# Patient Record
Sex: FEMALE | Race: BLACK OR AFRICAN AMERICAN | NOT HISPANIC OR LATINO
[De-identification: names, ages, dates, MRNs, and addresses within clinical notes are randomized per-mention and may not be internally consistent; named-entity substitution may affect disease eponyms.]

---

## 2017-01-25 ENCOUNTER — APPOINTMENT (OUTPATIENT)
Dept: OPHTHALMOLOGY | Facility: CLINIC | Age: 52
End: 2017-01-25

## 2017-07-19 ENCOUNTER — APPOINTMENT (OUTPATIENT)
Dept: OPHTHALMOLOGY | Facility: CLINIC | Age: 52
End: 2017-07-19

## 2017-11-29 ENCOUNTER — APPOINTMENT (OUTPATIENT)
Dept: OPHTHALMOLOGY | Facility: CLINIC | Age: 52
End: 2017-11-29

## 2017-12-13 ENCOUNTER — INPATIENT (INPATIENT)
Facility: HOSPITAL | Age: 52
LOS: 46 days | Discharge: NURSING, REHAB OR LONG-TERM CARE | DRG: 870 | End: 2018-01-29
Attending: FAMILY MEDICINE | Admitting: FAMILY MEDICINE
Payer: MEDICAID

## 2017-12-13 VITALS
RESPIRATION RATE: 20 BRPM | HEART RATE: 88 BPM | TEMPERATURE: 99 F | DIASTOLIC BLOOD PRESSURE: 76 MMHG | SYSTOLIC BLOOD PRESSURE: 141 MMHG | OXYGEN SATURATION: 95 %

## 2017-12-13 DIAGNOSIS — E11.9 TYPE 2 DIABETES MELLITUS WITHOUT COMPLICATIONS: ICD-10-CM

## 2017-12-13 DIAGNOSIS — E03.9 HYPOTHYROIDISM, UNSPECIFIED: ICD-10-CM

## 2017-12-13 DIAGNOSIS — I50.9 HEART FAILURE, UNSPECIFIED: ICD-10-CM

## 2017-12-13 DIAGNOSIS — R06.00 DYSPNEA, UNSPECIFIED: ICD-10-CM

## 2017-12-13 DIAGNOSIS — J96.00 ACUTE RESPIRATORY FAILURE, UNSPECIFIED WHETHER WITH HYPOXIA OR HYPERCAPNIA: ICD-10-CM

## 2017-12-13 DIAGNOSIS — N18.9 CHRONIC KIDNEY DISEASE, UNSPECIFIED: ICD-10-CM

## 2017-12-13 DIAGNOSIS — I10 ESSENTIAL (PRIMARY) HYPERTENSION: ICD-10-CM

## 2017-12-13 DIAGNOSIS — Z29.9 ENCOUNTER FOR PROPHYLACTIC MEASURES, UNSPECIFIED: ICD-10-CM

## 2017-12-13 LAB
ALBUMIN SERPL ELPH-MCNC: 3.4 G/DL — LOW (ref 3.5–5)
ALP SERPL-CCNC: 77 U/L — SIGNIFICANT CHANGE UP (ref 40–120)
ALT FLD-CCNC: 17 U/L DA — SIGNIFICANT CHANGE UP (ref 10–60)
ANION GAP SERPL CALC-SCNC: 12 MMOL/L — SIGNIFICANT CHANGE UP (ref 5–17)
ANION GAP SERPL CALC-SCNC: 16 MMOL/L — SIGNIFICANT CHANGE UP (ref 5–17)
APPEARANCE UR: ABNORMAL
AST SERPL-CCNC: 31 U/L — SIGNIFICANT CHANGE UP (ref 10–40)
BASE EXCESS BLDA CALC-SCNC: -0.4 MMOL/L — SIGNIFICANT CHANGE UP (ref -2–2)
BASE EXCESS BLDA CALC-SCNC: 0.7 MMOL/L — SIGNIFICANT CHANGE UP (ref -2–2)
BASOPHILS NFR BLD AUTO: 2 % — SIGNIFICANT CHANGE UP (ref 0–2)
BILIRUB SERPL-MCNC: 0.6 MG/DL — SIGNIFICANT CHANGE UP (ref 0.2–1.2)
BILIRUB UR-MCNC: NEGATIVE — SIGNIFICANT CHANGE UP
BLOOD GAS COMMENTS ARTERIAL: SIGNIFICANT CHANGE UP
BLOOD GAS COMMENTS ARTERIAL: SIGNIFICANT CHANGE UP
BUN SERPL-MCNC: 82 MG/DL — HIGH (ref 7–18)
BUN SERPL-MCNC: 88 MG/DL — HIGH (ref 7–18)
CALCIUM SERPL-MCNC: 8.9 MG/DL — SIGNIFICANT CHANGE UP (ref 8.4–10.5)
CALCIUM SERPL-MCNC: 9.1 MG/DL — SIGNIFICANT CHANGE UP (ref 8.4–10.5)
CHLORIDE SERPL-SCNC: 105 MMOL/L — SIGNIFICANT CHANGE UP (ref 96–108)
CHLORIDE SERPL-SCNC: 107 MMOL/L — SIGNIFICANT CHANGE UP (ref 96–108)
CK MB BLD-MCNC: 2.4 % — SIGNIFICANT CHANGE UP (ref 0–3.5)
CK MB CFR SERPL CALC: 1.8 NG/ML — SIGNIFICANT CHANGE UP (ref 0–3.6)
CK SERPL-CCNC: 75 U/L — SIGNIFICANT CHANGE UP (ref 21–215)
CO2 SERPL-SCNC: 21 MMOL/L — LOW (ref 22–31)
CO2 SERPL-SCNC: 23 MMOL/L — SIGNIFICANT CHANGE UP (ref 22–31)
COLOR SPEC: YELLOW — SIGNIFICANT CHANGE UP
CREAT SERPL-MCNC: 6.3 MG/DL — HIGH (ref 0.5–1.3)
CREAT SERPL-MCNC: 6.44 MG/DL — HIGH (ref 0.5–1.3)
DIFF PNL FLD: NEGATIVE — SIGNIFICANT CHANGE UP
GLUCOSE BLDC GLUCOMTR-MCNC: 108 MG/DL — HIGH (ref 70–99)
GLUCOSE SERPL-MCNC: 104 MG/DL — HIGH (ref 70–99)
GLUCOSE SERPL-MCNC: 133 MG/DL — HIGH (ref 70–99)
GLUCOSE UR QL: NEGATIVE — SIGNIFICANT CHANGE UP
HCO3 BLDA-SCNC: 25 MMOL/L — SIGNIFICANT CHANGE UP (ref 23–27)
HCO3 BLDA-SCNC: 26 MMOL/L — SIGNIFICANT CHANGE UP (ref 23–27)
HCT VFR BLD CALC: 28.9 % — LOW (ref 34.5–45)
HGB BLD-MCNC: 8.9 G/DL — LOW (ref 11.5–15.5)
HOROWITZ INDEX BLDA+IHG-RTO: 100 — SIGNIFICANT CHANGE UP
HOROWITZ INDEX BLDA+IHG-RTO: 60 — SIGNIFICANT CHANGE UP
KETONES UR-MCNC: NEGATIVE — SIGNIFICANT CHANGE UP
LACTATE SERPL-SCNC: 0.8 MMOL/L — SIGNIFICANT CHANGE UP (ref 0.7–2)
LEUKOCYTE ESTERASE UR-ACNC: ABNORMAL
LYMPHOCYTES # BLD AUTO: 2 % — LOW (ref 13–44)
MCHC RBC-ENTMCNC: 29.6 PG — SIGNIFICANT CHANGE UP (ref 27–34)
MCHC RBC-ENTMCNC: 30.9 GM/DL — LOW (ref 32–36)
MCV RBC AUTO: 96 FL — SIGNIFICANT CHANGE UP (ref 80–100)
MONOCYTES NFR BLD AUTO: 4 % — SIGNIFICANT CHANGE UP (ref 2–14)
NEUTROPHILS NFR BLD AUTO: 91 % — HIGH (ref 43–77)
NITRITE UR-MCNC: NEGATIVE — SIGNIFICANT CHANGE UP
NT-PROBNP SERPL-SCNC: HIGH PG/ML (ref 0–125)
PCO2 BLDA: 46 MMHG — SIGNIFICANT CHANGE UP (ref 32–46)
PCO2 BLDA: 48 MMHG — HIGH (ref 32–46)
PH BLDA: 7.33 — LOW (ref 7.35–7.45)
PH BLDA: 7.36 — SIGNIFICANT CHANGE UP (ref 7.35–7.45)
PH UR: 5 — SIGNIFICANT CHANGE UP (ref 5–8)
PLATELET # BLD AUTO: 160 K/UL — SIGNIFICANT CHANGE UP (ref 150–400)
PO2 BLDA: 62 MMHG — LOW (ref 74–108)
PO2 BLDA: 92 MMHG — SIGNIFICANT CHANGE UP (ref 74–108)
POTASSIUM SERPL-MCNC: 4.2 MMOL/L — SIGNIFICANT CHANGE UP (ref 3.5–5.3)
POTASSIUM SERPL-MCNC: 5.4 MMOL/L — HIGH (ref 3.5–5.3)
POTASSIUM SERPL-SCNC: 4.2 MMOL/L — SIGNIFICANT CHANGE UP (ref 3.5–5.3)
POTASSIUM SERPL-SCNC: 5.4 MMOL/L — HIGH (ref 3.5–5.3)
PROT SERPL-MCNC: 8.1 G/DL — SIGNIFICANT CHANGE UP (ref 6–8.3)
PROT UR-MCNC: 100
RAPID RVP RESULT: SIGNIFICANT CHANGE UP
RBC # BLD: 3.01 M/UL — LOW (ref 3.8–5.2)
RBC # FLD: 16.7 % — HIGH (ref 10.3–14.5)
SAO2 % BLDA: 87 % — LOW (ref 92–96)
SAO2 % BLDA: 94 % — SIGNIFICANT CHANGE UP (ref 92–96)
SODIUM SERPL-SCNC: 140 MMOL/L — SIGNIFICANT CHANGE UP (ref 135–145)
SODIUM SERPL-SCNC: 144 MMOL/L — SIGNIFICANT CHANGE UP (ref 135–145)
SP GR SPEC: 1.01 — SIGNIFICANT CHANGE UP (ref 1.01–1.02)
TROPONIN I SERPL-MCNC: 0.13 NG/ML — HIGH (ref 0–0.04)
UROBILINOGEN FLD QL: NEGATIVE — SIGNIFICANT CHANGE UP
WBC # BLD: 15.9 K/UL — HIGH (ref 3.8–10.5)
WBC # FLD AUTO: 15.9 K/UL — HIGH (ref 3.8–10.5)

## 2017-12-13 PROCEDURE — 99285 EMERGENCY DEPT VISIT HI MDM: CPT

## 2017-12-13 PROCEDURE — 71010: CPT | Mod: 26

## 2017-12-13 RX ORDER — DEXTROSE 50 % IN WATER 50 %
25 SYRINGE (ML) INTRAVENOUS ONCE
Qty: 0 | Refills: 0 | Status: DISCONTINUED | OUTPATIENT
Start: 2017-12-13 | End: 2018-01-29

## 2017-12-13 RX ORDER — FUROSEMIDE 40 MG
60 TABLET ORAL ONCE
Qty: 0 | Refills: 0 | Status: COMPLETED | OUTPATIENT
Start: 2017-12-13 | End: 2017-12-13

## 2017-12-13 RX ORDER — FERROUS SULFATE 325(65) MG
325 TABLET ORAL
Qty: 0 | Refills: 0 | Status: DISCONTINUED | OUTPATIENT
Start: 2017-12-13 | End: 2017-12-16

## 2017-12-13 RX ORDER — AMLODIPINE BESYLATE 2.5 MG/1
10 TABLET ORAL DAILY
Qty: 0 | Refills: 0 | Status: DISCONTINUED | OUTPATIENT
Start: 2017-12-13 | End: 2017-12-16

## 2017-12-13 RX ORDER — DOCUSATE SODIUM 100 MG
100 CAPSULE ORAL THREE TIMES A DAY
Qty: 0 | Refills: 0 | Status: DISCONTINUED | OUTPATIENT
Start: 2017-12-13 | End: 2017-12-18

## 2017-12-13 RX ORDER — SODIUM BICARBONATE 1 MEQ/ML
650 SYRINGE (ML) INTRAVENOUS
Qty: 0 | Refills: 0 | Status: DISCONTINUED | OUTPATIENT
Start: 2017-12-13 | End: 2017-12-15

## 2017-12-13 RX ORDER — INSULIN LISPRO 100/ML
VIAL (ML) SUBCUTANEOUS
Qty: 0 | Refills: 0 | Status: DISCONTINUED | OUTPATIENT
Start: 2017-12-13 | End: 2017-12-14

## 2017-12-13 RX ORDER — DEXTROSE 50 % IN WATER 50 %
12.5 SYRINGE (ML) INTRAVENOUS ONCE
Qty: 0 | Refills: 0 | Status: DISCONTINUED | OUTPATIENT
Start: 2017-12-13 | End: 2018-01-29

## 2017-12-13 RX ORDER — INSULIN GLARGINE 100 [IU]/ML
10 INJECTION, SOLUTION SUBCUTANEOUS AT BEDTIME
Qty: 0 | Refills: 0 | Status: DISCONTINUED | OUTPATIENT
Start: 2017-12-13 | End: 2017-12-14

## 2017-12-13 RX ORDER — SODIUM CHLORIDE 9 MG/ML
1000 INJECTION, SOLUTION INTRAVENOUS
Qty: 0 | Refills: 0 | Status: DISCONTINUED | OUTPATIENT
Start: 2017-12-13 | End: 2018-01-29

## 2017-12-13 RX ORDER — HYDRALAZINE HCL 50 MG
100 TABLET ORAL EVERY 8 HOURS
Qty: 0 | Refills: 0 | Status: DISCONTINUED | OUTPATIENT
Start: 2017-12-13 | End: 2017-12-16

## 2017-12-13 RX ORDER — DEXTROSE 50 % IN WATER 50 %
1 SYRINGE (ML) INTRAVENOUS ONCE
Qty: 0 | Refills: 0 | Status: DISCONTINUED | OUTPATIENT
Start: 2017-12-13 | End: 2018-01-29

## 2017-12-13 RX ORDER — PIPERACILLIN AND TAZOBACTAM 4; .5 G/20ML; G/20ML
3.38 INJECTION, POWDER, LYOPHILIZED, FOR SOLUTION INTRAVENOUS ONCE
Qty: 0 | Refills: 0 | Status: COMPLETED | OUTPATIENT
Start: 2017-12-13 | End: 2017-12-13

## 2017-12-13 RX ORDER — SODIUM CHLORIDE 9 MG/ML
1000 INJECTION INTRAMUSCULAR; INTRAVENOUS; SUBCUTANEOUS ONCE
Qty: 0 | Refills: 0 | Status: COMPLETED | OUTPATIENT
Start: 2017-12-13 | End: 2017-12-13

## 2017-12-13 RX ORDER — FOLIC ACID 0.8 MG
1 TABLET ORAL DAILY
Qty: 0 | Refills: 0 | Status: DISCONTINUED | OUTPATIENT
Start: 2017-12-13 | End: 2018-01-29

## 2017-12-13 RX ORDER — PIPERACILLIN AND TAZOBACTAM 4; .5 G/20ML; G/20ML
3.38 INJECTION, POWDER, LYOPHILIZED, FOR SOLUTION INTRAVENOUS EVERY 12 HOURS
Qty: 0 | Refills: 0 | Status: DISCONTINUED | OUTPATIENT
Start: 2017-12-13 | End: 2017-12-15

## 2017-12-13 RX ORDER — HEPARIN SODIUM 5000 [USP'U]/ML
5000 INJECTION INTRAVENOUS; SUBCUTANEOUS EVERY 8 HOURS
Qty: 0 | Refills: 0 | Status: DISCONTINUED | OUTPATIENT
Start: 2017-12-13 | End: 2017-12-14

## 2017-12-13 RX ORDER — VANCOMYCIN HCL 1 G
500 VIAL (EA) INTRAVENOUS EVERY 12 HOURS
Qty: 0 | Refills: 0 | Status: DISCONTINUED | OUTPATIENT
Start: 2017-12-13 | End: 2017-12-14

## 2017-12-13 RX ORDER — LEVOTHYROXINE SODIUM 125 MCG
50 TABLET ORAL DAILY
Qty: 0 | Refills: 0 | Status: DISCONTINUED | OUTPATIENT
Start: 2017-12-13 | End: 2017-12-18

## 2017-12-13 RX ORDER — ERYTHROPOIETIN 10000 [IU]/ML
0 INJECTION, SOLUTION INTRAVENOUS; SUBCUTANEOUS
Qty: 0 | Refills: 0 | COMMUNITY

## 2017-12-13 RX ORDER — SENNA PLUS 8.6 MG/1
2 TABLET ORAL AT BEDTIME
Qty: 0 | Refills: 0 | Status: DISCONTINUED | OUTPATIENT
Start: 2017-12-13 | End: 2017-12-19

## 2017-12-13 RX ORDER — CARVEDILOL PHOSPHATE 80 MG/1
25 CAPSULE, EXTENDED RELEASE ORAL EVERY 12 HOURS
Qty: 0 | Refills: 0 | Status: DISCONTINUED | OUTPATIENT
Start: 2017-12-13 | End: 2017-12-16

## 2017-12-13 RX ORDER — GLUCAGON INJECTION, SOLUTION 0.5 MG/.1ML
1 INJECTION, SOLUTION SUBCUTANEOUS ONCE
Qty: 0 | Refills: 0 | Status: DISCONTINUED | OUTPATIENT
Start: 2017-12-13 | End: 2018-01-29

## 2017-12-13 RX ORDER — ASCORBIC ACID 60 MG
500 TABLET,CHEWABLE ORAL DAILY
Qty: 0 | Refills: 0 | Status: DISCONTINUED | OUTPATIENT
Start: 2017-12-13 | End: 2018-01-29

## 2017-12-13 RX ORDER — ERYTHROPOIETIN 10000 [IU]/ML
10000 INJECTION, SOLUTION INTRAVENOUS; SUBCUTANEOUS
Qty: 0 | Refills: 0 | Status: DISCONTINUED | OUTPATIENT
Start: 2017-12-14 | End: 2017-12-14

## 2017-12-13 RX ORDER — CINACALCET 30 MG/1
30 TABLET, FILM COATED ORAL DAILY
Qty: 0 | Refills: 0 | Status: DISCONTINUED | OUTPATIENT
Start: 2017-12-13 | End: 2018-01-01

## 2017-12-13 RX ORDER — SIMVASTATIN 20 MG/1
20 TABLET, FILM COATED ORAL AT BEDTIME
Qty: 0 | Refills: 0 | Status: DISCONTINUED | OUTPATIENT
Start: 2017-12-13 | End: 2018-01-29

## 2017-12-13 RX ORDER — ASPIRIN/CALCIUM CARB/MAGNESIUM 324 MG
81 TABLET ORAL DAILY
Qty: 0 | Refills: 0 | Status: DISCONTINUED | OUTPATIENT
Start: 2017-12-13 | End: 2017-12-20

## 2017-12-13 RX ORDER — PANTOPRAZOLE SODIUM 20 MG/1
40 TABLET, DELAYED RELEASE ORAL
Qty: 0 | Refills: 0 | Status: DISCONTINUED | OUTPATIENT
Start: 2017-12-13 | End: 2017-12-14

## 2017-12-13 RX ORDER — FUROSEMIDE 40 MG
40 TABLET ORAL
Qty: 0 | Refills: 0 | Status: DISCONTINUED | OUTPATIENT
Start: 2017-12-13 | End: 2017-12-14

## 2017-12-13 RX ORDER — IPRATROPIUM/ALBUTEROL SULFATE 18-103MCG
3 AEROSOL WITH ADAPTER (GRAM) INHALATION EVERY 6 HOURS
Qty: 0 | Refills: 0 | Status: DISCONTINUED | OUTPATIENT
Start: 2017-12-13 | End: 2017-12-16

## 2017-12-13 RX ORDER — INSULIN GLARGINE 100 [IU]/ML
5 INJECTION, SOLUTION SUBCUTANEOUS ONCE
Qty: 0 | Refills: 0 | Status: COMPLETED | OUTPATIENT
Start: 2017-12-13 | End: 2017-12-13

## 2017-12-13 RX ADMIN — SODIUM CHLORIDE 1000 MILLILITER(S): 9 INJECTION INTRAMUSCULAR; INTRAVENOUS; SUBCUTANEOUS at 12:28

## 2017-12-13 RX ADMIN — HEPARIN SODIUM 5000 UNIT(S): 5000 INJECTION INTRAVENOUS; SUBCUTANEOUS at 17:17

## 2017-12-13 RX ADMIN — INSULIN GLARGINE 5 UNIT(S): 100 INJECTION, SOLUTION SUBCUTANEOUS at 22:23

## 2017-12-13 RX ADMIN — Medication 40 MILLIGRAM(S): at 17:17

## 2017-12-13 RX ADMIN — Medication 3 MILLILITER(S): at 20:21

## 2017-12-13 RX ADMIN — HEPARIN SODIUM 5000 UNIT(S): 5000 INJECTION INTRAVENOUS; SUBCUTANEOUS at 21:33

## 2017-12-13 RX ADMIN — Medication 60 MILLIGRAM(S): at 13:41

## 2017-12-13 RX ADMIN — Medication 100 MILLIGRAM(S): at 22:23

## 2017-12-13 RX ADMIN — PIPERACILLIN AND TAZOBACTAM 12.5 GRAM(S): 4; .5 INJECTION, POWDER, LYOPHILIZED, FOR SOLUTION INTRAVENOUS at 17:38

## 2017-12-13 RX ADMIN — PIPERACILLIN AND TAZOBACTAM 200 GRAM(S): 4; .5 INJECTION, POWDER, LYOPHILIZED, FOR SOLUTION INTRAVENOUS at 13:41

## 2017-12-13 NOTE — H&P ADULT - GASTROINTESTINAL DETAILS
normal/soft/no bruit/nontender/no distention/no masses palpable/no rebound tenderness/no rigidity/no guarding/no organomegaly/bowel sounds normal

## 2017-12-13 NOTE — H&P ADULT - NEGATIVE CARDIOVASCULAR SYMPTOMS
no dyspnea on exertion/no peripheral edema/no chest pain/no palpitations/no orthopnea/no claudication/no paroxysmal nocturnal dyspnea

## 2017-12-13 NOTE — H&P ADULT - PROBLEM SELECTOR PLAN 1
Acute respiratory failure secondary to CHF exacerbation vs HAP  - intial VS: Tmax - 97.1;/65, HR 74, RR 20 and 97 on 100 % NRB  -Clinically not in Acute resp distress , confused and lethargic, +cardiac wheezing   -ABG 7.36/46/92 on 100 % NRB  -EKG shows - NSR , no ischemic changes , 1st degree AV block  -CXR shows obscured left costophrenic marissa, cardiomegaly ,b/l  bilateral moderate infiltrates vs opacity  (not changed from prior CXR)  -pBNP 30K.  -Patient was given Lasix 60mg IV x 1 in ED and was placed on BiPAP  -Continue with Lasix 40mg IV BID  -c/w IV Abx to cover for HAP - renal dose vancomycin + Zosyn   - c/w nebs   -Continue with BiPAP and NPO except meds  -Flores for strict I/O monitor   -Monitor respiratory status   -c/w ICU monitoring  - f/u on serial cardiac enzymes  - f/u TSH/FT4, HbA1c and Lipid panel  - f/u repeat ABG post 1-2hrs after BiPAP  - f/u Procalcitonin, RVP, BCx, legionella Acute respiratory failure secondary to CHF exacerbation vs HAP  - intial VS: Tmax - 97.1;/65, HR 74, RR 20 and 97 on 100 % NRB  -Clinically not in Acute resp distress , confused and lethargic, +cardiac wheezing   -ABG 7.36/46/92 on 100 % NRB  -EKG shows - NSR , no ischemic changes , 1st degree AV block  -CXR shows obscured left costophrenic marissa, cardiomegaly ,b/l  bilateral moderate infiltrates vs opacity  (not changed from prior CXR)  -pBNP 30K.  -Patient was given Lasix 60mg IV x 1 in ED and was placed on BiPAP  -Continue with Lasix 40mg IV BID  -c/w IV Abx to cover for HAP - renal dose vancomycin + Zosyn   - c/w nebs   -Continue with BiPAP and NPO except meds  -Flores for strict I/O monitor   -Monitor respiratory status   -c/w ICU monitoring  - f/u on serial cardiac enzymes  - f/u TSH/FT4, HbA1c and Lipid panel  - f/u repeat ABG post 1-2hrs after BiPAP  - f/u Procalcitonin, RVP, BCx, legionella  - ID Dr. Dietz

## 2017-12-13 NOTE — H&P ADULT - NEGATIVE MUSCULOSKELETAL SYMPTOMS
no arthralgia/no arthritis/no muscle cramps/no muscle weakness/no neck pain/no leg pain R/no back pain/no leg pain L/no arm pain L/no arm pain R/no joint swelling/no myalgia

## 2017-12-13 NOTE — ED PROVIDER NOTE - OBJECTIVE STATEMENT
51 y/o F pt with PMHx of Anemia, CHF, CKD, Clostridium Difficile Infection, DM, Diabetic Neuropathy, HTN, Hypothyroidism, Osteomyelitis of the Jaw, and Parathyroid Adenoma and PSHx of  presents to ED c/o SOB and difficulty breathing x1 week. Pt also reports associated mild cough. Pt denies fever, chills, general pain, CP, or any other complaints. NKDA.

## 2017-12-13 NOTE — PATIENT PROFILE ADULT. - AS SC BRADEN MOBILITY
Steps to Quit Smoking   Smoking tobacco can be harmful to your health and can affect almost every organ in your body. Smoking puts you, and those around you, at risk for developing many serious chronic diseases. Quitting smoking is difficult, but it is one of the best things that you can do for your health. It is never too late to quit.  WHAT ARE THE BENEFITS OF QUITTING SMOKING?  When you quit smoking, you lower your risk of developing serious diseases and conditions, such as:  · Lung cancer or lung disease, such as COPD.  · Heart disease.  · Stroke.  · Heart attack.  · Infertility.  · Osteoporosis and bone fractures.  Additionally, symptoms such as coughing, wheezing, and shortness of breath may get better when you quit. You may also find that you get sick less often because your body is stronger at fighting off colds and infections. If you are pregnant, quitting smoking can help to reduce your chances of having a baby of low birth weight.  HOW DO I GET READY TO QUIT?  When you decide to quit smoking, create a plan to make sure that you are successful. Before you quit:  · Pick a date to quit. Set a date within the next two weeks to give you time to prepare.  · Write down the reasons why you are quitting. Keep this list in places where you will see it often, such as on your bathroom mirror or in your car or wallet.  · Identify the people, places, things, and activities that make you want to smoke (triggers) and avoid them. Make sure to take these actions:    Throw away all cigarettes at home, at work, and in your car.    Throw away smoking accessories, such as ashtrays and lighters.    Clean your car and make sure to empty the ashtray.    Clean your home, including curtains and carpets.  · Tell your family, friends, and coworkers that you are quitting. Support from your loved ones can make quitting easier.  · Talk with your health care provider about your options for quitting smoking.  · Find out what treatment  "options are covered by your health insurance.  WHAT STRATEGIES CAN I USE TO QUIT SMOKING?   Talk with your healthcare provider about different strategies to quit smoking. Some strategies include:  · Quitting smoking altogether instead of gradually lessening how much you smoke over a period of time. Research shows that quitting \"cold turkey\" is more successful than gradually quitting.  · Attending in-person counseling to help you build problem-solving skills. You are more likely to have success in quitting if you attend several counseling sessions. Even short sessions of 10 minutes can be effective.  · Finding resources and support systems that can help you to quit smoking and remain smoke-free after you quit. These resources are most helpful when you use them often. They can include:    Online chats with a counselor.    Telephone quitlines.    Printed self-help materials.    Support groups or group counseling.    Text messaging programs.    Mobile phone applications.  · Taking medicines to help you quit smoking. (If you are pregnant or breastfeeding, talk with your health care provider first.) Some medicines contain nicotine and some do not. Both types of medicines help with cravings, but the medicines that include nicotine help to relieve withdrawal symptoms. Your health care provider may recommend:    Nicotine patches, gum, or lozenges.    Nicotine inhalers or sprays.    Non-nicotine medicine that is taken by mouth.  Talk with your health care provider about combining strategies, such as taking medicines while you are also receiving in-person counseling. Using these two strategies together makes you more likely to succeed in quitting than if you used either strategy on its own.  If you are pregnant or breastfeeding, talk with your health care provider about finding counseling or other support strategies to quit smoking. Do not take medicine to help you quit smoking unless told to do so by your health care " provider.  WHAT THINGS CAN I DO TO MAKE IT EASIER TO QUIT?  Quitting smoking might feel overwhelming at first, but there is a lot that you can do to make it easier. Take these important actions:  · Reach out to your family and friends and ask that they support and encourage you during this time. Call telephone quitlines, reach out to support groups, or work with a counselor for support.  · Ask people who smoke to avoid smoking around you.  · Avoid places that trigger you to smoke, such as bars, parties, or smoke-break areas at work.  · Spend time around people who do not smoke.  · Lessen stress in your life, because stress can be a smoking trigger for some people. To lessen stress, try:    Exercising regularly.    Deep-breathing exercises.    Yoga.    Meditating.    Performing a body scan. This involves closing your eyes, scanning your body from head to toe, and noticing which parts of your body are particularly tense. Purposefully relax the muscles in those areas.  · Download or purchase mobile phone or tablet apps (applications) that can help you stick to your quit plan by providing reminders, tips, and encouragement. There are many free apps, such as QuitGuide from the CDC (Centers for Disease Control and Prevention). You can find other support for quitting smoking (smoking cessation) through smokefree.gov and other websites.  HOW WILL I FEEL WHEN I QUIT SMOKING?  Within the first 24 hours of quitting smoking, you may start to feel some withdrawal symptoms. These symptoms are usually most noticeable 2-3 days after quitting, but they usually do not last beyond 2-3 weeks. Changes or symptoms that you might experience include:  · Mood swings.  · Restlessness, anxiety, or irritation.  · Difficulty concentrating.  · Dizziness.  · Strong cravings for sugary foods in addition to nicotine.  · Mild weight gain.  · Constipation.  · Nausea.  · Coughing or a sore throat.  · Changes in how your medicines work in your  body.  · A depressed mood.  · Difficulty sleeping (insomnia).  After the first 2-3 weeks of quitting, you may start to notice more positive results, such as:  · Improved sense of smell and taste.  · Decreased coughing and sore throat.  · Slower heart rate.  · Lower blood pressure.  · Clearer skin.  · The ability to breathe more easily.  · Fewer sick days.  Quitting smoking is very challenging for most people. Do not get discouraged if you are not successful the first time. Some people need to make many attempts to quit before they achieve long-term success. Do your best to stick to your quit plan, and talk with your health care provider if you have any questions or concerns.     This information is not intended to replace advice given to you by your health care provider. Make sure you discuss any questions you have with your health care provider.     Document Released: 12/12/2002 Document Revised: 05/03/2016 Document Reviewed: 05/03/2016  [employer] Interactive Patient Education ©2016 [employer]  Exercising to Lose Weight  Exercising can help you to lose weight. In order to lose weight through exercise, you need to do vigorous-intensity exercise. You can tell that you are exercising with vigorous intensity if you are breathing very hard and fast and cannot hold a conversation while exercising.  Moderate-intensity exercise helps to maintain your current weight. You can tell that you are exercising at a moderate level if you have a higher heart rate and faster breathing, but you are still able to hold a conversation.  HOW OFTEN SHOULD I EXERCISE?  Choose an activity that you enjoy and set realistic goals. Your health care provider can help you to make an activity plan that works for you. Exercise regularly as directed by your health care provider. This may include:  · Doing resistance training twice each week, such as:    Push-ups.    Sit-ups.    Lifting weights.    Using resistance bands.  · Doing a given intensity  of exercise for a given amount of time. Choose from these options:    150 minutes of moderate-intensity exercise every week.    75 minutes of vigorous-intensity exercise every week.    A mix of moderate-intensity and vigorous-intensity exercise every week.  Children, pregnant women, people who are out of shape, people who are overweight, and older adults may need to consult a health care provider for individual recommendations. If you have any sort of medical condition, be sure to consult your health care provider before starting a new exercise program.  WHAT ARE SOME ACTIVITIES THAT CAN HELP ME TO LOSE WEIGHT?   · Walking at a rate of at least 4.5 miles an hour.  · Jogging or running at a rate of 5 miles per hour.  · Biking at a rate of at least 10 miles per hour.  · Lap swimming.  · Roller-skating or in-line skating.  · Cross-country skiing.  · Vigorous competitive sports, such as football, basketball, and soccer.  · Jumping rope.  · Aerobic dancing.  HOW CAN I BE MORE ACTIVE IN MY DAY-TO-DAY ACTIVITIES?  · Use the stairs instead of the elevator.  · Take a walk during your lunch break.  · If you drive, park your car farther away from work or school.  · If you take public transportation, get off one stop early and walk the rest of the way.  · Make all of your phone calls while standing up and walking around.  · Get up, stretch, and walk around every 30 minutes throughout the day.  WHAT GUIDELINES SHOULD I FOLLOW WHILE EXERCISING?  · Do not exercise so much that you hurt yourself, feel dizzy, or get very short of breath.  · Consult your health care provider prior to starting a new exercise program.  · Wear comfortable clothes and shoes with good support.  · Drink plenty of water while you exercise to prevent dehydration or heat stroke. Body water is lost during exercise and must be replaced.  · Work out until you breathe faster and your heart beats faster.     This information is not intended to replace advice given  to you by your health care provider. Make sure you discuss any questions you have with your health care provider.     Document Released: 01/20/2012 Document Revised: 01/08/2016 Document Reviewed: 05/21/2015  [employer] Interactive Patient Education ©2016 [employer]     (2) very limited

## 2017-12-13 NOTE — H&P ADULT - HISTORY OF PRESENT ILLNESS
52 y/o F from [facility] ( long term care 2/2 diffculty walking possibly 2/2 polyneuropathy) w/ PMHx of anemia, CHF( diastolic dysfunction), CKD, Clostridium difficile infection, DM, Diabetic neuropathy, HTN, HLD, Hypothyroidism, Hypoparathyroidism, Osteomyelitis of jaw, and Parathyroid adenoma, GERD p/w c/o SOB and difficulty breathing x1 week. Pt also reports associated mild cough. Pt denies fever, chills, general pain, C/P, or any other complaints. NO  In ED pt is Awake , responds to pain and verbal stimuli, She in moderate respiratory distress. placed on BIPAP. ICU consulted for new BIPAP. 52 y/o F from [facility] ( long term care 2/2 diffculty walking possibly 2/2 polyneuropathy) w/ PMHx of anemia, CHF( diastolic dysfunction), CKD, Clostridium difficile infection, DM, Diabetic neuropathy, HTN, HLD, Hypothyroidism, Hypoparathyroidism, Osteomyelitis of jaw, and Parathyroid adenoma, GERD p/w c/o SOB and difficulty breathing x1 week. Pt also reports associated mild cough.  As per [facility] papers pt was confused and lethargic.  In ED pt was lethargic initially with moderate respiratory distress. She was placed on BIPAP. ICU consulted for new BIPAP. Later after few hours of BiPAP treatment pt is awake , responds to pain and verbal stimuli. Pt denies fever, chills, general pain, C/P, or any other complaints.    Hx obtained from sister at bedside, and NH papers , pt is poor historian at this point. HPI info limited. GOC done with sister - pt is full code.

## 2017-12-13 NOTE — H&P ADULT - PROBLEM SELECTOR PLAN 4
hold nephrotoxic drugs   Hyperkalemia 5.4 9 (no EKG changes of hyperK)and creatinine 6.30( baseline 4.3)  discussed case with Dr. Tigist toscano , no need to correct hyperkalemia for now, no need for HD , will repeat BMP

## 2017-12-13 NOTE — H&P ADULT - NEGATIVE GENERAL SYMPTOMS
no fatigue/no fever/no weight loss/no polyphagia/no weight gain/no polyuria/no polydipsia/no malaise/no anorexia/no chills/no sweating

## 2017-12-13 NOTE — ED PROVIDER NOTE - PMH
Anemia    CHF (congestive heart failure)    CKD (chronic kidney disease)    Clostridium difficile infection    Diabetes mellitus    Diabetic neuropathy    HTN (hypertension)    Hypothyroidism    Osteomyelitis of jaw    Parathyroid adenoma

## 2017-12-13 NOTE — ED ADULT NURSE REASSESSMENT NOTE - NS ED NURSE REASSESS COMMENT FT1
IVF D/C by DR Wilson F/C 16 F inserted Lasix 60 mg IVP given
Pt remains lethargic VS WNL attached to BiPAP continue monitoring safety maintained all the times
Pt noted lethargic arose to verbal stimuli MD Wilson made aware attached to cardiac monitor and NRM IVF in progress flu swab sent to lab Pt placed in droplet precautions

## 2017-12-13 NOTE — ED PROVIDER NOTE - MEDICAL DECISION MAKING DETAILS
53 y/o F pt presents with likely PNA. Plan to give Abx's, hydration, and admit. 51 y/o F pt presents with respiratory distress. labs reveal renal failure fluid overload, xray with congestion. Patient initially started on IV fluids, but patient received only 200cc before I discontinued fluids. lasix given, bipap. admit to ICU. antibiotics given

## 2017-12-13 NOTE — H&P ADULT - NEGATIVE SKIN SYMPTOMS
no brittle nails/no hair loss/no tumor/no rash/no change in size/color of mole/no itching/no dryness

## 2017-12-13 NOTE — ED PROVIDER NOTE - CARE PLAN
Principal Discharge DX:	Respiratory distress  Secondary Diagnosis:	CHF (congestive heart failure)  Secondary Diagnosis:	CKD (chronic kidney disease)

## 2017-12-13 NOTE — H&P ADULT - RS GEN PE MLT RESP DETAILS PC
rales/no rhonchi/airway patent/good air movement/breath sounds equal/chest wall tenderness/respirations labored/normal/no intercostal retractions/no rales/clear to auscultation bilaterally

## 2017-12-13 NOTE — H&P ADULT - FAMILY HISTORY
Family history of diabetes mellitus     Family history of stroke     Sibling  Still living? Unknown  Family history of hypertension, Age at diagnosis: Age Unknown

## 2017-12-13 NOTE — H&P ADULT - NEGATIVE NEUROLOGICAL SYMPTOMS
no loss of consciousness/no difficulty walking/no generalized seizures/no tremors/no headache/no syncope/no loss of sensation/no paresthesias/no vertigo/no transient paralysis/no weakness/no focal seizures

## 2017-12-13 NOTE — H&P ADULT - ASSESSMENT
52 y/o F from [facility] ( long term care 2/2 diffculty walking possibly 2/2 polyneuropathy) w/ PMHx of anemia, CHF( diastolic dysfunction), CKD, Clostridium difficile infection, DM, Diabetic neuropathy, HTN, HLD, Hypothyroidism, Hypoparathyroidism, Osteomyelitis of jaw, and Parathyroid adenoma, GERD p/w c/o SOB and difficulty breathing x1 week. Pt also reports associated mild cough. Pt denies fever, chills, general pain, C/P, or any other complaints. NO  In ED pt is Awake , responds to pain and verbal stimuli, She in moderate respiratory distress. placed on BIPAP. ICU consulted for new BIPAP.    Admit to ICU for Acute Hypoxic and hypercapnic respiratory failure require new Bipap  2/2 Exacerbation of CHF or possibly pneumonia

## 2017-12-13 NOTE — H&P ADULT - NEUROLOGICAL DETAILS
deep reflexes intact/normal strength/responds to verbal commands/sensation intact/no spontaneous movement/responds to pain

## 2017-12-13 NOTE — H&P ADULT - NEGATIVE GASTROINTESTINAL SYMPTOMS
no melena/no jaundice/no constipation/no abdominal pain/no steatorrhea/no change in bowel habits/no flatulence/no hematochezia/no hiccoughs/no nausea/no vomiting/no diarrhea

## 2017-12-13 NOTE — H&P ADULT - ATTENDING COMMENTS
50 y/o F from [facility] ( long term care 2/2 diffculty walking possibly 2/2 polyneuropathy) w/ PMHx of anemia, CHF( diastolic dysfunction), CKD, Clostridium difficile infection, DM, Diabetic neuropathy, HTN, HLD, Hypothyroidism, Hypoparathyroidism, Osteomyelitis of jaw, and Parathyroid adenoma, GERD p/w c/o SOB and difficulty breathing x1 week. Pt also reports associated mild cough. Pt denies fever, chills, general pain, C/P, or any other complaints. NO  In ED pt is Awake , responds to pain and verbal stimuli, She in moderate respiratory distress. placed on BIPAP. ICU consulted for new BIPAP.    Admit to ICU for Acute Hypoxic and hypercapnic respiratory failure require new Bipap  2/2 Exacerbation of CHF or possibly pneumonia      Problem/Plan - 1:  ·  Problem: Acute respiratory failure.  Plan: Acute respiratory failure secondary to CHF exacerbation vs HAP  - intial VS: Tmax - 97.1;/65, HR 74, RR 20 and 97 on 100 % NRB  -Clinically not in Acute resp distress , confused and lethargic, +cardiac wheezing   -ABG 7.36/46/92 on 100 % NRB  -EKG shows - NSR , no ischemic changes , 1st degree AV block  -CXR shows obscured left costophrenic marissa, cardiomegaly ,b/l  bilateral moderate infiltrates vs opacity  (not changed from prior CXR)  -pBNP 30K.  -Patient was given Lasix 60mg IV x 1 in ED and was placed on BiPAP  -Continue with Lasix 40mg IV BID  -c/w IV Abx to cover for HAP - renal dose vancomycin + Zosyn   - c/w nebs   -Continue with BiPAP and NPO except meds  -Flores for strict I/O monitor   -Monitor respiratory status   -c/w ICU monitoring  - f/u on serial cardiac enzymes  - f/u TSH/FT4, HbA1c and Lipid panel  - f/u repeat ABG post 1-2hrs after BiPAP  - f/u Procalcitonin, RVP, BCx, legionella  - ID Dr. Dietz.     Problem/Plan - 2:  ·  Problem: Hypothyroidism.  Plan: c/w home dose LT4 50 mcg daily  f/u TSH.      Problem/Plan - 3:  ·  Problem: CHF (congestive heart failure).  Plan: unknown EF  c/w home cardiac meds   f/u TTE.      Problem/Plan - 4:  ·  Problem: CKD (chronic kidney disease).  Plan: hold nephrotoxic drugs   Hyperkalemia 5.4 9 (no EKG changes of hyperK)and creatinine 6.30( baseline 4.3)  discussed case with Dr. Tigist toscano , no need to correct hyperkalemia for now, no need for HD , will repeat BMP.      Problem/Plan - 5:  ·  Problem: Diabetes mellitus.  Plan: c/w Lantus 10 units daily , HSS   f/u HbA1c.

## 2017-12-14 LAB
24R-OH-CALCIDIOL SERPL-MCNC: 39.3 NG/ML — SIGNIFICANT CHANGE UP (ref 30–80)
ANION GAP SERPL CALC-SCNC: 10 MMOL/L — SIGNIFICANT CHANGE UP (ref 5–17)
ANION GAP SERPL CALC-SCNC: 11 MMOL/L — SIGNIFICANT CHANGE UP (ref 5–17)
ANION GAP SERPL CALC-SCNC: 11 MMOL/L — SIGNIFICANT CHANGE UP (ref 5–17)
APTT BLD: 40.2 SEC — HIGH (ref 27.5–37.4)
BASE EXCESS BLDA CALC-SCNC: 0.3 MMOL/L — SIGNIFICANT CHANGE UP (ref -2–2)
BASE EXCESS BLDA CALC-SCNC: 2.1 MMOL/L — HIGH (ref -2–2)
BASOPHILS # BLD AUTO: 0.1 K/UL — SIGNIFICANT CHANGE UP (ref 0–0.2)
BASOPHILS NFR BLD AUTO: 0.5 % — SIGNIFICANT CHANGE UP (ref 0–2)
BLOOD GAS COMMENTS ARTERIAL: SIGNIFICANT CHANGE UP
BLOOD GAS COMMENTS ARTERIAL: SIGNIFICANT CHANGE UP
BUN SERPL-MCNC: 64 MG/DL — HIGH (ref 7–18)
BUN SERPL-MCNC: 91 MG/DL — HIGH (ref 7–18)
BUN SERPL-MCNC: 93 MG/DL — HIGH (ref 7–18)
CALCIUM SERPL-MCNC: 8.3 MG/DL — LOW (ref 8.4–10.5)
CALCIUM SERPL-MCNC: 8.8 MG/DL — SIGNIFICANT CHANGE UP (ref 8.4–10.5)
CALCIUM SERPL-MCNC: 9 MG/DL — SIGNIFICANT CHANGE UP (ref 8.4–10.5)
CHLORIDE SERPL-SCNC: 106 MMOL/L — SIGNIFICANT CHANGE UP (ref 96–108)
CHLORIDE SERPL-SCNC: 108 MMOL/L — SIGNIFICANT CHANGE UP (ref 96–108)
CHLORIDE SERPL-SCNC: 109 MMOL/L — HIGH (ref 96–108)
CK MB BLD-MCNC: 1 % — SIGNIFICANT CHANGE UP (ref 0–3.5)
CK MB BLD-MCNC: 1.7 % — SIGNIFICANT CHANGE UP (ref 0–3.5)
CK MB BLD-MCNC: <0.9 % — SIGNIFICANT CHANGE UP (ref 0–3.5)
CK MB CFR SERPL CALC: 1.5 NG/ML — SIGNIFICANT CHANGE UP (ref 0–3.6)
CK MB CFR SERPL CALC: 1.5 NG/ML — SIGNIFICANT CHANGE UP (ref 0–3.6)
CK MB CFR SERPL CALC: <1 NG/ML — SIGNIFICANT CHANGE UP (ref 0–3.6)
CK SERPL-CCNC: 110 U/L — SIGNIFICANT CHANGE UP (ref 21–215)
CK SERPL-CCNC: 149 U/L — SIGNIFICANT CHANGE UP (ref 21–215)
CK SERPL-CCNC: 90 U/L — SIGNIFICANT CHANGE UP (ref 21–215)
CO2 SERPL-SCNC: 26 MMOL/L — SIGNIFICANT CHANGE UP (ref 22–31)
CO2 SERPL-SCNC: 26 MMOL/L — SIGNIFICANT CHANGE UP (ref 22–31)
CO2 SERPL-SCNC: 28 MMOL/L — SIGNIFICANT CHANGE UP (ref 22–31)
CREAT SERPL-MCNC: 4.67 MG/DL — HIGH (ref 0.5–1.3)
CREAT SERPL-MCNC: 6.44 MG/DL — HIGH (ref 0.5–1.3)
CREAT SERPL-MCNC: 6.53 MG/DL — HIGH (ref 0.5–1.3)
CULTURE RESULTS: NO GROWTH — SIGNIFICANT CHANGE UP
EOSINOPHIL # BLD AUTO: 0 K/UL — SIGNIFICANT CHANGE UP (ref 0–0.5)
EOSINOPHIL NFR BLD AUTO: 0.1 % — SIGNIFICANT CHANGE UP (ref 0–6)
FERRITIN SERPL-MCNC: 578 NG/ML — HIGH (ref 15–150)
GLUCOSE BLDC GLUCOMTR-MCNC: 118 MG/DL — HIGH (ref 70–99)
GLUCOSE BLDC GLUCOMTR-MCNC: 132 MG/DL — HIGH (ref 70–99)
GLUCOSE BLDC GLUCOMTR-MCNC: 46 MG/DL — LOW (ref 70–99)
GLUCOSE BLDC GLUCOMTR-MCNC: 59 MG/DL — LOW (ref 70–99)
GLUCOSE BLDC GLUCOMTR-MCNC: 68 MG/DL — LOW (ref 70–99)
GLUCOSE BLDC GLUCOMTR-MCNC: 80 MG/DL — SIGNIFICANT CHANGE UP (ref 70–99)
GLUCOSE SERPL-MCNC: 45 MG/DL — CRITICAL LOW (ref 70–99)
GLUCOSE SERPL-MCNC: 72 MG/DL — SIGNIFICANT CHANGE UP (ref 70–99)
GLUCOSE SERPL-MCNC: 72 MG/DL — SIGNIFICANT CHANGE UP (ref 70–99)
HBA1C BLD-MCNC: 5.2 % — SIGNIFICANT CHANGE UP (ref 4–5.6)
HCO3 BLDA-SCNC: 25 MMOL/L — SIGNIFICANT CHANGE UP (ref 23–27)
HCO3 BLDA-SCNC: 27 MMOL/L — SIGNIFICANT CHANGE UP (ref 23–27)
HCT VFR BLD CALC: 23.7 % — LOW (ref 34.5–45)
HCT VFR BLD CALC: 24.6 % — LOW (ref 34.5–45)
HGB BLD-MCNC: 7.3 G/DL — LOW (ref 11.5–15.5)
HGB BLD-MCNC: 7.6 G/DL — LOW (ref 11.5–15.5)
HOROWITZ INDEX BLDA+IHG-RTO: 60 — SIGNIFICANT CHANGE UP
HOROWITZ INDEX BLDA+IHG-RTO: 80 — SIGNIFICANT CHANGE UP
INR BLD: 1.49 RATIO — HIGH (ref 0.88–1.16)
IRON SATN MFR SERPL: 11 UG/DL — LOW (ref 40–150)
IRON SATN MFR SERPL: 6 % — LOW (ref 15–50)
LYMPHOCYTES # BLD AUTO: 0.4 K/UL — LOW (ref 1–3.3)
LYMPHOCYTES # BLD AUTO: 2.3 % — LOW (ref 13–44)
MCHC RBC-ENTMCNC: 29.1 PG — SIGNIFICANT CHANGE UP (ref 27–34)
MCHC RBC-ENTMCNC: 29.4 PG — SIGNIFICANT CHANGE UP (ref 27–34)
MCHC RBC-ENTMCNC: 30.7 GM/DL — LOW (ref 32–36)
MCHC RBC-ENTMCNC: 30.8 GM/DL — LOW (ref 32–36)
MCV RBC AUTO: 94.6 FL — SIGNIFICANT CHANGE UP (ref 80–100)
MCV RBC AUTO: 95.6 FL — SIGNIFICANT CHANGE UP (ref 80–100)
MONOCYTES # BLD AUTO: 0.6 K/UL — SIGNIFICANT CHANGE UP (ref 0–0.9)
MONOCYTES NFR BLD AUTO: 3.4 % — SIGNIFICANT CHANGE UP (ref 2–14)
NEUTROPHILS # BLD AUTO: 15.6 K/UL — HIGH (ref 1.8–7.4)
NEUTROPHILS NFR BLD AUTO: 93.7 % — HIGH (ref 43–77)
OB PNL STL: NEGATIVE — SIGNIFICANT CHANGE UP
PCO2 BLDA: 44 MMHG — SIGNIFICANT CHANGE UP (ref 32–46)
PCO2 BLDA: 45 MMHG — SIGNIFICANT CHANGE UP (ref 32–46)
PH BLDA: 7.37 — SIGNIFICANT CHANGE UP (ref 7.35–7.45)
PH BLDA: 7.39 — SIGNIFICANT CHANGE UP (ref 7.35–7.45)
PLATELET # BLD AUTO: 123 K/UL — LOW (ref 150–400)
PLATELET # BLD AUTO: 132 K/UL — LOW (ref 150–400)
PO2 BLDA: 56 MMHG — LOW (ref 74–108)
PO2 BLDA: 84 MMHG — SIGNIFICANT CHANGE UP (ref 74–108)
POTASSIUM SERPL-MCNC: 3.7 MMOL/L — SIGNIFICANT CHANGE UP (ref 3.5–5.3)
POTASSIUM SERPL-MCNC: 4.1 MMOL/L — SIGNIFICANT CHANGE UP (ref 3.5–5.3)
POTASSIUM SERPL-MCNC: 4.2 MMOL/L — SIGNIFICANT CHANGE UP (ref 3.5–5.3)
POTASSIUM SERPL-SCNC: 3.7 MMOL/L — SIGNIFICANT CHANGE UP (ref 3.5–5.3)
POTASSIUM SERPL-SCNC: 4.1 MMOL/L — SIGNIFICANT CHANGE UP (ref 3.5–5.3)
POTASSIUM SERPL-SCNC: 4.2 MMOL/L — SIGNIFICANT CHANGE UP (ref 3.5–5.3)
PROCALCITONIN SERPL-MCNC: 35.12 NG/ML — HIGH (ref 0–0.04)
PROTHROM AB SERPL-ACNC: 16.4 SEC — HIGH (ref 9.8–12.7)
RBC # BLD: 2.5 M/UL — LOW (ref 3.8–5.2)
RBC # BLD: 2.57 M/UL — LOW (ref 3.8–5.2)
RBC # FLD: 15.4 % — HIGH (ref 10.3–14.5)
RBC # FLD: 15.9 % — HIGH (ref 10.3–14.5)
SAO2 % BLDA: 88 % — LOW (ref 92–96)
SAO2 % BLDA: 94 % — SIGNIFICANT CHANGE UP (ref 92–96)
SODIUM SERPL-SCNC: 144 MMOL/L — SIGNIFICANT CHANGE UP (ref 135–145)
SODIUM SERPL-SCNC: 145 MMOL/L — SIGNIFICANT CHANGE UP (ref 135–145)
SODIUM SERPL-SCNC: 146 MMOL/L — HIGH (ref 135–145)
SPECIMEN SOURCE: SIGNIFICANT CHANGE UP
TIBC SERPL-MCNC: 178 UG/DL — LOW (ref 250–450)
TROPONIN I SERPL-MCNC: 0.16 NG/ML — HIGH (ref 0–0.04)
TROPONIN I SERPL-MCNC: 0.24 NG/ML — HIGH (ref 0–0.04)
TROPONIN I SERPL-MCNC: 0.38 NG/ML — HIGH (ref 0–0.04)
TSH SERPL-MCNC: 0.5 UU/ML — SIGNIFICANT CHANGE UP (ref 0.34–4.82)
UIBC SERPL-MCNC: 167 UG/DL — SIGNIFICANT CHANGE UP (ref 110–370)
WBC # BLD: 13.7 K/UL — HIGH (ref 3.8–10.5)
WBC # BLD: 16.7 K/UL — HIGH (ref 3.8–10.5)
WBC # FLD AUTO: 13.7 K/UL — HIGH (ref 3.8–10.5)
WBC # FLD AUTO: 16.7 K/UL — HIGH (ref 3.8–10.5)

## 2017-12-14 PROCEDURE — 71010: CPT | Mod: 26

## 2017-12-14 PROCEDURE — 99223 1ST HOSP IP/OBS HIGH 75: CPT | Mod: GC

## 2017-12-14 RX ORDER — DEXTROSE 50 % IN WATER 50 %
25 SYRINGE (ML) INTRAVENOUS ONCE
Qty: 0 | Refills: 0 | Status: COMPLETED | OUTPATIENT
Start: 2017-12-14 | End: 2017-12-14

## 2017-12-14 RX ORDER — PANTOPRAZOLE SODIUM 20 MG/1
40 TABLET, DELAYED RELEASE ORAL
Qty: 0 | Refills: 0 | Status: DISCONTINUED | OUTPATIENT
Start: 2017-12-14 | End: 2017-12-18

## 2017-12-14 RX ORDER — ERYTHROPOIETIN 10000 [IU]/ML
20000 INJECTION, SOLUTION INTRAVENOUS; SUBCUTANEOUS ONCE
Qty: 0 | Refills: 0 | Status: COMPLETED | OUTPATIENT
Start: 2017-12-14 | End: 2017-12-14

## 2017-12-14 RX ORDER — FUROSEMIDE 40 MG
80 TABLET ORAL DAILY
Qty: 0 | Refills: 0 | Status: DISCONTINUED | OUTPATIENT
Start: 2017-12-14 | End: 2017-12-20

## 2017-12-14 RX ORDER — ACETAMINOPHEN 500 MG
1000 TABLET ORAL ONCE
Qty: 0 | Refills: 0 | Status: COMPLETED | OUTPATIENT
Start: 2017-12-14 | End: 2017-12-14

## 2017-12-14 RX ORDER — ACETAMINOPHEN 500 MG
650 TABLET ORAL EVERY 6 HOURS
Qty: 0 | Refills: 0 | Status: DISCONTINUED | OUTPATIENT
Start: 2017-12-14 | End: 2018-01-10

## 2017-12-14 RX ORDER — LIDOCAINE HCL 20 MG/ML
10 VIAL (ML) INJECTION ONCE
Qty: 0 | Refills: 0 | Status: DISCONTINUED | OUTPATIENT
Start: 2017-12-14 | End: 2017-12-20

## 2017-12-14 RX ORDER — IPRATROPIUM/ALBUTEROL SULFATE 18-103MCG
3 AEROSOL WITH ADAPTER (GRAM) INHALATION EVERY 6 HOURS
Qty: 0 | Refills: 0 | Status: DISCONTINUED | OUTPATIENT
Start: 2017-12-14 | End: 2017-12-14

## 2017-12-14 RX ORDER — VANCOMYCIN HCL 1 G
500 VIAL (EA) INTRAVENOUS EVERY 12 HOURS
Qty: 0 | Refills: 0 | Status: DISCONTINUED | OUTPATIENT
Start: 2017-12-14 | End: 2017-12-15

## 2017-12-14 RX ORDER — DEXTROSE 50 % IN WATER 50 %
50 SYRINGE (ML) INTRAVENOUS ONCE
Qty: 0 | Refills: 0 | Status: COMPLETED | OUTPATIENT
Start: 2017-12-14 | End: 2017-12-14

## 2017-12-14 RX ADMIN — Medication 100 MILLIGRAM(S): at 21:27

## 2017-12-14 RX ADMIN — PIPERACILLIN AND TAZOBACTAM 12.5 GRAM(S): 4; .5 INJECTION, POWDER, LYOPHILIZED, FOR SOLUTION INTRAVENOUS at 18:08

## 2017-12-14 RX ADMIN — Medication 325 MILLIGRAM(S): at 18:18

## 2017-12-14 RX ADMIN — Medication 80 MILLIGRAM(S): at 12:34

## 2017-12-14 RX ADMIN — Medication 100 MILLIGRAM(S): at 06:18

## 2017-12-14 RX ADMIN — Medication 3 MILLILITER(S): at 21:02

## 2017-12-14 RX ADMIN — Medication 81 MILLIGRAM(S): at 12:41

## 2017-12-14 RX ADMIN — SIMVASTATIN 20 MILLIGRAM(S): 20 TABLET, FILM COATED ORAL at 21:26

## 2017-12-14 RX ADMIN — PANTOPRAZOLE SODIUM 40 MILLIGRAM(S): 20 TABLET, DELAYED RELEASE ORAL at 18:07

## 2017-12-14 RX ADMIN — ERYTHROPOIETIN 10000 UNIT(S): 10000 INJECTION, SOLUTION INTRAVENOUS; SUBCUTANEOUS at 12:37

## 2017-12-14 RX ADMIN — CARVEDILOL PHOSPHATE 25 MILLIGRAM(S): 80 CAPSULE, EXTENDED RELEASE ORAL at 18:07

## 2017-12-14 RX ADMIN — Medication 500 MILLIGRAM(S): at 12:41

## 2017-12-14 RX ADMIN — Medication 50 MILLILITER(S): at 06:01

## 2017-12-14 RX ADMIN — HEPARIN SODIUM 5000 UNIT(S): 5000 INJECTION INTRAVENOUS; SUBCUTANEOUS at 06:01

## 2017-12-14 RX ADMIN — SENNA PLUS 2 TABLET(S): 8.6 TABLET ORAL at 21:27

## 2017-12-14 RX ADMIN — Medication 40 MILLIGRAM(S): at 06:01

## 2017-12-14 RX ADMIN — Medication 50 MILLIGRAM(S): at 18:07

## 2017-12-14 RX ADMIN — Medication 3 MILLILITER(S): at 02:20

## 2017-12-14 RX ADMIN — Medication 3 MILLILITER(S): at 08:19

## 2017-12-14 RX ADMIN — Medication 3 MILLILITER(S): at 15:16

## 2017-12-14 RX ADMIN — Medication 100 MILLIGRAM(S): at 13:41

## 2017-12-14 RX ADMIN — ERYTHROPOIETIN 20000 UNIT(S): 10000 INJECTION, SOLUTION INTRAVENOUS; SUBCUTANEOUS at 19:06

## 2017-12-14 RX ADMIN — CINACALCET 30 MILLIGRAM(S): 30 TABLET, FILM COATED ORAL at 12:37

## 2017-12-14 RX ADMIN — Medication 400 MILLIGRAM(S): at 06:18

## 2017-12-14 RX ADMIN — Medication 25 GRAM(S): at 18:46

## 2017-12-14 RX ADMIN — Medication 650 MILLIGRAM(S): at 18:07

## 2017-12-14 RX ADMIN — PIPERACILLIN AND TAZOBACTAM 12.5 GRAM(S): 4; .5 INJECTION, POWDER, LYOPHILIZED, FOR SOLUTION INTRAVENOUS at 06:02

## 2017-12-14 RX ADMIN — Medication 100 MILLIGRAM(S): at 12:42

## 2017-12-14 RX ADMIN — Medication 1 MILLIGRAM(S): at 12:38

## 2017-12-14 RX ADMIN — Medication 1 TABLET(S): at 12:38

## 2017-12-14 NOTE — CONSULT NOTE ADULT - ASSESSMENT
worsening CKD requiring emergent hemodialysis  1. awaiting coags  2. will place shiley once coags return  3. permanent access as per renal  4. D/w Dr. Gonsalves and agreed

## 2017-12-14 NOTE — ADVANCED PRACTICE NURSE CONSULT - ASSESSMENT
This is a 52yr old female patient admitted for Dyspnea, presenting with a closed area to the Coccyx with hypergranulation tissue. There is no drainage, pain upon assessment, or signs of infection.

## 2017-12-14 NOTE — ADVANCED PRACTICE NURSE CONSULT - RECOMMEDATIONS
-Elevate/float the patients heels using heel protectors and reposition the patient Q 2hrs using wedges or pillows  -If this areas becomes tender (upon palpation) or drainage appears; please consider cauterization of the hypergranulation tissue, application of calcium alginate to the wound bed, and cover with a Foam dressing Q 72hrs PRN

## 2017-12-14 NOTE — CONSULT NOTE ADULT - ATTENDING COMMENTS
Patient evaluated and chart reviewed. Pt is a 52 y/o F from [facility] ( long term care 2/2 diffculty walking possibly 2/2 polyneuropathy) w/ PMHx of anemia, CHF( diastolic dysfunction), CKD, Clostridium difficile infection, DM, Diabetic neuropathy, HTN, HLD, Hypothyroidism, Hypoparathyroidism, Osteomyelitis of jaw, and Parathyroid adenoma, GERD p/w c/o SOB, difficulty breathing x1 week and associated mild cough  admitted to ICU for acute hypoxic respiratory failure 2/2 PNA . Patient afebrile over last 24 hours and currently on high flow . Patient placed on vanco and zosyn by ICU team for HCAP (from NH). Cxr : concern for fluid overload { moderate CHF } but not certain about discrete infiltrate. However, given patient's clinical presentation and elevated WBC, wd treat for HCAP with quinolone as recommended above.

## 2017-12-14 NOTE — CONSULT NOTE ADULT - SUBJECTIVE AND OBJECTIVE BOX
Vascular Surgery Consultation Note    Patient is a 52y old  Female who presents with a chief complaint of SOB (13 Dec 2017 16:01)    HPI:  52 y/o F from [facility] ( long term care 2/2 diffculty walking possibly 2/2 polyneuropathy) w/ PMHx of anemia, CHF( diastolic dysfunction), CKD, Clostridium difficile infection, DM, Diabetic neuropathy, HTN, HLD, Hypothyroidism, Hypoparathyroidism, Osteomyelitis of jaw, and Parathyroid adenoma, GERD p/w c/o SOB and difficulty breathing x1 week. Pt also reports associated mild cough.  As per NH papers pt was confused and lethargic.  In ED pt was lethargic initially with moderate respiratory distress. She was placed on BIPAP. ICU consulted for new BIPAP. Later after few hours of BiPAP treatment pt is awake , responds to pain and verbal stimuli. Pt denies fever, chills, general pain, C/P, or any other complaints.    Hx obtained from sister at bedside, and NH papers , pt is poor historian at this point. HPI info limited. GOC done with sister - pt is full code. (13 Dec 2017 16:01)    INTERVAL HPI:  Called to evaluate pt with acute on chronic renal failure requiring hemodialysis.     PAST MEDICAL & SURGICAL HISTORY:  Clostridium difficile infection  Osteomyelitis of jaw  Parathyroid adenoma  Hypothyroidism  CKD (chronic kidney disease)  Anemia  CHF (congestive heart failure)  Diabetic neuropathy  Diabetes mellitus  HTN (hypertension)  S/P :   No Past Surgical History      Allergies    No Known Allergies    MEDICATIONS  (STANDING):  ALBUTerol/ipratropium for Nebulization 3 milliLiter(s) Nebulizer every 6 hours  amLODIPine   Tablet 10 milliGRAM(s) Oral daily  ascorbic acid 500 milliGRAM(s) Oral daily  aspirin  chewable 81 milliGRAM(s) Oral daily  carvedilol 25 milliGRAM(s) Oral every 12 hours  cinacalcet 30 milliGRAM(s) Oral daily  dextrose 5%. 1000 milliLiter(s) (50 mL/Hr) IV Continuous <Continuous>  dextrose 50% Injectable 12.5 Gram(s) IV Push once  dextrose 50% Injectable 25 Gram(s) IV Push once  dextrose 50% Injectable 25 Gram(s) IV Push once  docusate sodium 100 milliGRAM(s) Oral three times a day  epoetin jacquelnie Injectable 37507 Unit(s) IV Push <User Schedule>  ferrous    sulfate 325 milliGRAM(s) Oral two times a day with meals  folic acid 1 milliGRAM(s) Oral daily  furosemide   Injectable 80 milliGRAM(s) IV Push daily  hydrALAZINE 100 milliGRAM(s) Oral every 8 hours  levothyroxine 50 MICROGram(s) Oral daily  multivitamin 1 Tablet(s) Oral daily  pantoprazole  Injectable 40 milliGRAM(s) IV Push two times a day  piperacillin/tazobactam IVPB. 3.375 Gram(s) IV Intermittent every 12 hours  pregabalin 50 milliGRAM(s) Oral two times a day  senna 2 Tablet(s) Oral at bedtime  simvastatin 20 milliGRAM(s) Oral at bedtime  sodium bicarbonate 650 milliGRAM(s) Oral two times a day  vancomycin  IVPB 500 milliGRAM(s) IV Intermittent every 12 hours    MEDICATIONS  (PRN):  acetaminophen  Suppository 650 milliGRAM(s) Rectal every 6 hours PRN For Temp greater than 38 C (100.4 F)  dextrose Gel 1 Dose(s) Oral once PRN Blood Glucose LESS THAN 70 milliGRAM(s)/deciliter  glucagon  Injectable 1 milliGRAM(s) IntraMuscular once PRN Glucose LESS THAN 70 milligrams/deciliter      Vital Signs Last 24 Hrs  T(C): 37.1 (14 Dec 2017 12:00), Max: 38.8 (14 Dec 2017 04:00)  T(F): 98.7 (14 Dec 2017 12:00), Max: 101.9 (14 Dec 2017 04:00)  HR: 77 (14 Dec 2017 13:00) (74 - 94)  BP: 165/64 (14 Dec 2017 13:00) (124/63 - 165/64)  BP(mean): 88 (14 Dec 2017 13:00) (73 - 94)  RR: 22 (14 Dec 2017 13:00) (12 - 27)  SpO2: 99% (14 Dec 2017 13:00) (86% - 100%)    Physical Exam:  Gen:   HEENT:  Abd:  Back:  Pelvic:  Ext:    Labs:                          7.3    13.7  )-----------( 123      ( 14 Dec 2017 06:26 )             23.7     12-14    146<H>  |  109<H>  |  91<H>  ----------------------------<  45<LL>  4.1   |  26  |  6.53<H>    Ca    8.8      14 Dec 2017 06:26    TPro  8.1  /  Alb  3.4<L>  /  TBili  0.6  /  DBili  x   /  AST  31  /  ALT  17  /  AlkPhos  77  12-13      Urinalysis Basic - ( 13 Dec 2017 14:12 )    Color: Yellow / Appearance: Slightly Turbid / S.015 / pH: x  Gluc: x / Ketone: Negative  / Bili: Negative / Urobili: Negative   Blood: x / Protein: 100 / Nitrite: Negative   Leuk Esterase: Small / RBC: 0-2 /HPF / WBC 3-5 /HPF   Sq Epi: x / Non Sq Epi: Occasional /HPF / Bacteria: Trace /HPF        Radiological Exams:  < from: Xray Chest 1 View AP-PORTABLE IMMEDIATE (17 @ 06:44) >  EXAM:  CHEST-PORTABLE STAT                            PROCEDURE DATE:  2017          INTERPRETATION:  PORTABLE CHEST X-RAY    HISTORY: respiratory distress.    COMPARISON: 2017.    FINDINGS:  Patchy bilateral perihilar opacities, worsening bilaterally compared to   the prior study. Possible small left pleural effusion. Enlarged cardiac   silhouette. No pneumothorax.    IMPRESSION:  Worsening CHF.    < end of copied text > Vascular Surgery Consultation Note    Patient is a 52y old  Female who presents with a chief complaint of SOB (13 Dec 2017 16:01)    HPI:  50 y/o F from [facility] ( long term care 2/2 diffculty walking possibly 2/2 polyneuropathy) w/ PMHx of anemia, CHF( diastolic dysfunction), CKD, Clostridium difficile infection, DM, Diabetic neuropathy, HTN, HLD, Hypothyroidism, Hypoparathyroidism, Osteomyelitis of jaw, and Parathyroid adenoma, GERD p/w c/o SOB and difficulty breathing x1 week. Pt also reports associated mild cough.  As per NH papers pt was confused and lethargic.  In ED pt was lethargic initially with moderate respiratory distress. She was placed on BIPAP. ICU consulted for new BIPAP. Later after few hours of BiPAP treatment pt is awake , responds to pain and verbal stimuli. Pt denies fever, chills, general pain, C/P, or any other complaints.    Hx obtained from sister at bedside, and NH papers , pt is poor historian at this point. HPI info limited. GOC done with sister - pt is full code. (13 Dec 2017 16:01)    INTERVAL HPI:  Called to evaluate pt with CHF and worsening CKD requiring hemodialysis. Pt states has had progressively worsening SOB for 3 weeks. +nausea and vomiting. Denies fever, chills.     PAST MEDICAL & SURGICAL HISTORY:  Clostridium difficile infection  Osteomyelitis of jaw  Parathyroid adenoma  Hypothyroidism  CKD (chronic kidney disease)  Anemia  CHF (congestive heart failure)  Diabetic neuropathy  Diabetes mellitus  HTN (hypertension)  S/P :   No Past Surgical History      Allergies    No Known Allergies    MEDICATIONS  (STANDING):  ALBUTerol/ipratropium for Nebulization 3 milliLiter(s) Nebulizer every 6 hours  amLODIPine   Tablet 10 milliGRAM(s) Oral daily  ascorbic acid 500 milliGRAM(s) Oral daily  aspirin  chewable 81 milliGRAM(s) Oral daily  carvedilol 25 milliGRAM(s) Oral every 12 hours  cinacalcet 30 milliGRAM(s) Oral daily  dextrose 5%. 1000 milliLiter(s) (50 mL/Hr) IV Continuous <Continuous>  dextrose 50% Injectable 12.5 Gram(s) IV Push once  dextrose 50% Injectable 25 Gram(s) IV Push once  dextrose 50% Injectable 25 Gram(s) IV Push once  docusate sodium 100 milliGRAM(s) Oral three times a day  epoetin jacqueline Injectable 45867 Unit(s) IV Push <User Schedule>  ferrous    sulfate 325 milliGRAM(s) Oral two times a day with meals  folic acid 1 milliGRAM(s) Oral daily  furosemide   Injectable 80 milliGRAM(s) IV Push daily  hydrALAZINE 100 milliGRAM(s) Oral every 8 hours  levothyroxine 50 MICROGram(s) Oral daily  multivitamin 1 Tablet(s) Oral daily  pantoprazole  Injectable 40 milliGRAM(s) IV Push two times a day  piperacillin/tazobactam IVPB. 3.375 Gram(s) IV Intermittent every 12 hours  pregabalin 50 milliGRAM(s) Oral two times a day  senna 2 Tablet(s) Oral at bedtime  simvastatin 20 milliGRAM(s) Oral at bedtime  sodium bicarbonate 650 milliGRAM(s) Oral two times a day  vancomycin  IVPB 500 milliGRAM(s) IV Intermittent every 12 hours    MEDICATIONS  (PRN):  acetaminophen  Suppository 650 milliGRAM(s) Rectal every 6 hours PRN For Temp greater than 38 C (100.4 F)  dextrose Gel 1 Dose(s) Oral once PRN Blood Glucose LESS THAN 70 milliGRAM(s)/deciliter  glucagon  Injectable 1 milliGRAM(s) IntraMuscular once PRN Glucose LESS THAN 70 milligrams/deciliter      Vital Signs Last 24 Hrs  T(C): 37.1 (14 Dec 2017 12:00), Max: 38.8 (14 Dec 2017 04:00)  T(F): 98.7 (14 Dec 2017 12:00), Max: 101.9 (14 Dec 2017 04:00)  HR: 77 (14 Dec 2017 13:00) (74 - 94)  BP: 165/64 (14 Dec 2017 13:00) (124/63 - 165/64)  BP(mean): 88 (14 Dec 2017 13:00) (73 - 94)  RR: 22 (14 Dec 2017 13:00) (12 - 27)  SpO2: 99% (14 Dec 2017 13:00) (86% - 100%)    Physical Exam:  Gen: awake, alert oriented NAD  Abd: soft NT ND  Ext: warm to touch no c/c/e, no lesions or ulcerations noted  Vasc:     Labs:                          7.3    13.7  )-----------( 123      ( 14 Dec 2017 06:26 )             23.7     12-14    146<H>  |  109<H>  |  91<H>  ----------------------------<  45<LL>  4.1   |  26  |  6.53<H>    Ca    8.8      14 Dec 2017 06:26    TPro  8.1  /  Alb  3.4<L>  /  TBili  0.6  /  DBili  x   /  AST  31  /  ALT  17  /  AlkPhos  77  12-13      Urinalysis Basic - ( 13 Dec 2017 14:12 )    Color: Yellow / Appearance: Slightly Turbid / S.015 / pH: x  Gluc: x / Ketone: Negative  / Bili: Negative / Urobili: Negative   Blood: x / Protein: 100 / Nitrite: Negative   Leuk Esterase: Small / RBC: 0-2 /HPF / WBC 3-5 /HPF   Sq Epi: x / Non Sq Epi: Occasional /HPF / Bacteria: Trace /HPF        Radiological Exams:  < from: Xray Chest 1 View AP-PORTABLE IMMEDIATE (17 @ 06:44) >  EXAM:  CHEST-PORTABLE STAT                            PROCEDURE DATE:  2017          INTERPRETATION:  PORTABLE CHEST X-RAY    HISTORY: respiratory distress.    COMPARISON: 2017.    FINDINGS:  Patchy bilateral perihilar opacities, worsening bilaterally compared to   the prior study. Possible small left pleural effusion. Enlarged cardiac   silhouette. No pneumothorax.    IMPRESSION:  Worsening CHF.    < end of copied text > Vascular Surgery Consultation Note    Patient is a 52y old  Female who presents with a chief complaint of SOB (13 Dec 2017 16:01)    HPI:  50 y/o F from [facility] ( long term care 2/2 diffculty walking possibly 2/2 polyneuropathy) w/ PMHx of anemia, CHF( diastolic dysfunction), CKD, Clostridium difficile infection, DM, Diabetic neuropathy, HTN, HLD, Hypothyroidism, Hypoparathyroidism, Osteomyelitis of jaw, and Parathyroid adenoma, GERD p/w c/o SOB and difficulty breathing x1 week. Pt also reports associated mild cough.  As per NH papers pt was confused and lethargic.  In ED pt was lethargic initially with moderate respiratory distress. She was placed on BIPAP. ICU consulted for new BIPAP. Later after few hours of BiPAP treatment pt is awake , responds to pain and verbal stimuli. Pt denies fever, chills, general pain, C/P, or any other complaints.    Hx obtained from sister at bedside, and NH papers , pt is poor historian at this point. HPI info limited. GOC done with sister - pt is full code. (13 Dec 2017 16:01)    INTERVAL HPI:  Called to evaluate pt with CHF and worsening CKD requiring hemodialysis. Pt states has had progressively worsening SOB for 3 weeks. +nausea and vomiting. Denies fever, chills.     PAST MEDICAL & SURGICAL HISTORY:  Clostridium difficile infection  Osteomyelitis of jaw  Parathyroid adenoma  Hypothyroidism  CKD (chronic kidney disease)  Anemia  CHF (congestive heart failure)  Diabetic neuropathy  Diabetes mellitus  HTN (hypertension)  S/P :   No Past Surgical History      Allergies    No Known Allergies    MEDICATIONS  (STANDING):  ALBUTerol/ipratropium for Nebulization 3 milliLiter(s) Nebulizer every 6 hours  amLODIPine   Tablet 10 milliGRAM(s) Oral daily  ascorbic acid 500 milliGRAM(s) Oral daily  aspirin  chewable 81 milliGRAM(s) Oral daily  carvedilol 25 milliGRAM(s) Oral every 12 hours  cinacalcet 30 milliGRAM(s) Oral daily  dextrose 5%. 1000 milliLiter(s) (50 mL/Hr) IV Continuous <Continuous>  dextrose 50% Injectable 12.5 Gram(s) IV Push once  dextrose 50% Injectable 25 Gram(s) IV Push once  dextrose 50% Injectable 25 Gram(s) IV Push once  docusate sodium 100 milliGRAM(s) Oral three times a day  epoetin jacqueline Injectable 11785 Unit(s) IV Push <User Schedule>  ferrous    sulfate 325 milliGRAM(s) Oral two times a day with meals  folic acid 1 milliGRAM(s) Oral daily  furosemide   Injectable 80 milliGRAM(s) IV Push daily  hydrALAZINE 100 milliGRAM(s) Oral every 8 hours  levothyroxine 50 MICROGram(s) Oral daily  multivitamin 1 Tablet(s) Oral daily  pantoprazole  Injectable 40 milliGRAM(s) IV Push two times a day  piperacillin/tazobactam IVPB. 3.375 Gram(s) IV Intermittent every 12 hours  pregabalin 50 milliGRAM(s) Oral two times a day  senna 2 Tablet(s) Oral at bedtime  simvastatin 20 milliGRAM(s) Oral at bedtime  sodium bicarbonate 650 milliGRAM(s) Oral two times a day  vancomycin  IVPB 500 milliGRAM(s) IV Intermittent every 12 hours    MEDICATIONS  (PRN):  acetaminophen  Suppository 650 milliGRAM(s) Rectal every 6 hours PRN For Temp greater than 38 C (100.4 F)  dextrose Gel 1 Dose(s) Oral once PRN Blood Glucose LESS THAN 70 milliGRAM(s)/deciliter  glucagon  Injectable 1 milliGRAM(s) IntraMuscular once PRN Glucose LESS THAN 70 milligrams/deciliter      Vital Signs Last 24 Hrs  T(C): 37.1 (14 Dec 2017 12:00), Max: 38.8 (14 Dec 2017 04:00)  T(F): 98.7 (14 Dec 2017 12:00), Max: 101.9 (14 Dec 2017 04:00)  HR: 77 (14 Dec 2017 13:00) (74 - 94)  BP: 165/64 (14 Dec 2017 13:00) (124/63 - 165/64)  BP(mean): 88 (14 Dec 2017 13:00) (73 - 94)  RR: 22 (14 Dec 2017 13:00) (12 - 27)  SpO2: 99% (14 Dec 2017 13:00) (86% - 100%)    Physical Exam:  Gen: awake, alert oriented NAD  Abd: soft NT ND  Ext: warm to touch no c/c/e, no lesions or ulcerations noted  Vasc: 2+ pulses throughout    Labs:                          7.3    13.7  )-----------( 123      ( 14 Dec 2017 06:26 )             23.7     12-14    146<H>  |  109<H>  |  91<H>  ----------------------------<  45<LL>  4.1   |  26  |  6.53<H>    Ca    8.8      14 Dec 2017 06:26    TPro  8.1  /  Alb  3.4<L>  /  TBili  0.6  /  DBili  x   /  AST  31  /  ALT  17  /  AlkPhos  77  12-13      Urinalysis Basic - ( 13 Dec 2017 14:12 )    Color: Yellow / Appearance: Slightly Turbid / S.015 / pH: x  Gluc: x / Ketone: Negative  / Bili: Negative / Urobili: Negative   Blood: x / Protein: 100 / Nitrite: Negative   Leuk Esterase: Small / RBC: 0-2 /HPF / WBC 3-5 /HPF   Sq Epi: x / Non Sq Epi: Occasional /HPF / Bacteria: Trace /HPF        Radiological Exams:  < from: Xray Chest 1 View AP-PORTABLE IMMEDIATE (17 @ 06:44) >  EXAM:  CHEST-PORTABLE STAT                            PROCEDURE DATE:  2017          INTERPRETATION:  PORTABLE CHEST X-RAY    HISTORY: respiratory distress.    COMPARISON: 2017.    FINDINGS:  Patchy bilateral perihilar opacities, worsening bilaterally compared to   the prior study. Possible small left pleural effusion. Enlarged cardiac   silhouette. No pneumothorax.    IMPRESSION:  Worsening CHF.    < end of copied text >

## 2017-12-14 NOTE — CONSULT NOTE ADULT - ASSESSMENT
52 yr old female with progressive CKD V admitted with decompesated CHF sec to CKD V. On antibiotics for healthcare associated PNA.   hypertensive, slight uremia  hyperkalemia improved  leucocytosis  anemia of CKD.    RECOMMENDATION:  [facility]- she provided consent for HD.  attempted calling sister but no answer.  Vascular to place samanta and HD  D/C NAHCO3  Procrit at HD. Consider PRBC transfusion.    Many Thanks for the  consultation.  Will follow closely with you.    Deon Escoto MD  [phone] for consultation.

## 2017-12-14 NOTE — PROCEDURE NOTE - PROCEDURE
<<-----Click on this checkbox to enter Procedure Insertion of hemodialysis catheter in adult  12/14/2017    [facility]  [facility]

## 2017-12-14 NOTE — PROGRESS NOTE ADULT - PROBLEM SELECTOR PLAN 1
Acute respiratory failure secondary to CHF exacerbation vs HAP  - intial VS: Tmax - 97.1;/65, HR 74, RR 20 and 97 on 100 % NRB  -Clinically not in Acute resp distress , confused and lethargic, +cardiac wheezing   -ABG 7.36/46/92 on 100 % NRB  -EKG shows - NSR , no ischemic changes , 1st degree AV block  -CXR shows obscured left costophrenic marissa, cardiomegaly ,b/l  bilateral moderate infiltrates vs opacity  (not changed from prior CXR)  -pBNP 30K.  -Patient was given Lasix 60mg IV x 1 in ED and was placed on BiPAP, given another dose of Lasix 40mg IV overnight with good urine output  -Continue with Lasix 40mg IV BID  -c/w IV Abx to cover for HAP - renal dose vancomycin + Zosyn   - c/w nebs   -Continue with BiPAP and NPO except meds  -Flores for strict I/O monitor   -Monitor respiratory status   -c/w ICU monitoring  - f/u on serial cardiac enzymes  - f/u TSH/FT4, HbA1c and Lipid panel  - f/u repeat ABG post 1-2hrs after BiPAP  - Procalcitonin positive, RVP, BCx, legionella  - ID Dr. Dietz Acute respiratory failure secondary to HAP (elevated procalcitonin)  - intial VS: Tmax - 97.1;/65, HR 74, RR 20 and 97 on 100 % NRB  -Clinically not in Acute resp distress , L sided wheezing  -ABG 7.36/46/92 on 100 % NRB  -EKG shows - NSR , no ischemic changes , 1st degree AV block  -CXR shows obscured left costophrenic marissa, cardiomegaly ,b/l  bilateral moderate infiltrates vs opacity  (not changed from prior CXR)  -pBNP 30K.  -Patient was given Lasix 60mg IV x 1 in ED and was placed on BiPAP, given another dose of Lasix 40mg IV overnight with good urine output  -Continue with Lasix 40mg IV BID  -c/w IV Abx to cover for HAP - renal dose vancomycin + Zosyn   - c/w nebs   -Switch to High flow NC  -Flores for strict I/O monitor   -Monitor respiratory status   -c/w ICU monitoring  - f/u TSH/FT4, HbA1c and Lipid panel  - f/u repeat ABG post 1-2hrs after BiPAP  - Procalcitonin positive, RVP, BCx, legionella  - ID Dr. Dietz Acute respiratory failure secondary to HAP (elevated procalcitonin)  - intial VS: Tmax - 97.1;/65, HR 74, RR 20 and 97 on 100 % NRB  -Clinically not in Acute resp distress , L sided wheezing  -ABG 7.36/46/92 on 100 % NRB  -EKG shows - NSR , no ischemic changes , 1st degree AV block  -CXR shows obscured left costophrenic marissa, cardiomegaly ,b/l  bilateral moderate infiltrates vs opacity  (not changed from prior CXR)  -pBNP 30K.  -Patient was given Lasix 60mg IV x 1 in ED and was placed on BiPAP, given another dose of Lasix 40mg IV overnight with good urine output  -Continue with Lasix 40mg IV BID  -c/w IV Abx to cover for HAP - renal dose vancomycin + Zosyn   - c/w nebs   -Switch to High flow NC  -Flores for strict I/O monitor   - f/u TSH/FT4, HbA1c and Lipid panel  - f/u repeat ABG post 1-2hrs after BiPAP  - Procalcitonin positive  - f/u RVP, BCx, legionella  - f/u ID Dr. Dietz

## 2017-12-14 NOTE — CONSULT NOTE ADULT - NEGATIVE ENMT SYMPTOMS
no gum bleeding/no nose bleeds/no dry mouth/no ear pain/no throat pain/no dysphagia/no hearing difficulty

## 2017-12-14 NOTE — PROGRESS NOTE ADULT - PROBLEM SELECTOR PLAN 4
hold nephrotoxic drugs   Hyperkalemia 5.4 9 (no EKG changes of hyperK)and creatinine 6.30( baseline 4.3)  discussed case with Dr. Tigist toscano , no need to correct hyperkalemia for now, no need for HD , will repeat BMP unknown EF  c/w home cardiac meds   f/u TTE  cardiac enzymes elevated, continue to trend

## 2017-12-14 NOTE — PROGRESS NOTE ADULT - ATTENDING COMMENTS
50 y/o F from [facility] ( long term care 2/2 diffculty walking possibly 2/2 polyneuropathy) w/ PMHx of anemia, CHF( diastolic dysfunction), CKD, Clostridium difficile infection, DM, Diabetic neuropathy, HTN, HLD, Hypothyroidism, Hypoparathyroidism, Osteomyelitis of jaw, and Parathyroid adenoma, GERD p/w c/o SOB and difficulty breathing x1 week. Pt also reports associated mild cough. Pt denies fever, chills, general pain, C/P, or any other complaints. NO  In ED pt is Awake , responds to pain and verbal stimuli, She in moderate respiratory distress. placed on BIPAP. ICU consulted for new BIPAP.    Admit to ICU for Acute Hypoxic and hypercapnic respiratory failure require new Bipap  2/2 Exacerbation of CHF or possibly pneumonia      Problem/Plan - 1:  ·  Problem: Acute respiratory failure.  Plan: Acute respiratory failure secondary to HAP (elevated procalcitonin)  - intial VS: Tmax - 97.1;/65, HR 74, RR 20 and 97 on 100 % NRB  -Clinically not in Acute resp distress , L sided wheezing  -ABG 7.36/46/92 on 100 % NRB  -EKG shows - NSR , no ischemic changes , 1st degree AV block  -CXR shows obscured left costophrenic marissa, cardiomegaly ,b/l  bilateral moderate infiltrates vs opacity  (not changed from prior CXR)  -pBNP 30K.  -Patient was given Lasix 60mg IV x 1 in ED and was placed on BiPAP, given another dose of Lasix 40mg IV overnight with good urine output  -Continue with Lasix 40mg IV BID  -c/w IV Abx to cover for HAP - renal dose vancomycin + Zosyn   - c/w nebs   -Switch to High flow NC  -Flores for strict I/O monitor   - f/u TSH/FT4, HbA1c and Lipid panel  - f/u repeat ABG post 1-2hrs after BiPAP  - Procalcitonin positive  - f/u RVP, BCx, legionella  - f/u ID Dr. Dietz.     Problem/Plan - 2:  ·  Problem: R/O Upper GI bleed.  Plan: dark tarry stool overnight  Hgb drop from 8.7 to 7.3  started IV protonix BID  CBC q6  f/u GI consult.      Problem/Plan - 3:  ·  Problem: Hypothyroidism.  Plan: c/w home dose LT4 50 mcg daily  f/u TSH.      Problem/Plan - 4:  ·  Problem: CHF (congestive heart failure).  Plan: unknown EF  c/w home cardiac meds   f/u TTE  cardiac enzymes elevated, continue to trend.      Problem/Plan - 5:  ·  Problem: CKD (chronic kidney disease).  Plan: hold nephrotoxic drugs   Hyperkalemia 5.4 9 (no EKG changes of hyperK)and creatinine 6.30( baseline 4.3)  discussed case with Dr. Tigist toscano , no need to correct hyperkalemia for now, no need for HD , repeat BMP K decreased to 4.2.   -for possible dialysis, will f/u with renal   Problem/Plan - 6:  Problem: Diabetes mellitus. Plan: c/w Lantus 10 units daily , HSS   f/u HbA1c.

## 2017-12-14 NOTE — CONSULT NOTE ADULT - ASSESSMENT
52 y/o F from [facility] ( long term care 2/2 diffculty walking possibly 2/2 polyneuropathy) w/ PMHx of anemia, CHF( diastolic dysfunction), CKD, Clostridium difficile infection, DM, Diabetic neuropathy, HTN, HLD, Hypothyroidism, Hypoparathyroidism, Osteomyelitis of jaw, and Parathyroid adenoma, GERD p/w c/o SOB and difficulty breathing x1 week and associated mild cough and patient admitted to ICU for acute hypoxic respiratory failure 2/2 PNA .  Patient afebrile over last 24 hours and currently on high flow .    will treat for community acquired pneumonia { patient from facility and denies any recent admission to hospital in last 3 months }  will treat strep pneumonia and gram negative bacilli  with respiratory fluoroquinolone  levofloxacin i.v 500 mg initial dose and followed by 250 mg every 48 hours { creatinine clearance 9mg/dl   { given the risk factors like DM , NURSING home facility and chronic kidney disease }  c/w nephrology recommendations for worsening kidney function and anemia of chronic kidney disease and iron deficiency anemia   c/w bronchodilators 50 y/o F from [facility] ( long term care 2/2 diffculty walking possibly 2/2 polyneuropathy) w/ PMHx of anemia, CHF( diastolic dysfunction), CKD, Clostridium difficile infection, DM, Diabetic neuropathy, HTN, HLD, Hypothyroidism, Hypoparathyroidism, Osteomyelitis of jaw, and Parathyroid adenoma, GERD p/w c/o SOB and difficulty breathing x1 week and associated mild cough and patient admitted to ICU for acute hypoxic respiratory failure 2/2 PNA .  Patient afebrile over last 24 hours and currently on high flow .    will treat for health care associated    pneumonia { patient from facility and denies any recent admission to hospital in last 3 months }  will treat strep pneumonia and gram negative bacilli  with respiratory fluoroquinolone  levofloxacin po  500 mg initial dose and followed by 250 mg every 48 hours { creatinine clearance 9mg/dl   { given the risk factors like DM , NURSING home facility and chronic kidney disease }  cxr : more concern for fluid overload { moderate CHF }   c/w nephrology recommendations for worsening kidney function and anemia of chronic kidney disease and iron deficiency anemia   c/w bronchodilators 50 y/o F from [facility] ( long term care 2/2 diffculty walking possibly 2/2 polyneuropathy) w/ PMHx of anemia, CHF( diastolic dysfunction), CKD, Clostridium difficile infection, DM, Diabetic neuropathy, HTN, HLD, Hypothyroidism, Hypoparathyroidism, Osteomyelitis of jaw, and Parathyroid adenoma, GERD p/w c/o SOB, difficulty breathing x1 week and associated mild cough  admitted to ICU for acute hypoxic respiratory failure 2/2 PNA . Patient afebrile over last 24 hours and currently on high flow . Patient placed on vanco and zosyn for HCAP (from NH but no  admissions to hospital in last 3 months) .CXR : concern for fluid overload { moderate CHF } and uncertain about discrete infiltrates. However, in view of patient's clinical presentation and elevated WBC, would recommend treatment for HCAP with respiratory fluoroquinolone ( levofloxacin po  500 mg initial dose and followed by 250 mg every 48 hours { creatinine clearance 9mg/dl). D/C vanco and zosyn.    --c/w nephrology recommendations for worsening kidney function and anemia of chronic kidney disease and iron deficiency anemia   --c/w bronchodilators  --f/u cultures

## 2017-12-14 NOTE — PHYSICAL THERAPY INITIAL EVALUATION ADULT - RANGE OF MOTION EXAMINATION, REHAB EVAL
Both lower extremities A/PROM at 90 degrees hip flexion/Left UE ROM was WFL (within functional limits)/Right UE ROM was WFL (within functional limits)/deficits as listed below

## 2017-12-14 NOTE — CHART NOTE - NSCHARTNOTEFT_GEN_A_CORE
Spoke with Vascular PA regarding Shiley placement for urgent dialysis today. Will obtain coagulation profile, CBC and follow up Shiley placement with vascular surgery.

## 2017-12-14 NOTE — PROGRESS NOTE ADULT - PROBLEM SELECTOR PLAN 2
c/w home dose LT4 50 mcg daily  f/u TSH dark tarry stool overnight  Hgb drop from 8.7 to 7.3  started IV protonix BID  CBC q6  f/u GI consult

## 2017-12-14 NOTE — PROGRESS NOTE ADULT - ASSESSMENT
50 y/o F from [facility] ( long term care 2/2 diffculty walking possibly 2/2 polyneuropathy) w/ PMHx of anemia, CHF( diastolic dysfunction), CKD, Clostridium difficile infection, DM, Diabetic neuropathy, HTN, HLD, Hypothyroidism, Hypoparathyroidism, Osteomyelitis of jaw, and Parathyroid adenoma, GERD p/w c/o SOB and difficulty breathing x1 week. Pt also reports associated mild cough. Pt denies fever, chills, general pain, C/P, or any other complaints. NO  In ED pt is Awake , responds to pain and verbal stimuli, She in moderate respiratory distress. placed on BIPAP. ICU consulted for new BIPAP.    Admit to ICU for Acute Hypoxic and hypercapnic respiratory failure require new Bipap  2/2 Exacerbation of CHF or possibly pneumonia

## 2017-12-14 NOTE — CONSULT NOTE ADULT - ASSESSMENT
The etiology foe melena and drop in H?H can be due to upper GI bleeding secondary to:  1. Stress induced gastric ulcer  2. Peptic ulcer disease  3. Right side colonic bleeding less likely    Suggestions:    1. Monitor H/H closely  2. Transfuse PRBC as needed  3. IV Protonix  4. EGD  5. Avoid anticoagulation  6. DVT prophylaxis

## 2017-12-14 NOTE — PROGRESS NOTE ADULT - PROBLEM SELECTOR PLAN 5
c/w Lantus 10 units daily , HSS   f/u HbA1c hold nephrotoxic drugs   Hyperkalemia 5.4 9 (no EKG changes of hyperK)and creatinine 6.30( baseline 4.3)  discussed case with Dr. Tigist toscano , no need to correct hyperkalemia for now, no need for HD , repeat BMP K decreased to 4.2

## 2017-12-14 NOTE — CONSULT NOTE ADULT - SUBJECTIVE AND OBJECTIVE BOX
[  ] STAT REQUEST              [ X ] ROUTINE REQUEST    Patient is a 52 year old female admitted to ICU with acute respiratory failure secondary to pulmonary edema had melena associated with drop in H/H. GI consulted to evaluate.      HPI:  51 year old female from [facility] with multiple medical problem admitted to ICU with respiratory failure and pulmonary edema developed black stool associated with drop in H?H. Patient denies abdominal pain , nausea, vomiting hematemesis, hematochezia, fever, chills, chest pain, hematuria, diarrhea or dysuria.                   PAIN MANAGEMENT:  Pain Scale:                 0/10  Pain Location:      Prior Colonoscopy:  UNknown    PAST MEDICAL HISTORY  Clostridium difficile infection  Osteomyelitis of jaw  Parathyroid adenoma  Hypothyroidism  CKD (chronic kidney disease)  Anemia  CHF (congestive heart failure)  Diabetic neuropathy  Diabetes mellitus  HTN (hypertension)      PAST SURGICAL HISTORY  S/P           Allergies    No Known Allergies        MEDICATIONS  (STANDING):  ALBUTerol/ipratropium for Nebulization 3 milliLiter(s) Nebulizer every 6 hours  amLODIPine   Tablet 10 milliGRAM(s) Oral daily  ascorbic acid 500 milliGRAM(s) Oral daily  aspirin  chewable 81 milliGRAM(s) Oral daily  carvedilol 25 milliGRAM(s) Oral every 12 hours  cinacalcet 30 milliGRAM(s) Oral daily  dextrose 5%. 1000 milliLiter(s) (50 mL/Hr) IV Continuous <Continuous>  dextrose 50% Injectable 12.5 Gram(s) IV Push once  dextrose 50% Injectable 25 Gram(s) IV Push once  dextrose 50% Injectable 25 Gram(s) IV Push once  docusate sodium 100 milliGRAM(s) Oral three times a day  epoetin jacqueline Injectable 29029 Unit(s) IV Push once  ferrous    sulfate 325 milliGRAM(s) Oral two times a day with meals  folic acid 1 milliGRAM(s) Oral daily  furosemide   Injectable 80 milliGRAM(s) IV Push daily  hydrALAZINE 100 milliGRAM(s) Oral every 8 hours  levothyroxine 50 MICROGram(s) Oral daily  multivitamin 1 Tablet(s) Oral daily  pantoprazole  Injectable 40 milliGRAM(s) IV Push two times a day  piperacillin/tazobactam IVPB. 3.375 Gram(s) IV Intermittent every 12 hours  pregabalin 50 milliGRAM(s) Oral two times a day  senna 2 Tablet(s) Oral at bedtime  simvastatin 20 milliGRAM(s) Oral at bedtime  sodium bicarbonate 650 milliGRAM(s) Oral two times a day  vancomycin  IVPB 500 milliGRAM(s) IV Intermittent every 12 hours    MEDICATIONS  (PRN):  acetaminophen  Suppository 650 milliGRAM(s) Rectal every 6 hours PRN For Temp greater than 38 C (100.4 F)  dextrose Gel 1 Dose(s) Oral once PRN Blood Glucose LESS THAN 70 milliGRAM(s)/deciliter  glucagon  Injectable 1 milliGRAM(s) IntraMuscular once PRN Glucose LESS THAN 70 milligrams/deciliter      SOCIAL HISTORY  Advanced Directives:       [ X ] Full Code       [  ] DNR  Marital Status:         [  ] M      [ X ] S      [  ] D       [  ] W  Children:       [ X ] Yes      [  ] No  Occupation:        [  ] Employed       [ X ] Unemployed       [  ] Retired  Diet:       [ X ] Regular       [  ] PEG feeding          [  ] NG tube feeding  Drug Use:           [ X ] Patient denied          [  ] Yes  Alcohol:           [X ] No             [  ] Yes (socially)         [  ] Yes (chronic)  Tobacco:           [  ] Yes           [ X ] No        FAMILY HISTORY  [ X ] Heart Disease   (father)         [  ] Diabetes             [  ] HTN             [  ] Colon Cancer             [  ] Stomach Cancer              [  ] Pancreatic Cancer        VITAL SIGNS   ICU Vital Signs Last 24 Hrs  T(C): 37.1 (14 Dec 2017 12:00), Max: 38.8 (14 Dec 2017 04:00)  T(F): 98.7 (14 Dec 2017 12:00), Max: 101.9 (14 Dec 2017 04:00)  HR: 85 (14 Dec 2017 14:00) (74 - 94)  BP: 139/66 (14 Dec 2017 14:00) (124/63 - 165/64)  BP(mean): 84 (14 Dec 2017 14:00) (73 - 94)  RR: 19 (14 Dec 2017 14:00) (12 - 27)  SpO2: 99% (14 Dec 2017 14:00) (86% - 100%)  Daily Weight in k.3 (14 Dec 2017 07:00)           CBC Full  -  ( 14 Dec 2017 14:06 )  WBC Count : 16.7 K/uL  Hemoglobin : 7.6 g/dL  Hematocrit : 24.6 %  Platelet Count - Automated : 132 K/uL  Mean Cell Volume : 95.6 fl  Mean Cell Hemoglobin : 29.4 pg  Mean Cell Hemoglobin Concentration : 30.7 gm/dL  Auto Neutrophil # : 15.6 K/uL  Auto Lymphocyte # : 0.4 K/uL  Auto Monocyte # : 0.6 K/uL  Auto Eosinophil # : 0.0 K/uL  Auto Basophil # : 0.1 K/uL  Auto Neutrophil % : 93.7 %  Auto Lymphocyte % : 2.3 %  Auto Monocyte % : 3.4 %  Auto Eosinophil % : 0.1 %  Auto Basophil % : 0.5 %          146<H>  |  109<H>  |  91<H>  ----------------------------<  45<LL>  4.1   |  26  |  6.53<H>    Ca    8.8      14 Dec 2017 06:26    TPro  8.1  /  Alb  3.4<L>  /  TBili  0.6  /  DBili  x   /  AST  31  /  ALT  17  /  AlkPhos  77  12-    PT/INR - ( 14 Dec 2017 14:42 )   PT: 16.4 sec;   INR: 1.49 ratio       PTT - ( 14 Dec 2017 14:42 )  PTT:40.2 sec       Iron with Total Binding Capacity (12.14.17 @ 06:26)    % Saturation, Iron: 6 %    Iron - Total Binding Capacity.: 178 ug/dL    Iron Total, Serum: 11 ug/dL    Unsaturated Iron Binding Capacity: 167 ug/dL    Urinalysis (12.13.17 @ 14:12)    pH Urine: 5.0    Glucose Qualitative, Urine: Negative    Blood, Urine: Negative    Color: Yellow    Urine Appearance: Slightly Turbid    Bilirubin: Negative    Ketone - Urine: Negative    Specific Gravity: 1.015    Protein, Urine: 100    Urobilinogen: Negative    Nitrite: Negative    Leukocyte Esterase Concentration: Small      Ventricular Rate 92 BPM    Atrial Rate 92 BPM    P-R Interval 176 ms    QRS Duration 90 ms     ms    QTc 432 ms    P Axis 63 degrees    R Axis 61 degrees    T Axis 80 degrees    Diagnosis Line Normal sinus rhythm  Low voltage QRS  Borderline ECG      RADIOLOGY/IMAGING     EXAM:  CT ABD AND PELV WO CON        PROCEDURE DATE:  10/23/2014      INTERPRETATION:  CT scan of the abdomen and pelvis without contrast     Clinical Indication: Abdominal pain and leukocytosis.    Comparison:      Technique: CT scan of the abdomen and pelvis was performed from the lung   bases to the level of the ischial tuberosities. No intravenous contrast   was administered.    The evaluation of the viscera is limited without intravenous contrast.    FINDINGS:    The heart is enlarged. There is extensive subcutaneous edema with skin   thickening in the left breast, correlate for mastoiditis, edema versus   inflammatory breast cancer. There is airspace consolidation the left   lower lobe suspicious for pneumonia. There is a small left pleural   effusion. Trace right pleural effusion is noted. Trace pericardial   effusion.    The liver, gallbladder, spleen, adrenal glands are unremarkable. There is   diffuse edema in the mid abdomen obscuring visualization of the pancreas.   Pelvic horseshoe kidney is identified, no hydronephrosis or calculi. The   urinary bladder and pelvic gynecological structures are grossly   unremarkable. There are small abdominal and pelvic ascites. There is   extensive mesenteric and subcutaneous edema compatible with anasarca.    There is no free air, fluid collection, although evaluation of the bowel   is markedly limited by motion and lack of oral contrast. There is an NG   tube in the stomach. The stomach wall is thickened, correlate for   gastritis. There may also be diffuse thickening of the duodenum,   correlate for duodenitis. Large amount of stool is seen in the   rectosigmoid colon and there is presacral edema. The appendix is normal.    Extensive vascular calcifications are seen. Evaluation for   retroperitoneal adenopathy was limited. Degenerative changes and renal   osteodystrophy in the spine are noted.    IMPRESSION: Limited exam without contrast.    Left lower lobe pneumonia and small pleural effusion.    Infiltrative changes in the left breast as described above.    Infiltrative changes in the midline upper abdomen, may be related to   gastritis/duodenitis versus pancreatitis, limited without contrast.    Small ascites. Anasarca.    Incidental findings as above.       EXAM:  CHEST-PORTABLE STAT                            PROCEDURE DATE:  2017          INTERPRETATION:  PORTABLE CHEST X-RAY    HISTORY: respiratory distress.    COMPARISON: 2017.    FINDINGS:  Patchy bilateral perihilar opacities, worsening bilaterally compared to   the prior study. Possible small left pleural effusion. Enlarged cardiac   silhouette. No pneumothorax.    IMPRESSION:  Worsening CHF.

## 2017-12-14 NOTE — CONSULT NOTE ADULT - SUBJECTIVE AND OBJECTIVE BOX
HPI:  52 y/o F from [facility] ( long term care 2/2 diffculty walking possibly 2/2 polyneuropathy) w/ PMHx of anemia, CHF( diastolic dysfunction), CKD, Clostridium difficile infection, DM, Diabetic neuropathy, HTN, HLD, Hypothyroidism, Hypoparathyroidism, Osteomyelitis of jaw, and Parathyroid adenoma, GERD p/w c/o SOB and difficulty breathing x1 week. Pt also reports associated mild cough.  As per NH papers pt was confused and lethargic.  In ED pt was lethargic initially with moderate respiratory distress. She was placed on BIPAP. ICU consulted for new BIPAP. Later after few hours of BiPAP treatment pt is awake , responds to pain and verbal stimuli. Pt denies fever, chills, general pain, C/P, or any other complaints.    Hx obtained from sister at bedside, and NH papers , pt is poor historian at this point. HPI info limited. GOC done with sister - pt is full code. (13 Dec 2017 16:01)      PAST MEDICAL & SURGICAL HISTORY:  Clostridium difficile infection  Osteomyelitis of jaw  Parathyroid adenoma  Hypothyroidism  CKD (chronic kidney disease)  Anemia  CHF (congestive heart failure)  Diabetic neuropathy  Diabetes mellitus  HTN (hypertension)  S/P :   No Past Surgical History      No Known Allergies      Meds:  acetaminophen  Suppository 650 milliGRAM(s) Rectal every 6 hours PRN  ALBUTerol/ipratropium for Nebulization 3 milliLiter(s) Nebulizer every 6 hours  amLODIPine   Tablet 10 milliGRAM(s) Oral daily  ascorbic acid 500 milliGRAM(s) Oral daily  aspirin  chewable 81 milliGRAM(s) Oral daily  carvedilol 25 milliGRAM(s) Oral every 12 hours  cinacalcet 30 milliGRAM(s) Oral daily  dextrose 5%. 1000 milliLiter(s) IV Continuous <Continuous>  dextrose 50% Injectable 12.5 Gram(s) IV Push once  dextrose 50% Injectable 25 Gram(s) IV Push once  dextrose 50% Injectable 25 Gram(s) IV Push once  dextrose Gel 1 Dose(s) Oral once PRN  docusate sodium 100 milliGRAM(s) Oral three times a day  epoetin jacqueline Injectable 25369 Unit(s) IV Push <User Schedule>  ferrous    sulfate 325 milliGRAM(s) Oral two times a day with meals  folic acid 1 milliGRAM(s) Oral daily  furosemide   Injectable 80 milliGRAM(s) IV Push daily  glucagon  Injectable 1 milliGRAM(s) IntraMuscular once PRN  hydrALAZINE 100 milliGRAM(s) Oral every 8 hours  levothyroxine 50 MICROGram(s) Oral daily  multivitamin 1 Tablet(s) Oral daily  pantoprazole  Injectable 40 milliGRAM(s) IV Push two times a day  piperacillin/tazobactam IVPB. 3.375 Gram(s) IV Intermittent every 12 hours  pregabalin 50 milliGRAM(s) Oral two times a day  senna 2 Tablet(s) Oral at bedtime  simvastatin 20 milliGRAM(s) Oral at bedtime  sodium bicarbonate 650 milliGRAM(s) Oral two times a day  vancomycin  IVPB 500 milliGRAM(s) IV Intermittent every 12 hours      SOCIAL HISTORY:  Smoker:  YES / NO        PACK YEARS:                         WHEN QUIT?  ETOH use:  YES / NO               FREQUENCY / QUANTITY:  Ilicit Drug use:  YES / NO  Occupation:  Assisted device use (Cane / Walker):  Live with:    FAMILY HISTORY:  Family history of stroke  Family history of hypertension (Sibling)  Family history of diabetes mellitus      VITALS:  Vital Signs Last 24 Hrs  T(C): 38.8 (14 Dec 2017 04:00), Max: 38.8 (14 Dec 2017 04:00)  T(F): 101.9 (14 Dec 2017 04:00), Max: 101.9 (14 Dec 2017 04:00)  HR: 77 (14 Dec 2017 12:32) (74 - 94)  BP: 132/70 (14 Dec 2017 10:00) (124/63 - 164/69)  BP(mean): 85 (14 Dec 2017 10:00) (73 - 94)  RR: 16 (14 Dec 2017 12:32) (12 - 27)  SpO2: 97% (14 Dec 2017 12:32) (86% - 100%)    LABS/DIAGNOSTIC TESTS:                          7.3    13.7  )-----------( 123      ( 14 Dec 2017 06:26 )             23.7     WBC Count: 13.7 K/uL ( @ 06:26)  WBC Count: 15.9 K/uL ( @ 12:30)          146<H>  |  109<H>  |  91<H>  ----------------------------<  45<LL>  4.1   |  26  |  6.53<H>    Ca    8.8      14 Dec 2017 06:26    TPro  8.1  /  Alb  3.4<L>  /  TBili  0.6  /  DBili  x   /  AST  31  /  ALT  17  /  AlkPhos  77  12-13      Urinalysis Basic - ( 13 Dec 2017 14:12 )    Color: Yellow / Appearance: Slightly Turbid / S.015 / pH: x  Gluc: x / Ketone: Negative  / Bili: Negative / Urobili: Negative   Blood: x / Protein: 100 / Nitrite: Negative   Leuk Esterase: Small / RBC: 0-2 /HPF / WBC 3-5 /HPF   Sq Epi: x / Non Sq Epi: Occasional /HPF / Bacteria: Trace /HPF        LIVER FUNCTIONS - ( 13 Dec 2017 12:30 )  Alb: 3.4 g/dL / Pro: 8.1 g/dL / ALK PHOS: 77 U/L / ALT: 17 U/L DA / AST: 31 U/L / GGT: x                 LACTATE:    ABG - ABG - ( 14 Dec 2017 05:15 )  pH: 7.39  /  pCO2: 45    /  pO2: 56    / HCO3: 27    / Base Excess: 2.1   /  SaO2: 88                  CULTURES:       RADIOLOGY:      ROS  [  ] UNABLE TO ELICIT HPI:  50 y/o F from [facility] ( long term care 2/2 diffculty walking possibly 2/2 polyneuropathy) w/ PMHx of anemia, CHF( diastolic dysfunction), CKD, Clostridium difficile infection, DM, Diabetic neuropathy, HTN, HLD, Hypothyroidism, Hypoparathyroidism, Osteomyelitis of jaw, and Parathyroid adenoma, GERD p/w c/o SOB and difficulty breathing x1 week and associated mild cough and patient admitted to ICU for acute hypoxic respiratory failure 2/2 PNA .  patient afebrile overnight and currently off bipap and on high flow . Patient denies any nausea , vomiting , abdominal pain or any other acute complaints .    PAST MEDICAL & SURGICAL HISTORY:  Clostridium difficile infection  Osteomyelitis of jaw  Parathyroid adenoma  Hypothyroidism  CKD (chronic kidney disease)  Anemia  CHF (congestive heart failure)  Diabetic neuropathy  Diabetes mellitus  HTN (hypertension)   s/p   in     ALLERGIES :  No Known Allergies    Social h/o : patient from nursing home , denies any smoking or alcohol or drug use     FAMILY HISTORY:Family history of stroke  Family history of hypertension (Sibling)  Family history of diabetes mellitus    ROS; negative except for above     VITALS:  Vital Signs Last 24 Hrs  T(C): 38.8 (14 Dec 2017 04:00), Max: 38.8 (14 Dec 2017 04:00)  T(F): 101.9 (14 Dec 2017 04:00), Max: 101.9 (14 Dec 2017 04:00)  HR: 77 (14 Dec 2017 12:32) (74 - 94)  BP: 132/70 (14 Dec 2017 10:00) (124/63 - 164/69)  BP(mean): 85 (14 Dec 2017 10:00) (73 - 94)  RR: 16 (14 Dec 2017 12:32) (12 - 27)  SpO2: 97% (14 Dec 2017 12:32) (86% - 100%)      GENERAL: no acute distress, currently on high flow   HEAD: atraumatic, normocephalic   EYES: PERRL, white sclera.   ENMT: oral cavity - moist  NECK: supple,  no JVD  CHEST/LUNG:  No Chest deformity , wheezing L>R  HEART: RRR, no murmur , no rub  ABDOMEN: soft, nontender, ; bowel sounds present , reducible umbilical hernia   EXTREMITIES: no  edema, no cyanosis, no clubbing.  NEURO: AA0X 3, no focal neuro deficits        Meds:  acetaminophen  Suppository 650 milliGRAM(s) Rectal every 6 hours PRN  ALBUTerol/ipratropium for Nebulization 3 milliLiter(s) Nebulizer every 6 hours  amLODIPine   Tablet 10 milliGRAM(s) Oral daily  ascorbic acid 500 milliGRAM(s) Oral daily  aspirin  chewable 81 milliGRAM(s) Oral daily  carvedilol 25 milliGRAM(s) Oral every 12 hours  cinacalcet 30 milliGRAM(s) Oral daily  dextrose 5%. 1000 milliLiter(s) IV Continuous <Continuous>  dextrose 50% Injectable 12.5 Gram(s) IV Push once  dextrose 50% Injectable 25 Gram(s) IV Push once  dextrose 50% Injectable 25 Gram(s) IV Push once  dextrose Gel 1 Dose(s) Oral once PRN  docusate sodium 100 milliGRAM(s) Oral three times a day  epoetin jacqueline Injectable 72604 Unit(s) IV Push <User Schedule>  ferrous    sulfate 325 milliGRAM(s) Oral two times a day with meals  folic acid 1 milliGRAM(s) Oral daily  furosemide   Injectable 80 milliGRAM(s) IV Push daily  glucagon  Injectable 1 milliGRAM(s) IntraMuscular once PRN  hydrALAZINE 100 milliGRAM(s) Oral every 8 hours  levothyroxine 50 MICROGram(s) Oral daily  multivitamin 1 Tablet(s) Oral daily  pantoprazole  Injectable 40 milliGRAM(s) IV Push two times a day  piperacillin/tazobactam IVPB. 3.375 Gram(s) IV Intermittent every 12 hours  pregabalin 50 milliGRAM(s) Oral two times a day  senna 2 Tablet(s) Oral at bedtime  simvastatin 20 milliGRAM(s) Oral at bedtime  sodium bicarbonate 650 milliGRAM(s) Oral two times a day  vancomycin  IVPB 500 milliGRAM(s) IV Intermittent every 12 hours      LABS/DIAGNOSTIC TESTS:                          7.3    13.7  )-----------( 123      ( 14 Dec 2017 06:26 )             23.7     WBC Count: 13.7 K/uL ( @ 06:26)  WBC Count: 15.9 K/uL ( @ 12:30)          146<H>  |  109<H>  |  91<H>  ----------------------------<  45<LL>  4.1   |  26  |  6.53<H>    Ca    8.8      14 Dec 2017 06:26    TPro  8.1  /  Alb  3.4<L>  /  TBili  0.6  /  DBili  x   /  AST  31  /  ALT  17  /  AlkPhos  77  12-13      Urinalysis Basic - ( 13 Dec 2017 14:12 )    Color: Yellow / Appearance: Slightly Turbid / S.015 / pH: x  Gluc: x / Ketone: Negative  / Bili: Negative / Urobili: Negative   Blood: x / Protein: 100 / Nitrite: Negative   Leuk Esterase: Small / RBC: 0-2 /HPF / WBC 3-5 /HPF   Sq Epi: x / Non Sq Epi: Occasional /HPF / Bacteria: Trace /HPF        LIVER FUNCTIONS - ( 13 Dec 2017 12:30 )  Alb: 3.4 g/dL / Pro: 8.1 g/dL / ALK PHOS: 77 U/L / ALT: 17 U/L DA / AST: 31 U/L / GGT: x                 LACTATE:    ABG - ABG - ( 14 Dec 2017 05:15 )  pH: 7.39  /  pCO2: 45    /  pO2: 56    / HCO3: 27    / Base Excess: 2.1   /  SaO2: 88        radiology ;  cxr -17   Patchy bilateral perihilar opacities, worsening bilaterally compared to   the prior study. Possible small left pleural effusion HPI:  50 y/o F from [facility] ( long term care 2/2 diffculty walking possibly 2/2 polyneuropathy) w/ PMHx of anemia, CHF( diastolic dysfunction), CKD, Clostridium difficile infection, DM, diabetic neuropathy, HTN, HLD, Hypothyroidism, Hypoparathyroidism, Osteomyelitis of jaw, and Parathyroid adenoma, GERD p/w c/o SOB and difficulty breathing x1 week and associated mild cough admitted to ICU for acute hypoxic respiratory failure 2/2. Pt dx with PNA. Afebrile overnight; currently off bipap and on high flow . Patient denies any nausea , vomiting , abdominal pain or any other acute complaints . Says she's coughing but does not bring up sputum.    PAST MEDICAL & SURGICAL HISTORY:  Clostridium difficile infection  Osteomyelitis of jaw  Parathyroid adenoma  Hypothyroidism  CKD (chronic kidney disease)  Anemia  CHF (congestive heart failure)  Diabetic neuropathy  Diabetes mellitus  HTN (hypertension)   s/p   in     ALLERGIES :  No Known Allergies    Social h/o : patient from nursing home , denies any smoking or alcohol or drug use     FAMILY HISTORY stroke  Family history of hypertension (Sibling)  Family history of diabetes mellitus    ROS: SOB, cough; no CP; no diarrhea; otherwise as per HPI     VITALS:  Vital Signs Last 24 Hrs  T(C): 38.8 (14 Dec 2017 04:00), Max: 38.8 (14 Dec 2017 04:00)  T(F): 101.9 (14 Dec 2017 04:00), Max: 101.9 (14 Dec 2017 04:00)  HR: 77 (14 Dec 2017 12:32) (74 - 94)  BP: 132/70 (14 Dec 2017 10:00) (124/63 - 164/69)  BP(mean): 85 (14 Dec 2017 10:00) (73 - 94)  RR: 16 (14 Dec 2017 12:32) (12 - 27)  SpO2: 97% (14 Dec 2017 12:32) (86% - 100%)      GENERAL: no acute distress; WD b F on high flow O2   HEAD: atraumatic, normocephalic   EYES: PERRL, white sclera.   ENMT: oral cavity - moist; edentulous  NECK: supple,  no JVD  CHEST/LUNG:  No Chest deformity , wheezing L>R (I did not hear any wheezing at the time of my exam at 3pm--MAS)  HEART: RRR, no murmur , no rub  ABDOMEN: soft, nontender, ; bowel sounds present , reducible umbilical hernia   EXTREMITIES: no  edema, no cyanosis, no clubbing.  NEURO: AA0X 3, no focal neuro deficits  Skin: no breakdown        Meds:  acetaminophen  Suppository 650 milliGRAM(s) Rectal every 6 hours PRN  ALBUTerol/ipratropium for Nebulization 3 milliLiter(s) Nebulizer every 6 hours  amLODIPine   Tablet 10 milliGRAM(s) Oral daily  ascorbic acid 500 milliGRAM(s) Oral daily  aspirin  chewable 81 milliGRAM(s) Oral daily  carvedilol 25 milliGRAM(s) Oral every 12 hours  cinacalcet 30 milliGRAM(s) Oral daily  dextrose 5%. 1000 milliLiter(s) IV Continuous <Continuous>  dextrose 50% Injectable 12.5 Gram(s) IV Push once  dextrose 50% Injectable 25 Gram(s) IV Push once  dextrose 50% Injectable 25 Gram(s) IV Push once  dextrose Gel 1 Dose(s) Oral once PRN  docusate sodium 100 milliGRAM(s) Oral three times a day  epoetin jacqueline Injectable 03491 Unit(s) IV Push <User Schedule>  ferrous    sulfate 325 milliGRAM(s) Oral two times a day with meals  folic acid 1 milliGRAM(s) Oral daily  furosemide   Injectable 80 milliGRAM(s) IV Push daily  glucagon  Injectable 1 milliGRAM(s) IntraMuscular once PRN  hydrALAZINE 100 milliGRAM(s) Oral every 8 hours  levothyroxine 50 MICROGram(s) Oral daily  multivitamin 1 Tablet(s) Oral daily  pantoprazole  Injectable 40 milliGRAM(s) IV Push two times a day  piperacillin/tazobactam IVPB. 3.375 Gram(s) IV Intermittent every 12 hours  pregabalin 50 milliGRAM(s) Oral two times a day  senna 2 Tablet(s) Oral at bedtime  simvastatin 20 milliGRAM(s) Oral at bedtime  sodium bicarbonate 650 milliGRAM(s) Oral two times a day  vancomycin  IVPB 500 milliGRAM(s) IV Intermittent every 12 hours      LABS/DIAGNOSTIC TESTS:                          7.3    13.7  )-----------( 123      ( 14 Dec 2017 06:26 )             23.7     WBC Count: 13.7 K/uL ( @ 06:26)  WBC Count: 15.9 K/uL ( @ 12:30)      12-    146<H>  |  109<H>  |  91<H>  ----------------------------<  45<LL>  4.1   |  26  |  6.53<H>    Ca    8.8      14 Dec 2017 06:26    TPro  8.1  /  Alb  3.4<L>  /  TBili  0.6  /  DBili  x   /  AST  31  /  ALT  17  /  AlkPhos  77  12-13      Urinalysis Basic - ( 13 Dec 2017 14:12 )    Color: Yellow / Appearance: Slightly Turbid / S.015 / pH: x  Gluc: x / Ketone: Negative  / Bili: Negative / Urobili: Negative   Blood: x / Protein: 100 / Nitrite: Negative   Leuk Esterase: Small / RBC: 0-2 /HPF / WBC 3-5 /HPF   Sq Epi: x / Non Sq Epi: Occasional /HPF / Bacteria: Trace /HPF        LIVER FUNCTIONS - ( 13 Dec 2017 12:30 )  Alb: 3.4 g/dL / Pro: 8.1 g/dL / ALK PHOS: 77 U/L / ALT: 17 U/L DA / AST: 31 U/L / GGT: x                 LACTATE:    ABG - ABG - ( 14 Dec 2017 05:15 )  pH: 7.39  /  pCO2: 45    /  pO2: 56    / HCO3: 27    / Base Excess: 2.1   /  SaO2: 88        radiology ;  cxr -17   Patchy bilateral perihilar opacities, worsening bilaterally compared to   the prior study. Possible small left pleural effusion

## 2017-12-14 NOTE — CONSULT NOTE ADULT - SUBJECTIVE AND OBJECTIVE BOX
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 Followed by me in outpatient setting. Also with anemia of CKD on EPO. sent from rehab with lethargy and progressive SOB. in ED found to have CHF on BIPAP admitted to ICU. Remains  hypoxic. Has leucocytosis on IV ABX for presumed PNA.    PAST MEDICAL & SURGICAL HISTORY:  Clostridium difficile infection  Osteomyelitis of jaw  Parathyroid adenoma  Hypothyroidism  CKD (chronic kidney disease)  Anemia  CHF (congestive heart failure)  Diabetic neuropathy  Diabetes mellitus  HTN (hypertension)  S/P :   No Past Surgical History    No Known Allergies    Home Medications Reviewed  Hospital Medications:   MEDICATIONS  (STANDING):  ALBUTerol/ipratropium for Nebulization 3 milliLiter(s) Nebulizer every 6 hours  amLODIPine   Tablet 10 milliGRAM(s) Oral daily  ascorbic acid 500 milliGRAM(s) Oral daily  aspirin  chewable 81 milliGRAM(s) Oral daily  carvedilol 25 milliGRAM(s) Oral every 12 hours  cinacalcet 30 milliGRAM(s) Oral daily  dextrose 5%. 1000 milliLiter(s) (50 mL/Hr) IV Continuous <Continuous>  dextrose 50% Injectable 12.5 Gram(s) IV Push once  dextrose 50% Injectable 25 Gram(s) IV Push once  dextrose 50% Injectable 25 Gram(s) IV Push once  docusate sodium 100 milliGRAM(s) Oral three times a day  epoetin jacqueline Injectable 56017 Unit(s) IV Push <User Schedule>  ferrous    sulfate 325 milliGRAM(s) Oral two times a day with meals  folic acid 1 milliGRAM(s) Oral daily  furosemide   Injectable 80 milliGRAM(s) IV Push daily  hydrALAZINE 100 milliGRAM(s) Oral every 8 hours  levothyroxine 50 MICROGram(s) Oral daily  multivitamin 1 Tablet(s) Oral daily  pantoprazole  Injectable 40 milliGRAM(s) IV Push two times a day  piperacillin/tazobactam IVPB. 3.375 Gram(s) IV Intermittent every 12 hours  pregabalin 50 milliGRAM(s) Oral two times a day  senna 2 Tablet(s) Oral at bedtime  simvastatin 20 milliGRAM(s) Oral at bedtime  sodium bicarbonate 650 milliGRAM(s) Oral two times a day  vancomycin  IVPB 500 milliGRAM(s) IV Intermittent every 12 hours    SOCIAL HISTORY:  Denies ETOh,Smoking,   FAMILY HISTORY:  Family history of stroke  Family history of hypertension (Sibling)  Family history of diabetes mellitus      VITALS:  T(F): 98.7 (17 @ 12:00), Max: 101.9 (17 @ 04:00)  HR: 77 (17 @ 13:00)  BP: 165/64 (17 @ 13:00)  RR: 22 (17 @ 13:00)  SpO2: 99% (17 @ 13:00)  Wt(kg): --     @ 07:01  -   07:00  --------------------------------------------------------  IN: 600 mL / OUT: 1145 mL / NET: -545 mL     @ 07:01  -   14:05  --------------------------------------------------------  IN: 300 mL / OUT: 520 mL / NET: -220 mL      Height (cm): 170 ( @ 17:16)  Weight (kg): 57 ( @ 17:16)  BMI (kg/m2): 19.7 ( @ 17:16)  BSA (m2): 1.66 ( 17:16)  PHYSICAL EXAM:  Constitutional: NAD  HEENT: anicteric sclera, oropharynx clear.  Neck: No JVD  Respiratory: bilat , no wheezes, rales+ or rhonchi  Cardiovascular: S1, S2, RRR  Gastrointestinal: BS+, soft, NT/ND  Extremities: No cyanosis or clubbing.  peripheral edema+  Neurological: A/O x 3, no focal deficits.    LABS:      146<H>  |  109<H>  |  91<H>  ----------------------------<  45<LL>  4.1   |  26  |  6.53<H>    Ca    8.8      14 Dec 2017 06:26    TPro  8.1  /  Alb  3.4<L>  /  TBili  0.6  /  DBili      /  AST  31  /  ALT  17  /  AlkPhos  77  12-13    Creatinine Trend: 6.53 <--, 6.44 <--, 6.44 <--, 6.30 <--                        7.3    13.7  )-----------( 123      ( 14 Dec 2017 06:26 )             23.7     Urine Studies:  Urinalysis Basic - ( 13 Dec 2017 14:12 )    Color: Yellow / Appearance: Slightly Turbid / S.015 / pH:   Gluc:  / Ketone: Negative  / Bili: Negative / Urobili: Negative   Blood:  / Protein: 100 / Nitrite: Negative   Leuk Esterase: Small / RBC: 0-2 /HPF / WBC 3-5 /HPF   Sq Epi:  / Non Sq Epi: Occasional /HPF / Bacteria: Trace /HPF        RADIOLOGY & ADDITIONAL STUDIES:

## 2017-12-15 LAB
ANION GAP SERPL CALC-SCNC: 9 MMOL/L — SIGNIFICANT CHANGE UP (ref 5–17)
BASOPHILS # BLD AUTO: 0 K/UL — SIGNIFICANT CHANGE UP (ref 0–0.2)
BASOPHILS NFR BLD AUTO: 0.3 % — SIGNIFICANT CHANGE UP (ref 0–2)
BUN SERPL-MCNC: 69 MG/DL — HIGH (ref 7–18)
CALCIUM SERPL-MCNC: 8.1 MG/DL — LOW (ref 8.4–10.5)
CHLORIDE SERPL-SCNC: 107 MMOL/L — SIGNIFICANT CHANGE UP (ref 96–108)
CO2 SERPL-SCNC: 28 MMOL/L — SIGNIFICANT CHANGE UP (ref 22–31)
CREAT SERPL-MCNC: 4.98 MG/DL — HIGH (ref 0.5–1.3)
EOSINOPHIL # BLD AUTO: 0 K/UL — SIGNIFICANT CHANGE UP (ref 0–0.5)
EOSINOPHIL NFR BLD AUTO: 0 % — SIGNIFICANT CHANGE UP (ref 0–6)
GLUCOSE BLDC GLUCOMTR-MCNC: 104 MG/DL — HIGH (ref 70–99)
GLUCOSE BLDC GLUCOMTR-MCNC: 107 MG/DL — HIGH (ref 70–99)
GLUCOSE BLDC GLUCOMTR-MCNC: 92 MG/DL — SIGNIFICANT CHANGE UP (ref 70–99)
GLUCOSE SERPL-MCNC: 70 MG/DL — SIGNIFICANT CHANGE UP (ref 70–99)
HAV IGM SER-ACNC: SIGNIFICANT CHANGE UP
HBV CORE IGM SER-ACNC: SIGNIFICANT CHANGE UP
HBV SURFACE AG SER-ACNC: SIGNIFICANT CHANGE UP
HCT VFR BLD CALC: 24.8 % — LOW (ref 34.5–45)
HCT VFR BLD CALC: 25.5 % — LOW (ref 34.5–45)
HCV AB S/CO SERPL IA: 0.12 S/CO — SIGNIFICANT CHANGE UP
HCV AB SERPL-IMP: SIGNIFICANT CHANGE UP
HGB BLD-MCNC: 7.3 G/DL — LOW (ref 11.5–15.5)
HGB BLD-MCNC: 7.3 G/DL — LOW (ref 11.5–15.5)
LACTATE SERPL-SCNC: 0.4 MMOL/L — LOW (ref 0.7–2)
LEGIONELLA AG UR QL: NEGATIVE — SIGNIFICANT CHANGE UP
LYMPHOCYTES # BLD AUTO: 0.4 K/UL — LOW (ref 1–3.3)
LYMPHOCYTES # BLD AUTO: 3.1 % — LOW (ref 13–44)
MAGNESIUM SERPL-MCNC: 2.4 MG/DL — SIGNIFICANT CHANGE UP (ref 1.6–2.6)
MCHC RBC-ENTMCNC: 27.1 PG — SIGNIFICANT CHANGE UP (ref 27–34)
MCHC RBC-ENTMCNC: 27.5 PG — SIGNIFICANT CHANGE UP (ref 27–34)
MCHC RBC-ENTMCNC: 28.8 GM/DL — LOW (ref 32–36)
MCHC RBC-ENTMCNC: 29.6 GM/DL — LOW (ref 32–36)
MCV RBC AUTO: 92.9 FL — SIGNIFICANT CHANGE UP (ref 80–100)
MCV RBC AUTO: 94 FL — SIGNIFICANT CHANGE UP (ref 80–100)
MONOCYTES # BLD AUTO: 0.3 K/UL — SIGNIFICANT CHANGE UP (ref 0–0.9)
MONOCYTES NFR BLD AUTO: 2.3 % — SIGNIFICANT CHANGE UP (ref 2–14)
NEUTROPHILS # BLD AUTO: 12 K/UL — HIGH (ref 1.8–7.4)
NEUTROPHILS NFR BLD AUTO: 94.2 % — HIGH (ref 43–77)
PHOSPHATE SERPL-MCNC: 4.7 MG/DL — HIGH (ref 2.5–4.5)
PLATELET # BLD AUTO: 130 K/UL — LOW (ref 150–400)
PLATELET # BLD AUTO: 130 K/UL — LOW (ref 150–400)
POTASSIUM SERPL-MCNC: 3.8 MMOL/L — SIGNIFICANT CHANGE UP (ref 3.5–5.3)
POTASSIUM SERPL-SCNC: 3.8 MMOL/L — SIGNIFICANT CHANGE UP (ref 3.5–5.3)
RBC # BLD: 2.67 M/UL — LOW (ref 3.8–5.2)
RBC # BLD: 2.71 M/UL — LOW (ref 3.8–5.2)
RBC # FLD: 15.2 % — HIGH (ref 10.3–14.5)
RBC # FLD: 15.4 % — HIGH (ref 10.3–14.5)
SODIUM SERPL-SCNC: 144 MMOL/L — SIGNIFICANT CHANGE UP (ref 135–145)
WBC # BLD: 12.8 K/UL — HIGH (ref 3.8–10.5)
WBC # BLD: 12.8 K/UL — HIGH (ref 3.8–10.5)
WBC # FLD AUTO: 12.8 K/UL — HIGH (ref 3.8–10.5)
WBC # FLD AUTO: 12.8 K/UL — HIGH (ref 3.8–10.5)

## 2017-12-15 PROCEDURE — 71010: CPT | Mod: 26

## 2017-12-15 PROCEDURE — 99233 SBSQ HOSP IP/OBS HIGH 50: CPT | Mod: GC

## 2017-12-15 RX ORDER — ACETAMINOPHEN 500 MG
650 TABLET ORAL EVERY 6 HOURS
Qty: 0 | Refills: 0 | Status: DISCONTINUED | OUTPATIENT
Start: 2017-12-15 | End: 2018-01-29

## 2017-12-15 RX ORDER — FUROSEMIDE 40 MG
100 TABLET ORAL ONCE
Qty: 0 | Refills: 0 | Status: COMPLETED | OUTPATIENT
Start: 2017-12-15 | End: 2017-12-15

## 2017-12-15 RX ADMIN — PANTOPRAZOLE SODIUM 40 MILLIGRAM(S): 20 TABLET, DELAYED RELEASE ORAL at 18:37

## 2017-12-15 RX ADMIN — Medication 650 MILLIGRAM(S): at 13:59

## 2017-12-15 RX ADMIN — Medication 3 MILLILITER(S): at 02:05

## 2017-12-15 RX ADMIN — Medication 100 MILLIGRAM(S): at 21:46

## 2017-12-15 RX ADMIN — Medication 100 MILLIGRAM(S): at 05:21

## 2017-12-15 RX ADMIN — SIMVASTATIN 20 MILLIGRAM(S): 20 TABLET, FILM COATED ORAL at 22:03

## 2017-12-15 RX ADMIN — CARVEDILOL PHOSPHATE 25 MILLIGRAM(S): 80 CAPSULE, EXTENDED RELEASE ORAL at 18:37

## 2017-12-15 RX ADMIN — PANTOPRAZOLE SODIUM 40 MILLIGRAM(S): 20 TABLET, DELAYED RELEASE ORAL at 05:21

## 2017-12-15 RX ADMIN — SENNA PLUS 2 TABLET(S): 8.6 TABLET ORAL at 21:46

## 2017-12-15 RX ADMIN — Medication 3 MILLILITER(S): at 20:29

## 2017-12-15 RX ADMIN — Medication 650 MILLIGRAM(S): at 21:54

## 2017-12-15 RX ADMIN — PIPERACILLIN AND TAZOBACTAM 12.5 GRAM(S): 4; .5 INJECTION, POWDER, LYOPHILIZED, FOR SOLUTION INTRAVENOUS at 05:21

## 2017-12-15 RX ADMIN — Medication 50 MILLIGRAM(S): at 18:36

## 2017-12-15 RX ADMIN — Medication 80 MILLIGRAM(S): at 05:21

## 2017-12-15 RX ADMIN — Medication 120 MILLIGRAM(S): at 09:51

## 2017-12-15 RX ADMIN — Medication 3 MILLILITER(S): at 09:21

## 2017-12-15 RX ADMIN — Medication 3 MILLILITER(S): at 15:50

## 2017-12-15 NOTE — DIETITIAN INITIAL EVALUATION ADULT. - PROBLEM SELECTOR PLAN 1
Acute respiratory failure secondary to CHF exacerbation vs HAP  - intial VS: Tmax - 97.1;/65, HR 74, RR 20 and 97 on 100 % NRB  -Clinically not in Acute resp distress , confused and lethargic, +cardiac wheezing   -ABG 7.36/46/92 on 100 % NRB  -EKG shows - NSR , no ischemic changes , 1st degree AV block  -CXR shows obscured left costophrenic marissa, cardiomegaly ,b/l  bilateral moderate infiltrates vs opacity  (not changed from prior CXR)  -pBNP 30K.  -Patient was given Lasix 60mg IV x 1 in ED and was placed on BiPAP  -Continue with Lasix 40mg IV BID  -c/w IV Abx to cover for HAP - renal dose vancomycin + Zosyn   - c/w nebs   -Continue with BiPAP and NPO except meds  -Flores for strict I/O monitor   -Monitor respiratory status   -c/w ICU monitoring  - f/u on serial cardiac enzymes  - f/u TSH/FT4, HbA1c and Lipid panel  - f/u repeat ABG post 1-2hrs after BiPAP  - f/u Procalcitonin, RVP, BCx, legionella  - ID Dr. Dietz

## 2017-12-15 NOTE — PROGRESS NOTE ADULT - SUBJECTIVE AND OBJECTIVE BOX
52 y/o F from [facility] ( long term care 2/2 diffculty walking possibly 2/2 polyneuropathy) w/ PMHx of anemia, CHF( diastolic dysfunction), CKD, Clostridium difficile infection, DM, diabetic neuropathy, HTN, HLD, Hypothyroidism, Hypoparathyroidism, Osteomyelitis of jaw, and Parathyroid adenoma, GERD p/w c/o SOB and difficulty breathing x1 week and associated mild cough admitted to ICU for acute hypoxic respiratory failure 2/2. Pt dx with PNA. ; currently on BIPAP . Patient denies any nausea , vomiting , abdominal pain or any other acute complaints . Says she's coughing but does not bring up sputum.    interval h/o  : patient currently on BIPAP and answering minimally , patient has Tmax of 100.4 overnight     review of systems : negative except for as mentioned above { patient currently on bipap and answering minimally }      Meds:  piperacillin/tazobactam IVPB. 3.375 Gram(s) IV Intermittent every 12 hours  vancomycin  IVPB 500 milliGRAM(s) IV Intermittent every 12 hours    Allergies  No Known Allergies      VITALS:  Vital Signs Last 24 Hrs  T(C): 38 (15 Dec 2017 06:00), Max: 38 (15 Dec 2017 06:00)  T(F): 100.4 (15 Dec 2017 06:00), Max: 100.4 (15 Dec 2017 06:00)  HR: 87 (15 Dec 2017 10:00) (74 - 92)  BP: 148/59 (15 Dec 2017 10:00) (115/63 - 171/63)  BP(mean): 81 (15 Dec 2017 10:00) (73 - 94)  RR: 17 (15 Dec 2017 10:00) (10 - 34)  SpO2: 94% (15 Dec 2017 10:00) (82% - 100%)    GENERAL: no acute distress; WD b F on BIPAP  HEAD: atraumatic, normocephalic   EYES: PERRL, white sclera.   ENMT: oral cavity - moist; edentulous  NECK: supple,  no JVD  CHEST/LUNG:  decreased breath sounds b/l  at the base  HEART: RRR, no murmur , no rub  ABDOMEN: soft, nontender, ; bowel sounds present , reducible umbilical hernia   EXTREMITIES: no  edema, no cyanosis, no clubbing.  NEURO: AA0X 3, no focal neuro deficits  Skin: no breakdown      LABS/DIAGNOSTIC TESTS:                          7.3    12.8  )-----------( 130      ( 15 Dec 2017 06:37 )             25.5         12-15    144  |  107  |  69<H>  ----------------------------<  70  3.8   |  28  |  4.98<H>    Ca    8.1<L>      15 Dec 2017 03:31  Phos  4.7     12-15  Mg     2.4     12-15    TPro  8.1  /  Alb  3.4<L>  /  TBili  0.6  /  DBili  x   /  AST  31  /  ALT  17  /  AlkPhos  77  12-13      LIVER FUNCTIONS - ( 13 Dec 2017 12:30 )  Alb: 3.4 g/dL / Pro: 8.1 g/dL / ALK PHOS: 77 U/L / ALT: 17 U/L DA / AST: 31 U/L / GGT: x             CULTURES: .Urine Clean Catch (Midstream)  12-13 @ 23:18   No growth  --  --      .Blood Blood-Peripheral  12-13 @ 17:41   No growth to date.  --  --      .Blood Blood-Peripheral  12-13 @ 17:40   No growth to date.  --  --            RADIOLOGY:  CXR :12/15/17  Interval improvement in right-sided pulmonary vascular congestive changes.  Left-sided pulmonary vascular congestive changes are stable,  Persistent mild left pleural effusion. 50 y/o F from [facility] ( long term care 2/2 diffculty walking possibly 2/2 polyneuropathy) w/ PMHx of anemia, CHF( diastolic dysfunction), CKD, Clostridium difficile infection, DM, diabetic neuropathy, HTN, HLD, Hypothyroidism, Hypoparathyroidism, Osteomyelitis of jaw, and Parathyroid adenoma, GERD p/w c/o SOB and difficulty breathing x1 week and associated mild cough admitted to ICU for acute hypoxic respiratory failure 2/2. Pt dx with PNA. ; currently on BIPAP . Patient denies any nausea , vomiting , abdominal pain or any other acute complaints . Says she's coughing but does not bring up sputum.    interval h/o  : patient currently on BIPAP and answering minimally , patient has Tmax of 100.4 overnight     review of systems : negative except for as mentioned above { patient currently on bipap and answering minimally }      Meds:  piperacillin/tazobactam IVPB. 3.375 Gram(s) IV Intermittent every 12 hours  vancomycin  IVPB 500 milliGRAM(s) IV Intermittent every 12 hours    Allergies  No Known Allergies      VITALS:  Vital Signs Last 24 Hrs  T(C): 38 (15 Dec 2017 06:00), Max: 38 (15 Dec 2017 06:00)  T(F): 100.4 (15 Dec 2017 06:00), Max: 100.4 (15 Dec 2017 06:00)  HR: 87 (15 Dec 2017 10:00) (74 - 92)  BP: 148/59 (15 Dec 2017 10:00) (115/63 - 171/63)  BP(mean): 81 (15 Dec 2017 10:00) (73 - 94)  RR: 17 (15 Dec 2017 10:00) (10 - 34)  SpO2: 94% (15 Dec 2017 10:00) (82% - 100%)    GENERAL: no acute distress; WD b F on BIPAP  HEAD: atraumatic, normocephalic   EYES: PERRL, white sclera.   ENMT: oral cavity - moist; edentulous  NECK: supple,  no JVD  CHEST/LUNG:  decreased breath sounds b/l  at the base; + wheezing   HEART: RRR, no murmur , no rub  ABDOMEN: soft, nontender, ; bowel sounds present , reducible umbilical hernia   EXTREMITIES: no  edema, no cyanosis, no clubbing.  NEURO: AA0X 3, no focal neuro deficits  Skin: no breakdown      LABS/DIAGNOSTIC TESTS:                          7.3    12.8  )-----------( 130      ( 15 Dec 2017 06:37 )             25.5         12-15    144  |  107  |  69<H>  ----------------------------<  70  3.8   |  28  |  4.98<H>    Ca    8.1<L>      15 Dec 2017 03:31  Phos  4.7     12-15  Mg     2.4     12-15    TPro  8.1  /  Alb  3.4<L>  /  TBili  0.6  /  DBili  x   /  AST  31  /  ALT  17  /  AlkPhos  77  12-13      LIVER FUNCTIONS - ( 13 Dec 2017 12:30 )  Alb: 3.4 g/dL / Pro: 8.1 g/dL / ALK PHOS: 77 U/L / ALT: 17 U/L DA / AST: 31 U/L / GGT: x             CULTURES: .Urine Clean Catch (Midstream)  12-13 @ 23:18   No growth  --  --      .Blood Blood-Peripheral  12-13 @ 17:41   No growth to date.  --  --      .Blood Blood-Peripheral  12-13 @ 17:40   No growth to date.  --  --            RADIOLOGY:  CXR :12/15/17  Interval improvement in right-sided pulmonary vascular congestive changes.  Left-sided pulmonary vascular congestive changes are stable,  Persistent mild left pleural effusion.

## 2017-12-15 NOTE — PROGRESS NOTE ADULT - ASSESSMENT
52 y/o F from [facility] ( long term care 2/2 diffculty walking possibly 2/2 polyneuropathy) w/ PMHx of anemia, CHF( diastolic dysfunction), CKD, Clostridium difficile infection, DM, Diabetic neuropathy, HTN, HLD, Hypothyroidism, Hypoparathyroidism, Osteomyelitis of jaw, and Parathyroid adenoma, GERD p/w c/o SOB, difficulty breathing x1 week and associated mild cough  admitted to ICU for acute hypoxic respiratory failure 2/2 PNA . Patient has a T MAX .4  and currently on BIPAP . Patient placed on vanco and zosyn for HCAP (from NH but no  admissions to hospital in last 3 months) .CXR ; IMPROVED PULMONARY VASCULAR CONGESTION  compared to 12/14/17 . However, in view of patient's clinical presentation and elevated WBC, would recommend treatment for HCAP with respiratory fluoroquinolone ( levofloxacin po  500 mg initial dose and followed by 250 mg every 48 hours { creatinine clearance 9mg/dl).   -BLOOD CULTURES and urine legionella - negative 52 y/o F from [facility] ( long term care 2/2 diffculty walking possibly 2/2 polyneuropathy) w/ PMHx of anemia, CHF( diastolic dysfunction), CKD, Clostridium difficile infection, DM, diabetic neuropathy, HTN, HLD, hypothyroidism, hypoparathyroidism, osteomyelitis of jaw, parathyroid adenoma, GERD p/w c/o SOB, difficulty breathing x1 week and associated mild cough  admitted to ICU for acute hypoxic respiratory failure 2/2 PNA . Patient has a T MAX .4  and currently on BIPAP . Patient placed on vanco and zosyn for HCAP (from NH but no  admissions to hospital in last 3 months) .CXR ; IMPROVED PULMONARY VASCULAR CONGESTION  compared to 12/14/17 . However, in view of patient's clinical presentation and elevated WBC, would recommend treatment for HCAP with respiratory fluoroquinolone ( levofloxacin po  500 mg initial dose and followed by 250 mg every 48 hours { creatinine clearance 9mg/dl).   -BLOOD CULTURES and urine legionella - negative 50 y/o F from [facility] ( long term care 2/2 diffculty walking possibly 2/2 polyneuropathy) w/ PMHx of anemia, CHF( diastolic dysfunction), CKD, Clostridium difficile infection, DM, diabetic neuropathy, HTN, HLD, hypothyroidism, hypoparathyroidism, osteomyelitis of jaw, parathyroid adenoma, GERD p/w c/o SOB, difficulty breathing x1 week and associated mild cough  admitted to ICU for acute hypoxic respiratory failure 2/2 PNA . Patient has a T MAX .4  and currently on BIPAP . Patient placed on vanco and zosyn for HCAP (from NH but no  admissions to hospital in last 3 months) .CXR ; IMPROVED PULMONARY VASCULAR CONGESTION  compared to 12/14/17 . However, in view of patient's clinical presentation and elevated WBC, would recommend treatment for HCAP with respiratory fluoroquinolone ( levofloxacin po  500 mg initial dose and followed by 250 mg every 48 hours { creatinine clearance 9mg/dl).   -BLOOD CULTURES and urine legionella - negative. Discussed above with Dr. Anand re change in Ab. 50 y/o F from [facility] ( long term care 2/2 diffculty walking possibly 2/2 polyneuropathy) w/ PMHx of anemia, CHF( diastolic dysfunction), CKD, Clostridium difficile infection, DM, diabetic neuropathy, HTN, HLD, hypothyroidism, hypoparathyroidism, osteomyelitis of jaw, parathyroid adenoma, GERD p/w c/o SOB, difficulty breathing x1 week and associated mild cough  admitted to ICU for acute hypoxic respiratory failure 2/2 PNA . Patient has a T MAX .4  and currently on BIPAP . Patient placed on vanco and zosyn for HCAP (from NH but no admissions to hospital in last 3 months) .CXR ; IMPROVED PULMONARY VASCULAR CONGESTION  compared to 12/14/17 . However, in view of patient's clinical presentation and elevated WBC, would recommend treatment for HCAP with respiratory fluoroquinolone ( levofloxacin po 500 mg initial dose and followed by 250 mg every 48 hours { creatinine clearance 9mg/dl).   -BLOOD CULTURES and urine legionella - negative. Discussed above with Dr. Anand re change in Ab.

## 2017-12-15 NOTE — PROGRESS NOTE ADULT - PROBLEM SELECTOR PLAN 5
hold nephrotoxic drugs   Hyperkalemia 5.4 9 (no EKG changes of hyperK)and creatinine 6.30( baseline 4.3)  discussed case with Dr. Tigist toscano , no need to correct hyperkalemia for now, no need for HD , repeat BMP K decreased to 4.2 hold nephrotoxic drugs   Hyperkalemia 5.4 9 (no EKG changes of hyperK)and creatinine 6.30( baseline 4.3)  discussed case with Dr. Escoto nephadryan , no need to correct hyperkalemia for now, no need for HD , repeat BMP K decreased to 4.2  K is 3.8

## 2017-12-15 NOTE — PROGRESS NOTE ADULT - ASSESSMENT
1. GI bleeding stopped  2. Anemia (H/H stable)  3. No evidence of acute GI bleeding    Suggestions:    1. Monitor H/H  2. Protonix daily  3. Avoid NSAID  4. Transfuse PRBC as needed  5. EGD when patient is more stable   6. DVT prophylaxis

## 2017-12-15 NOTE — DIETITIAN INITIAL EVALUATION ADULT. - MD RECOMMEND
change diet to carbohydrate consistent , renal replacement in view of dialysis initiation. oral supplementation as needed. obtain weight and diet history from family when able

## 2017-12-15 NOTE — PROGRESS NOTE ADULT - SUBJECTIVE AND OBJECTIVE BOX
s/p HD yesterday. currently in respiratory distress.    No Known Allergies    Hospital Medications:   MEDICATIONS  (STANDING):  ALBUTerol/ipratropium for Nebulization 3 milliLiter(s) Nebulizer every 6 hours  amLODIPine   Tablet 10 milliGRAM(s) Oral daily  ascorbic acid 500 milliGRAM(s) Oral daily  aspirin  chewable 81 milliGRAM(s) Oral daily  carvedilol 25 milliGRAM(s) Oral every 12 hours  cinacalcet 30 milliGRAM(s) Oral daily  dextrose 5%. 1000 milliLiter(s) (50 mL/Hr) IV Continuous <Continuous>  dextrose 50% Injectable 12.5 Gram(s) IV Push once  dextrose 50% Injectable 25 Gram(s) IV Push once  dextrose 50% Injectable 25 Gram(s) IV Push once  docusate sodium 100 milliGRAM(s) Oral three times a day  ferrous    sulfate 325 milliGRAM(s) Oral two times a day with meals  folic acid 1 milliGRAM(s) Oral daily  furosemide   Injectable 80 milliGRAM(s) IV Push daily  hydrALAZINE 100 milliGRAM(s) Oral every 8 hours  levoFLOXacin  Tablet 500 milliGRAM(s) Oral once  levothyroxine 50 MICROGram(s) Oral daily  lidocaine 1% Injectable 10 milliLiter(s) Local Injection once  multivitamin 1 Tablet(s) Oral daily  pantoprazole  Injectable 40 milliGRAM(s) IV Push two times a day  pregabalin 50 milliGRAM(s) Oral two times a day  senna 2 Tablet(s) Oral at bedtime  simvastatin 20 milliGRAM(s) Oral at bedtime      VITALS:  T(F): 101 (12-15-17 @ 13:00), Max: 101 (12-15-17 @ 13:00)  HR: 89 (12-15-17 @ 13:00)  BP: 154/55 (12-15-17 @ 13:00)  RR: 23 (12-15-17 @ 13:00)  SpO2: 98% (12-15-17 @ 13:00)  Wt(kg): --     @ 07:01  -  12-15 @ 07:00  --------------------------------------------------------  IN: 832.5 mL / OUT: 1030 mL / NET: -197.5 mL    12-15 @ 07:01  -  12-15 @ 13:56  --------------------------------------------------------  IN: 0 mL / OUT: 275 mL / NET: -275 mL        PHYSICAL EXAM:  Constitutional: NAD  HEENT: anicteric sclera, oropharynx clear.  Neck: JVD+  Respiratory: CTAB, no wheezes, rales or rhonchi  Cardiovascular: S1, S2, RRR  Gastrointestinal: BS+, soft, NT/ND  Extremities:   peripheral edema+  Neurological:  no focal deficits  Vascular Access: RT groin vasc cath.    LABS:  12-15    144  |  107  |  69<H>  ----------------------------<  70  3.8   |  28  |  4.98<H>    Ca    8.1<L>      15 Dec 2017 03:31  Phos  4.7     12-15  Mg     2.4     15      Creatinine Trend: 4.98 <--, 4.67 <--, 6.53 <--, 6.44 <--, 6.44 <--, 6.30 <--                        7.3    12.8  )-----------( 130      ( 15 Dec 2017 06:37 )             25.5     Urine Studies:  Urinalysis Basic - ( 13 Dec 2017 14:12 )    Color: Yellow / Appearance: Slightly Turbid / S.015 / pH:   Gluc:  / Ketone: Negative  / Bili: Negative / Urobili: Negative   Blood:  / Protein: 100 / Nitrite: Negative   Leuk Esterase: Small / RBC: 0-2 /HPF / WBC 3-5 /HPF   Sq Epi:  / Non Sq Epi: Occasional /HPF / Bacteria: Trace /HPF        RADIOLOGY & ADDITIONAL STUDIES:

## 2017-12-15 NOTE — PROGRESS NOTE ADULT - ATTENDING COMMENTS
-pat with respirator failure due to fluid over load with RONAL on CKD requiring NIPPV supprt. She was started on dialysis yesterday and schedule. Today more awake   -antibx per ID  -continue ventuppore

## 2017-12-15 NOTE — PROGRESS NOTE ADULT - ASSESSMENT
52 yr old female with progressive CKD V admitted with decompesated CHF sec to CKD V. On antibiotics for healthcare associated PNA.   s/p HD yesterday.  in respiratory distress.  leucocytosis  anemia of CKD.    RECOMMENDATION:  HD today with aggressive ultrafiltration.  will re-evaluate volume status in AM.

## 2017-12-15 NOTE — PROGRESS NOTE ADULT - ATTENDING COMMENTS
Pt is a 52 y/o F from [facility] ( long term care 2/2 diffculty walking possibly 2/2 polyneuropathy) w/ PMHx of anemia, CHF( diastolic dysfunction), CKD, Clostridium difficile infection, DM, diabetic neuropathy, HTN, HLD, hypothyroidism, hypoparathyroidism, osteomyelitis of jaw, parathyroid adenoma, GERD p/w c/o SOB, difficulty breathing x1 week and associated mild cough  admitted to ICU for acute hypoxic respiratory failure 2/2 PNA . Patient has a T MAX .4  and currently on BIPAP . Patient placed on vanco and zosyn for HCAP (from NH but no  admissions to hospital in last 3 months) .CXR ; IMPROVED PULMONARY VASCULAR CONGESTION  compared to 12/14/17 . In view of patient's clinical presentation and elevated WBC, would again recommend treatment for HCAP with respiratory fluoroquinolone ( levofloxacin po  500 mg initial dose and followed by 250 mg every 48 hours { creatinine clearance 9mg/dl) and would d/c vanco and zosyn..   -BLOOD CULTURES and urine legionella - negative. Discussed above with Dr. Anand re change in Ab. Chart reviewed, patient re-evaluated, case discussed with ICU team and resident on ID. Pt is a 52 y/o F from [facility] ( long term care 2/2 diffculty walking possibly 2/2 polyneuropathy) w/ PMHx of anemia, CHF( diastolic dysfunction), CKD, Clostridium difficile infection, DM, diabetic neuropathy, HTN, HLD, hypothyroidism, hypoparathyroidism, osteomyelitis of jaw, parathyroid adenoma, GERD p/w c/o SOB, difficulty breathing x1 week and associated mild cough  admitted to ICU for acute hypoxic respiratory failure 2/2 PNA . Patient afebrile over last 24 hours and currently on high flow . Patient placed on vanco and zosyn by ICU team for HCAP (from NH). Cxr from today with improvement in congestion; no clear reading of discrete infiltrate. However, given patient's clinical presentation, elevated WBC, renal dysfn, wd again recommend d/cing vanco and zosyn (larger fluid load) and adding quinolone instead to treat HCAP as recommended above.

## 2017-12-15 NOTE — PROGRESS NOTE ADULT - ASSESSMENT
52 y/o F from [facility] ( long term care 2/2 diffculty walking possibly 2/2 polyneuropathy) w/ PMHx of anemia, CHF( diastolic dysfunction), CKD, Clostridium difficile infection, DM, Diabetic neuropathy, HTN, HLD, Hypothyroidism, Hypoparathyroidism, Osteomyelitis of jaw, and Parathyroid adenoma, GERD p/w c/o SOB and difficulty breathing x1 week. Pt also reports associated mild cough. Pt denies fever, chills, general pain, C/P, or any other complaints. NO  In ED pt is Awake , responds to pain and verbal stimuli, She in moderate respiratory distress. placed on BIPAP. ICU consulted for new BIPAP.    Admit to ICU for Acute Hypoxic and hypercapnic respiratory failure require new Bipap  2/2 Exacerbation of CHF or possibly pneumonia

## 2017-12-15 NOTE — PROGRESS NOTE ADULT - SUBJECTIVE AND OBJECTIVE BOX
INTERVAL /OVERNIGHT EVENTS: HD yesterday    PRESSORS: [ ] YES [x ] NO    ANTIBIOTICS: Vanc, Zosyn                 DATE STARTED:     Antimicrobial:  piperacillin/tazobactam IVPB. 3.375 Gram(s) IV Intermittent every 12 hours  vancomycin  IVPB 500 milliGRAM(s) IV Intermittent every 12 hours    Cardiovascular:  amLODIPine   Tablet 10 milliGRAM(s) Oral daily  carvedilol 25 milliGRAM(s) Oral every 12 hours  furosemide   Injectable 80 milliGRAM(s) IV Push daily  hydrALAZINE 100 milliGRAM(s) Oral every 8 hours    Pulmonary:  ALBUTerol/ipratropium for Nebulization 3 milliLiter(s) Nebulizer every 6 hours    Hematalogic:  aspirin  chewable 81 milliGRAM(s) Oral daily    Other:  acetaminophen  Suppository 650 milliGRAM(s) Rectal every 6 hours PRN  ascorbic acid 500 milliGRAM(s) Oral daily  cinacalcet 30 milliGRAM(s) Oral daily  dextrose 5%. 1000 milliLiter(s) IV Continuous <Continuous>  dextrose 50% Injectable 12.5 Gram(s) IV Push once  dextrose 50% Injectable 25 Gram(s) IV Push once  dextrose 50% Injectable 25 Gram(s) IV Push once  dextrose Gel 1 Dose(s) Oral once PRN  docusate sodium 100 milliGRAM(s) Oral three times a day  ferrous    sulfate 325 milliGRAM(s) Oral two times a day with meals  folic acid 1 milliGRAM(s) Oral daily  glucagon  Injectable 1 milliGRAM(s) IntraMuscular once PRN  levothyroxine 50 MICROGram(s) Oral daily  lidocaine 1% Injectable 10 milliLiter(s) Local Injection once  multivitamin 1 Tablet(s) Oral daily  pantoprazole  Injectable 40 milliGRAM(s) IV Push two times a day  pregabalin 50 milliGRAM(s) Oral two times a day  senna 2 Tablet(s) Oral at bedtime  simvastatin 20 milliGRAM(s) Oral at bedtime    acetaminophen  Suppository 650 milliGRAM(s) Rectal every 6 hours PRN  ALBUTerol/ipratropium for Nebulization 3 milliLiter(s) Nebulizer every 6 hours  amLODIPine   Tablet 10 milliGRAM(s) Oral daily  ascorbic acid 500 milliGRAM(s) Oral daily  aspirin  chewable 81 milliGRAM(s) Oral daily  carvedilol 25 milliGRAM(s) Oral every 12 hours  cinacalcet 30 milliGRAM(s) Oral daily  dextrose 5%. 1000 milliLiter(s) IV Continuous <Continuous>  dextrose 50% Injectable 12.5 Gram(s) IV Push once  dextrose 50% Injectable 25 Gram(s) IV Push once  dextrose 50% Injectable 25 Gram(s) IV Push once  dextrose Gel 1 Dose(s) Oral once PRN  docusate sodium 100 milliGRAM(s) Oral three times a day  ferrous    sulfate 325 milliGRAM(s) Oral two times a day with meals  folic acid 1 milliGRAM(s) Oral daily  furosemide   Injectable 80 milliGRAM(s) IV Push daily  glucagon  Injectable 1 milliGRAM(s) IntraMuscular once PRN  hydrALAZINE 100 milliGRAM(s) Oral every 8 hours  levothyroxine 50 MICROGram(s) Oral daily  lidocaine 1% Injectable 10 milliLiter(s) Local Injection once  multivitamin 1 Tablet(s) Oral daily  pantoprazole  Injectable 40 milliGRAM(s) IV Push two times a day  piperacillin/tazobactam IVPB. 3.375 Gram(s) IV Intermittent every 12 hours  pregabalin 50 milliGRAM(s) Oral two times a day  senna 2 Tablet(s) Oral at bedtime  simvastatin 20 milliGRAM(s) Oral at bedtime  vancomycin  IVPB 500 milliGRAM(s) IV Intermittent every 12 hours    Drug Dosing Weight  Height (cm): 170 (13 Dec 2017 17:16)  Weight (kg): 57 (13 Dec 2017 17:16)  BMI (kg/m2): 19.7 (13 Dec 2017 17:16)  BSA (m2): 1.66 (13 Dec 2017 17:16)    CENTRAL LINE: [x ] YES [ ] NO  LOCATION:  [facility] DATE INSERTED: 12/15  REMOVE: [ ] YES [x ] NO  EXPLAIN: dialysis    JARRELL: [ x] YES [ ] NO    DATE INSERTED: 12/15  REMOVE:  [ ] YES [x ] NO  EXPLAIN: urine output monitoring    A-LINE:  [x ] YES [ ] NO  LOCATION:   DATE INSERTED: 12/15  REMOVE:  [ ] YES [ x] NO  EXPLAIN: dialysis        ICU Vital Signs Last 24 Hrs  T(C): 38 (15 Dec 2017 06:00), Max: 38 (15 Dec 2017 06:00)  T(F): 100.4 (15 Dec 2017 06:00), Max: 100.4 (15 Dec 2017 06:00)  HR: 92 (15 Dec 2017 08:10) (74 - 92)  BP: 159/68 (15 Dec 2017 08:00) (129/74 - 171/63)  BP(mean): 91 (15 Dec 2017 08:00) (81 - 94)  RR: 16 (15 Dec 2017 08:00) (10 - 24)  SpO2: 99% (15 Dec 2017 08:10) (82% - 100%)      ABG - ( 14 Dec 2017 13:56 )  pH: 7.37  /  pCO2: 44    /  pO2: 84    / HCO3: 25    / Base Excess: 0.3   /  SaO2: 94                    12-14 @ 07:01  -  -15 @ 07:00  --------------------------------------------------------  IN: 832.5 mL / OUT: 1030 mL / NET: -197.5 mL            PHYSICAL EXAM:    GENERAL: no acute distress  HEAD: atraumatic, normocephalic   ENMT: oral cavity moist  NECK: supple,  no JVD,  LYMPH: no palpable lymph nodes     SKIN: warm, dry   CHEST/LUNG: No Chest deformity , no chest tenderness. bilateral breath sounds, no  adventitious sounds  HEART: RRR, no m/r/g   ABDOMEN: soft, nontender, nondistended; bowel sounds.  :jarrell catheter.  EXTREMITIES: no edema, no cyanosis, no clubbing.  NEURO: AA0X3        LABS:  CBC Full  -  ( 15 Dec 2017 06:37 )  WBC Count : 12.8 K/uL  Hemoglobin : 7.3 g/dL  Hematocrit : 25.5 %  Platelet Count - Automated : 130 K/uL  Mean Cell Volume : 94.0 fl  Mean Cell Hemoglobin : 27.1 pg  Mean Cell Hemoglobin Concentration : 28.8 gm/dL  Auto Neutrophil # : 12.0 K/uL  Auto Lymphocyte # : 0.4 K/uL  Auto Monocyte # : 0.3 K/uL  Auto Eosinophil # : 0.0 K/uL  Auto Basophil # : 0.0 K/uL  Auto Neutrophil % : 94.2 %  Auto Lymphocyte % : 3.1 %  Auto Monocyte % : 2.3 %  Auto Eosinophil % : 0.0 %  Auto Basophil % : 0.3 %    12-15    144  |  107  |  69<H>  ----------------------------<  70  3.8   |  28  |  4.98<H>    Ca    8.1<L>      15 Dec 2017 03:31  Phos  4.7     12-15  Mg     2.4     12-15    TPro  8.1  /  Alb  3.4<L>  /  TBili  0.6  /  DBili  x   /  AST  31  /  ALT  17  /  AlkPhos  77      PT/INR - ( 14 Dec 2017 14:42 )   PT: 16.4 sec;   INR: 1.49 ratio         PTT - ( 14 Dec 2017 14:42 )  PTT:40.2 sec  Urinalysis Basic - ( 13 Dec 2017 14:12 )    Color: Yellow / Appearance: Slightly Turbid / S.015 / pH: x  Gluc: x / Ketone: Negative  / Bili: Negative / Urobili: Negative   Blood: x / Protein: 100 / Nitrite: Negative   Leuk Esterase: Small / RBC: 0-2 /HPF / WBC 3-5 /HPF   Sq Epi: x / Non Sq Epi: Occasional /HPF / Bacteria: Trace /HPF      Culture Results:   No growth ( @ 23:18)  Culture Results:   No growth to date. ( @ 17:41)  Culture Results:   No growth to date. ( @ 17:40)      RADIOLOGY & ADDITIONAL STUDIES REVIEWED:     [ ]GOALS OF CARE DISCUSSION WITH PATIENT/FAMILY/PROXY:    CRITICAL CARE TIME SPENT: 35 minutes

## 2017-12-15 NOTE — CHART NOTE - NSCHARTNOTEFT_GEN_A_CORE
Spoke with vascular PA over phone regarding Shiley, which seems to be leaking. Surgery will come by to look at it prior to dialysis.

## 2017-12-15 NOTE — PROGRESS NOTE ADULT - PROBLEM SELECTOR PLAN 1
Acute respiratory failure secondary to HAP (elevated procalcitonin)  - intial VS: Tmax - 97.1;/65, HR 74, RR 20 and 97 on 100 % NRB  -Clinically not in Acute resp distress , L sided wheezing  -ABG 7.36/46/92 on 100 % NRB  -EKG shows - NSR , no ischemic changes , 1st degree AV block  -CXR shows obscured left costophrenic marissa, cardiomegaly ,b/l  bilateral moderate infiltrates vs opacity  (not changed from prior CXR)  -pBNP 30K.  -Patient was given Lasix 60mg IV x 1 in ED and was placed on BiPAP, given another dose of Lasix 40mg IV overnight with good urine output  -Continue with Lasix 40mg IV BID  -c/w IV Abx to cover for HAP - renal dose vancomycin + Zosyn   - c/w nebs   -Switch to High flow NC  -Flores for strict I/O monitor   - f/u TSH/FT4, HbA1c and Lipid panel  - f/u repeat ABG post 1-2hrs after BiPAP  - Procalcitonin positive  - f/u RVP, BCx, legionella  - f/u ID Dr. Dietz Acute respiratory failure secondary to HAP (elevated procalcitonin)  - intial VS: Tmax - 97.1;/65, HR 74, RR 20 and 97 on 100 % NRB  -Clinically in Acute resp distress , L sided wheezing  -EKG shows - NSR , no ischemic changes , 1st degree AV block  -CXR shows obscured left costophrenic marissa, cardiomegaly ,b/l  bilateral moderate infiltrates vs opacity  (not changed from prior CXR)  -pBNP 30K.  -Patient was given Lasix 60mg IV x 1 in ED and was placed on BiPAP, given another dose of Lasix 40mg IV overnight with good urine output  -Continue with Lasix 40mg IV BID  c/w PO levaquin as per Id recommendation  - c/w nebs   -Switch to High flow NC  -Flores for strict I/O monitor, urine output continues to be low  -HbA1c 5.2, TSH wnl  - Procalcitonin positive  -legionella negative, BCx UCx negative to date  - f/u ID Dr. Dietz  Pt underwent dialysis yesterday, will need repeat dialysis today

## 2017-12-15 NOTE — PROGRESS NOTE ADULT - PROBLEM SELECTOR PLAN 2
dark tarry stool overnight  Hgb drop from 8.7 to 7.3  started IV protonix BID  CBC q6  f/u GI consult dark tarry stool overnight  Hgb drop from 8.7 to 7.3  started IV protonix BID  CBC q6  EGD postponed for another day as pt experiencing resp. distress and will need dialysis

## 2017-12-15 NOTE — PROGRESS NOTE ADULT - PROBLEM SELECTOR PLAN 4
unknown EF  c/w home cardiac meds   f/u TTE  cardiac enzymes elevated, continue to trend unknown EF  c/w home cardiac meds   TTE - RV systolic pressure is 49 mm Hg. Mild pulmonary  hypertension  cardiac enzymes elevated, continue to trend

## 2017-12-16 LAB
ALBUMIN SERPL ELPH-MCNC: 2.2 G/DL — LOW (ref 3.5–5)
ALP SERPL-CCNC: 45 U/L — SIGNIFICANT CHANGE UP (ref 40–120)
ALT FLD-CCNC: 35 U/L DA — SIGNIFICANT CHANGE UP (ref 10–60)
ANION GAP SERPL CALC-SCNC: 12 MMOL/L — SIGNIFICANT CHANGE UP (ref 5–17)
AST SERPL-CCNC: 72 U/L — HIGH (ref 10–40)
BASE EXCESS BLDA CALC-SCNC: 1.8 MMOL/L — SIGNIFICANT CHANGE UP (ref -2–2)
BASE EXCESS BLDA CALC-SCNC: 4 MMOL/L — HIGH (ref -2–2)
BASOPHILS # BLD AUTO: 0 K/UL — SIGNIFICANT CHANGE UP (ref 0–0.2)
BASOPHILS NFR BLD AUTO: 0.3 % — SIGNIFICANT CHANGE UP (ref 0–2)
BILIRUB SERPL-MCNC: 0.5 MG/DL — SIGNIFICANT CHANGE UP (ref 0.2–1.2)
BLOOD GAS COMMENTS ARTERIAL: SIGNIFICANT CHANGE UP
BLOOD GAS COMMENTS ARTERIAL: SIGNIFICANT CHANGE UP
BUN SERPL-MCNC: 55 MG/DL — HIGH (ref 7–18)
CALCIUM SERPL-MCNC: 7.7 MG/DL — LOW (ref 8.4–10.5)
CHLORIDE SERPL-SCNC: 101 MMOL/L — SIGNIFICANT CHANGE UP (ref 96–108)
CHOLEST SERPL-MCNC: 53 MG/DL — SIGNIFICANT CHANGE UP (ref 10–199)
CO2 SERPL-SCNC: 29 MMOL/L — SIGNIFICANT CHANGE UP (ref 22–31)
CREAT SERPL-MCNC: 4 MG/DL — HIGH (ref 0.5–1.3)
EOSINOPHIL # BLD AUTO: 0 K/UL — SIGNIFICANT CHANGE UP (ref 0–0.5)
EOSINOPHIL NFR BLD AUTO: 0 % — SIGNIFICANT CHANGE UP (ref 0–6)
GLUCOSE BLDC GLUCOMTR-MCNC: 91 MG/DL — SIGNIFICANT CHANGE UP (ref 70–99)
GLUCOSE SERPL-MCNC: 82 MG/DL — SIGNIFICANT CHANGE UP (ref 70–99)
HCO3 BLDA-SCNC: 27 MMOL/L — SIGNIFICANT CHANGE UP (ref 23–27)
HCO3 BLDA-SCNC: 29 MMOL/L — HIGH (ref 23–27)
HCT VFR BLD CALC: 22.7 % — LOW (ref 34.5–45)
HCT VFR BLD CALC: 29.6 % — LOW (ref 34.5–45)
HDLC SERPL-MCNC: 38 MG/DL — LOW (ref 40–125)
HGB BLD-MCNC: 6.7 G/DL — CRITICAL LOW (ref 11.5–15.5)
HGB BLD-MCNC: 9.1 G/DL — LOW (ref 11.5–15.5)
HOROWITZ INDEX BLDA+IHG-RTO: 100 — SIGNIFICANT CHANGE UP
HOROWITZ INDEX BLDA+IHG-RTO: 100 — SIGNIFICANT CHANGE UP
LIPID PNL WITH DIRECT LDL SERPL: -13 MG/DL — SIGNIFICANT CHANGE UP
LYMPHOCYTES # BLD AUTO: 0.4 K/UL — LOW (ref 1–3.3)
LYMPHOCYTES # BLD AUTO: 4.3 % — LOW (ref 13–44)
MAGNESIUM SERPL-MCNC: 2.3 MG/DL — SIGNIFICANT CHANGE UP (ref 1.6–2.6)
MCHC RBC-ENTMCNC: 27.6 PG — SIGNIFICANT CHANGE UP (ref 27–34)
MCHC RBC-ENTMCNC: 28.8 PG — SIGNIFICANT CHANGE UP (ref 27–34)
MCHC RBC-ENTMCNC: 29.3 GM/DL — LOW (ref 32–36)
MCHC RBC-ENTMCNC: 30.9 GM/DL — LOW (ref 32–36)
MCV RBC AUTO: 93.2 FL — SIGNIFICANT CHANGE UP (ref 80–100)
MCV RBC AUTO: 94.1 FL — SIGNIFICANT CHANGE UP (ref 80–100)
MONOCYTES # BLD AUTO: 0.2 K/UL — SIGNIFICANT CHANGE UP (ref 0–0.9)
MONOCYTES NFR BLD AUTO: 2.2 % — SIGNIFICANT CHANGE UP (ref 2–14)
NEUTROPHILS # BLD AUTO: 8.4 K/UL — HIGH (ref 1.8–7.4)
NEUTROPHILS NFR BLD AUTO: 93.2 % — HIGH (ref 43–77)
PCO2 BLDA: 48 MMHG — HIGH (ref 32–46)
PCO2 BLDA: 52 MMHG — HIGH (ref 32–46)
PH BLDA: 7.34 — LOW (ref 7.35–7.45)
PH BLDA: 7.39 — SIGNIFICANT CHANGE UP (ref 7.35–7.45)
PHOSPHATE SERPL-MCNC: 4.7 MG/DL — HIGH (ref 2.5–4.5)
PLATELET # BLD AUTO: 112 K/UL — LOW (ref 150–400)
PLATELET # BLD AUTO: 119 K/UL — LOW (ref 150–400)
PO2 BLDA: 75 MMHG — SIGNIFICANT CHANGE UP (ref 74–108)
PO2 BLDA: 92 MMHG — SIGNIFICANT CHANGE UP (ref 74–108)
POTASSIUM SERPL-MCNC: 3.7 MMOL/L — SIGNIFICANT CHANGE UP (ref 3.5–5.3)
POTASSIUM SERPL-SCNC: 3.7 MMOL/L — SIGNIFICANT CHANGE UP (ref 3.5–5.3)
PROT SERPL-MCNC: 6 G/DL — SIGNIFICANT CHANGE UP (ref 6–8.3)
RBC # BLD: 2.42 M/UL — LOW (ref 3.8–5.2)
RBC # BLD: 3.18 M/UL — LOW (ref 3.8–5.2)
RBC # FLD: 15.3 % — HIGH (ref 10.3–14.5)
RBC # FLD: 15.9 % — HIGH (ref 10.3–14.5)
SAO2 % BLDA: 94 % — SIGNIFICANT CHANGE UP (ref 92–96)
SAO2 % BLDA: 94 % — SIGNIFICANT CHANGE UP (ref 92–96)
SODIUM SERPL-SCNC: 142 MMOL/L — SIGNIFICANT CHANGE UP (ref 135–145)
TOTAL CHOLESTEROL/HDL RATIO MEASUREMENT: 1.4 RATIO — LOW (ref 3.3–7.1)
TRIGL SERPL-MCNC: 138 MG/DL — SIGNIFICANT CHANGE UP (ref 10–149)
WBC # BLD: 9 K/UL — SIGNIFICANT CHANGE UP (ref 3.8–10.5)
WBC # BLD: 9.3 K/UL — SIGNIFICANT CHANGE UP (ref 3.8–10.5)
WBC # FLD AUTO: 9 K/UL — SIGNIFICANT CHANGE UP (ref 3.8–10.5)
WBC # FLD AUTO: 9.3 K/UL — SIGNIFICANT CHANGE UP (ref 3.8–10.5)

## 2017-12-16 PROCEDURE — 71010: CPT | Mod: 26

## 2017-12-16 PROCEDURE — 99233 SBSQ HOSP IP/OBS HIGH 50: CPT | Mod: GC

## 2017-12-16 RX ORDER — ERYTHROPOIETIN 10000 [IU]/ML
20000 INJECTION, SOLUTION INTRAVENOUS; SUBCUTANEOUS ONCE
Qty: 0 | Refills: 0 | Status: COMPLETED | OUTPATIENT
Start: 2017-12-16 | End: 2017-12-16

## 2017-12-16 RX ORDER — HYDROMORPHONE HYDROCHLORIDE 2 MG/ML
1 INJECTION INTRAMUSCULAR; INTRAVENOUS; SUBCUTANEOUS EVERY 4 HOURS
Qty: 0 | Refills: 0 | Status: DISCONTINUED | OUTPATIENT
Start: 2017-12-16 | End: 2017-12-16

## 2017-12-16 RX ORDER — NOREPINEPHRINE BITARTRATE/D5W 8 MG/250ML
0.5 PLASTIC BAG, INJECTION (ML) INTRAVENOUS
Qty: 16 | Refills: 0 | Status: DISCONTINUED | OUTPATIENT
Start: 2017-12-16 | End: 2017-12-18

## 2017-12-16 RX ORDER — PIPERACILLIN AND TAZOBACTAM 4; .5 G/20ML; G/20ML
3.38 INJECTION, POWDER, LYOPHILIZED, FOR SOLUTION INTRAVENOUS EVERY 12 HOURS
Qty: 0 | Refills: 0 | Status: DISCONTINUED | OUTPATIENT
Start: 2017-12-16 | End: 2017-12-19

## 2017-12-16 RX ORDER — PROPOFOL 10 MG/ML
5 INJECTION, EMULSION INTRAVENOUS
Qty: 1000 | Refills: 0 | Status: DISCONTINUED | OUTPATIENT
Start: 2017-12-16 | End: 2017-12-26

## 2017-12-16 RX ORDER — FERROUS SULFATE 325(65) MG
300 TABLET ORAL DAILY
Qty: 0 | Refills: 0 | Status: DISCONTINUED | OUTPATIENT
Start: 2017-12-16 | End: 2018-01-29

## 2017-12-16 RX ORDER — VANCOMYCIN HCL 1 G
1000 VIAL (EA) INTRAVENOUS ONCE
Qty: 0 | Refills: 0 | Status: COMPLETED | OUTPATIENT
Start: 2017-12-16 | End: 2017-12-16

## 2017-12-16 RX ADMIN — AMLODIPINE BESYLATE 10 MILLIGRAM(S): 2.5 TABLET ORAL at 05:58

## 2017-12-16 RX ADMIN — CINACALCET 30 MILLIGRAM(S): 30 TABLET, FILM COATED ORAL at 16:05

## 2017-12-16 RX ADMIN — Medication 500 MILLIGRAM(S): at 16:04

## 2017-12-16 RX ADMIN — Medication 81 MILLIGRAM(S): at 16:03

## 2017-12-16 RX ADMIN — Medication 1 MILLIGRAM(S): at 16:02

## 2017-12-16 RX ADMIN — Medication 1 TABLET(S): at 16:03

## 2017-12-16 RX ADMIN — Medication 100 MILLIGRAM(S): at 05:58

## 2017-12-16 RX ADMIN — Medication 300 MILLIGRAM(S): at 17:44

## 2017-12-16 RX ADMIN — CARVEDILOL PHOSPHATE 25 MILLIGRAM(S): 80 CAPSULE, EXTENDED RELEASE ORAL at 05:58

## 2017-12-16 RX ADMIN — SIMVASTATIN 20 MILLIGRAM(S): 20 TABLET, FILM COATED ORAL at 21:42

## 2017-12-16 RX ADMIN — Medication 250 MILLIGRAM(S): at 18:42

## 2017-12-16 RX ADMIN — PANTOPRAZOLE SODIUM 40 MILLIGRAM(S): 20 TABLET, DELAYED RELEASE ORAL at 05:57

## 2017-12-16 RX ADMIN — PROPOFOL 1.71 MICROGRAM(S)/KG/MIN: 10 INJECTION, EMULSION INTRAVENOUS at 09:40

## 2017-12-16 RX ADMIN — SENNA PLUS 2 TABLET(S): 8.6 TABLET ORAL at 21:43

## 2017-12-16 RX ADMIN — Medication 26.72 MICROGRAM(S)/KG/MIN: at 11:10

## 2017-12-16 RX ADMIN — Medication 50 MILLIGRAM(S): at 05:58

## 2017-12-16 RX ADMIN — PANTOPRAZOLE SODIUM 40 MILLIGRAM(S): 20 TABLET, DELAYED RELEASE ORAL at 17:41

## 2017-12-16 RX ADMIN — Medication 80 MILLIGRAM(S): at 05:58

## 2017-12-16 RX ADMIN — Medication 100 MILLIGRAM(S): at 21:42

## 2017-12-16 RX ADMIN — Medication 50 MICROGRAM(S): at 05:58

## 2017-12-16 RX ADMIN — PIPERACILLIN AND TAZOBACTAM 12.5 GRAM(S): 4; .5 INJECTION, POWDER, LYOPHILIZED, FOR SOLUTION INTRAVENOUS at 18:42

## 2017-12-16 RX ADMIN — Medication 3 MILLILITER(S): at 02:25

## 2017-12-16 RX ADMIN — PROPOFOL 1.71 MICROGRAM(S)/KG/MIN: 10 INJECTION, EMULSION INTRAVENOUS at 15:06

## 2017-12-16 RX ADMIN — Medication 3 MILLILITER(S): at 08:08

## 2017-12-16 RX ADMIN — ERYTHROPOIETIN 20000 UNIT(S): 10000 INJECTION, SOLUTION INTRAVENOUS; SUBCUTANEOUS at 12:40

## 2017-12-16 NOTE — PROGRESS NOTE ADULT - PROBLEM SELECTOR PLAN 1
Acute respiratory failure secondary to HAP (elevated procalcitonin)  - intial VS: Tmax - 97.1;/65, HR 74, RR 20 and 97 on 100 % NRB  -Clinically in Acute resp distress , L sided wheezing  -EKG shows - NSR , no ischemic changes , 1st degree AV block  -CXR shows obscured left costophrenic marissa, cardiomegaly ,b/l  bilateral moderate infiltrates vs opacity  (not changed from prior CXR)  -pBNP 30K.  -Patient was given Lasix 60mg IV x 1 in ED and was placed on BiPAP, given another dose of Lasix 40mg IV overnight with good urine output  -Continue with Lasix 40mg IV BID  c/w PO levaquin as per Id recommendation  - c/w nebs   -Switch to High flow NC  -Flores for strict I/O monitor, urine output continues to be low  -HbA1c 5.2, TSH wnl  - Procalcitonin positive  -legionella negative, BCx UCx negative to date  - f/u ID Dr. Dietz  Pt underwent dialysis yesterday, will need repeat dialysis today Acute respiratory failure secondary to HAP (elevated procalcitonin)  Has been on BiPaP for respiratory support  Likely cause is fluid overload v/s PNA  s/p trial of IV diuresis yesterday  -Continue with Lasix 80 Daily  c/w PO levaquin as per Id recommendation  - c/w nebs   started on new dialysis for CKD and fluid overload

## 2017-12-16 NOTE — PROGRESS NOTE ADULT - SUBJECTIVE AND OBJECTIVE BOX
52y Female [facility]  w/ PMHx of anemia, CHF( diastolic dysfunction), CKD, , DM, diabetic neuropathy, HTN, HLD, Hypothyroidism, Hypoparathyroidism, Osteomyelitis of jaw, and Parathyroid adenoma, GERD p/w c/o SOB and difficulty breathing, patient was admitted to ICU on Bipap with Acute Hypoxic respiratory failure 2/2 PNA,  This morning patient was in Respiratory distress on Bipap, tachypneic, using accessory muscles, ABG shows respiratory Acidosis, patient was intubated, on mechanical ventilation, patient continues to have low grade fever with Tmax of 100.4, HD stable, leucocytosis resolved.       Meds:  levoFLOXacin  Tablet 250 milliGRAM(s) Oral every 48 hours    Allergies    No Known Allergies    Intolerances        VITALS:  Vital Signs Last 24 Hrs  T(C): 37.9 (16 Dec 2017 05:00), Max: 38.3 (15 Dec 2017 13:00)  T(F): 100.3 (16 Dec 2017 05:00), Max: 101 (15 Dec 2017 13:00)  HR: 75 (16 Dec 2017 09:40) (75 - 98)  BP: 125/48 (16 Dec 2017 08:00) (124/55 - 176/65)  BP(mean): 69 (16 Dec 2017 08:00) (69 - 106)  RR: 23 (16 Dec 2017 08:00) (13 - 30)  SpO2: 95% (16 Dec 2017 09:40) (89% - 100%)    ROS:  [ x ] UNABLE TO ELICIT  patient is on mechanical ventlation    GENERAL: no acute distress; WD b F on BIPAP  HEAD: atraumatic, normocephalic   EYES: PERRL, white sclera.   ENMT: oral cavity - moist; edentulous  NECK: supple,  no JVD  CHEST/LUNG:  ETT in place, On mechanical venti  HEART: RRR, no murmur , no rub  ABDOMEN: soft, nontender, ; bowel sounds present , reducible umbilical hernia   EXTREMITIES: no  edema, no cyanosis, no clubbing.  NEURO: patient is sedated  Skin: no breakdown      LABS/DIAGNOSTIC TESTS:                          6.7    9.0   )-----------( 112      ( 16 Dec 2017 06:35 )             22.7     Lactate, Blood: 0.4 mmol/L (12-15 @ 16:26)      12-16    142  |  101  |  55<H>  ----------------------------<  82  3.7   |  29  |  4.00<H>    Ca    7.7<L>      16 Dec 2017 06:35  Phos  4.7     12-16  Mg     2.3     12-16    TPro  6.0  /  Alb  2.2<L>  /  TBili  0.5  /  DBili  x   /  AST  72<H>  /  ALT  35  /  AlkPhos  45  12-16      LIVER FUNCTIONS - ( 16 Dec 2017 06:35 )  Alb: 2.2 g/dL / Pro: 6.0 g/dL / ALK PHOS: 45 U/L / ALT: 35 U/L DA / AST: 72 U/L / GGT: x             CULTURES: .Urine Clean Catch (Midstream)  12-13 @ 23:18   No growth  --  --      .Blood Blood-Peripheral  12-13 @ 17:41   No growth to date.  --  --      .Blood Blood-Peripheral  12-13 @ 17:40   No growth to date.  --  --    Blood Gas Profile - Arterial (12.16.17 @ 10:21)    pH, Arterial: 7.34    pCO2, Arterial: 52 mmHg    pO2, Arterial: 92 mmHg    HCO3, Arterial: 27 mmol/L    Base Excess, Arterial: 1.8 mmol/L    Oxygen Saturation, Arterial: 94 %    FIO2, Arterial: 100    Blood Gas Comments Arterial: abg on post intubation on ac15/400/100/p5 from a                RADIOLOGY: 52y Female [facility]  w/ PMHx of anemia, CHF( diastolic dysfunction), CKD, , DM, diabetic neuropathy, HTN, HLD, Hypothyroidism, Hypoparathyroidism, Osteomyelitis of jaw, and Parathyroid adenoma, GERD p/w c/o SOB and difficulty breathing, patient was admitted to ICU on Bipap with Acute Hypoxic respiratory failure 2/2 PNA.  This morning patient was in respiratory distress on Bipap-- tachypneic, using accessory muscles. ABG c/w respiratory acidosis. Patient was intubated and placed on mechanical ventilation. Patient continues to have low grade fever with Tmax of 100.4, HD stable, leucocytosis resolved.       Meds:  levoFLOXacin  Tablet 250 milliGRAM(s) Oral every 48 hours    Allergies    No Known Allergies      VITALS:  Vital Signs Last 24 Hrs  T(C): 37.9 (16 Dec 2017 05:00), Max: 38.3 (15 Dec 2017 13:00)  T(F): 100.3 (16 Dec 2017 05:00), Max: 101 (15 Dec 2017 13:00)  HR: 75 (16 Dec 2017 09:40) (75 - 98)  BP: 125/48 (16 Dec 2017 08:00) (124/55 - 176/65)  BP(mean): 69 (16 Dec 2017 08:00) (69 - 106)  RR: 23 (16 Dec 2017 08:00) (13 - 30)  SpO2: 95% (16 Dec 2017 09:40) (89% - 100%)    ROS:  [ x ] UNABLE TO ELICIT  patient is on mechanical venitlation and sedated    GENERAL: no acute distress; WD b F on vent  HEAD: atraumatic, normocephalic   NECK: supple,  no JVD  CHEST/LUNG:  ETT in place, rhonchi bilat  HEART: RR, no murmur or rubs heard  ABDOMEN: soft bowel sounds present , reducible umbilical hernia   EXTREMITIES: no  edema, no cyanosis, no clubbing.  NEURO: patient is sedated  Skin: no breakdown  L groin with dressing       LABS/DIAGNOSTIC TESTS:                          6.7    9.0   )-----------( 112      ( 16 Dec 2017 06:35 )             22.7     Lactate, Blood: 0.4 mmol/L (12-15 @ 16:26)      12-16    142  |  101  |  55<H>  ----------------------------<  82  3.7   |  29  |  4.00<H>    Ca    7.7<L>      16 Dec 2017 06:35  Phos  4.7     12-16  Mg     2.3     12-16    TPro  6.0  /  Alb  2.2<L>  /  TBili  0.5  /  DBili  x   /  AST  72<H>  /  ALT  35  /  AlkPhos  45  12-16      LIVER FUNCTIONS - ( 16 Dec 2017 06:35 )  Alb: 2.2 g/dL / Pro: 6.0 g/dL / ALK PHOS: 45 U/L / ALT: 35 U/L DA / AST: 72 U/L / GGT: x             CULTURES: .Urine Clean Catch (Midstream)  12-13 @ 23:18   No growth  --  --      .Blood Blood-Peripheral  12-13 @ 17:41   No growth to date.  --  --      .Blood Blood-Peripheral  12-13 @ 17:40   No growth to date.  --  --    Blood Gas Profile - Arterial (12.16.17 @ 10:21)    pH, Arterial: 7.34    pCO2, Arterial: 52 mmHg    pO2, Arterial: 92 mmHg    HCO3, Arterial: 27 mmol/L    Base Excess, Arterial: 1.8 mmol/L    Oxygen Saturation, Arterial: 94 %    FIO2, Arterial: 100    Blood Gas Comments Arterial: abg on post intubation on ac15/400/100/p5 from a                RADIOLOGY:

## 2017-12-16 NOTE — PROGRESS NOTE ADULT - ASSESSMENT
50 y/o F from [facility] ( long term care 2/2 diffculty walking possibly 2/2 polyneuropathy) w/ PMHx of anemia, CHF( diastolic dysfunction), CKD, Clostridium difficile infection, DM, Diabetic neuropathy, HTN, HLD, Hypothyroidism, Hypoparathyroidism, Osteomyelitis of jaw, and Parathyroid adenoma, GERD p/w c/o SOB and difficulty breathing x1 week. Pt also reports associated mild cough. Pt denies fever, chills, general pain, C/P, or any other complaints. NO  In ED pt is Awake , responds to pain and verbal stimuli, She in moderate respiratory distress. placed on BIPAP. ICU consulted for new BIPAP.    Admit to ICU for Acute Hypoxic and hypercapnic respiratory failure require new Bipap  2/2 Exacerbation of CHF or possibly pneumonia

## 2017-12-16 NOTE — PROGRESS NOTE ADULT - PROBLEM SELECTOR PLAN 1
Acute respiratory failure secondary to HAP  Patient was intubated  Likely cause is fluid overload v/s PNA  s/p trial of IV diuresis yesterday  -Continue with Lasix 80 Daily  c/w PO levaquin as per Id recommendation  - c/w nebs   started on new dialysis for CKD and fluid overload

## 2017-12-16 NOTE — PROGRESS NOTE ADULT - ATTENDING COMMENTS
50 y/o F from [facility] ( long term care 2/2 diffculty walking possibly 2/2 polyneuropathy) w/ PMHx of anemia, CHF( diastolic dysfunction), CKD, Clostridium difficile infection, DM, Diabetic neuropathy, HTN, HLD, Hypothyroidism, Hypoparathyroidism, Osteomyelitis of jaw, and Parathyroid adenoma, GERD p/w c/o SOB and difficulty breathing x1 week. Pt also reports associated mild cough. Pt denies fever, chills, general pain, C/P, or any other complaints. NO  In ED pt is Awake , responds to pain and verbal stimuli, She in moderate respiratory distress. placed on BIPAP. ICU consulted for new BIPAP.    Admit to ICU for Acute Hypoxic and hypercapnic respiratory failure require new Bipap  2/2 Exacerbation of CHF or possibly pneumonia      Problem/Plan - 1:  ·  Problem: Acute respiratory failure.  Plan: Acute respiratory failure secondary to HAP (elevated procalcitonin)  Has been on BiPaP  and orally intubated for respiratory support  Likely cause is fluid overload v/s PNA  s/p trial of IV diuresis yesterday  -Continue with Lasix 80 Daily  c/w PO levaquin as per Id recommendation  - c/w nebs   started on new dialysis for CKD and fluid overload.

## 2017-12-16 NOTE — PROGRESS NOTE ADULT - ASSESSMENT
52 y/o F from [facility] ( long term care 2/2 diffculty walking possibly 2/2 polyneuropathy) w/ PMHx of anemia, CHF( diastolic dysfunction), CKD, Clostridium difficile infection, DM, Diabetic neuropathy, HTN, HLD, Hypothyroidism, Hypoparathyroidism, Osteomyelitis of jaw, and Parathyroid adenoma, GERD p/w c/o SOB and difficulty breathing x1 week. Pt also reports associated mild cough. Pt denies fever, chills, general pain, C/P, or any other complaints. NO  In ED pt is Awake , responds to pain and verbal stimuli, She in moderate respiratory distress. placed on BIPAP. ICU consulted for new BIPAP.    Admit to ICU for Acute Hypoxic and hypercapnic respiratory failure require new Bipap  2/2 Exacerbation of CHF or possibly pneumonia

## 2017-12-16 NOTE — AIRWAY PLACEMENT NOTE ADULT - POST AIRWAY PLACEMENT ASSESSMENT:
breath sounds bilateral/breath sounds equal/chest excursion noted/CXR pending/skin color improved/positive end tidal CO2 noted

## 2017-12-16 NOTE — PROGRESS NOTE ADULT - PROBLEM SELECTOR PLAN 2
dark tarry stool overnight  Hgb drop from 8.7 to 7.3  started IV protonix BID  CBC q6  EGD postponed for another day as pt experiencing resp. distress and will need dialysis c/w protonix, no recurrent GI Bleed

## 2017-12-16 NOTE — PROGRESS NOTE ADULT - SUBJECTIVE AND OBJECTIVE BOX
events noted. Intubated earlier for hypoxia and resp distress.    No Known Allergies    Hospital Medications:   MEDICATIONS  (STANDING):  amLODIPine   Tablet 10 milliGRAM(s) Oral daily  ascorbic acid 500 milliGRAM(s) Oral daily  aspirin  chewable 81 milliGRAM(s) Oral daily  carvedilol 25 milliGRAM(s) Oral every 12 hours  cinacalcet 30 milliGRAM(s) Oral daily  dextrose 5%. 1000 milliLiter(s) (50 mL/Hr) IV Continuous <Continuous>  dextrose 50% Injectable 12.5 Gram(s) IV Push once  dextrose 50% Injectable 25 Gram(s) IV Push once  dextrose 50% Injectable 25 Gram(s) IV Push once  docusate sodium 100 milliGRAM(s) Oral three times a day  ferrous    sulfate 325 milliGRAM(s) Oral two times a day with meals  folic acid 1 milliGRAM(s) Oral daily  furosemide   Injectable 80 milliGRAM(s) IV Push daily  hydrALAZINE 100 milliGRAM(s) Oral every 8 hours  HYDROmorphone  Injectable 1 milliGRAM(s) IV Push every 4 hours  levoFLOXacin  Tablet 250 milliGRAM(s) Oral every 48 hours  levothyroxine 50 MICROGram(s) Oral daily  lidocaine 1% Injectable 10 milliLiter(s) Local Injection once  multivitamin 1 Tablet(s) Oral daily  norepinephrine Infusion 0.5 MICROgram(s)/kG/Min (26.719 mL/Hr) IV Continuous <Continuous>  pantoprazole  Injectable 40 milliGRAM(s) IV Push two times a day  pregabalin 50 milliGRAM(s) Oral two times a day  propofol Infusion 5 MICROgram(s)/kG/Min (1.71 mL/Hr) IV Continuous <Continuous>  senna 2 Tablet(s) Oral at bedtime  simvastatin 20 milliGRAM(s) Oral at bedtime        VITALS:  T(F): 99.2 (17 @ 12:00), Max: 100.4 (17 @ 00:00)  HR: 81 (17 @ 12:00)  BP: 133/54 (17 @ 12:00)  RR: 18 (17 @ 12:00)  SpO2: 100% (17 @ 12:00)  Wt(kg): --    12-15 @ 07:01  -   @ 07:00  --------------------------------------------------------  IN: 0 mL / OUT: 825 mL / NET: -825 mL     @ 07:01  -   @ 13:37  --------------------------------------------------------  IN: 43.8 mL / OUT: 20 mL / NET: 23.8 mL        PHYSICAL EXAM:  Constitutional: NAD  HEENT: ETT.  Neck: No JVD  Respiratory: CTAB, no wheezes, rales or rhonchi  Cardiovascular: S1, S2, RRR  Gastrointestinal: BS+, soft, NT/ND  Extremities:  peripheral edema++  Neurological: sedated  Vascular Access: RT groin vasc cath.    LABS:      142  |  101  |  55<H>  ----------------------------<  82  3.7   |  29  |  4.00<H>    Ca    7.7<L>      16 Dec 2017 06:35  Phos  4.7       Mg     2.3         TPro  6.0  /  Alb  2.2<L>  /  TBili  0.5  /  DBili      /  AST  72<H>  /  ALT  35  /  AlkPhos  45      Creatinine Trend: 4.00 <--, 4.98 <--, 4.67 <--, 6.53 <--, 6.44 <--, 6.44 <--, 6.30 <--                        6.7    9.0   )-----------( 112      ( 16 Dec 2017 06:35 )             22.7     Urine Studies:  Urinalysis Basic - ( 13 Dec 2017 14:12 )    Color: Yellow / Appearance: Slightly Turbid / S.015 / pH:   Gluc:  / Ketone: Negative  / Bili: Negative / Urobili: Negative   Blood:  / Protein: 100 / Nitrite: Negative   Leuk Esterase: Small / RBC: 0-2 /HPF / WBC 3-5 /HPF   Sq Epi:  / Non Sq Epi: Occasional /HPF / Bacteria: Trace /HPF        RADIOLOGY & ADDITIONAL STUDIES:

## 2017-12-16 NOTE — PROGRESS NOTE ADULT - PROBLEM SELECTOR PLAN 5
hold nephrotoxic drugs   Hyperkalemia 5.4 9 (no EKG changes of hyperK)and creatinine 6.30( baseline 4.3)  discussed case with Dr. Escoto nephadryan , no need to correct hyperkalemia for now, no need for HD , repeat BMP K decreased to 4.2  [facility]

## 2017-12-16 NOTE — PROGRESS NOTE ADULT - ASSESSMENT
52 y/o F from [facility] ( long term care 2/2 diffculty walking possibly 2/2 polyneuropathy) w/ PMHx of anemia, CHF( diastolic dysfunction), CKD, Clostridium difficile infection, DM, diabetic neuropathy, HTN, HLD, hypothyroidism, hypoparathyroidism, osteomyelitis of jaw, parathyroid adenoma, GERD p/w c/o SOB, difficulty breathing x1 week and associated mild cough  admitted to ICU for acute hypoxic respiratory failure 2/2 PNA . Patient has a T MAX .4  and currently on BIPAP . Patient placed on vanco and zosyn for HCAP (from [facility] but no admissions to hospital in last 3 months) .CXR ; IMPROVED PULMONARY VASCULAR CONGESTION  compared to 12/14/17 . However, in view of patient's clinical presentation and elevated WBC, would recommend treatment for HCAP with respiratory fluoroquinolone ( levofloxacin po 500 mg initial dose and followed by 250 mg every 48 hours { creatinine clearance 9mg/dl).   -BLOOD CULTURES and urine legionella - negative.    Plan   patient on mechanical ventilation will change levofloxacin  to  mg Q48hrs. ( renal adjusted dose ) 52 y/o F from [facility] ( long term care 2/2 diffculty walking possibly 2/2 polyneuropathy) w/ PMHx of anemia, CHF( diastolic dysfunction), CKD, Clostridium difficile infection, DM, diabetic neuropathy, HTN, HLD, hypothyroidism, hypoparathyroidism, osteomyelitis of jaw, parathyroid adenoma, GERD p/w c/o SOB, difficulty breathing x1 week and associated mild cough  admitted to ICU for acute hypoxic respiratory failure 2/2 PNA . Patient has a T MAX .4  and currently on BIPAP . Patient placed on vanco and zosyn for HCAP (from [facility] but no admissions to hospital in last 3 months) .CXR ; IMPROVED PULMONARY VASCULAR CONGESTION  compared to 12/14/17 . Patient on levoquine for HCAP (reduced fluid load) ( levofloxacin po 500 mg initial dose and followed by 250 mg every 48 hours { creatinine clearance 9mg/dl).   -BLOOD CULTURES and urine legionella - negative.    Plan   patient on mechanical ventilation and on levofloxacin  to  mg Q48hrs. ( renal adjusted dose ) 52 y/o F from [facility] ( long term care 2/2 diffculty walking possibly 2/2 polyneuropathy) w/ PMHx of anemia, CHF( diastolic dysfunction), CKD, Clostridium difficile infection, DM, diabetic neuropathy, HTN, HLD, hypothyroidism, hypoparathyroidism, osteomyelitis of jaw, parathyroid adenoma, GERD p/w c/o SOB, difficulty breathing x1 week and associated mild cough  admitted to ICU for acute hypoxic respiratory failure 2/2 PNA . Patient has a T MAX .4  and currently on BIPAP . Patient placed on vanco and zosyn for HCAP initially (from [facility] but no admissions to hospital in last 3 months) . Patient now on levoquine for HCAP (reduced fluid load and adjusted for renal dysfn.   -BLOOD CULTURES and urine legionella - negative.    Plan:   --Cont mechanical ventilation and on  -- levofloxacin   mg Q48hrs. ( renal adjusted dose )

## 2017-12-16 NOTE — PROGRESS NOTE ADULT - PROBLEM SELECTOR PLAN 4
unknown EF  c/w home cardiac meds   TTE - RV systolic pressure is 49 mm Hg. Mild pulmonary  hypertension

## 2017-12-16 NOTE — PROGRESS NOTE ADULT - ASSESSMENT
52 yr old female with progressive CKD V admitted with decompesated CHF sec to CKD V. On antibiotics for healthcare associated PNA.   s/p HD Thursday & yesterday.  s/p intubation this AM  leucocytosis  anemia of CKD.    RECOMMENDATION:  HD today as well  with aggressive ultrafiltration.  vent care per MICU

## 2017-12-16 NOTE — PROGRESS NOTE ADULT - ATTENDING COMMENTS
Chart reviewed, patient re-evaluated, case discussed with ICU team and resident on ID. Pt is a 52 y/o F from [facility] ( long term care 2/2 diffculty walking possibly 2/2 polyneuropathy) w/ PMHx of anemia, CHF( diastolic dysfunction), CKD, Clostridium difficile infection, DM, diabetic neuropathy, HTN, HLD, hypothyroidism, hypoparathyroidism, osteomyelitis of jaw, parathyroid adenoma, GERD p/w c/o SOB, difficulty breathing x1 week and associated mild cough  admitted to ICU for acute hypoxic respiratory failure 2/2 PNA . Patient on vent (secondary to resp failure) and on levoquine for HCAP. WBC improved. Patient should continue levoquine to complete 7d of treatment.    In view of patient's PMHx of Cdif, consider adding flagyl while patient remains on Ab for HCAP.    Dr. Kaplan will resume ID coverage beginning 12/17.

## 2017-12-16 NOTE — CHART NOTE - NSCHARTNOTEFT_GEN_A_CORE
Patient was saturating in late 70's on 100% FiO2 on BiPaP, using accessory muscles  Decision was made to intubate the patient  Sister, who is emergency contact was called and informed about current condition, and she understands the situation and agrees with intubation  Patient will be dislysed today for fluid overload, and will be transfused1 u PRBC for anemia  transfusion consent in chart

## 2017-12-16 NOTE — PROGRESS NOTE ADULT - SUBJECTIVE AND OBJECTIVE BOX
INTERVAL HPI/OVERNIGHT EVENTS: no acute events overnight, anemia noted    PRESSORS: NO    ANTIBIOTICS: Levaquin               DATE STARTED:     Antimicrobial:  levoFLOXacin  Tablet 250 milliGRAM(s) Oral every 48 hours    Cardiovascular:  amLODIPine   Tablet 10 milliGRAM(s) Oral daily  carvedilol 25 milliGRAM(s) Oral every 12 hours  furosemide   Injectable 80 milliGRAM(s) IV Push daily  hydrALAZINE 100 milliGRAM(s) Oral every 8 hours    Pulmonary:  ALBUTerol/ipratropium for Nebulization 3 milliLiter(s) Nebulizer every 6 hours    Hematalogic:  aspirin  chewable 81 milliGRAM(s) Oral daily    Other:  acetaminophen   Tablet 650 milliGRAM(s) Oral every 6 hours PRN  acetaminophen  Suppository 650 milliGRAM(s) Rectal every 6 hours PRN  ascorbic acid 500 milliGRAM(s) Oral daily  cinacalcet 30 milliGRAM(s) Oral daily  dextrose 5%. 1000 milliLiter(s) IV Continuous <Continuous>  dextrose 50% Injectable 12.5 Gram(s) IV Push once  dextrose 50% Injectable 25 Gram(s) IV Push once  dextrose 50% Injectable 25 Gram(s) IV Push once  dextrose Gel 1 Dose(s) Oral once PRN  docusate sodium 100 milliGRAM(s) Oral three times a day  ferrous    sulfate 325 milliGRAM(s) Oral two times a day with meals  folic acid 1 milliGRAM(s) Oral daily  glucagon  Injectable 1 milliGRAM(s) IntraMuscular once PRN  levothyroxine 50 MICROGram(s) Oral daily  lidocaine 1% Injectable 10 milliLiter(s) Local Injection once  multivitamin 1 Tablet(s) Oral daily  pantoprazole  Injectable 40 milliGRAM(s) IV Push two times a day  pregabalin 50 milliGRAM(s) Oral two times a day  senna 2 Tablet(s) Oral at bedtime  simvastatin 20 milliGRAM(s) Oral at bedtime    acetaminophen   Tablet 650 milliGRAM(s) Oral every 6 hours PRN  acetaminophen  Suppository 650 milliGRAM(s) Rectal every 6 hours PRN  ALBUTerol/ipratropium for Nebulization 3 milliLiter(s) Nebulizer every 6 hours  amLODIPine   Tablet 10 milliGRAM(s) Oral daily  ascorbic acid 500 milliGRAM(s) Oral daily  aspirin  chewable 81 milliGRAM(s) Oral daily  carvedilol 25 milliGRAM(s) Oral every 12 hours  cinacalcet 30 milliGRAM(s) Oral daily  dextrose 5%. 1000 milliLiter(s) IV Continuous <Continuous>  dextrose 50% Injectable 12.5 Gram(s) IV Push once  dextrose 50% Injectable 25 Gram(s) IV Push once  dextrose 50% Injectable 25 Gram(s) IV Push once  dextrose Gel 1 Dose(s) Oral once PRN  docusate sodium 100 milliGRAM(s) Oral three times a day  ferrous    sulfate 325 milliGRAM(s) Oral two times a day with meals  folic acid 1 milliGRAM(s) Oral daily  furosemide   Injectable 80 milliGRAM(s) IV Push daily  glucagon  Injectable 1 milliGRAM(s) IntraMuscular once PRN  hydrALAZINE 100 milliGRAM(s) Oral every 8 hours  levoFLOXacin  Tablet 250 milliGRAM(s) Oral every 48 hours  levothyroxine 50 MICROGram(s) Oral daily  lidocaine 1% Injectable 10 milliLiter(s) Local Injection once  multivitamin 1 Tablet(s) Oral daily  pantoprazole  Injectable 40 milliGRAM(s) IV Push two times a day  pregabalin 50 milliGRAM(s) Oral two times a day  senna 2 Tablet(s) Oral at bedtime  simvastatin 20 milliGRAM(s) Oral at bedtime    Drug Dosing Weight  Height (cm): 170 (13 Dec 2017 17:16)  Weight (kg): 57 (13 Dec 2017 17:16)  BMI (kg/m2): 19.7 (13 Dec 2017 17:16)  BSA (m2): 1.66 (13 Dec 2017 17:16)    CENTRAL LINE: [x] YES [] NO  LOCATION: [facility]  DATE INSERTED:   REMOVE: [] YES [x] NO  EXPLAIN: HD    JACOBSON: [x] YES [] NO    DATE INSERTED:   REMOVE:  [] YES [x] NO  EXPLAIN: IOs    A-LINE: NO    PMH -reviewed admission note, no change since admission  PAST MEDICAL & SURGICAL HISTORY:  Clostridium difficile infection  Osteomyelitis of jaw  Parathyroid adenoma  Hypothyroidism  CKD (chronic kidney disease)  Anemia  CHF (congestive heart failure)  Diabetic neuropathy  Diabetes mellitus  HTN (hypertension)  S/P :   No Past Surgical History      ICU Vital Signs Last 24 Hrs  T(C): 38 (16 Dec 2017 00:00), Max: 38.3 (15 Dec 2017 13:00)  T(F): 100.4 (16 Dec 2017 00:00), Max: 101 (15 Dec 2017 13:00)  HR: 80 (16 Dec 2017 01:00) (80 - 94)  BP: 135/60 (16 Dec 2017 01:00) (115/63 - 176/65)  BP(mean): 77 (16 Dec 2017 01:00) (72 - 106)  ABP: --  ABP(mean): --  RR: 14 (16 Dec 2017 01:00) (13 - 34)  SpO2: 98% (16 Dec 2017 01:00) (89% - 100%)      ABG - ( 14 Dec 2017 13:56 )  pH: 7.37  /  pCO2: 44    /  pO2: 84    / HCO3: 25    / Base Excess: 0.3   /  SaO2: 94                    - @ 07:01  -  12-15 @ 07:00  --------------------------------------------------------  IN: 832.5 mL / OUT: 1030 mL / NET: -197.5 mL            PHYSICAL EXAM:    GENERAL: [x]NAD, []well-groomed, []well-developed  HEAD:  [x]Atraumatic, []Normocephalic  EYES: [x]EOMI, []PERRLA, []conjunctiva and sclera clear  ENMT: [x]No tonsillar erythema, exudates, or enlargement; []Moist mucous membranes, []Good dentition, []No lesions  NECK: [x]Supple, normal appearance, []No JVD; []Normal thyroid; []Trachea midline  NERVOUS SYSTEM:  [x]Alert & Oriented X3, []Good concentration; []Motor Strength 5/5 B/L upper and lower extremities; []DTRs 2+ intact and symmetric  CHEST/LUNG: [x]No chest deformity; []Normal percussion bilaterally; []No rales, rhonchi, wheezing   HEART: [x]Regular rate and rhythm; []No murmurs, rubs, or gallops  ABDOMEN: [x]Soft, Nontender, Nondistended; []Bowel sounds present  EXTREMITIES:  [x]2+ Peripheral Pulses, []No clubbing, cyanosis, or edema  LYMPH: [x]No lymphadenopathy noted  SKIN: [x]No rashes or lesions; []Good capillary refill      LABS:  CBC Full  -  ( 15 Dec 2017 06:37 )  WBC Count : 12.8 K/uL  Hemoglobin : 7.3 g/dL  Hematocrit : 25.5 %  Platelet Count - Automated : 130 K/uL  Mean Cell Volume : 94.0 fl  Mean Cell Hemoglobin : 27.1 pg  Mean Cell Hemoglobin Concentration : 28.8 gm/dL  Auto Neutrophil # : 12.0 K/uL  Auto Lymphocyte # : 0.4 K/uL  Auto Monocyte # : 0.3 K/uL  Auto Eosinophil # : 0.0 K/uL  Auto Basophil # : 0.0 K/uL  Auto Neutrophil % : 94.2 %  Auto Lymphocyte % : 3.1 %  Auto Monocyte % : 2.3 %  Auto Eosinophil % : 0.0 %  Auto Basophil % : 0.3 %    12-15    144  |  107  |  69<H>  ----------------------------<  70  3.8   |  28  |  4.98<H>    Ca    8.1<L>      15 Dec 2017 03:31  Phos  4.7     12-15  Mg     2.4     12-15      PT/INR - ( 14 Dec 2017 14:42 )   PT: 16.4 sec;   INR: 1.49 ratio         PTT - ( 14 Dec 2017 14:42 )  PTT:40.2 sec    Culture Results:   No growth ( @ 23:18)  Culture Results:   No growth to date. ( @ 17:41)  Culture Results:   No growth to date. ( @ 17:40)      RADIOLOGY & ADDITIONAL STUDIES REVIEWED:  YES    []GOALS OF CARE DISCUSSION WITH PATIENT/FAMILY/PROXY:    CRITICAL CARE TIME SPENT: 35 minutes INTERVAL HPI/OVERNIGHT EVENTS: no acute events overnight, anemia noted, no further episodes of melena    PRESSORS: NO    ANTIBIOTICS: Levaquin               DATE STARTED:     Antimicrobial:  levoFLOXacin  Tablet 250 milliGRAM(s) Oral every 48 hours    Cardiovascular:  amLODIPine   Tablet 10 milliGRAM(s) Oral daily  carvedilol 25 milliGRAM(s) Oral every 12 hours  furosemide   Injectable 80 milliGRAM(s) IV Push daily  hydrALAZINE 100 milliGRAM(s) Oral every 8 hours    Pulmonary:  ALBUTerol/ipratropium for Nebulization 3 milliLiter(s) Nebulizer every 6 hours    Hematalogic:  aspirin  chewable 81 milliGRAM(s) Oral daily    Other:  acetaminophen   Tablet 650 milliGRAM(s) Oral every 6 hours PRN  acetaminophen  Suppository 650 milliGRAM(s) Rectal every 6 hours PRN  ascorbic acid 500 milliGRAM(s) Oral daily  cinacalcet 30 milliGRAM(s) Oral daily  dextrose 5%. 1000 milliLiter(s) IV Continuous <Continuous>  dextrose 50% Injectable 12.5 Gram(s) IV Push once  dextrose 50% Injectable 25 Gram(s) IV Push once  dextrose 50% Injectable 25 Gram(s) IV Push once  dextrose Gel 1 Dose(s) Oral once PRN  docusate sodium 100 milliGRAM(s) Oral three times a day  ferrous    sulfate 325 milliGRAM(s) Oral two times a day with meals  folic acid 1 milliGRAM(s) Oral daily  glucagon  Injectable 1 milliGRAM(s) IntraMuscular once PRN  levothyroxine 50 MICROGram(s) Oral daily  lidocaine 1% Injectable 10 milliLiter(s) Local Injection once  multivitamin 1 Tablet(s) Oral daily  pantoprazole  Injectable 40 milliGRAM(s) IV Push two times a day  pregabalin 50 milliGRAM(s) Oral two times a day  senna 2 Tablet(s) Oral at bedtime  simvastatin 20 milliGRAM(s) Oral at bedtime    acetaminophen   Tablet 650 milliGRAM(s) Oral every 6 hours PRN  acetaminophen  Suppository 650 milliGRAM(s) Rectal every 6 hours PRN  ALBUTerol/ipratropium for Nebulization 3 milliLiter(s) Nebulizer every 6 hours  amLODIPine   Tablet 10 milliGRAM(s) Oral daily  ascorbic acid 500 milliGRAM(s) Oral daily  aspirin  chewable 81 milliGRAM(s) Oral daily  carvedilol 25 milliGRAM(s) Oral every 12 hours  cinacalcet 30 milliGRAM(s) Oral daily  dextrose 5%. 1000 milliLiter(s) IV Continuous <Continuous>  dextrose 50% Injectable 12.5 Gram(s) IV Push once  dextrose 50% Injectable 25 Gram(s) IV Push once  dextrose 50% Injectable 25 Gram(s) IV Push once  dextrose Gel 1 Dose(s) Oral once PRN  docusate sodium 100 milliGRAM(s) Oral three times a day  ferrous    sulfate 325 milliGRAM(s) Oral two times a day with meals  folic acid 1 milliGRAM(s) Oral daily  furosemide   Injectable 80 milliGRAM(s) IV Push daily  glucagon  Injectable 1 milliGRAM(s) IntraMuscular once PRN  hydrALAZINE 100 milliGRAM(s) Oral every 8 hours  levoFLOXacin  Tablet 250 milliGRAM(s) Oral every 48 hours  levothyroxine 50 MICROGram(s) Oral daily  lidocaine 1% Injectable 10 milliLiter(s) Local Injection once  multivitamin 1 Tablet(s) Oral daily  pantoprazole  Injectable 40 milliGRAM(s) IV Push two times a day  pregabalin 50 milliGRAM(s) Oral two times a day  senna 2 Tablet(s) Oral at bedtime  simvastatin 20 milliGRAM(s) Oral at bedtime    Drug Dosing Weight  Height (cm): 170 (13 Dec 2017 17:16)  Weight (kg): 57 (13 Dec 2017 17:16)  BMI (kg/m2): 19.7 (13 Dec 2017 17:16)  BSA (m2): 1.66 (13 Dec 2017 17:16)    CENTRAL LINE: [x] YES [] NO  LOCATION: [facility]  DATE INSERTED:   REMOVE: [] YES [x] NO  EXPLAIN: HD    JACOBSON: [x] YES [] NO    DATE INSERTED:   REMOVE:  [] YES [x] NO  EXPLAIN: IOs    A-LINE: NO    PMH -reviewed admission note, no change since admission  PAST MEDICAL & SURGICAL HISTORY:  Clostridium difficile infection  Osteomyelitis of jaw  Parathyroid adenoma  Hypothyroidism  CKD (chronic kidney disease)  Anemia  CHF (congestive heart failure)  Diabetic neuropathy  Diabetes mellitus  HTN (hypertension)  S/P :   No Past Surgical History      ICU Vital Signs Last 24 Hrs  T(C): 38 (16 Dec 2017 00:00), Max: 38.3 (15 Dec 2017 13:00)  T(F): 100.4 (16 Dec 2017 00:00), Max: 101 (15 Dec 2017 13:00)  HR: 80 (16 Dec 2017 01:00) (80 - 94)  BP: 135/60 (16 Dec 2017 01:00) (115/63 - 176/65)  BP(mean): 77 (16 Dec 2017 01:00) (72 - 106)  ABP: --  ABP(mean): --  RR: 14 (16 Dec 2017 01:00) (13 - 34)  SpO2: 98% (16 Dec 2017 01:00) (89% - 100%)      ABG - ( 14 Dec 2017 13:56 )  pH: 7.37  /  pCO2: 44    /  pO2: 84    / HCO3: 25    / Base Excess: 0.3   /  SaO2: 94                     @ 07:01  -  12-15 @ 07:00  --------------------------------------------------------  IN: 832.5 mL / OUT: 1030 mL / NET: -197.5 mL            PHYSICAL EXAM:    GENERAL: [x]NAD, []well-groomed, []well-developed  HEAD:  [x]Atraumatic, []Normocephalic  EYES: [x]EOMI, []PERRLA, []conjunctiva and sclera clear  ENMT: [x]No tonsillar erythema, exudates, or enlargement; []Moist mucous membranes, []Good dentition, []No lesions  NECK: [x]Supple, normal appearance, []No JVD; []Normal thyroid; []Trachea midline  NERVOUS SYSTEM:  [x]Alert & Oriented X3, []Good concentration; []Motor Strength 5/5 B/L upper and lower extremities; []DTRs 2+ intact and symmetric  CHEST/LUNG: [x]No chest deformity; []Normal percussion bilaterally; []No rales, rhonchi, wheezing   HEART: [x]Regular rate and rhythm; []No murmurs, rubs, or gallops  ABDOMEN: [x]Soft, Nontender, Nondistended; []Bowel sounds present  EXTREMITIES:  [x]2+ Peripheral Pulses, []No clubbing, cyanosis, or edema  LYMPH: [x]No lymphadenopathy noted  SKIN: [x]No rashes or lesions; []Good capillary refill      LABS:  CBC Full  -  ( 15 Dec 2017 06:37 )  WBC Count : 12.8 K/uL  Hemoglobin : 7.3 g/dL  Hematocrit : 25.5 %  Platelet Count - Automated : 130 K/uL  Mean Cell Volume : 94.0 fl  Mean Cell Hemoglobin : 27.1 pg  Mean Cell Hemoglobin Concentration : 28.8 gm/dL  Auto Neutrophil # : 12.0 K/uL  Auto Lymphocyte # : 0.4 K/uL  Auto Monocyte # : 0.3 K/uL  Auto Eosinophil # : 0.0 K/uL  Auto Basophil # : 0.0 K/uL  Auto Neutrophil % : 94.2 %  Auto Lymphocyte % : 3.1 %  Auto Monocyte % : 2.3 %  Auto Eosinophil % : 0.0 %  Auto Basophil % : 0.3 %    12-15    144  |  107  |  69<H>  ----------------------------<  70  3.8   |  28  |  4.98<H>    Ca    8.1<L>      15 Dec 2017 03:31  Phos  4.7     12-15  Mg     2.4     12-15      PT/INR - ( 14 Dec 2017 14:42 )   PT: 16.4 sec;   INR: 1.49 ratio         PTT - ( 14 Dec 2017 14:42 )  PTT:40.2 sec    Culture Results:   No growth ( @ 23:18)  Culture Results:   No growth to date. ( @ 17:41)  Culture Results:   No growth to date. ( @ 17:40)      RADIOLOGY & ADDITIONAL STUDIES REVIEWED:  YES    []GOALS OF CARE DISCUSSION WITH PATIENT/FAMILY/PROXY:    CRITICAL CARE TIME SPENT: 35 minutes

## 2017-12-16 NOTE — PROGRESS NOTE ADULT - SUBJECTIVE AND OBJECTIVE BOX
CHIEF COMPLAINT:Patient is a 52y old  Female who presents with a chief complaint of SOB (13 Dec 2017 16:01)    	  REVIEW OF SYSTEMS:  CONSTITUTIONAL: No fever, weight loss, or fatigue  EYES: No eye pain, visual disturbances, or discharge  ENMT:  No difficulty hearing, tinnitus, vertigo; No sinus or throat pain  NECK: No pain or stiffness  RESPIRATORY: No cough, wheezing, chills or hemoptysis; No Shortness of Breath  CARDIOVASCULAR: No chest pain, palpitations, passing out, dizziness, or leg swelling  GASTROINTESTINAL: No abdominal or epigastric pain. No nausea, vomiting, or hematemesis; No diarrhea or constipation. No melena or hematochezia.  GENITOURINARY: No dysuria, frequency, hematuria, or incontinence  NEUROLOGICAL: No headaches, memory loss, loss of strength, numbness, or tremors  SKIN: No itching, burning, rashes, or lesions   LYMPH Nodes: No enlarged glands  ENDOCRINE: No heat or cold intolerance; No hair loss  MUSCULOSKELETAL: No joint pain or swelling; No muscle, back, or extremity pain  PSYCHIATRIC: No depression, anxiety, mood swings, or difficulty sleeping  HEME/LYMPH: No easy bruising, or bleeding gums  ALLERY AND IMMUNOLOGIC: No hives or eczema	    [ ] All others negative	  [ ] Unable to obtain    PHYSICAL EXAM:  T(C): 37.4 (12-16-17 @ 15:15), Max: 38 (12-16-17 @ 00:00)  HR: 78 (12-16-17 @ 19:00) (72 - 98)  BP: 129/55 (12-16-17 @ 19:00) (82/42 - 159/88)  RR: 15 (12-16-17 @ 19:00) (13 - 33)  SpO2: 99% (12-16-17 @ 19:00) (85% - 100%)  Wt(kg): --  I&O's Summary    15 Dec 2017 07:01  -  16 Dec 2017 07:00  --------------------------------------------------------  IN: 0 mL / OUT: 825 mL / NET: -825 mL    16 Dec 2017 07:01  -  16 Dec 2017 19:58  --------------------------------------------------------  IN: 674.7 mL / OUT: 40 mL / NET: 634.7 mL        Appearance: Normal	  HEENT:   Normal oral mucosa, PERRL, EOMI	  Lymphatic: No lymphadenopathy  Cardiovascular: Normal S1 S2, No JVD, No murmurs, No edema  Respiratory: Lungs clear to auscultation	  Psychiatry: A & O x 3, Mood & affect appropriate  Gastrointestinal:  Soft, Non-tender, + BS	  Skin: No rashes, No ecchymoses, No cyanosis	  Neurologic: Non-focal  Extremities: Normal range of motion, No clubbing, cyanosis or edema  Vascular: Peripheral pulses palpable 2+ bilaterally    MEDICATIONS  (STANDING):  ascorbic acid 500 milliGRAM(s) Oral daily  aspirin  chewable 81 milliGRAM(s) Oral daily  cinacalcet 30 milliGRAM(s) Oral daily  dextrose 5%. 1000 milliLiter(s) (50 mL/Hr) IV Continuous <Continuous>  dextrose 50% Injectable 12.5 Gram(s) IV Push once  dextrose 50% Injectable 25 Gram(s) IV Push once  dextrose 50% Injectable 25 Gram(s) IV Push once  docusate sodium 100 milliGRAM(s) Oral three times a day  ferrous    sulfate Liquid 300 milliGRAM(s) Enteral Tube daily  folic acid 1 milliGRAM(s) Oral daily  furosemide   Injectable 80 milliGRAM(s) IV Push daily  levothyroxine 50 MICROGram(s) Oral daily  lidocaine 1% Injectable 10 milliLiter(s) Local Injection once  norepinephrine Infusion 0.5 MICROgram(s)/kG/Min (26.719 mL/Hr) IV Continuous <Continuous>  pantoprazole  Injectable 40 milliGRAM(s) IV Push two times a day  piperacillin/tazobactam IVPB. 3.375 Gram(s) IV Intermittent every 12 hours  pregabalin 50 milliGRAM(s) Oral two times a day  propofol Infusion 5 MICROgram(s)/kG/Min (1.71 mL/Hr) IV Continuous <Continuous>  senna 2 Tablet(s) Oral at bedtime  simvastatin 20 milliGRAM(s) Oral at bedtime      TELEMETRY: 	    ECG:  	  RADIOLOGY:  OTHER: 	  	  CBC Full  -  ( 16 Dec 2017 17:53 )  WBC Count : 9.3 K/uL  Hemoglobin : 9.1 g/dL  Hematocrit : 29.6 %  Platelet Count - Automated : 119 K/uL  Mean Cell Volume : 93.2 fl  Mean Cell Hemoglobin : 28.8 pg  Mean Cell Hemoglobin Concentration : 30.9 gm/dL  Auto Neutrophil # : x  Auto Lymphocyte # : x  Auto Monocyte # : x  Auto Eosinophil # : x  Auto Basophil # : x  Auto Neutrophil % : x  Auto Lymphocyte % : x  Auto Monocyte % : x  Auto Eosinophil % : x  Auto Basophil % : x        CARDIAC MARKERS:                              9.1    9.3   )-----------( 119      ( 16 Dec 2017 17:53 )             29.6       12-16    142  |  101  |  55<H>  ----------------------------<  82  3.7   |  29  |  4.00<H>    Ca    7.7<L>      16 Dec 2017 06:35  Phos  4.7     12-16  Mg     2.3     12-16    TPro  6.0  /  Alb  2.2<L>  /  TBili  0.5  /  DBili  x   /  AST  72<H>  /  ALT  35  /  AlkPhos  45  12-16            proBNP: Serum Pro-Brain Natriuretic Peptide: 11528 pg/mL (12-13 @ 12:30)    Lipid Profile: Cholesterol 53  LDL -13  HDL 38      HgA1c: Hemoglobin A1C, Whole Blood: 5.2 % (12-14 @ 13:29)    TSH: Thyroid Stimulating Hormone, Serum: 0.50 uU/mL (12-14 @ 06:26)    ABG - ( 16 Dec 2017 10:21 )  pH: 7.34  /  pCO2: 52    /  pO2: 92    / HCO3: 27    / Base Excess: 1.8   /  SaO2: 94              < from: Xray Chest 1 View AP -PORTABLE-Routine (12.16.17 @ 10:27) >  EXAM:  CHEST PORTABLE ROUTINE                            PROCEDURE DATE:  12/16/2017          INTERPRETATION:  Chest one view    HISTORY: Dyspnea    COMPARISON STUDY: 12/15/2017    Frontal expiratory view of the chest shows the heart to be similarly   enlarged in size. Endotracheal tube and feeding tube have been placed to   the lower trachea and stomach body respectively.     The lungs show progression of dense pulmonary infiltrates, especially on   the right and there is no evidence of pneumothorax.    IMPRESSION:  Postintubation. Progression of pulmonary infiltrates.    Thank you for the courtesy of this referral.    < end of copied text >

## 2017-12-17 LAB
ALBUMIN SERPL ELPH-MCNC: 2 G/DL — LOW (ref 3.5–5)
ALP SERPL-CCNC: 49 U/L — SIGNIFICANT CHANGE UP (ref 40–120)
ALT FLD-CCNC: 28 U/L DA — SIGNIFICANT CHANGE UP (ref 10–60)
ANION GAP SERPL CALC-SCNC: 12 MMOL/L — SIGNIFICANT CHANGE UP (ref 5–17)
AST SERPL-CCNC: 63 U/L — HIGH (ref 10–40)
BASOPHILS # BLD AUTO: 0 K/UL — SIGNIFICANT CHANGE UP (ref 0–0.2)
BASOPHILS NFR BLD AUTO: 0.7 % — SIGNIFICANT CHANGE UP (ref 0–2)
BILIRUB SERPL-MCNC: 0.6 MG/DL — SIGNIFICANT CHANGE UP (ref 0.2–1.2)
BUN SERPL-MCNC: 43 MG/DL — HIGH (ref 7–18)
CALCIUM SERPL-MCNC: 7.6 MG/DL — LOW (ref 8.4–10.5)
CHLORIDE SERPL-SCNC: 97 MMOL/L — SIGNIFICANT CHANGE UP (ref 96–108)
CO2 SERPL-SCNC: 31 MMOL/L — SIGNIFICANT CHANGE UP (ref 22–31)
CREAT SERPL-MCNC: 3.46 MG/DL — HIGH (ref 0.5–1.3)
EOSINOPHIL # BLD AUTO: 0 K/UL — SIGNIFICANT CHANGE UP (ref 0–0.5)
EOSINOPHIL NFR BLD AUTO: 1.2 % — SIGNIFICANT CHANGE UP (ref 0–6)
GLUCOSE BLDC GLUCOMTR-MCNC: 194 MG/DL — HIGH (ref 70–99)
GLUCOSE BLDC GLUCOMTR-MCNC: 212 MG/DL — HIGH (ref 70–99)
GLUCOSE BLDC GLUCOMTR-MCNC: 259 MG/DL — HIGH (ref 70–99)
GLUCOSE SERPL-MCNC: 177 MG/DL — HIGH (ref 70–99)
HCT VFR BLD CALC: 24.6 % — LOW (ref 34.5–45)
HGB BLD-MCNC: 7.7 G/DL — LOW (ref 11.5–15.5)
LYMPHOCYTES # BLD AUTO: 0.5 K/UL — LOW (ref 1–3.3)
LYMPHOCYTES # BLD AUTO: 13.7 % — SIGNIFICANT CHANGE UP (ref 13–44)
MAGNESIUM SERPL-MCNC: 2.1 MG/DL — SIGNIFICANT CHANGE UP (ref 1.6–2.6)
MCHC RBC-ENTMCNC: 29 PG — SIGNIFICANT CHANGE UP (ref 27–34)
MCHC RBC-ENTMCNC: 31.5 GM/DL — LOW (ref 32–36)
MCV RBC AUTO: 92.3 FL — SIGNIFICANT CHANGE UP (ref 80–100)
MONOCYTES # BLD AUTO: 0.2 K/UL — SIGNIFICANT CHANGE UP (ref 0–0.9)
MONOCYTES NFR BLD AUTO: 6.1 % — SIGNIFICANT CHANGE UP (ref 2–14)
NEUTROPHILS # BLD AUTO: 2.7 K/UL — SIGNIFICANT CHANGE UP (ref 1.8–7.4)
NEUTROPHILS NFR BLD AUTO: 78.2 % — HIGH (ref 43–77)
PHOSPHATE SERPL-MCNC: 2.3 MG/DL — LOW (ref 2.5–4.5)
PLATELET # BLD AUTO: 90 K/UL — LOW (ref 150–400)
POTASSIUM SERPL-MCNC: 2.8 MMOL/L — CRITICAL LOW (ref 3.5–5.3)
POTASSIUM SERPL-SCNC: 2.8 MMOL/L — CRITICAL LOW (ref 3.5–5.3)
PROT SERPL-MCNC: 5.6 G/DL — LOW (ref 6–8.3)
RBC # BLD: 2.66 M/UL — LOW (ref 3.8–5.2)
RBC # FLD: 15.7 % — HIGH (ref 10.3–14.5)
SODIUM SERPL-SCNC: 140 MMOL/L — SIGNIFICANT CHANGE UP (ref 135–145)
WBC # BLD: 3.4 K/UL — LOW (ref 3.8–10.5)
WBC # FLD AUTO: 3.4 K/UL — LOW (ref 3.8–10.5)

## 2017-12-17 PROCEDURE — 71010: CPT | Mod: 26

## 2017-12-17 RX ORDER — INSULIN LISPRO 100/ML
VIAL (ML) SUBCUTANEOUS
Qty: 0 | Refills: 0 | Status: DISCONTINUED | OUTPATIENT
Start: 2017-12-17 | End: 2017-12-19

## 2017-12-17 RX ADMIN — SENNA PLUS 2 TABLET(S): 8.6 TABLET ORAL at 21:09

## 2017-12-17 RX ADMIN — Medication 80 MILLIGRAM(S): at 05:07

## 2017-12-17 RX ADMIN — PROPOFOL 1.71 MICROGRAM(S)/KG/MIN: 10 INJECTION, EMULSION INTRAVENOUS at 12:35

## 2017-12-17 RX ADMIN — Medication 50 MILLIGRAM(S): at 17:08

## 2017-12-17 RX ADMIN — Medication 1 MILLIGRAM(S): at 11:23

## 2017-12-17 RX ADMIN — PIPERACILLIN AND TAZOBACTAM 12.5 GRAM(S): 4; .5 INJECTION, POWDER, LYOPHILIZED, FOR SOLUTION INTRAVENOUS at 17:08

## 2017-12-17 RX ADMIN — CINACALCET 30 MILLIGRAM(S): 30 TABLET, FILM COATED ORAL at 15:23

## 2017-12-17 RX ADMIN — Medication 650 MILLIGRAM(S): at 00:03

## 2017-12-17 RX ADMIN — PANTOPRAZOLE SODIUM 40 MILLIGRAM(S): 20 TABLET, DELAYED RELEASE ORAL at 05:08

## 2017-12-17 RX ADMIN — Medication 100 MILLIGRAM(S): at 05:04

## 2017-12-17 RX ADMIN — Medication 500 MILLIGRAM(S): at 11:23

## 2017-12-17 RX ADMIN — PANTOPRAZOLE SODIUM 40 MILLIGRAM(S): 20 TABLET, DELAYED RELEASE ORAL at 17:11

## 2017-12-17 RX ADMIN — PIPERACILLIN AND TAZOBACTAM 12.5 GRAM(S): 4; .5 INJECTION, POWDER, LYOPHILIZED, FOR SOLUTION INTRAVENOUS at 05:08

## 2017-12-17 RX ADMIN — SIMVASTATIN 20 MILLIGRAM(S): 20 TABLET, FILM COATED ORAL at 21:09

## 2017-12-17 RX ADMIN — Medication 650 MILLIGRAM(S): at 21:10

## 2017-12-17 RX ADMIN — Medication 50 MILLIGRAM(S): at 05:09

## 2017-12-17 RX ADMIN — Medication 50 MICROGRAM(S): at 05:08

## 2017-12-17 RX ADMIN — Medication 300 MILLIGRAM(S): at 11:23

## 2017-12-17 RX ADMIN — Medication 81 MILLIGRAM(S): at 11:23

## 2017-12-17 RX ADMIN — Medication 1: at 23:46

## 2017-12-17 RX ADMIN — PROPOFOL 1.71 MICROGRAM(S)/KG/MIN: 10 INJECTION, EMULSION INTRAVENOUS at 17:07

## 2017-12-17 RX ADMIN — Medication 100 MILLIGRAM(S): at 15:23

## 2017-12-17 RX ADMIN — PROPOFOL 1.71 MICROGRAM(S)/KG/MIN: 10 INJECTION, EMULSION INTRAVENOUS at 07:04

## 2017-12-17 NOTE — PROGRESS NOTE ADULT - PROBLEM SELECTOR PLAN 1
Inubated 12/16. Pt was hypoxic on BiPAP  Acute respiratory failure secondary to HAP (elevated procalcitonin) and fluid overload  -Continue with Lasix 80 Daily  c/w PO levaquin as per Id recommendation  - c/w nebs   started on new dialysis for CKD and fluid overload

## 2017-12-17 NOTE — CONSULT NOTE ADULT - SUBJECTIVE AND OBJECTIVE BOX
PULMONARY CONSULT NOTE      STEVEN HARVEY  MRN-[id number]    Patient is a 52y old  Female who presents with a chief complaint of SOB (13 Dec 2017 16:01)    History of Present Illness:  Chief Complaint/Reason for Admission: SOB	  History of Present Illness: 	  52 y/o F from [facility] ( long term care 2/2 diffculty walking possibly 2/2 polyneuropathy) w/ PMHx of anemia, CHF( diastolic dysfunction), CKD, Clostridium difficile infection, DM, Diabetic neuropathy, HTN, HLD, Hypothyroidism, Hypoparathyroidism, Osteomyelitis of jaw, and Parathyroid adenoma, GERD p/w c/o SOB and difficulty breathing x1 week. Pt also reports associated mild cough.  As per NH papers pt was confused and lethargic.  In ED pt was lethargic initially with moderate respiratory distress. She was placed on BIPAP. ICU consulted for new BIPAP. Later after few hours of BiPAP treatment pt is awake , responds to pain and verbal stimuli. Pt denies fever, chills, general pain, C/P, or any other complaints.        HISTORY OF PRESENT ILLNESS:As above. Sedated, intubated on mechanical ventilation. Off pressure meds    MEDICATIONS  (STANDING):  ascorbic acid 500 milliGRAM(s) Oral daily  aspirin  chewable 81 milliGRAM(s) Oral daily  cinacalcet 30 milliGRAM(s) Oral daily  dextrose 5%. 1000 milliLiter(s) (50 mL/Hr) IV Continuous <Continuous>  dextrose 50% Injectable 12.5 Gram(s) IV Push once  dextrose 50% Injectable 25 Gram(s) IV Push once  dextrose 50% Injectable 25 Gram(s) IV Push once  docusate sodium 100 milliGRAM(s) Oral three times a day  ferrous    sulfate Liquid 300 milliGRAM(s) Enteral Tube daily  folic acid 1 milliGRAM(s) Oral daily  furosemide   Injectable 80 milliGRAM(s) IV Push daily  levothyroxine 50 MICROGram(s) Oral daily  lidocaine 1% Injectable 10 milliLiter(s) Local Injection once  norepinephrine Infusion 0.5 MICROgram(s)/kG/Min (26.719 mL/Hr) IV Continuous <Continuous>  pantoprazole  Injectable 40 milliGRAM(s) IV Push two times a day  piperacillin/tazobactam IVPB. 3.375 Gram(s) IV Intermittent every 12 hours  pregabalin 50 milliGRAM(s) Oral two times a day  propofol Infusion 5 MICROgram(s)/kG/Min (1.71 mL/Hr) IV Continuous <Continuous>  senna 2 Tablet(s) Oral at bedtime  simvastatin 20 milliGRAM(s) Oral at bedtime      MEDICATIONS  (PRN):  acetaminophen   Tablet 650 milliGRAM(s) Oral every 6 hours PRN For Temp greater than 38 C (100.4 F)  acetaminophen  Suppository 650 milliGRAM(s) Rectal every 6 hours PRN For Temp greater than 38 C (100.4 F)  dextrose Gel 1 Dose(s) Oral once PRN Blood Glucose LESS THAN 70 milliGRAM(s)/deciliter  glucagon  Injectable 1 milliGRAM(s) IntraMuscular once PRN Glucose LESS THAN 70 milligrams/deciliter  HYDROmorphone  Injectable 1 milliGRAM(s) IV Push every 4 hours PRN Severe Pain (7 - 10)      Allergies    No Known Allergies    Intolerances        PAST MEDICAL & SURGICAL HISTORY:  Clostridium difficile infection  Osteomyelitis of jaw  Parathyroid adenoma  Hypothyroidism  CKD (chronic kidney disease)  Anemia  CHF (congestive heart failure)  Diabetic neuropathy  Diabetes mellitus  HTN (hypertension)  S/P :   No Past Surgical History      FAMILY HISTORY:  Family history of stroke  Family history of hypertension (Sibling)  Family history of diabetes mellitus      SOCIAL HISTORY  Smoking History:     REVIEW OF SYSTEMS:    CONSTITUTIONAL:  No fevers, chills, sweats    HEENT:  Eyes:  No diplopia or blurred vision. ENT:  No earache, sore throat or runny nose.    CARDIOVASCULAR:  No pressure, squeezing, tightness, or heaviness about the chest; no palpitations.    RESPIRATORY:  Per HPI    GASTROINTESTINAL:  No abdominal pain, nausea, vomiting or diarrhea.    GENITOURINARY:  No dysuria, frequency or urgency.    NEUROLOGIC:  No paresthesias, fasciculations, seizures or weakness.    PSYCHIATRIC:  No disorder of thought or mood.    Vital Signs Last 24 Hrs  T(C): 36.9 (17 Dec 2017 12:00), Max: 38.2 (16 Dec 2017 20:50)  T(F): 98.4 (17 Dec 2017 12:00), Max: 100.8 (16 Dec 2017 20:50)  HR: 77 (17 Dec 2017 13:00) (75 - 83)  BP: 120/51 (17 Dec 2017 13:00) (82/42 - 159/64)  BP(mean): 68 (17 Dec 2017 13:00) (52 - 87)  RR: 22 (17 Dec 2017 13:00) (15 - 22)  SpO2: 97% (17 Dec 2017 13:00) (96% - 100%)  I&O's Detail    16 Dec 2017 07:01  -  17 Dec 2017 07:00  --------------------------------------------------------  IN:    Nepro: 455 mL    norepinephrine Infusion: 225.2 mL    Packed Red Blood Cells: 350 mL    propofol Infusion: 360.8 mL    Solution: 12 mL  Total IN: 1403 mL    OUT:    Indwelling Catheter - Urethral: 70 mL  Total OUT: 70 mL    Total NET: 1333 mL      17 Dec 2017 07:01  -  17 Dec 2017 14:48  --------------------------------------------------------  IN:    Enteral Tube Flush: 50 mL    Nepro: 240 mL    norepinephrine Infusion: 2 mL    propofol Infusion: 112.8 mL    Solution: 37.5 mL  Total IN: 442.3 mL    OUT:    Indwelling Catheter - Urethral: 20 mL  Total OUT: 20 mL    Total NET: 422.3 mL          PHYSICAL EXAMINATION:    GENERAL: The patient is a well-developed, well-nourished _____in no apparent distress.     HEENT: Head is normocephalic and atraumatic. Extraocular muscles are intact. Mucous membranes are moist.     NECK: Supple.     LUNGS: Bilateral ronchi    HEART: Regular rate and rhythm without murmur.    ABDOMEN: Soft, nontender, and nondistended.  No hepatosplenomegaly is noted.    EXTREMITIES: Without any cyanosis, clubbing, rash, lesions or edema.    NEUROLOGIC: Grossly intact.      LABS:                        7.7    3.4   )-----------( 90       ( 17 Dec 2017 06:50 )             24.6     12-17    140  |  97  |  43<H>  ----------------------------<  177<H>  2.8<LL>   |  31  |  3.46<H>    Ca    7.6<L>      17 Dec 2017 06:50  Phos  2.3       Mg     2.1         TPro  5.6<L>  /  Alb  2.0<L>  /  TBili  0.6  /  DBili  x   /  AST  63<H>  /  ALT  28  /  AlkPhos  49          ABG - ( 16 Dec 2017 10:21 )  pH: 7.34  /  pCO2: 52    /  pO2: 92    / HCO3: 27    / Base Excess: 1.8   /  SaO2: 94                              MICROBIOLOGY:    RADIOLOGY & ADDITIONAL STUDIES:    CXR:  :< from: Xray Chest 1 View AP -PORTABLE-Routine (17 @ 11:11) >  IMPRESSION:  Partial clearing of the lungs.    < end of copied text >      ekg;    echo:

## 2017-12-17 NOTE — PROGRESS NOTE ADULT - ASSESSMENT
52 y/o F from [facility] ( long term care 2/2 diffculty walking possibly 2/2 polyneuropathy) w/ PMHx of anemia, CHF( diastolic dysfunction), CKD, Clostridium difficile infection, DM, Diabetic neuropathy, HTN, HLD, Hypothyroidism, Hypoparathyroidism, Osteomyelitis of jaw, and Parathyroid adenoma, GERD p/w c/o SOB and difficulty breathing x1 week. Pt also reports associated mild cough. Pt denies fever, chills, general pain, C/P, or any other complaints. NO  In ED pt is Awake , responds to pain and verbal stimuli, She in moderate respiratory distress. placed on BIPAP. ICU consulted for new BIPAP.    Admit to ICU for Acute Hypoxic and hypercapnic respiratory failure require new Bipap  2/2 Exacerbation of CHF or possibly pneumonia

## 2017-12-17 NOTE — PROGRESS NOTE ADULT - SUBJECTIVE AND OBJECTIVE BOX
INTERVAL HPI/OVERNIGHT EVENTS: Patient was intubated yesterday  to hypoxia on BiPAP. Pressors are started for BP support. Plan for colonoscopy on Monday.    PRESSORS: NO    ANTIBIOTICS: Levaquin               DATE STARTED:     Antimicrobial:  levoFLOXacin  Tablet 250 milliGRAM(s) Oral every 48 hours    Cardiovascular:  amLODIPine   Tablet 10 milliGRAM(s) Oral daily  carvedilol 25 milliGRAM(s) Oral every 12 hours  furosemide   Injectable 80 milliGRAM(s) IV Push daily  hydrALAZINE 100 milliGRAM(s) Oral every 8 hours    Pulmonary:  ALBUTerol/ipratropium for Nebulization 3 milliLiter(s) Nebulizer every 6 hours    Hematalogic:  aspirin  chewable 81 milliGRAM(s) Oral daily    Other:  acetaminophen   Tablet 650 milliGRAM(s) Oral every 6 hours PRN  acetaminophen  Suppository 650 milliGRAM(s) Rectal every 6 hours PRN  ascorbic acid 500 milliGRAM(s) Oral daily  cinacalcet 30 milliGRAM(s) Oral daily  dextrose 5%. 1000 milliLiter(s) IV Continuous <Continuous>  dextrose 50% Injectable 12.5 Gram(s) IV Push once  dextrose 50% Injectable 25 Gram(s) IV Push once  dextrose 50% Injectable 25 Gram(s) IV Push once  dextrose Gel 1 Dose(s) Oral once PRN  docusate sodium 100 milliGRAM(s) Oral three times a day  ferrous    sulfate 325 milliGRAM(s) Oral two times a day with meals  folic acid 1 milliGRAM(s) Oral daily  glucagon  Injectable 1 milliGRAM(s) IntraMuscular once PRN  levothyroxine 50 MICROGram(s) Oral daily  lidocaine 1% Injectable 10 milliLiter(s) Local Injection once  multivitamin 1 Tablet(s) Oral daily  pantoprazole  Injectable 40 milliGRAM(s) IV Push two times a day  pregabalin 50 milliGRAM(s) Oral two times a day  senna 2 Tablet(s) Oral at bedtime  simvastatin 20 milliGRAM(s) Oral at bedtime    acetaminophen   Tablet 650 milliGRAM(s) Oral every 6 hours PRN  acetaminophen  Suppository 650 milliGRAM(s) Rectal every 6 hours PRN  ALBUTerol/ipratropium for Nebulization 3 milliLiter(s) Nebulizer every 6 hours  amLODIPine   Tablet 10 milliGRAM(s) Oral daily  ascorbic acid 500 milliGRAM(s) Oral daily  aspirin  chewable 81 milliGRAM(s) Oral daily  carvedilol 25 milliGRAM(s) Oral every 12 hours  cinacalcet 30 milliGRAM(s) Oral daily  dextrose 5%. 1000 milliLiter(s) IV Continuous <Continuous>  dextrose 50% Injectable 12.5 Gram(s) IV Push once  dextrose 50% Injectable 25 Gram(s) IV Push once  dextrose 50% Injectable 25 Gram(s) IV Push once  dextrose Gel 1 Dose(s) Oral once PRN  docusate sodium 100 milliGRAM(s) Oral three times a day  ferrous    sulfate 325 milliGRAM(s) Oral two times a day with meals  folic acid 1 milliGRAM(s) Oral daily  furosemide   Injectable 80 milliGRAM(s) IV Push daily  glucagon  Injectable 1 milliGRAM(s) IntraMuscular once PRN  hydrALAZINE 100 milliGRAM(s) Oral every 8 hours  levoFLOXacin  Tablet 250 milliGRAM(s) Oral every 48 hours  levothyroxine 50 MICROGram(s) Oral daily  lidocaine 1% Injectable 10 milliLiter(s) Local Injection once  multivitamin 1 Tablet(s) Oral daily  pantoprazole  Injectable 40 milliGRAM(s) IV Push two times a day  pregabalin 50 milliGRAM(s) Oral two times a day  senna 2 Tablet(s) Oral at bedtime  simvastatin 20 milliGRAM(s) Oral at bedtime    Drug Dosing Weight  Height (cm): 170 (13 Dec 2017 17:16)  Weight (kg): 57 (13 Dec 2017 17:16)  BMI (kg/m2): 19.7 (13 Dec 2017 17:16)  BSA (m2): 1.66 (13 Dec 2017 17:16)    CENTRAL LINE: [x] YES [] NO  LOCATION: L Fem  DATE INSERTED:   REMOVE: [] YES [x] NO  EXPLAIN: HD    JACOBSON: [x] YES [] NO    DATE INSERTED:   REMOVE:  [] YES [x] NO  EXPLAIN: IOs    A-LINE: NO    PMH -reviewed admission note, no change since admission  PAST MEDICAL & SURGICAL HISTORY:  Clostridium difficile infection  Osteomyelitis of jaw  Parathyroid adenoma  Hypothyroidism  CKD (chronic kidney disease)  Anemia  CHF (congestive heart failure)  Diabetic neuropathy  Diabetes mellitus  HTN (hypertension)  S/P :   No Past Surgical History      ICU Vital Signs Last 24 Hrs  T(C): 38 (16 Dec 2017 00:00), Max: 38.3 (15 Dec 2017 13:00)  T(F): 100.4 (16 Dec 2017 00:00), Max: 101 (15 Dec 2017 13:00)  HR: 80 (16 Dec 2017 01:00) (80 - 94)  BP: 135/60 (16 Dec 2017 01:00) (115/63 - 176/65)  BP(mean): 77 (16 Dec 2017 01:00) (72 - 106)  ABP: --  ABP(mean): --  RR: 14 (16 Dec 2017 01:00) (13 - 34)  SpO2: 98% (16 Dec 2017 01:00) (89% - 100%)      ABG - ( 14 Dec 2017 13:56 )  pH: 7.37  /  pCO2: 44    /  pO2: 84    / HCO3: 25    / Base Excess: 0.3   /  SaO2: 94                    -14 @ 07:01  -  15 @ 07:00  --------------------------------------------------------  IN: 832.5 mL / OUT: 1030 mL / NET: -197.5 mL            PHYSICAL EXAM:    GENERAL: [x]NAD, []well-groomed, []well-developed  HEAD:  [x]Atraumatic, []Normocephalic  EYES: [x]EOMI, []PERRLA, []conjunctiva and sclera clear  ENMT: [x]No tonsillar erythema, exudates, or enlargement; []Moist mucous membranes, []Good dentition, []No lesions  NECK: [x]Supple, normal appearance, []No JVD; []Normal thyroid; []Trachea midline  NERVOUS SYSTEM:  [x]Alert & Oriented X3, []Good concentration; []Motor Strength 5/5 B/L upper and lower extremities; []DTRs 2+ intact and symmetric  CHEST/LUNG: [x]No chest deformity; []Normal percussion bilaterally; []No rales, rhonchi, wheezing   HEART: [x]Regular rate and rhythm; []No murmurs, rubs, or gallops  ABDOMEN: [x]Soft, Nontender, Nondistended; []Bowel sounds present  EXTREMITIES:  [x]2+ Peripheral Pulses, []No clubbing, cyanosis, or edema  LYMPH: [x]No lymphadenopathy noted  SKIN: [x]No rashes or lesions; []Good capillary refill      LABS:  CBC Full  -  ( 15 Dec 2017 06:37 )  WBC Count : 12.8 K/uL  Hemoglobin : 7.3 g/dL  Hematocrit : 25.5 %  Platelet Count - Automated : 130 K/uL  Mean Cell Volume : 94.0 fl  Mean Cell Hemoglobin : 27.1 pg  Mean Cell Hemoglobin Concentration : 28.8 gm/dL  Auto Neutrophil # : 12.0 K/uL  Auto Lymphocyte # : 0.4 K/uL  Auto Monocyte # : 0.3 K/uL  Auto Eosinophil # : 0.0 K/uL  Auto Basophil # : 0.0 K/uL  Auto Neutrophil % : 94.2 %  Auto Lymphocyte % : 3.1 %  Auto Monocyte % : 2.3 %  Auto Eosinophil % : 0.0 %  Auto Basophil % : 0.3 %    12-15    144  |  107  |  69<H>  ----------------------------<  70  3.8   |  28  |  4.98<H>    Ca    8.1<L>      15 Dec 2017 03:31  Phos  4.7     12-15  Mg     2.4     12-15      PT/INR - ( 14 Dec 2017 14:42 )   PT: 16.4 sec;   INR: 1.49 ratio         PTT - ( 14 Dec 2017 14:42 )  PTT:40.2 sec    Culture Results:   No growth ( @ 23:18)  Culture Results:   No growth to date. ( @ 17:41)  Culture Results:   No growth to date. ( @ 17:40)      RADIOLOGY & ADDITIONAL STUDIES REVIEWED:  YES    []GOALS OF CARE DISCUSSION WITH PATIENT/FAMILY/PROXY:    CRITICAL CARE TIME SPENT: 35 minutes INTERVAL HPI/OVERNIGHT EVENTS: Patient was intubated yesterday  to hypoxia on BiPAP. Pressors are started for BP support. Plan for colonoscopy on Monday.    PRESSORS: NO    ANTIBIOTICS: Levaquin               DATE STARTED:     Antimicrobial:  levoFLOXacin  Tablet 250 milliGRAM(s) Oral every 48 hours    Cardiovascular:  amLODIPine   Tablet 10 milliGRAM(s) Oral daily  carvedilol 25 milliGRAM(s) Oral every 12 hours  furosemide   Injectable 80 milliGRAM(s) IV Push daily  hydrALAZINE 100 milliGRAM(s) Oral every 8 hours    Pulmonary:  ALBUTerol/ipratropium for Nebulization 3 milliLiter(s) Nebulizer every 6 hours    Hematalogic:  aspirin  chewable 81 milliGRAM(s) Oral daily    Other:  acetaminophen   Tablet 650 milliGRAM(s) Oral every 6 hours PRN  acetaminophen  Suppository 650 milliGRAM(s) Rectal every 6 hours PRN  ascorbic acid 500 milliGRAM(s) Oral daily  cinacalcet 30 milliGRAM(s) Oral daily  dextrose 5%. 1000 milliLiter(s) IV Continuous <Continuous>  dextrose 50% Injectable 12.5 Gram(s) IV Push once  dextrose 50% Injectable 25 Gram(s) IV Push once  dextrose 50% Injectable 25 Gram(s) IV Push once  dextrose Gel 1 Dose(s) Oral once PRN  docusate sodium 100 milliGRAM(s) Oral three times a day  ferrous    sulfate 325 milliGRAM(s) Oral two times a day with meals  folic acid 1 milliGRAM(s) Oral daily  glucagon  Injectable 1 milliGRAM(s) IntraMuscular once PRN  levothyroxine 50 MICROGram(s) Oral daily  lidocaine 1% Injectable 10 milliLiter(s) Local Injection once  multivitamin 1 Tablet(s) Oral daily  pantoprazole  Injectable 40 milliGRAM(s) IV Push two times a day  pregabalin 50 milliGRAM(s) Oral two times a day  senna 2 Tablet(s) Oral at bedtime  simvastatin 20 milliGRAM(s) Oral at bedtime    acetaminophen   Tablet 650 milliGRAM(s) Oral every 6 hours PRN  acetaminophen  Suppository 650 milliGRAM(s) Rectal every 6 hours PRN  ALBUTerol/ipratropium for Nebulization 3 milliLiter(s) Nebulizer every 6 hours  amLODIPine   Tablet 10 milliGRAM(s) Oral daily  ascorbic acid 500 milliGRAM(s) Oral daily  aspirin  chewable 81 milliGRAM(s) Oral daily  carvedilol 25 milliGRAM(s) Oral every 12 hours  cinacalcet 30 milliGRAM(s) Oral daily  dextrose 5%. 1000 milliLiter(s) IV Continuous <Continuous>  dextrose 50% Injectable 12.5 Gram(s) IV Push once  dextrose 50% Injectable 25 Gram(s) IV Push once  dextrose 50% Injectable 25 Gram(s) IV Push once  dextrose Gel 1 Dose(s) Oral once PRN  docusate sodium 100 milliGRAM(s) Oral three times a day  ferrous    sulfate 325 milliGRAM(s) Oral two times a day with meals  folic acid 1 milliGRAM(s) Oral daily  furosemide   Injectable 80 milliGRAM(s) IV Push daily  glucagon  Injectable 1 milliGRAM(s) IntraMuscular once PRN  hydrALAZINE 100 milliGRAM(s) Oral every 8 hours  levoFLOXacin  Tablet 250 milliGRAM(s) Oral every 48 hours  levothyroxine 50 MICROGram(s) Oral daily  lidocaine 1% Injectable 10 milliLiter(s) Local Injection once  multivitamin 1 Tablet(s) Oral daily  pantoprazole  Injectable 40 milliGRAM(s) IV Push two times a day  pregabalin 50 milliGRAM(s) Oral two times a day  senna 2 Tablet(s) Oral at bedtime  simvastatin 20 milliGRAM(s) Oral at bedtime    Drug Dosing Weight  Height (cm): 170 (13 Dec 2017 17:16)  Weight (kg): 57 (13 Dec 2017 17:16)  BMI (kg/m2): 19.7 (13 Dec 2017 17:16)  BSA (m2): 1.66 (13 Dec 2017 17:16)    CENTRAL LINE: [x] YES [] NO  LOCATION: L Fem  DATE INSERTED:   REMOVE: [] YES [x] NO  EXPLAIN: HD    JACOBSON: [x] YES [] NO    DATE INSERTED:   REMOVE:  [] YES [x] NO  EXPLAIN: IOs    A-LINE: NO    PMH -reviewed admission note, no change since admission  PAST MEDICAL & SURGICAL HISTORY:  Clostridium difficile infection  Osteomyelitis of jaw  Parathyroid adenoma  Hypothyroidism  CKD (chronic kidney disease)  Anemia  CHF (congestive heart failure)  Diabetic neuropathy  Diabetes mellitus  HTN (hypertension)  S/P :   No Past Surgical History      ICU Vital Signs Last 24 Hrs  T(C): 38 (16 Dec 2017 00:00), Max: 38.3 (15 Dec 2017 13:00)  T(F): 100.4 (16 Dec 2017 00:00), Max: 101 (15 Dec 2017 13:00)  HR: 80 (16 Dec 2017 01:00) (80 - 94)  BP: 135/60 (16 Dec 2017 01:00) (115/63 - 176/65)  BP(mean): 77 (16 Dec 2017 01:00) (72 - 106)  ABP: --  ABP(mean): --  RR: 14 (16 Dec 2017 01:00) (13 - 34)  SpO2: 98% (16 Dec 2017 01:00) (89% - 100%)      ABG - ( 14 Dec 2017 13:56 )  pH: 7.37  /  pCO2: 44    /  pO2: 84    / HCO3: 25    / Base Excess: 0.3   /  SaO2: 94                    12-14 @ 07:01  -  15 @ 07:00  --------------------------------------------------------  IN: 832.5 mL / OUT: 1030 mL / NET: -197.5 mL            PHYSICAL EXAM:    GENERAL: [x]NAD, []well-groomed, []well-developed  HEAD:  [x]Atraumatic, []Normocephalic  EYES: sluggish pupillary response bilaterally, conjunctiva and sclera clear  ENMT: [x]No tonsillar erythema, exudates, or enlargement; []Moist mucous membranes, []Good dentition, []No lesions  NECK: [x]Supple, normal appearance, []No JVD; []Normal thyroid; []Trachea midline  NERVOUS SYSTEM: sedated, nonverbal, noncommunicative  CHEST/LUNG: [x]No chest deformity; []Normal percussion bilaterally; []No rales, rhonchi, wheezing   HEART: [x]Regular rate and rhythm; []No murmurs, rubs, or gallops  ABDOMEN: [x]Soft, Nontender, Nondistended; []Bowel sounds present  EXTREMITIES:  [x]2+ Peripheral Pulses, []No clubbing, cyanosis, or edema  LYMPH: [x]No lymphadenopathy noted  SKIN: [x]No rashes or lesions; []Good capillary refill      LABS:  CBC Full  -  ( 15 Dec 2017 06:37 )  WBC Count : 12.8 K/uL  Hemoglobin : 7.3 g/dL  Hematocrit : 25.5 %  Platelet Count - Automated : 130 K/uL  Mean Cell Volume : 94.0 fl  Mean Cell Hemoglobin : 27.1 pg  Mean Cell Hemoglobin Concentration : 28.8 gm/dL  Auto Neutrophil # : 12.0 K/uL  Auto Lymphocyte # : 0.4 K/uL  Auto Monocyte # : 0.3 K/uL  Auto Eosinophil # : 0.0 K/uL  Auto Basophil # : 0.0 K/uL  Auto Neutrophil % : 94.2 %  Auto Lymphocyte % : 3.1 %  Auto Monocyte % : 2.3 %  Auto Eosinophil % : 0.0 %  Auto Basophil % : 0.3 %    12-15    144  |  107  |  69<H>  ----------------------------<  70  3.8   |  28  |  4.98<H>    Ca    8.1<L>      15 Dec 2017 03:31  Phos  4.7     12-15  Mg     2.4     12-15      PT/INR - ( 14 Dec 2017 14:42 )   PT: 16.4 sec;   INR: 1.49 ratio         PTT - ( 14 Dec 2017 14:42 )  PTT:40.2 sec    Culture Results:   No growth ( @ 23:18)  Culture Results:   No growth to date. ( @ 17:41)  Culture Results:   No growth to date. ( @ 17:40)      RADIOLOGY & ADDITIONAL STUDIES REVIEWED:  YES    []GOALS OF CARE DISCUSSION WITH PATIENT/FAMILY/PROXY:    CRITICAL CARE TIME SPENT: 35 minutes

## 2017-12-17 NOTE — PROGRESS NOTE ADULT - ASSESSMENT
1. Anemia  2. GI bleeding stopped for now    Suggestions:    1. Monitor H/H  2. Transfuse PRBC as needed  3. Protonix daily  4. Avoid NSAID  5. EGD  6. DVT prophylaxis

## 2017-12-17 NOTE — PROGRESS NOTE ADULT - SUBJECTIVE AND OBJECTIVE BOX
[ X  ] ICU                                          [   ] CCU                                      [   ] Medical Floor      Patient remain in ICU for monitoring. She is comfortable. No new complaints reported, No abdominal pain, N/V, hematemesis, hematochezia, melena, fever, chills, chest pain, SOB, cough or diarrhea reported.    VITALS  ICU Vital Signs Last 24 Hrs  T(C): 37.4 (17 Dec 2017 08:00), Max: 38.2 (16 Dec 2017 20:50)  T(F): 99.4 (17 Dec 2017 08:00), Max: 100.8 (16 Dec 2017 20:50)  HR: 77 (17 Dec 2017 11:00) (75 - 83)  BP: 124/57 (17 Dec 2017 11:00) (82/42 - 159/64)  BP(mean): 72 (17 Dec 2017 11:00) (52 - 87)  RR: 17 (17 Dec 2017 11:00) (15 - 20)  SpO2: 99% (17 Dec 2017 11:00) (96% - 100%)         MEDICATIONS  (STANDING):  ascorbic acid 500 milliGRAM(s) Oral daily  aspirin  chewable 81 milliGRAM(s) Oral daily  cinacalcet 30 milliGRAM(s) Oral daily  dextrose 5%. 1000 milliLiter(s) (50 mL/Hr) IV Continuous <Continuous>  dextrose 50% Injectable 12.5 Gram(s) IV Push once  dextrose 50% Injectable 25 Gram(s) IV Push once  dextrose 50% Injectable 25 Gram(s) IV Push once  docusate sodium 100 milliGRAM(s) Oral three times a day  ferrous    sulfate Liquid 300 milliGRAM(s) Enteral Tube daily  folic acid 1 milliGRAM(s) Oral daily  furosemide   Injectable 80 milliGRAM(s) IV Push daily  levothyroxine 50 MICROGram(s) Oral daily  lidocaine 1% Injectable 10 milliLiter(s) Local Injection once  norepinephrine Infusion 0.5 MICROgram(s)/kG/Min (26.719 mL/Hr) IV Continuous <Continuous>  pantoprazole  Injectable 40 milliGRAM(s) IV Push two times a day  piperacillin/tazobactam IVPB. 3.375 Gram(s) IV Intermittent every 12 hours  pregabalin 50 milliGRAM(s) Oral two times a day  propofol Infusion 5 MICROgram(s)/kG/Min (1.71 mL/Hr) IV Continuous <Continuous>  senna 2 Tablet(s) Oral at bedtime  simvastatin 20 milliGRAM(s) Oral at bedtime    MEDICATIONS  (PRN):  acetaminophen   Tablet 650 milliGRAM(s) Oral every 6 hours PRN For Temp greater than 38 C (100.4 F)  acetaminophen  Suppository 650 milliGRAM(s) Rectal every 6 hours PRN For Temp greater than 38 C (100.4 F)  dextrose Gel 1 Dose(s) Oral once PRN Blood Glucose LESS THAN 70 milliGRAM(s)/deciliter  glucagon  Injectable 1 milliGRAM(s) IntraMuscular once PRN Glucose LESS THAN 70 milligrams/deciliter  HYDROmorphone  Injectable 1 milliGRAM(s) IV Push every 4 hours PRN Severe Pain (7 - 10)                            7.7    3.4   )-----------( 90       ( 17 Dec 2017 06:50 )             24.6       12-17    140  |  97  |  43<H>  ----------------------------<  177<H>  2.8<LL>   |  31  |  3.46<H>    Ca    7.6<L>      17 Dec 2017 06:50  Phos  2.3     12-17  Mg     2.1     12-17    TPro  5.6<L>  /  Alb  2.0<L>  /  TBili  0.6  /  DBili  x   /  AST  63<H>  /  ALT  28  /  AlkPhos  49  12-17

## 2017-12-17 NOTE — CONSULT NOTE ADULT - PROBLEM SELECTOR RECOMMENDATION 9
ventilator support  F/u ABGs  Bronchodilators neb  Aspiration precautions  Pulmonary toilet  Nutrition  Follow up CXR

## 2017-12-17 NOTE — CONSULT NOTE ADULT - ASSESSMENT
52 y/o F from [facility] ( long term care 2/2 diffculty walking possibly 2/2 polyneuropathy) w/ PMHx of anemia, CHF( diastolic dysfunction), CKD, Clostridium difficile infection, DM, Diabetic neuropathy, HTN, HLD, Hypothyroidism, Hypoparathyroidism, Osteomyelitis of jaw, and Parathyroid adenoma, GERD p/w c/o SOB and difficulty breathing x1 week. Pt also reports associated mild cough. Pt denies fever, chills, general pain, C/P, or any other complaints. NO  In ED pt is Awake , responds to pain and verbal stimuli, She in moderate respiratory distress. placed on BIPAP. ICU consulted for new BIPAP.    Admit to ICU for Acute Hypoxic and hypercapnic respiratory failure require new Bipap  2/2 Exacerbation of CHF or possibly pneumonia

## 2017-12-17 NOTE — PROGRESS NOTE ADULT - ATTENDING COMMENTS
-pat with acute hypoxic resp failure due to fluid over load and pul edema, failed NIPPV support and was intubated 11/16. Her cxr improved with dialysis and now off pressors.   -continue vent suipport  -trial JEN/ SBT after dialysis tomorr.   -continue antibx and f/u cultures

## 2017-12-18 ENCOUNTER — RESULT REVIEW (OUTPATIENT)
Age: 52
End: 2017-12-18

## 2017-12-18 LAB
ANION GAP SERPL CALC-SCNC: 12 MMOL/L — SIGNIFICANT CHANGE UP (ref 5–17)
ANION GAP SERPL CALC-SCNC: 6 MMOL/L — SIGNIFICANT CHANGE UP (ref 5–17)
APTT BLD: 32.9 SEC — SIGNIFICANT CHANGE UP (ref 27.5–37.4)
BASE EXCESS BLDA CALC-SCNC: 4.3 MMOL/L — HIGH (ref -2–2)
BASE EXCESS BLDA CALC-SCNC: 6 MMOL/L — HIGH (ref -2–2)
BLOOD GAS COMMENTS ARTERIAL: SIGNIFICANT CHANGE UP
BLOOD GAS COMMENTS ARTERIAL: SIGNIFICANT CHANGE UP
BUN SERPL-MCNC: 24 MG/DL — HIGH (ref 7–18)
BUN SERPL-MCNC: 59 MG/DL — HIGH (ref 7–18)
CALCIUM SERPL-MCNC: 7.8 MG/DL — LOW (ref 8.4–10.5)
CALCIUM SERPL-MCNC: 8.1 MG/DL — LOW (ref 8.4–10.5)
CHLORIDE SERPL-SCNC: 104 MMOL/L — SIGNIFICANT CHANGE UP (ref 96–108)
CHLORIDE SERPL-SCNC: 96 MMOL/L — SIGNIFICANT CHANGE UP (ref 96–108)
CO2 SERPL-SCNC: 30 MMOL/L — SIGNIFICANT CHANGE UP (ref 22–31)
CO2 SERPL-SCNC: 35 MMOL/L — HIGH (ref 22–31)
CREAT SERPL-MCNC: 2.16 MG/DL — HIGH (ref 0.5–1.3)
CREAT SERPL-MCNC: 4.54 MG/DL — HIGH (ref 0.5–1.3)
CULTURE RESULTS: SIGNIFICANT CHANGE UP
CULTURE RESULTS: SIGNIFICANT CHANGE UP
GLUCOSE BLDC GLUCOMTR-MCNC: 124 MG/DL — HIGH (ref 70–99)
GLUCOSE SERPL-MCNC: 107 MG/DL — HIGH (ref 70–99)
GLUCOSE SERPL-MCNC: 109 MG/DL — HIGH (ref 70–99)
HCO3 BLDA-SCNC: 28 MMOL/L — HIGH (ref 23–27)
HCO3 BLDA-SCNC: 30 MMOL/L — HIGH (ref 23–27)
HCT VFR BLD CALC: 23 % — LOW (ref 34.5–45)
HGB BLD-MCNC: 7.2 G/DL — LOW (ref 11.5–15.5)
HOROWITZ INDEX BLDA+IHG-RTO: 60 — SIGNIFICANT CHANGE UP
HOROWITZ INDEX BLDA+IHG-RTO: 80 — SIGNIFICANT CHANGE UP
INR BLD: 1.02 RATIO — SIGNIFICANT CHANGE UP (ref 0.88–1.16)
MAGNESIUM SERPL-MCNC: 2.1 MG/DL — SIGNIFICANT CHANGE UP (ref 1.6–2.6)
MCHC RBC-ENTMCNC: 28.3 PG — SIGNIFICANT CHANGE UP (ref 27–34)
MCHC RBC-ENTMCNC: 31.4 GM/DL — LOW (ref 32–36)
MCV RBC AUTO: 90.1 FL — SIGNIFICANT CHANGE UP (ref 80–100)
PCO2 BLDA: 37 MMHG — SIGNIFICANT CHANGE UP (ref 32–46)
PCO2 BLDA: 39 MMHG — SIGNIFICANT CHANGE UP (ref 32–46)
PH BLDA: 7.49 — HIGH (ref 7.35–7.45)
PH BLDA: 7.49 — HIGH (ref 7.35–7.45)
PHOSPHATE SERPL-MCNC: 2.3 MG/DL — LOW (ref 2.5–4.5)
PLATELET # BLD AUTO: 72 K/UL — LOW (ref 150–400)
PO2 BLDA: 60 MMHG — LOW (ref 74–108)
PO2 BLDA: 67 MMHG — LOW (ref 74–108)
POTASSIUM SERPL-MCNC: 2.7 MMOL/L — CRITICAL LOW (ref 3.5–5.3)
POTASSIUM SERPL-MCNC: 3.9 MMOL/L — SIGNIFICANT CHANGE UP (ref 3.5–5.3)
POTASSIUM SERPL-SCNC: 2.7 MMOL/L — CRITICAL LOW (ref 3.5–5.3)
POTASSIUM SERPL-SCNC: 3.9 MMOL/L — SIGNIFICANT CHANGE UP (ref 3.5–5.3)
PROTHROM AB SERPL-ACNC: 11.1 SEC — SIGNIFICANT CHANGE UP (ref 9.8–12.7)
RBC # BLD: 2.55 M/UL — LOW (ref 3.8–5.2)
RBC # FLD: 15.6 % — HIGH (ref 10.3–14.5)
SAO2 % BLDA: 90 % — LOW (ref 92–96)
SAO2 % BLDA: 94 % — SIGNIFICANT CHANGE UP (ref 92–96)
SODIUM SERPL-SCNC: 138 MMOL/L — SIGNIFICANT CHANGE UP (ref 135–145)
SODIUM SERPL-SCNC: 145 MMOL/L — SIGNIFICANT CHANGE UP (ref 135–145)
SPECIMEN SOURCE: SIGNIFICANT CHANGE UP
SPECIMEN SOURCE: SIGNIFICANT CHANGE UP
WBC # BLD: 1.8 K/UL — LOW (ref 3.8–10.5)
WBC # FLD AUTO: 1.8 K/UL — LOW (ref 3.8–10.5)

## 2017-12-18 PROCEDURE — 71010: CPT | Mod: 26

## 2017-12-18 PROCEDURE — 88305 TISSUE EXAM BY PATHOLOGIST: CPT | Mod: 26

## 2017-12-18 PROCEDURE — 88312 SPECIAL STAINS GROUP 1: CPT | Mod: 26

## 2017-12-18 RX ORDER — POTASSIUM PHOSPHATE, MONOBASIC POTASSIUM PHOSPHATE, DIBASIC 236; 224 MG/ML; MG/ML
30 INJECTION, SOLUTION INTRAVENOUS ONCE
Qty: 0 | Refills: 0 | Status: DISCONTINUED | OUTPATIENT
Start: 2017-12-18 | End: 2017-12-18

## 2017-12-18 RX ORDER — LEVOTHYROXINE SODIUM 125 MCG
25 TABLET ORAL DAILY
Qty: 0 | Refills: 0 | Status: DISCONTINUED | OUTPATIENT
Start: 2017-12-18 | End: 2017-12-19

## 2017-12-18 RX ORDER — POTASSIUM CHLORIDE 20 MEQ
40 PACKET (EA) ORAL EVERY 4 HOURS
Qty: 0 | Refills: 0 | Status: COMPLETED | OUTPATIENT
Start: 2017-12-18 | End: 2017-12-18

## 2017-12-18 RX ORDER — ERYTHROPOIETIN 10000 [IU]/ML
20000 INJECTION, SOLUTION INTRAVENOUS; SUBCUTANEOUS ONCE
Qty: 0 | Refills: 0 | Status: COMPLETED | OUTPATIENT
Start: 2017-12-18 | End: 2017-12-18

## 2017-12-18 RX ORDER — PANTOPRAZOLE SODIUM 20 MG/1
40 TABLET, DELAYED RELEASE ORAL DAILY
Qty: 0 | Refills: 0 | Status: DISCONTINUED | OUTPATIENT
Start: 2017-12-18 | End: 2018-01-10

## 2017-12-18 RX ADMIN — Medication 650 MILLIGRAM(S): at 17:10

## 2017-12-18 RX ADMIN — PIPERACILLIN AND TAZOBACTAM 12.5 GRAM(S): 4; .5 INJECTION, POWDER, LYOPHILIZED, FOR SOLUTION INTRAVENOUS at 05:37

## 2017-12-18 RX ADMIN — Medication 50 MILLIGRAM(S): at 05:37

## 2017-12-18 RX ADMIN — SIMVASTATIN 20 MILLIGRAM(S): 20 TABLET, FILM COATED ORAL at 21:43

## 2017-12-18 RX ADMIN — PANTOPRAZOLE SODIUM 40 MILLIGRAM(S): 20 TABLET, DELAYED RELEASE ORAL at 18:05

## 2017-12-18 RX ADMIN — Medication 40 MILLIEQUIVALENT(S): at 09:49

## 2017-12-18 RX ADMIN — Medication 40 MILLIEQUIVALENT(S): at 11:03

## 2017-12-18 RX ADMIN — PIPERACILLIN AND TAZOBACTAM 12.5 GRAM(S): 4; .5 INJECTION, POWDER, LYOPHILIZED, FOR SOLUTION INTRAVENOUS at 21:44

## 2017-12-18 RX ADMIN — Medication 25 MICROGRAM(S): at 05:36

## 2017-12-18 RX ADMIN — PROPOFOL 1.71 MICROGRAM(S)/KG/MIN: 10 INJECTION, EMULSION INTRAVENOUS at 04:30

## 2017-12-18 RX ADMIN — ERYTHROPOIETIN 20000 UNIT(S): 10000 INJECTION, SOLUTION INTRAVENOUS; SUBCUTANEOUS at 18:56

## 2017-12-18 RX ADMIN — PANTOPRAZOLE SODIUM 40 MILLIGRAM(S): 20 TABLET, DELAYED RELEASE ORAL at 05:36

## 2017-12-18 RX ADMIN — SENNA PLUS 2 TABLET(S): 8.6 TABLET ORAL at 21:43

## 2017-12-18 RX ADMIN — Medication 80 MILLIGRAM(S): at 05:37

## 2017-12-18 NOTE — PROGRESS NOTE ADULT - ATTENDING COMMENTS
Patient is seen and examined. Case reviewed with the medical team. Above note is appreciated. Will follow up clinically. Continue DVT prophylaxis. Case discussed with ICU and pulmonary. Remains intubated in the ICU.

## 2017-12-18 NOTE — PROGRESS NOTE ADULT - ATTENDING COMMENTS
52 female nursing home patient with hx of DM, neuropathy, CKD, CHFpEF, GERD, osteomyelitis of jaw, hypothyroidism, hypoparathyroidism, anemia, admitted with acute on chronic renal failure requiring initiation of dialysis, acute respiratory failure requiring intubation on 12/16, GI bleed.  Plan:  - EGD today  - HD per nephrology  - zosyn for possible pneumonia with sepsis  - wean mechanical ventilation as tolerated.   Total cc time 35 min.

## 2017-12-18 NOTE — PROGRESS NOTE ADULT - SUBJECTIVE AND OBJECTIVE BOX
INTERVAL HPI/OVERNIGHT EVENTS: pt had two episodes of melena since last night, will EGD today as per GI Dr. [place]    PRESSORS: [ ] YES [ x] NO  WHICH:    ANTIBIOTICS:   Zosyn                DATE STARTED:   ANTIBIOTICS:                  DATE STARTED:  ANTIBIOTICS:                  DATE STARTED:    Antimicrobial:  piperacillin/tazobactam IVPB. 3.375 Gram(s) IV Intermittent every 12 hours    Cardiovascular:  furosemide   Injectable 80 milliGRAM(s) IV Push daily    Pulmonary:    Hematalogic:  aspirin  chewable 81 milliGRAM(s) Oral daily    Other:  acetaminophen   Tablet 650 milliGRAM(s) Oral every 6 hours PRN  acetaminophen  Suppository 650 milliGRAM(s) Rectal every 6 hours PRN  ascorbic acid 500 milliGRAM(s) Oral daily  cinacalcet 30 milliGRAM(s) Oral daily  dextrose 5%. 1000 milliLiter(s) IV Continuous <Continuous>  dextrose 50% Injectable 12.5 Gram(s) IV Push once  dextrose 50% Injectable 25 Gram(s) IV Push once  dextrose 50% Injectable 25 Gram(s) IV Push once  dextrose Gel 1 Dose(s) Oral once PRN  docusate sodium 100 milliGRAM(s) Oral three times a day  ferrous    sulfate Liquid 300 milliGRAM(s) Enteral Tube daily  folic acid 1 milliGRAM(s) Oral daily  glucagon  Injectable 1 milliGRAM(s) IntraMuscular once PRN  HYDROmorphone  Injectable 1 milliGRAM(s) IV Push every 4 hours PRN  insulin lispro (HumaLOG) corrective regimen sliding scale   SubCutaneous three times a day before meals  levothyroxine Injectable 25 MICROGram(s) IV Push daily  lidocaine 1% Injectable 10 milliLiter(s) Local Injection once  pantoprazole  Injectable 40 milliGRAM(s) IV Push two times a day  potassium chloride   Powder 40 milliEquivalent(s) Oral every 4 hours  potassium phosphate IVPB 30 milliMole(s) IV Intermittent once  pregabalin 50 milliGRAM(s) Oral two times a day  propofol Infusion 5 MICROgram(s)/kG/Min IV Continuous <Continuous>  senna 2 Tablet(s) Oral at bedtime  simvastatin 20 milliGRAM(s) Oral at bedtime    acetaminophen   Tablet 650 milliGRAM(s) Oral every 6 hours PRN  acetaminophen  Suppository 650 milliGRAM(s) Rectal every 6 hours PRN  ascorbic acid 500 milliGRAM(s) Oral daily  aspirin  chewable 81 milliGRAM(s) Oral daily  cinacalcet 30 milliGRAM(s) Oral daily  dextrose 5%. 1000 milliLiter(s) IV Continuous <Continuous>  dextrose 50% Injectable 12.5 Gram(s) IV Push once  dextrose 50% Injectable 25 Gram(s) IV Push once  dextrose 50% Injectable 25 Gram(s) IV Push once  dextrose Gel 1 Dose(s) Oral once PRN  docusate sodium 100 milliGRAM(s) Oral three times a day  ferrous    sulfate Liquid 300 milliGRAM(s) Enteral Tube daily  folic acid 1 milliGRAM(s) Oral daily  furosemide   Injectable 80 milliGRAM(s) IV Push daily  glucagon  Injectable 1 milliGRAM(s) IntraMuscular once PRN  HYDROmorphone  Injectable 1 milliGRAM(s) IV Push every 4 hours PRN  insulin lispro (HumaLOG) corrective regimen sliding scale   SubCutaneous three times a day before meals  levothyroxine Injectable 25 MICROGram(s) IV Push daily  lidocaine 1% Injectable 10 milliLiter(s) Local Injection once  pantoprazole  Injectable 40 milliGRAM(s) IV Push two times a day  piperacillin/tazobactam IVPB. 3.375 Gram(s) IV Intermittent every 12 hours  potassium chloride   Powder 40 milliEquivalent(s) Oral every 4 hours  potassium phosphate IVPB 30 milliMole(s) IV Intermittent once  pregabalin 50 milliGRAM(s) Oral two times a day  propofol Infusion 5 MICROgram(s)/kG/Min IV Continuous <Continuous>  senna 2 Tablet(s) Oral at bedtime  simvastatin 20 milliGRAM(s) Oral at bedtime    Drug Dosing Weight  Height (cm): 170 (13 Dec 2017 17:16)  Weight (kg): 57 (13 Dec 2017 17:16)  BMI (kg/m2): 19.7 (13 Dec 2017 17:16)  BSA (m2): 1.66 (13 Dec 2017 17:16)    CENTRAL LINE: [x] YES [] NO  LOCATION: L Fem  DATE INSERTED:   REMOVE: [] YES [x] NO  EXPLAIN: HD    JACOBSON: [x] YES [] NO    DATE INSERTED:   REMOVE:  [] YES [x] NO  EXPLAIN: IOs    A-LINE: NO    PMH -reviewed admission note, no change since admission  PAST MEDICAL & SURGICAL HISTORY:  Clostridium difficile infection  Osteomyelitis of jaw  Parathyroid adenoma  Hypothyroidism  CKD (chronic kidney disease)  Anemia  CHF (congestive heart failure)  Diabetic neuropathy  Diabetes mellitus  HTN (hypertension)  S/P :   No Past Surgical History      ICU Vital Signs Last 24 Hrs  T(C): 37 (18 Dec 2017 04:44), Max: 38.6 (17 Dec 2017 20:53)  T(F): 98.6 (18 Dec 2017 04:44), Max: 101.5 (17 Dec 2017 20:53)  HR: 84 (18 Dec 2017 07:00) (75 - 84)  BP: 135/53 (18 Dec 2017 07:00) (113/45 - 137/58)  BP(mean): 72 (18 Dec 2017 07:00) (62 - 76)  ABP: --  ABP(mean): --  RR: 20 (18 Dec 2017 07:00) (15 - 22)  SpO2: 99% (18 Dec 2017 07:00) (89% - 100%)      ABG - ( 18 Dec 2017 04:58 )  pH: 7.49  /  pCO2: 39    /  pO2: 60    / HCO3: 30    / Base Excess: 6.0   /  SaO2: 90                     @ 07:01  -   @ 07:00  --------------------------------------------------------  IN: 1404.9 mL / OUT: 70 mL / NET: 1334.9 mL        Mode: AC/ CMV (Assist Control/ Continuous Mandatory Ventilation)  RR (machine): 15  TV (machine): 400  FiO2: 80  PEEP: 5  ITime: 1  MAP: 10  PIP: 26      PHYSICAL EXAM:    GENERAL: [x]NAD, []well-groomed, []well-developed  HEAD:  [x]Atraumatic, []Normocephalic  EYES: sluggish pupillary response bilaterally, conjunctiva and sclera clear  ENMT: [x]No tonsillar erythema, exudates, or enlargement; []Moist mucous membranes, []Good dentition, []No lesions  NECK: [x]Supple, normal appearance, []No JVD; []Normal thyroid; []Trachea midline  NERVOUS SYSTEM: sedated, nonverbal, noncommunicative  CHEST/LUNG: [x]No chest deformity; []Normal percussion bilaterally; []No rales, rhonchi, wheezing   HEART: [x]Regular rate and rhythm; []No murmurs, rubs, or gallops  ABDOMEN: [x]Soft, Nontender, Nondistended; []Bowel sounds present  EXTREMITIES:  [x]2+ Peripheral Pulses, []No clubbing, cyanosis, or edema  LYMPH: [x]No lymphadenopathy noted  SKIN: [x]No rashes or lesions; []Good capillary refill      LABS:  CBC Full  -  ( 17 Dec 2017 06:50 )  WBC Count : 3.4 K/uL  Hemoglobin : 7.7 g/dL  Hematocrit : 24.6 %  Platelet Count - Automated : 90 K/uL  Mean Cell Volume : 92.3 fl  Mean Cell Hemoglobin : 29.0 pg  Mean Cell Hemoglobin Concentration : 31.5 gm/dL  Auto Neutrophil # : 2.7 K/uL  Auto Lymphocyte # : 0.5 K/uL  Auto Monocyte # : 0.2 K/uL  Auto Eosinophil # : 0.0 K/uL  Auto Basophil # : 0.0 K/uL  Auto Neutrophil % : 78.2 %  Auto Lymphocyte % : 13.7 %  Auto Monocyte % : 6.1 %  Auto Eosinophil % : 1.2 %  Auto Basophil % : 0.7 %        138  |  96  |  59<H>  ----------------------------<  109<H>  2.7<LL>   |  30  |  4.54<H>    Ca    7.8<L>      18 Dec 2017 07:06  Phos  2.3       Mg     2.1     18    TPro  5.6<L>  /  Alb  2.0<L>  /  TBili  0.6  /  DBili  x   /  AST  63<H>  /  ALT  28  /  AlkPhos  49  12-17    PT/INR - ( 18 Dec 2017 07:06 )   PT: 11.1 sec;   INR: 1.02 ratio         PTT - ( 18 Dec 2017 07:06 )  PTT:32.9 sec    Culture Results:   No growth to date. ( @ 00:53)  Culture Results:   No growth to date. (12-15 @ 23:09)      RADIOLOGY & ADDITIONAL STUDIES REVIEWED:  YES    []GOALS OF CARE DISCUSSION WITH PATIENT/FAMILY/PROXY:    CRITICAL CARE TIME SPENT: 35 minutes    Assessment and Plan:   · Assessment		  50 y/o F from [facility] ( long term care 2/2 diffculty walking possibly 2/2 polyneuropathy) w/ PMHx of anemia, CHF( diastolic dysfunction), CKD, Clostridium difficile infection, DM, Diabetic neuropathy, HTN, HLD, Hypothyroidism, Hypoparathyroidism, Osteomyelitis of jaw, and Parathyroid adenoma, GERD p/w c/o SOB and difficulty breathing x1 week. Pt also reports associated mild cough. Pt denies fever, chills, general pain, C/P, or any other complaints. NO  In ED pt is Awake , responds to pain and verbal stimuli, She in moderate respiratory distress. placed on BIPAP. ICU consulted for new BIPAP.    Admit to ICU for Acute Hypoxic and hypercapnic respiratory failure require new Bipap  2/2 Exacerbation of CHF or possibly pneumonia      Problem/Plan - 1:  ·  Problem: Acute respiratory failure.  Plan: Inubated . Pt was hypoxic on BiPAP,  failed NIPPV support  Acute respiratory failure secondary to HAP (elevated procalcitonin) and fluid overload  -Continue with Lasix 80 Daily  -CXR improved with dialysis and now off pressors.   -continue vent support  -trial SAT/ SBT after dialysis today.  -on Zosyn on   - c/w nebs   -started on new dialysis for CKD and fluid overload.      Problem/Plan - 2:  ·  Problem: R/O Upper GI bleed.  Plan: c/w protonix, no recurrent GI Bleed.  -Hb 9.1-->7.7  - EGD today      Problem/Plan - 3:  ·  Problem: Hypothyroidism.  Plan: c/w home dose LT4 50 mcg daily  TSH wnl.      Problem/Plan - 4:  ·  Problem: CHF (congestive heart failure).  Plan: unknown EF  c/w home cardiac meds   TTE - RV systolic pressure is 49 mm Hg. Mild pulmonary  hypertension.      Problem/Plan - 5:  ·  Problem: CKD (chronic kidney disease).  Plan: getting HD via [facility].      Problem/Plan - 6:  Problem: Diabetes mellitus. Plan: c/w Lantus 10 units daily , HSS   HbA1c 5.2.     Problem/Plan - 7:  ·  Problem: HTN (hypertension).  Plan: c/w home BP meds.      Problem/Plan - 8:  ·  Problem: Prophylactic measure.  Plan: c/w DVT ppx   IMPROVE score - 2.

## 2017-12-18 NOTE — PROGRESS NOTE ADULT - SUBJECTIVE AND OBJECTIVE BOX
CHIEF COMPLAINT:Patient is a 52y old  Female who presents with a chief complaint of SOB (13 Dec 2017 16:01)    	  REVIEW OF SYSTEMS:  CONSTITUTIONAL: No fever, weight loss, or fatigue  EYES: No eye pain, visual disturbances, or discharge  ENMT:  No difficulty hearing, tinnitus, vertigo; No sinus or throat pain  NECK: No pain or stiffness  RESPIRATORY: No cough, wheezing, chills or hemoptysis; No Shortness of Breath  CARDIOVASCULAR: No chest pain, palpitations, passing out, dizziness, or leg swelling  GASTROINTESTINAL: No abdominal or epigastric pain. No nausea, vomiting, or hematemesis; No diarrhea or constipation. No melena or hematochezia.  GENITOURINARY: No dysuria, frequency, hematuria, or incontinence  NEUROLOGICAL: No headaches, memory loss, loss of strength, numbness, or tremors  SKIN: No itching, burning, rashes, or lesions   LYMPH Nodes: No enlarged glands  ENDOCRINE: No heat or cold intolerance; No hair loss  MUSCULOSKELETAL: No joint pain or swelling; No muscle, back, or extremity pain  PSYCHIATRIC: No depression, anxiety, mood swings, or difficulty sleeping  HEME/LYMPH: No easy bruising, or bleeding gums  ALLERY AND IMMUNOLOGIC: No hives or eczema	    [ ] All others negative	  [ ] Unable to obtain    PHYSICAL EXAM:  T(C): 36.7 (12-18-17 @ 21:08), Max: 38.5 (12-18-17 @ 16:25)  HR: 83 (12-18-17 @ 23:00) (77 - 91)  BP: 127/56 (12-18-17 @ 23:00) (99/53 - 149/78)  RR: 16 (12-18-17 @ 23:00) (7 - 21)  SpO2: 100% (12-18-17 @ 23:00) (89% - 100%)  Wt(kg): --  I&O's Summary    17 Dec 2017 07:01  -  18 Dec 2017 07:00  --------------------------------------------------------  IN: 1404.9 mL / OUT: 70 mL / NET: 1334.9 mL    18 Dec 2017 07:01  -  18 Dec 2017 23:40  --------------------------------------------------------  IN: 63.6 mL / OUT: 25 mL / NET: 38.6 mL        Appearance: Normal	  HEENT:   Normal oral mucosa, PERRL, EOMI	  Lymphatic: No lymphadenopathy  Cardiovascular: Normal S1 S2, No JVD, No murmurs, No edema  Respiratory: Lungs clear to auscultation	  Psychiatry: A & O x 3, Mood & affect appropriate  Gastrointestinal:  Soft, Non-tender, + BS	  Skin: No rashes, No ecchymoses, No cyanosis	  Neurologic: Non-focal  Extremities: Normal range of motion, No clubbing, cyanosis or edema  Vascular: Peripheral pulses palpable 2+ bilaterally    MEDICATIONS  (STANDING):  ascorbic acid 500 milliGRAM(s) Oral daily  aspirin  chewable 81 milliGRAM(s) Oral daily  cinacalcet 30 milliGRAM(s) Oral daily  dextrose 5%. 1000 milliLiter(s) (50 mL/Hr) IV Continuous <Continuous>  dextrose 50% Injectable 12.5 Gram(s) IV Push once  dextrose 50% Injectable 25 Gram(s) IV Push once  dextrose 50% Injectable 25 Gram(s) IV Push once  ferrous    sulfate Liquid 300 milliGRAM(s) Enteral Tube daily  folic acid 1 milliGRAM(s) Oral daily  furosemide   Injectable 80 milliGRAM(s) IV Push daily  insulin lispro (HumaLOG) corrective regimen sliding scale   SubCutaneous three times a day before meals  levothyroxine Injectable 25 MICROGram(s) IV Push daily  lidocaine 1% Injectable 10 milliLiter(s) Local Injection once  pantoprazole  Injectable 40 milliGRAM(s) IV Push daily  piperacillin/tazobactam IVPB. 3.375 Gram(s) IV Intermittent every 12 hours  pregabalin 50 milliGRAM(s) Oral two times a day  propofol Infusion 5 MICROgram(s)/kG/Min (1.71 mL/Hr) IV Continuous <Continuous>  senna 2 Tablet(s) Oral at bedtime  simvastatin 20 milliGRAM(s) Oral at bedtime      TELEMETRY: 	    ECG:  	  RADIOLOGY:  OTHER: 	  	  CBC Full  -  ( 18 Dec 2017 07:06 )  WBC Count : 1.8 K/uL  Hemoglobin : 7.2 g/dL  Hematocrit : 23.0 %  Platelet Count - Automated : 72 K/uL  Mean Cell Volume : 90.1 fl  Mean Cell Hemoglobin : 28.3 pg  Mean Cell Hemoglobin Concentration : 31.4 gm/dL  Auto Neutrophil # : x  Auto Lymphocyte # : x  Auto Monocyte # : x  Auto Eosinophil # : x  Auto Basophil # : x  Auto Neutrophil % : x  Auto Lymphocyte % : x  Auto Monocyte % : x  Auto Eosinophil % : x  Auto Basophil % : x        CARDIAC MARKERS:                              7.2    1.8   )-----------( 72       ( 18 Dec 2017 07:06 )             23.0       12-18    145  |  104  |  24<H>  ----------------------------<  107<H>  3.9   |  35<H>  |  2.16<H>    Ca    8.1<L>      18 Dec 2017 18:58  Phos  2.3     12-18  Mg     2.1     12-18    TPro  5.6<L>  /  Alb  2.0<L>  /  TBili  0.6  /  DBili  x   /  AST  63<H>  /  ALT  28  /  AlkPhos  49  12-17      PT/INR - ( 18 Dec 2017 07:06 )   PT: 11.1 sec;   INR: 1.02 ratio         PTT - ( 18 Dec 2017 07:06 )  PTT:32.9 sec      proBNP: Serum Pro-Brain Natriuretic Peptide: 43229 pg/mL (12-13 @ 12:30)    Lipid Profile: Cholesterol 53  LDL -13  HDL 38      HgA1c: Hemoglobin A1C, Whole Blood: 5.2 % (12-14 @ 13:29)    TSH: Thyroid Stimulating Hormone, Serum: 0.50 uU/mL (12-14 @ 06:26)    ABG - ( 18 Dec 2017 14:13 )  pH: 7.49  /  pCO2: 37    /  pO2: 67    / HCO3: 28    / Base Excess: 4.3   /  SaO2: 94

## 2017-12-18 NOTE — PROGRESS NOTE ADULT - ASSESSMENT
50 y/o F from [facility] ( long term care 2/2 diffculty walking possibly 2/2 polyneuropathy) w/ PMHx of anemia, CHF( diastolic dysfunction), CKD, Clostridium difficile infection, DM, Diabetic neuropathy, HTN, HLD, Hypothyroidism, Hypoparathyroidism, Osteomyelitis of jaw, and Parathyroid adenoma, GERD p/w c/o SOB and difficulty breathing x1 week. Pt also reports associated mild cough. Pt denies fever, chills, general pain, C/P, or any other complaints. NO  In ED pt is Awake , responds to pain and verbal stimuli, She in moderate respiratory distress. placed on BIPAP. ICU consulted for new BIPAP.    Admit to ICU for Acute Hypoxic and hypercapnic respiratory failure require new Bipap  2/2 Exacerbation of CHF or possibly pneumonia

## 2017-12-18 NOTE — PROGRESS NOTE ADULT - ATTENDING COMMENTS
Patient is seen and examined. Case reviewed with the medical team. Above note is appreciated. Will follow up clinically. Continue DVT prophylaxis. Case discussed with ICU and pulmonary.

## 2017-12-18 NOTE — PROGRESS NOTE ADULT - ASSESSMENT
52 y/o F from [facility] ( long term care 2/2 diffculty walking possibly 2/2 polyneuropathy) w/ PMHx of anemia, CHF( diastolic dysfunction), CKD, Clostridium difficile infection, DM, Diabetic neuropathy, HTN, HLD, Hypothyroidism, Hypoparathyroidism, Osteomyelitis of jaw, and Parathyroid adenoma, GERD p/w c/o SOB and difficulty breathing x1 week. Pt also reports associated mild cough. Pt denies fever, chills, general pain, C/P, or any other complaints. NO  In ED pt is Awake , responds to pain and verbal stimuli, She in moderate respiratory distress. placed on BIPAP. ICU consulted for new BIPAP.    Admit to ICU for Acute Hypoxic and hypercapnic respiratory failure require new Bipap  2/2 Exacerbation of CHF or possibly pneumonia

## 2017-12-18 NOTE — PROGRESS NOTE ADULT - ASSESSMENT
52 yr old female with progressive CKD V admitted with decompesated CHF sec to CKD V. On antibiotics for healthcare associated PNA.   s/p HD Thursday & yesterday.  s/p intubation. fluid overloaded.  pancytopenia  anemia of CKD.    RECOMMENDATION:  last HD Saturday.  will arrange for hd today as well   with aggressive ultrafiltration depending on time for endoscopy. otherwise, H early in AM.  vent care per MICU

## 2017-12-18 NOTE — PROGRESS NOTE ADULT - SUBJECTIVE AND OBJECTIVE BOX
CHIEF COMPLAINT:Patient is a 52y old  Female who presents with a chief complaint of SOB (13 Dec 2017 16:01)    	  REVIEW OF SYSTEMS:  CONSTITUTIONAL: No fever, weight loss, or fatigue  EYES: No eye pain, visual disturbances, or discharge  ENMT:  No difficulty hearing, tinnitus, vertigo; No sinus or throat pain  NECK: No pain or stiffness  RESPIRATORY: No cough, wheezing, chills or hemoptysis; No Shortness of Breath  CARDIOVASCULAR: No chest pain, palpitations, passing out, dizziness, or leg swelling  GASTROINTESTINAL: No abdominal or epigastric pain. No nausea, vomiting, or hematemesis; No diarrhea or constipation. No melena or hematochezia.  GENITOURINARY: No dysuria, frequency, hematuria, or incontinence  NEUROLOGICAL: No headaches, memory loss, loss of strength, numbness, or tremors  SKIN: No itching, burning, rashes, or lesions   LYMPH Nodes: No enlarged glands  ENDOCRINE: No heat or cold intolerance; No hair loss  MUSCULOSKELETAL: No joint pain or swelling; No muscle, back, or extremity pain  PSYCHIATRIC: No depression, anxiety, mood swings, or difficulty sleeping  HEME/LYMPH: No easy bruising, or bleeding gums  ALLERY AND IMMUNOLOGIC: No hives or eczema	    [ ] All others negative	  [ ] Unable to obtain    PHYSICAL EXAM:  T(C): 36.3 (12-18-17 @ 00:09), Max: 38.6 (12-17-17 @ 20:53)  HR: 78 (12-18-17 @ 00:04) (75 - 83)  BP: 133/48 (12-18-17 @ 00:00) (117/52 - 149/56)  RR: 19 (12-18-17 @ 00:00) (15 - 22)  SpO2: 96% (12-18-17 @ 00:04) (94% - 100%)  Wt(kg): --  I&O's Summary    16 Dec 2017 07:01  -  17 Dec 2017 07:00  --------------------------------------------------------  IN: 1403 mL / OUT: 70 mL / NET: 1333 mL    17 Dec 2017 07:01  -  18 Dec 2017 00:41  --------------------------------------------------------  IN: 1270.9 mL / OUT: 60 mL / NET: 1210.9 mL        Appearance: Normal	  HEENT:   Normal oral mucosa, PERRL, EOMI	  Lymphatic: No lymphadenopathy  Cardiovascular: Normal S1 S2, No JVD, No murmurs, No edema  Respiratory: Lungs clear to auscultation	  Psychiatry: A & O x 3, Mood & affect appropriate  Gastrointestinal:  Soft, Non-tender, + BS	  Skin: No rashes, No ecchymoses, No cyanosis	  Neurologic: Non-focal  Extremities: Normal range of motion, No clubbing, cyanosis or edema  Vascular: Peripheral pulses palpable 2+ bilaterally    MEDICATIONS  (STANDING):  ascorbic acid 500 milliGRAM(s) Oral daily  aspirin  chewable 81 milliGRAM(s) Oral daily  cinacalcet 30 milliGRAM(s) Oral daily  dextrose 5%. 1000 milliLiter(s) (50 mL/Hr) IV Continuous <Continuous>  dextrose 50% Injectable 12.5 Gram(s) IV Push once  dextrose 50% Injectable 25 Gram(s) IV Push once  dextrose 50% Injectable 25 Gram(s) IV Push once  docusate sodium 100 milliGRAM(s) Oral three times a day  ferrous    sulfate Liquid 300 milliGRAM(s) Enteral Tube daily  folic acid 1 milliGRAM(s) Oral daily  furosemide   Injectable 80 milliGRAM(s) IV Push daily  insulin lispro (HumaLOG) corrective regimen sliding scale   SubCutaneous three times a day before meals  levothyroxine 50 MICROGram(s) Oral daily  lidocaine 1% Injectable 10 milliLiter(s) Local Injection once  norepinephrine Infusion 0.5 MICROgram(s)/kG/Min (26.719 mL/Hr) IV Continuous <Continuous>  pantoprazole  Injectable 40 milliGRAM(s) IV Push two times a day  piperacillin/tazobactam IVPB. 3.375 Gram(s) IV Intermittent every 12 hours  pregabalin 50 milliGRAM(s) Oral two times a day  propofol Infusion 5 MICROgram(s)/kG/Min (1.71 mL/Hr) IV Continuous <Continuous>  senna 2 Tablet(s) Oral at bedtime  simvastatin 20 milliGRAM(s) Oral at bedtime      TELEMETRY: 	    ECG:  	  RADIOLOGY:  OTHER: 	  	  CBC Full  -  ( 17 Dec 2017 06:50 )  WBC Count : 3.4 K/uL  Hemoglobin : 7.7 g/dL  Hematocrit : 24.6 %  Platelet Count - Automated : 90 K/uL  Mean Cell Volume : 92.3 fl  Mean Cell Hemoglobin : 29.0 pg  Mean Cell Hemoglobin Concentration : 31.5 gm/dL  Auto Neutrophil # : 2.7 K/uL  Auto Lymphocyte # : 0.5 K/uL  Auto Monocyte # : 0.2 K/uL  Auto Eosinophil # : 0.0 K/uL  Auto Basophil # : 0.0 K/uL  Auto Neutrophil % : 78.2 %  Auto Lymphocyte % : 13.7 %  Auto Monocyte % : 6.1 %  Auto Eosinophil % : 1.2 %  Auto Basophil % : 0.7 %        CARDIAC MARKERS:                              7.7    3.4   )-----------( 90       ( 17 Dec 2017 06:50 )             24.6       12-17    140  |  97  |  43<H>  ----------------------------<  177<H>  2.8<LL>   |  31  |  3.46<H>    Ca    7.6<L>      17 Dec 2017 06:50  Phos  2.3     12-17  Mg     2.1     12-17    TPro  5.6<L>  /  Alb  2.0<L>  /  TBili  0.6  /  DBili  x   /  AST  63<H>  /  ALT  28  /  AlkPhos  49  12-17            proBNP: Serum Pro-Brain Natriuretic Peptide: 10703 pg/mL (12-13 @ 12:30)    Lipid Profile: Cholesterol 53  LDL -13  HDL 38      HgA1c: Hemoglobin A1C, Whole Blood: 5.2 % (12-14 @ 13:29)    TSH: Thyroid Stimulating Hormone, Serum: 0.50 uU/mL (12-14 @ 06:26)    ABG - ( 16 Dec 2017 10:21 )  pH: 7.34  /  pCO2: 52    /  pO2: 92    / HCO3: 27    / Base Excess: 1.8   /  SaO2: 94

## 2017-12-18 NOTE — PROGRESS NOTE ADULT - PROBLEM SELECTOR PLAN 1
ventilator support  F/u ABGs  Bronchodilators neb  Aspiration precautions  Pulmonary toilet  Nutrition  Follow up CXR ventilator support  F/u ABGs  Bronchodilators neb  Aspiration precautions  Pulmonary toilet  Nutrition  Follow up CXR  Decubitus prevention

## 2017-12-18 NOTE — PROGRESS NOTE ADULT - SUBJECTIVE AND OBJECTIVE BOX
Patient is a 52y old  Female who presents with a chief complaint of SOB. PMHx of anemia, CHF( diastolic dysfunction), CKD, Clostridium difficile infection, DM, Diabetic neuropathy, HTN, HLD, Hypothyroidism, Hypoparathyroidism, Osteomyelitis of jaw, and Parathyroid adenoma, GERD p/w c/o SOB and difficulty breathing x1 week. Pt also reports associated mild cough.      INTERVAL HPI/OVERNIGHT EVENTS:      VITAL SIGNS:  T(F): 98.6 (12-18-17 @ 04:44)  HR: 82 (12-18-17 @ 08:48)  BP: 132/55 (12-18-17 @ 08:00)  RR: 16 (12-18-17 @ 08:00)  SpO2: 100% (12-18-17 @ 08:48)  Wt(kg): --  I&O's Detail    17 Dec 2017 07:01  -  18 Dec 2017 07:00  --------------------------------------------------------  IN:    Enteral Tube Flush: 150 mL    Enteral Tube Flush: 120 mL    Nepro: 640 mL    norepinephrine Infusion: 2 mL    propofol Infusion: 380.4 mL    Solution: 112.5 mL  Total IN: 1404.9 mL    OUT:    Indwelling Catheter - Urethral: 70 mL  Total OUT: 70 mL    Total NET: 1334.9 mL        Mode: AC/ CMV (Assist Control/ Continuous Mandatory Ventilation)  RR (machine): 15  TV (machine): 400  FiO2: 100  PEEP: 10  ITime: 1  MAP: 17  PIP: 33        REVIEW OF SYSTEMS:    CONSTITUTIONAL:  No fevers, chills, sweats    HEENT:  Eyes:  No diplopia or blurred vision. ENT:  No earache, sore throat or runny nose.    CARDIOVASCULAR:  No pressure, squeezing, tightness, or heaviness about the chest; no palpitations.    RESPIRATORY:  Per HPI    GASTROINTESTINAL:  No abdominal pain, nausea, vomiting or diarrhea.    GENITOURINARY:  No dysuria, frequency or urgency.    NEUROLOGIC:  No paresthesias, fasciculations, seizures or weakness.    PSYCHIATRIC:  No disorder of thought or mood.      PHYSICAL EXAM:    Constitutional: Well developed and nourished  Eyes:Perrla  ENMT: normal  Neck:supple  Respiratory: good air entry  Cardiovascular: S1 S2 regular  Gastrointestinal: Soft, Non tender  Extremities: No edema  Vascular:normal  Neurological:Awake, alert,Ox3  Musculoskeletal:Normal      MEDICATIONS  (STANDING):  ascorbic acid 500 milliGRAM(s) Oral daily  aspirin  chewable 81 milliGRAM(s) Oral daily  cinacalcet 30 milliGRAM(s) Oral daily  dextrose 5%. 1000 milliLiter(s) (50 mL/Hr) IV Continuous <Continuous>  dextrose 50% Injectable 12.5 Gram(s) IV Push once  dextrose 50% Injectable 25 Gram(s) IV Push once  dextrose 50% Injectable 25 Gram(s) IV Push once  docusate sodium 100 milliGRAM(s) Oral three times a day  ferrous    sulfate Liquid 300 milliGRAM(s) Enteral Tube daily  folic acid 1 milliGRAM(s) Oral daily  furosemide   Injectable 80 milliGRAM(s) IV Push daily  insulin lispro (HumaLOG) corrective regimen sliding scale   SubCutaneous three times a day before meals  levothyroxine Injectable 25 MICROGram(s) IV Push daily  lidocaine 1% Injectable 10 milliLiter(s) Local Injection once  pantoprazole  Injectable 40 milliGRAM(s) IV Push two times a day  piperacillin/tazobactam IVPB. 3.375 Gram(s) IV Intermittent every 12 hours  potassium chloride   Powder 40 milliEquivalent(s) Oral every 4 hours  pregabalin 50 milliGRAM(s) Oral two times a day  propofol Infusion 5 MICROgram(s)/kG/Min (1.71 mL/Hr) IV Continuous <Continuous>  senna 2 Tablet(s) Oral at bedtime  simvastatin 20 milliGRAM(s) Oral at bedtime    MEDICATIONS  (PRN):  acetaminophen   Tablet 650 milliGRAM(s) Oral every 6 hours PRN For Temp greater than 38 C (100.4 F)  acetaminophen  Suppository 650 milliGRAM(s) Rectal every 6 hours PRN For Temp greater than 38 C (100.4 F)  dextrose Gel 1 Dose(s) Oral once PRN Blood Glucose LESS THAN 70 milliGRAM(s)/deciliter  glucagon  Injectable 1 milliGRAM(s) IntraMuscular once PRN Glucose LESS THAN 70 milligrams/deciliter  HYDROmorphone  Injectable 1 milliGRAM(s) IV Push every 4 hours PRN Severe Pain (7 - 10)      Allergies    No Known Allergies    Intolerances        LABS:                        7.2    1.8   )-----------( x        ( 18 Dec 2017 07:06 )             23.0     12-18    138  |  96  |  59<H>  ----------------------------<  109<H>  2.7<LL>   |  30  |  4.54<H>    Ca    7.8<L>      18 Dec 2017 07:06  Phos  2.3     12-18  Mg     2.1     12-18    TPro  5.6<L>  /  Alb  2.0<L>  /  TBili  0.6  /  DBili  x   /  AST  63<H>  /  ALT  28  /  AlkPhos  49  12-17    PT/INR - ( 18 Dec 2017 07:06 )   PT: 11.1 sec;   INR: 1.02 ratio         PTT - ( 18 Dec 2017 07:06 )  PTT:32.9 sec    ABG - ( 18 Dec 2017 04:58 )  pH: 7.49  /  pCO2: 39    /  pO2: 60    / HCO3: 30    / Base Excess: 6.0   /  SaO2: 90                    CAPILLARY BLOOD GLUCOSE      POCT Blood Glucose.: 124 mg/dL (18 Dec 2017 05:04)  POCT Blood Glucose.: 194 mg/dL (17 Dec 2017 23:37)  POCT Blood Glucose.: 259 mg/dL (17 Dec 2017 17:25)    pro-bnp [id number] 12-13 @ 12:30     d-dimer --  12-13 @ 12:30      RADIOLOGY & ADDITIONAL TESTS:    CXR:  < from: Xray Chest 1 View AP -PORTABLE-Routine (12.17.17 @ 11:11) >  The lungs are clearer and there is no evidence of pneumothorax nor pleural   effusion.    < end of copied text >    Ct scan chest:    ekg;  < from: 12 Lead ECG (12.13.17 @ 11:32) >  Diagnosis Line Sinus rhythm with 1st degree A-V block  Low voltage QRS  Borderline ECG    < end of copied text >    echo:  < from: Transthoracic Echocardiogram (12.14.17 @ 07:15) >  CONCLUSIONS:  1. Mitral annular calcification. Trace mitral  regurgitation.  2. Mildly dilated left atrium.  LA volume index = 39 cc/m2.  3. Severe concentric left ventricular hypertrophy.  4. Endocardium not well visualized; grossly low normal left  ventricular function.  Moderate diastolic (stage II)  dysfunction.  5. Normal right ventricular size and function.  6. RV systolic pressure is 49 mm Hg. Mild pulmonary  hypertension.  7. Small pericardial effusion measuring 0.7 cm at its  largest dimension when pooled posterior to the LV.   No  echocardiographic evidence of pericardial tamponade.    < end of copied text > Patient is a 52y old  Female who presents with a chief complaint of SOB. PMHx of anemia, CHF( diastolic dysfunction), CKD, Clostridium difficile infection, DM, Diabetic neuropathy, HTN, HLD, Hypothyroidism, Hypoparathyroidism, Osteomyelitis of jaw, and Parathyroid adenoma, GERD p/w c/o SOB and difficulty breathing x1 week. Pt also reports associated mild cough.  Currently intubated, sedated on mechanical ventilation but in NAD. Off pressors meds.    INTERVAL HPI/OVERNIGHT EVENTS:      VITAL SIGNS:  T(F): 98.6 (12-18-17 @ 04:44)  HR: 82 (12-18-17 @ 08:48)  BP: 132/55 (12-18-17 @ 08:00)  RR: 16 (12-18-17 @ 08:00)  SpO2: 100% (12-18-17 @ 08:48)  Wt(kg): --  I&O's Detail    17 Dec 2017 07:01  -  18 Dec 2017 07:00  --------------------------------------------------------  IN:    Enteral Tube Flush: 150 mL    Enteral Tube Flush: 120 mL    Nepro: 640 mL    norepinephrine Infusion: 2 mL    propofol Infusion: 380.4 mL    Solution: 112.5 mL  Total IN: 1404.9 mL    OUT:    Indwelling Catheter - Urethral: 70 mL  Total OUT: 70 mL    Total NET: 1334.9 mL        Mode: AC/ CMV (Assist Control/ Continuous Mandatory Ventilation)  RR (machine): 15  TV (machine): 400  FiO2: 100  PEEP: 10  ITime: 1  MAP: 17  PIP: 33        REVIEW OF SYSTEMS:    CONSTITUTIONAL:  No fevers, chills, sweats    HEENT:  Eyes:  No diplopia or blurred vision. ENT:  No earache, sore throat or runny nose.    CARDIOVASCULAR:  No pressure, squeezing, tightness, or heaviness about the chest; no palpitations.    RESPIRATORY:  Per HPI    GASTROINTESTINAL:  No abdominal pain, nausea, vomiting or diarrhea.    GENITOURINARY:  No dysuria, frequency or urgency.    NEUROLOGIC:  No paresthesias, fasciculations, seizures or weakness.    PSYCHIATRIC:  No disorder of thought or mood.      PHYSICAL EXAM:    Constitutional: Well developed and nourished  Eyes:Perrla  ENMT: normal  Neck:supple  Respiratory: good air entry  Cardiovascular: S1 S2 regular  Gastrointestinal: Soft, Non tender  Extremities: No edema  Vascular:normal  Neurological:Sedated  Musculoskeletal:Normal      MEDICATIONS  (STANDING):  ascorbic acid 500 milliGRAM(s) Oral daily  aspirin  chewable 81 milliGRAM(s) Oral daily  cinacalcet 30 milliGRAM(s) Oral daily  dextrose 5%. 1000 milliLiter(s) (50 mL/Hr) IV Continuous <Continuous>  dextrose 50% Injectable 12.5 Gram(s) IV Push once  dextrose 50% Injectable 25 Gram(s) IV Push once  dextrose 50% Injectable 25 Gram(s) IV Push once  docusate sodium 100 milliGRAM(s) Oral three times a day  ferrous    sulfate Liquid 300 milliGRAM(s) Enteral Tube daily  folic acid 1 milliGRAM(s) Oral daily  furosemide   Injectable 80 milliGRAM(s) IV Push daily  insulin lispro (HumaLOG) corrective regimen sliding scale   SubCutaneous three times a day before meals  levothyroxine Injectable 25 MICROGram(s) IV Push daily  lidocaine 1% Injectable 10 milliLiter(s) Local Injection once  pantoprazole  Injectable 40 milliGRAM(s) IV Push two times a day  piperacillin/tazobactam IVPB. 3.375 Gram(s) IV Intermittent every 12 hours  potassium chloride   Powder 40 milliEquivalent(s) Oral every 4 hours  pregabalin 50 milliGRAM(s) Oral two times a day  propofol Infusion 5 MICROgram(s)/kG/Min (1.71 mL/Hr) IV Continuous <Continuous>  senna 2 Tablet(s) Oral at bedtime  simvastatin 20 milliGRAM(s) Oral at bedtime    MEDICATIONS  (PRN):  acetaminophen   Tablet 650 milliGRAM(s) Oral every 6 hours PRN For Temp greater than 38 C (100.4 F)  acetaminophen  Suppository 650 milliGRAM(s) Rectal every 6 hours PRN For Temp greater than 38 C (100.4 F)  dextrose Gel 1 Dose(s) Oral once PRN Blood Glucose LESS THAN 70 milliGRAM(s)/deciliter  glucagon  Injectable 1 milliGRAM(s) IntraMuscular once PRN Glucose LESS THAN 70 milligrams/deciliter  HYDROmorphone  Injectable 1 milliGRAM(s) IV Push every 4 hours PRN Severe Pain (7 - 10)      Allergies    No Known Allergies    Intolerances        LABS:                        7.2    1.8   )-----------( x        ( 18 Dec 2017 07:06 )             23.0     12-18    138  |  96  |  59<H>  ----------------------------<  109<H>  2.7<LL>   |  30  |  4.54<H>    Ca    7.8<L>      18 Dec 2017 07:06  Phos  2.3     12-18  Mg     2.1     12-18    TPro  5.6<L>  /  Alb  2.0<L>  /  TBili  0.6  /  DBili  x   /  AST  63<H>  /  ALT  28  /  AlkPhos  49  12-17    PT/INR - ( 18 Dec 2017 07:06 )   PT: 11.1 sec;   INR: 1.02 ratio         PTT - ( 18 Dec 2017 07:06 )  PTT:32.9 sec    ABG - ( 18 Dec 2017 04:58 )  pH: 7.49  /  pCO2: 39    /  pO2: 60    / HCO3: 30    / Base Excess: 6.0   /  SaO2: 90                    CAPILLARY BLOOD GLUCOSE      POCT Blood Glucose.: 124 mg/dL (18 Dec 2017 05:04)  POCT Blood Glucose.: 194 mg/dL (17 Dec 2017 23:37)  POCT Blood Glucose.: 259 mg/dL (17 Dec 2017 17:25)    pro-bnp [id number] 12-13 @ 12:30     d-dimer --  12-13 @ 12:30      RADIOLOGY & ADDITIONAL TESTS:    CXR:  < from: Xray Chest 1 View AP -PORTABLE-Routine (12.17.17 @ 11:11) >  The lungs are clearer and there is no evidence of pneumothorax nor pleural   effusion.    < end of copied text >    Ct scan chest:    ekg;  < from: 12 Lead ECG (12.13.17 @ 11:32) >  Diagnosis Line Sinus rhythm with 1st degree A-V block  Low voltage QRS  Borderline ECG    < end of copied text >    echo:  < from: Transthoracic Echocardiogram (12.14.17 @ 07:15) >  CONCLUSIONS:  1. Mitral annular calcification. Trace mitral  regurgitation.  2. Mildly dilated left atrium.  LA volume index = 39 cc/m2.  3. Severe concentric left ventricular hypertrophy.  4. Endocardium not well visualized; grossly low normal left  ventricular function.  Moderate diastolic (stage II)  dysfunction.  5. Normal right ventricular size and function.  6. RV systolic pressure is 49 mm Hg. Mild pulmonary  hypertension.  7. Small pericardial effusion measuring 0.7 cm at its  largest dimension when pooled posterior to the LV.   No  echocardiographic evidence of pericardial tamponade.    < end of copied text >

## 2017-12-18 NOTE — CHART NOTE - NSCHARTNOTEFT_GEN_A_CORE
EGD was done at 4:30pm by , showed duodenal ulcer, no active bleeding, gastritis and esophagitis.  f/u biopsy report  monitor CBC  c/w protonix 40mg, daily

## 2017-12-18 NOTE — PROGRESS NOTE ADULT - SUBJECTIVE AND OBJECTIVE BOX
events noted, pt with episodes of melena.  remains intubated with fluid overload.    No Known Allergies    Hospital Medications:   MEDICATIONS  (STANDING):  ascorbic acid 500 milliGRAM(s) Oral daily  aspirin  chewable 81 milliGRAM(s) Oral daily  cinacalcet 30 milliGRAM(s) Oral daily  dextrose 5%. 1000 milliLiter(s) (50 mL/Hr) IV Continuous <Continuous>  dextrose 50% Injectable 12.5 Gram(s) IV Push once  dextrose 50% Injectable 25 Gram(s) IV Push once  dextrose 50% Injectable 25 Gram(s) IV Push once  epoetin jacqueline Injectable 16746 Unit(s) IV Push once  ferrous    sulfate Liquid 300 milliGRAM(s) Enteral Tube daily  folic acid 1 milliGRAM(s) Oral daily  furosemide   Injectable 80 milliGRAM(s) IV Push daily  insulin lispro (HumaLOG) corrective regimen sliding scale   SubCutaneous three times a day before meals  levothyroxine Injectable 25 MICROGram(s) IV Push daily  lidocaine 1% Injectable 10 milliLiter(s) Local Injection once  pantoprazole  Injectable 40 milliGRAM(s) IV Push two times a day  piperacillin/tazobactam IVPB. 3.375 Gram(s) IV Intermittent every 12 hours  pregabalin 50 milliGRAM(s) Oral two times a day  propofol Infusion 5 MICROgram(s)/kG/Min (1.71 mL/Hr) IV Continuous <Continuous>  senna 2 Tablet(s) Oral at bedtime  simvastatin 20 milliGRAM(s) Oral at bedtime        VITALS:  T(F): 101.3 (17 @ 16:25), Max: 101.5 (17 @ 20:53)  HR: 86 (17 @ 17:03)  BP: 111/57 (17 @ 16:00)  RR: 7 (17 @ 16:00)  SpO2: 93% (17 @ 17:03)  Wt(kg): --     @ 07:01  -   @ 07:00  --------------------------------------------------------  IN: 1404.9 mL / OUT: 70 mL / NET: 1334.9 mL        PHYSICAL EXAM:  Constitutional: NAD  HEENT: anicteric sclera, oropharynx clear, MMM  Neck: No JVD  Respiratory: bilat rales. rales or ronchi.  Cardiovascular: S1, S2, RRR  Gastrointestinal: BS+, soft, NT/ND  Extremities: No cyanosis or clubbing.  peripheral edema  Neurological: A/O x 3, no focal deficits  Psychiatric: Normal mood, normal affect  : No CVA tenderness. No jarerll.   Vascular Access: rt groin vasc cath.    LABS:      138  |  96  |  59<H>  ----------------------------<  109<H>  2.7<LL>   |  30  |  4.54<H>    Ca    7.8<L>      18 Dec 2017 07:06  Phos  2.3       Mg     2.1         TPro  5.6<L>  /  Alb  2.0<L>  /  TBili  0.6  /  DBili      /  AST  63<H>  /  ALT  28  /  AlkPhos  49      Creatinine Trend: 4.54 <--, 3.46 <--, 4.00 <--, 4.98 <--, 4.67 <--, 6.53 <--, 6.44 <--, 6.44 <--, 6.30 <--                        7.2    1.8   )-----------( 72       ( 18 Dec 2017 07:06 )             23.0     Urine Studies:  Urinalysis Basic - ( 13 Dec 2017 14:12 )    Color: Yellow / Appearance: Slightly Turbid / S.015 / pH:   Gluc:  / Ketone: Negative  / Bili: Negative / Urobili: Negative   Blood:  / Protein: 100 / Nitrite: Negative   Leuk Esterase: Small / RBC: 0-2 /HPF / WBC 3-5 /HPF   Sq Epi:  / Non Sq Epi: Occasional /HPF / Bacteria: Trace /HPF        RADIOLOGY & ADDITIONAL STUDIES:

## 2017-12-19 DIAGNOSIS — J96.01 ACUTE RESPIRATORY FAILURE WITH HYPOXIA: ICD-10-CM

## 2017-12-19 DIAGNOSIS — J18.9 PNEUMONIA, UNSPECIFIED ORGANISM: ICD-10-CM

## 2017-12-19 DIAGNOSIS — N18.6 END STAGE RENAL DISEASE: ICD-10-CM

## 2017-12-19 DIAGNOSIS — I27.20 PULMONARY HYPERTENSION, UNSPECIFIED: ICD-10-CM

## 2017-12-19 DIAGNOSIS — Z51.5 ENCOUNTER FOR PALLIATIVE CARE: ICD-10-CM

## 2017-12-19 DIAGNOSIS — R53.81 OTHER MALAISE: ICD-10-CM

## 2017-12-19 LAB
ANION GAP SERPL CALC-SCNC: 11 MMOL/L — SIGNIFICANT CHANGE UP (ref 5–17)
APTT BLD: 32.5 SEC — SIGNIFICANT CHANGE UP (ref 27.5–37.4)
BASE EXCESS BLDA CALC-SCNC: 6 MMOL/L — HIGH (ref -2–2)
BLOOD GAS COMMENTS ARTERIAL: SIGNIFICANT CHANGE UP
BUN SERPL-MCNC: 49 MG/DL — HIGH (ref 7–18)
CALCIUM SERPL-MCNC: 7.7 MG/DL — LOW (ref 8.4–10.5)
CHLORIDE SERPL-SCNC: 101 MMOL/L — SIGNIFICANT CHANGE UP (ref 96–108)
CO2 SERPL-SCNC: 30 MMOL/L — SIGNIFICANT CHANGE UP (ref 22–31)
CREAT SERPL-MCNC: 4.46 MG/DL — HIGH (ref 0.5–1.3)
GLUCOSE BLDC GLUCOMTR-MCNC: 193 MG/DL — HIGH (ref 70–99)
GLUCOSE BLDC GLUCOMTR-MCNC: 231 MG/DL — HIGH (ref 70–99)
GLUCOSE SERPL-MCNC: 168 MG/DL — HIGH (ref 70–99)
GRAM STN FLD: SIGNIFICANT CHANGE UP
HCO3 BLDA-SCNC: 30 MMOL/L — HIGH (ref 23–27)
HCT VFR BLD CALC: 21.4 % — LOW (ref 34.5–45)
HCT VFR BLD CALC: 21.7 % — LOW (ref 34.5–45)
HCT VFR BLD CALC: 26.4 % — LOW (ref 34.5–45)
HGB BLD-MCNC: 6.8 G/DL — CRITICAL LOW (ref 11.5–15.5)
HGB BLD-MCNC: 6.8 G/DL — CRITICAL LOW (ref 11.5–15.5)
HGB BLD-MCNC: 8.2 G/DL — LOW (ref 11.5–15.5)
HOROWITZ INDEX BLDA+IHG-RTO: 100 — SIGNIFICANT CHANGE UP
INR BLD: 1.13 RATIO — SIGNIFICANT CHANGE UP (ref 0.88–1.16)
MAGNESIUM SERPL-MCNC: 2.2 MG/DL — SIGNIFICANT CHANGE UP (ref 1.6–2.6)
MCHC RBC-ENTMCNC: 27.7 PG — SIGNIFICANT CHANGE UP (ref 27–34)
MCHC RBC-ENTMCNC: 28.7 PG — SIGNIFICANT CHANGE UP (ref 27–34)
MCHC RBC-ENTMCNC: 29.1 PG — SIGNIFICANT CHANGE UP (ref 27–34)
MCHC RBC-ENTMCNC: 30.9 GM/DL — LOW (ref 32–36)
MCHC RBC-ENTMCNC: 31.2 GM/DL — LOW (ref 32–36)
MCHC RBC-ENTMCNC: 31.5 GM/DL — LOW (ref 32–36)
MCV RBC AUTO: 89.7 FL — SIGNIFICANT CHANGE UP (ref 80–100)
MCV RBC AUTO: 92.2 FL — SIGNIFICANT CHANGE UP (ref 80–100)
MCV RBC AUTO: 92.3 FL — SIGNIFICANT CHANGE UP (ref 80–100)
PCO2 BLDA: 40 MMHG — SIGNIFICANT CHANGE UP (ref 32–46)
PH BLDA: 7.48 — HIGH (ref 7.35–7.45)
PHOSPHATE SERPL-MCNC: 2.5 MG/DL — SIGNIFICANT CHANGE UP (ref 2.5–4.5)
PLATELET # BLD AUTO: 64 K/UL — LOW (ref 150–400)
PLATELET # BLD AUTO: 69 K/UL — LOW (ref 150–400)
PLATELET # BLD AUTO: 69 K/UL — LOW (ref 150–400)
PO2 BLDA: 217 MMHG — HIGH (ref 74–108)
POTASSIUM SERPL-MCNC: 3.7 MMOL/L — SIGNIFICANT CHANGE UP (ref 3.5–5.3)
POTASSIUM SERPL-SCNC: 3.7 MMOL/L — SIGNIFICANT CHANGE UP (ref 3.5–5.3)
PROTHROM AB SERPL-ACNC: 12.3 SEC — SIGNIFICANT CHANGE UP (ref 9.8–12.7)
RBC # BLD: 2.32 M/UL — LOW (ref 3.8–5.2)
RBC # BLD: 2.36 M/UL — LOW (ref 3.8–5.2)
RBC # BLD: 2.94 M/UL — LOW (ref 3.8–5.2)
RBC # FLD: 15 % — HIGH (ref 10.3–14.5)
RBC # FLD: 15.6 % — HIGH (ref 10.3–14.5)
RBC # FLD: 15.9 % — HIGH (ref 10.3–14.5)
SAO2 % BLDA: 100 % — HIGH (ref 92–96)
SODIUM SERPL-SCNC: 142 MMOL/L — SIGNIFICANT CHANGE UP (ref 135–145)
SPECIMEN SOURCE: SIGNIFICANT CHANGE UP
WBC # BLD: 4.3 K/UL — SIGNIFICANT CHANGE UP (ref 3.8–10.5)
WBC # BLD: 4.4 K/UL — SIGNIFICANT CHANGE UP (ref 3.8–10.5)
WBC # BLD: 4.9 K/UL — SIGNIFICANT CHANGE UP (ref 3.8–10.5)
WBC # FLD AUTO: 4.3 K/UL — SIGNIFICANT CHANGE UP (ref 3.8–10.5)
WBC # FLD AUTO: 4.4 K/UL — SIGNIFICANT CHANGE UP (ref 3.8–10.5)
WBC # FLD AUTO: 4.9 K/UL — SIGNIFICANT CHANGE UP (ref 3.8–10.5)

## 2017-12-19 PROCEDURE — 99497 ADVNCD CARE PLAN 30 MIN: CPT | Mod: 25

## 2017-12-19 PROCEDURE — 71010: CPT | Mod: 26

## 2017-12-19 PROCEDURE — 99223 1ST HOSP IP/OBS HIGH 75: CPT

## 2017-12-19 RX ORDER — LEVOTHYROXINE SODIUM 125 MCG
50 TABLET ORAL DAILY
Qty: 0 | Refills: 0 | Status: DISCONTINUED | OUTPATIENT
Start: 2017-12-19 | End: 2017-12-31

## 2017-12-19 RX ORDER — INSULIN LISPRO 100/ML
VIAL (ML) SUBCUTANEOUS EVERY 6 HOURS
Qty: 0 | Refills: 0 | Status: DISCONTINUED | OUTPATIENT
Start: 2017-12-19 | End: 2018-01-18

## 2017-12-19 RX ORDER — CEFEPIME 1 G/1
1000 INJECTION, POWDER, FOR SOLUTION INTRAMUSCULAR; INTRAVENOUS EVERY 24 HOURS
Qty: 0 | Refills: 0 | Status: DISCONTINUED | OUTPATIENT
Start: 2017-12-19 | End: 2017-12-30

## 2017-12-19 RX ORDER — FUROSEMIDE 40 MG
60 TABLET ORAL ONCE
Qty: 0 | Refills: 0 | Status: DISCONTINUED | OUTPATIENT
Start: 2017-12-19 | End: 2017-12-19

## 2017-12-19 RX ADMIN — Medication 500 MILLIGRAM(S): at 11:30

## 2017-12-19 RX ADMIN — Medication 50 MILLIGRAM(S): at 05:00

## 2017-12-19 RX ADMIN — SIMVASTATIN 20 MILLIGRAM(S): 20 TABLET, FILM COATED ORAL at 21:43

## 2017-12-19 RX ADMIN — Medication 1: at 16:48

## 2017-12-19 RX ADMIN — CINACALCET 30 MILLIGRAM(S): 30 TABLET, FILM COATED ORAL at 11:32

## 2017-12-19 RX ADMIN — Medication 25 MICROGRAM(S): at 05:00

## 2017-12-19 RX ADMIN — Medication 1 MILLIGRAM(S): at 11:29

## 2017-12-19 RX ADMIN — Medication 2: at 11:28

## 2017-12-19 RX ADMIN — PANTOPRAZOLE SODIUM 40 MILLIGRAM(S): 20 TABLET, DELAYED RELEASE ORAL at 11:30

## 2017-12-19 RX ADMIN — Medication 300 MILLIGRAM(S): at 11:30

## 2017-12-19 RX ADMIN — Medication 80 MILLIGRAM(S): at 05:00

## 2017-12-19 RX ADMIN — Medication 1: at 06:24

## 2017-12-19 RX ADMIN — Medication 81 MILLIGRAM(S): at 11:29

## 2017-12-19 RX ADMIN — PIPERACILLIN AND TAZOBACTAM 12.5 GRAM(S): 4; .5 INJECTION, POWDER, LYOPHILIZED, FOR SOLUTION INTRAVENOUS at 05:00

## 2017-12-19 RX ADMIN — PROPOFOL 1.71 MICROGRAM(S)/KG/MIN: 10 INJECTION, EMULSION INTRAVENOUS at 04:48

## 2017-12-19 RX ADMIN — Medication 50 MILLIGRAM(S): at 18:04

## 2017-12-19 RX ADMIN — CEFEPIME 100 MILLIGRAM(S): 1 INJECTION, POWDER, FOR SOLUTION INTRAMUSCULAR; INTRAVENOUS at 16:49

## 2017-12-19 NOTE — PROGRESS NOTE ADULT - ASSESSMENT
52 yr old female with progressive CKD V admitted with decompesated CHF sec to CKD V. NOW ESRD. On antibiotics for healthcare associated PNA.   dialysed last night. HD in AM and thereafter vent  weaning trial     [facility] IN THE NEXT 48-72  HRS

## 2017-12-19 NOTE — PROGRESS NOTE ADULT - PROBLEM SELECTOR PLAN 1
ventilator support  F/u ABGs  Bronchodilators neb  Aspiration precautions  Pulmonary toilet  Nutrition  Follow up CXR  Decubitus prevention

## 2017-12-19 NOTE — PROGRESS NOTE ADULT - SUBJECTIVE AND OBJECTIVE BOX
Patient is a 52y old  Female who presents with a chief complaint of SOB.  Pt also reports associated mild cough.  Currently intubated, sedated on mechanical ventilation but in NAD. Off pressors meds.    INTERVAL HPI/OVERNIGHT EVENTS:      VITAL SIGNS:  T(F): 98.7 (12-19-17 @ 09:00)  HR: 82 (12-19-17 @ 10:00)  BP: 124/59 (12-19-17 @ 10:00)  RR: 15 (12-19-17 @ 10:00)  SpO2: 100% (12-19-17 @ 10:00)  Wt(kg): --  I&O's Detail    18 Dec 2017 07:01  -  19 Dec 2017 07:00  --------------------------------------------------------  IN:    Nepro: 320 mL    propofol Infusion: 74.8 mL    Solution: 100 mL  Total IN: 494.8 mL    OUT:    Indwelling Catheter - Urethral: 35 mL  Total OUT: 35 mL    Total NET: 459.8 mL      19 Dec 2017 07:01  -  19 Dec 2017 11:06  --------------------------------------------------------  IN:    Nepro: 40 mL    Packed Red Blood Cells: 350 mL    propofol Infusion: 6.8 mL  Total IN: 396.8 mL    OUT:  Total OUT: 0 mL    Total NET: 396.8 mL        Mode: AC/ CMV (Assist Control/ Continuous Mandatory Ventilation)  RR (machine): 15  TV (machine): 400  FiO2: 80  PEEP: 10  ITime: 1  MAP: 16  PIP: 31        REVIEW OF SYSTEMS:    CONSTITUTIONAL:  No fevers, chills, sweats    HEENT:  Eyes:  No diplopia or blurred vision. ENT:  No earache, sore throat or runny nose.    CARDIOVASCULAR:  No pressure, squeezing, tightness, or heaviness about the chest; no palpitations.    RESPIRATORY:  Per HPI    GASTROINTESTINAL:  No abdominal pain, nausea, vomiting or diarrhea.    GENITOURINARY:  No dysuria, frequency or urgency.    NEUROLOGIC:  No paresthesias, fasciculations, seizures or weakness.    PSYCHIATRIC:  No disorder of thought or mood.      PHYSICAL EXAM:    Constitutional: Well developed and nourished  Eyes:Perrla  ENMT: normal  Neck:supple  Respiratory: good air entry  Cardiovascular: S1 S2 regular  Gastrointestinal: Soft, Non tender  Extremities: No edema  Vascular:normal  Neurological:Awake, alert,Ox3  Musculoskeletal:Normal      MEDICATIONS  (STANDING):  ascorbic acid 500 milliGRAM(s) Oral daily  aspirin  chewable 81 milliGRAM(s) Oral daily  cinacalcet 30 milliGRAM(s) Oral daily  dextrose 5%. 1000 milliLiter(s) (50 mL/Hr) IV Continuous <Continuous>  dextrose 50% Injectable 12.5 Gram(s) IV Push once  dextrose 50% Injectable 25 Gram(s) IV Push once  dextrose 50% Injectable 25 Gram(s) IV Push once  ferrous    sulfate Liquid 300 milliGRAM(s) Enteral Tube daily  folic acid 1 milliGRAM(s) Oral daily  furosemide   Injectable 80 milliGRAM(s) IV Push daily  insulin lispro (HumaLOG) corrective regimen sliding scale   SubCutaneous three times a day before meals  levothyroxine Injectable 25 MICROGram(s) IV Push daily  lidocaine 1% Injectable 10 milliLiter(s) Local Injection once  pantoprazole  Injectable 40 milliGRAM(s) IV Push daily  piperacillin/tazobactam IVPB. 3.375 Gram(s) IV Intermittent every 12 hours  pregabalin 50 milliGRAM(s) Oral two times a day  propofol Infusion 5 MICROgram(s)/kG/Min (1.71 mL/Hr) IV Continuous <Continuous>  senna 2 Tablet(s) Oral at bedtime  simvastatin 20 milliGRAM(s) Oral at bedtime    MEDICATIONS  (PRN):  acetaminophen   Tablet 650 milliGRAM(s) Oral every 6 hours PRN For Temp greater than 38 C (100.4 F)  acetaminophen  Suppository 650 milliGRAM(s) Rectal every 6 hours PRN For Temp greater than 38 C (100.4 F)  dextrose Gel 1 Dose(s) Oral once PRN Blood Glucose LESS THAN 70 milliGRAM(s)/deciliter  glucagon  Injectable 1 milliGRAM(s) IntraMuscular once PRN Glucose LESS THAN 70 milligrams/deciliter  HYDROmorphone  Injectable 1 milliGRAM(s) IV Push every 4 hours PRN Severe Pain (7 - 10)      Allergies    No Known Allergies    Intolerances        LABS:                        6.8    4.3   )-----------( 64       ( 19 Dec 2017 05:38 )             21.7     12-19    142  |  101  |  49<H>  ----------------------------<  168<H>  3.7   |  30  |  4.46<H>    Ca    7.7<L>      19 Dec 2017 04:54  Phos  2.5     12-19  Mg     2.2     12-19      PT/INR - ( 19 Dec 2017 05:38 )   PT: 12.3 sec;   INR: 1.13 ratio         PTT - ( 19 Dec 2017 05:38 )  PTT:32.5 sec    ABG - ( 19 Dec 2017 04:46 )  pH: 7.48  /  pCO2: 40    /  pO2: 217   / HCO3: 30    / Base Excess: 6.0   /  SaO2: 100                   CAPILLARY BLOOD GLUCOSE      POCT Blood Glucose.: 193 mg/dL (19 Dec 2017 06:22)    pro-bnp 06171 12-13 @ 12:30     d-dimer --  12-13 @ 12:30      RADIOLOGY & ADDITIONAL TESTS:    CXR:  < from: Xray Chest 1 View AP -PORTABLE-Routine (12.19.17 @ 09:04) >   Increasing interstitial and airspace opacities. Small pleural   effusions.    < end of copied text >    Ct scan chest:    ekg;    echo:  < from: Transthoracic Echocardiogram (12.14.17 @ 07:15) >  1. Mitral annular calcification. Trace mitral  regurgitation.  2. Mildly dilated left atrium.  LA volume index = 39 cc/m2.  3. Severe concentric left ventricular hypertrophy.  4. Endocardium not well visualized; grossly low normal left  ventricular function.  Moderate diastolic (stage II)  dysfunction.  5. Normal right ventricular size and function.  6. RV systolic pressure is 49 mm Hg. Mild pulmonary  hypertension.  7. Small pericardial effusion measuring 0.7 cm at its  largest dimension when pooled posterior to the LV.   No  echocardiographic evidence of pericardial tamponade.    < end of copied text > Patient is a 52y old  Female who presents with a chief complaint of SOB.  Pt also reports associated mild cough.  Currently intubated, awake, alert on mechanical ventilation but in NAD. Off pressors meds.  S/p HD. S/p PRBCs because of severe anemia.  INTERVAL HPI/OVERNIGHT EVENTS:      VITAL SIGNS:  T(F): 98.7 (12-19-17 @ 09:00)  HR: 82 (12-19-17 @ 10:00)  BP: 124/59 (12-19-17 @ 10:00)  RR: 15 (12-19-17 @ 10:00)  SpO2: 100% (12-19-17 @ 10:00)  Wt(kg): --  I&O's Detail    18 Dec 2017 07:01  -  19 Dec 2017 07:00  --------------------------------------------------------  IN:    Nepro: 320 mL    propofol Infusion: 74.8 mL    Solution: 100 mL  Total IN: 494.8 mL    OUT:    Indwelling Catheter - Urethral: 35 mL  Total OUT: 35 mL    Total NET: 459.8 mL      19 Dec 2017 07:01  -  19 Dec 2017 11:06  --------------------------------------------------------  IN:    Nepro: 40 mL    Packed Red Blood Cells: 350 mL    propofol Infusion: 6.8 mL  Total IN: 396.8 mL    OUT:  Total OUT: 0 mL    Total NET: 396.8 mL        Mode: AC/ CMV (Assist Control/ Continuous Mandatory Ventilation)  RR (machine): 15  TV (machine): 400  FiO2: 80  PEEP: 10  ITime: 1  MAP: 16  PIP: 31        REVIEW OF SYSTEMS:    CONSTITUTIONAL:  No fevers, chills, sweats    HEENT:  Eyes:  No diplopia or blurred vision. ENT:  No earache, sore throat or runny nose.    CARDIOVASCULAR:  No pressure, squeezing, tightness, or heaviness about the chest; no palpitations.    RESPIRATORY:  Per HPI    GASTROINTESTINAL:  No abdominal pain, nausea, vomiting or diarrhea.    GENITOURINARY:  No dysuria, frequency or urgency.    NEUROLOGIC:  No paresthesias, fasciculations, seizures or weakness.    PSYCHIATRIC:  No disorder of thought or mood.      PHYSICAL EXAM:    Constitutional: Well developed and nourished  Eyes:Perrla  ENMT: normal  Neck:supple  Respiratory: good air entry  Cardiovascular: S1 S2 regular  Gastrointestinal: Soft, Non tender  Extremities: No edema  Vascular:normal  Neurological:Awake, alert,Ox3  Musculoskeletal:Normal      MEDICATIONS  (STANDING):  ascorbic acid 500 milliGRAM(s) Oral daily  aspirin  chewable 81 milliGRAM(s) Oral daily  cinacalcet 30 milliGRAM(s) Oral daily  dextrose 5%. 1000 milliLiter(s) (50 mL/Hr) IV Continuous <Continuous>  dextrose 50% Injectable 12.5 Gram(s) IV Push once  dextrose 50% Injectable 25 Gram(s) IV Push once  dextrose 50% Injectable 25 Gram(s) IV Push once  ferrous    sulfate Liquid 300 milliGRAM(s) Enteral Tube daily  folic acid 1 milliGRAM(s) Oral daily  furosemide   Injectable 80 milliGRAM(s) IV Push daily  insulin lispro (HumaLOG) corrective regimen sliding scale   SubCutaneous three times a day before meals  levothyroxine Injectable 25 MICROGram(s) IV Push daily  lidocaine 1% Injectable 10 milliLiter(s) Local Injection once  pantoprazole  Injectable 40 milliGRAM(s) IV Push daily  piperacillin/tazobactam IVPB. 3.375 Gram(s) IV Intermittent every 12 hours  pregabalin 50 milliGRAM(s) Oral two times a day  propofol Infusion 5 MICROgram(s)/kG/Min (1.71 mL/Hr) IV Continuous <Continuous>  senna 2 Tablet(s) Oral at bedtime  simvastatin 20 milliGRAM(s) Oral at bedtime    MEDICATIONS  (PRN):  acetaminophen   Tablet 650 milliGRAM(s) Oral every 6 hours PRN For Temp greater than 38 C (100.4 F)  acetaminophen  Suppository 650 milliGRAM(s) Rectal every 6 hours PRN For Temp greater than 38 C (100.4 F)  dextrose Gel 1 Dose(s) Oral once PRN Blood Glucose LESS THAN 70 milliGRAM(s)/deciliter  glucagon  Injectable 1 milliGRAM(s) IntraMuscular once PRN Glucose LESS THAN 70 milligrams/deciliter  HYDROmorphone  Injectable 1 milliGRAM(s) IV Push every 4 hours PRN Severe Pain (7 - 10)      Allergies    No Known Allergies    Intolerances        LABS:                        6.8    4.3   )-----------( 64       ( 19 Dec 2017 05:38 )             21.7     12-19    142  |  101  |  49<H>  ----------------------------<  168<H>  3.7   |  30  |  4.46<H>    Ca    7.7<L>      19 Dec 2017 04:54  Phos  2.5     12-19  Mg     2.2     12-19      PT/INR - ( 19 Dec 2017 05:38 )   PT: 12.3 sec;   INR: 1.13 ratio         PTT - ( 19 Dec 2017 05:38 )  PTT:32.5 sec    ABG - ( 19 Dec 2017 04:46 )  pH: 7.48  /  pCO2: 40    /  pO2: 217   / HCO3: 30    / Base Excess: 6.0   /  SaO2: 100                   CAPILLARY BLOOD GLUCOSE      POCT Blood Glucose.: 193 mg/dL (19 Dec 2017 06:22)    pro-bnp [id number] 12-13 @ 12:30     d-dimer --  12-13 @ 12:30      RADIOLOGY & ADDITIONAL TESTS:    CXR:  < from: Xray Chest 1 View AP -PORTABLE-Routine (12.19.17 @ 09:04) >   Increasing interstitial and airspace opacities. Small pleural   effusions.    < end of copied text >    Ct scan chest:    ekg;    echo:  < from: Transthoracic Echocardiogram (12.14.17 @ 07:15) >  1. Mitral annular calcification. Trace mitral  regurgitation.  2. Mildly dilated left atrium.  LA volume index = 39 cc/m2.  3. Severe concentric left ventricular hypertrophy.  4. Endocardium not well visualized; grossly low normal left  ventricular function.  Moderate diastolic (stage II)  dysfunction.  5. Normal right ventricular size and function.  6. RV systolic pressure is 49 mm Hg. Mild pulmonary  hypertension.  7. Small pericardial effusion measuring 0.7 cm at its  largest dimension when pooled posterior to the LV.   No  echocardiographic evidence of pericardial tamponade.    < end of copied text >

## 2017-12-19 NOTE — CONSULT NOTE ADULT - SUBJECTIVE AND OBJECTIVE BOX
HPI:  50 y/o F from [facility] ( long term care 2/2 difficulty walking possibly 2/2 polyneuropathy) w/ PMHx of anemia, CHF( diastolic dysfunction), CKD, Clostridium difficile infection, DM, Diabetic neuropathy, HTN, HLD, Hypothyroidism, Hypoparathyroidism, Osteomyelitis of jaw, and Parathyroid adenoma, GERD p/w c/o SOB and difficulty breathing x1 week. Pt admitted to ICU for Acute Hypoxic and hypercapnic respiratory failure 2/2 Exacerbation of CHF or possibly pneumonia - now intubated, new ESRD - on HD. Full code on file.       PAST MEDICAL & SURGICAL HISTORY:  Clostridium difficile infection  Osteomyelitis of jaw  Parathyroid adenoma  Hypothyroidism  CKD (chronic kidney disease)  Anemia  CHF (congestive heart failure)  Diabetic neuropathy  Diabetes mellitus  HTN (hypertension)  S/P :   No Past Surgical History      SOCIAL HISTORY:    Admitted from:  [facility] - LT  Home Opioid hx: none  [employer]:          [employer]                          Preferred Language: English    Surrogate/HCP/Guardian: Emily Harvey (sister/surrogate): [phone] / [phone]    FAMILY HISTORY:  Family history of stroke  Family history of hypertension (Sibling)  Family history of diabetes mellitus    Baseline ADLs (prior to admission): assist with ADLs, ambulatory with RW and 2 person assist     No Known Allergies    Present Symptoms:   Pain:        patient shakes head 'no' when asked if she has pain                       Review of Systems: Limited ROS - pt intubated     MEDICATIONS  (STANDING):  ascorbic acid 500 milliGRAM(s) Oral daily  aspirin  chewable 81 milliGRAM(s) Oral daily  cinacalcet 30 milliGRAM(s) Oral daily  dextrose 5%. 1000 milliLiter(s) (50 mL/Hr) IV Continuous <Continuous>  dextrose 50% Injectable 12.5 Gram(s) IV Push once  dextrose 50% Injectable 25 Gram(s) IV Push once  dextrose 50% Injectable 25 Gram(s) IV Push once  ferrous    sulfate Liquid 300 milliGRAM(s) Enteral Tube daily  folic acid 1 milliGRAM(s) Oral daily  furosemide   Injectable 80 milliGRAM(s) IV Push daily  insulin lispro (HumaLOG) corrective regimen sliding scale   SubCutaneous three times a day before meals  levothyroxine 50 MICROGram(s) Oral daily  lidocaine 1% Injectable 10 milliLiter(s) Local Injection once  pantoprazole  Injectable 40 milliGRAM(s) IV Push daily  piperacillin/tazobactam IVPB. 3.375 Gram(s) IV Intermittent every 12 hours  pregabalin 50 milliGRAM(s) Oral two times a day  propofol Infusion 5 MICROgram(s)/kG/Min (1.71 mL/Hr) IV Continuous <Continuous>  senna 2 Tablet(s) Oral at bedtime  simvastatin 20 milliGRAM(s) Oral at bedtime    MEDICATIONS  (PRN):  acetaminophen   Tablet 650 milliGRAM(s) Oral every 6 hours PRN For Temp greater than 38 C (100.4 F)  acetaminophen  Suppository 650 milliGRAM(s) Rectal every 6 hours PRN For Temp greater than 38 C (100.4 F)  dextrose Gel 1 Dose(s) Oral once PRN Blood Glucose LESS THAN 70 milliGRAM(s)/deciliter  glucagon  Injectable 1 milliGRAM(s) IntraMuscular once PRN Glucose LESS THAN 70 milligrams/deciliter  HYDROmorphone  Injectable 1 milliGRAM(s) IV Push every 4 hours PRN Severe Pain (7 - 10)      PHYSICAL EXAM:    Vital Signs Last 24 Hrs  T(C): 37.1 (19 Dec 2017 09:00), Max: 38.5 (18 Dec 2017 16:25)  T(F): 98.7 (19 Dec 2017 09:00), Max: 101.3 (18 Dec 2017 16:25)  HR: 83 (19 Dec 2017 14:00) (76 - 88)  BP: 128/56 (19 Dec 2017 14:00) (99/66 - 149/78)  BP(mean): 74 (19 Dec 2017 14:00) (62 - 84)  RR: 19 (19 Dec 2017 14:00) (0 - 27)  SpO2: 95% (19 Dec 2017 14:00) (90% - 100%)    General: awake and alert, intubated, nonverbal. Answers simple questions by shaking/nodding head.   Karnofsky Performance Score/Palliative Performance Status Version2:    30 %    HEENT: dry mouth, ET tube, NGT  Lungs: comfortable, pt intubated    CV: normal   GI: incontinent, NGT  :  jarrell  Musculoskeletal: bedbound, dependent on all ADLs, +1-2 bilateral  strength, weakness x 4  Skin: normal    Neuro: pt awake/oriented, intubated, follows simple commands  Oral intake ability: unable/only mouth care   Diet: NPO-NGT    LABS:                        6.8    4.3   )-----------( 64       ( 19 Dec 2017 05:38 )             21.7         142  |  101  |  49<H>  ----------------------------<  168<H>  3.7   |  30  |  4.46<H>    Ca    7.7<L>      19 Dec 2017 04:54  Phos  2.5       Mg     2.2               RADIOLOGY & ADDITIONAL STUDIES:  < from: Xray Chest 1 View AP -PORTABLE-Routine (17 @ 09:04) >  EXAM:  CHEST PORTABLE ROUTINE                            PROCEDURE DATE:  2017          INTERPRETATION:  INDICATION: Dyspnea    PRIORS: 2017    VIEWS: Portable AP radiography of the chest performed.    FINDINGS: Heart size cannotquantitated on this portable evaluation. No   superior mediastinal widening is identified. The indwelling ETT is   without significant change in position. The distal aspect of the   indwelling NG tube is not included on the radiographic evaluation. There   are increased interstitial and airspace opacities noted bilaterally.   Small pleural effusions are likely present. There is no evidence for   pneumothorax. No mediastinal shift is noted.    IMPRESSION: Increasing interstitial and airspace opacities. Small pleural   effusions.  < end of copied text >    < from: Transthoracic Echocardiogram (17 @ 07:15) >  Patient name: STEVEN HARVEY  YOB: 1965   Age: 52 (F)   MR#: [id number]  Study Date: 2017  [id number]: Saji Cannon RDCS  Study quality: Fair  Referring Physician:  PIETER OTTO MD  Blood Pressure: 124/63 mmHg  Height: 170 cm  Weight: 57 kg  BSA: 1.7 m2  ------------------------------------------------------------------------    PROCEDURE: Transthoracic echocardiogram with 2-D, M-Mode  and complete spectral and color flow Doppler.  INDICATION: Unspecified diastolic (congestive) heart  failure (I50.30)  HISTORY:  ------------------------------------------------------------------------  DIMENSIONS:  Dimensions:     Normal Values:  LA:     3.2 cm    2.0 - 4.0 cm  Ao:     3.1 cm    2.0 - 3.8 cm  SEPTUM: 1.3 cm    0.6 - 1.2 cm  PWT:    1.3 cm    0.6 - 1.1 cm  LVIDd:  4.7 cm    3.0 - 5.6 cm  LVIDs:  3.3 cm    1.8 - 4.0 cm      Derived Variables:  LVMI: 143 g/m2  RWT: 0.55  Ejection Fraction Visual Estimate: 55 %    ------------------------------------------------------------------------  OBSERVATIONS:  Mitral Valve: Mitral annular calcification. Trace mitral  regurgitation.  Aortic Root: Normal aortic root.  Aortic Valve: Normal trileaflet aortic valve.  Left Atrium: Mildly dilated left atrium.  LA volume index =  39 cc/m2.  Left Ventricle: Endocardium not well visualized; grossly  low normal left ventricular function.  Moderate diastolic  (stage II) dysfunction. Severe concentric left ventricular  hypertrophy.  Right Heart: Normal right atrium. Normal right ventricular  size and function. There is mild-moderate tricuspid  regurgitation. There is trace pulmonic regurgitation.  Pericardium/PleuraSmall pericardial effusion measuring 0.7  cm at its largest dimension when pooled posterior to the  LV.   No echocardiographic evidence of pericardial  tamponade.  Hemodynamic: RA Pressure is 8 mm Hg. RV systolic pressure  is 49 mm Hg. Mild pulmonary hypertension.  ------------------------------------------------------------------------  CONCLUSIONS:  1. Mitral annular calcification. Trace mitral  regurgitation.  2. Mildly dilated left atrium.  LA volume index = 39 cc/m2.  3. Severe concentric left ventricular hypertrophy.  4. Endocardium not well visualized; grossly low normal left  ventricular function.  Moderate diastolic (stage II)  dysfunction.  5. Normal right ventricular size and function.  6. RV systolic pressure is 49 mm Hg. Mild pulmonary  hypertension.  7. Small pericardial effusion measuring 0.7 cm at its  largest dimension when pooled posterior to the LV.   No  echocardiographic evidence of pericardial tamponade.  < end of copied text >        ADVANCE DIRECTIVES: Full code HPI:  50 y/o F from [facility] ( long term care 2/2 difficulty walking possibly 2/2 polyneuropathy) w/ PMHx of anemia, CHF( diastolic dysfunction), CKD, Clostridium difficile infection, DM, Diabetic neuropathy, HTN, HLD, Hypothyroidism, Hypoparathyroidism, Osteomyelitis of jaw, and Parathyroid adenoma, GERD p/w c/o SOB and difficulty breathing x1 week. Pt admitted to ICU for Acute Hypoxic and hypercapnic respiratory failure 2/2 Exacerbation of CHF or possibly pneumonia - now intubated, new ESRD - on HD. Full code on file.       PAST MEDICAL & SURGICAL HISTORY:  Clostridium difficile infection  Osteomyelitis of jaw  Parathyroid adenoma  Hypothyroidism  CKD (chronic kidney disease)  Anemia  CHF (congestive heart failure)  Diabetic neuropathy  Diabetes mellitus  HTN (hypertension)  S/P :   No Past Surgical History      SOCIAL HISTORY:    Admitted from:  [facility] - LT  Home Opioid hx: none  [place]osque:          [employer]                          Preferred Language: English    Surrogate/HCP/Guardian: Emily Harvey (sister/surrogate): [phone] / [phone]    FAMILY HISTORY:  Family history of stroke  Family history of hypertension (Sibling)  Family history of diabetes mellitus    Baseline ADLs (prior to admission): assist with ADLs, ambulatory with RW and 2 person assist     No Known Allergies    Present Symptoms:   Pain:        patient shakes head 'no' when asked if she has pain                       Review of Systems: Limited ROS - pt intubated     MEDICATIONS  (STANDING):  ascorbic acid 500 milliGRAM(s) Oral daily  aspirin  chewable 81 milliGRAM(s) Oral daily  cinacalcet 30 milliGRAM(s) Oral daily  dextrose 5%. 1000 milliLiter(s) (50 mL/Hr) IV Continuous <Continuous>  dextrose 50% Injectable 12.5 Gram(s) IV Push once  dextrose 50% Injectable 25 Gram(s) IV Push once  dextrose 50% Injectable 25 Gram(s) IV Push once  ferrous    sulfate Liquid 300 milliGRAM(s) Enteral Tube daily  folic acid 1 milliGRAM(s) Oral daily  furosemide   Injectable 80 milliGRAM(s) IV Push daily  insulin lispro (HumaLOG) corrective regimen sliding scale   SubCutaneous three times a day before meals  levothyroxine 50 MICROGram(s) Oral daily  lidocaine 1% Injectable 10 milliLiter(s) Local Injection once  pantoprazole  Injectable 40 milliGRAM(s) IV Push daily  piperacillin/tazobactam IVPB. 3.375 Gram(s) IV Intermittent every 12 hours  pregabalin 50 milliGRAM(s) Oral two times a day  propofol Infusion 5 MICROgram(s)/kG/Min (1.71 mL/Hr) IV Continuous <Continuous>  senna 2 Tablet(s) Oral at bedtime  simvastatin 20 milliGRAM(s) Oral at bedtime    MEDICATIONS  (PRN):  acetaminophen   Tablet 650 milliGRAM(s) Oral every 6 hours PRN For Temp greater than 38 C (100.4 F)  acetaminophen  Suppository 650 milliGRAM(s) Rectal every 6 hours PRN For Temp greater than 38 C (100.4 F)  dextrose Gel 1 Dose(s) Oral once PRN Blood Glucose LESS THAN 70 milliGRAM(s)/deciliter  glucagon  Injectable 1 milliGRAM(s) IntraMuscular once PRN Glucose LESS THAN 70 milligrams/deciliter  HYDROmorphone  Injectable 1 milliGRAM(s) IV Push every 4 hours PRN Severe Pain (7 - 10)      PHYSICAL EXAM:    Vital Signs Last 24 Hrs  T(C): 37.1 (19 Dec 2017 09:00), Max: 38.5 (18 Dec 2017 16:25)  T(F): 98.7 (19 Dec 2017 09:00), Max: 101.3 (18 Dec 2017 16:25)  HR: 83 (19 Dec 2017 14:00) (76 - 88)  BP: 128/56 (19 Dec 2017 14:00) (99/66 - 149/78)  BP(mean): 74 (19 Dec 2017 14:00) (62 - 84)  RR: 19 (19 Dec 2017 14:00) (0 - 27)  SpO2: 95% (19 Dec 2017 14:00) (90% - 100%)    General: awake and alert, intubated, nonverbal. Answers simple questions by shaking/nodding head.   Karnofsky Performance Score/Palliative Performance Status Version2:    30 %    HEENT: dry mouth, ET tube, NGT  Lungs: comfortable, pt intubated    CV: normal   GI: incontinent, NGT  :  jarrell  Musculoskeletal: bedbound, dependent on all ADLs, +1-2 bilateral  strength, weakness x 4  Skin: normal    Neuro: pt awake/oriented, intubated, follows simple commands  Oral intake ability: unable/only mouth care   Diet: NPO-NGT    LABS:                        6.8    4.3   )-----------( 64       ( 19 Dec 2017 05:38 )             21.7         142  |  101  |  49<H>  ----------------------------<  168<H>  3.7   |  30  |  4.46<H>    Ca    7.7<L>      19 Dec 2017 04:54  Phos  2.5       Mg     2.2               RADIOLOGY & ADDITIONAL STUDIES:  < from: Xray Chest 1 View AP -PORTABLE-Routine (17 @ 09:04) >  EXAM:  CHEST PORTABLE ROUTINE                            PROCEDURE DATE:  2017          INTERPRETATION:  INDICATION: Dyspnea    PRIORS: 2017    VIEWS: Portable AP radiography of the chest performed.    FINDINGS: Heart size cannotquantitated on this portable evaluation. No   superior mediastinal widening is identified. The indwelling ETT is   without significant change in position. The distal aspect of the   indwelling NG tube is not included on the radiographic evaluation. There   are increased interstitial and airspace opacities noted bilaterally.   Small pleural effusions are likely present. There is no evidence for   pneumothorax. No mediastinal shift is noted.    IMPRESSION: Increasing interstitial and airspace opacities. Small pleural   effusions.  < end of copied text >    < from: Transthoracic Echocardiogram (17 @ 07:15) >  Patient name: STEVEN HARVEY  YOB: 1965   Age: 52 (F)   MR#: [id number]  Study Date: 2017  [id number]: Saji Cannon RDCS  Study quality: Fair  Referring Physician:  PIETER OTTO MD  Blood Pressure: 124/63 mmHg  Height: 170 cm  Weight: 57 kg  BSA: 1.7 m2  ------------------------------------------------------------------------    PROCEDURE: Transthoracic echocardiogram with 2-D, M-Mode  and complete spectral and color flow Doppler.  INDICATION: Unspecified diastolic (congestive) heart  failure (I50.30)  HISTORY:  ------------------------------------------------------------------------  DIMENSIONS:  Dimensions:     Normal Values:  LA:     3.2 cm    2.0 - 4.0 cm  Ao:     3.1 cm    2.0 - 3.8 cm  SEPTUM: 1.3 cm    0.6 - 1.2 cm  PWT:    1.3 cm    0.6 - 1.1 cm  LVIDd:  4.7 cm    3.0 - 5.6 cm  LVIDs:  3.3 cm    1.8 - 4.0 cm      Derived Variables:  LVMI: 143 g/m2  RWT: 0.55  Ejection Fraction Visual Estimate: 55 %    ------------------------------------------------------------------------  OBSERVATIONS:  Mitral Valve: Mitral annular calcification. Trace mitral  regurgitation.  Aortic Root: Normal aortic root.  Aortic Valve: Normal trileaflet aortic valve.  Left Atrium: Mildly dilated left atrium.  LA volume index =  39 cc/m2.  Left Ventricle: Endocardium not well visualized; grossly  low normal left ventricular function.  Moderate diastolic  (stage II) dysfunction. Severe concentric left ventricular  hypertrophy.  Right Heart: Normal right atrium. Normal right ventricular  size and function. There is mild-moderate tricuspid  regurgitation. There is trace pulmonic regurgitation.  Pericardium/PleuraSmall pericardial effusion measuring 0.7  cm at its largest dimension when pooled posterior to the  LV.   No echocardiographic evidence of pericardial  tamponade.  Hemodynamic: RA Pressure is 8 mm Hg. RV systolic pressure  is 49 mm Hg. Mild pulmonary hypertension.  ------------------------------------------------------------------------  CONCLUSIONS:  1. Mitral annular calcification. Trace mitral  regurgitation.  2. Mildly dilated left atrium.  LA volume index = 39 cc/m2.  3. Severe concentric left ventricular hypertrophy.  4. Endocardium not well visualized; grossly low normal left  ventricular function.  Moderate diastolic (stage II)  dysfunction.  5. Normal right ventricular size and function.  6. RV systolic pressure is 49 mm Hg. Mild pulmonary  hypertension.  7. Small pericardial effusion measuring 0.7 cm at its  largest dimension when pooled posterior to the LV.   No  echocardiographic evidence of pericardial tamponade.  < end of copied text >        ADVANCE DIRECTIVES: Full code; MOLST form completed.  Advance Care Planning discussion: +30 minutes. Patient deferred to sister, Emily, for medical decision making. Met with sister face-to-face. Discussed goals of care and advance directives, including benefits vs burdens of resuscitation, intubation, artificial nutrition. MOLST form completed as per wishes. Patient remains a full code.

## 2017-12-19 NOTE — PROGRESS NOTE ADULT - PROBLEM SELECTOR PLAN 6
Accuchecks with reg insulin coverage  Endo eval  Monitor labs  Replace lytes Cont HD  Renal follow up  Monitor labs  Replace dewayne

## 2017-12-19 NOTE — PROGRESS NOTE ADULT - SUBJECTIVE AND OBJECTIVE BOX
52y Female [facility]  w/ PMHx of anemia, CHF( diastolic dysfunction), CKD, , DM, diabetic neuropathy, HTN, HLD, Hypothyroidism, Hypoparathyroidism, Osteomyelitis of jaw, and Parathyroid adenoma, GERD p/w c/o SOB and difficulty breathing, patient was admitted to ICU on Bipap with Acute Hypoxic respiratory failure 2/2 PNA. Patient currently intubated and placed on mechanical ventilation. Patient had a Tmax of 101.3 { over the last 24 hours }    ROS : INTUBATED AND SEDATED { unable to illicit }    Meds:  piperacillin/tazobactam IVPB. 3.375 Gram(s) IV Intermittent every 12 hours    Allergies  No Known Allergies    VITALS:  Vital Signs Last 24 Hrs  T(C): 37.1 (19 Dec 2017 09:00), Max: 38.5 (18 Dec 2017 16:25)  T(F): 98.7 (19 Dec 2017 09:00), Max: 101.3 (18 Dec 2017 16:25)  HR: 85 (19 Dec 2017 11:00) (76 - 91)  BP: 122/59 (19 Dec 2017 11:00) (99/53 - 149/78)  BP(mean): 76 (19 Dec 2017 11:00) (62 - 84)  RR: 17 (19 Dec 2017 11:00) (0 - 27)  SpO2: 100% (19 Dec 2017 11:00) (90% - 100%)    PHYSICAL EXAM:    GENERAL: [x]NAD, []well-groomed, []well-developed  HEAD:  [x]Atraumatic, []Normocephalic  EYES: sluggish pupillary response bilaterally, conjunctiva and sclera clear  ENMT: [x]No tonsillar erythema, exudates, or enlargement; []Moist mucous membranes, []Good dentition, []No lesions  NECK: [x]Supple, normal appearance, []No JVD; []Normal thyroid; []Trachea midline  NERVOUS SYSTEM: sedated, nonverbal, noncommunicative  CHEST/LUNG: [x]No chest deformity; []Normal percussion bilaterally; []No rales, rhonchi, wheezing   HEART: [x]Regular rate and rhythm; []No murmurs, rubs, or gallops  ABDOMEN: [x]Soft, Nontender, Nondistended; []Bowel sounds present  EXTREMITIES:  [x]2+ Peripheral Pulses, []No clubbing, cyanosis, or edema  LYMPH: [x]No lymphadenopathy noted  SKIN: [x]No rashes or lesions; []Good capillary refill      LABS/DIAGNOSTIC TESTS:                          6.8    4.3   )-----------( 64       ( 19 Dec 2017 05:38 )             21.7         12-19    142  |  101  |  49<H>  ----------------------------<  168<H>  3.7   |  30  |  4.46<H>    Ca    7.7<L>      19 Dec 2017 04:54  Phos  2.5     12-19  Mg     2.2     12-19            CULTURES: .Sputum Sputum  12-18 @ 21:43 --  --    Few Squamous epithelial cells per low power field  Few polymorphonuclear leukocytes per low power field  Rare Gram variable coccobacilli   per oil power field      .Blood rec'd 2 anaerobic bottles for this accession #  12-16 @ 00:53   No growth to date.  --  --      .Blood Blood-Peripheral  12-15 @ 23:09   No growth to date.  --  --      .Urine Clean Catch (Midstream)  12-13 @ 23:18   No growth  --  --      .Blood Blood-Peripheral  12-13 @ 17:41   No growth at 5 days.  --  --      .Blood Blood-Peripheral  12-13 @ 17:40   No growth at 5 days.  --  --            RADIOLOGY: cxr : 12/19 /17   Increasing interstitial and airspace opacities. Small pleural   effusions. 52y Female [facility]  w/ PMHx of anemia, CHF( diastolic dysfunction), CKD, , DM, diabetic neuropathy, HTN, HLD, Hypothyroidism, Hypoparathyroidism, Osteomyelitis of jaw, and Parathyroid adenoma, GERD p/w c/o SOB and difficulty breathing, patient was admitted to ICU on Bipap with Acute Hypoxic respiratory failure 2/2 PNA. Patient currently intubated and placed on mechanical ventilation. Patient had a Tmax of 101.3 { over the last 24 hours }    ROS : INTUBATED AND SEDATED { unable to illicit }    Meds:  piperacillin/tazobactam IVPB. 3.375 Gram(s) IV Intermittent every 12 hours    Allergies  No Known Allergies    VITALS:  Vital Signs Last 24 Hrs  T(C): 37.1 (19 Dec 2017 09:00), Max: 38.5 (18 Dec 2017 16:25)  T(F): 98.7 (19 Dec 2017 09:00), Max: 101.3 (18 Dec 2017 16:25)  HR: 85 (19 Dec 2017 11:00) (76 - 91)  BP: 122/59 (19 Dec 2017 11:00) (99/53 - 149/78)  BP(mean): 76 (19 Dec 2017 11:00) (62 - 84)  RR: 17 (19 Dec 2017 11:00) (0 - 27)  SpO2: 100% (19 Dec 2017 11:00) (90% - 100%)    PHYSICAL EXAM:    GENERAL: well-developed and sedated and intubated   HEAD:  Atraumatic, Normocephalic  EYES: sluggish pupillary response bilaterally, conjunctiva and sclera clear  ENMT:Moist mucous membranes  NECK: Supple, normal appearance, No JVD;   NERVOUS SYSTEM: sedated  CHEST/LUNG: intubated and normal breath sounds b/l   HEART:regular rate and rhythm; No murmurs, rubs, or gallops  ABDOMEN:Soft, Nontender, Nondistended; Bowel sounds present  EXTREMITIES:  2+ Peripheral Pulses, No clubbing, cyanosis, or edema  SKIN: No rashes or lesions;       LABS/DIAGNOSTIC TESTS:                          6.8    4.3   )-----------( 64       ( 19 Dec 2017 05:38 )             21.7         12-19    142  |  101  |  49<H>  ----------------------------<  168<H>  3.7   |  30  |  4.46<H>    Ca    7.7<L>      19 Dec 2017 04:54  Phos  2.5     12-19  Mg     2.2     12-19            CULTURES: .Sputum Sputum  12-18 @ 21:43 --  --    Few Squamous epithelial cells per low power field  Few polymorphonuclear leukocytes per low power field  Rare Gram variable coccobacilli   per oil power field      .Blood rec'd 2 anaerobic bottles for this accession #  12-16 @ 00:53   No growth to date.  --  --      .Blood Blood-Peripheral  12-15 @ 23:09   No growth to date.  --  --      .Urine Clean Catch (Midstream)  12-13 @ 23:18   No growth  --  --      .Blood Blood-Peripheral  12-13 @ 17:41   No growth at 5 days.  --  --      .Blood Blood-Peripheral  12-13 @ 17:40   No growth at 5 days.  --  --            RADIOLOGY: cxr : 12/19 /17   Increasing interstitial and airspace opacities. Small pleural   effusions. 52y Female [facility]  w/ PMHx of anemia, CHF( diastolic dysfunction), CKD, , DM, diabetic neuropathy, HTN, HLD, Hypothyroidism, Hypoparathyroidism, Osteomyelitis of jaw, and Parathyroid adenoma, GERD p/w c/o SOB and difficulty breathing, patient was admitted to ICU on Bipap with Acute Hypoxic respiratory failure 2/2 PNA. Patient currently intubated and placed on mechanical ventilation. Patient had a Tmax of 101.3 { over the last 24 hours }.  pt being taken over from Dr Dietz.    ROS : INTUBATED AND SEDATED { unable to illicit }    Meds:  piperacillin/tazobactam IVPB. 3.375 Gram(s) IV Intermittent every 12 hours    Allergies  No Known Allergies    VITALS:  Vital Signs Last 24 Hrs  T(C): 37.1 (19 Dec 2017 09:00), Max: 38.5 (18 Dec 2017 16:25)  T(F): 98.7 (19 Dec 2017 09:00), Max: 101.3 (18 Dec 2017 16:25)  HR: 85 (19 Dec 2017 11:00) (76 - 91)  BP: 122/59 (19 Dec 2017 11:00) (99/53 - 149/78)  BP(mean): 76 (19 Dec 2017 11:00) (62 - 84)  RR: 17 (19 Dec 2017 11:00) (0 - 27)  SpO2: 100% (19 Dec 2017 11:00) (90% - 100%)    PHYSICAL EXAM:    GENERAL: well-developed and sedated and intubated   HEAD:  Atraumatic, Normocephalic  EYES: sluggish pupillary response bilaterally, conjunctiva and sclera clear  ENMT:Moist mucous membranes  NECK: Supple, normal appearance, No JVD;   NERVOUS SYSTEM: sedated  CHEST/LUNG: intubated and normal breath sounds b/l , no rales or rhonchi heard  HEART:regular rate and rhythm; No murmurs, rubs, or gallops  ABDOMEN:Soft, Nontender, Nondistended; Bowel sounds present, small umbilical hernia  EXTREMITIES:  2+ Peripheral Pulses, No clubbing, cyanosis, or edema, left hand atrophy both thenar and hypothenar  SKIN: No rashes or lesions;       LABS/DIAGNOSTIC TESTS:                          6.8    4.3   )-----------( 64       ( 19 Dec 2017 05:38 )             21.7         12-19    142  |  101  |  49<H>  ----------------------------<  168<H>  3.7   |  30  |  4.46<H>    Ca    7.7<L>      19 Dec 2017 04:54  Phos  2.5     12-19  Mg     2.2     12-19            CULTURES: .Sputum Sputum  12-18 @ 21:43 --  --    Few Squamous epithelial cells per low power field  Few polymorphonuclear leukocytes per low power field  Rare Gram variable coccobacilli   per oil power field      .Blood rec'd 2 anaerobic bottles for this accession #  12-16 @ 00:53   No growth to date.  --  --      .Blood Blood-Peripheral  12-15 @ 23:09   No growth to date.  --  --      .Urine Clean Catch (Midstream)  12-13 @ 23:18   No growth  --  --      .Blood Blood-Peripheral  12-13 @ 17:41   No growth at 5 days.  --  --      .Blood Blood-Peripheral  12-13 @ 17:40   No growth at 5 days.  --  --            RADIOLOGY: cxr : 12/19 /17   Increasing interstitial and airspace opacities. Small pleural   effusions.

## 2017-12-19 NOTE — CONSULT NOTE ADULT - CONSULT REASON
Elevated SCR.
GI bleeding
PNEUMONIA
Respiratory failure
medical decision making - new ESRD on HD, HF. Intubated
emergent hemodialysis

## 2017-12-19 NOTE — CONSULT NOTE ADULT - CONSULT REQUESTED DATE/TIME
14-Dec-2017 12:49
14-Dec-2017 14:05
14-Dec-2017 15:25
17-Dec-2017 14:47
19-Dec-2017 14:48
14-Dec-2017 14:03

## 2017-12-19 NOTE — CONSULT NOTE ADULT - PROBLEM SELECTOR RECOMMENDATION 3
ECHO  Cardio eval  Cont meds
Pt from LTC - requires assistance with ADLs, ambulatory with RW and 2 person assist at baseline. Pt now bedbound, intubated, dependent on all ADLs. Pt requires 24 hr care.

## 2017-12-19 NOTE — CONSULT NOTE ADULT - PROBLEM SELECTOR RECOMMENDATION 4
Patient shaking head 'yes' when asked if her sister makes medical decisions. Left message for her sister, Emily ([phone] / [phone]) with request for meeting - will f/u to discuss goals of care/advance directives. Patient remains a full code at this time. Patient shaking head 'yes' when asked if her sister makes medical decisions. Met with sister, Emily ([phone] / [phone]), face to face to discuss goals of care/advance directives. MOLST form completed; pt remains a full code.

## 2017-12-19 NOTE — PROGRESS NOTE ADULT - ASSESSMENT
52 female nursing home patient with hx of DM, neuropathy, CKD, CHFpEF, GERD, osteomyelitis of jaw, hypothyroidism, hypoparathyroidism, anemia, admitted with acute on chronic renal failure requiring initiation of dialysis, acute respiratory failure requiring intubation on 12/16, possible pneumonia  patient still spiking fevers { last 24 hours t max of 101.3}  will c/w zoysn for { possible PNA WITH sepsis} bilat pneumonia with chf - not improving with dialysis and current abx  fevers on and off  resp failure  sepsis    plan - dc zosyn as not improving , has a high NA load and platelets decreasing  start maxipime 1 gm iv q24 hrs  start levaquin 250mgs iv q24hrs   to cover for both gram negatives and possible  legionella pneumonia.  time spent - 30 minutes

## 2017-12-19 NOTE — PROGRESS NOTE ADULT - SUBJECTIVE AND OBJECTIVE BOX
fluid overloaded. S/P HD overnight.    No Known Allergies    Hospital Medications:   MEDICATIONS  (STANDING):  ascorbic acid 500 milliGRAM(s) Oral daily  aspirin  chewable 81 milliGRAM(s) Oral daily  cinacalcet 30 milliGRAM(s) Oral daily  dextrose 5%. 1000 milliLiter(s) (50 mL/Hr) IV Continuous <Continuous>  dextrose 50% Injectable 12.5 Gram(s) IV Push once  dextrose 50% Injectable 25 Gram(s) IV Push once  dextrose 50% Injectable 25 Gram(s) IV Push once  ferrous    sulfate Liquid 300 milliGRAM(s) Enteral Tube daily  folic acid 1 milliGRAM(s) Oral daily  furosemide   Injectable 80 milliGRAM(s) IV Push daily  insulin lispro (HumaLOG) corrective regimen sliding scale   SubCutaneous three times a day before meals  levothyroxine 50 MICROGram(s) Oral daily  lidocaine 1% Injectable 10 milliLiter(s) Local Injection once  pantoprazole  Injectable 40 milliGRAM(s) IV Push daily  piperacillin/tazobactam IVPB. 3.375 Gram(s) IV Intermittent every 12 hours  pregabalin 50 milliGRAM(s) Oral two times a day  propofol Infusion 5 MICROgram(s)/kG/Min (1.71 mL/Hr) IV Continuous <Continuous>  senna 2 Tablet(s) Oral at bedtime  simvastatin 20 milliGRAM(s) Oral at bedtime      VITALS:  T(F): 98.7 (17 @ 09:00), Max: 101.3 (17 @ 16:25)  HR: 85 (17 @ 11:00)  BP: 122/59 (17 @ 11:00)  RR: 17 (17 @ 11:00)  SpO2: 100% (17 @ 11:00)  Wt(kg): --     @ 07:01  -   @ 07:00  --------------------------------------------------------  IN: 494.8 mL / OUT: 35 mL / NET: 459.8 mL     @ 07:01  -   @ 13:01  --------------------------------------------------------  IN: 396.8 mL / OUT: 0 mL / NET: 396.8 mL        PHYSICAL EXAM:  Constitutional: NAD  HEENT: anicteric sclera, oropharynx clear,  Neck: No JVD  Respiratory: bilat rales++ or rhonchi  Cardiovascular: S1, S2, RRR  Gastrointestinal: BS+, soft, NT/ND  Extremities:  peripheral edema  Neurological: A/O x 3, no focal deficits  Vascular Access: RT groin   permacath.    LABS:      142  |  101  |  49<H>  ----------------------------<  168<H>  3.7   |  30  |  4.46<H>    Ca    7.7<L>      19 Dec 2017 04:54  Phos  2.5       Mg     2.2           Creatinine Trend: 4.46 <--, 2.16 <--, 4.54 <--, 3.46 <--, 4.00 <--, 4.98 <--, 4.67 <--, 6.53 <--, 6.44 <--, 6.44 <--, 6.30 <--                        6.8    4.3   )-----------( 64       ( 19 Dec 2017 05:38 )             21.7     Urine Studies:  Urinalysis Basic - ( 13 Dec 2017 14:12 )    Color: Yellow / Appearance: Slightly Turbid / S.015 / pH:   Gluc:  / Ketone: Negative  / Bili: Negative / Urobili: Negative   Blood:  / Protein: 100 / Nitrite: Negative   Leuk Esterase: Small / RBC: 0-2 /HPF / WBC 3-5 /HPF   Sq Epi:  / Non Sq Epi: Occasional /HPF / Bacteria: Trace /HPF        RADIOLOGY & ADDITIONAL STUDIES:

## 2017-12-19 NOTE — CONSULT NOTE ADULT - PROBLEM SELECTOR RECOMMENDATION 9
2/2 PNA and fluid overload. Patient remains intubated; FiO2 changed to 80% this am. Continues IV abx and lasix. On HD. Full code on file. 2/2 PNA and fluid overload. Patient remains intubated; FiO2 changed to 80% this am. Continues IV abx and lasix. On HD. Full code.

## 2017-12-19 NOTE — PROGRESS NOTE ADULT - SUBJECTIVE AND OBJECTIVE BOX
[ X  ] ICU                 [   ] CCU                   [   ] Medical Floor    Patient remain in ICU intubated on ventilator. She is comfortable. No new complaints reported, No abdominal pain, N/V, hematemesis, hematochezia, melena, fever, chills, chest pain, SOB, cough or diarrhea reported.    VITALS  ICU Vital Signs Last 24 Hrs  T(C): 37.1 (19 Dec 2017 09:00), Max: 38.5 (18 Dec 2017 16:25)  T(F): 98.7 (19 Dec 2017 09:00), Max: 101.3 (18 Dec 2017 16:25)  HR: 81 (19 Dec 2017 15:00) (76 - 88)  BP: 117/57 (19 Dec 2017 15:00) (99/66 - 149/78)  BP(mean): 70 (19 Dec 2017 15:00) (63 - 84)  RR: 15 (19 Dec 2017 15:00) (0 - 27)  SpO2: 94% (19 Dec 2017 15:00) (90% - 100%)      [ X  ] On Ventilator    MEDICATIONS  (STANDING):  ascorbic acid 500 milliGRAM(s) Oral daily  aspirin  chewable 81 milliGRAM(s) Oral daily  cefepime  IVPB 1000 milliGRAM(s) IV Intermittent every 24 hours  cinacalcet 30 milliGRAM(s) Oral daily  dextrose 5%. 1000 milliLiter(s) (50 mL/Hr) IV Continuous <Continuous>  dextrose 50% Injectable 12.5 Gram(s) IV Push once  dextrose 50% Injectable 25 Gram(s) IV Push once  dextrose 50% Injectable 25 Gram(s) IV Push once  ferrous    sulfate Liquid 300 milliGRAM(s) Enteral Tube daily  folic acid 1 milliGRAM(s) Oral daily  furosemide   Injectable 80 milliGRAM(s) IV Push daily  insulin lispro (HumaLOG) corrective regimen sliding scale   SubCutaneous three times a day before meals  levoFLOXacin IVPB 250 milliGRAM(s) IV Intermittent every 24 hours  levothyroxine 50 MICROGram(s) Oral daily  lidocaine 1% Injectable 10 milliLiter(s) Local Injection once  pantoprazole  Injectable 40 milliGRAM(s) IV Push daily  pregabalin 50 milliGRAM(s) Oral two times a day  propofol Infusion 5 MICROgram(s)/kG/Min (1.71 mL/Hr) IV Continuous <Continuous>  senna 2 Tablet(s) Oral at bedtime  simvastatin 20 milliGRAM(s) Oral at bedtime    MEDICATIONS  (PRN):  acetaminophen   Tablet 650 milliGRAM(s) Oral every 6 hours PRN For Temp greater than 38 C (100.4 F)  acetaminophen  Suppository 650 milliGRAM(s) Rectal every 6 hours PRN For Temp greater than 38 C (100.4 F)  dextrose Gel 1 Dose(s) Oral once PRN Blood Glucose LESS THAN 70 milliGRAM(s)/deciliter  glucagon  Injectable 1 milliGRAM(s) IntraMuscular once PRN Glucose LESS THAN 70 milligrams/deciliter  HYDROmorphone  Injectable 1 milliGRAM(s) IV Push every 4 hours PRN Severe Pain (7 - 10)                            6.8    4.3   )-----------( 64       ( 19 Dec 2017 05:38 )             21.7       12-19    142  |  101  |  49<H>  ----------------------------<  168<H>  3.7   |  30  |  4.46<H>    Ca    7.7<L>      19 Dec 2017 04:54  Phos  2.5     12-19  Mg     2.2     12-19        PT/INR - ( 19 Dec 2017 05:38 )   PT: 12.3 sec;   INR: 1.13 ratio         PTT - ( 19 Dec 2017 05:38 )  PTT:32.5 sec

## 2017-12-19 NOTE — PROGRESS NOTE ADULT - NEUROLOGICAL DETAILS
responds to pain/responds to verbal commands
responds to verbal commands/sensation intact/responds to pain/alert and oriented x 3
responds to pain/alert and oriented x 3/responds to verbal commands/sensation intact

## 2017-12-19 NOTE — PROGRESS NOTE ADULT - ASSESSMENT
1. Anemia  2. No evidence of acute GI bleeding  3. Gastritis /* Esophagitis  4. Duodenitis    Suggestions:    1. Monitor H/H  2. Protonix daily  3. Avoid NSAID  4. DVT prophylaxis  5. Follow up path

## 2017-12-19 NOTE — PROGRESS NOTE ADULT - ATTENDING COMMENTS
52 female nursing home patient with hx of DM, neuropathy, CKD, CHFpEF, GERD, osteomyelitis of jaw, hypothyroidism, hypoparathyroidism, anemia, admitted with acute on chronic renal failure requiring initiation of dialysis, acute respiratory failure requiring intubation on 12/16, possible pneumonia, GI bleed, EGD showed non bleeding duodenal ulcer.  Plan:  - HD per nephrology  - zosyn for possible pneumonia with sepsis  - wean mechanical ventilation as tolerated.   Total cc time 35 min.

## 2017-12-19 NOTE — PROGRESS NOTE ADULT - SUBJECTIVE AND OBJECTIVE BOX
INTERVAL HPI/OVERNIGHT EVENTS: pt desaturated to 80% after HD last night, up FiO2 100 overnight, SO2 up to 96%, changed FiO2 to 80% this am.     PRESSORS: [ ] YES [x ] NO  WHICH:    ANTIBIOTICS:   Zosyn                DATE STARTED:   ANTIBIOTICS:                  DATE STARTED:  ANTIBIOTICS:                  DATE STARTED:    Antimicrobial:  piperacillin/tazobactam IVPB. 3.375 Gram(s) IV Intermittent every 12 hours    Cardiovascular:  furosemide   Injectable 80 milliGRAM(s) IV Push daily    Pulmonary:    Hematalogic:  aspirin  chewable 81 milliGRAM(s) Oral daily    Other:  acetaminophen   Tablet 650 milliGRAM(s) Oral every 6 hours PRN  acetaminophen  Suppository 650 milliGRAM(s) Rectal every 6 hours PRN  ascorbic acid 500 milliGRAM(s) Oral daily  cinacalcet 30 milliGRAM(s) Oral daily  dextrose 5%. 1000 milliLiter(s) IV Continuous <Continuous>  dextrose 50% Injectable 12.5 Gram(s) IV Push once  dextrose 50% Injectable 25 Gram(s) IV Push once  dextrose 50% Injectable 25 Gram(s) IV Push once  dextrose Gel 1 Dose(s) Oral once PRN  ferrous    sulfate Liquid 300 milliGRAM(s) Enteral Tube daily  folic acid 1 milliGRAM(s) Oral daily  glucagon  Injectable 1 milliGRAM(s) IntraMuscular once PRN  HYDROmorphone  Injectable 1 milliGRAM(s) IV Push every 4 hours PRN  insulin lispro (HumaLOG) corrective regimen sliding scale   SubCutaneous three times a day before meals  levothyroxine Injectable 25 MICROGram(s) IV Push daily  lidocaine 1% Injectable 10 milliLiter(s) Local Injection once  pantoprazole  Injectable 40 milliGRAM(s) IV Push daily  pregabalin 50 milliGRAM(s) Oral two times a day  propofol Infusion 5 MICROgram(s)/kG/Min IV Continuous <Continuous>  senna 2 Tablet(s) Oral at bedtime  simvastatin 20 milliGRAM(s) Oral at bedtime    acetaminophen   Tablet 650 milliGRAM(s) Oral every 6 hours PRN  acetaminophen  Suppository 650 milliGRAM(s) Rectal every 6 hours PRN  ascorbic acid 500 milliGRAM(s) Oral daily  aspirin  chewable 81 milliGRAM(s) Oral daily  cinacalcet 30 milliGRAM(s) Oral daily  dextrose 5%. 1000 milliLiter(s) IV Continuous <Continuous>  dextrose 50% Injectable 12.5 Gram(s) IV Push once  dextrose 50% Injectable 25 Gram(s) IV Push once  dextrose 50% Injectable 25 Gram(s) IV Push once  dextrose Gel 1 Dose(s) Oral once PRN  ferrous    sulfate Liquid 300 milliGRAM(s) Enteral Tube daily  folic acid 1 milliGRAM(s) Oral daily  furosemide   Injectable 80 milliGRAM(s) IV Push daily  glucagon  Injectable 1 milliGRAM(s) IntraMuscular once PRN  HYDROmorphone  Injectable 1 milliGRAM(s) IV Push every 4 hours PRN  insulin lispro (HumaLOG) corrective regimen sliding scale   SubCutaneous three times a day before meals  levothyroxine Injectable 25 MICROGram(s) IV Push daily  lidocaine 1% Injectable 10 milliLiter(s) Local Injection once  pantoprazole  Injectable 40 milliGRAM(s) IV Push daily  piperacillin/tazobactam IVPB. 3.375 Gram(s) IV Intermittent every 12 hours  pregabalin 50 milliGRAM(s) Oral two times a day  propofol Infusion 5 MICROgram(s)/kG/Min IV Continuous <Continuous>  senna 2 Tablet(s) Oral at bedtime  simvastatin 20 milliGRAM(s) Oral at bedtime    Drug Dosing Weight  Height (cm): 170 (13 Dec 2017 17:16)  Weight (kg): 57 (13 Dec 2017 17:16)  BMI (kg/m2): 19.7 (13 Dec 2017 17:16)  BSA (m2): 1.66 (13 Dec 2017 17:16)    CENTRAL LINE: [x] YES [] NO  LOCATION: [facility]  DATE INSERTED:   REMOVE: [] YES [x] NO  EXPLAIN: HD    JACOBSON: [x] YES [] NO    DATE INSERTED:   REMOVE:  [] YES [x] NO  EXPLAIN: IOs    A-LINE: NO    PMH -reviewed admission note, no change since admission  PAST MEDICAL & SURGICAL HISTORY:  Clostridium difficile infection  Osteomyelitis of jaw  Parathyroid adenoma  Hypothyroidism  CKD (chronic kidney disease)  Anemia  CHF (congestive heart failure)  Diabetic neuropathy  Diabetes mellitus  HTN (hypertension)  S/P :   No Past Surgical History    ICU Vital Signs Last 24 Hrs  T(C): 36.9 (19 Dec 2017 04:00), Max: 38.5 (18 Dec 2017 16:25)  T(F): 98.5 (19 Dec 2017 04:00), Max: 101.3 (18 Dec 2017 16:25)  HR: 76 (19 Dec 2017 07:00) (76 - 91)  BP: 110/50 (19 Dec 2017 07:00) (99/53 - 149/78)  BP(mean): 64 (19 Dec 2017 07:00) (62 - 84)  ABP: --  ABP(mean): --  RR: 15 (19 Dec 2017 07:00) (0 - 20)  SpO2: 100% (19 Dec 2017 07:00) (89% - 100%)      ABG - ( 19 Dec 2017 04:46 )  pH: 7.48  /  pCO2: 40    /  pO2: 217   / HCO3: 30    / Base Excess: 6.0   /  SaO2: 100                   - @ 07:01  -   @ 07:00  --------------------------------------------------------  IN: 494.8 mL / OUT: 35 mL / NET: 459.8 mL        Mode: AC/ CMV (Assist Control/ Continuous Mandatory Ventilation)  RR (machine): 15  TV (machine): 400  FiO2: 80  PEEP: 10  ITime: 1  MAP: 15  PIP: 34      PHYSICAL EXAM:    GENERAL: [x]NAD, []well-groomed, []well-developed  HEAD:  [x]Atraumatic, []Normocephalic  EYES: sluggish pupillary response bilaterally, conjunctiva and sclera clear  ENMT: [x]No tonsillar erythema, exudates, or enlargement; []Moist mucous membranes, []Good dentition, []No lesions  NECK: [x]Supple, normal appearance, []No JVD; []Normal thyroid; []Trachea midline  NERVOUS SYSTEM: sedated, nonverbal, noncommunicative  CHEST/LUNG: [x]No chest deformity; []Normal percussion bilaterally; []No rales, rhonchi, wheezing   HEART: [x]Regular rate and rhythm; []No murmurs, rubs, or gallops  ABDOMEN: [x]Soft, Nontender, Nondistended; []Bowel sounds present  EXTREMITIES:  [x]2+ Peripheral Pulses, []No clubbing, cyanosis, or edema  LYMPH: [x]No lymphadenopathy noted  SKIN: [x]No rashes or lesions; []Good capillary refill      LABS:  CBC Full  -  ( 19 Dec 2017 05:38 )  WBC Count : 4.3 K/uL  Hemoglobin : 6.8 g/dL  Hematocrit : 21.7 %  Platelet Count - Automated : 64 K/uL  Mean Cell Volume : 92.2 fl  Mean Cell Hemoglobin : 28.7 pg  Mean Cell Hemoglobin Concentration : 31.2 gm/dL  Auto Neutrophil # : x  Auto Lymphocyte # : x  Auto Monocyte # : x  Auto Eosinophil # : x  Auto Basophil # : x  Auto Neutrophil % : x  Auto Lymphocyte % : x  Auto Monocyte % : x  Auto Eosinophil % : x  Auto Basophil % : x        142  |  101  |  49<H>  ----------------------------<  168<H>  3.7   |  30  |  4.46<H>    Ca    7.7<L>      19 Dec 2017 04:54  Phos  2.5       Mg     2.2           PT/INR - ( 19 Dec 2017 05:38 )   PT: 12.3 sec;   INR: 1.13 ratio         PTT - ( 19 Dec 2017 05:38 )  PTT:32.5 sec        RADIOLOGY & ADDITIONAL STUDIES REVIEWED:  yes    [ ]GOALS OF CARE DISCUSSION WITH PATIENT/FAMILY/PROXY:    CRITICAL CARE TIME SPENT: 35 minutes INTERVAL HPI/OVERNIGHT EVENTS: pt desaturated to 80% after HD last night, up FiO2 100 overnight, SO2 up to 96%, changed FiO2 to 80% this am. Hb 6.8 this am, one episode of green BM last night. given 1 PRBC, will f/u CBC.     PRESSORS: [ ] YES [x ] NO  WHICH:    ANTIBIOTICS:   Zosyn                DATE STARTED:   ANTIBIOTICS:                  DATE STARTED:  ANTIBIOTICS:                  DATE STARTED:    Antimicrobial:  piperacillin/tazobactam IVPB. 3.375 Gram(s) IV Intermittent every 12 hours    Cardiovascular:  furosemide   Injectable 80 milliGRAM(s) IV Push daily    Pulmonary:    Hematalogic:  aspirin  chewable 81 milliGRAM(s) Oral daily    Other:  acetaminophen   Tablet 650 milliGRAM(s) Oral every 6 hours PRN  acetaminophen  Suppository 650 milliGRAM(s) Rectal every 6 hours PRN  ascorbic acid 500 milliGRAM(s) Oral daily  cinacalcet 30 milliGRAM(s) Oral daily  dextrose 5%. 1000 milliLiter(s) IV Continuous <Continuous>  dextrose 50% Injectable 12.5 Gram(s) IV Push once  dextrose 50% Injectable 25 Gram(s) IV Push once  dextrose 50% Injectable 25 Gram(s) IV Push once  dextrose Gel 1 Dose(s) Oral once PRN  ferrous    sulfate Liquid 300 milliGRAM(s) Enteral Tube daily  folic acid 1 milliGRAM(s) Oral daily  glucagon  Injectable 1 milliGRAM(s) IntraMuscular once PRN  HYDROmorphone  Injectable 1 milliGRAM(s) IV Push every 4 hours PRN  insulin lispro (HumaLOG) corrective regimen sliding scale   SubCutaneous three times a day before meals  levothyroxine Injectable 25 MICROGram(s) IV Push daily  lidocaine 1% Injectable 10 milliLiter(s) Local Injection once  pantoprazole  Injectable 40 milliGRAM(s) IV Push daily  pregabalin 50 milliGRAM(s) Oral two times a day  propofol Infusion 5 MICROgram(s)/kG/Min IV Continuous <Continuous>  senna 2 Tablet(s) Oral at bedtime  simvastatin 20 milliGRAM(s) Oral at bedtime    acetaminophen   Tablet 650 milliGRAM(s) Oral every 6 hours PRN  acetaminophen  Suppository 650 milliGRAM(s) Rectal every 6 hours PRN  ascorbic acid 500 milliGRAM(s) Oral daily  aspirin  chewable 81 milliGRAM(s) Oral daily  cinacalcet 30 milliGRAM(s) Oral daily  dextrose 5%. 1000 milliLiter(s) IV Continuous <Continuous>  dextrose 50% Injectable 12.5 Gram(s) IV Push once  dextrose 50% Injectable 25 Gram(s) IV Push once  dextrose 50% Injectable 25 Gram(s) IV Push once  dextrose Gel 1 Dose(s) Oral once PRN  ferrous    sulfate Liquid 300 milliGRAM(s) Enteral Tube daily  folic acid 1 milliGRAM(s) Oral daily  furosemide   Injectable 80 milliGRAM(s) IV Push daily  glucagon  Injectable 1 milliGRAM(s) IntraMuscular once PRN  HYDROmorphone  Injectable 1 milliGRAM(s) IV Push every 4 hours PRN  insulin lispro (HumaLOG) corrective regimen sliding scale   SubCutaneous three times a day before meals  levothyroxine Injectable 25 MICROGram(s) IV Push daily  lidocaine 1% Injectable 10 milliLiter(s) Local Injection once  pantoprazole  Injectable 40 milliGRAM(s) IV Push daily  piperacillin/tazobactam IVPB. 3.375 Gram(s) IV Intermittent every 12 hours  pregabalin 50 milliGRAM(s) Oral two times a day  propofol Infusion 5 MICROgram(s)/kG/Min IV Continuous <Continuous>  senna 2 Tablet(s) Oral at bedtime  simvastatin 20 milliGRAM(s) Oral at bedtime    Drug Dosing Weight  Height (cm): 170 (13 Dec 2017 17:16)  Weight (kg): 57 (13 Dec 2017 17:16)  BMI (kg/m2): 19.7 (13 Dec 2017 17:16)  BSA (m2): 1.66 (13 Dec 2017 17:16)    CENTRAL LINE: [x] YES [] NO  LOCATION: [facility]  DATE INSERTED:   REMOVE: [] YES [x] NO  EXPLAIN: HD    JACOBSON: [x] YES [] NO    DATE INSERTED:   REMOVE:  [] YES [x] NO  EXPLAIN: IOs    A-LINE: NO    PMH -reviewed admission note, no change since admission  PAST MEDICAL & SURGICAL HISTORY:  Clostridium difficile infection  Osteomyelitis of jaw  Parathyroid adenoma  Hypothyroidism  CKD (chronic kidney disease)  Anemia  CHF (congestive heart failure)  Diabetic neuropathy  Diabetes mellitus  HTN (hypertension)  S/P :   No Past Surgical History    ICU Vital Signs Last 24 Hrs  T(C): 36.9 (19 Dec 2017 04:00), Max: 38.5 (18 Dec 2017 16:25)  T(F): 98.5 (19 Dec 2017 04:00), Max: 101.3 (18 Dec 2017 16:25)  HR: 76 (19 Dec 2017 07:00) (76 - 91)  BP: 110/50 (19 Dec 2017 07:00) (99/53 - 149/78)  BP(mean): 64 (19 Dec 2017 07:00) (62 - 84)  ABP: --  ABP(mean): --  RR: 15 (19 Dec 2017 07:00) (0 - 20)  SpO2: 100% (19 Dec 2017 07:00) (89% - 100%)      ABG - ( 19 Dec 2017 04:46 )  pH: 7.48  /  pCO2: 40    /  pO2: 217   / HCO3: 30    / Base Excess: 6.0   /  SaO2: 100                    @ 07:01  -   @ 07:00  --------------------------------------------------------  IN: 494.8 mL / OUT: 35 mL / NET: 459.8 mL        Mode: AC/ CMV (Assist Control/ Continuous Mandatory Ventilation)  RR (machine): 15  TV (machine): 400  FiO2: 80  PEEP: 10  ITime: 1  MAP: 15  PIP: 34      PHYSICAL EXAM:    GENERAL: [x]NAD, []well-groomed, []well-developed  HEAD:  [x]Atraumatic, []Normocephalic  EYES: sluggish pupillary response bilaterally, conjunctiva and sclera clear  ENMT: [x]No tonsillar erythema, exudates, or enlargement; []Moist mucous membranes, []Good dentition, []No lesions  NECK: [x]Supple, normal appearance, []No JVD; []Normal thyroid; []Trachea midline  NERVOUS SYSTEM: sedated, nonverbal, noncommunicative  CHEST/LUNG: [x]No chest deformity; []Normal percussion bilaterally; []No rales, rhonchi, wheezing   HEART: [x]Regular rate and rhythm; []No murmurs, rubs, or gallops  ABDOMEN: [x]Soft, Nontender, Nondistended; []Bowel sounds present  EXTREMITIES:  [x]2+ Peripheral Pulses, []No clubbing, cyanosis, or edema  LYMPH: [x]No lymphadenopathy noted  SKIN: [x]No rashes or lesions; []Good capillary refill      LABS:  CBC Full  -  ( 19 Dec 2017 05:38 )  WBC Count : 4.3 K/uL  Hemoglobin : 6.8 g/dL  Hematocrit : 21.7 %  Platelet Count - Automated : 64 K/uL  Mean Cell Volume : 92.2 fl  Mean Cell Hemoglobin : 28.7 pg  Mean Cell Hemoglobin Concentration : 31.2 gm/dL  Auto Neutrophil # : x  Auto Lymphocyte # : x  Auto Monocyte # : x  Auto Eosinophil # : x  Auto Basophil # : x  Auto Neutrophil % : x  Auto Lymphocyte % : x  Auto Monocyte % : x  Auto Eosinophil % : x  Auto Basophil % : x    12-19    142  |  101  |  49<H>  ----------------------------<  168<H>  3.7   |  30  |  4.46<H>    Ca    7.7<L>      19 Dec 2017 04:54  Phos  2.5     12-  Mg     2.2     12-      PT/INR - ( 19 Dec 2017 05:38 )   PT: 12.3 sec;   INR: 1.13 ratio         PTT - ( 19 Dec 2017 05:38 )  PTT:32.5 sec        RADIOLOGY & ADDITIONAL STUDIES REVIEWED:  yes    [ ]GOALS OF CARE DISCUSSION WITH PATIENT/FAMILY/PROXY:    CRITICAL CARE TIME SPENT: 35 minutes    Assessment and Plan:   · Assessment		  52 y/o F from [facility] ( long term care 2/2 diffculty walking possibly 2/2 polyneuropathy) w/ PMHx of anemia, CHF( diastolic dysfunction), CKD, Clostridium difficile infection, DM, Diabetic neuropathy, HTN, HLD, Hypothyroidism, Hypoparathyroidism, Osteomyelitis of jaw, and Parathyroid adenoma, GERD p/w c/o SOB and difficulty breathing x1 week. Pt also reports associated mild cough. Pt denies fever, chills, general pain, C/P, or any other complaints. NO  In ED pt is Awake , responds to pain and verbal stimuli, She in moderate respiratory distress. placed on BIPAP. ICU consulted for new BIPAP.    Admit to ICU for Acute Hypoxic and hypercapnic respiratory failure require new Bipap  2/2 Exacerbation of CHF or possibly pneumonia      Problem/Plan - 1:  ·  Problem: Acute respiratory failure.  Plan: Inubated . Pt was hypoxic on BiPAP,  failed NIPPV support  Acute respiratory failure secondary to HAP (elevated procalcitonin) and fluid overload  -Continue with Lasix 80 Daily  -CXR improved with dialysis  -continue vent support  -HD last night  -trial SAT/ SBT  today.  -on Zosyn on   - c/w nebs        Problem/Plan - 2:  ·  Problem: R/O Upper GI bleed.  Plan: c/w protonix, no recurrent GI Bleed.  Hb 6.8 this am,  one episode of green BM last night. given 1 PRBC, will f/u CBC at 12:00pm   EGD was done yesterday, showed duodenal ulcer, no active bleeding, gastritis and esophagitis.  f/u biopsy report  monitor CBC  c/w protonix 40mg, daily.       Problem/Plan - 3:  ·  Problem: Hypothyroidism.  Plan: c/w home dose LT4 50 mcg daily  TSH wnl.      Problem/Plan - 4:  ·  Problem: CHF (congestive heart failure).  Plan: unknown EF  c/w home cardiac meds   TTE - RV systolic pressure is 49 mm Hg. Mild pulmonary  hypertension.      Problem/Plan - 5:  ·  Problem: CKD (chronic kidney disease).  Plan: getting HD via [employer].      Problem/Plan - 6:  Problem: Diabetes mellitus. Plan: c/w Lantus 10 units daily , HSS   HbA1c 5.2.     Problem/Plan - 7:  ·  Problem: HTN (hypertension).  Plan: c/w home BP meds.      Problem/Plan - 8:  ·  Problem: Prophylactic measure.  Plan: c/w DVT ppx   IMPROVE score - 2.

## 2017-12-19 NOTE — CONSULT NOTE ADULT - PROBLEM SELECTOR RECOMMENDATION 2
Check TFTs  Cont meds  Endo eval
Last eGFR: 12. Received HD last night. Continue dialysis and recs as per nephro.

## 2017-12-20 LAB
ANION GAP SERPL CALC-SCNC: 11 MMOL/L — SIGNIFICANT CHANGE UP (ref 5–17)
BASE EXCESS BLDA CALC-SCNC: 4.9 MMOL/L — HIGH (ref -2–2)
BLOOD GAS COMMENTS ARTERIAL: SIGNIFICANT CHANGE UP
BUN SERPL-MCNC: 70 MG/DL — HIGH (ref 7–18)
CALCIUM SERPL-MCNC: 7.9 MG/DL — LOW (ref 8.4–10.5)
CHLORIDE SERPL-SCNC: 100 MMOL/L — SIGNIFICANT CHANGE UP (ref 96–108)
CO2 SERPL-SCNC: 29 MMOL/L — SIGNIFICANT CHANGE UP (ref 22–31)
CREAT SERPL-MCNC: 5.6 MG/DL — HIGH (ref 0.5–1.3)
CULTURE RESULTS: SIGNIFICANT CHANGE UP
GLUCOSE BLDC GLUCOMTR-MCNC: 142 MG/DL — HIGH (ref 70–99)
GLUCOSE BLDC GLUCOMTR-MCNC: 196 MG/DL — HIGH (ref 70–99)
GLUCOSE SERPL-MCNC: 197 MG/DL — HIGH (ref 70–99)
HCO3 BLDA-SCNC: 29 MMOL/L — HIGH (ref 23–27)
HCT VFR BLD CALC: 26.2 % — LOW (ref 34.5–45)
HGB BLD-MCNC: 8.3 G/DL — LOW (ref 11.5–15.5)
HOROWITZ INDEX BLDA+IHG-RTO: 60 — SIGNIFICANT CHANGE UP
MAGNESIUM SERPL-MCNC: 2.6 MG/DL — SIGNIFICANT CHANGE UP (ref 1.6–2.6)
MCHC RBC-ENTMCNC: 29 PG — SIGNIFICANT CHANGE UP (ref 27–34)
MCHC RBC-ENTMCNC: 31.9 GM/DL — LOW (ref 32–36)
MCV RBC AUTO: 91 FL — SIGNIFICANT CHANGE UP (ref 80–100)
PCO2 BLDA: 44 MMHG — SIGNIFICANT CHANGE UP (ref 32–46)
PH BLDA: 7.44 — SIGNIFICANT CHANGE UP (ref 7.35–7.45)
PHOSPHATE SERPL-MCNC: 2.4 MG/DL — LOW (ref 2.5–4.5)
PLATELET # BLD AUTO: 79 K/UL — LOW (ref 150–400)
PO2 BLDA: 70 MMHG — LOW (ref 74–108)
POTASSIUM SERPL-MCNC: 3.6 MMOL/L — SIGNIFICANT CHANGE UP (ref 3.5–5.3)
POTASSIUM SERPL-SCNC: 3.6 MMOL/L — SIGNIFICANT CHANGE UP (ref 3.5–5.3)
RBC # BLD: 2.87 M/UL — LOW (ref 3.8–5.2)
RBC # FLD: 15 % — HIGH (ref 10.3–14.5)
SAO2 % BLDA: 94 % — SIGNIFICANT CHANGE UP (ref 92–96)
SODIUM SERPL-SCNC: 140 MMOL/L — SIGNIFICANT CHANGE UP (ref 135–145)
SPECIMEN SOURCE: SIGNIFICANT CHANGE UP
SURGICAL PATHOLOGY FINAL REPORT - CH: SIGNIFICANT CHANGE UP
WBC # BLD: 5.3 K/UL — SIGNIFICANT CHANGE UP (ref 3.8–10.5)
WBC # FLD AUTO: 5.3 K/UL — SIGNIFICANT CHANGE UP (ref 3.8–10.5)

## 2017-12-20 PROCEDURE — 71010: CPT | Mod: 26

## 2017-12-20 RX ORDER — ERYTHROPOIETIN 10000 [IU]/ML
10000 INJECTION, SOLUTION INTRAVENOUS; SUBCUTANEOUS ONCE
Qty: 0 | Refills: 0 | Status: COMPLETED | OUTPATIENT
Start: 2017-12-20 | End: 2017-12-20

## 2017-12-20 RX ORDER — PHENYLEPHRINE HYDROCHLORIDE 10 MG/ML
0.5 INJECTION INTRAVENOUS
Qty: 80 | Refills: 0 | Status: DISCONTINUED | OUTPATIENT
Start: 2017-12-20 | End: 2017-12-21

## 2017-12-20 RX ADMIN — Medication 50 MICROGRAM(S): at 05:26

## 2017-12-20 RX ADMIN — Medication 1 MILLIGRAM(S): at 12:45

## 2017-12-20 RX ADMIN — PANTOPRAZOLE SODIUM 40 MILLIGRAM(S): 20 TABLET, DELAYED RELEASE ORAL at 12:45

## 2017-12-20 RX ADMIN — Medication 50 MILLIGRAM(S): at 18:28

## 2017-12-20 RX ADMIN — Medication 81 MILLIGRAM(S): at 12:45

## 2017-12-20 RX ADMIN — Medication 80 MILLIGRAM(S): at 05:20

## 2017-12-20 RX ADMIN — PHENYLEPHRINE HYDROCHLORIDE 10.69 MICROGRAM(S)/KG/MIN: 10 INJECTION INTRAVENOUS at 12:46

## 2017-12-20 RX ADMIN — ERYTHROPOIETIN 10000 UNIT(S): 10000 INJECTION, SOLUTION INTRAVENOUS; SUBCUTANEOUS at 12:11

## 2017-12-20 RX ADMIN — CEFEPIME 100 MILLIGRAM(S): 1 INJECTION, POWDER, FOR SOLUTION INTRAMUSCULAR; INTRAVENOUS at 16:37

## 2017-12-20 RX ADMIN — Medication 2: at 00:03

## 2017-12-20 RX ADMIN — Medication 500 MILLIGRAM(S): at 12:45

## 2017-12-20 RX ADMIN — Medication 50 MILLIGRAM(S): at 05:20

## 2017-12-20 RX ADMIN — SIMVASTATIN 20 MILLIGRAM(S): 20 TABLET, FILM COATED ORAL at 21:39

## 2017-12-20 RX ADMIN — CINACALCET 30 MILLIGRAM(S): 30 TABLET, FILM COATED ORAL at 12:45

## 2017-12-20 RX ADMIN — Medication 300 MILLIGRAM(S): at 12:45

## 2017-12-20 RX ADMIN — PROPOFOL 1.71 MICROGRAM(S)/KG/MIN: 10 INJECTION, EMULSION INTRAVENOUS at 12:46

## 2017-12-20 RX ADMIN — Medication 1: at 05:26

## 2017-12-20 RX ADMIN — PROPOFOL 1.71 MICROGRAM(S)/KG/MIN: 10 INJECTION, EMULSION INTRAVENOUS at 00:03

## 2017-12-20 NOTE — PROGRESS NOTE ADULT - ASSESSMENT
52 y/o F from [facility] ( long term care 2/2 diffculty walking possibly 2/2 polyneuropathy) w/ PMHx of anemia, CHF( diastolic dysfunction), CKD, Clostridium difficile infection, DM, Diabetic neuropathy, HTN, HLD, Hypothyroidism, Hypoparathyroidism, Osteomyelitis of jaw, and Parathyroid adenoma, GERD p/w c/o SOB and difficulty breathing x1 week. Pt also reports associated mild cough. Pt denies fever, chills, general pain, C/P, or any other complaints. NO  In ED pt is Awake , responds to pain and verbal stimuli, She in moderate respiratory distress. placed on BIPAP. ICU consulted for new BIPAP.    Admit to ICU for Acute Hypoxic and hypercapnic respiratory failure require new Bipap  2/2 Exacerbation of CHF or possibly pneumonia

## 2017-12-20 NOTE — PROGRESS NOTE ADULT - SUBJECTIVE AND OBJECTIVE BOX
Patient is a 52y old  Female who presents with a chief complaint of SOB.   Pt also reports associated mild cough.  Currently intubated, awake, alert on mechanical ventilation but in NAD. Off pressors meds.  S/p HD. S/p PRBCs because of severe anemia.        INTERVAL HPI/OVERNIGHT EVENTS:      VITAL SIGNS:  T(F): 98.1 (12-20-17 @ 04:00)  HR: 85 (12-20-17 @ 09:00)  BP: 125/61 (12-20-17 @ 09:00)  RR: 16 (12-20-17 @ 09:00)  SpO2: 93% (12-20-17 @ 09:00)  Wt(kg): --  I&O's Detail    19 Dec 2017 07:01  -  20 Dec 2017 07:00  --------------------------------------------------------  IN:    Enteral Tube Flush: 100 mL    Nepro: 445 mL    Packed Red Blood Cells: 350 mL    propofol Infusion: 126.6 mL    Solution: 50 mL    Solution: 100 mL  Total IN: 1171.6 mL    OUT:    Indwelling Catheter - Urethral: 10 mL  Total OUT: 10 mL    Total NET: 1161.6 mL        Mode: AC/ CMV (Assist Control/ Continuous Mandatory Ventilation)  RR (machine): 15  TV (machine): 400  FiO2: 50  PEEP: 8  ITime: 1  MAP: 14  PIP: 32        REVIEW OF SYSTEMS:    CONSTITUTIONAL:  No fevers, chills, sweats    HEENT:  Eyes:  No diplopia or blurred vision. ENT:  No earache, sore throat or runny nose.    CARDIOVASCULAR:  No pressure, squeezing, tightness, or heaviness about the chest; no palpitations.    RESPIRATORY:  Per HPI    GASTROINTESTINAL:  No abdominal pain, nausea, vomiting or diarrhea.    GENITOURINARY:  No dysuria, frequency or urgency.    NEUROLOGIC:  No paresthesias, fasciculations, seizures or weakness.    PSYCHIATRIC:  No disorder of thought or mood.      PHYSICAL EXAM:    Constitutional: Well developed and nourished  Eyes:Perrla  ENMT: normal  Neck:supple  Respiratory: good air entry  Cardiovascular: S1 S2 regular  Gastrointestinal: Soft, Non tender  Extremities: No edema  Vascular:normal  Neurological:Awake, alert,Ox3  Musculoskeletal:Normal      MEDICATIONS  (STANDING):  ascorbic acid 500 milliGRAM(s) Oral daily  aspirin  chewable 81 milliGRAM(s) Oral daily  cefepime  IVPB 1000 milliGRAM(s) IV Intermittent every 24 hours  cinacalcet 30 milliGRAM(s) Oral daily  dextrose 5%. 1000 milliLiter(s) (50 mL/Hr) IV Continuous <Continuous>  dextrose 50% Injectable 12.5 Gram(s) IV Push once  dextrose 50% Injectable 25 Gram(s) IV Push once  dextrose 50% Injectable 25 Gram(s) IV Push once  ferrous    sulfate Liquid 300 milliGRAM(s) Enteral Tube daily  folic acid 1 milliGRAM(s) Oral daily  insulin lispro (HumaLOG) corrective regimen sliding scale   SubCutaneous every 6 hours  levoFLOXacin IVPB 250 milliGRAM(s) IV Intermittent every 24 hours  levothyroxine 50 MICROGram(s) Oral daily  lidocaine 1% Injectable 10 milliLiter(s) Local Injection once  pantoprazole  Injectable 40 milliGRAM(s) IV Push daily  pregabalin 50 milliGRAM(s) Oral two times a day  propofol Infusion 5 MICROgram(s)/kG/Min (1.71 mL/Hr) IV Continuous <Continuous>  simvastatin 20 milliGRAM(s) Oral at bedtime    MEDICATIONS  (PRN):  acetaminophen   Tablet 650 milliGRAM(s) Oral every 6 hours PRN For Temp greater than 38 C (100.4 F)  acetaminophen  Suppository 650 milliGRAM(s) Rectal every 6 hours PRN For Temp greater than 38 C (100.4 F)  dextrose Gel 1 Dose(s) Oral once PRN Blood Glucose LESS THAN 70 milliGRAM(s)/deciliter  glucagon  Injectable 1 milliGRAM(s) IntraMuscular once PRN Glucose LESS THAN 70 milligrams/deciliter  HYDROmorphone  Injectable 1 milliGRAM(s) IV Push every 4 hours PRN Severe Pain (7 - 10)      Allergies    No Known Allergies    Intolerances        LABS:                        8.2    4.9   )-----------( 69       ( 19 Dec 2017 15:33 )             26.4     12-20    140  |  100  |  70<H>  ----------------------------<  197<H>  3.6   |  29  |  5.60<H>    Ca    7.9<L>      20 Dec 2017 07:24  Phos  2.4     12-20  Mg     2.6     12-20      PT/INR - ( 19 Dec 2017 05:38 )   PT: 12.3 sec;   INR: 1.13 ratio         PTT - ( 19 Dec 2017 05:38 )  PTT:32.5 sec    ABG - ( 20 Dec 2017 04:44 )  pH: 7.44  /  pCO2: 44    /  pO2: 70    / HCO3: 29    / Base Excess: 4.9   /  SaO2: 94                    CAPILLARY BLOOD GLUCOSE      POCT Blood Glucose.: 196 mg/dL (20 Dec 2017 05:23)  POCT Blood Glucose.: 231 mg/dL (19 Dec 2017 23:59)    pro-bnp [id number] 12-13 @ 12:30     d-dimer --  12-13 @ 12:30      RADIOLOGY & ADDITIONAL TESTS:    CXR:  < from: Xray Chest 1 View AP -PORTABLE-Routine (12.20.17 @ 06:52) >  Extensive bilateral lung parenchymal airspace opacities,   which appear increased within the right upper lobe.    < end of copied text >    Ct scan chest:    ekg;    echo:  < from: Transthoracic Echocardiogram (12.14.17 @ 07:15) >  CONCLUSIONS:  1. Mitral annular calcification. Trace mitral regurgitation.  2. Mildly dilated left atrium.  LA volume index = 39 cc/m2.  3. Severe concentric left ventricular hypertrophy.  4. Endocardium not well visualized; grossly low normal left  ventricular function.  Moderate diastolic (stage II) dysfunction.  5. Normal right ventricular size and function.  6. RV systolic pressure is 49 mm Hg. Mild pulmonary hypertension.  7. Small pericardial effusion measuring 0.7 cm at its  largest dimension when pooled posterior to the LV.  No  echocardiographic evidence of pericardial tamponade.    < end of copied text > Patient is a 52y old  Female who presents with a chief complaint of SOB.   Pt also reports associated mild cough.  Currently intubated, awake, alert on mechanical ventilation but in NAD. Off pressors meds.  Having HD. S/p PRBCs because of severe anemia.        INTERVAL HPI/OVERNIGHT EVENTS:      VITAL SIGNS:  T(F): 98.1 (12-20-17 @ 04:00)  HR: 85 (12-20-17 @ 09:00)  BP: 125/61 (12-20-17 @ 09:00)  RR: 16 (12-20-17 @ 09:00)  SpO2: 93% (12-20-17 @ 09:00)  Wt(kg): --  I&O's Detail    19 Dec 2017 07:01  -  20 Dec 2017 07:00  --------------------------------------------------------  IN:    Enteral Tube Flush: 100 mL    Nepro: 445 mL    Packed Red Blood Cells: 350 mL    propofol Infusion: 126.6 mL    Solution: 50 mL    Solution: 100 mL  Total IN: 1171.6 mL    OUT:    Indwelling Catheter - Urethral: 10 mL  Total OUT: 10 mL    Total NET: 1161.6 mL        Mode: AC/ CMV (Assist Control/ Continuous Mandatory Ventilation)  RR (machine): 15  TV (machine): 400  FiO2: 50  PEEP: 8  ITime: 1  MAP: 14  PIP: 32        REVIEW OF SYSTEMS:    CONSTITUTIONAL:  No fevers, chills, sweats    HEENT:  Eyes:  No diplopia or blurred vision. ENT:  No earache, sore throat or runny nose.    CARDIOVASCULAR:  No pressure, squeezing, tightness, or heaviness about the chest; no palpitations.    RESPIRATORY:  Per HPI    GASTROINTESTINAL:  No abdominal pain, nausea, vomiting or diarrhea.    GENITOURINARY:  No dysuria, frequency or urgency.    NEUROLOGIC:  No paresthesias, fasciculations, seizures or weakness.    PSYCHIATRIC:  No disorder of thought or mood.      PHYSICAL EXAM:    Constitutional: Well developed and nourished  Eyes:Perrla  ENMT: normal  Neck:supple  Respiratory: good air entry  Cardiovascular: S1 S2 regular  Gastrointestinal: Soft, Non tender  Extremities: No edema  Vascular:normal  Neurological:Sedated  Musculoskeletal:Normal      MEDICATIONS  (STANDING):  ascorbic acid 500 milliGRAM(s) Oral daily  aspirin  chewable 81 milliGRAM(s) Oral daily  cefepime  IVPB 1000 milliGRAM(s) IV Intermittent every 24 hours  cinacalcet 30 milliGRAM(s) Oral daily  dextrose 5%. 1000 milliLiter(s) (50 mL/Hr) IV Continuous <Continuous>  dextrose 50% Injectable 12.5 Gram(s) IV Push once  dextrose 50% Injectable 25 Gram(s) IV Push once  dextrose 50% Injectable 25 Gram(s) IV Push once  ferrous    sulfate Liquid 300 milliGRAM(s) Enteral Tube daily  folic acid 1 milliGRAM(s) Oral daily  insulin lispro (HumaLOG) corrective regimen sliding scale   SubCutaneous every 6 hours  levoFLOXacin IVPB 250 milliGRAM(s) IV Intermittent every 24 hours  levothyroxine 50 MICROGram(s) Oral daily  lidocaine 1% Injectable 10 milliLiter(s) Local Injection once  pantoprazole  Injectable 40 milliGRAM(s) IV Push daily  pregabalin 50 milliGRAM(s) Oral two times a day  propofol Infusion 5 MICROgram(s)/kG/Min (1.71 mL/Hr) IV Continuous <Continuous>  simvastatin 20 milliGRAM(s) Oral at bedtime    MEDICATIONS  (PRN):  acetaminophen   Tablet 650 milliGRAM(s) Oral every 6 hours PRN For Temp greater than 38 C (100.4 F)  acetaminophen  Suppository 650 milliGRAM(s) Rectal every 6 hours PRN For Temp greater than 38 C (100.4 F)  dextrose Gel 1 Dose(s) Oral once PRN Blood Glucose LESS THAN 70 milliGRAM(s)/deciliter  glucagon  Injectable 1 milliGRAM(s) IntraMuscular once PRN Glucose LESS THAN 70 milligrams/deciliter  HYDROmorphone  Injectable 1 milliGRAM(s) IV Push every 4 hours PRN Severe Pain (7 - 10)      Allergies    No Known Allergies    Intolerances        LABS:                        8.2    4.9   )-----------( 69       ( 19 Dec 2017 15:33 )             26.4     12-20    140  |  100  |  70<H>  ----------------------------<  197<H>  3.6   |  29  |  5.60<H>    Ca    7.9<L>      20 Dec 2017 07:24  Phos  2.4     12-20  Mg     2.6     12-20      PT/INR - ( 19 Dec 2017 05:38 )   PT: 12.3 sec;   INR: 1.13 ratio         PTT - ( 19 Dec 2017 05:38 )  PTT:32.5 sec    ABG - ( 20 Dec 2017 04:44 )  pH: 7.44  /  pCO2: 44    /  pO2: 70    / HCO3: 29    / Base Excess: 4.9   /  SaO2: 94                    CAPILLARY BLOOD GLUCOSE      POCT Blood Glucose.: 196 mg/dL (20 Dec 2017 05:23)  POCT Blood Glucose.: 231 mg/dL (19 Dec 2017 23:59)    pro-bnp [id number] 12-13 @ 12:30     d-dimer --  12-13 @ 12:30      RADIOLOGY & ADDITIONAL TESTS:    CXR:  < from: Xray Chest 1 View AP -PORTABLE-Routine (12.20.17 @ 06:52) >  Extensive bilateral lung parenchymal airspace opacities,   which appear increased within the right upper lobe.    < end of copied text >    Ct scan chest:    ekg;    echo:  < from: Transthoracic Echocardiogram (12.14.17 @ 07:15) >  CONCLUSIONS:  1. Mitral annular calcification. Trace mitral regurgitation.  2. Mildly dilated left atrium.  LA volume index = 39 cc/m2.  3. Severe concentric left ventricular hypertrophy.  4. Endocardium not well visualized; grossly low normal left  ventricular function.  Moderate diastolic (stage II) dysfunction.  5. Normal right ventricular size and function.  6. RV systolic pressure is 49 mm Hg. Mild pulmonary hypertension.  7. Small pericardial effusion measuring 0.7 cm at its  largest dimension when pooled posterior to the LV.  No  echocardiographic evidence of pericardial tamponade.    < end of copied text >

## 2017-12-20 NOTE — PROGRESS NOTE ADULT - ASSESSMENT
52 yr old female with progressive CKD V admitted with decompesated CHF sec to CKD V. NOW ESRD. On antibiotics for healthcare associated PNA.   dialysed last night. HD  today with pressors as hypotensive   vent  weaning trial     [facility] IN THE NEXT 24  HRS

## 2017-12-20 NOTE — PROGRESS NOTE ADULT - SUBJECTIVE AND OBJECTIVE BOX
SEEN ON HD WITH HYPOTENSION.    No Known Allergies    Hospital Medications:   MEDICATIONS  (STANDING):  ascorbic acid 500 milliGRAM(s) Oral daily  aspirin  chewable 81 milliGRAM(s) Oral daily  cefepime  IVPB 1000 milliGRAM(s) IV Intermittent every 24 hours  cinacalcet 30 milliGRAM(s) Oral daily  dextrose 5%. 1000 milliLiter(s) (50 mL/Hr) IV Continuous <Continuous>  dextrose 50% Injectable 12.5 Gram(s) IV Push once  dextrose 50% Injectable 25 Gram(s) IV Push once  dextrose 50% Injectable 25 Gram(s) IV Push once  ferrous    sulfate Liquid 300 milliGRAM(s) Enteral Tube daily  folic acid 1 milliGRAM(s) Oral daily  insulin lispro (HumaLOG) corrective regimen sliding scale   SubCutaneous every 6 hours  levoFLOXacin IVPB 250 milliGRAM(s) IV Intermittent every 24 hours  levothyroxine 50 MICROGram(s) Oral daily  lidocaine 1% Injectable 10 milliLiter(s) Local Injection once  pantoprazole  Injectable 40 milliGRAM(s) IV Push daily  phenylephrine    Infusion 0.5 MICROgram(s)/kG/Min (10.688 mL/Hr) IV Continuous <Continuous>  pregabalin 50 milliGRAM(s) Oral two times a day  propofol Infusion 5 MICROgram(s)/kG/Min (1.71 mL/Hr) IV Continuous <Continuous>  simvastatin 20 milliGRAM(s) Oral at bedtime      VITALS:  T(F): 99.6 (12-20-17 @ 15:30), Max: 100.2 (12-20-17 @ 13:30)  HR: 87 (12-20-17 @ 16:30)  BP: 124/57 (12-20-17 @ 16:30)  RR: 18 (12-20-17 @ 16:30)  SpO2: 94% (12-20-17 @ 16:30)  Wt(kg): --    12-19 @ 07:01  -  12-20 @ 07:00  --------------------------------------------------------  IN: 1171.6 mL / OUT: 10 mL / NET: 1161.6 mL    12-20 @ 07:01  -  12-20 @ 17:10  --------------------------------------------------------  IN: 44.7 mL / OUT: 0 mL / NET: 44.7 mL        PHYSICAL EXAM:  Constitutional: INTUBATED  HEENT: anicteric sclera, oropharynx clear, MMM  Neck: No JVD  Respiratory: CTAB, no wheezes, rales or rhonchi  Cardiovascular: S1, S2, RRR  Gastrointestinal: BS+, soft, NT/ND  Extremities: No cyanosis or clubbing. No peripheral edema  Neurological: A/O x 3, no focal deficits  Vascular Access: rt groin vasc cath in use.    LABS:  12-20    140  |  100  |  70<H>  ----------------------------<  197<H>  3.6   |  29  |  5.60<H>    Ca    7.9<L>      20 Dec 2017 07:24  Phos  2.4     12-20  Mg     2.6     12-20      Creatinine Trend: 5.60 <--, 4.46 <--, 2.16 <--, 4.54 <--, 3.46 <--, 4.00 <--, 4.98 <--, 4.67 <--, 6.53 <--, 6.44 <--, 6.44 <--                        8.3    5.3   )-----------( 79       ( 20 Dec 2017 07:24 )             26.2     Urine Studies:      RADIOLOGY & ADDITIONAL STUDIES:

## 2017-12-20 NOTE — PROGRESS NOTE ADULT - PROBLEM SELECTOR PLAN 1
Acute respiratory failure secondary to HAP  Patient was intubated  Likely cause is fluid overload v/s PNA  ..  started on new dialysis for CKD and fluid overload

## 2017-12-20 NOTE — PROGRESS NOTE ADULT - SUBJECTIVE AND OBJECTIVE BOX
CHIEF COMPLAINT:Patient is a 52y old  Female who presents with a chief complaint of SOB (13 Dec 2017 16:01)    	  REVIEW OF SYSTEMS:  CONSTITUTIONAL: No fever, weight loss, or fatigue  EYES: No eye pain, visual disturbances, or discharge  ENMT:  No difficulty hearing, tinnitus, vertigo; No sinus or throat pain  NECK: No pain or stiffness  RESPIRATORY: No cough, wheezing, chills or hemoptysis; No Shortness of Breath  CARDIOVASCULAR: No chest pain, palpitations, passing out, dizziness, or leg swelling  GASTROINTESTINAL: No abdominal or epigastric pain. No nausea, vomiting, or hematemesis; No diarrhea or constipation. No melena or hematochezia.  GENITOURINARY: No dysuria, frequency, hematuria, or incontinence  NEUROLOGICAL: No headaches, memory loss, loss of strength, numbness, or tremors  SKIN: No itching, burning, rashes, or lesions   LYMPH Nodes: No enlarged glands  ENDOCRINE: No heat or cold intolerance; No hair loss  MUSCULOSKELETAL: No joint pain or swelling; No muscle, back, or extremity pain  PSYCHIATRIC: No depression, anxiety, mood swings, or difficulty sleeping  HEME/LYMPH: No easy bruising, or bleeding gums  ALLERY AND IMMUNOLOGIC: No hives or eczema	    [ ] All others negative	  [ ] Unable to obtain    PHYSICAL EXAM:  T(C): 36.6 (12-19-17 @ 23:55), Max: 37.2 (12-19-17 @ 20:00)  HR: 82 (12-20-17 @ 03:00) (76 - 87)  BP: 126/60 (12-20-17 @ 03:00) (99/66 - 143/62)  RR: 16 (12-20-17 @ 03:00) (14 - 29)  SpO2: 95% (12-20-17 @ 03:00) (89% - 100%)  Wt(kg): --  I&O's Summary    18 Dec 2017 07:01  -  19 Dec 2017 07:00  --------------------------------------------------------  IN: 494.8 mL / OUT: 35 mL / NET: 459.8 mL    19 Dec 2017 07:01  -  20 Dec 2017 04:38  --------------------------------------------------------  IN: 1079.8 mL / OUT: 10 mL / NET: 1069.8 mL        Appearance: Normal	  HEENT:   Normal oral mucosa, PERRL, EOMI	  Lymphatic: No lymphadenopathy  Cardiovascular: Normal S1 S2, No JVD, No murmurs, No edema  Respiratory: Lungs clear to auscultation	  Psychiatry: A & O x 3, Mood & affect appropriate  Gastrointestinal:  Soft, Non-tender, + BS	  Skin: No rashes, No ecchymoses, No cyanosis	  Neurologic: Non-focal  Extremities: Normal range of motion, No clubbing, cyanosis or edema  Vascular: Peripheral pulses palpable 2+ bilaterally    MEDICATIONS  (STANDING):  ascorbic acid 500 milliGRAM(s) Oral daily  aspirin  chewable 81 milliGRAM(s) Oral daily  cefepime  IVPB 1000 milliGRAM(s) IV Intermittent every 24 hours  cinacalcet 30 milliGRAM(s) Oral daily  dextrose 5%. 1000 milliLiter(s) (50 mL/Hr) IV Continuous <Continuous>  dextrose 50% Injectable 12.5 Gram(s) IV Push once  dextrose 50% Injectable 25 Gram(s) IV Push once  dextrose 50% Injectable 25 Gram(s) IV Push once  ferrous    sulfate Liquid 300 milliGRAM(s) Enteral Tube daily  folic acid 1 milliGRAM(s) Oral daily  furosemide   Injectable 80 milliGRAM(s) IV Push daily  insulin lispro (HumaLOG) corrective regimen sliding scale   SubCutaneous every 6 hours  levoFLOXacin IVPB 250 milliGRAM(s) IV Intermittent every 24 hours  levothyroxine 50 MICROGram(s) Oral daily  lidocaine 1% Injectable 10 milliLiter(s) Local Injection once  pantoprazole  Injectable 40 milliGRAM(s) IV Push daily  pregabalin 50 milliGRAM(s) Oral two times a day  propofol Infusion 5 MICROgram(s)/kG/Min (1.71 mL/Hr) IV Continuous <Continuous>  simvastatin 20 milliGRAM(s) Oral at bedtime      TELEMETRY: 	    ECG:  	  RADIOLOGY:  OTHER: 	  	  CBC Full  -  ( 19 Dec 2017 15:33 )  WBC Count : 4.9 K/uL  Hemoglobin : 8.2 g/dL  Hematocrit : 26.4 %  Platelet Count - Automated : 69 K/uL  Mean Cell Volume : 89.7 fl  Mean Cell Hemoglobin : 27.7 pg  Mean Cell Hemoglobin Concentration : 30.9 gm/dL  Auto Neutrophil # : x  Auto Lymphocyte # : x  Auto Monocyte # : x  Auto Eosinophil # : x  Auto Basophil # : x  Auto Neutrophil % : x  Auto Lymphocyte % : x  Auto Monocyte % : x  Auto Eosinophil % : x  Auto Basophil % : x        CARDIAC MARKERS:                              8.2    4.9   )-----------( 69       ( 19 Dec 2017 15:33 )             26.4       12-19    142  |  101  |  49<H>  ----------------------------<  168<H>  3.7   |  30  |  4.46<H>    Ca    7.7<L>      19 Dec 2017 04:54  Phos  2.5     12-19  Mg     2.2     12-19        PT/INR - ( 19 Dec 2017 05:38 )   PT: 12.3 sec;   INR: 1.13 ratio         PTT - ( 19 Dec 2017 05:38 )  PTT:32.5 sec      proBNP: Serum Pro-Brain Natriuretic Peptide: 11855 pg/mL (12-13 @ 12:30)    Lipid Profile: Cholesterol 53  LDL -13  HDL 38      HgA1c: Hemoglobin A1C, Whole Blood: 5.2 % (12-14 @ 13:29)    TSH: Thyroid Stimulating Hormone, Serum: 0.50 uU/mL (12-14 @ 06:26)    ABG - ( 19 Dec 2017 04:46 )  pH: 7.48  /  pCO2: 40    /  pO2: 217   / HCO3: 30    / Base Excess: 6.0   /  SaO2: 100

## 2017-12-20 NOTE — PROGRESS NOTE ADULT - ATTENDING COMMENTS
52 female nursing home patient with hx of DM, neuropathy, CKD, CHFpEF, GERD, osteomyelitis of jaw, hypothyroidism, hypoparathyroidism, anemia, admitted with acute on chronic renal failure requiring initiation of dialysis, acute respiratory failure requiring intubation on 12/16, possible pneumonia, GI bleed, EGD showed non bleeding duodenal ulcer.  Plan:  - HD per nephrology  - abx per ID  - wean mechanical ventilation as tolerated.   Total cc time 35 min.

## 2017-12-20 NOTE — PROGRESS NOTE ADULT - SUBJECTIVE AND OBJECTIVE BOX
INTERVAL HPI/OVERNIGHT EVENTS: Consider d/c lasix, pt is anuric, HD in AM and thereafter may SBT.    PRESSORS: [ ] YES [X ] NO  WHICH:      Antimicrobial:  cefepime  IVPB 1000 milliGRAM(s) IV Intermittent every 24 hours  levoFLOXacin IVPB 250 milliGRAM(s) IV Intermittent every 24 hours    Cardiovascular:  furosemide   Injectable 80 milliGRAM(s) IV Push daily    Pulmonary:    Hematalogic:  aspirin  chewable 81 milliGRAM(s) Oral daily    Other:  acetaminophen   Tablet 650 milliGRAM(s) Oral every 6 hours PRN  acetaminophen  Suppository 650 milliGRAM(s) Rectal every 6 hours PRN  ascorbic acid 500 milliGRAM(s) Oral daily  cinacalcet 30 milliGRAM(s) Oral daily  dextrose 5%. 1000 milliLiter(s) IV Continuous <Continuous>  dextrose 50% Injectable 12.5 Gram(s) IV Push once  dextrose 50% Injectable 25 Gram(s) IV Push once  dextrose 50% Injectable 25 Gram(s) IV Push once  dextrose Gel 1 Dose(s) Oral once PRN  ferrous    sulfate Liquid 300 milliGRAM(s) Enteral Tube daily  folic acid 1 milliGRAM(s) Oral daily  glucagon  Injectable 1 milliGRAM(s) IntraMuscular once PRN  HYDROmorphone  Injectable 1 milliGRAM(s) IV Push every 4 hours PRN  insulin lispro (HumaLOG) corrective regimen sliding scale   SubCutaneous every 6 hours  levothyroxine 50 MICROGram(s) Oral daily  lidocaine 1% Injectable 10 milliLiter(s) Local Injection once  pantoprazole  Injectable 40 milliGRAM(s) IV Push daily  pregabalin 50 milliGRAM(s) Oral two times a day  propofol Infusion 5 MICROgram(s)/kG/Min IV Continuous <Continuous>  simvastatin 20 milliGRAM(s) Oral at bedtime    acetaminophen   Tablet 650 milliGRAM(s) Oral every 6 hours PRN  acetaminophen  Suppository 650 milliGRAM(s) Rectal every 6 hours PRN  ascorbic acid 500 milliGRAM(s) Oral daily  aspirin  chewable 81 milliGRAM(s) Oral daily  cefepime  IVPB 1000 milliGRAM(s) IV Intermittent every 24 hours  cinacalcet 30 milliGRAM(s) Oral daily  dextrose 5%. 1000 milliLiter(s) IV Continuous <Continuous>  dextrose 50% Injectable 12.5 Gram(s) IV Push once  dextrose 50% Injectable 25 Gram(s) IV Push once  dextrose 50% Injectable 25 Gram(s) IV Push once  dextrose Gel 1 Dose(s) Oral once PRN  ferrous    sulfate Liquid 300 milliGRAM(s) Enteral Tube daily  folic acid 1 milliGRAM(s) Oral daily  furosemide   Injectable 80 milliGRAM(s) IV Push daily  glucagon  Injectable 1 milliGRAM(s) IntraMuscular once PRN  HYDROmorphone  Injectable 1 milliGRAM(s) IV Push every 4 hours PRN  insulin lispro (HumaLOG) corrective regimen sliding scale   SubCutaneous every 6 hours  levoFLOXacin IVPB 250 milliGRAM(s) IV Intermittent every 24 hours  levothyroxine 50 MICROGram(s) Oral daily  lidocaine 1% Injectable 10 milliLiter(s) Local Injection once  pantoprazole  Injectable 40 milliGRAM(s) IV Push daily  pregabalin 50 milliGRAM(s) Oral two times a day  propofol Infusion 5 MICROgram(s)/kG/Min IV Continuous <Continuous>  simvastatin 20 milliGRAM(s) Oral at bedtime    Drug Dosing Weight  Height (cm): 170 (13 Dec 2017 17:16)  Weight (kg): 57 (13 Dec 2017 17:16)  BMI (kg/m2): 19.7 (13 Dec 2017 17:16)  BSA (m2): 1.66 (13 Dec 2017 17:16)    CENTRAL LINE: [x ] YES [ ] NO  LOCATION:   DATE INSERTED:  REMOVE: [ ] YES [ ] NO  EXPLAIN:    JACOBSON: [ ] YES [x] NO    DATE INSERTED:  REMOVE:  [ ] YES [ ] NO  EXPLAIN:    A-LINE:  [ ] YES [x ] NO  LOCATION:   DATE INSERTED:  REMOVE:  [ ] YES [ ] NO  EXPLAIN:    ICU Vital Signs Last 24 Hrs  T(C): 36.7 (20 Dec 2017 04:00), Max: 37.2 (19 Dec 2017 20:00)  T(F): 98.1 (20 Dec 2017 04:00), Max: 98.9 (19 Dec 2017 20:00)  HR: 85 (20 Dec 2017 06:00) (76 - 87)  BP: 127/60 (20 Dec 2017 06:00) (99/66 - 143/62)  BP(mean): 76 (20 Dec 2017 06:00) (64 - 87)  ABP: --  ABP(mean): --  RR: 15 (20 Dec 2017 06:00) (15 - 29)  SpO2: 96% (20 Dec 2017 06:00) (89% - 100%)      ABG - ( 20 Dec 2017 04:44 )  pH: 7.44  /  pCO2: 44    /  pO2: 70    / HCO3: 29    / Base Excess: 4.9   /  SaO2: 94                    12-18 @ 07:01  -  12-19 @ 07:00  --------------------------------------------------------  IN: 494.8 mL / OUT: 35 mL / NET: 459.8 mL        Mode: AC/ CMV (Assist Control/ Continuous Mandatory Ventilation)  RR (machine): 15  TV (machine): 400  FiO2: 60  PEEP: 10  ITime: 1  MAP: 15  PIP: 32      PHYSICAL EXAM:    GENERAL: [x]NAD, []well-groomed, []well-developed  HEAD:  [x]Atraumatic, []Normocephalic  EYES: sluggish pupillary response bilaterally, conjunctiva and sclera clear  ENMT: [x]No tonsillar erythema, exudates, or enlargement; []Moist mucous membranes, []Good dentition, []No lesions  NECK: [x]Supple, normal appearance, []No JVD; []Normal thyroid; []Trachea midline  NERVOUS SYSTEM: sedated, nonverbal, noncommunicative  CHEST/LUNG: [x]No chest deformity; []Normal percussion bilaterally; []No rales, rhonchi, wheezing   HEART: [x]Regular rate and rhythm; []No murmurs, rubs, or gallops  ABDOMEN: [x]Soft, Nontender, Nondistended; []Bowel sounds present  EXTREMITIES:  [x]2+ Peripheral Pulses, []No clubbing, cyanosis, or edema  LYMPH: [x]No lymphadenopathy noted  SKIN: [x]No rashes or lesions; []Good capillary refill      LABS:  CBC Full  -  ( 19 Dec 2017 15:33 )  WBC Count : 4.9 K/uL  Hemoglobin : 8.2 g/dL  Hematocrit : 26.4 %  Platelet Count - Automated : 69 K/uL  Mean Cell Volume : 89.7 fl  Mean Cell Hemoglobin : 27.7 pg  Mean Cell Hemoglobin Concentration : 30.9 gm/dL  Auto Neutrophil # : x  Auto Lymphocyte # : x  Auto Monocyte # : x  Auto Eosinophil # : x  Auto Basophil # : x  Auto Neutrophil % : x  Auto Lymphocyte % : x  Auto Monocyte % : x  Auto Eosinophil % : x  Auto Basophil % : x    12-19    142  |  101  |  49<H>  ----------------------------<  168<H>  3.7   |  30  |  4.46<H>    Ca    7.7<L>      19 Dec 2017 04:54  Phos  2.5     12-19  Mg     2.2     12-19      PT/INR - ( 19 Dec 2017 05:38 )   PT: 12.3 sec;   INR: 1.13 ratio         PTT - ( 19 Dec 2017 05:38 )  PTT:32.5 sec    Culture Results:   Normal Respiratory Liz present (12-18 @ 21:43)      RADIOLOGY & ADDITIONAL STUDIES REVIEWED:  yes    [ ]GOALS OF CARE DISCUSSION WITH PATIENT/FAMILY/PROXY:    CRITICAL CARE TIME SPENT: 35 minutes    Assessment and Plan:   · Assessment		  52 y/o F from [facility] ( long term care 2/2 diffculty walking possibly 2/2 polyneuropathy) w/ PMHx of anemia, CHF( diastolic dysfunction), CKD, Clostridium difficile infection, DM, Diabetic neuropathy, HTN, HLD, Hypothyroidism, Hypoparathyroidism, Osteomyelitis of jaw, and Parathyroid adenoma, GERD p/w c/o SOB and difficulty breathing x1 week. Pt also reports associated mild cough. Pt denies fever, chills, general pain, C/P, or any other complaints. NO  In ED pt is Awake , responds to pain and verbal stimuli, She in moderate respiratory distress. placed on BIPAP. ICU consulted for new BIPAP.    Admit to ICU for Acute Hypoxic and hypercapnic respiratory failure require new Bipap  2/2 Exacerbation of CHF or possibly pneumonia      Problem/Plan - 1:  ·  Problem: Acute respiratory failure.  Plan: Inubated 12/16. Pt was hypoxic on BiPAP,  failed NIPPV support  Acute respiratory failure secondary to HAP (elevated procalcitonin) and fluid overload  -on Lasix 80 Daily  -continue vent support  -will HD today, may trial SAT/ SBT  after HD  -d/c zosyn as not improving, started maxipime 1 gm iv q24 hrs and started levaquin 250mgs iv q24hrs on 12/19 per ID  -pt need permcath in next 48-72 hrs as per Dr. Escoto        Problem/Plan - 2:  ·  Problem: R/O Upper GI bleed.  Plan: c/w protonix, no recurrent GI Bleed.  s/p 1 PRBC, hb 8.2 after transfusion  EGD showed duodenal ulcer, no active bleeding, gastritis and esophagitis.  f/u biopsy report  monitor CBC  c/w protonix 40mg, daily.       Problem/Plan - 3:  ·  Problem: Hypothyroidism.  Plan: c/w home dose LT4 50 mcg daily  TSH wnl.      Problem/Plan - 4:  ·  Problem: CHF (congestive heart failure).  Plan: unknown EF  c/w home cardiac meds   TTE - RV systolic pressure is 49 mm Hg. Mild pulmonary  hypertension.      Problem/Plan - 5:  ·  Problem: CKD (chronic kidney disease).  Plan: getting HD via [facility] [employer].  -will HD today, may trial SAT/ SBT  after HD  -pt need permcath in next 48-72 hrs as per Dr. Escoto         Problem/Plan - 6:  Problem: Diabetes mellitus. Plan: c/w Lantus 10 units daily , HSS   HbA1c 5.2.     Problem/Plan - 7:  ·  Problem: HTN (hypertension).  Plan: c/w home BP meds.      Problem/Plan - 8:  ·  Problem: Prophylactic measure.  Plan: c/w DVT ppx   IMPROVE score - 2.

## 2017-12-21 LAB
ANION GAP SERPL CALC-SCNC: 11 MMOL/L — SIGNIFICANT CHANGE UP (ref 5–17)
APTT BLD: 31.7 SEC — SIGNIFICANT CHANGE UP (ref 27.5–37.4)
BASE EXCESS BLDA CALC-SCNC: 1.6 MMOL/L — SIGNIFICANT CHANGE UP (ref -2–2)
BASOPHILS # BLD AUTO: 0 K/UL — SIGNIFICANT CHANGE UP (ref 0–0.2)
BASOPHILS NFR BLD AUTO: 0.4 % — SIGNIFICANT CHANGE UP (ref 0–2)
BLOOD GAS COMMENTS ARTERIAL: SIGNIFICANT CHANGE UP
BUN SERPL-MCNC: 60 MG/DL — HIGH (ref 7–18)
CALCIUM SERPL-MCNC: 8 MG/DL — LOW (ref 8.4–10.5)
CHLORIDE SERPL-SCNC: 101 MMOL/L — SIGNIFICANT CHANGE UP (ref 96–108)
CO2 SERPL-SCNC: 27 MMOL/L — SIGNIFICANT CHANGE UP (ref 22–31)
CREAT SERPL-MCNC: 5.18 MG/DL — HIGH (ref 0.5–1.3)
CULTURE RESULTS: SIGNIFICANT CHANGE UP
CULTURE RESULTS: SIGNIFICANT CHANGE UP
EOSINOPHIL # BLD AUTO: 0.4 K/UL — SIGNIFICANT CHANGE UP (ref 0–0.5)
EOSINOPHIL NFR BLD AUTO: 3.2 % — SIGNIFICANT CHANGE UP (ref 0–6)
GLUCOSE BLDC GLUCOMTR-MCNC: 126 MG/DL — HIGH (ref 70–99)
GLUCOSE BLDC GLUCOMTR-MCNC: 160 MG/DL — HIGH (ref 70–99)
GLUCOSE BLDC GLUCOMTR-MCNC: 164 MG/DL — HIGH (ref 70–99)
GLUCOSE BLDC GLUCOMTR-MCNC: 167 MG/DL — HIGH (ref 70–99)
GLUCOSE SERPL-MCNC: 171 MG/DL — HIGH (ref 70–99)
HCO3 BLDA-SCNC: 25 MMOL/L — SIGNIFICANT CHANGE UP (ref 23–27)
HCT VFR BLD CALC: 25.6 % — LOW (ref 34.5–45)
HGB BLD-MCNC: 8 G/DL — LOW (ref 11.5–15.5)
HOROWITZ INDEX BLDA+IHG-RTO: 50 — SIGNIFICANT CHANGE UP
INR BLD: 1.17 RATIO — HIGH (ref 0.88–1.16)
LYMPHOCYTES # BLD AUTO: 1.3 K/UL — SIGNIFICANT CHANGE UP (ref 1–3.3)
LYMPHOCYTES # BLD AUTO: 11.7 % — LOW (ref 13–44)
MAGNESIUM SERPL-MCNC: 2.5 MG/DL — SIGNIFICANT CHANGE UP (ref 1.6–2.6)
MCHC RBC-ENTMCNC: 28.8 PG — SIGNIFICANT CHANGE UP (ref 27–34)
MCHC RBC-ENTMCNC: 31.4 GM/DL — LOW (ref 32–36)
MCV RBC AUTO: 92 FL — SIGNIFICANT CHANGE UP (ref 80–100)
MONOCYTES # BLD AUTO: 0.6 K/UL — SIGNIFICANT CHANGE UP (ref 0–0.9)
MONOCYTES NFR BLD AUTO: 5.8 % — SIGNIFICANT CHANGE UP (ref 2–14)
NEUTROPHILS # BLD AUTO: 8.7 K/UL — HIGH (ref 1.8–7.4)
NEUTROPHILS NFR BLD AUTO: 78.9 % — HIGH (ref 43–77)
PCO2 BLDA: 38 MMHG — SIGNIFICANT CHANGE UP (ref 32–46)
PH BLDA: 7.44 — SIGNIFICANT CHANGE UP (ref 7.35–7.45)
PHOSPHATE SERPL-MCNC: 2.2 MG/DL — LOW (ref 2.5–4.5)
PLATELET # BLD AUTO: 101 K/UL — LOW (ref 150–400)
PO2 BLDA: 67 MMHG — LOW (ref 74–108)
POTASSIUM SERPL-MCNC: 3.3 MMOL/L — LOW (ref 3.5–5.3)
POTASSIUM SERPL-SCNC: 3.3 MMOL/L — LOW (ref 3.5–5.3)
PROTHROM AB SERPL-ACNC: 12.8 SEC — HIGH (ref 9.8–12.7)
RBC # BLD: 2.79 M/UL — LOW (ref 3.8–5.2)
RBC # FLD: 15 % — HIGH (ref 10.3–14.5)
SAO2 % BLDA: 93 % — SIGNIFICANT CHANGE UP (ref 92–96)
SODIUM SERPL-SCNC: 139 MMOL/L — SIGNIFICANT CHANGE UP (ref 135–145)
SPECIMEN SOURCE: SIGNIFICANT CHANGE UP
SPECIMEN SOURCE: SIGNIFICANT CHANGE UP
WBC # BLD: 11.1 K/UL — HIGH (ref 3.8–10.5)
WBC # FLD AUTO: 11.1 K/UL — HIGH (ref 3.8–10.5)

## 2017-12-21 PROCEDURE — 71010: CPT | Mod: 26

## 2017-12-21 RX ORDER — POTASSIUM PHOSPHATE, MONOBASIC POTASSIUM PHOSPHATE, DIBASIC 236; 224 MG/ML; MG/ML
15 INJECTION, SOLUTION INTRAVENOUS ONCE
Qty: 0 | Refills: 0 | Status: COMPLETED | OUTPATIENT
Start: 2017-12-21 | End: 2017-12-21

## 2017-12-21 RX ORDER — POTASSIUM CHLORIDE 20 MEQ
40 PACKET (EA) ORAL ONCE
Qty: 0 | Refills: 0 | Status: COMPLETED | OUTPATIENT
Start: 2017-12-21 | End: 2017-12-21

## 2017-12-21 RX ADMIN — CEFEPIME 100 MILLIGRAM(S): 1 INJECTION, POWDER, FOR SOLUTION INTRAMUSCULAR; INTRAVENOUS at 16:00

## 2017-12-21 RX ADMIN — POTASSIUM PHOSPHATE, MONOBASIC POTASSIUM PHOSPHATE, DIBASIC 62.5 MILLIMOLE(S): 236; 224 INJECTION, SOLUTION INTRAVENOUS at 11:55

## 2017-12-21 RX ADMIN — PROPOFOL 1.71 MICROGRAM(S)/KG/MIN: 10 INJECTION, EMULSION INTRAVENOUS at 00:17

## 2017-12-21 RX ADMIN — PANTOPRAZOLE SODIUM 40 MILLIGRAM(S): 20 TABLET, DELAYED RELEASE ORAL at 12:03

## 2017-12-21 RX ADMIN — Medication 500 MILLIGRAM(S): at 12:03

## 2017-12-21 RX ADMIN — PROPOFOL 1.71 MICROGRAM(S)/KG/MIN: 10 INJECTION, EMULSION INTRAVENOUS at 15:32

## 2017-12-21 RX ADMIN — SIMVASTATIN 20 MILLIGRAM(S): 20 TABLET, FILM COATED ORAL at 22:23

## 2017-12-21 RX ADMIN — Medication 300 MILLIGRAM(S): at 12:02

## 2017-12-21 RX ADMIN — Medication 1: at 18:00

## 2017-12-21 RX ADMIN — Medication 1 MILLIGRAM(S): at 12:03

## 2017-12-21 RX ADMIN — Medication 1: at 05:41

## 2017-12-21 RX ADMIN — Medication 1: at 00:17

## 2017-12-21 RX ADMIN — Medication 40 MILLIEQUIVALENT(S): at 08:44

## 2017-12-21 RX ADMIN — CINACALCET 30 MILLIGRAM(S): 30 TABLET, FILM COATED ORAL at 12:02

## 2017-12-21 RX ADMIN — Medication 650 MILLIGRAM(S): at 00:26

## 2017-12-21 NOTE — PROGRESS NOTE ADULT - ASSESSMENT
52 yr old female with progressive CKD V admitted with decompesated CHF sec to CKD V. NOW ESRD. On antibiotics for healthcare associated PNA.   dialysed yesterday.  HD in AM with UF as tolerated   failed  vent  weaning trial

## 2017-12-21 NOTE — PROGRESS NOTE ADULT - SUBJECTIVE AND OBJECTIVE BOX
failed weaning trial today. s/p HD yesterday    No Known Allergies    Hospital Medications:   MEDICATIONS  (STANDING):  ascorbic acid 500 milliGRAM(s) Oral daily  cefepime  IVPB 1000 milliGRAM(s) IV Intermittent every 24 hours  cinacalcet 30 milliGRAM(s) Oral daily  dextrose 5%. 1000 milliLiter(s) (50 mL/Hr) IV Continuous <Continuous>  dextrose 50% Injectable 12.5 Gram(s) IV Push once  dextrose 50% Injectable 25 Gram(s) IV Push once  dextrose 50% Injectable 25 Gram(s) IV Push once  ferrous    sulfate Liquid 300 milliGRAM(s) Enteral Tube daily  folic acid 1 milliGRAM(s) Oral daily  insulin lispro (HumaLOG) corrective regimen sliding scale   SubCutaneous every 6 hours  levoFLOXacin IVPB 250 milliGRAM(s) IV Intermittent every 24 hours  levothyroxine 50 MICROGram(s) Oral daily  pantoprazole  Injectable 40 milliGRAM(s) IV Push daily  propofol Infusion 5 MICROgram(s)/kG/Min (1.71 mL/Hr) IV Continuous <Continuous>  simvastatin 20 milliGRAM(s) Oral at bedtime      VITALS:  T(F): 100.1 (12-21-17 @ 12:00), Max: 100.8 (12-21-17 @ 00:00)  HR: 89 (12-21-17 @ 13:00)  BP: 126/56 (12-21-17 @ 13:00)  RR: 20 (12-21-17 @ 13:00)  SpO2: 100% (12-21-17 @ 13:00)  Wt(kg): --    12-20 @ 07:01  -  12-21 @ 07:00  --------------------------------------------------------  IN: 258.1 mL / OUT: 0 mL / NET: 258.1 mL    12-21 @ 07:01  -  12-21 @ 13:09  --------------------------------------------------------  IN: 282.9 mL / OUT: 0 mL / NET: 282.9 mL        PHYSICAL EXAM:  Constitutional: NAD  Neck: No JVD  Respiratory: CTAB, no wheezes, rales or rhonchi  Cardiovascular: S1, S2, RRR  Gastrointestinal: BS+, soft, NT/ND  Extremities: No cyanosis or clubbing. No peripheral edema  Neurological: A/O x 3, no focal deficits  Psychiatric: Normal mood, normal affect  Vascular Access: LT groin vasc cath.    LABS:  12-21    139  |  101  |  60<H>  ----------------------------<  171<H>  3.3<L>   |  27  |  5.18<H>    Ca    8.0<L>      21 Dec 2017 06:32  Phos  2.2     12-21  Mg     2.5     12-21      Creatinine Trend: 5.18 <--, 5.60 <--, 4.46 <--, 2.16 <--, 4.54 <--, 3.46 <--, 4.00 <--, 4.98 <--, 4.67 <--                        8.0    11.1  )-----------( 101      ( 21 Dec 2017 06:32 )             25.6     Urine Studies:      RADIOLOGY & ADDITIONAL STUDIES:

## 2017-12-21 NOTE — PROGRESS NOTE ADULT - ATTENDING COMMENTS
52 female nursing home patient with hx of DM, neuropathy, CKD, CHFpEF, GERD, osteomyelitis of jaw, hypothyroidism, hypoparathyroidism, anemia, admitted with acute on chronic renal failure requiring initiation of dialysis, acute respiratory failure requiring intubation on 12/16, possible pneumonia, GI bleed, EGD showed non bleeding duodenal ulcer.  Now off pressors.   Plan:  - HD per nephrology  - abx per ID  - wean mechanical ventilation as tolerated.   - will need permacath   Will try to reach family to give update.  Total cc time 35 min.

## 2017-12-21 NOTE — PROGRESS NOTE ADULT - SUBJECTIVE AND OBJECTIVE BOX
Patient is a 52y old  Female who presents with a chief complaint of SOB (13 Dec 2017 16:01)  Remains intubated, awake, not alert on mechanical ventilation but no acute distress    INTERVAL HPI/OVERNIGHT EVENTS:      VITAL SIGNS:  T(F): 100.2 (12-21-17 @ 08:00)  HR: 86 (12-21-17 @ 12:00)  BP: 125/54 (12-21-17 @ 12:00)  RR: 17 (12-21-17 @ 12:00)  SpO2: 100% (12-21-17 @ 12:00)  Wt(kg): --  I&O's Detail    20 Dec 2017 07:01  -  21 Dec 2017 07:00  --------------------------------------------------------  IN:    Nepro: 125 mL    phenylephrine   Infusion: 10.7 mL    propofol Infusion: 122.4 mL  Total IN: 258.1 mL    OUT:  Total OUT: 0 mL    Total NET: 258.1 mL      21 Dec 2017 07:01  -  21 Dec 2017 12:06  --------------------------------------------------------  IN:    Enteral Tube Flush: 75 mL    propofol Infusion: 13.6 mL  Total IN: 88.6 mL    OUT:  Total OUT: 0 mL    Total NET: 88.6 mL        Mode: AC/ CMV (Assist Control/ Continuous Mandatory Ventilation)  RR (machine): 15  TV (machine): 400  FiO2: 60  PEEP: 8  ITime: 1  MAP: 13  PIP: 33        REVIEW OF SYSTEMS:    CONSTITUTIONAL:  No fevers, chills, sweats    HEENT:  Eyes:  No diplopia or blurred vision. ENT:  No earache, sore throat or runny nose.    CARDIOVASCULAR:  No pressure, squeezing, tightness, or heaviness about the chest; no palpitations.    RESPIRATORY:  Per HPI    GASTROINTESTINAL:  No abdominal pain, nausea, vomiting or diarrhea.    GENITOURINARY:  No dysuria, frequency or urgency.    NEUROLOGIC:  No paresthesias, fasciculations, seizures or weakness.    PSYCHIATRIC:  No disorder of thought or mood.      PHYSICAL EXAM:    Constitutional: Well developed and nourished  Eyes:Perrla  ENMT: normal  Neck:supple  Respiratory: good air entry  Cardiovascular: S1 S2 regular  Gastrointestinal: Soft, Non tender  Extremities: No edema  Vascular:normal  Neurological:Awake, alert,Ox3  Musculoskeletal:Normal      MEDICATIONS  (STANDING):  ascorbic acid 500 milliGRAM(s) Oral daily  cefepime  IVPB 1000 milliGRAM(s) IV Intermittent every 24 hours  cinacalcet 30 milliGRAM(s) Oral daily  dextrose 5%. 1000 milliLiter(s) (50 mL/Hr) IV Continuous <Continuous>  dextrose 50% Injectable 12.5 Gram(s) IV Push once  dextrose 50% Injectable 25 Gram(s) IV Push once  dextrose 50% Injectable 25 Gram(s) IV Push once  ferrous    sulfate Liquid 300 milliGRAM(s) Enteral Tube daily  folic acid 1 milliGRAM(s) Oral daily  insulin lispro (HumaLOG) corrective regimen sliding scale   SubCutaneous every 6 hours  levoFLOXacin IVPB 250 milliGRAM(s) IV Intermittent every 24 hours  levothyroxine 50 MICROGram(s) Oral daily  pantoprazole  Injectable 40 milliGRAM(s) IV Push daily  propofol Infusion 5 MICROgram(s)/kG/Min (1.71 mL/Hr) IV Continuous <Continuous>  simvastatin 20 milliGRAM(s) Oral at bedtime    MEDICATIONS  (PRN):  acetaminophen   Tablet 650 milliGRAM(s) Oral every 6 hours PRN For Temp greater than 38 C (100.4 F)  acetaminophen  Suppository 650 milliGRAM(s) Rectal every 6 hours PRN For Temp greater than 38 C (100.4 F)  dextrose Gel 1 Dose(s) Oral once PRN Blood Glucose LESS THAN 70 milliGRAM(s)/deciliter  glucagon  Injectable 1 milliGRAM(s) IntraMuscular once PRN Glucose LESS THAN 70 milligrams/deciliter  HYDROmorphone  Injectable 1 milliGRAM(s) IV Push every 4 hours PRN Severe Pain (7 - 10)      Allergies    No Known Allergies    Intolerances        LABS:                        8.0    11.1  )-----------( 101      ( 21 Dec 2017 06:32 )             25.6     12-21    139  |  101  |  60<H>  ----------------------------<  171<H>  3.3<L>   |  27  |  5.18<H>    Ca    8.0<L>      21 Dec 2017 06:32  Phos  2.2     12-21  Mg     2.5     12-21      PT/INR - ( 21 Dec 2017 06:32 )   PT: 12.8 sec;   INR: 1.17 ratio         PTT - ( 21 Dec 2017 06:32 )  PTT:31.7 sec    ABG - ( 21 Dec 2017 04:55 )  pH: 7.44  /  pCO2: 38    /  pO2: 67    / HCO3: 25    / Base Excess: 1.6   /  SaO2: 93                    CAPILLARY BLOOD GLUCOSE      POCT Blood Glucose.: 126 mg/dL (21 Dec 2017 11:48)  POCT Blood Glucose.: 164 mg/dL (21 Dec 2017 05:39)  POCT Blood Glucose.: 160 mg/dL (21 Dec 2017 00:13)  POCT Blood Glucose.: 142 mg/dL (20 Dec 2017 18:16)        RADIOLOGY & ADDITIONAL TESTS:    CXR:  < from: Xray Chest 1 View AP -PORTABLE-Routine (12.21.17 @ 07:00) >    IMPRESSION:    Bilateral lung opacities without significant change.      < end of copied text >    Ct scan chest:    ekg;    echo:

## 2017-12-22 LAB
ANION GAP SERPL CALC-SCNC: 14 MMOL/L — SIGNIFICANT CHANGE UP (ref 5–17)
APTT BLD: 33 SEC — SIGNIFICANT CHANGE UP (ref 27.5–37.4)
BASE EXCESS BLDA CALC-SCNC: -0.7 MMOL/L — SIGNIFICANT CHANGE UP (ref -2–2)
BASOPHILS # BLD AUTO: 0.1 K/UL — SIGNIFICANT CHANGE UP (ref 0–0.2)
BASOPHILS NFR BLD AUTO: 0.6 % — SIGNIFICANT CHANGE UP (ref 0–2)
BLOOD GAS COMMENTS ARTERIAL: SIGNIFICANT CHANGE UP
BUN SERPL-MCNC: 81 MG/DL — HIGH (ref 7–18)
CALCIUM SERPL-MCNC: 8.5 MG/DL — SIGNIFICANT CHANGE UP (ref 8.4–10.5)
CHLORIDE SERPL-SCNC: 105 MMOL/L — SIGNIFICANT CHANGE UP (ref 96–108)
CO2 SERPL-SCNC: 24 MMOL/L — SIGNIFICANT CHANGE UP (ref 22–31)
CREAT SERPL-MCNC: 6.74 MG/DL — HIGH (ref 0.5–1.3)
EOSINOPHIL # BLD AUTO: 0.2 K/UL — SIGNIFICANT CHANGE UP (ref 0–0.5)
EOSINOPHIL NFR BLD AUTO: 1.4 % — SIGNIFICANT CHANGE UP (ref 0–6)
GLUCOSE BLDC GLUCOMTR-MCNC: 113 MG/DL — HIGH (ref 70–99)
GLUCOSE BLDC GLUCOMTR-MCNC: 149 MG/DL — HIGH (ref 70–99)
GLUCOSE BLDC GLUCOMTR-MCNC: 180 MG/DL — HIGH (ref 70–99)
GLUCOSE BLDC GLUCOMTR-MCNC: 207 MG/DL — HIGH (ref 70–99)
GLUCOSE BLDC GLUCOMTR-MCNC: 209 MG/DL — HIGH (ref 70–99)
GLUCOSE SERPL-MCNC: 152 MG/DL — HIGH (ref 70–99)
HCO3 BLDA-SCNC: 23 MMOL/L — SIGNIFICANT CHANGE UP (ref 23–27)
HCT VFR BLD CALC: 24.5 % — LOW (ref 34.5–45)
HGB BLD-MCNC: 8 G/DL — LOW (ref 11.5–15.5)
HOROWITZ INDEX BLDA+IHG-RTO: 50 — SIGNIFICANT CHANGE UP
INR BLD: 1.23 RATIO — HIGH (ref 0.88–1.16)
LACTATE SERPL-SCNC: 1 MMOL/L — SIGNIFICANT CHANGE UP (ref 0.7–2)
LYMPHOCYTES # BLD AUTO: 1.4 K/UL — SIGNIFICANT CHANGE UP (ref 1–3.3)
LYMPHOCYTES # BLD AUTO: 8.1 % — LOW (ref 13–44)
MAGNESIUM SERPL-MCNC: 2.8 MG/DL — HIGH (ref 1.6–2.6)
MCHC RBC-ENTMCNC: 30 PG — SIGNIFICANT CHANGE UP (ref 27–34)
MCHC RBC-ENTMCNC: 32.4 GM/DL — SIGNIFICANT CHANGE UP (ref 32–36)
MCV RBC AUTO: 92.4 FL — SIGNIFICANT CHANGE UP (ref 80–100)
MONOCYTES # BLD AUTO: 1.3 K/UL — HIGH (ref 0–0.9)
MONOCYTES NFR BLD AUTO: 7.7 % — SIGNIFICANT CHANGE UP (ref 2–14)
NEUTROPHILS # BLD AUTO: 14.1 K/UL — HIGH (ref 1.8–7.4)
NEUTROPHILS NFR BLD AUTO: 82.2 % — HIGH (ref 43–77)
PCO2 BLDA: 36 MMHG — SIGNIFICANT CHANGE UP (ref 32–46)
PH BLDA: 7.43 — SIGNIFICANT CHANGE UP (ref 7.35–7.45)
PHOSPHATE SERPL-MCNC: 4.2 MG/DL — SIGNIFICANT CHANGE UP (ref 2.5–4.5)
PLATELET # BLD AUTO: 125 K/UL — LOW (ref 150–400)
PO2 BLDA: 76 MMHG — SIGNIFICANT CHANGE UP (ref 74–108)
POTASSIUM SERPL-MCNC: 4.6 MMOL/L — SIGNIFICANT CHANGE UP (ref 3.5–5.3)
POTASSIUM SERPL-SCNC: 4.6 MMOL/L — SIGNIFICANT CHANGE UP (ref 3.5–5.3)
PROTHROM AB SERPL-ACNC: 13.5 SEC — HIGH (ref 9.8–12.7)
RBC # BLD: 2.65 M/UL — LOW (ref 3.8–5.2)
RBC # FLD: 15.2 % — HIGH (ref 10.3–14.5)
SAO2 % BLDA: 94 % — SIGNIFICANT CHANGE UP (ref 92–96)
SODIUM SERPL-SCNC: 143 MMOL/L — SIGNIFICANT CHANGE UP (ref 135–145)
WBC # BLD: 17.2 K/UL — HIGH (ref 3.8–10.5)
WBC # FLD AUTO: 17.2 K/UL — HIGH (ref 3.8–10.5)

## 2017-12-22 PROCEDURE — 71010: CPT | Mod: 26

## 2017-12-22 RX ORDER — ERYTHROPOIETIN 10000 [IU]/ML
10000 INJECTION, SOLUTION INTRAVENOUS; SUBCUTANEOUS ONCE
Qty: 0 | Refills: 0 | Status: COMPLETED | OUTPATIENT
Start: 2017-12-22 | End: 2017-12-22

## 2017-12-22 RX ADMIN — SIMVASTATIN 20 MILLIGRAM(S): 20 TABLET, FILM COATED ORAL at 21:15

## 2017-12-22 RX ADMIN — PANTOPRAZOLE SODIUM 40 MILLIGRAM(S): 20 TABLET, DELAYED RELEASE ORAL at 14:32

## 2017-12-22 RX ADMIN — Medication 2: at 23:50

## 2017-12-22 RX ADMIN — Medication 1: at 18:19

## 2017-12-22 RX ADMIN — Medication 1 MILLIGRAM(S): at 14:31

## 2017-12-22 RX ADMIN — Medication 2: at 02:20

## 2017-12-22 RX ADMIN — ERYTHROPOIETIN 10000 UNIT(S): 10000 INJECTION, SOLUTION INTRAVENOUS; SUBCUTANEOUS at 11:44

## 2017-12-22 RX ADMIN — CINACALCET 30 MILLIGRAM(S): 30 TABLET, FILM COATED ORAL at 14:31

## 2017-12-22 RX ADMIN — PROPOFOL 1.71 MICROGRAM(S)/KG/MIN: 10 INJECTION, EMULSION INTRAVENOUS at 14:55

## 2017-12-22 RX ADMIN — Medication 300 MILLIGRAM(S): at 14:32

## 2017-12-22 RX ADMIN — Medication 500 MILLIGRAM(S): at 14:31

## 2017-12-22 RX ADMIN — Medication 650 MILLIGRAM(S): at 06:27

## 2017-12-22 RX ADMIN — CEFEPIME 100 MILLIGRAM(S): 1 INJECTION, POWDER, FOR SOLUTION INTRAMUSCULAR; INTRAVENOUS at 16:00

## 2017-12-22 RX ADMIN — Medication 50 MICROGRAM(S): at 06:16

## 2017-12-22 NOTE — PROGRESS NOTE ADULT - SUBJECTIVE AND OBJECTIVE BOX
seen on HD . spiked fever    No Known Allergies    Hospital Medications:   MEDICATIONS  (STANDING):  ascorbic acid 500 milliGRAM(s) Oral daily  cefepime  IVPB 1000 milliGRAM(s) IV Intermittent every 24 hours  cinacalcet 30 milliGRAM(s) Oral daily  dextrose 5%. 1000 milliLiter(s) (50 mL/Hr) IV Continuous <Continuous>  dextrose 50% Injectable 12.5 Gram(s) IV Push once  dextrose 50% Injectable 25 Gram(s) IV Push once  dextrose 50% Injectable 25 Gram(s) IV Push once  ferrous    sulfate Liquid 300 milliGRAM(s) Enteral Tube daily  folic acid 1 milliGRAM(s) Oral daily  insulin lispro (HumaLOG) corrective regimen sliding scale   SubCutaneous every 6 hours  levoFLOXacin IVPB 250 milliGRAM(s) IV Intermittent every 24 hours  levothyroxine 50 MICROGram(s) Oral daily  pantoprazole  Injectable 40 milliGRAM(s) IV Push daily  propofol Infusion 5 MICROgram(s)/kG/Min (1.71 mL/Hr) IV Continuous <Continuous>  simvastatin 20 milliGRAM(s) Oral at bedtime      VITALS:  T(F): 98.8 (12-22-17 @ 08:00), Max: 100.8 (12-21-17 @ 23:31)  HR: 91 (12-22-17 @ 13:07)  BP: 172/79 (12-22-17 @ 13:07)  RR: 18 (12-22-17 @ 13:07)  SpO2: 100% (12-22-17 @ 13:07)  Wt(kg): --    12-21 @ 07:01  -  12-22 @ 07:00  --------------------------------------------------------  IN: 1129.2 mL / OUT: 0 mL / NET: 1129.2 mL    12-22 @ 07:01  -  12-22 @ 14:00  --------------------------------------------------------  IN: 31.8 mL / OUT: 0 mL / NET: 31.8 mL        PHYSICAL EXAM:  Constitutional: NAD  HEENT: anicteric sclera, oropharynx clear,  Neck: No JVD  Respiratory: CTAB, no wheezes, rales or rhonchi  Cardiovascular: S1, S2, RRR  Gastrointestinal: BS+, soft, NT/ND  Extremities:  No peripheral edema  Neurologic, no focal deficits  Vascular Access: LT groin vasc cath.    LABS:  12-22    143  |  105  |  81<H>  ----------------------------<  152<H>  4.6   |  24  |  6.74<H>    Ca    8.5      22 Dec 2017 06:56  Phos  4.2     12-22  Mg     2.8     12-22      Creatinine Trend: 6.74 <--, 5.18 <--, 5.60 <--, 4.46 <--, 2.16 <--, 4.54 <--, 3.46 <--, 4.00 <--                        8.0    17.2  )-----------( 125      ( 22 Dec 2017 06:56 )             24.5     Urine Studies:      RADIOLOGY & ADDITIONAL STUDIES:

## 2017-12-22 NOTE — PROGRESS NOTE ADULT - ATTENDING COMMENTS
52 female nursing home patient with hx of DM, neuropathy, CKD, CHFpEF, GERD, osteomyelitis of jaw, hypothyroidism, hypoparathyroidism, anemia, admitted with acute on chronic renal failure requiring initiation of dialysis, acute respiratory failure requiring intubation on 12/16, possible pneumonia, GI bleed, EGD showed non bleeding duodenal ulcer.  Now off pressors.   Today with fever and worsening leukocytosis.  Plan:  - source of infection may be dialysis catheter, will need to replace  - HD per nephrology  - abx per ID  - wean mechanical ventilation as tolerated.   - will need permacath when afebrile  updated sister yesterday, will try to reach her again today  Total cc time 35 min.

## 2017-12-22 NOTE — PROGRESS NOTE ADULT - SUBJECTIVE AND OBJECTIVE BOX
52 female nursing home patient with hx of DM, neuropathy, CKD, CHFpEF, GERD, osteomyelitis of jaw, hypothyroidism, hypoparathyroidism, anemia, admitted with acute on chronic renal failure requiring initiation of dialysis, acute respiratory failure requiring intubation on 12/16, possible pneumonia, GI bleed, EGD showed non bleeding duodenal ulcer.  Now off pressors.   Today with fever and worsening leukocytosis.    ROS : unable to illicit { patient intubated and sedated }     Meds:  cefepime  IVPB 1000 milliGRAM(s) IV Intermittent every 24 hours  levoFLOXacin IVPB 250 milliGRAM(s) IV Intermittent every 24 hours    Allergies    No Known Allergies    Intolerances        VITALS:  Vital Signs Last 24 Hrs  T(C): 37.1 (22 Dec 2017 08:00), Max: 38.2 (21 Dec 2017 23:31)  T(F): 98.8 (22 Dec 2017 08:00), Max: 100.8 (21 Dec 2017 23:31)  HR: 86 (22 Dec 2017 09:09) (82 - 100)  BP: 129/62 (22 Dec 2017 09:00) (109/56 - 133/57)  BP(mean): 78 (22 Dec 2017 09:00) (68 - 78)  RR: 17 (22 Dec 2017 09:00) (10 - 21)  SpO2: 99% (22 Dec 2017 09:09) (94% - 100%)      PHYSICAL EXAM:  Constitutional: NAD  HEAD ; atraumatic and normocephalic   Neck: No JVD  Respiratory: CTAB, no wheezes, rales or rhonchi  Cardiovascular: S1, S2, RRR  Gastrointestinal: BS+, soft, NT/ND  Extremities: No cyanosis or clubbing. No peripheral edema  Neurological: sedated   Vascular Access: LT groin vasc cath.      LABS/DIAGNOSTIC TESTS:                          8.0    17.2  )-----------( 125      ( 22 Dec 2017 06:56 )             24.5     Lactate, Blood: 1.0 mmol/L (12-22 @ 11:29)      12-22    143  |  105  |  81<H>  ----------------------------<  152<H>  4.6   |  24  |  6.74<H>    Ca    8.5      22 Dec 2017 06:56  Phos  4.2     12-22  Mg     2.8     12-22            CULTURES: .Sputum Sputum  12-18 @ 21:43   Normal Respiratory Liz present  --    Few Squamous epithelial cells per low power field  Few polymorphonuclear leukocytes per low power field  Rare Gram variable coccobacilli   per oil power field      .Blood rec'd 2 anaerobic bottles for this accession #  12-16 @ 00:53   No growth at 5 days.  --  --      .Blood Blood-Peripheral  12-15 @ 23:09   No growth at 5 days.  --  --      .Urine Clean Catch (Midstream)  12-13 @ 23:18   No growth  --  --      .Blood Blood-Peripheral  12-13 @ 17:41   No growth at 5 days.  --  --      .Blood Blood-Peripheral  12-13 @ 17:40   No growth at 5 days.  --  -- 52 female nursing home patient with hx of DM, neuropathy, CKD, CHFpEF, GERD, osteomyelitis of jaw, hypothyroidism, hypoparathyroidism, anemia, admitted with acute on chronic renal failure requiring initiation of dialysis, acute respiratory failure requiring intubation on 12/16, possible pneumonia, GI bleed, EGD showed non bleeding duodenal ulcer.  Now off pressors.   Today with fever and worsening leukocytosis.    ROS : unable to illicit { patient intubated and sedated }     Meds:  cefepime  IVPB 1000 milliGRAM(s) IV Intermittent every 24 hours  levoFLOXacin IVPB 250 milliGRAM(s) IV Intermittent every 24 hours    Allergies    No Known Allergies    Intolerances        VITALS:  Vital Signs Last 24 Hrs  T(C): 37.1 (22 Dec 2017 08:00), Max: 38.2 (21 Dec 2017 23:31)  T(F): 98.8 (22 Dec 2017 08:00), Max: 100.8 (21 Dec 2017 23:31)  HR: 86 (22 Dec 2017 09:09) (82 - 100)  BP: 129/62 (22 Dec 2017 09:00) (109/56 - 133/57)  BP(mean): 78 (22 Dec 2017 09:00) (68 - 78)  RR: 17 (22 Dec 2017 09:00) (10 - 21)  SpO2: 99% (22 Dec 2017 09:09) (94% - 100%)      PHYSICAL EXAM:  Constitutional: sedated and intubated   HEAD ; atraumatic and normocephalic   HEENT ; ET tube in place   Neck: No JVD  Respiratory: CTAB, no wheezes, rales or rhonchi  Cardiovascular: S1, S2, RRR  Gastrointestinal: BS+, soft, NT/ND  Extremities: No cyanosis or clubbing. No peripheral edema  Neurological: sedated   Vascular Access: LT groin vasc cath.      LABS/DIAGNOSTIC TESTS:                          8.0    17.2  )-----------( 125      ( 22 Dec 2017 06:56 )             24.5     Lactate, Blood: 1.0 mmol/L (12-22 @ 11:29)      12-22    143  |  105  |  81<H>  ----------------------------<  152<H>  4.6   |  24  |  6.74<H>    Ca    8.5      22 Dec 2017 06:56  Phos  4.2     12-22  Mg     2.8     12-22            CULTURES: .Sputum Sputum  12-18 @ 21:43   Normal Respiratory Liz present  --    Few Squamous epithelial cells per low power field  Few polymorphonuclear leukocytes per low power field  Rare Gram variable coccobacilli   per oil power field      .Blood rec'd 2 anaerobic bottles for this accession #  12-16 @ 00:53   No growth at 5 days.  --  --      .Blood Blood-Peripheral  12-15 @ 23:09   No growth at 5 days.  --  --      .Urine Clean Catch (Midstream)  12-13 @ 23:18   No growth  --  --      .Blood Blood-Peripheral  12-13 @ 17:41   No growth at 5 days.  --  --      .Blood Blood-Peripheral  12-13 @ 17:40   No growth at 5 days.  --  -- 52 female nursing home patient with hx of DM, neuropathy, CKD, CHFpEF, GERD, osteomyelitis of jaw, hypothyroidism, hypoparathyroidism, anemia, admitted with acute on chronic renal failure requiring initiation of dialysis, acute respiratory failure requiring intubation on 12/16, possible pneumonia, GI bleed, EGD showed non bleeding duodenal ulcer.  Now off pressors.   Today with fever and worsening leukocytosis.    ROS : unable to illicit { patient intubated and sedated }     Meds:  cefepime  IVPB 1000 milliGRAM(s) IV Intermittent every 24 hours  levoFLOXacin IVPB 250 milliGRAM(s) IV Intermittent every 24 hours    Allergies    No Known Allergies    Intolerances        VITALS:  Vital Signs Last 24 Hrs  T(C): 37.1 (22 Dec 2017 08:00), Max: 38.2 (21 Dec 2017 23:31)  T(F): 98.8 (22 Dec 2017 08:00), Max: 100.8 (21 Dec 2017 23:31)  HR: 86 (22 Dec 2017 09:09) (82 - 100)  BP: 129/62 (22 Dec 2017 09:00) (109/56 - 133/57)  BP(mean): 78 (22 Dec 2017 09:00) (68 - 78)  RR: 17 (22 Dec 2017 09:00) (10 - 21)  SpO2: 99% (22 Dec 2017 09:09) (94% - 100%)      PHYSICAL EXAM:  Constitutional: sedated and intubated   HEAD ; atraumatic and normocephalic   HEENT ; ET tube in place   Neck: No JVD  Respiratory: CTAB, no wheezes, rhonchi but has some rales at both bases  Cardiovascular: S1, S2, RRR  Gastrointestinal: BS+, soft, NT/ND  Extremities: No cyanosis or clubbing. No peripheral edema  Neurological: sedated   Vascular Access: LT groin vasc cath.      LABS/DIAGNOSTIC TESTS:                          8.0    17.2  )-----------( 125      ( 22 Dec 2017 06:56 )             24.5     Lactate, Blood: 1.0 mmol/L (12-22 @ 11:29)      12-22    143  |  105  |  81<H>  ----------------------------<  152<H>  4.6   |  24  |  6.74<H>    Ca    8.5      22 Dec 2017 06:56  Phos  4.2     12-22  Mg     2.8     12-22            CULTURES: .Sputum Sputum  12-18 @ 21:43   Normal Respiratory Liz present  --    Few Squamous epithelial cells per low power field  Few polymorphonuclear leukocytes per low power field  Rare Gram variable coccobacilli   per oil power field      .Blood rec'd 2 anaerobic bottles for this accession #  12-16 @ 00:53   No growth at 5 days.  --  --      .Blood Blood-Peripheral  12-15 @ 23:09   No growth at 5 days.  --  --      .Urine Clean Catch (Midstream)  12-13 @ 23:18   No growth  --  --      .Blood Blood-Peripheral  12-13 @ 17:41   No growth at 5 days.  --  --      .Blood Blood-Peripheral  12-13 @ 17:40   No growth at 5 days.  --  --

## 2017-12-22 NOTE — PROGRESS NOTE ADULT - ASSESSMENT
- MULTI LOBAR pneumonia {R>L}  -LEUCOCYTOSIS   -fevers   plan   c/w cefepime  1 gram every 24 hours day 4  c/w levaquin 250 every 24 hours day 4  -plan to change the dialysis catheter   -follow with repeat blood cultures from 12/23/17 - MULTI LOBAR pneumonia {R>L} - cxr shows improvement of left sided infiltrate  -LEUCOCYTOSIS - increasing  -fevers   plan   c/w cefepime  1 gram every 24 hours day 4  c/w levaquin 250 every 24 hours day 4  -plan to change the dialysis catheter - was dced today and will get a new line next week  -follow with repeat blood cultures from 12/23/17   time spent - 30 minutes

## 2017-12-22 NOTE — PROGRESS NOTE ADULT - ASSESSMENT
52 yr old female with progressive CKD V admitted with decompesated CHF sec to CKD V. NOW ESRD. On antibiotics for healthcare associated PNA.   febrile, ?catheter related infection. D/C vasc cath to.day. daily BMP. will get new vasc cath either monday or tuesday  cont vent management per ICU

## 2017-12-22 NOTE — PROGRESS NOTE ADULT - SUBJECTIVE AND OBJECTIVE BOX
INTERVAL HPI/OVERNIGHT EVENTS: pt had fever 100.8, given tylenol, repeated Blood culture sent.    PRESSORS: [ ] YES [x ] NO  WHICH:      Antimicrobial:  cefepime  IVPB 1000 milliGRAM(s) IV Intermittent every 24 hours  levoFLOXacin IVPB 250 milliGRAM(s) IV Intermittent every 24 hours    Cardiovascular:    Pulmonary:    Hematalogic:    Other:  acetaminophen   Tablet 650 milliGRAM(s) Oral every 6 hours PRN  acetaminophen  Suppository 650 milliGRAM(s) Rectal every 6 hours PRN  ascorbic acid 500 milliGRAM(s) Oral daily  cinacalcet 30 milliGRAM(s) Oral daily  dextrose 5%. 1000 milliLiter(s) IV Continuous <Continuous>  dextrose 50% Injectable 12.5 Gram(s) IV Push once  dextrose 50% Injectable 25 Gram(s) IV Push once  dextrose 50% Injectable 25 Gram(s) IV Push once  dextrose Gel 1 Dose(s) Oral once PRN  ferrous    sulfate Liquid 300 milliGRAM(s) Enteral Tube daily  folic acid 1 milliGRAM(s) Oral daily  glucagon  Injectable 1 milliGRAM(s) IntraMuscular once PRN  HYDROmorphone  Injectable 1 milliGRAM(s) IV Push every 4 hours PRN  insulin lispro (HumaLOG) corrective regimen sliding scale   SubCutaneous every 6 hours  levothyroxine 50 MICROGram(s) Oral daily  pantoprazole  Injectable 40 milliGRAM(s) IV Push daily  propofol Infusion 5 MICROgram(s)/kG/Min IV Continuous <Continuous>  simvastatin 20 milliGRAM(s) Oral at bedtime    acetaminophen   Tablet 650 milliGRAM(s) Oral every 6 hours PRN  acetaminophen  Suppository 650 milliGRAM(s) Rectal every 6 hours PRN  ascorbic acid 500 milliGRAM(s) Oral daily  cefepime  IVPB 1000 milliGRAM(s) IV Intermittent every 24 hours  cinacalcet 30 milliGRAM(s) Oral daily  dextrose 5%. 1000 milliLiter(s) IV Continuous <Continuous>  dextrose 50% Injectable 12.5 Gram(s) IV Push once  dextrose 50% Injectable 25 Gram(s) IV Push once  dextrose 50% Injectable 25 Gram(s) IV Push once  dextrose Gel 1 Dose(s) Oral once PRN  ferrous    sulfate Liquid 300 milliGRAM(s) Enteral Tube daily  folic acid 1 milliGRAM(s) Oral daily  glucagon  Injectable 1 milliGRAM(s) IntraMuscular once PRN  HYDROmorphone  Injectable 1 milliGRAM(s) IV Push every 4 hours PRN  insulin lispro (HumaLOG) corrective regimen sliding scale   SubCutaneous every 6 hours  levoFLOXacin IVPB 250 milliGRAM(s) IV Intermittent every 24 hours  levothyroxine 50 MICROGram(s) Oral daily  pantoprazole  Injectable 40 milliGRAM(s) IV Push daily  propofol Infusion 5 MICROgram(s)/kG/Min IV Continuous <Continuous>  simvastatin 20 milliGRAM(s) Oral at bedtime    Drug Dosing Weight  Height (cm): 170 (13 Dec 2017 17:16)  Weight (kg): 57 (13 Dec 2017 17:16)  BMI (kg/m2): 19.7 (13 Dec 2017 17:16)  BSA (m2): 1.66 (13 Dec 2017 17:16)    CENTRAL LINE: [x ] YES [ ] NO  LOCATION:   DATE INSERTED:  REMOVE: [ ] YES [ ] NO  EXPLAIN:    JACOBSON: [ ] YES [ x] NO    DATE INSERTED:  REMOVE:  [ ] YES [ ] NO  EXPLAIN:    A-LINE:  [ ] YES [x ] NO  LOCATION:   DATE INSERTED:  REMOVE:  [ ] YES [ ] NO  EXPLAIN:      ICU Vital Signs Last 24 Hrs  T(C): 38.1 (22 Dec 2017 04:00), Max: 38.2 (21 Dec 2017 23:31)  T(F): 100.6 (22 Dec 2017 04:00), Max: 100.8 (21 Dec 2017 23:31)  HR: 86 (22 Dec 2017 07:00) (82 - 100)  BP: 112/57 (22 Dec 2017 07:00) (105/50 - 133/57)  BP(mean): 70 (22 Dec 2017 07:00) (63 - 78)  ABP: --  ABP(mean): --  RR: 15 (22 Dec 2017 07:00) (10 - 21)  SpO2: 98% (22 Dec 2017 07:00) (94% - 100%)      ABG - ( 22 Dec 2017 04:08 )  pH: 7.43  /  pCO2: 36    /  pO2: 76    / HCO3: 23    / Base Excess: -0.7  /  SaO2: 94                    12-21 @ 07:01  -  12-22 @ 07:00  --------------------------------------------------------  IN: 1129.2 mL / OUT: 0 mL / NET: 1129.2 mL        Mode: AC/ CMV (Assist Control/ Continuous Mandatory Ventilation)  RR (machine): 15  TV (machine): 400  FiO2: 50  PEEP: 7  ITime: 1  MAP: 13  PIP: 31      PHYSICAL EXAM:    GENERAL: [x]NAD, []well-groomed, []well-developed  HEAD:  [x]Atraumatic, []Normocephalic  EYES: sluggish pupillary response bilaterally, conjunctiva and sclera clear  ENMT: [x]No tonsillar erythema, exudates, or enlargement; []Moist mucous membranes, []Good dentition, []No lesions  NECK: [x]Supple, normal appearance, []No JVD; []Normal thyroid; []Trachea midline  NERVOUS SYSTEM: sedated, nonverbal, noncommunicative  CHEST/LUNG: [x]No chest deformity; []Normal percussion bilaterally; []No rales, rhonchi, wheezing   HEART: [x]Regular rate and rhythm; []No murmurs, rubs, or gallops  ABDOMEN: [x]Soft, Nontender, Nondistended; []Bowel sounds present  EXTREMITIES:  [x]2+ Peripheral Pulses, []No clubbing, cyanosis, or edema  LYMPH: [x]No lymphadenopathy noted  SKIN: [x]No rashes or lesions; []Good capillary refill        LABS:  CBC Full  -  ( 21 Dec 2017 06:32 )  WBC Count : 11.1 K/uL  Hemoglobin : 8.0 g/dL  Hematocrit : 25.6 %  Platelet Count - Automated : 101 K/uL  Mean Cell Volume : 92.0 fl  Mean Cell Hemoglobin : 28.8 pg  Mean Cell Hemoglobin Concentration : 31.4 gm/dL  Auto Neutrophil # : 8.7 K/uL  Auto Lymphocyte # : 1.3 K/uL  Auto Monocyte # : 0.6 K/uL  Auto Eosinophil # : 0.4 K/uL  Auto Basophil # : 0.0 K/uL  Auto Neutrophil % : 78.9 %  Auto Lymphocyte % : 11.7 %  Auto Monocyte % : 5.8 %  Auto Eosinophil % : 3.2 %  Auto Basophil % : 0.4 %    12-21    139  |  101  |  60<H>  ----------------------------<  171<H>  3.3<L>   |  27  |  5.18<H>    Ca    8.0<L>      21 Dec 2017 06:32  Phos  2.2     12-21  Mg     2.5     12-21      PT/INR - ( 22 Dec 2017 06:56 )   PT: 13.5 sec;   INR: 1.23 ratio         PTT - ( 22 Dec 2017 06:56 )  PTT:33.0 sec        RADIOLOGY & ADDITIONAL STUDIES REVIEWED:  yes    [ ]GOALS OF CARE DISCUSSION WITH PATIENT/FAMILY/PROXY:    CRITICAL CARE TIME SPENT: 35 minutes      Assessment and Plan:   · Assessment		  50 y/o F from [facility] ( long term care 2/2 diffculty walking possibly 2/2 polyneuropathy) w/ PMHx of anemia, CHF( diastolic dysfunction), CKD, Clostridium difficile infection, DM, Diabetic neuropathy, HTN, HLD, Hypothyroidism, Hypoparathyroidism, Osteomyelitis of jaw, and Parathyroid adenoma, GERD p/w c/o SOB and difficulty breathing x1 week. Pt also reports associated mild cough. Pt denies fever, chills, general pain, C/P, or any other complaints. NO  In ED pt is Awake , responds to pain and verbal stimuli, She in moderate respiratory distress. placed on BIPAP. ICU consulted for new BIPAP.    Admit to ICU for Acute Hypoxic and hypercapnic respiratory failure require new Bipap  2/2 Exacerbation of CHF or possibly pneumonia      Problem/Plan - 1:  ·  Problem: Acute respiratory failure.  Plan: Inubated 12/16. Pt was hypoxic on BiPAP,  failed NIPPV support  Acute respiratory failure secondary to HAP (elevated procalcitonin) and fluid overload  -continue vent support, on propofol for sedation  -HD today may trial SAT/ SBT    -c/w  maxipime and  levaquin   -pt need permcath  as per Dr. Escoto   -called IR for permacath today       Problem/Plan - 2:  ·  Problem: R/O Upper GI bleed.  Plan: c/w protonix, no recurrent GI Bleed.  s/p 1 PRBC, hb 8.2 after transfusion  EGD showed duodenal ulcer, no active bleeding, gastritis and esophagitis.  f/u biopsy report  monitor CBC  c/w protonix 40mg, daily.       Problem/Plan - 3:  ·  Problem: Hypothyroidism.  Plan: c/w home dose LT4 50 mcg daily  TSH wnl.      Problem/Plan - 4:  ·  Problem: CHF (congestive heart failure).  Plan: unknown EF  c/w home cardiac meds   TTE - RV systolic pressure is 49 mm Hg. Mild pulmonary  hypertension.      Problem/Plan - 5:  ·  Problem: CKD (chronic kidney disease).  Plan: getting HD via [facility]iley.  -pt need permcath  as per Dr. Escoto   -called IR for permacath today        Problem/Plan - 6:  Problem: Diabetes mellitus. Plan: c/w Lantus 10 units daily , HSS   HbA1c 5.2.     Problem/Plan - 7:  ·  Problem: HTN (hypertension).  Plan: c/w home BP meds.      Problem/Plan - 8:  ·  Problem: Prophylactic measure.  Plan: c/w DVT ppx   IMPROVE score - 2. INTERVAL HPI/OVERNIGHT EVENTS: pt had fever 100.8, given tylenol, repeated Blood culture sent.    PRESSORS: [ ] YES [x ] NO  WHICH:      Antimicrobial:  cefepime  IVPB 1000 milliGRAM(s) IV Intermittent every 24 hours  levoFLOXacin IVPB 250 milliGRAM(s) IV Intermittent every 24 hours    Cardiovascular:    Pulmonary:    Hematalogic:    Other:  acetaminophen   Tablet 650 milliGRAM(s) Oral every 6 hours PRN  acetaminophen  Suppository 650 milliGRAM(s) Rectal every 6 hours PRN  ascorbic acid 500 milliGRAM(s) Oral daily  cinacalcet 30 milliGRAM(s) Oral daily  dextrose 5%. 1000 milliLiter(s) IV Continuous <Continuous>  dextrose 50% Injectable 12.5 Gram(s) IV Push once  dextrose 50% Injectable 25 Gram(s) IV Push once  dextrose 50% Injectable 25 Gram(s) IV Push once  dextrose Gel 1 Dose(s) Oral once PRN  ferrous    sulfate Liquid 300 milliGRAM(s) Enteral Tube daily  folic acid 1 milliGRAM(s) Oral daily  glucagon  Injectable 1 milliGRAM(s) IntraMuscular once PRN  HYDROmorphone  Injectable 1 milliGRAM(s) IV Push every 4 hours PRN  insulin lispro (HumaLOG) corrective regimen sliding scale   SubCutaneous every 6 hours  levothyroxine 50 MICROGram(s) Oral daily  pantoprazole  Injectable 40 milliGRAM(s) IV Push daily  propofol Infusion 5 MICROgram(s)/kG/Min IV Continuous <Continuous>  simvastatin 20 milliGRAM(s) Oral at bedtime    acetaminophen   Tablet 650 milliGRAM(s) Oral every 6 hours PRN  acetaminophen  Suppository 650 milliGRAM(s) Rectal every 6 hours PRN  ascorbic acid 500 milliGRAM(s) Oral daily  cefepime  IVPB 1000 milliGRAM(s) IV Intermittent every 24 hours  cinacalcet 30 milliGRAM(s) Oral daily  dextrose 5%. 1000 milliLiter(s) IV Continuous <Continuous>  dextrose 50% Injectable 12.5 Gram(s) IV Push once  dextrose 50% Injectable 25 Gram(s) IV Push once  dextrose 50% Injectable 25 Gram(s) IV Push once  dextrose Gel 1 Dose(s) Oral once PRN  ferrous    sulfate Liquid 300 milliGRAM(s) Enteral Tube daily  folic acid 1 milliGRAM(s) Oral daily  glucagon  Injectable 1 milliGRAM(s) IntraMuscular once PRN  HYDROmorphone  Injectable 1 milliGRAM(s) IV Push every 4 hours PRN  insulin lispro (HumaLOG) corrective regimen sliding scale   SubCutaneous every 6 hours  levoFLOXacin IVPB 250 milliGRAM(s) IV Intermittent every 24 hours  levothyroxine 50 MICROGram(s) Oral daily  pantoprazole  Injectable 40 milliGRAM(s) IV Push daily  propofol Infusion 5 MICROgram(s)/kG/Min IV Continuous <Continuous>  simvastatin 20 milliGRAM(s) Oral at bedtime    Drug Dosing Weight  Height (cm): 170 (13 Dec 2017 17:16)  Weight (kg): 57 (13 Dec 2017 17:16)  BMI (kg/m2): 19.7 (13 Dec 2017 17:16)  BSA (m2): 1.66 (13 Dec 2017 17:16)    CENTRAL LINE: [x ] YES [ ] NO  LOCATION:   DATE INSERTED:  REMOVE: [ ] YES [ ] NO  EXPLAIN:    JACOBSON: [ ] YES [ x] NO    DATE INSERTED:  REMOVE:  [ ] YES [ ] NO  EXPLAIN:    A-LINE:  [ ] YES [x ] NO  LOCATION:   DATE INSERTED:  REMOVE:  [ ] YES [ ] NO  EXPLAIN:      ICU Vital Signs Last 24 Hrs  T(C): 38.1 (22 Dec 2017 04:00), Max: 38.2 (21 Dec 2017 23:31)  T(F): 100.6 (22 Dec 2017 04:00), Max: 100.8 (21 Dec 2017 23:31)  HR: 86 (22 Dec 2017 07:00) (82 - 100)  BP: 112/57 (22 Dec 2017 07:00) (105/50 - 133/57)  BP(mean): 70 (22 Dec 2017 07:00) (63 - 78)  ABP: --  ABP(mean): --  RR: 15 (22 Dec 2017 07:00) (10 - 21)  SpO2: 98% (22 Dec 2017 07:00) (94% - 100%)      ABG - ( 22 Dec 2017 04:08 )  pH: 7.43  /  pCO2: 36    /  pO2: 76    / HCO3: 23    / Base Excess: -0.7  /  SaO2: 94                    12-21 @ 07:01  -  12-22 @ 07:00  --------------------------------------------------------  IN: 1129.2 mL / OUT: 0 mL / NET: 1129.2 mL        Mode: AC/ CMV (Assist Control/ Continuous Mandatory Ventilation)  RR (machine): 15  TV (machine): 400  FiO2: 50  PEEP: 7  ITime: 1  MAP: 13  PIP: 31      PHYSICAL EXAM:    GENERAL: [x]NAD, []well-groomed, []well-developed  HEAD:  [x]Atraumatic, []Normocephalic  EYES: sluggish pupillary response bilaterally, conjunctiva and sclera clear  ENMT: [x]No tonsillar erythema, exudates, or enlargement; []Moist mucous membranes, []Good dentition, []No lesions  NECK: [x]Supple, normal appearance, []No JVD; []Normal thyroid; [x]Trachea midline  NERVOUS SYSTEM: sedated, nonverbal, noncommunicative  CHEST/LUNG: [x]No chest deformity; []Normal percussion bilaterally; []No rales, rhonchi, wheezing   HEART: [x]Regular rate and rhythm; []No murmurs, rubs, or gallops  ABDOMEN: [x]Soft, Nontender, Nondistended; []Bowel sounds present  EXTREMITIES:  [x]2+ Peripheral Pulses, []No clubbing, cyanosis, or edema  LYMPH: [x]No lymphadenopathy noted  SKIN: [x]No rashes or lesions; []Good capillary refill        LABS:  CBC Full  -  ( 21 Dec 2017 06:32 )  WBC Count : 11.1 K/uL  Hemoglobin : 8.0 g/dL  Hematocrit : 25.6 %  Platelet Count - Automated : 101 K/uL  Mean Cell Volume : 92.0 fl  Mean Cell Hemoglobin : 28.8 pg  Mean Cell Hemoglobin Concentration : 31.4 gm/dL  Auto Neutrophil # : 8.7 K/uL  Auto Lymphocyte # : 1.3 K/uL  Auto Monocyte # : 0.6 K/uL  Auto Eosinophil # : 0.4 K/uL  Auto Basophil # : 0.0 K/uL  Auto Neutrophil % : 78.9 %  Auto Lymphocyte % : 11.7 %  Auto Monocyte % : 5.8 %  Auto Eosinophil % : 3.2 %  Auto Basophil % : 0.4 %    12-21    139  |  101  |  60<H>  ----------------------------<  171<H>  3.3<L>   |  27  |  5.18<H>    Ca    8.0<L>      21 Dec 2017 06:32  Phos  2.2     12-21  Mg     2.5     12-21      PT/INR - ( 22 Dec 2017 06:56 )   PT: 13.5 sec;   INR: 1.23 ratio         PTT - ( 22 Dec 2017 06:56 )  PTT:33.0 sec        RADIOLOGY & ADDITIONAL STUDIES REVIEWED:  yes    [ ]GOALS OF CARE DISCUSSION WITH PATIENT/FAMILY/PROXY:    CRITICAL CARE TIME SPENT: 35 minutes      Assessment and Plan:   · Assessment		  50 y/o F from [facility] ( long term care 2/2 diffculty walking possibly 2/2 polyneuropathy) w/ PMHx of anemia, CHF( diastolic dysfunction), CKD, Clostridium difficile infection, DM, Diabetic neuropathy, HTN, HLD, Hypothyroidism, Hypoparathyroidism, Osteomyelitis of jaw, and Parathyroid adenoma, GERD p/w c/o SOB and difficulty breathing x1 week. Pt also reports associated mild cough. Pt denies fever, chills, general pain, C/P, or any other complaints. NO  In ED pt is Awake , responds to pain and verbal stimuli, She in moderate respiratory distress. placed on BIPAP. ICU consulted for new BIPAP.    Admit to ICU for Acute Hypoxic and hypercapnic respiratory failure require new Bipap  2/2 Exacerbation of CHF or possibly pneumonia      Problem/Plan - 1:  ·  Problem: Acute respiratory failure.  Plan: Inubated 12/16. Pt was hypoxic on BiPAP,  failed NIPPV support  Acute respiratory failure secondary to HAP (elevated procalcitonin) and fluid overload  -continue vent support, on propofol for sedation  -HD today may trial SAT/ SBT    -c/w  maxipime and  levaquin   -pt need permcath  as per Dr. Escoto   -called IR for permacath today       Problem/Plan - 2:  ·  Problem: R/O Upper GI bleed.  Plan: c/w protonix, no recurrent GI Bleed.  s/p 1 PRBC, hb 8.2 after transfusion  EGD showed duodenal ulcer, no active bleeding, gastritis and esophagitis.  f/u biopsy report  monitor CBC  c/w protonix 40mg, daily.       Problem/Plan - 3:  ·  Problem: Hypothyroidism.  Plan: c/w home dose LT4 50 mcg daily  TSH wnl.      Problem/Plan - 4:  ·  Problem: CHF (congestive heart failure).  Plan: unknown EF  c/w home cardiac meds   TTE - RV systolic pressure is 49 mm Hg. Mild pulmonary  hypertension.      Problem/Plan - 5:  ·  Problem: CKD (chronic kidney disease).  Plan: getting HD via [facility]iley.  -pt need permcath  as per Dr. Escoto   -called IR for permacath today        Problem/Plan - 6:  Problem: Diabetes mellitus. Plan: c/w Lantus 10 units daily , HSS   HbA1c 5.2.     Problem/Plan - 7:  ·  Problem: HTN (hypertension).  Plan: c/w home BP meds.      Problem/Plan - 8:  ·  Problem: Prophylactic measure.  Plan: c/w DVT ppx   IMPROVE score - 2.

## 2017-12-22 NOTE — PROGRESS NOTE ADULT - SUBJECTIVE AND OBJECTIVE BOX
Patient is a 52y old  Female who presents with a chief complaint of SOB.  Remains intubated, awake, not alert on mechanical ventilation but no acute distress.    INTERVAL HPI/OVERNIGHT EVENTS:      VITAL SIGNS:  T(F): 98.8 (12-22-17 @ 08:00)  HR: 86 (12-22-17 @ 09:09)  BP: 129/62 (12-22-17 @ 09:00)  RR: 17 (12-22-17 @ 09:00)  SpO2: 99% (12-22-17 @ 09:09)  Wt(kg): --  I&O's Detail    21 Dec 2017 07:01  -  22 Dec 2017 07:00  --------------------------------------------------------  IN:    Enteral Tube Flush: 150 mL    Nepro: 500 mL    propofol Infusion: 129.2 mL    Solution: 250 mL    Solution: 50 mL    Solution: 50 mL  Total IN: 1129.2 mL    OUT:  Total OUT: 0 mL    Total NET: 1129.2 mL      22 Dec 2017 07:01  -  22 Dec 2017 10:41  --------------------------------------------------------  IN:    Nepro: 25 mL    propofol Infusion: 6.8 mL  Total IN: 31.8 mL    OUT:  Total OUT: 0 mL    Total NET: 31.8 mL        Mode: AC/ CMV (Assist Control/ Continuous Mandatory Ventilation)  RR (machine): 15  TV (machine): 400  FiO2: 50  PEEP: 7  ITime: 1  MAP: 13  PIP: 34        REVIEW OF SYSTEMS:    CONSTITUTIONAL:  No fevers, chills, sweats    HEENT:  Eyes:  No diplopia or blurred vision. ENT:  No earache, sore throat or runny nose.    CARDIOVASCULAR:  No pressure, squeezing, tightness, or heaviness about the chest; no palpitations.    RESPIRATORY:  Per HPI    GASTROINTESTINAL:  No abdominal pain, nausea, vomiting or diarrhea.    GENITOURINARY:  No dysuria, frequency or urgency.    NEUROLOGIC:  No paresthesias, fasciculations, seizures or weakness.    PSYCHIATRIC:  No disorder of thought or mood.      PHYSICAL EXAM:    Constitutional: Well developed and nourished  Eyes:Perrla  ENMT: normal  Neck:supple  Respiratory: good air entry  Cardiovascular: S1 S2 regular  Gastrointestinal: Soft, Non tender  Extremities: No edema  Vascular:normal  Neurological:Awake, alert,Ox3  Musculoskeletal:Normal      MEDICATIONS  (STANDING):  ascorbic acid 500 milliGRAM(s) Oral daily  cefepime  IVPB 1000 milliGRAM(s) IV Intermittent every 24 hours  cinacalcet 30 milliGRAM(s) Oral daily  dextrose 5%. 1000 milliLiter(s) (50 mL/Hr) IV Continuous <Continuous>  dextrose 50% Injectable 12.5 Gram(s) IV Push once  dextrose 50% Injectable 25 Gram(s) IV Push once  dextrose 50% Injectable 25 Gram(s) IV Push once  ferrous    sulfate Liquid 300 milliGRAM(s) Enteral Tube daily  folic acid 1 milliGRAM(s) Oral daily  insulin lispro (HumaLOG) corrective regimen sliding scale   SubCutaneous every 6 hours  levoFLOXacin IVPB 250 milliGRAM(s) IV Intermittent every 24 hours  levothyroxine 50 MICROGram(s) Oral daily  pantoprazole  Injectable 40 milliGRAM(s) IV Push daily  propofol Infusion 5 MICROgram(s)/kG/Min (1.71 mL/Hr) IV Continuous <Continuous>  simvastatin 20 milliGRAM(s) Oral at bedtime    MEDICATIONS  (PRN):  acetaminophen   Tablet 650 milliGRAM(s) Oral every 6 hours PRN For Temp greater than 38 C (100.4 F)  acetaminophen  Suppository 650 milliGRAM(s) Rectal every 6 hours PRN For Temp greater than 38 C (100.4 F)  dextrose Gel 1 Dose(s) Oral once PRN Blood Glucose LESS THAN 70 milliGRAM(s)/deciliter  glucagon  Injectable 1 milliGRAM(s) IntraMuscular once PRN Glucose LESS THAN 70 milligrams/deciliter  HYDROmorphone  Injectable 1 milliGRAM(s) IV Push every 4 hours PRN Severe Pain (7 - 10)      Allergies    No Known Allergies    Intolerances        LABS:                        8.0    17.2  )-----------( 125      ( 22 Dec 2017 06:56 )             24.5     12-22    143  |  105  |  81<H>  ----------------------------<  152<H>  4.6   |  24  |  6.74<H>    Ca    8.5      22 Dec 2017 06:56  Phos  4.2     12-22  Mg     2.8     12-22      PT/INR - ( 22 Dec 2017 06:56 )   PT: 13.5 sec;   INR: 1.23 ratio         PTT - ( 22 Dec 2017 06:56 )  PTT:33.0 sec    ABG - ( 22 Dec 2017 04:08 )  pH: 7.43  /  pCO2: 36    /  pO2: 76    / HCO3: 23    / Base Excess: -0.7  /  SaO2: 94                    CAPILLARY BLOOD GLUCOSE      POCT Blood Glucose.: 149 mg/dL (22 Dec 2017 06:27)  POCT Blood Glucose.: 209 mg/dL (22 Dec 2017 02:16)  POCT Blood Glucose.: 167 mg/dL (21 Dec 2017 17:43)  POCT Blood Glucose.: 126 mg/dL (21 Dec 2017 11:48)        RADIOLOGY & ADDITIONAL TESTS:    CXR:  < from: Xray Chest 1 View AP -PORTABLE-Routine (12.22.17 @ 07:56) >  No significant interval change, with persistence of dense   airspace consolidation within the bilateral lower lobes and stable patchy   airspace opacities noted within the bilateral upper lobes, right greater   than left.    < end of copied text >    Ct scan chest:    ekg;    echo: Patient is a 52y old  Female who presents with a chief complaint of SOB.  Remains intubated, sedated,  on mechanical ventilation but no acute distress. Having HD at this time.    INTERVAL HPI/OVERNIGHT EVENTS:      VITAL SIGNS:  T(F): 98.8 (12-22-17 @ 08:00)  HR: 86 (12-22-17 @ 09:09)  BP: 129/62 (12-22-17 @ 09:00)  RR: 17 (12-22-17 @ 09:00)  SpO2: 99% (12-22-17 @ 09:09)  Wt(kg): --  I&O's Detail    21 Dec 2017 07:01  -  22 Dec 2017 07:00  --------------------------------------------------------  IN:    Enteral Tube Flush: 150 mL    Nepro: 500 mL    propofol Infusion: 129.2 mL    Solution: 250 mL    Solution: 50 mL    Solution: 50 mL  Total IN: 1129.2 mL    OUT:  Total OUT: 0 mL    Total NET: 1129.2 mL      22 Dec 2017 07:01  -  22 Dec 2017 10:41  --------------------------------------------------------  IN:    Nepro: 25 mL    propofol Infusion: 6.8 mL  Total IN: 31.8 mL    OUT:  Total OUT: 0 mL    Total NET: 31.8 mL        Mode: AC/ CMV (Assist Control/ Continuous Mandatory Ventilation)  RR (machine): 15  TV (machine): 400  FiO2: 50  PEEP: 7  ITime: 1  MAP: 13  PIP: 34        REVIEW OF SYSTEMS:    CONSTITUTIONAL:  No fevers, chills, sweats    HEENT:  Eyes:  No diplopia or blurred vision. ENT:  No earache, sore throat or runny nose.    CARDIOVASCULAR:  No pressure, squeezing, tightness, or heaviness about the chest; no palpitations.    RESPIRATORY:  Per HPI    GASTROINTESTINAL:  No abdominal pain, nausea, vomiting or diarrhea.    GENITOURINARY:  No dysuria, frequency or urgency.    NEUROLOGIC:  No paresthesias, fasciculations, seizures or weakness.    PSYCHIATRIC:  No disorder of thought or mood.      PHYSICAL EXAM:    Constitutional: Well developed and nourished  Eyes:Perrla  ENMT: normal  Neck:supple  Respiratory: good air entry  Cardiovascular: S1 S2 regular  Gastrointestinal: Soft, Non tender  Extremities: No edema  Vascular:normal  Neurological:Awake, alert,Ox3  Musculoskeletal:Normal      MEDICATIONS  (STANDING):  ascorbic acid 500 milliGRAM(s) Oral daily  cefepime  IVPB 1000 milliGRAM(s) IV Intermittent every 24 hours  cinacalcet 30 milliGRAM(s) Oral daily  dextrose 5%. 1000 milliLiter(s) (50 mL/Hr) IV Continuous <Continuous>  dextrose 50% Injectable 12.5 Gram(s) IV Push once  dextrose 50% Injectable 25 Gram(s) IV Push once  dextrose 50% Injectable 25 Gram(s) IV Push once  ferrous    sulfate Liquid 300 milliGRAM(s) Enteral Tube daily  folic acid 1 milliGRAM(s) Oral daily  insulin lispro (HumaLOG) corrective regimen sliding scale   SubCutaneous every 6 hours  levoFLOXacin IVPB 250 milliGRAM(s) IV Intermittent every 24 hours  levothyroxine 50 MICROGram(s) Oral daily  pantoprazole  Injectable 40 milliGRAM(s) IV Push daily  propofol Infusion 5 MICROgram(s)/kG/Min (1.71 mL/Hr) IV Continuous <Continuous>  simvastatin 20 milliGRAM(s) Oral at bedtime    MEDICATIONS  (PRN):  acetaminophen   Tablet 650 milliGRAM(s) Oral every 6 hours PRN For Temp greater than 38 C (100.4 F)  acetaminophen  Suppository 650 milliGRAM(s) Rectal every 6 hours PRN For Temp greater than 38 C (100.4 F)  dextrose Gel 1 Dose(s) Oral once PRN Blood Glucose LESS THAN 70 milliGRAM(s)/deciliter  glucagon  Injectable 1 milliGRAM(s) IntraMuscular once PRN Glucose LESS THAN 70 milligrams/deciliter  HYDROmorphone  Injectable 1 milliGRAM(s) IV Push every 4 hours PRN Severe Pain (7 - 10)      Allergies    No Known Allergies    Intolerances        LABS:                        8.0    17.2  )-----------( 125      ( 22 Dec 2017 06:56 )             24.5     12-22    143  |  105  |  81<H>  ----------------------------<  152<H>  4.6   |  24  |  6.74<H>    Ca    8.5      22 Dec 2017 06:56  Phos  4.2     12-22  Mg     2.8     12-22      PT/INR - ( 22 Dec 2017 06:56 )   PT: 13.5 sec;   INR: 1.23 ratio         PTT - ( 22 Dec 2017 06:56 )  PTT:33.0 sec    ABG - ( 22 Dec 2017 04:08 )  pH: 7.43  /  pCO2: 36    /  pO2: 76    / HCO3: 23    / Base Excess: -0.7  /  SaO2: 94                    CAPILLARY BLOOD GLUCOSE      POCT Blood Glucose.: 149 mg/dL (22 Dec 2017 06:27)  POCT Blood Glucose.: 209 mg/dL (22 Dec 2017 02:16)  POCT Blood Glucose.: 167 mg/dL (21 Dec 2017 17:43)  POCT Blood Glucose.: 126 mg/dL (21 Dec 2017 11:48)        RADIOLOGY & ADDITIONAL TESTS:    CXR:  < from: Xray Chest 1 View AP -PORTABLE-Routine (12.22.17 @ 07:56) >  No significant interval change, with persistence of dense   airspace consolidation within the bilateral lower lobes and stable patchy   airspace opacities noted within the bilateral upper lobes, right greater   than left.    < end of copied text >    Ct scan chest:    ekg;    echo: Patient is a 52y old  Female who presents with a chief complaint of SOB.  Remains intubated, sedated,  on mechanical ventilation but no acute distress. Having HD at this time.    INTERVAL HPI/OVERNIGHT EVENTS:      VITAL SIGNS:  T(F): 98.8 (12-22-17 @ 08:00)  HR: 86 (12-22-17 @ 09:09)  BP: 129/62 (12-22-17 @ 09:00)  RR: 17 (12-22-17 @ 09:00)  SpO2: 99% (12-22-17 @ 09:09)  Wt(kg): --  I&O's Detail    21 Dec 2017 07:01  -  22 Dec 2017 07:00  --------------------------------------------------------  IN:    Enteral Tube Flush: 150 mL    Nepro: 500 mL    propofol Infusion: 129.2 mL    Solution: 250 mL    Solution: 50 mL    Solution: 50 mL  Total IN: 1129.2 mL    OUT:  Total OUT: 0 mL    Total NET: 1129.2 mL      22 Dec 2017 07:01  -  22 Dec 2017 10:41  --------------------------------------------------------  IN:    Nepro: 25 mL    propofol Infusion: 6.8 mL  Total IN: 31.8 mL    OUT:  Total OUT: 0 mL    Total NET: 31.8 mL        Mode: AC/ CMV (Assist Control/ Continuous Mandatory Ventilation)  RR (machine): 15  TV (machine): 400  FiO2: 50  PEEP: 7  ITime: 1  MAP: 13  PIP: 34        REVIEW OF SYSTEMS:    CONSTITUTIONAL:  No fevers, chills, sweats    HEENT:  Eyes:  No diplopia or blurred vision. ENT:  No earache, sore throat or runny nose.    CARDIOVASCULAR:  No pressure, squeezing, tightness, or heaviness about the chest; no palpitations.    RESPIRATORY:  Per HPI    GASTROINTESTINAL:  No abdominal pain, nausea, vomiting or diarrhea.    GENITOURINARY:  No dysuria, frequency or urgency.    NEUROLOGIC:  No paresthesias, fasciculations, seizures or weakness.    PSYCHIATRIC:  No disorder of thought or mood.      PHYSICAL EXAM:    Constitutional: Well developed and nourished  Eyes:Perrla  ENMT: normal  Neck:supple  Respiratory: good air entry  Cardiovascular: S1 S2 regular  Gastrointestinal: Soft, Non tender  Extremities: No edema  Vascular:normal  Neurological:Sedated  Musculoskeletal:Normal      MEDICATIONS  (STANDING):  ascorbic acid 500 milliGRAM(s) Oral daily  cefepime  IVPB 1000 milliGRAM(s) IV Intermittent every 24 hours  cinacalcet 30 milliGRAM(s) Oral daily  dextrose 5%. 1000 milliLiter(s) (50 mL/Hr) IV Continuous <Continuous>  dextrose 50% Injectable 12.5 Gram(s) IV Push once  dextrose 50% Injectable 25 Gram(s) IV Push once  dextrose 50% Injectable 25 Gram(s) IV Push once  ferrous    sulfate Liquid 300 milliGRAM(s) Enteral Tube daily  folic acid 1 milliGRAM(s) Oral daily  insulin lispro (HumaLOG) corrective regimen sliding scale   SubCutaneous every 6 hours  levoFLOXacin IVPB 250 milliGRAM(s) IV Intermittent every 24 hours  levothyroxine 50 MICROGram(s) Oral daily  pantoprazole  Injectable 40 milliGRAM(s) IV Push daily  propofol Infusion 5 MICROgram(s)/kG/Min (1.71 mL/Hr) IV Continuous <Continuous>  simvastatin 20 milliGRAM(s) Oral at bedtime    MEDICATIONS  (PRN):  acetaminophen   Tablet 650 milliGRAM(s) Oral every 6 hours PRN For Temp greater than 38 C (100.4 F)  acetaminophen  Suppository 650 milliGRAM(s) Rectal every 6 hours PRN For Temp greater than 38 C (100.4 F)  dextrose Gel 1 Dose(s) Oral once PRN Blood Glucose LESS THAN 70 milliGRAM(s)/deciliter  glucagon  Injectable 1 milliGRAM(s) IntraMuscular once PRN Glucose LESS THAN 70 milligrams/deciliter  HYDROmorphone  Injectable 1 milliGRAM(s) IV Push every 4 hours PRN Severe Pain (7 - 10)      Allergies    No Known Allergies    Intolerances        LABS:                        8.0    17.2  )-----------( 125      ( 22 Dec 2017 06:56 )             24.5     12-22    143  |  105  |  81<H>  ----------------------------<  152<H>  4.6   |  24  |  6.74<H>    Ca    8.5      22 Dec 2017 06:56  Phos  4.2     12-22  Mg     2.8     12-22      PT/INR - ( 22 Dec 2017 06:56 )   PT: 13.5 sec;   INR: 1.23 ratio         PTT - ( 22 Dec 2017 06:56 )  PTT:33.0 sec    ABG - ( 22 Dec 2017 04:08 )  pH: 7.43  /  pCO2: 36    /  pO2: 76    / HCO3: 23    / Base Excess: -0.7  /  SaO2: 94                    CAPILLARY BLOOD GLUCOSE      POCT Blood Glucose.: 149 mg/dL (22 Dec 2017 06:27)  POCT Blood Glucose.: 209 mg/dL (22 Dec 2017 02:16)  POCT Blood Glucose.: 167 mg/dL (21 Dec 2017 17:43)  POCT Blood Glucose.: 126 mg/dL (21 Dec 2017 11:48)        RADIOLOGY & ADDITIONAL TESTS:    CXR:  < from: Xray Chest 1 View AP -PORTABLE-Routine (12.22.17 @ 07:56) >  No significant interval change, with persistence of dense   airspace consolidation within the bilateral lower lobes and stable patchy   airspace opacities noted within the bilateral upper lobes, right greater   than left.    < end of copied text >    Ct scan chest:    ekg;    echo:

## 2017-12-22 NOTE — PROGRESS NOTE ADULT - SUBJECTIVE AND OBJECTIVE BOX
left groin shiley removed at request of medical team due to leukocytosis and refusal IR to place Permacath.  Pressure held in place for 15 minutes with good hemostasis achieved  additional 5minutes pressure applied with good hemostasis  pressure dressing applied  nursing instructed to groin check in 10 minutes.  Permacath Tuesday if remains afebrile

## 2017-12-22 NOTE — CHART NOTE - NSCHARTNOTEFT_GEN_A_CORE
Called urology for shiley removal. Next HD will be scheduled on Monday or Tuesday as per Dr. Escoto. Called urologist PA for shiley removal. Next HD will be scheduled on Monday or Tuesday as per Dr. Escoto. Called urology PA for shiley removal. Next HD will be scheduled on Monday or Tuesday as per Dr. Escoto. Called IR for permacath, may be placed on Tuesday.

## 2017-12-23 LAB
ANION GAP SERPL CALC-SCNC: 14 MMOL/L — SIGNIFICANT CHANGE UP (ref 5–17)
BASE EXCESS BLDA CALC-SCNC: 0.2 MMOL/L — SIGNIFICANT CHANGE UP (ref -2–2)
BLOOD GAS COMMENTS ARTERIAL: SIGNIFICANT CHANGE UP
BUN SERPL-MCNC: 74 MG/DL — HIGH (ref 7–18)
CALCIUM SERPL-MCNC: 8.4 MG/DL — SIGNIFICANT CHANGE UP (ref 8.4–10.5)
CHLORIDE SERPL-SCNC: 106 MMOL/L — SIGNIFICANT CHANGE UP (ref 96–108)
CO2 SERPL-SCNC: 24 MMOL/L — SIGNIFICANT CHANGE UP (ref 22–31)
CREAT SERPL-MCNC: 6.22 MG/DL — HIGH (ref 0.5–1.3)
EOSINOPHIL NFR BLD AUTO: 1 % — SIGNIFICANT CHANGE UP (ref 0–6)
GLUCOSE BLDC GLUCOMTR-MCNC: 191 MG/DL — HIGH (ref 70–99)
GLUCOSE BLDC GLUCOMTR-MCNC: 219 MG/DL — HIGH (ref 70–99)
GLUCOSE BLDC GLUCOMTR-MCNC: 239 MG/DL — HIGH (ref 70–99)
GLUCOSE BLDC GLUCOMTR-MCNC: 264 MG/DL — HIGH (ref 70–99)
GLUCOSE SERPL-MCNC: 196 MG/DL — HIGH (ref 70–99)
HCO3 BLDA-SCNC: 24 MMOL/L — SIGNIFICANT CHANGE UP (ref 23–27)
HCT VFR BLD CALC: 26 % — LOW (ref 34.5–45)
HGB BLD-MCNC: 8.3 G/DL — LOW (ref 11.5–15.5)
HOROWITZ INDEX BLDA+IHG-RTO: 50 — SIGNIFICANT CHANGE UP
LYMPHOCYTES # BLD AUTO: 5 % — LOW (ref 13–44)
MAGNESIUM SERPL-MCNC: 2.7 MG/DL — HIGH (ref 1.6–2.6)
MCHC RBC-ENTMCNC: 29.6 PG — SIGNIFICANT CHANGE UP (ref 27–34)
MCHC RBC-ENTMCNC: 31.9 GM/DL — LOW (ref 32–36)
MCV RBC AUTO: 92.8 FL — SIGNIFICANT CHANGE UP (ref 80–100)
MONOCYTES NFR BLD AUTO: 4 % — SIGNIFICANT CHANGE UP (ref 2–14)
NEUTROPHILS NFR BLD AUTO: 87 % — HIGH (ref 43–77)
PCO2 BLDA: 38 MMHG — SIGNIFICANT CHANGE UP (ref 32–46)
PH BLDA: 7.42 — SIGNIFICANT CHANGE UP (ref 7.35–7.45)
PHOSPHATE SERPL-MCNC: 4.5 MG/DL — SIGNIFICANT CHANGE UP (ref 2.5–4.5)
PLATELET # BLD AUTO: 153 K/UL — SIGNIFICANT CHANGE UP (ref 150–400)
PO2 BLDA: 66 MMHG — LOW (ref 74–108)
POTASSIUM SERPL-MCNC: 4.3 MMOL/L — SIGNIFICANT CHANGE UP (ref 3.5–5.3)
POTASSIUM SERPL-SCNC: 4.3 MMOL/L — SIGNIFICANT CHANGE UP (ref 3.5–5.3)
RBC # BLD: 2.8 M/UL — LOW (ref 3.8–5.2)
RBC # FLD: 15.1 % — HIGH (ref 10.3–14.5)
SAO2 % BLDA: 91 % — LOW (ref 92–96)
SODIUM SERPL-SCNC: 144 MMOL/L — SIGNIFICANT CHANGE UP (ref 135–145)
WBC # BLD: 21.4 K/UL — HIGH (ref 3.8–10.5)
WBC # FLD AUTO: 21.4 K/UL — HIGH (ref 3.8–10.5)

## 2017-12-23 PROCEDURE — 71010: CPT | Mod: 26

## 2017-12-23 RX ADMIN — CINACALCET 30 MILLIGRAM(S): 30 TABLET, FILM COATED ORAL at 12:58

## 2017-12-23 RX ADMIN — PANTOPRAZOLE SODIUM 40 MILLIGRAM(S): 20 TABLET, DELAYED RELEASE ORAL at 12:59

## 2017-12-23 RX ADMIN — SIMVASTATIN 20 MILLIGRAM(S): 20 TABLET, FILM COATED ORAL at 21:28

## 2017-12-23 RX ADMIN — Medication 3: at 23:15

## 2017-12-23 RX ADMIN — CEFEPIME 100 MILLIGRAM(S): 1 INJECTION, POWDER, FOR SOLUTION INTRAMUSCULAR; INTRAVENOUS at 16:54

## 2017-12-23 RX ADMIN — Medication 300 MILLIGRAM(S): at 12:57

## 2017-12-23 RX ADMIN — Medication 2: at 17:14

## 2017-12-23 RX ADMIN — Medication 1: at 06:37

## 2017-12-23 RX ADMIN — Medication 2: at 12:56

## 2017-12-23 RX ADMIN — Medication 50 MICROGRAM(S): at 05:17

## 2017-12-23 RX ADMIN — Medication 1 MILLIGRAM(S): at 12:58

## 2017-12-23 RX ADMIN — PROPOFOL 1.71 MICROGRAM(S)/KG/MIN: 10 INJECTION, EMULSION INTRAVENOUS at 09:46

## 2017-12-23 RX ADMIN — Medication 500 MILLIGRAM(S): at 12:57

## 2017-12-23 NOTE — PROGRESS NOTE ADULT - ASSESSMENT
50 y/o F from [facility] ( long term care 2/2 diffculty walking possibly 2/2 polyneuropathy) w/ PMHx of anemia, CHF( diastolic dysfunction), CKD, Clostridium difficile infection, DM, Diabetic neuropathy, HTN, HLD, Hypothyroidism, Hypoparathyroidism, Osteomyelitis of jaw, and Parathyroid adenoma, GERD p/w c/o SOB and difficulty breathing x1 week. Pt also reports associated mild cough. Pt denies fever, chills, general pain, C/P, or any other complaints. NO  In ED pt is Awake , responds to pain and verbal stimuli, She in moderate respiratory distress. placed on BIPAP. ICU consulted for new BIPAP.    Admit to ICU for Acute Hypoxic and hypercapnic respiratory failure require new Bipap  2/2 Exacerbation of CHF or possibly pneumonia      Problem/Plan - 1:  ·  Problem: Acute respiratory failure.  Plan: Inubated 12/16. Pt was hypoxic on BiPAP,  failed NIPPV support  Acute respiratory failure secondary to HAP (elevated procalcitonin) and fluid overload  -continue vent support, on propofol for sedation  -HD today may trial SAT/ SBT    -c/w  maxipime and  levaquin   -pt need permcath  as per Dr. Escoto   -called IR for permacath today       Problem/Plan - 2:  ·  Problem: R/O Upper GI bleed.  Plan: c/w protonix, no recurrent GI Bleed.  s/p 1 PRBC, hb 8.2 after transfusion  EGD showed duodenal ulcer, no active bleeding, gastritis and esophagitis.  f/u biopsy report  monitor CBC  c/w protonix 40mg, daily.       Problem/Plan - 3:  ·  Problem: Hypothyroidism.  Plan: c/w home dose LT4 50 mcg daily  TSH wnl.      Problem/Plan - 4:  ·  Problem: CHF (congestive heart failure).  Plan: unknown EF  c/w home cardiac meds   TTE - RV systolic pressure is 49 mm Hg. Mild pulmonary  hypertension.      Problem/Plan - 5:  ·  Problem: CKD (chronic kidney disease).  Plan: getting HD via shiley.  -pt need permcath  as per Dr. Escoto   -called IR for permacath today        Problem/Plan - 6:  Problem: Diabetes mellitus. Plan: c/w Lantus 10 units daily , HSS   HbA1c 5.2.     Problem/Plan - 7:  ·  Problem: HTN (hypertension).  Plan: c/w home BP meds.      Problem/Plan - 8:  ·  Problem: Prophylactic measure.  Plan: c/w DVT ppx   IMPROVE score - 2.

## 2017-12-23 NOTE — PROGRESS NOTE ADULT - SUBJECTIVE AND OBJECTIVE BOX
S/P REMOVAL OF SHILEY. AFEBRILE    No Known Allergies    Hospital Medications:   MEDICATIONS  (STANDING):  ascorbic acid 500 milliGRAM(s) Oral daily  cefepime  IVPB 1000 milliGRAM(s) IV Intermittent every 24 hours  cinacalcet 30 milliGRAM(s) Oral daily  dextrose 5%. 1000 milliLiter(s) (50 mL/Hr) IV Continuous <Continuous>  dextrose 50% Injectable 12.5 Gram(s) IV Push once  dextrose 50% Injectable 25 Gram(s) IV Push once  dextrose 50% Injectable 25 Gram(s) IV Push once  ferrous    sulfate Liquid 300 milliGRAM(s) Enteral Tube daily  folic acid 1 milliGRAM(s) Oral daily  insulin lispro (HumaLOG) corrective regimen sliding scale   SubCutaneous every 6 hours  levoFLOXacin IVPB 250 milliGRAM(s) IV Intermittent every 24 hours  levothyroxine 50 MICROGram(s) Oral daily  pantoprazole  Injectable 40 milliGRAM(s) IV Push daily  propofol Infusion 5 MICROgram(s)/kG/Min (1.71 mL/Hr) IV Continuous <Continuous>  simvastatin 20 milliGRAM(s) Oral at bedtime        VITALS:  T(F): 98.8 (12-23-17 @ 08:00), Max: 100.1 (12-22-17 @ 16:00)  HR: 89 (12-23-17 @ 14:00)  BP: 114/50 (12-23-17 @ 14:00)  RR: 20 (12-23-17 @ 14:00)  SpO2: 89% (12-23-17 @ 14:00)  Wt(kg): --    12-22 @ 07:01  -  12-23 @ 07:00  --------------------------------------------------------  IN: 816.4 mL / OUT: 0 mL / NET: 816.4 mL    12-23 @ 07:01  -  12-23 @ 15:18  --------------------------------------------------------  IN: 180.6 mL / OUT: 0 mL / NET: 180.6 mL        PHYSICAL EXAM:  Constitutional: NAD  HEENT: anicteric sclera, oropharynx clear.  Neck: No JVD  Respiratory: CTAB, no wheezes, rales or rhonchi  Cardiovascular: S1, S2, RRR  Gastrointestinal: BS+, soft, NT/ND  Extremities: No cyanosis or clubbing.  peripheral edema  Neurological: A/O x 3, no focal deficits  Psychiatric: Normal mood, normal affect  : No CVA tenderness. No jarrell.       LABS:  12-23    144  |  106  |  74<H>  ----------------------------<  196<H>  4.3   |  24  |  6.22<H>    Ca    8.4      23 Dec 2017 04:18  Phos  4.5     12-23  Mg     2.7     12-23      Creatinine Trend: 6.22 <--, 6.74 <--, 5.18 <--, 5.60 <--, 4.46 <--, 2.16 <--, 4.54 <--, 3.46 <--                        8.3    21.4  )-----------( 153      ( 23 Dec 2017 04:18 )             26.0     Urine Studies:      RADIOLOGY & ADDITIONAL STUDIES:

## 2017-12-23 NOTE — PROGRESS NOTE ADULT - SUBJECTIVE AND OBJECTIVE BOX
INTERVAL HPI/OVERNIGHT EVENTS: no overnight events, s/p shiley removal, failed SBT yesterday  PRESSORS: [ ] YES [x ] NO  WHICH:      MEDICATIONS  (STANDING):  ascorbic acid 500 milliGRAM(s) Oral daily  cefepime  IVPB 1000 milliGRAM(s) IV Intermittent every 24 hours  cinacalcet 30 milliGRAM(s) Oral daily  dextrose 5%. 1000 milliLiter(s) (50 mL/Hr) IV Continuous <Continuous>  dextrose 50% Injectable 12.5 Gram(s) IV Push once  dextrose 50% Injectable 25 Gram(s) IV Push once  dextrose 50% Injectable 25 Gram(s) IV Push once  ferrous    sulfate Liquid 300 milliGRAM(s) Enteral Tube daily  folic acid 1 milliGRAM(s) Oral daily  insulin lispro (HumaLOG) corrective regimen sliding scale   SubCutaneous every 6 hours  levoFLOXacin IVPB 250 milliGRAM(s) IV Intermittent every 24 hours  levothyroxine 50 MICROGram(s) Oral daily  pantoprazole  Injectable 40 milliGRAM(s) IV Push daily  propofol Infusion 5 MICROgram(s)/kG/Min (1.71 mL/Hr) IV Continuous <Continuous>  simvastatin 20 milliGRAM(s) Oral at bedtime    MEDICATIONS  (PRN):  acetaminophen   Tablet 650 milliGRAM(s) Oral every 6 hours PRN For Temp greater than 38 C (100.4 F)  acetaminophen  Suppository 650 milliGRAM(s) Rectal every 6 hours PRN For Temp greater than 38 C (100.4 F)  dextrose Gel 1 Dose(s) Oral once PRN Blood Glucose LESS THAN 70 milliGRAM(s)/deciliter  glucagon  Injectable 1 milliGRAM(s) IntraMuscular once PRN Glucose LESS THAN 70 milligrams/deciliter  HYDROmorphone  Injectable 1 milliGRAM(s) IV Push every 4 hours PRN Severe Pain (7 - 10)    ICU Vital Signs Last 24 Hrs  T(C): 37.3 (22 Dec 2017 23:55), Max: 38.1 (22 Dec 2017 04:00)  T(F): 99.1 (22 Dec 2017 23:55), Max: 100.6 (22 Dec 2017 04:00)  HR: 89 (23 Dec 2017 02:01) (80 - 110)  BP: 137/60 (23 Dec 2017 01:00) (100/51 - 178/70)  BP(mean): 80 (23 Dec 2017 01:00) (61 - 105)  ABP: --  ABP(mean): --  RR: 9 (23 Dec 2017 01:00) (9 - 20)  SpO2: 95% (23 Dec 2017 02:01) (91% - 100%)                12-21 @ 07:01  -  12-22 @ 07:00  --------------------------------------------------------  IN: 1129.2 mL / OUT: 0 mL / NET: 1129.2 mL        Mode: AC/ CMV (Assist Control/ Continuous Mandatory Ventilation)  RR (machine): 15  TV (machine): 400  FiO2: 50  PEEP: 7  ITime: 1  MAP: 13  PIP: 31      PHYSICAL EXAM:    GENERAL: [x]NAD, []well-groomed, []well-developed  HEAD:  [x]Atraumatic, []Normocephalic  EYES: sluggish pupillary response bilaterally, conjunctiva and sclera clear  ENMT: [x]No tonsillar erythema, exudates, or enlargement; []Moist mucous membranes, []Good dentition, []No lesions  NECK: [x]Supple, normal appearance, []No JVD; []Normal thyroid; [x]Trachea midline  NERVOUS SYSTEM: sedated, nonverbal, noncommunicative  CHEST/LUNG: [x]No chest deformity; []Normal percussion bilaterally; []No rales, rhonchi, wheezing   HEART: [x]Regular rate and rhythm; []No murmurs, rubs, or gallops  ABDOMEN: [x]Soft, Nontender, Nondistended; []Bowel sounds present  EXTREMITIES:  [x]2+ Peripheral Pulses, []No clubbing, cyanosis, or edema  LYMPH: [x]No lymphadenopathy noted  SKIN: [x]No rashes or lesions; []Good capillary refill                              8.0    17.2  )-----------( 125      ( 22 Dec 2017 06:56 )             24.5   12-22    143  |  105  |  81<H>  ----------------------------<  152<H>  4.6   |  24  |  6.74<H>    Ca    8.5      22 Dec 2017 06:56  Phos  4.2     12-22  Mg     2.8     12-22          Assessment and Plan:   · Assessment		  50 y/o F from [facility] ( long term care 2/2 diffculty walking possibly 2/2 polyneuropathy) w/ PMHx of anemia, CHF( diastolic dysfunction), CKD, Clostridium difficile infection, DM, Diabetic neuropathy, HTN, HLD, Hypothyroidism, Hypoparathyroidism, Osteomyelitis of jaw, and Parathyroid adenoma, GERD p/w c/o SOB and difficulty breathing x1 week. Pt also reports associated mild cough. Pt denies fever, chills, general pain, C/P, or any other complaints. NO  In ED pt is Awake , responds to pain and verbal stimuli, She in moderate respiratory distress. placed on BIPAP. ICU consulted for new BIPAP.    Admit to ICU for Acute Hypoxic and hypercapnic respiratory failure require new Bipap  2/2 Exacerbation of CHF or possibly pneumonia      Problem/Plan - 1:  ·  Problem: Acute respiratory failure.  Plan: Inubated 12/16. Pt was hypoxic on BiPAP,  failed NIPPV support  Acute respiratory failure secondary to HAP (elevated procalcitonin) and fluid overload  -continue vent support, on propofol for sedation  -Hs/p SBT yesterday, shiley removed for now   -c/w  maxipime and  levaquin   -pt need permcath  as per Dr. Escoto   -called IR for permacath today       Problem/Plan - 2:  ·  Problem: R/O Upper GI bleed.  Plan: c/w protonix, no recurrent GI Bleed.  s/p 1 PRBC, hb 8.2 after transfusion  EGD showed duodenal ulcer, no active bleeding, gastritis and esophagitis.  f/u biopsy report  monitor CBC  c/w protonix 40mg, daily.       Problem/Plan - 3:  ·  Problem: Hypothyroidism.  Plan: c/w home dose LT4 50 mcg daily  TSH wnl.      Problem/Plan - 4:  ·  Problem: CHF (congestive heart failure).  Plan: unknown EF  c/w home cardiac meds   TTE - RV systolic pressure is 49 mm Hg. Mild pulmonary  hypertension.      Problem/Plan - 5:  ·  Problem: CKD (chronic kidney disease).  Plan: getting HD via [facility].  -pt need permcath  as per Dr. Escoto   -called IR for permacath today        Problem/Plan - 6:  Problem: Diabetes mellitus. Plan: c/w Lantus 10 units daily , HSS   HbA1c 5.2.     Problem/Plan - 7:  ·  Problem: HTN (hypertension).  Plan: c/w home BP meds.      Problem/Plan - 8:  ·  Problem: Prophylactic measure.  Plan: c/w DVT ppx   IMPROVE score - 2. INTERVAL HPI/OVERNIGHT EVENTS: no overnight events, s/p shiley removal, failed SBT yesterday. Patient did not have fever spike yesterday  PRESSORS: [ ] YES [x ] NO  WHICH:      MEDICATIONS  (STANDING):  ascorbic acid 500 milliGRAM(s) Oral daily  cefepime  IVPB 1000 milliGRAM(s) IV Intermittent every 24 hours  cinacalcet 30 milliGRAM(s) Oral daily  dextrose 5%. 1000 milliLiter(s) (50 mL/Hr) IV Continuous <Continuous>  dextrose 50% Injectable 12.5 Gram(s) IV Push once  dextrose 50% Injectable 25 Gram(s) IV Push once  dextrose 50% Injectable 25 Gram(s) IV Push once  ferrous    sulfate Liquid 300 milliGRAM(s) Enteral Tube daily  folic acid 1 milliGRAM(s) Oral daily  insulin lispro (HumaLOG) corrective regimen sliding scale   SubCutaneous every 6 hours  levoFLOXacin IVPB 250 milliGRAM(s) IV Intermittent every 24 hours  levothyroxine 50 MICROGram(s) Oral daily  pantoprazole  Injectable 40 milliGRAM(s) IV Push daily  propofol Infusion 5 MICROgram(s)/kG/Min (1.71 mL/Hr) IV Continuous <Continuous>  simvastatin 20 milliGRAM(s) Oral at bedtime    MEDICATIONS  (PRN):  acetaminophen   Tablet 650 milliGRAM(s) Oral every 6 hours PRN For Temp greater than 38 C (100.4 F)  acetaminophen  Suppository 650 milliGRAM(s) Rectal every 6 hours PRN For Temp greater than 38 C (100.4 F)  dextrose Gel 1 Dose(s) Oral once PRN Blood Glucose LESS THAN 70 milliGRAM(s)/deciliter  glucagon  Injectable 1 milliGRAM(s) IntraMuscular once PRN Glucose LESS THAN 70 milligrams/deciliter  HYDROmorphone  Injectable 1 milliGRAM(s) IV Push every 4 hours PRN Severe Pain (7 - 10)    ICU Vital Signs Last 24 Hrs  T(C): 37.3 (22 Dec 2017 23:55), Max: 38.1 (22 Dec 2017 04:00)  T(F): 99.1 (22 Dec 2017 23:55), Max: 100.6 (22 Dec 2017 04:00)  HR: 89 (23 Dec 2017 02:01) (80 - 110)  BP: 137/60 (23 Dec 2017 01:00) (100/51 - 178/70)  BP(mean): 80 (23 Dec 2017 01:00) (61 - 105)  ABP: --  ABP(mean): --  RR: 9 (23 Dec 2017 01:00) (9 - 20)  SpO2: 95% (23 Dec 2017 02:01) (91% - 100%)                12-21 @ 07:01  -  12-22 @ 07:00  --------------------------------------------------------  IN: 1129.2 mL / OUT: 0 mL / NET: 1129.2 mL        Mode: AC/ CMV (Assist Control/ Continuous Mandatory Ventilation)  RR (machine): 15  TV (machine): 400  FiO2: 50  PEEP: 7  ITime: 1  MAP: 13  PIP: 31      PHYSICAL EXAM:    GENERAL: [x]NAD,  HEAD:  [x]Atraumatic, []Normocephalic  EYES: sluggish pupillary response bilaterally, conjunctiva and sclera clear  ENMT: on ETT tube  NECK: [x]Supple, normal appearance, []No JVD; []Normal thyroid; [x]Trachea midline  NERVOUS SYSTEM: sedated, nonverbal, noncommunicative  CHEST/LUNG: [x]No chest deformity; []Normal percussion bilaterally; []No rales, rhonchi, wheezing   HEART: [x]Regular rate and rhythm; []No murmurs, rubs, or gallops  ABDOMEN: [x]Soft, Nontender, Nondistended; []Bowel sounds present  EXTREMITIES:  [x]2+ Peripheral Pulses, []No clubbing, cyanosis, or edema  LYMPH: [x]No lymphadenopathy noted  SKIN: [x]No rashes or lesions; []Good capillary refill                              8.0    17.2  )-----------( 125      ( 22 Dec 2017 06:56 )             24.5   12-22    143  |  105  |  81<H>  ----------------------------<  152<H>  4.6   |  24  |  6.74<H>    Ca    8.5      22 Dec 2017 06:56  Phos  4.2     12-22  Mg     2.8     12-22

## 2017-12-23 NOTE — PROGRESS NOTE ADULT - ASSESSMENT
52 yr old female with progressive CKD V admitted with decompesated CHF sec to CKD V. NOW ESRD. On antibiotics for healthcare associated PNA.   leucocytosis worsening. Afebrile. s/p removal pf vasc cath.  daily BMP

## 2017-12-23 NOTE — PROGRESS NOTE ADULT - SUBJECTIVE AND OBJECTIVE BOX
Patient is a 52y old  Female who presents with a chief complaint of SOB. She has  PMHx of anemia, CHF, CKD, Clostridium difficile infection, DM, Diabetic neuropathy, HTN, HLD, Hypothyroidism, Hypoparathyroidism, Osteomyelitis of jaw, and Parathyroid adenoma, GERD .  Remains intubated, sedated,  on mechanical ventilation but no acute distress. Having HD at this time.             INTERVAL HPI/OVERNIGHT EVENTS:      VITAL SIGNS:  T(F): 99.6 (12-23-17 @ 06:00)  HR: 83 (12-23-17 @ 08:45)  BP: 130/56 (12-23-17 @ 08:00)  RR: 19 (12-23-17 @ 08:00)  SpO2: 93% (12-23-17 @ 08:45)  Wt(kg): --  I&O's Detail    22 Dec 2017 07:01  -  23 Dec 2017 07:00  --------------------------------------------------------  IN:    Enteral Tube Flush: 195 mL    Nepro: 450 mL    propofol Infusion: 71.4 mL    Solution: 50 mL    Solution: 50 mL  Total IN: 816.4 mL    OUT:  Total OUT: 0 mL    Total NET: 816.4 mL      23 Dec 2017 07:01  -  23 Dec 2017 11:49  --------------------------------------------------------  IN:    Nepro: 25 mL    propofol Infusion: 5.1 mL  Total IN: 30.1 mL    OUT:  Total OUT: 0 mL    Total NET: 30.1 mL        Mode: AC/ CMV (Assist Control/ Continuous Mandatory Ventilation)  RR (machine): 15  TV (machine): 400  FiO2: 50  PEEP: 5  ITime: 1  MAP: 10  PIP: 30        REVIEW OF SYSTEMS:    CONSTITUTIONAL:  No fevers, chills, sweats    HEENT:  Eyes:  No diplopia or blurred vision. ENT:  No earache, sore throat or runny nose.    CARDIOVASCULAR:  No pressure, squeezing, tightness, or heaviness about the chest; no palpitations.    RESPIRATORY:  Per HPI    GASTROINTESTINAL:  No abdominal pain, nausea, vomiting or diarrhea.    GENITOURINARY:  No dysuria, frequency or urgency.    NEUROLOGIC:  No paresthesias, fasciculations, seizures or weakness.    PSYCHIATRIC:  No disorder of thought or mood.      PHYSICAL EXAM:    Constitutional: Well developed and nourished  Eyes:Perrla  ENMT: normal  Neck:supple  Respiratory: good air entry  Cardiovascular: S1 S2 regular  Gastrointestinal: Soft, Non tender  Extremities: No edema  Vascular:normal  Neurological:Awake, alert,Ox3  Musculoskeletal:Normal      MEDICATIONS  (STANDING):  ascorbic acid 500 milliGRAM(s) Oral daily  cefepime  IVPB 1000 milliGRAM(s) IV Intermittent every 24 hours  cinacalcet 30 milliGRAM(s) Oral daily  dextrose 5%. 1000 milliLiter(s) (50 mL/Hr) IV Continuous <Continuous>  dextrose 50% Injectable 12.5 Gram(s) IV Push once  dextrose 50% Injectable 25 Gram(s) IV Push once  dextrose 50% Injectable 25 Gram(s) IV Push once  ferrous    sulfate Liquid 300 milliGRAM(s) Enteral Tube daily  folic acid 1 milliGRAM(s) Oral daily  insulin lispro (HumaLOG) corrective regimen sliding scale   SubCutaneous every 6 hours  levoFLOXacin IVPB 250 milliGRAM(s) IV Intermittent every 24 hours  levothyroxine 50 MICROGram(s) Oral daily  pantoprazole  Injectable 40 milliGRAM(s) IV Push daily  propofol Infusion 5 MICROgram(s)/kG/Min (1.71 mL/Hr) IV Continuous <Continuous>  simvastatin 20 milliGRAM(s) Oral at bedtime    MEDICATIONS  (PRN):  acetaminophen   Tablet 650 milliGRAM(s) Oral every 6 hours PRN For Temp greater than 38 C (100.4 F)  acetaminophen  Suppository 650 milliGRAM(s) Rectal every 6 hours PRN For Temp greater than 38 C (100.4 F)  dextrose Gel 1 Dose(s) Oral once PRN Blood Glucose LESS THAN 70 milliGRAM(s)/deciliter  glucagon  Injectable 1 milliGRAM(s) IntraMuscular once PRN Glucose LESS THAN 70 milligrams/deciliter  HYDROmorphone  Injectable 1 milliGRAM(s) IV Push every 4 hours PRN Severe Pain (7 - 10)      Allergies    No Known Allergies    Intolerances        LABS:                        8.3    21.4  )-----------( 153      ( 23 Dec 2017 04:18 )             26.0     12-23    144  |  106  |  74<H>  ----------------------------<  196<H>  4.3   |  24  |  6.22<H>    Ca    8.4      23 Dec 2017 04:18  Phos  4.5     12-23  Mg     2.7     12-23      PT/INR - ( 22 Dec 2017 06:56 )   PT: 13.5 sec;   INR: 1.23 ratio         PTT - ( 22 Dec 2017 06:56 )  PTT:33.0 sec    ABG - ( 23 Dec 2017 04:31 )  pH: 7.42  /  pCO2: 38    /  pO2: 66    / HCO3: 24    / Base Excess: 0.2   /  SaO2: 91                    CAPILLARY BLOOD GLUCOSE      POCT Blood Glucose.: 191 mg/dL (23 Dec 2017 06:30)  POCT Blood Glucose.: 207 mg/dL (22 Dec 2017 23:45)  POCT Blood Glucose.: 180 mg/dL (22 Dec 2017 18:15)        RADIOLOGY & ADDITIONAL TESTS:    CXR:    < from: Xray Chest 1 View AP -PORTABLE-Routine (12.23.17 @ 11:40) >    The mediastinal cardiac silhouette is unremarkable. ET tube and nasogastric tube unchanged.   Left lower lung opacity unchanged. Blunting of right costophrenic angle unchanged.    No acute osseous finding.     < end of copied text >    Ct scan chest:    ekg;     < from: 12 Lead ECG (12.13.17 @ 11:32) >    Sinus rhythm with 1st degree A-V block. Low voltage QRS. Borderline ECG    < end of copied text >    echo:    < from: Transthoracic Echocardiogram (12.14.17 @ 07:15) >    1. Mitral annular calcification. Trace mitral regurgitation.  2. Mildly dilated left atrium.  LA volume index = 39 cc/m2.  3. Severe concentric left ventricular hypertrophy.  4. Endocardium not well visualized; grossly low normal left ventricular function.  Moderate diastolic (stage II) dysfunction.  5. Normal right ventricular size and function.  6. RV systolic pressure is 49 mm Hg. Mild pulmonary hypertension.  7. Small pericardial effusion measuring 0.7 cm at its largest dimension when pooled posterior to the LV.   No echocardiographic evidence of pericardial tamponade.    < end of copied text > Patient is a 52y old  Female who presents with a chief complaint of SOB. She has  PMHx of anemia, CHF, CKD, Clostridium difficile infection, DM, Diabetic neuropathy, HTN, HLD, Hypothyroidism, Hypoparathyroidism, Osteomyelitis of jaw, and Parathyroid adenoma, GERD .  Remains intubated, sedated,  on mechanical ventilation but no acute distress. Have HD yesterday.             INTERVAL HPI/OVERNIGHT EVENTS:      VITAL SIGNS:  T(F): 99.6 (12-23-17 @ 06:00)  HR: 83 (12-23-17 @ 08:45)  BP: 130/56 (12-23-17 @ 08:00)  RR: 19 (12-23-17 @ 08:00)  SpO2: 93% (12-23-17 @ 08:45)  Wt(kg): --  I&O's Detail    22 Dec 2017 07:01  -  23 Dec 2017 07:00  --------------------------------------------------------  IN:    Enteral Tube Flush: 195 mL    Nepro: 450 mL    propofol Infusion: 71.4 mL    Solution: 50 mL    Solution: 50 mL  Total IN: 816.4 mL    OUT:  Total OUT: 0 mL    Total NET: 816.4 mL      23 Dec 2017 07:01  -  23 Dec 2017 11:49  --------------------------------------------------------  IN:    Nepro: 25 mL    propofol Infusion: 5.1 mL  Total IN: 30.1 mL    OUT:  Total OUT: 0 mL    Total NET: 30.1 mL        Mode: AC/ CMV (Assist Control/ Continuous Mandatory Ventilation)  RR (machine): 15  TV (machine): 400  FiO2: 50  PEEP: 5  ITime: 1  MAP: 10  PIP: 30        REVIEW OF SYSTEMS:    CONSTITUTIONAL:  No fevers, chills, sweats    HEENT:  Eyes:  No diplopia or blurred vision. ENT:  No earache, sore throat or runny nose.    CARDIOVASCULAR:  No pressure, squeezing, tightness, or heaviness about the chest; no palpitations.    RESPIRATORY:  Per HPI    GASTROINTESTINAL:  No abdominal pain, nausea, vomiting or diarrhea.    GENITOURINARY:  No dysuria, frequency or urgency.    NEUROLOGIC:  No paresthesias, fasciculations, seizures or weakness.    PSYCHIATRIC:  No disorder of thought or mood.      PHYSICAL EXAM:    Constitutional: Well developed and nourished  Eyes:Perrla  ENMT: normal  Neck:supple  Respiratory: good air entry  Cardiovascular: S1 S2 regular  Gastrointestinal: Soft, Non tender  Extremities: No edema  Vascular:normal  Neurological:Sedated  Musculoskeletal:Normal      MEDICATIONS  (STANDING):  ascorbic acid 500 milliGRAM(s) Oral daily  cefepime  IVPB 1000 milliGRAM(s) IV Intermittent every 24 hours  cinacalcet 30 milliGRAM(s) Oral daily  dextrose 5%. 1000 milliLiter(s) (50 mL/Hr) IV Continuous <Continuous>  dextrose 50% Injectable 12.5 Gram(s) IV Push once  dextrose 50% Injectable 25 Gram(s) IV Push once  dextrose 50% Injectable 25 Gram(s) IV Push once  ferrous    sulfate Liquid 300 milliGRAM(s) Enteral Tube daily  folic acid 1 milliGRAM(s) Oral daily  insulin lispro (HumaLOG) corrective regimen sliding scale   SubCutaneous every 6 hours  levoFLOXacin IVPB 250 milliGRAM(s) IV Intermittent every 24 hours  levothyroxine 50 MICROGram(s) Oral daily  pantoprazole  Injectable 40 milliGRAM(s) IV Push daily  propofol Infusion 5 MICROgram(s)/kG/Min (1.71 mL/Hr) IV Continuous <Continuous>  simvastatin 20 milliGRAM(s) Oral at bedtime    MEDICATIONS  (PRN):  acetaminophen   Tablet 650 milliGRAM(s) Oral every 6 hours PRN For Temp greater than 38 C (100.4 F)  acetaminophen  Suppository 650 milliGRAM(s) Rectal every 6 hours PRN For Temp greater than 38 C (100.4 F)  dextrose Gel 1 Dose(s) Oral once PRN Blood Glucose LESS THAN 70 milliGRAM(s)/deciliter  glucagon  Injectable 1 milliGRAM(s) IntraMuscular once PRN Glucose LESS THAN 70 milligrams/deciliter  HYDROmorphone  Injectable 1 milliGRAM(s) IV Push every 4 hours PRN Severe Pain (7 - 10)      Allergies    No Known Allergies    Intolerances        LABS:                        8.3    21.4  )-----------( 153      ( 23 Dec 2017 04:18 )             26.0     12-23    144  |  106  |  74<H>  ----------------------------<  196<H>  4.3   |  24  |  6.22<H>    Ca    8.4      23 Dec 2017 04:18  Phos  4.5     12-23  Mg     2.7     12-23      PT/INR - ( 22 Dec 2017 06:56 )   PT: 13.5 sec;   INR: 1.23 ratio         PTT - ( 22 Dec 2017 06:56 )  PTT:33.0 sec    ABG - ( 23 Dec 2017 04:31 )  pH: 7.42  /  pCO2: 38    /  pO2: 66    / HCO3: 24    / Base Excess: 0.2   /  SaO2: 91                    CAPILLARY BLOOD GLUCOSE      POCT Blood Glucose.: 191 mg/dL (23 Dec 2017 06:30)  POCT Blood Glucose.: 207 mg/dL (22 Dec 2017 23:45)  POCT Blood Glucose.: 180 mg/dL (22 Dec 2017 18:15)        RADIOLOGY & ADDITIONAL TESTS:    CXR:    < from: Xray Chest 1 View AP -PORTABLE-Routine (12.23.17 @ 11:40) >    The mediastinal cardiac silhouette is unremarkable. ET tube and nasogastric tube unchanged.   Left lower lung opacity unchanged. Blunting of right costophrenic angle unchanged.    No acute osseous finding.     < end of copied text >    Ct scan chest:    ekg;     < from: 12 Lead ECG (12.13.17 @ 11:32) >    Sinus rhythm with 1st degree A-V block. Low voltage QRS. Borderline ECG    < end of copied text >    echo:    < from: Transthoracic Echocardiogram (12.14.17 @ 07:15) >    1. Mitral annular calcification. Trace mitral regurgitation.  2. Mildly dilated left atrium.  LA volume index = 39 cc/m2.  3. Severe concentric left ventricular hypertrophy.  4. Endocardium not well visualized; grossly low normal left ventricular function.  Moderate diastolic (stage II) dysfunction.  5. Normal right ventricular size and function.  6. RV systolic pressure is 49 mm Hg. Mild pulmonary hypertension.  7. Small pericardial effusion measuring 0.7 cm at its largest dimension when pooled posterior to the LV.   No echocardiographic evidence of pericardial tamponade.    < end of copied text >

## 2017-12-23 NOTE — PROGRESS NOTE ADULT - ATTENDING COMMENTS
52 female nursing home patient with hx of DM, neuropathy, CKD, CHFpEF, GERD, osteomyelitis of jaw, hypothyroidism, hypoparathyroidism, anemia, admitted with acute on chronic renal failure requiring initiation of dialysis, acute respiratory failure requiring intubation on 12/16, possible pneumonia, GI bleed, EGD showed non bleeding duodenal ulcer.  Now off pressors.   Today with fever and worsening leukocytosis.  Plan:  - source of infection may be dialysis catheter, will need to replace  - HD per nephrology  - abx per ID  - wean mechanical ventilation as tolerated.   - will need permacath when afebrile  updated sister yesterday, will try to reach her again today  Total cc time 35 min. 52 female nursing home patient with hx of DM, neuropathy, CKD, CHFpEF, GERD, osteomyelitis of jaw, hypothyroidism, hypoparathyroidism, anemia, admitted with acute on chronic renal failure requiring initiation of dialysis, acute respiratory failure requiring intubation on 12/16, pneumonia with sepsis, GI bleed, EGD showed non bleeding duodenal ulcer.  Now off pressors.   Dialysis catheter removed 12/22 due to fevers and leukocytosis  Plan:  - plan for permacath monday if afebrile, followed by HD  - abx per ID  - wean mechanical ventilation as tolerated, daily SAT/DBT  Total cc time 35 min.

## 2017-12-24 LAB
ANION GAP SERPL CALC-SCNC: 17 MMOL/L — SIGNIFICANT CHANGE UP (ref 5–17)
BASE EXCESS BLDA CALC-SCNC: -0.7 MMOL/L — SIGNIFICANT CHANGE UP (ref -2–2)
BASOPHILS # BLD AUTO: 0.1 K/UL — SIGNIFICANT CHANGE UP (ref 0–0.2)
BASOPHILS NFR BLD AUTO: 0.4 % — SIGNIFICANT CHANGE UP (ref 0–2)
BLOOD GAS COMMENTS ARTERIAL: SIGNIFICANT CHANGE UP
BUN SERPL-MCNC: 97 MG/DL — HIGH (ref 7–18)
CALCIUM SERPL-MCNC: 9.4 MG/DL — SIGNIFICANT CHANGE UP (ref 8.4–10.5)
CHLORIDE SERPL-SCNC: 105 MMOL/L — SIGNIFICANT CHANGE UP (ref 96–108)
CO2 SERPL-SCNC: 22 MMOL/L — SIGNIFICANT CHANGE UP (ref 22–31)
CREAT SERPL-MCNC: 7.87 MG/DL — HIGH (ref 0.5–1.3)
EOSINOPHIL # BLD AUTO: 0.2 K/UL — SIGNIFICANT CHANGE UP (ref 0–0.5)
EOSINOPHIL NFR BLD AUTO: 0.8 % — SIGNIFICANT CHANGE UP (ref 0–6)
GLUCOSE BLDC GLUCOMTR-MCNC: 232 MG/DL — HIGH (ref 70–99)
GLUCOSE BLDC GLUCOMTR-MCNC: 240 MG/DL — HIGH (ref 70–99)
GLUCOSE BLDC GLUCOMTR-MCNC: 250 MG/DL — HIGH (ref 70–99)
GLUCOSE BLDC GLUCOMTR-MCNC: 260 MG/DL — HIGH (ref 70–99)
GLUCOSE SERPL-MCNC: 204 MG/DL — HIGH (ref 70–99)
HCO3 BLDA-SCNC: 22 MMOL/L — LOW (ref 23–27)
HCT VFR BLD CALC: 22.7 % — LOW (ref 34.5–45)
HGB BLD-MCNC: 8.3 G/DL — LOW (ref 11.5–15.5)
HOROWITZ INDEX BLDA+IHG-RTO: 50 — SIGNIFICANT CHANGE UP
LYMPHOCYTES # BLD AUTO: 1.8 K/UL — SIGNIFICANT CHANGE UP (ref 1–3.3)
LYMPHOCYTES # BLD AUTO: 7.9 % — LOW (ref 13–44)
MAGNESIUM SERPL-MCNC: 3.1 MG/DL — HIGH (ref 1.6–2.6)
MCHC RBC-ENTMCNC: 33.1 PG — SIGNIFICANT CHANGE UP (ref 27–34)
MCHC RBC-ENTMCNC: 36.7 GM/DL — HIGH (ref 32–36)
MCV RBC AUTO: 90.3 FL — SIGNIFICANT CHANGE UP (ref 80–100)
MONOCYTES # BLD AUTO: 1.9 K/UL — HIGH (ref 0–0.9)
MONOCYTES NFR BLD AUTO: 8.4 % — SIGNIFICANT CHANGE UP (ref 2–14)
NEUTROPHILS # BLD AUTO: 19 K/UL — HIGH (ref 1.8–7.4)
NEUTROPHILS NFR BLD AUTO: 82.5 % — HIGH (ref 43–77)
PCO2 BLDA: 32 MMHG — SIGNIFICANT CHANGE UP (ref 32–46)
PH BLDA: 7.46 — HIGH (ref 7.35–7.45)
PHOSPHATE SERPL-MCNC: 5.3 MG/DL — HIGH (ref 2.5–4.5)
PLATELET # BLD AUTO: 172 K/UL — SIGNIFICANT CHANGE UP (ref 150–400)
PO2 BLDA: 64 MMHG — LOW (ref 74–108)
POTASSIUM SERPL-MCNC: 4.6 MMOL/L — SIGNIFICANT CHANGE UP (ref 3.5–5.3)
POTASSIUM SERPL-SCNC: 4.6 MMOL/L — SIGNIFICANT CHANGE UP (ref 3.5–5.3)
RBC # BLD: 2.51 M/UL — LOW (ref 3.8–5.2)
RBC # FLD: 15.3 % — HIGH (ref 10.3–14.5)
SAO2 % BLDA: 91 % — LOW (ref 92–96)
SODIUM SERPL-SCNC: 144 MMOL/L — SIGNIFICANT CHANGE UP (ref 135–145)
WBC # BLD: 23 K/UL — HIGH (ref 3.8–10.5)
WBC # FLD AUTO: 23 K/UL — HIGH (ref 3.8–10.5)

## 2017-12-24 PROCEDURE — 71010: CPT | Mod: 26

## 2017-12-24 RX ADMIN — SIMVASTATIN 20 MILLIGRAM(S): 20 TABLET, FILM COATED ORAL at 23:05

## 2017-12-24 RX ADMIN — Medication 3: at 23:16

## 2017-12-24 RX ADMIN — CINACALCET 30 MILLIGRAM(S): 30 TABLET, FILM COATED ORAL at 12:16

## 2017-12-24 RX ADMIN — PANTOPRAZOLE SODIUM 40 MILLIGRAM(S): 20 TABLET, DELAYED RELEASE ORAL at 12:16

## 2017-12-24 RX ADMIN — Medication 2: at 12:16

## 2017-12-24 RX ADMIN — Medication 2: at 05:33

## 2017-12-24 RX ADMIN — Medication 300 MILLIGRAM(S): at 12:18

## 2017-12-24 RX ADMIN — Medication 50 MICROGRAM(S): at 05:01

## 2017-12-24 RX ADMIN — Medication 500 MILLIGRAM(S): at 12:16

## 2017-12-24 RX ADMIN — Medication 2: at 17:09

## 2017-12-24 RX ADMIN — PROPOFOL 1.71 MICROGRAM(S)/KG/MIN: 10 INJECTION, EMULSION INTRAVENOUS at 00:18

## 2017-12-24 RX ADMIN — CEFEPIME 100 MILLIGRAM(S): 1 INJECTION, POWDER, FOR SOLUTION INTRAMUSCULAR; INTRAVENOUS at 16:14

## 2017-12-24 RX ADMIN — Medication 1 MILLIGRAM(S): at 12:16

## 2017-12-24 NOTE — PROGRESS NOTE ADULT - ASSESSMENT
52 yr old female with progressive CKD V admitted with decompesated CHF sec to CKD V. NOW ESRD. On antibiotics for healthcare associated PNA.   leucocytosis worsening. Afebrile. s/p removal pf vasc cath.  daily BMP   Plan for vasc cath on tuesday and HD thereafter. D/w icu team.

## 2017-12-24 NOTE — PROGRESS NOTE ADULT - SUBJECTIVE AND OBJECTIVE BOX
Patient is a 52y old  Female who presents with a chief complaint of SOB (13 Dec 2017 16:01)  Remains sedated on Mechanical ventilation. Tachypneic on C-pap mode. Clinically unchanged.    INTERVAL HPI/OVERNIGHT EVENTS:      VITAL SIGNS:  T(F): 99.2 (12-24-17 @ 06:00)  HR: 109 (12-24-17 @ 08:00)  BP: 172/71 (12-24-17 @ 08:00)  RR: 36 (12-24-17 @ 08:00)  SpO2: 93% (12-24-17 @ 08:00)  Wt(kg): --  I&O's Detail    23 Dec 2017 07:01  -  24 Dec 2017 07:00  --------------------------------------------------------  IN:    Nepro: 375 mL    propofol Infusion: 146.2 mL    Solution: 50 mL    Solution: 50 mL  Total IN: 621.2 mL    OUT:  Total OUT: 0 mL    Total NET: 621.2 mL        Mode: CPAP with PS  FiO2: 50  PEEP: 5  PS: 5  MAP: 7  PIP: 10        REVIEW OF SYSTEMS:    CONSTITUTIONAL:  No fevers, chills, sweats    HEENT:  Eyes:  No diplopia or blurred vision. ENT:  No earache, sore throat or runny nose.    CARDIOVASCULAR:  No pressure, squeezing, tightness, or heaviness about the chest; no palpitations.    RESPIRATORY:  Per HPI    GASTROINTESTINAL:  No abdominal pain, nausea, vomiting or diarrhea.    GENITOURINARY:  No dysuria, frequency or urgency.    NEUROLOGIC:  No paresthesias, fasciculations, seizures or weakness.    PSYCHIATRIC:  No disorder of thought or mood.      PHYSICAL EXAM:    Constitutional: Well developed and nourished  Eyes:Perrla  ENMT: normal  Neck:supple  Respiratory: Occasional wheezes bilaterally  Cardiovascular: S1 S2 regular  Gastrointestinal: Soft, Non tender  Extremities: No edema  Vascular:normal  Neurological:Sedated  Musculoskeletal:Normal      MEDICATIONS  (STANDING):  ascorbic acid 500 milliGRAM(s) Oral daily  cefepime  IVPB 1000 milliGRAM(s) IV Intermittent every 24 hours  cinacalcet 30 milliGRAM(s) Oral daily  dextrose 5%. 1000 milliLiter(s) (50 mL/Hr) IV Continuous <Continuous>  dextrose 50% Injectable 12.5 Gram(s) IV Push once  dextrose 50% Injectable 25 Gram(s) IV Push once  dextrose 50% Injectable 25 Gram(s) IV Push once  ferrous    sulfate Liquid 300 milliGRAM(s) Enteral Tube daily  folic acid 1 milliGRAM(s) Oral daily  insulin lispro (HumaLOG) corrective regimen sliding scale   SubCutaneous every 6 hours  levoFLOXacin IVPB 250 milliGRAM(s) IV Intermittent every 24 hours  levothyroxine 50 MICROGram(s) Oral daily  pantoprazole  Injectable 40 milliGRAM(s) IV Push daily  propofol Infusion 5 MICROgram(s)/kG/Min (1.71 mL/Hr) IV Continuous <Continuous>  simvastatin 20 milliGRAM(s) Oral at bedtime    MEDICATIONS  (PRN):  acetaminophen   Tablet 650 milliGRAM(s) Oral every 6 hours PRN For Temp greater than 38 C (100.4 F)  acetaminophen  Suppository 650 milliGRAM(s) Rectal every 6 hours PRN For Temp greater than 38 C (100.4 F)  dextrose Gel 1 Dose(s) Oral once PRN Blood Glucose LESS THAN 70 milliGRAM(s)/deciliter  glucagon  Injectable 1 milliGRAM(s) IntraMuscular once PRN Glucose LESS THAN 70 milligrams/deciliter      Allergies    No Known Allergies    Intolerances        LABS:                        8.3    23.0  )-----------( 172      ( 24 Dec 2017 07:06 )             22.7     12-24    144  |  105  |  97<H>  ----------------------------<  204<H>  4.6   |  22  |  7.87<H>    Ca    9.4      24 Dec 2017 07:06  Phos  5.3     12-24  Mg     3.1     12-24          ABG - ( 24 Dec 2017 04:48 )  pH: 7.46  /  pCO2: 32    /  pO2: 64    / HCO3: 22    / Base Excess: -0.7  /  SaO2: 91                    CAPILLARY BLOOD GLUCOSE      POCT Blood Glucose.: 232 mg/dL (24 Dec 2017 05:32)  POCT Blood Glucose.: 264 mg/dL (23 Dec 2017 23:12)  POCT Blood Glucose.: 219 mg/dL (23 Dec 2017 17:13)  POCT Blood Glucose.: 239 mg/dL (23 Dec 2017 12:55)        RADIOLOGY & ADDITIONAL TESTS:    CXR:  < from: Xray Chest 1 View AP -PORTABLE-Routine (12.24.17 @ 08:49) >  Mild pulmonary vascular congestion. Mild hazy opacity left lower lung.    < end of copied text >    Ct scan chest:    ekg;    echo:

## 2017-12-24 NOTE — PROGRESS NOTE ADULT - ATTENDING COMMENTS
52 female nursing home patient with hx of DM, neuropathy, CKD, CHFpEF, GERD, osteomyelitis of jaw, hypothyroidism, hypoparathyroidism, anemia, admitted with acute on chronic renal failure requiring initiation of dialysis, acute respiratory failure requiring intubation on 12/16, pneumonia with sepsis, GI bleed, EGD showed non bleeding duodenal ulcer.  Now off pressors.   Dialysis catheter removed 12/22 due to fevers and leukocytosis  Plan:  - plan for permacath monday if afebrile, followed by HD  - abx per ID  - wean mechanical ventilation as tolerated, daily SAT/DBT  Total cc time 35 min.

## 2017-12-24 NOTE — PROGRESS NOTE ADULT - PROBLEM SELECTOR PLAN 1
ventilator support  F/u ABGs  Bronchodilators neb  Aspiration precautions  Pulmonary toilet  Nutrition  Follow up CXR  Decubitus prevention  Wean as tolerated

## 2017-12-24 NOTE — PROGRESS NOTE ADULT - SUBJECTIVE AND OBJECTIVE BOX
remains intubated. CXR better. hypoxia better. WBC rising.    No Known Allergies    Hospital Medications:   MEDICATIONS  (STANDING):  ascorbic acid 500 milliGRAM(s) Oral daily  cefepime  IVPB 1000 milliGRAM(s) IV Intermittent every 24 hours  cinacalcet 30 milliGRAM(s) Oral daily  dextrose 5%. 1000 milliLiter(s) (50 mL/Hr) IV Continuous <Continuous>  dextrose 50% Injectable 12.5 Gram(s) IV Push once  dextrose 50% Injectable 25 Gram(s) IV Push once  dextrose 50% Injectable 25 Gram(s) IV Push once  ferrous    sulfate Liquid 300 milliGRAM(s) Enteral Tube daily  folic acid 1 milliGRAM(s) Oral daily  insulin lispro (HumaLOG) corrective regimen sliding scale   SubCutaneous every 6 hours  levoFLOXacin IVPB 250 milliGRAM(s) IV Intermittent every 24 hours  levothyroxine 50 MICROGram(s) Oral daily  pantoprazole  Injectable 40 milliGRAM(s) IV Push daily  propofol Infusion 5 MICROgram(s)/kG/Min (1.71 mL/Hr) IV Continuous <Continuous>  simvastatin 20 milliGRAM(s) Oral at bedtime      VITALS:  T(F): 99.2 (12-24-17 @ 06:00), Max: 100 (12-23-17 @ 15:13)  HR: 114 (12-24-17 @ 12:20)  BP: 172/71 (12-24-17 @ 08:00)  RR: 36 (12-24-17 @ 08:00)  SpO2: 93% (12-24-17 @ 12:20)  Wt(kg): --    12-23 @ 07:01  -  12-24 @ 07:00  --------------------------------------------------------  IN: 621.2 mL / OUT: 0 mL / NET: 621.2 mL        PHYSICAL EXAM:  Constitutional: ETT.  HEENT: anicteric sclera, oropharynx clear.  Neck: No JVD  Respiratory: CTAB, no wheezes, rales or rhonchi  Cardiovascular: S1, S2, RRR  Gastrointestinal: BS+, soft, NT/ND  Extremities: No cyanosis or clubbing. No peripheral edema  Neurological: A/O x 3, no focal deficitss  Vascular Access: SHILEY removed.    LABS:  12-24    144  |  105  |  97<H>  ----------------------------<  204<H>  4.6   |  22  |  7.87<H>    Ca    9.4      24 Dec 2017 07:06  Phos  5.3     12-24  Mg     3.1     12-24      Creatinine Trend: 7.87 <--, 6.22 <--, 6.74 <--, 5.18 <--, 5.60 <--, 4.46 <--, 2.16 <--, 4.54 <--                        8.3    23.0  )-----------( 172      ( 24 Dec 2017 07:06 )             22.7     Urine Studies:      RADIOLOGY & ADDITIONAL STUDIES:

## 2017-12-24 NOTE — PROGRESS NOTE ADULT - ASSESSMENT
pneumonia and CHF - both improving  leukocytosis - ? infection or reactive, john is out  fevers - resolved    plan - cont maxipime 1 gm iv q24 hrs  cont levaquin 250mgs iv q24hrs for now

## 2017-12-24 NOTE — PROGRESS NOTE ADULT - SUBJECTIVE AND OBJECTIVE BOX
52y Female    Meds:  cefepime  IVPB 1000 milliGRAM(s) IV Intermittent every 24 hours  levoFLOXacin IVPB 250 milliGRAM(s) IV Intermittent every 24 hours    Allergies    No Known Allergies    Intolerances        VITALS:  Vital Signs Last 24 Hrs  T(C): 37.2 (24 Dec 2017 15:12), Max: 37.7 (23 Dec 2017 19:42)  T(F): 98.9 (24 Dec 2017 15:12), Max: 99.8 (23 Dec 2017 19:42)  HR: 89 (24 Dec 2017 17:00) (81 - 114)  BP: 123/65 (24 Dec 2017 17:00) (123/65 - 177/68)  BP(mean): 79 (24 Dec 2017 17:00) (78 - 96)  RR: 20 (24 Dec 2017 17:00) (17 - 36)  SpO2: 93% (24 Dec 2017 17:00) (90% - 98%)    LABS/DIAGNOSTIC TESTS:                          8.3    23.0  )-----------( 172      ( 24 Dec 2017 07:06 )             22.7         12-24    144  |  105  |  97<H>  ----------------------------<  204<H>  4.6   |  22  |  7.87<H>    Ca    9.4      24 Dec 2017 07:06  Phos  5.3     12-24  Mg     3.1     12-24            CULTURES: .Blood Blood-Peripheral  12-21 @ 18:39   No growth to date.  --  --      .Sputum Sputum  12-18 @ 21:43   Normal Respiratory Liz present  --    Few Squamous epithelial cells per low power field  Few polymorphonuclear leukocytes per low power field  Rare Gram variable coccobacilli   per oil power field      .Blood rec'd 2 anaerobic bottles for this accession #  12-16 @ 00:53   No growth at 5 days.  --  --      .Blood Blood-Peripheral  12-15 @ 23:09   No growth at 5 days.  --  --      .Urine Clean Catch (Midstream)  12-13 @ 23:18   No growth  --  --      .Blood Blood-Peripheral  12-13 @ 17:41   No growth at 5 days.  --  --      .Blood Blood-Peripheral  12-13 @ 17:40   No growth at 5 days.  --  --            RADIOLOGY:      ROS:  [  ] UNABLE TO ELICIT 52y Female who remains in the ICU, intubated and arousable, she was on CPAP today and tolerated it well, she has no fevers or diarrhea, her wbc count increased again but her cxr looks much better.    Meds:  cefepime  IVPB 1000 milliGRAM(s) IV Intermittent every 24 hours  levoFLOXacin IVPB 250 milliGRAM(s) IV Intermittent every 24 hours    Allergies    No Known Allergies    Intolerances        VITALS:  Vital Signs Last 24 Hrs  T(C): 37.2 (24 Dec 2017 15:12), Max: 37.7 (23 Dec 2017 19:42)  T(F): 98.9 (24 Dec 2017 15:12), Max: 99.8 (23 Dec 2017 19:42)  HR: 89 (24 Dec 2017 17:00) (81 - 114)  BP: 123/65 (24 Dec 2017 17:00) (123/65 - 177/68)  BP(mean): 79 (24 Dec 2017 17:00) (78 - 96)  RR: 20 (24 Dec 2017 17:00) (17 - 36)  SpO2: 93% (24 Dec 2017 17:00) (90% - 98%)    LABS/DIAGNOSTIC TESTS:                          8.3    23.0  )-----------( 172      ( 24 Dec 2017 07:06 )             22.7         12-24    144  |  105  |  97<H>  ----------------------------<  204<H>  4.6   |  22  |  7.87<H>    Ca    9.4      24 Dec 2017 07:06  Phos  5.3     12-24  Mg     3.1     12-24            CULTURES: .Blood Blood-Peripheral  12-21 @ 18:39   No growth to date.  --  --      .Sputum Sputum  12-18 @ 21:43   Normal Respiratory Liz present  --    Few Squamous epithelial cells per low power field  Few polymorphonuclear leukocytes per low power field  Rare Gram variable coccobacilli   per oil power field      .Blood rec'd 2 anaerobic bottles for this accession #  12-16 @ 00:53   No growth at 5 days.  --  --      .Blood Blood-Peripheral  12-15 @ 23:09   No growth at 5 days.  --  --      .Urine Clean Catch (Midstream)  12-13 @ 23:18   No growth  --  --      .Blood Blood-Peripheral  12-13 @ 17:41   No growth at 5 days.  --  --      .Blood Blood-Peripheral  12-13 @ 17:40   No growth at 5 days.  --  --            RADIOLOGY:< from: Xray Chest 1 View AP -PORTABLE-Routine (12.24.17 @ 08:49) >  EXAM:  CHEST PORTABLE ROUTINE                            PROCEDURE DATE:  12/24/2017          INTERPRETATION:  History: Pulmonary vascular congestion.    Findings: Frontal chest.    Comparison: 12/23/2017.    Chain overlies the apices.    Endotracheal tube and enteric catheter again seen in place. EKG clips and   wires overlie the chest.    Heart size may be exaggerated by AP technique. Prominence of the main   pulmonary artery contour again noted. Mild pulmonary vascular congestion.   Improvedaeration right lung. Mild hazy opacity left lower lung.    Impression:    Mild pulmonary vascular congestion. Mild hazy opacity left lower lung.      < end of copied text >        ROS:  [x  ] UNABLE TO ELICIT

## 2017-12-24 NOTE — PROGRESS NOTE ADULT - SUBJECTIVE AND OBJECTIVE BOX
INTERVAL HPI/OVERNIGHT EVENTS: no overnight events.    PRESSORS: [ ] YES [x ] NO  WHICH:    Antimicrobial:  cefepime  IVPB 1000 milliGRAM(s) IV Intermittent every 24 hours  levoFLOXacin IVPB 250 milliGRAM(s) IV Intermittent every 24 hours    Cardiovascular:    Pulmonary:    Hematalogic:    Other:  acetaminophen   Tablet 650 milliGRAM(s) Oral every 6 hours PRN  acetaminophen  Suppository 650 milliGRAM(s) Rectal every 6 hours PRN  ascorbic acid 500 milliGRAM(s) Oral daily  cinacalcet 30 milliGRAM(s) Oral daily  dextrose 5%. 1000 milliLiter(s) IV Continuous <Continuous>  dextrose 50% Injectable 12.5 Gram(s) IV Push once  dextrose 50% Injectable 25 Gram(s) IV Push once  dextrose 50% Injectable 25 Gram(s) IV Push once  dextrose Gel 1 Dose(s) Oral once PRN  ferrous    sulfate Liquid 300 milliGRAM(s) Enteral Tube daily  folic acid 1 milliGRAM(s) Oral daily  glucagon  Injectable 1 milliGRAM(s) IntraMuscular once PRN  HYDROmorphone  Injectable 1 milliGRAM(s) IV Push every 4 hours PRN  insulin lispro (HumaLOG) corrective regimen sliding scale   SubCutaneous every 6 hours  levothyroxine 50 MICROGram(s) Oral daily  pantoprazole  Injectable 40 milliGRAM(s) IV Push daily  propofol Infusion 5 MICROgram(s)/kG/Min IV Continuous <Continuous>  simvastatin 20 milliGRAM(s) Oral at bedtime    acetaminophen   Tablet 650 milliGRAM(s) Oral every 6 hours PRN  acetaminophen  Suppository 650 milliGRAM(s) Rectal every 6 hours PRN  ascorbic acid 500 milliGRAM(s) Oral daily  cefepime  IVPB 1000 milliGRAM(s) IV Intermittent every 24 hours  cinacalcet 30 milliGRAM(s) Oral daily  dextrose 5%. 1000 milliLiter(s) IV Continuous <Continuous>  dextrose 50% Injectable 12.5 Gram(s) IV Push once  dextrose 50% Injectable 25 Gram(s) IV Push once  dextrose 50% Injectable 25 Gram(s) IV Push once  dextrose Gel 1 Dose(s) Oral once PRN  ferrous    sulfate Liquid 300 milliGRAM(s) Enteral Tube daily  folic acid 1 milliGRAM(s) Oral daily  glucagon  Injectable 1 milliGRAM(s) IntraMuscular once PRN  HYDROmorphone  Injectable 1 milliGRAM(s) IV Push every 4 hours PRN  insulin lispro (HumaLOG) corrective regimen sliding scale   SubCutaneous every 6 hours  levoFLOXacin IVPB 250 milliGRAM(s) IV Intermittent every 24 hours  levothyroxine 50 MICROGram(s) Oral daily  pantoprazole  Injectable 40 milliGRAM(s) IV Push daily  propofol Infusion 5 MICROgram(s)/kG/Min IV Continuous <Continuous>  simvastatin 20 milliGRAM(s) Oral at bedtime    Drug Dosing Weight  Height (cm): 170 (13 Dec 2017 17:16)  Weight (kg): 57 (13 Dec 2017 17:16)  BMI (kg/m2): 19.7 (13 Dec 2017 17:16)  BSA (m2): 1.66 (13 Dec 2017 17:16)    CENTRAL LINE: [ ] YES [x ] NO  LOCATION:   DATE INSERTED:  REMOVE: [ ] YES [ ] NO  EXPLAIN:    JACOBSON: [ ] YES [x] NO    DATE INSERTED:  REMOVE:  [ ] YES [ ] NO  EXPLAIN:    A-LINE:  [ ] YES [x ] NO  LOCATION:   DATE INSERTED:  REMOVE:  [ ] YES [ ] NO  EXPLAIN:      ICU Vital Signs Last 24 Hrs  T(C): 37.4 (23 Dec 2017 23:56), Max: 37.8 (23 Dec 2017 15:13)  T(F): 99.4 (23 Dec 2017 23:56), Max: 100 (23 Dec 2017 15:13)  HR: 84 (24 Dec 2017 00:00) (82 - 92)  BP: 147/59 (24 Dec 2017 00:00) (114/50 - 163/67)  BP(mean): 81 (24 Dec 2017 00:00) (66 - 92)  ABP: --  ABP(mean): --  RR: 20 (24 Dec 2017 00:00) (6 - 24)  SpO2: 96% (24 Dec 2017 00:00) (89% - 96%)      ABG - ( 23 Dec 2017 04:31 )  pH: 7.42  /  pCO2: 38    /  pO2: 66    / HCO3: 24    / Base Excess: 0.2   /  SaO2: 91                    12-22 @ 07:01  -  12-23 @ 07:00  --------------------------------------------------------  IN: 816.4 mL / OUT: 0 mL / NET: 816.4 mL        Mode: AC/ CMV (Assist Control/ Continuous Mandatory Ventilation)  RR (machine): 15  TV (machine): 400  FiO2: 50  PEEP: 5  ITime: 1  MAP: 12  PIP: 29      PHYSICAL EXAM:    GENERAL: [x]NAD,  HEAD:  [x]Atraumatic, []Normocephalic  EYES: sluggish pupillary response bilaterally, conjunctiva and sclera clear  ENMT: on ETT tube  NECK: [x]Supple, normal appearance, []No JVD; []Normal thyroid; [x]Trachea midline  NERVOUS SYSTEM: sedated, nonverbal, noncommunicative  CHEST/LUNG: [x]No chest deformity; []Normal percussion bilaterally; []No rales, rhonchi, wheezing   HEART: [x]Regular rate and rhythm; []No murmurs, rubs, or gallops  ABDOMEN: [x]Soft, Nontender, Nondistended; []Bowel sounds present  EXTREMITIES:  [x]2+ Peripheral Pulses, []No clubbing, cyanosis, or edema  LYMPH: [x]No lymphadenopathy noted  SKIN: [x]No rashes or lesions; []Good capillary refill      LABS:  CBC Full  -  ( 23 Dec 2017 04:18 )  WBC Count : 21.4 K/uL  Hemoglobin : 8.3 g/dL  Hematocrit : 26.0 %  Platelet Count - Automated : 153 K/uL  Mean Cell Volume : 92.8 fl  Mean Cell Hemoglobin : 29.6 pg  Mean Cell Hemoglobin Concentration : 31.9 gm/dL  Auto Neutrophil # : x  Auto Lymphocyte # : x  Auto Monocyte # : x  Auto Eosinophil # : x  Auto Basophil # : x  Auto Neutrophil % : 87.0 %  Auto Lymphocyte % : 5.0 %  Auto Monocyte % : 4.0 %  Auto Eosinophil % : 1.0 %  Auto Basophil % : x    12-23    144  |  106  |  74<H>  ----------------------------<  196<H>  4.3   |  24  |  6.22<H>    Ca    8.4      23 Dec 2017 04:18  Phos  4.5     12-23  Mg     2.7     12-23      PT/INR - ( 22 Dec 2017 06:56 )   PT: 13.5 sec;   INR: 1.23 ratio         PTT - ( 22 Dec 2017 06:56 )  PTT:33.0 sec    Culture Results:   No growth to date. (12-21 @ 18:39)  Culture Results:   No growth to date. (12-21 @ 18:39)      RADIOLOGY & ADDITIONAL STUDIES REVIEWED:  yes    [ ]GOALS OF CARE DISCUSSION WITH PATIENT/FAMILY/PROXY:    CRITICAL CARE TIME SPENT: 35 minutes        Assessment and Plan:   · Assessment		  50 y/o F from [facility] ( long term care 2/2 diffculty walking possibly 2/2 polyneuropathy) w/ PMHx of anemia, CHF( diastolic dysfunction), CKD, Clostridium difficile infection, DM, Diabetic neuropathy, HTN, HLD, Hypothyroidism, Hypoparathyroidism, Osteomyelitis of jaw, and Parathyroid adenoma, GERD p/w c/o SOB and difficulty breathing x1 week. Pt also reports associated mild cough. Pt denies fever, chills, general pain, C/P, or any other complaints. NO  In ED pt is Awake , responds to pain and verbal stimuli, She in moderate respiratory distress. placed on BIPAP. ICU consulted for new BIPAP.    Admit to ICU for Acute Hypoxic and hypercapnic respiratory failure require new Bipap  2/2 Exacerbation of CHF or possibly pneumonia      Problem/Plan - 1:  ·  Problem: Acute respiratory failure.  Plan: Inubated 12/16. Pt was hypoxic on BiPAP,  failed NIPPV support  Acute respiratory failure secondary to HAP (elevated procalcitonin) and fluid overload  -continue vent support, on propofol for sedation  -wean mechanical ventilation as tolerated, daily SAT/DBT  -c/w  maxipime and  levaquin   -pt need permcath  as per Dr. Escoto   -called IR, plan for permacath monday if afebrile, followed by HD       Problem/Plan - 2:  ·  Problem: R/O Upper GI bleed.  Plan: c/w protonix, no recurrent GI Bleed.  s/p 1 PRBC, hb 8.2 after transfusion  EGD showed duodenal ulcer, no active bleeding, gastritis and esophagitis.  f/u biopsy report  monitor CBC  c/w protonix 40mg, daily.       Problem/Plan - 3:  ·  Problem: Hypothyroidism.  Plan: c/w home dose LT4 50 mcg daily  TSH wnl.      Problem/Plan - 4:  ·  Problem: CHF (congestive heart failure).  Plan: unknown EF  c/w home cardiac meds   TTE - RV systolic pressure is 49 mm Hg. Mild pulmonary  hypertension.      Problem/Plan - 5:  ·  Problem: CKD (chronic kidney disease).  Plan: getting HD via shiley.  -pt need permcath  as per Dr. Escoto   -called IR for permacath today        Problem/Plan - 6:  Problem: Diabetes mellitus. Plan: c/w Lantus 10 units daily , HSS   HbA1c 5.2.     Problem/Plan - 7:  ·  Problem: HTN (hypertension).  Plan: c/w home BP meds.      Problem/Plan - 8:  ·  Problem: Prophylactic measure.  Plan: c/w DVT ppx   IMPROVE score - 2.

## 2017-12-25 LAB
ANION GAP SERPL CALC-SCNC: 17 MMOL/L — SIGNIFICANT CHANGE UP (ref 5–17)
BASE EXCESS BLDA CALC-SCNC: -3 MMOL/L — LOW (ref -2–2)
BASOPHILS # BLD AUTO: 0.1 K/UL — SIGNIFICANT CHANGE UP (ref 0–0.2)
BASOPHILS NFR BLD AUTO: 0.5 % — SIGNIFICANT CHANGE UP (ref 0–2)
BLOOD GAS COMMENTS ARTERIAL: SIGNIFICANT CHANGE UP
BLOOD GAS COMMENTS ARTERIAL: SIGNIFICANT CHANGE UP
BUN SERPL-MCNC: 112 MG/DL — HIGH (ref 7–18)
CALCIUM SERPL-MCNC: 9.6 MG/DL — SIGNIFICANT CHANGE UP (ref 8.4–10.5)
CHLORIDE SERPL-SCNC: 106 MMOL/L — SIGNIFICANT CHANGE UP (ref 96–108)
CO2 SERPL-SCNC: 22 MMOL/L — SIGNIFICANT CHANGE UP (ref 22–31)
CREAT SERPL-MCNC: 9.33 MG/DL — HIGH (ref 0.5–1.3)
EOSINOPHIL # BLD AUTO: 0.1 K/UL — SIGNIFICANT CHANGE UP (ref 0–0.5)
EOSINOPHIL NFR BLD AUTO: 0.6 % — SIGNIFICANT CHANGE UP (ref 0–6)
GLUCOSE BLDC GLUCOMTR-MCNC: 151 MG/DL — HIGH (ref 70–99)
GLUCOSE BLDC GLUCOMTR-MCNC: 242 MG/DL — HIGH (ref 70–99)
GLUCOSE BLDC GLUCOMTR-MCNC: 255 MG/DL — HIGH (ref 70–99)
GLUCOSE BLDC GLUCOMTR-MCNC: 260 MG/DL — HIGH (ref 70–99)
GLUCOSE SERPL-MCNC: 242 MG/DL — HIGH (ref 70–99)
HCO3 BLDA-SCNC: 21 MMOL/L — LOW (ref 23–27)
HCT VFR BLD CALC: 21.9 % — LOW (ref 34.5–45)
HGB BLD-MCNC: 8.2 G/DL — LOW (ref 11.5–15.5)
HOROWITZ INDEX BLDA+IHG-RTO: 50 — SIGNIFICANT CHANGE UP
LYMPHOCYTES # BLD AUTO: 1.3 K/UL — SIGNIFICANT CHANGE UP (ref 1–3.3)
LYMPHOCYTES # BLD AUTO: 6.8 % — LOW (ref 13–44)
MAGNESIUM SERPL-MCNC: 3.5 MG/DL — HIGH (ref 1.6–2.6)
MCHC RBC-ENTMCNC: 33.8 PG — SIGNIFICANT CHANGE UP (ref 27–34)
MCHC RBC-ENTMCNC: 37.5 GM/DL — HIGH (ref 32–36)
MCV RBC AUTO: 90.1 FL — SIGNIFICANT CHANGE UP (ref 80–100)
MONOCYTES # BLD AUTO: 2.1 K/UL — HIGH (ref 0–0.9)
MONOCYTES NFR BLD AUTO: 10.7 % — SIGNIFICANT CHANGE UP (ref 2–14)
NEUTROPHILS # BLD AUTO: 15.7 K/UL — HIGH (ref 1.8–7.4)
NEUTROPHILS NFR BLD AUTO: 81.4 % — HIGH (ref 43–77)
PCO2 BLDA: 35 MMHG — SIGNIFICANT CHANGE UP (ref 32–46)
PH BLDA: 7.4 — SIGNIFICANT CHANGE UP (ref 7.35–7.45)
PHOSPHATE SERPL-MCNC: 8.4 MG/DL — HIGH (ref 2.5–4.5)
PLATELET # BLD AUTO: 196 K/UL — SIGNIFICANT CHANGE UP (ref 150–400)
PO2 BLDA: 92 MMHG — SIGNIFICANT CHANGE UP (ref 74–108)
POTASSIUM SERPL-MCNC: 4.9 MMOL/L — SIGNIFICANT CHANGE UP (ref 3.5–5.3)
POTASSIUM SERPL-SCNC: 4.9 MMOL/L — SIGNIFICANT CHANGE UP (ref 3.5–5.3)
RBC # BLD: 2.43 M/UL — LOW (ref 3.8–5.2)
RBC # FLD: 16.3 % — HIGH (ref 10.3–14.5)
SAO2 % BLDA: 96 % — SIGNIFICANT CHANGE UP (ref 92–96)
SODIUM SERPL-SCNC: 145 MMOL/L — SIGNIFICANT CHANGE UP (ref 135–145)
WBC # BLD: 19.3 K/UL — HIGH (ref 3.8–10.5)
WBC # FLD AUTO: 19.3 K/UL — HIGH (ref 3.8–10.5)

## 2017-12-25 PROCEDURE — 71010: CPT | Mod: 26

## 2017-12-25 RX ADMIN — Medication 3: at 17:59

## 2017-12-25 RX ADMIN — CINACALCET 30 MILLIGRAM(S): 30 TABLET, FILM COATED ORAL at 11:05

## 2017-12-25 RX ADMIN — Medication 50 MICROGRAM(S): at 05:40

## 2017-12-25 RX ADMIN — Medication 3: at 11:06

## 2017-12-25 RX ADMIN — PANTOPRAZOLE SODIUM 40 MILLIGRAM(S): 20 TABLET, DELAYED RELEASE ORAL at 11:05

## 2017-12-25 RX ADMIN — Medication 2: at 05:40

## 2017-12-25 RX ADMIN — CEFEPIME 100 MILLIGRAM(S): 1 INJECTION, POWDER, FOR SOLUTION INTRAMUSCULAR; INTRAVENOUS at 16:24

## 2017-12-25 RX ADMIN — Medication 1 MILLIGRAM(S): at 11:05

## 2017-12-25 RX ADMIN — SIMVASTATIN 20 MILLIGRAM(S): 20 TABLET, FILM COATED ORAL at 22:55

## 2017-12-25 RX ADMIN — Medication 300 MILLIGRAM(S): at 11:05

## 2017-12-25 RX ADMIN — Medication 500 MILLIGRAM(S): at 11:06

## 2017-12-25 NOTE — PROGRESS NOTE ADULT - ATTENDING COMMENTS
50 y/o F from [facility] ( long term care 2/2 diffculty walking possibly 2/2 polyneuropathy) w/ PMHx of anemia, CHF( diastolic dysfunction), CKD, Clostridium difficile infection, DM, Diabetic neuropathy, HTN, HLD, Hypothyroidism, Hypoparathyroidism, Osteomyelitis of jaw, and Parathyroid adenoma, GERD p/w c/o SOB and difficulty breathing x1 week. Pt also reports associated mild cough. Pt denies fever, chills, general pain, C/P, or any other complaints. NO  In ED pt is Awake , responds to pain and verbal stimuli, She in moderate respiratory distress. placed on BIPAP. ICU consulted for new BIPAP.    Admit to ICU for Acute Hypoxic and hypercapnic respiratory failure require new Bipap  2/2 Exacerbation of CHF or possibly pneumonia      Problem/Plan - 1:  ·  Problem: Acute respiratory failure.  Plan: Inubated 12/16. Pt was hypoxic on BiPAP,  failed NIPPV support  Acute respiratory failure secondary to HAP (elevated procalcitonin) and fluid overload  -continue vent support, off propofol overnight   -wean mechanical ventilation as tolerated, daily SAT/DBT  -c/w  maxipime and  levaquin   -pt need permcath  as per Dr. Escoto   -called IR, plan for permacath on Monday or Tuesday if afebrile, followed by HD       Problem/Plan - 2:  ·  Problem: R/O Upper GI bleed.  Plan: c/w protonix, no recurrent GI Bleed.  s/p 1 PRBC, hb 8.2 after transfusion  EGD showed duodenal ulcer, no active bleeding, gastritis and esophagitis.  f/u biopsy report  monitor CBC  c/w protonix 40mg, daily.       Problem/Plan - 3:  ·  Problem: Hypothyroidism.  Plan: c/w home dose LT4 50 mcg daily  TSH wnl.      Problem/Plan - 4:  ·  Problem: CHF (congestive heart failure).  Plan: unknown EF  c/w home cardiac meds   TTE - RV systolic pressure is 49 mm Hg. Mild pulmonary  hypertension.      Problem/Plan - 5:  ·  Problem: CKD (chronic kidney disease).  Plan: getting HD via shiley.  -shiley was removed due to fever and leukocytosis   -pt need permcath  as per Dr. Escoto   -called IR for permacath today        Problem/Plan - 6:  Problem: Diabetes mellitus. Plan: c/w Lantus 10 units daily , HSS   HbA1c 5.2.     Problem/Plan - 7:  ·  Problem: HTN (hypertension).  Plan: c/w home BP meds.

## 2017-12-25 NOTE — PROGRESS NOTE ADULT - SUBJECTIVE AND OBJECTIVE BOX
Patient is a 52y old  Female who presents with a chief complaint of SOB (13 Dec 2017 16:01)  Patient remains sedated, intubated on mechanical ventilation and in NAD. For permacath in am for HD    INTERVAL HPI/OVERNIGHT EVENTS:      VITAL SIGNS:  T(F): 99.5 (12-25-17 @ 05:49)  HR: 94 (12-25-17 @ 11:00)  BP: 153/67 (12-25-17 @ 11:00)  RR: 19 (12-25-17 @ 11:00)  SpO2: 98% (12-25-17 @ 11:00)  Wt(kg): --  I&O's Detail    24 Dec 2017 07:01  -  25 Dec 2017 07:00  --------------------------------------------------------  IN:    Nepro: 475 mL    Solution: 50 mL    Solution: 50 mL  Total IN: 575 mL    OUT:  Total OUT: 0 mL    Total NET: 575 mL      25 Dec 2017 07:01  -  25 Dec 2017 11:40  --------------------------------------------------------  IN:    Nepro: 75 mL  Total IN: 75 mL    OUT:  Total OUT: 0 mL    Total NET: 75 mL        Mode: AC/ CMV (Assist Control/ Continuous Mandatory Ventilation)  RR (machine): 15  TV (machine): 400  FiO2: 50  PEEP: 5  ITime: 1  MAP: 10  PIP: 25        REVIEW OF SYSTEMS:    CONSTITUTIONAL:  No fevers, chills, sweats    HEENT:  Eyes:  No diplopia or blurred vision. ENT:  No earache, sore throat or runny nose.    CARDIOVASCULAR:  No pressure, squeezing, tightness, or heaviness about the chest; no palpitations.    RESPIRATORY:  Per HPI    GASTROINTESTINAL:  No abdominal pain, nausea, vomiting or diarrhea.    GENITOURINARY:  No dysuria, frequency or urgency.    NEUROLOGIC:  No paresthesias, fasciculations, seizures or weakness.    PSYCHIATRIC:  No disorder of thought or mood.      PHYSICAL EXAM:    Constitutional: Well developed and nourished  Eyes:Perrla  ENMT: normal  Neck:supple  Respiratory: Fine wheezes bilaterally  Cardiovascular: S1 S2 regular  Gastrointestinal: Soft, Non tender  Extremities: No edema  Vascular:normal  Neurological:Awake, alert,Ox3  Musculoskeletal:Normal      MEDICATIONS  (STANDING):  ascorbic acid 500 milliGRAM(s) Oral daily  cefepime  IVPB 1000 milliGRAM(s) IV Intermittent every 24 hours  cinacalcet 30 milliGRAM(s) Oral daily  dextrose 5%. 1000 milliLiter(s) (50 mL/Hr) IV Continuous <Continuous>  dextrose 50% Injectable 12.5 Gram(s) IV Push once  dextrose 50% Injectable 25 Gram(s) IV Push once  dextrose 50% Injectable 25 Gram(s) IV Push once  ferrous    sulfate Liquid 300 milliGRAM(s) Enteral Tube daily  folic acid 1 milliGRAM(s) Oral daily  insulin lispro (HumaLOG) corrective regimen sliding scale   SubCutaneous every 6 hours  levoFLOXacin IVPB 250 milliGRAM(s) IV Intermittent every 24 hours  levothyroxine 50 MICROGram(s) Oral daily  pantoprazole  Injectable 40 milliGRAM(s) IV Push daily  propofol Infusion 5 MICROgram(s)/kG/Min (1.71 mL/Hr) IV Continuous <Continuous>  simvastatin 20 milliGRAM(s) Oral at bedtime    MEDICATIONS  (PRN):  acetaminophen   Tablet 650 milliGRAM(s) Oral every 6 hours PRN For Temp greater than 38 C (100.4 F)  acetaminophen  Suppository 650 milliGRAM(s) Rectal every 6 hours PRN For Temp greater than 38 C (100.4 F)  dextrose Gel 1 Dose(s) Oral once PRN Blood Glucose LESS THAN 70 milliGRAM(s)/deciliter  glucagon  Injectable 1 milliGRAM(s) IntraMuscular once PRN Glucose LESS THAN 70 milligrams/deciliter      Allergies    No Known Allergies    Intolerances        LABS:                        8.2    19.3  )-----------( 196      ( 25 Dec 2017 06:57 )             21.9     12-25    145  |  106  |  112<H>  ----------------------------<  242<H>  4.9   |  22  |  9.33<H>    Ca    9.6      25 Dec 2017 06:57  Phos  8.4     12-25  Mg     3.5     12-25          ABG - ( 25 Dec 2017 04:45 )  pH: 7.40  /  pCO2: 35    /  pO2: 92    / HCO3: 21    / Base Excess: -3.0  /  SaO2: 96                    CAPILLARY BLOOD GLUCOSE      POCT Blood Glucose.: 260 mg/dL (25 Dec 2017 10:46)  POCT Blood Glucose.: 242 mg/dL (25 Dec 2017 05:38)  POCT Blood Glucose.: 260 mg/dL (24 Dec 2017 23:15)  POCT Blood Glucose.: 240 mg/dL (24 Dec 2017 17:08)  POCT Blood Glucose.: 250 mg/dL (24 Dec 2017 12:08)        RADIOLOGY & ADDITIONAL TESTS:    CXR:  < from: Xray Chest 1 View AP -PORTABLE-Routine (12.25.17 @ 09:01) >  Comparison: 12/24/2017.    Endotracheal tube and enteric catheter again seenin place, unchanged in   position. EKG clips and wires overlie the chest.    The heart is enlarged, may be exaggerated by AP technique. Prominent main   pulmonary artery contour. Pulmonary vascular congestion. Underlying   infiltrates cannot be excluded. Visualized bony thorax and soft tissues   are grossly unremarkable.    < end of copied text >    Ct scan chest:    ekg;    echo:

## 2017-12-25 NOTE — PROGRESS NOTE ADULT - SUBJECTIVE AND OBJECTIVE BOX
INTERVAL HPI/OVERNIGHT EVENTS: pt off sedation, alert and awake. afebrile overnight     PRESSORS: [ ] YES [ x] NO  WHICH:    Antimicrobial:  cefepime  IVPB 1000 milliGRAM(s) IV Intermittent every 24 hours  levoFLOXacin IVPB 250 milliGRAM(s) IV Intermittent every 24 hours    Cardiovascular:    Pulmonary:    Hematalogic:    Other:  acetaminophen   Tablet 650 milliGRAM(s) Oral every 6 hours PRN  acetaminophen  Suppository 650 milliGRAM(s) Rectal every 6 hours PRN  ascorbic acid 500 milliGRAM(s) Oral daily  cinacalcet 30 milliGRAM(s) Oral daily  dextrose 5%. 1000 milliLiter(s) IV Continuous <Continuous>  dextrose 50% Injectable 12.5 Gram(s) IV Push once  dextrose 50% Injectable 25 Gram(s) IV Push once  dextrose 50% Injectable 25 Gram(s) IV Push once  dextrose Gel 1 Dose(s) Oral once PRN  ferrous    sulfate Liquid 300 milliGRAM(s) Enteral Tube daily  folic acid 1 milliGRAM(s) Oral daily  glucagon  Injectable 1 milliGRAM(s) IntraMuscular once PRN  insulin lispro (HumaLOG) corrective regimen sliding scale   SubCutaneous every 6 hours  levothyroxine 50 MICROGram(s) Oral daily  pantoprazole  Injectable 40 milliGRAM(s) IV Push daily  propofol Infusion 5 MICROgram(s)/kG/Min IV Continuous <Continuous>  simvastatin 20 milliGRAM(s) Oral at bedtime    acetaminophen   Tablet 650 milliGRAM(s) Oral every 6 hours PRN  acetaminophen  Suppository 650 milliGRAM(s) Rectal every 6 hours PRN  ascorbic acid 500 milliGRAM(s) Oral daily  cefepime  IVPB 1000 milliGRAM(s) IV Intermittent every 24 hours  cinacalcet 30 milliGRAM(s) Oral daily  dextrose 5%. 1000 milliLiter(s) IV Continuous <Continuous>  dextrose 50% Injectable 12.5 Gram(s) IV Push once  dextrose 50% Injectable 25 Gram(s) IV Push once  dextrose 50% Injectable 25 Gram(s) IV Push once  dextrose Gel 1 Dose(s) Oral once PRN  ferrous    sulfate Liquid 300 milliGRAM(s) Enteral Tube daily  folic acid 1 milliGRAM(s) Oral daily  glucagon  Injectable 1 milliGRAM(s) IntraMuscular once PRN  insulin lispro (HumaLOG) corrective regimen sliding scale   SubCutaneous every 6 hours  levoFLOXacin IVPB 250 milliGRAM(s) IV Intermittent every 24 hours  levothyroxine 50 MICROGram(s) Oral daily  pantoprazole  Injectable 40 milliGRAM(s) IV Push daily  propofol Infusion 5 MICROgram(s)/kG/Min IV Continuous <Continuous>  simvastatin 20 milliGRAM(s) Oral at bedtime    Drug Dosing Weight  Height (cm): 170 (13 Dec 2017 17:16)  Weight (kg): 57 (13 Dec 2017 17:16)  BMI (kg/m2): 19.7 (13 Dec 2017 17:16)  BSA (m2): 1.66 (13 Dec 2017 17:16)    CENTRAL LINE: [ ] YES [ ] NO  LOCATION:   DATE INSERTED:  REMOVE: [ ] YES [ ] NO  EXPLAIN:    JACOBSON: [ ] YES [ ] NO    DATE INSERTED:  REMOVE:  [ ] YES [ ] NO  EXPLAIN:    A-LINE:  [ ] YES [ ] NO  LOCATION:   DATE INSERTED:  REMOVE:  [ ] YES [ ] NO  EXPLAIN:    PMH -reviewed admission note, no change since admission  Heart faliure: acute [ ] chronic [ ] acute or chronic [ ] diastolic [ ] systolic [ ] combied systolic and diastolic[ ]  RONAL: ATN[ ] renal medullary necrosis [ ] CKD I [ ]CKDII [ ]CKD III [ ]CKD IV [ ]CKD V [ ]Other pathological lesions [ ]  Abdominal Nutrition Status: malnutrition [ ] cachexia [ ] morbid obesity/BMI=40 [ ] Supplement ordered [___________]     ICU Vital Signs Last 24 Hrs  T(C): 37.5 (25 Dec 2017 05:49), Max: 37.8 (24 Dec 2017 23:34)  T(F): 99.5 (25 Dec 2017 05:49), Max: 100 (24 Dec 2017 23:34)  HR: 88 (25 Dec 2017 04:16) (86 - 114)  BP: 129/59 (25 Dec 2017 03:00) (105/55 - 177/68)  BP(mean): 77 (25 Dec 2017 03:00) (66 - 96)  ABP: --  ABP(mean): --  RR: 6 (24 Dec 2017 18:00) (6 - 36)  SpO2: 97% (25 Dec 2017 04:16) (90% - 97%)      ABG - ( 25 Dec 2017 04:45 )  pH: 7.40  /  pCO2: 35    /  pO2: 92    / HCO3: 21    / Base Excess: -3.0  /  SaO2: 96                    12-23 @ 07:01  -  12-24 @ 07:00  --------------------------------------------------------  IN: 621.2 mL / OUT: 0 mL / NET: 621.2 mL        Mode: AC/ CMV (Assist Control/ Continuous Mandatory Ventilation)  RR (machine): 15  TV (machine): 400  FiO2: 50  PEEP: 5  ITime: 1  MAP: 10  PIP: 30      PHYSICAL EXAM:    GENERAL: NAD, well-groomed, well-developed  HEAD:  Atraumatic, Normocephalic  EYES: EOMI, PERRLA, conjunctiva and sclera clear  ENMT: No tonsillar erythema, exudates, or enlargement; Moist mucous membranes, Good dentition, No lesions  NECK: Supple, normal appearance, No JVD; Normal thyroid; Trachea midline  NERVOUS SYSTEM:  Alert & Oriented X3, Good concentration; Motor Strength 5/5 B/L upper and lower extremities; DTRs 2+ intact and symmetric  CHEST/LUNG: No chest deformity; Normal percussion bilaterally; No rales, rhonchi, wheezing   HEART: Regular rate and rhythm; No murmurs, rubs, or gallops  ABDOMEN: Soft, Nontender, Nondistended; Bowel sounds present  EXTREMITIES:  2+ Peripheral Pulses, No clubbing, cyanosis, or edema  LYMPH: No lymphadenopathy noted  SKIN: No rashes or lesions; Good capillary refill      LABS:  CBC Full  -  ( 24 Dec 2017 07:06 )  WBC Count : 23.0 K/uL  Hemoglobin : 8.3 g/dL  Hematocrit : 22.7 %  Platelet Count - Automated : 172 K/uL  Mean Cell Volume : 90.3 fl  Mean Cell Hemoglobin : 33.1 pg  Mean Cell Hemoglobin Concentration : 36.7 gm/dL  Auto Neutrophil # : 19.0 K/uL  Auto Lymphocyte # : 1.8 K/uL  Auto Monocyte # : 1.9 K/uL  Auto Eosinophil # : 0.2 K/uL  Auto Basophil # : 0.1 K/uL  Auto Neutrophil % : 82.5 %  Auto Lymphocyte % : 7.9 %  Auto Monocyte % : 8.4 %  Auto Eosinophil % : 0.8 %  Auto Basophil % : 0.4 %    12-24    144  |  105  |  97<H>  ----------------------------<  204<H>  4.6   |  22  |  7.87<H>    Ca    9.4      24 Dec 2017 07:06  Phos  5.3     12-24  Mg     3.1     12-24              RADIOLOGY & ADDITIONAL STUDIES REVIEWED:  ***    [ ]GOALS OF CARE DISCUSSION WITH PATIENT/FAMILY/PROXY:    CRITICAL CARE TIME SPENT: 35 minutes INTERVAL HPI/OVERNIGHT EVENTS: pt off sedation overnight, alert and awake. afebrile overnight     PRESSORS: [ ] YES [ x] NO  WHICH:    Antimicrobial:  cefepime  IVPB 1000 milliGRAM(s) IV Intermittent every 24 hours  levoFLOXacin IVPB 250 milliGRAM(s) IV Intermittent every 24 hours    Cardiovascular:    Pulmonary:    Hematalogic:    Other:  acetaminophen   Tablet 650 milliGRAM(s) Oral every 6 hours PRN  acetaminophen  Suppository 650 milliGRAM(s) Rectal every 6 hours PRN  ascorbic acid 500 milliGRAM(s) Oral daily  cinacalcet 30 milliGRAM(s) Oral daily  dextrose 5%. 1000 milliLiter(s) IV Continuous <Continuous>  dextrose 50% Injectable 12.5 Gram(s) IV Push once  dextrose 50% Injectable 25 Gram(s) IV Push once  dextrose 50% Injectable 25 Gram(s) IV Push once  dextrose Gel 1 Dose(s) Oral once PRN  ferrous    sulfate Liquid 300 milliGRAM(s) Enteral Tube daily  folic acid 1 milliGRAM(s) Oral daily  glucagon  Injectable 1 milliGRAM(s) IntraMuscular once PRN  insulin lispro (HumaLOG) corrective regimen sliding scale   SubCutaneous every 6 hours  levothyroxine 50 MICROGram(s) Oral daily  pantoprazole  Injectable 40 milliGRAM(s) IV Push daily  propofol Infusion 5 MICROgram(s)/kG/Min IV Continuous <Continuous>  simvastatin 20 milliGRAM(s) Oral at bedtime    acetaminophen   Tablet 650 milliGRAM(s) Oral every 6 hours PRN  acetaminophen  Suppository 650 milliGRAM(s) Rectal every 6 hours PRN  ascorbic acid 500 milliGRAM(s) Oral daily  cefepime  IVPB 1000 milliGRAM(s) IV Intermittent every 24 hours  cinacalcet 30 milliGRAM(s) Oral daily  dextrose 5%. 1000 milliLiter(s) IV Continuous <Continuous>  dextrose 50% Injectable 12.5 Gram(s) IV Push once  dextrose 50% Injectable 25 Gram(s) IV Push once  dextrose 50% Injectable 25 Gram(s) IV Push once  dextrose Gel 1 Dose(s) Oral once PRN  ferrous    sulfate Liquid 300 milliGRAM(s) Enteral Tube daily  folic acid 1 milliGRAM(s) Oral daily  glucagon  Injectable 1 milliGRAM(s) IntraMuscular once PRN  insulin lispro (HumaLOG) corrective regimen sliding scale   SubCutaneous every 6 hours  levoFLOXacin IVPB 250 milliGRAM(s) IV Intermittent every 24 hours  levothyroxine 50 MICROGram(s) Oral daily  pantoprazole  Injectable 40 milliGRAM(s) IV Push daily  propofol Infusion 5 MICROgram(s)/kG/Min IV Continuous <Continuous>  simvastatin 20 milliGRAM(s) Oral at bedtime    Drug Dosing Weight  Height (cm): 170 (13 Dec 2017 17:16)  Weight (kg): 57 (13 Dec 2017 17:16)  BMI (kg/m2): 19.7 (13 Dec 2017 17:16)  BSA (m2): 1.66 (13 Dec 2017 17:16)    CENTRAL LINE: [ ] YES [x ] NO  LOCATION:   DATE INSERTED:  REMOVE: [ ] YES [ ] NO  EXPLAIN:    JACOBSON: [ ] YES [x ] NO    DATE INSERTED:  REMOVE:  [ ] YES [ ] NO  EXPLAIN:    A-LINE:  [ ] YES [x ] NO  LOCATION:   DATE INSERTED:  REMOVE:  [ ] YES [ ] NO  EXPLAIN:    ICU Vital Signs Last 24 Hrs  T(C): 37.5 (25 Dec 2017 05:49), Max: 37.8 (24 Dec 2017 23:34)  T(F): 99.5 (25 Dec 2017 05:49), Max: 100 (24 Dec 2017 23:34)  HR: 88 (25 Dec 2017 04:16) (86 - 114)  BP: 129/59 (25 Dec 2017 03:00) (105/55 - 177/68)  BP(mean): 77 (25 Dec 2017 03:00) (66 - 96)  ABP: --  ABP(mean): --  RR: 6 (24 Dec 2017 18:00) (6 - 36)  SpO2: 97% (25 Dec 2017 04:16) (90% - 97%)      ABG - ( 25 Dec 2017 04:45 )  pH: 7.40  /  pCO2: 35    /  pO2: 92    / HCO3: 21    / Base Excess: -3.0  /  SaO2: 96                    12-23 @ 07:01  -  12-24 @ 07:00  --------------------------------------------------------  IN: 621.2 mL / OUT: 0 mL / NET: 621.2 mL        Mode: AC/ CMV (Assist Control/ Continuous Mandatory Ventilation)  RR (machine): 15  TV (machine): 400  FiO2: 50  PEEP: 5  ITime: 1  MAP: 10  PIP: 30      PHYSICAL EXAM:    GENERAL: [x]NAD,  HEAD:  [x]Atraumatic, []Normocephalic  EYES: sluggish pupillary response bilaterally, conjunctiva and sclera clear  ENMT: on ETT tube  NECK: [x]Supple, normal appearance, []No JVD; []Normal thyroid; [x]Trachea midline  NERVOUS SYSTEM: awake, alert, nonverbal, noncommunicative  CHEST/LUNG: [x]No chest deformity; []Normal percussion bilaterally; []No rales, rhonchi, wheezing   HEART: [x]Regular rate and rhythm; []No murmurs, rubs, or gallops  ABDOMEN: [x]Soft, Nontender, Nondistended; []Bowel sounds present  EXTREMITIES:  [x]2+ Peripheral Pulses, []No clubbing, cyanosis, or edema  LYMPH: [x]No lymphadenopathy noted  SKIN: [x]No rashes or lesions; []Good capillary refill        LABS:  CBC Full  -  ( 24 Dec 2017 07:06 )  WBC Count : 23.0 K/uL  Hemoglobin : 8.3 g/dL  Hematocrit : 22.7 %  Platelet Count - Automated : 172 K/uL  Mean Cell Volume : 90.3 fl  Mean Cell Hemoglobin : 33.1 pg  Mean Cell Hemoglobin Concentration : 36.7 gm/dL  Auto Neutrophil # : 19.0 K/uL  Auto Lymphocyte # : 1.8 K/uL  Auto Monocyte # : 1.9 K/uL  Auto Eosinophil # : 0.2 K/uL  Auto Basophil # : 0.1 K/uL  Auto Neutrophil % : 82.5 %  Auto Lymphocyte % : 7.9 %  Auto Monocyte % : 8.4 %  Auto Eosinophil % : 0.8 %  Auto Basophil % : 0.4 %    12-24    144  |  105  |  97<H>  ----------------------------<  204<H>  4.6   |  22  |  7.87<H>    Ca    9.4      24 Dec 2017 07:06  Phos  5.3     12-24  Mg     3.1     12-24              RADIOLOGY & ADDITIONAL STUDIES REVIEWED: yes    [ ]GOALS OF CARE DISCUSSION WITH PATIENT/FAMILY/PROXY:    CRITICAL CARE TIME SPENT: 35 minutes    Assessment and Plan:   · Assessment		  52 y/o F from [facility] ( long term care 2/2 diffculty walking possibly 2/2 polyneuropathy) w/ PMHx of anemia, CHF( diastolic dysfunction), CKD, Clostridium difficile infection, DM, Diabetic neuropathy, HTN, HLD, Hypothyroidism, Hypoparathyroidism, Osteomyelitis of jaw, and Parathyroid adenoma, GERD p/w c/o SOB and difficulty breathing x1 week. Pt also reports associated mild cough. Pt denies fever, chills, general pain, C/P, or any other complaints. NO  In ED pt is Awake , responds to pain and verbal stimuli, She in moderate respiratory distress. placed on BIPAP. ICU consulted for new BIPAP.    Admit to ICU for Acute Hypoxic and hypercapnic respiratory failure require new Bipap  2/2 Exacerbation of CHF or possibly pneumonia      Problem/Plan - 1:  ·  Problem: Acute respiratory failure.  Plan: Inubated 12/16. Pt was hypoxic on BiPAP,  failed NIPPV support  Acute respiratory failure secondary to HAP (elevated procalcitonin) and fluid overload  -continue vent support, off propofol overnight   -wean mechanical ventilation as tolerated, daily SAT/DBT  -c/w  maxipime and  levaquin   -pt need permcath  as per Dr. Escoto   -called IR, plan for permacath on Monday or Tuesday if afebrile, followed by HD       Problem/Plan - 2:  ·  Problem: R/O Upper GI bleed.  Plan: c/w protonix, no recurrent GI Bleed.  s/p 1 PRBC, hb 8.2 after transfusion  EGD showed duodenal ulcer, no active bleeding, gastritis and esophagitis.  f/u biopsy report  monitor CBC  c/w protonix 40mg, daily.       Problem/Plan - 3:  ·  Problem: Hypothyroidism.  Plan: c/w home dose LT4 50 mcg daily  TSH wnl.      Problem/Plan - 4:  ·  Problem: CHF (congestive heart failure).  Plan: unknown EF  c/w home cardiac meds   TTE - RV systolic pressure is 49 mm Hg. Mild pulmonary  hypertension.      Problem/Plan - 5:  ·  Problem: CKD (chronic kidney disease).  Plan: getting HD via shiley.  -shiley was removed due to fever and leukocytosis   -pt need permcath  as per Dr. Escoto   -called IR for permacath today        Problem/Plan - 6:  Problem: Diabetes mellitus. Plan: c/w Lantus 10 units daily , HSS   HbA1c 5.2.     Problem/Plan - 7:  ·  Problem: HTN (hypertension).  Plan: c/w home BP meds.      Problem/Plan - 8:  ·  Problem: Prophylactic measure.  Plan: c/w DVT ppx   IMPROVE score - 2. INTERVAL HPI/OVERNIGHT EVENTS: pt off sedation overnight, alert and awake. afebrile overnight     PRESSORS: [ ] YES [ x] NO  WHICH:    Antimicrobial:  cefepime  IVPB 1000 milliGRAM(s) IV Intermittent every 24 hours  levoFLOXacin IVPB 250 milliGRAM(s) IV Intermittent every 24 hours    Cardiovascular:    Pulmonary:    Hematalogic:    Other:  acetaminophen   Tablet 650 milliGRAM(s) Oral every 6 hours PRN  acetaminophen  Suppository 650 milliGRAM(s) Rectal every 6 hours PRN  ascorbic acid 500 milliGRAM(s) Oral daily  cinacalcet 30 milliGRAM(s) Oral daily  dextrose 5%. 1000 milliLiter(s) IV Continuous <Continuous>  dextrose 50% Injectable 12.5 Gram(s) IV Push once  dextrose 50% Injectable 25 Gram(s) IV Push once  dextrose 50% Injectable 25 Gram(s) IV Push once  dextrose Gel 1 Dose(s) Oral once PRN  ferrous    sulfate Liquid 300 milliGRAM(s) Enteral Tube daily  folic acid 1 milliGRAM(s) Oral daily  glucagon  Injectable 1 milliGRAM(s) IntraMuscular once PRN  insulin lispro (HumaLOG) corrective regimen sliding scale   SubCutaneous every 6 hours  levothyroxine 50 MICROGram(s) Oral daily  pantoprazole  Injectable 40 milliGRAM(s) IV Push daily  propofol Infusion 5 MICROgram(s)/kG/Min IV Continuous <Continuous>  simvastatin 20 milliGRAM(s) Oral at bedtime    acetaminophen   Tablet 650 milliGRAM(s) Oral every 6 hours PRN  acetaminophen  Suppository 650 milliGRAM(s) Rectal every 6 hours PRN  ascorbic acid 500 milliGRAM(s) Oral daily  cefepime  IVPB 1000 milliGRAM(s) IV Intermittent every 24 hours  cinacalcet 30 milliGRAM(s) Oral daily  dextrose 5%. 1000 milliLiter(s) IV Continuous <Continuous>  dextrose 50% Injectable 12.5 Gram(s) IV Push once  dextrose 50% Injectable 25 Gram(s) IV Push once  dextrose 50% Injectable 25 Gram(s) IV Push once  dextrose Gel 1 Dose(s) Oral once PRN  ferrous    sulfate Liquid 300 milliGRAM(s) Enteral Tube daily  folic acid 1 milliGRAM(s) Oral daily  glucagon  Injectable 1 milliGRAM(s) IntraMuscular once PRN  insulin lispro (HumaLOG) corrective regimen sliding scale   SubCutaneous every 6 hours  levoFLOXacin IVPB 250 milliGRAM(s) IV Intermittent every 24 hours  levothyroxine 50 MICROGram(s) Oral daily  pantoprazole  Injectable 40 milliGRAM(s) IV Push daily  propofol Infusion 5 MICROgram(s)/kG/Min IV Continuous <Continuous>  simvastatin 20 milliGRAM(s) Oral at bedtime    Drug Dosing Weight  Height (cm): 170 (13 Dec 2017 17:16)  Weight (kg): 57 (13 Dec 2017 17:16)  BMI (kg/m2): 19.7 (13 Dec 2017 17:16)  BSA (m2): 1.66 (13 Dec 2017 17:16)    CENTRAL LINE: [ ] YES [x ] NO  LOCATION:   DATE INSERTED:  REMOVE: [ ] YES [ ] NO  EXPLAIN:    JACOBSON: [ ] YES [x ] NO    DATE INSERTED:  REMOVE:  [ ] YES [ ] NO  EXPLAIN:    A-LINE:  [ ] YES [x ] NO  LOCATION:   DATE INSERTED:  REMOVE:  [ ] YES [ ] NO  EXPLAIN:    ICU Vital Signs Last 24 Hrs  T(C): 37.5 (25 Dec 2017 05:49), Max: 37.8 (24 Dec 2017 23:34)  T(F): 99.5 (25 Dec 2017 05:49), Max: 100 (24 Dec 2017 23:34)  HR: 88 (25 Dec 2017 04:16) (86 - 114)  BP: 129/59 (25 Dec 2017 03:00) (105/55 - 177/68)  BP(mean): 77 (25 Dec 2017 03:00) (66 - 96)  ABP: --  ABP(mean): --  RR: 6 (24 Dec 2017 18:00) (6 - 36)  SpO2: 97% (25 Dec 2017 04:16) (90% - 97%)      ABG - ( 25 Dec 2017 04:45 )  pH: 7.40  /  pCO2: 35    /  pO2: 92    / HCO3: 21    / Base Excess: -3.0  /  SaO2: 96                    12-23 @ 07:01  -  12-24 @ 07:00  --------------------------------------------------------  IN: 621.2 mL / OUT: 0 mL / NET: 621.2 mL        Mode: AC/ CMV (Assist Control/ Continuous Mandatory Ventilation)  RR (machine): 15  TV (machine): 400  FiO2: 50  PEEP: 5  ITime: 1  MAP: 10  PIP: 30      PHYSICAL EXAM:    GENERAL: [x]NAD,  HEAD:  [x]Atraumatic, []Normocephalic  EYES: sluggish pupillary response bilaterally, conjunctiva and sclera clear  ENMT: on ETT tube  NECK: [x]Supple, normal appearance, []No JVD; []Normal thyroid; [x]Trachea midline  NERVOUS SYSTEM: awake, alert, nonverbal, noncommunicative  CHEST/LUNG: [x]No chest deformity; []Normal percussion bilaterally; []No rales, rhonchi, wheezing   HEART: [x]Regular rate and rhythm; []No murmurs, rubs, or gallops  ABDOMEN: [x]Soft, Nontender, Nondistended; []Bowel sounds present  EXTREMITIES:  [x]2+ Peripheral Pulses, []No clubbing, cyanosis, or edema  LYMPH: [x]No lymphadenopathy noted  SKIN: [x]No rashes or lesions; []Good capillary refill        LABS:  CBC Full  -  ( 24 Dec 2017 07:06 )  WBC Count : 23.0 K/uL  Hemoglobin : 8.3 g/dL  Hematocrit : 22.7 %  Platelet Count - Automated : 172 K/uL  Mean Cell Volume : 90.3 fl  Mean Cell Hemoglobin : 33.1 pg  Mean Cell Hemoglobin Concentration : 36.7 gm/dL  Auto Neutrophil # : 19.0 K/uL  Auto Lymphocyte # : 1.8 K/uL  Auto Monocyte # : 1.9 K/uL  Auto Eosinophil # : 0.2 K/uL  Auto Basophil # : 0.1 K/uL  Auto Neutrophil % : 82.5 %  Auto Lymphocyte % : 7.9 %  Auto Monocyte % : 8.4 %  Auto Eosinophil % : 0.8 %  Auto Basophil % : 0.4 %    12-24    144  |  105  |  97<H>  ----------------------------<  204<H>  4.6   |  22  |  7.87<H>    Ca    9.4      24 Dec 2017 07:06  Phos  5.3     12-24  Mg     3.1     12-24              RADIOLOGY & ADDITIONAL STUDIES REVIEWED: yes    [ ]GOALS OF CARE DISCUSSION WITH PATIENT/FAMILY/PROXY:    CRITICAL CARE TIME SPENT: 35 minutes    Assessment and Plan:   · Assessment		  52 female nursing home patient with hx of DM, neuropathy, CKD, CHFpEF, GERD, osteomyelitis of jaw, hypothyroidism, hypoparathyroidism, anemia, admitted with acute on chronic renal failure requiring initiation of dialysis, acute respiratory failure requiring intubation on 12/16, pneumonia with sepsis, GI bleed, EGD showed non bleeding duodenal ulcer.  Now off pressors.   Dialysis catheter removed 12/22 due to fevers and leukocytosis     Problem/Plan - 1:  ·  Problem: Acute respiratory failure.  Plan: Inubated 12/16. Pt was hypoxic on BiPAP,  failed NIPPV support  Acute respiratory failure secondary to HAP (elevated procalcitonin) and fluid overload  -continue vent support, off propofol overnight   -wean mechanical ventilation as tolerated, daily SAT/DBT  -c/w  maxipime and  levaquin   -pt need permcath  as per Dr. Escoto   -Dialysis catheter removed 12/22 due to fevers and leukocytosis  -called IR, plan for permacath on Tuesday if afebrile, followed by HD       Problem/Plan - 2:  ·  Problem: R/O Upper GI bleed.  Plan: c/w protonix, no recurrent GI Bleed.  s/p 1 PRBC, hb 8.2 after transfusion  EGD showed duodenal ulcer, no active bleeding, gastritis and esophagitis.  f/u biopsy report  monitor CBC  c/w protonix 40mg, daily.       Problem/Plan - 3:  ·  Problem: Hypothyroidism.  Plan: c/w home dose LT4 50 mcg daily  TSH wnl.      Problem/Plan - 4:  ·  Problem: CHF (congestive heart failure).  Plan: unknown EF  c/w home cardiac meds   TTE - RV systolic pressure is 49 mm Hg. Mild pulmonary  hypertension.      Problem/Plan - 5:  ·  Problem: CKD (chronic kidney disease).  Plan: getting HD via shiley.  -shiley was removed due to fever and leukocytosis   -pt need permcath  as per Dr. Escoto   -called IR for permacath tomorrow   -Dialysis catheter removed 12/22 due to fevers and leukocytosis        Problem/Plan - 6:  Problem: Diabetes mellitus. Plan: c/w Lantus 10 units daily , HSS   HbA1c 5.2.     Problem/Plan - 7:  ·  Problem: HTN (hypertension).  Plan: c/w home BP meds.      Problem/Plan - 8:  ·  Problem: Prophylactic measure.  Plan: c/w DVT ppx   IMPROVE score - 2.

## 2017-12-26 LAB
ANION GAP SERPL CALC-SCNC: 21 MMOL/L — HIGH (ref 5–17)
APPEARANCE UR: ABNORMAL
APTT BLD: 29.6 SEC — SIGNIFICANT CHANGE UP (ref 27.5–37.4)
BASE EXCESS BLDA CALC-SCNC: -4.3 MMOL/L — LOW (ref -2–2)
BILIRUB UR-MCNC: NEGATIVE — SIGNIFICANT CHANGE UP
BLOOD GAS COMMENTS ARTERIAL: SIGNIFICANT CHANGE UP
BUN SERPL-MCNC: 133 MG/DL — HIGH (ref 7–18)
CALCIUM SERPL-MCNC: 9.5 MG/DL — SIGNIFICANT CHANGE UP (ref 8.4–10.5)
CHLORIDE SERPL-SCNC: 107 MMOL/L — SIGNIFICANT CHANGE UP (ref 96–108)
CO2 SERPL-SCNC: 18 MMOL/L — LOW (ref 22–31)
COLOR SPEC: YELLOW — SIGNIFICANT CHANGE UP
CREAT SERPL-MCNC: 10.3 MG/DL — HIGH (ref 0.5–1.3)
CULTURE RESULTS: SIGNIFICANT CHANGE UP
CULTURE RESULTS: SIGNIFICANT CHANGE UP
DAT IGG-SP REAG RBC-IMP: ABNORMAL
DIFF PNL FLD: ABNORMAL
GLUCOSE BLDC GLUCOMTR-MCNC: 128 MG/DL — HIGH (ref 70–99)
GLUCOSE BLDC GLUCOMTR-MCNC: 134 MG/DL — HIGH (ref 70–99)
GLUCOSE SERPL-MCNC: 111 MG/DL — HIGH (ref 70–99)
GLUCOSE UR QL: NEGATIVE — SIGNIFICANT CHANGE UP
HCO3 BLDA-SCNC: 19 MMOL/L — LOW (ref 23–27)
HCT VFR BLD CALC: 23.8 % — LOW (ref 34.5–45)
HGB BLD-MCNC: 7.3 G/DL — LOW (ref 11.5–15.5)
HOROWITZ INDEX BLDA+IHG-RTO: 50 — SIGNIFICANT CHANGE UP
IAT COMP-SP REAG SERPL QL: ABNORMAL
INR BLD: 1.27 RATIO — HIGH (ref 0.88–1.16)
KETONES UR-MCNC: NEGATIVE — SIGNIFICANT CHANGE UP
LEUKOCYTE ESTERASE UR-ACNC: ABNORMAL
MAGNESIUM SERPL-MCNC: 3.4 MG/DL — HIGH (ref 1.6–2.6)
MCHC RBC-ENTMCNC: 27.3 PG — SIGNIFICANT CHANGE UP (ref 27–34)
MCHC RBC-ENTMCNC: 30.5 GM/DL — LOW (ref 32–36)
MCV RBC AUTO: 89.4 FL — SIGNIFICANT CHANGE UP (ref 80–100)
NITRITE UR-MCNC: NEGATIVE — SIGNIFICANT CHANGE UP
PCO2 BLDA: 30 MMHG — LOW (ref 32–46)
PH BLDA: 7.42 — SIGNIFICANT CHANGE UP (ref 7.35–7.45)
PH UR: 6 — SIGNIFICANT CHANGE UP (ref 5–8)
PHOSPHATE SERPL-MCNC: 8.9 MG/DL — HIGH (ref 2.5–4.5)
PLATELET # BLD AUTO: 221 K/UL — SIGNIFICANT CHANGE UP (ref 150–400)
PO2 BLDA: 88 MMHG — SIGNIFICANT CHANGE UP (ref 74–108)
POTASSIUM SERPL-MCNC: 5.5 MMOL/L — HIGH (ref 3.5–5.3)
POTASSIUM SERPL-SCNC: 5.5 MMOL/L — HIGH (ref 3.5–5.3)
PROT UR-MCNC: 100
PROTHROM AB SERPL-ACNC: 13.9 SEC — HIGH (ref 9.8–12.7)
RBC # BLD: 2.66 M/UL — LOW (ref 3.8–5.2)
RBC # FLD: 14.8 % — HIGH (ref 10.3–14.5)
SAO2 % BLDA: 95 % — SIGNIFICANT CHANGE UP (ref 92–96)
SODIUM SERPL-SCNC: 146 MMOL/L — HIGH (ref 135–145)
SP GR SPEC: 1.01 — SIGNIFICANT CHANGE UP (ref 1.01–1.02)
SPECIMEN SOURCE: SIGNIFICANT CHANGE UP
SPECIMEN SOURCE: SIGNIFICANT CHANGE UP
UROBILINOGEN FLD QL: NEGATIVE — SIGNIFICANT CHANGE UP
WBC # BLD: 22.2 K/UL — HIGH (ref 3.8–10.5)
WBC # FLD AUTO: 22.2 K/UL — HIGH (ref 3.8–10.5)

## 2017-12-26 PROCEDURE — 71010: CPT | Mod: 26

## 2017-12-26 RX ORDER — VANCOMYCIN HCL 1 G
500 VIAL (EA) INTRAVENOUS ONCE
Qty: 0 | Refills: 0 | Status: COMPLETED | OUTPATIENT
Start: 2017-12-26 | End: 2017-12-26

## 2017-12-26 RX ORDER — MIDAZOLAM HYDROCHLORIDE 1 MG/ML
2 INJECTION, SOLUTION INTRAMUSCULAR; INTRAVENOUS ONCE
Qty: 0 | Refills: 0 | Status: DISCONTINUED | OUTPATIENT
Start: 2017-12-26 | End: 2017-12-26

## 2017-12-26 RX ORDER — ERYTHROPOIETIN 10000 [IU]/ML
10000 INJECTION, SOLUTION INTRAVENOUS; SUBCUTANEOUS
Qty: 0 | Refills: 0 | Status: COMPLETED | OUTPATIENT
Start: 2017-12-26 | End: 2017-12-26

## 2017-12-26 RX ADMIN — Medication 300 MILLIGRAM(S): at 11:46

## 2017-12-26 RX ADMIN — Medication 1 MILLIGRAM(S): at 11:46

## 2017-12-26 RX ADMIN — CEFEPIME 100 MILLIGRAM(S): 1 INJECTION, POWDER, FOR SOLUTION INTRAMUSCULAR; INTRAVENOUS at 17:32

## 2017-12-26 RX ADMIN — CINACALCET 30 MILLIGRAM(S): 30 TABLET, FILM COATED ORAL at 11:46

## 2017-12-26 RX ADMIN — ERYTHROPOIETIN 10000 UNIT(S): 10000 INJECTION, SOLUTION INTRAVENOUS; SUBCUTANEOUS at 15:27

## 2017-12-26 RX ADMIN — MIDAZOLAM HYDROCHLORIDE 2 MILLIGRAM(S): 1 INJECTION, SOLUTION INTRAMUSCULAR; INTRAVENOUS at 12:15

## 2017-12-26 RX ADMIN — SIMVASTATIN 20 MILLIGRAM(S): 20 TABLET, FILM COATED ORAL at 21:57

## 2017-12-26 RX ADMIN — MIDAZOLAM HYDROCHLORIDE 2 MILLIGRAM(S): 1 INJECTION, SOLUTION INTRAMUSCULAR; INTRAVENOUS at 13:25

## 2017-12-26 RX ADMIN — Medication 500 MILLIGRAM(S): at 11:47

## 2017-12-26 RX ADMIN — Medication 50 MICROGRAM(S): at 07:12

## 2017-12-26 RX ADMIN — Medication 150 MILLIGRAM(S): at 15:38

## 2017-12-26 RX ADMIN — PANTOPRAZOLE SODIUM 40 MILLIGRAM(S): 20 TABLET, DELAYED RELEASE ORAL at 12:15

## 2017-12-26 NOTE — CHART NOTE - NSCHARTNOTEFT_GEN_A_CORE
Upon Nutritional Assessment by the Registered Dietitian your patient was determined to meet criteria / has evidence of the following diagnosis/diagnoses:          [ ]  Mild Protein Calorie Malnutrition        [ ]  Moderate Protein Calorie Malnutrition        [ X ] Severe Protein Calorie Malnutrition        [ ] Unspecified Protein Calorie Malnutrition        [ X ] Underweight / BMI <19        [ ] Morbid Obesity / BMI > 40      Findings as based on:  •  Comprehensive nutrition assessment and consultation  •  Calorie counts (nutrient intake analysis)  •  Food acceptance and intake status from observations by staff  •  Follow up  •  Patient education  •  Intervention secondary to interdisciplinary rounds  •   concerns      Treatment:    The following diet has been recommended: Goal: Nepro @ 25 ml/h x 24 h  MD to adjust free water as needed  ( 600 ml, 1080 kcal, 48 g protein, 438 ml water daily), Bacid, Kanana Banana as medically feasible; if Vital AV 1.2 desirable, Goal: Vital AF 1.2 @ 40ml/h x 24h (960ml, 1152kcal, 72g protein, 779ml water at goal)  MD to adjust free water as needed       PROVIDER Section:     By signing this assessment you are acknowledging and agree with the diagnosis/diagnoses assigned by the Registered Dietitian    Comments:

## 2017-12-26 NOTE — PROGRESS NOTE ADULT - ATTENDING COMMENTS
- pat spiked fever with up trend WBC  - add vanco  - repeat BC and check UA, no diarrhea  -transfuse I unit  prbc during dialyisis  - IR will not place permacath due to elevated leukocytosis and fever

## 2017-12-26 NOTE — PROGRESS NOTE ADULT - ASSESSMENT
pneumonia and CHF - both improving  leukocytosis -   fevers - only one low grade temp    plan - cont maxipime 1 gm iv q24 hrs  cont levaquin 250mgs iv q24hrs for now  got one dose vanco  will give abxs for another 2-3 days only unless she gets worse.  time spent - 30 mins

## 2017-12-26 NOTE — PROGRESS NOTE ADULT - ASSESSMENT
52 yr old female with  ESRD.  Shiley was removed on Friday sec to leucocytosis and fever.  spiked fever overnight. cultures drawn the are negative. on IV ABX  shiley being placed.  HD today  cont abx  con sensipar for renal steodystrophy  procrit on hd.

## 2017-12-26 NOTE — PROGRESS NOTE ADULT - SUBJECTIVE AND OBJECTIVE BOX
Patient is a 52y old  Female who presents with a chief complaint of SOB (13 Dec 2017 16:01)  Patient remains intubated on mechanical ventilation. Scheduled for PC placement this week.    INTERVAL HPI/OVERNIGHT EVENTS:      VITAL SIGNS:  T(F): 99.1 (12-26-17 @ 08:00)  HR: 88 (12-26-17 @ 09:00)  BP: 147/61 (12-26-17 @ 09:00)  RR: 17 (12-26-17 @ 09:00)  SpO2: 99% (12-26-17 @ 09:00)  Wt(kg): --  I&O's Detail    25 Dec 2017 07:01  -  26 Dec 2017 07:00  --------------------------------------------------------  IN:    Nepro: 400 mL    Solution: 50 mL    Solution: 50 mL  Total IN: 500 mL    OUT:  Total OUT: 0 mL    Total NET: 500 mL        Mode: AC/ CMV (Assist Control/ Continuous Mandatory Ventilation)  RR (machine): 15  TV (machine): 400  FiO2: 50  PEEP: 5  ITime: 1  MAP: 12  PIP: 27        REVIEW OF SYSTEMS:    CONSTITUTIONAL:  No fevers, chills, sweats    HEENT:  Eyes:  No diplopia or blurred vision. ENT:  No earache, sore throat or runny nose.    CARDIOVASCULAR:  No pressure, squeezing, tightness, or heaviness about the chest; no palpitations.    RESPIRATORY:  Per HPI    GASTROINTESTINAL:  No abdominal pain, nausea, vomiting or diarrhea.    GENITOURINARY:  No dysuria, frequency or urgency.    NEUROLOGIC:  No paresthesias, fasciculations, seizures or weakness.    PSYCHIATRIC:  No disorder of thought or mood.      PHYSICAL EXAM:    Constitutional: Well developed and nourished  Eyes:Perrla  ENMT: normal  Neck:supple  Respiratory: Fine wheezes bilaterally  Cardiovascular: S1 S2 regular  Gastrointestinal: Soft, Non tender  Extremities: No edema  Vascular:normal  Neurological:Sedated  Musculoskeletal:Normal      MEDICATIONS  (STANDING):  ascorbic acid 500 milliGRAM(s) Oral daily  cefepime  IVPB 1000 milliGRAM(s) IV Intermittent every 24 hours  cinacalcet 30 milliGRAM(s) Oral daily  dextrose 5%. 1000 milliLiter(s) (50 mL/Hr) IV Continuous <Continuous>  dextrose 50% Injectable 12.5 Gram(s) IV Push once  dextrose 50% Injectable 25 Gram(s) IV Push once  dextrose 50% Injectable 25 Gram(s) IV Push once  epoetin jacqueline Injectable 86807 Unit(s) IV Push <User Schedule>  ferrous    sulfate Liquid 300 milliGRAM(s) Enteral Tube daily  folic acid 1 milliGRAM(s) Oral daily  insulin lispro (HumaLOG) corrective regimen sliding scale   SubCutaneous every 6 hours  levoFLOXacin IVPB 250 milliGRAM(s) IV Intermittent every 24 hours  levothyroxine 50 MICROGram(s) Oral daily  pantoprazole  Injectable 40 milliGRAM(s) IV Push daily  simvastatin 20 milliGRAM(s) Oral at bedtime    MEDICATIONS  (PRN):  acetaminophen   Tablet 650 milliGRAM(s) Oral every 6 hours PRN For Temp greater than 38 C (100.4 F)  acetaminophen  Suppository 650 milliGRAM(s) Rectal every 6 hours PRN For Temp greater than 38 C (100.4 F)  dextrose Gel 1 Dose(s) Oral once PRN Blood Glucose LESS THAN 70 milliGRAM(s)/deciliter  glucagon  Injectable 1 milliGRAM(s) IntraMuscular once PRN Glucose LESS THAN 70 milligrams/deciliter      Allergies    No Known Allergies    Intolerances        LABS:                        7.3    22.2  )-----------( 221      ( 26 Dec 2017 08:49 )             23.8     12-26    146<H>  |  107  |  133<H>  ----------------------------<  111<H>  5.5<H>   |  18<L>  |  10.30<H>    Ca    9.5      26 Dec 2017 06:44  Phos  8.9     12-26  Mg     3.4     12-26      PT/INR - ( 26 Dec 2017 06:44 )   PT: 13.9 sec;   INR: 1.27 ratio         PTT - ( 26 Dec 2017 06:44 )  PTT:29.6 sec    ABG - ( 26 Dec 2017 05:00 )  pH: 7.42  /  pCO2: 30    /  pO2: 88    / HCO3: 19    / Base Excess: -4.3  /  SaO2: 95                    CAPILLARY BLOOD GLUCOSE      POCT Blood Glucose.: 151 mg/dL (25 Dec 2017 22:54)  POCT Blood Glucose.: 255 mg/dL (25 Dec 2017 17:58)  POCT Blood Glucose.: 260 mg/dL (25 Dec 2017 10:46)        RADIOLOGY & ADDITIONAL TESTS:    CXR:    Ct scan chest:    ekg;    echo:

## 2017-12-26 NOTE — PROCEDURE NOTE - PROCEDURE
<<-----Click on this checkbox to enter Procedure Insertion of catheter for hemodialysis  12/26/2017    Active  [id number]

## 2017-12-26 NOTE — PROGRESS NOTE ADULT - SUBJECTIVE AND OBJECTIVE BOX
INTERVAL HPI/OVERNIGHT EVENTS: pt had fever 100.5 last night, given tylenol, temp 99.2 this am, NPO after midnight for IR guided Perma cath today.     PRESSORS: [ ] YES [ x] NO  WHICH:    Antimicrobial:  cefepime  IVPB 1000 milliGRAM(s) IV Intermittent every 24 hours  levoFLOXacin IVPB 250 milliGRAM(s) IV Intermittent every 24 hours    Cardiovascular:    Pulmonary:    Hematalogic:    Other:  acetaminophen   Tablet 650 milliGRAM(s) Oral every 6 hours PRN  acetaminophen  Suppository 650 milliGRAM(s) Rectal every 6 hours PRN  ascorbic acid 500 milliGRAM(s) Oral daily  cinacalcet 30 milliGRAM(s) Oral daily  dextrose 5%. 1000 milliLiter(s) IV Continuous <Continuous>  dextrose 50% Injectable 12.5 Gram(s) IV Push once  dextrose 50% Injectable 25 Gram(s) IV Push once  dextrose 50% Injectable 25 Gram(s) IV Push once  dextrose Gel 1 Dose(s) Oral once PRN  ferrous    sulfate Liquid 300 milliGRAM(s) Enteral Tube daily  folic acid 1 milliGRAM(s) Oral daily  glucagon  Injectable 1 milliGRAM(s) IntraMuscular once PRN  insulin lispro (HumaLOG) corrective regimen sliding scale   SubCutaneous every 6 hours  levothyroxine 50 MICROGram(s) Oral daily  pantoprazole  Injectable 40 milliGRAM(s) IV Push daily  simvastatin 20 milliGRAM(s) Oral at bedtime    acetaminophen   Tablet 650 milliGRAM(s) Oral every 6 hours PRN  acetaminophen  Suppository 650 milliGRAM(s) Rectal every 6 hours PRN  ascorbic acid 500 milliGRAM(s) Oral daily  cefepime  IVPB 1000 milliGRAM(s) IV Intermittent every 24 hours  cinacalcet 30 milliGRAM(s) Oral daily  dextrose 5%. 1000 milliLiter(s) IV Continuous <Continuous>  dextrose 50% Injectable 12.5 Gram(s) IV Push once  dextrose 50% Injectable 25 Gram(s) IV Push once  dextrose 50% Injectable 25 Gram(s) IV Push once  dextrose Gel 1 Dose(s) Oral once PRN  ferrous    sulfate Liquid 300 milliGRAM(s) Enteral Tube daily  folic acid 1 milliGRAM(s) Oral daily  glucagon  Injectable 1 milliGRAM(s) IntraMuscular once PRN  insulin lispro (HumaLOG) corrective regimen sliding scale   SubCutaneous every 6 hours  levoFLOXacin IVPB 250 milliGRAM(s) IV Intermittent every 24 hours  levothyroxine 50 MICROGram(s) Oral daily  pantoprazole  Injectable 40 milliGRAM(s) IV Push daily  simvastatin 20 milliGRAM(s) Oral at bedtime    Drug Dosing Weight  Height (cm): 170 (13 Dec 2017 17:16)  Weight (kg): 57 (13 Dec 2017 17:16)  BMI (kg/m2): 19.7 (13 Dec 2017 17:16)  BSA (m2): 1.66 (13 Dec 2017 17:16)    CENTRAL LINE: [ ] YES [ x] NO  LOCATION:   DATE INSERTED:  REMOVE: [ ] YES [ ] NO  EXPLAIN:    JACOBSON: [ ] YES [ x] NO    DATE INSERTED:  REMOVE:  [ ] YES [ ] NO  EXPLAIN:    A-LINE:  [ ] YES [x ] NO  LOCATION:   DATE INSERTED:  REMOVE:  [ ] YES [ ] NO  EXPLAIN:      ICU Vital Signs Last 24 Hrs  T(C): 37.3 (26 Dec 2017 05:00), Max: 38.1 (25 Dec 2017 20:27)  T(F): 99.2 (26 Dec 2017 05:00), Max: 100.5 (25 Dec 2017 20:27)  HR: 90 (26 Dec 2017 06:00) (80 - 100)  BP: 148/66 (26 Dec 2017 06:00) (118/51 - 176/74)  BP(mean): 87 (26 Dec 2017 06:00) (67 - 97)  ABP: --  ABP(mean): --  RR: 18 (26 Dec 2017 06:00) (16 - 27)  SpO2: 96% (26 Dec 2017 06:00) (88% - 100%)      ABG - ( 26 Dec 2017 05:00 )  pH: 7.42  /  pCO2: 30    /  pO2: 88    / HCO3: 19    / Base Excess: -4.3  /  SaO2: 95                    12-24 @ 07:01  -  12-25 @ 07:00  --------------------------------------------------------  IN: 575 mL / OUT: 0 mL / NET: 575 mL        Mode: AC/ CMV (Assist Control/ Continuous Mandatory Ventilation)  RR (machine): 15  TV (machine): 400  FiO2: 50  PEEP: 5  ITime: 1  MAP: 13  PIP: 29      PHYSICAL EXAM:    GENERAL: [x]NAD,  HEAD:  [x]Atraumatic, []Normocephalic  EYES: sluggish pupillary response bilaterally, conjunctiva and sclera clear  ENMT: on ETT tube  NECK: [x]Supple, normal appearance, []No JVD; []Normal thyroid; [x]Trachea midline  NERVOUS SYSTEM: awake, alert, nonverbal, noncommunicative  CHEST/LUNG: [x]No chest deformity; []Normal percussion bilaterally; []No rales, rhonchi, wheezing   HEART: [x]Regular rate and rhythm; []No murmurs, rubs, or gallops  ABDOMEN: [x]Soft, Nontender, Nondistended; []Bowel sounds present  EXTREMITIES:  [x]2+ Peripheral Pulses, []No clubbing, cyanosis, or edema  LYMPH: [x]No lymphadenopathy noted  SKIN: [x]No rashes or lesions; []Good capillary refill      LABS:  CBC Full  -  ( 25 Dec 2017 06:57 )  WBC Count : 19.3 K/uL  Hemoglobin : 8.2 g/dL  Hematocrit : 21.9 %  Platelet Count - Automated : 196 K/uL  Mean Cell Volume : 90.1 fl  Mean Cell Hemoglobin : 33.8 pg  Mean Cell Hemoglobin Concentration : 37.5 gm/dL  Auto Neutrophil # : 15.7 K/uL  Auto Lymphocyte # : 1.3 K/uL  Auto Monocyte # : 2.1 K/uL  Auto Eosinophil # : 0.1 K/uL  Auto Basophil # : 0.1 K/uL  Auto Neutrophil % : 81.4 %  Auto Lymphocyte % : 6.8 %  Auto Monocyte % : 10.7 %  Auto Eosinophil % : 0.6 %  Auto Basophil % : 0.5 %    12-25    145  |  106  |  112<H>  ----------------------------<  242<H>  4.9   |  22  |  9.33<H>    Ca    9.6      25 Dec 2017 06:57  Phos  8.4     12-25  Mg     3.5     12-25              RADIOLOGY & ADDITIONAL STUDIES REVIEWED:  yes    [ ]GOALS OF CARE DISCUSSION WITH PATIENT/FAMILY/PROXY:    Assessment and Plan:   · Assessment		  52 female nursing home patient with hx of DM, neuropathy, CKD, CHFpEF, GERD, osteomyelitis of jaw, hypothyroidism, hypoparathyroidism, anemia, admitted with acute on chronic renal failure requiring initiation of dialysis, acute respiratory failure requiring intubation on 12/16, pneumonia with sepsis, GI bleed, EGD showed non bleeding duodenal ulcer.  Now off pressors.   Dialysis catheter removed 12/22 due to fevers and leukocytosis     Problem/Plan - 1:  ·  Problem: Acute respiratory failure.  Plan: Inubated 12/16. Pt was hypoxic on BiPAP,  failed NIPPV support  Acute respiratory failure secondary to HAP (elevated procalcitonin) and fluid overload  -continue vent support, off propofol sedation  -wean mechanical ventilation as tolerated, daily SAT/DBT  -c/w  maxipime and  levaquin , D8  -pt need permcath  as per Dr. Escoto   -shiflip catheter removed 12/22 due to fevers and leukocytosis  -called IR, plan for permacath on Tuesday if afebrile, followed by HD  -fever 100.5 last night, given tylenol, temp 99.2 this am.       Problem/Plan - 2:  ·  Problem: R/O Upper GI bleed.  Plan: c/w protonix, no recurrent GI Bleed.  s/p 1 PRBC, hb 8.2 after transfusion  EGD showed duodenal ulcer, no active bleeding, gastritis and esophagitis.  biopsy report negative   monitor CBC  c/w protonix 40mg, daily.       Problem/Plan - 3:  ·  Problem: Hypothyroidism.  Plan: c/w home dose LT4 50 mcg daily  TSH wnl.      Problem/Plan - 4:  ·  Problem: CHF (congestive heart failure).  Plan: unknown EF  c/w home cardiac meds   TTE - RV systolic pressure is 49 mm Hg. Mild pulmonary  hypertension.      Problem/Plan - 5:  ·  Problem: CKD (chronic kidney disease).  Plan: getting HD via shiley.  -shiley was removed due to fever and leukocytosis   -pt need permcath  as per Dr. Escoto   -called IR for permacath tomorrow   -shiely catheter removed 12/22 due to fevers and leukocytosis        Problem/Plan - 6:  Problem: Diabetes mellitus. Plan: c/w Lantus 10 units daily , HSS   HbA1c 5.2.     Problem/Plan - 7:  ·  Problem: HTN (hypertension).  Plan: c/w home BP meds.      Problem/Plan - 8:  ·  Problem: Prophylactic measure.  Plan:   c/w DVT ppx with SCD , hold heparin for permacath procedure.  c/w GI ppx, on protonix          CRITICAL CARE TIME SPENT: 35 minutes INTERVAL HPI/OVERNIGHT EVENTS: pt had fever 100.5 last night, temp 99.2 this am, NPO after midnight for IR guided Perma cath today.     PRESSORS: [ ] YES [ x] NO  WHICH:    Antimicrobial:  cefepime  IVPB 1000 milliGRAM(s) IV Intermittent every 24 hours  levoFLOXacin IVPB 250 milliGRAM(s) IV Intermittent every 24 hours    Cardiovascular:    Pulmonary:    Hematalogic:    Other:  acetaminophen   Tablet 650 milliGRAM(s) Oral every 6 hours PRN  acetaminophen  Suppository 650 milliGRAM(s) Rectal every 6 hours PRN  ascorbic acid 500 milliGRAM(s) Oral daily  cinacalcet 30 milliGRAM(s) Oral daily  dextrose 5%. 1000 milliLiter(s) IV Continuous <Continuous>  dextrose 50% Injectable 12.5 Gram(s) IV Push once  dextrose 50% Injectable 25 Gram(s) IV Push once  dextrose 50% Injectable 25 Gram(s) IV Push once  dextrose Gel 1 Dose(s) Oral once PRN  ferrous    sulfate Liquid 300 milliGRAM(s) Enteral Tube daily  folic acid 1 milliGRAM(s) Oral daily  glucagon  Injectable 1 milliGRAM(s) IntraMuscular once PRN  insulin lispro (HumaLOG) corrective regimen sliding scale   SubCutaneous every 6 hours  levothyroxine 50 MICROGram(s) Oral daily  pantoprazole  Injectable 40 milliGRAM(s) IV Push daily  simvastatin 20 milliGRAM(s) Oral at bedtime    acetaminophen   Tablet 650 milliGRAM(s) Oral every 6 hours PRN  acetaminophen  Suppository 650 milliGRAM(s) Rectal every 6 hours PRN  ascorbic acid 500 milliGRAM(s) Oral daily  cefepime  IVPB 1000 milliGRAM(s) IV Intermittent every 24 hours  cinacalcet 30 milliGRAM(s) Oral daily  dextrose 5%. 1000 milliLiter(s) IV Continuous <Continuous>  dextrose 50% Injectable 12.5 Gram(s) IV Push once  dextrose 50% Injectable 25 Gram(s) IV Push once  dextrose 50% Injectable 25 Gram(s) IV Push once  dextrose Gel 1 Dose(s) Oral once PRN  ferrous    sulfate Liquid 300 milliGRAM(s) Enteral Tube daily  folic acid 1 milliGRAM(s) Oral daily  glucagon  Injectable 1 milliGRAM(s) IntraMuscular once PRN  insulin lispro (HumaLOG) corrective regimen sliding scale   SubCutaneous every 6 hours  levoFLOXacin IVPB 250 milliGRAM(s) IV Intermittent every 24 hours  levothyroxine 50 MICROGram(s) Oral daily  pantoprazole  Injectable 40 milliGRAM(s) IV Push daily  simvastatin 20 milliGRAM(s) Oral at bedtime    Drug Dosing Weight  Height (cm): 170 (13 Dec 2017 17:16)  Weight (kg): 57 (13 Dec 2017 17:16)  BMI (kg/m2): 19.7 (13 Dec 2017 17:16)  BSA (m2): 1.66 (13 Dec 2017 17:16)    CENTRAL LINE: [ ] YES [ x] NO  LOCATION:   DATE INSERTED:  REMOVE: [ ] YES [ ] NO  EXPLAIN:    JACOBSON: [ ] YES [ x] NO    DATE INSERTED:  REMOVE:  [ ] YES [ ] NO  EXPLAIN:    A-LINE:  [ ] YES [x ] NO  LOCATION:   DATE INSERTED:  REMOVE:  [ ] YES [ ] NO  EXPLAIN:      ICU Vital Signs Last 24 Hrs  T(C): 37.3 (26 Dec 2017 05:00), Max: 38.1 (25 Dec 2017 20:27)  T(F): 99.2 (26 Dec 2017 05:00), Max: 100.5 (25 Dec 2017 20:27)  HR: 90 (26 Dec 2017 06:00) (80 - 100)  BP: 148/66 (26 Dec 2017 06:00) (118/51 - 176/74)  BP(mean): 87 (26 Dec 2017 06:00) (67 - 97)  ABP: --  ABP(mean): --  RR: 18 (26 Dec 2017 06:00) (16 - 27)  SpO2: 96% (26 Dec 2017 06:00) (88% - 100%)      ABG - ( 26 Dec 2017 05:00 )  pH: 7.42  /  pCO2: 30    /  pO2: 88    / HCO3: 19    / Base Excess: -4.3  /  SaO2: 95                    12-24 @ 07:01  -  12-25 @ 07:00  --------------------------------------------------------  IN: 575 mL / OUT: 0 mL / NET: 575 mL        Mode: AC/ CMV (Assist Control/ Continuous Mandatory Ventilation)  RR (machine): 15  TV (machine): 400  FiO2: 50  PEEP: 5  ITime: 1  MAP: 13  PIP: 29      PHYSICAL EXAM:    GENERAL: [x]NAD,  HEAD:  [x]Atraumatic, []Normocephalic  EYES: sluggish pupillary response bilaterally, conjunctiva and sclera clear  ENMT: on ETT tube  NECK: [x]Supple, normal appearance, []No JVD; []Normal thyroid; [x]Trachea midline  NERVOUS SYSTEM: awake, alert, nonverbal, noncommunicative  CHEST/LUNG: [x]No chest deformity; []Normal percussion bilaterally; []No rales, rhonchi, wheezing   HEART: [x]Regular rate and rhythm; []No murmurs, rubs, or gallops  ABDOMEN: [x]Soft, Nontender, Nondistended; []Bowel sounds present  EXTREMITIES:  [x]2+ Peripheral Pulses, []No clubbing, cyanosis, or edema  LYMPH: [x]No lymphadenopathy noted  SKIN: [x]No rashes or lesions; []Good capillary refill      LABS:  CBC Full  -  ( 25 Dec 2017 06:57 )  WBC Count : 19.3 K/uL  Hemoglobin : 8.2 g/dL  Hematocrit : 21.9 %  Platelet Count - Automated : 196 K/uL  Mean Cell Volume : 90.1 fl  Mean Cell Hemoglobin : 33.8 pg  Mean Cell Hemoglobin Concentration : 37.5 gm/dL  Auto Neutrophil # : 15.7 K/uL  Auto Lymphocyte # : 1.3 K/uL  Auto Monocyte # : 2.1 K/uL  Auto Eosinophil # : 0.1 K/uL  Auto Basophil # : 0.1 K/uL  Auto Neutrophil % : 81.4 %  Auto Lymphocyte % : 6.8 %  Auto Monocyte % : 10.7 %  Auto Eosinophil % : 0.6 %  Auto Basophil % : 0.5 %    12-25    145  |  106  |  112<H>  ----------------------------<  242<H>  4.9   |  22  |  9.33<H>    Ca    9.6      25 Dec 2017 06:57  Phos  8.4     12-25  Mg     3.5     12-25              RADIOLOGY & ADDITIONAL STUDIES REVIEWED:  yes    [ ]GOALS OF CARE DISCUSSION WITH PATIENT/FAMILY/PROXY:    Assessment and Plan:   · Assessment		  52 female nursing home patient with hx of DM, neuropathy, CKD, CHFpEF, GERD, osteomyelitis of jaw, hypothyroidism, hypoparathyroidism, anemia, admitted with acute on chronic renal failure requiring initiation of dialysis, acute respiratory failure requiring intubation on 12/16, pneumonia with sepsis, GI bleed, EGD showed non bleeding duodenal ulcer.  Now off pressors.   Dialysis catheter removed 12/22 due to fevers and leukocytosis     Problem/Plan - 1:  ·  Problem: Acute respiratory failure.  Plan: Inubated 12/16. Pt was hypoxic on BiPAP,  failed NIPPV support  Acute respiratory failure secondary to HAP (elevated procalcitonin) and fluid overload  -continue vent support, off propofol sedation  -wean mechanical ventilation as tolerated, daily SAT/DBT  -c/w  maxipime and  levaquin , D8  -pt need permcath  as per Dr. Escoto   -shiflip catheter removed 12/22 due to fevers and leukocytosis  -called IR, plan for permacath on Tuesday if afebrile, followed by HD  -fever 100.5 last night, given tylenol, temp 99.2 this am.       Problem/Plan - 2:  ·  Problem: R/O Upper GI bleed.  Plan: c/w protonix, no recurrent GI Bleed.  s/p 1 PRBC, hb 8.2 after transfusion  EGD showed duodenal ulcer, no active bleeding, gastritis and esophagitis.  biopsy report negative   monitor CBC  c/w protonix 40mg, daily.       Problem/Plan - 3:  ·  Problem: Hypothyroidism.  Plan: c/w home dose LT4 50 mcg daily  TSH wnl.      Problem/Plan - 4:  ·  Problem: CHF (congestive heart failure).  Plan: unknown EF  c/w home cardiac meds   TTE - RV systolic pressure is 49 mm Hg. Mild pulmonary  hypertension.      Problem/Plan - 5:  ·  Problem: CKD (chronic kidney disease).  Plan: getting HD via shiley.  -shiley was removed due to fever and leukocytosis   -pt need permcath  as per Dr. Escoto   -called IR for permacath tomorrow   -shiely catheter removed 12/22 due to fevers and leukocytosis        Problem/Plan - 6:  Problem: Diabetes mellitus. Plan: c/w Lantus 10 units daily , HSS   HbA1c 5.2.     Problem/Plan - 7:  ·  Problem: HTN (hypertension).  Plan: c/w home BP meds.      Problem/Plan - 8:  ·  Problem: Prophylactic measure.  Plan:   c/w DVT ppx with SCD , hold heparin for permacath procedure.  c/w GI ppx, on protonix          CRITICAL CARE TIME SPENT: 35 minutes INTERVAL HPI/OVERNIGHT EVENTS: pt had fever 100.5 last night, temp 99.2 this am, leukocytosis WBC 19-->22, added one dose vancomycin, repeated UA positive , will f/u repeat blood culture and urine culture.    PRESSORS: [ ] YES [ x] NO  WHICH:    Antimicrobial:  cefepime  IVPB 1000 milliGRAM(s) IV Intermittent every 24 hours  levoFLOXacin IVPB 250 milliGRAM(s) IV Intermittent every 24 hours    Cardiovascular:    Pulmonary:    Hematalogic:    Other:  acetaminophen   Tablet 650 milliGRAM(s) Oral every 6 hours PRN  acetaminophen  Suppository 650 milliGRAM(s) Rectal every 6 hours PRN  ascorbic acid 500 milliGRAM(s) Oral daily  cinacalcet 30 milliGRAM(s) Oral daily  dextrose 5%. 1000 milliLiter(s) IV Continuous <Continuous>  dextrose 50% Injectable 12.5 Gram(s) IV Push once  dextrose 50% Injectable 25 Gram(s) IV Push once  dextrose 50% Injectable 25 Gram(s) IV Push once  dextrose Gel 1 Dose(s) Oral once PRN  ferrous    sulfate Liquid 300 milliGRAM(s) Enteral Tube daily  folic acid 1 milliGRAM(s) Oral daily  glucagon  Injectable 1 milliGRAM(s) IntraMuscular once PRN  insulin lispro (HumaLOG) corrective regimen sliding scale   SubCutaneous every 6 hours  levothyroxine 50 MICROGram(s) Oral daily  pantoprazole  Injectable 40 milliGRAM(s) IV Push daily  simvastatin 20 milliGRAM(s) Oral at bedtime    acetaminophen   Tablet 650 milliGRAM(s) Oral every 6 hours PRN  acetaminophen  Suppository 650 milliGRAM(s) Rectal every 6 hours PRN  ascorbic acid 500 milliGRAM(s) Oral daily  cefepime  IVPB 1000 milliGRAM(s) IV Intermittent every 24 hours  cinacalcet 30 milliGRAM(s) Oral daily  dextrose 5%. 1000 milliLiter(s) IV Continuous <Continuous>  dextrose 50% Injectable 12.5 Gram(s) IV Push once  dextrose 50% Injectable 25 Gram(s) IV Push once  dextrose 50% Injectable 25 Gram(s) IV Push once  dextrose Gel 1 Dose(s) Oral once PRN  ferrous    sulfate Liquid 300 milliGRAM(s) Enteral Tube daily  folic acid 1 milliGRAM(s) Oral daily  glucagon  Injectable 1 milliGRAM(s) IntraMuscular once PRN  insulin lispro (HumaLOG) corrective regimen sliding scale   SubCutaneous every 6 hours  levoFLOXacin IVPB 250 milliGRAM(s) IV Intermittent every 24 hours  levothyroxine 50 MICROGram(s) Oral daily  pantoprazole  Injectable 40 milliGRAM(s) IV Push daily  simvastatin 20 milliGRAM(s) Oral at bedtime    Drug Dosing Weight  Height (cm): 170 (13 Dec 2017 17:16)  Weight (kg): 57 (13 Dec 2017 17:16)  BMI (kg/m2): 19.7 (13 Dec 2017 17:16)  BSA (m2): 1.66 (13 Dec 2017 17:16)    CENTRAL LINE: [ ] YES [ x] NO  LOCATION:   DATE INSERTED:  REMOVE: [ ] YES [ ] NO  EXPLAIN:    JACOBSON: [ ] YES [ x] NO    DATE INSERTED:  REMOVE:  [ ] YES [ ] NO  EXPLAIN:    A-LINE:  [ ] YES [x ] NO  LOCATION:   DATE INSERTED:  REMOVE:  [ ] YES [ ] NO  EXPLAIN:      ICU Vital Signs Last 24 Hrs  T(C): 37.3 (26 Dec 2017 05:00), Max: 38.1 (25 Dec 2017 20:27)  T(F): 99.2 (26 Dec 2017 05:00), Max: 100.5 (25 Dec 2017 20:27)  HR: 90 (26 Dec 2017 06:00) (80 - 100)  BP: 148/66 (26 Dec 2017 06:00) (118/51 - 176/74)  BP(mean): 87 (26 Dec 2017 06:00) (67 - 97)  ABP: --  ABP(mean): --  RR: 18 (26 Dec 2017 06:00) (16 - 27)  SpO2: 96% (26 Dec 2017 06:00) (88% - 100%)      ABG - ( 26 Dec 2017 05:00 )  pH: 7.42  /  pCO2: 30    /  pO2: 88    / HCO3: 19    / Base Excess: -4.3  /  SaO2: 95                    12-24 @ 07:01  -  12-25 @ 07:00  --------------------------------------------------------  IN: 575 mL / OUT: 0 mL / NET: 575 mL        Mode: AC/ CMV (Assist Control/ Continuous Mandatory Ventilation)  RR (machine): 15  TV (machine): 400  FiO2: 50  PEEP: 5  ITime: 1  MAP: 13  PIP: 29      PHYSICAL EXAM:    GENERAL: [x]NAD,  HEAD:  [x]Atraumatic, []Normocephalic  EYES: sluggish pupillary response bilaterally, conjunctiva and sclera clear  ENMT: on ETT tube  NECK: [x]Supple, normal appearance, []No JVD; []Normal thyroid; [x]Trachea midline  NERVOUS SYSTEM: awake, alert, nonverbal, noncommunicative  CHEST/LUNG: [x]No chest deformity; []Normal percussion bilaterally; []No rales, rhonchi, wheezing   HEART: [x]Regular rate and rhythm; []No murmurs, rubs, or gallops  ABDOMEN: [x]Soft, Nontender, Nondistended; []Bowel sounds present  EXTREMITIES:  [x]2+ Peripheral Pulses, []No clubbing, cyanosis, or edema  LYMPH: [x]No lymphadenopathy noted  SKIN: [x]No rashes or lesions; []Good capillary refill      LABS:  CBC Full  -  ( 25 Dec 2017 06:57 )  WBC Count : 19.3 K/uL  Hemoglobin : 8.2 g/dL  Hematocrit : 21.9 %  Platelet Count - Automated : 196 K/uL  Mean Cell Volume : 90.1 fl  Mean Cell Hemoglobin : 33.8 pg  Mean Cell Hemoglobin Concentration : 37.5 gm/dL  Auto Neutrophil # : 15.7 K/uL  Auto Lymphocyte # : 1.3 K/uL  Auto Monocyte # : 2.1 K/uL  Auto Eosinophil # : 0.1 K/uL  Auto Basophil # : 0.1 K/uL  Auto Neutrophil % : 81.4 %  Auto Lymphocyte % : 6.8 %  Auto Monocyte % : 10.7 %  Auto Eosinophil % : 0.6 %  Auto Basophil % : 0.5 %    12-25    145  |  106  |  112<H>  ----------------------------<  242<H>  4.9   |  22  |  9.33<H>    Ca    9.6      25 Dec 2017 06:57  Phos  8.4     12-25  Mg     3.5     12-25              RADIOLOGY & ADDITIONAL STUDIES REVIEWED:  yes    [ ]GOALS OF CARE DISCUSSION WITH PATIENT/FAMILY/PROXY:    Assessment and Plan:   · Assessment		  52 female nursing home patient with hx of DM, neuropathy, CKD, CHFpEF, GERD, osteomyelitis of jaw, hypothyroidism, hypoparathyroidism, anemia, admitted with acute on chronic renal failure requiring initiation of dialysis, acute respiratory failure requiring intubation on 12/16, pneumonia with sepsis, GI bleed, EGD showed non bleeding duodenal ulcer.  Now off pressors.   Dialysis catheter removed 12/22 due to fevers and leukocytosis     Problem/Plan - 1:  ·  Problem: Acute respiratory failure.  Plan: Inubated 12/16. Pt was hypoxic on BiPAP,  failed NIPPV support  Acute respiratory failure secondary to HAP (elevated procalcitonin) and fluid overload  -continue vent support, off propofol sedation  -wean mechanical ventilation as tolerated, daily SAT/DBT  -c/w  maxipime and  levaquin , D8  -pt need permcath  as per Dr. Escoto   -john catheter removed 12/22 due to fevers and leukocytosis  -called IR, plan for permacath on Tuesday if afebrile, followed by HD  -fever 100.5 last night, given tylenol, temp 99.2 this am.       Problem/Plan - 2:  ·  Problem: R/O Upper GI bleed.  Plan: c/w protonix, no recurrent GI Bleed.  s/p 1 PRBC, hb 8.2 after transfusion  EGD showed duodenal ulcer, no active bleeding, gastritis and esophagitis.  biopsy report negative   monitor CBC  c/w protonix 40mg, daily.       Problem/Plan - 3:  ·  Problem: Hypothyroidism.  Plan: c/w home dose LT4 50 mcg daily  TSH wnl.      Problem/Plan - 4:  ·  Problem: CHF (congestive heart failure).  Plan: unknown EF  c/w home cardiac meds   TTE - RV systolic pressure is 49 mm Hg. Mild pulmonary  hypertension.      Problem/Plan - 5:  ·  Problem: CKD (chronic kidney disease).  Plan: getting HD via shiley.  -shiley was removed due to fever and leukocytosis   -pt need permcath  as per Dr. Escoto   -called IR for permacath tomorrow   -shiely catheter removed 12/22 due to fevers and leukocytosis        Problem/Plan - 6:  Problem: Diabetes mellitus. Plan: c/w Lantus 10 units daily , HSS   HbA1c 5.2.     Problem/Plan - 7:  ·  Problem: HTN (hypertension).  Plan: c/w home BP meds.      Problem/Plan - 8:  ·  Problem: Prophylactic measure.  Plan:   c/w DVT ppx with SCD , hold heparin for permacath procedure.  c/w GI ppx, on protonix          CRITICAL CARE TIME SPENT: 35 minutes INTERVAL HPI/OVERNIGHT EVENTS: pt had fever 100.5 last night, temp 99.2 this am, leukocytosis WBC 19-->22, Pt has no jarrell, no central line/A line, no recent diarrhea. added one dose vancomycin this am, repeated UA positive, will f/u repeat blood culture and urine culture. New shiley cath placed on 12/26.     PRESSORS: [ ] YES [ x] NO  WHICH:    Antimicrobial:  cefepime  IVPB 1000 milliGRAM(s) IV Intermittent every 24 hours  levoFLOXacin IVPB 250 milliGRAM(s) IV Intermittent every 24 hours    Cardiovascular:    Pulmonary:    Hematalogic:    Other:  acetaminophen   Tablet 650 milliGRAM(s) Oral every 6 hours PRN  acetaminophen  Suppository 650 milliGRAM(s) Rectal every 6 hours PRN  ascorbic acid 500 milliGRAM(s) Oral daily  cinacalcet 30 milliGRAM(s) Oral daily  dextrose 5%. 1000 milliLiter(s) IV Continuous <Continuous>  dextrose 50% Injectable 12.5 Gram(s) IV Push once  dextrose 50% Injectable 25 Gram(s) IV Push once  dextrose 50% Injectable 25 Gram(s) IV Push once  dextrose Gel 1 Dose(s) Oral once PRN  ferrous    sulfate Liquid 300 milliGRAM(s) Enteral Tube daily  folic acid 1 milliGRAM(s) Oral daily  glucagon  Injectable 1 milliGRAM(s) IntraMuscular once PRN  insulin lispro (HumaLOG) corrective regimen sliding scale   SubCutaneous every 6 hours  levothyroxine 50 MICROGram(s) Oral daily  pantoprazole  Injectable 40 milliGRAM(s) IV Push daily  simvastatin 20 milliGRAM(s) Oral at bedtime    acetaminophen   Tablet 650 milliGRAM(s) Oral every 6 hours PRN  acetaminophen  Suppository 650 milliGRAM(s) Rectal every 6 hours PRN  ascorbic acid 500 milliGRAM(s) Oral daily  cefepime  IVPB 1000 milliGRAM(s) IV Intermittent every 24 hours  cinacalcet 30 milliGRAM(s) Oral daily  dextrose 5%. 1000 milliLiter(s) IV Continuous <Continuous>  dextrose 50% Injectable 12.5 Gram(s) IV Push once  dextrose 50% Injectable 25 Gram(s) IV Push once  dextrose 50% Injectable 25 Gram(s) IV Push once  dextrose Gel 1 Dose(s) Oral once PRN  ferrous    sulfate Liquid 300 milliGRAM(s) Enteral Tube daily  folic acid 1 milliGRAM(s) Oral daily  glucagon  Injectable 1 milliGRAM(s) IntraMuscular once PRN  insulin lispro (HumaLOG) corrective regimen sliding scale   SubCutaneous every 6 hours  levoFLOXacin IVPB 250 milliGRAM(s) IV Intermittent every 24 hours  levothyroxine 50 MICROGram(s) Oral daily  pantoprazole  Injectable 40 milliGRAM(s) IV Push daily  simvastatin 20 milliGRAM(s) Oral at bedtime    Drug Dosing Weight  Height (cm): 170 (13 Dec 2017 17:16)  Weight (kg): 57 (13 Dec 2017 17:16)  BMI (kg/m2): 19.7 (13 Dec 2017 17:16)  BSA (m2): 1.66 (13 Dec 2017 17:16)    CENTRAL LINE: [ ] YES [ x] NO  LOCATION:   DATE INSERTED:  REMOVE: [ ] YES [ ] NO  EXPLAIN:    JARRELL: [ ] YES [ x] NO    DATE INSERTED:  REMOVE:  [ ] YES [ ] NO  EXPLAIN:    A-LINE:  [ ] YES [x ] NO  LOCATION:   DATE INSERTED:  REMOVE:  [ ] YES [ ] NO  EXPLAIN:      ICU Vital Signs Last 24 Hrs  T(C): 37.3 (26 Dec 2017 05:00), Max: 38.1 (25 Dec 2017 20:27)  T(F): 99.2 (26 Dec 2017 05:00), Max: 100.5 (25 Dec 2017 20:27)  HR: 90 (26 Dec 2017 06:00) (80 - 100)  BP: 148/66 (26 Dec 2017 06:00) (118/51 - 176/74)  BP(mean): 87 (26 Dec 2017 06:00) (67 - 97)  ABP: --  ABP(mean): --  RR: 18 (26 Dec 2017 06:00) (16 - 27)  SpO2: 96% (26 Dec 2017 06:00) (88% - 100%)      ABG - ( 26 Dec 2017 05:00 )  pH: 7.42  /  pCO2: 30    /  pO2: 88    / HCO3: 19    / Base Excess: -4.3  /  SaO2: 95                    12-24 @ 07:01  -  12-25 @ 07:00  --------------------------------------------------------  IN: 575 mL / OUT: 0 mL / NET: 575 mL        Mode: AC/ CMV (Assist Control/ Continuous Mandatory Ventilation)  RR (machine): 15  TV (machine): 400  FiO2: 50  PEEP: 5  ITime: 1  MAP: 13  PIP: 29      PHYSICAL EXAM:    GENERAL: [x]NAD,  HEAD:  [x]Atraumatic, []Normocephalic  EYES: sluggish pupillary response bilaterally, conjunctiva and sclera clear  ENMT: on ETT tube  NECK: [x]Supple, normal appearance, []No JVD; []Normal thyroid; [x]Trachea midline  NERVOUS SYSTEM: awake, alert, nonverbal, noncommunicative  CHEST/LUNG: [x]No chest deformity; []Normal percussion bilaterally; []No rales, rhonchi, wheezing   HEART: [x]Regular rate and rhythm; []No murmurs, rubs, or gallops  ABDOMEN: [x]Soft, Nontender, Nondistended; []Bowel sounds present  EXTREMITIES:  [x]2+ Peripheral Pulses, []No clubbing, cyanosis, or edema  LYMPH: [x]No lymphadenopathy noted  SKIN: [x]No rashes or lesions; []Good capillary refill      LABS:  CBC Full  -  ( 25 Dec 2017 06:57 )  WBC Count : 19.3 K/uL  Hemoglobin : 8.2 g/dL  Hematocrit : 21.9 %  Platelet Count - Automated : 196 K/uL  Mean Cell Volume : 90.1 fl  Mean Cell Hemoglobin : 33.8 pg  Mean Cell Hemoglobin Concentration : 37.5 gm/dL  Auto Neutrophil # : 15.7 K/uL  Auto Lymphocyte # : 1.3 K/uL  Auto Monocyte # : 2.1 K/uL  Auto Eosinophil # : 0.1 K/uL  Auto Basophil # : 0.1 K/uL  Auto Neutrophil % : 81.4 %  Auto Lymphocyte % : 6.8 %  Auto Monocyte % : 10.7 %  Auto Eosinophil % : 0.6 %  Auto Basophil % : 0.5 %    12-25    145  |  106  |  112<H>  ----------------------------<  242<H>  4.9   |  22  |  9.33<H>    Ca    9.6      25 Dec 2017 06:57  Phos  8.4     12-25  Mg     3.5     12-25              RADIOLOGY & ADDITIONAL STUDIES REVIEWED:  yes    [ ]GOALS OF CARE DISCUSSION WITH PATIENT/FAMILY/PROXY:    Assessment and Plan:   · Assessment		  52 female nursing home patient with hx of DM, neuropathy, CKD, CHFpEF, GERD, osteomyelitis of jaw, hypothyroidism, hypoparathyroidism, anemia, admitted with acute on chronic renal failure requiring initiation of dialysis, acute respiratory failure requiring intubation on 12/16, pneumonia with sepsis, GI bleed, EGD showed non bleeding duodenal ulcer.  Now off pressors.   Dialysis catheter removed 12/22 due to fevers and leukocytosis     Problem/Plan - 1:  ·  Problem: Acute respiratory failure.  Plan: Inubated 12/16. Pt was hypoxic on BiPAP,  failed NIPPV support  Acute respiratory failure secondary to HAP (elevated procalcitonin) and fluid overload  -continue vent support, off propofol sedation  -wean mechanical ventilation as tolerated, daily SAT/DBT  -c/w  maxipime and  levaquin , D8  -pt need permcath  as per Dr. Escoto   -shiley catheter removed 12/22 due to fevers and leukocytosis  -pt had fever 100.5 last night, temp 99.2 this am, leukocytosis WBC 19-->22, Pt has no jarrell, no central line/A line, no recent diarrhea. added one dose vancomycin this am, repeated UA positive, will f/u repeat blood culture and urine culture.   -New shiley cath placed on 12/26.            Problem/Plan - 2:  ·  Problem: R/O Upper GI bleed.  Plan: c/w protonix, no recurrent GI Bleed.  s/p 1 PRBC, hb 8.2 after transfusion  EGD showed duodenal ulcer, no active bleeding, gastritis and esophagitis.  biopsy report negative   monitor CBC  c/w protonix 40mg, daily.       Problem/Plan - 3:  ·  Problem: Hypothyroidism.  Plan: c/w home dose LT4 50 mcg daily  TSH wnl.      Problem/Plan - 4:  ·  Problem: CHF (congestive heart failure).  Plan: unknown EF  c/w home cardiac meds   TTE - RV systolic pressure is 49 mm Hg. Mild pulmonary  hypertension.      Problem/Plan - 5:  ·  Problem: CKD (chronic kidney disease).  Plan: getting HD via shiley.  -shiley was removed due to fever and leukocytosis   -pt need permcath  as per Dr. Escoto   -called IR for permacath tomorrow   -shiely catheter removed 12/22 due to fevers and leukocytosis        Problem/Plan - 6:  Problem: Diabetes mellitus. Plan: c/w Lantus 10 units daily , HSS   HbA1c 5.2.     Problem/Plan - 7:  ·  Problem: HTN (hypertension).  Plan: c/w home BP meds.      Problem/Plan - 8:  ·  Problem: Prophylactic measure.  Plan:   c/w DVT ppx with SCD , hold heparin for permacath procedure.  c/w GI ppx, on protonix          CRITICAL CARE TIME SPENT: 35 minutes INTERVAL HPI/OVERNIGHT EVENTS: pt had fever 100.5 last night, temp 99.2 this am, leukocytosis WBC 19-->22, Pt has no jarrell, no central line/A line, no recent diarrhea. added one dose vancomycin this am, repeated UA positive, will f/u repeat blood culture and urine culture. New shiley cath placed on 12/26.   pt will get HD later today.      PRESSORS: [ ] YES [ x] NO  WHICH:    Antimicrobial:  cefepime  IVPB 1000 milliGRAM(s) IV Intermittent every 24 hours  levoFLOXacin IVPB 250 milliGRAM(s) IV Intermittent every 24 hours    Cardiovascular:    Pulmonary:    Hematalogic:    Other:  acetaminophen   Tablet 650 milliGRAM(s) Oral every 6 hours PRN  acetaminophen  Suppository 650 milliGRAM(s) Rectal every 6 hours PRN  ascorbic acid 500 milliGRAM(s) Oral daily  cinacalcet 30 milliGRAM(s) Oral daily  dextrose 5%. 1000 milliLiter(s) IV Continuous <Continuous>  dextrose 50% Injectable 12.5 Gram(s) IV Push once  dextrose 50% Injectable 25 Gram(s) IV Push once  dextrose 50% Injectable 25 Gram(s) IV Push once  dextrose Gel 1 Dose(s) Oral once PRN  ferrous    sulfate Liquid 300 milliGRAM(s) Enteral Tube daily  folic acid 1 milliGRAM(s) Oral daily  glucagon  Injectable 1 milliGRAM(s) IntraMuscular once PRN  insulin lispro (HumaLOG) corrective regimen sliding scale   SubCutaneous every 6 hours  levothyroxine 50 MICROGram(s) Oral daily  pantoprazole  Injectable 40 milliGRAM(s) IV Push daily  simvastatin 20 milliGRAM(s) Oral at bedtime    acetaminophen   Tablet 650 milliGRAM(s) Oral every 6 hours PRN  acetaminophen  Suppository 650 milliGRAM(s) Rectal every 6 hours PRN  ascorbic acid 500 milliGRAM(s) Oral daily  cefepime  IVPB 1000 milliGRAM(s) IV Intermittent every 24 hours  cinacalcet 30 milliGRAM(s) Oral daily  dextrose 5%. 1000 milliLiter(s) IV Continuous <Continuous>  dextrose 50% Injectable 12.5 Gram(s) IV Push once  dextrose 50% Injectable 25 Gram(s) IV Push once  dextrose 50% Injectable 25 Gram(s) IV Push once  dextrose Gel 1 Dose(s) Oral once PRN  ferrous    sulfate Liquid 300 milliGRAM(s) Enteral Tube daily  folic acid 1 milliGRAM(s) Oral daily  glucagon  Injectable 1 milliGRAM(s) IntraMuscular once PRN  insulin lispro (HumaLOG) corrective regimen sliding scale   SubCutaneous every 6 hours  levoFLOXacin IVPB 250 milliGRAM(s) IV Intermittent every 24 hours  levothyroxine 50 MICROGram(s) Oral daily  pantoprazole  Injectable 40 milliGRAM(s) IV Push daily  simvastatin 20 milliGRAM(s) Oral at bedtime    Drug Dosing Weight  Height (cm): 170 (13 Dec 2017 17:16)  Weight (kg): 57 (13 Dec 2017 17:16)  BMI (kg/m2): 19.7 (13 Dec 2017 17:16)  BSA (m2): 1.66 (13 Dec 2017 17:16)    CENTRAL LINE: [ ] YES [ x] NO  LOCATION:   DATE INSERTED:  REMOVE: [ ] YES [ ] NO  EXPLAIN:    JARRELL: [ ] YES [ x] NO    DATE INSERTED:  REMOVE:  [ ] YES [ ] NO  EXPLAIN:    A-LINE:  [ ] YES [x ] NO  LOCATION:   DATE INSERTED:  REMOVE:  [ ] YES [ ] NO  EXPLAIN:      ICU Vital Signs Last 24 Hrs  T(C): 37.3 (26 Dec 2017 05:00), Max: 38.1 (25 Dec 2017 20:27)  T(F): 99.2 (26 Dec 2017 05:00), Max: 100.5 (25 Dec 2017 20:27)  HR: 90 (26 Dec 2017 06:00) (80 - 100)  BP: 148/66 (26 Dec 2017 06:00) (118/51 - 176/74)  BP(mean): 87 (26 Dec 2017 06:00) (67 - 97)  ABP: --  ABP(mean): --  RR: 18 (26 Dec 2017 06:00) (16 - 27)  SpO2: 96% (26 Dec 2017 06:00) (88% - 100%)      ABG - ( 26 Dec 2017 05:00 )  pH: 7.42  /  pCO2: 30    /  pO2: 88    / HCO3: 19    / Base Excess: -4.3  /  SaO2: 95                    12-24 @ 07:01  -  12-25 @ 07:00  --------------------------------------------------------  IN: 575 mL / OUT: 0 mL / NET: 575 mL        Mode: AC/ CMV (Assist Control/ Continuous Mandatory Ventilation)  RR (machine): 15  TV (machine): 400  FiO2: 50  PEEP: 5  ITime: 1  MAP: 13  PIP: 29      PHYSICAL EXAM:    GENERAL: [x]NAD,  HEAD:  [x]Atraumatic, []Normocephalic  EYES: sluggish pupillary response bilaterally, conjunctiva and sclera clear  ENMT: on ETT tube  NECK: [x]Supple, normal appearance, []No JVD; []Normal thyroid; [x]Trachea midline  NERVOUS SYSTEM: awake, alert, nonverbal, noncommunicative  CHEST/LUNG: [x]No chest deformity; []Normal percussion bilaterally; []No rales, rhonchi, wheezing   HEART: [x]Regular rate and rhythm; []No murmurs, rubs, or gallops  ABDOMEN: [x]Soft, Nontender, Nondistended; []Bowel sounds present  EXTREMITIES:  [x]2+ Peripheral Pulses, []No clubbing, cyanosis, or edema  LYMPH: [x]No lymphadenopathy noted  SKIN: [x]No rashes or lesions; []Good capillary refill      LABS:  CBC Full  -  ( 25 Dec 2017 06:57 )  WBC Count : 19.3 K/uL  Hemoglobin : 8.2 g/dL  Hematocrit : 21.9 %  Platelet Count - Automated : 196 K/uL  Mean Cell Volume : 90.1 fl  Mean Cell Hemoglobin : 33.8 pg  Mean Cell Hemoglobin Concentration : 37.5 gm/dL  Auto Neutrophil # : 15.7 K/uL  Auto Lymphocyte # : 1.3 K/uL  Auto Monocyte # : 2.1 K/uL  Auto Eosinophil # : 0.1 K/uL  Auto Basophil # : 0.1 K/uL  Auto Neutrophil % : 81.4 %  Auto Lymphocyte % : 6.8 %  Auto Monocyte % : 10.7 %  Auto Eosinophil % : 0.6 %  Auto Basophil % : 0.5 %    12-25    145  |  106  |  112<H>  ----------------------------<  242<H>  4.9   |  22  |  9.33<H>    Ca    9.6      25 Dec 2017 06:57  Phos  8.4     12-25  Mg     3.5     12-25              RADIOLOGY & ADDITIONAL STUDIES REVIEWED:  yes    [ ]GOALS OF CARE DISCUSSION WITH PATIENT/FAMILY/PROXY:    Assessment and Plan:   · Assessment		  52 female nursing home patient with hx of DM, neuropathy, CKD, CHFpEF, GERD, osteomyelitis of jaw, hypothyroidism, hypoparathyroidism, anemia, admitted with acute on chronic renal failure requiring initiation of dialysis, acute respiratory failure requiring intubation on 12/16, pneumonia with sepsis, GI bleed, EGD showed non bleeding duodenal ulcer.  Now off pressors.   Dialysis catheter removed 12/22 due to fevers and leukocytosis     Problem/Plan - 1:  ·  Problem: Acute respiratory failure.  Plan: Inubated 12/16. Pt was hypoxic on BiPAP,  failed NIPPV support  Acute respiratory failure secondary to HAP (elevated procalcitonin) and fluid overload  -continue vent support, off propofol sedation  -wean mechanical ventilation as tolerated, daily SAT/DBT  -c/w  maxipime and  levaquin , D8  -pt need permcath  as per Dr. Escoto   -shiley catheter removed 12/22 due to fevers and leukocytosis  -pt had fever 100.5 last night, temp 99.2 this am, leukocytosis WBC 19-->22, Pt has no jarrell, no central line/A line, no recent diarrhea. added one dose vancomycin this am, repeated UA positive, will f/u repeat blood culture and urine culture.  -New shiley cath placed on 12/26.   -pt will get HD later today.           Problem/Plan - 2:  ·  Problem: R/O Upper GI bleed.  Plan: c/w protonix, no recurrent GI Bleed.  s/p 1 PRBC, hb 8.2 after transfusion  EGD showed duodenal ulcer, no active bleeding, gastritis and esophagitis.  biopsy report negative   monitor CBC  c/w protonix 40mg, daily.       Problem/Plan - 3:  ·  Problem: Hypothyroidism.  Plan: c/w home dose LT4 50 mcg daily  TSH wnl.      Problem/Plan - 4:  ·  Problem: CHF (congestive heart failure).  Plan: unknown EF  c/w home cardiac meds   TTE - RV systolic pressure is 49 mm Hg. Mild pulmonary  hypertension.      Problem/Plan - 5:  ·  Problem: CKD (chronic kidney disease).  Plan: getting HD via shiley.  -shiley was removed due to fever and leukocytosis   -pt need permcath  as per Dr. Escoto   -called IR for permacath tomorrow   -shiely catheter removed 12/22 due to fevers and leukocytosis        Problem/Plan - 6:  Problem: Diabetes mellitus. Plan: c/w Lantus 10 units daily , HSS   HbA1c 5.2.     Problem/Plan - 7:  ·  Problem: HTN (hypertension).  Plan: c/w home BP meds.      Problem/Plan - 8:  ·  Problem: Prophylactic measure.  Plan:   c/w DVT ppx with SCD , hold heparin for permacath procedure.  c/w GI ppx, on protonix          CRITICAL CARE TIME SPENT: 35 minutes

## 2017-12-26 NOTE — PROGRESS NOTE ADULT - SUBJECTIVE AND OBJECTIVE BOX
currently getting shiley. febrile overnight.    PAST MEDICAL & SURGICAL HISTORY:  Clostridium difficile infection  Osteomyelitis of jaw  Parathyroid adenoma  Hypothyroidism  CKD (chronic kidney disease)  Anemia  CHF (congestive heart failure)  Diabetic neuropathy  Diabetes mellitus  HTN (hypertension)  S/P :   No Past Surgical History    No Known Allergies    Home Medications Reviewed  Hospital Medications:   MEDICATIONS  (STANDING):  ascorbic acid 500 milliGRAM(s) Oral daily  cefepime  IVPB 1000 milliGRAM(s) IV Intermittent every 24 hours  cinacalcet 30 milliGRAM(s) Oral daily  dextrose 5%. 1000 milliLiter(s) (50 mL/Hr) IV Continuous <Continuous>  dextrose 50% Injectable 12.5 Gram(s) IV Push once  dextrose 50% Injectable 25 Gram(s) IV Push once  dextrose 50% Injectable 25 Gram(s) IV Push once  epoetin jacqueline Injectable 42308 Unit(s) IV Push <User Schedule>  ferrous    sulfate Liquid 300 milliGRAM(s) Enteral Tube daily  folic acid 1 milliGRAM(s) Oral daily  insulin lispro (HumaLOG) corrective regimen sliding scale   SubCutaneous every 6 hours  levoFLOXacin IVPB 250 milliGRAM(s) IV Intermittent every 24 hours  levothyroxine 50 MICROGram(s) Oral daily  midazolam Injectable 2 milliGRAM(s) IV Push once  pantoprazole  Injectable 40 milliGRAM(s) IV Push daily  simvastatin 20 milliGRAM(s) Oral at bedtime  vancomycin  IVPB 500 milliGRAM(s) IV Intermittent once    SOCIAL HISTORY:  Denies ETOh,Smoking,   FAMILY HISTORY:  Family history of stroke  Family history of hypertension (Sibling)  Family history of diabetes mellitus        VITALS:  T(F): 99.1 (17 @ 08:00), Max: 100.5 (17 @ 20:27)  HR: 85 (17 @ 12:08)  BP: 159/68 (17 @ 12:00)  RR: 24 (17 @ 12:00)  SpO2: 98% (17 @ 12:08)  Wt(kg): --     @ 07:01  -   @ 07:00  --------------------------------------------------------  IN: 500 mL / OUT: 0 mL / NET: 500 mL        PHYSICAL EXAM:  Constitutional: ETT  HEENT: anicteric sclera, oropharynx clear.  Neck: No JVD  Respiratory: CTAB, no wheezes, rales or rhonchi  Cardiovascular: S1, S2, RRR  Gastrointestinal: BS+, soft, NT/ND  Extremities:  No peripheral edema  Neurological: no focal deficits  : No CVA tenderness. no jarrell.       LABS:      146<H>  |  107  |  133<H>  ----------------------------<  111<H>  5.5<H>   |  18<L>  |  10.30<H>    Ca    9.5      26 Dec 2017 06:44  Phos  8.9       Mg     3.4           Creatinine Trend: 10.30 <--, 9.33 <--, 7.87 <--, 6.22 <--, 6.74 <--, 5.18 <--, 5.60 <--                        7.3    22.2  )-----------( 221      ( 26 Dec 2017 08:49 )             23.8     Urine Studies:  Urinalysis Basic - ( 26 Dec 2017 11:29 )    Color: Yellow / Appearance: Slightly Turbid / S.015 / pH:   Gluc:  / Ketone: Negative  / Bili: Negative / Urobili: Negative   Blood:  / Protein: 100 / Nitrite: Negative   Leuk Esterase: Moderate / RBC: 5-10 /HPF / WBC >50 /HPF   Sq Epi:  / Non Sq Epi: Few /HPF / Bacteria: Trace /HPF        RADIOLOGY & ADDITIONAL STUDIES:

## 2017-12-26 NOTE — PROGRESS NOTE ADULT - SUBJECTIVE AND OBJECTIVE BOX
52y Female    Meds:  cefepime  IVPB 1000 milliGRAM(s) IV Intermittent every 24 hours  levoFLOXacin IVPB 250 milliGRAM(s) IV Intermittent every 24 hours    Allergies    No Known Allergies    Intolerances        VITALS:  Vital Signs Last 24 Hrs  T(C): 36.9 (26 Dec 2017 14:02), Max: 38.1 (25 Dec 2017 20:27)  T(F): 98.4 (26 Dec 2017 14:02), Max: 100.5 (25 Dec 2017 20:27)  HR: 107 (26 Dec 2017 18:00) (78 - 116)  BP: 108/50 (26 Dec 2017 18:00) (108/50 - 159/68)  BP(mean): 65 (26 Dec 2017 18:00) (65 - 92)  RR: 24 (26 Dec 2017 18:00) (17 - 29)  SpO2: 92% (26 Dec 2017 18:00) (78% - 100%)    LABS/DIAGNOSTIC TESTS:                          7.3    22.2  )-----------( 221      ( 26 Dec 2017 08:49 )             23.8         12-26    146<H>  |  107  |  133<H>  ----------------------------<  111<H>  5.5<H>   |  18<L>  |  10.30<H>    Ca    9.5      26 Dec 2017 06:44  Phos  8.9     12-26  Mg     3.4     12-26            CULTURES: .Blood Blood-Peripheral  12-21 @ 18:39   No growth to date.  --  --      .Sputum Sputum  12-18 @ 21:43   Normal Respiratory Liz present  --    Few Squamous epithelial cells per low power field  Few polymorphonuclear leukocytes per low power field  Rare Gram variable coccobacilli   per oil power field      .Blood rec'd 2 anaerobic bottles for this accession #  12-16 @ 00:53   No growth at 5 days.  --  --      .Blood Blood-Peripheral  12-15 @ 23:09   No growth at 5 days.  --  --      .Urine Clean Catch (Midstream)  12-13 @ 23:18   No growth  --  --      .Blood Blood-Peripheral  12-13 @ 17:41   No growth at 5 days.  --  --      .Blood Blood-Peripheral  12-13 @ 17:40   No growth at 5 days.  --  --            RADIOLOGY:      ROS:  [  ] UNABLE TO ELICIT 52y Female who remains in the ICU , intubated on a vent, she is unresponsive at this time, she had a low grade temp to 100.5 today and her wbc count is still high. she had a new left groin shiley placed today as her creatinine continues to increase. she was given vanco 500mgs iv x 1 dose by ICU team today as she had a fever.    Meds:  cefepime  IVPB 1000 milliGRAM(s) IV Intermittent every 24 hours  levoFLOXacin IVPB 250 milliGRAM(s) IV Intermittent every 24 hours    Allergies    No Known Allergies    Intolerances        VITALS:  Vital Signs Last 24 Hrs  T(C): 36.9 (26 Dec 2017 14:02), Max: 38.1 (25 Dec 2017 20:27)  T(F): 98.4 (26 Dec 2017 14:02), Max: 100.5 (25 Dec 2017 20:27)  HR: 107 (26 Dec 2017 18:00) (78 - 116)  BP: 108/50 (26 Dec 2017 18:00) (108/50 - 159/68)  BP(mean): 65 (26 Dec 2017 18:00) (65 - 92)  RR: 24 (26 Dec 2017 18:00) (17 - 29)  SpO2: 92% (26 Dec 2017 18:00) (78% - 100%)    LABS/DIAGNOSTIC TESTS:                          7.3    22.2  )-----------( 221      ( 26 Dec 2017 08:49 )             23.8         12-26    146<H>  |  107  |  133<H>  ----------------------------<  111<H>  5.5<H>   |  18<L>  |  10.30<H>    Ca    9.5      26 Dec 2017 06:44  Phos  8.9     12-26  Mg     3.4     12-26            CULTURES: .Blood Blood-Peripheral  12-21 @ 18:39   No growth to date.  --  --      .Sputum Sputum  12-18 @ 21:43   Normal Respiratory Liz present  --    Few Squamous epithelial cells per low power field  Few polymorphonuclear leukocytes per low power field  Rare Gram variable coccobacilli   per oil power field      .Blood rec'd 2 anaerobic bottles for this accession #  12-16 @ 00:53   No growth at 5 days.  --  --      .Blood Blood-Peripheral  12-15 @ 23:09   No growth at 5 days.  --  --      .Urine Clean Catch (Midstream)  12-13 @ 23:18   No growth  --  --      .Blood Blood-Peripheral  12-13 @ 17:41   No growth at 5 days.  --  --      .Blood Blood-Peripheral  12-13 @ 17:40   No growth at 5 days.  --  --            RADIOLOGY:< from: Xray Chest 1 View AP -PORTABLE-Routine (12.26.17 @ 09:56) >    EXAM:  CHEST PORTABLE ROUTINE                            PROCEDURE DATE:  12/26/2017          INTERPRETATION:  Portable chest x-ray    Indication: Dyspnea.    Portable chest x-ray is compared to a previous examination dated   12/25/2017.    Impression: Stable ET tube and NG tube.    Grossly stable mild central pulmonary vascular congestion; underlying   pulmonary infiltrates cannot be excluded.    No evidence for pleural effusion or pneumothorax.    The trachea is midline.    Stable cardiac silhouette.      < end of copied text >        ROS:  [ x ] UNABLE TO ELICIT

## 2017-12-26 NOTE — PROGRESS NOTE ADULT - PROBLEM SELECTOR PLAN 1
ventilator support  F/u ABGs  Bronchodilators neb  Aspiration precautions  Pulmonary toilet  Nutrition  Follow up CXR  Decubitus prevention  Wean as tolerated  Trial of steroids

## 2017-12-27 LAB
ANION GAP SERPL CALC-SCNC: 17 MMOL/L — SIGNIFICANT CHANGE UP (ref 5–17)
BASE EXCESS BLDA CALC-SCNC: -1.2 MMOL/L — SIGNIFICANT CHANGE UP (ref -2–2)
BLOOD GAS COMMENTS ARTERIAL: SIGNIFICANT CHANGE UP
BUN SERPL-MCNC: 93 MG/DL — HIGH (ref 7–18)
CALCIUM SERPL-MCNC: 8.7 MG/DL — SIGNIFICANT CHANGE UP (ref 8.4–10.5)
CHLORIDE SERPL-SCNC: 106 MMOL/L — SIGNIFICANT CHANGE UP (ref 96–108)
CO2 SERPL-SCNC: 22 MMOL/L — SIGNIFICANT CHANGE UP (ref 22–31)
CREAT SERPL-MCNC: 7.71 MG/DL — HIGH (ref 0.5–1.3)
CULTURE RESULTS: NO GROWTH — SIGNIFICANT CHANGE UP
GLUCOSE BLDC GLUCOMTR-MCNC: 140 MG/DL — HIGH (ref 70–99)
GLUCOSE BLDC GLUCOMTR-MCNC: 146 MG/DL — HIGH (ref 70–99)
GLUCOSE BLDC GLUCOMTR-MCNC: 157 MG/DL — HIGH (ref 70–99)
GLUCOSE BLDC GLUCOMTR-MCNC: 173 MG/DL — HIGH (ref 70–99)
GLUCOSE BLDC GLUCOMTR-MCNC: 240 MG/DL — HIGH (ref 70–99)
GLUCOSE SERPL-MCNC: 266 MG/DL — HIGH (ref 70–99)
HCO3 BLDA-SCNC: 22 MMOL/L — LOW (ref 23–27)
HCT VFR BLD CALC: 28.2 % — LOW (ref 34.5–45)
HGB BLD-MCNC: 9.1 G/DL — LOW (ref 11.5–15.5)
HOROWITZ INDEX BLDA+IHG-RTO: 40 — SIGNIFICANT CHANGE UP
LYMPHOCYTES # BLD AUTO: 5 % — LOW (ref 13–44)
MAGNESIUM SERPL-MCNC: 3 MG/DL — HIGH (ref 1.6–2.6)
MCHC RBC-ENTMCNC: 29.3 PG — SIGNIFICANT CHANGE UP (ref 27–34)
MCHC RBC-ENTMCNC: 32.2 GM/DL — SIGNIFICANT CHANGE UP (ref 32–36)
MCV RBC AUTO: 91 FL — SIGNIFICANT CHANGE UP (ref 80–100)
MONOCYTES NFR BLD AUTO: 9 % — SIGNIFICANT CHANGE UP (ref 2–14)
NEUTROPHILS NFR BLD AUTO: 75 % — SIGNIFICANT CHANGE UP (ref 43–77)
PCO2 BLDA: 32 MMHG — SIGNIFICANT CHANGE UP (ref 32–46)
PH BLDA: 7.45 — SIGNIFICANT CHANGE UP (ref 7.35–7.45)
PHOSPHATE SERPL-MCNC: 8.5 MG/DL — HIGH (ref 2.5–4.5)
PLATELET # BLD AUTO: 222 K/UL — SIGNIFICANT CHANGE UP (ref 150–400)
PO2 BLDA: 82 MMHG — SIGNIFICANT CHANGE UP (ref 74–108)
POTASSIUM SERPL-MCNC: 4.5 MMOL/L — SIGNIFICANT CHANGE UP (ref 3.5–5.3)
POTASSIUM SERPL-SCNC: 4.5 MMOL/L — SIGNIFICANT CHANGE UP (ref 3.5–5.3)
RBC # BLD: 3.1 M/UL — LOW (ref 3.8–5.2)
RBC # FLD: 14.5 % — SIGNIFICANT CHANGE UP (ref 10.3–14.5)
SAO2 % BLDA: 95 % — SIGNIFICANT CHANGE UP (ref 92–96)
SODIUM SERPL-SCNC: 145 MMOL/L — SIGNIFICANT CHANGE UP (ref 135–145)
SPECIMEN SOURCE: SIGNIFICANT CHANGE UP
WBC # BLD: 26.3 K/UL — HIGH (ref 3.8–10.5)
WBC # FLD AUTO: 26.3 K/UL — HIGH (ref 3.8–10.5)

## 2017-12-27 PROCEDURE — 71010: CPT | Mod: 26

## 2017-12-27 RX ORDER — ERYTHROPOIETIN 10000 [IU]/ML
4000 INJECTION, SOLUTION INTRAVENOUS; SUBCUTANEOUS ONCE
Qty: 0 | Refills: 0 | Status: COMPLETED | OUTPATIENT
Start: 2017-12-27 | End: 2017-12-27

## 2017-12-27 RX ORDER — HEPARIN SODIUM 5000 [USP'U]/ML
5000 INJECTION INTRAVENOUS; SUBCUTANEOUS EVERY 12 HOURS
Qty: 0 | Refills: 0 | Status: DISCONTINUED | OUTPATIENT
Start: 2017-12-27 | End: 2017-12-29

## 2017-12-27 RX ORDER — CHLORHEXIDINE GLUCONATE 213 G/1000ML
1 SOLUTION TOPICAL DAILY
Qty: 0 | Refills: 0 | Status: DISCONTINUED | OUTPATIENT
Start: 2017-12-27 | End: 2018-01-01

## 2017-12-27 RX ADMIN — Medication 50 MICROGRAM(S): at 06:11

## 2017-12-27 RX ADMIN — CEFEPIME 100 MILLIGRAM(S): 1 INJECTION, POWDER, FOR SOLUTION INTRAMUSCULAR; INTRAVENOUS at 17:17

## 2017-12-27 RX ADMIN — PANTOPRAZOLE SODIUM 40 MILLIGRAM(S): 20 TABLET, DELAYED RELEASE ORAL at 14:22

## 2017-12-27 RX ADMIN — HEPARIN SODIUM 5000 UNIT(S): 5000 INJECTION INTRAVENOUS; SUBCUTANEOUS at 17:17

## 2017-12-27 RX ADMIN — Medication 500 MILLIGRAM(S): at 14:21

## 2017-12-27 RX ADMIN — Medication 1 MILLIGRAM(S): at 14:22

## 2017-12-27 RX ADMIN — Medication 1: at 00:35

## 2017-12-27 RX ADMIN — ERYTHROPOIETIN 4000 UNIT(S): 10000 INJECTION, SOLUTION INTRAVENOUS; SUBCUTANEOUS at 12:50

## 2017-12-27 RX ADMIN — CINACALCET 30 MILLIGRAM(S): 30 TABLET, FILM COATED ORAL at 14:21

## 2017-12-27 RX ADMIN — Medication 300 MILLIGRAM(S): at 14:21

## 2017-12-27 RX ADMIN — Medication 2: at 06:11

## 2017-12-27 NOTE — PROGRESS NOTE ADULT - PROBLEM SELECTOR PLAN 9
DVT and Stress Ulcer PPX

## 2017-12-27 NOTE — PROGRESS NOTE ADULT - SUBJECTIVE AND OBJECTIVE BOX
INTERVAL HPI/OVERNIGHT EVENTS: pt had mild fever 100.1 overnight. no new overnight events.    PRESSORS: [ ] YES [x ] NO  WHICH:    Antimicrobial:  cefepime  IVPB 1000 milliGRAM(s) IV Intermittent every 24 hours  levoFLOXacin IVPB 250 milliGRAM(s) IV Intermittent every 24 hours    Cardiovascular:    Pulmonary:    Hematalogic:    Other:  acetaminophen   Tablet 650 milliGRAM(s) Oral every 6 hours PRN  acetaminophen  Suppository 650 milliGRAM(s) Rectal every 6 hours PRN  ascorbic acid 500 milliGRAM(s) Oral daily  cinacalcet 30 milliGRAM(s) Oral daily  dextrose 5%. 1000 milliLiter(s) IV Continuous <Continuous>  dextrose 50% Injectable 12.5 Gram(s) IV Push once  dextrose 50% Injectable 25 Gram(s) IV Push once  dextrose 50% Injectable 25 Gram(s) IV Push once  dextrose Gel 1 Dose(s) Oral once PRN  ferrous    sulfate Liquid 300 milliGRAM(s) Enteral Tube daily  folic acid 1 milliGRAM(s) Oral daily  glucagon  Injectable 1 milliGRAM(s) IntraMuscular once PRN  insulin lispro (HumaLOG) corrective regimen sliding scale   SubCutaneous every 6 hours  levothyroxine 50 MICROGram(s) Oral daily  pantoprazole  Injectable 40 milliGRAM(s) IV Push daily  simvastatin 20 milliGRAM(s) Oral at bedtime    acetaminophen   Tablet 650 milliGRAM(s) Oral every 6 hours PRN  acetaminophen  Suppository 650 milliGRAM(s) Rectal every 6 hours PRN  ascorbic acid 500 milliGRAM(s) Oral daily  cefepime  IVPB 1000 milliGRAM(s) IV Intermittent every 24 hours  cinacalcet 30 milliGRAM(s) Oral daily  dextrose 5%. 1000 milliLiter(s) IV Continuous <Continuous>  dextrose 50% Injectable 12.5 Gram(s) IV Push once  dextrose 50% Injectable 25 Gram(s) IV Push once  dextrose 50% Injectable 25 Gram(s) IV Push once  dextrose Gel 1 Dose(s) Oral once PRN  ferrous    sulfate Liquid 300 milliGRAM(s) Enteral Tube daily  folic acid 1 milliGRAM(s) Oral daily  glucagon  Injectable 1 milliGRAM(s) IntraMuscular once PRN  insulin lispro (HumaLOG) corrective regimen sliding scale   SubCutaneous every 6 hours  levoFLOXacin IVPB 250 milliGRAM(s) IV Intermittent every 24 hours  levothyroxine 50 MICROGram(s) Oral daily  pantoprazole  Injectable 40 milliGRAM(s) IV Push daily  simvastatin 20 milliGRAM(s) Oral at bedtime    Drug Dosing Weight  Height (cm): 170 (13 Dec 2017 17:16)  Weight (kg): 57 (13 Dec 2017 17:16)  BMI (kg/m2): 19.7 (13 Dec 2017 17:16)  BSA (m2): 1.66 (13 Dec 2017 17:16)    CENTRAL LINE: [ x] YES [ ] NO  LOCATION:   DATE INSERTED:  REMOVE: [ ] YES [ ] NO  EXPLAIN:    JARRELL: [ ] YES [x ] NO    DATE INSERTED:  REMOVE:  [ ] YES [ ] NO  EXPLAIN:    A-LINE:  [ ] YES [ x] NO  LOCATION:   DATE INSERTED:  REMOVE:  [ ] YES [ ] NO  EXPLAIN:    ICU Vital Signs Last 24 Hrs  T(C): 37.2 (27 Dec 2017 05:23), Max: 37.8 (26 Dec 2017 21:00)  T(F): 99 (27 Dec 2017 05:23), Max: 100.1 (27 Dec 2017 00:00)  HR: 88 (27 Dec 2017 06:00) (78 - 118)  BP: 128/61 (27 Dec 2017 06:00) (89/55 - 159/68)  BP(mean): 77 (27 Dec 2017 06:00) (60 - 92)  ABP: --  ABP(mean): --  RR: 17 (27 Dec 2017 06:00) (17 - 29)  SpO2: 98% (27 Dec 2017 06:00) (78% - 100%)      ABG - ( 27 Dec 2017 04:42 )  pH: 7.45  /  pCO2: 32    /  pO2: 82    / HCO3: 22    / Base Excess: -1.2  /  SaO2: 95                     @ 07:01  -   @ 07:00  --------------------------------------------------------  IN: 500 mL / OUT: 0 mL / NET: 500 mL        Mode: AC/ CMV (Assist Control/ Continuous Mandatory Ventilation)  RR (machine): 15  TV (machine): 400  FiO2: 50  PEEP: 5  ITime: 1  MAP: 11  PIP: 30    PHYSICAL EXAM:    GENERAL: [x]NAD,  HEAD:  [x]Atraumatic, []Normocephalic  EYES: sluggish pupillary response bilaterally, conjunctiva and sclera clear  ENMT: on ETT tube  NECK: [x]Supple, normal appearance, []No JVD; []Normal thyroid; [x]Trachea midline  NERVOUS SYSTEM: awake, alert, nonverbal, noncommunicative  CHEST/LUNG: [x]No chest deformity; []Normal percussion bilaterally; []No rales, rhonchi, wheezing   HEART: [x]Regular rate and rhythm; []No murmurs, rubs, or gallops  ABDOMEN: [x]Soft, Nontender, Nondistended; []Bowel sounds present  EXTREMITIES:  [x]2+ Peripheral Pulses, []No clubbing, cyanosis, or edema  LYMPH: [x]No lymphadenopathy noted  SKIN: [x]No rashes or lesions; []Good capillary refill        LABS:  CBC Full  -  ( 26 Dec 2017 08:49 )  WBC Count : 22.2 K/uL  Hemoglobin : 7.3 g/dL  Hematocrit : 23.8 %  Platelet Count - Automated : 221 K/uL  Mean Cell Volume : 89.4 fl  Mean Cell Hemoglobin : 27.3 pg  Mean Cell Hemoglobin Concentration : 30.5 gm/dL  Auto Neutrophil # : x  Auto Lymphocyte # : x  Auto Monocyte # : x  Auto Eosinophil # : x  Auto Basophil # : x  Auto Neutrophil % : x  Auto Lymphocyte % : x  Auto Monocyte % : x  Auto Eosinophil % : x  Auto Basophil % : x        146<H>  |  107  |  133<H>  ----------------------------<  111<H>  5.5<H>   |  18<L>  |  10.30<H>    Ca    9.5      26 Dec 2017 06:44  Phos  8.9       Mg     3.4           PT/INR - ( 26 Dec 2017 06:44 )   PT: 13.9 sec;   INR: 1.27 ratio         PTT - ( 26 Dec 2017 06:44 )  PTT:29.6 sec  Urinalysis Basic - ( 26 Dec 2017 11:29 )    Color: Yellow / Appearance: Slightly Turbid / S.015 / pH: x  Gluc: x / Ketone: Negative  / Bili: Negative / Urobili: Negative   Blood: x / Protein: 100 / Nitrite: Negative   Leuk Esterase: Moderate / RBC: 5-10 /HPF / WBC >50 /HPF   Sq Epi: x / Non Sq Epi: Few /HPF / Bacteria: Trace /HPF          RADIOLOGY & ADDITIONAL STUDIES REVIEWED: yes    [ ]GOALS OF CARE DISCUSSION WITH PATIENT/FAMILY/PROXY:    CRITICAL CARE TIME SPENT: 35 minutes    Assessment and Plan:   · Assessment		  52 female nursing home patient with hx of DM, neuropathy, CKD, CHFpEF, GERD, osteomyelitis of jaw, hypothyroidism, hypoparathyroidism, anemia, admitted with acute on chronic renal failure requiring initiation of dialysis, acute respiratory failure requiring intubation on , pneumonia with sepsis, GI bleed, EGD showed non bleeding duodenal ulcer.  Now off pressors.   Dialysis catheter removed  due to fevers and leukocytosis       Problem/Plan - 1:  ·  Problem: Acute respiratory failure.  Plan: Inubated . Pt was hypoxic on BiPAP,  failed NIPPV support  Acute respiratory failure secondary to HAP (elevated procalcitonin) and fluid overload  -continue vent support, off propofol sedation  -wean mechanical ventilation as tolerated, daily SAT/DBT  -c/w  maxipime and  levaquin , D9  -pt need permcath  as per Dr. Escoto   -shiflip catheter removed  due to fevers and leukocytosis  -pt had fever 100.1 last night, temp 99 this am, leukocytosis, Pt has no jarrell, no central line/A line, no recent diarrhea, no skin ulcer/would, given one dose vancomycin yesterday, repeated UA positive, will f/u repeat blood culture and urine culture.  -New shiley cath placed on .   -pt got HD on            Problem/Plan - 2:  ·  Problem: R/O Upper GI bleed.  Plan: c/w protonix, no recurrent GI Bleed.  s/p 1 PRBC, hb 8.2 after transfusion  EGD showed duodenal ulcer, no active bleeding, gastritis and esophagitis.  biopsy report negative   monitor CBC  c/w protonix 40mg, daily.       Problem/Plan - 3:  ·  Problem: Hypothyroidism.  Plan: c/w home dose LT4 50 mcg daily  TSH wnl.      Problem/Plan - 4:  ·  Problem: CHF (congestive heart failure).  Plan: unknown EF  c/w home cardiac meds   TTE - RV systolic pressure is 49 mm Hg. Mild pulmonary  hypertension.      Problem/Plan - 5:  ·  Problem: CKD (chronic kidney disease).  Plan: getting HD via shiley.  -shiley was removed due to fever and leukocytosis   -pt need permcath  as per Dr. Escoto   -called IR for permacath tomorrow   -shiely catheter removed  due to fevers and leukocytosis  -New shiley cath placed on .   -pt got HD on         Problem/Plan - 6:  Problem: Diabetes mellitus. Plan: c/w Lantus 10 units daily , HSS   HbA1c 5.2.     Problem/Plan - 7:  ·  Problem: HTN (hypertension).  Plan: c/w home BP meds.      Problem/Plan - 8:  ·  Problem: Prophylactic measure.  Plan:   c/w DVT ppx with SCD , hold heparin for permacath procedure.  c/w GI ppx, on protonix INTERVAL HPI/OVERNIGHT EVENTS: pt had mild fever 100.1 overnight. UA positive, will f/u BCx and UCx. Will HD today and SBT after HD.    PRESSORS: [ ] YES [x ] NO  WHICH:    Antimicrobial:  cefepime  IVPB 1000 milliGRAM(s) IV Intermittent every 24 hours  levoFLOXacin IVPB 250 milliGRAM(s) IV Intermittent every 24 hours    Cardiovascular:    Pulmonary:    Hematalogic:    Other:  acetaminophen   Tablet 650 milliGRAM(s) Oral every 6 hours PRN  acetaminophen  Suppository 650 milliGRAM(s) Rectal every 6 hours PRN  ascorbic acid 500 milliGRAM(s) Oral daily  cinacalcet 30 milliGRAM(s) Oral daily  dextrose 5%. 1000 milliLiter(s) IV Continuous <Continuous>  dextrose 50% Injectable 12.5 Gram(s) IV Push once  dextrose 50% Injectable 25 Gram(s) IV Push once  dextrose 50% Injectable 25 Gram(s) IV Push once  dextrose Gel 1 Dose(s) Oral once PRN  ferrous    sulfate Liquid 300 milliGRAM(s) Enteral Tube daily  folic acid 1 milliGRAM(s) Oral daily  glucagon  Injectable 1 milliGRAM(s) IntraMuscular once PRN  insulin lispro (HumaLOG) corrective regimen sliding scale   SubCutaneous every 6 hours  levothyroxine 50 MICROGram(s) Oral daily  pantoprazole  Injectable 40 milliGRAM(s) IV Push daily  simvastatin 20 milliGRAM(s) Oral at bedtime    acetaminophen   Tablet 650 milliGRAM(s) Oral every 6 hours PRN  acetaminophen  Suppository 650 milliGRAM(s) Rectal every 6 hours PRN  ascorbic acid 500 milliGRAM(s) Oral daily  cefepime  IVPB 1000 milliGRAM(s) IV Intermittent every 24 hours  cinacalcet 30 milliGRAM(s) Oral daily  dextrose 5%. 1000 milliLiter(s) IV Continuous <Continuous>  dextrose 50% Injectable 12.5 Gram(s) IV Push once  dextrose 50% Injectable 25 Gram(s) IV Push once  dextrose 50% Injectable 25 Gram(s) IV Push once  dextrose Gel 1 Dose(s) Oral once PRN  ferrous    sulfate Liquid 300 milliGRAM(s) Enteral Tube daily  folic acid 1 milliGRAM(s) Oral daily  glucagon  Injectable 1 milliGRAM(s) IntraMuscular once PRN  insulin lispro (HumaLOG) corrective regimen sliding scale   SubCutaneous every 6 hours  levoFLOXacin IVPB 250 milliGRAM(s) IV Intermittent every 24 hours  levothyroxine 50 MICROGram(s) Oral daily  pantoprazole  Injectable 40 milliGRAM(s) IV Push daily  simvastatin 20 milliGRAM(s) Oral at bedtime    Drug Dosing Weight  Height (cm): 170 (13 Dec 2017 17:16)  Weight (kg): 57 (13 Dec 2017 17:16)  BMI (kg/m2): 19.7 (13 Dec 2017 17:16)  BSA (m2): 1.66 (13 Dec 2017 17:16)    CENTRAL LINE: [ x] YES [ ] NO  LOCATION:   DATE INSERTED:  REMOVE: [ ] YES [ ] NO  EXPLAIN:    JARRELL: [ ] YES [x ] NO    DATE INSERTED:  REMOVE:  [ ] YES [ ] NO  EXPLAIN:    A-LINE:  [ ] YES [ x] NO  LOCATION:   DATE INSERTED:  REMOVE:  [ ] YES [ ] NO  EXPLAIN:    ICU Vital Signs Last 24 Hrs  T(C): 37.2 (27 Dec 2017 05:23), Max: 37.8 (26 Dec 2017 21:00)  T(F): 99 (27 Dec 2017 05:23), Max: 100.1 (27 Dec 2017 00:00)  HR: 88 (27 Dec 2017 06:00) (78 - 118)  BP: 128/61 (27 Dec 2017 06:00) (89/55 - 159/68)  BP(mean): 77 (27 Dec 2017 06:00) (60 - 92)  ABP: --  ABP(mean): --  RR: 17 (27 Dec 2017 06:00) (17 - 29)  SpO2: 98% (27 Dec 2017 06:00) (78% - 100%)      ABG - ( 27 Dec 2017 04:42 )  pH: 7.45  /  pCO2: 32    /  pO2: 82    / HCO3: 22    / Base Excess: -1.2  /  SaO2: 95                     @ 07:01  -   @ 07:00  --------------------------------------------------------  IN: 500 mL / OUT: 0 mL / NET: 500 mL        Mode: AC/ CMV (Assist Control/ Continuous Mandatory Ventilation)  RR (machine): 15  TV (machine): 400  FiO2: 50  PEEP: 5  ITime: 1  MAP: 11  PIP: 30    PHYSICAL EXAM:    GENERAL: [x]NAD,  HEAD:  [x]Atraumatic, []Normocephalic  EYES: sluggish pupillary response bilaterally, conjunctiva and sclera clear  ENMT: on ETT tube  NECK: [x]Supple, normal appearance, []No JVD; []Normal thyroid; [x]Trachea midline  NERVOUS SYSTEM: awake, alert, nonverbal, noncommunicative  CHEST/LUNG: [x]No chest deformity; []Normal percussion bilaterally; []No rales, rhonchi, wheezing   HEART: [x]Regular rate and rhythm; []No murmurs, rubs, or gallops  ABDOMEN: [x]Soft, Nontender, Nondistended; []Bowel sounds present  EXTREMITIES:  [x]2+ Peripheral Pulses, []No clubbing, cyanosis, or edema  LYMPH: [x]No lymphadenopathy noted  SKIN: [x]No rashes or lesions; []Good capillary refill        LABS:  CBC Full  -  ( 26 Dec 2017 08:49 )  WBC Count : 22.2 K/uL  Hemoglobin : 7.3 g/dL  Hematocrit : 23.8 %  Platelet Count - Automated : 221 K/uL  Mean Cell Volume : 89.4 fl  Mean Cell Hemoglobin : 27.3 pg  Mean Cell Hemoglobin Concentration : 30.5 gm/dL  Auto Neutrophil # : x  Auto Lymphocyte # : x  Auto Monocyte # : x  Auto Eosinophil # : x  Auto Basophil # : x  Auto Neutrophil % : x  Auto Lymphocyte % : x  Auto Monocyte % : x  Auto Eosinophil % : x  Auto Basophil % : x        146<H>  |  107  |  133<H>  ----------------------------<  111<H>  5.5<H>   |  18<L>  |  10.30<H>    Ca    9.5      26 Dec 2017 06:44  Phos  8.9     -  Mg     3.4           PT/INR - ( 26 Dec 2017 06:44 )   PT: 13.9 sec;   INR: 1.27 ratio         PTT - ( 26 Dec 2017 06:44 )  PTT:29.6 sec  Urinalysis Basic - ( 26 Dec 2017 11:29 )    Color: Yellow / Appearance: Slightly Turbid / S.015 / pH: x  Gluc: x / Ketone: Negative  / Bili: Negative / Urobili: Negative   Blood: x / Protein: 100 / Nitrite: Negative   Leuk Esterase: Moderate / RBC: 5-10 /HPF / WBC >50 /HPF   Sq Epi: x / Non Sq Epi: Few /HPF / Bacteria: Trace /HPF          RADIOLOGY & ADDITIONAL STUDIES REVIEWED: yes    [ ]GOALS OF CARE DISCUSSION WITH PATIENT/FAMILY/PROXY:    CRITICAL CARE TIME SPENT: 35 minutes    Assessment and Plan:   · Assessment		  52 female nursing home patient with hx of DM, neuropathy, CKD, CHFpEF, GERD, osteomyelitis of jaw, hypothyroidism, hypoparathyroidism, anemia, admitted with acute on chronic renal failure requiring initiation of dialysis, acute respiratory failure requiring intubation on , pneumonia with sepsis, GI bleed, EGD showed non bleeding duodenal ulcer.  Now off pressors.   Dialysis catheter removed  due to fevers and leukocytosis       Problem/Plan - 1:  ·  Problem: Acute respiratory failure.  Plan: Inubated . Pt was hypoxic on BiPAP,  failed NIPPV support  Acute respiratory failure secondary to HAP (elevated procalcitonin) and fluid overload  -continue vent support, off propofol sedation  -wean mechanical ventilation as tolerated, daily SAT/DBT  -c/w  maxipime and  levaquin , D9  -pt need permcath  as per Dr. Escoto   -shiflip catheter removed  due to fevers and leukocytosis  -pt had fever 100.1 last night, temp 99 this am, leukocytosis, Pt has no jarrell, no central line/A line, no recent diarrhea, no skin ulcer/would, given one dose vancomycin yesterday, repeated UA positive, will f/u repeat blood culture and urine culture.  -New shiley cath placed on .   -pt got HD on   -will HD today, and SBT after HD.           Problem/Plan - 2:  ·  Problem: R/O Upper GI bleed.  Plan: c/w protonix, no recurrent GI Bleed.  s/p 1 PRBC, hb 8.2 after transfusion  EGD showed duodenal ulcer, no active bleeding, gastritis and esophagitis.  biopsy report negative   monitor CBC  c/w protonix 40mg, daily.       Problem/Plan - 3:  ·  Problem: Hypothyroidism.  Plan: c/w home dose LT4 50 mcg daily  TSH wnl.      Problem/Plan - 4:  ·  Problem: CHF (congestive heart failure).  Plan: unknown EF  c/w home cardiac meds   TTE - RV systolic pressure is 49 mm Hg. Mild pulmonary  hypertension.      Problem/Plan - 5:  ·  Problem: CKD (chronic kidney disease).  Plan: getting HD via shiley.  -shiley was removed due to fever and leukocytosis   -pt need permcath  as per Dr. Escoto   -called IR for permacath tomorrow   -shiely catheter removed  due to fevers and leukocytosis  -New shiley cath placed on .   -pt got HD on   -will HD today, and SBT after HD.        Problem/Plan - 6:  Problem: Diabetes mellitus. Plan: c/w Lantus 10 units daily , HSS   HbA1c 5.2.     Problem/Plan - 7:  ·  Problem: HTN (hypertension).  Plan: c/w home BP meds.      Problem/Plan - 8:  ·  Problem: Prophylactic measure.  Plan:   c/w DVT ppx with SCD , hold heparin for permacath procedure.  c/w GI ppx, on protonix

## 2017-12-27 NOTE — PROGRESS NOTE ADULT - ATTENDING COMMENTS
-UA is positive, may be source of fever  -pat is more awake, cxr is still wet  -will speak with renal for dialysis today then will resume weaning trials  -

## 2017-12-27 NOTE — PROGRESS NOTE ADULT - ASSESSMENT
52 yr old female with  ESRD. S/P HD yesterday.  spiked fever overnight. UA positive. on ABX.   fluid overloaded.  HD today  cont abx  con sensipar for renal steodystrophy  procrit on hd.

## 2017-12-27 NOTE — PROGRESS NOTE ADULT - SUBJECTIVE AND OBJECTIVE BOX
Patient is a 52y old  Female who presents with a chief complaint of SOB (13 Dec 2017 16:01)  Patient remains on a ventilator, sedated, lying in bed in NAD. Currently on C-pap mode with a RR of 24    INTERVAL HPI/OVERNIGHT EVENTS:      VITAL SIGNS:  T(F): 99 (17 @ 05:23)  HR: 91 (17 @ 09:00)  BP: 138/65 (17 @ 09:00)  RR: 17 (17 @ 09:00)  SpO2: 98% (17 @ 09:00)  Wt(kg): --  I&O's Detail    26 Dec 2017 07:01  -  27 Dec 2017 07:00  --------------------------------------------------------  IN:    Enteral Tube Flush: 120 mL    Nepro: 340 mL    Packed Red Blood Cells: 350 mL    Solution: 50 mL    Solution: 50 mL  Total IN: 910 mL    OUT:  Total OUT: 0 mL    Total NET: 910 mL      27 Dec 2017 07:01  -  27 Dec 2017 10:52  --------------------------------------------------------  IN:    Nepro: 70 mL  Total IN: 70 mL    OUT:  Total OUT: 0 mL    Total NET: 70 mL        Mode: AC/ CMV (Assist Control/ Continuous Mandatory Ventilation)  RR (machine): 15  TV (machine): 400  FiO2: 50  PEEP: 5  ITime: 1  MAP: 15  PIP: 31        REVIEW OF SYSTEMS:    CONSTITUTIONAL:  No fevers, chills, sweats    HEENT:  Eyes:  No diplopia or blurred vision. ENT:  No earache, sore throat or runny nose.    CARDIOVASCULAR:  No pressure, squeezing, tightness, or heaviness about the chest; no palpitations.    RESPIRATORY:  Per HPI    GASTROINTESTINAL:  No abdominal pain, nausea, vomiting or diarrhea.    GENITOURINARY:  No dysuria, frequency or urgency.    NEUROLOGIC:  No paresthesias, fasciculations, seizures or weakness.    PSYCHIATRIC:  No disorder of thought or mood.      PHYSICAL EXAM:    Constitutional: Well developed and nourished  Eyes:Perrla  ENMT: normal  Neck:supple  Respiratory: ronchi bilaterally  Cardiovascular: S1 S2 regular  Gastrointestinal: Soft, Non tender  Extremities: No edema  Vascular:normal  Neurological:Awake, alert,Ox3  Musculoskeletal:Normal      MEDICATIONS  (STANDING):  ascorbic acid 500 milliGRAM(s) Oral daily  cefepime  IVPB 1000 milliGRAM(s) IV Intermittent every 24 hours  cinacalcet 30 milliGRAM(s) Oral daily  dextrose 5%. 1000 milliLiter(s) (50 mL/Hr) IV Continuous <Continuous>  dextrose 50% Injectable 12.5 Gram(s) IV Push once  dextrose 50% Injectable 25 Gram(s) IV Push once  dextrose 50% Injectable 25 Gram(s) IV Push once  ferrous    sulfate Liquid 300 milliGRAM(s) Enteral Tube daily  folic acid 1 milliGRAM(s) Oral daily  heparin  Injectable 5000 Unit(s) SubCutaneous every 12 hours  insulin lispro (HumaLOG) corrective regimen sliding scale   SubCutaneous every 6 hours  levoFLOXacin IVPB 250 milliGRAM(s) IV Intermittent every 24 hours  levothyroxine 50 MICROGram(s) Oral daily  pantoprazole  Injectable 40 milliGRAM(s) IV Push daily  simvastatin 20 milliGRAM(s) Oral at bedtime    MEDICATIONS  (PRN):  acetaminophen   Tablet 650 milliGRAM(s) Oral every 6 hours PRN For Temp greater than 38 C (100.4 F)  acetaminophen  Suppository 650 milliGRAM(s) Rectal every 6 hours PRN For Temp greater than 38 C (100.4 F)  dextrose Gel 1 Dose(s) Oral once PRN Blood Glucose LESS THAN 70 milliGRAM(s)/deciliter  glucagon  Injectable 1 milliGRAM(s) IntraMuscular once PRN Glucose LESS THAN 70 milligrams/deciliter      Allergies    No Known Allergies    Intolerances        LABS:                        9.1    26.3  )-----------( 222      ( 27 Dec 2017 06:54 )             28.2     12-    145  |  106  |  93<H>  ----------------------------<  266<H>  4.5   |  22  |  7.71<H>    Ca    8.7      27 Dec 2017 06:54  Phos  8.5       Mg     3.0     12      PT/INR - ( 26 Dec 2017 06:44 )   PT: 13.9 sec;   INR: 1.27 ratio         PTT - ( 26 Dec 2017 06:44 )  PTT:29.6 sec  Urinalysis Basic - ( 26 Dec 2017 11:29 )    Color: Yellow / Appearance: Slightly Turbid / S.015 / pH: x  Gluc: x / Ketone: Negative  / Bili: Negative / Urobili: Negative   Blood: x / Protein: 100 / Nitrite: Negative   Leuk Esterase: Moderate / RBC: 5-10 /HPF / WBC >50 /HPF   Sq Epi: x / Non Sq Epi: Few /HPF / Bacteria: Trace /HPF      ABG - ( 27 Dec 2017 04:42 )  pH: 7.45  /  pCO2: 32    /  pO2: 82    / HCO3: 22    / Base Excess: -1.2  /  SaO2: 95                    CAPILLARY BLOOD GLUCOSE      POCT Blood Glucose.: 240 mg/dL (27 Dec 2017 06:04)  POCT Blood Glucose.: 173 mg/dL (27 Dec 2017 00:11)  POCT Blood Glucose.: 128 mg/dL (26 Dec 2017 17:30)  POCT Blood Glucose.: 134 mg/dL (26 Dec 2017 11:44)        RADIOLOGY & ADDITIONAL TESTS:    CXR:  < from: Xray Chest 1 View AP -PORTABLE-Routine (17 @ 06:53) >  IMPRESSION: Right perihilar airspace opacity, without significant   interval change. Prominence in the region of the pulmonary artery outflow   track is identified and unchanged, which may be related to main pulmonary   artery enlargement. CT evaluation can be performed for further   characterization.    < end of copied text >    Ct scan chest:    ekg;    echo:

## 2017-12-27 NOTE — PROGRESS NOTE ADULT - SUBJECTIVE AND OBJECTIVE BOX
febrile overnight. CXR fluid overload.    No Known Allergies    Hospital Medications:   MEDICATIONS  (STANDING):  ascorbic acid 500 milliGRAM(s) Oral daily  cefepime  IVPB 1000 milliGRAM(s) IV Intermittent every 24 hours  chlorhexidine 4% Liquid 1 Application(s) Topical daily  cinacalcet 30 milliGRAM(s) Oral daily  dextrose 5%. 1000 milliLiter(s) (50 mL/Hr) IV Continuous <Continuous>  dextrose 50% Injectable 12.5 Gram(s) IV Push once  dextrose 50% Injectable 25 Gram(s) IV Push once  dextrose 50% Injectable 25 Gram(s) IV Push once  ferrous    sulfate Liquid 300 milliGRAM(s) Enteral Tube daily  folic acid 1 milliGRAM(s) Oral daily  heparin  Injectable 5000 Unit(s) SubCutaneous every 12 hours  insulin lispro (HumaLOG) corrective regimen sliding scale   SubCutaneous every 6 hours  levoFLOXacin IVPB 250 milliGRAM(s) IV Intermittent every 24 hours  levothyroxine 50 MICROGram(s) Oral daily  pantoprazole  Injectable 40 milliGRAM(s) IV Push daily  simvastatin 20 milliGRAM(s) Oral at bedtime        VITALS:  T(F): 97.6 (17 @ 15:20), Max: 100.1 (17 @ 00:00)  HR: 101 (17 @ 15:20)  BP: 109/62 (17 @ 15:20)  RR: 22 (17 @ 15:20)  SpO2: 100% (17 @ 15:20)  Wt(kg): --     @ 07:01  -   @ 07:00  --------------------------------------------------------  IN: 910 mL / OUT: 0 mL / NET: 910 mL     @ 07:01  -   @ 15:35  --------------------------------------------------------  IN: 70 mL / OUT: 0 mL / NET: 70 mL        PHYSICAL EXAM:  Constitutional: NAD  HEENT: anicteric sclera, oropharynx clear, MMM  Neck: No JVD  Respiratory: CTAB, no wheezes, rales or rhonchi  Cardiovascular: S1, S2, RRR  Gastrointestinal: BS+, soft, NT/ND  Extremities: No cyanosis or clubbing. No peripheral edema  Neurological: A/O x 3, no focal deficits  Vascular Access: femoral vasc cath.    LABS:      145  |  106  |  93<H>  ----------------------------<  266<H>  4.5   |  22  |  7.71<H>    Ca    8.7      27 Dec 2017 06:54  Phos  8.5       Mg     3.0           Creatinine Trend: 7.71 <--, 10.30 <--, 9.33 <--, 7.87 <--, 6.22 <--, 6.74 <--, 5.18 <--                        9.1    26.3  )-----------( 222      ( 27 Dec 2017 06:54 )             28.2     Urine Studies:  Urinalysis Basic - ( 26 Dec 2017 11:29 )    Color: Yellow / Appearance: Slightly Turbid / S.015 / pH:   Gluc:  / Ketone: Negative  / Bili: Negative / Urobili: Negative   Blood:  / Protein: 100 / Nitrite: Negative   Leuk Esterase: Moderate / RBC: 5-10 /HPF / WBC >50 /HPF   Sq Epi:  / Non Sq Epi: Few /HPF / Bacteria: Trace /HPF        RADIOLOGY & ADDITIONAL STUDIES:

## 2017-12-28 LAB
ANION GAP SERPL CALC-SCNC: 16 MMOL/L — SIGNIFICANT CHANGE UP (ref 5–17)
BASOPHILS # BLD AUTO: 0.2 K/UL — SIGNIFICANT CHANGE UP (ref 0–0.2)
BASOPHILS NFR BLD AUTO: 0.8 % — SIGNIFICANT CHANGE UP (ref 0–2)
BUN SERPL-MCNC: 86 MG/DL — HIGH (ref 7–18)
CALCIUM SERPL-MCNC: 9 MG/DL — SIGNIFICANT CHANGE UP (ref 8.4–10.5)
CHLORIDE SERPL-SCNC: 105 MMOL/L — SIGNIFICANT CHANGE UP (ref 96–108)
CO2 SERPL-SCNC: 20 MMOL/L — LOW (ref 22–31)
CREAT SERPL-MCNC: 6.87 MG/DL — HIGH (ref 0.5–1.3)
EOSINOPHIL # BLD AUTO: 0.1 K/UL — SIGNIFICANT CHANGE UP (ref 0–0.5)
EOSINOPHIL NFR BLD AUTO: 0.3 % — SIGNIFICANT CHANGE UP (ref 0–6)
GLUCOSE BLDC GLUCOMTR-MCNC: 229 MG/DL — HIGH (ref 70–99)
GLUCOSE BLDC GLUCOMTR-MCNC: 251 MG/DL — HIGH (ref 70–99)
GLUCOSE BLDC GLUCOMTR-MCNC: 281 MG/DL — HIGH (ref 70–99)
GLUCOSE SERPL-MCNC: 262 MG/DL — HIGH (ref 70–99)
HCT VFR BLD CALC: 26.5 % — LOW (ref 34.5–45)
HGB BLD-MCNC: 10.2 G/DL — LOW (ref 11.5–15.5)
LYMPHOCYTES # BLD AUTO: 1.5 K/UL — SIGNIFICANT CHANGE UP (ref 1–3.3)
LYMPHOCYTES # BLD AUTO: 5.6 % — LOW (ref 13–44)
MAGNESIUM SERPL-MCNC: 2.9 MG/DL — HIGH (ref 1.6–2.6)
MCHC RBC-ENTMCNC: 35.7 PG — HIGH (ref 27–34)
MCHC RBC-ENTMCNC: 38.6 GM/DL — HIGH (ref 32–36)
MCV RBC AUTO: 92.5 FL — SIGNIFICANT CHANGE UP (ref 80–100)
MONOCYTES # BLD AUTO: 2 K/UL — HIGH (ref 0–0.9)
MONOCYTES NFR BLD AUTO: 7.4 % — SIGNIFICANT CHANGE UP (ref 2–14)
NEUTROPHILS # BLD AUTO: 22.6 K/UL — HIGH (ref 1.8–7.4)
NEUTROPHILS NFR BLD AUTO: 85.9 % — HIGH (ref 43–77)
PHOSPHATE SERPL-MCNC: 8.5 MG/DL — HIGH (ref 2.5–4.5)
PLATELET # BLD AUTO: 283 K/UL — SIGNIFICANT CHANGE UP (ref 150–400)
POTASSIUM SERPL-MCNC: 4.7 MMOL/L — SIGNIFICANT CHANGE UP (ref 3.5–5.3)
POTASSIUM SERPL-SCNC: 4.7 MMOL/L — SIGNIFICANT CHANGE UP (ref 3.5–5.3)
RBC # BLD: 2.87 M/UL — LOW (ref 3.8–5.2)
RBC # FLD: 15.8 % — HIGH (ref 10.3–14.5)
SODIUM SERPL-SCNC: 141 MMOL/L — SIGNIFICANT CHANGE UP (ref 135–145)
WBC # BLD: 26.3 K/UL — HIGH (ref 3.8–10.5)
WBC # FLD AUTO: 26.3 K/UL — HIGH (ref 3.8–10.5)

## 2017-12-28 PROCEDURE — 71010: CPT | Mod: 26

## 2017-12-28 RX ORDER — ALTEPLASE 100 MG
2 KIT INTRAVENOUS ONCE
Qty: 0 | Refills: 0 | Status: DISCONTINUED | OUTPATIENT
Start: 2017-12-28 | End: 2017-12-28

## 2017-12-28 RX ADMIN — CHLORHEXIDINE GLUCONATE 1 APPLICATION(S): 213 SOLUTION TOPICAL at 12:29

## 2017-12-28 RX ADMIN — HEPARIN SODIUM 5000 UNIT(S): 5000 INJECTION INTRAVENOUS; SUBCUTANEOUS at 06:09

## 2017-12-28 RX ADMIN — CINACALCET 30 MILLIGRAM(S): 30 TABLET, FILM COATED ORAL at 12:26

## 2017-12-28 RX ADMIN — HEPARIN SODIUM 5000 UNIT(S): 5000 INJECTION INTRAVENOUS; SUBCUTANEOUS at 18:32

## 2017-12-28 RX ADMIN — SIMVASTATIN 20 MILLIGRAM(S): 20 TABLET, FILM COATED ORAL at 21:42

## 2017-12-28 RX ADMIN — PANTOPRAZOLE SODIUM 40 MILLIGRAM(S): 20 TABLET, DELAYED RELEASE ORAL at 12:26

## 2017-12-28 RX ADMIN — Medication 1 MILLIGRAM(S): at 12:27

## 2017-12-28 RX ADMIN — Medication 3: at 12:28

## 2017-12-28 RX ADMIN — Medication 2: at 18:32

## 2017-12-28 RX ADMIN — SIMVASTATIN 20 MILLIGRAM(S): 20 TABLET, FILM COATED ORAL at 00:40

## 2017-12-28 RX ADMIN — Medication 50 MICROGRAM(S): at 06:09

## 2017-12-28 RX ADMIN — Medication 3: at 06:09

## 2017-12-28 RX ADMIN — Medication 300 MILLIGRAM(S): at 12:27

## 2017-12-28 RX ADMIN — Medication 500 MILLIGRAM(S): at 12:27

## 2017-12-28 RX ADMIN — CEFEPIME 100 MILLIGRAM(S): 1 INJECTION, POWDER, FOR SOLUTION INTRAMUSCULAR; INTRAVENOUS at 17:33

## 2017-12-28 NOTE — PHYSICAL THERAPY INITIAL EVALUATION ADULT - PRECAUTIONS/LIMITATIONS, REHAB EVAL
oxygen therapy device and L/min/fall precautions
vision precautions/detached retina/cardiac precautions

## 2017-12-28 NOTE — PHYSICAL THERAPY INITIAL EVALUATION ADULT - IMPAIRMENTS FOUND, PT EVAL
tone/ventilation and respiration/gas exchange/gait, locomotion, and balance/muscle strength/ROM/aerobic capacity/endurance
poor safety awareness/muscle strength/ROM/cognitive impairment/gait, locomotion, and balance/posture/aerobic capacity/endurance

## 2017-12-28 NOTE — PHYSICAL THERAPY INITIAL EVALUATION ADULT - LEVEL OF INDEPENDENCE: BED TO CHAIR, REHAB EVAL
unable to perform
Not attempted this session as pt. fatigued sitting at EOB. Will attempt at future sessions as appropriate.

## 2017-12-28 NOTE — CHART NOTE - NSCHARTNOTEFT_GEN_A_CORE
Shiley cath was not functioning during dialysis this afternoon. Called , recommended stop dialysis now, and will cathflo tomorrow am before dialysis. If shiley cath still does not work, Shiley cathter need to be changed tomorrow.

## 2017-12-28 NOTE — PROGRESS NOTE ADULT - SUBJECTIVE AND OBJECTIVE BOX
extubated yesterday. on vent mask     No Known Allergies    Hospital Medications:   MEDICATIONS  (STANDING):  ascorbic acid 500 milliGRAM(s) Oral daily  cefepime  IVPB 1000 milliGRAM(s) IV Intermittent every 24 hours  chlorhexidine 4% Liquid 1 Application(s) Topical daily  cinacalcet 30 milliGRAM(s) Oral daily  dextrose 5%. 1000 milliLiter(s) (50 mL/Hr) IV Continuous <Continuous>  dextrose 50% Injectable 12.5 Gram(s) IV Push once  dextrose 50% Injectable 25 Gram(s) IV Push once  dextrose 50% Injectable 25 Gram(s) IV Push once  ferrous    sulfate Liquid 300 milliGRAM(s) Enteral Tube daily  folic acid 1 milliGRAM(s) Oral daily  heparin  Injectable 5000 Unit(s) SubCutaneous every 12 hours  insulin lispro (HumaLOG) corrective regimen sliding scale   SubCutaneous every 6 hours  levoFLOXacin IVPB 250 milliGRAM(s) IV Intermittent every 24 hours  levothyroxine 50 MICROGram(s) Oral daily  pantoprazole  Injectable 40 milliGRAM(s) IV Push daily  simvastatin 20 milliGRAM(s) Oral at bedtime        VITALS:  T(F): 98.6 (17 @ 08:00), Max: 98.8 (17 @ 06:00)  HR: 100 (17 @ 10:00)  BP: 151/68 (17 @ 10:00)  RR: 24 (17 @ 10:00)  SpO2: 97% (17 @ 10:00)  Wt(kg): --     @ :  -   @ 07:00  --------------------------------------------------------  IN: 690 mL / OUT: 0 mL / NET: 690 mL     @ 07:01  -   @ 11:10  --------------------------------------------------------  IN: 140 mL / OUT: 0 mL / NET: 140 mL        PHYSICAL EXAM:  Constitutional: NAD  HEENT: anicteric sclera, oropharynx clear.  Neck: No JVD  Respiratory: bilat  wheezes, rales or rhonchi  Cardiovascular: S1, S2, RRR  Gastrointestinal: BS+, soft, NT/ND  Extremities: No cyanosis or clubbing. No peripheral edema  Neurological: A/O x 3, no focal deficits  Psychiatric: Normal mood, normal affect  : No CVA tenderness. No jarrell.   Skin: No rashes  Vascular Access: LT groin vasc cath.    LABS:      141  |  105  |  86<H>  ----------------------------<  262<H>  4.7   |  20<L>  |  6.87<H>    Ca    9.0      28 Dec 2017 06:39  Phos  8.5       Mg     2.9           Creatinine Trend: 6.87 <--, 7.71 <--, 10.30 <--, 9.33 <--, 7.87 <--, 6.22 <--, 6.74 <--                        10.2   26.3  )-----------( 283      ( 28 Dec 2017 06:39 )             26.5     Urine Studies:  Urinalysis Basic - ( 26 Dec 2017 11:29 )    Color: Yellow / Appearance: Slightly Turbid / S.015 / pH:   Gluc:  / Ketone: Negative  / Bili: Negative / Urobili: Negative   Blood:  / Protein: 100 / Nitrite: Negative   Leuk Esterase: Moderate / RBC: 5-10 /HPF / WBC >50 /HPF   Sq Epi:  / Non Sq Epi: Few /HPF / Bacteria: Trace /HPF        RADIOLOGY & ADDITIONAL STUDIES:

## 2017-12-28 NOTE — PROGRESS NOTE ADULT - ATTENDING COMMENTS
-pat is extubated day #1, on AM,taper to NC as tolerated, afebrile  -antibx  for one more day  -dialysis as per renal

## 2017-12-28 NOTE — CHART NOTE - NSCHARTNOTEFT_GEN_A_CORE
ICU attending and myself spoke to patient's daughter and updated on current condition. Patient's daughter understood.

## 2017-12-28 NOTE — PHYSICAL THERAPY INITIAL EVALUATION ADULT - ADDITIONAL COMMENTS
Pt uses rolling walker for ambulation with 2 person assist.
As per IE, Pt uses rolling walker for ambulation with 2 person assist.

## 2017-12-28 NOTE — PROGRESS NOTE ADULT - SUBJECTIVE AND OBJECTIVE BOX
INTERVAL HPI/OVERNIGHT EVENTS: pt afebrile overnight,  extubated on  , on aerosol mask , 10LMP, saturating well.     PRESSORS: [ ] YES [ x] NO  WHICH:    Antimicrobial:  cefepime  IVPB 1000 milliGRAM(s) IV Intermittent every 24 hours  levoFLOXacin IVPB 250 milliGRAM(s) IV Intermittent every 24 hours    Cardiovascular:    Pulmonary:    Hematalogic:  heparin  Injectable 5000 Unit(s) SubCutaneous every 12 hours    Other:  acetaminophen   Tablet 650 milliGRAM(s) Oral every 6 hours PRN  acetaminophen  Suppository 650 milliGRAM(s) Rectal every 6 hours PRN  ascorbic acid 500 milliGRAM(s) Oral daily  chlorhexidine 4% Liquid 1 Application(s) Topical daily  cinacalcet 30 milliGRAM(s) Oral daily  dextrose 5%. 1000 milliLiter(s) IV Continuous <Continuous>  dextrose 50% Injectable 12.5 Gram(s) IV Push once  dextrose 50% Injectable 25 Gram(s) IV Push once  dextrose 50% Injectable 25 Gram(s) IV Push once  dextrose Gel 1 Dose(s) Oral once PRN  ferrous    sulfate Liquid 300 milliGRAM(s) Enteral Tube daily  folic acid 1 milliGRAM(s) Oral daily  glucagon  Injectable 1 milliGRAM(s) IntraMuscular once PRN  insulin lispro (HumaLOG) corrective regimen sliding scale   SubCutaneous every 6 hours  levothyroxine 50 MICROGram(s) Oral daily  pantoprazole  Injectable 40 milliGRAM(s) IV Push daily  simvastatin 20 milliGRAM(s) Oral at bedtime    acetaminophen   Tablet 650 milliGRAM(s) Oral every 6 hours PRN  acetaminophen  Suppository 650 milliGRAM(s) Rectal every 6 hours PRN  ascorbic acid 500 milliGRAM(s) Oral daily  cefepime  IVPB 1000 milliGRAM(s) IV Intermittent every 24 hours  chlorhexidine 4% Liquid 1 Application(s) Topical daily  cinacalcet 30 milliGRAM(s) Oral daily  dextrose 5%. 1000 milliLiter(s) IV Continuous <Continuous>  dextrose 50% Injectable 12.5 Gram(s) IV Push once  dextrose 50% Injectable 25 Gram(s) IV Push once  dextrose 50% Injectable 25 Gram(s) IV Push once  dextrose Gel 1 Dose(s) Oral once PRN  ferrous    sulfate Liquid 300 milliGRAM(s) Enteral Tube daily  folic acid 1 milliGRAM(s) Oral daily  glucagon  Injectable 1 milliGRAM(s) IntraMuscular once PRN  heparin  Injectable 5000 Unit(s) SubCutaneous every 12 hours  insulin lispro (HumaLOG) corrective regimen sliding scale   SubCutaneous every 6 hours  levoFLOXacin IVPB 250 milliGRAM(s) IV Intermittent every 24 hours  levothyroxine 50 MICROGram(s) Oral daily  pantoprazole  Injectable 40 milliGRAM(s) IV Push daily  simvastatin 20 milliGRAM(s) Oral at bedtime    Drug Dosing Weight  Height (cm): 170 (13 Dec 2017 17:16)  Weight (kg): 57 (13 Dec 2017 17:16)  BMI (kg/m2): 19.7 (13 Dec 2017 17:16)  BSA (m2): 1.66 (13 Dec 2017 17:16)    CENTRAL LINE: [x ] YES [ ] NO  LOCATION:   DATE INSERTED:  REMOVE: [ ] YES [ ] NO  EXPLAIN:    JACOBSON: [ ] YES [ x] NO    DATE INSERTED:  REMOVE:  [ ] YES [ ] NO  EXPLAIN:    A-LINE:  [ ] YES [ x] NO  LOCATION:   DATE INSERTED:  REMOVE:  [ ] YES [ ] NO  EXPLAIN:    ICU Vital Signs Last 24 Hrs  T(C): 37.1 (28 Dec 2017 06:00), Max: 37.1 (27 Dec 2017 10:00)  T(F): 98.8 (28 Dec 2017 06:00), Max: 98.8 (28 Dec 2017 06:00)  HR: 98 (28 Dec 2017 06:00) (90 - 106)  BP: 156/72 (28 Dec 2017 06:00) (109/62 - 162/73)  BP(mean): 92 (28 Dec 2017 06:00) (76 - 95)  ABP: --  ABP(mean): --  RR: 21 (28 Dec 2017 06:00) (17 - 31)  SpO2: 98% (28 Dec 2017 06:00) (94% - 100%)      ABG - ( 27 Dec 2017 04:42 )  pH: 7.45  /  pCO2: 32    /  pO2: 82    / HCO3: 22    / Base Excess: -1.2  /  SaO2: 95                     @ 07:01  -   @ 07:00  --------------------------------------------------------  IN: 655 mL / OUT: 0 mL / NET: 655 mL        Mode: Spontaneous/ CPAP (Continuous Positive Airway Pressure)  FiO2: 50  PEEP: 5  ITime: 1  MAP: 8.5  PIP: 15      PHYSICAL EXAM:    GENERAL: [x]NAD, on aerosol mask, saturating well on 10LPM  HEAD:  [x]Atraumatic, []Normocephalic  EYES: sluggish pupillary response bilaterally, conjunctiva and sclera clear  NECK: [x]Supple, normal appearance, []No JVD; []Normal thyroid; [x]Trachea midline  NERVOUS SYSTEM: awake, nonverbal, noncommunicative  CHEST/LUNG: [x]No chest deformity; []Normal percussion bilaterally; []No rales, rhonchi, wheezing   HEART: [x]Regular rate and rhythm; []No murmurs, rubs, or gallops  ABDOMEN: [x]Soft, Nontender, Nondistended; []Bowel sounds present  EXTREMITIES:  [x]2+ Peripheral Pulses, []No clubbing, cyanosis, or edema  SKIN: [x]No rashes or lesions; []Good capillary refill        LABS:  CBC Full  -  ( 27 Dec 2017 06:54 )  WBC Count : 26.3 K/uL  Hemoglobin : 9.1 g/dL  Hematocrit : 28.2 %  Platelet Count - Automated : 222 K/uL  Mean Cell Volume : 91.0 fl  Mean Cell Hemoglobin : 29.3 pg  Mean Cell Hemoglobin Concentration : 32.2 gm/dL  Auto Neutrophil # : x  Auto Lymphocyte # : x  Auto Monocyte # : x  Auto Eosinophil # : x  Auto Basophil # : x  Auto Neutrophil % : 75.0 %  Auto Lymphocyte % : 5.0 %  Auto Monocyte % : 9.0 %  Auto Eosinophil % : x  Auto Basophil % : x    12-28    141  |  105  |  86<H>  ----------------------------<  262<H>  4.7   |  20<L>  |  6.87<H>    Ca    9.0      28 Dec 2017 06:39  Phos  8.5     12-27  Mg     2.9     12-28        Urinalysis Basic - ( 26 Dec 2017 11:29 )    Color: Yellow / Appearance: Slightly Turbid / S.015 / pH: x  Gluc: x / Ketone: Negative  / Bili: Negative / Urobili: Negative   Blood: x / Protein: 100 / Nitrite: Negative   Leuk Esterase: Moderate / RBC: 5-10 /HPF / WBC >50 /HPF   Sq Epi: x / Non Sq Epi: Few /HPF / Bacteria: Trace /HPF      Culture Results:   No growth to date. ( @ 22:38)  Culture Results:   No growth to date. ( @ 22:38)  Culture Results:   No growth ( @ 17:34)      RADIOLOGY & ADDITIONAL STUDIES REVIEWED:  YES    [ ]GOALS OF CARE DISCUSSION WITH PATIENT/FAMILY/PROXY:    CRITICAL CARE TIME SPENT: 35 minutes    Assessment and Plan:   · Assessment		  52 female nursing home patient with hx of DM, neuropathy, CKD, CHFpEF, GERD, osteomyelitis of jaw, hypothyroidism, hypoparathyroidism, anemia, admitted with acute on chronic renal failure requiring initiation of dialysis, acute respiratory failure requiring intubation on , pneumonia with sepsis, GI bleed, EGD showed non bleeding duodenal ulcer.  Now off pressors.   Dialysis catheter removed  due to fevers and leukocytosis       Problem/Plan - 1:  ·  Problem: Acute respiratory failure.  Plan: Inubated . Pt was hypoxic on BiPAP,  failed NIPPV support  Acute respiratory failure secondary to HAP (elevated procalcitonin) and fluid overload  -Extubated on ,  off propofol sedation, on Aerosol mask, saturating well on 10LMP  -c/w  maxipime and  levaquin , D10, will given 1 more day as per ID Dr. Kaplan.  -previous shiley catheter removed  due to fevers and leukocytosis  -New shiley cath placed on .   -pt afebrile overnight, leukocytosis, repeated UA positive, repeated blood culture and urine culture no growth to date.  -last HD on        Problem/Plan - 2:  ·  Problem: CKD (chronic kidney disease).  Plan: getting HD via shiley.  -shiley was removed due to fever and leukocytosis   -pt need permcath  as per Dr. Escoto   -called IR for permacath tomorrow   -previous shiely catheter removed  due to fevers and leukocytosis  -New shiley cath placed on .   -last HD on       Problem/Plan - 3:  ·  Problem: R/O Upper GI bleed.  Plan: c/w protonix, no recurrent GI Bleed.  s/p 1 PRBC, hb 8.2 after transfusion  EGD showed duodenal ulcer, no active bleeding, gastritis and esophagitis.  biopsy report negative   monitor CBC  c/w protonix 40mg, daily.       Problem/Plan - 4:  ·  Problem: Hypothyroidism.  Plan: c/w home dose LT4 50 mcg daily  TSH wnl.      Problem/Plan - 5:  ·  Problem: CHF (congestive heart failure).  Plan: unknown EF  c/w home cardiac meds   TTE - RV systolic pressure is 49 mm Hg. Mild pulmonary  hypertension.         Problem/Plan - 6:  Problem: Diabetes mellitus. Plan: c/w Lantus 10 units daily , HSS   HbA1c 5.2.     Problem/Plan - 7:  ·  Problem: HTN (hypertension).  Plan: c/w home BP meds.      Problem/Plan - 8:  ·  Problem: Prophylactic measure.  Plan:   c/w DVT ppx with SCD and heparin  c/w GI ppx, on protonix INTERVAL HPI/OVERNIGHT EVENTS: pt afebrile overnight,  extubated on  , on aerosol mask , 10LMP, saturating well.     PRESSORS: [ ] YES [ x] NO  WHICH:    Antimicrobial:  cefepime  IVPB 1000 milliGRAM(s) IV Intermittent every 24 hours  levoFLOXacin IVPB 250 milliGRAM(s) IV Intermittent every 24 hours    Cardiovascular:    Pulmonary:    Hematalogic:  heparin  Injectable 5000 Unit(s) SubCutaneous every 12 hours    Other:  acetaminophen   Tablet 650 milliGRAM(s) Oral every 6 hours PRN  acetaminophen  Suppository 650 milliGRAM(s) Rectal every 6 hours PRN  ascorbic acid 500 milliGRAM(s) Oral daily  chlorhexidine 4% Liquid 1 Application(s) Topical daily  cinacalcet 30 milliGRAM(s) Oral daily  dextrose 5%. 1000 milliLiter(s) IV Continuous <Continuous>  dextrose 50% Injectable 12.5 Gram(s) IV Push once  dextrose 50% Injectable 25 Gram(s) IV Push once  dextrose 50% Injectable 25 Gram(s) IV Push once  dextrose Gel 1 Dose(s) Oral once PRN  ferrous    sulfate Liquid 300 milliGRAM(s) Enteral Tube daily  folic acid 1 milliGRAM(s) Oral daily  glucagon  Injectable 1 milliGRAM(s) IntraMuscular once PRN  insulin lispro (HumaLOG) corrective regimen sliding scale   SubCutaneous every 6 hours  levothyroxine 50 MICROGram(s) Oral daily  pantoprazole  Injectable 40 milliGRAM(s) IV Push daily  simvastatin 20 milliGRAM(s) Oral at bedtime    acetaminophen   Tablet 650 milliGRAM(s) Oral every 6 hours PRN  acetaminophen  Suppository 650 milliGRAM(s) Rectal every 6 hours PRN  ascorbic acid 500 milliGRAM(s) Oral daily  cefepime  IVPB 1000 milliGRAM(s) IV Intermittent every 24 hours  chlorhexidine 4% Liquid 1 Application(s) Topical daily  cinacalcet 30 milliGRAM(s) Oral daily  dextrose 5%. 1000 milliLiter(s) IV Continuous <Continuous>  dextrose 50% Injectable 12.5 Gram(s) IV Push once  dextrose 50% Injectable 25 Gram(s) IV Push once  dextrose 50% Injectable 25 Gram(s) IV Push once  dextrose Gel 1 Dose(s) Oral once PRN  ferrous    sulfate Liquid 300 milliGRAM(s) Enteral Tube daily  folic acid 1 milliGRAM(s) Oral daily  glucagon  Injectable 1 milliGRAM(s) IntraMuscular once PRN  heparin  Injectable 5000 Unit(s) SubCutaneous every 12 hours  insulin lispro (HumaLOG) corrective regimen sliding scale   SubCutaneous every 6 hours  levoFLOXacin IVPB 250 milliGRAM(s) IV Intermittent every 24 hours  levothyroxine 50 MICROGram(s) Oral daily  pantoprazole  Injectable 40 milliGRAM(s) IV Push daily  simvastatin 20 milliGRAM(s) Oral at bedtime    Drug Dosing Weight  Height (cm): 170 (13 Dec 2017 17:16)  Weight (kg): 57 (13 Dec 2017 17:16)  BMI (kg/m2): 19.7 (13 Dec 2017 17:16)  BSA (m2): 1.66 (13 Dec 2017 17:16)    CENTRAL LINE: [x ] YES [ ] NO  LOCATION:   DATE INSERTED:  REMOVE: [ ] YES [ ] NO  EXPLAIN:    JACOBSON: [ ] YES [ x] NO    DATE INSERTED:  REMOVE:  [ ] YES [ ] NO  EXPLAIN:    A-LINE:  [ ] YES [ x] NO  LOCATION:   DATE INSERTED:  REMOVE:  [ ] YES [ ] NO  EXPLAIN:    ICU Vital Signs Last 24 Hrs  T(C): 37.1 (28 Dec 2017 06:00), Max: 37.1 (27 Dec 2017 10:00)  T(F): 98.8 (28 Dec 2017 06:00), Max: 98.8 (28 Dec 2017 06:00)  HR: 98 (28 Dec 2017 06:00) (90 - 106)  BP: 156/72 (28 Dec 2017 06:00) (109/62 - 162/73)  BP(mean): 92 (28 Dec 2017 06:00) (76 - 95)  ABP: --  ABP(mean): --  RR: 21 (28 Dec 2017 06:00) (17 - 31)  SpO2: 98% (28 Dec 2017 06:00) (94% - 100%)      ABG - ( 27 Dec 2017 04:42 )  pH: 7.45  /  pCO2: 32    /  pO2: 82    / HCO3: 22    / Base Excess: -1.2  /  SaO2: 95                     @ 07:01  -   @ 07:00  --------------------------------------------------------  IN: 655 mL / OUT: 0 mL / NET: 655 mL        Mode: Spontaneous/ CPAP (Continuous Positive Airway Pressure)  FiO2: 50  PEEP: 5  ITime: 1  MAP: 8.5  PIP: 15      PHYSICAL EXAM:    GENERAL: [x]NAD, on aerosol mask, saturating well on 10LPM  HEAD:  [x]Atraumatic, []Normocephalic  EYES: sluggish pupillary response bilaterally, conjunctiva and sclera clear  NECK: [x]Supple, normal appearance, []No JVD; []Normal thyroid; [x]Trachea midline  NERVOUS SYSTEM: awake, nonverbal, noncommunicative  CHEST/LUNG: [x]No chest deformity; []Normal percussion bilaterally; []No rales, rhonchi, wheezing   HEART: [x]Regular rate and rhythm; []No murmurs, rubs, or gallops  ABDOMEN: [x]Soft, Nontender, Nondistended; []Bowel sounds present  EXTREMITIES:  [x]2+ Peripheral Pulses, []No clubbing, cyanosis, or edema  SKIN: [x]No rashes or lesions; []Good capillary refill        LABS:  CBC Full  -  ( 27 Dec 2017 06:54 )  WBC Count : 26.3 K/uL  Hemoglobin : 9.1 g/dL  Hematocrit : 28.2 %  Platelet Count - Automated : 222 K/uL  Mean Cell Volume : 91.0 fl  Mean Cell Hemoglobin : 29.3 pg  Mean Cell Hemoglobin Concentration : 32.2 gm/dL  Auto Neutrophil # : x  Auto Lymphocyte # : x  Auto Monocyte # : x  Auto Eosinophil # : x  Auto Basophil # : x  Auto Neutrophil % : 75.0 %  Auto Lymphocyte % : 5.0 %  Auto Monocyte % : 9.0 %  Auto Eosinophil % : x  Auto Basophil % : x    12-28    141  |  105  |  86<H>  ----------------------------<  262<H>  4.7   |  20<L>  |  6.87<H>    Ca    9.0      28 Dec 2017 06:39  Phos  8.5     12-27  Mg     2.9     12-28        Urinalysis Basic - ( 26 Dec 2017 11:29 )    Color: Yellow / Appearance: Slightly Turbid / S.015 / pH: x  Gluc: x / Ketone: Negative  / Bili: Negative / Urobili: Negative   Blood: x / Protein: 100 / Nitrite: Negative   Leuk Esterase: Moderate / RBC: 5-10 /HPF / WBC >50 /HPF   Sq Epi: x / Non Sq Epi: Few /HPF / Bacteria: Trace /HPF      Culture Results:   No growth to date. ( @ 22:38)  Culture Results:   No growth to date. ( @ 22:38)  Culture Results:   No growth ( @ 17:34)      RADIOLOGY & ADDITIONAL STUDIES REVIEWED:  YES    [ ]GOALS OF CARE DISCUSSION WITH PATIENT/FAMILY/PROXY:    CRITICAL CARE TIME SPENT: 35 minutes    Assessment and Plan:   · Assessment		  52 female nursing home patient with hx of DM, neuropathy, CKD, CHFpEF, GERD, osteomyelitis of jaw, hypothyroidism, hypoparathyroidism, anemia, admitted with acute on chronic renal failure requiring initiation of dialysis, acute respiratory failure requiring intubation on , pneumonia with sepsis, GI bleed, EGD showed non bleeding duodenal ulcer.  Now off pressors.   Dialysis catheter removed  due to fevers and leukocytosis       Problem/Plan - 1:  ·  Problem: Acute respiratory failure.  Plan: Inubated . Pt was hypoxic on BiPAP,  failed NIPPV support  Acute respiratory failure secondary to HAP (elevated procalcitonin) and fluid overload  -Extubated on ,  off propofol sedation, on Aerosol mask, saturating well on 10LMP  -c/w  maxipime and  levaquin , D10, will given 1 more day as per ID Dr. Kaplan.  -previous shiley catheter removed  due to fevers and leukocytosis  -New shiley cath placed on .   -pt afebrile overnight, leukocytosis, repeated UA positive, repeated blood culture and urine culture no growth to date.  -last HD on        Problem/Plan - 2:  ·  Problem: CKD (chronic kidney disease).  Plan: getting HD via shiley.  -pt need permcath  as per Dr. Escoto   -previous shiely catheter removed  due to fevers and leukocytosis  -New shiley cath placed on .   -last HD on       Problem/Plan - 3:  ·  Problem: R/O Upper GI bleed.  Plan: c/w protonix, no recurrent GI Bleed.  s/p 1 PRBC, hb 8.2 after transfusion  EGD showed duodenal ulcer, no active bleeding, gastritis and esophagitis.  biopsy report negative   monitor CBC  c/w protonix 40mg, daily.       Problem/Plan - 4:  ·  Problem: Hypothyroidism.  Plan: c/w home dose LT4 50 mcg daily  TSH wnl.      Problem/Plan - 5:  ·  Problem: CHF (congestive heart failure).  Plan: unknown EF  c/w home cardiac meds   TTE - RV systolic pressure is 49 mm Hg. Mild pulmonary  hypertension.         Problem/Plan - 6:  Problem: Diabetes mellitus. Plan: c/w Lantus 10 units daily , HSS   HbA1c 5.2.     Problem/Plan - 7:  ·  Problem: HTN (hypertension).  Plan: c/w home BP meds.      Problem/Plan - 8:  ·  Problem: Prophylactic measure.  Plan:   c/w DVT ppx with SCD and heparin  c/w GI ppx, on protonix INTERVAL HPI/OVERNIGHT EVENTS: pt afebrile overnight,  extubated on  , on venti mask , 4LMP, saturating well.     PRESSORS: [ ] YES [ x] NO  WHICH:    Antimicrobial:  cefepime  IVPB 1000 milliGRAM(s) IV Intermittent every 24 hours  levoFLOXacin IVPB 250 milliGRAM(s) IV Intermittent every 24 hours    Cardiovascular:    Pulmonary:    Hematalogic:  heparin  Injectable 5000 Unit(s) SubCutaneous every 12 hours    Other:  acetaminophen   Tablet 650 milliGRAM(s) Oral every 6 hours PRN  acetaminophen  Suppository 650 milliGRAM(s) Rectal every 6 hours PRN  ascorbic acid 500 milliGRAM(s) Oral daily  chlorhexidine 4% Liquid 1 Application(s) Topical daily  cinacalcet 30 milliGRAM(s) Oral daily  dextrose 5%. 1000 milliLiter(s) IV Continuous <Continuous>  dextrose 50% Injectable 12.5 Gram(s) IV Push once  dextrose 50% Injectable 25 Gram(s) IV Push once  dextrose 50% Injectable 25 Gram(s) IV Push once  dextrose Gel 1 Dose(s) Oral once PRN  ferrous    sulfate Liquid 300 milliGRAM(s) Enteral Tube daily  folic acid 1 milliGRAM(s) Oral daily  glucagon  Injectable 1 milliGRAM(s) IntraMuscular once PRN  insulin lispro (HumaLOG) corrective regimen sliding scale   SubCutaneous every 6 hours  levothyroxine 50 MICROGram(s) Oral daily  pantoprazole  Injectable 40 milliGRAM(s) IV Push daily  simvastatin 20 milliGRAM(s) Oral at bedtime    acetaminophen   Tablet 650 milliGRAM(s) Oral every 6 hours PRN  acetaminophen  Suppository 650 milliGRAM(s) Rectal every 6 hours PRN  ascorbic acid 500 milliGRAM(s) Oral daily  cefepime  IVPB 1000 milliGRAM(s) IV Intermittent every 24 hours  chlorhexidine 4% Liquid 1 Application(s) Topical daily  cinacalcet 30 milliGRAM(s) Oral daily  dextrose 5%. 1000 milliLiter(s) IV Continuous <Continuous>  dextrose 50% Injectable 12.5 Gram(s) IV Push once  dextrose 50% Injectable 25 Gram(s) IV Push once  dextrose 50% Injectable 25 Gram(s) IV Push once  dextrose Gel 1 Dose(s) Oral once PRN  ferrous    sulfate Liquid 300 milliGRAM(s) Enteral Tube daily  folic acid 1 milliGRAM(s) Oral daily  glucagon  Injectable 1 milliGRAM(s) IntraMuscular once PRN  heparin  Injectable 5000 Unit(s) SubCutaneous every 12 hours  insulin lispro (HumaLOG) corrective regimen sliding scale   SubCutaneous every 6 hours  levoFLOXacin IVPB 250 milliGRAM(s) IV Intermittent every 24 hours  levothyroxine 50 MICROGram(s) Oral daily  pantoprazole  Injectable 40 milliGRAM(s) IV Push daily  simvastatin 20 milliGRAM(s) Oral at bedtime    Drug Dosing Weight  Height (cm): 170 (13 Dec 2017 17:16)  Weight (kg): 57 (13 Dec 2017 17:16)  BMI (kg/m2): 19.7 (13 Dec 2017 17:16)  BSA (m2): 1.66 (13 Dec 2017 17:16)    CENTRAL LINE: [x ] YES [ ] NO  LOCATION:   DATE INSERTED:  REMOVE: [ ] YES [ ] NO  EXPLAIN:    JACOBSON: [ ] YES [ x] NO    DATE INSERTED:  REMOVE:  [ ] YES [ ] NO  EXPLAIN:    A-LINE:  [ ] YES [ x] NO  LOCATION:   DATE INSERTED:  REMOVE:  [ ] YES [ ] NO  EXPLAIN:    ICU Vital Signs Last 24 Hrs  T(C): 37.1 (28 Dec 2017 06:00), Max: 37.1 (27 Dec 2017 10:00)  T(F): 98.8 (28 Dec 2017 06:00), Max: 98.8 (28 Dec 2017 06:00)  HR: 98 (28 Dec 2017 06:00) (90 - 106)  BP: 156/72 (28 Dec 2017 06:00) (109/62 - 162/73)  BP(mean): 92 (28 Dec 2017 06:00) (76 - 95)  ABP: --  ABP(mean): --  RR: 21 (28 Dec 2017 06:00) (17 - 31)  SpO2: 98% (28 Dec 2017 06:00) (94% - 100%)      ABG - ( 27 Dec 2017 04:42 )  pH: 7.45  /  pCO2: 32    /  pO2: 82    / HCO3: 22    / Base Excess: -1.2  /  SaO2: 95                     @ 07:01  -   @ 07:00  --------------------------------------------------------  IN: 655 mL / OUT: 0 mL / NET: 655 mL        Mode: Spontaneous/ CPAP (Continuous Positive Airway Pressure)  FiO2: 50  PEEP: 5  ITime: 1  MAP: 8.5  PIP: 15      PHYSICAL EXAM:    GENERAL: [x]NAD, on aerosol mask, saturating well on 10LPM  HEAD:  [x]Atraumatic, []Normocephalic  EYES: sluggish pupillary response bilaterally, conjunctiva and sclera clear  NECK: [x]Supple, normal appearance, []No JVD; []Normal thyroid; [x]Trachea midline  NERVOUS SYSTEM: awake, nonverbal, noncommunicative  CHEST/LUNG: [x]No chest deformity; []Normal percussion bilaterally; []No rales, rhonchi, wheezing   HEART: [x]Regular rate and rhythm; []No murmurs, rubs, or gallops  ABDOMEN: [x]Soft, Nontender, Nondistended; []Bowel sounds present  EXTREMITIES:  [x]2+ Peripheral Pulses, []No clubbing, cyanosis, or edema  SKIN: [x]No rashes or lesions; []Good capillary refill        LABS:  CBC Full  -  ( 27 Dec 2017 06:54 )  WBC Count : 26.3 K/uL  Hemoglobin : 9.1 g/dL  Hematocrit : 28.2 %  Platelet Count - Automated : 222 K/uL  Mean Cell Volume : 91.0 fl  Mean Cell Hemoglobin : 29.3 pg  Mean Cell Hemoglobin Concentration : 32.2 gm/dL  Auto Neutrophil # : x  Auto Lymphocyte # : x  Auto Monocyte # : x  Auto Eosinophil # : x  Auto Basophil # : x  Auto Neutrophil % : 75.0 %  Auto Lymphocyte % : 5.0 %  Auto Monocyte % : 9.0 %  Auto Eosinophil % : x  Auto Basophil % : x    12-28    141  |  105  |  86<H>  ----------------------------<  262<H>  4.7   |  20<L>  |  6.87<H>    Ca    9.0      28 Dec 2017 06:39  Phos  8.5     12-27  Mg     2.9     12-28        Urinalysis Basic - ( 26 Dec 2017 11:29 )    Color: Yellow / Appearance: Slightly Turbid / S.015 / pH: x  Gluc: x / Ketone: Negative  / Bili: Negative / Urobili: Negative   Blood: x / Protein: 100 / Nitrite: Negative   Leuk Esterase: Moderate / RBC: 5-10 /HPF / WBC >50 /HPF   Sq Epi: x / Non Sq Epi: Few /HPF / Bacteria: Trace /HPF      Culture Results:   No growth to date. ( @ 22:38)  Culture Results:   No growth to date. ( @ 22:38)  Culture Results:   No growth ( @ 17:34)      RADIOLOGY & ADDITIONAL STUDIES REVIEWED:  YES    [ ]GOALS OF CARE DISCUSSION WITH PATIENT/FAMILY/PROXY:    CRITICAL CARE TIME SPENT: 35 minutes    Assessment and Plan:   · Assessment		  52 female nursing home patient with hx of DM, neuropathy, CKD, CHFpEF, GERD, osteomyelitis of jaw, hypothyroidism, hypoparathyroidism, anemia, admitted with acute on chronic renal failure requiring initiation of dialysis, acute respiratory failure requiring intubation on , pneumonia with sepsis, GI bleed, EGD showed non bleeding duodenal ulcer.  Now off pressors.   Dialysis catheter removed  due to fevers and leukocytosis       Problem/Plan - 1:  ·  Problem: Acute respiratory failure.  Plan: Inubated . Pt was hypoxic on BiPAP,  failed NIPPV support  Acute respiratory failure secondary to HAP (elevated procalcitonin) and fluid overload  -Extubated on ,  off propofol sedation, on venti mask, saturating well on 4 LMP  -c/w  maxipime and  levaquin , D10, will given 1 more day as per ID Dr. Kaplan.  -previous shiley catheter removed  due to fevers and leukocytosis  -New shiley cath placed on .   -pt afebrile overnight, leukocytosis WBC 26->26, stable, repeated UA positive, repeated blood culture and urine culture no growth to date.  -last HD on        Problem/Plan - 2:  ·  Problem: CKD (chronic kidney disease).  Plan: getting HD via shiley.  -pt need permcath  as per Dr. Escoto   -previous shiely catheter removed  due to fevers and leukocytosis  -New shiley cath placed on .   -last HD on       Problem/Plan - 3:  ·  Problem: R/O Upper GI bleed.  Plan: c/w protonix, no recurrent GI Bleed.  s/p 1 PRBC, hb 8.2 after transfusion  EGD showed duodenal ulcer, no active bleeding, gastritis and esophagitis.  biopsy report negative   monitor CBC  c/w protonix 40mg, daily.       Problem/Plan - 4:  ·  Problem: Hypothyroidism.  Plan: c/w home dose LT4 50 mcg daily  TSH wnl.      Problem/Plan - 5:  ·  Problem: CHF (congestive heart failure).  Plan: unknown EF  c/w home cardiac meds   TTE - RV systolic pressure is 49 mm Hg. Mild pulmonary  hypertension.         Problem/Plan - 6:  Problem: Diabetes mellitus. Plan: c/w Lantus 10 units daily , HSS   HbA1c 5.2.     Problem/Plan - 7:  ·  Problem: HTN (hypertension).  Plan: c/w home BP meds.      Problem/Plan - 8:  ·  Problem: Prophylactic measure.  Plan:   c/w DVT ppx with SCD and heparin  c/w GI ppx, on protonix INTERVAL HPI/OVERNIGHT EVENTS: pt afebrile overnight,  extubated on  , on venti mask , 4LMP, saturating well.     PRESSORS: [ ] YES [ x] NO  WHICH:    Antimicrobial:  cefepime  IVPB 1000 milliGRAM(s) IV Intermittent every 24 hours  levoFLOXacin IVPB 250 milliGRAM(s) IV Intermittent every 24 hours    Cardiovascular:    Pulmonary:    Hematalogic:  heparin  Injectable 5000 Unit(s) SubCutaneous every 12 hours    Other:  acetaminophen   Tablet 650 milliGRAM(s) Oral every 6 hours PRN  acetaminophen  Suppository 650 milliGRAM(s) Rectal every 6 hours PRN  ascorbic acid 500 milliGRAM(s) Oral daily  chlorhexidine 4% Liquid 1 Application(s) Topical daily  cinacalcet 30 milliGRAM(s) Oral daily  dextrose 5%. 1000 milliLiter(s) IV Continuous <Continuous>  dextrose 50% Injectable 12.5 Gram(s) IV Push once  dextrose 50% Injectable 25 Gram(s) IV Push once  dextrose 50% Injectable 25 Gram(s) IV Push once  dextrose Gel 1 Dose(s) Oral once PRN  ferrous    sulfate Liquid 300 milliGRAM(s) Enteral Tube daily  folic acid 1 milliGRAM(s) Oral daily  glucagon  Injectable 1 milliGRAM(s) IntraMuscular once PRN  insulin lispro (HumaLOG) corrective regimen sliding scale   SubCutaneous every 6 hours  levothyroxine 50 MICROGram(s) Oral daily  pantoprazole  Injectable 40 milliGRAM(s) IV Push daily  simvastatin 20 milliGRAM(s) Oral at bedtime    acetaminophen   Tablet 650 milliGRAM(s) Oral every 6 hours PRN  acetaminophen  Suppository 650 milliGRAM(s) Rectal every 6 hours PRN  ascorbic acid 500 milliGRAM(s) Oral daily  cefepime  IVPB 1000 milliGRAM(s) IV Intermittent every 24 hours  chlorhexidine 4% Liquid 1 Application(s) Topical daily  cinacalcet 30 milliGRAM(s) Oral daily  dextrose 5%. 1000 milliLiter(s) IV Continuous <Continuous>  dextrose 50% Injectable 12.5 Gram(s) IV Push once  dextrose 50% Injectable 25 Gram(s) IV Push once  dextrose 50% Injectable 25 Gram(s) IV Push once  dextrose Gel 1 Dose(s) Oral once PRN  ferrous    sulfate Liquid 300 milliGRAM(s) Enteral Tube daily  folic acid 1 milliGRAM(s) Oral daily  glucagon  Injectable 1 milliGRAM(s) IntraMuscular once PRN  heparin  Injectable 5000 Unit(s) SubCutaneous every 12 hours  insulin lispro (HumaLOG) corrective regimen sliding scale   SubCutaneous every 6 hours  levoFLOXacin IVPB 250 milliGRAM(s) IV Intermittent every 24 hours  levothyroxine 50 MICROGram(s) Oral daily  pantoprazole  Injectable 40 milliGRAM(s) IV Push daily  simvastatin 20 milliGRAM(s) Oral at bedtime    Drug Dosing Weight  Height (cm): 170 (13 Dec 2017 17:16)  Weight (kg): 57 (13 Dec 2017 17:16)  BMI (kg/m2): 19.7 (13 Dec 2017 17:16)  BSA (m2): 1.66 (13 Dec 2017 17:16)    CENTRAL LINE: [x ] YES [ ] NO  LOCATION:   DATE INSERTED:  REMOVE: [ ] YES [ ] NO  EXPLAIN:    JACOBSON: [ ] YES [ x] NO    DATE INSERTED:  REMOVE:  [ ] YES [ ] NO  EXPLAIN:    A-LINE:  [ ] YES [ x] NO  LOCATION:   DATE INSERTED:  REMOVE:  [ ] YES [ ] NO  EXPLAIN:    ICU Vital Signs Last 24 Hrs  T(C): 37.1 (28 Dec 2017 06:00), Max: 37.1 (27 Dec 2017 10:00)  T(F): 98.8 (28 Dec 2017 06:00), Max: 98.8 (28 Dec 2017 06:00)  HR: 98 (28 Dec 2017 06:00) (90 - 106)  BP: 156/72 (28 Dec 2017 06:00) (109/62 - 162/73)  BP(mean): 92 (28 Dec 2017 06:00) (76 - 95)  ABP: --  ABP(mean): --  RR: 21 (28 Dec 2017 06:00) (17 - 31)  SpO2: 98% (28 Dec 2017 06:00) (94% - 100%)      ABG - ( 27 Dec 2017 04:42 )  pH: 7.45  /  pCO2: 32    /  pO2: 82    / HCO3: 22    / Base Excess: -1.2  /  SaO2: 95                     @ 07:01  -   @ 07:00  --------------------------------------------------------  IN: 655 mL / OUT: 0 mL / NET: 655 mL        Mode: Spontaneous/ CPAP (Continuous Positive Airway Pressure)  FiO2: 50  PEEP: 5  ITime: 1  MAP: 8.5  PIP: 15      PHYSICAL EXAM:    GENERAL: [x]NAD, on aerosol mask, saturating well on 10LPM  HEAD:  [x]Atraumatic, []Normocephalic  EYES: sluggish pupillary response bilaterally, conjunctiva and sclera clear  NECK: [x]Supple, normal appearance, []No JVD; []Normal thyroid; [x]Trachea midline  NERVOUS SYSTEM: awake, nonverbal, noncommunicative  CHEST/LUNG: [x]No chest deformity; []Normal percussion bilaterally; []No rales, rhonchi, wheezing   HEART: [x]Regular rate and rhythm; []No murmurs, rubs, or gallops  ABDOMEN: [x]Soft, Nontender, Nondistended; []Bowel sounds present  EXTREMITIES:  [x]2+ Peripheral Pulses, []No clubbing, cyanosis, or edema  SKIN: [x]No rashes or lesions; []Good capillary refill        LABS:  CBC Full  -  ( 27 Dec 2017 06:54 )  WBC Count : 26.3 K/uL  Hemoglobin : 9.1 g/dL  Hematocrit : 28.2 %  Platelet Count - Automated : 222 K/uL  Mean Cell Volume : 91.0 fl  Mean Cell Hemoglobin : 29.3 pg  Mean Cell Hemoglobin Concentration : 32.2 gm/dL  Auto Neutrophil # : x  Auto Lymphocyte # : x  Auto Monocyte # : x  Auto Eosinophil # : x  Auto Basophil # : x  Auto Neutrophil % : 75.0 %  Auto Lymphocyte % : 5.0 %  Auto Monocyte % : 9.0 %  Auto Eosinophil % : x  Auto Basophil % : x    12-28    141  |  105  |  86<H>  ----------------------------<  262<H>  4.7   |  20<L>  |  6.87<H>    Ca    9.0      28 Dec 2017 06:39  Phos  8.5     12-27  Mg     2.9     12-28        Urinalysis Basic - ( 26 Dec 2017 11:29 )    Color: Yellow / Appearance: Slightly Turbid / S.015 / pH: x  Gluc: x / Ketone: Negative  / Bili: Negative / Urobili: Negative   Blood: x / Protein: 100 / Nitrite: Negative   Leuk Esterase: Moderate / RBC: 5-10 /HPF / WBC >50 /HPF   Sq Epi: x / Non Sq Epi: Few /HPF / Bacteria: Trace /HPF      Culture Results:   No growth to date. ( @ 22:38)  Culture Results:   No growth to date. ( @ 22:38)  Culture Results:   No growth ( @ 17:34)      RADIOLOGY & ADDITIONAL STUDIES REVIEWED:  YES    [ ]GOALS OF CARE DISCUSSION WITH PATIENT/FAMILY/PROXY:    CRITICAL CARE TIME SPENT: 35 minutes    Assessment and Plan:   · Assessment		  52 female nursing home patient with hx of DM, neuropathy, CKD, CHFpEF, GERD, osteomyelitis of jaw, hypothyroidism, hypoparathyroidism, anemia, admitted with acute on chronic renal failure requiring initiation of dialysis, acute respiratory failure requiring intubation on , pneumonia with sepsis, GI bleed, EGD showed non bleeding duodenal ulcer.  Now off pressors.   Dialysis catheter removed  due to fevers and leukocytosis       Problem/Plan - 1:  ·  Problem: Acute respiratory failure.  Plan: Inubated . Pt was hypoxic on BiPAP,  failed NIPPV support  Acute respiratory failure secondary to HAP (elevated procalcitonin) and fluid overload  -Extubated on ,  off propofol sedation, on venti mask, saturating well on 4 LMP  -c/w  maxipime and  levaquin , D10, will given 1 more day as per ID Dr. Kaplan.  -previous shiley catheter removed  due to fevers and leukocytosis  -New shiley cath placed on .   -pt afebrile overnight, leukocytosis WBC 26->26, stable, repeated UA positive, repeated blood culture and urine culture no growth to date.  -last HD on   -will HD today, and will give procrit on Saturday       Problem/Plan - 2:  ·  Problem: CKD (chronic kidney disease).  Plan: getting HD via shiley.  -pt need permcath  as per Dr. Escoto   -previous shiely catheter removed  due to fevers and leukocytosis  -New shiley cath placed on .   -last HD on  -will HD today, and will give procrit on Saturday       Problem/Plan - 3:  ·  Problem: R/O Upper GI bleed.  Plan: c/w protonix, no recurrent GI Bleed.  s/p 1 PRBC, hb 8.2 after transfusion  EGD showed duodenal ulcer, no active bleeding, gastritis and esophagitis.  biopsy report negative   monitor CBC  c/w protonix 40mg, daily.       Problem/Plan - 4:  ·  Problem: Hypothyroidism.  Plan: c/w home dose LT4 50 mcg daily  TSH wnl.      Problem/Plan - 5:  ·  Problem: CHF (congestive heart failure).  Plan: unknown EF  c/w home cardiac meds   TTE - RV systolic pressure is 49 mm Hg. Mild pulmonary  hypertension.         Problem/Plan - 6:  Problem: Diabetes mellitus. Plan: c/w Lantus 10 units daily , HSS   HbA1c 5.2.     Problem/Plan - 7:  ·  Problem: HTN (hypertension).  Plan: c/w home BP meds.      Problem/Plan - 8:  ·  Problem: Prophylactic measure.  Plan:   c/w DVT ppx with SCD and heparin  c/w GI ppx, on protonix

## 2017-12-28 NOTE — PROGRESS NOTE ADULT - ASSESSMENT
52 yr old female with  ESRD. S/P HD yesterday. EXTUBATED. fluid overloaded.  HD again today   afebrile.  cont abx  con sensipar for renal steodystrophy  procrit on on saturday

## 2017-12-28 NOTE — PHYSICAL THERAPY INITIAL EVALUATION ADULT - LIVES WITH, PROFILE
came from nursing home where she was receiving skilled rehab services
As per IE, pt. came from nursing home where she was receiving skilled rehab services

## 2017-12-28 NOTE — PHYSICAL THERAPY INITIAL EVALUATION ADULT - IMPAIRMENTS CONTRIBUTING IMPAIRED BED MOBILITY, REHAB EVAL
decreased ROM/decreased strength/decreased flexibility
impaired postural control/cognition/decreased ROM/decreased strength/impaired balance

## 2017-12-28 NOTE — PHYSICAL THERAPY INITIAL EVALUATION ADULT - BED MOBILITY LIMITATIONS, REHAB EVAL
impaired ability to control trunk for mobility/decreased ability to use arms for pushing/pulling/decreased ability to use legs for bridging/pushing
decreased ability to use arms for pushing/pulling/impaired ability to control trunk for mobility/decreased ability to use legs for bridging/pushing

## 2017-12-29 LAB
ANION GAP SERPL CALC-SCNC: 16 MMOL/L — SIGNIFICANT CHANGE UP (ref 5–17)
APTT BLD: 28.4 SEC — SIGNIFICANT CHANGE UP (ref 27.5–37.4)
BASOPHILS # BLD AUTO: 0.2 K/UL — SIGNIFICANT CHANGE UP (ref 0–0.2)
BASOPHILS NFR BLD AUTO: 0.7 % — SIGNIFICANT CHANGE UP (ref 0–2)
BUN SERPL-MCNC: 93 MG/DL — HIGH (ref 7–18)
CALCIUM SERPL-MCNC: 9.1 MG/DL — SIGNIFICANT CHANGE UP (ref 8.4–10.5)
CHLORIDE SERPL-SCNC: 105 MMOL/L — SIGNIFICANT CHANGE UP (ref 96–108)
CO2 SERPL-SCNC: 20 MMOL/L — LOW (ref 22–31)
CREAT SERPL-MCNC: 7.16 MG/DL — HIGH (ref 0.5–1.3)
EOSINOPHIL # BLD AUTO: 0.1 K/UL — SIGNIFICANT CHANGE UP (ref 0–0.5)
EOSINOPHIL NFR BLD AUTO: 0.5 % — SIGNIFICANT CHANGE UP (ref 0–6)
GLUCOSE BLDC GLUCOMTR-MCNC: 254 MG/DL — HIGH (ref 70–99)
GLUCOSE BLDC GLUCOMTR-MCNC: 263 MG/DL — HIGH (ref 70–99)
GLUCOSE BLDC GLUCOMTR-MCNC: 290 MG/DL — HIGH (ref 70–99)
GLUCOSE BLDC GLUCOMTR-MCNC: 292 MG/DL — HIGH (ref 70–99)
GLUCOSE BLDC GLUCOMTR-MCNC: 296 MG/DL — HIGH (ref 70–99)
GLUCOSE BLDC GLUCOMTR-MCNC: 364 MG/DL — HIGH (ref 70–99)
GLUCOSE SERPL-MCNC: 241 MG/DL — HIGH (ref 70–99)
HCT VFR BLD CALC: 34.4 % — LOW (ref 34.5–45)
HGB BLD-MCNC: 11.3 G/DL — LOW (ref 11.5–15.5)
INR BLD: 1.38 RATIO — HIGH (ref 0.88–1.16)
LYMPHOCYTES # BLD AUTO: 1.3 K/UL — SIGNIFICANT CHANGE UP (ref 1–3.3)
LYMPHOCYTES # BLD AUTO: 5.5 % — LOW (ref 13–44)
MAGNESIUM SERPL-MCNC: 3 MG/DL — HIGH (ref 1.6–2.6)
MCHC RBC-ENTMCNC: 30.6 PG — SIGNIFICANT CHANGE UP (ref 27–34)
MCHC RBC-ENTMCNC: 32.9 GM/DL — SIGNIFICANT CHANGE UP (ref 32–36)
MCV RBC AUTO: 93.1 FL — SIGNIFICANT CHANGE UP (ref 80–100)
MONOCYTES # BLD AUTO: 1.6 K/UL — HIGH (ref 0–0.9)
MONOCYTES NFR BLD AUTO: 7 % — SIGNIFICANT CHANGE UP (ref 2–14)
NEUTROPHILS # BLD AUTO: 20.2 K/UL — HIGH (ref 1.8–7.4)
NEUTROPHILS NFR BLD AUTO: 86.2 % — HIGH (ref 43–77)
PHOSPHATE SERPL-MCNC: 8.4 MG/DL — HIGH (ref 2.5–4.5)
PLATELET # BLD AUTO: 325 K/UL — SIGNIFICANT CHANGE UP (ref 150–400)
POTASSIUM SERPL-MCNC: 4.9 MMOL/L — SIGNIFICANT CHANGE UP (ref 3.5–5.3)
POTASSIUM SERPL-SCNC: 4.9 MMOL/L — SIGNIFICANT CHANGE UP (ref 3.5–5.3)
PROTHROM AB SERPL-ACNC: 15.1 SEC — HIGH (ref 9.8–12.7)
RBC # BLD: 3.7 M/UL — LOW (ref 3.8–5.2)
RBC # FLD: 15.4 % — HIGH (ref 10.3–14.5)
SODIUM SERPL-SCNC: 141 MMOL/L — SIGNIFICANT CHANGE UP (ref 135–145)
WBC # BLD: 23.5 K/UL — HIGH (ref 3.8–10.5)
WBC # FLD AUTO: 23.5 K/UL — HIGH (ref 3.8–10.5)

## 2017-12-29 PROCEDURE — 71010: CPT | Mod: 26

## 2017-12-29 RX ORDER — ERYTHROPOIETIN 10000 [IU]/ML
10000 INJECTION, SOLUTION INTRAVENOUS; SUBCUTANEOUS
Qty: 0 | Refills: 0 | Status: DISCONTINUED | OUTPATIENT
Start: 2017-12-29 | End: 2017-12-30

## 2017-12-29 RX ORDER — HEPARIN SODIUM 5000 [USP'U]/ML
5000 INJECTION INTRAVENOUS; SUBCUTANEOUS EVERY 12 HOURS
Qty: 0 | Refills: 0 | Status: DISCONTINUED | OUTPATIENT
Start: 2017-12-29 | End: 2018-01-29

## 2017-12-29 RX ADMIN — Medication 300 MILLIGRAM(S): at 14:35

## 2017-12-29 RX ADMIN — Medication 50 MICROGRAM(S): at 05:59

## 2017-12-29 RX ADMIN — ERYTHROPOIETIN 10000 UNIT(S): 10000 INJECTION, SOLUTION INTRAVENOUS; SUBCUTANEOUS at 12:02

## 2017-12-29 RX ADMIN — Medication: at 11:24

## 2017-12-29 RX ADMIN — CINACALCET 30 MILLIGRAM(S): 30 TABLET, FILM COATED ORAL at 14:39

## 2017-12-29 RX ADMIN — PANTOPRAZOLE SODIUM 40 MILLIGRAM(S): 20 TABLET, DELAYED RELEASE ORAL at 14:33

## 2017-12-29 RX ADMIN — Medication 3: at 00:06

## 2017-12-29 RX ADMIN — SIMVASTATIN 20 MILLIGRAM(S): 20 TABLET, FILM COATED ORAL at 22:00

## 2017-12-29 RX ADMIN — Medication 1 MILLIGRAM(S): at 14:33

## 2017-12-29 RX ADMIN — Medication 3: at 06:22

## 2017-12-29 RX ADMIN — Medication 3: at 18:06

## 2017-12-29 RX ADMIN — CEFEPIME 100 MILLIGRAM(S): 1 INJECTION, POWDER, FOR SOLUTION INTRAMUSCULAR; INTRAVENOUS at 16:54

## 2017-12-29 RX ADMIN — Medication 500 MILLIGRAM(S): at 14:34

## 2017-12-29 RX ADMIN — HEPARIN SODIUM 5000 UNIT(S): 5000 INJECTION INTRAVENOUS; SUBCUTANEOUS at 18:06

## 2017-12-29 RX ADMIN — HEPARIN SODIUM 5000 UNIT(S): 5000 INJECTION INTRAVENOUS; SUBCUTANEOUS at 05:59

## 2017-12-29 NOTE — CHART NOTE - NSCHARTNOTEFT_GEN_A_CORE
left femoral shiley was removed. Pressure was held for 15 minutes. no bleeding encountered. pressure dressing applied.

## 2017-12-29 NOTE — PROGRESS NOTE ADULT - SUBJECTIVE AND OBJECTIVE BOX
INTERVAL HPI/OVERNIGHT EVENTS: pt had afebrile overnight. Half Dialysis (1.5 hrs) yesterday, blood flow very low as per HD nurse and so    PRESSORS: [ ] YES [ ] NO  WHICH:    ANTIBIOTICS:                  DATE STARTED:  ANTIBIOTICS:                  DATE STARTED:  ANTIBIOTICS:                  DATE STARTED:    Antimicrobial:  cefepime  IVPB 1000 milliGRAM(s) IV Intermittent every 24 hours  levoFLOXacin IVPB 250 milliGRAM(s) IV Intermittent every 24 hours    Cardiovascular:    Pulmonary:    Hematalogic:  heparin  Injectable 5000 Unit(s) SubCutaneous every 12 hours    Other:  acetaminophen   Tablet 650 milliGRAM(s) Oral every 6 hours PRN  acetaminophen  Suppository 650 milliGRAM(s) Rectal every 6 hours PRN  ascorbic acid 500 milliGRAM(s) Oral daily  chlorhexidine 4% Liquid 1 Application(s) Topical daily  cinacalcet 30 milliGRAM(s) Oral daily  dextrose 5%. 1000 milliLiter(s) IV Continuous <Continuous>  dextrose 50% Injectable 12.5 Gram(s) IV Push once  dextrose 50% Injectable 25 Gram(s) IV Push once  dextrose 50% Injectable 25 Gram(s) IV Push once  dextrose Gel 1 Dose(s) Oral once PRN  ferrous    sulfate Liquid 300 milliGRAM(s) Enteral Tube daily  folic acid 1 milliGRAM(s) Oral daily  glucagon  Injectable 1 milliGRAM(s) IntraMuscular once PRN  insulin lispro (HumaLOG) corrective regimen sliding scale   SubCutaneous every 6 hours  levothyroxine 50 MICROGram(s) Oral daily  pantoprazole  Injectable 40 milliGRAM(s) IV Push daily  simvastatin 20 milliGRAM(s) Oral at bedtime    acetaminophen   Tablet 650 milliGRAM(s) Oral every 6 hours PRN  acetaminophen  Suppository 650 milliGRAM(s) Rectal every 6 hours PRN  ascorbic acid 500 milliGRAM(s) Oral daily  cefepime  IVPB 1000 milliGRAM(s) IV Intermittent every 24 hours  chlorhexidine 4% Liquid 1 Application(s) Topical daily  cinacalcet 30 milliGRAM(s) Oral daily  dextrose 5%. 1000 milliLiter(s) IV Continuous <Continuous>  dextrose 50% Injectable 12.5 Gram(s) IV Push once  dextrose 50% Injectable 25 Gram(s) IV Push once  dextrose 50% Injectable 25 Gram(s) IV Push once  dextrose Gel 1 Dose(s) Oral once PRN  ferrous    sulfate Liquid 300 milliGRAM(s) Enteral Tube daily  folic acid 1 milliGRAM(s) Oral daily  glucagon  Injectable 1 milliGRAM(s) IntraMuscular once PRN  heparin  Injectable 5000 Unit(s) SubCutaneous every 12 hours  insulin lispro (HumaLOG) corrective regimen sliding scale   SubCutaneous every 6 hours  levoFLOXacin IVPB 250 milliGRAM(s) IV Intermittent every 24 hours  levothyroxine 50 MICROGram(s) Oral daily  pantoprazole  Injectable 40 milliGRAM(s) IV Push daily  simvastatin 20 milliGRAM(s) Oral at bedtime    Drug Dosing Weight  Height (cm): 170 (13 Dec 2017 17:16)  Weight (kg): 57 (13 Dec 2017 17:16)  BMI (kg/m2): 19.7 (13 Dec 2017 17:16)  BSA (m2): 1.66 (13 Dec 2017 17:16)    CENTRAL LINE: [ ] YES [ ] NO  LOCATION:   DATE INSERTED:  REMOVE: [ ] YES [ ] NO  EXPLAIN:    JACOBSON: [ ] YES [ ] NO    DATE INSERTED:  REMOVE:  [ ] YES [ ] NO  EXPLAIN:    A-LINE:  [ ] YES [ ] NO  LOCATION:   DATE INSERTED:  REMOVE:  [ ] YES [ ] NO  EXPLAIN:    PMH -reviewed admission note, no change since admission  Heart faliure: acute [ ] chronic [ ] acute or chronic [ ] diastolic [ ] systolic [ ] combied systolic and diastolic[ ]  RONAL: ATN[ ] renal medullary necrosis [ ] CKD I [ ]CKDII [ ]CKD III [ ]CKD IV [ ]CKD V [ ]Other pathological lesions [ ]  Abdominal Nutrition Status: malnutrition [ ] cachexia [ ] morbid obesity/BMI=40 [ ] Supplement ordered [___________]     ICU Vital Signs Last 24 Hrs  T(C): 36.6 (29 Dec 2017 07:46), Max: 37 (28 Dec 2017 08:00)  T(F): 97.8 (29 Dec 2017 07:46), Max: 98.6 (28 Dec 2017 08:00)  HR: 115 (29 Dec 2017 07:46) (97 - 119)  BP: 170/72 (29 Dec 2017 07:00) (118/54 - 192/93)  BP(mean): 97 (29 Dec 2017 07:00) (77 - 118)  ABP: --  ABP(mean): --  RR: 30 (29 Dec 2017 07:46) (22 - 35)  SpO2: 97% (29 Dec 2017 07:46) (90% - 99%)            12-28 @ 07:01  -  12-29 @ 07:00  --------------------------------------------------------  IN: 1030 mL / OUT: 0 mL / NET: 1030 mL            PHYSICAL EXAM:    GENERAL: NAD, well-groomed, well-developed  HEAD:  Atraumatic, Normocephalic  EYES: EOMI, PERRLA, conjunctiva and sclera clear  ENMT: No tonsillar erythema, exudates, or enlargement; Moist mucous membranes, Good dentition, No lesions  NECK: Supple, normal appearance, No JVD; Normal thyroid; Trachea midline  NERVOUS SYSTEM:  Alert & Oriented X3, Good concentration; Motor Strength 5/5 B/L upper and lower extremities; DTRs 2+ intact and symmetric  CHEST/LUNG: No chest deformity; Normal percussion bilaterally; No rales, rhonchi, wheezing   HEART: Regular rate and rhythm; No murmurs, rubs, or gallops  ABDOMEN: Soft, Nontender, Nondistended; Bowel sounds present  EXTREMITIES:  2+ Peripheral Pulses, No clubbing, cyanosis, or edema  LYMPH: No lymphadenopathy noted  SKIN: No rashes or lesions; Good capillary refill      LABS:  CBC Full  -  ( 28 Dec 2017 06:39 )  WBC Count : 26.3 K/uL  Hemoglobin : 10.2 g/dL  Hematocrit : 26.5 %  Platelet Count - Automated : 283 K/uL  Mean Cell Volume : 92.5 fl  Mean Cell Hemoglobin : 35.7 pg  Mean Cell Hemoglobin Concentration : 38.6 gm/dL  Auto Neutrophil # : 22.6 K/uL  Auto Lymphocyte # : 1.5 K/uL  Auto Monocyte # : 2.0 K/uL  Auto Eosinophil # : 0.1 K/uL  Auto Basophil # : 0.2 K/uL  Auto Neutrophil % : 85.9 %  Auto Lymphocyte % : 5.6 %  Auto Monocyte % : 7.4 %  Auto Eosinophil % : 0.3 %  Auto Basophil % : 0.8 %    12-29    141  |  105  |  93<H>  ----------------------------<  241<H>  4.9   |  20<L>  |  7.16<H>    Ca    9.1      29 Dec 2017 06:57  Phos  8.5     12-28  Mg     3.0     12-29          Culture Results:   No growth to date. (12-26 @ 22:38)  Culture Results:   No growth to date. (12-26 @ 22:38)  Culture Results:   No growth (12-26 @ 17:34)      RADIOLOGY & ADDITIONAL STUDIES REVIEWED:  ***    [ ]GOALS OF CARE DISCUSSION WITH PATIENT/FAMILY/PROXY:    CRITICAL CARE TIME SPENT: 35 minutes INTERVAL HPI/OVERNIGHT EVENTS: pt had afebrile overnight. Half Dialysis (1.5 hrs) yesterday, blood flow very low as per HD nurse and called , recommended stop dialysis and if shiley cath still does not work, Shiley cathter need to be changed. Called Surgery, will assess shiley cath this am.      PRESSORS: [ ] YES [ x] NO  WHICH:    Antimicrobial:  cefepime  IVPB 1000 milliGRAM(s) IV Intermittent every 24 hours  levoFLOXacin IVPB 250 milliGRAM(s) IV Intermittent every 24 hours    Cardiovascular:    Pulmonary:    Hematalogic:  heparin  Injectable 5000 Unit(s) SubCutaneous every 12 hours    Other:  acetaminophen   Tablet 650 milliGRAM(s) Oral every 6 hours PRN  acetaminophen  Suppository 650 milliGRAM(s) Rectal every 6 hours PRN  ascorbic acid 500 milliGRAM(s) Oral daily  chlorhexidine 4% Liquid 1 Application(s) Topical daily  cinacalcet 30 milliGRAM(s) Oral daily  dextrose 5%. 1000 milliLiter(s) IV Continuous <Continuous>  dextrose 50% Injectable 12.5 Gram(s) IV Push once  dextrose 50% Injectable 25 Gram(s) IV Push once  dextrose 50% Injectable 25 Gram(s) IV Push once  dextrose Gel 1 Dose(s) Oral once PRN  ferrous    sulfate Liquid 300 milliGRAM(s) Enteral Tube daily  folic acid 1 milliGRAM(s) Oral daily  glucagon  Injectable 1 milliGRAM(s) IntraMuscular once PRN  insulin lispro (HumaLOG) corrective regimen sliding scale   SubCutaneous every 6 hours  levothyroxine 50 MICROGram(s) Oral daily  pantoprazole  Injectable 40 milliGRAM(s) IV Push daily  simvastatin 20 milliGRAM(s) Oral at bedtime    acetaminophen   Tablet 650 milliGRAM(s) Oral every 6 hours PRN  acetaminophen  Suppository 650 milliGRAM(s) Rectal every 6 hours PRN  ascorbic acid 500 milliGRAM(s) Oral daily  cefepime  IVPB 1000 milliGRAM(s) IV Intermittent every 24 hours  chlorhexidine 4% Liquid 1 Application(s) Topical daily  cinacalcet 30 milliGRAM(s) Oral daily  dextrose 5%. 1000 milliLiter(s) IV Continuous <Continuous>  dextrose 50% Injectable 12.5 Gram(s) IV Push once  dextrose 50% Injectable 25 Gram(s) IV Push once  dextrose 50% Injectable 25 Gram(s) IV Push once  dextrose Gel 1 Dose(s) Oral once PRN  ferrous    sulfate Liquid 300 milliGRAM(s) Enteral Tube daily  folic acid 1 milliGRAM(s) Oral daily  glucagon  Injectable 1 milliGRAM(s) IntraMuscular once PRN  heparin  Injectable 5000 Unit(s) SubCutaneous every 12 hours  insulin lispro (HumaLOG) corrective regimen sliding scale   SubCutaneous every 6 hours  levoFLOXacin IVPB 250 milliGRAM(s) IV Intermittent every 24 hours  levothyroxine 50 MICROGram(s) Oral daily  pantoprazole  Injectable 40 milliGRAM(s) IV Push daily  simvastatin 20 milliGRAM(s) Oral at bedtime    Drug Dosing Weight  Height (cm): 170 (13 Dec 2017 17:16)  Weight (kg): 57 (13 Dec 2017 17:16)  BMI (kg/m2): 19.7 (13 Dec 2017 17:16)  BSA (m2): 1.66 (13 Dec 2017 17:16)    CENTRAL LINE: [x ] YES [ ] NO  LOCATION:   DATE INSERTED:  REMOVE: [ ] YES [ ] NO  EXPLAIN:    JACOBSON: [ ] YES [ x] NO    DATE INSERTED:  REMOVE:  [ ] YES [ ] NO  EXPLAIN:    A-LINE:  [ ] YES [x ] NO  LOCATION:   DATE INSERTED:  REMOVE:  [ ] YES [ ] NO  EXPLAIN:      ICU Vital Signs Last 24 Hrs  T(C): 36.6 (29 Dec 2017 07:46), Max: 37 (28 Dec 2017 08:00)  T(F): 97.8 (29 Dec 2017 07:46), Max: 98.6 (28 Dec 2017 08:00)  HR: 115 (29 Dec 2017 07:46) (97 - 119)  BP: 170/72 (29 Dec 2017 07:00) (118/54 - 192/93)  BP(mean): 97 (29 Dec 2017 07:00) (77 - 118)  ABP: --  ABP(mean): --  RR: 30 (29 Dec 2017 07:46) (22 - 35)  SpO2: 97% (29 Dec 2017 07:46) (90% - 99%)            12-28 @ 07:01  -  12-29 @ 07:00  --------------------------------------------------------  IN: 1030 mL / OUT: 0 mL / NET: 1030 mL          PHYSICAL EXAM:    GENERAL: [x]NAD, on venti mask  HEAD:  [x]Atraumatic, []Normocephalic  EYES: sluggish pupillary response bilaterally, conjunctiva and sclera clear  NECK: [x]Supple, normal appearance, []No JVD; []Normal thyroid; [x]Trachea midline  NERVOUS SYSTEM: awake, nonverbal, noncommunicative  CHEST/LUNG: [x]No chest deformity; []Normal percussion bilaterally; []No rales, rhonchi, wheezing   HEART: [x]Regular rate and rhythm; []No murmurs, rubs, or gallops  ABDOMEN: [x]Soft, Nontender, Nondistended; []Bowel sounds present  EXTREMITIES:  [x]2+ Peripheral Pulses, []No clubbing, cyanosis, or edema  SKIN: [x]No rashes or lesions; []Good capillary refill      LABS:  CBC Full  -  ( 28 Dec 2017 06:39 )  WBC Count : 26.3 K/uL  Hemoglobin : 10.2 g/dL  Hematocrit : 26.5 %  Platelet Count - Automated : 283 K/uL  Mean Cell Volume : 92.5 fl  Mean Cell Hemoglobin : 35.7 pg  Mean Cell Hemoglobin Concentration : 38.6 gm/dL  Auto Neutrophil # : 22.6 K/uL  Auto Lymphocyte # : 1.5 K/uL  Auto Monocyte # : 2.0 K/uL  Auto Eosinophil # : 0.1 K/uL  Auto Basophil # : 0.2 K/uL  Auto Neutrophil % : 85.9 %  Auto Lymphocyte % : 5.6 %  Auto Monocyte % : 7.4 %  Auto Eosinophil % : 0.3 %  Auto Basophil % : 0.8 %    12-29    141  |  105  |  93<H>  ----------------------------<  241<H>  4.9   |  20<L>  |  7.16<H>    Ca    9.1      29 Dec 2017 06:57  Phos  8.5     12-28  Mg     3.0     12-29          Culture Results:   No growth to date. (12-26 @ 22:38)  Culture Results:   No growth to date. (12-26 @ 22:38)  Culture Results:   No growth (12-26 @ 17:34)      RADIOLOGY & ADDITIONAL STUDIES REVIEWED: yes    [ ]GOALS OF CARE DISCUSSION WITH PATIENT/FAMILY/PROXY:    CRITICAL CARE TIME SPENT: 35 minutes    Assessment and Plan:   · Assessment		  52 female nursing home patient with hx of DM, neuropathy, CKD, CHFpEF, GERD, osteomyelitis of jaw, hypothyroidism, hypoparathyroidism, anemia, admitted with acute on chronic renal failure requiring initiation of dialysis, acute respiratory failure requiring intubation on 12/16, pneumonia with sepsis, GI bleed, EGD showed non bleeding duodenal ulcer.  Now off pressors.   Dialysis catheter removed 12/22 due to fevers and leukocytosis       Problem/Plan - 1:  ·  Problem: Acute respiratory failure.  Plan: Inubated 12/16. Pt was hypoxic on BiPAP,  failed NIPPV support  Acute respiratory failure secondary to HAP (elevated procalcitonin) and fluid overload  -Extubated on 12/27,  off propofol sedation, on venti mask, saturating well on 4 LMP  -c/w  maxipime and  levaquin , D11, will stop after today as per ID Dr. Kaplan.  -previous shiley catheter removed 12/22 due to fevers and leukocytosis  -New shiley cath placed on 12/26.   -pt afebrile overnight, leukocytosis,  repeated UA positive, repeated blood culture and urine culture no growth to date.  -Half Dialysis (1.5 hrs) 12/28, blood flow very low as per HD nurse and called , recommended stop dialysis and if shiley cath still does not work, Shiley cathter need to be changed. Called Surgery, will assess shiley cath this am.         Problem/Plan - 2:  ·  Problem: CKD (chronic kidney disease).  Plan: getting HD via shiley.  -pt need permcath  as per Dr. Escoto   -previous shiely catheter removed 12/22 due to fevers and leukocytosis  -New shiley cath placed on 12/26.   -pt had afebrile overnight. Half Dialysis (1.5 hrs) 12/28, blood flow very low as per HD nurse and called , recommended stop dialysis and if shiley cath still does not work, Shiley cathter need to be changed. Called Surgery, will assess shiley cath this am.         Problem/Plan - 3:  ·  Problem: R/O Upper GI bleed.  Plan: c/w protonix, no recurrent GI Bleed.  s/p 1 PRBC, hb 8.2 after transfusion  EGD showed duodenal ulcer, no active bleeding, gastritis and esophagitis.  biopsy report negative   monitor CBC  c/w protonix 40mg, daily.       Problem/Plan - 4:  ·  Problem: Hypothyroidism.  Plan: c/w home dose LT4 50 mcg daily  TSH wnl.      Problem/Plan - 5:  ·  Problem: CHF (congestive heart failure).  Plan: unknown EF  c/w home cardiac meds   TTE - RV systolic pressure is 49 mm Hg. Mild pulmonary  hypertension.         Problem/Plan - 6:  Problem: Diabetes mellitus. Plan: c/w Lantus 10 units daily , HSS   HbA1c 5.2.     Problem/Plan - 7:  ·  Problem: HTN (hypertension).  Plan: c/w home BP meds.      Problem/Plan - 8:  ·  Problem: Prophylactic measure.  Plan:   c/w DVT ppx with SCD and heparin  c/w GI ppx, on protonix INTERVAL HPI/OVERNIGHT EVENTS: pt had afebrile overnight. Half Dialysis (1.5 hrs) yesterday, blood flow very low as per HD nurse and called , recommended stop dialysis and if shiley cath still does not work, Shiley cathter need to be changed. Called Surgery, will assess shiley cath this am.      PRESSORS: [ ] YES [ x] NO  WHICH:    Antimicrobial:  cefepime  IVPB 1000 milliGRAM(s) IV Intermittent every 24 hours  levoFLOXacin IVPB 250 milliGRAM(s) IV Intermittent every 24 hours    Cardiovascular:    Pulmonary:    Hematalogic:  heparin  Injectable 5000 Unit(s) SubCutaneous every 12 hours    Other:  acetaminophen   Tablet 650 milliGRAM(s) Oral every 6 hours PRN  acetaminophen  Suppository 650 milliGRAM(s) Rectal every 6 hours PRN  ascorbic acid 500 milliGRAM(s) Oral daily  chlorhexidine 4% Liquid 1 Application(s) Topical daily  cinacalcet 30 milliGRAM(s) Oral daily  dextrose 5%. 1000 milliLiter(s) IV Continuous <Continuous>  dextrose 50% Injectable 12.5 Gram(s) IV Push once  dextrose 50% Injectable 25 Gram(s) IV Push once  dextrose 50% Injectable 25 Gram(s) IV Push once  dextrose Gel 1 Dose(s) Oral once PRN  ferrous    sulfate Liquid 300 milliGRAM(s) Enteral Tube daily  folic acid 1 milliGRAM(s) Oral daily  glucagon  Injectable 1 milliGRAM(s) IntraMuscular once PRN  insulin lispro (HumaLOG) corrective regimen sliding scale   SubCutaneous every 6 hours  levothyroxine 50 MICROGram(s) Oral daily  pantoprazole  Injectable 40 milliGRAM(s) IV Push daily  simvastatin 20 milliGRAM(s) Oral at bedtime    acetaminophen   Tablet 650 milliGRAM(s) Oral every 6 hours PRN  acetaminophen  Suppository 650 milliGRAM(s) Rectal every 6 hours PRN  ascorbic acid 500 milliGRAM(s) Oral daily  cefepime  IVPB 1000 milliGRAM(s) IV Intermittent every 24 hours  chlorhexidine 4% Liquid 1 Application(s) Topical daily  cinacalcet 30 milliGRAM(s) Oral daily  dextrose 5%. 1000 milliLiter(s) IV Continuous <Continuous>  dextrose 50% Injectable 12.5 Gram(s) IV Push once  dextrose 50% Injectable 25 Gram(s) IV Push once  dextrose 50% Injectable 25 Gram(s) IV Push once  dextrose Gel 1 Dose(s) Oral once PRN  ferrous    sulfate Liquid 300 milliGRAM(s) Enteral Tube daily  folic acid 1 milliGRAM(s) Oral daily  glucagon  Injectable 1 milliGRAM(s) IntraMuscular once PRN  heparin  Injectable 5000 Unit(s) SubCutaneous every 12 hours  insulin lispro (HumaLOG) corrective regimen sliding scale   SubCutaneous every 6 hours  levoFLOXacin IVPB 250 milliGRAM(s) IV Intermittent every 24 hours  levothyroxine 50 MICROGram(s) Oral daily  pantoprazole  Injectable 40 milliGRAM(s) IV Push daily  simvastatin 20 milliGRAM(s) Oral at bedtime    Drug Dosing Weight  Height (cm): 170 (13 Dec 2017 17:16)  Weight (kg): 57 (13 Dec 2017 17:16)  BMI (kg/m2): 19.7 (13 Dec 2017 17:16)  BSA (m2): 1.66 (13 Dec 2017 17:16)    CENTRAL LINE: [x ] YES [ ] NO  LOCATION:   DATE INSERTED:  REMOVE: [ ] YES [ ] NO  EXPLAIN:    JACOBSON: [ ] YES [ x] NO    DATE INSERTED:  REMOVE:  [ ] YES [ ] NO  EXPLAIN:    A-LINE:  [ ] YES [x ] NO  LOCATION:   DATE INSERTED:  REMOVE:  [ ] YES [ ] NO  EXPLAIN:      ICU Vital Signs Last 24 Hrs  T(C): 36.6 (29 Dec 2017 07:46), Max: 37 (28 Dec 2017 08:00)  T(F): 97.8 (29 Dec 2017 07:46), Max: 98.6 (28 Dec 2017 08:00)  HR: 115 (29 Dec 2017 07:46) (97 - 119)  BP: 170/72 (29 Dec 2017 07:00) (118/54 - 192/93)  BP(mean): 97 (29 Dec 2017 07:00) (77 - 118)  ABP: --  ABP(mean): --  RR: 30 (29 Dec 2017 07:46) (22 - 35)  SpO2: 97% (29 Dec 2017 07:46) (90% - 99%)            12-28 @ 07:01  -  12-29 @ 07:00  --------------------------------------------------------  IN: 1030 mL / OUT: 0 mL / NET: 1030 mL          PHYSICAL EXAM:    GENERAL: [x]NAD, on venti mask  HEAD:  [x]Atraumatic, []Normocephalic  EYES: sluggish pupillary response bilaterally, conjunctiva and sclera clear  NECK: [x]Supple, normal appearance, []No JVD; []Normal thyroid; [x]Trachea midline  NERVOUS SYSTEM: awake, nonverbal, noncommunicative  CHEST/LUNG: [x]No chest deformity; []Normal percussion bilaterally; []No rales, rhonchi, wheezing   HEART: [x]Regular rate and rhythm; []No murmurs, rubs, or gallops  ABDOMEN: [x]Soft, Nontender, Nondistended; []Bowel sounds present  EXTREMITIES:  [x]2+ Peripheral Pulses, []No clubbing, cyanosis, or edema  SKIN: [x]No rashes or lesions; []Good capillary refill      LABS:  CBC Full  -  ( 28 Dec 2017 06:39 )  WBC Count : 26.3 K/uL  Hemoglobin : 10.2 g/dL  Hematocrit : 26.5 %  Platelet Count - Automated : 283 K/uL  Mean Cell Volume : 92.5 fl  Mean Cell Hemoglobin : 35.7 pg  Mean Cell Hemoglobin Concentration : 38.6 gm/dL  Auto Neutrophil # : 22.6 K/uL  Auto Lymphocyte # : 1.5 K/uL  Auto Monocyte # : 2.0 K/uL  Auto Eosinophil # : 0.1 K/uL  Auto Basophil # : 0.2 K/uL  Auto Neutrophil % : 85.9 %  Auto Lymphocyte % : 5.6 %  Auto Monocyte % : 7.4 %  Auto Eosinophil % : 0.3 %  Auto Basophil % : 0.8 %    12-29    141  |  105  |  93<H>  ----------------------------<  241<H>  4.9   |  20<L>  |  7.16<H>    Ca    9.1      29 Dec 2017 06:57  Phos  8.5     12-28  Mg     3.0     12-29          Culture Results:   No growth to date. (12-26 @ 22:38)  Culture Results:   No growth to date. (12-26 @ 22:38)  Culture Results:   No growth (12-26 @ 17:34)      RADIOLOGY & ADDITIONAL STUDIES REVIEWED: yes    [ ]GOALS OF CARE DISCUSSION WITH PATIENT/FAMILY/PROXY:    CRITICAL CARE TIME SPENT: 35 minutes    Assessment and Plan:   · Assessment		  52 female nursing home patient with hx of DM, neuropathy, CKD, CHFpEF, GERD, osteomyelitis of jaw, hypothyroidism, hypoparathyroidism, anemia, admitted with acute on chronic renal failure requiring initiation of dialysis, acute respiratory failure requiring intubation on 12/16, pneumonia with sepsis, GI bleed, EGD showed non bleeding duodenal ulcer.  Now off pressors.   Dialysis catheter removed 12/22 due to fevers and leukocytosis       Problem/Plan - 1:  ·  Problem: Acute respiratory failure.  Plan: Inubated 12/16. Pt was hypoxic on BiPAP,  failed NIPPV support  Acute respiratory failure secondary to HAP (elevated procalcitonin) and fluid overload  -Extubated on 12/27,  off propofol sedation, on venti mask, saturating well on FiO2 40%  -c/w  maxipime and  levaquin , D11, will stop after today as per ID Dr. Kaplan.  -previous shiley catheter removed 12/22 due to fevers and leukocytosis  -New shiley cath placed on 12/26.   -pt afebrile overnight, leukocytosis,  repeated UA positive, repeated blood culture and urine culture no growth to date.  -Half Dialysis (1.5 hrs) 12/28, blood flow very low as per HD nurse and called , recommended stop dialysis and if shiley cath still does not work, Shiley cathter need to be changed. Called Surgery, will assess shiley cath this am.         Problem/Plan - 2:  ·  Problem: CKD (chronic kidney disease).  Plan: getting HD via shiley.  -pt need permcath  as per Dr. Escoto   -previous shiely catheter removed 12/22 due to fevers and leukocytosis  -New shiley cath placed on 12/26.   -pt had afebrile overnight. Half Dialysis (1.5 hrs) 12/28, blood flow very low as per HD nurse and called , recommended stop dialysis and if shiley cath still does not work, Shiley cathter need to be changed. Called Surgery, will assess shiley cath this am.         Problem/Plan - 3:  ·  Problem: R/O Upper GI bleed.  Plan: c/w protonix, no recurrent GI Bleed.  s/p 1 PRBC, hb 8.2 after transfusion  EGD showed duodenal ulcer, no active bleeding, gastritis and esophagitis.  biopsy report negative   monitor CBC  c/w protonix 40mg, daily.       Problem/Plan - 4:  ·  Problem: Hypothyroidism.  Plan: c/w home dose LT4 50 mcg daily  TSH wnl.      Problem/Plan - 5:  ·  Problem: CHF (congestive heart failure).  Plan: unknown EF  c/w home cardiac meds   TTE - RV systolic pressure is 49 mm Hg. Mild pulmonary  hypertension.         Problem/Plan - 6:  Problem: Diabetes mellitus. Plan: c/w Lantus 10 units daily , HSS   HbA1c 5.2.     Problem/Plan - 7:  ·  Problem: HTN (hypertension).  Plan: c/w home BP meds.      Problem/Plan - 8:  ·  Problem: Prophylactic measure.  Plan:   c/w DVT ppx with SCD and heparin  c/w GI ppx, on protonix

## 2017-12-29 NOTE — PROCEDURE NOTE - NSPOSTCAREGUIDE_GEN_A_CORE
Verbal/written post procedure instructions were given to patient/caregiver
Verbal/written post procedure instructions were given to patient/caregiver
Care for catheter as per unit/ICU protocols

## 2017-12-29 NOTE — PROCEDURE NOTE - PROCEDURE
<<-----Click on this checkbox to enter Procedure Insertion of catheter for hemodialysis  12/26/2017    Active  Quita Barahona

## 2017-12-29 NOTE — PROGRESS NOTE ADULT - ASSESSMENT
52 yr old female with  ESRD. Vasc-cath was no functional yesterday. A new groin vasc cath placed today and working well on HD.  fluid overloaded.  re-evaluate volume status in AM.  cont abx  con sensipar for renal osteodystrophy  procrit on on HD

## 2017-12-29 NOTE — PROGRESS NOTE ADULT - ATTENDING COMMENTS
-pat is more awake  -will need new dialysis cath placement, and repeat dialysis today. She had partial dialysis yesteday due to cath malfunction  -continue meds  -taper off VM to nasal canula  -I spoke with daughter and sister yesterday in detail regarding current medical condition

## 2017-12-29 NOTE — CHART NOTE - NSCHARTNOTEFT_GEN_A_CORE
Right Shiley cath was placed by vascular PA this am, then pt had tachycardia  and tachypnea RR 31  during dialysis, increased Venti mask Fio2 from 40% to 50%, saturating well . Left shiley cath was removed after dialysis.  If SO2 or blood pressure goes down, will reintubate pt as Per . Discussed with PGY 3 Dr. Singh.

## 2017-12-29 NOTE — PROGRESS NOTE ADULT - SUBJECTIVE AND OBJECTIVE BOX
seen on HD.  s/p new VASC CATH rt GROIN.  tachycardic on HD     No Known Allergies    Hospital Medications:   MEDICATIONS  (STANDING):  ascorbic acid 500 milliGRAM(s) Oral daily  cefepime  IVPB 1000 milliGRAM(s) IV Intermittent every 24 hours  chlorhexidine 4% Liquid 1 Application(s) Topical daily  cinacalcet 30 milliGRAM(s) Oral daily  dextrose 5%. 1000 milliLiter(s) (50 mL/Hr) IV Continuous <Continuous>  dextrose 50% Injectable 12.5 Gram(s) IV Push once  dextrose 50% Injectable 25 Gram(s) IV Push once  dextrose 50% Injectable 25 Gram(s) IV Push once  epoetin jacqueline Injectable 20994 Unit(s) IV Push <User Schedule>  ferrous    sulfate Liquid 300 milliGRAM(s) Enteral Tube daily  folic acid 1 milliGRAM(s) Oral daily  heparin  Injectable 5000 Unit(s) SubCutaneous every 12 hours  insulin lispro (HumaLOG) corrective regimen sliding scale   SubCutaneous every 6 hours  levoFLOXacin IVPB 250 milliGRAM(s) IV Intermittent every 24 hours  levothyroxine 50 MICROGram(s) Oral daily  pantoprazole  Injectable 40 milliGRAM(s) IV Push daily  simvastatin 20 milliGRAM(s) Oral at bedtime      VITALS:  T(F): 98.8 (17 @ 10:30), Max: 98.8 (17 @ 10:30)  HR: 113 (17 @ 10:30)  BP: 174/95 (17 @ 10:30)  RR: 25 (17 @ 10:30)  SpO2: 97% (17 @ 10:30)  Wt(kg): --     @ 07:  -   @ 07:00  --------------------------------------------------------  IN: 1030 mL / OUT: 0 mL / NET: 1030 mL     @ 07:01  -   @ 12:34  --------------------------------------------------------  IN: 35 mL / OUT: 0 mL / NET: 35 mL        PHYSICAL EXAM:  Constitutional: NAD  HEENT: anicteric sclera, oropharynx clear.  Neck: No JVD  Respiratory: CTAB,  wheezes+, rales+ or rhonchi  Cardiovascular: S1, S2, RRR  Gastrointestinal: BS+, soft, NT/ND  Extremities: No cyanosis or clubbing. No peripheral edema  Neurological: A/O x 3, no focal deficits  Vascular Access: bilateral groin vasc cath'S    LABS:      141  |  105  |  93<H>  ----------------------------<  241<H>  4.9   |  20<L>  |  7.16<H>    Ca    9.1      29 Dec 2017 06:57  Phos  8.4       Mg     3.0           Creatinine Trend: 7.16 <--, 6.87 <--, 7.71 <--, 10.30 <--, 9.33 <--, 7.87 <--, 6.22 <--                        11.3   23.5  )-----------( 325      ( 29 Dec 2017 06:57 )             34.4     Urine Studies:  Urinalysis Basic - ( 26 Dec 2017 11:29 )    Color: Yellow / Appearance: Slightly Turbid / S.015 / pH:   Gluc:  / Ketone: Negative  / Bili: Negative / Urobili: Negative   Blood:  / Protein: 100 / Nitrite: Negative   Leuk Esterase: Moderate / RBC: 5-10 /HPF / WBC >50 /HPF   Sq Epi:  / Non Sq Epi: Few /HPF / Bacteria: Trace /HPF        RADIOLOGY & ADDITIONAL STUDIES:

## 2017-12-29 NOTE — PROCEDURE NOTE - NSPROCDETAILS_GEN_ALL_CORE
guidewire recovered/sterile dressing applied/sterile technique, catheter placed/lumen(s) aspirated and flushed
sterile technique, catheter placed/guidewire recovered/sterile dressing applied/lumen(s) aspirated and flushed

## 2017-12-30 LAB
ANION GAP SERPL CALC-SCNC: 14 MMOL/L — SIGNIFICANT CHANGE UP (ref 5–17)
BUN SERPL-MCNC: 66 MG/DL — HIGH (ref 7–18)
CALCIUM SERPL-MCNC: 9.6 MG/DL — SIGNIFICANT CHANGE UP (ref 8.4–10.5)
CHLORIDE SERPL-SCNC: 102 MMOL/L — SIGNIFICANT CHANGE UP (ref 96–108)
CO2 SERPL-SCNC: 23 MMOL/L — SIGNIFICANT CHANGE UP (ref 22–31)
CREAT SERPL-MCNC: 4.78 MG/DL — HIGH (ref 0.5–1.3)
EOSINOPHIL NFR BLD AUTO: 1 % — SIGNIFICANT CHANGE UP (ref 0–6)
GLUCOSE BLDC GLUCOMTR-MCNC: 128 MG/DL — HIGH (ref 70–99)
GLUCOSE BLDC GLUCOMTR-MCNC: 128 MG/DL — HIGH (ref 70–99)
GLUCOSE BLDC GLUCOMTR-MCNC: 151 MG/DL — HIGH (ref 70–99)
GLUCOSE BLDC GLUCOMTR-MCNC: 369 MG/DL — HIGH (ref 70–99)
GLUCOSE SERPL-MCNC: 332 MG/DL — HIGH (ref 70–99)
HCT VFR BLD CALC: 26.6 % — LOW (ref 34.5–45)
HGB BLD-MCNC: 12 G/DL — SIGNIFICANT CHANGE UP (ref 11.5–15.5)
LYMPHOCYTES # BLD AUTO: 6 % — LOW (ref 13–44)
MAGNESIUM SERPL-MCNC: 2.9 MG/DL — HIGH (ref 1.6–2.6)
MCHC RBC-ENTMCNC: 43.4 PG — HIGH (ref 27–34)
MCHC RBC-ENTMCNC: 45 GM/DL — HIGH (ref 32–36)
MCV RBC AUTO: 96.4 FL — SIGNIFICANT CHANGE UP (ref 80–100)
MONOCYTES NFR BLD AUTO: 3 % — SIGNIFICANT CHANGE UP (ref 2–14)
NEUTROPHILS NFR BLD AUTO: 85 % — HIGH (ref 43–77)
PHOSPHATE SERPL-MCNC: 7.1 MG/DL — HIGH (ref 2.5–4.5)
PLATELET # BLD AUTO: 377 K/UL — SIGNIFICANT CHANGE UP (ref 150–400)
POTASSIUM SERPL-MCNC: 5.6 MMOL/L — HIGH (ref 3.5–5.3)
POTASSIUM SERPL-SCNC: 5.6 MMOL/L — HIGH (ref 3.5–5.3)
RBC # BLD: 2.76 M/UL — LOW (ref 3.8–5.2)
RBC # FLD: 20.5 % — HIGH (ref 10.3–14.5)
SODIUM SERPL-SCNC: 139 MMOL/L — SIGNIFICANT CHANGE UP (ref 135–145)
WBC # BLD: 21.7 K/UL — HIGH (ref 3.8–10.5)
WBC # FLD AUTO: 21.7 K/UL — HIGH (ref 3.8–10.5)

## 2017-12-30 PROCEDURE — 71010: CPT | Mod: 26

## 2017-12-30 RX ORDER — INSULIN GLARGINE 100 [IU]/ML
10 INJECTION, SOLUTION SUBCUTANEOUS ONCE
Qty: 0 | Refills: 0 | Status: COMPLETED | OUTPATIENT
Start: 2017-12-30 | End: 2017-12-30

## 2017-12-30 RX ORDER — INSULIN GLARGINE 100 [IU]/ML
10 INJECTION, SOLUTION SUBCUTANEOUS AT BEDTIME
Qty: 0 | Refills: 0 | Status: DISCONTINUED | OUTPATIENT
Start: 2017-12-30 | End: 2017-12-30

## 2017-12-30 RX ORDER — PHENYLEPHRINE HYDROCHLORIDE 10 MG/ML
0.5 INJECTION INTRAVENOUS
Qty: 160 | Refills: 0 | Status: DISCONTINUED | OUTPATIENT
Start: 2017-12-30 | End: 2017-12-31

## 2017-12-30 RX ADMIN — Medication 5: at 05:39

## 2017-12-30 RX ADMIN — Medication 50 MICROGRAM(S): at 05:25

## 2017-12-30 RX ADMIN — PANTOPRAZOLE SODIUM 40 MILLIGRAM(S): 20 TABLET, DELAYED RELEASE ORAL at 12:15

## 2017-12-30 RX ADMIN — INSULIN GLARGINE 10 UNIT(S): 100 INJECTION, SOLUTION SUBCUTANEOUS at 07:05

## 2017-12-30 RX ADMIN — HEPARIN SODIUM 5000 UNIT(S): 5000 INJECTION INTRAVENOUS; SUBCUTANEOUS at 05:25

## 2017-12-30 RX ADMIN — PHENYLEPHRINE HYDROCHLORIDE 5.34 MICROGRAM(S)/KG/MIN: 10 INJECTION INTRAVENOUS at 15:19

## 2017-12-30 RX ADMIN — Medication 5: at 00:04

## 2017-12-30 RX ADMIN — CHLORHEXIDINE GLUCONATE 1 APPLICATION(S): 213 SOLUTION TOPICAL at 12:15

## 2017-12-30 RX ADMIN — HEPARIN SODIUM 5000 UNIT(S): 5000 INJECTION INTRAVENOUS; SUBCUTANEOUS at 18:30

## 2017-12-30 NOTE — PROGRESS NOTE ADULT - SUBJECTIVE AND OBJECTIVE BOX
Pt seen and examined at bedside  Pt lethargic, but easily arousable. No acute events overnight.     Allergies:  No Known Allergies    Hospital Medications:   MEDICATIONS  (STANDING):  ascorbic acid 500 milliGRAM(s) Oral daily  chlorhexidine 4% Liquid 1 Application(s) Topical daily  cinacalcet 30 milliGRAM(s) Oral daily  dextrose 5%. 1000 milliLiter(s) (50 mL/Hr) IV Continuous <Continuous>  dextrose 50% Injectable 12.5 Gram(s) IV Push once  dextrose 50% Injectable 25 Gram(s) IV Push once  dextrose 50% Injectable 25 Gram(s) IV Push once  epoetin jacqueline Injectable 37624 Unit(s) IV Push <User Schedule>  ferrous    sulfate Liquid 300 milliGRAM(s) Enteral Tube daily  folic acid 1 milliGRAM(s) Oral daily  heparin  Injectable 5000 Unit(s) SubCutaneous every 12 hours  insulin lispro (HumaLOG) corrective regimen sliding scale   SubCutaneous every 6 hours  levothyroxine 50 MICROGram(s) Oral daily  pantoprazole  Injectable 40 milliGRAM(s) IV Push daily  simvastatin 20 milliGRAM(s) Oral at bedtime    VITALS:  T(F): 98.4 (17 @ 09:00), Max: 99.2 (17 @ 06:00)  HR: 111 (17 @ 12:00)  BP: 155/83 (17 @ 12:00)  RR: 28 (17 @ 12:00)  SpO2: 97% (17 @ 12:00)  Wt(kg): --     @ 07:01  -   @ 07:00  --------------------------------------------------------  IN: 890 mL / OUT: 0 mL / NET: 890 mL      PHYSICAL EXAM:  Constitutional: NAD  HEENT: anicteric sclera, oropharynx clear, MMM  Neck: No JVD  Respiratory: CTAB, no wheezes, rales or rhonchi  Cardiovascular: S1, S2, RRR  Gastrointestinal: BS+, soft, NT/ND  Extremities: No cyanosis or clubbing. No peripheral edema  : No CVA tenderness. No jarrell.   Skin: No rashes  Vascular Access: CHRISTIE lopez    LABS:      139  |  102  |  66<H>  ----------------------------<  332<H>  5.6<H>   |  23  |  4.78<H>    Ca    9.6      30 Dec 2017 06:38  Phos  7.1       Mg     2.9           Creatinine Trend: 4.78 <--, 7.16 <--, 6.87 <--, 7.71 <--, 10.30 <--, 9.33 <--, 7.87 <--                        12.0   21.7  )-----------( 377      ( 30 Dec 2017 06:38 )             38.5     Urine Studies:  Urinalysis Basic - ( 26 Dec 2017 11:29 )    Color: Yellow / Appearance: Slightly Turbid / S.015 / pH:   Gluc:  / Ketone: Negative  / Bili: Negative / Urobili: Negative   Blood:  / Protein: 100 / Nitrite: Negative   Leuk Esterase: Moderate / RBC: 5-10 /HPF / WBC >50 /HPF   Sq Epi:  / Non Sq Epi: Few /HPF / Bacteria: Trace /HPF        RADIOLOGY & ADDITIONAL STUDIES:

## 2017-12-30 NOTE — PROGRESS NOTE ADULT - ASSESSMENT
52 yr old female with  ESRD.   HD yesterday, flowsheet reviewed.   Episodes of Intradialytic hypotension noted. Will dialyze again today to optimize fluid status and borderline high K.   cont abx  con sensipar for renal osteodystrophy  Hg above goal, can hold epo for now, and restart when Hg <10

## 2017-12-30 NOTE — PROGRESS NOTE ADULT - ASSESSMENT
52 female nursing home patient with hx of DM, neuropathy, CKD, CHFpEF, GERD, osteomyelitis of jaw, hypothyroidism, hypoparathyroidism, anemia, admitted with acute on chronic renal failure requiring initiation of dialysis, acute respiratory failure requiring intubation on 12/16, pneumonia with sepsis, GI bleed, EGD showed non bleeding duodenal ulcer.  Now off pressors.   Dialysis catheter removed 12/22 due to fevers and leukocytosis       Problem/Plan - 1:  ·  Problem:S/P Acute respiratory failure.  -Plan: Acute respiratory failure secondary to HAP (elevated procalcitonin) and fluid overload likey due to ESRD, on admission  - was hypoxic on Bipap, S/P intubation Extubated on 12/27, in now transitioned from venti mask to NC 3 L saturating well   -Pt afebrile overnight, leukocytosis,  repeated UA positive, repeated blood culture and urine culture no growth to date.  -Patient will get last dose of maxipime and  levaquin today, as per ID Dr. Kaplan. will stop today  -Previous shiley catheter removed 12/22 due to fevers and leukocytosis. New shiley cath was placed on 12/26 but was not functioning therefore removed. 3rd Rt Shiley was placed 12/29, dialysis was done 12/29  --c/w HD.          Problem/Plan - 2:  ·  Problem: ESRD    Plan: getting HD via shiley.  -pt need permcath  as per Dr. Escoto   -Previous shiley catheter removed 12/22 due to fevers and leukocytosis. New shiley cath was placed on 12/26 but was not functioning therefore removed. 3rd Rt Shiley was placed 12/29,  -Got Dialysis 12/29  -c/w HD  -c/w sensipar for renal osteodystrophy  -c/w renal diet       Problem/Plan - 3:  ·  Problem: R/O Upper GI bleed.  Plan:  -Resolved, no recurrent GI Bleed.   s/p 1 PRBC, hb 8.2 after transfusion  EGD showed duodenal ulcer, no active bleeding, gastritis and esophagitis.  biopsy report negative   monitor CBC  c/w protonix 40mg, daily.       Problem/Plan - 4:  ·  Problem: Hypothyroidism.    Plan: c/w home dose LT4 50 mcg daily via NG tube  TSH wnl.      Problem/Plan - 5:  ·  Problem: CHF (congestive heart failure).    Plan: c/w home cardiac meds   TTE - RV systolic pressure is 49 mm Hg. Mild pulmonary  hypertension. EF:55% Grade II DD     Problem/Plan - 6:  Problem: Diabetes mellitus.   Plan: c/w  HSS   HbA1c 5.2.     Problem/Plan - 7:  ·  Problem: HTN (hypertension).    Plan: BP normal,  - Will Start home dose of hydralazine and carvidilol if BP high     Problem/Plan - 8:  ·  Problem: Prophylactic measure.  Plan:     RISK                                                          Points  [  ] Previous VTE                                                3  [  ] Thrombophilia                                             2  [  ] Lower limb paralysis                                   2        (unable to hold up >15 seconds)    [  ] Current Cancer                                             2         (within 6 months)  [ x ] Immobilization > 24 hrs                              1  [x  ] ICU/CCU stay > 24 hours                             1  [ ] Age > 60                                                         1    IMPROVE VTE Score: 2   c/w DVT ppx with SCD and heparin  c/w GI ppx, on protonix 52 female nursing home patient with hx of DM, neuropathy, CKD, CHFpEF, GERD, osteomyelitis of jaw, hypothyroidism, hypoparathyroidism, anemia, admitted with acute on chronic renal failure requiring initiation of dialysis, acute respiratory failure requiring intubation on 12/16, pneumonia with sepsis, GI bleed, EGD showed non bleeding duodenal ulcer.  Now off pressors.   Dialysis catheter removed 12/22 due to fevers and leukocytosis and was replaced with new dialysis catheter.        Problem/Plan - 1:  ·  Problem: S/P Acute respiratory failure.  -Plan: Acute respiratory failure secondary to HAP (elevated procalcitonin) and fluid overload likey due to ESRD, on admission  - was hypoxic on Bipap, S/P intubation Extubated on 12/27, in now transitioned from venti mask to NC 3 L saturating well   -Pt afebrile overnight, leukocytosis,  repeated UA positive, repeated blood culture and urine culture no growth to date.  -As per ID Dr. Kaplan. antibiotics stopped today  -Previous shiley catheter removed 12/22 due to fevers and leukocytosis. New shiley cath was placed on 12/26 but was not functioning therefore removed. 3rd Rt Shiley was placed 12/29, dialysis was done 12/29  --c/w HD.          Problem/Plan - 2:  ·  Problem: ESRD    Plan: getting HD via shiley.  -pt need permcath  as per Dr. Escoto   -Previous shiley catheter removed 12/22 due to fevers and leukocytosis. New shiley cath was placed on 12/26 but was not functioning therefore removed. 3rd Rt Shiley was placed 12/29,  -Got Dialysis 12/29  -c/w HD  -c/w sensipar for renal osteodystrophy  -c/w renal diet       Problem/Plan - 3:  ·  Problem: R/O Upper GI bleed.  Plan:  -Resolved, no recurrent GI Bleed.   s/p 1 PRBC, hb 8.2 after transfusion  EGD showed duodenal ulcer, no active bleeding, gastritis and esophagitis.  biopsy report negative   monitor CBC  c/w protonix 40mg, daily.       Problem/Plan - 4:  ·  Problem: Hypothyroidism.    Plan: c/w home dose LT4 50 mcg daily via NG tube  TSH wnl.      Problem/Plan - 5:  ·  Problem: CHF (congestive heart failure).    Plan: c/w home cardiac meds   TTE - RV systolic pressure is 49 mm Hg. Mild pulmonary  hypertension. EF:55% Grade II DD     Problem/Plan - 6:  Problem: Diabetes mellitus.   Plan: c/w  HSS   HbA1c 5.2.     Problem/Plan - 7:  ·  Problem: HTN (hypertension).    Plan: BP normal,  - Will Start home dose of hydralazine and Carvedilol if BP high     Problem/Plan - 8:  ·  Problem: Prophylactic measure.  Plan:     RISK                                                          Points  [  ] Previous VTE                                                3  [  ] Thrombophilia                                             2  [  ] Lower limb paralysis                                   2        (unable to hold up >15 seconds)    [  ] Current Cancer                                             2         (within 6 months)  [ x ] Immobilization > 24 hrs                              1  [x  ] ICU/CCU stay > 24 hours                             1  [ ] Age > 60                                                         1    IMPROVE VTE Score: 2   c/w DVT ppx with SCD and heparin  c/w GI ppx, on protonix

## 2017-12-30 NOTE — PROGRESS NOTE ADULT - SUBJECTIVE AND OBJECTIVE BOX
INTERVAL HPI/OVERNIGHT EVENTS: *** No overnight events. Postextubation Day 3> VM was tapered off to NC. Patient is saturating well currently on 3L NC.     PRESSORS: [ ] YES [x ] NO  WHICH:    ANTIBIOTICS:  cefepime                DATE STARTED: 12/19  ANTIBIOTICS:    Levofloxacin              DATE STARTED: 12/19      Antimicrobial:  cefepime  IVPB 1000 milliGRAM(s) IV Intermittent every 24 hours  levoFLOXacin IVPB 250 milliGRAM(s) IV Intermittent every 24 hours    Cardiovascular:    Pulmonary:    Hematalogic:  heparin  Injectable 5000 Unit(s) SubCutaneous every 12 hours    Other:  acetaminophen   Tablet 650 milliGRAM(s) Oral every 6 hours PRN  acetaminophen  Suppository 650 milliGRAM(s) Rectal every 6 hours PRN  ascorbic acid 500 milliGRAM(s) Oral daily  chlorhexidine 4% Liquid 1 Application(s) Topical daily  cinacalcet 30 milliGRAM(s) Oral daily  dextrose 5%. 1000 milliLiter(s) IV Continuous <Continuous>  dextrose 50% Injectable 12.5 Gram(s) IV Push once  dextrose 50% Injectable 25 Gram(s) IV Push once  dextrose 50% Injectable 25 Gram(s) IV Push once  dextrose Gel 1 Dose(s) Oral once PRN  epoetin jacqueline Injectable 91514 Unit(s) IV Push <User Schedule>  ferrous    sulfate Liquid 300 milliGRAM(s) Enteral Tube daily  folic acid 1 milliGRAM(s) Oral daily  glucagon  Injectable 1 milliGRAM(s) IntraMuscular once PRN  insulin lispro (HumaLOG) corrective regimen sliding scale   SubCutaneous every 6 hours  levothyroxine 50 MICROGram(s) Oral daily  pantoprazole  Injectable 40 milliGRAM(s) IV Push daily  simvastatin 20 milliGRAM(s) Oral at bedtime    acetaminophen   Tablet 650 milliGRAM(s) Oral every 6 hours PRN  acetaminophen  Suppository 650 milliGRAM(s) Rectal every 6 hours PRN  ascorbic acid 500 milliGRAM(s) Oral daily  cefepime  IVPB 1000 milliGRAM(s) IV Intermittent every 24 hours  chlorhexidine 4% Liquid 1 Application(s) Topical daily  cinacalcet 30 milliGRAM(s) Oral daily  dextrose 5%. 1000 milliLiter(s) IV Continuous <Continuous>  dextrose 50% Injectable 12.5 Gram(s) IV Push once  dextrose 50% Injectable 25 Gram(s) IV Push once  dextrose 50% Injectable 25 Gram(s) IV Push once  dextrose Gel 1 Dose(s) Oral once PRN  epoetin jacqueline Injectable 32471 Unit(s) IV Push <User Schedule>  ferrous    sulfate Liquid 300 milliGRAM(s) Enteral Tube daily  folic acid 1 milliGRAM(s) Oral daily  glucagon  Injectable 1 milliGRAM(s) IntraMuscular once PRN  heparin  Injectable 5000 Unit(s) SubCutaneous every 12 hours  insulin lispro (HumaLOG) corrective regimen sliding scale   SubCutaneous every 6 hours  levoFLOXacin IVPB 250 milliGRAM(s) IV Intermittent every 24 hours  levothyroxine 50 MICROGram(s) Oral daily  pantoprazole  Injectable 40 milliGRAM(s) IV Push daily  simvastatin 20 milliGRAM(s) Oral at bedtime    Drug Dosing Weight  Height (cm): 170 (13 Dec 2017 17:16)  Weight (kg): 57 (13 Dec 2017 17:16)  BMI (kg/m2): 19.7 (13 Dec 2017 17:16)  BSA (m2): 1.66 (13 Dec 2017 17:16)    CENTRAL LINE: [x ] YES [ ] NO  LOCATION:   DATE INSERTED:  REMOVE: [ ] YES [ ] NO  EXPLAIN:    JACOBSON: [ ] YES [ x] NO    DATE INSERTED:  REMOVE:  [ ] YES [ ] NO  EXPLAIN:    A-LINE:  [ ] YES [x ] NO  LOCATION:   DATE INSERTED:  REMOVE:  [ ] YES [ ] NO  EXPLAIN:    PMH -reviewed admission note, no change since admission  Heart faliure: acute [ ] chronic [ ] acute or chronic [ ] diastolic [ ] systolic [ ] combied systolic and diastolic[ ]  RONAL: ATN[ ] renal medullary necrosis [ ] CKD I [ ]CKDII [ ]CKD III [ ]CKD IV [ ]CKD V [ ]Other pathological lesions [ ]  Abdominal Nutrition Status: malnutrition [ ] cachexia [ ] morbid obesity/BMI=40 [ ] Supplement ordered [___________]     ICU Vital Signs Last 24 Hrs  T(C): 37.2 (29 Dec 2017 19:24), Max: 37.2 (29 Dec 2017 19:24)  T(F): 98.9 (29 Dec 2017 19:24), Max: 98.9 (29 Dec 2017 19:24)  HR: 114 (30 Dec 2017 00:00) (105 - 121)  BP: 147/68 (30 Dec 2017 00:00) (96/48 - 174/95)  BP(mean): 85 (30 Dec 2017 00:00) (55 - 98)  ABP: --  ABP(mean): --  RR: 28 (30 Dec 2017 00:00) (23 - 35)  SpO2: 99% (30 Dec 2017 00:00) (88% - 100%)            12-28 @ 07:01  -  12-29 @ 07:00  --------------------------------------------------------  IN: 1030 mL / OUT: 0 mL / NET: 1030 mL            PHYSICAL EXAM:    GENERAL: [ x]NAD, on Nasal cannula  HEAD:  [x]Atraumatic, [ x]Normocephalic  EYES: [x ]conjunctiva and sclera clear  ENMT: [ x]No tonsillar erythema, exudates, or enlargement; [ x]Moist mucous membranes,   NECK: [x ]Supple, normal appearance, [x ]No JVD;  NERVOUS SYSTEM:  [x ]Alert & Oriented X2,   CHEST/LUNG: Clear to auscultation, [ x]No chest deformity; [ ]Normal percussion bilaterally; [ x]No rales, rhonchi, wheezing   HEART: [x ]Regular rate and rhythm; [x ]No murmurs, rubs, or gallops  ABDOMEN: [x ]Soft, Nontender, Nondistended; [x ]Bowel sounds present  GENITOURINARY: Rt shiley in placed, Clean site. No erythema, exudate  EXTREMITIES:  [x ]2+ Peripheral Pulses, [x ]No clubbing, cyanosis, or edema  LYMPH: [x ]No lymphadenopathy noted  SKIN: [ x]No rashes or lesions;     LABS:  CBC Full  -  ( 29 Dec 2017 06:57 )  WBC Count : 23.5 K/uL  Hemoglobin : 11.3 g/dL  Hematocrit : 34.4 %  Platelet Count - Automated : 325 K/uL  Mean Cell Volume : 93.1 fl  Mean Cell Hemoglobin : 30.6 pg  Mean Cell Hemoglobin Concentration : 32.9 gm/dL  Auto Neutrophil # : 20.2 K/uL  Auto Lymphocyte # : 1.3 K/uL  Auto Monocyte # : 1.6 K/uL  Auto Eosinophil # : 0.1 K/uL  Auto Basophil # : 0.2 K/uL  Auto Neutrophil % : 86.2 %  Auto Lymphocyte % : 5.5 %  Auto Monocyte % : 7.0 %  Auto Eosinophil % : 0.5 %  Auto Basophil % : 0.7 %    12-29    141  |  105  |  93<H>  ----------------------------<  241<H>  4.9   |  20<L>  |  7.16<H>    Ca    9.1      29 Dec 2017 06:57  Phos  8.4     12-29  Mg     3.0     12-29      PT/INR - ( 29 Dec 2017 09:41 )   PT: 15.1 sec;   INR: 1.38 ratio         PTT - ( 29 Dec 2017 09:41 )  PTT:28.4 sec        RADIOLOGY & ADDITIONAL STUDIES REVIEWED:  ***    [ ]GOALS OF CARE DISCUSSION WITH PATIENT/FAMILY/PROXY:    CRITICAL CARE TIME SPENT: 35 minutes INTERVAL HPI/OVERNIGHT EVENTS:  No overnight events. Postextubation Day 3> VM was tapered off to NC. Patient is saturating well currently on 3L NC.     PRESSORS: [ ] YES [x ] NO  WHICH:    Antibiotics: None    Antimicrobial:    Cardiovascular:    Pulmonary:    Hematalogic:  heparin  Injectable 5000 Unit(s) SubCutaneous every 12 hours    Other:  acetaminophen   Tablet 650 milliGRAM(s) Oral every 6 hours PRN  acetaminophen  Suppository 650 milliGRAM(s) Rectal every 6 hours PRN  ascorbic acid 500 milliGRAM(s) Oral daily  chlorhexidine 4% Liquid 1 Application(s) Topical daily  cinacalcet 30 milliGRAM(s) Oral daily  dextrose 5%. 1000 milliLiter(s) IV Continuous <Continuous>  dextrose 50% Injectable 12.5 Gram(s) IV Push once  dextrose 50% Injectable 25 Gram(s) IV Push once  dextrose 50% Injectable 25 Gram(s) IV Push once  dextrose Gel 1 Dose(s) Oral once PRN  epoetin jacqueline Injectable 46188 Unit(s) IV Push <User Schedule>  ferrous    sulfate Liquid 300 milliGRAM(s) Enteral Tube daily  folic acid 1 milliGRAM(s) Oral daily  glucagon  Injectable 1 milliGRAM(s) IntraMuscular once PRN  insulin lispro (HumaLOG) corrective regimen sliding scale   SubCutaneous every 6 hours  levothyroxine 50 MICROGram(s) Oral daily  pantoprazole  Injectable 40 milliGRAM(s) IV Push daily  simvastatin 20 milliGRAM(s) Oral at bedtime    acetaminophen   Tablet 650 milliGRAM(s) Oral every 6 hours PRN  acetaminophen  Suppository 650 milliGRAM(s) Rectal every 6 hours PRN  ascorbic acid 500 milliGRAM(s) Oral daily  cefepime  IVPB 1000 milliGRAM(s) IV Intermittent every 24 hours  chlorhexidine 4% Liquid 1 Application(s) Topical daily  cinacalcet 30 milliGRAM(s) Oral daily  dextrose 5%. 1000 milliLiter(s) IV Continuous <Continuous>  dextrose 50% Injectable 12.5 Gram(s) IV Push once  dextrose 50% Injectable 25 Gram(s) IV Push once  dextrose 50% Injectable 25 Gram(s) IV Push once  dextrose Gel 1 Dose(s) Oral once PRN  epoetin jacqueline Injectable 20661 Unit(s) IV Push <User Schedule>  ferrous    sulfate Liquid 300 milliGRAM(s) Enteral Tube daily  folic acid 1 milliGRAM(s) Oral daily  glucagon  Injectable 1 milliGRAM(s) IntraMuscular once PRN  heparin  Injectable 5000 Unit(s) SubCutaneous every 12 hours  insulin lispro (HumaLOG) corrective regimen sliding scale   SubCutaneous every 6 hours  levoFLOXacin IVPB 250 milliGRAM(s) IV Intermittent every 24 hours  levothyroxine 50 MICROGram(s) Oral daily  pantoprazole  Injectable 40 milliGRAM(s) IV Push daily  simvastatin 20 milliGRAM(s) Oral at bedtime    Drug Dosing Weight  Height (cm): 170 (13 Dec 2017 17:16)  Weight (kg): 57 (13 Dec 2017 17:16)  BMI (kg/m2): 19.7 (13 Dec 2017 17:16)  BSA (m2): 1.66 (13 Dec 2017 17:16)    CENTRAL LINE: [x ] YES [ ] NO  LOCATION:   DATE INSERTED:  REMOVE: [ ] YES [ ] NO  EXPLAIN:    JACOBSON: [ ] YES [ x] NO    DATE INSERTED:  REMOVE:  [ ] YES [ ] NO  EXPLAIN:    A-LINE:  [ ] YES [x ] NO  LOCATION:   DATE INSERTED:  REMOVE:  [ ] YES [ ] NO  EXPLAIN:    PMH -reviewed admission note, no change since admission  Heart faliure: acute [ ] chronic [ ] acute or chronic [ ] diastolic [ ] systolic [ ] combied systolic and diastolic[ ]  RONAL: ATN[ ] renal medullary necrosis [ ] CKD I [ ]CKDII [ ]CKD III [ ]CKD IV [ ]CKD V [ ]Other pathological lesions [ ]  Abdominal Nutrition Status: malnutrition [ ] cachexia [ ] morbid obesity/BMI=40 [ ] Supplement ordered [___________]     ICU Vital Signs Last 24 Hrs  T(C): 37.2 (29 Dec 2017 19:24), Max: 37.2 (29 Dec 2017 19:24)  T(F): 98.9 (29 Dec 2017 19:24), Max: 98.9 (29 Dec 2017 19:24)  HR: 114 (30 Dec 2017 00:00) (105 - 121)  BP: 147/68 (30 Dec 2017 00:00) (96/48 - 174/95)  BP(mean): 85 (30 Dec 2017 00:00) (55 - 98)  ABP: --  ABP(mean): --  RR: 28 (30 Dec 2017 00:00) (23 - 35)  SpO2: 99% (30 Dec 2017 00:00) (88% - 100%)            12-28 @ 07:01  -  12-29 @ 07:00  --------------------------------------------------------  IN: 1030 mL / OUT: 0 mL / NET: 1030 mL            PHYSICAL EXAM:    GENERAL: [ x]NAD, on Nasal cannula  HEAD:  [x]Atraumatic, [ x]Normocephalic  EYES: [x ]conjunctiva and sclera clear  ENMT: [ x]No tonsillar erythema, exudates, or enlargement; [ x]Moist mucous membranes,   NECK: [x ]Supple, normal appearance, [x ]No JVD;  NERVOUS SYSTEM:  [x ]Alert & Oriented X2,   CHEST/LUNG: Clear to auscultation, [ x]No chest deformity; [ ]Normal percussion bilaterally; [ x]No rales, rhonchi, wheezing   HEART: [x ]Regular rate and rhythm; [x ]No murmurs, rubs, or gallops  ABDOMEN: [x ]Soft, Nontender, Nondistended; [x ]Bowel sounds present  GENITOURINARY: Rt shiley in placed, Clean site. No erythema, exudate  EXTREMITIES:  [x ]2+ Peripheral Pulses, [x ]No clubbing, cyanosis, or edema  LYMPH: [x ]No lymphadenopathy noted  SKIN: [ x]No rashes or lesions;     LABS:  CBC Full  -  ( 29 Dec 2017 06:57 )  WBC Count : 23.5 K/uL  Hemoglobin : 11.3 g/dL  Hematocrit : 34.4 %  Platelet Count - Automated : 325 K/uL  Mean Cell Volume : 93.1 fl  Mean Cell Hemoglobin : 30.6 pg  Mean Cell Hemoglobin Concentration : 32.9 gm/dL  Auto Neutrophil # : 20.2 K/uL  Auto Lymphocyte # : 1.3 K/uL  Auto Monocyte # : 1.6 K/uL  Auto Eosinophil # : 0.1 K/uL  Auto Basophil # : 0.2 K/uL  Auto Neutrophil % : 86.2 %  Auto Lymphocyte % : 5.5 %  Auto Monocyte % : 7.0 %  Auto Eosinophil % : 0.5 %  Auto Basophil % : 0.7 %    12-29    141  |  105  |  93<H>  ----------------------------<  241<H>  4.9   |  20<L>  |  7.16<H>    Ca    9.1      29 Dec 2017 06:57  Phos  8.4     12-29  Mg     3.0     12-29      PT/INR - ( 29 Dec 2017 09:41 )   PT: 15.1 sec;   INR: 1.38 ratio         PTT - ( 29 Dec 2017 09:41 )  PTT:28.4 sec        RADIOLOGY & ADDITIONAL STUDIES REVIEWED:  ***    [ ]GOALS OF CARE DISCUSSION WITH PATIENT/FAMILY/PROXY:    CRITICAL CARE TIME SPENT: 35 minutes

## 2017-12-31 LAB
ANION GAP SERPL CALC-SCNC: 19 MMOL/L — HIGH (ref 5–17)
BASOPHILS # BLD AUTO: 0.3 K/UL — HIGH (ref 0–0.2)
BASOPHILS NFR BLD AUTO: 1.2 % — SIGNIFICANT CHANGE UP (ref 0–2)
BUN SERPL-MCNC: 48 MG/DL — HIGH (ref 7–18)
CALCIUM SERPL-MCNC: 9.9 MG/DL — SIGNIFICANT CHANGE UP (ref 8.4–10.5)
CHLORIDE SERPL-SCNC: 99 MMOL/L — SIGNIFICANT CHANGE UP (ref 96–108)
CO2 SERPL-SCNC: 21 MMOL/L — LOW (ref 22–31)
CREAT SERPL-MCNC: 3.9 MG/DL — HIGH (ref 0.5–1.3)
CULTURE RESULTS: SIGNIFICANT CHANGE UP
CULTURE RESULTS: SIGNIFICANT CHANGE UP
EOSINOPHIL # BLD AUTO: 0 K/UL — SIGNIFICANT CHANGE UP (ref 0–0.5)
EOSINOPHIL NFR BLD AUTO: 0 % — SIGNIFICANT CHANGE UP (ref 0–6)
GLUCOSE BLDC GLUCOMTR-MCNC: 127 MG/DL — HIGH (ref 70–99)
GLUCOSE BLDC GLUCOMTR-MCNC: 144 MG/DL — HIGH (ref 70–99)
GLUCOSE BLDC GLUCOMTR-MCNC: 154 MG/DL — HIGH (ref 70–99)
GLUCOSE BLDC GLUCOMTR-MCNC: 221 MG/DL — HIGH (ref 70–99)
GLUCOSE SERPL-MCNC: 117 MG/DL — HIGH (ref 70–99)
HCT VFR BLD CALC: 34.3 % — LOW (ref 34.5–45)
HGB BLD-MCNC: 11.6 G/DL — SIGNIFICANT CHANGE UP (ref 11.5–15.5)
LYMPHOCYTES # BLD AUTO: 12.2 % — LOW (ref 13–44)
LYMPHOCYTES # BLD AUTO: 2.8 K/UL — SIGNIFICANT CHANGE UP (ref 1–3.3)
MAGNESIUM SERPL-MCNC: 2.7 MG/DL — HIGH (ref 1.6–2.6)
MCHC RBC-ENTMCNC: 32.2 PG — SIGNIFICANT CHANGE UP (ref 27–34)
MCHC RBC-ENTMCNC: 33.9 GM/DL — SIGNIFICANT CHANGE UP (ref 32–36)
MCV RBC AUTO: 94.8 FL — SIGNIFICANT CHANGE UP (ref 80–100)
MONOCYTES # BLD AUTO: 2.1 K/UL — HIGH (ref 0–0.9)
MONOCYTES NFR BLD AUTO: 9.1 % — SIGNIFICANT CHANGE UP (ref 2–14)
NEUTROPHILS # BLD AUTO: 17.9 K/UL — HIGH (ref 1.8–7.4)
NEUTROPHILS NFR BLD AUTO: 77.6 % — HIGH (ref 43–77)
PHOSPHATE SERPL-MCNC: 7.2 MG/DL — HIGH (ref 2.5–4.5)
PLATELET # BLD AUTO: 419 K/UL — HIGH (ref 150–400)
POTASSIUM SERPL-MCNC: 4.3 MMOL/L — SIGNIFICANT CHANGE UP (ref 3.5–5.3)
POTASSIUM SERPL-SCNC: 4.3 MMOL/L — SIGNIFICANT CHANGE UP (ref 3.5–5.3)
RBC # BLD: 3.62 M/UL — LOW (ref 3.8–5.2)
RBC # FLD: 17.6 % — HIGH (ref 10.3–14.5)
SODIUM SERPL-SCNC: 139 MMOL/L — SIGNIFICANT CHANGE UP (ref 135–145)
SPECIMEN SOURCE: SIGNIFICANT CHANGE UP
SPECIMEN SOURCE: SIGNIFICANT CHANGE UP
WBC # BLD: 23 K/UL — HIGH (ref 3.8–10.5)
WBC # FLD AUTO: 23 K/UL — HIGH (ref 3.8–10.5)

## 2017-12-31 PROCEDURE — 71010: CPT | Mod: 26

## 2017-12-31 RX ORDER — IPRATROPIUM/ALBUTEROL SULFATE 18-103MCG
3 AEROSOL WITH ADAPTER (GRAM) INHALATION ONCE
Qty: 0 | Refills: 0 | Status: COMPLETED | OUTPATIENT
Start: 2017-12-31 | End: 2017-12-31

## 2017-12-31 RX ORDER — SODIUM CHLORIDE 9 MG/ML
1000 INJECTION INTRAMUSCULAR; INTRAVENOUS; SUBCUTANEOUS
Qty: 0 | Refills: 0 | Status: DISCONTINUED | OUTPATIENT
Start: 2017-12-31 | End: 2018-01-01

## 2017-12-31 RX ORDER — METOPROLOL TARTRATE 50 MG
2.5 TABLET ORAL EVERY 6 HOURS
Qty: 0 | Refills: 0 | Status: DISCONTINUED | OUTPATIENT
Start: 2017-12-31 | End: 2018-01-01

## 2017-12-31 RX ORDER — LEVOTHYROXINE SODIUM 125 MCG
25 TABLET ORAL
Qty: 0 | Refills: 0 | Status: DISCONTINUED | OUTPATIENT
Start: 2017-12-31 | End: 2018-01-02

## 2017-12-31 RX ADMIN — Medication 25 MICROGRAM(S): at 12:09

## 2017-12-31 RX ADMIN — Medication 2.5 MILLIGRAM(S): at 23:43

## 2017-12-31 RX ADMIN — Medication 3 MILLILITER(S): at 04:31

## 2017-12-31 RX ADMIN — HEPARIN SODIUM 5000 UNIT(S): 5000 INJECTION INTRAVENOUS; SUBCUTANEOUS at 05:20

## 2017-12-31 RX ADMIN — HEPARIN SODIUM 5000 UNIT(S): 5000 INJECTION INTRAVENOUS; SUBCUTANEOUS at 17:38

## 2017-12-31 RX ADMIN — SIMVASTATIN 20 MILLIGRAM(S): 20 TABLET, FILM COATED ORAL at 22:11

## 2017-12-31 RX ADMIN — PANTOPRAZOLE SODIUM 40 MILLIGRAM(S): 20 TABLET, DELAYED RELEASE ORAL at 12:09

## 2017-12-31 RX ADMIN — Medication 2.5 MILLIGRAM(S): at 17:38

## 2017-12-31 RX ADMIN — CHLORHEXIDINE GLUCONATE 1 APPLICATION(S): 213 SOLUTION TOPICAL at 12:10

## 2017-12-31 RX ADMIN — Medication 1: at 05:21

## 2017-12-31 NOTE — PROGRESS NOTE ADULT - ATTENDING COMMENTS
-pat is clinically imroved  -tachycardic due to not getting coreg, not eating and npo, no ngt placed due to agitation  -monitor s/s eval

## 2017-12-31 NOTE — PROGRESS NOTE ADULT - SUBJECTIVE AND OBJECTIVE BOX
Pt seen and examined at bedside  No acute events overnight. Pt resting comfortably.   Lethargic, but arousable. Not in respiratory distress.     Allergies:  No Known Allergies    Hospital Medications:   MEDICATIONS  (STANDING):  ascorbic acid 500 milliGRAM(s) Oral daily  chlorhexidine 4% Liquid 1 Application(s) Topical daily  cinacalcet 30 milliGRAM(s) Oral daily  dextrose 5%. 1000 milliLiter(s) (50 mL/Hr) IV Continuous <Continuous>  dextrose 50% Injectable 12.5 Gram(s) IV Push once  dextrose 50% Injectable 25 Gram(s) IV Push once  dextrose 50% Injectable 25 Gram(s) IV Push once  ferrous    sulfate Liquid 300 milliGRAM(s) Enteral Tube daily  folic acid 1 milliGRAM(s) Oral daily  heparin  Injectable 5000 Unit(s) SubCutaneous every 12 hours  insulin lispro (HumaLOG) corrective regimen sliding scale   SubCutaneous every 6 hours  levothyroxine Injectable 25 MICROGram(s) IV Push <User Schedule>  pantoprazole  Injectable 40 milliGRAM(s) IV Push daily  simvastatin 20 milliGRAM(s) Oral at bedtime  sodium chloride 0.9%. 1000 milliLiter(s) (75 mL/Hr) IV Continuous <Continuous>    VITALS:  T(F): 99 (17 @ 05:30), Max: 99.9 (17 @ 00:00)  HR: 120 (17 @ 09:30)  BP: 147/74 (17 @ 09:30)  RR: 27 (17 @ 09:30)  SpO2: 98% (17 @ 09:30)  Wt(kg): --     @ 07:01  -   @ 07:00  --------------------------------------------------------  IN: 10.6 mL / OUT: 0 mL / NET: 10.6 mL        PHYSICAL EXAM:  Constitutional: NAD  HEENT: anicteric sclera, oropharynx clear, MMM  Neck: No JVD  Respiratory: CTAB, no wheezes, rales or rhonchi  Cardiovascular: S1, S2, RRR  Gastrointestinal: BS+, soft, NT/ND  Extremities: No cyanosis or clubbing. No peripheral edema  : No CVA tenderness. No jarrell.   Skin: No rashes  Vascular Access: CHRISTIE lopez.     LABS:      139  |  99  |  48<H>  ----------------------------<  117<H>  4.3   |  21<L>  |  3.90<H>    Ca    9.9      31 Dec 2017 06:21  Phos  7.2       Mg     2.7           Creatinine Trend: 3.90 <--, 4.78 <--, 7.16 <--, 6.87 <--, 7.71 <--, 10.30 <--, 9.33 <--                        11.6   23.0  )-----------( 419      ( 31 Dec 2017 06:21 )             34.3     Urine Studies:  Urinalysis Basic - ( 26 Dec 2017 11:29 )    Color: Yellow / Appearance: Slightly Turbid / S.015 / pH:   Gluc:  / Ketone: Negative  / Bili: Negative / Urobili: Negative   Blood:  / Protein: 100 / Nitrite: Negative   Leuk Esterase: Moderate / RBC: 5-10 /HPF / WBC >50 /HPF   Sq Epi:  / Non Sq Epi: Few /HPF / Bacteria: Trace /HPF        RADIOLOGY & ADDITIONAL STUDIES:

## 2017-12-31 NOTE — PROGRESS NOTE ADULT - SUBJECTIVE AND OBJECTIVE BOX
INTERVAL HPI/OVERNIGHT EVENTS: BP stable overnight, off pheny    PRESSORS: [ ] YES [ ] NO  WHICH:    ANTIBIOTICS:                  DATE STARTED:  ANTIBIOTICS:                  DATE STARTED:  ANTIBIOTICS:                  DATE STARTED:    Antimicrobial:    Cardiovascular:    Pulmonary:    Hematalogic:  heparin  Injectable 5000 Unit(s) SubCutaneous every 12 hours    Other:  acetaminophen   Tablet 650 milliGRAM(s) Oral every 6 hours PRN  acetaminophen  Suppository 650 milliGRAM(s) Rectal every 6 hours PRN  ascorbic acid 500 milliGRAM(s) Oral daily  chlorhexidine 4% Liquid 1 Application(s) Topical daily  cinacalcet 30 milliGRAM(s) Oral daily  dextrose 5%. 1000 milliLiter(s) IV Continuous <Continuous>  dextrose 50% Injectable 12.5 Gram(s) IV Push once  dextrose 50% Injectable 25 Gram(s) IV Push once  dextrose 50% Injectable 25 Gram(s) IV Push once  dextrose Gel 1 Dose(s) Oral once PRN  ferrous    sulfate Liquid 300 milliGRAM(s) Enteral Tube daily  folic acid 1 milliGRAM(s) Oral daily  glucagon  Injectable 1 milliGRAM(s) IntraMuscular once PRN  insulin lispro (HumaLOG) corrective regimen sliding scale   SubCutaneous every 6 hours  levothyroxine 50 MICROGram(s) Oral daily  pantoprazole  Injectable 40 milliGRAM(s) IV Push daily  simvastatin 20 milliGRAM(s) Oral at bedtime    acetaminophen   Tablet 650 milliGRAM(s) Oral every 6 hours PRN  acetaminophen  Suppository 650 milliGRAM(s) Rectal every 6 hours PRN  ascorbic acid 500 milliGRAM(s) Oral daily  chlorhexidine 4% Liquid 1 Application(s) Topical daily  cinacalcet 30 milliGRAM(s) Oral daily  dextrose 5%. 1000 milliLiter(s) IV Continuous <Continuous>  dextrose 50% Injectable 12.5 Gram(s) IV Push once  dextrose 50% Injectable 25 Gram(s) IV Push once  dextrose 50% Injectable 25 Gram(s) IV Push once  dextrose Gel 1 Dose(s) Oral once PRN  ferrous    sulfate Liquid 300 milliGRAM(s) Enteral Tube daily  folic acid 1 milliGRAM(s) Oral daily  glucagon  Injectable 1 milliGRAM(s) IntraMuscular once PRN  heparin  Injectable 5000 Unit(s) SubCutaneous every 12 hours  insulin lispro (HumaLOG) corrective regimen sliding scale   SubCutaneous every 6 hours  levothyroxine 50 MICROGram(s) Oral daily  pantoprazole  Injectable 40 milliGRAM(s) IV Push daily  simvastatin 20 milliGRAM(s) Oral at bedtime    Drug Dosing Weight  Height (cm): 170 (13 Dec 2017 17:16)  Weight (kg): 57 (13 Dec 2017 17:16)  BMI (kg/m2): 19.7 (13 Dec 2017 17:16)  BSA (m2): 1.66 (13 Dec 2017 17:16)    CENTRAL LINE: [ ] YES [ ] NO  LOCATION:   DATE INSERTED:  REMOVE: [ ] YES [ ] NO  EXPLAIN:    JACOBSON: [ ] YES [ ] NO    DATE INSERTED:  REMOVE:  [ ] YES [ ] NO  EXPLAIN:    A-LINE:  [ ] YES [ ] NO  LOCATION:   DATE INSERTED:  REMOVE:  [ ] YES [ ] NO  EXPLAIN:    PMH -reviewed admission note, no change since admission  Heart faliure: acute [ ] chronic [ ] acute or chronic [ ] diastolic [ ] systolic [ ] combied systolic and diastolic[ ]  RONAL: ATN[ ] renal medullary necrosis [ ] CKD I [ ]CKDII [ ]CKD III [ ]CKD IV [ ]CKD V [ ]Other pathological lesions [ ]  Abdominal Nutrition Status: malnutrition [ ] cachexia [ ] morbid obesity/BMI=40 [ ] Supplement ordered [___________]     ICU Vital Signs Last 24 Hrs  T(C): 37.7 (31 Dec 2017 00:00), Max: 37.7 (31 Dec 2017 00:00)  T(F): 99.9 (31 Dec 2017 00:00), Max: 99.9 (31 Dec 2017 00:00)  HR: 106 (31 Dec 2017 00:30) (102 - 123)  BP: 130/60 (31 Dec 2017 00:30) (85/51 - 160/74)  BP(mean): 76 (31 Dec 2017 00:30) (59 - 101)  ABP: --  ABP(mean): --  RR: 20 (31 Dec 2017 00:30) (18 - 48)  SpO2: 94% (31 Dec 2017 00:30) (94% - 100%)            12-29 @ 07:01  -  12-30 @ 07:00  --------------------------------------------------------  IN: 890 mL / OUT: 0 mL / NET: 890 mL            PHYSICAL EXAM:    GENERAL: NAD, well-groomed, well-developed  HEAD:  Atraumatic, Normocephalic  EYES: EOMI, PERRLA, conjunctiva and sclera clear  ENMT: No tonsillar erythema, exudates, or enlargement; Moist mucous membranes, Good dentition, No lesions  NECK: Supple, normal appearance, No JVD; Normal thyroid; Trachea midline  NERVOUS SYSTEM:  Alert & Oriented X3, Good concentration; Motor Strength 5/5 B/L upper and lower extremities; DTRs 2+ intact and symmetric  CHEST/LUNG: No chest deformity; Normal percussion bilaterally; No rales, rhonchi, wheezing   HEART: Regular rate and rhythm; No murmurs, rubs, or gallops  ABDOMEN: Soft, Nontender, Nondistended; Bowel sounds present  EXTREMITIES:  2+ Peripheral Pulses, No clubbing, cyanosis, or edema  LYMPH: No lymphadenopathy noted  SKIN: No rashes or lesions; Good capillary refill      LABS:  CBC Full  -  ( 30 Dec 2017 06:38 )  WBC Count : 21.7 K/uL  Hemoglobin : 12.0 g/dL  Hematocrit : 38.5 %  Platelet Count - Automated : 377 K/uL  Mean Cell Volume : 93.0 fl  Mean Cell Hemoglobin : 29.1 pg  Mean Cell Hemoglobin Concentration : 31.3 gm/dL  Auto Neutrophil # : x  Auto Lymphocyte # : x  Auto Monocyte # : x  Auto Eosinophil # : x  Auto Basophil # : x  Auto Neutrophil % : 85.0 %  Auto Lymphocyte % : 6.0 %  Auto Monocyte % : 3.0 %  Auto Eosinophil % : 1.0 %  Auto Basophil % : x    12-30    139  |  102  |  66<H>  ----------------------------<  332<H>  5.6<H>   |  23  |  4.78<H>    Ca    9.6      30 Dec 2017 06:38  Phos  7.1     12-30  Mg     2.9     12-30      PT/INR - ( 29 Dec 2017 09:41 )   PT: 15.1 sec;   INR: 1.38 ratio         PTT - ( 29 Dec 2017 09:41 )  PTT:28.4 sec        RADIOLOGY & ADDITIONAL STUDIES REVIEWED:  ***    [ ]GOALS OF CARE DISCUSSION WITH PATIENT/FAMILY/PROXY:    CRITICAL CARE TIME SPENT: 35 minutes INTERVAL HPI/OVERNIGHT EVENTS: BP stable overnight, off pheny. NG tube was pulled out by pt, pt refused new NG tube, NPO for now, on NS at 75ml.     PRESSORS: [ ] YES [x ] NO  WHICH:    ANTIBIOTICS:                  DATE STARTED:  ANTIBIOTICS:                  DATE STARTED:  ANTIBIOTICS:                  DATE STARTED:    Antimicrobial:    Cardiovascular:    Pulmonary:    Hematalogic:  heparin  Injectable 5000 Unit(s) SubCutaneous every 12 hours    Other:  acetaminophen   Tablet 650 milliGRAM(s) Oral every 6 hours PRN  acetaminophen  Suppository 650 milliGRAM(s) Rectal every 6 hours PRN  ascorbic acid 500 milliGRAM(s) Oral daily  chlorhexidine 4% Liquid 1 Application(s) Topical daily  cinacalcet 30 milliGRAM(s) Oral daily  dextrose 5%. 1000 milliLiter(s) IV Continuous <Continuous>  dextrose 50% Injectable 12.5 Gram(s) IV Push once  dextrose 50% Injectable 25 Gram(s) IV Push once  dextrose 50% Injectable 25 Gram(s) IV Push once  dextrose Gel 1 Dose(s) Oral once PRN  ferrous    sulfate Liquid 300 milliGRAM(s) Enteral Tube daily  folic acid 1 milliGRAM(s) Oral daily  glucagon  Injectable 1 milliGRAM(s) IntraMuscular once PRN  insulin lispro (HumaLOG) corrective regimen sliding scale   SubCutaneous every 6 hours  levothyroxine 50 MICROGram(s) Oral daily  pantoprazole  Injectable 40 milliGRAM(s) IV Push daily  simvastatin 20 milliGRAM(s) Oral at bedtime    acetaminophen   Tablet 650 milliGRAM(s) Oral every 6 hours PRN  acetaminophen  Suppository 650 milliGRAM(s) Rectal every 6 hours PRN  ascorbic acid 500 milliGRAM(s) Oral daily  chlorhexidine 4% Liquid 1 Application(s) Topical daily  cinacalcet 30 milliGRAM(s) Oral daily  dextrose 5%. 1000 milliLiter(s) IV Continuous <Continuous>  dextrose 50% Injectable 12.5 Gram(s) IV Push once  dextrose 50% Injectable 25 Gram(s) IV Push once  dextrose 50% Injectable 25 Gram(s) IV Push once  dextrose Gel 1 Dose(s) Oral once PRN  ferrous    sulfate Liquid 300 milliGRAM(s) Enteral Tube daily  folic acid 1 milliGRAM(s) Oral daily  glucagon  Injectable 1 milliGRAM(s) IntraMuscular once PRN  heparin  Injectable 5000 Unit(s) SubCutaneous every 12 hours  insulin lispro (HumaLOG) corrective regimen sliding scale   SubCutaneous every 6 hours  levothyroxine 50 MICROGram(s) Oral daily  pantoprazole  Injectable 40 milliGRAM(s) IV Push daily  simvastatin 20 milliGRAM(s) Oral at bedtime    Drug Dosing Weight  Height (cm): 170 (13 Dec 2017 17:16)  Weight (kg): 57 (13 Dec 2017 17:16)  BMI (kg/m2): 19.7 (13 Dec 2017 17:16)  BSA (m2): 1.66 (13 Dec 2017 17:16)    CENTRAL LINE: [ x] YES [ ] NO  LOCATION:   DATE INSERTED:  REMOVE: [ ] YES [ ] NO  EXPLAIN:    JACOBSON: [ ] YES [ x] NO    DATE INSERTED:  REMOVE:  [ ] YES [ ] NO  EXPLAIN:    A-LINE:  [ ] YES [x ] NO  LOCATION:   DATE INSERTED:  REMOVE:  [ ] YES [ ] NO  EXPLAIN:        ICU Vital Signs Last 24 Hrs  T(C): 37.7 (31 Dec 2017 00:00), Max: 37.7 (31 Dec 2017 00:00)  T(F): 99.9 (31 Dec 2017 00:00), Max: 99.9 (31 Dec 2017 00:00)  HR: 106 (31 Dec 2017 00:30) (102 - 123)  BP: 130/60 (31 Dec 2017 00:30) (85/51 - 160/74)  BP(mean): 76 (31 Dec 2017 00:30) (59 - 101)  ABP: --  ABP(mean): --  RR: 20 (31 Dec 2017 00:30) (18 - 48)  SpO2: 94% (31 Dec 2017 00:30) (94% - 100%)            12-29 @ 07:01  -  12-30 @ 07:00  --------------------------------------------------------  IN: 890 mL / OUT: 0 mL / NET: 890 mL            PHYSICAL EXAM:    GENERAL: [ x]NAD, on Nasal cannula  HEAD:  [x]Atraumatic, [ x]Normocephalic  EYES: [x ]conjunctiva and sclera clear  ENMT: [ x]No tonsillar erythema, exudates, or enlargement; [ x]Moist mucous membranes,   NECK: [x ]Supple, normal appearance, [x ]No JVD;  NERVOUS SYSTEM:  [x ]Alert & Oriented X2,   CHEST/LUNG: Clear to auscultation, [ x]No chest deformity; [ ]Normal percussion bilaterally; [ x]No rales, rhonchi, wheezing   HEART: [x ]Regular rate and rhythm; [x ]No murmurs, rubs, or gallops  ABDOMEN: [x ]Soft, Nontender, Nondistended; [x ]Bowel sounds present  GENITOURINARY: Rt shiley in placed, Clean site. No erythema, exudate  EXTREMITIES:  [x ]2+ Peripheral Pulses, [x ]No clubbing, cyanosis, or edema  LYMPH: [x ]No lymphadenopathy noted  SKIN: [ x]No rashes or lesions;       LABS:  CBC Full  -  ( 30 Dec 2017 06:38 )  WBC Count : 21.7 K/uL  Hemoglobin : 12.0 g/dL  Hematocrit : 38.5 %  Platelet Count - Automated : 377 K/uL  Mean Cell Volume : 93.0 fl  Mean Cell Hemoglobin : 29.1 pg  Mean Cell Hemoglobin Concentration : 31.3 gm/dL  Auto Neutrophil # : x  Auto Lymphocyte # : x  Auto Monocyte # : x  Auto Eosinophil # : x  Auto Basophil # : x  Auto Neutrophil % : 85.0 %  Auto Lymphocyte % : 6.0 %  Auto Monocyte % : 3.0 %  Auto Eosinophil % : 1.0 %  Auto Basophil % : x    12-30    139  |  102  |  66<H>  ----------------------------<  332<H>  5.6<H>   |  23  |  4.78<H>    Ca    9.6      30 Dec 2017 06:38  Phos  7.1     12-30  Mg     2.9     12-30      PT/INR - ( 29 Dec 2017 09:41 )   PT: 15.1 sec;   INR: 1.38 ratio         PTT - ( 29 Dec 2017 09:41 )  PTT:28.4 sec        RADIOLOGY & ADDITIONAL STUDIES REVIEWED: yes    [ ]GOALS OF CARE DISCUSSION WITH PATIENT/FAMILY/PROXY:    CRITICAL CARE TIME SPENT: 35 minutes      Assessment and Plan:   · Assessment		  52 female nursing home patient with hx of DM, neuropathy, CKD, CHFpEF, GERD, osteomyelitis of jaw, hypothyroidism, hypoparathyroidism, anemia, admitted with acute on chronic renal failure requiring initiation of dialysis, acute respiratory failure requiring intubation on 12/16, pneumonia with sepsis, GI bleed, EGD showed non bleeding duodenal ulcer.  Now off pressors.   Dialysis catheter removed 12/22 due to fevers and leukocytosis and was replaced with new dialysis catheter.        Problem/Plan - 1:  ·  Problem: S/P Acute respiratory failure.  -Plan: Acute respiratory failure secondary to HAP (elevated procalcitonin) and fluid overload likey due to ESRD, on admission  - was hypoxic on Bipap, S/P intubation Extubated on 12/27, in now transitioned from venti mask to NC 3 L saturating well   -Pt afebrile overnight, leukocytosis,  repeated UA positive, repeated blood culture and urine culture no growth to date.  -As per ID Dr. Kaplan. antibiotics stopped today  -Previous shiley catheter removed 12/22 due to fevers and leukocytosis. New shiley cath was placed on 12/26 but was not functioning therefore removed. 3rd Rt Shiley was placed 12/29, dialysis was done 12/30  -NG tube was pulled out by pt, pt refused new NG tube, NPO for now, on NS at 75ml.         Problem/Plan - 2:  ·  Problem: ESRD    Plan: getting HD via shiley.  -pt need permcath  as per Dr. Escoto   -Previous shiley catheter removed 12/22 due to fevers and leukocytosis. New shiley cath was placed on 12/26 but was not functioning therefore removed. 3rd Rt Shiley was placed 12/29,  -Got Dialysis 12/30  -c/w sensipar for renal osteodystrophy  -NG tube was pulled out by pt, pt refused new NG tube, NPO for now, on NS at 75ml.       Problem/Plan - 3:  ·  Problem: R/O Upper GI bleed.  Plan:  -Resolved, no recurrent GI Bleed.   s/p 1 PRBC, hb 8.2 after transfusion  EGD showed duodenal ulcer, no active bleeding, gastritis and esophagitis.  biopsy report negative   monitor CBC  c/w protonix 40mg, daily.       Problem/Plan - 4:  ·  Problem: Hypothyroidism.    Plan: c/w home dose LT4 50 mcg daily via NG tube  TSH wnl.      Problem/Plan - 5:  ·  Problem: CHF (congestive heart failure).    Plan: c/w home cardiac meds   TTE - RV systolic pressure is 49 mm Hg. Mild pulmonary  hypertension. EF:55% Grade II DD     Problem/Plan - 6:  Problem: Diabetes mellitus.   Plan: c/w  HSS   HbA1c 5.2.     Problem/Plan - 7:  ·  Problem: HTN (hypertension).    Plan: BP normal,  - Will Start home dose of hydralazine and Carvedilol if BP high    Problem/Plan - 8:  ·  Problem: Prophylactic measure.  Plan:   c/w DVT ppx with SCD and heparin  c/w GI ppx, on protonix INTERVAL HPI/OVERNIGHT EVENTS: BP stable overnight, off pheny. NG tube was pulled out by pt, pt refused new NG tube, NPO for now, on NS at 75ml.     PRESSORS: [ ] YES [x ] NO  WHICH:    Antimicrobial:    Cardiovascular:    Pulmonary:    Hematalogic:  heparin  Injectable 5000 Unit(s) SubCutaneous every 12 hours    Other:  acetaminophen   Tablet 650 milliGRAM(s) Oral every 6 hours PRN  acetaminophen  Suppository 650 milliGRAM(s) Rectal every 6 hours PRN  ascorbic acid 500 milliGRAM(s) Oral daily  chlorhexidine 4% Liquid 1 Application(s) Topical daily  cinacalcet 30 milliGRAM(s) Oral daily  dextrose 5%. 1000 milliLiter(s) IV Continuous <Continuous>  dextrose 50% Injectable 12.5 Gram(s) IV Push once  dextrose 50% Injectable 25 Gram(s) IV Push once  dextrose 50% Injectable 25 Gram(s) IV Push once  dextrose Gel 1 Dose(s) Oral once PRN  ferrous    sulfate Liquid 300 milliGRAM(s) Enteral Tube daily  folic acid 1 milliGRAM(s) Oral daily  glucagon  Injectable 1 milliGRAM(s) IntraMuscular once PRN  insulin lispro (HumaLOG) corrective regimen sliding scale   SubCutaneous every 6 hours  levothyroxine 50 MICROGram(s) Oral daily  pantoprazole  Injectable 40 milliGRAM(s) IV Push daily  simvastatin 20 milliGRAM(s) Oral at bedtime    acetaminophen   Tablet 650 milliGRAM(s) Oral every 6 hours PRN  acetaminophen  Suppository 650 milliGRAM(s) Rectal every 6 hours PRN  ascorbic acid 500 milliGRAM(s) Oral daily  chlorhexidine 4% Liquid 1 Application(s) Topical daily  cinacalcet 30 milliGRAM(s) Oral daily  dextrose 5%. 1000 milliLiter(s) IV Continuous <Continuous>  dextrose 50% Injectable 12.5 Gram(s) IV Push once  dextrose 50% Injectable 25 Gram(s) IV Push once  dextrose 50% Injectable 25 Gram(s) IV Push once  dextrose Gel 1 Dose(s) Oral once PRN  ferrous    sulfate Liquid 300 milliGRAM(s) Enteral Tube daily  folic acid 1 milliGRAM(s) Oral daily  glucagon  Injectable 1 milliGRAM(s) IntraMuscular once PRN  heparin  Injectable 5000 Unit(s) SubCutaneous every 12 hours  insulin lispro (HumaLOG) corrective regimen sliding scale   SubCutaneous every 6 hours  levothyroxine 50 MICROGram(s) Oral daily  pantoprazole  Injectable 40 milliGRAM(s) IV Push daily  simvastatin 20 milliGRAM(s) Oral at bedtime    Drug Dosing Weight  Height (cm): 170 (13 Dec 2017 17:16)  Weight (kg): 57 (13 Dec 2017 17:16)  BMI (kg/m2): 19.7 (13 Dec 2017 17:16)  BSA (m2): 1.66 (13 Dec 2017 17:16)    CENTRAL LINE: [ x] YES [ ] NO  LOCATION:   DATE INSERTED:  REMOVE: [ ] YES [ ] NO  EXPLAIN:    JACOBSON: [ ] YES [ x] NO    DATE INSERTED:  REMOVE:  [ ] YES [ ] NO  EXPLAIN:    A-LINE:  [ ] YES [x ] NO  LOCATION:   DATE INSERTED:  REMOVE:  [ ] YES [ ] NO  EXPLAIN:        ICU Vital Signs Last 24 Hrs  T(C): 37.7 (31 Dec 2017 00:00), Max: 37.7 (31 Dec 2017 00:00)  T(F): 99.9 (31 Dec 2017 00:00), Max: 99.9 (31 Dec 2017 00:00)  HR: 106 (31 Dec 2017 00:30) (102 - 123)  BP: 130/60 (31 Dec 2017 00:30) (85/51 - 160/74)  BP(mean): 76 (31 Dec 2017 00:30) (59 - 101)  ABP: --  ABP(mean): --  RR: 20 (31 Dec 2017 00:30) (18 - 48)  SpO2: 94% (31 Dec 2017 00:30) (94% - 100%)            12-29 @ 07:01  -  12-30 @ 07:00  --------------------------------------------------------  IN: 890 mL / OUT: 0 mL / NET: 890 mL            PHYSICAL EXAM:    GENERAL: [ x]NAD, on Nasal cannula  HEAD:  [x]Atraumatic, [ x]Normocephalic  EYES: [x ]conjunctiva and sclera clear  ENMT: [ x]No tonsillar erythema, exudates, or enlargement; [ x]Moist mucous membranes,   NECK: [x ]Supple, normal appearance, [x ]No JVD;  NERVOUS SYSTEM:  [x ]Alert & Oriented X2,   CHEST/LUNG: Clear to auscultation, [ x]No chest deformity; [ ]Normal percussion bilaterally; [ x]No rales, rhonchi, wheezing   HEART: [x ]Regular rate and rhythm; [x ]No murmurs, rubs, or gallops  ABDOMEN: [x ]Soft, Nontender, Nondistended; [x ]Bowel sounds present  GENITOURINARY: Rt shiley in placed, Clean site. No erythema, exudate  EXTREMITIES:  [x ]2+ Peripheral Pulses, [x ]No clubbing, cyanosis, or edema  LYMPH: [x ]No lymphadenopathy noted  SKIN: [ x]No rashes or lesions;       LABS:  CBC Full  -  ( 30 Dec 2017 06:38 )  WBC Count : 21.7 K/uL  Hemoglobin : 12.0 g/dL  Hematocrit : 38.5 %  Platelet Count - Automated : 377 K/uL  Mean Cell Volume : 93.0 fl  Mean Cell Hemoglobin : 29.1 pg  Mean Cell Hemoglobin Concentration : 31.3 gm/dL  Auto Neutrophil # : x  Auto Lymphocyte # : x  Auto Monocyte # : x  Auto Eosinophil # : x  Auto Basophil # : x  Auto Neutrophil % : 85.0 %  Auto Lymphocyte % : 6.0 %  Auto Monocyte % : 3.0 %  Auto Eosinophil % : 1.0 %  Auto Basophil % : x    12-30    139  |  102  |  66<H>  ----------------------------<  332<H>  5.6<H>   |  23  |  4.78<H>    Ca    9.6      30 Dec 2017 06:38  Phos  7.1     12-30  Mg     2.9     12-30      PT/INR - ( 29 Dec 2017 09:41 )   PT: 15.1 sec;   INR: 1.38 ratio         PTT - ( 29 Dec 2017 09:41 )  PTT:28.4 sec        RADIOLOGY & ADDITIONAL STUDIES REVIEWED: yes    [ ]GOALS OF CARE DISCUSSION WITH PATIENT/FAMILY/PROXY:    CRITICAL CARE TIME SPENT: 35 minutes      Assessment and Plan:   · Assessment		  52 female nursing home patient with hx of DM, neuropathy, CKD, CHFpEF, GERD, osteomyelitis of jaw, hypothyroidism, hypoparathyroidism, anemia, admitted with acute on chronic renal failure requiring initiation of dialysis, acute respiratory failure requiring intubation on 12/16, pneumonia with sepsis, GI bleed, EGD showed non bleeding duodenal ulcer.  Now off pressors.   Dialysis catheter removed 12/22 due to fevers and leukocytosis and was replaced with new dialysis catheter.        Problem/Plan - 1:  ·  Problem: S/P Acute respiratory failure.  -Plan: Acute respiratory failure secondary to HAP (elevated procalcitonin) and fluid overload likey due to ESRD, on admission  - was hypoxic on Bipap, S/P intubation Extubated on 12/27, in now transitioned from venti mask to NC 3 L saturating well   -Pt afebrile overnight, leukocytosis,  repeated UA positive, repeated blood culture and urine culture no growth to date.  -As per ID Dr. Kaplan. antibiotics stopped today  -Previous shiley catheter removed 12/22 due to fevers and leukocytosis. New shiley cath was placed on 12/26 but was not functioning therefore removed. 3rd Rt Shiley was placed 12/29, dialysis was done 12/30  -NG tube was pulled out by pt, pt refused new NG tube, NPO for now, on NS at 75ml.  -f/u speech and sallow eval.       Problem/Plan - 2:  ·  Problem: ESRD    Plan: getting HD via shiley.  -pt need permcath  as per Dr. Escoto   -Previous shiley catheter removed 12/22 due to fevers and leukocytosis. New shiley cath was placed on 12/26 but was not functioning therefore removed. 3rd Rt Shiley was placed 12/29,  -Got Dialysis 12/30  -c/w sensipar for renal osteodystrophy  -NG tube was pulled out by pt, pt refused new NG tube, NPO for now, on NS at 75ml.       Problem/Plan - 3:  ·  Problem: R/O Upper GI bleed.  Plan:  -Resolved, no recurrent GI Bleed.   s/p 1 PRBC, hb 8.2 after transfusion  EGD showed duodenal ulcer, no active bleeding, gastritis and esophagitis.  biopsy report negative   monitor CBC  c/w protonix 40mg, daily.       Problem/Plan - 4:  ·  Problem: Hypothyroidism.    Plan: c/w home dose LT4 50 mcg daily via NG tube  TSH wnl.      Problem/Plan - 5:  ·  Problem: CHF (congestive heart failure).    Plan: c/w home cardiac meds   TTE - RV systolic pressure is 49 mm Hg. Mild pulmonary  hypertension. EF:55% Grade II DD     Problem/Plan - 6:  Problem: Diabetes mellitus.   Plan: c/w  HSS   HbA1c 5.2.     Problem/Plan - 7:  ·  Problem: HTN (hypertension).    Plan: BP normal,  - Will Start home dose of hydralazine and Carvedilol if BP high    Problem/Plan - 8:  ·  Problem: Prophylactic measure.  Plan:   c/w DVT ppx with SCD and heparin  c/w GI ppx, on protonix

## 2017-12-31 NOTE — PROGRESS NOTE ADULT - ASSESSMENT
52 yr old female with  ESRD.   HD yesterday, flowsheet reviewed.   Episodes of Intradialytic hypotension noted, HD terminated after 2.5 hours HD.   Electrolytes and volume status acceptable.   cont abx  con sensipar for renal osteodystrophy  Hg above goal, can hold epo for now, and restart when Hg <10

## 2017-12-31 NOTE — SWALLOW BEDSIDE ASSESSMENT ADULT - ASR SWALLOW LABIAL MOBILITY
Unable to assess due to pt not following many directions; pt did closed lips tight to prevent suctioning

## 2017-12-31 NOTE — SWALLOW BEDSIDE ASSESSMENT ADULT - SWALLOW EVAL: RECOMMENDED FEEDING/EATING TECHNIQUES
check mouth frequently for oral residue/pocketing/crush medication (when feasible)/small sips/bites/allow for swallow between intakes/maintain upright posture during/after eating for 30 mins/no straws/oral hygiene/alternate food with liquid/position upright (90 degrees)

## 2017-12-31 NOTE — SWALLOW BEDSIDE ASSESSMENT ADULT - SLP PERTINENT HISTORY OF CURRENT PROBLEM
52 female nursing home patient with hx of DM, neuropathy, CKD, CHFpEF, GERD, osteomyelitis of jaw, hypothyroidism, hypoparathyroidism, anemia, admitted with acute on chronic renal failure requiring initiation of dialysis, acute respiratory failure requiring intubation on 12/16, pneumonia with sepsis, GI bleed, EGD showed non bleeding duodenal ulcer.  Now off pressors.

## 2017-12-31 NOTE — SWALLOW BEDSIDE ASSESSMENT ADULT - ASR SWALLOW ASPIRATION MONITOR
gurgly voice/upper respiratory infection/fever/pneumonia/throat clearing/oral hygiene/position upright (90Y)/change of breathing pattern/cough

## 2017-12-31 NOTE — SWALLOW BEDSIDE ASSESSMENT ADULT - COMMENTS
Pt awake & alert. SUSAN Cintron helped position pt. SpO2 93-97 & -116 during eval. Pt congested & coughed prior to trials of any food. Pt suctioned orally by SUSAN Cintron prior to trials. Closed lips tight to prevent RN from suctioning. Did not follow many simple directions w/max cues (i.e., opened mouth w/finger prompting downward on chin). Verbal, responded w/ "yeah" sometimes to simple questions. Pt opened mouth when presented w/spoon on lips. Vocal quality slightly hoarse w/low volume at baseline. Needed extra time to swallow each bolus.

## 2017-12-31 NOTE — SWALLOW BEDSIDE ASSESSMENT ADULT - PHARYNGEAL PHASE
Multiple swallows/Decreased laryngeal elevation/Delayed pharyngeal swallow Multiple swallows 2/6 times/Delayed pharyngeal swallow/Decreased laryngeal elevation

## 2017-12-31 NOTE — SWALLOW BEDSIDE ASSESSMENT ADULT - SWALLOW EVAL: DIAGNOSIS
Pt suspected oropharyngeal dysphagia. Pt exhibited adequate labial seal & spoon stripping. Slight delayed oral transit & a-p transport. Initiation of swallow delayed w/reduced laryngeal elevation. Multiple swallows noted to clear boluses. Coughing noted post swallow 1/6 times w/honey thick liquid but did not appear to be swallowing related. Needed extra time to swallow each bolus.

## 2018-01-01 LAB
ALBUMIN SERPL ELPH-MCNC: 1.2 G/DL — LOW (ref 3.5–5)
ALP SERPL-CCNC: 66 U/L — SIGNIFICANT CHANGE UP (ref 40–120)
ALT FLD-CCNC: <6 U/L DA — LOW (ref 10–60)
ANION GAP SERPL CALC-SCNC: 16 MMOL/L — SIGNIFICANT CHANGE UP (ref 5–17)
ANION GAP SERPL CALC-SCNC: 16 MMOL/L — SIGNIFICANT CHANGE UP (ref 5–17)
ANION GAP SERPL CALC-SCNC: 18 MMOL/L — HIGH (ref 5–17)
ANION GAP SERPL CALC-SCNC: 18 MMOL/L — HIGH (ref 5–17)
AST SERPL-CCNC: 13 U/L — SIGNIFICANT CHANGE UP (ref 10–40)
BASOPHILS # BLD AUTO: 0.2 K/UL — SIGNIFICANT CHANGE UP (ref 0–0.2)
BASOPHILS NFR BLD AUTO: 1 % — SIGNIFICANT CHANGE UP (ref 0–2)
BILIRUB SERPL-MCNC: 0.2 MG/DL — SIGNIFICANT CHANGE UP (ref 0.2–1.2)
BUN SERPL-MCNC: 104 MG/DL — HIGH (ref 7–18)
BUN SERPL-MCNC: 110 MG/DL — HIGH (ref 7–18)
BUN SERPL-MCNC: 55 MG/DL — HIGH (ref 7–18)
BUN SERPL-MCNC: 97 MG/DL — HIGH (ref 7–18)
CALCIUM SERPL-MCNC: 5.7 MG/DL — CRITICAL LOW (ref 8.4–10.5)
CALCIUM SERPL-MCNC: 9 MG/DL — SIGNIFICANT CHANGE UP (ref 8.4–10.5)
CALCIUM SERPL-MCNC: 9.4 MG/DL — SIGNIFICANT CHANGE UP (ref 8.4–10.5)
CALCIUM SERPL-MCNC: 9.7 MG/DL — SIGNIFICANT CHANGE UP (ref 8.4–10.5)
CHLORIDE SERPL-SCNC: 106 MMOL/L — SIGNIFICANT CHANGE UP (ref 96–108)
CHLORIDE SERPL-SCNC: 108 MMOL/L — SIGNIFICANT CHANGE UP (ref 96–108)
CHLORIDE SERPL-SCNC: 108 MMOL/L — SIGNIFICANT CHANGE UP (ref 96–108)
CHLORIDE SERPL-SCNC: 119 MMOL/L — HIGH (ref 96–108)
CO2 SERPL-SCNC: 14 MMOL/L — LOW (ref 22–31)
CO2 SERPL-SCNC: 19 MMOL/L — LOW (ref 22–31)
CO2 SERPL-SCNC: 19 MMOL/L — LOW (ref 22–31)
CO2 SERPL-SCNC: 21 MMOL/L — LOW (ref 22–31)
CREAT SERPL-MCNC: 3.59 MG/DL — HIGH (ref 0.5–1.3)
CREAT SERPL-MCNC: 6.61 MG/DL — HIGH (ref 0.5–1.3)
CREAT SERPL-MCNC: 6.98 MG/DL — HIGH (ref 0.5–1.3)
CREAT SERPL-MCNC: 7.32 MG/DL — HIGH (ref 0.5–1.3)
EOSINOPHIL # BLD AUTO: 0 K/UL — SIGNIFICANT CHANGE UP (ref 0–0.5)
EOSINOPHIL NFR BLD AUTO: 0 % — SIGNIFICANT CHANGE UP (ref 0–6)
GLUCOSE BLDC GLUCOMTR-MCNC: 150 MG/DL — HIGH (ref 70–99)
GLUCOSE BLDC GLUCOMTR-MCNC: 239 MG/DL — HIGH (ref 70–99)
GLUCOSE BLDC GLUCOMTR-MCNC: 280 MG/DL — HIGH (ref 70–99)
GLUCOSE SERPL-MCNC: 112 MG/DL — HIGH (ref 70–99)
GLUCOSE SERPL-MCNC: 223 MG/DL — HIGH (ref 70–99)
GLUCOSE SERPL-MCNC: 246 MG/DL — HIGH (ref 70–99)
GLUCOSE SERPL-MCNC: 304 MG/DL — HIGH (ref 70–99)
HAV IGM SER-ACNC: SIGNIFICANT CHANGE UP
HBV CORE IGM SER-ACNC: SIGNIFICANT CHANGE UP
HBV SURFACE AG SER-ACNC: SIGNIFICANT CHANGE UP
HCT VFR BLD CALC: 28.1 % — LOW (ref 34.5–45)
HCV AB S/CO SERPL IA: 0.14 S/CO — SIGNIFICANT CHANGE UP
HCV AB SERPL-IMP: SIGNIFICANT CHANGE UP
HGB BLD-MCNC: 9.2 G/DL — LOW (ref 11.5–15.5)
LYMPHOCYTES # BLD AUTO: 10.3 % — LOW (ref 13–44)
LYMPHOCYTES # BLD AUTO: 2 K/UL — SIGNIFICANT CHANGE UP (ref 1–3.3)
MAGNESIUM SERPL-MCNC: 1.8 MG/DL — SIGNIFICANT CHANGE UP (ref 1.6–2.6)
MCHC RBC-ENTMCNC: 30.9 PG — SIGNIFICANT CHANGE UP (ref 27–34)
MCHC RBC-ENTMCNC: 32.8 GM/DL — SIGNIFICANT CHANGE UP (ref 32–36)
MCV RBC AUTO: 94.3 FL — SIGNIFICANT CHANGE UP (ref 80–100)
MONOCYTES # BLD AUTO: 2 K/UL — HIGH (ref 0–0.9)
MONOCYTES NFR BLD AUTO: 10.1 % — SIGNIFICANT CHANGE UP (ref 2–14)
NEUTROPHILS # BLD AUTO: 15.3 K/UL — HIGH (ref 1.8–7.4)
NEUTROPHILS NFR BLD AUTO: 78.5 % — HIGH (ref 43–77)
PHOSPHATE SERPL-MCNC: 7.1 MG/DL — HIGH (ref 2.5–4.5)
PLATELET # BLD AUTO: 369 K/UL — SIGNIFICANT CHANGE UP (ref 150–400)
POTASSIUM SERPL-MCNC: 3.1 MMOL/L — LOW (ref 3.5–5.3)
POTASSIUM SERPL-MCNC: 6.2 MMOL/L — CRITICAL HIGH (ref 3.5–5.3)
POTASSIUM SERPL-MCNC: 6.6 MMOL/L — CRITICAL HIGH (ref 3.5–5.3)
POTASSIUM SERPL-MCNC: 6.8 MMOL/L — CRITICAL HIGH (ref 3.5–5.3)
POTASSIUM SERPL-SCNC: 3.1 MMOL/L — LOW (ref 3.5–5.3)
POTASSIUM SERPL-SCNC: 6.2 MMOL/L — CRITICAL HIGH (ref 3.5–5.3)
POTASSIUM SERPL-SCNC: 6.6 MMOL/L — CRITICAL HIGH (ref 3.5–5.3)
POTASSIUM SERPL-SCNC: 6.8 MMOL/L — CRITICAL HIGH (ref 3.5–5.3)
PROT SERPL-MCNC: 4.9 G/DL — LOW (ref 6–8.3)
RBC # BLD: 2.98 M/UL — LOW (ref 3.8–5.2)
RBC # FLD: 18.3 % — HIGH (ref 10.3–14.5)
SODIUM SERPL-SCNC: 143 MMOL/L — SIGNIFICANT CHANGE UP (ref 135–145)
SODIUM SERPL-SCNC: 145 MMOL/L — SIGNIFICANT CHANGE UP (ref 135–145)
SODIUM SERPL-SCNC: 145 MMOL/L — SIGNIFICANT CHANGE UP (ref 135–145)
SODIUM SERPL-SCNC: 149 MMOL/L — HIGH (ref 135–145)
WBC # BLD: 19.5 K/UL — HIGH (ref 3.8–10.5)
WBC # FLD AUTO: 19.5 K/UL — HIGH (ref 3.8–10.5)

## 2018-01-01 PROCEDURE — 71045 X-RAY EXAM CHEST 1 VIEW: CPT | Mod: 26

## 2018-01-01 RX ORDER — ERYTHROPOIETIN 10000 [IU]/ML
6000 INJECTION, SOLUTION INTRAVENOUS; SUBCUTANEOUS
Qty: 0 | Refills: 0 | Status: DISCONTINUED | OUTPATIENT
Start: 2018-01-01 | End: 2018-01-10

## 2018-01-01 RX ORDER — POTASSIUM CHLORIDE 20 MEQ
40 PACKET (EA) ORAL EVERY 4 HOURS
Qty: 0 | Refills: 0 | Status: COMPLETED | OUTPATIENT
Start: 2018-01-01 | End: 2018-01-01

## 2018-01-01 RX ORDER — DEXTROSE 50 % IN WATER 50 %
50 SYRINGE (ML) INTRAVENOUS ONCE
Qty: 0 | Refills: 0 | Status: COMPLETED | OUTPATIENT
Start: 2018-01-01 | End: 2018-01-01

## 2018-01-01 RX ORDER — ALBUTEROL 90 UG/1
2.5 AEROSOL, METERED ORAL
Qty: 0 | Refills: 0 | Status: COMPLETED | OUTPATIENT
Start: 2018-01-01 | End: 2018-01-01

## 2018-01-01 RX ORDER — POTASSIUM CHLORIDE 20 MEQ
40 PACKET (EA) ORAL EVERY 4 HOURS
Qty: 0 | Refills: 0 | Status: DISCONTINUED | OUTPATIENT
Start: 2018-01-01 | End: 2018-01-01

## 2018-01-01 RX ORDER — SODIUM POLYSTYRENE SULFONATE 4.1 MEQ/G
15 POWDER, FOR SUSPENSION ORAL ONCE
Qty: 0 | Refills: 0 | Status: COMPLETED | OUTPATIENT
Start: 2018-01-01 | End: 2018-01-01

## 2018-01-01 RX ORDER — SODIUM POLYSTYRENE SULFONATE 4.1 MEQ/G
30 POWDER, FOR SUSPENSION ORAL ONCE
Qty: 0 | Refills: 0 | Status: COMPLETED | OUTPATIENT
Start: 2018-01-01 | End: 2018-01-01

## 2018-01-01 RX ORDER — FUROSEMIDE 40 MG
40 TABLET ORAL ONCE
Qty: 0 | Refills: 0 | Status: COMPLETED | OUTPATIENT
Start: 2018-01-01 | End: 2018-01-01

## 2018-01-01 RX ORDER — INSULIN HUMAN 100 [IU]/ML
10 INJECTION, SOLUTION SUBCUTANEOUS ONCE
Qty: 0 | Refills: 0 | Status: COMPLETED | OUTPATIENT
Start: 2018-01-01 | End: 2018-01-01

## 2018-01-01 RX ORDER — METOPROLOL TARTRATE 50 MG
25 TABLET ORAL
Qty: 0 | Refills: 0 | Status: DISCONTINUED | OUTPATIENT
Start: 2018-01-01 | End: 2018-01-29

## 2018-01-01 RX ORDER — TUBERCULIN PURIFIED PROTEIN DERIVATIVE 5 [IU]/.1ML
5 INJECTION, SOLUTION INTRADERMAL ONCE
Qty: 0 | Refills: 0 | Status: DISCONTINUED | OUTPATIENT
Start: 2018-01-01 | End: 2018-01-29

## 2018-01-01 RX ORDER — CALCIUM GLUCONATE 100 MG/ML
1 VIAL (ML) INTRAVENOUS ONCE
Qty: 0 | Refills: 0 | Status: COMPLETED | OUTPATIENT
Start: 2018-01-01 | End: 2018-01-01

## 2018-01-01 RX ADMIN — ALBUTEROL 2.5 MILLIGRAM(S): 90 AEROSOL, METERED ORAL at 20:45

## 2018-01-01 RX ADMIN — Medication 2.5 MILLIGRAM(S): at 05:17

## 2018-01-01 RX ADMIN — PANTOPRAZOLE SODIUM 40 MILLIGRAM(S): 20 TABLET, DELAYED RELEASE ORAL at 11:49

## 2018-01-01 RX ADMIN — Medication 300 MILLIGRAM(S): at 11:48

## 2018-01-01 RX ADMIN — SIMVASTATIN 20 MILLIGRAM(S): 20 TABLET, FILM COATED ORAL at 21:02

## 2018-01-01 RX ADMIN — ALBUTEROL 2.5 MILLIGRAM(S): 90 AEROSOL, METERED ORAL at 20:30

## 2018-01-01 RX ADMIN — CHLORHEXIDINE GLUCONATE 1 APPLICATION(S): 213 SOLUTION TOPICAL at 11:50

## 2018-01-01 RX ADMIN — Medication 100 GRAM(S): at 21:50

## 2018-01-01 RX ADMIN — SODIUM POLYSTYRENE SULFONATE 15 GRAM(S): 4.1 POWDER, FOR SUSPENSION ORAL at 19:58

## 2018-01-01 RX ADMIN — Medication 40 MILLIGRAM(S): at 21:13

## 2018-01-01 RX ADMIN — Medication 25 MILLIGRAM(S): at 17:34

## 2018-01-01 RX ADMIN — INSULIN HUMAN 10 UNIT(S): 100 INJECTION, SOLUTION SUBCUTANEOUS at 20:41

## 2018-01-01 RX ADMIN — Medication 100 GRAM(S): at 07:28

## 2018-01-01 RX ADMIN — Medication 40 MILLIEQUIVALENT(S): at 09:27

## 2018-01-01 RX ADMIN — Medication 1 MILLIGRAM(S): at 11:49

## 2018-01-01 RX ADMIN — Medication 2: at 17:30

## 2018-01-01 RX ADMIN — Medication 50 MILLILITER(S): at 20:41

## 2018-01-01 RX ADMIN — Medication 25 MICROGRAM(S): at 05:17

## 2018-01-01 RX ADMIN — Medication 500 MILLIGRAM(S): at 11:49

## 2018-01-01 RX ADMIN — Medication 3: at 11:48

## 2018-01-01 RX ADMIN — CINACALCET 30 MILLIGRAM(S): 30 TABLET, FILM COATED ORAL at 11:49

## 2018-01-01 RX ADMIN — Medication 40 MILLIEQUIVALENT(S): at 11:49

## 2018-01-01 RX ADMIN — HEPARIN SODIUM 5000 UNIT(S): 5000 INJECTION INTRAVENOUS; SUBCUTANEOUS at 05:17

## 2018-01-01 RX ADMIN — HEPARIN SODIUM 5000 UNIT(S): 5000 INJECTION INTRAVENOUS; SUBCUTANEOUS at 17:30

## 2018-01-01 RX ADMIN — ALBUTEROL 2.5 MILLIGRAM(S): 90 AEROSOL, METERED ORAL at 21:00

## 2018-01-01 NOTE — CHART NOTE - NSCHARTNOTEFT_GEN_A_CORE
ICU downgrade to medicine floor note    52 female nursing home patient with hx of DM, neuropathy, CKD, CHFpEF, GERD, osteomyelitis of jaw, hypothyroidism, hypoparathyroidism, anemia, admitted to ICU acute on chronic renal failure requiring initiation of dialysis and  acute respiratory failure requiring intubation 2/2 pneumonia with sepsis.        Problem/Plan - 1:  ·  Problem:  Acute respiratory failure.  Plan: Acute respiratory failure secondary to HAP (elevated procalcitonin) and fluid overload likey due to ESRD.   on admission pt was hypoxic on Bipap, S/P intubation on 12/16,  Extubated on 12/27, in now transitioned from venti mask to NC 3 L saturating well   -Pt afebrile overnight, leukocytosis, WBC trending down.  Repeated blood culture and urine culture no growth to date.  -completed ABx course.   -Previous shiley catheter removed 12/22 due to fevers and leukocytosis. New shiley cath was placed on 12/26 but was not functioning therefore removed. 3rd Rt Shiley was placed 12/29, last dialysis was done ON 12/30  -passed speech and sallow, started on pureed diet.       Problem/Plan - 2:  ·  Problem: ESRD    Plan: getting HD via shiley.  -pt  need permcath  as per Dr. Escoto   -Previous shiley catheter removed 12/22 due to fevers and leukocytosis. New shiley cath was placed on 12/26 but was not functioning therefore removed. 3rd Rt Shiley was placed 12/29.  -Got last Dialysis on 12/30  -c/w sensipar for renal osteodystrophy  -Nephro  and Dr. Valles         Problem/Plan - 3:  ·  Problem: R/O Upper GI bleed.  Plan:  -Resolved, no recurrent GI Bleed.   s/p 1 PRBC, hb stable  EGD showed duodenal ulcer, no active bleeding, gastritis and esophagitis.  biopsy report negative   monitor CBC  c/w protonix 40mg, daily.       Problem/Plan - 4:  ·  Problem: Hypothyroidism.    Plan: c/w home dose LT4 50 mcg daily via NG tube  TSH wnl.      Problem/Plan - 5:  ·  Problem: CHF (congestive heart failure).    Plan: c/w home cardiac meds   TTE - RV systolic pressure is 49 mm Hg. Mild pulmonary  hypertension. EF:55% Grade II DD     Problem/Plan - 6:  Problem: Diabetes mellitus.   Plan: c/w  HSS   HbA1c 5.2.     Problem/Plan - 7:  ·  Problem: HTN (hypertension).    Plan: BP stable  - on lopressor 25mg, bid    Problem/Plan - 8:  ·  Problem: Prophylactic measure.  Plan:   c/w DVT ppx with SCD and heparin  c/w GI ppx, on protonix ICU downgrade to medicine floor note    52 female nursing home patient with hx of DM, neuropathy, CKD, CHFpEF, GERD, osteomyelitis of jaw, hypothyroidism, hypoparathyroidism, anemia, admitted to ICU acute on chronic renal failure requiring initiation of dialysis and  acute respiratory failure requiring intubation 2/2 pneumonia with sepsis.        Problem/Plan - 1:  ·  Problem:  Acute respiratory failure.  Plan: Acute respiratory failure secondary to HAP (elevated procalcitonin) and fluid overload likey due to ESRD.   on admission pt was hypoxic on Bipap, S/P intubation on 12/16,  Extubated on 12/27, in now transitioned from venti mask to NC 3 L saturating well   -Pt afebrile overnight, leukocytosis, WBC trending down.  Repeated blood culture and urine culture no growth to date.  -completed ABx course, was on levaquin and maxipime for 14 days.  -Previous shiley catheter removed 12/22 due to fevers and leukocytosis. New shiley cath was placed on 12/26 but was not functioning therefore removed. 3rd Rt Shiley was placed 12/29, last dialysis was done ON 12/30  -passed speech and sallow, started on pureed diet.  -ID Dr. Kaplan        Problem/Plan - 2:  ·  Problem: ESRD    Plan: getting HD via shiley.  -pt  need permcath  as per Dr. Escoto   -Previous shiley catheter removed 12/22 due to fevers and leukocytosis. New shiley cath was placed on 12/26 but was not functioning therefore removed. 3rd Rt Shiley was placed 12/29.  -Got last Dialysis on 12/30  -c/w sensipar for renal osteodystrophy  -Nephro  and Dr. Valles         Problem/Plan - 3:  ·  Problem: R/O Upper GI bleed.  Plan:  -Resolved, no recurrent GI Bleed.   s/p 1 PRBC, hb stable  EGD showed duodenal ulcer, no active bleeding, gastritis and esophagitis.  biopsy report negative   monitor CBC  c/w protonix 40mg, daily.       Problem/Plan - 4:  ·  Problem: Hypothyroidism.    Plan: c/w home dose LT4 50 mcg daily via NG tube  TSH wnl.      Problem/Plan - 5:  ·  Problem: CHF (congestive heart failure).    Plan: c/w home cardiac meds   TTE - RV systolic pressure is 49 mm Hg. Mild pulmonary  hypertension. EF:55% Grade II DD     Problem/Plan - 6:  Problem: Diabetes mellitus.   Plan: c/w  HSS   HbA1c 5.2.     Problem/Plan - 7:  ·  Problem: HTN (hypertension).    Plan: BP stable  - on lopressor 25mg, bid    Problem/Plan - 8:  ·  Problem: Prophylactic measure.  Plan:   c/w DVT ppx with SCD and heparin  c/w GI ppx, on protonix    Pt was seen and examined at bedside, medically ready to be transferred to medicine floor. Discussed with Dr. Fatima and Resident ??. ICU downgrade to medicine floor note    52 female nursing home patient with hx of DM, neuropathy, CKD, CHFpEF, GERD, osteomyelitis of jaw, hypothyroidism, hypoparathyroidism, anemia, admitted to ICU acute on chronic renal failure requiring initiation of dialysis and  acute respiratory failure requiring intubation 2/2 pneumonia with sepsis.        Problem/Plan - 1:  ·  Problem:  Acute respiratory failure.  Plan: Acute respiratory failure secondary to HAP (elevated procalcitonin) and fluid overload likey due to ESRD.   on admission pt was hypoxic on Bipap, S/P intubation on 12/16,  Extubated on 12/27, in now transitioned from venti mask to NC 3 L saturating well   -Pt afebrile overnight, leukocytosis, WBC trending down.  Repeated blood culture and urine culture no growth to date.  -completed ABx course, was on levaquin and maxipime for 14 days.  -Previous shiley catheter removed 12/22 due to fevers and leukocytosis. New shiley cath was placed on 12/26 but was not functioning therefore removed. 3rd Rt Shiley was placed 12/29, last dialysis was done ON 12/30  -passed speech and sallow, started on pureed diet.  -ID Dr. Kaplan        Problem/Plan - 2:  ·  Problem: ESRD    Plan: getting HD via shiley.  -pt  need permcath  as per Dr. Escoto   -Previous shiley catheter removed 12/22 due to fevers and leukocytosis. New shiley cath was placed on 12/26 but was not functioning therefore removed. 3rd Rt Shiley was placed 12/29.  -Got last Dialysis on 12/30  -c/w sensipar for renal osteodystrophy  -Nephro  and Dr. Valles         Problem/Plan - 3:  ·  Problem: R/O Upper GI bleed.  Plan:  -Resolved, no recurrent GI Bleed.   s/p 1 PRBC, hb stable  EGD showed duodenal ulcer, no active bleeding, gastritis and esophagitis.  biopsy report negative   monitor CBC  c/w protonix 40mg, daily.       Problem/Plan - 4:  ·  Problem: Hypothyroidism.    Plan: c/w home dose LT4 50 mcg daily via NG tube  TSH wnl.      Problem/Plan - 5:  ·  Problem: CHF (congestive heart failure).    Plan: c/w home cardiac meds   TTE - RV systolic pressure is 49 mm Hg. Mild pulmonary  hypertension. EF:55% Grade II DD     Problem/Plan - 6:  Problem: Diabetes mellitus.   Plan: c/w  HSS   HbA1c 5.2.     Problem/Plan - 7:  ·  Problem: HTN (hypertension).    Plan: BP stable  - on lopressor 25mg, bid    Problem/Plan - 8:  ·  Problem: Prophylactic measure.  Plan:   c/w DVT ppx with SCD and heparin  c/w GI ppx, on protonix    Pt was seen and examined at bedside, medically ready to be transferred to medicine floor. Discussed with Dr. Fatima and Resident . ICU downgrade to medicine floor note    52 female nursing home patient with hx of DM, neuropathy, CKD, CHFpEF, GERD, osteomyelitis of jaw, hypothyroidism, hypoparathyroidism, anemia, admitted to ICU acute on chronic renal failure requiring initiation of dialysis and  acute respiratory failure requiring intubation 2/2 pneumonia with sepsis.        Problem/Plan - 1:  ·  Problem:  Acute respiratory failure.  Plan: Acute respiratory failure secondary to HAP (elevated procalcitonin) and fluid overload likey due to ESRD.   on admission pt was hypoxic on Bipap, S/P intubation on 12/16,  Extubated on 12/27, in now transitioned from venti mask to NC 3 L saturating well   -Pt afebrile overnight, leukocytosis, WBC trending down.  Repeated blood culture and urine culture no growth to date.  -completed ABx course, was on levaquin and maxipime for 14 days.  -Previous shiley catheter removed 12/22 due to fevers and leukocytosis. New shiley cath was placed on 12/26 but was not functioning therefore removed. 3rd Rt Shiley was placed 12/29, last dialysis was done ON 12/30  -passed izabella and sallow, started on pureed diet.  -ID Dr. Kaplan   -f/u PPD and hepatitis panel due to pt will d/c to new dialysis facility.   -PT consulted, recommended to JEN.     Problem/Plan - 2:  ·  Problem: ESRD    Plan: getting HD via shiley.  -pt  need permcath  as per Dr. Escoto   -Previous shiley catheter removed 12/22 due to fevers and leukocytosis. New shiley cath was placed on 12/26 but was not functioning therefore removed. 3rd Rt Shiley was placed 12/29.  -Got last Dialysis on 12/30  -c/w sensipar for renal osteodystrophy  -Nephro  and Dr. Valles  -f/u PPD and hepatitis panel due to pt will d/c to new dialysis facility.   -PT consulted, recommended to JEN.     Problem/Plan - 3:  ·  Problem: R/O Upper GI bleed.  Plan:  -Resolved, no recurrent GI Bleed.   s/p 1 PRBC, hb stable  EGD showed duodenal ulcer, no active bleeding, gastritis and esophagitis.  biopsy report negative   monitor CBC  c/w protonix 40mg, daily.       Problem/Plan - 4:  ·  Problem: Hypothyroidism.    Plan: c/w home dose LT4 50 mcg daily via NG tube  TSH wnl.      Problem/Plan - 5:  ·  Problem: CHF (congestive heart failure).    Plan: c/w home cardiac meds   TTE - RV systolic pressure is 49 mm Hg. Mild pulmonary  hypertension. EF:55% Grade II DD     Problem/Plan - 6:  Problem: Diabetes mellitus.   Plan: c/w  HSS   HbA1c 5.2.     Problem/Plan - 7:  ·  Problem: HTN (hypertension).    Plan: BP stable  - on lopressor 25mg, bid    Problem/Plan - 8:  ·  Problem: Prophylactic measure.  Plan:   c/w DVT ppx with SCD and heparin  c/w GI ppx, on protonix    Pt was seen and examined at bedside, medically ready to be transferred to medicine floor. Discussed with Dr. Fatima and Resident . ICU downgrade to medicine floor note    52 female nursing home patient with hx of DM, neuropathy, CKD, CHFpEF, GERD, osteomyelitis of jaw, hypothyroidism, hypoparathyroidism, anemia, admitted to ICU acute on chronic renal failure requiring initiation of dialysis and  acute respiratory failure requiring intubation 2/2 pneumonia with sepsis.        Problem/Plan - 1:  ·  Problem:  Acute respiratory failure.  Plan: Acute respiratory failure secondary to HAP (elevated procalcitonin) and fluid overload likey due to ESRD.   on admission pt was hypoxic on Bipap, S/P intubation on 12/16,  Extubated on 12/27, in now transitioned from venti mask to NC 3 L saturating well   -Pt afebrile overnight, leukocytosis, WBC trending down.  Repeated blood culture and urine culture no growth to date.  -completed ABx course, was on levaquin and maxipime for 14 days.  -Previous shiley catheter removed 12/22 due to fevers and leukocytosis. New shiley cath was placed on 12/26 but was not functioning therefore removed. 3rd Rt Shiley was placed 12/29, last dialysis was done ON 12/30  -passed izabella and sallow, started on pureed diet.  -ID Dr. Kaplan   -f/u PPD and hepatitis panel due to pt will need to go to new dialysis facility.   -PT consulted, recommended to JEN.     Problem/Plan - 2:  ·  Problem: ESRD    Plan: getting HD via shiley.  -pt  need permcath  as per Dr. Escoto   -Previous shiley catheter removed 12/22 due to fevers and leukocytosis. New shiley cath was placed on 12/26 but was not functioning therefore removed. 3rd Rt Shiley was placed 12/29.  -Got last Dialysis on 12/30  -c/w sensipar for renal osteodystrophy  -Nephro  and Dr. Valles  -f/u PPD and hepatitis panel due to pt will need to go to new dialysis facility.   -PT consulted, recommended to JEN.     Problem/Plan - 3:  ·  Problem: R/O Upper GI bleed.  Plan:  -Resolved, no recurrent GI Bleed.   s/p 1 PRBC, hb stable  EGD showed duodenal ulcer, no active bleeding, gastritis and esophagitis.  biopsy report negative   monitor CBC  c/w protonix 40mg, daily.       Problem/Plan - 4:  ·  Problem: Hypothyroidism.    Plan: c/w home dose LT4 50 mcg daily via NG tube  TSH wnl.      Problem/Plan - 5:  ·  Problem: CHF (congestive heart failure).    Plan: c/w home cardiac meds   TTE - RV systolic pressure is 49 mm Hg. Mild pulmonary  hypertension. EF:55% Grade II DD     Problem/Plan - 6:  Problem: Diabetes mellitus.   Plan: c/w  HSS   HbA1c 5.2.     Problem/Plan - 7:  ·  Problem: HTN (hypertension).    Plan: BP stable  - on lopressor 25mg, bid    Problem/Plan - 8:  ·  Problem: Prophylactic measure.  Plan:   c/w DVT ppx with SCD and heparin  c/w GI ppx, on protonix    Pt was seen and examined at bedside, medically ready to be transferred to medicine floor. Discussed with Dr. Fatima and Resident .

## 2018-01-01 NOTE — PROGRESS NOTE ADULT - SUBJECTIVE AND OBJECTIVE BOX
INTERVAL HPI/OVERNIGHT EVENTS: pt afebrile overnight, WBC trending down.      PRESSORS: [ ] YES [ x] NO  WHICH:    Antimicrobial:    Cardiovascular:  metoprolol     tartrate 25 milliGRAM(s) Oral two times a day    Pulmonary:    Hematalogic:  heparin  Injectable 5000 Unit(s) SubCutaneous every 12 hours    Other:  acetaminophen   Tablet 650 milliGRAM(s) Oral every 6 hours PRN  acetaminophen  Suppository 650 milliGRAM(s) Rectal every 6 hours PRN  ascorbic acid 500 milliGRAM(s) Oral daily  chlorhexidine 4% Liquid 1 Application(s) Topical daily  cinacalcet 30 milliGRAM(s) Oral daily  dextrose 5%. 1000 milliLiter(s) IV Continuous <Continuous>  dextrose 50% Injectable 12.5 Gram(s) IV Push once  dextrose 50% Injectable 25 Gram(s) IV Push once  dextrose 50% Injectable 25 Gram(s) IV Push once  dextrose Gel 1 Dose(s) Oral once PRN  ferrous    sulfate Liquid 300 milliGRAM(s) Enteral Tube daily  folic acid 1 milliGRAM(s) Oral daily  glucagon  Injectable 1 milliGRAM(s) IntraMuscular once PRN  insulin lispro (HumaLOG) corrective regimen sliding scale   SubCutaneous every 6 hours  levothyroxine Injectable 25 MICROGram(s) IV Push <User Schedule>  pantoprazole  Injectable 40 milliGRAM(s) IV Push daily  potassium chloride    Tablet ER 40 milliEquivalent(s) Oral every 4 hours  simvastatin 20 milliGRAM(s) Oral at bedtime    acetaminophen   Tablet 650 milliGRAM(s) Oral every 6 hours PRN  acetaminophen  Suppository 650 milliGRAM(s) Rectal every 6 hours PRN  ascorbic acid 500 milliGRAM(s) Oral daily  chlorhexidine 4% Liquid 1 Application(s) Topical daily  cinacalcet 30 milliGRAM(s) Oral daily  dextrose 5%. 1000 milliLiter(s) IV Continuous <Continuous>  dextrose 50% Injectable 12.5 Gram(s) IV Push once  dextrose 50% Injectable 25 Gram(s) IV Push once  dextrose 50% Injectable 25 Gram(s) IV Push once  dextrose Gel 1 Dose(s) Oral once PRN  ferrous    sulfate Liquid 300 milliGRAM(s) Enteral Tube daily  folic acid 1 milliGRAM(s) Oral daily  glucagon  Injectable 1 milliGRAM(s) IntraMuscular once PRN  heparin  Injectable 5000 Unit(s) SubCutaneous every 12 hours  insulin lispro (HumaLOG) corrective regimen sliding scale   SubCutaneous every 6 hours  levothyroxine Injectable 25 MICROGram(s) IV Push <User Schedule>  metoprolol     tartrate 25 milliGRAM(s) Oral two times a day  pantoprazole  Injectable 40 milliGRAM(s) IV Push daily  potassium chloride    Tablet ER 40 milliEquivalent(s) Oral every 4 hours  simvastatin 20 milliGRAM(s) Oral at bedtime    Drug Dosing Weight  Height (cm): 170 (13 Dec 2017 17:16)  Weight (kg): 57 (13 Dec 2017 17:16)  BMI (kg/m2): 19.7 (13 Dec 2017 17:16)  BSA (m2): 1.66 (13 Dec 2017 17:16)    CENTRAL LINE: [ ] YES [x NO  LOCATION:   DATE INSERTED:  REMOVE: [ ] YES [ ] NO  EXPLAIN:    JACOBSON: [ ] YES [x ] NO    DATE INSERTED:  REMOVE:  [ ] YES [ ] NO  EXPLAIN:    A-LINE:  [ ] YES [x ] NO  LOCATION:   DATE INSERTED:  REMOVE:  [ ] YES [ ] NO  EXPLAIN:    ICU Vital Signs Last 24 Hrs  T(C): 36.9 (01 Jan 2018 05:55), Max: 36.9 (01 Jan 2018 05:55)  T(F): 98.4 (01 Jan 2018 05:55), Max: 98.4 (01 Jan 2018 05:55)  HR: 98 (01 Jan 2018 07:00) (83 - 123)  BP: 147/86 (01 Jan 2018 07:00) (93/45 - 169/79)  BP(mean): 102 (01 Jan 2018 07:00) (56 - 103)  ABP: --  ABP(mean): --  RR: 22 (01 Jan 2018 07:00) (9 - 33)  SpO2: 100% (01 Jan 2018 07:00) (89% - 100%)            12-31 @ 07:01  -  01-01 @ 07:00  --------------------------------------------------------  IN: 30 mL / OUT: 0 mL / NET: 30 mL            PHYSICAL EXAM:    GENERAL: [ x]NAD, on Nasal cannula 3L  HEAD:  [x]Atraumatic, [ x]Normocephalic  EYES: [x ]conjunctiva and sclera clear  ENMT: [ x]No tonsillar erythema, exudates, or enlargement; [ x]Moist mucous membranes,   NECK: [x ]Supple, normal appearance, [x ]No JVD;  NERVOUS SYSTEM:  [x ]Alert & Oriented X2,   CHEST/LUNG: Clear to auscultation, [ x]No chest deformity; [ ]Normal percussion bilaterally; [ x]No rales, rhonchi, wheezing   HEART: [x ]Regular rate and rhythm; [x ]No murmurs, rubs, or gallops  ABDOMEN: [x ]Soft, Nontender, Nondistended; [x ]Bowel sounds present  GENITOURINARY: Rt shiley in placed, Clean site. No erythema, exudate  EXTREMITIES:  [x ]2+ Peripheral Pulses, [x ]No clubbing, cyanosis, or edema  LYMPH: [x ]No lymphadenopathy noted  SKIN: [ x]No rashes or lesions;       LABS:  CBC Full  -  ( 01 Jan 2018 04:13 )  WBC Count : 19.5 K/uL  Hemoglobin : 9.2 g/dL  Hematocrit : 28.1 %  Platelet Count - Automated : 369 K/uL  Mean Cell Volume : 94.3 fl  Mean Cell Hemoglobin : 30.9 pg  Mean Cell Hemoglobin Concentration : 32.8 gm/dL  Auto Neutrophil # : 15.3 K/uL  Auto Lymphocyte # : 2.0 K/uL  Auto Monocyte # : 2.0 K/uL  Auto Eosinophil # : 0.0 K/uL  Auto Basophil # : 0.2 K/uL  Auto Neutrophil % : 78.5 %  Auto Lymphocyte % : 10.3 %  Auto Monocyte % : 10.1 %  Auto Eosinophil % : 0.0 %  Auto Basophil % : 1.0 %    01-01    149<H>  |  119<H>  |  55<H>  ----------------------------<  112<H>  3.1<L>   |  14<L>  |  3.59<H>    Ca    5.7<LL>      01 Jan 2018 04:13  Phos  7.1     01-01  Mg     1.8     01-01    TPro  4.9<L>  /  Alb  1.2<L>  /  TBili  0.2  /  DBili  x   /  AST  13  /  ALT  <6<L>  /  AlkPhos  66  01-01            RADIOLOGY & ADDITIONAL STUDIES REVIEWED: YES    [ ]GOALS OF CARE DISCUSSION WITH PATIENT/FAMILY/PROXY:    CRITICAL CARE TIME SPENT: 35 minutes      Assessment and Plan:   · Assessment		  52 female nursing home patient with hx of DM, neuropathy, CKD, CHFpEF, GERD, osteomyelitis of jaw, hypothyroidism, hypoparathyroidism, anemia, admitted with acute on chronic renal failure requiring initiation of dialysis, acute respiratory failure requiring intubation on 12/16, pneumonia with sepsis, GI bleed, EGD showed non bleeding duodenal ulcer.  Now off pressors.   Dialysis catheter removed 12/22 due to fevers and leukocytosis and was replaced with new dialysis catheter.        Problem/Plan - 1:  ·  Problem: S/P Acute respiratory failure.  -Plan: Acute respiratory failure secondary to HAP (elevated procalcitonin) and fluid overload likey due to ESRD, on admission  - was hypoxic on Bipap, S/P intubation Extubated on 12/27, in now transitioned from venti mask to NC 3 L saturating well   -Pt afebrile overnight, leukocytosis,  repeated UA positive, repeated blood culture and urine culture no growth to date.  -completed ABx course.   -Previous shiley catheter removed 12/22 due to fevers and leukocytosis. New shiley cath was placed on 12/26 but was not functioning therefore removed. 3rd Rt Shiley was placed 12/29, last dialysis was done 12/30  -passed speech and sallow, started on pureed diet.       Problem/Plan - 2:  ·  Problem: ESRD    Plan: getting HD via shiley.  -pt need permcath  as per Dr. Escoto   -Previous shiley catheter removed 12/22 due to fevers and leukocytosis. New shiley cath was placed on 12/26 but was not functioning therefore removed. 3rd Rt Shiley was placed 12/29,  -Got Dialysis 12/30  -c/w sensipar for renal osteodystrophy         Problem/Plan - 3:  ·  Problem: R/O Upper GI bleed.  Plan:  -Resolved, no recurrent GI Bleed.   s/p 1 PRBC, hb 8.2 after transfusion  EGD showed duodenal ulcer, no active bleeding, gastritis and esophagitis.  biopsy report negative   monitor CBC  c/w protonix 40mg, daily.       Problem/Plan - 4:  ·  Problem: Hypothyroidism.    Plan: c/w home dose LT4 50 mcg daily via NG tube  TSH wnl.      Problem/Plan - 5:  ·  Problem: CHF (congestive heart failure).    Plan: c/w home cardiac meds   TTE - RV systolic pressure is 49 mm Hg. Mild pulmonary  hypertension. EF:55% Grade II DD     Problem/Plan - 6:  Problem: Diabetes mellitus.   Plan: c/w  HSS   HbA1c 5.2.     Problem/Plan - 7:  ·  Problem: HTN (hypertension).    Plan: BP normal,  - Will Start home dose of hydralazine and Carvedilol if BP high    Problem/Plan - 8:  ·  Problem: Prophylactic measure.  Plan:   c/w DVT ppx with SCD and heparin  c/w GI ppx, on protonix

## 2018-01-01 NOTE — PROGRESS NOTE ADULT - ASSESSMENT
52 yr old female with  ESRD.     ESRD:  Last HD 12/30, with episodes of Intradialytic hypotension noted, HD terminated after 2.5 hours HD.   Electrolytes and volume status acceptable.   Will defer HD today, and likely dialyze tomorrow. Much improved volume status, approaching euvolemia.   Pt with only temp HD catheter, no further evidence of active infection; Pt will require tunneled HD catheter placement.    Secondary hyperparathyroidism   Hold sensipar for now, given hypocalcemia    Anemia   Hg below goal, can restart epo for now, at 6000 units tiw

## 2018-01-01 NOTE — PROGRESS NOTE ADULT - SUBJECTIVE AND OBJECTIVE BOX
Pt seen and examined at bedside  Pt transferred out of MICU. Pt appears comfortable. Without respiratory distress.     Allergies:  No Known Allergies    Hospital Medications:   MEDICATIONS  (STANDING):  ascorbic acid 500 milliGRAM(s) Oral daily  cinacalcet 30 milliGRAM(s) Oral daily  dextrose 5%. 1000 milliLiter(s) (50 mL/Hr) IV Continuous <Continuous>  dextrose 50% Injectable 12.5 Gram(s) IV Push once  dextrose 50% Injectable 25 Gram(s) IV Push once  dextrose 50% Injectable 25 Gram(s) IV Push once  ferrous    sulfate Liquid 300 milliGRAM(s) Enteral Tube daily  folic acid 1 milliGRAM(s) Oral daily  heparin  Injectable 5000 Unit(s) SubCutaneous every 12 hours  insulin lispro (HumaLOG) corrective regimen sliding scale   SubCutaneous every 6 hours  levothyroxine Injectable 25 MICROGram(s) IV Push <User Schedule>  metoprolol     tartrate 25 milliGRAM(s) Oral two times a day  pantoprazole  Injectable 40 milliGRAM(s) IV Push daily  PPD  5 Tuberculin Unit(s) Injectable 5 Unit(s) IntraDermal once  simvastatin 20 milliGRAM(s) Oral at bedtime      VITALS:  T(F): 97.9 (18 @ 11:30), Max: 98.4 (18 @ 05:55)  HR: 99 (18 @ 12:00)  BP: 147/71 (18 @ 12:00)  RR: 18 (18 @ 12:00)  SpO2: 99% (18 @ 12:00)  Wt(kg): --     @ : @ 07:00  --------------------------------------------------------  IN: 30 mL / OUT: 0 mL / NET: 30 mL     @ 07:  -   @ 14:06  --------------------------------------------------------  IN: 200 mL / OUT: 0 mL / NET: 200 mL      PHYSICAL EXAM:  Constitutional: NAD  HEENT: anicteric sclera, oropharynx clear, MMM  Neck: No JVD  Respiratory: CTAB, no wheezes, rales or rhonchi  Cardiovascular: S1, S2, RRR  Gastrointestinal: BS+, soft, NT/ND  Extremities: No cyanosis or clubbing. No peripheral edema  Neurological: A/O x 1-2, no focal deficits  Psychiatric: Normal mood, normal affect  : No CVA tenderness. No jarrell.   Skin: No rashes  [facility]     LABS:      149<H>  |  119<H>  |  55<H>  ----------------------------<  112<H>  3.1<L>   |  14<L>  |  3.59<H>    Ca    5.7<LL>      2018 04:13  Phos  7.1       Mg     1.8         TPro  4.9<L>  /  Alb  1.2<L>  /  TBili  0.2  /  DBili      /  AST  13  /  ALT  <6<L>  /  AlkPhos  66      Creatinine Trend: 3.59 <--, 3.90 <--, 4.78 <--, 7.16 <--, 6.87 <--, 7.71 <--, 10.30 <--                        9.2    19.5  )-----------( 369      ( 2018 04:13 )             28.1     Urine Studies:  Urinalysis Basic - ( 26 Dec 2017 11:29 )    Color: Yellow / Appearance: Slightly Turbid / S.015 / pH:   Gluc:  / Ketone: Negative  / Bili: Negative / Urobili: Negative   Blood:  / Protein: 100 / Nitrite: Negative   Leuk Esterase: Moderate / RBC: 5-10 /HPF / WBC >50 /HPF   Sq Epi:  / Non Sq Epi: Few /HPF / Bacteria: Trace /HPF        RADIOLOGY & ADDITIONAL STUDIES:

## 2018-01-02 LAB
ANION GAP SERPL CALC-SCNC: 19 MMOL/L — HIGH (ref 5–17)
BUN SERPL-MCNC: 113 MG/DL — HIGH (ref 7–18)
CALCIUM SERPL-MCNC: 9.4 MG/DL — SIGNIFICANT CHANGE UP (ref 8.4–10.5)
CHLORIDE SERPL-SCNC: 112 MMOL/L — HIGH (ref 96–108)
CO2 SERPL-SCNC: 22 MMOL/L — SIGNIFICANT CHANGE UP (ref 22–31)
CREAT SERPL-MCNC: 7.86 MG/DL — HIGH (ref 0.5–1.3)
EOSINOPHIL NFR BLD AUTO: 1 % — SIGNIFICANT CHANGE UP (ref 0–6)
GLUCOSE BLDC GLUCOMTR-MCNC: 103 MG/DL — HIGH (ref 70–99)
GLUCOSE BLDC GLUCOMTR-MCNC: 117 MG/DL — HIGH (ref 70–99)
GLUCOSE BLDC GLUCOMTR-MCNC: 155 MG/DL — HIGH (ref 70–99)
GLUCOSE BLDC GLUCOMTR-MCNC: 161 MG/DL — HIGH (ref 70–99)
GLUCOSE BLDC GLUCOMTR-MCNC: 215 MG/DL — HIGH (ref 70–99)
GLUCOSE BLDC GLUCOMTR-MCNC: 283 MG/DL — HIGH (ref 70–99)
GLUCOSE BLDC GLUCOMTR-MCNC: 317 MG/DL — HIGH (ref 70–99)
GLUCOSE SERPL-MCNC: 80 MG/DL — SIGNIFICANT CHANGE UP (ref 70–99)
HCT VFR BLD CALC: 29.3 % — LOW (ref 34.5–45)
HGB BLD-MCNC: 10.4 G/DL — LOW (ref 11.5–15.5)
LYMPHOCYTES # BLD AUTO: 5 % — LOW (ref 13–44)
MAGNESIUM SERPL-MCNC: 3.2 MG/DL — HIGH (ref 1.6–2.6)
MCHC RBC-ENTMCNC: 33.8 PG — SIGNIFICANT CHANGE UP (ref 27–34)
MCHC RBC-ENTMCNC: 35.4 GM/DL — SIGNIFICANT CHANGE UP (ref 32–36)
MCV RBC AUTO: 95.6 FL — SIGNIFICANT CHANGE UP (ref 80–100)
MONOCYTES NFR BLD AUTO: 11 % — SIGNIFICANT CHANGE UP (ref 2–14)
NEUTROPHILS NFR BLD AUTO: 81 % — HIGH (ref 43–77)
PHOSPHATE SERPL-MCNC: 11.9 MG/DL — HIGH (ref 2.5–4.5)
PLATELET # BLD AUTO: 492 K/UL — HIGH (ref 150–400)
POTASSIUM SERPL-MCNC: 5 MMOL/L — SIGNIFICANT CHANGE UP (ref 3.5–5.3)
POTASSIUM SERPL-SCNC: 5 MMOL/L — SIGNIFICANT CHANGE UP (ref 3.5–5.3)
RBC # BLD: 3.07 M/UL — LOW (ref 3.8–5.2)
RBC # FLD: 20.4 % — HIGH (ref 10.3–14.5)
SODIUM SERPL-SCNC: 153 MMOL/L — HIGH (ref 135–145)
WBC # BLD: 19.6 K/UL — HIGH (ref 3.8–10.5)
WBC # FLD AUTO: 19.6 K/UL — HIGH (ref 3.8–10.5)

## 2018-01-02 RX ORDER — CINACALCET 30 MG/1
30 TABLET, FILM COATED ORAL DAILY
Qty: 0 | Refills: 0 | Status: DISCONTINUED | OUTPATIENT
Start: 2018-01-02 | End: 2018-01-29

## 2018-01-02 RX ORDER — LEVOTHYROXINE SODIUM 125 MCG
50 TABLET ORAL DAILY
Qty: 0 | Refills: 0 | Status: DISCONTINUED | OUTPATIENT
Start: 2018-01-02 | End: 2018-01-29

## 2018-01-02 RX ORDER — SEVELAMER CARBONATE 2400 MG/1
1600 POWDER, FOR SUSPENSION ORAL
Qty: 0 | Refills: 0 | Status: DISCONTINUED | OUTPATIENT
Start: 2018-01-02 | End: 2018-01-29

## 2018-01-02 RX ORDER — ALBUTEROL 90 UG/1
2.5 AEROSOL, METERED ORAL ONCE
Qty: 0 | Refills: 0 | Status: COMPLETED | OUTPATIENT
Start: 2018-01-02 | End: 2018-01-02

## 2018-01-02 RX ADMIN — Medication 25 MICROGRAM(S): at 06:09

## 2018-01-02 RX ADMIN — Medication 500 MILLIGRAM(S): at 12:38

## 2018-01-02 RX ADMIN — Medication 4: at 17:58

## 2018-01-02 RX ADMIN — Medication 300 MILLIGRAM(S): at 12:38

## 2018-01-02 RX ADMIN — HEPARIN SODIUM 5000 UNIT(S): 5000 INJECTION INTRAVENOUS; SUBCUTANEOUS at 06:09

## 2018-01-02 RX ADMIN — Medication 25 MILLIGRAM(S): at 17:58

## 2018-01-02 RX ADMIN — ERYTHROPOIETIN 6000 UNIT(S): 10000 INJECTION, SOLUTION INTRAVENOUS; SUBCUTANEOUS at 10:05

## 2018-01-02 RX ADMIN — PANTOPRAZOLE SODIUM 40 MILLIGRAM(S): 20 TABLET, DELAYED RELEASE ORAL at 12:38

## 2018-01-02 RX ADMIN — SIMVASTATIN 20 MILLIGRAM(S): 20 TABLET, FILM COATED ORAL at 21:54

## 2018-01-02 RX ADMIN — Medication 50 MILLILITER(S): at 00:46

## 2018-01-02 RX ADMIN — SODIUM POLYSTYRENE SULFONATE 30 GRAM(S): 4.1 POWDER, FOR SUSPENSION ORAL at 00:03

## 2018-01-02 RX ADMIN — SEVELAMER CARBONATE 1600 MILLIGRAM(S): 2400 POWDER, FOR SUSPENSION ORAL at 17:58

## 2018-01-02 RX ADMIN — INSULIN HUMAN 10 UNIT(S): 100 INJECTION, SOLUTION SUBCUTANEOUS at 00:44

## 2018-01-02 RX ADMIN — Medication 50 MILLIGRAM(S): at 19:32

## 2018-01-02 RX ADMIN — Medication 100 GRAM(S): at 00:20

## 2018-01-02 RX ADMIN — SEVELAMER CARBONATE 1600 MILLIGRAM(S): 2400 POWDER, FOR SUSPENSION ORAL at 13:44

## 2018-01-02 RX ADMIN — Medication 1: at 06:08

## 2018-01-02 RX ADMIN — Medication 1 MILLIGRAM(S): at 12:38

## 2018-01-02 RX ADMIN — Medication 3: at 00:08

## 2018-01-02 RX ADMIN — Medication 25 MILLIGRAM(S): at 06:09

## 2018-01-02 RX ADMIN — HEPARIN SODIUM 5000 UNIT(S): 5000 INJECTION INTRAVENOUS; SUBCUTANEOUS at 17:58

## 2018-01-02 RX ADMIN — CINACALCET 30 MILLIGRAM(S): 30 TABLET, FILM COATED ORAL at 12:38

## 2018-01-02 NOTE — CHART NOTE - NSCHARTNOTEFT_GEN_A_CORE
Critical potassium level of 6.8 was reported in BMP after ICU DG (not hemolyzed sample). BMP was repeated with K level of 6.6 pt was given Dextrose 50 + Insulin 10 IV x 1, Albuterol x 3, Ca Glu and Kayexalate 15mg x 1. EKG showing peaked T waves in V1-V6 with QT prolongation (from [id number]). Dr. Valles was informed agreed with plan and advised to repeat BMP 2 hours after.     Pt had 2 BM and BMP was repeated with K level of 6.2, pt was given Dextrose 50 + Insulin 10 IV x 1, Albuterol x 3, Ca Glu and Kayexalate 30 mg x 1 pt had 2 more BM after second dose of Kayexalate. Repeat EKG showing improvement of peaked T waves. Pt alert and asymptomatic lying in bed. Dr. Valles was informed and reported with do HD in AM.

## 2018-01-02 NOTE — PROGRESS NOTE ADULT - PROBLEM SELECTOR PLAN 1
cute respiratory failure secondary to HAP (elevated procalcitonin) and fluid overload likey due to ESRD.   on admission pt was hypoxic on Bipap, S/P intubation on 12/16,  Extubated on 12/27, in now transitioned from venti mask to NC 3 L saturating well   -completed ABx course, was on levaquin and maxipime for 14 days.  -Repeated blood culture and urine culture no growth to date.

## 2018-01-02 NOTE — PROGRESS NOTE ADULT - ATTENDING COMMENTS
Patient is seen and examined. Case reviewed with the medical team. Above note is appreciated. Will follow up clinically. Continue DVT prophylaxis. Will follow up with pulmonary and nephrology. Will follow up BMP level.

## 2018-01-02 NOTE — PROGRESS NOTE ADULT - PROBLEM SELECTOR PLAN 2
-Previous shiley catheter removed 12/22 due to fevers and leukocytosis. New shiley cath was placed on 12/26 but was not functioning therefore removed. 3rd Rt Shiley was placed 12/29  -HD today for hyperkalemia   -PT need permcath for HD as per Nephro   -Spoke to Vascular , needs IR guided placement   -Spoke to IR , as pt leukocytosis , they cant do it till leukocytosis resolve   -Discussed with ID , no contraindication as no fever , cultures negative, completed antibiotics

## 2018-01-02 NOTE — PROGRESS NOTE ADULT - SUBJECTIVE AND OBJECTIVE BOX
Pt seen and examined during HD  Tolerating HD well. Low UF goal due to hypotension.   Episode of hyperkalemia noted overnight, medically managed.   Pt appears comfortable, without complaints.     Allergies:  No Known Allergies    Hospital Medications:   MEDICATIONS  (STANDING):  ascorbic acid 500 milliGRAM(s) Oral daily  dextrose 5%. 1000 milliLiter(s) (50 mL/Hr) IV Continuous <Continuous>  dextrose 50% Injectable 12.5 Gram(s) IV Push once  dextrose 50% Injectable 25 Gram(s) IV Push once  dextrose 50% Injectable 25 Gram(s) IV Push once  epoetin jacqueline Injectable 6000 Unit(s) IV Push <User Schedule>  ferrous    sulfate Liquid 300 milliGRAM(s) Enteral Tube daily  folic acid 1 milliGRAM(s) Oral daily  heparin  Injectable 5000 Unit(s) SubCutaneous every 12 hours  insulin lispro (HumaLOG) corrective regimen sliding scale   SubCutaneous every 6 hours  levothyroxine Injectable 25 MICROGram(s) IV Push <User Schedule>  metoprolol     tartrate 25 milliGRAM(s) Oral two times a day  pantoprazole  Injectable 40 milliGRAM(s) IV Push daily  PPD  5 Tuberculin Unit(s) Injectable 5 Unit(s) IntraDermal once  simvastatin 20 milliGRAM(s) Oral at bedtime      VITALS:  T(F): 98.3 (18 @ 08:20), Max: 98.6 (18 @ 05:02)  HR: 108 (18 @ 08:20)  BP: 150/86 (18 @ 08:20)  RR: 16 (18 @ 08:20)  SpO2: 97% (18 @ 08:20)  Wt(kg): --     @ 07:01  -   @ 07:00  --------------------------------------------------------  IN: 200 mL / OUT: 0 mL / NET: 200 mL    PHYSICAL EXAM:  Constitutional: NAD  HEENT: anicteric sclera, oropharynx clear, MMM  Neck: No JVD  Respiratory: CTAB, no wheezes, rales or rhonchi  Cardiovascular: S1, S2, RRR  Gastrointestinal: BS+, soft, NT/ND  Extremities: No cyanosis or clubbing. No peripheral edema  Neurological: A/O x 1, no focal deficits  : No CVA tenderness. No jarrell.   Skin: No rashes  Vascular Access: Right groin shiley     LABS:      153<H>  |  112<H>  |  113<H>  ----------------------------<  80  5.0   |  22  |  7.86<H>    Ca    9.4      2018 09:11  Phos  11.9       Mg     3.2         TPro  4.9<L>  /  Alb  1.2<L>  /  TBili  0.2  /  DBili      /  AST  13  /  ALT  <6<L>  /  AlkPhos  66      Creatinine Trend: 7.86 <--, 7.32 <--, 6.98 <--, 6.61 <--, 3.59 <--, 3.90 <--, 4.78 <--, 7.16 <--, 6.87 <--, 7.71 <--                        10.4   19.6  )-----------( 492      ( 2018 09:11 )             29.3     Urine Studies:  Urinalysis Basic - ( 26 Dec 2017 11:29 )    Color: Yellow / Appearance: Slightly Turbid / S.015 / pH:   Gluc:  / Ketone: Negative  / Bili: Negative / Urobili: Negative   Blood:  / Protein: 100 / Nitrite: Negative   Leuk Esterase: Moderate / RBC: 5-10 /HPF / WBC >50 /HPF   Sq Epi:  / Non Sq Epi: Few /HPF / Bacteria: Trace /HPF        RADIOLOGY & ADDITIONAL STUDIES:

## 2018-01-02 NOTE — CHART NOTE - NSCHARTNOTEFT_GEN_A_CORE
patient was on home medication Lyrica 50 mg BID for B/l lower extremity pain  which was not started in hospital.   2 doses of Lyrica ordered.  Primary team to access and start Lyrica if needed.

## 2018-01-02 NOTE — PROGRESS NOTE ADULT - ASSESSMENT
52 yr old female with  ESRD.     ESRD:  Tolerating HD this AM, low UF goal due to hypotension. Poor BFR noted during treatment.  volume status acceptable, and resolved hyperkalemia this AM.    Pt with only temp HD catheter, no further evidence of active infection; Pt will require tunneled HD catheter placement.    Secondary hyperparathyroidism   Resolved hypocalcemia, suspect may have been erroronous lab value previously.   Can resume sensipar. Recheck iPTH.     Anemia   Hg at goal, can cont epo  6000 units tiw 52 yr old female with  ESRD.     ESRD:  Tolerating HD this AM, low UF goal due to hypotension. Poor BFR noted during treatment.  volume status acceptable, and resolved hyperkalemia this AM.    Pt with only temp HD catheter, no further evidence of active infection; Pt will require tunneled HD catheter placement.    Secondary hyperparathyroidism   Resolved hypocalcemia, suspect may have been erroronous lab value previously.   Can resume sensipar. Recheck iPTH.     Anemia   Hg at goal, can cont epo  6000 units tiw     Hyperphosphatemia  Start renagel 1600mg TID with meals.   High phos 2/2 CKD.

## 2018-01-02 NOTE — PROGRESS NOTE ADULT - SUBJECTIVE AND OBJECTIVE BOX
Patient is a 52y old  Female who presents with a chief complaint of SOB (13 Dec 2017 16:01)      INTERVAL HPI/OVERNIGHT EVENTS: Hyperkalemia overnight     T(C): 36.8 (01-02-18 @ 08:20), Max: 37 (01-02-18 @ 05:02)  HR: 108 (01-02-18 @ 08:20) (78 - 110)  BP: 150/86 (01-02-18 @ 08:20) (139/67 - 173/83)  RR: 16 (01-02-18 @ 08:20) (16 - 18)  SpO2: 97% (01-02-18 @ 08:20) (97% - 100%)  Wt(kg): --  I&O's Summary    01 Jan 2018 07:01  -  02 Jan 2018 07:00  --------------------------------------------------------  IN: 200 mL / OUT: 0 mL / NET: 200 mL          MEDICATIONS  (STANDING):  ascorbic acid 500 milliGRAM(s) Oral daily  cinacalcet 30 milliGRAM(s) Oral daily  dextrose 5%. 1000 milliLiter(s) (50 mL/Hr) IV Continuous <Continuous>  dextrose 50% Injectable 12.5 Gram(s) IV Push once  dextrose 50% Injectable 25 Gram(s) IV Push once  dextrose 50% Injectable 25 Gram(s) IV Push once  epoetin jacqueline Injectable 6000 Unit(s) IV Push <User Schedule>  ferrous    sulfate Liquid 300 milliGRAM(s) Enteral Tube daily  folic acid 1 milliGRAM(s) Oral daily  heparin  Injectable 5000 Unit(s) SubCutaneous every 12 hours  insulin lispro (HumaLOG) corrective regimen sliding scale   SubCutaneous every 6 hours  levothyroxine Injectable 25 MICROGram(s) IV Push <User Schedule>  metoprolol     tartrate 25 milliGRAM(s) Oral two times a day  pantoprazole  Injectable 40 milliGRAM(s) IV Push daily  PPD  5 Tuberculin Unit(s) Injectable 5 Unit(s) IntraDermal once  sevelamer hydrochloride 1600 milliGRAM(s) Oral three times a day with meals  simvastatin 20 milliGRAM(s) Oral at bedtime    MEDICATIONS  (PRN):  acetaminophen   Tablet 650 milliGRAM(s) Oral every 6 hours PRN For Temp greater than 38 C (100.4 F)  acetaminophen  Suppository 650 milliGRAM(s) Rectal every 6 hours PRN For Temp greater than 38 C (100.4 F)  dextrose Gel 1 Dose(s) Oral once PRN Blood Glucose LESS THAN 70 milliGRAM(s)/deciliter  glucagon  Injectable 1 milliGRAM(s) IntraMuscular once PRN Glucose LESS THAN 70 milligrams/deciliter      PHYSICAL EXAM:  GENERAL: NAD, well-groomed, well-developed  HEAD:  Atraumatic, Normocephalic  EYES: EOMI, PERRLA, conjunctiva and sclera clear  ENMT: No tonsillar erythema, exudates, or enlargement; Moist mucous membranes, Good dentition, No lesions  NECK: Supple, No JVD, Normal thyroid  NERVOUS SYSTEM:  Alert & Oriented X3, no focal neurological deficit   CHEST/LUNG: Clear to percussion bilaterally; No rales, rhonchi, wheezing, or rubs  HEART: Regular rate and rhythm; No murmurs, rubs, or gallops  ABDOMEN: Soft, Nontender, Nondistended; Bowel sounds present  EXTREMITIES:  2+ Peripheral Pulses, No clubbing, cyanosis, or edema  LABS:                        10.4   19.6  )-----------( 492      ( 02 Jan 2018 09:11 )             29.3     01-02    153<H>  |  112<H>  |  113<H>  ----------------------------<  80  5.0   |  22  |  7.86<H>    Ca    9.4      02 Jan 2018 09:11  Phos  11.9     01-02  Mg     3.2     01-02    TPro  4.9<L>  /  Alb  1.2<L>  /  TBili  0.2  /  DBili  x   /  AST  13  /  ALT  <6<L>  /  AlkPhos  66  01-01        CAPILLARY BLOOD GLUCOSE      POCT Blood Glucose.: 117 mg/dL (02 Jan 2018 10:56)  POCT Blood Glucose.: 103 mg/dL (02 Jan 2018 07:32)  POCT Blood Glucose.: 161 mg/dL (02 Jan 2018 06:07)  POCT Blood Glucose.: 215 mg/dL (02 Jan 2018 04:55)  POCT Blood Glucose.: 283 mg/dL (02 Jan 2018 00:08)  POCT Blood Glucose.: 239 mg/dL (01 Jan 2018 16:52)  POCT Blood Glucose.: 280 mg/dL (01 Jan 2018 11:40)

## 2018-01-03 LAB
ALBUMIN SERPL ELPH-MCNC: 2 G/DL — LOW (ref 3.5–5)
ALP SERPL-CCNC: 102 U/L — SIGNIFICANT CHANGE UP (ref 40–120)
ALT FLD-CCNC: 11 U/L DA — SIGNIFICANT CHANGE UP (ref 10–60)
ANION GAP SERPL CALC-SCNC: 13 MMOL/L — SIGNIFICANT CHANGE UP (ref 5–17)
AST SERPL-CCNC: 14 U/L — SIGNIFICANT CHANGE UP (ref 10–40)
BILIRUB SERPL-MCNC: 0.4 MG/DL — SIGNIFICANT CHANGE UP (ref 0.2–1.2)
BUN SERPL-MCNC: 57 MG/DL — HIGH (ref 7–18)
CALCIUM SERPL-MCNC: 8.7 MG/DL — SIGNIFICANT CHANGE UP (ref 8.4–10.5)
CALCIUM SERPL-MCNC: 8.9 MG/DL — SIGNIFICANT CHANGE UP (ref 8.4–10.5)
CHLORIDE SERPL-SCNC: 105 MMOL/L — SIGNIFICANT CHANGE UP (ref 96–108)
CO2 SERPL-SCNC: 26 MMOL/L — SIGNIFICANT CHANGE UP (ref 22–31)
CREAT SERPL-MCNC: 5.16 MG/DL — HIGH (ref 0.5–1.3)
GLUCOSE BLDC GLUCOMTR-MCNC: 103 MG/DL — HIGH (ref 70–99)
GLUCOSE BLDC GLUCOMTR-MCNC: 139 MG/DL — HIGH (ref 70–99)
GLUCOSE BLDC GLUCOMTR-MCNC: 193 MG/DL — HIGH (ref 70–99)
GLUCOSE SERPL-MCNC: 113 MG/DL — HIGH (ref 70–99)
HCT VFR BLD CALC: 32.2 % — LOW (ref 34.5–45)
HGB BLD-MCNC: 10.4 G/DL — LOW (ref 11.5–15.5)
MAGNESIUM SERPL-MCNC: 2.5 MG/DL — SIGNIFICANT CHANGE UP (ref 1.6–2.6)
MCHC RBC-ENTMCNC: 31.2 PG — SIGNIFICANT CHANGE UP (ref 27–34)
MCHC RBC-ENTMCNC: 32.2 GM/DL — SIGNIFICANT CHANGE UP (ref 32–36)
MCV RBC AUTO: 96.9 FL — SIGNIFICANT CHANGE UP (ref 80–100)
PHOSPHATE SERPL-MCNC: 7.5 MG/DL — HIGH (ref 2.5–4.5)
PLATELET # BLD AUTO: 406 K/UL — HIGH (ref 150–400)
POTASSIUM SERPL-MCNC: 4.1 MMOL/L — SIGNIFICANT CHANGE UP (ref 3.5–5.3)
POTASSIUM SERPL-SCNC: 4.1 MMOL/L — SIGNIFICANT CHANGE UP (ref 3.5–5.3)
PROT SERPL-MCNC: 7.7 G/DL — SIGNIFICANT CHANGE UP (ref 6–8.3)
PTH-INTACT FLD-MCNC: 253 PG/ML — HIGH (ref 15–65)
RBC # BLD: 3.32 M/UL — LOW (ref 3.8–5.2)
RBC # FLD: 18.1 % — HIGH (ref 10.3–14.5)
SODIUM SERPL-SCNC: 144 MMOL/L — SIGNIFICANT CHANGE UP (ref 135–145)
WBC # BLD: 14.2 K/UL — HIGH (ref 3.8–10.5)
WBC # FLD AUTO: 14.2 K/UL — HIGH (ref 3.8–10.5)

## 2018-01-03 RX ADMIN — CINACALCET 30 MILLIGRAM(S): 30 TABLET, FILM COATED ORAL at 11:24

## 2018-01-03 RX ADMIN — Medication 25 MILLIGRAM(S): at 17:08

## 2018-01-03 RX ADMIN — Medication 1 MILLIGRAM(S): at 11:25

## 2018-01-03 RX ADMIN — SEVELAMER CARBONATE 1600 MILLIGRAM(S): 2400 POWDER, FOR SUSPENSION ORAL at 17:08

## 2018-01-03 RX ADMIN — PANTOPRAZOLE SODIUM 40 MILLIGRAM(S): 20 TABLET, DELAYED RELEASE ORAL at 11:24

## 2018-01-03 RX ADMIN — SEVELAMER CARBONATE 1600 MILLIGRAM(S): 2400 POWDER, FOR SUSPENSION ORAL at 08:31

## 2018-01-03 RX ADMIN — Medication 50 MILLIGRAM(S): at 05:44

## 2018-01-03 RX ADMIN — Medication 50 MICROGRAM(S): at 05:44

## 2018-01-03 RX ADMIN — Medication 1: at 00:51

## 2018-01-03 RX ADMIN — Medication 300 MILLIGRAM(S): at 11:24

## 2018-01-03 RX ADMIN — Medication 500 MILLIGRAM(S): at 11:25

## 2018-01-03 RX ADMIN — SEVELAMER CARBONATE 1600 MILLIGRAM(S): 2400 POWDER, FOR SUSPENSION ORAL at 11:24

## 2018-01-03 RX ADMIN — HEPARIN SODIUM 5000 UNIT(S): 5000 INJECTION INTRAVENOUS; SUBCUTANEOUS at 17:08

## 2018-01-03 RX ADMIN — Medication 1: at 12:00

## 2018-01-03 RX ADMIN — Medication 25 MILLIGRAM(S): at 05:45

## 2018-01-03 RX ADMIN — Medication 50 MILLIGRAM(S): at 17:08

## 2018-01-03 RX ADMIN — HEPARIN SODIUM 5000 UNIT(S): 5000 INJECTION INTRAVENOUS; SUBCUTANEOUS at 05:44

## 2018-01-03 RX ADMIN — SIMVASTATIN 20 MILLIGRAM(S): 20 TABLET, FILM COATED ORAL at 21:28

## 2018-01-03 NOTE — PROGRESS NOTE ADULT - SUBJECTIVE AND OBJECTIVE BOX
Patient is a 52y old  Female who presents with a chief complaint of SOB (13 Dec 2017 16:01)  Awake, alert, comfortable in bed in NAD. Off ICU on medical santo. extubated, doing well on supp oxygen via NC. Remain s on HD via shiley cath. Will need PC the AVF.    INTERVAL HPI/OVERNIGHT EVENTS:      VITAL SIGNS:  T(F): 98.5 (01-03-18 @ 05:34)  HR: 85 (01-03-18 @ 05:34)  BP: 128/69 (01-03-18 @ 05:34)  RR: 16 (01-03-18 @ 05:34)  SpO2: 99% (01-03-18 @ 05:34)  Wt(kg): --  I&O's Detail          REVIEW OF SYSTEMS:    CONSTITUTIONAL:  No fevers, chills, sweats    HEENT:  Eyes:  No diplopia or blurred vision. ENT:  No earache, sore throat or runny nose.    CARDIOVASCULAR:  No pressure, squeezing, tightness, or heaviness about the chest; no palpitations.    RESPIRATORY:  Per HPI    GASTROINTESTINAL:  No abdominal pain, nausea, vomiting or diarrhea.    GENITOURINARY:  No dysuria, frequency or urgency.    NEUROLOGIC:  No paresthesias, fasciculations, seizures or weakness.    PSYCHIATRIC:  No disorder of thought or mood.      PHYSICAL EXAM:    Constitutional: Well developed and nourished  Eyes:Perrla  ENMT: normal  Neck:supple  Respiratory: good air entry  Cardiovascular: S1 S2 regular  Gastrointestinal: Soft, Non tender  Extremities: No edema  Vascular:normal  Neurological:Awake, alert,Ox3  Musculoskeletal:Normal      MEDICATIONS  (STANDING):  ascorbic acid 500 milliGRAM(s) Oral daily  cinacalcet 30 milliGRAM(s) Oral daily  dextrose 5%. 1000 milliLiter(s) (50 mL/Hr) IV Continuous <Continuous>  dextrose 50% Injectable 12.5 Gram(s) IV Push once  dextrose 50% Injectable 25 Gram(s) IV Push once  dextrose 50% Injectable 25 Gram(s) IV Push once  epoetin jacqueline Injectable 6000 Unit(s) IV Push <User Schedule>  ferrous    sulfate Liquid 300 milliGRAM(s) Enteral Tube daily  folic acid 1 milliGRAM(s) Oral daily  heparin  Injectable 5000 Unit(s) SubCutaneous every 12 hours  insulin lispro (HumaLOG) corrective regimen sliding scale   SubCutaneous every 6 hours  levothyroxine 50 MICROGram(s) Oral daily  metoprolol     tartrate 25 milliGRAM(s) Oral two times a day  pantoprazole  Injectable 40 milliGRAM(s) IV Push daily  PPD  5 Tuberculin Unit(s) Injectable 5 Unit(s) IntraDermal once  pregabalin 50 milliGRAM(s) Oral two times a day  sevelamer hydrochloride 1600 milliGRAM(s) Oral three times a day with meals  simvastatin 20 milliGRAM(s) Oral at bedtime    MEDICATIONS  (PRN):  acetaminophen   Tablet 650 milliGRAM(s) Oral every 6 hours PRN For Temp greater than 38 C (100.4 F)  acetaminophen  Suppository 650 milliGRAM(s) Rectal every 6 hours PRN For Temp greater than 38 C (100.4 F)  dextrose Gel 1 Dose(s) Oral once PRN Blood Glucose LESS THAN 70 milliGRAM(s)/deciliter  glucagon  Injectable 1 milliGRAM(s) IntraMuscular once PRN Glucose LESS THAN 70 milligrams/deciliter      Allergies    No Known Allergies    Intolerances        LABS:                        10.4   14.2  )-----------( 406      ( 03 Jan 2018 07:29 )             32.2     01-03    144  |  105  |  57<H>  ----------------------------<  113<H>  4.1   |  26  |  5.16<H>    Ca    8.7      03 Jan 2018 07:29  Phos  7.5     01-03  Mg     2.5     01-03    TPro  7.7  /  Alb  2.0<L>  /  TBili  0.4  /  DBili  x   /  AST  14  /  ALT  11  /  AlkPhos  102  01-03              CAPILLARY BLOOD GLUCOSE      POCT Blood Glucose.: 193 mg/dL (03 Jan 2018 11:44)  POCT Blood Glucose.: 103 mg/dL (03 Jan 2018 06:38)  POCT Blood Glucose.: 155 mg/dL (02 Jan 2018 23:55)  POCT Blood Glucose.: 317 mg/dL (02 Jan 2018 17:54)        RADIOLOGY & ADDITIONAL TESTS:    CXR:    Ct scan chest:    ekg;    echo:

## 2018-01-03 NOTE — PROGRESS NOTE ADULT - SUBJECTIVE AND OBJECTIVE BOX
Patient is a 52y old  Female who presents with a chief complaint of SOB (13 Dec 2017 16:01)      INTERVAL HPI/OVERNIGHT EVENTS: NO acute overnight event   T(C): 36.9 (01-03-18 @ 05:34), Max: 36.9 (01-03-18 @ 05:34)  HR: 85 (01-03-18 @ 05:34) (79 - 112)  BP: 128/69 (01-03-18 @ 05:34) (118/100 - 140/68)  RR: 16 (01-03-18 @ 05:34) (16 - 18)  SpO2: 99% (01-03-18 @ 05:34) (98% - 100%)      MEDICATIONS  (STANDING):  ascorbic acid 500 milliGRAM(s) Oral daily  cinacalcet 30 milliGRAM(s) Oral daily  dextrose 5%. 1000 milliLiter(s) (50 mL/Hr) IV Continuous <Continuous>  dextrose 50% Injectable 12.5 Gram(s) IV Push once  dextrose 50% Injectable 25 Gram(s) IV Push once  dextrose 50% Injectable 25 Gram(s) IV Push once  epoetin jacqueline Injectable 6000 Unit(s) IV Push <User Schedule>  ferrous    sulfate Liquid 300 milliGRAM(s) Enteral Tube daily  folic acid 1 milliGRAM(s) Oral daily  heparin  Injectable 5000 Unit(s) SubCutaneous every 12 hours  insulin lispro (HumaLOG) corrective regimen sliding scale   SubCutaneous every 6 hours  levothyroxine 50 MICROGram(s) Oral daily  metoprolol     tartrate 25 milliGRAM(s) Oral two times a day  pantoprazole  Injectable 40 milliGRAM(s) IV Push daily  PPD  5 Tuberculin Unit(s) Injectable 5 Unit(s) IntraDermal once  pregabalin 50 milliGRAM(s) Oral two times a day  sevelamer hydrochloride 1600 milliGRAM(s) Oral three times a day with meals  simvastatin 20 milliGRAM(s) Oral at bedtime    MEDICATIONS  (PRN):  acetaminophen   Tablet 650 milliGRAM(s) Oral every 6 hours PRN For Temp greater than 38 C (100.4 F)  acetaminophen  Suppository 650 milliGRAM(s) Rectal every 6 hours PRN For Temp greater than 38 C (100.4 F)  dextrose Gel 1 Dose(s) Oral once PRN Blood Glucose LESS THAN 70 milliGRAM(s)/deciliter  glucagon  Injectable 1 milliGRAM(s) IntraMuscular once PRN Glucose LESS THAN 70 milligrams/deciliter      PHYSICAL EXAM:  GENERAL: NAD, answering appropriately   HEAD:  Atraumatic, Normocephalic  NECK: Supple, No JVD  NERVOUS SYSTEM:  Alert & Oriented X2-3, no focal neurological deficit   CHEST/LUNG: Clear to percussion bilaterally; No rales, rhonchi, wheezing, or rubs  HEART: Regular rate and rhythm; No murmurs, rubs, or gallops  ABDOMEN: Soft, Nontender, Nondistended; Bowel sounds present  EXTREMITIES:  2+ Peripheral Pulses, No clubbing, cyanosis, or edema  LABS:                        10.4   14.2  )-----------( 406      ( 03 Jan 2018 07:29 )             32.2     01-03    144  |  105  |  57<H>  ----------------------------<  113<H>  4.1   |  26  |  5.16<H>    Ca    8.7      03 Jan 2018 07:29  Phos  7.5     01-03  Mg     2.5     01-03    TPro  7.7  /  Alb  2.0<L>  /  TBili  0.4  /  DBili  x   /  AST  14  /  ALT  11  /  AlkPhos  102  01-03        CAPILLARY BLOOD GLUCOSE      POCT Blood Glucose.: 193 mg/dL (03 Jan 2018 11:44)  POCT Blood Glucose.: 103 mg/dL (03 Jan 2018 06:38)  POCT Blood Glucose.: 155 mg/dL (02 Jan 2018 23:55)  POCT Blood Glucose.: 317 mg/dL (02 Jan 2018 17:54)

## 2018-01-03 NOTE — ADVANCED PRACTICE NURSE CONSULT - ASSESSMENT
This is a 52yr old female patient admitted for Dyspnea, presenting with an Unstageable Pressure Injury to the Sacrococcyx Area (5cm x 8cm) with slough, necrotic tissue, pink tissue, and scant drainage

## 2018-01-03 NOTE — ADVANCED PRACTICE NURSE CONSULT - RECOMMEDATIONS
-Clean the Sacrococcyx Area wound with normal saline and apply skin prep to the surrounding skin  -Apply Collagenase ointment to the slough and necrotic areas of the wound bed, apply saline moistened gauze to the wound bed, and cover with a Foam dressing Daily PRN  -Elevate/float the patients heels using heel protectors and reposition the patient Q 2hrs using wedges or pillows

## 2018-01-03 NOTE — PROGRESS NOTE ADULT - PROBLEM SELECTOR PLAN 2
-Previous shiley catheter removed 12/22 due to fevers and leukocytosis. New shiley cath was placed on 12/26 but was not functioning therefore removed. 3rd Rt Shiley was placed 12/29  -Last HD on 01/02/18  -PT need permcath for HD as per Nephro   -Spoke to Vascular , needs IR guided placement   -Spoke to IR , as leukocytosis improving , may be done today vs tomorrow   -Discussed with ID , no contraindication as no fever , cultures negative, completed antibiotics

## 2018-01-03 NOTE — PROGRESS NOTE ADULT - SUBJECTIVE AND OBJECTIVE BOX
Pt seen and examined at bedside  Pt appears comfortable. Denies SOB.     Allergies:  No Known Allergies    Hospital Medications:   MEDICATIONS  (STANDING):  ascorbic acid 500 milliGRAM(s) Oral daily  cinacalcet 30 milliGRAM(s) Oral daily  dextrose 5%. 1000 milliLiter(s) (50 mL/Hr) IV Continuous <Continuous>  dextrose 50% Injectable 12.5 Gram(s) IV Push once  dextrose 50% Injectable 25 Gram(s) IV Push once  dextrose 50% Injectable 25 Gram(s) IV Push once  epoetin jacqueline Injectable 6000 Unit(s) IV Push <User Schedule>  ferrous    sulfate Liquid 300 milliGRAM(s) Enteral Tube daily  folic acid 1 milliGRAM(s) Oral daily  heparin  Injectable 5000 Unit(s) SubCutaneous every 12 hours  insulin lispro (HumaLOG) corrective regimen sliding scale   SubCutaneous every 6 hours  levothyroxine 50 MICROGram(s) Oral daily  metoprolol     tartrate 25 milliGRAM(s) Oral two times a day  pantoprazole  Injectable 40 milliGRAM(s) IV Push daily  PPD  5 Tuberculin Unit(s) Injectable 5 Unit(s) IntraDermal once  pregabalin 50 milliGRAM(s) Oral two times a day  sevelamer hydrochloride 1600 milliGRAM(s) Oral three times a day with meals  simvastatin 20 milliGRAM(s) Oral at bedtime    VITALS:  T(F): 98.8 (01-03-18 @ 14:25), Max: 98.8 (01-03-18 @ 14:25)  HR: 94 (01-03-18 @ 14:25)  BP: 116/57 (01-03-18 @ 14:25)  RR: 18 (01-03-18 @ 14:25)  SpO2: 100% (01-03-18 @ 14:25)  Wt(kg): --    PHYSICAL EXAM:  Constitutional: NAD  HEENT: anicteric sclera, oropharynx clear, MMM  Neck: No JVD  Respiratory: CTAB, no wheezes, rales or rhonchi  Cardiovascular: S1, S2, RRR  Gastrointestinal: BS+, soft, NT/ND  Extremities: No cyanosis or clubbing. No peripheral edema  Neurological: A/O x 1-2, no focal deficits  Psychiatric: Normal mood, normal affect  : No CVA tenderness. No jarrell.   Skin: No rashes  Vascular Access: Right groin shiley     LABS:  01-03    144  |  105  |  57<H>  ----------------------------<  113<H>  4.1   |  26  |  5.16<H>    Ca    8.7      03 Jan 2018 07:29  Phos  7.5     01-03  Mg     2.5     01-03    TPro  7.7  /  Alb  2.0<L>  /  TBili  0.4  /  DBili      /  AST  14  /  ALT  11  /  AlkPhos  102  01-03    Creatinine Trend: 5.16 <--, 7.86 <--, 7.32 <--, 6.98 <--, 6.61 <--, 3.59 <--, 3.90 <--, 4.78 <--, 7.16 <--, 6.87 <--                        10.4   14.2  )-----------( 406      ( 03 Jan 2018 07:29 )             32.2     Urine Studies:      RADIOLOGY & ADDITIONAL STUDIES:

## 2018-01-03 NOTE — PROGRESS NOTE ADULT - ATTENDING COMMENTS
Patient is seen and examined. Case reviewed with the medical team. Above note is appreciated. Will follow up clinically. Continue DVT prophylaxis. Await access placement by IR [facility]

## 2018-01-03 NOTE — PROGRESS NOTE ADULT - ASSESSMENT
52 yr old female with  ESRD.     ESRD:  Tolerating HD yesterday, low UF goal due to hypotension. Poor BFR noted during treatment.  volume status acceptable, and resolved hyperkalemia.  Pt with only temp HD catheter, no further evidence of active infection; Pt will require tunneled HD catheter placement.    Secondary hyperparathyroidism   Can resume sensipar. iPTH and Ca level acceptable.      Anemia   Hg at goal, can cont epo  6000 units tiw     Hyperphosphatemia  Cont renagel 1600mg TID with meals.   High phos 2/2 CKD.

## 2018-01-04 LAB
ANION GAP SERPL CALC-SCNC: 15 MMOL/L — SIGNIFICANT CHANGE UP (ref 5–17)
BUN SERPL-MCNC: 71 MG/DL — HIGH (ref 7–18)
CALCIUM SERPL-MCNC: 8.8 MG/DL — SIGNIFICANT CHANGE UP (ref 8.4–10.5)
CHLORIDE SERPL-SCNC: 102 MMOL/L — SIGNIFICANT CHANGE UP (ref 96–108)
CO2 SERPL-SCNC: 21 MMOL/L — LOW (ref 22–31)
CREAT SERPL-MCNC: 6.54 MG/DL — HIGH (ref 0.5–1.3)
GLUCOSE BLDC GLUCOMTR-MCNC: 105 MG/DL — HIGH (ref 70–99)
GLUCOSE BLDC GLUCOMTR-MCNC: 108 MG/DL — HIGH (ref 70–99)
GLUCOSE BLDC GLUCOMTR-MCNC: 123 MG/DL — HIGH (ref 70–99)
GLUCOSE BLDC GLUCOMTR-MCNC: 131 MG/DL — HIGH (ref 70–99)
GLUCOSE BLDC GLUCOMTR-MCNC: 152 MG/DL — HIGH (ref 70–99)
GLUCOSE BLDC GLUCOMTR-MCNC: 161 MG/DL — HIGH (ref 70–99)
GLUCOSE SERPL-MCNC: 172 MG/DL — HIGH (ref 70–99)
HCT VFR BLD CALC: 33.5 % — LOW (ref 34.5–45)
HGB BLD-MCNC: 10.1 G/DL — LOW (ref 11.5–15.5)
INR BLD: 1.11 RATIO — SIGNIFICANT CHANGE UP (ref 0.88–1.16)
MCHC RBC-ENTMCNC: 29.2 PG — SIGNIFICANT CHANGE UP (ref 27–34)
MCHC RBC-ENTMCNC: 30.2 GM/DL — LOW (ref 32–36)
MCV RBC AUTO: 96.8 FL — SIGNIFICANT CHANGE UP (ref 80–100)
PLATELET # BLD AUTO: 448 K/UL — HIGH (ref 150–400)
POTASSIUM SERPL-MCNC: 4.7 MMOL/L — SIGNIFICANT CHANGE UP (ref 3.5–5.3)
POTASSIUM SERPL-SCNC: 4.7 MMOL/L — SIGNIFICANT CHANGE UP (ref 3.5–5.3)
PROTHROM AB SERPL-ACNC: 12.1 SEC — SIGNIFICANT CHANGE UP (ref 9.8–12.7)
RBC # BLD: 3.46 M/UL — LOW (ref 3.8–5.2)
RBC # FLD: 17.8 % — HIGH (ref 10.3–14.5)
SODIUM SERPL-SCNC: 138 MMOL/L — SIGNIFICANT CHANGE UP (ref 135–145)
WBC # BLD: 16.4 K/UL — HIGH (ref 3.8–10.5)
WBC # FLD AUTO: 16.4 K/UL — HIGH (ref 3.8–10.5)

## 2018-01-04 PROCEDURE — 99232 SBSQ HOSP IP/OBS MODERATE 35: CPT | Mod: GC

## 2018-01-04 RX ADMIN — Medication 500 MILLIGRAM(S): at 11:45

## 2018-01-04 RX ADMIN — Medication 25 MILLIGRAM(S): at 06:39

## 2018-01-04 RX ADMIN — Medication 50 MILLIGRAM(S): at 06:40

## 2018-01-04 RX ADMIN — SEVELAMER CARBONATE 1600 MILLIGRAM(S): 2400 POWDER, FOR SUSPENSION ORAL at 08:22

## 2018-01-04 RX ADMIN — Medication 300 MILLIGRAM(S): at 11:45

## 2018-01-04 RX ADMIN — SIMVASTATIN 20 MILLIGRAM(S): 20 TABLET, FILM COATED ORAL at 21:45

## 2018-01-04 RX ADMIN — HEPARIN SODIUM 5000 UNIT(S): 5000 INJECTION INTRAVENOUS; SUBCUTANEOUS at 17:33

## 2018-01-04 RX ADMIN — Medication 25 MILLIGRAM(S): at 17:32

## 2018-01-04 RX ADMIN — Medication 1 MILLIGRAM(S): at 11:45

## 2018-01-04 RX ADMIN — SEVELAMER CARBONATE 1600 MILLIGRAM(S): 2400 POWDER, FOR SUSPENSION ORAL at 11:45

## 2018-01-04 RX ADMIN — SEVELAMER CARBONATE 1600 MILLIGRAM(S): 2400 POWDER, FOR SUSPENSION ORAL at 17:32

## 2018-01-04 RX ADMIN — HEPARIN SODIUM 5000 UNIT(S): 5000 INJECTION INTRAVENOUS; SUBCUTANEOUS at 06:40

## 2018-01-04 RX ADMIN — PANTOPRAZOLE SODIUM 40 MILLIGRAM(S): 20 TABLET, DELAYED RELEASE ORAL at 11:46

## 2018-01-04 RX ADMIN — Medication 1: at 11:46

## 2018-01-04 RX ADMIN — Medication 50 MICROGRAM(S): at 06:40

## 2018-01-04 RX ADMIN — Medication 1: at 00:41

## 2018-01-04 RX ADMIN — CINACALCET 30 MILLIGRAM(S): 30 TABLET, FILM COATED ORAL at 11:45

## 2018-01-04 RX ADMIN — Medication 75 MILLIGRAM(S): at 17:33

## 2018-01-04 NOTE — PROGRESS NOTE ADULT - SUBJECTIVE AND OBJECTIVE BOX
INTERVAL HPI/OVERNIGHT EVENTS:  Pt resting comfortably. No acute complaints.     MEDICATIONS  (STANDING):  ascorbic acid 500 milliGRAM(s) Oral daily  cinacalcet 30 milliGRAM(s) Oral daily  dextrose 5%. 1000 milliLiter(s) (50 mL/Hr) IV Continuous <Continuous>  dextrose 50% Injectable 12.5 Gram(s) IV Push once  dextrose 50% Injectable 25 Gram(s) IV Push once  dextrose 50% Injectable 25 Gram(s) IV Push once  epoetin jacqueline Injectable 6000 Unit(s) IV Push <User Schedule>  ferrous    sulfate Liquid 300 milliGRAM(s) Enteral Tube daily  folic acid 1 milliGRAM(s) Oral daily  heparin  Injectable 5000 Unit(s) SubCutaneous every 12 hours  insulin lispro (HumaLOG) corrective regimen sliding scale   SubCutaneous every 6 hours  levothyroxine 50 MICROGram(s) Oral daily  metoprolol     tartrate 25 milliGRAM(s) Oral two times a day  pantoprazole  Injectable 40 milliGRAM(s) IV Push daily  PPD  5 Tuberculin Unit(s) Injectable 5 Unit(s) IntraDermal once  pregabalin 75 milliGRAM(s) Oral two times a day  sevelamer hydrochloride 1600 milliGRAM(s) Oral three times a day with meals  simvastatin 20 milliGRAM(s) Oral at bedtime    MEDICATIONS  (PRN):  acetaminophen   Tablet 650 milliGRAM(s) Oral every 6 hours PRN For Temp greater than 38 C (100.4 F)  acetaminophen  Suppository 650 milliGRAM(s) Rectal every 6 hours PRN For Temp greater than 38 C (100.4 F)  dextrose Gel 1 Dose(s) Oral once PRN Blood Glucose LESS THAN 70 milliGRAM(s)/deciliter  glucagon  Injectable 1 milliGRAM(s) IntraMuscular once PRN Glucose LESS THAN 70 milligrams/deciliter      Vital Signs Last 24 Hrs  T(C): 37 (04 Jan 2018 04:50), Max: 37.2 (03 Jan 2018 20:30)  T(F): 98.6 (04 Jan 2018 04:50), Max: 99 (03 Jan 2018 20:30)  HR: 80 (04 Jan 2018 04:50) (80 - 96)  BP: 142/78 (04 Jan 2018 04:50) (116/57 - 143/60)  BP(mean): --  RR: 16 (04 Jan 2018 04:50) (16 - 18)  SpO2: 100% (04 Jan 2018 04:50) (99% - 100%)    Physical:  General: A&Ox3. NAD.  RLE: Warm, NVI, shiley in place. Dressing C/D/I     I&O's Summary      LABS:                        10.1   16.4  )-----------( 448      ( 04 Jan 2018 07:41 )             33.5             01-04    138  |  102  |  71<H>  ----------------------------<  172<H>  4.7   |  21<L>  |  6.54<H>    Ca    8.8      04 Jan 2018 07:41  Phos  7.5     01-03  Mg     2.5     01-03    TPro  7.7  /  Alb  2.0<L>  /  TBili  0.4  /  DBili  x   /  AST  14  /  ALT  11  /  AlkPhos  102  01-03    Blood Culture Results:   No growth at 5 days. (12.26.17 @ 22:38)

## 2018-01-04 NOTE — PROGRESS NOTE ADULT - ATTENDING COMMENTS
Covering for Dr. Gmoez.    Agree with above  Patient is seen and examined at bedside. She has no complains. She is awake, laying in bed, follows all command and answers all the questions, but does not open her eyes.   ROS and PE as above   Vital Signs Last 24 Hrs  T(C): 36.6 (04 Jan 2018 14:00), Max: 37.2 (03 Jan 2018 20:30)  T(F): 97.8 (04 Jan 2018 14:00), Max: 99 (03 Jan 2018 20:30)  HR: 81 (04 Jan 2018 14:00) (80 - 96)  BP: 138/65 (04 Jan 2018 14:00) (121/64 - 151/78)  BP(mean): --  RR: 19 (04 Jan 2018 14:00) (16 - 19)  SpO2: 100% (04 Jan 2018 14:00) (99% - 100%)    1. Hypoxia - improving, comfortable on 3 Lt supplemental O2 via NC  Completed course of antibiotics     2. ESRD with new HD. Patient needs a perma cath.   2. Leukocytosis, but  afebrile . BC negative. CXR clean.   Completed 2 weeks of antibiotics. Spoke to ID -DrRosaura,, and agree with his that no needs for antibiotics at this time.   Will continue to monitor, patient clinically significantly improved   Because of patients leukocytosis the procedure postponed.   Will repeat CBC in AM, if leukocytosis goes down will schedule for procedure. Spoke to CARLOS Humphrey from IR and informed her regarding the plan   If Leukocytosis does not go down, will remove the current line after the next HD.     The rest is as above Covering for Dr. Gomez.    Agree with above  Patient is seen and examined at bedside. She has no complains. She is awake, laying in bed, follows all command and answers all the questions, but does not open her eyes.   ROS and PE as above   Vital Signs Last 24 Hrs  T(C): 36.6 (04 Jan 2018 14:00), Max: 37.2 (03 Jan 2018 20:30)  T(F): 97.8 (04 Jan 2018 14:00), Max: 99 (03 Jan 2018 20:30)  HR: 81 (04 Jan 2018 14:00) (80 - 96)  BP: 138/65 (04 Jan 2018 14:00) (121/64 - 151/78)  BP(mean): --  RR: 19 (04 Jan 2018 14:00) (16 - 19)  SpO2: 100% (04 Jan 2018 14:00) (99% - 100%)    1. Hypoxia - improving, comfortable on 3 Lt supplemental O2 via NC  Completed course of antibiotics     2. ESRD with new HD. Patient needs a perma cath.   2. Leukocytosis, but  afebrile . BC negative. CXR clean.   Completed 2 weeks of antibiotics. Spoke to ID -DrRosaura,, and agree with him that no needs for antibiotics at this time.   Will continue to monitor, patient clinically significantly improved   Because of patients leukocytosis the procedure for perma cath placement postponed.   Will repeat CBC in AM, if leukocytosis goes down will schedule for procedure. Spoke to CARLOS Humphrey from IR and informed her regarding the plan   If Leukocytosis does not go down, will remove the current line after the next HD.     The rest is as above

## 2018-01-04 NOTE — PROGRESS NOTE ADULT - PROBLEM SELECTOR PLAN 2
-Previous shiley catheter removed 12/22 due to fevers and leukocytosis. New shiley cath was placed on 12/26 but was not functioning therefore removed. 3rd Rt Shiley was placed 12/29  -Last HD on 01/02/18  -PT need permcath for HD as per Nephro   -Spoke to Vascular , needs IR guided placement   -Spoke to IR , unable to do permcath due to leukocytosis   -Discussed with ID , no contraindication as no fever , cultures negative, completed antibiotics

## 2018-01-04 NOTE — PROGRESS NOTE ADULT - SUBJECTIVE AND OBJECTIVE BOX
Patient is a 52y old  Female who presents with a chief complaint of SOB (13 Dec 2017 16:01)      INTERVAL HPI/OVERNIGHT EVENTS: No acute overnight event , remained afebrile. Pt is tired and sleeping , c/o pain , already on lyrica but pain is not controlled     T(C): 37 (01-04-18 @ 04:50), Max: 37.2 (01-03-18 @ 20:30)  HR: 81 (01-04-18 @ 11:00) (80 - 96)  BP: 151/78 (01-04-18 @ 11:00) (116/57 - 151/78)  RR: 16 (01-04-18 @ 04:50) (16 - 18)  SpO2: 100% (01-04-18 @ 11:00) (99% - 100%)  Wt(kg): --  I&O's Summary      MEDICATIONS  (STANDING):  ascorbic acid 500 milliGRAM(s) Oral daily  cinacalcet 30 milliGRAM(s) Oral daily  dextrose 5%. 1000 milliLiter(s) (50 mL/Hr) IV Continuous <Continuous>  dextrose 50% Injectable 12.5 Gram(s) IV Push once  dextrose 50% Injectable 25 Gram(s) IV Push once  dextrose 50% Injectable 25 Gram(s) IV Push once  epoetin jacqueline Injectable 6000 Unit(s) IV Push <User Schedule>  ferrous    sulfate Liquid 300 milliGRAM(s) Enteral Tube daily  folic acid 1 milliGRAM(s) Oral daily  heparin  Injectable 5000 Unit(s) SubCutaneous every 12 hours  insulin lispro (HumaLOG) corrective regimen sliding scale   SubCutaneous every 6 hours  levothyroxine 50 MICROGram(s) Oral daily  metoprolol     tartrate 25 milliGRAM(s) Oral two times a day  pantoprazole  Injectable 40 milliGRAM(s) IV Push daily  PPD  5 Tuberculin Unit(s) Injectable 5 Unit(s) IntraDermal once  pregabalin 75 milliGRAM(s) Oral two times a day  sevelamer hydrochloride 1600 milliGRAM(s) Oral three times a day with meals  simvastatin 20 milliGRAM(s) Oral at bedtime    MEDICATIONS  (PRN):  acetaminophen   Tablet 650 milliGRAM(s) Oral every 6 hours PRN For Temp greater than 38 C (100.4 F)  acetaminophen  Suppository 650 milliGRAM(s) Rectal every 6 hours PRN For Temp greater than 38 C (100.4 F)  dextrose Gel 1 Dose(s) Oral once PRN Blood Glucose LESS THAN 70 milliGRAM(s)/deciliter  glucagon  Injectable 1 milliGRAM(s) IntraMuscular once PRN Glucose LESS THAN 70 milligrams/deciliter      PHYSICAL EXAM:  GENERAL: NAD, malnourished   NECK: Supple, No JVD  NERVOUS SYSTEM:  Alert & Oriented X2-3, no focal neurological deficit   Chest : Clear to auscultation   Heart :ar rate and rhythm; No murmurs, rubs, or gallops  ABDOMEN: Soft, Nontender, Nondistended; Bowel sounds present  EXTREMITIES:  2+ Peripheral Pulses, No clubbing, cyanosis, or edema  LABS:                        10.1   16.4  )-----------( 448      ( 04 Jan 2018 07:41 )             33.5     01-04    138  |  102  |  71<H>  ----------------------------<  172<H>  4.7   |  21<L>  |  6.54<H>    Ca    8.8      04 Jan 2018 07:41  Phos  7.5     01-03  Mg     2.5     01-03    TPro  7.7  /  Alb  2.0<L>  /  TBili  0.4  /  DBili  x   /  AST  14  /  ALT  11  /  AlkPhos  102  01-03    PT/INR - ( 04 Jan 2018 07:41 )   PT: 12.1 sec;   INR: 1.11 ratio             CAPILLARY BLOOD GLUCOSE      POCT Blood Glucose.: 161 mg/dL (04 Jan 2018 11:23)  POCT Blood Glucose.: 131 mg/dL (04 Jan 2018 07:15)  POCT Blood Glucose.: 123 mg/dL (04 Jan 2018 06:08)  POCT Blood Glucose.: 152 mg/dL (04 Jan 2018 00:26)  POCT Blood Glucose.: 139 mg/dL (03 Jan 2018 17:18)

## 2018-01-04 NOTE — PROGRESS NOTE ADULT - SUBJECTIVE AND OBJECTIVE BOX
Patient is a 52y old  Female who presents with a chief complaint of SOB (13 Dec 2017 16:01)  awake, alert, comfortable in bed in NAD. Still with an elevated WBC count. ID cleared pt for PC placement.    INTERVAL HPI/OVERNIGHT EVENTS:      VITAL SIGNS:  T(F): 98.6 (01-04-18 @ 04:50)  HR: 81 (01-04-18 @ 11:00)  BP: 151/78 (01-04-18 @ 11:00)  RR: 16 (01-04-18 @ 04:50)  SpO2: 100% (01-04-18 @ 11:00)  Wt(kg): --  I&O's Detail          REVIEW OF SYSTEMS:    CONSTITUTIONAL:  No fevers, chills, sweats    HEENT:  Eyes:  No diplopia or blurred vision. ENT:  No earache, sore throat or runny nose.    CARDIOVASCULAR:  No pressure, squeezing, tightness, or heaviness about the chest; no palpitations.    RESPIRATORY:  Per HPI    GASTROINTESTINAL:  No abdominal pain, nausea, vomiting or diarrhea.    GENITOURINARY:  No dysuria, frequency or urgency.    NEUROLOGIC:  No paresthesias, fasciculations, seizures or weakness.    PSYCHIATRIC:  No disorder of thought or mood.      PHYSICAL EXAM:    Constitutional: Well developed and nourished  Eyes:Perrla  ENMT: normal  Neck:supple  Respiratory: good air entry  Cardiovascular: S1 S2 regular  Gastrointestinal: Soft, Non tender  Extremities: No edema  Vascular:normal  Neurological:Awake, alert,Ox3  Musculoskeletal:Normal      MEDICATIONS  (STANDING):  ascorbic acid 500 milliGRAM(s) Oral daily  cinacalcet 30 milliGRAM(s) Oral daily  dextrose 5%. 1000 milliLiter(s) (50 mL/Hr) IV Continuous <Continuous>  dextrose 50% Injectable 12.5 Gram(s) IV Push once  dextrose 50% Injectable 25 Gram(s) IV Push once  dextrose 50% Injectable 25 Gram(s) IV Push once  epoetin jacqueline Injectable 6000 Unit(s) IV Push <User Schedule>  ferrous    sulfate Liquid 300 milliGRAM(s) Enteral Tube daily  folic acid 1 milliGRAM(s) Oral daily  heparin  Injectable 5000 Unit(s) SubCutaneous every 12 hours  insulin lispro (HumaLOG) corrective regimen sliding scale   SubCutaneous every 6 hours  levothyroxine 50 MICROGram(s) Oral daily  metoprolol     tartrate 25 milliGRAM(s) Oral two times a day  pantoprazole  Injectable 40 milliGRAM(s) IV Push daily  PPD  5 Tuberculin Unit(s) Injectable 5 Unit(s) IntraDermal once  pregabalin 75 milliGRAM(s) Oral two times a day  sevelamer hydrochloride 1600 milliGRAM(s) Oral three times a day with meals  simvastatin 20 milliGRAM(s) Oral at bedtime    MEDICATIONS  (PRN):  acetaminophen   Tablet 650 milliGRAM(s) Oral every 6 hours PRN For Temp greater than 38 C (100.4 F)  acetaminophen  Suppository 650 milliGRAM(s) Rectal every 6 hours PRN For Temp greater than 38 C (100.4 F)  dextrose Gel 1 Dose(s) Oral once PRN Blood Glucose LESS THAN 70 milliGRAM(s)/deciliter  glucagon  Injectable 1 milliGRAM(s) IntraMuscular once PRN Glucose LESS THAN 70 milligrams/deciliter      Allergies    No Known Allergies    Intolerances        LABS:                        10.1   16.4  )-----------( 448      ( 04 Jan 2018 07:41 )             33.5     01-04    138  |  102  |  71<H>  ----------------------------<  172<H>  4.7   |  21<L>  |  6.54<H>    Ca    8.8      04 Jan 2018 07:41  Phos  7.5     01-03  Mg     2.5     01-03    TPro  7.7  /  Alb  2.0<L>  /  TBili  0.4  /  DBili  x   /  AST  14  /  ALT  11  /  AlkPhos  102  01-03    PT/INR - ( 04 Jan 2018 07:41 )   PT: 12.1 sec;   INR: 1.11 ratio                   CAPILLARY BLOOD GLUCOSE      POCT Blood Glucose.: 161 mg/dL (04 Jan 2018 11:23)  POCT Blood Glucose.: 131 mg/dL (04 Jan 2018 07:15)  POCT Blood Glucose.: 123 mg/dL (04 Jan 2018 06:08)  POCT Blood Glucose.: 152 mg/dL (04 Jan 2018 00:26)  POCT Blood Glucose.: 139 mg/dL (03 Jan 2018 17:18)        RADIOLOGY & ADDITIONAL TESTS:    CXR:  < from: Xray Chest 1 View AP- PORTABLE-Urgent (01.01.18 @ 10:56) >  Impression: Clear lungs.      < end of copied text >    Ct scan chest:    ekg;    echo:

## 2018-01-05 LAB
ANION GAP SERPL CALC-SCNC: 18 MMOL/L — HIGH (ref 5–17)
BUN SERPL-MCNC: 81 MG/DL — HIGH (ref 7–18)
CALCIUM SERPL-MCNC: 8.4 MG/DL — SIGNIFICANT CHANGE UP (ref 8.4–10.5)
CHLORIDE SERPL-SCNC: 100 MMOL/L — SIGNIFICANT CHANGE UP (ref 96–108)
CO2 SERPL-SCNC: 20 MMOL/L — LOW (ref 22–31)
CREAT SERPL-MCNC: 7.66 MG/DL — HIGH (ref 0.5–1.3)
GLUCOSE BLDC GLUCOMTR-MCNC: 109 MG/DL — HIGH (ref 70–99)
GLUCOSE BLDC GLUCOMTR-MCNC: 113 MG/DL — HIGH (ref 70–99)
GLUCOSE BLDC GLUCOMTR-MCNC: 134 MG/DL — HIGH (ref 70–99)
GLUCOSE BLDC GLUCOMTR-MCNC: 145 MG/DL — HIGH (ref 70–99)
GLUCOSE SERPL-MCNC: 175 MG/DL — HIGH (ref 70–99)
HCT VFR BLD CALC: 30.2 % — LOW (ref 34.5–45)
HGB BLD-MCNC: 9.6 G/DL — LOW (ref 11.5–15.5)
MCHC RBC-ENTMCNC: 30.9 PG — SIGNIFICANT CHANGE UP (ref 27–34)
MCHC RBC-ENTMCNC: 31.8 GM/DL — LOW (ref 32–36)
MCV RBC AUTO: 97.3 FL — SIGNIFICANT CHANGE UP (ref 80–100)
PLATELET # BLD AUTO: 412 K/UL — HIGH (ref 150–400)
POTASSIUM SERPL-MCNC: 5.5 MMOL/L — HIGH (ref 3.5–5.3)
POTASSIUM SERPL-SCNC: 5.5 MMOL/L — HIGH (ref 3.5–5.3)
RBC # BLD: 3.1 M/UL — LOW (ref 3.8–5.2)
RBC # FLD: 18.6 % — HIGH (ref 10.3–14.5)
SODIUM SERPL-SCNC: 138 MMOL/L — SIGNIFICANT CHANGE UP (ref 135–145)
WBC # BLD: 15.9 K/UL — HIGH (ref 3.8–10.5)
WBC # FLD AUTO: 15.9 K/UL — HIGH (ref 3.8–10.5)

## 2018-01-05 PROCEDURE — 99232 SBSQ HOSP IP/OBS MODERATE 35: CPT | Mod: GC

## 2018-01-05 RX ADMIN — Medication 50 MICROGRAM(S): at 05:19

## 2018-01-05 RX ADMIN — Medication 300 MILLIGRAM(S): at 11:26

## 2018-01-05 RX ADMIN — Medication 75 MILLIGRAM(S): at 18:12

## 2018-01-05 RX ADMIN — Medication 1 MILLIGRAM(S): at 11:24

## 2018-01-05 RX ADMIN — CINACALCET 30 MILLIGRAM(S): 30 TABLET, FILM COATED ORAL at 11:25

## 2018-01-05 RX ADMIN — Medication 75 MILLIGRAM(S): at 05:22

## 2018-01-05 RX ADMIN — Medication 500 MILLIGRAM(S): at 11:25

## 2018-01-05 RX ADMIN — Medication 25 MILLIGRAM(S): at 18:11

## 2018-01-05 RX ADMIN — SEVELAMER CARBONATE 1600 MILLIGRAM(S): 2400 POWDER, FOR SUSPENSION ORAL at 18:11

## 2018-01-05 RX ADMIN — Medication 25 MILLIGRAM(S): at 05:19

## 2018-01-05 RX ADMIN — SEVELAMER CARBONATE 1600 MILLIGRAM(S): 2400 POWDER, FOR SUSPENSION ORAL at 11:25

## 2018-01-05 RX ADMIN — PANTOPRAZOLE SODIUM 40 MILLIGRAM(S): 20 TABLET, DELAYED RELEASE ORAL at 11:24

## 2018-01-05 RX ADMIN — SEVELAMER CARBONATE 1600 MILLIGRAM(S): 2400 POWDER, FOR SUSPENSION ORAL at 08:03

## 2018-01-05 RX ADMIN — HEPARIN SODIUM 5000 UNIT(S): 5000 INJECTION INTRAVENOUS; SUBCUTANEOUS at 18:11

## 2018-01-05 RX ADMIN — SIMVASTATIN 20 MILLIGRAM(S): 20 TABLET, FILM COATED ORAL at 21:23

## 2018-01-05 RX ADMIN — HEPARIN SODIUM 5000 UNIT(S): 5000 INJECTION INTRAVENOUS; SUBCUTANEOUS at 05:19

## 2018-01-05 RX ADMIN — ERYTHROPOIETIN 6000 UNIT(S): 10000 INJECTION, SOLUTION INTRAVENOUS; SUBCUTANEOUS at 17:15

## 2018-01-05 RX ADMIN — Medication 650 MILLIGRAM(S): at 22:40

## 2018-01-05 NOTE — PROGRESS NOTE ADULT - ASSESSMENT
52 yr old female with  ESRD.     ESRD:  Scheduled for HD today, but unable to dialyze due to poor blood flow from temp HD catheter.   Await tunneled catheter placement. If not going for tunneled cath today, then will need new temp HD cath, and plan for HD tomorrow.   Also pt requires out HD unit placement.     Secondary hyperparathyroidism   Can cont sensipar. iPTH and Ca level acceptable.      Anemia   Hg at goal, can cont epo  6000 units tiw     Hyperphosphatemia  Cont renagel 1600mg TID with meals.   High phos 2/2 CKD. 52 yr old female with  ESRD.     ESRD:  Scheduled for HD today, but unable to dialyze due to poor blood flow from temp HD catheter.   Await tunneled catheter placement. If not going for tunneled cath today, then will need new temp HD cath, and plan for HD tonight or tomorrow.   Also pt requires out HD unit placement.     Secondary hyperparathyroidism   Can cont sensipar. iPTH and Ca level acceptable.      Anemia   Hg at goal, can cont epo  6000 units tiw     Hyperphosphatemia  Cont renagel 1600mg TID with meals.   High phos 2/2 CKD.

## 2018-01-05 NOTE — PROGRESS NOTE ADULT - SUBJECTIVE AND OBJECTIVE BOX
Patient is a 52y old  Female who presents with a chief complaint of SOB (13 Dec 2017 16:01)  awake, alert, comfortable in NAD. S/p HD today. Feeling better. Awaiting permacath placement    INTERVAL HPI/OVERNIGHT EVENTS:      VITAL SIGNS:  T(F): 98 (01-05-18 @ 05:30)  HR: 85 (01-05-18 @ 05:30)  BP: 154/82 (01-05-18 @ 05:30)  RR: 17 (01-05-18 @ 05:30)  SpO2: 100% (01-05-18 @ 05:30)  Wt(kg): --  I&O's Detail          REVIEW OF SYSTEMS:    CONSTITUTIONAL:  No fevers, chills, sweats    HEENT:  Eyes:  No diplopia or blurred vision. ENT:  No earache, sore throat or runny nose.    CARDIOVASCULAR:  No pressure, squeezing, tightness, or heaviness about the chest; no palpitations.    RESPIRATORY:  Per HPI    GASTROINTESTINAL:  No abdominal pain, nausea, vomiting or diarrhea.    GENITOURINARY:  No dysuria, frequency or urgency.    NEUROLOGIC:  No paresthesias, fasciculations, seizures or weakness.    PSYCHIATRIC:  No disorder of thought or mood.      PHYSICAL EXAM:    Constitutional: Well developed and nourished  Eyes:Perrla  ENMT: normal  Neck:supple  Respiratory: good air entry  Cardiovascular: S1 S2 regular  Gastrointestinal: Soft, Non tender  Extremities: No edema  Vascular:normal  Neurological:Awake, alert,Ox3  Musculoskeletal:Normal      MEDICATIONS  (STANDING):  ascorbic acid 500 milliGRAM(s) Oral daily  cinacalcet 30 milliGRAM(s) Oral daily  dextrose 5%. 1000 milliLiter(s) (50 mL/Hr) IV Continuous <Continuous>  dextrose 50% Injectable 12.5 Gram(s) IV Push once  dextrose 50% Injectable 25 Gram(s) IV Push once  dextrose 50% Injectable 25 Gram(s) IV Push once  epoetin jacqueline Injectable 6000 Unit(s) IV Push <User Schedule>  ferrous    sulfate Liquid 300 milliGRAM(s) Enteral Tube daily  folic acid 1 milliGRAM(s) Oral daily  heparin  Injectable 5000 Unit(s) SubCutaneous every 12 hours  insulin lispro (HumaLOG) corrective regimen sliding scale   SubCutaneous every 6 hours  levothyroxine 50 MICROGram(s) Oral daily  metoprolol     tartrate 25 milliGRAM(s) Oral two times a day  pantoprazole  Injectable 40 milliGRAM(s) IV Push daily  PPD  5 Tuberculin Unit(s) Injectable 5 Unit(s) IntraDermal once  pregabalin 75 milliGRAM(s) Oral two times a day  sevelamer hydrochloride 1600 milliGRAM(s) Oral three times a day with meals  simvastatin 20 milliGRAM(s) Oral at bedtime    MEDICATIONS  (PRN):  acetaminophen   Tablet 650 milliGRAM(s) Oral every 6 hours PRN For Temp greater than 38 C (100.4 F)  acetaminophen  Suppository 650 milliGRAM(s) Rectal every 6 hours PRN For Temp greater than 38 C (100.4 F)  dextrose Gel 1 Dose(s) Oral once PRN Blood Glucose LESS THAN 70 milliGRAM(s)/deciliter  glucagon  Injectable 1 milliGRAM(s) IntraMuscular once PRN Glucose LESS THAN 70 milligrams/deciliter      Allergies    No Known Allergies    Intolerances        LABS:                        9.6    15.9  )-----------( 412      ( 05 Jan 2018 07:26 )             30.2     01-05    138  |  100  |  81<H>  ----------------------------<  175<H>  5.5<H>   |  20<L>  |  7.66<H>    Ca    8.4      05 Jan 2018 07:26      PT/INR - ( 04 Jan 2018 07:41 )   PT: 12.1 sec;   INR: 1.11 ratio                   CAPILLARY BLOOD GLUCOSE      POCT Blood Glucose.: 145 mg/dL (05 Jan 2018 11:19)  POCT Blood Glucose.: 109 mg/dL (05 Jan 2018 05:41)  POCT Blood Glucose.: 134 mg/dL (04 Jan 2018 23:58)  POCT Blood Glucose.: 108 mg/dL (04 Jan 2018 17:18)  POCT Blood Glucose.: 105 mg/dL (04 Jan 2018 15:43)        RADIOLOGY & ADDITIONAL TESTS:    CXR:    Ct scan chest:    ekg;    echo:

## 2018-01-05 NOTE — PROGRESS NOTE ADULT - ATTENDING COMMENTS
Agree with above   patient is seen and examined during HD. She is very alert today. Responds to all the questions. Complaining of food. Says that she could of feel better.     ROS and PE as above   Vital Signs Last 24 Hrs  T(C): 36.5 (05 Jan 2018 09:50), Max: 37.1 (04 Jan 2018 21:00)  T(F): 97.7 (05 Jan 2018 09:50), Max: 98.8 (04 Jan 2018 21:00)  HR: 79 (05 Jan 2018 09:50) (78 - 98)  BP: 149/77 (05 Jan 2018 09:50) (123/68 - 155/86)  BP(mean): --  RR: 17 (05 Jan 2018 09:50) (16 - 19)  SpO2: 99% (05 Jan 2018 09:50) (99% - 100%)    1. Hypoxic respiratory failure secondary to PNA   Completed course of antibiotics   Taper off supplemental O2   Afebrile, leukocytosis slowly improving     2. ESRD on new HD   Patient had no access line today and received a new right side shiley on her neck   Spoke to IR again: no perma cath until the leukocytosis resolved   Will send repeat BC today   Receiving HD today     3. HTN: BP controlled on Metoprolol   4. Anemia likely of renal disease; on Epogen during HD   5. Hypothyroidism: on Levothyroxine 50 mcg po qd

## 2018-01-05 NOTE — PROGRESS NOTE ADULT - PROBLEM SELECTOR PLAN 3
-EGD negative for ULcer   -H/H stable   -Tolerating diet, advanced to soft , pt ate well puree diet and wants regular food   -Aspiration precaution

## 2018-01-05 NOTE — PROGRESS NOTE ADULT - SUBJECTIVE AND OBJECTIVE BOX
Patient is a 52y old  Female who presents with a chief complaint of SOB (13 Dec 2017 16:01)      INTERVAL HPI/OVERNIGHT EVENTS: No acute overnight event     T(C): 36.5 (01-05-18 @ 09:50), Max: 37.1 (01-04-18 @ 21:00)  HR: 79 (01-05-18 @ 09:50) (78 - 98)  BP: 149/77 (01-05-18 @ 09:50) (123/68 - 155/86)  RR: 17 (01-05-18 @ 09:50) (16 - 19)  SpO2: 99% (01-05-18 @ 09:50) (99% - 100%)      MEDICATIONS  (STANDING):  ascorbic acid 500 milliGRAM(s) Oral daily  cinacalcet 30 milliGRAM(s) Oral daily  dextrose 5%. 1000 milliLiter(s) (50 mL/Hr) IV Continuous <Continuous>  dextrose 50% Injectable 12.5 Gram(s) IV Push once  dextrose 50% Injectable 25 Gram(s) IV Push once  dextrose 50% Injectable 25 Gram(s) IV Push once  epoetin jacqueline Injectable 6000 Unit(s) IV Push <User Schedule>  ferrous    sulfate Liquid 300 milliGRAM(s) Enteral Tube daily  folic acid 1 milliGRAM(s) Oral daily  heparin  Injectable 5000 Unit(s) SubCutaneous every 12 hours  insulin lispro (HumaLOG) corrective regimen sliding scale   SubCutaneous every 6 hours  levothyroxine 50 MICROGram(s) Oral daily  metoprolol     tartrate 25 milliGRAM(s) Oral two times a day  pantoprazole  Injectable 40 milliGRAM(s) IV Push daily  PPD  5 Tuberculin Unit(s) Injectable 5 Unit(s) IntraDermal once  pregabalin 75 milliGRAM(s) Oral two times a day  sevelamer hydrochloride 1600 milliGRAM(s) Oral three times a day with meals  simvastatin 20 milliGRAM(s) Oral at bedtime    MEDICATIONS  (PRN):  acetaminophen   Tablet 650 milliGRAM(s) Oral every 6 hours PRN For Temp greater than 38 C (100.4 F)  acetaminophen  Suppository 650 milliGRAM(s) Rectal every 6 hours PRN For Temp greater than 38 C (100.4 F)  dextrose Gel 1 Dose(s) Oral once PRN Blood Glucose LESS THAN 70 milliGRAM(s)/deciliter  glucagon  Injectable 1 milliGRAM(s) IntraMuscular once PRN Glucose LESS THAN 70 milligrams/deciliter      PHYSICAL EXAM:  GENERAL: NAD, responds with eyes closed   HEAD:  Atraumatic, Normocephalic  NERVOUS SYSTEM:  Alert & Oriented X3, no focal neurological deficit   CHEST/LUNG: Clear to percussion bilaterally; No rales, rhonchi, wheezing, or rubs  HEART: Regular rate and rhythm; No murmurs, rubs, or gallops  ABDOMEN: Soft, Nontender, Nondistended; Bowel sounds present  EXTREMITIES:  2+ Peripheral Pulses, No clubbing, cyanosis, or edema  LABS:                        9.6    15.9  )-----------( 412      ( 05 Jan 2018 07:26 )             30.2     01-05    138  |  100  |  81<H>  ----------------------------<  175<H>  5.5<H>   |  20<L>  |  7.66<H>    Ca    8.4      05 Jan 2018 07:26      PT/INR - ( 04 Jan 2018 07:41 )   PT: 12.1 sec;   INR: 1.11 ratio             CAPILLARY BLOOD GLUCOSE      POCT Blood Glucose.: 145 mg/dL (05 Jan 2018 11:19)  POCT Blood Glucose.: 109 mg/dL (05 Jan 2018 05:41)  POCT Blood Glucose.: 134 mg/dL (04 Jan 2018 23:58)  POCT Blood Glucose.: 108 mg/dL (04 Jan 2018 17:18)  POCT Blood Glucose.: 105 mg/dL (04 Jan 2018 15:43)

## 2018-01-05 NOTE — CHART NOTE - NSCHARTNOTEFT_GEN_A_CORE
Informed by Nephrologist Dr. Valles , pt Erick is not working, she went for HD but didn't get any HD . An dshe needs another access. Informed covering medical attending Dr. Lopez , she will speak to IR for permcath. Also spoke to vascular NATE Martinez, recommended to get Permcath if we get Permcath functional then she will take out the current Erick

## 2018-01-05 NOTE — PROGRESS NOTE ADULT - SUBJECTIVE AND OBJECTIVE BOX
Pt seen and examined at bedside  No acute events overnight. Pt appears comfortable.     Allergies:  No Known Allergies    Hospital Medications:   MEDICATIONS  (STANDING):  ascorbic acid 500 milliGRAM(s) Oral daily  cinacalcet 30 milliGRAM(s) Oral daily  dextrose 5%. 1000 milliLiter(s) (50 mL/Hr) IV Continuous <Continuous>  dextrose 50% Injectable 12.5 Gram(s) IV Push once  dextrose 50% Injectable 25 Gram(s) IV Push once  dextrose 50% Injectable 25 Gram(s) IV Push once  epoetin jacqueline Injectable 6000 Unit(s) IV Push <User Schedule>  ferrous    sulfate Liquid 300 milliGRAM(s) Enteral Tube daily  folic acid 1 milliGRAM(s) Oral daily  heparin  Injectable 5000 Unit(s) SubCutaneous every 12 hours  insulin lispro (HumaLOG) corrective regimen sliding scale   SubCutaneous every 6 hours  levothyroxine 50 MICROGram(s) Oral daily  metoprolol     tartrate 25 milliGRAM(s) Oral two times a day  pantoprazole  Injectable 40 milliGRAM(s) IV Push daily  PPD  5 Tuberculin Unit(s) Injectable 5 Unit(s) IntraDermal once  pregabalin 75 milliGRAM(s) Oral two times a day  sevelamer hydrochloride 1600 milliGRAM(s) Oral three times a day with meals  simvastatin 20 milliGRAM(s) Oral at bedtime    VITALS:  T(F): 98 (01-05-18 @ 05:30), Max: 98.8 (01-04-18 @ 21:00)  HR: 85 (01-05-18 @ 05:30)  BP: 154/82 (01-05-18 @ 05:30)  RR: 17 (01-05-18 @ 05:30)  SpO2: 100% (01-05-18 @ 05:30)  Wt(kg): --      PHYSICAL EXAM:  Constitutional: NAD  HEENT: anicteric sclera, oropharynx clear, MMM  Neck: No JVD  Respiratory: CTAB, no wheezes, rales or rhonchi  Cardiovascular: S1, S2, RRR  Gastrointestinal: BS+, soft, NT/ND  Extremities: No cyanosis or clubbing. No peripheral edema  Neurological: A/O x 2  : No CVA tenderness. No jarrell.   Skin: No rashes  Vascular Access: R davidin john.     LABS:  01-05    138  |  100  |  81<H>  ----------------------------<  175<H>  5.5<H>   |  20<L>  |  7.66<H>    Ca    8.4      05 Jan 2018 07:26      Creatinine Trend: 7.66 <--, 6.54 <--, 5.16 <--, 7.86 <--, 7.32 <--, 6.98 <--, 6.61 <--, 3.59 <--, 3.90 <--, 4.78 <--                        9.6    15.9  )-----------( 412      ( 05 Jan 2018 07:26 )             30.2     Urine Studies:      RADIOLOGY & ADDITIONAL STUDIES:

## 2018-01-05 NOTE — PROGRESS NOTE ADULT - ASSESSMENT
HD via R groin shiley, needs more permanent access    1- recc IR placement of permacath. ID has clrd pt  2- will need shiley exchanged if no permacath has been placed. this will be patient's 4th shiley

## 2018-01-05 NOTE — PROGRESS NOTE ADULT - SUBJECTIVE AND OBJECTIVE BOX
52y Female    Meds:    Allergies    No Known Allergies    Intolerances        VITALS:  Vital Signs Last 24 Hrs  T(C): 36.7 (05 Jan 2018 05:30), Max: 37.1 (04 Jan 2018 21:00)  T(F): 98 (05 Jan 2018 05:30), Max: 98.8 (04 Jan 2018 21:00)  HR: 85 (05 Jan 2018 05:30) (81 - 98)  BP: 154/82 (05 Jan 2018 05:30) (123/68 - 154/82)  BP(mean): --  RR: 17 (05 Jan 2018 05:30) (17 - 19)  SpO2: 100% (05 Jan 2018 05:30) (100% - 100%)    LABS/DIAGNOSTIC TESTS:                          9.6    15.9  )-----------( 412      ( 05 Jan 2018 07:26 )             30.2         01-05    138  |  100  |  81<H>  ----------------------------<  175<H>  5.5<H>   |  20<L>  |  7.66<H>    Ca    8.4      05 Jan 2018 07:26            CULTURES: .Blood Blood-Peripheral  12-26 @ 22:38   No growth at 5 days.  --  --      .Urine Catheterized  12-26 @ 17:34   No growth  --  --      .Blood Blood-Peripheral  12-21 @ 18:39   No growth at 5 days.  --  --      .Sputum Sputum  12-18 @ 21:43   Normal Respiratory Liz present  --    Few Squamous epithelial cells per low power field  Few polymorphonuclear leukocytes per low power field  Rare Gram variable coccobacilli   per oil power field      .Blood rec'd 2 anaerobic bottles for this accession #  12-16 @ 00:53   No growth at 5 days.  --  --      .Blood Blood-Peripheral  12-15 @ 23:09   No growth at 5 days.  --  --      .Urine Clean Catch (Midstream)  12-13 @ 23:18   No growth  --  --      .Blood Blood-Peripheral  12-13 @ 17:41   No growth at 5 days.  --  --      .Blood Blood-Peripheral  12-13 @ 17:40   No growth at 5 days.  --  --            RADIOLOGY:      ROS:  [  ] UNABLE TO ELICIT

## 2018-01-05 NOTE — PROGRESS NOTE ADULT - PROBLEM SELECTOR PLAN 1
was in acute respiratory failure secondary to HAP (elevated procalcitonin) and fluid overload likey due to ESRD, S/P intubation on 12/16,  Extubated on 12/27  -completed ABx course, was on levaquin and maxipime for 14 days.  -Repeated blood culture and urine culture no growth to date.

## 2018-01-05 NOTE — PROGRESS NOTE ADULT - SUBJECTIVE AND OBJECTIVE BOX
52y Female with no overnight complaints. Tolerating reg diet.  last shiley placed 12/29, last HD 1/2  needs IR guided PC, ID has clrd pt    Vital Signs:  T(C): 36.7 (01-05-18 @ 05:30), Max: 37.1 (01-04-18 @ 21:00)  HR: 85 (01-05-18 @ 05:30) (80 - 98)  BP: 154/82 (01-05-18 @ 05:30) (123/68 - 154/82)  RR: 17 (01-05-18 @ 05:30) (17 - 19)  SpO2: 100% (01-05-18 @ 05:30) (99% - 100%)  Wt(kg): --    Physical Exam:  General: NAD, comfortable  R groin: shiley catheter in place. no hematoma                          10.1   16.4  )-----------( 448      ( 04 Jan 2018 07:41 )             33.5

## 2018-01-06 LAB
ANION GAP SERPL CALC-SCNC: 8 MMOL/L — SIGNIFICANT CHANGE UP (ref 5–17)
BUN SERPL-MCNC: 37 MG/DL — HIGH (ref 7–18)
CALCIUM SERPL-MCNC: 8.6 MG/DL — SIGNIFICANT CHANGE UP (ref 8.4–10.5)
CHLORIDE SERPL-SCNC: 105 MMOL/L — SIGNIFICANT CHANGE UP (ref 96–108)
CO2 SERPL-SCNC: 27 MMOL/L — SIGNIFICANT CHANGE UP (ref 22–31)
CREAT SERPL-MCNC: 4.5 MG/DL — HIGH (ref 0.5–1.3)
GLUCOSE BLDC GLUCOMTR-MCNC: 102 MG/DL — HIGH (ref 70–99)
GLUCOSE BLDC GLUCOMTR-MCNC: 151 MG/DL — HIGH (ref 70–99)
GLUCOSE BLDC GLUCOMTR-MCNC: 157 MG/DL — HIGH (ref 70–99)
GLUCOSE BLDC GLUCOMTR-MCNC: 215 MG/DL — HIGH (ref 70–99)
GLUCOSE SERPL-MCNC: 115 MG/DL — HIGH (ref 70–99)
HCT VFR BLD CALC: 27.9 % — LOW (ref 34.5–45)
HGB BLD-MCNC: 9.2 G/DL — LOW (ref 11.5–15.5)
MCHC RBC-ENTMCNC: 32 PG — SIGNIFICANT CHANGE UP (ref 27–34)
MCHC RBC-ENTMCNC: 32.9 GM/DL — SIGNIFICANT CHANGE UP (ref 32–36)
MCV RBC AUTO: 97.1 FL — SIGNIFICANT CHANGE UP (ref 80–100)
PHOSPHATE SERPL-MCNC: 5 MG/DL — HIGH (ref 2.5–4.5)
PLATELET # BLD AUTO: 312 K/UL — SIGNIFICANT CHANGE UP (ref 150–400)
POTASSIUM SERPL-MCNC: 4.6 MMOL/L — SIGNIFICANT CHANGE UP (ref 3.5–5.3)
POTASSIUM SERPL-SCNC: 4.6 MMOL/L — SIGNIFICANT CHANGE UP (ref 3.5–5.3)
RBC # BLD: 2.87 M/UL — LOW (ref 3.8–5.2)
RBC # FLD: 19 % — HIGH (ref 10.3–14.5)
SODIUM SERPL-SCNC: 140 MMOL/L — SIGNIFICANT CHANGE UP (ref 135–145)
WBC # BLD: 17.4 K/UL — HIGH (ref 3.8–10.5)
WBC # FLD AUTO: 17.4 K/UL — HIGH (ref 3.8–10.5)

## 2018-01-06 PROCEDURE — 71045 X-RAY EXAM CHEST 1 VIEW: CPT | Mod: 26

## 2018-01-06 RX ORDER — MORPHINE SULFATE 50 MG/1
1 CAPSULE, EXTENDED RELEASE ORAL EVERY 4 HOURS
Qty: 0 | Refills: 0 | Status: DISCONTINUED | OUTPATIENT
Start: 2018-01-06 | End: 2018-01-11

## 2018-01-06 RX ORDER — ACETAMINOPHEN WITH CODEINE 300MG-30MG
1 TABLET ORAL EVERY 4 HOURS
Qty: 0 | Refills: 0 | Status: DISCONTINUED | OUTPATIENT
Start: 2018-01-06 | End: 2018-01-07

## 2018-01-06 RX ADMIN — Medication 500 MILLIGRAM(S): at 12:26

## 2018-01-06 RX ADMIN — SIMVASTATIN 20 MILLIGRAM(S): 20 TABLET, FILM COATED ORAL at 21:16

## 2018-01-06 RX ADMIN — Medication 75 MILLIGRAM(S): at 05:21

## 2018-01-06 RX ADMIN — Medication 1 MILLIGRAM(S): at 12:26

## 2018-01-06 RX ADMIN — HEPARIN SODIUM 5000 UNIT(S): 5000 INJECTION INTRAVENOUS; SUBCUTANEOUS at 19:06

## 2018-01-06 RX ADMIN — Medication 1 TABLET(S): at 20:27

## 2018-01-06 RX ADMIN — PANTOPRAZOLE SODIUM 40 MILLIGRAM(S): 20 TABLET, DELAYED RELEASE ORAL at 12:26

## 2018-01-06 RX ADMIN — SEVELAMER CARBONATE 1600 MILLIGRAM(S): 2400 POWDER, FOR SUSPENSION ORAL at 12:26

## 2018-01-06 RX ADMIN — Medication 300 MILLIGRAM(S): at 12:26

## 2018-01-06 RX ADMIN — SEVELAMER CARBONATE 1600 MILLIGRAM(S): 2400 POWDER, FOR SUSPENSION ORAL at 09:02

## 2018-01-06 RX ADMIN — CINACALCET 30 MILLIGRAM(S): 30 TABLET, FILM COATED ORAL at 12:26

## 2018-01-06 RX ADMIN — Medication 25 MILLIGRAM(S): at 05:21

## 2018-01-06 RX ADMIN — Medication 50 MICROGRAM(S): at 05:21

## 2018-01-06 RX ADMIN — Medication 75 MILLIGRAM(S): at 19:05

## 2018-01-06 RX ADMIN — Medication 1 TABLET(S): at 19:58

## 2018-01-06 RX ADMIN — Medication 25 MILLIGRAM(S): at 19:05

## 2018-01-06 RX ADMIN — HEPARIN SODIUM 5000 UNIT(S): 5000 INJECTION INTRAVENOUS; SUBCUTANEOUS at 05:22

## 2018-01-06 RX ADMIN — SEVELAMER CARBONATE 1600 MILLIGRAM(S): 2400 POWDER, FOR SUSPENSION ORAL at 19:05

## 2018-01-06 RX ADMIN — Medication 2: at 02:11

## 2018-01-06 NOTE — PROGRESS NOTE ADULT - ASSESSMENT
A/P   ESRD ON  HD ,     WAS  DIALYZED YESTERDAY   FOR REGULAR HD ON  MONDAY    WILL USE R IJ  CATH    PL  HAVE  FEMORAL  CATH  REMOVED   BP IS WELL CONTROLLED   HAS NO VOL EXCESS  ON  EPO  WITH  HD    ARRANGEMENTS FOR OUT PT HD

## 2018-01-06 NOTE — PROGRESS NOTE ADULT - SUBJECTIVE AND OBJECTIVE BOX
Patient is a 52y old  Female who presents with a chief complaint of SOB (13 Dec 2017 16:01)  Awake, alert, comfortable in bed in NAD. Feels better. Awaiting PC placement for HD    INTERVAL HPI/OVERNIGHT EVENTS:      VITAL SIGNS:  T(F): 98.7 (01-06-18 @ 04:55)  HR: 85 (01-06-18 @ 04:55)  BP: 132/67 (01-06-18 @ 04:55)  RR: 16 (01-06-18 @ 04:55)  SpO2: 100% (01-06-18 @ 04:55)  Wt(kg): --  I&O's Detail          REVIEW OF SYSTEMS:    CONSTITUTIONAL:  No fevers, chills, sweats    HEENT:  Eyes:  No diplopia or blurred vision. ENT:  No earache, sore throat or runny nose.    CARDIOVASCULAR:  No pressure, squeezing, tightness, or heaviness about the chest; no palpitations.    RESPIRATORY:  Per HPI    GASTROINTESTINAL:  No abdominal pain, nausea, vomiting or diarrhea.    GENITOURINARY:  No dysuria, frequency or urgency.    NEUROLOGIC:  No paresthesias, fasciculations, seizures or weakness.    PSYCHIATRIC:  No disorder of thought or mood.      PHYSICAL EXAM:    Constitutional: Well developed and nourished  Eyes:Perrla  ENMT: normal  Neck:supple  Respiratory: good air entry  Cardiovascular: S1 S2 regular  Gastrointestinal: Soft, Non tender  Extremities: No edema  Vascular:normal  Neurological:Awake, alert,Ox3  Musculoskeletal:Normal      MEDICATIONS  (STANDING):  ascorbic acid 500 milliGRAM(s) Oral daily  cinacalcet 30 milliGRAM(s) Oral daily  dextrose 5%. 1000 milliLiter(s) (50 mL/Hr) IV Continuous <Continuous>  dextrose 50% Injectable 12.5 Gram(s) IV Push once  dextrose 50% Injectable 25 Gram(s) IV Push once  dextrose 50% Injectable 25 Gram(s) IV Push once  epoetin jacqueline Injectable 6000 Unit(s) IV Push <User Schedule>  ferrous    sulfate Liquid 300 milliGRAM(s) Enteral Tube daily  folic acid 1 milliGRAM(s) Oral daily  heparin  Injectable 5000 Unit(s) SubCutaneous every 12 hours  insulin lispro (HumaLOG) corrective regimen sliding scale   SubCutaneous every 6 hours  levothyroxine 50 MICROGram(s) Oral daily  metoprolol     tartrate 25 milliGRAM(s) Oral two times a day  pantoprazole  Injectable 40 milliGRAM(s) IV Push daily  PPD  5 Tuberculin Unit(s) Injectable 5 Unit(s) IntraDermal once  pregabalin 75 milliGRAM(s) Oral two times a day  sevelamer hydrochloride 1600 milliGRAM(s) Oral three times a day with meals  simvastatin 20 milliGRAM(s) Oral at bedtime    MEDICATIONS  (PRN):  acetaminophen   Tablet 650 milliGRAM(s) Oral every 6 hours PRN For Temp greater than 38 C (100.4 F)  acetaminophen  Suppository 650 milliGRAM(s) Rectal every 6 hours PRN For Temp greater than 38 C (100.4 F)  acetaminophen 300 mG/codeine 30 mG 1 Tablet(s) Oral every 4 hours PRN Moderate Pain (4 - 6)  dextrose Gel 1 Dose(s) Oral once PRN Blood Glucose LESS THAN 70 milliGRAM(s)/deciliter  glucagon  Injectable 1 milliGRAM(s) IntraMuscular once PRN Glucose LESS THAN 70 milligrams/deciliter  morphine  - Injectable 1 milliGRAM(s) IV Push every 4 hours PRN Severe Pain (7 - 10)      Allergies    No Known Allergies    Intolerances        LABS:                        9.2    17.4  )-----------( 312      ( 06 Jan 2018 08:00 )             27.9     01-06    140  |  105  |  37<H>  ----------------------------<  115<H>  4.6   |  27  |  4.50<H>    Ca    8.6      06 Jan 2018 08:00  Phos  5.0     01-06                CAPILLARY BLOOD GLUCOSE      POCT Blood Glucose.: 102 mg/dL (06 Jan 2018 06:48)  POCT Blood Glucose.: 215 mg/dL (06 Jan 2018 02:07)  POCT Blood Glucose.: 113 mg/dL (05 Jan 2018 17:53)  POCT Blood Glucose.: 145 mg/dL (05 Jan 2018 11:19)        RADIOLOGY & ADDITIONAL TESTS:    CXR:    Ct scan chest:    ekg;    echo:

## 2018-01-06 NOTE — PROGRESS NOTE ADULT - PROBLEM SELECTOR PLAN 2
-pt had new right side shiley on her neck was placed yesterday  As per IR - no perma cath until the leukocytosis resolved   last HD yesterday  Pt lethargic and leucocytosis getting worse, 100.4 fever yesterday 9 pm, -completed ABx course, was on levaquin and maxipime for 14 days.    last Bcx from 12/26 - no growth

## 2018-01-06 NOTE — PROGRESS NOTE ADULT - SUBJECTIVE AND OBJECTIVE BOX
Patient is a 52y old  Female who presents with a chief complaint of SOB (13 Dec 2017 16:01)      INTERVAL HPI/OVERNIGHT EVENTS: No acute overnight event , Pt is in bed barely responding to verbal and painful stimuli, opens eyes , moves but not able to answer my questions.     MEDICATIONS  (STANDING):  ascorbic acid 500 milliGRAM(s) Oral daily  cinacalcet 30 milliGRAM(s) Oral daily  dextrose 5%. 1000 milliLiter(s) (50 mL/Hr) IV Continuous <Continuous>  dextrose 50% Injectable 12.5 Gram(s) IV Push once  dextrose 50% Injectable 25 Gram(s) IV Push once  dextrose 50% Injectable 25 Gram(s) IV Push once  epoetin jacqueline Injectable 6000 Unit(s) IV Push <User Schedule>  ferrous    sulfate Liquid 300 milliGRAM(s) Enteral Tube daily  folic acid 1 milliGRAM(s) Oral daily  heparin  Injectable 5000 Unit(s) SubCutaneous every 12 hours  insulin lispro (HumaLOG) corrective regimen sliding scale   SubCutaneous every 6 hours  levothyroxine 50 MICROGram(s) Oral daily  metoprolol     tartrate 25 milliGRAM(s) Oral two times a day  pantoprazole  Injectable 40 milliGRAM(s) IV Push daily  PPD  5 Tuberculin Unit(s) Injectable 5 Unit(s) IntraDermal once  pregabalin 75 milliGRAM(s) Oral two times a day  sevelamer hydrochloride 1600 milliGRAM(s) Oral three times a day with meals  simvastatin 20 milliGRAM(s) Oral at bedtime    MEDICATIONS  (PRN):  acetaminophen   Tablet 650 milliGRAM(s) Oral every 6 hours PRN For Temp greater than 38 C (100.4 F)  acetaminophen  Suppository 650 milliGRAM(s) Rectal every 6 hours PRN For Temp greater than 38 C (100.4 F)  acetaminophen 300 mG/codeine 30 mG 1 Tablet(s) Oral every 4 hours PRN Moderate Pain (4 - 6)  dextrose Gel 1 Dose(s) Oral once PRN Blood Glucose LESS THAN 70 milliGRAM(s)/deciliter  glucagon  Injectable 1 milliGRAM(s) IntraMuscular once PRN Glucose LESS THAN 70 milligrams/deciliter  morphine  - Injectable 1 milliGRAM(s) IV Push every 4 hours PRN Severe Pain (7 - 10)    Vital Signs Last 24 Hrs  T(C): 37 (06 Jan 2018 14:01), Max: 38 (05 Jan 2018 21:30)  T(F): 98.6 (06 Jan 2018 14:01), Max: 100.4 (05 Jan 2018 21:30)  HR: 94 (06 Jan 2018 14:01) (85 - 110)  BP: 161/78 (06 Jan 2018 14:01) (132/67 - 164/83)  BP(mean): --  RR: 16 (06 Jan 2018 14:01) (16 - 19)  SpO2: 100% (06 Jan 2018 14:01) (94% - 100%)    PHYSICAL EXAM:  GENERAL: NAD, responds with eyes closed   HEAD:  Atraumatic, Normocephalic  NERVOUS SYSTEM:  Alert & Oriented X 0?, no focal neurological deficit   CHEST/LUNG: Clear to percussion bilaterally; No rales, rhonchi, wheezing, or rubs  HEART: Regular rate and rhythm; No murmurs, rubs, or gallops  ABDOMEN: Soft, Nontender, Nondistended; Bowel sounds present  EXTREMITIES:  2+ Peripheral Pulses, No clubbing, cyanosis, or edema       Labs -                             9.2    17.4  )-----------( 312      ( 06 Jan 2018 08:00 )             27.9       01-06    140  |  105  |  37<H>  ----------------------------<  115<H>  4.6   |  27  |  4.50<H>    Ca    8.6      06 Jan 2018 08:00  Phos  5.0     01-06          finger sticks   POCT Blood Glucose.: 157 mg/dL (06 Jan 2018 11:16)

## 2018-01-06 NOTE — PROGRESS NOTE ADULT - ATTENDING COMMENTS
Patient seen/evaluated at bedside 1/6/2017. I agree with the resident progress note/outlined plan of care. My independent findings and conclusions are documented.    Patient now more awake on my assessment and answering questions though disoriented. She was Febrile to 100.4 overnight. She denies cough, abd pain, diarrhea, sputum production    chronically ill appearing, arousable, unable to cite year/month  right chest wall central line  S1S2 RRR  CTAB/l no accessory muscle use  soft, NT, ND, + BS  right femoral line  no LE edema/cyanosis/clubbing    1. low grade fever in setting of recent  2. Hypoxic respiratory failure secondary to PNA   recently completed course of antibiotics   f/u  repeat BC sent yesterday   -discontinue right femoral line ASAP--> discussed with Dr. Mosqueda  Taper off supplemental O2   Afebrile, leukocytosis slowly improving     3. ESRD on new HD   Patient had no access line today and received a new right side shiley on her neck   Spoke to IR again: no perma cath until the leukocytosis resolved   -case discussed w/ Dr. Monique  s/p HD today     4. HTN: BP controlled on Metoprolol   5. Anemia likely of renal disease; on Epogen during HD   6. Hypothyroidism: on Levothyroxine 50 mcg po qd .

## 2018-01-06 NOTE — PROGRESS NOTE ADULT - ASSESSMENT
52 female nursing home patient with hx of DM, neuropathy, CKD, CHFpEF, GERD, osteomyelitis of jaw, hypothyroidism, hypoparathyroidism, anemia, admitted to ICU acute on chronic renal failure requiring initiation of dialysis and  acute respiratory failure requiring intubation 2/2 pneumonia with sepsis.   downgraded to medical floor on 01/01/2018.

## 2018-01-06 NOTE — PROGRESS NOTE ADULT - PROBLEM SELECTOR PLAN 1
S/p IV antibiotics  ID follow up  CXR clear now  Still with leukocytosis  Will need repeat cultures  monitor WBC count

## 2018-01-07 LAB
GLUCOSE BLDC GLUCOMTR-MCNC: 111 MG/DL — HIGH (ref 70–99)
GLUCOSE BLDC GLUCOMTR-MCNC: 121 MG/DL — HIGH (ref 70–99)
GLUCOSE BLDC GLUCOMTR-MCNC: 137 MG/DL — HIGH (ref 70–99)
GLUCOSE BLDC GLUCOMTR-MCNC: 94 MG/DL — SIGNIFICANT CHANGE UP (ref 70–99)

## 2018-01-07 RX ADMIN — MORPHINE SULFATE 1 MILLIGRAM(S): 50 CAPSULE, EXTENDED RELEASE ORAL at 14:58

## 2018-01-07 RX ADMIN — Medication 1 MILLIGRAM(S): at 13:02

## 2018-01-07 RX ADMIN — Medication 50 MICROGRAM(S): at 05:54

## 2018-01-07 RX ADMIN — Medication 25 MILLIGRAM(S): at 18:27

## 2018-01-07 RX ADMIN — PANTOPRAZOLE SODIUM 40 MILLIGRAM(S): 20 TABLET, DELAYED RELEASE ORAL at 13:02

## 2018-01-07 RX ADMIN — MORPHINE SULFATE 1 MILLIGRAM(S): 50 CAPSULE, EXTENDED RELEASE ORAL at 21:34

## 2018-01-07 RX ADMIN — Medication 500 MILLIGRAM(S): at 13:02

## 2018-01-07 RX ADMIN — HEPARIN SODIUM 5000 UNIT(S): 5000 INJECTION INTRAVENOUS; SUBCUTANEOUS at 05:54

## 2018-01-07 RX ADMIN — SEVELAMER CARBONATE 1600 MILLIGRAM(S): 2400 POWDER, FOR SUSPENSION ORAL at 13:02

## 2018-01-07 RX ADMIN — MORPHINE SULFATE 1 MILLIGRAM(S): 50 CAPSULE, EXTENDED RELEASE ORAL at 22:02

## 2018-01-07 RX ADMIN — Medication 1 TABLET(S): at 05:58

## 2018-01-07 RX ADMIN — CINACALCET 30 MILLIGRAM(S): 30 TABLET, FILM COATED ORAL at 13:02

## 2018-01-07 RX ADMIN — SEVELAMER CARBONATE 1600 MILLIGRAM(S): 2400 POWDER, FOR SUSPENSION ORAL at 18:27

## 2018-01-07 RX ADMIN — Medication 300 MILLIGRAM(S): at 13:02

## 2018-01-07 RX ADMIN — Medication 75 MILLIGRAM(S): at 05:54

## 2018-01-07 RX ADMIN — Medication 25 MILLIGRAM(S): at 05:54

## 2018-01-07 RX ADMIN — HEPARIN SODIUM 5000 UNIT(S): 5000 INJECTION INTRAVENOUS; SUBCUTANEOUS at 18:27

## 2018-01-07 RX ADMIN — SIMVASTATIN 20 MILLIGRAM(S): 20 TABLET, FILM COATED ORAL at 21:34

## 2018-01-07 RX ADMIN — Medication 1 TABLET(S): at 06:32

## 2018-01-07 RX ADMIN — MORPHINE SULFATE 1 MILLIGRAM(S): 50 CAPSULE, EXTENDED RELEASE ORAL at 15:15

## 2018-01-07 RX ADMIN — Medication 75 MILLIGRAM(S): at 19:24

## 2018-01-07 NOTE — PROGRESS NOTE ADULT - SUBJECTIVE AND OBJECTIVE BOX
CHIEF COMPLAINT:Patient is a 52y old  Female who presents with a chief complaint of SOB (13 Dec 2017 16:01)    	  REVIEW OF SYSTEMS:  CONSTITUTIONAL: No fever, weight loss, or fatigue  EYES: No eye pain, visual disturbances, or discharge  ENMT:  No difficulty hearing, tinnitus, vertigo; No sinus or throat pain  NECK: No pain or stiffness  RESPIRATORY: No cough, wheezing, chills or hemoptysis; No Shortness of Breath  CARDIOVASCULAR: No chest pain, palpitations, passing out, dizziness, or leg swelling  GASTROINTESTINAL: No abdominal or epigastric pain. No nausea, vomiting, or hematemesis; No diarrhea or constipation. No melena or hematochezia.  GENITOURINARY: No dysuria, frequency, hematuria, or incontinence  NEUROLOGICAL: No headaches, memory loss, loss of strength, numbness, or tremors  SKIN: No itching, burning, rashes, or lesions   LYMPH Nodes: No enlarged glands  ENDOCRINE: No heat or cold intolerance; No hair loss  MUSCULOSKELETAL: No joint pain or swelling; No muscle, back, or extremity pain  PSYCHIATRIC: No depression, anxiety, mood swings, or difficulty sleeping  HEME/LYMPH: No easy bruising, or bleeding gums  ALLERY AND IMMUNOLOGIC: No hives or eczema	    [ ] All others negative	  [ ] Unable to obtain    PHYSICAL EXAM:  T(C): 36.6 (01-07-18 @ 05:20), Max: 37 (01-06-18 @ 14:01)  HR: 93 (01-07-18 @ 05:20) (78 - 94)  BP: 163/83 (01-07-18 @ 05:20) (141/61 - 163/83)  RR: 16 (01-07-18 @ 05:20) (16 - 16)  SpO2: 100% (01-07-18 @ 05:20) (100% - 100%)  Wt(kg): --  I&O's Summary      Appearance: Normal	  HEENT:   Normal oral mucosa, PERRL, EOMI	  Lymphatic: No lymphadenopathy  Cardiovascular: Normal S1 S2, No JVD, No murmurs, No edema  Respiratory: Lungs clear to auscultation	  Psychiatry: A & O x 3, Mood & affect appropriate  Gastrointestinal:  Soft, Non-tender, + BS	  Skin: No rashes, No ecchymoses, No cyanosis	  Neurologic: Non-focal  Extremities: Normal range of motion, No clubbing, cyanosis or edema  Vascular: Peripheral pulses palpable 2+ bilaterally    MEDICATIONS  (STANDING):  ascorbic acid 500 milliGRAM(s) Oral daily  cinacalcet 30 milliGRAM(s) Oral daily  dextrose 5%. 1000 milliLiter(s) (50 mL/Hr) IV Continuous <Continuous>  dextrose 50% Injectable 12.5 Gram(s) IV Push once  dextrose 50% Injectable 25 Gram(s) IV Push once  dextrose 50% Injectable 25 Gram(s) IV Push once  epoetin jacqueline Injectable 6000 Unit(s) IV Push <User Schedule>  ferrous    sulfate Liquid 300 milliGRAM(s) Enteral Tube daily  folic acid 1 milliGRAM(s) Oral daily  heparin  Injectable 5000 Unit(s) SubCutaneous every 12 hours  insulin lispro (HumaLOG) corrective regimen sliding scale   SubCutaneous every 6 hours  levothyroxine 50 MICROGram(s) Oral daily  metoprolol     tartrate 25 milliGRAM(s) Oral two times a day  pantoprazole  Injectable 40 milliGRAM(s) IV Push daily  PPD  5 Tuberculin Unit(s) Injectable 5 Unit(s) IntraDermal once  pregabalin 75 milliGRAM(s) Oral two times a day  sevelamer hydrochloride 1600 milliGRAM(s) Oral three times a day with meals  simvastatin 20 milliGRAM(s) Oral at bedtime      TELEMETRY: 	    ECG:  	  RADIOLOGY:  OTHER: 	  	  CBC Full  -  ( 06 Jan 2018 08:00 )  WBC Count : 17.4 K/uL  Hemoglobin : 9.2 g/dL  Hematocrit : 27.9 %  Platelet Count - Automated : 312 K/uL  Mean Cell Volume : 97.1 fl  Mean Cell Hemoglobin : 32.0 pg  Mean Cell Hemoglobin Concentration : 32.9 gm/dL  Auto Neutrophil # : x  Auto Lymphocyte # : x  Auto Monocyte # : x  Auto Eosinophil # : x  Auto Basophil # : x  Auto Neutrophil % : x  Auto Lymphocyte % : x  Auto Monocyte % : x  Auto Eosinophil % : x  Auto Basophil % : x        CARDIAC MARKERS:                              9.2    17.4  )-----------( 312      ( 06 Jan 2018 08:00 )             27.9       01-06    140  |  105  |  37<H>  ----------------------------<  115<H>  4.6   |  27  |  4.50<H>    Ca    8.6      06 Jan 2018 08:00  Phos  5.0     01-06              proBNP: Serum Pro-Brain Natriuretic Peptide: 22584 pg/mL (12-13 @ 12:30)    Lipid Profile: Cholesterol 53  LDL -13  HDL 38      HgA1c: Hemoglobin A1C, Whole Blood: 5.2 % (12-14 @ 13:29)    TSH: Thyroid Stimulating Hormone, Serum: 0.50 uU/mL (12-14 @ 06:26)

## 2018-01-07 NOTE — PROGRESS NOTE ADULT - PROBLEM SELECTOR PLAN 1
S/p IV antibiotics  ID follow up  CXR clear now  Still with leukocytosis  Will need repeat cultures  monitor WBC count  follow up CXR

## 2018-01-07 NOTE — PROGRESS NOTE ADULT - SUBJECTIVE AND OBJECTIVE BOX
Patient is a 52y old  Female who presents with a chief complaint of SOB (13 Dec 2017 16:01)  Patient is lethargic, somewhat arousable, lying in bed in NAD. S/p shiley cath placement on right neck. Still no PC because of persistent leukocytosis. No obvious source of infection. CXR and BC negative. Needs panculture again if elevated WBC presists.    INTERVAL HPI/OVERNIGHT EVENTS:      VITAL SIGNS:  T(F): 97.8 (01-07-18 @ 05:20)  HR: 93 (01-07-18 @ 05:20)  BP: 163/83 (01-07-18 @ 05:20)  RR: 16 (01-07-18 @ 05:20)  SpO2: 100% (01-07-18 @ 05:20)  Wt(kg): --  I&O's Detail          REVIEW OF SYSTEMS:    CONSTITUTIONAL:  No fevers, chills, sweats    HEENT:  Eyes:  No diplopia or blurred vision. ENT:  No earache, sore throat or runny nose.    CARDIOVASCULAR:  No pressure, squeezing, tightness, or heaviness about the chest; no palpitations.    RESPIRATORY:  Per HPI    GASTROINTESTINAL:  No abdominal pain, nausea, vomiting or diarrhea.    GENITOURINARY:  No dysuria, frequency or urgency.    NEUROLOGIC:  No paresthesias, fasciculations, seizures or weakness.    PSYCHIATRIC:  No disorder of thought or mood.      PHYSICAL EXAM:    Constitutional: Well developed and nourished  Eyes:Perrla  ENMT: normal  Neck:supple  Respiratory: good air entry  Cardiovascular: S1 S2 regular  Gastrointestinal: Soft, Non tender  Extremities: No edema  Vascular:normal  Neurological:Awake, alert,Ox3  Musculoskeletal:Normal      MEDICATIONS  (STANDING):  ascorbic acid 500 milliGRAM(s) Oral daily  cinacalcet 30 milliGRAM(s) Oral daily  dextrose 5%. 1000 milliLiter(s) (50 mL/Hr) IV Continuous <Continuous>  dextrose 50% Injectable 12.5 Gram(s) IV Push once  dextrose 50% Injectable 25 Gram(s) IV Push once  dextrose 50% Injectable 25 Gram(s) IV Push once  epoetin jacqueline Injectable 6000 Unit(s) IV Push <User Schedule>  ferrous    sulfate Liquid 300 milliGRAM(s) Enteral Tube daily  folic acid 1 milliGRAM(s) Oral daily  heparin  Injectable 5000 Unit(s) SubCutaneous every 12 hours  insulin lispro (HumaLOG) corrective regimen sliding scale   SubCutaneous every 6 hours  levothyroxine 50 MICROGram(s) Oral daily  metoprolol     tartrate 25 milliGRAM(s) Oral two times a day  pantoprazole  Injectable 40 milliGRAM(s) IV Push daily  PPD  5 Tuberculin Unit(s) Injectable 5 Unit(s) IntraDermal once  pregabalin 75 milliGRAM(s) Oral two times a day  sevelamer hydrochloride 1600 milliGRAM(s) Oral three times a day with meals  simvastatin 20 milliGRAM(s) Oral at bedtime    MEDICATIONS  (PRN):  acetaminophen   Tablet 650 milliGRAM(s) Oral every 6 hours PRN For Temp greater than 38 C (100.4 F)  acetaminophen  Suppository 650 milliGRAM(s) Rectal every 6 hours PRN For Temp greater than 38 C (100.4 F)  dextrose Gel 1 Dose(s) Oral once PRN Blood Glucose LESS THAN 70 milliGRAM(s)/deciliter  glucagon  Injectable 1 milliGRAM(s) IntraMuscular once PRN Glucose LESS THAN 70 milligrams/deciliter  morphine  - Injectable 1 milliGRAM(s) IV Push every 4 hours PRN Severe Pain (7 - 10)      Allergies    No Known Allergies    Intolerances        LABS:                        9.2    17.4  )-----------( 312      ( 06 Jan 2018 08:00 )             27.9     01-06    140  |  105  |  37<H>  ----------------------------<  115<H>  4.6   |  27  |  4.50<H>    Ca    8.6      06 Jan 2018 08:00  Phos  5.0     01-06                CAPILLARY BLOOD GLUCOSE      POCT Blood Glucose.: 121 mg/dL (07 Jan 2018 11:08)  POCT Blood Glucose.: 94 mg/dL (07 Jan 2018 05:49)  POCT Blood Glucose.: 111 mg/dL (07 Jan 2018 00:03)  POCT Blood Glucose.: 151 mg/dL (06 Jan 2018 17:18)        RADIOLOGY & ADDITIONAL TESTS:    CXR:    Ct scan chest:    ekg;    echo:

## 2018-01-07 NOTE — PROGRESS NOTE ADULT - NSHPATTENDINGPLANDISCUSS_GEN_ALL_CORE
signed out to weekend covering PA
icu team on rounds
ICU team, intern on ID
icu team on rounds
ICU team and resident on ID
House staff.
Dr. Chisholm - PGY 3
Dr. Rosenbaum- PGY3 and Dr. Kaplan - ID and Milagro - NP
house staff covering
house staff covering
icu team on chetan
icu team on rounds
37
icu team on rounds
house staff covering

## 2018-01-07 NOTE — PROGRESS NOTE ADULT - SUBJECTIVE AND OBJECTIVE BOX
Patient is a 52y Female with  ESRD ON HD   DUE FOR HD IN  AM    IS SLEEPING ,  EASILY  AROUSABLE    AFEBRILE  FEMORAL  SHILEY  REMOVED EARLIER TODAY    No Known Allergies    Hospital Medications:   MEDICATIONS  (STANDING):  ascorbic acid 500 milliGRAM(s) Oral daily  cinacalcet 30 milliGRAM(s) Oral daily  dextrose 5%. 1000 milliLiter(s) (50 mL/Hr) IV Continuous <Continuous>  dextrose 50% Injectable 12.5 Gram(s) IV Push once  dextrose 50% Injectable 25 Gram(s) IV Push once  dextrose 50% Injectable 25 Gram(s) IV Push once  epoetin jacqueline Injectable 6000 Unit(s) IV Push <User Schedule>  ferrous    sulfate Liquid 300 milliGRAM(s) Enteral Tube daily  folic acid 1 milliGRAM(s) Oral daily  heparin  Injectable 5000 Unit(s) SubCutaneous every 12 hours  insulin lispro (HumaLOG) corrective regimen sliding scale   SubCutaneous every 6 hours  levothyroxine 50 MICROGram(s) Oral daily  metoprolol     tartrate 25 milliGRAM(s) Oral two times a day  pantoprazole  Injectable 40 milliGRAM(s) IV Push daily  PPD  5 Tuberculin Unit(s) Injectable 5 Unit(s) IntraDermal once  pregabalin 75 milliGRAM(s) Oral two times a day  sevelamer hydrochloride 1600 milliGRAM(s) Oral three times a day with meals  simvastatin 20 milliGRAM(s) Oral at bedtime    REVIEW OF SYSTEMS:  NO FEVER/CHILLS    NO SOB      NO LEG SWELLING   APPETITE  IS OK,  NO N/V  VITALS:  T(F): 97.8 (01-07-18 @ 05:20), Max: 98.6 (01-06-18 @ 14:01)  HR: 93 (01-07-18 @ 05:20)  BP: 163/83 (01-07-18 @ 05:20)  RR: 16 (01-07-18 @ 05:20)  SpO2: 100% (01-07-18 @ 05:20)  Wt(kg): --      PHYSICAL EXAM:  Constitutional: NAD  Neck: No JVD, R IJ [PC IN PLACE  Respiratory: CTAB, no wheezes, rales or rhonchi  Cardiovascular: S1, S2, RRR  Gastrointestinal: BS+, soft, NT/ND  Extremities: . No peripheral edema  Neurological: A/O x2  :  No jarrell.   Vascular Access:R IJ  PC IN PLACE    LABS:  01-06    140  |  105  |  37<H>  ----------------------------<  115<H>  4.6   |  27  |  4.50<H>    Ca    8.6      06 Jan 2018 08:00  Phos  5.0     01-06      Creatinine Trend: 4.50 <--, 7.66 <--, 6.54 <--, 5.16 <--, 7.86 <--, 7.32 <--, 6.98 <--, 6.61 <--, 3.59 <--                        9.2    17.4  )-----------( 312      ( 06 Jan 2018 08:00 )             27.9     Urine Studies:      RADIOLOGY & ADDITIONAL STUDIES:

## 2018-01-07 NOTE — PROGRESS NOTE ADULT - PROBLEM SELECTOR PLAN 2
-pt had new right side shiley on her neck was placed yesterday  As per IR - no perma cath until the leukocytosis resolved   last HD friday.  Pt lethargic and leucocytosis getting worse, 100.4 fever yesterday 9 pm, -completed ABx course, was on levaquin and maxipime for 14 days.    last Bcx from 12/26 - no growth

## 2018-01-07 NOTE — PROGRESS NOTE ADULT - SUBJECTIVE AND OBJECTIVE BOX
Right femoral vein Shilley catheter was removed. Hemostasis achieved with extended external pressure.  Pressure dressing applied. Pt tolerated procedure well. No complication.    Monitor right groin for bleeding.

## 2018-01-07 NOTE — PROGRESS NOTE ADULT - ASSESSMENT
A/P   ESRD ON  HD  FOR DIALYSIS IN AM     BP  IS OK    HAS NO VOL EXCESS    ON  MARITA  FOR ANEMIA    D/C PLANNING, WILL NEED OUTPT  HD PLACEMENT

## 2018-01-07 NOTE — PROGRESS NOTE ADULT - PROBLEM SELECTOR PLAN 1
was in acute respiratory failure secondary to HAP (elevated procalcitonin) and fluid overload likey due to ESRD, S/P intubation on 12/16,  Extubated on 12/27  -completed ABx course, was on levaquin and maxipime for 14 days. Will follow up CBC today and follow up with ID.  -Repeated blood culture and urine culture no growth to date.

## 2018-01-07 NOTE — CHART NOTE - NSCHARTNOTEFT_GEN_A_CORE
Patient was woke up shouting that she is having severe chest pain.   describes as midsternal, stabbing, constant pain, non tender, not aggravated or relived by anything.   Vitals:  BP: 134/72 MA: 74 and RR: 17 with 100 % saturation with 3L.   On exam: patient is in mild distress  CVS: normal S1S2 no M/R/G  Respi: B/L equal breathing sounds, no respiratory distress.  EKG: normal sinus rhythm, no ST T wave changes   Patient was given 1mg of morphine IV push before examination, her pain was significantly improved after pain medication.  will follow up patient in 1 hour to reassess.

## 2018-01-08 LAB
ANION GAP SERPL CALC-SCNC: 14 MMOL/L — SIGNIFICANT CHANGE UP (ref 5–17)
BUN SERPL-MCNC: 56 MG/DL — HIGH (ref 7–18)
CALCIUM SERPL-MCNC: 8.9 MG/DL — SIGNIFICANT CHANGE UP (ref 8.4–10.5)
CHLORIDE SERPL-SCNC: 100 MMOL/L — SIGNIFICANT CHANGE UP (ref 96–108)
CO2 SERPL-SCNC: 24 MMOL/L — SIGNIFICANT CHANGE UP (ref 22–31)
CREAT SERPL-MCNC: 8.16 MG/DL — HIGH (ref 0.5–1.3)
GLUCOSE BLDC GLUCOMTR-MCNC: 102 MG/DL — HIGH (ref 70–99)
GLUCOSE BLDC GLUCOMTR-MCNC: 103 MG/DL — HIGH (ref 70–99)
GLUCOSE BLDC GLUCOMTR-MCNC: 105 MG/DL — HIGH (ref 70–99)
GLUCOSE BLDC GLUCOMTR-MCNC: 116 MG/DL — HIGH (ref 70–99)
GLUCOSE BLDC GLUCOMTR-MCNC: 89 MG/DL — SIGNIFICANT CHANGE UP (ref 70–99)
GLUCOSE BLDC GLUCOMTR-MCNC: 98 MG/DL — SIGNIFICANT CHANGE UP (ref 70–99)
GLUCOSE SERPL-MCNC: 93 MG/DL — SIGNIFICANT CHANGE UP (ref 70–99)
HCT VFR BLD CALC: 31.4 % — LOW (ref 34.5–45)
HGB BLD-MCNC: 9.4 G/DL — LOW (ref 11.5–15.5)
HIV 1 & 2 AB SERPL IA.RAPID: SIGNIFICANT CHANGE UP
HIV 1+2 AB+HIV1 P24 AG SERPL QL IA: SIGNIFICANT CHANGE UP
MCHC RBC-ENTMCNC: 28.7 PG — SIGNIFICANT CHANGE UP (ref 27–34)
MCHC RBC-ENTMCNC: 30 GM/DL — LOW (ref 32–36)
MCV RBC AUTO: 95.9 FL — SIGNIFICANT CHANGE UP (ref 80–100)
PLATELET # BLD AUTO: 329 K/UL — SIGNIFICANT CHANGE UP (ref 150–400)
POTASSIUM SERPL-MCNC: 6 MMOL/L — HIGH (ref 3.5–5.3)
POTASSIUM SERPL-SCNC: 6 MMOL/L — HIGH (ref 3.5–5.3)
RBC # BLD: 3.28 M/UL — LOW (ref 3.8–5.2)
RBC # FLD: 17.4 % — HIGH (ref 10.3–14.5)
SODIUM SERPL-SCNC: 138 MMOL/L — SIGNIFICANT CHANGE UP (ref 135–145)
WBC # BLD: 18.9 K/UL — HIGH (ref 3.8–10.5)
WBC # FLD AUTO: 18.9 K/UL — HIGH (ref 3.8–10.5)

## 2018-01-08 RX ADMIN — Medication 25 MILLIGRAM(S): at 05:17

## 2018-01-08 RX ADMIN — HEPARIN SODIUM 5000 UNIT(S): 5000 INJECTION INTRAVENOUS; SUBCUTANEOUS at 05:17

## 2018-01-08 RX ADMIN — Medication 75 MILLIGRAM(S): at 18:55

## 2018-01-08 RX ADMIN — PANTOPRAZOLE SODIUM 40 MILLIGRAM(S): 20 TABLET, DELAYED RELEASE ORAL at 13:29

## 2018-01-08 RX ADMIN — Medication 75 MILLIGRAM(S): at 05:17

## 2018-01-08 RX ADMIN — SEVELAMER CARBONATE 1600 MILLIGRAM(S): 2400 POWDER, FOR SUSPENSION ORAL at 13:29

## 2018-01-08 RX ADMIN — SEVELAMER CARBONATE 1600 MILLIGRAM(S): 2400 POWDER, FOR SUSPENSION ORAL at 18:55

## 2018-01-08 RX ADMIN — MORPHINE SULFATE 1 MILLIGRAM(S): 50 CAPSULE, EXTENDED RELEASE ORAL at 09:15

## 2018-01-08 RX ADMIN — Medication 300 MILLIGRAM(S): at 13:29

## 2018-01-08 RX ADMIN — Medication 25 MILLIGRAM(S): at 18:55

## 2018-01-08 RX ADMIN — Medication 500 MILLIGRAM(S): at 13:29

## 2018-01-08 RX ADMIN — MORPHINE SULFATE 1 MILLIGRAM(S): 50 CAPSULE, EXTENDED RELEASE ORAL at 08:13

## 2018-01-08 RX ADMIN — Medication 50 MICROGRAM(S): at 05:17

## 2018-01-08 RX ADMIN — Medication 1 MILLIGRAM(S): at 13:28

## 2018-01-08 RX ADMIN — CINACALCET 30 MILLIGRAM(S): 30 TABLET, FILM COATED ORAL at 13:29

## 2018-01-08 RX ADMIN — SIMVASTATIN 20 MILLIGRAM(S): 20 TABLET, FILM COATED ORAL at 22:41

## 2018-01-08 RX ADMIN — SEVELAMER CARBONATE 1600 MILLIGRAM(S): 2400 POWDER, FOR SUSPENSION ORAL at 08:06

## 2018-01-08 NOTE — PROGRESS NOTE ADULT - SUBJECTIVE AND OBJECTIVE BOX
Patient is a 52y old  Female who presents with a chief complaint of SOB (13 Dec 2017 16:01)  Awake, alert, comfortable in bed in [facility].    INTERVAL HPI/OVERNIGHT EVENTS:      VITAL SIGNS:  T(F): 98.5 (01-08-18 @ 05:09)  HR: 99 (01-08-18 @ 05:09)  BP: 162/78 (01-08-18 @ 05:09)  RR: 16 (01-08-18 @ 05:09)  SpO2: 100% (01-08-18 @ 05:09)  Wt(kg): --  I&O's Detail          REVIEW OF SYSTEMS:    CONSTITUTIONAL:  No fevers, chills, sweats    HEENT:  Eyes:  No diplopia or blurred vision. ENT:  No earache, sore throat or runny nose.    CARDIOVASCULAR:  No pressure, squeezing, tightness, or heaviness about the chest; no palpitations.    RESPIRATORY:  Per HPI    GASTROINTESTINAL:  No abdominal pain, nausea, vomiting or diarrhea.    GENITOURINARY:  No dysuria, frequency or urgency.    NEUROLOGIC:  No paresthesias, fasciculations, seizures or weakness.    PSYCHIATRIC:  No disorder of thought or mood.      PHYSICAL EXAM:    Constitutional: Well developed and nourished  Eyes:Perrla  ENMT: normal  Neck:supple  Respiratory: rales posteriorly  Cardiovascular: S1 S2 regular  Gastrointestinal: Soft, Non tender  Extremities: No edema  Vascular:normal  Neurological:Awake, alert,Ox3  Musculoskeletal:Normal      MEDICATIONS  (STANDING):  ascorbic acid 500 milliGRAM(s) Oral daily  cinacalcet 30 milliGRAM(s) Oral daily  dextrose 5%. 1000 milliLiter(s) (50 mL/Hr) IV Continuous <Continuous>  dextrose 50% Injectable 12.5 Gram(s) IV Push once  dextrose 50% Injectable 25 Gram(s) IV Push once  dextrose 50% Injectable 25 Gram(s) IV Push once  epoetin jacqueline Injectable 6000 Unit(s) IV Push <User Schedule>  ferrous    sulfate Liquid 300 milliGRAM(s) Enteral Tube daily  folic acid 1 milliGRAM(s) Oral daily  heparin  Injectable 5000 Unit(s) SubCutaneous every 12 hours  insulin lispro (HumaLOG) corrective regimen sliding scale   SubCutaneous every 6 hours  levothyroxine 50 MICROGram(s) Oral daily  metoprolol     tartrate 25 milliGRAM(s) Oral two times a day  pantoprazole  Injectable 40 milliGRAM(s) IV Push daily  PPD  5 Tuberculin Unit(s) Injectable 5 Unit(s) IntraDermal once  pregabalin 75 milliGRAM(s) Oral two times a day  sevelamer hydrochloride 1600 milliGRAM(s) Oral three times a day with meals  simvastatin 20 milliGRAM(s) Oral at bedtime    MEDICATIONS  (PRN):  acetaminophen   Tablet 650 milliGRAM(s) Oral every 6 hours PRN For Temp greater than 38 C (100.4 F)  acetaminophen  Suppository 650 milliGRAM(s) Rectal every 6 hours PRN For Temp greater than 38 C (100.4 F)  dextrose Gel 1 Dose(s) Oral once PRN Blood Glucose LESS THAN 70 milliGRAM(s)/deciliter  glucagon  Injectable 1 milliGRAM(s) IntraMuscular once PRN Glucose LESS THAN 70 milligrams/deciliter  morphine  - Injectable 1 milliGRAM(s) IV Push every 4 hours PRN Severe Pain (7 - 10)      Allergies    No Known Allergies    Intolerances        LABS:                    CAPILLARY BLOOD GLUCOSE      POCT Blood Glucose.: 103 mg/dL (08 Jan 2018 07:31)  POCT Blood Glucose.: 116 mg/dL (08 Jan 2018 06:35)  POCT Blood Glucose.: 105 mg/dL (08 Jan 2018 00:19)  POCT Blood Glucose.: 137 mg/dL (07 Jan 2018 17:33)  POCT Blood Glucose.: 121 mg/dL (07 Jan 2018 11:08)        RADIOLOGY & ADDITIONAL TESTS:    CXR:    Ct scan chest:    ekg;    echo:

## 2018-01-08 NOTE — PROGRESS NOTE ADULT - ATTENDING COMMENTS
Patient is seen and examined. Case reviewed with the medical team. Above note is appreciated. Will follow up clinically. Continue DVT prophylaxis. Patient can be discharged to [facility] that has hemodialysis on site and follow up with vascular for fistula as outpatient. As per ID there is no sign of infection.

## 2018-01-08 NOTE — PROGRESS NOTE ADULT - ASSESSMENT
52 yr old female with  ESRD.     ESRD:  Had HD today, uneventful treatment.   Await tunneled catheter placement.   Femoral cath removed, now dialyzing with ANABELLE lopez.   Also pt requires out HD unit placement. Recommend sending paperwork to [facility], [facility] HD unit.     Secondary hyperparathyroidism   Can cont sensipar. iPTH and Ca level acceptable.      Anemia   Hg at goal, can cont epo  6000 units tiw     Hyperphosphatemia  Cont renagel 1600mg TID with meals.   High phos 2/2 CKD. 52 yr old female with  ESRD.     ESRD:  Plan for HD today. Mildly elevated K, will correct with HD.   Await tunneled catheter placement.   Femoral cath removed, now dialyzing with ANABELLE lopez.   Also pt requires out HD unit placement. Recommend sending paperwork to [facility], [facility] HD unit.     Secondary hyperparathyroidism   Can cont sensipar. iPTH and Ca level acceptable.      Anemia   Hg at goal, can cont epo  6000 units tiw     Hyperphosphatemia  Cont renagel 1600mg TID with meals.   High phos 2/2 CKD.

## 2018-01-08 NOTE — PROGRESS NOTE ADULT - PROBLEM SELECTOR PLAN 2
-pt had new right side shiley on her neck was placed on Friday   As per IR - no perma cath until the leukocytosis resolved   Awaiting permcath placement by IR

## 2018-01-08 NOTE — PROGRESS NOTE ADULT - PROBLEM SELECTOR PLAN 1
S/p IV antibiotics  ID follow up  CXR clear now  Repeat CBC (persistent leukocytosis)  Still with leukocytosis  Will need repeat cultures  monitor WBC count  follow up CXR

## 2018-01-08 NOTE — PROGRESS NOTE ADULT - SUBJECTIVE AND OBJECTIVE BOX
Pt seen and examined at bedside  No acute events overnight. Episode of chest pain yesterday noted.   Pt without pain currently, denies SOB.     Allergies:  No Known Allergies    Hospital Medications:   MEDICATIONS  (STANDING):  ascorbic acid 500 milliGRAM(s) Oral daily  cinacalcet 30 milliGRAM(s) Oral daily  dextrose 5%. 1000 milliLiter(s) (50 mL/Hr) IV Continuous <Continuous>  dextrose 50% Injectable 12.5 Gram(s) IV Push once  dextrose 50% Injectable 25 Gram(s) IV Push once  dextrose 50% Injectable 25 Gram(s) IV Push once  epoetin jacqueline Injectable 6000 Unit(s) IV Push <User Schedule>  ferrous    sulfate Liquid 300 milliGRAM(s) Enteral Tube daily  folic acid 1 milliGRAM(s) Oral daily  heparin  Injectable 5000 Unit(s) SubCutaneous every 12 hours  insulin lispro (HumaLOG) corrective regimen sliding scale   SubCutaneous every 6 hours  levothyroxine 50 MICROGram(s) Oral daily  metoprolol     tartrate 25 milliGRAM(s) Oral two times a day  pantoprazole  Injectable 40 milliGRAM(s) IV Push daily  PPD  5 Tuberculin Unit(s) Injectable 5 Unit(s) IntraDermal once  pregabalin 75 milliGRAM(s) Oral two times a day  sevelamer hydrochloride 1600 milliGRAM(s) Oral three times a day with meals  simvastatin 20 milliGRAM(s) Oral at bedtime    VITALS:  T(F): 98.2 (01-08-18 @ 14:30), Max: 99.4 (01-08-18 @ 00:40)  HR: 86 (01-08-18 @ 14:30)  BP: 154/61 (01-08-18 @ 14:30)  RR: 16 (01-08-18 @ 14:30)  SpO2: 100% (01-08-18 @ 14:30)  Wt(kg): --      PHYSICAL EXAM:  Constitutional: NAD  HEENT: anicteric sclera, oropharynx clear, MMM  Neck: No JVD  Respiratory: CTAB, no wheezes, rales or rhonchi  Cardiovascular: S1, S2, RRR  Gastrointestinal: BS+, soft, NT/ND  Extremities: No cyanosis or clubbing. No peripheral edema  Neurological: A/O x 1-2, no focal deficits  Psychiatric: Normal mood, normal affect  : No CVA tenderness. No jarrell.   Skin: No rashes  [facility]     LABS:  01-08    138  |  100  |  56<H>  ----------------------------<  93  6.0<H>   |  24  |  8.16<H>    Ca    8.9      08 Jan 2018 15:06      Creatinine Trend: 8.16 <--, 4.50 <--, 7.66 <--, 6.54 <--, 5.16 <--, 7.86 <--, 7.32 <--, 6.98 <--, 6.61 <--                        9.4    18.9  )-----------( 329      ( 08 Jan 2018 15:06 )             31.4     Urine Studies:      RADIOLOGY & ADDITIONAL STUDIES:

## 2018-01-08 NOTE — PROGRESS NOTE ADULT - SUBJECTIVE AND OBJECTIVE BOX
Patient is a 52y old  Female who presents with a chief complaint of SOB (13 Dec 2017 16:01)      INTERVAL HPI/OVERNIGHT EVENTS: Chest pain yesterday , not c/o any chest pain today     T(C): 36.9 (01-08-18 @ 05:09), Max: 37.4 (01-08-18 @ 00:40)  HR: 99 (01-08-18 @ 05:09) (74 - 99)  BP: 162/78 (01-08-18 @ 05:09) (134/72 - 172/67)  RR: 16 (01-08-18 @ 05:09) (16 - 17)  SpO2: 100% (01-08-18 @ 05:09) (99% - 100%)      MEDICATIONS  (STANDING):  ascorbic acid 500 milliGRAM(s) Oral daily  cinacalcet 30 milliGRAM(s) Oral daily  dextrose 5%. 1000 milliLiter(s) (50 mL/Hr) IV Continuous <Continuous>  dextrose 50% Injectable 12.5 Gram(s) IV Push once  dextrose 50% Injectable 25 Gram(s) IV Push once  dextrose 50% Injectable 25 Gram(s) IV Push once  epoetin jacqueline Injectable 6000 Unit(s) IV Push <User Schedule>  ferrous    sulfate Liquid 300 milliGRAM(s) Enteral Tube daily  folic acid 1 milliGRAM(s) Oral daily  heparin  Injectable 5000 Unit(s) SubCutaneous every 12 hours  insulin lispro (HumaLOG) corrective regimen sliding scale   SubCutaneous every 6 hours  levothyroxine 50 MICROGram(s) Oral daily  metoprolol     tartrate 25 milliGRAM(s) Oral two times a day  pantoprazole  Injectable 40 milliGRAM(s) IV Push daily  PPD  5 Tuberculin Unit(s) Injectable 5 Unit(s) IntraDermal once  pregabalin 75 milliGRAM(s) Oral two times a day  sevelamer hydrochloride 1600 milliGRAM(s) Oral three times a day with meals  simvastatin 20 milliGRAM(s) Oral at bedtime    MEDICATIONS  (PRN):  acetaminophen   Tablet 650 milliGRAM(s) Oral every 6 hours PRN For Temp greater than 38 C (100.4 F)  acetaminophen  Suppository 650 milliGRAM(s) Rectal every 6 hours PRN For Temp greater than 38 C (100.4 F)  dextrose Gel 1 Dose(s) Oral once PRN Blood Glucose LESS THAN 70 milliGRAM(s)/deciliter  glucagon  Injectable 1 milliGRAM(s) IntraMuscular once PRN Glucose LESS THAN 70 milligrams/deciliter  morphine  - Injectable 1 milliGRAM(s) IV Push every 4 hours PRN Severe Pain (7 - 10)      PHYSICAL EXAM:  GENERAL: NAD, well-groomed, well-developed  HEAD:  Atraumatic, Normocephalic  EYES: EOMI, PERRLA, conjunctiva and sclera clear  ENMT: No tonsillar erythema, exudates, or enlargement; Moist mucous membranes, Good dentition, No lesions  NECK: Supple, No JVD, Normal thyroid  NERVOUS SYSTEM:  Alert & Oriented X3, no focal neurological deficit   CHEST/LUNG: Clear to percussion bilaterally; No rales, rhonchi, wheezing, or rubs  HEART: Regular rate and rhythm; No murmurs, rubs, or gallops  ABDOMEN: Soft, Nontender, Nondistended; Bowel sounds present  EXTREMITIES:  2+ Peripheral Pulses, No clubbing, cyanosis, or edema    CAPILLARY BLOOD GLUCOSE      POCT Blood Glucose.: 102 mg/dL (08 Jan 2018 12:22)  POCT Blood Glucose.: 98 mg/dL (08 Jan 2018 11:49)  POCT Blood Glucose.: 103 mg/dL (08 Jan 2018 07:31)  POCT Blood Glucose.: 116 mg/dL (08 Jan 2018 06:35)  POCT Blood Glucose.: 105 mg/dL (08 Jan 2018 00:19)  POCT Blood Glucose.: 137 mg/dL (07 Jan 2018 17:33)

## 2018-01-09 LAB
ANION GAP SERPL CALC-SCNC: 12 MMOL/L — SIGNIFICANT CHANGE UP (ref 5–17)
ANION GAP SERPL CALC-SCNC: 14 MMOL/L — SIGNIFICANT CHANGE UP (ref 5–17)
BUN SERPL-MCNC: 63 MG/DL — HIGH (ref 7–18)
BUN SERPL-MCNC: 63 MG/DL — HIGH (ref 7–18)
CALCIUM SERPL-MCNC: 8.8 MG/DL — SIGNIFICANT CHANGE UP (ref 8.4–10.5)
CALCIUM SERPL-MCNC: 8.9 MG/DL — SIGNIFICANT CHANGE UP (ref 8.4–10.5)
CHLORIDE SERPL-SCNC: 104 MMOL/L — SIGNIFICANT CHANGE UP (ref 96–108)
CHLORIDE SERPL-SCNC: 104 MMOL/L — SIGNIFICANT CHANGE UP (ref 96–108)
CO2 SERPL-SCNC: 22 MMOL/L — SIGNIFICANT CHANGE UP (ref 22–31)
CO2 SERPL-SCNC: 23 MMOL/L — SIGNIFICANT CHANGE UP (ref 22–31)
CREAT SERPL-MCNC: 8.37 MG/DL — HIGH (ref 0.5–1.3)
CREAT SERPL-MCNC: 8.73 MG/DL — HIGH (ref 0.5–1.3)
EOSINOPHIL NFR BLD AUTO: 1 % — SIGNIFICANT CHANGE UP (ref 0–6)
GLUCOSE BLDC GLUCOMTR-MCNC: 107 MG/DL — HIGH (ref 70–99)
GLUCOSE BLDC GLUCOMTR-MCNC: 136 MG/DL — HIGH (ref 70–99)
GLUCOSE BLDC GLUCOMTR-MCNC: 86 MG/DL — SIGNIFICANT CHANGE UP (ref 70–99)
GLUCOSE BLDC GLUCOMTR-MCNC: 92 MG/DL — SIGNIFICANT CHANGE UP (ref 70–99)
GLUCOSE BLDC GLUCOMTR-MCNC: 95 MG/DL — SIGNIFICANT CHANGE UP (ref 70–99)
GLUCOSE SERPL-MCNC: 115 MG/DL — HIGH (ref 70–99)
GLUCOSE SERPL-MCNC: 73 MG/DL — SIGNIFICANT CHANGE UP (ref 70–99)
HCT VFR BLD CALC: 33.2 % — LOW (ref 34.5–45)
HGB BLD-MCNC: 9.8 G/DL — LOW (ref 11.5–15.5)
LYMPHOCYTES # BLD AUTO: 10 % — LOW (ref 13–44)
MCHC RBC-ENTMCNC: 28.1 PG — SIGNIFICANT CHANGE UP (ref 27–34)
MCHC RBC-ENTMCNC: 29.5 GM/DL — LOW (ref 32–36)
MCV RBC AUTO: 95.2 FL — SIGNIFICANT CHANGE UP (ref 80–100)
MONOCYTES NFR BLD AUTO: 5 % — SIGNIFICANT CHANGE UP (ref 2–14)
NEUTROPHILS NFR BLD AUTO: 82 % — HIGH (ref 43–77)
PLATELET # BLD AUTO: 316 K/UL — SIGNIFICANT CHANGE UP (ref 150–400)
POTASSIUM SERPL-MCNC: 5.9 MMOL/L — HIGH (ref 3.5–5.3)
POTASSIUM SERPL-MCNC: 6.3 MMOL/L — CRITICAL HIGH (ref 3.5–5.3)
POTASSIUM SERPL-SCNC: 5.9 MMOL/L — HIGH (ref 3.5–5.3)
POTASSIUM SERPL-SCNC: 6.3 MMOL/L — CRITICAL HIGH (ref 3.5–5.3)
RBC # BLD: 3.49 M/UL — LOW (ref 3.8–5.2)
RBC # FLD: 16.8 % — HIGH (ref 10.3–14.5)
SODIUM SERPL-SCNC: 139 MMOL/L — SIGNIFICANT CHANGE UP (ref 135–145)
SODIUM SERPL-SCNC: 140 MMOL/L — SIGNIFICANT CHANGE UP (ref 135–145)
WBC # BLD: 19.3 K/UL — HIGH (ref 3.8–10.5)
WBC # FLD AUTO: 19.3 K/UL — HIGH (ref 3.8–10.5)

## 2018-01-09 PROCEDURE — 71045 X-RAY EXAM CHEST 1 VIEW: CPT | Mod: 26

## 2018-01-09 PROCEDURE — 99222 1ST HOSP IP/OBS MODERATE 55: CPT | Mod: GC

## 2018-01-09 RX ORDER — SODIUM POLYSTYRENE SULFONATE 4.1 MEQ/G
15 POWDER, FOR SUSPENSION ORAL ONCE
Qty: 0 | Refills: 0 | Status: COMPLETED | OUTPATIENT
Start: 2018-01-09 | End: 2018-01-09

## 2018-01-09 RX ORDER — MEROPENEM 1 G/30ML
1000 INJECTION INTRAVENOUS EVERY 24 HOURS
Qty: 0 | Refills: 0 | Status: DISCONTINUED | OUTPATIENT
Start: 2018-01-10 | End: 2018-01-22

## 2018-01-09 RX ORDER — MEROPENEM 1 G/30ML
1000 INJECTION INTRAVENOUS ONCE
Qty: 0 | Refills: 0 | Status: COMPLETED | OUTPATIENT
Start: 2018-01-09 | End: 2018-01-09

## 2018-01-09 RX ORDER — MEROPENEM 1 G/30ML
INJECTION INTRAVENOUS
Qty: 0 | Refills: 0 | Status: DISCONTINUED | OUTPATIENT
Start: 2018-01-09 | End: 2018-01-22

## 2018-01-09 RX ADMIN — Medication 1 MILLIGRAM(S): at 11:15

## 2018-01-09 RX ADMIN — Medication 75 MILLIGRAM(S): at 05:51

## 2018-01-09 RX ADMIN — HEPARIN SODIUM 5000 UNIT(S): 5000 INJECTION INTRAVENOUS; SUBCUTANEOUS at 05:51

## 2018-01-09 RX ADMIN — SEVELAMER CARBONATE 1600 MILLIGRAM(S): 2400 POWDER, FOR SUSPENSION ORAL at 17:51

## 2018-01-09 RX ADMIN — MEROPENEM 100 MILLIGRAM(S): 1 INJECTION INTRAVENOUS at 15:57

## 2018-01-09 RX ADMIN — SEVELAMER CARBONATE 1600 MILLIGRAM(S): 2400 POWDER, FOR SUSPENSION ORAL at 14:29

## 2018-01-09 RX ADMIN — Medication 25 MILLIGRAM(S): at 05:51

## 2018-01-09 RX ADMIN — SODIUM POLYSTYRENE SULFONATE 15 GRAM(S): 4.1 POWDER, FOR SUSPENSION ORAL at 10:57

## 2018-01-09 RX ADMIN — PANTOPRAZOLE SODIUM 40 MILLIGRAM(S): 20 TABLET, DELAYED RELEASE ORAL at 11:15

## 2018-01-09 RX ADMIN — SIMVASTATIN 20 MILLIGRAM(S): 20 TABLET, FILM COATED ORAL at 21:25

## 2018-01-09 RX ADMIN — HEPARIN SODIUM 5000 UNIT(S): 5000 INJECTION INTRAVENOUS; SUBCUTANEOUS at 17:51

## 2018-01-09 RX ADMIN — Medication 50 MICROGRAM(S): at 05:51

## 2018-01-09 RX ADMIN — Medication 75 MILLIGRAM(S): at 17:51

## 2018-01-09 RX ADMIN — Medication 650 MILLIGRAM(S): at 05:51

## 2018-01-09 RX ADMIN — Medication 300 MILLIGRAM(S): at 11:15

## 2018-01-09 RX ADMIN — SEVELAMER CARBONATE 1600 MILLIGRAM(S): 2400 POWDER, FOR SUSPENSION ORAL at 08:48

## 2018-01-09 RX ADMIN — Medication 500 MILLIGRAM(S): at 11:15

## 2018-01-09 RX ADMIN — ERYTHROPOIETIN 6000 UNIT(S): 10000 INJECTION, SOLUTION INTRAVENOUS; SUBCUTANEOUS at 18:17

## 2018-01-09 RX ADMIN — CINACALCET 30 MILLIGRAM(S): 30 TABLET, FILM COATED ORAL at 11:15

## 2018-01-09 NOTE — PROGRESS NOTE ADULT - ATTENDING COMMENTS
Patient is seen and examined. Case reviewed with the medical team. Above note is appreciated. Will follow up clinically. Continue DVT prophylaxis. Case discussed with Nephrology and ID. Patient with fever earlier this am. Will do a fever work up and follow up. ID note is appreciated.

## 2018-01-09 NOTE — PROGRESS NOTE ADULT - SUBJECTIVE AND OBJECTIVE BOX
Pt seen and examined at bedside  No acute events overnight. Pt denies SOB or pain.  Pt much more awake and answering questions appropriately, as compared to previous days.     Allergies:  No Known Allergies    Hospital Medications:   MEDICATIONS  (STANDING):  ascorbic acid 500 milliGRAM(s) Oral daily  cinacalcet 30 milliGRAM(s) Oral daily  dextrose 5%. 1000 milliLiter(s) (50 mL/Hr) IV Continuous <Continuous>  dextrose 50% Injectable 12.5 Gram(s) IV Push once  dextrose 50% Injectable 25 Gram(s) IV Push once  dextrose 50% Injectable 25 Gram(s) IV Push once  epoetin jacqueline Injectable 6000 Unit(s) IV Push <User Schedule>  ferrous    sulfate Liquid 300 milliGRAM(s) Enteral Tube daily  folic acid 1 milliGRAM(s) Oral daily  heparin  Injectable 5000 Unit(s) SubCutaneous every 12 hours  insulin lispro (HumaLOG) corrective regimen sliding scale   SubCutaneous every 6 hours  levothyroxine 50 MICROGram(s) Oral daily  meropenem IVPB 1000 milliGRAM(s) IV Intermittent once  meropenem IVPB      metoprolol     tartrate 25 milliGRAM(s) Oral two times a day  pantoprazole  Injectable 40 milliGRAM(s) IV Push daily  PPD  5 Tuberculin Unit(s) Injectable 5 Unit(s) IntraDermal once  pregabalin 75 milliGRAM(s) Oral two times a day  sevelamer hydrochloride 1600 milliGRAM(s) Oral three times a day with meals  simvastatin 20 milliGRAM(s) Oral at bedtime    VITALS:  T(F): 102.4 (01-09-18 @ 05:18), Max: 102.4 (01-09-18 @ 05:18)  HR: 107 (01-09-18 @ 05:18)  BP: 115/47 (01-09-18 @ 05:18)  RR: 16 (01-09-18 @ 05:18)  SpO2: 95% (01-09-18 @ 05:18)  Wt(kg): --      PHYSICAL EXAM:  Constitutional: NAD  HEENT: anicteric sclera, oropharynx clear, MMM  Neck: No JVD  Respiratory: CTAB, no wheezes, rales or rhonchi  Cardiovascular: S1, S2, RRR  Gastrointestinal: BS+, soft, NT/ND  Extremities: No cyanosis or clubbing. No peripheral edema  Neurological: A/O x 3, no focal deficits  Psychiatric: Normal mood, normal affect  : No CVA tenderness. No jarrell.   Skin: No rashes  Vascular Access: CHRISTIE lopez    LABS:  01-09    139  |  104  |  63<H>  ----------------------------<  73  6.3<HH>   |  23  |  8.37<H>    Ca    8.9      09 Jan 2018 08:42      Creatinine Trend: 8.37 <--, 8.16 <--, 4.50 <--, 7.66 <--, 6.54 <--, 5.16 <--                        9.8    19.3  )-----------( 316      ( 09 Jan 2018 08:42 )             33.2     Urine Studies:      RADIOLOGY & ADDITIONAL STUDIES:

## 2018-01-09 NOTE — PROGRESS NOTE ADULT - SUBJECTIVE AND OBJECTIVE BOX
Asked today to eval pt.  ESRD on HD  R IJ shiflip by IR- not functioning.  L hand dom    R  IJ site ok.  CXR ok    Fever, wbc, L shift    RIJ shiflip exchange requested by primary team- even for same 12Fr size.  IR unavauil.  No larger bore catheters in the hosptial  Will exchange.  If not successful- rec IR to place larger bore permcath without tunnel.    No punctures in R UE

## 2018-01-09 NOTE — PROGRESS NOTE ADULT - SUBJECTIVE AND OBJECTIVE BOX
informed that shiley catheter "completely non functional" at time of last attempted HD.  Request for shiley exchange as IR unble to do procedure.  Informed that pt needs HD Today.  Consent obtained from family for Shiley exchange.  Preparation made for exchange, upon manipulation of catheter , prior to attempting exchange, noted Excellent slow through blue port.  Fair to good flow noted through red port.  sterile dressing reapplied.  Personally informed Dialysis nurse on duty that attempt should be made to use the corrent catheter, and they should attempt HD Now.   Informed that Surgery is prepared and readyon hand to exchange shiley if difficulty should arise during HD today.  Shiley can be removed and or replaced after successful HD with plan for more permanent access to be determinded with ID and IR service coordination.

## 2018-01-09 NOTE — PROGRESS NOTE ADULT - ASSESSMENT
52 yr old female with  ESRD.     ESRD:  Pt had shorted HD treatment due to tunneled HD cath malfunction.   Await catheter placement; IR not available todayfor new chat placement.    Femoral cath removed, ANABELLE lopez not functioning well, poor flows. Recommend exhange current acces.   Also pt requires out HD unit placement. Recommend sending paperwork to [facility], has [facility] HD unit.     Secondary hyperparathyroidism   Can cont sensipar. iPTH and Ca level acceptable.      Anemia   Hg at goal, can cont epo  6000 units tiw     Hyperphosphatemia  Cont renagel 1600mg TID with meals.   High phos 2/2 CKD.

## 2018-01-09 NOTE — PROGRESS NOTE ADULT - PROBLEM SELECTOR PLAN 5
Cont HD  Renal follow up  Monitor labs  Replace lytes  Perma cath Placement by IR once leukocytosis improves  Kayexalate po

## 2018-01-09 NOTE — PROGRESS NOTE ADULT - SUBJECTIVE AND OBJECTIVE BOX
52y Female    Meds:    Allergies    No Known Allergies    Intolerances        VITALS:  Vital Signs Last 24 Hrs  T(C): 39.1 (09 Jan 2018 05:18), Max: 39.1 (09 Jan 2018 05:18)  T(F): 102.4 (09 Jan 2018 05:18), Max: 102.4 (09 Jan 2018 05:18)  HR: 107 (09 Jan 2018 05:18) (81 - 107)  BP: 115/47 (09 Jan 2018 05:18) (115/47 - 154/61)  BP(mean): --  RR: 16 (09 Jan 2018 05:18) (16 - 17)  SpO2: 95% (09 Jan 2018 05:18) (95% - 100%)    LABS/DIAGNOSTIC TESTS:                          9.8    19.3  )-----------( 316      ( 09 Jan 2018 08:42 )             33.2         01-09    139  |  104  |  63<H>  ----------------------------<  73  6.3<HH>   |  23  |  8.37<H>    Ca    8.9      09 Jan 2018 08:42            CULTURES: .Blood Blood-Peripheral  01-06 @ 21:57   No growth to date.  --  --      .Blood Blood  01-06 @ 00:17   No growth to date.  --  --      .Blood Blood  01-05 @ 22:47   No growth to date.  --  --      .Blood Blood-Peripheral  12-26 @ 22:38   No growth at 5 days.  --  --      .Urine Catheterized  12-26 @ 17:34   No growth  --  --      .Blood Blood-Peripheral  12-21 @ 18:39   No growth at 5 days.  --  --      .Sputum Sputum  12-18 @ 21:43   Normal Respiratory Liz present  --    Few Squamous epithelial cells per low power field  Few polymorphonuclear leukocytes per low power field  Rare Gram variable coccobacilli   per oil power field      .Blood rec'd 2 anaerobic bottles for this accession #  12-16 @ 00:53   No growth at 5 days.  --  --      .Blood Blood-Peripheral  12-15 @ 23:09   No growth at 5 days.  --  --      .Urine Clean Catch (Midstream)  12-13 @ 23:18   No growth  --  --      .Blood Blood-Peripheral  12-13 @ 17:41   No growth at 5 days.  --  --      .Blood Blood-Peripheral  12-13 @ 17:40   No growth at 5 days.  --  --            RADIOLOGY:      ROS:  [  ] UNABLE TO ELICIT 52y Female who has remained in the hospital but asked to reeval pt as she spiked to 102.4 this am and her wbc cont increased further also, she says she is coughing a little and has some sputum production, she has some diarrhea because she got kayexalate. Her shiley which IR put in is not working apparently from a positioning problem.    Meds:    Allergies    No Known Allergies    Intolerances        VITALS:  Vital Signs Last 24 Hrs  T(C): 39.1 (09 Jan 2018 05:18), Max: 39.1 (09 Jan 2018 05:18)  T(F): 102.4 (09 Jan 2018 05:18), Max: 102.4 (09 Jan 2018 05:18)  HR: 107 (09 Jan 2018 05:18) (81 - 107)  BP: 115/47 (09 Jan 2018 05:18) (115/47 - 154/61)  BP(mean): --  RR: 16 (09 Jan 2018 05:18) (16 - 17)  SpO2: 95% (09 Jan 2018 05:18) (95% - 100%)    LABS/DIAGNOSTIC TESTS:                          9.8    19.3  )-----------( 316      ( 09 Jan 2018 08:42 )             33.2         01-09    139  |  104  |  63<H>  ----------------------------<  73  6.3<HH>   |  23  |  8.37<H>    Ca    8.9      09 Jan 2018 08:42            CULTURES: .Blood Blood-Peripheral  01-06 @ 21:57   No growth to date.  --  --      .Blood Blood  01-06 @ 00:17   No growth to date.  --  --      .Blood Blood  01-05 @ 22:47   No growth to date.  --  --      .Blood Blood-Peripheral  12-26 @ 22:38   No growth at 5 days.  --  --      .Urine Catheterized  12-26 @ 17:34   No growth  --  --      .Blood Blood-Peripheral  12-21 @ 18:39   No growth at 5 days.  --  --      .Sputum Sputum  12-18 @ 21:43   Normal Respiratory Liz present  --    Few Squamous epithelial cells per low power field  Few polymorphonuclear leukocytes per low power field  Rare Gram variable coccobacilli   per oil power field      .Blood rec'd 2 anaerobic bottles for this accession #  12-16 @ 00:53   No growth at 5 days.  --  --      .Blood Blood-Peripheral  12-15 @ 23:09   No growth at 5 days.  --  --      .Urine Clean Catch (Midstream)  12-13 @ 23:18   No growth  --  --      .Blood Blood-Peripheral  12-13 @ 17:41   No growth at 5 days.  --  --      .Blood Blood-Peripheral  12-13 @ 17:40   No growth at 5 days.  --  --            RADIOLOGY:      ROS:  [  ] UNABLE TO ELICIT

## 2018-01-09 NOTE — PROGRESS NOTE ADULT - SUBJECTIVE AND OBJECTIVE BOX
Patient is a 52y old  Female who presents with a chief complaint of SOB (13 Dec 2017 16:01)      INTERVAL HPI/OVERNIGHT EVENTS: No acute overnight event. Informed with the critical value of 6.3, discussed with Dr. Reena Magana, pt didn't complete HD yesterday as poor flow from Shiley Recommended to change it over guidewire . Spoke to IR, no doctor in house today to do Shiley. Informed vascular surgery , Medical attending and nephrologist. Will give kayexalate as per nephrologist recommendation       T(C): 39.1 (01-09-18 @ 05:18), Max: 39.1 (01-09-18 @ 05:18)  HR: 107 (01-09-18 @ 05:18) (81 - 107)  BP: 115/47 (01-09-18 @ 05:18) (115/47 - 154/61)  RR: 16 (01-09-18 @ 05:18) (16 - 17)  SpO2: 95% (01-09-18 @ 05:18) (95% - 100%          MEDICATIONS  (STANDING):  ascorbic acid 500 milliGRAM(s) Oral daily  cinacalcet 30 milliGRAM(s) Oral daily  dextrose 5%. 1000 milliLiter(s) (50 mL/Hr) IV Continuous <Continuous>  dextrose 50% Injectable 12.5 Gram(s) IV Push once  dextrose 50% Injectable 25 Gram(s) IV Push once  dextrose 50% Injectable 25 Gram(s) IV Push once  epoetin jacqueline Injectable 6000 Unit(s) IV Push <User Schedule>  ferrous    sulfate Liquid 300 milliGRAM(s) Enteral Tube daily  folic acid 1 milliGRAM(s) Oral daily  heparin  Injectable 5000 Unit(s) SubCutaneous every 12 hours  insulin lispro (HumaLOG) corrective regimen sliding scale   SubCutaneous every 6 hours  levothyroxine 50 MICROGram(s) Oral daily  metoprolol     tartrate 25 milliGRAM(s) Oral two times a day  pantoprazole  Injectable 40 milliGRAM(s) IV Push daily  PPD  5 Tuberculin Unit(s) Injectable 5 Unit(s) IntraDermal once  pregabalin 75 milliGRAM(s) Oral two times a day  sevelamer hydrochloride 1600 milliGRAM(s) Oral three times a day with meals  simvastatin 20 milliGRAM(s) Oral at bedtime  sodium polystyrene sulfonate Suspension 15 Gram(s) Oral once    MEDICATIONS  (PRN):  acetaminophen   Tablet 650 milliGRAM(s) Oral every 6 hours PRN For Temp greater than 38 C (100.4 F)  acetaminophen  Suppository 650 milliGRAM(s) Rectal every 6 hours PRN For Temp greater than 38 C (100.4 F)  dextrose Gel 1 Dose(s) Oral once PRN Blood Glucose LESS THAN 70 milliGRAM(s)/deciliter  glucagon  Injectable 1 milliGRAM(s) IntraMuscular once PRN Glucose LESS THAN 70 milligrams/deciliter  morphine  - Injectable 1 milliGRAM(s) IV Push every 4 hours PRN Severe Pain (7 - 10)      PHYSICAL EXAM:  GENERAL: NAD  NERVOUS SYSTEM:  Alert & Oriented X2-3, No focal neurological deficit   CHEST/LUNG: Clear to percussion bilaterally; No rales, rhonchi, wheezing, or rubs  HEART: Regular rate and rhythm; No murmurs, rubs, or gallops  ABDOMEN: Soft, Nontender, Nondistended; Bowel sounds present  EXTREMITIES:  2+ Peripheral Pulses, No clubbing, cyanosis, or edema    LABS:                        9.8    19.3  )-----------( 316      ( 09 Jan 2018 08:42 )             33.2     01-09    139  |  104  |  63<H>  ----------------------------<  73  6.3<HH>   |  23  |  8.37<H>    Ca    8.9      09 Jan 2018 08:42          CAPILLARY BLOOD GLUCOSE      POCT Blood Glucose.: 86 mg/dL (09 Jan 2018 05:59)  POCT Blood Glucose.: 95 mg/dL (09 Jan 2018 00:16)  POCT Blood Glucose.: 89 mg/dL (08 Jan 2018 19:00)  POCT Blood Glucose.: 102 mg/dL (08 Jan 2018 12:22)  POCT Blood Glucose.: 98 mg/dL (08 Jan 2018 11:49) Patient is a 52y old  Female who presents with a chief complaint of SOB (13 Dec 2017 16:01)      INTERVAL HPI/OVERNIGHT EVENTS: No acute overnight event. Informed with the critical value of 6.3, discussed with Dr. Reena Magana, pt didn't complete HD yesterday as poor flow from Shiley Recommended to change it over guidewire . Spoke to IR, no doctor in house today to do Shiley. Informed vascular surgery , Medical attending and nephrologist. Will give kayexalate as per nephrologist recommendation . Dr. Tobar was informed       T(C): 39.1 (01-09-18 @ 05:18), Max: 39.1 (01-09-18 @ 05:18)  HR: 107 (01-09-18 @ 05:18) (81 - 107)  BP: 115/47 (01-09-18 @ 05:18) (115/47 - 154/61)  RR: 16 (01-09-18 @ 05:18) (16 - 17)  SpO2: 95% (01-09-18 @ 05:18) (95% - 100%          MEDICATIONS  (STANDING):  ascorbic acid 500 milliGRAM(s) Oral daily  cinacalcet 30 milliGRAM(s) Oral daily  dextrose 5%. 1000 milliLiter(s) (50 mL/Hr) IV Continuous <Continuous>  dextrose 50% Injectable 12.5 Gram(s) IV Push once  dextrose 50% Injectable 25 Gram(s) IV Push once  dextrose 50% Injectable 25 Gram(s) IV Push once  epoetin jacqueline Injectable 6000 Unit(s) IV Push <User Schedule>  ferrous    sulfate Liquid 300 milliGRAM(s) Enteral Tube daily  folic acid 1 milliGRAM(s) Oral daily  heparin  Injectable 5000 Unit(s) SubCutaneous every 12 hours  insulin lispro (HumaLOG) corrective regimen sliding scale   SubCutaneous every 6 hours  levothyroxine 50 MICROGram(s) Oral daily  metoprolol     tartrate 25 milliGRAM(s) Oral two times a day  pantoprazole  Injectable 40 milliGRAM(s) IV Push daily  PPD  5 Tuberculin Unit(s) Injectable 5 Unit(s) IntraDermal once  pregabalin 75 milliGRAM(s) Oral two times a day  sevelamer hydrochloride 1600 milliGRAM(s) Oral three times a day with meals  simvastatin 20 milliGRAM(s) Oral at bedtime  sodium polystyrene sulfonate Suspension 15 Gram(s) Oral once    MEDICATIONS  (PRN):  acetaminophen   Tablet 650 milliGRAM(s) Oral every 6 hours PRN For Temp greater than 38 C (100.4 F)  acetaminophen  Suppository 650 milliGRAM(s) Rectal every 6 hours PRN For Temp greater than 38 C (100.4 F)  dextrose Gel 1 Dose(s) Oral once PRN Blood Glucose LESS THAN 70 milliGRAM(s)/deciliter  glucagon  Injectable 1 milliGRAM(s) IntraMuscular once PRN Glucose LESS THAN 70 milligrams/deciliter  morphine  - Injectable 1 milliGRAM(s) IV Push every 4 hours PRN Severe Pain (7 - 10)      PHYSICAL EXAM:  GENERAL: NAD  NERVOUS SYSTEM:  Alert & Oriented X2-3, No focal neurological deficit   CHEST/LUNG: Clear to percussion bilaterally; No rales, rhonchi, wheezing, or rubs  HEART: Regular rate and rhythm; No murmurs, rubs, or gallops  ABDOMEN: Soft, Nontender, Nondistended; Bowel sounds present  EXTREMITIES:  2+ Peripheral Pulses, No clubbing, cyanosis, or edema    LABS:                        9.8    19.3  )-----------( 316      ( 09 Jan 2018 08:42 )             33.2     01-09    139  |  104  |  63<H>  ----------------------------<  73  6.3<HH>   |  23  |  8.37<H>    Ca    8.9      09 Jan 2018 08:42          CAPILLARY BLOOD GLUCOSE      POCT Blood Glucose.: 86 mg/dL (09 Jan 2018 05:59)  POCT Blood Glucose.: 95 mg/dL (09 Jan 2018 00:16)  POCT Blood Glucose.: 89 mg/dL (08 Jan 2018 19:00)  POCT Blood Glucose.: 102 mg/dL (08 Jan 2018 12:22)  POCT Blood Glucose.: 98 mg/dL (08 Jan 2018 11:49)

## 2018-01-09 NOTE — PROGRESS NOTE ADULT - PROBLEM SELECTOR PLAN 2
-pt had new right side shiley on her neck was placed on Friday which Is not working   -Didn't complete HD yesterday  -Potassium of 6.3 today, no EKG changes   -Will give Kayexalate   -Awaiting new access placement   -Attending informed

## 2018-01-10 DIAGNOSIS — R50.9 FEVER, UNSPECIFIED: ICD-10-CM

## 2018-01-10 LAB
ANION GAP SERPL CALC-SCNC: 11 MMOL/L — SIGNIFICANT CHANGE UP (ref 5–17)
BUN SERPL-MCNC: 40 MG/DL — HIGH (ref 7–18)
CALCIUM SERPL-MCNC: 7.8 MG/DL — LOW (ref 8.4–10.5)
CHLORIDE SERPL-SCNC: 103 MMOL/L — SIGNIFICANT CHANGE UP (ref 96–108)
CO2 SERPL-SCNC: 25 MMOL/L — SIGNIFICANT CHANGE UP (ref 22–31)
CREAT SERPL-MCNC: 5.71 MG/DL — HIGH (ref 0.5–1.3)
CULTURE RESULTS: SIGNIFICANT CHANGE UP
GLUCOSE BLDC GLUCOMTR-MCNC: 107 MG/DL — HIGH (ref 70–99)
GLUCOSE BLDC GLUCOMTR-MCNC: 126 MG/DL — HIGH (ref 70–99)
GLUCOSE BLDC GLUCOMTR-MCNC: 88 MG/DL — SIGNIFICANT CHANGE UP (ref 70–99)
GLUCOSE SERPL-MCNC: 110 MG/DL — HIGH (ref 70–99)
HCT VFR BLD CALC: 25.4 % — LOW (ref 34.5–45)
HGB BLD-MCNC: 7.9 G/DL — LOW (ref 11.5–15.5)
MCHC RBC-ENTMCNC: 29.5 PG — SIGNIFICANT CHANGE UP (ref 27–34)
MCHC RBC-ENTMCNC: 30.9 GM/DL — LOW (ref 32–36)
MCV RBC AUTO: 95.6 FL — SIGNIFICANT CHANGE UP (ref 80–100)
PLATELET # BLD AUTO: 220 K/UL — SIGNIFICANT CHANGE UP (ref 150–400)
POTASSIUM SERPL-MCNC: 4.3 MMOL/L — SIGNIFICANT CHANGE UP (ref 3.5–5.3)
POTASSIUM SERPL-SCNC: 4.3 MMOL/L — SIGNIFICANT CHANGE UP (ref 3.5–5.3)
RBC # BLD: 2.66 M/UL — LOW (ref 3.8–5.2)
RBC # FLD: 16.4 % — HIGH (ref 10.3–14.5)
SODIUM SERPL-SCNC: 139 MMOL/L — SIGNIFICANT CHANGE UP (ref 135–145)
SPECIMEN SOURCE: SIGNIFICANT CHANGE UP
WBC # BLD: 18.7 K/UL — HIGH (ref 3.8–10.5)
WBC # FLD AUTO: 18.7 K/UL — HIGH (ref 3.8–10.5)

## 2018-01-10 RX ORDER — PANTOPRAZOLE SODIUM 20 MG/1
40 TABLET, DELAYED RELEASE ORAL
Qty: 0 | Refills: 0 | Status: DISCONTINUED | OUTPATIENT
Start: 2018-01-10 | End: 2018-01-22

## 2018-01-10 RX ORDER — VANCOMYCIN HCL 1 G
1000 VIAL (EA) INTRAVENOUS ONCE
Qty: 0 | Refills: 0 | Status: COMPLETED | OUTPATIENT
Start: 2018-01-10 | End: 2018-01-10

## 2018-01-10 RX ORDER — ERYTHROPOIETIN 10000 [IU]/ML
14 INJECTION, SOLUTION INTRAVENOUS; SUBCUTANEOUS
Qty: 0 | Refills: 0 | Status: DISCONTINUED | OUTPATIENT
Start: 2018-01-10 | End: 2018-01-11

## 2018-01-10 RX ADMIN — HEPARIN SODIUM 5000 UNIT(S): 5000 INJECTION INTRAVENOUS; SUBCUTANEOUS at 06:18

## 2018-01-10 RX ADMIN — Medication 50 MICROGRAM(S): at 06:18

## 2018-01-10 RX ADMIN — Medication 75 MILLIGRAM(S): at 06:18

## 2018-01-10 RX ADMIN — SEVELAMER CARBONATE 1600 MILLIGRAM(S): 2400 POWDER, FOR SUSPENSION ORAL at 08:15

## 2018-01-10 RX ADMIN — Medication 300 MILLIGRAM(S): at 11:31

## 2018-01-10 RX ADMIN — SEVELAMER CARBONATE 1600 MILLIGRAM(S): 2400 POWDER, FOR SUSPENSION ORAL at 17:15

## 2018-01-10 RX ADMIN — Medication 25 MILLIGRAM(S): at 06:18

## 2018-01-10 RX ADMIN — MEROPENEM 100 MILLIGRAM(S): 1 INJECTION INTRAVENOUS at 11:32

## 2018-01-10 RX ADMIN — SEVELAMER CARBONATE 1600 MILLIGRAM(S): 2400 POWDER, FOR SUSPENSION ORAL at 11:32

## 2018-01-10 RX ADMIN — Medication 75 MILLIGRAM(S): at 17:15

## 2018-01-10 RX ADMIN — SIMVASTATIN 20 MILLIGRAM(S): 20 TABLET, FILM COATED ORAL at 21:42

## 2018-01-10 RX ADMIN — HEPARIN SODIUM 5000 UNIT(S): 5000 INJECTION INTRAVENOUS; SUBCUTANEOUS at 17:15

## 2018-01-10 RX ADMIN — Medication 500 MILLIGRAM(S): at 11:31

## 2018-01-10 RX ADMIN — Medication 250 MILLIGRAM(S): at 17:15

## 2018-01-10 RX ADMIN — Medication 25 MILLIGRAM(S): at 17:15

## 2018-01-10 RX ADMIN — Medication 1 MILLIGRAM(S): at 11:31

## 2018-01-10 RX ADMIN — Medication 650 MILLIGRAM(S): at 14:57

## 2018-01-10 RX ADMIN — CINACALCET 30 MILLIGRAM(S): 30 TABLET, FILM COATED ORAL at 11:31

## 2018-01-10 NOTE — PROGRESS NOTE ADULT - SUBJECTIVE AND OBJECTIVE BOX
Pt seen and examined at bedside  Pt still spiking fevers. Denies SOB or chest pain.     Allergies:  No Known Allergies    Hospital Medications:   MEDICATIONS  (STANDING):  ascorbic acid 500 milliGRAM(s) Oral daily  cinacalcet 30 milliGRAM(s) Oral daily  dextrose 5%. 1000 milliLiter(s) (50 mL/Hr) IV Continuous <Continuous>  dextrose 50% Injectable 12.5 Gram(s) IV Push once  dextrose 50% Injectable 25 Gram(s) IV Push once  dextrose 50% Injectable 25 Gram(s) IV Push once  epoetin jacqueline Injectable 6000 Unit(s) IV Push <User Schedule>  ferrous    sulfate Liquid 300 milliGRAM(s) Enteral Tube daily  folic acid 1 milliGRAM(s) Oral daily  heparin  Injectable 5000 Unit(s) SubCutaneous every 12 hours  insulin lispro (HumaLOG) corrective regimen sliding scale   SubCutaneous every 6 hours  levothyroxine 50 MICROGram(s) Oral daily  meropenem IVPB 1000 milliGRAM(s) IV Intermittent every 24 hours  meropenem IVPB      metoprolol     tartrate 25 milliGRAM(s) Oral two times a day  pantoprazole    Tablet 40 milliGRAM(s) Oral before breakfast  PPD  5 Tuberculin Unit(s) Injectable 5 Unit(s) IntraDermal once  pregabalin 75 milliGRAM(s) Oral two times a day  sevelamer hydrochloride 1600 milliGRAM(s) Oral three times a day with meals  simvastatin 20 milliGRAM(s) Oral at bedtime  vancomycin  IVPB 1000 milliGRAM(s) IV Intermittent once    VITALS:  T(F): 101.7 (01-10-18 @ 14:47), Max: 101.7 (01-10-18 @ 14:47)  HR: 94 (01-10-18 @ 14:47)  BP: 139/49 (01-10-18 @ 14:47)  RR: 18 (01-10-18 @ 14:47)  SpO2: 100% (01-10-18 @ 14:47)  Wt(kg): --    PHYSICAL EXAM:  Constitutional: NAD  HEENT: anicteric sclera, oropharynx clear, MMM  Neck: No JVD  Respiratory: CTAB, no wheezes, rales or rhonchi  Cardiovascular: S1, S2, RRR  Gastrointestinal: BS+, soft, NT/ND  Extremities: No cyanosis or clubbing. No peripheral edema  Neurological: A/O x 2, no focal deficits  : No CVA tenderness. No jarrell.   Skin: No rashes  Vascular Access: CHRISTIE Suarez     LABS:  01-10    139  |  103  |  40<H>  ----------------------------<  110<H>  4.3   |  25  |  5.71<H>    Ca    7.8<L>      10 Rebel 2018 12:46      Creatinine Trend: 5.71 <--, 8.73 <--, 8.37 <--, 8.16 <--, 4.50 <--, 7.66 <--, 6.54 <--                        7.9    18.7  )-----------( 220      ( 10 Rebel 2018 12:46 )             25.4     Urine Studies:      RADIOLOGY & ADDITIONAL STUDIES:

## 2018-01-10 NOTE — PROGRESS NOTE ADULT - PROBLEM SELECTOR PLAN 2
-HD yesterday after repositioning of shiley   -awaiting repeat blood culture , will need permcath   -HD as per nephro

## 2018-01-10 NOTE — PROGRESS NOTE ADULT - ATTENDING COMMENTS
Patient is seen and examined. Case reviewed with the medical team. Above note is appreciated. Will follow up clinically. Continue DVT prophylaxis. Case discussed with nephrology and ID. patient with another episode of fever today at 5. Vancomycin was given one dose. Will follow up with cultures and as per ID. Continue HD. Over all prognosis is very poor despite any medical treatment and the family is fully aware.

## 2018-01-10 NOTE — PROGRESS NOTE ADULT - ASSESSMENT
52 yr old female with  ESRD.     ESRD:  HD yesterday[facility] to finish [facility].   Recommend exhange current access over guidewire. Can consider taking out HD catheter, after HD tomorrow, if catheter is suspected source of infection.    Also pt requires out HD unit placement. Recommend sending paperwork to [facility], [facility] HD unit.     Secondary hyperparathyroidism   Can cont sensipar. iPTH and Ca level acceptable.      Anemia   Hg below goal, can increase epo to [id number] units tiw     Hyperphosphatemia  Cont renagel 1600mg TID with meals.   High phos 2/2 CKD.

## 2018-01-10 NOTE — PROGRESS NOTE ADULT - SUBJECTIVE AND OBJECTIVE BOX
INTERVAL HPI/OVERNIGHT EVENTS:    Pt seen and examined at bedside. Offers no acute complaints at this time. States had HD yesterday, no complications.     Vital Signs Last 24 Hrs  T(C): 37.2 (10 Rebel 2018 04:56), Max: 37.2 (09 Jan 2018 15:00)  T(F): 98.9 (10 Rebel 2018 04:56), Max: 99 (09 Jan 2018 15:00)  HR: 95 (10 Rebel 2018 04:56) (90 - 193)  BP: 154/68 (10 Rebel 2018 04:56) (136/60 - 163/67)  BP(mean): --  RR: 18 (10 Rebel 2018 04:56) (16 - 18)  SpO2: 100% (10 Rebel 2018 04:56) (87% - 100%)  I&O's Detail    cinacalcet 30 milliGRAM(s) Oral daily  meropenem IVPB 1000 milliGRAM(s) IV Intermittent every 24 hours  meropenem IVPB      pantoprazole  Injectable 40 milliGRAM(s) IV Push daily  sevelamer hydrochloride 1600 milliGRAM(s) Oral three times a day with meals      Physical Exam  General: AAOx3, No acute distress  Chest: rt IJ in place, dressing c/d/i, no TTP, no erythema   Extremities: non edematous, no calf pain bilaterally      Labs:                        9.8    19.3  )-----------( 316      ( 09 Jan 2018 08:42 )             33.2     01-09    140  |  104  |  63<H>  ----------------------------<  115<H>  5.9<H>   |  22  |  8.73<H>    Ca    8.8      09 Jan 2018 14:21

## 2018-01-10 NOTE — PROGRESS NOTE ADULT - SUBJECTIVE AND OBJECTIVE BOX
Patient is a 52y old  Female who presents with a chief complaint of SOB (13 Dec 2017 16:01)      INTERVAL HPI/OVERNIGHT EVENTS: Pt had HD yesterday after readjustment of catheter, no fever overnight , CXR negative       T(C): 37.2 (01-10-18 @ 04:56), Max: 37.2 (01-09-18 @ 15:00)  HR: 95 (01-10-18 @ 04:56) (90 - 193)  BP: 154/68 (01-10-18 @ 04:56) (136/60 - 163/67)  RR: 18 (01-10-18 @ 04:56) (16 - 18)  SpO2: 100% (01-10-18 @ 04:56) (87% - 100%)  Wt(kg): --  I&O's Summary          MEDICATIONS  (STANDING):  ascorbic acid 500 milliGRAM(s) Oral daily  cinacalcet 30 milliGRAM(s) Oral daily  dextrose 5%. 1000 milliLiter(s) (50 mL/Hr) IV Continuous <Continuous>  dextrose 50% Injectable 12.5 Gram(s) IV Push once  dextrose 50% Injectable 25 Gram(s) IV Push once  dextrose 50% Injectable 25 Gram(s) IV Push once  epoetin jacqueline Injectable 6000 Unit(s) IV Push <User Schedule>  ferrous    sulfate Liquid 300 milliGRAM(s) Enteral Tube daily  folic acid 1 milliGRAM(s) Oral daily  heparin  Injectable 5000 Unit(s) SubCutaneous every 12 hours  insulin lispro (HumaLOG) corrective regimen sliding scale   SubCutaneous every 6 hours  levothyroxine 50 MICROGram(s) Oral daily  meropenem IVPB 1000 milliGRAM(s) IV Intermittent every 24 hours  meropenem IVPB      metoprolol     tartrate 25 milliGRAM(s) Oral two times a day  pantoprazole    Tablet 40 milliGRAM(s) Oral before breakfast  PPD  5 Tuberculin Unit(s) Injectable 5 Unit(s) IntraDermal once  pregabalin 75 milliGRAM(s) Oral two times a day  sevelamer hydrochloride 1600 milliGRAM(s) Oral three times a day with meals  simvastatin 20 milliGRAM(s) Oral at bedtime    MEDICATIONS  (PRN):  acetaminophen   Tablet 650 milliGRAM(s) Oral every 6 hours PRN For Temp greater than 38 C (100.4 F)  dextrose Gel 1 Dose(s) Oral once PRN Blood Glucose LESS THAN 70 milliGRAM(s)/deciliter  glucagon  Injectable 1 milliGRAM(s) IntraMuscular once PRN Glucose LESS THAN 70 milligrams/deciliter  morphine  - Injectable 1 milliGRAM(s) IV Push every 4 hours PRN Severe Pain (7 - 10)      PHYSICAL EXAM:  GENERAL: NAD  NECK: Supple, No JVD, Normal thyroid  NERVOUS SYSTEM:  Alert & Oriented X2-3, no focal neurological deficit   CHEST/LUNG: Clear to percussion bilaterally; No rales, rhonchi, wheezing, or rubs  HEART: Regular rate and rhythm; No murmurs, rubs, or gallops  ABDOMEN: Soft, Nontender, Nondistended; Bowel sounds present  EXTREMITIES:  2+ Peripheral Pulses, No clubbing, cyanosis, or edema  LABS:                        9.8    19.3  )-----------( 316      ( 09 Jan 2018 08:42 )             33.2     01-09    140  |  104  |  63<H>  ----------------------------<  115<H>  5.9<H>   |  22  |  8.73<H>    Ca    8.8      09 Jan 2018 14:21          CAPILLARY BLOOD GLUCOSE      POCT Blood Glucose.: 88 mg/dL (10 Rebel 2018 06:38)  POCT Blood Glucose.: 107 mg/dL (09 Jan 2018 23:56)  POCT Blood Glucose.: 136 mg/dL (09 Jan 2018 17:47)  POCT Blood Glucose.: 92 mg/dL (09 Jan 2018 11:03)

## 2018-01-10 NOTE — PROGRESS NOTE ADULT - SUBJECTIVE AND OBJECTIVE BOX
Patient is a 52y old  Female who presents with a chief complaint of SOB (13 Dec 2017 16:01)  awake, alert, comfortable in NAD. Tolerating po food. HD done yesterday after masonley cath was repositioned. Had elevated temp requiring repeat BC and restart of antibiotics.    INTERVAL HPI/OVERNIGHT EVENTS:      VITAL SIGNS:  T(F): 98.9 (01-10-18 @ 04:56)  HR: 95 (01-10-18 @ 04:56)  BP: 154/68 (01-10-18 @ 04:56)  RR: 18 (01-10-18 @ 04:56)  SpO2: 100% (01-10-18 @ 04:56)  Wt(kg): --  I&O's Detail          REVIEW OF SYSTEMS:    CONSTITUTIONAL:  No fevers, chills, sweats    HEENT:  Eyes:  No diplopia or blurred vision. ENT:  No earache, sore throat or runny nose.    CARDIOVASCULAR:  No pressure, squeezing, tightness, or heaviness about the chest; no palpitations.    RESPIRATORY:  Per HPI    GASTROINTESTINAL:  No abdominal pain, nausea, vomiting or diarrhea.    GENITOURINARY:  No dysuria, frequency or urgency.    NEUROLOGIC:  No paresthesias, fasciculations, seizures or weakness.    PSYCHIATRIC:  No disorder of thought or mood.      PHYSICAL EXAM:    Constitutional: Well developed and nourished  Eyes:Perrla  ENMT: normal  Neck:supple  Respiratory: good air entry  Cardiovascular: S1 S2 regular  Gastrointestinal: Soft, Non tender  Extremities: No edema  Vascular:normal  Neurological:Awake, alert,Ox3  Musculoskeletal:Normal      MEDICATIONS  (STANDING):  ascorbic acid 500 milliGRAM(s) Oral daily  cinacalcet 30 milliGRAM(s) Oral daily  dextrose 5%. 1000 milliLiter(s) (50 mL/Hr) IV Continuous <Continuous>  dextrose 50% Injectable 12.5 Gram(s) IV Push once  dextrose 50% Injectable 25 Gram(s) IV Push once  dextrose 50% Injectable 25 Gram(s) IV Push once  epoetin jacqueline Injectable 6000 Unit(s) IV Push <User Schedule>  ferrous    sulfate Liquid 300 milliGRAM(s) Enteral Tube daily  folic acid 1 milliGRAM(s) Oral daily  heparin  Injectable 5000 Unit(s) SubCutaneous every 12 hours  insulin lispro (HumaLOG) corrective regimen sliding scale   SubCutaneous every 6 hours  levothyroxine 50 MICROGram(s) Oral daily  meropenem IVPB 1000 milliGRAM(s) IV Intermittent every 24 hours  meropenem IVPB      metoprolol     tartrate 25 milliGRAM(s) Oral two times a day  pantoprazole    Tablet 40 milliGRAM(s) Oral before breakfast  PPD  5 Tuberculin Unit(s) Injectable 5 Unit(s) IntraDermal once  pregabalin 75 milliGRAM(s) Oral two times a day  sevelamer hydrochloride 1600 milliGRAM(s) Oral three times a day with meals  simvastatin 20 milliGRAM(s) Oral at bedtime    MEDICATIONS  (PRN):  acetaminophen   Tablet 650 milliGRAM(s) Oral every 6 hours PRN For Temp greater than 38 C (100.4 F)  dextrose Gel 1 Dose(s) Oral once PRN Blood Glucose LESS THAN 70 milliGRAM(s)/deciliter  glucagon  Injectable 1 milliGRAM(s) IntraMuscular once PRN Glucose LESS THAN 70 milligrams/deciliter  morphine  - Injectable 1 milliGRAM(s) IV Push every 4 hours PRN Severe Pain (7 - 10)      Allergies    No Known Allergies    Intolerances        LABS:                        9.8    19.3  )-----------( 316      ( 09 Jan 2018 08:42 )             33.2     01-09    140  |  104  |  63<H>  ----------------------------<  115<H>  5.9<H>   |  22  |  8.73<H>    Ca    8.8      09 Jan 2018 14:21                CAPILLARY BLOOD GLUCOSE      POCT Blood Glucose.: 88 mg/dL (10 Rebel 2018 06:38)  POCT Blood Glucose.: 107 mg/dL (09 Jan 2018 23:56)  POCT Blood Glucose.: 136 mg/dL (09 Jan 2018 17:47)        RADIOLOGY & ADDITIONAL TESTS:    CXR:    Ct scan chest:    ekg;    echo:

## 2018-01-10 NOTE — CHART NOTE - NSCHARTNOTEFT_GEN_A_CORE
Patient again had fever spike of 101.7 , will give one dose of vancomycin 1 gm . Blood cultures were sent yesterday

## 2018-01-10 NOTE — PROGRESS NOTE ADULT - ASSESSMENT
51 y/o female w/ ESRD on HD via rt IJ john (placed 1/5 by IR)      -Attempt HD via rt IJ shiflip   -If malfunctioning IR to exchange vs PC placement   -Will follow   -Care as per medical team

## 2018-01-10 NOTE — PROGRESS NOTE ADULT - PROBLEM SELECTOR PLAN 1
was in acute respiratory failure secondary to HAP (elevated procalcitonin) and fluid overload likey due to ESRD, S/P intubation on 12/16,  Extubated on 12/27  -completed ABx course, was on levaquin and maxipime for 14 days.  -Repeated blood culture and urine culture no growth to date.  -As patient spiked fever of 102 , restarted on meropenem , blood culture sent

## 2018-01-11 LAB
ANION GAP SERPL CALC-SCNC: 9 MMOL/L — SIGNIFICANT CHANGE UP (ref 5–17)
BUN SERPL-MCNC: 52 MG/DL — HIGH (ref 7–18)
CALCIUM SERPL-MCNC: 8.2 MG/DL — LOW (ref 8.4–10.5)
CHLORIDE SERPL-SCNC: 103 MMOL/L — SIGNIFICANT CHANGE UP (ref 96–108)
CO2 SERPL-SCNC: 26 MMOL/L — SIGNIFICANT CHANGE UP (ref 22–31)
CREAT SERPL-MCNC: 7.59 MG/DL — HIGH (ref 0.5–1.3)
CULTURE RESULTS: SIGNIFICANT CHANGE UP
GLUCOSE BLDC GLUCOMTR-MCNC: 125 MG/DL — HIGH (ref 70–99)
GLUCOSE BLDC GLUCOMTR-MCNC: 133 MG/DL — HIGH (ref 70–99)
GLUCOSE BLDC GLUCOMTR-MCNC: 138 MG/DL — HIGH (ref 70–99)
GLUCOSE BLDC GLUCOMTR-MCNC: 169 MG/DL — HIGH (ref 70–99)
GLUCOSE SERPL-MCNC: 131 MG/DL — HIGH (ref 70–99)
HCT VFR BLD CALC: 25.1 % — LOW (ref 34.5–45)
HGB BLD-MCNC: 7.8 G/DL — LOW (ref 11.5–15.5)
MCHC RBC-ENTMCNC: 29.7 PG — SIGNIFICANT CHANGE UP (ref 27–34)
MCHC RBC-ENTMCNC: 31.3 GM/DL — LOW (ref 32–36)
MCV RBC AUTO: 95 FL — SIGNIFICANT CHANGE UP (ref 80–100)
PHOSPHATE SERPL-MCNC: 6.7 MG/DL — HIGH (ref 2.5–4.5)
PLATELET # BLD AUTO: 242 K/UL — SIGNIFICANT CHANGE UP (ref 150–400)
POTASSIUM SERPL-MCNC: 4.4 MMOL/L — SIGNIFICANT CHANGE UP (ref 3.5–5.3)
POTASSIUM SERPL-SCNC: 4.4 MMOL/L — SIGNIFICANT CHANGE UP (ref 3.5–5.3)
RBC # BLD: 2.64 M/UL — LOW (ref 3.8–5.2)
RBC # FLD: 16.3 % — HIGH (ref 10.3–14.5)
SODIUM SERPL-SCNC: 138 MMOL/L — SIGNIFICANT CHANGE UP (ref 135–145)
SPECIMEN SOURCE: SIGNIFICANT CHANGE UP
WBC # BLD: 16.4 K/UL — HIGH (ref 3.8–10.5)
WBC # FLD AUTO: 16.4 K/UL — HIGH (ref 3.8–10.5)

## 2018-01-11 RX ORDER — ERYTHROPOIETIN 10000 [IU]/ML
14000 INJECTION, SOLUTION INTRAVENOUS; SUBCUTANEOUS
Qty: 0 | Refills: 0 | Status: DISCONTINUED | OUTPATIENT
Start: 2018-01-11 | End: 2018-01-19

## 2018-01-11 RX ORDER — OXYCODONE AND ACETAMINOPHEN 5; 325 MG/1; MG/1
1 TABLET ORAL ONCE
Qty: 0 | Refills: 0 | Status: DISCONTINUED | OUTPATIENT
Start: 2018-01-11 | End: 2018-01-11

## 2018-01-11 RX ADMIN — Medication 1 MILLIGRAM(S): at 11:54

## 2018-01-11 RX ADMIN — Medication 25 MILLIGRAM(S): at 17:24

## 2018-01-11 RX ADMIN — SIMVASTATIN 20 MILLIGRAM(S): 20 TABLET, FILM COATED ORAL at 21:41

## 2018-01-11 RX ADMIN — PANTOPRAZOLE SODIUM 40 MILLIGRAM(S): 20 TABLET, DELAYED RELEASE ORAL at 05:15

## 2018-01-11 RX ADMIN — CINACALCET 30 MILLIGRAM(S): 30 TABLET, FILM COATED ORAL at 11:54

## 2018-01-11 RX ADMIN — MORPHINE SULFATE 1 MILLIGRAM(S): 50 CAPSULE, EXTENDED RELEASE ORAL at 13:47

## 2018-01-11 RX ADMIN — SEVELAMER CARBONATE 1600 MILLIGRAM(S): 2400 POWDER, FOR SUSPENSION ORAL at 08:24

## 2018-01-11 RX ADMIN — Medication 50 MICROGRAM(S): at 05:15

## 2018-01-11 RX ADMIN — Medication 300 MILLIGRAM(S): at 11:54

## 2018-01-11 RX ADMIN — MORPHINE SULFATE 1 MILLIGRAM(S): 50 CAPSULE, EXTENDED RELEASE ORAL at 14:13

## 2018-01-11 RX ADMIN — HEPARIN SODIUM 5000 UNIT(S): 5000 INJECTION INTRAVENOUS; SUBCUTANEOUS at 17:23

## 2018-01-11 RX ADMIN — SEVELAMER CARBONATE 1600 MILLIGRAM(S): 2400 POWDER, FOR SUSPENSION ORAL at 17:23

## 2018-01-11 RX ADMIN — Medication 1: at 00:35

## 2018-01-11 RX ADMIN — Medication 500 MILLIGRAM(S): at 11:54

## 2018-01-11 RX ADMIN — OXYCODONE AND ACETAMINOPHEN 1 TABLET(S): 5; 325 TABLET ORAL at 20:05

## 2018-01-11 RX ADMIN — OXYCODONE AND ACETAMINOPHEN 1 TABLET(S): 5; 325 TABLET ORAL at 21:41

## 2018-01-11 RX ADMIN — OXYCODONE AND ACETAMINOPHEN 1 TABLET(S): 5; 325 TABLET ORAL at 20:37

## 2018-01-11 RX ADMIN — HEPARIN SODIUM 5000 UNIT(S): 5000 INJECTION INTRAVENOUS; SUBCUTANEOUS at 05:15

## 2018-01-11 RX ADMIN — MEROPENEM 100 MILLIGRAM(S): 1 INJECTION INTRAVENOUS at 11:54

## 2018-01-11 RX ADMIN — OXYCODONE AND ACETAMINOPHEN 1 TABLET(S): 5; 325 TABLET ORAL at 22:18

## 2018-01-11 RX ADMIN — Medication 25 MILLIGRAM(S): at 05:15

## 2018-01-11 RX ADMIN — Medication 650 MILLIGRAM(S): at 05:15

## 2018-01-11 RX ADMIN — Medication 75 MILLIGRAM(S): at 05:15

## 2018-01-11 NOTE — PROGRESS NOTE ADULT - ATTENDING COMMENTS
Patient is seen and examined. Case reviewed with the medical team. Above note is appreciated. Will follow up clinically. Continue DVT prophylaxis.

## 2018-01-11 NOTE — PROGRESS NOTE ADULT - ASSESSMENT
52 yr old female with  ESRD.     ESRD:  HD today, better BFR, 300 achieved.   Can consider taking out HD catheter, after HD tomorrow, if catheter is suspected source of infection.    Will need tunneled cath placement, once fever/infection resolves  Abx selection as per ID  Also pt requires out HD unit placement. Recommend sending paperwork to [facility], [facility] HD unit.   D/C morphine, in pt with ESRD. Can give oxycodone prn. Also lower lyrica to once daily dosing.     Secondary hyperparathyroidism   Can cont sensipar. iPTH and Ca level acceptable.      Anemia   Hg below goal, epo recently increased to 79097 units tiw     Hyperphosphatemia  Cont renagel 1600mg TID with meals.   High phos 2/2 CKD.

## 2018-01-11 NOTE — PROGRESS NOTE ADULT - SUBJECTIVE AND OBJECTIVE BOX
Patient is a 52y old  Female who presents with a chief complaint of SOB (13 Dec 2017 16:01)      INTERVAL HPI/OVERNIGHT EVENTS: continue to spike fever       T(C): 36.4 (01-11-18 @ 14:30), Max: 38.8 (01-11-18 @ 05:30)  HR: 76 (01-11-18 @ 14:30) (76 - 100)  BP: 109/50 (01-11-18 @ 14:30) (109/50 - 130/58)  RR: 17 (01-11-18 @ 14:30) (16 - 17)  SpO2: 100% (01-11-18 @ 14:30) (98% - 100%)  Wt(kg): --  I&O's Summary        MEDICATIONS  (STANDING):  ascorbic acid 500 milliGRAM(s) Oral daily  cinacalcet 30 milliGRAM(s) Oral daily  dextrose 5%. 1000 milliLiter(s) (50 mL/Hr) IV Continuous <Continuous>  dextrose 50% Injectable 12.5 Gram(s) IV Push once  dextrose 50% Injectable 25 Gram(s) IV Push once  dextrose 50% Injectable 25 Gram(s) IV Push once  epoetin jacqueline Injectable 82567 Unit(s) IV Push <User Schedule>  ferrous    sulfate Liquid 300 milliGRAM(s) Enteral Tube daily  folic acid 1 milliGRAM(s) Oral daily  heparin  Injectable 5000 Unit(s) SubCutaneous every 12 hours  insulin lispro (HumaLOG) corrective regimen sliding scale   SubCutaneous every 6 hours  levothyroxine 50 MICROGram(s) Oral daily  meropenem IVPB 1000 milliGRAM(s) IV Intermittent every 24 hours  meropenem IVPB      metoprolol     tartrate 25 milliGRAM(s) Oral two times a day  pantoprazole    Tablet 40 milliGRAM(s) Oral before breakfast  PPD  5 Tuberculin Unit(s) Injectable 5 Unit(s) IntraDermal once  pregabalin 75 milliGRAM(s) Oral two times a day  sevelamer hydrochloride 1600 milliGRAM(s) Oral three times a day with meals  simvastatin 20 milliGRAM(s) Oral at bedtime    MEDICATIONS  (PRN):  acetaminophen   Tablet 650 milliGRAM(s) Oral every 6 hours PRN For Temp greater than 38 C (100.4 F)  dextrose Gel 1 Dose(s) Oral once PRN Blood Glucose LESS THAN 70 milliGRAM(s)/deciliter  glucagon  Injectable 1 milliGRAM(s) IntraMuscular once PRN Glucose LESS THAN 70 milligrams/deciliter  morphine  - Injectable 1 milliGRAM(s) IV Push every 4 hours PRN Severe Pain (7 - 10)      PHYSICAL EXAM:  GENERAL: NAD  NECK: Supple, No JVD  NERVOUS SYSTEM:  Alert & Oriented X3, no focal neurological deficit   CHEST/LUNG: Clear to percussion bilaterally; No rales, rhonchi, wheezing, or rubs  HEART: Regular rate and rhythm; No murmurs, rubs, or gallops  ABDOMEN: Soft, Nontender, Nondistended; Bowel sounds present  EXTREMITIES:  2+ Peripheral Pulses, No clubbing, cyanosis, or edema  SKIN: non stageable sacral decubitus ulcer    LABS:                        7.8    16.4  )-----------( 242      ( 11 Jan 2018 12:38 )             25.1     01-11    138  |  103  |  52<H>  ----------------------------<  131<H>  4.4   |  26  |  7.59<H>    Ca    8.2<L>      11 Jan 2018 12:38  Phos  6.7     01-11          CAPILLARY BLOOD GLUCOSE      POCT Blood Glucose.: 133 mg/dL (11 Jan 2018 11:44)  POCT Blood Glucose.: 125 mg/dL (11 Jan 2018 06:28)  POCT Blood Glucose.: 169 mg/dL (11 Jan 2018 00:25)  POCT Blood Glucose.: 126 mg/dL (10 Rebel 2018 18:19)

## 2018-01-11 NOTE — PROGRESS NOTE ADULT - SUBJECTIVE AND OBJECTIVE BOX
Pt seen and examined during HD.   Pt tolerating HD well, able to achieve .   Pt remains febrile over today.       Allergies:  No Known Allergies    Hospital Medications:   MEDICATIONS  (STANDING):  ascorbic acid 500 milliGRAM(s) Oral daily  cinacalcet 30 milliGRAM(s) Oral daily  dextrose 5%. 1000 milliLiter(s) (50 mL/Hr) IV Continuous <Continuous>  dextrose 50% Injectable 12.5 Gram(s) IV Push once  dextrose 50% Injectable 25 Gram(s) IV Push once  dextrose 50% Injectable 25 Gram(s) IV Push once  epoetin jacqueline Injectable 13622 Unit(s) IV Push <User Schedule>  ferrous    sulfate Liquid 300 milliGRAM(s) Enteral Tube daily  folic acid 1 milliGRAM(s) Oral daily  heparin  Injectable 5000 Unit(s) SubCutaneous every 12 hours  insulin lispro (HumaLOG) corrective regimen sliding scale   SubCutaneous every 6 hours  levothyroxine 50 MICROGram(s) Oral daily  meropenem IVPB 1000 milliGRAM(s) IV Intermittent every 24 hours  meropenem IVPB      metoprolol     tartrate 25 milliGRAM(s) Oral two times a day  pantoprazole    Tablet 40 milliGRAM(s) Oral before breakfast  PPD  5 Tuberculin Unit(s) Injectable 5 Unit(s) IntraDermal once  pregabalin 75 milliGRAM(s) Oral two times a day  sevelamer hydrochloride 1600 milliGRAM(s) Oral three times a day with meals  simvastatin 20 milliGRAM(s) Oral at bedtime    VITALS:  T(F): 97.6 (01-11-18 @ 14:30), Max: 101.9 (01-11-18 @ 05:30)  HR: 76 (01-11-18 @ 14:30)  BP: 109/50 (01-11-18 @ 14:30)  RR: 17 (01-11-18 @ 14:30)  SpO2: 100% (01-11-18 @ 14:30)  Wt(kg): --      Weight (kg): 51.4 (01-11 @ 05:30)  PHYSICAL EXAM:  Constitutional: NAD  HEENT: anicteric sclera, oropharynx clear, MMM  Neck: No JVD  Respiratory: CTAB, no wheezes, rales or rhonchi  Cardiovascular: S1, S2, RRR  Gastrointestinal: BS+, soft, NT/ND  Extremities: No cyanosis or clubbing. No peripheral edema  Neurological: A/O x 3, no focal deficits  Psychiatric: Normal mood, normal affect  : No CVA tenderness. No jarrell.   Skin: No rashes  Vascular Access: CHRISTIE Suarez.     LABS:  01-11    138  |  103  |  52<H>  ----------------------------<  131<H>  4.4   |  26  |  7.59<H>    Ca    8.2<L>      11 Jan 2018 12:38  Phos  6.7     01-11      Creatinine Trend: 7.59 <--, 5.71 <--, 8.73 <--, 8.37 <--, 8.16 <--, 4.50 <--, 7.66 <--                        7.8    16.4  )-----------( 242      ( 11 Jan 2018 12:38 )             25.1     Urine Studies:      RADIOLOGY & ADDITIONAL STUDIES:

## 2018-01-11 NOTE — PROGRESS NOTE ADULT - PROBLEM SELECTOR PLAN 2
Fever spike 101.9 this morning   blood culture from 1/10 showed no growth uptodate   Nephrologist recommended to take out shiley after HD today and place a new one on saturday   Discussed with ID Dr. Kaplan , not convinced patient has infection from Shiley as blood culture showed no growth   Received 1 dose of vancomycin   Continue with meropenem   Follow up ID Fever spike 101.9 this morning   blood culture from 1/10 showed no growth uptodate   Nephrologist recommended to take out shiley after HD today and place a new one on saturday   Discussed with ID Dr. Kaplan , not convinced patient has infection from Shiley as blood culture showed no growth , will watch without taking th line.   Received 1 dose of vancomycin   Continue with meropenem   Follow up ID

## 2018-01-11 NOTE — PROGRESS NOTE ADULT - SUBJECTIVE AND OBJECTIVE BOX
Patient is a 52y old  Female who presents with a chief complaint of SOB (13 Dec 2017 16:01)  Awake, alert, comfortable in bed in NAD. No cough or sob.    INTERVAL HPI/OVERNIGHT EVENTS:      VITAL SIGNS:  T(F): 98 (01-11-18 @ 09:00)  HR: 100 (01-11-18 @ 05:30)  BP: 130/58 (01-11-18 @ 05:30)  RR: 17 (01-11-18 @ 05:30)  SpO2: 98% (01-11-18 @ 05:30)  Wt(kg): --  I&O's Detail          REVIEW OF SYSTEMS:    CONSTITUTIONAL:  No fevers, chills, sweats    HEENT:  Eyes:  No diplopia or blurred vision. ENT:  No earache, sore throat or runny nose.    CARDIOVASCULAR:  No pressure, squeezing, tightness, or heaviness about the chest; no palpitations.    RESPIRATORY:  Per HPI    GASTROINTESTINAL:  No abdominal pain, nausea, vomiting or diarrhea.    GENITOURINARY:  No dysuria, frequency or urgency.    NEUROLOGIC:  No paresthesias, fasciculations, seizures or weakness.    PSYCHIATRIC:  No disorder of thought or mood.      PHYSICAL EXAM:    Constitutional: Well developed and nourished  Eyes:Perrla  ENMT: normal  Neck:supple  Respiratory: good air entry  Cardiovascular: S1 S2 regular  Gastrointestinal: Soft, Non tender  Extremities: No edema  Vascular:normal  Neurological:Awake, alert,Ox3  Musculoskeletal:Normal      MEDICATIONS  (STANDING):  ascorbic acid 500 milliGRAM(s) Oral daily  cinacalcet 30 milliGRAM(s) Oral daily  dextrose 5%. 1000 milliLiter(s) (50 mL/Hr) IV Continuous <Continuous>  dextrose 50% Injectable 12.5 Gram(s) IV Push once  dextrose 50% Injectable 25 Gram(s) IV Push once  dextrose 50% Injectable 25 Gram(s) IV Push once  epoetin jacqueline Injectable 14 Unit(s) IV Push <User Schedule>  ferrous    sulfate Liquid 300 milliGRAM(s) Enteral Tube daily  folic acid 1 milliGRAM(s) Oral daily  heparin  Injectable 5000 Unit(s) SubCutaneous every 12 hours  insulin lispro (HumaLOG) corrective regimen sliding scale   SubCutaneous every 6 hours  levothyroxine 50 MICROGram(s) Oral daily  meropenem IVPB 1000 milliGRAM(s) IV Intermittent every 24 hours  meropenem IVPB      metoprolol     tartrate 25 milliGRAM(s) Oral two times a day  pantoprazole    Tablet 40 milliGRAM(s) Oral before breakfast  PPD  5 Tuberculin Unit(s) Injectable 5 Unit(s) IntraDermal once  pregabalin 75 milliGRAM(s) Oral two times a day  sevelamer hydrochloride 1600 milliGRAM(s) Oral three times a day with meals  simvastatin 20 milliGRAM(s) Oral at bedtime    MEDICATIONS  (PRN):  acetaminophen   Tablet 650 milliGRAM(s) Oral every 6 hours PRN For Temp greater than 38 C (100.4 F)  dextrose Gel 1 Dose(s) Oral once PRN Blood Glucose LESS THAN 70 milliGRAM(s)/deciliter  glucagon  Injectable 1 milliGRAM(s) IntraMuscular once PRN Glucose LESS THAN 70 milligrams/deciliter  morphine  - Injectable 1 milliGRAM(s) IV Push every 4 hours PRN Severe Pain (7 - 10)      Allergies    No Known Allergies    Intolerances        LABS:                        7.9    18.7  )-----------( 220      ( 10 Rebel 2018 12:46 )             25.4     01-10    139  |  103  |  40<H>  ----------------------------<  110<H>  4.3   |  25  |  5.71<H>    Ca    7.8<L>      10 Rebel 2018 12:46                CAPILLARY BLOOD GLUCOSE      POCT Blood Glucose.: 125 mg/dL (11 Jan 2018 06:28)  POCT Blood Glucose.: 169 mg/dL (11 Jan 2018 00:25)  POCT Blood Glucose.: 126 mg/dL (10 Rebel 2018 18:19)  POCT Blood Glucose.: 107 mg/dL (10 Rebel 2018 11:23)        RADIOLOGY & ADDITIONAL TESTS:    CXR:  < from: Xray Chest 1 View AP- PORTABLE-Urgent (01.09.18 @ 14:44) >  IMPRESSION:  Mild diffuse interstitial prominence.      < end of copied text >    Ct scan chest:    ekg;    echo:

## 2018-01-12 LAB
ANION GAP SERPL CALC-SCNC: 10 MMOL/L — SIGNIFICANT CHANGE UP (ref 5–17)
BUN SERPL-MCNC: 31 MG/DL — HIGH (ref 7–18)
CALCIUM SERPL-MCNC: 8.7 MG/DL — SIGNIFICANT CHANGE UP (ref 8.4–10.5)
CHLORIDE SERPL-SCNC: 104 MMOL/L — SIGNIFICANT CHANGE UP (ref 96–108)
CO2 SERPL-SCNC: 27 MMOL/L — SIGNIFICANT CHANGE UP (ref 22–31)
CREAT SERPL-MCNC: 4.91 MG/DL — HIGH (ref 0.5–1.3)
GLUCOSE BLDC GLUCOMTR-MCNC: 101 MG/DL — HIGH (ref 70–99)
GLUCOSE BLDC GLUCOMTR-MCNC: 86 MG/DL — SIGNIFICANT CHANGE UP (ref 70–99)
GLUCOSE BLDC GLUCOMTR-MCNC: 88 MG/DL — SIGNIFICANT CHANGE UP (ref 70–99)
GLUCOSE BLDC GLUCOMTR-MCNC: 99 MG/DL — SIGNIFICANT CHANGE UP (ref 70–99)
GLUCOSE SERPL-MCNC: 72 MG/DL — SIGNIFICANT CHANGE UP (ref 70–99)
HCT VFR BLD CALC: 29.1 % — LOW (ref 34.5–45)
HGB BLD-MCNC: 8.5 G/DL — LOW (ref 11.5–15.5)
HIV1 RNA SERPL QL NAA+PROBE: NEGATIVE — SIGNIFICANT CHANGE UP
MCHC RBC-ENTMCNC: 27.5 PG — SIGNIFICANT CHANGE UP (ref 27–34)
MCHC RBC-ENTMCNC: 29.1 GM/DL — LOW (ref 32–36)
MCV RBC AUTO: 94.5 FL — SIGNIFICANT CHANGE UP (ref 80–100)
PHOSPHATE SERPL-MCNC: 5.1 MG/DL — HIGH (ref 2.5–4.5)
PLATELET # BLD AUTO: 246 K/UL — SIGNIFICANT CHANGE UP (ref 150–400)
POTASSIUM SERPL-MCNC: 4 MMOL/L — SIGNIFICANT CHANGE UP (ref 3.5–5.3)
POTASSIUM SERPL-SCNC: 4 MMOL/L — SIGNIFICANT CHANGE UP (ref 3.5–5.3)
RBC # BLD: 3.08 M/UL — LOW (ref 3.8–5.2)
RBC # FLD: 16.5 % — HIGH (ref 10.3–14.5)
SODIUM SERPL-SCNC: 141 MMOL/L — SIGNIFICANT CHANGE UP (ref 135–145)
WBC # BLD: 16.8 K/UL — HIGH (ref 3.8–10.5)
WBC # FLD AUTO: 16.8 K/UL — HIGH (ref 3.8–10.5)

## 2018-01-12 PROCEDURE — 71045 X-RAY EXAM CHEST 1 VIEW: CPT | Mod: 26

## 2018-01-12 RX ORDER — ACETAMINOPHEN 500 MG
1000 TABLET ORAL ONCE
Qty: 0 | Refills: 0 | Status: COMPLETED | OUTPATIENT
Start: 2018-01-12 | End: 2018-01-13

## 2018-01-12 RX ADMIN — Medication 25 MILLIGRAM(S): at 05:51

## 2018-01-12 RX ADMIN — PANTOPRAZOLE SODIUM 40 MILLIGRAM(S): 20 TABLET, DELAYED RELEASE ORAL at 05:51

## 2018-01-12 RX ADMIN — HEPARIN SODIUM 5000 UNIT(S): 5000 INJECTION INTRAVENOUS; SUBCUTANEOUS at 05:51

## 2018-01-12 RX ADMIN — Medication 25 MILLIGRAM(S): at 18:55

## 2018-01-12 RX ADMIN — SEVELAMER CARBONATE 1600 MILLIGRAM(S): 2400 POWDER, FOR SUSPENSION ORAL at 12:25

## 2018-01-12 RX ADMIN — Medication 500 MILLIGRAM(S): at 12:26

## 2018-01-12 RX ADMIN — Medication 300 MILLIGRAM(S): at 12:25

## 2018-01-12 RX ADMIN — SEVELAMER CARBONATE 1600 MILLIGRAM(S): 2400 POWDER, FOR SUSPENSION ORAL at 08:55

## 2018-01-12 RX ADMIN — SIMVASTATIN 20 MILLIGRAM(S): 20 TABLET, FILM COATED ORAL at 21:22

## 2018-01-12 RX ADMIN — SEVELAMER CARBONATE 1600 MILLIGRAM(S): 2400 POWDER, FOR SUSPENSION ORAL at 18:55

## 2018-01-12 RX ADMIN — Medication 75 MILLIGRAM(S): at 12:25

## 2018-01-12 RX ADMIN — Medication 1 MILLIGRAM(S): at 12:26

## 2018-01-12 RX ADMIN — Medication 650 MILLIGRAM(S): at 21:22

## 2018-01-12 RX ADMIN — HEPARIN SODIUM 5000 UNIT(S): 5000 INJECTION INTRAVENOUS; SUBCUTANEOUS at 18:55

## 2018-01-12 RX ADMIN — CINACALCET 30 MILLIGRAM(S): 30 TABLET, FILM COATED ORAL at 12:26

## 2018-01-12 RX ADMIN — MEROPENEM 100 MILLIGRAM(S): 1 INJECTION INTRAVENOUS at 12:26

## 2018-01-12 RX ADMIN — Medication 50 MICROGRAM(S): at 05:51

## 2018-01-12 NOTE — PROGRESS NOTE ADULT - PROBLEM SELECTOR PLAN 2
Fever spike 101.9 yesterday morning  blood culture from 1/10 showed no growth up to date   Nephrologist recommending to take out shiley after HD tomorrow if Shiley is the source  Discussed with ID Dr. Kaplan , not convinced patient has infection from Shiley as blood culture showed no growth , will watch without taking out the line.   Received 1 dose of vancomycin   Continue with meropenem   Follow up ID  Will need tunneled cath placement, once fever/infection resolves

## 2018-01-12 NOTE — PROGRESS NOTE ADULT - ATTENDING COMMENTS
Patient is seen and examined. Case reviewed with the medical team. Above note is appreciated. Will follow up clinically. Continue DVT prophylaxis. Will follow up with ID. Events of earlier noted

## 2018-01-12 NOTE — PHYSICAL THERAPY INITIAL EVALUATION ADULT - AMBULATION SKILLS, REHAB EVAL
needed assist/needs device and assist/needs device
needs device and assist
needs device/needed assist/needs device and assist

## 2018-01-12 NOTE — PROGRESS NOTE ADULT - SUBJECTIVE AND OBJECTIVE BOX
Pt seen and examined at bedside  No complaints. No acute events overnight. Afebrile overnight.     Allergies:  No Known Allergies    Hospital Medications:   MEDICATIONS  (STANDING):  ascorbic acid 500 milliGRAM(s) Oral daily  cinacalcet 30 milliGRAM(s) Oral daily  dextrose 5%. 1000 milliLiter(s) (50 mL/Hr) IV Continuous <Continuous>  dextrose 50% Injectable 12.5 Gram(s) IV Push once  dextrose 50% Injectable 25 Gram(s) IV Push once  dextrose 50% Injectable 25 Gram(s) IV Push once  epoetin jacqueline Injectable 87842 Unit(s) IV Push <User Schedule>  ferrous    sulfate Liquid 300 milliGRAM(s) Enteral Tube daily  folic acid 1 milliGRAM(s) Oral daily  heparin  Injectable 5000 Unit(s) SubCutaneous every 12 hours  insulin lispro (HumaLOG) corrective regimen sliding scale   SubCutaneous every 6 hours  levothyroxine 50 MICROGram(s) Oral daily  meropenem IVPB 1000 milliGRAM(s) IV Intermittent every 24 hours  meropenem IVPB      metoprolol     tartrate 25 milliGRAM(s) Oral two times a day  pantoprazole    Tablet 40 milliGRAM(s) Oral before breakfast  PPD  5 Tuberculin Unit(s) Injectable 5 Unit(s) IntraDermal once  pregabalin 75 milliGRAM(s) Oral daily  sevelamer hydrochloride 1600 milliGRAM(s) Oral three times a day with meals  simvastatin 20 milliGRAM(s) Oral at bedtime    VITALS:  T(F): 99.6 (01-12-18 @ 05:47), Max: 99.6 (01-12-18 @ 05:47)  HR: 72 (01-12-18 @ 12:30)  BP: 141/79 (01-12-18 @ 12:30)  RR: 99 (01-12-18 @ 12:30)  SpO2: 100% (01-12-18 @ 05:47)  Wt(kg): --      PHYSICAL EXAM:  Constitutional: NAD  HEENT: anicteric sclera, oropharynx clear, MMM  Neck: No JVD  Respiratory: CTAB, no wheezes, rales or rhonchi  Cardiovascular: S1, S2, RRR  Gastrointestinal: BS+, soft, NT/ND  Extremities: No cyanosis or clubbing. No peripheral edema  Neurological: A/O x 3, no focal deficits  Psychiatric: Normal mood, normal affect  : No CVA tenderness. No jarrell.   Skin: No rashes  [facility]     LABS:  01-12    141  |  104  |  31<H>  ----------------------------<  72  4.0   |  27  |  4.91<H>    Ca    8.7      12 Jan 2018 09:10  Phos  5.1     01-12      Creatinine Trend: 4.91 <--, 7.59 <--, 5.71 <--, 8.73 <--, 8.37 <--, 8.16 <--, 4.50 <--                        8.5    16.8  )-----------( 246      ( 12 Jan 2018 09:10 )             29.1     Urine Studies:      RADIOLOGY & ADDITIONAL STUDIES:

## 2018-01-12 NOTE — PHYSICAL THERAPY INITIAL EVALUATION ADULT - TRANSFER SKILLS, REHAB EVAL
needed assist/needs device and assist/needs device
needs device and assist
needs device/needs device and assist/needed assist

## 2018-01-12 NOTE — PHYSICAL THERAPY INITIAL EVALUATION ADULT - GENERAL OBSERVATIONS, REHAB EVAL
in NAD, supine in bed, supplemental O2 on
Consult received, chart reviewed. Patient received supine in bed, NAD, +NG, +cardiac monitoring, +venti mask, +Lt. groin shiley, + SCDs and z-flex boots. Patient agreed to RE-EVALUATION from Physical Therapist.
Pt awake, on high flow nasal cannula, reports difficulty breathing

## 2018-01-12 NOTE — PHYSICAL THERAPY INITIAL EVALUATION ADULT - PASSIVE RANGE OF MOTION EXAMINATION, REHAB EVAL
bilateral upper extremity Passive ROM was WFL (within functional limits)/bilateral lower extremity Passive ROM was WFL (within functional limits)
bilateral upper extremity Passive ROM was WFL (within functional limits)/bilateral lower extremity Passive ROM was WFL (within functional limits)

## 2018-01-12 NOTE — PHYSICAL THERAPY INITIAL EVALUATION ADULT - PHYSICAL ASSIST/NONPHYSICAL ASSIST, REHAB EVAL
1 person assist
verbal cues/supervision/1 person assist
verbal cues/1 person assist/nonverbal cues (demo/gestures)

## 2018-01-12 NOTE — PHYSICAL THERAPY INITIAL EVALUATION ADULT - LEVEL OF INDEPENDENCE: SUPINE/SIT, REHAB EVAL
dependent (less than 25% patients effort)
unable to perform
Pt. tolerated ~5 min seated supported at EOB./dependent (less than 25% patients effort)

## 2018-01-12 NOTE — PHYSICAL THERAPY INITIAL EVALUATION ADULT - REHAB POTENTIAL, PT EVAL
fair, will monitor progress closely
fair, will monitor progress closely
good, to achieve stated therapy goals

## 2018-01-12 NOTE — PROGRESS NOTE ADULT - SUBJECTIVE AND OBJECTIVE BOX
Patient is a 52y old  Female who presents with a chief complaint of SOB (13 Dec 2017 16:01)      INTERVAL HPI/OVERNIGHT EVENTS: No acute overnight event , last fever spike yesterday morning      T(C): 37.6 (01-12-18 @ 05:47), Max: 37.6 (01-12-18 @ 05:47)  HR: 91 (01-12-18 @ 05:47) (76 - 91)  BP: 146/75 (01-12-18 @ 05:47) (109/50 - 156/57)  RR: 16 (01-12-18 @ 05:47) (16 - 20)  SpO2: 100% (01-12-18 @ 05:47) (98% - 100%)  Wt(kg): --  I&O's Summary        MEDICATIONS  (STANDING):  ascorbic acid 500 milliGRAM(s) Oral daily  cinacalcet 30 milliGRAM(s) Oral daily  dextrose 5%. 1000 milliLiter(s) (50 mL/Hr) IV Continuous <Continuous>  dextrose 50% Injectable 12.5 Gram(s) IV Push once  dextrose 50% Injectable 25 Gram(s) IV Push once  dextrose 50% Injectable 25 Gram(s) IV Push once  epoetin jacqueline Injectable 31599 Unit(s) IV Push <User Schedule>  ferrous    sulfate Liquid 300 milliGRAM(s) Enteral Tube daily  folic acid 1 milliGRAM(s) Oral daily  heparin  Injectable 5000 Unit(s) SubCutaneous every 12 hours  insulin lispro (HumaLOG) corrective regimen sliding scale   SubCutaneous every 6 hours  levothyroxine 50 MICROGram(s) Oral daily  meropenem IVPB 1000 milliGRAM(s) IV Intermittent every 24 hours  meropenem IVPB      metoprolol     tartrate 25 milliGRAM(s) Oral two times a day  pantoprazole    Tablet 40 milliGRAM(s) Oral before breakfast  PPD  5 Tuberculin Unit(s) Injectable 5 Unit(s) IntraDermal once  pregabalin 75 milliGRAM(s) Oral daily  sevelamer hydrochloride 1600 milliGRAM(s) Oral three times a day with meals  simvastatin 20 milliGRAM(s) Oral at bedtime    MEDICATIONS  (PRN):  acetaminophen   Tablet 650 milliGRAM(s) Oral every 6 hours PRN For Temp greater than 38 C (100.4 F)  dextrose Gel 1 Dose(s) Oral once PRN Blood Glucose LESS THAN 70 milliGRAM(s)/deciliter  glucagon  Injectable 1 milliGRAM(s) IntraMuscular once PRN Glucose LESS THAN 70 milligrams/deciliter      PHYSICAL EXAM:  GENERAL: NAD  NERVOUS SYSTEM:  Alert & Oriented X3, no focal neurological deficit   CHEST/LUNG: Clear to percussion bilaterally; Rt. IJ catheter in place   HEART: Regular rate and rhythm; No murmurs, rubs, or gallops  ABDOMEN: Soft, Nontender, Nondistended; Bowel sounds present  EXTREMITIES:  2+ Peripheral Pulses, No clubbing, cyanosis, or edema  SKIN: sacral decubitus ulcer , non stageable   LABS:                        8.5    16.8  )-----------( 246      ( 12 Jan 2018 09:10 )             29.1     01-12    141  |  104  |  31<H>  ----------------------------<  72  4.0   |  27  |  4.91<H>    Ca    8.7      12 Jan 2018 09:10  Phos  5.1     01-12          CAPILLARY BLOOD GLUCOSE      POCT Blood Glucose.: 86 mg/dL (12 Jan 2018 05:42)  POCT Blood Glucose.: 138 mg/dL (11 Jan 2018 18:14)  POCT Blood Glucose.: 133 mg/dL (11 Jan 2018 11:44)

## 2018-01-12 NOTE — PHYSICAL THERAPY INITIAL EVALUATION ADULT - ADL SKILLS, REHAB EVAL
needed assist/needs device and assist/needs device
needs device and assist
needed assist/needs device/needs device and assist

## 2018-01-12 NOTE — PHYSICAL THERAPY INITIAL EVALUATION ADULT - MANUAL MUSCLE TESTING RESULTS, REHAB EVAL
grossly assessed due to/B UE were grossly graded 2/5; B LE were grossly graded 2-/5.
2/5 both lower extremities, 2+/5 both upper extremities
2-/5 throughout

## 2018-01-12 NOTE — PROGRESS NOTE ADULT - ASSESSMENT
52 yr old female with  ESRD.     ESRD:  HD yesterday, uneventful treatment.   Can consider taking out HD catheter, if catheter is suspected source of infection.    Will need tunneled cath placement, once fever/infection resolves  Abx selection as per ID  Also pt requires out HD unit placement. Recommend sending paperwork to [facility].     Secondary hyperparathyroidism   Can cont sensipar. iPTH and Ca level acceptable.      Anemia   Hg below goal, epo recently increased to 35985 units tiw     Hyperphosphatemia  Cont renagel 1600mg TID with meals.   High phos 2/2 CKD, better controlled.

## 2018-01-12 NOTE — PHYSICAL THERAPY INITIAL EVALUATION ADULT - DIAGNOSIS, PT EVAL
weakness, deconditioning
Impaired cognition, balance, strength, endurance and ROM.
decreased endurance, decreased strength, increased work of breathing, impaired mobility, impaired ability to perform ADLs,

## 2018-01-12 NOTE — PHYSICAL THERAPY INITIAL EVALUATION ADULT - FOLLOWS COMMANDS/ANSWERS QUESTIONS, REHAB EVAL
75% of the time
able to follow multistep instructions/100% of the time
able to follow single-step instructions/50% of the time

## 2018-01-12 NOTE — CHART NOTE - NSCHARTNOTEFT_GEN_A_CORE
52 female nursing home patient with hx of DM, neuropathy, CKD, CHFpEF, GERD, osteomyelitis of jaw, hypothyroidism, hypoparathyroidism, anemia, admitted to ICU acute on chronic renal failure requiring initiation of dialysis and acute respiratory failure requiring intubation 2/2 pneumonia with sepsis.  downgraded to medical floor on 01/01/2018.  Patient was doing well since she was downgraded to medical floor when today after coming back from dialysis she had an episode of vomiting after lunch and started feeling short of breath right afterwards.   Examined patient at bedside with dr Farr who is the primary resident taking care of patient. Patient is alert and oriented and as per primary resident dr Farr and at baseline mental status. Shortness of breath has resolved.Vitals at time of rapid were significant for increased blood pressure 170/90.          Shortness of breath      - Likely from vomiting and aspiration    - Patient is already on meropenem    - F/u cxr and ekg    - As symptoms improved now will not get abg    - Will get labs including cardiac enzymes    - NPO for now till cleared by speech and swallow        Attending informed .

## 2018-01-12 NOTE — PHYSICAL THERAPY INITIAL EVALUATION ADULT - CRITERIA FOR SKILLED THERAPEUTIC INTERVENTIONS
risk reduction/prevention/impairments found/functional limitations in following categories/anticipated discharge recommendation
therapy frequency/impairments found/rehab potential/risk reduction/prevention/functional limitations in following categories/predicted duration of therapy intervention

## 2018-01-12 NOTE — PROGRESS NOTE ADULT - SUBJECTIVE AND OBJECTIVE BOX
Patient is a 52y old  Female who presents with a chief complaint of SOB (13 Dec 2017 16:01)  Awake, alert, comfortable in bed in NAD. Had fever yesterday but denies respiratory symptoms    INTERVAL HPI/OVERNIGHT EVENTS:      VITAL SIGNS:  T(F): 99.6 (01-12-18 @ 05:47)  HR: 91 (01-12-18 @ 05:47)  BP: 146/75 (01-12-18 @ 05:47)  RR: 16 (01-12-18 @ 05:47)  SpO2: 100% (01-12-18 @ 05:47)  Wt(kg): --  I&O's Detail          REVIEW OF SYSTEMS:    CONSTITUTIONAL:  No fevers, chills, sweats    HEENT:  Eyes:  No diplopia or blurred vision. ENT:  No earache, sore throat or runny nose.    CARDIOVASCULAR:  No pressure, squeezing, tightness, or heaviness about the chest; no palpitations.    RESPIRATORY:  Per HPI    GASTROINTESTINAL:  No abdominal pain, nausea, vomiting or diarrhea.    GENITOURINARY:  No dysuria, frequency or urgency.    NEUROLOGIC:  No paresthesias, fasciculations, seizures or weakness.    PSYCHIATRIC:  No disorder of thought or mood.      PHYSICAL EXAM:    Constitutional: Well developed and nourished  Eyes:Perrla  ENMT: normal  Neck:supple  Respiratory: good air entry  Cardiovascular: S1 S2 regular  Gastrointestinal: Soft, Non tender  Extremities: No edema  Vascular:normal  Neurological:Awake, alert,Ox3  Musculoskeletal:Normal      MEDICATIONS  (STANDING):  ascorbic acid 500 milliGRAM(s) Oral daily  cinacalcet 30 milliGRAM(s) Oral daily  dextrose 5%. 1000 milliLiter(s) (50 mL/Hr) IV Continuous <Continuous>  dextrose 50% Injectable 12.5 Gram(s) IV Push once  dextrose 50% Injectable 25 Gram(s) IV Push once  dextrose 50% Injectable 25 Gram(s) IV Push once  epoetin jacqueline Injectable 08328 Unit(s) IV Push <User Schedule>  ferrous    sulfate Liquid 300 milliGRAM(s) Enteral Tube daily  folic acid 1 milliGRAM(s) Oral daily  heparin  Injectable 5000 Unit(s) SubCutaneous every 12 hours  insulin lispro (HumaLOG) corrective regimen sliding scale   SubCutaneous every 6 hours  levothyroxine 50 MICROGram(s) Oral daily  meropenem IVPB 1000 milliGRAM(s) IV Intermittent every 24 hours  meropenem IVPB      metoprolol     tartrate 25 milliGRAM(s) Oral two times a day  pantoprazole    Tablet 40 milliGRAM(s) Oral before breakfast  PPD  5 Tuberculin Unit(s) Injectable 5 Unit(s) IntraDermal once  pregabalin 75 milliGRAM(s) Oral daily  sevelamer hydrochloride 1600 milliGRAM(s) Oral three times a day with meals  simvastatin 20 milliGRAM(s) Oral at bedtime    MEDICATIONS  (PRN):  acetaminophen   Tablet 650 milliGRAM(s) Oral every 6 hours PRN For Temp greater than 38 C (100.4 F)  dextrose Gel 1 Dose(s) Oral once PRN Blood Glucose LESS THAN 70 milliGRAM(s)/deciliter  glucagon  Injectable 1 milliGRAM(s) IntraMuscular once PRN Glucose LESS THAN 70 milligrams/deciliter      Allergies    No Known Allergies    Intolerances        LABS:                        8.5    16.8  )-----------( 246      ( 12 Jan 2018 09:10 )             29.1     01-12    141  |  104  |  31<H>  ----------------------------<  72  4.0   |  27  |  4.91<H>    Ca    8.7      12 Jan 2018 09:10  Phos  5.1     01-12                CAPILLARY BLOOD GLUCOSE      POCT Blood Glucose.: 101 mg/dL (12 Jan 2018 11:19)  POCT Blood Glucose.: 86 mg/dL (12 Jan 2018 05:42)  POCT Blood Glucose.: 138 mg/dL (11 Jan 2018 18:14)  POCT Blood Glucose.: 133 mg/dL (11 Jan 2018 11:44)        RADIOLOGY & ADDITIONAL TESTS:    CXR:  < from: Xray Chest 1 View AP- PORTABLE-Urgent (01.09.18 @ 14:44) >  IMPRESSION:  Mild diffuse interstitial prominence.    < end of copied text >    Ct scan chest:    ekg;    echo:

## 2018-01-13 LAB
ANION GAP SERPL CALC-SCNC: 11 MMOL/L — SIGNIFICANT CHANGE UP (ref 5–17)
BASOPHILS # BLD AUTO: 0.1 K/UL — SIGNIFICANT CHANGE UP (ref 0–0.2)
BASOPHILS NFR BLD AUTO: 1 % — SIGNIFICANT CHANGE UP (ref 0–2)
BUN SERPL-MCNC: 42 MG/DL — HIGH (ref 7–18)
CALCIUM SERPL-MCNC: 8.1 MG/DL — LOW (ref 8.4–10.5)
CHLORIDE SERPL-SCNC: 103 MMOL/L — SIGNIFICANT CHANGE UP (ref 96–108)
CO2 SERPL-SCNC: 25 MMOL/L — SIGNIFICANT CHANGE UP (ref 22–31)
CREAT SERPL-MCNC: 6.57 MG/DL — HIGH (ref 0.5–1.3)
EOSINOPHIL # BLD AUTO: 0.2 K/UL — SIGNIFICANT CHANGE UP (ref 0–0.5)
EOSINOPHIL NFR BLD AUTO: 1.7 % — SIGNIFICANT CHANGE UP (ref 0–6)
GLUCOSE BLDC GLUCOMTR-MCNC: 100 MG/DL — HIGH (ref 70–99)
GLUCOSE BLDC GLUCOMTR-MCNC: 102 MG/DL — HIGH (ref 70–99)
GLUCOSE BLDC GLUCOMTR-MCNC: 122 MG/DL — HIGH (ref 70–99)
GLUCOSE BLDC GLUCOMTR-MCNC: 79 MG/DL — SIGNIFICANT CHANGE UP (ref 70–99)
GLUCOSE BLDC GLUCOMTR-MCNC: 89 MG/DL — SIGNIFICANT CHANGE UP (ref 70–99)
GLUCOSE SERPL-MCNC: 86 MG/DL — SIGNIFICANT CHANGE UP (ref 70–99)
HCT VFR BLD CALC: 26.8 % — LOW (ref 34.5–45)
HGB BLD-MCNC: 7.6 G/DL — LOW (ref 11.5–15.5)
LYMPHOCYTES # BLD AUTO: 15.9 % — SIGNIFICANT CHANGE UP (ref 13–44)
LYMPHOCYTES # BLD AUTO: 2.2 K/UL — SIGNIFICANT CHANGE UP (ref 1–3.3)
MCHC RBC-ENTMCNC: 26.9 PG — LOW (ref 27–34)
MCHC RBC-ENTMCNC: 28.5 GM/DL — LOW (ref 32–36)
MCV RBC AUTO: 94.6 FL — SIGNIFICANT CHANGE UP (ref 80–100)
MONOCYTES # BLD AUTO: 1.3 K/UL — HIGH (ref 0–0.9)
MONOCYTES NFR BLD AUTO: 9.5 % — SIGNIFICANT CHANGE UP (ref 2–14)
NEUTROPHILS # BLD AUTO: 10.2 K/UL — HIGH (ref 1.8–7.4)
NEUTROPHILS NFR BLD AUTO: 71.9 % — SIGNIFICANT CHANGE UP (ref 43–77)
PHOSPHATE SERPL-MCNC: 6.4 MG/DL — HIGH (ref 2.5–4.5)
PLATELET # BLD AUTO: 270 K/UL — SIGNIFICANT CHANGE UP (ref 150–400)
POTASSIUM SERPL-MCNC: 4.3 MMOL/L — SIGNIFICANT CHANGE UP (ref 3.5–5.3)
POTASSIUM SERPL-SCNC: 4.3 MMOL/L — SIGNIFICANT CHANGE UP (ref 3.5–5.3)
RBC # BLD: 2.84 M/UL — LOW (ref 3.8–5.2)
RBC # FLD: 16.7 % — HIGH (ref 10.3–14.5)
SODIUM SERPL-SCNC: 139 MMOL/L — SIGNIFICANT CHANGE UP (ref 135–145)
TROPONIN I SERPL-MCNC: <0.015 NG/ML — SIGNIFICANT CHANGE UP (ref 0–0.04)
WBC # BLD: 14.2 K/UL — HIGH (ref 3.8–10.5)
WBC # FLD AUTO: 14.2 K/UL — HIGH (ref 3.8–10.5)

## 2018-01-13 RX ORDER — OXYCODONE AND ACETAMINOPHEN 5; 325 MG/1; MG/1
1 TABLET ORAL ONCE
Qty: 0 | Refills: 0 | Status: DISCONTINUED | OUTPATIENT
Start: 2018-01-13 | End: 2018-01-13

## 2018-01-13 RX ORDER — GABAPENTIN 400 MG/1
100 CAPSULE ORAL THREE TIMES A DAY
Qty: 0 | Refills: 0 | Status: DISCONTINUED | OUTPATIENT
Start: 2018-01-13 | End: 2018-01-29

## 2018-01-13 RX ORDER — ACETAMINOPHEN 500 MG
1000 TABLET ORAL ONCE
Qty: 0 | Refills: 0 | Status: COMPLETED | OUTPATIENT
Start: 2018-01-13 | End: 2018-01-13

## 2018-01-13 RX ADMIN — HEPARIN SODIUM 5000 UNIT(S): 5000 INJECTION INTRAVENOUS; SUBCUTANEOUS at 17:20

## 2018-01-13 RX ADMIN — Medication 400 MILLIGRAM(S): at 22:54

## 2018-01-13 RX ADMIN — SEVELAMER CARBONATE 1600 MILLIGRAM(S): 2400 POWDER, FOR SUSPENSION ORAL at 12:21

## 2018-01-13 RX ADMIN — SEVELAMER CARBONATE 1600 MILLIGRAM(S): 2400 POWDER, FOR SUSPENSION ORAL at 08:27

## 2018-01-13 RX ADMIN — MEROPENEM 100 MILLIGRAM(S): 1 INJECTION INTRAVENOUS at 12:26

## 2018-01-13 RX ADMIN — HEPARIN SODIUM 5000 UNIT(S): 5000 INJECTION INTRAVENOUS; SUBCUTANEOUS at 06:04

## 2018-01-13 RX ADMIN — OXYCODONE AND ACETAMINOPHEN 1 TABLET(S): 5; 325 TABLET ORAL at 10:09

## 2018-01-13 RX ADMIN — Medication 300 MILLIGRAM(S): at 12:21

## 2018-01-13 RX ADMIN — Medication 1 MILLIGRAM(S): at 12:21

## 2018-01-13 RX ADMIN — SIMVASTATIN 20 MILLIGRAM(S): 20 TABLET, FILM COATED ORAL at 21:07

## 2018-01-13 RX ADMIN — GABAPENTIN 100 MILLIGRAM(S): 400 CAPSULE ORAL at 22:55

## 2018-01-13 RX ADMIN — Medication 25 MILLIGRAM(S): at 06:03

## 2018-01-13 RX ADMIN — PANTOPRAZOLE SODIUM 40 MILLIGRAM(S): 20 TABLET, DELAYED RELEASE ORAL at 06:03

## 2018-01-13 RX ADMIN — Medication 75 MILLIGRAM(S): at 12:27

## 2018-01-13 RX ADMIN — SEVELAMER CARBONATE 1600 MILLIGRAM(S): 2400 POWDER, FOR SUSPENSION ORAL at 17:20

## 2018-01-13 RX ADMIN — CINACALCET 30 MILLIGRAM(S): 30 TABLET, FILM COATED ORAL at 12:21

## 2018-01-13 RX ADMIN — Medication 50 MICROGRAM(S): at 06:03

## 2018-01-13 RX ADMIN — OXYCODONE AND ACETAMINOPHEN 1 TABLET(S): 5; 325 TABLET ORAL at 11:00

## 2018-01-13 RX ADMIN — ERYTHROPOIETIN 14000 UNIT(S): 10000 INJECTION, SOLUTION INTRAVENOUS; SUBCUTANEOUS at 20:17

## 2018-01-13 RX ADMIN — Medication 500 MILLIGRAM(S): at 12:21

## 2018-01-13 RX ADMIN — Medication 1000 MILLIGRAM(S): at 23:24

## 2018-01-13 RX ADMIN — Medication 25 MILLIGRAM(S): at 17:20

## 2018-01-13 NOTE — PROGRESS NOTE ADULT - SUBJECTIVE AND OBJECTIVE BOX
Patient is a 52y old  Female who presents with a chief complaint of SOB (13 Dec 2017 16:01)  Awake, alert, comfortable in bed in NAD. No further fever    INTERVAL HPI/OVERNIGHT EVENTS:      VITAL SIGNS:  T(F): 98.7 (01-13-18 @ 05:45)  HR: 78 (01-13-18 @ 05:45)  BP: 157/71 (01-13-18 @ 05:45)  RR: 16 (01-13-18 @ 05:45)  SpO2: 100% (01-13-18 @ 05:45)  Wt(kg): --  I&O's Detail          REVIEW OF SYSTEMS:    CONSTITUTIONAL:  No fevers, chills, sweats    HEENT:  Eyes:  No diplopia or blurred vision. ENT:  No earache, sore throat or runny nose.    CARDIOVASCULAR:  No pressure, squeezing, tightness, or heaviness about the chest; no palpitations.    RESPIRATORY:  Per HPI    GASTROINTESTINAL:  No abdominal pain, nausea, vomiting or diarrhea.    GENITOURINARY:  No dysuria, frequency or urgency.    NEUROLOGIC:  No paresthesias, fasciculations, seizures or weakness.    PSYCHIATRIC:  No disorder of thought or mood.      PHYSICAL EXAM:    Constitutional: Well developed and nourished  Eyes:Perrla  ENMT: normal  Neck:supple  Respiratory: good air entry  Cardiovascular: S1 S2 regular  Gastrointestinal: Soft, Non tender  Extremities: No edema  Vascular:normal  Neurological:Awake, alert,Ox3  Musculoskeletal:Normal      MEDICATIONS  (STANDING):  acetaminophen  IVPB. 1000 milliGRAM(s) IV Intermittent once  ascorbic acid 500 milliGRAM(s) Oral daily  cinacalcet 30 milliGRAM(s) Oral daily  dextrose 5%. 1000 milliLiter(s) (50 mL/Hr) IV Continuous <Continuous>  dextrose 50% Injectable 12.5 Gram(s) IV Push once  dextrose 50% Injectable 25 Gram(s) IV Push once  dextrose 50% Injectable 25 Gram(s) IV Push once  epoetin jacqueline Injectable 02274 Unit(s) IV Push <User Schedule>  ferrous    sulfate Liquid 300 milliGRAM(s) Enteral Tube daily  folic acid 1 milliGRAM(s) Oral daily  heparin  Injectable 5000 Unit(s) SubCutaneous every 12 hours  insulin lispro (HumaLOG) corrective regimen sliding scale   SubCutaneous every 6 hours  levothyroxine 50 MICROGram(s) Oral daily  meropenem IVPB 1000 milliGRAM(s) IV Intermittent every 24 hours  meropenem IVPB      metoprolol     tartrate 25 milliGRAM(s) Oral two times a day  pantoprazole    Tablet 40 milliGRAM(s) Oral before breakfast  PPD  5 Tuberculin Unit(s) Injectable 5 Unit(s) IntraDermal once  pregabalin 75 milliGRAM(s) Oral daily  sevelamer hydrochloride 1600 milliGRAM(s) Oral three times a day with meals  simvastatin 20 milliGRAM(s) Oral at bedtime    MEDICATIONS  (PRN):  acetaminophen   Tablet 650 milliGRAM(s) Oral every 6 hours PRN For Temp greater than 38 C (100.4 F)  dextrose Gel 1 Dose(s) Oral once PRN Blood Glucose LESS THAN 70 milliGRAM(s)/deciliter  glucagon  Injectable 1 milliGRAM(s) IntraMuscular once PRN Glucose LESS THAN 70 milligrams/deciliter      Allergies    No Known Allergies    Intolerances        LABS:                        8.5    16.8  )-----------( 246      ( 12 Jan 2018 09:10 )             29.1     01-12    141  |  104  |  31<H>  ----------------------------<  72  4.0   |  27  |  4.91<H>    Ca    8.7      12 Jan 2018 09:10  Phos  5.1     01-12                CAPILLARY BLOOD GLUCOSE      POCT Blood Glucose.: 122 mg/dL (13 Jan 2018 11:24)  POCT Blood Glucose.: 79 mg/dL (13 Jan 2018 05:36)  POCT Blood Glucose.: 89 mg/dL (13 Jan 2018 00:34)  POCT Blood Glucose.: 99 mg/dL (12 Jan 2018 18:15)  POCT Blood Glucose.: 88 mg/dL (12 Jan 2018 13:55)        RADIOLOGY & ADDITIONAL TESTS:    CXR:    Ct scan chest:    ekg;    echo:

## 2018-01-13 NOTE — PROGRESS NOTE ADULT - SUBJECTIVE AND OBJECTIVE BOX
Pt seen and examined at bedside  Pt resting comfortably in bed, but easily arousable and answering appropriately.     Allergies:  No Known Allergies    Hospital Medications:   MEDICATIONS  (STANDING):  acetaminophen  IVPB. 1000 milliGRAM(s) IV Intermittent once  ascorbic acid 500 milliGRAM(s) Oral daily  cinacalcet 30 milliGRAM(s) Oral daily  dextrose 5%. 1000 milliLiter(s) (50 mL/Hr) IV Continuous <Continuous>  dextrose 50% Injectable 12.5 Gram(s) IV Push once  dextrose 50% Injectable 25 Gram(s) IV Push once  dextrose 50% Injectable 25 Gram(s) IV Push once  epoetin jacqueline Injectable 26044 Unit(s) IV Push <User Schedule>  ferrous    sulfate Liquid 300 milliGRAM(s) Enteral Tube daily  folic acid 1 milliGRAM(s) Oral daily  heparin  Injectable 5000 Unit(s) SubCutaneous every 12 hours  insulin lispro (HumaLOG) corrective regimen sliding scale   SubCutaneous every 6 hours  levothyroxine 50 MICROGram(s) Oral daily  meropenem IVPB 1000 milliGRAM(s) IV Intermittent every 24 hours  meropenem IVPB      metoprolol     tartrate 25 milliGRAM(s) Oral two times a day  pantoprazole    Tablet 40 milliGRAM(s) Oral before breakfast  PPD  5 Tuberculin Unit(s) Injectable 5 Unit(s) IntraDermal once  pregabalin 75 milliGRAM(s) Oral daily  sevelamer hydrochloride 1600 milliGRAM(s) Oral three times a day with meals  simvastatin 20 milliGRAM(s) Oral at bedtime    VITALS:  T(F): 98.7 (01-13-18 @ 05:45), Max: 100.4 (01-12-18 @ 21:20)  HR: 78 (01-13-18 @ 05:45)  BP: 157/71 (01-13-18 @ 05:45)  RR: 16 (01-13-18 @ 05:45)  SpO2: 100% (01-13-18 @ 05:45)  Wt(kg): --      PHYSICAL EXAM:  Constitutional: NAD  HEENT: anicteric sclera, oropharynx clear, MMM  Neck: No JVD  Respiratory: CTAB, no wheezes, rales or rhonchi  Cardiovascular: S1, S2, RRR  Gastrointestinal: BS+, soft, NT/ND  Extremities: No cyanosis or clubbing. No peripheral edema  Neurological: A/O x 3, no focal deficits  Psychiatric: Normal mood, normal affect  : No CVA tenderness. No jarrell.   Skin: No rashes  Vascular Access: [facility]     LABS:  01-12    141  |  104  |  31<H>  ----------------------------<  72  4.0   |  27  |  4.91<H>    Ca    8.7      12 Jan 2018 09:10  Phos  5.1     01-12      Creatinine Trend: 4.91 <--, 7.59 <--, 5.71 <--, 8.73 <--, 8.37 <--, 8.16 <--                        8.5    16.8  )-----------( 246      ( 12 Jan 2018 09:10 )             29.1     Urine Studies:      RADIOLOGY & ADDITIONAL STUDIES:

## 2018-01-13 NOTE — PROGRESS NOTE ADULT - ASSESSMENT
52 yr old female with  ESRD.     ESRD:  HD planned for today. Check labs with HD.   Can consider taking out HD catheter, if catheter is suspected source of infection.    Will need tunneled cath placement, once fever/infection resolves  Abx selection as per ID  Also pt requires out HD unit placement. Recommend sending paperwork to [facility], [facility] HD unit.     Secondary hyperparathyroidism   Can cont sensipar. iPTH and Ca level acceptable.      Anemia   Hg below goal, epo recently increased to 13707 units tiw     Hyperphosphatemia  Cont renagel 1600mg TID with meals.   High phos 2/2 CKD, better controlled.

## 2018-01-13 NOTE — PROGRESS NOTE ADULT - SUBJECTIVE AND OBJECTIVE BOX
CHIEF COMPLAINT:Patient is a 52y old  Female who presents with a chief complaint of SOB (13 Dec 2017 16:01)    	  REVIEW OF SYSTEMS:  CONSTITUTIONAL: No fever, weight loss, or fatigue  EYES: No eye pain, visual disturbances, or discharge  ENMT:  No difficulty hearing, tinnitus, vertigo; No sinus or throat pain  NECK: No pain or stiffness  RESPIRATORY: No cough, wheezing, chills or hemoptysis; No Shortness of Breath  CARDIOVASCULAR: No chest pain, palpitations, passing out, dizziness, or leg swelling  GASTROINTESTINAL: No abdominal or epigastric pain. No nausea, vomiting, or hematemesis; No diarrhea or constipation. No melena or hematochezia.  GENITOURINARY: No dysuria, frequency, hematuria, or incontinence  NEUROLOGICAL: No headaches, memory loss, loss of strength, numbness, or tremors  SKIN: No itching, burning, rashes, or lesions   LYMPH Nodes: No enlarged glands  ENDOCRINE: No heat or cold intolerance; No hair loss  MUSCULOSKELETAL: No joint pain or swelling; No muscle, back, or extremity pain  PSYCHIATRIC: No depression, anxiety, mood swings, or difficulty sleeping  HEME/LYMPH: No easy bruising, or bleeding gums  ALLERY AND IMMUNOLOGIC: No hives or eczema	    [ ] All others negative	  [ ] Unable to obtain    PHYSICAL EXAM:  T(C): 37.2 (01-13-18 @ 20:52), Max: 37.3 (01-13-18 @ 00:41)  HR: 80 (01-13-18 @ 20:52) (71 - 80)  BP: 149/81 (01-13-18 @ 20:52) (147/64 - 161/56)  RR: 17 (01-13-18 @ 20:52) (16 - 17)  SpO2: 100% (01-13-18 @ 20:52) (98% - 100%)  Wt(kg): --  I&O's Summary      Appearance: Normal	  HEENT:   Normal oral mucosa, PERRL, EOMI	  Lymphatic: No lymphadenopathy  Cardiovascular: Normal S1 S2, No JVD, No murmurs, No edema  Respiratory: Lungs clear to auscultation	  Psychiatry: A & O x 3, Mood & affect appropriate  Gastrointestinal:  Soft, Non-tender, + BS	  Skin: No rashes, No ecchymoses, No cyanosis	  Neurologic: Non-focal  Extremities: Normal range of motion, No clubbing, cyanosis or edema  Vascular: Peripheral pulses palpable 2+ bilaterally    MEDICATIONS  (STANDING):  ascorbic acid 500 milliGRAM(s) Oral daily  cinacalcet 30 milliGRAM(s) Oral daily  dextrose 5%. 1000 milliLiter(s) (50 mL/Hr) IV Continuous <Continuous>  dextrose 50% Injectable 12.5 Gram(s) IV Push once  dextrose 50% Injectable 25 Gram(s) IV Push once  dextrose 50% Injectable 25 Gram(s) IV Push once  epoetin jacqueline Injectable 26532 Unit(s) IV Push <User Schedule>  ferrous    sulfate Liquid 300 milliGRAM(s) Enteral Tube daily  folic acid 1 milliGRAM(s) Oral daily  gabapentin 100 milliGRAM(s) Oral three times a day  heparin  Injectable 5000 Unit(s) SubCutaneous every 12 hours  insulin lispro (HumaLOG) corrective regimen sliding scale   SubCutaneous every 6 hours  levothyroxine 50 MICROGram(s) Oral daily  meropenem IVPB 1000 milliGRAM(s) IV Intermittent every 24 hours  meropenem IVPB      metoprolol     tartrate 25 milliGRAM(s) Oral two times a day  pantoprazole    Tablet 40 milliGRAM(s) Oral before breakfast  PPD  5 Tuberculin Unit(s) Injectable 5 Unit(s) IntraDermal once  pregabalin 75 milliGRAM(s) Oral daily  sevelamer hydrochloride 1600 milliGRAM(s) Oral three times a day with meals  simvastatin 20 milliGRAM(s) Oral at bedtime      TELEMETRY: 	    ECG:  	  RADIOLOGY:  OTHER: 	  	  CBC Full  -  ( 13 Jan 2018 19:10 )  WBC Count : 14.2 K/uL  Hemoglobin : 7.6 g/dL  Hematocrit : 26.8 %  Platelet Count - Automated : 270 K/uL  Mean Cell Volume : 94.6 fl  Mean Cell Hemoglobin : 26.9 pg  Mean Cell Hemoglobin Concentration : 28.5 gm/dL  Auto Neutrophil # : 10.2 K/uL  Auto Lymphocyte # : 2.2 K/uL  Auto Monocyte # : 1.3 K/uL  Auto Eosinophil # : 0.2 K/uL  Auto Basophil # : 0.1 K/uL  Auto Neutrophil % : 71.9 %  Auto Lymphocyte % : 15.9 %  Auto Monocyte % : 9.5 %  Auto Eosinophil % : 1.7 %  Auto Basophil % : 1.0 %        CARDIAC MARKERS:  CARDIAC MARKERS ( 13 Jan 2018 15:54 )  <0.015 ng/mL / x     / x     / x     / x                                  7.6    14.2  )-----------( 270      ( 13 Jan 2018 19:10 )             26.8       01-13    139  |  103  |  42<H>  ----------------------------<  86  4.3   |  25  |  6.57<H>    Ca    8.1<L>      13 Jan 2018 19:10  Phos  6.4     01-13              proBNP:   Lipid Profile: Cholesterol 53  LDL -13  HDL 38      HgA1c:   TSH:

## 2018-01-14 LAB
GLUCOSE BLDC GLUCOMTR-MCNC: 116 MG/DL — HIGH (ref 70–99)
GLUCOSE BLDC GLUCOMTR-MCNC: 120 MG/DL — HIGH (ref 70–99)
GLUCOSE BLDC GLUCOMTR-MCNC: 134 MG/DL — HIGH (ref 70–99)
GLUCOSE BLDC GLUCOMTR-MCNC: 143 MG/DL — HIGH (ref 70–99)

## 2018-01-14 RX ORDER — NITROGLYCERIN 6.5 MG
1 CAPSULE, EXTENDED RELEASE ORAL DAILY
Qty: 0 | Refills: 0 | Status: DISCONTINUED | OUTPATIENT
Start: 2018-01-14 | End: 2018-01-16

## 2018-01-14 RX ORDER — ACETAMINOPHEN 500 MG
650 TABLET ORAL EVERY 6 HOURS
Qty: 0 | Refills: 0 | Status: DISCONTINUED | OUTPATIENT
Start: 2018-01-14 | End: 2018-01-29

## 2018-01-14 RX ORDER — ONDANSETRON 8 MG/1
4 TABLET, FILM COATED ORAL EVERY 8 HOURS
Qty: 0 | Refills: 0 | Status: COMPLETED | OUTPATIENT
Start: 2018-01-14 | End: 2018-01-14

## 2018-01-14 RX ADMIN — SEVELAMER CARBONATE 1600 MILLIGRAM(S): 2400 POWDER, FOR SUSPENSION ORAL at 08:48

## 2018-01-14 RX ADMIN — Medication 30 MILLILITER(S): at 12:33

## 2018-01-14 RX ADMIN — MEROPENEM 100 MILLIGRAM(S): 1 INJECTION INTRAVENOUS at 12:36

## 2018-01-14 RX ADMIN — Medication 650 MILLIGRAM(S): at 21:54

## 2018-01-14 RX ADMIN — Medication 75 MILLIGRAM(S): at 12:36

## 2018-01-14 RX ADMIN — SEVELAMER CARBONATE 1600 MILLIGRAM(S): 2400 POWDER, FOR SUSPENSION ORAL at 17:30

## 2018-01-14 RX ADMIN — Medication 1 PATCH: at 20:52

## 2018-01-14 RX ADMIN — Medication 500 MILLIGRAM(S): at 12:22

## 2018-01-14 RX ADMIN — Medication 300 MILLIGRAM(S): at 12:21

## 2018-01-14 RX ADMIN — Medication 1 MILLIGRAM(S): at 12:21

## 2018-01-14 RX ADMIN — HEPARIN SODIUM 5000 UNIT(S): 5000 INJECTION INTRAVENOUS; SUBCUTANEOUS at 17:30

## 2018-01-14 RX ADMIN — CINACALCET 30 MILLIGRAM(S): 30 TABLET, FILM COATED ORAL at 12:22

## 2018-01-14 RX ADMIN — GABAPENTIN 100 MILLIGRAM(S): 400 CAPSULE ORAL at 13:05

## 2018-01-14 RX ADMIN — Medication 650 MILLIGRAM(S): at 22:24

## 2018-01-14 RX ADMIN — ONDANSETRON 4 MILLIGRAM(S): 8 TABLET, FILM COATED ORAL at 21:45

## 2018-01-14 RX ADMIN — HEPARIN SODIUM 5000 UNIT(S): 5000 INJECTION INTRAVENOUS; SUBCUTANEOUS at 06:09

## 2018-01-14 RX ADMIN — Medication 25 MILLIGRAM(S): at 06:09

## 2018-01-14 RX ADMIN — Medication 25 MILLIGRAM(S): at 17:31

## 2018-01-14 RX ADMIN — PANTOPRAZOLE SODIUM 40 MILLIGRAM(S): 20 TABLET, DELAYED RELEASE ORAL at 06:09

## 2018-01-14 RX ADMIN — SEVELAMER CARBONATE 1600 MILLIGRAM(S): 2400 POWDER, FOR SUSPENSION ORAL at 12:21

## 2018-01-14 RX ADMIN — GABAPENTIN 100 MILLIGRAM(S): 400 CAPSULE ORAL at 06:09

## 2018-01-14 RX ADMIN — Medication 50 MICROGRAM(S): at 06:09

## 2018-01-14 NOTE — PROGRESS NOTE ADULT - ASSESSMENT
52 yr old female with  ESRD.     ESRD:  Pt schedule for HD yesterday, but terminated after only 15 mins due to blood oozing from access site.   Evaluated by vascular surgery at that time. Bleeding stopped, plan for exchanging cath over guidewire today, as per discussion with vasc yesterday.   Electrolytes and volume status acceptable. No acute indication for HD today.   Will plan for HD tomorrow, after HD cath exchange by vas.     Will need tunneled cath placement, once fever/infection resolves  Abx selection as per ID  Also pt requires out HD unit placement. [facility] HD [facility].     Secondary hyperparathyroidism   Can cont sensipar. iPTH and Ca level acceptable.      Anemia   Hg below goal, epo recently increased to 73264 units tiw     Hyperphosphatemia  Cont renagel 1600mg TID with meals.   High phos 2/2 CKD, better controlled.

## 2018-01-14 NOTE — PROGRESS NOTE ADULT - SUBJECTIVE AND OBJECTIVE BOX
CHIEF COMPLAINT:Patient is a 52y old  Female who presents with a chief complaint of SOB (13 Dec 2017 16:01)    	  REVIEW OF SYSTEMS:  CONSTITUTIONAL: No fever, weight loss, or fatigue  EYES: No eye pain, visual disturbances, or discharge  ENMT:  No difficulty hearing, tinnitus, vertigo; No sinus or throat pain  NECK: No pain or stiffness  RESPIRATORY: No cough, wheezing, chills or hemoptysis; No Shortness of Breath  CARDIOVASCULAR: No chest pain, palpitations, passing out, dizziness, or leg swelling  GASTROINTESTINAL: No abdominal or epigastric pain. No nausea, vomiting, or hematemesis; No diarrhea or constipation. No melena or hematochezia.  GENITOURINARY: No dysuria, frequency, hematuria, or incontinence  NEUROLOGICAL: No headaches, memory loss, loss of strength, numbness, or tremors  SKIN: No itching, burning, rashes, or lesions   LYMPH Nodes: No enlarged glands  ENDOCRINE: No heat or cold intolerance; No hair loss  MUSCULOSKELETAL: No joint pain or swelling; No muscle, back, or extremity pain  PSYCHIATRIC: No depression, anxiety, mood swings, or difficulty sleeping  HEME/LYMPH: No easy bruising, or bleeding gums  ALLERY AND IMMUNOLOGIC: No hives or eczema	    [ ] All others negative	  [ ] Unable to obtain    PHYSICAL EXAM:  T(C): 36.7 (01-14-18 @ 05:10), Max: 37.2 (01-13-18 @ 20:52)  HR: 80 (01-14-18 @ 05:10) (71 - 80)  BP: 136/45 (01-14-18 @ 05:10) (136/45 - 161/56)  RR: 18 (01-14-18 @ 05:10) (16 - 18)  SpO2: 100% (01-14-18 @ 05:10) (98% - 100%)  Wt(kg): --  I&O's Summary      Appearance: Normal	  HEENT:   Normal oral mucosa, PERRL, EOMI	  Lymphatic: No lymphadenopathy  Cardiovascular: Normal S1 S2, No JVD, No murmurs, No edema  Respiratory: Lungs clear to auscultation	  Psychiatry: A & O x 3, Mood & affect appropriate  Gastrointestinal:  Soft, Non-tender, + BS	  Skin: No rashes, No ecchymoses, No cyanosis	  Neurologic: Non-focal  Extremities: Normal range of motion, No clubbing, cyanosis or edema  Vascular: Peripheral pulses palpable 2+ bilaterally    MEDICATIONS  (STANDING):  ascorbic acid 500 milliGRAM(s) Oral daily  cinacalcet 30 milliGRAM(s) Oral daily  dextrose 5%. 1000 milliLiter(s) (50 mL/Hr) IV Continuous <Continuous>  dextrose 50% Injectable 12.5 Gram(s) IV Push once  dextrose 50% Injectable 25 Gram(s) IV Push once  dextrose 50% Injectable 25 Gram(s) IV Push once  epoetin jacqueline Injectable 68798 Unit(s) IV Push <User Schedule>  ferrous    sulfate Liquid 300 milliGRAM(s) Enteral Tube daily  folic acid 1 milliGRAM(s) Oral daily  gabapentin 100 milliGRAM(s) Oral three times a day  heparin  Injectable 5000 Unit(s) SubCutaneous every 12 hours  insulin lispro (HumaLOG) corrective regimen sliding scale   SubCutaneous every 6 hours  levothyroxine 50 MICROGram(s) Oral daily  meropenem IVPB 1000 milliGRAM(s) IV Intermittent every 24 hours  meropenem IVPB      metoprolol     tartrate 25 milliGRAM(s) Oral two times a day  pantoprazole    Tablet 40 milliGRAM(s) Oral before breakfast  PPD  5 Tuberculin Unit(s) Injectable 5 Unit(s) IntraDermal once  pregabalin 75 milliGRAM(s) Oral daily  sevelamer hydrochloride 1600 milliGRAM(s) Oral three times a day with meals  simvastatin 20 milliGRAM(s) Oral at bedtime      TELEMETRY: 	    ECG:  	  RADIOLOGY:  OTHER: 	  	  CBC Full  -  ( 13 Jan 2018 19:10 )  WBC Count : 14.2 K/uL  Hemoglobin : 7.6 g/dL  Hematocrit : 26.8 %  Platelet Count - Automated : 270 K/uL  Mean Cell Volume : 94.6 fl  Mean Cell Hemoglobin : 26.9 pg  Mean Cell Hemoglobin Concentration : 28.5 gm/dL  Auto Neutrophil # : 10.2 K/uL  Auto Lymphocyte # : 2.2 K/uL  Auto Monocyte # : 1.3 K/uL  Auto Eosinophil # : 0.2 K/uL  Auto Basophil # : 0.1 K/uL  Auto Neutrophil % : 71.9 %  Auto Lymphocyte % : 15.9 %  Auto Monocyte % : 9.5 %  Auto Eosinophil % : 1.7 %  Auto Basophil % : 1.0 %        CARDIAC MARKERS:  CARDIAC MARKERS ( 13 Jan 2018 15:54 )  <0.015 ng/mL / x     / x     / x     / x                                  7.6    14.2  )-----------( 270      ( 13 Jan 2018 19:10 )             26.8       01-13    139  |  103  |  42<H>  ----------------------------<  86  4.3   |  25  |  6.57<H>    Ca    8.1<L>      13 Jan 2018 19:10  Phos  6.4     01-13              proBNP:   Lipid Profile: Cholesterol 53  LDL -13  HDL 38      HgA1c:   TSH:

## 2018-01-14 NOTE — PROGRESS NOTE ADULT - ATTENDING COMMENTS
Patient is seen and examined. Case reviewed with the medical team. Above note is appreciated. Will follow up clinically. Continue DVT prophylaxis. over all prognosis is poor. Will follow up as per ID and Nephrology.

## 2018-01-14 NOTE — PROGRESS NOTE ADULT - SUBJECTIVE AND OBJECTIVE BOX
Pt seen and examined at bedside  Denies SOB; also afebrile.  c/o left knee pain.    Unable to dialyze yesterday due to malfunctioning shiley. Bleeding from shiley reported. Evaluated by vascular at that time.     Allergies:  No Known Allergies    Hospital Medications:   MEDICATIONS  (STANDING):  ascorbic acid 500 milliGRAM(s) Oral daily  cinacalcet 30 milliGRAM(s) Oral daily  dextrose 5%. 1000 milliLiter(s) (50 mL/Hr) IV Continuous <Continuous>  dextrose 50% Injectable 12.5 Gram(s) IV Push once  dextrose 50% Injectable 25 Gram(s) IV Push once  dextrose 50% Injectable 25 Gram(s) IV Push once  epoetin jacqueline Injectable 81536 Unit(s) IV Push <User Schedule>  ferrous    sulfate Liquid 300 milliGRAM(s) Enteral Tube daily  folic acid 1 milliGRAM(s) Oral daily  gabapentin 100 milliGRAM(s) Oral three times a day  heparin  Injectable 5000 Unit(s) SubCutaneous every 12 hours  insulin lispro (HumaLOG) corrective regimen sliding scale   SubCutaneous every 6 hours  levothyroxine 50 MICROGram(s) Oral daily  meropenem IVPB 1000 milliGRAM(s) IV Intermittent every 24 hours  meropenem IVPB      metoprolol     tartrate 25 milliGRAM(s) Oral two times a day  pantoprazole    Tablet 40 milliGRAM(s) Oral before breakfast  PPD  5 Tuberculin Unit(s) Injectable 5 Unit(s) IntraDermal once  pregabalin 75 milliGRAM(s) Oral daily  sevelamer hydrochloride 1600 milliGRAM(s) Oral three times a day with meals  simvastatin 20 milliGRAM(s) Oral at bedtime      VITALS:  T(F): 98.1 (01-14-18 @ 05:10), Max: 98.9 (01-13-18 @ 20:52)  HR: 80 (01-14-18 @ 05:10)  BP: 136/45 (01-14-18 @ 05:10)  RR: 18 (01-14-18 @ 05:10)  SpO2: 100% (01-14-18 @ 05:10)  Wt(kg): --      PHYSICAL EXAM:  Constitutional: NAD  HEENT: anicteric sclera, oropharynx clear, MMM  Neck: No JVD  Respiratory: CTAB, no wheezes, rales or rhonchi  Cardiovascular: S1, S2, RRR  Gastrointestinal: BS+, soft, NT/ND  Extremities: No cyanosis or clubbing. No peripheral edema  Neurological: A/O x 3, no focal deficits  Psychiatric: Normal mood, normal affect  : No CVA tenderness. No jarrell.   Skin: No rashes  Vascular Access: [facility]     LABS:  01-13    139  |  103  |  42<H>  ----------------------------<  86  4.3   |  25  |  6.57<H>    Ca    8.1<L>      13 Jan 2018 19:10  Phos  6.4     01-13      Creatinine Trend: 6.57 <--, 4.91 <--, 7.59 <--, 5.71 <--, 8.73 <--, 8.37 <--, 8.16 <--                        7.6    14.2  )-----------( 270      ( 13 Jan 2018 19:10 )             26.8     Urine Studies:      RADIOLOGY & ADDITIONAL STUDIES:

## 2018-01-14 NOTE — PROGRESS NOTE ADULT - SUBJECTIVE AND OBJECTIVE BOX
Patient is a 52y old  Female who presents with a chief complaint of SOB (13 Dec 2017 16:01)  awake, alert, comfortable in NAD    INTERVAL HPI/OVERNIGHT EVENTS:      VITAL SIGNS:  T(F): 98.1 (01-14-18 @ 05:10)  HR: 80 (01-14-18 @ 05:10)  BP: 136/45 (01-14-18 @ 05:10)  RR: 18 (01-14-18 @ 05:10)  SpO2: 100% (01-14-18 @ 05:10)  Wt(kg): --  I&O's Detail          REVIEW OF SYSTEMS:    CONSTITUTIONAL:  No fevers, chills, sweats    HEENT:  Eyes:  No diplopia or blurred vision. ENT:  No earache, sore throat or runny nose.    CARDIOVASCULAR:  No pressure, squeezing, tightness, or heaviness about the chest; no palpitations.    RESPIRATORY:  Per HPI    GASTROINTESTINAL:  No abdominal pain, nausea, vomiting or diarrhea.    GENITOURINARY:  No dysuria, frequency or urgency.    NEUROLOGIC:  No paresthesias, fasciculations, seizures or weakness.    PSYCHIATRIC:  No disorder of thought or mood.      PHYSICAL EXAM:    Constitutional: Well developed and nourished  Eyes:Perrla  ENMT: normal  Neck:supple  Respiratory: good air entry  Cardiovascular: S1 S2 regular  Gastrointestinal: Soft, Non tender  Extremities: No edema  Vascular:normal  Neurological:Awake, alert,Ox3  Musculoskeletal:Normal      MEDICATIONS  (STANDING):  ascorbic acid 500 milliGRAM(s) Oral daily  cinacalcet 30 milliGRAM(s) Oral daily  dextrose 5%. 1000 milliLiter(s) (50 mL/Hr) IV Continuous <Continuous>  dextrose 50% Injectable 12.5 Gram(s) IV Push once  dextrose 50% Injectable 25 Gram(s) IV Push once  dextrose 50% Injectable 25 Gram(s) IV Push once  epoetin jacqueline Injectable 11349 Unit(s) IV Push <User Schedule>  ferrous    sulfate Liquid 300 milliGRAM(s) Enteral Tube daily  folic acid 1 milliGRAM(s) Oral daily  gabapentin 100 milliGRAM(s) Oral three times a day  heparin  Injectable 5000 Unit(s) SubCutaneous every 12 hours  insulin lispro (HumaLOG) corrective regimen sliding scale   SubCutaneous every 6 hours  levothyroxine 50 MICROGram(s) Oral daily  meropenem IVPB 1000 milliGRAM(s) IV Intermittent every 24 hours  meropenem IVPB      metoprolol     tartrate 25 milliGRAM(s) Oral two times a day  pantoprazole    Tablet 40 milliGRAM(s) Oral before breakfast  PPD  5 Tuberculin Unit(s) Injectable 5 Unit(s) IntraDermal once  pregabalin 75 milliGRAM(s) Oral daily  sevelamer hydrochloride 1600 milliGRAM(s) Oral three times a day with meals  simvastatin 20 milliGRAM(s) Oral at bedtime    MEDICATIONS  (PRN):  acetaminophen   Tablet 650 milliGRAM(s) Oral every 6 hours PRN For Temp greater than 38 C (100.4 F)  aluminum hydroxide/magnesium hydroxide/simethicone Suspension 30 milliLiter(s) Oral every 4 hours PRN Dyspepsia  dextrose Gel 1 Dose(s) Oral once PRN Blood Glucose LESS THAN 70 milliGRAM(s)/deciliter  glucagon  Injectable 1 milliGRAM(s) IntraMuscular once PRN Glucose LESS THAN 70 milligrams/deciliter      Allergies    No Known Allergies    Intolerances        LABS:                        7.6    14.2  )-----------( 270      ( 13 Jan 2018 19:10 )             26.8     01-13    139  |  103  |  42<H>  ----------------------------<  86  4.3   |  25  |  6.57<H>    Ca    8.1<L>      13 Jan 2018 19:10  Phos  6.4     01-13            CARDIAC MARKERS ( 13 Jan 2018 15:54 )  <0.015 ng/mL / x     / x     / x     / x          CAPILLARY BLOOD GLUCOSE      POCT Blood Glucose.: 134 mg/dL (14 Jan 2018 08:27)  POCT Blood Glucose.: 116 mg/dL (14 Jan 2018 06:25)  POCT Blood Glucose.: 102 mg/dL (13 Jan 2018 23:45)  POCT Blood Glucose.: 100 mg/dL (13 Jan 2018 17:19)  POCT Blood Glucose.: 122 mg/dL (13 Jan 2018 11:24)        RADIOLOGY & ADDITIONAL TESTS:    CXR:    Ct scan chest:    ekg;    echo:

## 2018-01-15 LAB
ALBUMIN SERPL ELPH-MCNC: 1.6 G/DL — LOW (ref 3.5–5)
ALP SERPL-CCNC: 104 U/L — SIGNIFICANT CHANGE UP (ref 40–120)
ALT FLD-CCNC: <6 U/L DA — LOW (ref 10–60)
ANION GAP SERPL CALC-SCNC: 11 MMOL/L — SIGNIFICANT CHANGE UP (ref 5–17)
APTT BLD: 27.5 SEC — SIGNIFICANT CHANGE UP (ref 27.5–37.4)
AST SERPL-CCNC: 11 U/L — SIGNIFICANT CHANGE UP (ref 10–40)
BASOPHILS # BLD AUTO: 0.1 K/UL — SIGNIFICANT CHANGE UP (ref 0–0.2)
BASOPHILS NFR BLD AUTO: 0.9 % — SIGNIFICANT CHANGE UP (ref 0–2)
BILIRUB SERPL-MCNC: 0.3 MG/DL — SIGNIFICANT CHANGE UP (ref 0.2–1.2)
BUN SERPL-MCNC: 57 MG/DL — HIGH (ref 7–18)
CALCIUM SERPL-MCNC: 8.5 MG/DL — SIGNIFICANT CHANGE UP (ref 8.4–10.5)
CHLORIDE SERPL-SCNC: 102 MMOL/L — SIGNIFICANT CHANGE UP (ref 96–108)
CO2 SERPL-SCNC: 23 MMOL/L — SIGNIFICANT CHANGE UP (ref 22–31)
CREAT SERPL-MCNC: 7.72 MG/DL — HIGH (ref 0.5–1.3)
CULTURE RESULTS: SIGNIFICANT CHANGE UP
CULTURE RESULTS: SIGNIFICANT CHANGE UP
EOSINOPHIL # BLD AUTO: 0.5 K/UL — SIGNIFICANT CHANGE UP (ref 0–0.5)
EOSINOPHIL NFR BLD AUTO: 3.5 % — SIGNIFICANT CHANGE UP (ref 0–6)
GLUCOSE BLDC GLUCOMTR-MCNC: 102 MG/DL — HIGH (ref 70–99)
GLUCOSE BLDC GLUCOMTR-MCNC: 107 MG/DL — HIGH (ref 70–99)
GLUCOSE BLDC GLUCOMTR-MCNC: 119 MG/DL — HIGH (ref 70–99)
GLUCOSE BLDC GLUCOMTR-MCNC: 134 MG/DL — HIGH (ref 70–99)
GLUCOSE SERPL-MCNC: 86 MG/DL — SIGNIFICANT CHANGE UP (ref 70–99)
HCT VFR BLD CALC: 25.6 % — LOW (ref 34.5–45)
HGB BLD-MCNC: 7.4 G/DL — LOW (ref 11.5–15.5)
INR BLD: 1.05 RATIO — SIGNIFICANT CHANGE UP (ref 0.88–1.16)
LYMPHOCYTES # BLD AUTO: 15.4 % — SIGNIFICANT CHANGE UP (ref 13–44)
LYMPHOCYTES # BLD AUTO: 2.3 K/UL — SIGNIFICANT CHANGE UP (ref 1–3.3)
MAGNESIUM SERPL-MCNC: 2.8 MG/DL — HIGH (ref 1.6–2.6)
MCHC RBC-ENTMCNC: 27.1 PG — SIGNIFICANT CHANGE UP (ref 27–34)
MCHC RBC-ENTMCNC: 28.8 GM/DL — LOW (ref 32–36)
MCV RBC AUTO: 94.2 FL — SIGNIFICANT CHANGE UP (ref 80–100)
MONOCYTES # BLD AUTO: 1.4 K/UL — HIGH (ref 0–0.9)
MONOCYTES NFR BLD AUTO: 9.5 % — SIGNIFICANT CHANGE UP (ref 2–14)
NEUTROPHILS # BLD AUTO: 10.5 K/UL — HIGH (ref 1.8–7.4)
NEUTROPHILS NFR BLD AUTO: 70.7 % — SIGNIFICANT CHANGE UP (ref 43–77)
PHOSPHATE SERPL-MCNC: 5.6 MG/DL — HIGH (ref 2.5–4.5)
PLATELET # BLD AUTO: 253 K/UL — SIGNIFICANT CHANGE UP (ref 150–400)
POTASSIUM SERPL-MCNC: 5.2 MMOL/L — SIGNIFICANT CHANGE UP (ref 3.5–5.3)
POTASSIUM SERPL-SCNC: 5.2 MMOL/L — SIGNIFICANT CHANGE UP (ref 3.5–5.3)
PROT SERPL-MCNC: 6.2 G/DL — SIGNIFICANT CHANGE UP (ref 6–8.3)
PROTHROM AB SERPL-ACNC: 11.5 SEC — SIGNIFICANT CHANGE UP (ref 9.8–12.7)
RBC # BLD: 2.72 M/UL — LOW (ref 3.8–5.2)
RBC # FLD: 16.7 % — HIGH (ref 10.3–14.5)
SODIUM SERPL-SCNC: 136 MMOL/L — SIGNIFICANT CHANGE UP (ref 135–145)
SPECIMEN SOURCE: SIGNIFICANT CHANGE UP
SPECIMEN SOURCE: SIGNIFICANT CHANGE UP
WBC # BLD: 14.8 K/UL — HIGH (ref 3.8–10.5)
WBC # FLD AUTO: 14.8 K/UL — HIGH (ref 3.8–10.5)

## 2018-01-15 RX ORDER — CALAMINE 8% AND ZINC OXIDE 8% 160 MG/ML
1 LOTION TOPICAL THREE TIMES A DAY
Qty: 0 | Refills: 0 | Status: DISCONTINUED | OUTPATIENT
Start: 2018-01-15 | End: 2018-01-29

## 2018-01-15 RX ADMIN — Medication 500 MILLIGRAM(S): at 14:17

## 2018-01-15 RX ADMIN — Medication 300 MILLIGRAM(S): at 14:17

## 2018-01-15 RX ADMIN — HEPARIN SODIUM 5000 UNIT(S): 5000 INJECTION INTRAVENOUS; SUBCUTANEOUS at 05:57

## 2018-01-15 RX ADMIN — PANTOPRAZOLE SODIUM 40 MILLIGRAM(S): 20 TABLET, DELAYED RELEASE ORAL at 05:57

## 2018-01-15 RX ADMIN — Medication 25 MILLIGRAM(S): at 05:56

## 2018-01-15 RX ADMIN — CINACALCET 30 MILLIGRAM(S): 30 TABLET, FILM COATED ORAL at 14:16

## 2018-01-15 RX ADMIN — Medication 50 MICROGRAM(S): at 05:56

## 2018-01-15 RX ADMIN — SEVELAMER CARBONATE 1600 MILLIGRAM(S): 2400 POWDER, FOR SUSPENSION ORAL at 18:03

## 2018-01-15 RX ADMIN — GABAPENTIN 100 MILLIGRAM(S): 400 CAPSULE ORAL at 21:17

## 2018-01-15 RX ADMIN — CALAMINE 8% AND ZINC OXIDE 8% 1 APPLICATION(S): 160 LOTION TOPICAL at 14:22

## 2018-01-15 RX ADMIN — GABAPENTIN 100 MILLIGRAM(S): 400 CAPSULE ORAL at 05:56

## 2018-01-15 RX ADMIN — Medication 25 MILLIGRAM(S): at 18:06

## 2018-01-15 RX ADMIN — CALAMINE 8% AND ZINC OXIDE 8% 1 APPLICATION(S): 160 LOTION TOPICAL at 23:15

## 2018-01-15 RX ADMIN — Medication 75 MILLIGRAM(S): at 14:21

## 2018-01-15 RX ADMIN — SEVELAMER CARBONATE 1600 MILLIGRAM(S): 2400 POWDER, FOR SUSPENSION ORAL at 08:16

## 2018-01-15 RX ADMIN — HEPARIN SODIUM 5000 UNIT(S): 5000 INJECTION INTRAVENOUS; SUBCUTANEOUS at 18:03

## 2018-01-15 RX ADMIN — MEROPENEM 100 MILLIGRAM(S): 1 INJECTION INTRAVENOUS at 16:02

## 2018-01-15 RX ADMIN — Medication 1 PATCH: at 20:22

## 2018-01-15 RX ADMIN — SIMVASTATIN 20 MILLIGRAM(S): 20 TABLET, FILM COATED ORAL at 21:18

## 2018-01-15 RX ADMIN — Medication 1 MILLIGRAM(S): at 14:17

## 2018-01-15 RX ADMIN — Medication 1 PATCH: at 08:22

## 2018-01-15 RX ADMIN — GABAPENTIN 100 MILLIGRAM(S): 400 CAPSULE ORAL at 14:18

## 2018-01-15 RX ADMIN — SEVELAMER CARBONATE 1600 MILLIGRAM(S): 2400 POWDER, FOR SUSPENSION ORAL at 14:16

## 2018-01-15 NOTE — PROGRESS NOTE ADULT - SUBJECTIVE AND OBJECTIVE BOX
Patient is a 52y old  Female who presents with a chief complaint of SOB (13 Dec 2017 16:01)      INTERVAL HPI/OVERNIGHT EVENTS: no fever in 24 hours          MEDICATIONS  (STANDING):  ascorbic acid 500 milliGRAM(s) Oral daily  cinacalcet 30 milliGRAM(s) Oral daily  dextrose 5%. 1000 milliLiter(s) (50 mL/Hr) IV Continuous <Continuous>  dextrose 50% Injectable 12.5 Gram(s) IV Push once  dextrose 50% Injectable 25 Gram(s) IV Push once  dextrose 50% Injectable 25 Gram(s) IV Push once  epoetin jacqueline Injectable 07541 Unit(s) IV Push <User Schedule>  ferrous    sulfate Liquid 300 milliGRAM(s) Enteral Tube daily  folic acid 1 milliGRAM(s) Oral daily  heparin  Injectable 5000 Unit(s) SubCutaneous every 12 hours  insulin lispro (HumaLOG) corrective regimen sliding scale   SubCutaneous every 6 hours  levothyroxine 50 MICROGram(s) Oral daily  meropenem IVPB 1000 milliGRAM(s) IV Intermittent every 24 hours  meropenem IVPB      metoprolol     tartrate 25 milliGRAM(s) Oral two times a day  pantoprazole    Tablet 40 milliGRAM(s) Oral before breakfast  PPD  5 Tuberculin Unit(s) Injectable 5 Unit(s) IntraDermal once  pregabalin 75 milliGRAM(s) Oral daily  sevelamer hydrochloride 1600 milliGRAM(s) Oral three times a day with meals  simvastatin 20 milliGRAM(s) Oral at bedtime    MEDICATIONS  (PRN):  acetaminophen   Tablet 650 milliGRAM(s) Oral every 6 hours PRN For Temp greater than 38 C (100.4 F)  dextrose Gel 1 Dose(s) Oral once PRN Blood Glucose LESS THAN 70 milliGRAM(s)/deciliter  glucagon  Injectable 1 milliGRAM(s) IntraMuscular once PRN Glucose LESS THAN 70 milligrams/deciliter      PHYSICAL EXAM:  GENERAL: NAD  NERVOUS SYSTEM:  Alert & Oriented X3, no focal neurological deficit   CHEST/LUNG: Clear to percussion bilaterally; Rt. IJ catheter in place   HEART: Regular rate and rhythm; No murmurs, rubs, or gallops  ABDOMEN: Soft, Nontender, Nondistended; Bowel sounds present  EXTREMITIES:  2+ Peripheral Pulses, No clubbing, cyanosis, or edema  SKIN: sacral decubitus ulcer , non stageable   LABS:                        8

## 2018-01-15 NOTE — PROGRESS NOTE ADULT - SUBJECTIVE AND OBJECTIVE BOX
Patient is a 52y old  Female who presents with a chief complaint of SOB (13 Dec 2017 16:01)  Awake, alert, comfortable in bed in [facility].    INTERVAL HPI/OVERNIGHT EVENTS:      VITAL SIGNS:  T(F): 98.8 (01-15-18 @ 05:18)  HR: 71 (01-15-18 @ 05:18)  BP: 131/55 (01-15-18 @ 05:18)  RR: 18 (01-15-18 @ 05:18)  SpO2: 100% (01-15-18 @ 05:18)  Wt(kg): --  I&O's Detail          REVIEW OF SYSTEMS:    CONSTITUTIONAL:  No fevers, chills, sweats    HEENT:  Eyes:  No diplopia or blurred vision. ENT:  No earache, sore throat or runny nose.    CARDIOVASCULAR:  No pressure, squeezing, tightness, or heaviness about the chest; no palpitations.    RESPIRATORY:  Per HPI    GASTROINTESTINAL:  No abdominal pain, nausea, vomiting or diarrhea.    GENITOURINARY:  No dysuria, frequency or urgency.    NEUROLOGIC:  No paresthesias, fasciculations, seizures or weakness.    PSYCHIATRIC:  No disorder of thought or mood.      PHYSICAL EXAM:    Constitutional: Well developed and nourished  Eyes:Perrla  ENMT: normal  Neck:supple  Respiratory: good air entry  Cardiovascular: S1 S2 regular  Gastrointestinal: Soft, Non tender  Extremities: No edema  Vascular:normal  Neurological:Awake, alert,Ox3  Musculoskeletal: Sacral decubitus      MEDICATIONS  (STANDING):  ascorbic acid 500 milliGRAM(s) Oral daily  cinacalcet 30 milliGRAM(s) Oral daily  dextrose 5%. 1000 milliLiter(s) (50 mL/Hr) IV Continuous <Continuous>  dextrose 50% Injectable 12.5 Gram(s) IV Push once  dextrose 50% Injectable 25 Gram(s) IV Push once  dextrose 50% Injectable 25 Gram(s) IV Push once  epoetin jacqueline Injectable 73463 Unit(s) IV Push <User Schedule>  ferrous    sulfate Liquid 300 milliGRAM(s) Enteral Tube daily  folic acid 1 milliGRAM(s) Oral daily  gabapentin 100 milliGRAM(s) Oral three times a day  heparin  Injectable 5000 Unit(s) SubCutaneous every 12 hours  insulin lispro (HumaLOG) corrective regimen sliding scale   SubCutaneous every 6 hours  levothyroxine 50 MICROGram(s) Oral daily  meropenem IVPB 1000 milliGRAM(s) IV Intermittent every 24 hours  meropenem IVPB      metoprolol     tartrate 25 milliGRAM(s) Oral two times a day  nitroglycerin    Patch 0.2 mG/Hr(s) 1 patch Transdermal daily  pantoprazole    Tablet 40 milliGRAM(s) Oral before breakfast  PPD  5 Tuberculin Unit(s) Injectable 5 Unit(s) IntraDermal once  pregabalin 75 milliGRAM(s) Oral daily  sevelamer hydrochloride 1600 milliGRAM(s) Oral three times a day with meals  simvastatin 20 milliGRAM(s) Oral at bedtime    MEDICATIONS  (PRN):  acetaminophen   Tablet 650 milliGRAM(s) Oral every 6 hours PRN For Temp greater than 38 C (100.4 F)  acetaminophen   Tablet. 650 milliGRAM(s) Oral every 6 hours PRN Moderate Pain (4 - 6)  aluminum hydroxide/magnesium hydroxide/simethicone Suspension 30 milliLiter(s) Oral every 4 hours PRN Dyspepsia  dextrose Gel 1 Dose(s) Oral once PRN Blood Glucose LESS THAN 70 milliGRAM(s)/deciliter  glucagon  Injectable 1 milliGRAM(s) IntraMuscular once PRN Glucose LESS THAN 70 milligrams/deciliter      Allergies    No Known Allergies    Intolerances        LABS:                        7.6    14.2  )-----------( 270      ( 13 Jan 2018 19:10 )             26.8     01-13    139  |  103  |  42<H>  ----------------------------<  86  4.3   |  25  |  6.57<H>    Ca    8.1<L>      13 Jan 2018 19:10  Phos  6.4     01-13            CARDIAC MARKERS ( 13 Jan 2018 15:54 )  <0.015 ng/mL / x     / x     / x     / x          CAPILLARY BLOOD GLUCOSE      POCT Blood Glucose.: 102 mg/dL (15 Rebel 2018 07:09)  POCT Blood Glucose.: 134 mg/dL (14 Jan 2018 23:59)  POCT Blood Glucose.: 143 mg/dL (14 Jan 2018 18:15)  POCT Blood Glucose.: 120 mg/dL (14 Jan 2018 11:16)        RADIOLOGY & ADDITIONAL TESTS:    CXR:    Ct scan chest:    ekg;    echo:

## 2018-01-15 NOTE — PROGRESS NOTE ADULT - ASSESSMENT
pneumonia  fevers - resolved  leukocytosis - improved overall    plan - cont meropenem 1gm iv q24 hrs will need another 3-4 days  she is cleared from an infectious disease point of view to get her permacath

## 2018-01-15 NOTE — PROGRESS NOTE ADULT - ATTENDING COMMENTS
Patient is seen and examined. Case reviewed with the medical team. Above note is appreciated. Will follow up clinically. Continue DVT prophylaxis. Case discussed with Nephrology and ID.

## 2018-01-15 NOTE — PROGRESS NOTE ADULT - SUBJECTIVE AND OBJECTIVE BOX
52y Female    Meds:  meropenem IVPB 1000 milliGRAM(s) IV Intermittent every 24 hours  meropenem IVPB        Allergies    No Known Allergies    Intolerances        VITALS:  Vital Signs Last 24 Hrs  T(C): 36.5 (15 Rebel 2018 14:36), Max: 37.2 (14 Jan 2018 20:41)  T(F): 97.7 (15 Rebel 2018 14:36), Max: 99 (14 Jan 2018 20:41)  HR: 84 (15 Rebel 2018 14:36) (71 - 84)  BP: 121/50 (15 Rebel 2018 14:36) (121/50 - 145/50)  BP(mean): --  RR: 16 (15 Rebel 2018 14:36) (16 - 19)  SpO2: 95% (15 Rebel 2018 14:36) (95% - 100%)    LABS/DIAGNOSTIC TESTS:                          7.4    14.8  )-----------( 253      ( 15 Rebel 2018 13:50 )             25.6         01-15    136  |  102  |  57<H>  ----------------------------<  86  5.2   |  23  |  7.72<H>    Ca    8.5      15 Rebel 2018 13:50  Phos  5.6     01-15  Mg     2.8     01-15    TPro  6.2  /  Alb  1.6<L>  /  TBili  0.3  /  DBili  x   /  AST  11  /  ALT  <6<L>  /  AlkPhos  104  01-15      LIVER FUNCTIONS - ( 15 Rebel 2018 13:50 )  Alb: 1.6 g/dL / Pro: 6.2 g/dL / ALK PHOS: 104 U/L / ALT: <6 U/L DA / AST: 11 U/L / GGT: x             CULTURES: .Blood Blood  01-10 @ 00:04   No growth at 5 days.  --  --      .Blood Blood-Peripheral  01-06 @ 21:57   No growth at 5 days.  --  --      .Blood Blood  01-06 @ 00:17   No growth at 5 days.  --  --      .Blood Blood  01-05 @ 22:47   No growth at 5 days.  --  --                RADIOLOGY:      ROS:  [  ] UNABLE TO ELICIT 52y Female who is doing much better overall, she has no sob , only a mild cough, no diarrhea, no fevers, no chills, her wbc count is decreasing overall. she has been on abxs for a new pneumonia.    Meds:  meropenem IVPB 1000 milliGRAM(s) IV Intermittent every 24 hours  meropenem IVPB        Allergies    No Known Allergies    Intolerances        VITALS:  Vital Signs Last 24 Hrs  T(C): 36.5 (15 Rebel 2018 14:36), Max: 37.2 (14 Jan 2018 20:41)  T(F): 97.7 (15 Rebel 2018 14:36), Max: 99 (14 Jan 2018 20:41)  HR: 84 (15 Rebel 2018 14:36) (71 - 84)  BP: 121/50 (15 Rebel 2018 14:36) (121/50 - 145/50)  BP(mean): --  RR: 16 (15 Rebel 2018 14:36) (16 - 19)  SpO2: 95% (15 Rebel 2018 14:36) (95% - 100%)    LABS/DIAGNOSTIC TESTS:                          7.4    14.8  )-----------( 253      ( 15 Rebel 2018 13:50 )             25.6         01-15    136  |  102  |  57<H>  ----------------------------<  86  5.2   |  23  |  7.72<H>    Ca    8.5      15 Rebel 2018 13:50  Phos  5.6     01-15  Mg     2.8     01-15    TPro  6.2  /  Alb  1.6<L>  /  TBili  0.3  /  DBili  x   /  AST  11  /  ALT  <6<L>  /  AlkPhos  104  01-15      LIVER FUNCTIONS - ( 15 Rebel 2018 13:50 )  Alb: 1.6 g/dL / Pro: 6.2 g/dL / ALK PHOS: 104 U/L / ALT: <6 U/L DA / AST: 11 U/L / GGT: x             CULTURES: .Blood Blood  01-10 @ 00:04   No growth at 5 days.  --  --      .Blood Blood-Peripheral  01-06 @ 21:57   No growth at 5 days.  --  --      .Blood Blood  01-06 @ 00:17   No growth at 5 days.  --  --      .Blood Blood  01-05 @ 22:47   No growth at 5 days.  --  --                RADIOLOGY:      ROS:  [  ] UNABLE TO ELICIT

## 2018-01-15 NOTE — PROGRESS NOTE ADULT - PROBLEM SELECTOR PLAN 2
no fever in 24 hours  blood culture from 1/10 showed no growth up to date   Nephrologist recommending to take out Shiley, will be replaced with guide wire by IR in am if she remains afebrile   Received 1 dose of vancomycin   Continue with meropenem   Follow up ID  Will need tunneled cath placement, once fever/infection resolves

## 2018-01-15 NOTE — PROGRESS NOTE ADULT - SUBJECTIVE AND OBJECTIVE BOX
no events overnight.  afebrile    No Known Allergies    Hospital Medications:   MEDICATIONS  (STANDING):  ascorbic acid 500 milliGRAM(s) Oral daily  calamine Lotion 1 Application(s) Topical three times a day  cinacalcet 30 milliGRAM(s) Oral daily  dextrose 5%. 1000 milliLiter(s) (50 mL/Hr) IV Continuous <Continuous>  dextrose 50% Injectable 12.5 Gram(s) IV Push once  dextrose 50% Injectable 25 Gram(s) IV Push once  dextrose 50% Injectable 25 Gram(s) IV Push once  epoetin jacqueline Injectable 32608 Unit(s) IV Push <User Schedule>  ferrous    sulfate Liquid 300 milliGRAM(s) Enteral Tube daily  folic acid 1 milliGRAM(s) Oral daily  gabapentin 100 milliGRAM(s) Oral three times a day  heparin  Injectable 5000 Unit(s) SubCutaneous every 12 hours  insulin lispro (HumaLOG) corrective regimen sliding scale   SubCutaneous every 6 hours  levothyroxine 50 MICROGram(s) Oral daily  meropenem IVPB 1000 milliGRAM(s) IV Intermittent every 24 hours  meropenem IVPB      metoprolol     tartrate 25 milliGRAM(s) Oral two times a day  nitroglycerin    Patch 0.2 mG/Hr(s) 1 patch Transdermal daily  pantoprazole    Tablet 40 milliGRAM(s) Oral before breakfast  PPD  5 Tuberculin Unit(s) Injectable 5 Unit(s) IntraDermal once  pregabalin 75 milliGRAM(s) Oral daily  sevelamer hydrochloride 1600 milliGRAM(s) Oral three times a day with meals  simvastatin 20 milliGRAM(s) Oral at bedtime        VITALS:  T(F): 97.7 (01-15-18 @ 14:36), Max: 99 (01-14-18 @ 20:41)  HR: 84 (01-15-18 @ 14:36)  BP: 121/50 (01-15-18 @ 14:36)  RR: 16 (01-15-18 @ 14:36)  SpO2: 95% (01-15-18 @ 14:36)  Wt(kg): --      PHYSICAL EXAM:  Constitutional: NAD  HEENT: anicteric sclera, oropharynx clear, MMM  Neck: No JVD  Respiratory: CTAB, no wheezes, rales or rhonchi  Cardiovascular: S1, S2, RRR  Gastrointestinal: BS+, soft, NT/ND  Extremities: No cyanosis or clubbing. No peripheral edema  Vascular Access: RT vasc cath.    LABS:  01-15    136  |  102  |  57<H>  ----------------------------<  86  5.2   |  23  |  7.72<H>    Ca    8.5      15 Rebel 2018 13:50  Phos  5.6     01-15  Mg     2.8     01-15    TPro  6.2  /  Alb  1.6<L>  /  TBili  0.3  /  DBili      /  AST  11  /  ALT  <6<L>  /  AlkPhos  104  01-15    Creatinine Trend: 7.72 <--, 6.57 <--, 4.91 <--, 7.59 <--, 5.71 <--, 8.73 <--, 8.37 <--                        7.4    14.8  )-----------( 253      ( 15 Rebel 2018 13:50 )             25.6     Urine Studies:      RADIOLOGY & ADDITIONAL STUDIES:

## 2018-01-15 NOTE — PROGRESS NOTE ADULT - ASSESSMENT
52 yr old female with  ESRD.  Leucocytosis better. afebrile. cont ABX  hyperphosphatemia  anemia of CKD. cont MARITA  plans for tunneled catheter in AM and HD thereafter.  .

## 2018-01-16 LAB
GLUCOSE BLDC GLUCOMTR-MCNC: 100 MG/DL — HIGH (ref 70–99)
GLUCOSE BLDC GLUCOMTR-MCNC: 103 MG/DL — HIGH (ref 70–99)
GLUCOSE BLDC GLUCOMTR-MCNC: 107 MG/DL — HIGH (ref 70–99)
GLUCOSE BLDC GLUCOMTR-MCNC: 91 MG/DL — SIGNIFICANT CHANGE UP (ref 70–99)
GLUCOSE BLDC GLUCOMTR-MCNC: 96 MG/DL — SIGNIFICANT CHANGE UP (ref 70–99)

## 2018-01-16 RX ORDER — NITROGLYCERIN 6.5 MG
1 CAPSULE, EXTENDED RELEASE ORAL DAILY
Qty: 0 | Refills: 0 | Status: DISCONTINUED | OUTPATIENT
Start: 2018-01-16 | End: 2018-01-29

## 2018-01-16 RX ADMIN — Medication 25 MILLIGRAM(S): at 05:47

## 2018-01-16 RX ADMIN — Medication 1 PATCH: at 23:26

## 2018-01-16 RX ADMIN — Medication 75 MILLIGRAM(S): at 12:14

## 2018-01-16 RX ADMIN — CALAMINE 8% AND ZINC OXIDE 8% 1 APPLICATION(S): 160 LOTION TOPICAL at 23:21

## 2018-01-16 RX ADMIN — CALAMINE 8% AND ZINC OXIDE 8% 1 APPLICATION(S): 160 LOTION TOPICAL at 14:21

## 2018-01-16 RX ADMIN — SEVELAMER CARBONATE 1600 MILLIGRAM(S): 2400 POWDER, FOR SUSPENSION ORAL at 12:06

## 2018-01-16 RX ADMIN — Medication 0: at 23:24

## 2018-01-16 RX ADMIN — GABAPENTIN 100 MILLIGRAM(S): 400 CAPSULE ORAL at 05:48

## 2018-01-16 RX ADMIN — Medication 1 PATCH: at 10:18

## 2018-01-16 RX ADMIN — SEVELAMER CARBONATE 1600 MILLIGRAM(S): 2400 POWDER, FOR SUSPENSION ORAL at 08:17

## 2018-01-16 RX ADMIN — Medication 1 MILLIGRAM(S): at 12:06

## 2018-01-16 RX ADMIN — Medication 1 PATCH: at 10:29

## 2018-01-16 RX ADMIN — Medication 300 MILLIGRAM(S): at 14:20

## 2018-01-16 RX ADMIN — CALAMINE 8% AND ZINC OXIDE 8% 1 APPLICATION(S): 160 LOTION TOPICAL at 05:48

## 2018-01-16 RX ADMIN — PANTOPRAZOLE SODIUM 40 MILLIGRAM(S): 20 TABLET, DELAYED RELEASE ORAL at 05:47

## 2018-01-16 RX ADMIN — Medication 500 MILLIGRAM(S): at 12:06

## 2018-01-16 RX ADMIN — HEPARIN SODIUM 5000 UNIT(S): 5000 INJECTION INTRAVENOUS; SUBCUTANEOUS at 17:26

## 2018-01-16 RX ADMIN — GABAPENTIN 100 MILLIGRAM(S): 400 CAPSULE ORAL at 23:25

## 2018-01-16 RX ADMIN — Medication 50 MICROGRAM(S): at 05:48

## 2018-01-16 RX ADMIN — SEVELAMER CARBONATE 1600 MILLIGRAM(S): 2400 POWDER, FOR SUSPENSION ORAL at 17:25

## 2018-01-16 RX ADMIN — GABAPENTIN 100 MILLIGRAM(S): 400 CAPSULE ORAL at 14:20

## 2018-01-16 RX ADMIN — Medication 650 MILLIGRAM(S): at 23:25

## 2018-01-16 RX ADMIN — CINACALCET 30 MILLIGRAM(S): 30 TABLET, FILM COATED ORAL at 12:06

## 2018-01-16 RX ADMIN — SIMVASTATIN 20 MILLIGRAM(S): 20 TABLET, FILM COATED ORAL at 23:25

## 2018-01-16 RX ADMIN — MEROPENEM 100 MILLIGRAM(S): 1 INJECTION INTRAVENOUS at 14:20

## 2018-01-16 RX ADMIN — HEPARIN SODIUM 5000 UNIT(S): 5000 INJECTION INTRAVENOUS; SUBCUTANEOUS at 05:48

## 2018-01-16 RX ADMIN — Medication 25 MILLIGRAM(S): at 17:25

## 2018-01-16 NOTE — PROGRESS NOTE ADULT - ASSESSMENT
52 yr old female with  ESRD.  Leucocytosis better. afebrile. cont ABX  hyperphosphatemia  anemia of CKD. cont MARITA  Awaits  for tunneled catheter by IR and removal of shiley.  euvolemic and no electrolyte dyscrasia.  if catheter not placed today, can have HD in AM post cath.  .

## 2018-01-16 NOTE — PROGRESS NOTE ADULT - SUBJECTIVE AND OBJECTIVE BOX
Patient is a 52y old  Female who presents with a chief complaint of SOB (13 Dec 2017 16:01)  Awake,alert, comfortable in NAD    INTERVAL HPI/OVERNIGHT EVENTS:      VITAL SIGNS:  T(F): 98.6 (01-16-18 @ 14:00)  HR: 76 (01-16-18 @ 14:00)  BP: 135/62 (01-16-18 @ 14:00)  RR: 16 (01-16-18 @ 14:00)  SpO2: 97% (01-16-18 @ 14:00)  Wt(kg): --  I&O's Detail    15 Rebel 2018 07:01  -  16 Jan 2018 07:00  --------------------------------------------------------  IN:  Total IN: 0 mL    OUT:  Total OUT: 0 mL    Total NET: 0 mL              REVIEW OF SYSTEMS:    CONSTITUTIONAL:  No fevers, chills, sweats    HEENT:  Eyes:  No diplopia or blurred vision. ENT:  No earache, sore throat or runny nose.    CARDIOVASCULAR:  No pressure, squeezing, tightness, or heaviness about the chest; no palpitations.    RESPIRATORY:  Per HPI    GASTROINTESTINAL:  No abdominal pain, nausea, vomiting or diarrhea.    GENITOURINARY:  No dysuria, frequency or urgency.    NEUROLOGIC:  No paresthesias, fasciculations, seizures or weakness.    PSYCHIATRIC:  No disorder of thought or mood.      PHYSICAL EXAM:    Constitutional: Well developed and nourished  Eyes:Perrla  ENMT: normal  Neck:supple  Respiratory: good air entry  Cardiovascular: S1 S2 regular  Gastrointestinal: Soft, Non tender  Extremities: No edema  Vascular:normal  Neurological:Awake, alert,Ox3  Musculoskeletal:Sacral decubitus      MEDICATIONS  (STANDING):  ascorbic acid 500 milliGRAM(s) Oral daily  calamine Lotion 1 Application(s) Topical three times a day  cinacalcet 30 milliGRAM(s) Oral daily  dextrose 5%. 1000 milliLiter(s) (50 mL/Hr) IV Continuous <Continuous>  dextrose 50% Injectable 12.5 Gram(s) IV Push once  dextrose 50% Injectable 25 Gram(s) IV Push once  dextrose 50% Injectable 25 Gram(s) IV Push once  epoetin jacqueline Injectable 85737 Unit(s) IV Push <User Schedule>  ferrous    sulfate Liquid 300 milliGRAM(s) Enteral Tube daily  folic acid 1 milliGRAM(s) Oral daily  gabapentin 100 milliGRAM(s) Oral three times a day  heparin  Injectable 5000 Unit(s) SubCutaneous every 12 hours  insulin lispro (HumaLOG) corrective regimen sliding scale   SubCutaneous every 6 hours  levothyroxine 50 MICROGram(s) Oral daily  meropenem IVPB 1000 milliGRAM(s) IV Intermittent every 24 hours  meropenem IVPB      metoprolol     tartrate 25 milliGRAM(s) Oral two times a day  nitroglycerin    Patch 0.2 mG/Hr(s) 1 patch Transdermal daily  pantoprazole    Tablet 40 milliGRAM(s) Oral before breakfast  PPD  5 Tuberculin Unit(s) Injectable 5 Unit(s) IntraDermal once  pregabalin 75 milliGRAM(s) Oral daily  sevelamer hydrochloride 1600 milliGRAM(s) Oral three times a day with meals  simvastatin 20 milliGRAM(s) Oral at bedtime    MEDICATIONS  (PRN):  acetaminophen   Tablet 650 milliGRAM(s) Oral every 6 hours PRN For Temp greater than 38 C (100.4 F)  acetaminophen   Tablet. 650 milliGRAM(s) Oral every 6 hours PRN Moderate Pain (4 - 6)  aluminum hydroxide/magnesium hydroxide/simethicone Suspension 30 milliLiter(s) Oral every 4 hours PRN Dyspepsia  dextrose Gel 1 Dose(s) Oral once PRN Blood Glucose LESS THAN 70 milliGRAM(s)/deciliter  glucagon  Injectable 1 milliGRAM(s) IntraMuscular once PRN Glucose LESS THAN 70 milligrams/deciliter      Allergies    No Known Allergies    Intolerances        LABS:                        7.4    14.8  )-----------( 253      ( 15 Rebel 2018 13:50 )             25.6     01-15    136  |  102  |  57<H>  ----------------------------<  86  5.2   |  23  |  7.72<H>    Ca    8.5      15 Rebel 2018 13:50  Phos  5.6     01-15  Mg     2.8     01-15    TPro  6.2  /  Alb  1.6<L>  /  TBili  0.3  /  DBili  x   /  AST  11  /  ALT  <6<L>  /  AlkPhos  104  01-15    PT/INR - ( 15 Rebel 2018 13:50 )   PT: 11.5 sec;   INR: 1.05 ratio         PTT - ( 15 Rebel 2018 13:50 )  PTT:27.5 sec          CAPILLARY BLOOD GLUCOSE      POCT Blood Glucose.: 103 mg/dL (16 Jan 2018 11:57)  POCT Blood Glucose.: 96 mg/dL (16 Jan 2018 07:01)  POCT Blood Glucose.: 107 mg/dL (16 Jan 2018 00:03)  POCT Blood Glucose.: 107 mg/dL (15 Rebel 2018 18:05)        RADIOLOGY & ADDITIONAL TESTS:    CXR:    Ct scan chest:    ekg;    echo:

## 2018-01-16 NOTE — PROGRESS NOTE ADULT - SUBJECTIVE AND OBJECTIVE BOX
Patient is a 52y old  Female who presents with a chief complaint of SOB (13 Dec 2017 16:01)      INTERVAL HPI/OVERNIGHT EVENTS: no fever in 24 hours          MEDICATIONS  (STANDING):  ascorbic acid 500 milliGRAM(s) Oral daily  cinacalcet 30 milliGRAM(s) Oral daily  dextrose 5%. 1000 milliLiter(s) (50 mL/Hr) IV Continuous <Continuous>  dextrose 50% Injectable 12.5 Gram(s) IV Push once  dextrose 50% Injectable 25 Gram(s) IV Push once  dextrose 50% Injectable 25 Gram(s) IV Push once  epoetin jacqueline Injectable 01914 Unit(s) IV Push <User Schedule>  ferrous    sulfate Liquid 300 milliGRAM(s) Enteral Tube daily  folic acid 1 milliGRAM(s) Oral daily  heparin  Injectable 5000 Unit(s) SubCutaneous every 12 hours  insulin lispro (HumaLOG) corrective regimen sliding scale   SubCutaneous every 6 hours  levothyroxine 50 MICROGram(s) Oral daily  meropenem IVPB 1000 milliGRAM(s) IV Intermittent every 24 hours  meropenem IVPB      metoprolol     tartrate 25 milliGRAM(s) Oral two times a day  pantoprazole    Tablet 40 milliGRAM(s) Oral before breakfast  PPD  5 Tuberculin Unit(s) Injectable 5 Unit(s) IntraDermal once  pregabalin 75 milliGRAM(s) Oral daily  sevelamer hydrochloride 1600 milliGRAM(s) Oral three times a day with meals  simvastatin 20 milliGRAM(s) Oral at bedtime    MEDICATIONS  (PRN):  acetaminophen   Tablet 650 milliGRAM(s) Oral every 6 hours PRN For Temp greater than 38 C (100.4 F)  dextrose Gel 1 Dose(s) Oral once PRN Blood Glucose LESS THAN 70 milliGRAM(s)/deciliter  glucagon  Injectable 1 milliGRAM(s) IntraMuscular once PRN Glucose LESS THAN 70 milligrams/deciliter      PHYSICAL EXAM:  GENERAL: NAD  NERVOUS SYSTEM:  Alert & Oriented X1-2, no focal neurological deficit   CHEST/LUNG: Clear to percussion bilaterally; Rt. IJ catheter in place   HEART: Regular rate and rhythm; No murmurs, rubs, or gallops  ABDOMEN: Soft, Nontender, Nondistended; Bowel sounds present  EXTREMITIES:  2+ Peripheral Pulses, No clubbing, cyanosis, or edema  SKIN: sacral decubitus ulcer , non stageable   LABS:                        8

## 2018-01-16 NOTE — PROGRESS NOTE ADULT - PROBLEM SELECTOR PLAN 2
no fever in 24 hours  blood culture from 1/10 showed no growth up to date   Nephrologist recommending to take out Shiley, will be replaced with guide wire by IR in am if she remains afebrile   Received 1 dose of vancomycin   Continue with meropenem for 4 more days of iv antibiotics   fever resolved    Follow up ID  Will need tunneled cath placement, once fever/infection resolves

## 2018-01-16 NOTE — PROGRESS NOTE ADULT - SUBJECTIVE AND OBJECTIVE BOX
pt s- new complaints  vss a/f  shiley in place.  pt cleared for permacath by ID service  blood cultures negative    plan for IR placement or permacath and subsequent shiley cath removal

## 2018-01-16 NOTE — PROGRESS NOTE ADULT - PROBLEM SELECTOR PLAN 3
-HD as per nephrology  permacath placement pending  will go macie per IR as they don't have equipment set up for today  nephrology follow up   currently not over loaded but will continue watchful monitoring

## 2018-01-16 NOTE — PROGRESS NOTE ADULT - SUBJECTIVE AND OBJECTIVE BOX
c/o generalized pain    No Known Allergies    Hospital Medications:   MEDICATIONS  (STANDING):  ascorbic acid 500 milliGRAM(s) Oral daily  calamine Lotion 1 Application(s) Topical three times a day  cinacalcet 30 milliGRAM(s) Oral daily  dextrose 5%. 1000 milliLiter(s) (50 mL/Hr) IV Continuous <Continuous>  dextrose 50% Injectable 12.5 Gram(s) IV Push once  dextrose 50% Injectable 25 Gram(s) IV Push once  dextrose 50% Injectable 25 Gram(s) IV Push once  epoetin jacqueline Injectable 14610 Unit(s) IV Push <User Schedule>  ferrous    sulfate Liquid 300 milliGRAM(s) Enteral Tube daily  folic acid 1 milliGRAM(s) Oral daily  gabapentin 100 milliGRAM(s) Oral three times a day  heparin  Injectable 5000 Unit(s) SubCutaneous every 12 hours  insulin lispro (HumaLOG) corrective regimen sliding scale   SubCutaneous every 6 hours  levothyroxine 50 MICROGram(s) Oral daily  meropenem IVPB 1000 milliGRAM(s) IV Intermittent every 24 hours  meropenem IVPB      metoprolol     tartrate 25 milliGRAM(s) Oral two times a day  nitroglycerin    Patch 0.2 mG/Hr(s) 1 patch Transdermal daily  pantoprazole    Tablet 40 milliGRAM(s) Oral before breakfast  PPD  5 Tuberculin Unit(s) Injectable 5 Unit(s) IntraDermal once  pregabalin 75 milliGRAM(s) Oral daily  sevelamer hydrochloride 1600 milliGRAM(s) Oral three times a day with meals  simvastatin 20 milliGRAM(s) Oral at bedtime        VITALS:  T(F): 98.1 (01-16-18 @ 05:35), Max: 98.7 (01-15-18 @ 21:10)  HR: 73 (01-16-18 @ 05:35)  BP: 159/69 (01-16-18 @ 05:35)  RR: 17 (01-16-18 @ 05:35)  SpO2: 99% (01-16-18 @ 05:35)  Wt(kg): --    01-15 @ 07:01  -  01-16 @ 07:00  --------------------------------------------------------  IN: 0 mL / OUT: 0 mL / NET: 0 mL        PHYSICAL EXAM:  Constitutional: NAD  HEENT: anicteric sclera, oropharynx clear.  Neck: No JVD  Respiratory: CTAB, no wheezes, rales or rhonchi  Cardiovascular: S1, S2, RRR  Gastrointestinal: BS+, soft, NT/ND  Extremities: No cyanosis or clubbing. No peripheral edema  Neurological: A/O x 3, no focal deficits  Vascular Access: RT IJ john.    LABS:  01-15    136  |  102  |  57<H>  ----------------------------<  86  5.2   |  23  |  7.72<H>    Ca    8.5      15 Rebel 2018 13:50  Phos  5.6     01-15  Mg     2.8     01-15    TPro  6.2  /  Alb  1.6<L>  /  TBili  0.3  /  DBili      /  AST  11  /  ALT  <6<L>  /  AlkPhos  104  01-15    Creatinine Trend: 7.72 <--, 6.57 <--, 4.91 <--, 7.59 <--, 5.71 <--, 8.73 <--                        7.4    14.8  )-----------( 253      ( 15 Rebel 2018 13:50 )             25.6     Urine Studies:      RADIOLOGY & ADDITIONAL STUDIES:

## 2018-01-17 LAB
ALBUMIN SERPL ELPH-MCNC: 1.6 G/DL — LOW (ref 3.5–5)
ALP SERPL-CCNC: 110 U/L — SIGNIFICANT CHANGE UP (ref 40–120)
ALT FLD-CCNC: <6 U/L DA — LOW (ref 10–60)
ANION GAP SERPL CALC-SCNC: 12 MMOL/L — SIGNIFICANT CHANGE UP (ref 5–17)
AST SERPL-CCNC: 10 U/L — SIGNIFICANT CHANGE UP (ref 10–40)
BASOPHILS # BLD AUTO: 0.1 K/UL — SIGNIFICANT CHANGE UP (ref 0–0.2)
BASOPHILS NFR BLD AUTO: 0.7 % — SIGNIFICANT CHANGE UP (ref 0–2)
BILIRUB SERPL-MCNC: 0.4 MG/DL — SIGNIFICANT CHANGE UP (ref 0.2–1.2)
BUN SERPL-MCNC: 66 MG/DL — HIGH (ref 7–18)
CALCIUM SERPL-MCNC: 9.3 MG/DL — SIGNIFICANT CHANGE UP (ref 8.4–10.5)
CHLORIDE SERPL-SCNC: 105 MMOL/L — SIGNIFICANT CHANGE UP (ref 96–108)
CO2 SERPL-SCNC: 22 MMOL/L — SIGNIFICANT CHANGE UP (ref 22–31)
CREAT SERPL-MCNC: 9.44 MG/DL — HIGH (ref 0.5–1.3)
EOSINOPHIL # BLD AUTO: 0.4 K/UL — SIGNIFICANT CHANGE UP (ref 0–0.5)
EOSINOPHIL NFR BLD AUTO: 2.2 % — SIGNIFICANT CHANGE UP (ref 0–6)
GLUCOSE BLDC GLUCOMTR-MCNC: 103 MG/DL — HIGH (ref 70–99)
GLUCOSE BLDC GLUCOMTR-MCNC: 99 MG/DL — SIGNIFICANT CHANGE UP (ref 70–99)
GLUCOSE SERPL-MCNC: 89 MG/DL — SIGNIFICANT CHANGE UP (ref 70–99)
HCT VFR BLD CALC: 25 % — LOW (ref 34.5–45)
HGB BLD-MCNC: 7.7 G/DL — LOW (ref 11.5–15.5)
LYMPHOCYTES # BLD AUTO: 1.9 K/UL — SIGNIFICANT CHANGE UP (ref 1–3.3)
LYMPHOCYTES # BLD AUTO: 11.4 % — LOW (ref 13–44)
MAGNESIUM SERPL-MCNC: 3.1 MG/DL — HIGH (ref 1.6–2.6)
MCHC RBC-ENTMCNC: 29.3 PG — SIGNIFICANT CHANGE UP (ref 27–34)
MCHC RBC-ENTMCNC: 30.8 GM/DL — LOW (ref 32–36)
MCV RBC AUTO: 95.2 FL — SIGNIFICANT CHANGE UP (ref 80–100)
MONOCYTES # BLD AUTO: 1.3 K/UL — HIGH (ref 0–0.9)
MONOCYTES NFR BLD AUTO: 7.8 % — SIGNIFICANT CHANGE UP (ref 2–14)
NEUTROPHILS # BLD AUTO: 13.3 K/UL — HIGH (ref 1.8–7.4)
NEUTROPHILS NFR BLD AUTO: 77.9 % — HIGH (ref 43–77)
PHOSPHATE SERPL-MCNC: 6.8 MG/DL — HIGH (ref 2.5–4.5)
PLATELET # BLD AUTO: 332 K/UL — SIGNIFICANT CHANGE UP (ref 150–400)
POTASSIUM SERPL-MCNC: 5.6 MMOL/L — HIGH (ref 3.5–5.3)
POTASSIUM SERPL-SCNC: 5.6 MMOL/L — HIGH (ref 3.5–5.3)
PROT SERPL-MCNC: 6 G/DL — SIGNIFICANT CHANGE UP (ref 6–8.3)
RBC # BLD: 2.63 M/UL — LOW (ref 3.8–5.2)
RBC # FLD: 16.8 % — HIGH (ref 10.3–14.5)
SODIUM SERPL-SCNC: 139 MMOL/L — SIGNIFICANT CHANGE UP (ref 135–145)
WBC # BLD: 17.1 K/UL — HIGH (ref 3.8–10.5)
WBC # FLD AUTO: 17.1 K/UL — HIGH (ref 3.8–10.5)

## 2018-01-17 RX ORDER — INSULIN HUMAN 100 [IU]/ML
10 INJECTION, SOLUTION SUBCUTANEOUS ONCE
Qty: 0 | Refills: 0 | Status: COMPLETED | OUTPATIENT
Start: 2018-01-17 | End: 2018-01-17

## 2018-01-17 RX ORDER — DEXTROSE 50 % IN WATER 50 %
50 SYRINGE (ML) INTRAVENOUS ONCE
Qty: 0 | Refills: 0 | Status: COMPLETED | OUTPATIENT
Start: 2018-01-17 | End: 2018-01-17

## 2018-01-17 RX ADMIN — GABAPENTIN 100 MILLIGRAM(S): 400 CAPSULE ORAL at 21:38

## 2018-01-17 RX ADMIN — Medication 75 MILLIGRAM(S): at 14:58

## 2018-01-17 RX ADMIN — CINACALCET 30 MILLIGRAM(S): 30 TABLET, FILM COATED ORAL at 14:58

## 2018-01-17 RX ADMIN — Medication 1 PATCH: at 23:30

## 2018-01-17 RX ADMIN — Medication 50 MILLILITER(S): at 12:46

## 2018-01-17 RX ADMIN — HEPARIN SODIUM 5000 UNIT(S): 5000 INJECTION INTRAVENOUS; SUBCUTANEOUS at 07:53

## 2018-01-17 RX ADMIN — Medication 650 MILLIGRAM(S): at 00:25

## 2018-01-17 RX ADMIN — Medication 1 MILLIGRAM(S): at 14:57

## 2018-01-17 RX ADMIN — ERYTHROPOIETIN 14000 UNIT(S): 10000 INJECTION, SOLUTION INTRAVENOUS; SUBCUTANEOUS at 16:59

## 2018-01-17 RX ADMIN — CALAMINE 8% AND ZINC OXIDE 8% 1 APPLICATION(S): 160 LOTION TOPICAL at 14:57

## 2018-01-17 RX ADMIN — CALAMINE 8% AND ZINC OXIDE 8% 1 APPLICATION(S): 160 LOTION TOPICAL at 06:00

## 2018-01-17 RX ADMIN — Medication 1 PATCH: at 11:49

## 2018-01-17 RX ADMIN — CALAMINE 8% AND ZINC OXIDE 8% 1 APPLICATION(S): 160 LOTION TOPICAL at 21:38

## 2018-01-17 RX ADMIN — Medication 25 MILLIGRAM(S): at 07:53

## 2018-01-17 RX ADMIN — Medication 300 MILLIGRAM(S): at 14:57

## 2018-01-17 RX ADMIN — PANTOPRAZOLE SODIUM 40 MILLIGRAM(S): 20 TABLET, DELAYED RELEASE ORAL at 07:53

## 2018-01-17 RX ADMIN — MEROPENEM 100 MILLIGRAM(S): 1 INJECTION INTRAVENOUS at 15:22

## 2018-01-17 RX ADMIN — Medication 500 MILLIGRAM(S): at 14:58

## 2018-01-17 RX ADMIN — SIMVASTATIN 20 MILLIGRAM(S): 20 TABLET, FILM COATED ORAL at 21:38

## 2018-01-17 RX ADMIN — Medication 25 MILLIGRAM(S): at 19:18

## 2018-01-17 RX ADMIN — GABAPENTIN 100 MILLIGRAM(S): 400 CAPSULE ORAL at 14:58

## 2018-01-17 RX ADMIN — Medication 50 MICROGRAM(S): at 07:53

## 2018-01-17 RX ADMIN — GABAPENTIN 100 MILLIGRAM(S): 400 CAPSULE ORAL at 07:53

## 2018-01-17 RX ADMIN — INSULIN HUMAN 10 UNIT(S): 100 INJECTION, SOLUTION SUBCUTANEOUS at 12:46

## 2018-01-17 NOTE — PROGRESS NOTE ADULT - ASSESSMENT
52 yr old female with  ESRD.  WBC rising. afebrile. on Meronem. afebrile  hyperphosphatemia  anemia of CKD. cont MARITA  hyperkalemia. will get dextrose and insulin.  Awaits  for tunneled catheter by IR today then HD

## 2018-01-17 NOTE — PROGRESS NOTE ADULT - PROBLEM SELECTOR PLAN 3
-HD as per nephrology  permacath placement pending  nephrology follow up   currently not over loaded but will continue watchful monitoring  plan for tunneled cath today

## 2018-01-17 NOTE — PROGRESS NOTE ADULT - SUBJECTIVE AND OBJECTIVE BOX
Patient is a 52y old  Female who presents with a chief complaint of SOB (13 Dec 2017 16:01)  awake, alert, comfortable in NAD. Awaiting PC placement    INTERVAL HPI/OVERNIGHT EVENTS:      VITAL SIGNS:  T(F): 98.2 (01-17-18 @ 05:47)  HR: 74 (01-17-18 @ 05:47)  BP: 154/72 (01-17-18 @ 05:47)  RR: 16 (01-17-18 @ 05:47)  SpO2: 97% (01-17-18 @ 05:47)  Wt(kg): --  I&O's Detail          REVIEW OF SYSTEMS:    CONSTITUTIONAL:  No fevers, chills, sweats    HEENT:  Eyes:  No diplopia or blurred vision. ENT:  No earache, sore throat or runny nose.    CARDIOVASCULAR:  No pressure, squeezing, tightness, or heaviness about the chest; no palpitations.    RESPIRATORY:  Per HPI    GASTROINTESTINAL:  No abdominal pain, nausea, vomiting or diarrhea.    GENITOURINARY:  No dysuria, frequency or urgency.    NEUROLOGIC:  No paresthesias, fasciculations, seizures or weakness.    PSYCHIATRIC:  No disorder of thought or mood.      PHYSICAL EXAM:    Constitutional: Well developed and nourished  Eyes:Perrla  ENMT: normal  Neck:supple  Respiratory: good air entry  Cardiovascular: S1 S2 regular  Gastrointestinal: Soft, Non tender  Extremities: No edema  Vascular:normal  Neurological:Awake, alert,Ox3  Musculoskeletal:Normal      MEDICATIONS  (STANDING):  ascorbic acid 500 milliGRAM(s) Oral daily  calamine Lotion 1 Application(s) Topical three times a day  cinacalcet 30 milliGRAM(s) Oral daily  dextrose 5%. 1000 milliLiter(s) (50 mL/Hr) IV Continuous <Continuous>  dextrose 50% Injectable 12.5 Gram(s) IV Push once  dextrose 50% Injectable 25 Gram(s) IV Push once  dextrose 50% Injectable 25 Gram(s) IV Push once  epoetin jacqueline Injectable 16516 Unit(s) IV Push <User Schedule>  ferrous    sulfate Liquid 300 milliGRAM(s) Enteral Tube daily  folic acid 1 milliGRAM(s) Oral daily  gabapentin 100 milliGRAM(s) Oral three times a day  heparin  Injectable 5000 Unit(s) SubCutaneous every 12 hours  insulin lispro (HumaLOG) corrective regimen sliding scale   SubCutaneous every 6 hours  levothyroxine 50 MICROGram(s) Oral daily  meropenem IVPB 1000 milliGRAM(s) IV Intermittent every 24 hours  meropenem IVPB      metoprolol     tartrate 25 milliGRAM(s) Oral two times a day  nitroglycerin    Patch 0.2 mG/Hr(s) 1 patch Transdermal daily  pantoprazole    Tablet 40 milliGRAM(s) Oral before breakfast  PPD  5 Tuberculin Unit(s) Injectable 5 Unit(s) IntraDermal once  pregabalin 75 milliGRAM(s) Oral daily  sevelamer hydrochloride 1600 milliGRAM(s) Oral three times a day with meals  simvastatin 20 milliGRAM(s) Oral at bedtime    MEDICATIONS  (PRN):  acetaminophen   Tablet 650 milliGRAM(s) Oral every 6 hours PRN For Temp greater than 38 C (100.4 F)  acetaminophen   Tablet. 650 milliGRAM(s) Oral every 6 hours PRN Moderate Pain (4 - 6)  aluminum hydroxide/magnesium hydroxide/simethicone Suspension 30 milliLiter(s) Oral every 4 hours PRN Dyspepsia  dextrose Gel 1 Dose(s) Oral once PRN Blood Glucose LESS THAN 70 milliGRAM(s)/deciliter  glucagon  Injectable 1 milliGRAM(s) IntraMuscular once PRN Glucose LESS THAN 70 milligrams/deciliter      Allergies    No Known Allergies    Intolerances        LABS:                        7.7    17.1  )-----------( 332      ( 17 Jan 2018 07:42 )             25.0     01-17    139  |  105  |  66<H>  ----------------------------<  89  5.6<H>   |  22  |  9.44<H>    Ca    9.3      17 Jan 2018 07:42  Phos  6.8     01-17  Mg     3.1     01-17    TPro  6.0  /  Alb  1.6<L>  /  TBili  0.4  /  DBili  x   /  AST  10  /  ALT  <6<L>  /  AlkPhos  110  01-17    PT/INR - ( 15 Rebel 2018 13:50 )   PT: 11.5 sec;   INR: 1.05 ratio         PTT - ( 15 Rebel 2018 13:50 )  PTT:27.5 sec          CAPILLARY BLOOD GLUCOSE      POCT Blood Glucose.: 103 mg/dL (17 Jan 2018 06:04)  POCT Blood Glucose.: 100 mg/dL (16 Jan 2018 23:20)  POCT Blood Glucose.: 91 mg/dL (16 Jan 2018 17:47)  POCT Blood Glucose.: 103 mg/dL (16 Jan 2018 11:57)        RADIOLOGY & ADDITIONAL TESTS:    CXR:  < from: Xray Chest 1 View AP- PORTABLE-Urgent (01.12.18 @ 15:25) >  Impression: Stable right IJ central venous catheter.     New mild right perihilar and left basilar pulmonary infiltrates.    New small atelectasis in the right midlung zone.    No evidence for pneumothorax.    The trachea is midline.    Stable cardiac silhouette.      < end of copied text >    Ct scan chest:    ekg;    echo:

## 2018-01-17 NOTE — PROGRESS NOTE ADULT - SUBJECTIVE AND OBJECTIVE BOX
afebrile.    No Known Allergies    Hospital Medications:   MEDICATIONS  (STANDING):  ascorbic acid 500 milliGRAM(s) Oral daily  calamine Lotion 1 Application(s) Topical three times a day  cinacalcet 30 milliGRAM(s) Oral daily  dextrose 5%. 1000 milliLiter(s) (50 mL/Hr) IV Continuous <Continuous>  dextrose 50% Injectable 12.5 Gram(s) IV Push once  dextrose 50% Injectable 25 Gram(s) IV Push once  dextrose 50% Injectable 25 Gram(s) IV Push once  dextrose 50% Injectable 50 milliLiter(s) IV Push once  epoetin jacqueline Injectable 18227 Unit(s) IV Push <User Schedule>  ferrous    sulfate Liquid 300 milliGRAM(s) Enteral Tube daily  folic acid 1 milliGRAM(s) Oral daily  gabapentin 100 milliGRAM(s) Oral three times a day  heparin  Injectable 5000 Unit(s) SubCutaneous every 12 hours  insulin lispro (HumaLOG) corrective regimen sliding scale   SubCutaneous every 6 hours  insulin regular  human recombinant. 10 Unit(s) IV Push once  levothyroxine 50 MICROGram(s) Oral daily  meropenem IVPB 1000 milliGRAM(s) IV Intermittent every 24 hours  meropenem IVPB      metoprolol     tartrate 25 milliGRAM(s) Oral two times a day  nitroglycerin    Patch 0.2 mG/Hr(s) 1 patch Transdermal daily  pantoprazole    Tablet 40 milliGRAM(s) Oral before breakfast  PPD  5 Tuberculin Unit(s) Injectable 5 Unit(s) IntraDermal once  pregabalin 75 milliGRAM(s) Oral daily  sevelamer hydrochloride 1600 milliGRAM(s) Oral three times a day with meals  simvastatin 20 milliGRAM(s) Oral at bedtim    VITALS:  T(F): 98.2 (01-17-18 @ 05:47), Max: 99.1 (01-16-18 @ 20:56)  HR: 74 (01-17-18 @ 05:47)  BP: 154/72 (01-17-18 @ 05:47)  RR: 16 (01-17-18 @ 05:47)  SpO2: 97% (01-17-18 @ 05:47)  Wt(kg): --      PHYSICAL EXAM:  Constitutional: NAD  HEENT: anicteric sclera, oropharynx clear, MMM  Neck: No JVD  Respiratory: CTAB, no wheezes, rales or rhonchi  Cardiovascular: S1, S2, RRR  Gastrointestinal: BS+, soft, NT/ND  Extremities: No cyanosis or clubbing. No peripheral edema  Neurological: A/O x 3, no focal deficits  Vascular Access: RT MARCO A lopez.    LABS:  01-17    139  |  105  |  66<H>  ----------------------------<  89  5.6<H>   |  22  |  9.44<H>    Ca    9.3      17 Jan 2018 07:42  Phos  6.8     01-17  Mg     3.1     01-17    TPro  6.0  /  Alb  1.6<L>  /  TBili  0.4  /  DBili      /  AST  10  /  ALT  <6<L>  /  AlkPhos  110  01-17    Creatinine Trend: 9.44 <--, 7.72 <--, 6.57 <--, 4.91 <--, 7.59 <--, 5.71 <--                        7.7    17.1  )-----------( 332      ( 17 Jan 2018 07:42 )             25.0     Urine Studies:      RADIOLOGY & ADDITIONAL STUDIES:

## 2018-01-17 NOTE — PROGRESS NOTE ADULT - SUBJECTIVE AND OBJECTIVE BOX
Patient is a 52y old  Female who presents with a chief complaint of SOB (13 Dec 2017 16:01)      INTERVAL HPI/OVERNIGHT EVENTS: no acute overnight events    MEDICATIONS  (STANDING):  ascorbic acid 500 milliGRAM(s) Oral daily  calamine Lotion 1 Application(s) Topical three times a day  cinacalcet 30 milliGRAM(s) Oral daily  dextrose 5%. 1000 milliLiter(s) (50 mL/Hr) IV Continuous <Continuous>  dextrose 50% Injectable 12.5 Gram(s) IV Push once  dextrose 50% Injectable 25 Gram(s) IV Push once  dextrose 50% Injectable 25 Gram(s) IV Push once  epoetin jacqueline Injectable 28171 Unit(s) IV Push <User Schedule>  ferrous    sulfate Liquid 300 milliGRAM(s) Enteral Tube daily  folic acid 1 milliGRAM(s) Oral daily  gabapentin 100 milliGRAM(s) Oral three times a day  heparin  Injectable 5000 Unit(s) SubCutaneous every 12 hours  insulin lispro (HumaLOG) corrective regimen sliding scale   SubCutaneous every 6 hours  levothyroxine 50 MICROGram(s) Oral daily  meropenem IVPB 1000 milliGRAM(s) IV Intermittent every 24 hours  meropenem IVPB      metoprolol     tartrate 25 milliGRAM(s) Oral two times a day  nitroglycerin    Patch 0.2 mG/Hr(s) 1 patch Transdermal daily  pantoprazole    Tablet 40 milliGRAM(s) Oral before breakfast  PPD  5 Tuberculin Unit(s) Injectable 5 Unit(s) IntraDermal once  pregabalin 75 milliGRAM(s) Oral daily  sevelamer hydrochloride 1600 milliGRAM(s) Oral three times a day with meals  simvastatin 20 milliGRAM(s) Oral at bedtime    MEDICATIONS  (PRN):  acetaminophen   Tablet 650 milliGRAM(s) Oral every 6 hours PRN For Temp greater than 38 C (100.4 F)  acetaminophen   Tablet. 650 milliGRAM(s) Oral every 6 hours PRN Moderate Pain (4 - 6)  aluminum hydroxide/magnesium hydroxide/simethicone Suspension 30 milliLiter(s) Oral every 4 hours PRN Dyspepsia  dextrose Gel 1 Dose(s) Oral once PRN Blood Glucose LESS THAN 70 milliGRAM(s)/deciliter  glucagon  Injectable 1 milliGRAM(s) IntraMuscular once PRN Glucose LESS THAN 70 milligrams/deciliter      Allergies    No Known Allergies    Intolerances        REVIEW OF SYSTEMS:  denies fever, chills , chest pain , sob , c/o itching at back. denies any other symptoms    Vital Signs Last 24 Hrs  T(C): 36.8 (17 Jan 2018 05:47), Max: 37.3 (16 Jan 2018 20:56)  T(F): 98.2 (17 Jan 2018 05:47), Max: 99.1 (16 Jan 2018 20:56)  HR: 74 (17 Jan 2018 05:47) (74 - 81)  BP: 154/72 (17 Jan 2018 05:47) (135/62 - 154/72)  BP(mean): --  RR: 16 (17 Jan 2018 05:47) (16 - 17)  SpO2: 97% (17 Jan 2018 05:47) (97% - 100%)    PHYSICAL EXAM:  GENERAL: NAD  NERVOUS SYSTEM:  Alert & Oriented X1-2, no focal neurological deficit   CHEST/LUNG: Clear to percussion bilaterally; Rt. IJ catheter in place   HEART: Regular rate and rhythm; No murmurs, rubs, or gallops  ABDOMEN: Soft, Nontender, Nondistended; Bowel sounds present  EXTREMITIES:  2+ Peripheral Pulses, No clubbing, cyanosis, or edema  SKIN: sacral decubitus ulcer , non stageable     LABS:                        7.7    17.1  )-----------( 332      ( 17 Jan 2018 07:42 )             25.0     01-17    139  |  105  |  66<H>  ----------------------------<  89  5.6<H>   |  22  |  9.44<H>    Ca    9.3      17 Jan 2018 07:42  Phos  6.8     01-17  Mg     3.1     01-17    TPro  6.0  /  Alb  1.6<L>  /  TBili  0.4  /  DBili  x   /  AST  10  /  ALT  <6<L>  /  AlkPhos  110  01-17    PT/INR - ( 15 Rebel 2018 13:50 )   PT: 11.5 sec;   INR: 1.05 ratio         PTT - ( 15 Rebel 2018 13:50 )  PTT:27.5 sec    CAPILLARY BLOOD GLUCOSE      POCT Blood Glucose.: 103 mg/dL (17 Jan 2018 06:04)  POCT Blood Glucose.: 100 mg/dL (16 Jan 2018 23:20)  POCT Blood Glucose.: 91 mg/dL (16 Jan 2018 17:47)  POCT Blood Glucose.: 103 mg/dL (16 Jan 2018 11:57)      RADIOLOGY & ADDITIONAL TESTS:    Imaging Personally Reviewed:  [ ] YES  [ ] NO    Consultant(s) Notes Reviewed:  [ ] YES  [ ] NO

## 2018-01-17 NOTE — PROGRESS NOTE ADULT - ATTENDING COMMENTS
Patient is seen and examined. Case reviewed with the medical team. Above note is appreciated. Will follow up clinically. Continue DVT prophylaxis. Case discussed with nephrology.

## 2018-01-18 LAB
ALBUMIN SERPL ELPH-MCNC: 1.8 G/DL — LOW (ref 3.5–5)
ALP SERPL-CCNC: 107 U/L — SIGNIFICANT CHANGE UP (ref 40–120)
ALT FLD-CCNC: 6 U/L DA — LOW (ref 10–60)
ANION GAP SERPL CALC-SCNC: 10 MMOL/L — SIGNIFICANT CHANGE UP (ref 5–17)
AST SERPL-CCNC: 15 U/L — SIGNIFICANT CHANGE UP (ref 10–40)
BASOPHILS # BLD AUTO: 0.1 K/UL — SIGNIFICANT CHANGE UP (ref 0–0.2)
BASOPHILS NFR BLD AUTO: 0.8 % — SIGNIFICANT CHANGE UP (ref 0–2)
BILIRUB SERPL-MCNC: 0.3 MG/DL — SIGNIFICANT CHANGE UP (ref 0.2–1.2)
BUN SERPL-MCNC: 28 MG/DL — HIGH (ref 7–18)
CALCIUM SERPL-MCNC: 8.8 MG/DL — SIGNIFICANT CHANGE UP (ref 8.4–10.5)
CHLORIDE SERPL-SCNC: 102 MMOL/L — SIGNIFICANT CHANGE UP (ref 96–108)
CO2 SERPL-SCNC: 26 MMOL/L — SIGNIFICANT CHANGE UP (ref 22–31)
CREAT SERPL-MCNC: 5.03 MG/DL — HIGH (ref 0.5–1.3)
EOSINOPHIL # BLD AUTO: 0.4 K/UL — SIGNIFICANT CHANGE UP (ref 0–0.5)
EOSINOPHIL NFR BLD AUTO: 2.4 % — SIGNIFICANT CHANGE UP (ref 0–6)
GLUCOSE BLDC GLUCOMTR-MCNC: 63 MG/DL — LOW (ref 70–99)
GLUCOSE BLDC GLUCOMTR-MCNC: 70 MG/DL — SIGNIFICANT CHANGE UP (ref 70–99)
GLUCOSE BLDC GLUCOMTR-MCNC: 70 MG/DL — SIGNIFICANT CHANGE UP (ref 70–99)
GLUCOSE BLDC GLUCOMTR-MCNC: 75 MG/DL — SIGNIFICANT CHANGE UP (ref 70–99)
GLUCOSE BLDC GLUCOMTR-MCNC: 78 MG/DL — SIGNIFICANT CHANGE UP (ref 70–99)
GLUCOSE BLDC GLUCOMTR-MCNC: 87 MG/DL — SIGNIFICANT CHANGE UP (ref 70–99)
GLUCOSE SERPL-MCNC: 68 MG/DL — LOW (ref 70–99)
HCT VFR BLD CALC: 26.8 % — LOW (ref 34.5–45)
HGB BLD-MCNC: 7.8 G/DL — LOW (ref 11.5–15.5)
LYMPHOCYTES # BLD AUTO: 1.9 K/UL — SIGNIFICANT CHANGE UP (ref 1–3.3)
LYMPHOCYTES # BLD AUTO: 11.9 % — LOW (ref 13–44)
MAGNESIUM SERPL-MCNC: 2.5 MG/DL — SIGNIFICANT CHANGE UP (ref 1.6–2.6)
MCHC RBC-ENTMCNC: 27.5 PG — SIGNIFICANT CHANGE UP (ref 27–34)
MCHC RBC-ENTMCNC: 29.2 GM/DL — LOW (ref 32–36)
MCV RBC AUTO: 93.9 FL — SIGNIFICANT CHANGE UP (ref 80–100)
MONOCYTES # BLD AUTO: 1.2 K/UL — HIGH (ref 0–0.9)
MONOCYTES NFR BLD AUTO: 7.5 % — SIGNIFICANT CHANGE UP (ref 2–14)
NEUTROPHILS # BLD AUTO: 12.5 K/UL — HIGH (ref 1.8–7.4)
NEUTROPHILS NFR BLD AUTO: 77.4 % — HIGH (ref 43–77)
PHOSPHATE SERPL-MCNC: 3.6 MG/DL — SIGNIFICANT CHANGE UP (ref 2.5–4.5)
PLATELET # BLD AUTO: 311 K/UL — SIGNIFICANT CHANGE UP (ref 150–400)
POTASSIUM SERPL-MCNC: 4.2 MMOL/L — SIGNIFICANT CHANGE UP (ref 3.5–5.3)
POTASSIUM SERPL-SCNC: 4.2 MMOL/L — SIGNIFICANT CHANGE UP (ref 3.5–5.3)
PROT SERPL-MCNC: 6.3 G/DL — SIGNIFICANT CHANGE UP (ref 6–8.3)
RBC # BLD: 2.86 M/UL — LOW (ref 3.8–5.2)
RBC # FLD: 18.7 % — HIGH (ref 10.3–14.5)
SODIUM SERPL-SCNC: 138 MMOL/L — SIGNIFICANT CHANGE UP (ref 135–145)
WBC # BLD: 16.2 K/UL — HIGH (ref 3.8–10.5)
WBC # FLD AUTO: 16.2 K/UL — HIGH (ref 3.8–10.5)

## 2018-01-18 RX ORDER — INSULIN LISPRO 100/ML
VIAL (ML) SUBCUTANEOUS
Qty: 0 | Refills: 0 | Status: DISCONTINUED | OUTPATIENT
Start: 2018-01-18 | End: 2018-01-29

## 2018-01-18 RX ORDER — DEXTROSE 50 % IN WATER 50 %
1 SYRINGE (ML) INTRAVENOUS ONCE
Qty: 0 | Refills: 0 | Status: DISCONTINUED | OUTPATIENT
Start: 2018-01-18 | End: 2018-01-29

## 2018-01-18 RX ORDER — ONDANSETRON 8 MG/1
4 TABLET, FILM COATED ORAL ONCE
Qty: 0 | Refills: 0 | Status: COMPLETED | OUTPATIENT
Start: 2018-01-18 | End: 2018-01-18

## 2018-01-18 RX ADMIN — Medication 25 MILLIGRAM(S): at 20:13

## 2018-01-18 RX ADMIN — PANTOPRAZOLE SODIUM 40 MILLIGRAM(S): 20 TABLET, DELAYED RELEASE ORAL at 05:48

## 2018-01-18 RX ADMIN — Medication 650 MILLIGRAM(S): at 13:49

## 2018-01-18 RX ADMIN — Medication 1 MILLIGRAM(S): at 13:20

## 2018-01-18 RX ADMIN — CINACALCET 30 MILLIGRAM(S): 30 TABLET, FILM COATED ORAL at 13:20

## 2018-01-18 RX ADMIN — Medication 1 PATCH: at 13:20

## 2018-01-18 RX ADMIN — SEVELAMER CARBONATE 1600 MILLIGRAM(S): 2400 POWDER, FOR SUSPENSION ORAL at 13:19

## 2018-01-18 RX ADMIN — CALAMINE 8% AND ZINC OXIDE 8% 1 APPLICATION(S): 160 LOTION TOPICAL at 13:27

## 2018-01-18 RX ADMIN — SIMVASTATIN 20 MILLIGRAM(S): 20 TABLET, FILM COATED ORAL at 21:36

## 2018-01-18 RX ADMIN — HEPARIN SODIUM 5000 UNIT(S): 5000 INJECTION INTRAVENOUS; SUBCUTANEOUS at 05:48

## 2018-01-18 RX ADMIN — MEROPENEM 100 MILLIGRAM(S): 1 INJECTION INTRAVENOUS at 13:21

## 2018-01-18 RX ADMIN — GABAPENTIN 100 MILLIGRAM(S): 400 CAPSULE ORAL at 21:36

## 2018-01-18 RX ADMIN — Medication 50 MICROGRAM(S): at 05:48

## 2018-01-18 RX ADMIN — Medication 500 MILLIGRAM(S): at 13:20

## 2018-01-18 RX ADMIN — CALAMINE 8% AND ZINC OXIDE 8% 1 APPLICATION(S): 160 LOTION TOPICAL at 21:39

## 2018-01-18 RX ADMIN — Medication 25 MILLIGRAM(S): at 05:48

## 2018-01-18 RX ADMIN — CALAMINE 8% AND ZINC OXIDE 8% 1 APPLICATION(S): 160 LOTION TOPICAL at 05:50

## 2018-01-18 RX ADMIN — HEPARIN SODIUM 5000 UNIT(S): 5000 INJECTION INTRAVENOUS; SUBCUTANEOUS at 18:08

## 2018-01-18 RX ADMIN — ONDANSETRON 4 MILLIGRAM(S): 8 TABLET, FILM COATED ORAL at 19:17

## 2018-01-18 RX ADMIN — Medication 650 MILLIGRAM(S): at 14:50

## 2018-01-18 RX ADMIN — GABAPENTIN 100 MILLIGRAM(S): 400 CAPSULE ORAL at 05:48

## 2018-01-18 RX ADMIN — Medication 75 MILLIGRAM(S): at 13:27

## 2018-01-18 RX ADMIN — GABAPENTIN 100 MILLIGRAM(S): 400 CAPSULE ORAL at 13:20

## 2018-01-18 RX ADMIN — SEVELAMER CARBONATE 1600 MILLIGRAM(S): 2400 POWDER, FOR SUSPENSION ORAL at 08:42

## 2018-01-18 RX ADMIN — Medication 300 MILLIGRAM(S): at 13:20

## 2018-01-18 NOTE — PROGRESS NOTE ADULT - SUBJECTIVE AND OBJECTIVE BOX
INTERVAL HPI/OVERNIGHT EVENTS:    Pt seen and examined at bedside. Offers no acute complaints at this time. Denies fevers, chills, SOB or CP. IJ shiflip exchanged yesterday by IR, pt states she had HD yesterday, w/o any issues.      Vital Signs Last 24 Hrs  T(C): 36.7 (18 Jan 2018 05:33), Max: 36.8 (17 Jan 2018 14:40)  T(F): 98.1 (18 Jan 2018 05:33), Max: 98.3 (17 Jan 2018 15:40)  HR: 82 (18 Jan 2018 05:33) (70 - 82)  BP: 159/69 (18 Jan 2018 05:33) (131/68 - 174/66)  BP(mean): --  RR: 16 (18 Jan 2018 05:33) (16 - 18)  SpO2: 97% (18 Jan 2018 05:33) (96% - 98%)  I&O's Detail    aluminum hydroxide/magnesium hydroxide/simethicone Suspension 30 milliLiter(s) Oral every 4 hours PRN  cinacalcet 30 milliGRAM(s) Oral daily  meropenem IVPB 1000 milliGRAM(s) IV Intermittent every 24 hours  meropenem IVPB      pantoprazole    Tablet 40 milliGRAM(s) Oral before breakfast  sevelamer hydrochloride 1600 milliGRAM(s) Oral three times a day with meals      Physical Exam  General: AAOx3, No acute distress  Chest: Rt IJ in place, dressing c/d/i, no erythema, no TTP        Labs:                        7.8    16.2  )-----------( 311      ( 18 Jan 2018 06:53 )             26.8     01-18    138  |  102  |  28<H>  ----------------------------<  68<L>  4.2   |  26  |  5.03<H>    Ca    8.8      18 Jan 2018 06:53  Phos  3.6     01-18  Mg     2.5     01-18    TPro  6.3  /  Alb  1.8<L>  /  TBili  0.3  /  DBili  x   /  AST  15  /  ALT  6<L>  /  AlkPhos  107  01-18

## 2018-01-18 NOTE — PROGRESS NOTE ADULT - SUBJECTIVE AND OBJECTIVE BOX
Patient is a 52y old  Female who presents with a chief complaint of SOB (13 Dec 2017 16:01)  Awake, alert, comfortable in bed in NAD    INTERVAL HPI/OVERNIGHT EVENTS:      VITAL SIGNS:  T(F): 98.1 (01-18-18 @ 05:33)  HR: 82 (01-18-18 @ 05:33)  BP: 159/69 (01-18-18 @ 05:33)  RR: 16 (01-18-18 @ 05:33)  SpO2: 97% (01-18-18 @ 05:33)  Wt(kg): --  I&O's Detail          REVIEW OF SYSTEMS:    CONSTITUTIONAL:  No fevers, chills, sweats    HEENT:  Eyes:  No diplopia or blurred vision. ENT:  No earache, sore throat or runny nose.    CARDIOVASCULAR:  No pressure, squeezing, tightness, or heaviness about the chest; no palpitations.    RESPIRATORY:  Per HPI    GASTROINTESTINAL:  No abdominal pain, nausea, vomiting or diarrhea.    GENITOURINARY:  No dysuria, frequency or urgency.    NEUROLOGIC:  No paresthesias, fasciculations, seizures or weakness.    PSYCHIATRIC:  No disorder of thought or mood.      PHYSICAL EXAM:    Constitutional: Well developed and nourished  Eyes:Perrla  ENMT: normal  Neck:supple  Respiratory: good air entry  Cardiovascular: S1 S2 regular  Gastrointestinal: Soft, Non tender  Extremities: No edema  Vascular:normal  Neurological:Awake, alert,Ox3  Musculoskeletal: Sacral decubitus      MEDICATIONS  (STANDING):  ascorbic acid 500 milliGRAM(s) Oral daily  calamine Lotion 1 Application(s) Topical three times a day  cinacalcet 30 milliGRAM(s) Oral daily  dextrose 5%. 1000 milliLiter(s) (50 mL/Hr) IV Continuous <Continuous>  dextrose 50% Injectable 12.5 Gram(s) IV Push once  dextrose 50% Injectable 25 Gram(s) IV Push once  dextrose 50% Injectable 25 Gram(s) IV Push once  epoetin jacqueline Injectable 75708 Unit(s) IV Push <User Schedule>  ferrous    sulfate Liquid 300 milliGRAM(s) Enteral Tube daily  folic acid 1 milliGRAM(s) Oral daily  gabapentin 100 milliGRAM(s) Oral three times a day  heparin  Injectable 5000 Unit(s) SubCutaneous every 12 hours  insulin lispro (HumaLOG) corrective regimen sliding scale   SubCutaneous Before meals and at bedtime  levothyroxine 50 MICROGram(s) Oral daily  meropenem IVPB 1000 milliGRAM(s) IV Intermittent every 24 hours  meropenem IVPB      metoprolol     tartrate 25 milliGRAM(s) Oral two times a day  nitroglycerin    Patch 0.2 mG/Hr(s) 1 patch Transdermal daily  pantoprazole    Tablet 40 milliGRAM(s) Oral before breakfast  PPD  5 Tuberculin Unit(s) Injectable 5 Unit(s) IntraDermal once  pregabalin 75 milliGRAM(s) Oral daily  sevelamer hydrochloride 1600 milliGRAM(s) Oral three times a day with meals  simvastatin 20 milliGRAM(s) Oral at bedtime    MEDICATIONS  (PRN):  acetaminophen   Tablet 650 milliGRAM(s) Oral every 6 hours PRN For Temp greater than 38 C (100.4 F)  acetaminophen   Tablet. 650 milliGRAM(s) Oral every 6 hours PRN Moderate Pain (4 - 6)  aluminum hydroxide/magnesium hydroxide/simethicone Suspension 30 milliLiter(s) Oral every 4 hours PRN Dyspepsia  dextrose Gel 1 Dose(s) Oral once PRN Blood Glucose LESS THAN 70 milliGRAM(s)/deciliter  glucagon  Injectable 1 milliGRAM(s) IntraMuscular once PRN Glucose LESS THAN 70 milligrams/deciliter      Allergies    No Known Allergies    Intolerances        LABS:                        7.8    16.2  )-----------( 311      ( 18 Jan 2018 06:53 )             26.8     01-18    138  |  102  |  28<H>  ----------------------------<  68<L>  4.2   |  26  |  5.03<H>    Ca    8.8      18 Jan 2018 06:53  Phos  3.6     01-18  Mg     2.5     01-18    TPro  6.3  /  Alb  1.8<L>  /  TBili  0.3  /  DBili  x   /  AST  15  /  ALT  6<L>  /  AlkPhos  107  01-18              CAPILLARY BLOOD GLUCOSE      POCT Blood Glucose.: 87 mg/dL (18 Jan 2018 11:02)  POCT Blood Glucose.: 75 mg/dL (18 Jan 2018 07:28)  POCT Blood Glucose.: 78 mg/dL (18 Jan 2018 05:53)  POCT Blood Glucose.: 70 mg/dL (18 Jan 2018 01:33)        RADIOLOGY & ADDITIONAL TESTS:    CXR:  < from: Xray Chest 1 View AP- PORTABLE-Urgent (01.12.18 @ 15:25) >  INTERPRETATION:  Portable chest x-ray    Indication: Shortness of breath.    Portable chest x-ray is compared to a previous examination dated 1/9/2018.    Impression: Stable right IJ central venous catheter.     New mild right perihilar and left basilar pulmonary infiltrates.    New small atelectasis in the right midlung zone.    No evidence for pneumothorax.    The trachea is midline.    Stable cardiac silhouette.    < end of copied text >    Ct scan chest:    ekg;    echo:

## 2018-01-18 NOTE — PROGRESS NOTE ADULT - ASSESSMENT
52yFemale w/ ESRD      -HD as per renal   -Awaiting PC placement by IR   -Will follow   -Care as per medical team

## 2018-01-18 NOTE — PROGRESS NOTE ADULT - SUBJECTIVE AND OBJECTIVE BOX
s/p exchange of catheter.    No Known Allergies    Hospital Medications:   MEDICATIONS  (STANDING):  ascorbic acid 500 milliGRAM(s) Oral daily  calamine Lotion 1 Application(s) Topical three times a day  cinacalcet 30 milliGRAM(s) Oral daily  dextrose 5%. 1000 milliLiter(s) (50 mL/Hr) IV Continuous <Continuous>  dextrose 50% Injectable 12.5 Gram(s) IV Push once  dextrose 50% Injectable 25 Gram(s) IV Push once  dextrose 50% Injectable 25 Gram(s) IV Push once  epoetin jacqueline Injectable 13958 Unit(s) IV Push <User Schedule>  ferrous    sulfate Liquid 300 milliGRAM(s) Enteral Tube daily  folic acid 1 milliGRAM(s) Oral daily  gabapentin 100 milliGRAM(s) Oral three times a day  heparin  Injectable 5000 Unit(s) SubCutaneous every 12 hours  insulin lispro (HumaLOG) corrective regimen sliding scale   SubCutaneous Before meals and at bedtime  levothyroxine 50 MICROGram(s) Oral daily  meropenem IVPB 1000 milliGRAM(s) IV Intermittent every 24 hours  meropenem IVPB      metoprolol     tartrate 25 milliGRAM(s) Oral two times a day  nitroglycerin    Patch 0.2 mG/Hr(s) 1 patch Transdermal daily  pantoprazole    Tablet 40 milliGRAM(s) Oral before breakfast  PPD  5 Tuberculin Unit(s) Injectable 5 Unit(s) IntraDermal once  sevelamer hydrochloride 1600 milliGRAM(s) Oral three times a day with meals  simvastatin 20 milliGRAM(s) Oral at bedtime      VITALS:  T(F): 98.2 (01-18-18 @ 14:46), Max: 98.2 (01-17-18 @ 18:40)  HR: 86 (01-18-18 @ 14:46)  BP: 177/64 (01-18-18 @ 14:46)  RR: 17 (01-18-18 @ 14:46)  SpO2: 96% (01-18-18 @ 14:46)  Wt(kg): --      PHYSICAL EXAM:  Constitutional: NAD  HEENT: anicteric sclera, oropharynx clear, MMM  Neck: No JVD  Respiratory: CTAB, no wheezes, rales or rhonchi  Cardiovascular: S1, S2, RRR  Gastrointestinal: BS+, soft, NT/ND  Extremities: No cyanosis or clubbing. No peripheral edema  Neurological: A/O x 3, no focal deficits  Vascular Access: MARCO A lopez.    LABS:  01-18    138  |  102  |  28<H>  ----------------------------<  68<L>  4.2   |  26  |  5.03<H>    Ca    8.8      18 Jan 2018 06:53  Phos  3.6     01-18  Mg     2.5     01-18    TPro  6.3  /  Alb  1.8<L>  /  TBili  0.3  /  DBili      /  AST  15  /  ALT  6<L>  /  AlkPhos  107  01-18    Creatinine Trend: 5.03 <--, 9.44 <--, 7.72 <--, 6.57 <--, 4.91 <--                        7.8    16.2  )-----------( 311      ( 18 Jan 2018 06:53 )             26.8     Urine Studies:      RADIOLOGY & ADDITIONAL STUDIES:

## 2018-01-18 NOTE — PROGRESS NOTE ADULT - SUBJECTIVE AND OBJECTIVE BOX
Patient is a 52y old  Female who presents with a chief complaint of SOB (13 Dec 2017 16:01)      INTERVAL HPI/OVERNIGHT EVENTS:    MEDICATIONS  (STANDING):  ascorbic acid 500 milliGRAM(s) Oral daily  calamine Lotion 1 Application(s) Topical three times a day  cinacalcet 30 milliGRAM(s) Oral daily  dextrose 5%. 1000 milliLiter(s) (50 mL/Hr) IV Continuous <Continuous>  dextrose 50% Injectable 12.5 Gram(s) IV Push once  dextrose 50% Injectable 25 Gram(s) IV Push once  dextrose 50% Injectable 25 Gram(s) IV Push once  epoetin jacqueline Injectable 05550 Unit(s) IV Push <User Schedule>  ferrous    sulfate Liquid 300 milliGRAM(s) Enteral Tube daily  folic acid 1 milliGRAM(s) Oral daily  gabapentin 100 milliGRAM(s) Oral three times a day  heparin  Injectable 5000 Unit(s) SubCutaneous every 12 hours  insulin lispro (HumaLOG) corrective regimen sliding scale   SubCutaneous Before meals and at bedtime  levothyroxine 50 MICROGram(s) Oral daily  meropenem IVPB 1000 milliGRAM(s) IV Intermittent every 24 hours  meropenem IVPB      metoprolol     tartrate 25 milliGRAM(s) Oral two times a day  nitroglycerin    Patch 0.2 mG/Hr(s) 1 patch Transdermal daily  pantoprazole    Tablet 40 milliGRAM(s) Oral before breakfast  PPD  5 Tuberculin Unit(s) Injectable 5 Unit(s) IntraDermal once  pregabalin 75 milliGRAM(s) Oral daily  sevelamer hydrochloride 1600 milliGRAM(s) Oral three times a day with meals  simvastatin 20 milliGRAM(s) Oral at bedtime    MEDICATIONS  (PRN):  acetaminophen   Tablet 650 milliGRAM(s) Oral every 6 hours PRN For Temp greater than 38 C (100.4 F)  acetaminophen   Tablet. 650 milliGRAM(s) Oral every 6 hours PRN Moderate Pain (4 - 6)  aluminum hydroxide/magnesium hydroxide/simethicone Suspension 30 milliLiter(s) Oral every 4 hours PRN Dyspepsia  dextrose Gel 1 Dose(s) Oral once PRN Blood Glucose LESS THAN 70 milliGRAM(s)/deciliter  glucagon  Injectable 1 milliGRAM(s) IntraMuscular once PRN Glucose LESS THAN 70 milligrams/deciliter      Allergies    No Known Allergies    Intolerances        REVIEW OF SYSTEMS:  denies any fever , chills, chest pain, SOB , abdominal pain or diarrhea    Vital Signs Last 24 Hrs  T(C): 36.7 (18 Jan 2018 05:33), Max: 36.8 (17 Jan 2018 14:40)  T(F): 98.1 (18 Jan 2018 05:33), Max: 98.3 (17 Jan 2018 15:40)  HR: 82 (18 Jan 2018 05:33) (70 - 82)  BP: 159/69 (18 Jan 2018 05:33) (131/68 - 174/66)  BP(mean): --  RR: 16 (18 Jan 2018 05:33) (16 - 18)  SpO2: 97% (18 Jan 2018 05:33) (96% - 98%)    PHYSICAL EXAM:  GENERAL: NAD  NERVOUS SYSTEM:  Alert & Oriented X1-2, no focal neurological deficit   CHEST/LUNG: Clear to percussion bilaterally; Rt. IJ catheter in place   HEART: Regular rate and rhythm; No murmurs, rubs, or gallops  ABDOMEN: Soft, Nontender, Nondistended; Bowel sounds present  EXTREMITIES:  2+ Peripheral Pulses, No clubbing, cyanosis, or edema  SKIN: sacral decubitus ulcer , non stageable       LABS:                        7.8    16.2  )-----------( 311      ( 18 Jan 2018 06:53 )             26.8     01-18    138  |  102  |  28<H>  ----------------------------<  68<L>  4.2   |  26  |  5.03<H>    Ca    8.8      18 Jan 2018 06:53  Phos  3.6     01-18  Mg     2.5     01-18    TPro  6.3  /  Alb  1.8<L>  /  TBili  0.3  /  DBili  x   /  AST  15  /  ALT  6<L>  /  AlkPhos  107  01-18        CAPILLARY BLOOD GLUCOSE      POCT Blood Glucose.: 87 mg/dL (18 Jan 2018 11:02)  POCT Blood Glucose.: 75 mg/dL (18 Jan 2018 07:28)  POCT Blood Glucose.: 78 mg/dL (18 Jan 2018 05:53)  POCT Blood Glucose.: 70 mg/dL (18 Jan 2018 01:33)      RADIOLOGY & ADDITIONAL TESTS:    Imaging Personally Reviewed:  [ ] YES  [ ] NO    Consultant(s) Notes Reviewed:  [ ] YES  [ ] NO

## 2018-01-18 NOTE — PROGRESS NOTE ADULT - ASSESSMENT
52 female nursing home patient with hx of DM, neuropathy, CKD, CHFpEF, GERD, osteomyelitis of jaw, hypothyroidism, hypoparathyroidism, anemia, admitted to ICU acute on chronic renal failure requiring initiation of dialysis and  acute respiratory failure requiring intubation 2/2 pneumonia with sepsis.   downgraded to medical floor on 01/01/2018.   currently pending tunneled cath placement by IR

## 2018-01-18 NOTE — PROGRESS NOTE ADULT - PROBLEM SELECTOR PLAN 3
-HD as per nephrology  permacath placement pending  nephrology follow up   currently not over loaded but will continue watchful monitoring  replacement john put in  IR refusing to put in tunneled cath with elevated wbc count although cleared by ID

## 2018-01-18 NOTE — PROGRESS NOTE ADULT - ATTENDING COMMENTS
Patient is seen and examined. Case reviewed with the medical team. Above note is appreciated. Will follow up clinically. Continue DVT prophylaxis. Case discussed with ID and IR. Will order RBC indium scan give the leukocytosis.

## 2018-01-18 NOTE — PROGRESS NOTE ADULT - ASSESSMENT
52 yr old female with  ESRD.  WBC rising. afebrile. on Meronem. afebrile  hyperphosphatemia  anemia of CKD. cont MARITA.  s/p exchange of catheter.    will need permacath per IR.

## 2018-01-19 DIAGNOSIS — D72.829 ELEVATED WHITE BLOOD CELL COUNT, UNSPECIFIED: ICD-10-CM

## 2018-01-19 LAB
BASOPHILS # BLD AUTO: 0.1 K/UL — SIGNIFICANT CHANGE UP (ref 0–0.2)
BASOPHILS NFR BLD AUTO: 0.7 % — SIGNIFICANT CHANGE UP (ref 0–2)
EOSINOPHIL # BLD AUTO: 0.1 K/UL — SIGNIFICANT CHANGE UP (ref 0–0.5)
EOSINOPHIL NFR BLD AUTO: 0.8 % — SIGNIFICANT CHANGE UP (ref 0–6)
GLUCOSE BLDC GLUCOMTR-MCNC: 105 MG/DL — HIGH (ref 70–99)
GLUCOSE BLDC GLUCOMTR-MCNC: 114 MG/DL — HIGH (ref 70–99)
GLUCOSE BLDC GLUCOMTR-MCNC: 134 MG/DL — HIGH (ref 70–99)
GLUCOSE BLDC GLUCOMTR-MCNC: 76 MG/DL — SIGNIFICANT CHANGE UP (ref 70–99)
GLUCOSE BLDC GLUCOMTR-MCNC: 77 MG/DL — SIGNIFICANT CHANGE UP (ref 70–99)
HCT VFR BLD CALC: 25.4 % — LOW (ref 34.5–45)
HGB BLD-MCNC: 7.4 G/DL — LOW (ref 11.5–15.5)
LYMPHOCYTES # BLD AUTO: 1.9 K/UL — SIGNIFICANT CHANGE UP (ref 1–3.3)
LYMPHOCYTES # BLD AUTO: 12.4 % — LOW (ref 13–44)
MCHC RBC-ENTMCNC: 27.5 PG — SIGNIFICANT CHANGE UP (ref 27–34)
MCHC RBC-ENTMCNC: 29 GM/DL — LOW (ref 32–36)
MCV RBC AUTO: 94.8 FL — SIGNIFICANT CHANGE UP (ref 80–100)
MONOCYTES # BLD AUTO: 1.2 K/UL — HIGH (ref 0–0.9)
MONOCYTES NFR BLD AUTO: 7.8 % — SIGNIFICANT CHANGE UP (ref 2–14)
NEUTROPHILS # BLD AUTO: 11.7 K/UL — HIGH (ref 1.8–7.4)
NEUTROPHILS NFR BLD AUTO: 78.2 % — HIGH (ref 43–77)
PLATELET # BLD AUTO: 342 K/UL — SIGNIFICANT CHANGE UP (ref 150–400)
RBC # BLD: 2.68 M/UL — LOW (ref 3.8–5.2)
RBC # FLD: 18.1 % — HIGH (ref 10.3–14.5)
WBC # BLD: 15 K/UL — HIGH (ref 3.8–10.5)
WBC # FLD AUTO: 15 K/UL — HIGH (ref 3.8–10.5)

## 2018-01-19 RX ORDER — POLYETHYLENE GLYCOL 3350 17 G/17G
17 POWDER, FOR SOLUTION ORAL ONCE
Qty: 0 | Refills: 0 | Status: COMPLETED | OUTPATIENT
Start: 2018-01-19 | End: 2018-01-19

## 2018-01-19 RX ORDER — DOCUSATE SODIUM 100 MG
100 CAPSULE ORAL
Qty: 0 | Refills: 0 | Status: DISCONTINUED | OUTPATIENT
Start: 2018-01-19 | End: 2018-01-29

## 2018-01-19 RX ORDER — ERYTHROPOIETIN 10000 [IU]/ML
14000 INJECTION, SOLUTION INTRAVENOUS; SUBCUTANEOUS
Qty: 0 | Refills: 0 | Status: DISCONTINUED | OUTPATIENT
Start: 2018-01-19 | End: 2018-01-29

## 2018-01-19 RX ORDER — ERYTHROPOIETIN 10000 [IU]/ML
1400 INJECTION, SOLUTION INTRAVENOUS; SUBCUTANEOUS EVERY OTHER DAY
Qty: 0 | Refills: 0 | Status: DISCONTINUED | OUTPATIENT
Start: 2018-01-19 | End: 2018-01-19

## 2018-01-19 RX ORDER — SENNA PLUS 8.6 MG/1
2 TABLET ORAL AT BEDTIME
Qty: 0 | Refills: 0 | Status: DISCONTINUED | OUTPATIENT
Start: 2018-01-19 | End: 2018-01-29

## 2018-01-19 RX ADMIN — Medication 1 MILLIGRAM(S): at 11:07

## 2018-01-19 RX ADMIN — PANTOPRAZOLE SODIUM 40 MILLIGRAM(S): 20 TABLET, DELAYED RELEASE ORAL at 05:44

## 2018-01-19 RX ADMIN — POLYETHYLENE GLYCOL 3350 17 GRAM(S): 17 POWDER, FOR SOLUTION ORAL at 05:58

## 2018-01-19 RX ADMIN — Medication 650 MILLIGRAM(S): at 21:23

## 2018-01-19 RX ADMIN — Medication 650 MILLIGRAM(S): at 21:56

## 2018-01-19 RX ADMIN — Medication 1 PATCH: at 23:26

## 2018-01-19 RX ADMIN — CALAMINE 8% AND ZINC OXIDE 8% 1 APPLICATION(S): 160 LOTION TOPICAL at 16:00

## 2018-01-19 RX ADMIN — Medication 1 PATCH: at 05:53

## 2018-01-19 RX ADMIN — Medication 25 MILLIGRAM(S): at 05:44

## 2018-01-19 RX ADMIN — CINACALCET 30 MILLIGRAM(S): 30 TABLET, FILM COATED ORAL at 11:07

## 2018-01-19 RX ADMIN — MEROPENEM 100 MILLIGRAM(S): 1 INJECTION INTRAVENOUS at 11:25

## 2018-01-19 RX ADMIN — Medication 1 PATCH: at 11:08

## 2018-01-19 RX ADMIN — GABAPENTIN 100 MILLIGRAM(S): 400 CAPSULE ORAL at 15:59

## 2018-01-19 RX ADMIN — SEVELAMER CARBONATE 1600 MILLIGRAM(S): 2400 POWDER, FOR SUSPENSION ORAL at 08:35

## 2018-01-19 RX ADMIN — Medication 100 MILLIGRAM(S): at 05:49

## 2018-01-19 RX ADMIN — Medication 650 MILLIGRAM(S): at 10:24

## 2018-01-19 RX ADMIN — GABAPENTIN 100 MILLIGRAM(S): 400 CAPSULE ORAL at 05:44

## 2018-01-19 RX ADMIN — Medication 650 MILLIGRAM(S): at 09:50

## 2018-01-19 RX ADMIN — GABAPENTIN 100 MILLIGRAM(S): 400 CAPSULE ORAL at 21:23

## 2018-01-19 RX ADMIN — Medication 50 MICROGRAM(S): at 05:44

## 2018-01-19 RX ADMIN — SENNA PLUS 2 TABLET(S): 8.6 TABLET ORAL at 21:23

## 2018-01-19 RX ADMIN — HEPARIN SODIUM 5000 UNIT(S): 5000 INJECTION INTRAVENOUS; SUBCUTANEOUS at 17:35

## 2018-01-19 RX ADMIN — HEPARIN SODIUM 5000 UNIT(S): 5000 INJECTION INTRAVENOUS; SUBCUTANEOUS at 05:45

## 2018-01-19 RX ADMIN — Medication 25 MILLIGRAM(S): at 17:35

## 2018-01-19 RX ADMIN — ERYTHROPOIETIN 14000 UNIT(S): 10000 INJECTION, SOLUTION INTRAVENOUS; SUBCUTANEOUS at 15:38

## 2018-01-19 RX ADMIN — CALAMINE 8% AND ZINC OXIDE 8% 1 APPLICATION(S): 160 LOTION TOPICAL at 21:23

## 2018-01-19 RX ADMIN — Medication 100 MILLIGRAM(S): at 17:35

## 2018-01-19 RX ADMIN — CALAMINE 8% AND ZINC OXIDE 8% 1 APPLICATION(S): 160 LOTION TOPICAL at 05:50

## 2018-01-19 RX ADMIN — SIMVASTATIN 20 MILLIGRAM(S): 20 TABLET, FILM COATED ORAL at 21:23

## 2018-01-19 RX ADMIN — SEVELAMER CARBONATE 1600 MILLIGRAM(S): 2400 POWDER, FOR SUSPENSION ORAL at 17:35

## 2018-01-19 RX ADMIN — Medication 300 MILLIGRAM(S): at 11:07

## 2018-01-19 RX ADMIN — Medication 500 MILLIGRAM(S): at 11:07

## 2018-01-19 NOTE — PROGRESS NOTE ADULT - SUBJECTIVE AND OBJECTIVE BOX
Patient is a 52y old  Female who presents with a chief complaint of SOB (13 Dec 2017 16:01)      INTERVAL HPI/OVERNIGHT EVENTS:    MEDICATIONS  (STANDING):  ascorbic acid 500 milliGRAM(s) Oral daily  calamine Lotion 1 Application(s) Topical three times a day  cinacalcet 30 milliGRAM(s) Oral daily  dextrose 5%. 1000 milliLiter(s) (50 mL/Hr) IV Continuous <Continuous>  dextrose 50% Injectable 12.5 Gram(s) IV Push once  dextrose 50% Injectable 25 Gram(s) IV Push once  dextrose 50% Injectable 25 Gram(s) IV Push once  docusate sodium 100 milliGRAM(s) Oral two times a day  epoetin jacqueline Injectable 99364 Unit(s) IV Push <User Schedule>  ferrous    sulfate Liquid 300 milliGRAM(s) Enteral Tube daily  folic acid 1 milliGRAM(s) Oral daily  gabapentin 100 milliGRAM(s) Oral three times a day  heparin  Injectable 5000 Unit(s) SubCutaneous every 12 hours  insulin lispro (HumaLOG) corrective regimen sliding scale   SubCutaneous Before meals and at bedtime  levothyroxine 50 MICROGram(s) Oral daily  meropenem IVPB 1000 milliGRAM(s) IV Intermittent every 24 hours  meropenem IVPB      metoprolol     tartrate 25 milliGRAM(s) Oral two times a day  nitroglycerin    Patch 0.2 mG/Hr(s) 1 patch Transdermal daily  pantoprazole    Tablet 40 milliGRAM(s) Oral before breakfast  PPD  5 Tuberculin Unit(s) Injectable 5 Unit(s) IntraDermal once  senna 2 Tablet(s) Oral at bedtime  sevelamer hydrochloride 1600 milliGRAM(s) Oral three times a day with meals  simvastatin 20 milliGRAM(s) Oral at bedtime    MEDICATIONS  (PRN):  acetaminophen   Tablet 650 milliGRAM(s) Oral every 6 hours PRN For Temp greater than 38 C (100.4 F)  acetaminophen   Tablet. 650 milliGRAM(s) Oral every 6 hours PRN Moderate Pain (4 - 6)  aluminum hydroxide/magnesium hydroxide/simethicone Suspension 30 milliLiter(s) Oral every 4 hours PRN Dyspepsia  dextrose Gel 1 Dose(s) Oral once PRN Blood Glucose LESS THAN 70 milliGRAM(s)/deciliter  dextrose Gel 1 Dose(s) Oral once PRN Blood Glucose LESS THAN 70 milliGRAM(s)/deciliter  glucagon  Injectable 1 milliGRAM(s) IntraMuscular once PRN Glucose LESS THAN 70 milligrams/deciliter      Allergies    No Known Allergies    Intolerances        REVIEW OF SYSTEMS:  denies any fever. chills , chest pain , abominal pain , diarrhea or cough    Vital Signs Last 24 Hrs  T(C): 36.9 (19 Jan 2018 11:40), Max: 37.2 (18 Jan 2018 20:53)  T(F): 98.5 (19 Jan 2018 11:40), Max: 98.9 (18 Jan 2018 20:53)  HR: 73 (19 Jan 2018 11:40) (73 - 86)  BP: 128/58 (19 Jan 2018 11:40) (128/58 - 177/64)  BP(mean): --  RR: 16 (19 Jan 2018 11:40) (16 - 17)  SpO2: 98% (19 Jan 2018 06:30) (96% - 98%)    PHYSICAL EXAM:  GENERAL: NAD  NERVOUS SYSTEM:  Alert & Oriented X1-2, no focal neurological deficit   CHEST/LUNG: Clear to percussion bilaterally; Rt. IJ catheter in place   HEART: Regular rate and rhythm; No murmurs, rubs, or gallops  ABDOMEN: Soft, Nontender, Nondistended; Bowel sounds present  EXTREMITIES:  2+ Peripheral Pulses, No clubbing, cyanosis, or edema  SKIN: sacral decubitus ulcer , non stageable       LABS:                        7.4    15.0  )-----------( 342      ( 19 Jan 2018 11:51 )             25.4     01-18    138  |  102  |  28<H>  ----------------------------<  68<L>  4.2   |  26  |  5.03<H>    Ca    8.8      18 Jan 2018 06:53  Phos  3.6     01-18  Mg     2.5     01-18    TPro  6.3  /  Alb  1.8<L>  /  TBili  0.3  /  DBili  x   /  AST  15  /  ALT  6<L>  /  AlkPhos  107  01-18        CAPILLARY BLOOD GLUCOSE      POCT Blood Glucose.: 134 mg/dL (19 Jan 2018 11:21)  POCT Blood Glucose.: 105 mg/dL (19 Jan 2018 09:00)  POCT Blood Glucose.: 76 mg/dL (18 Jan 2018 23:55)  POCT Blood Glucose.: 63 mg/dL (18 Jan 2018 21:06)  POCT Blood Glucose.: 70 mg/dL (18 Jan 2018 15:59)      RADIOLOGY & ADDITIONAL TESTS:    Imaging Personally Reviewed:  [ ] YES  [ ] NO    Consultant(s) Notes Reviewed:  [ ] YES  [ ] NO

## 2018-01-19 NOTE — PROGRESS NOTE ADULT - ASSESSMENT
52 yr old female with  ESRD.  WBC rising. afebrile. on Meronem. afebrile  hyperphosphatemia  anemia of CKD. cont MARITA.  s/p exchange of catheter.  getingt WBC scan sec to persistent leucocytosis and need for permanent access placement.

## 2018-01-19 NOTE — PROGRESS NOTE ADULT - SUBJECTIVE AND OBJECTIVE BOX
Patient is a 52y old  Female who presents with a chief complaint of SOB (13 Dec 2017 16:01)  Awake, alert, comfortable in bed in NAD. feeling nauseated. Scheduled for indium scan today. Still with persistent leukocytosis    INTERVAL HPI/OVERNIGHT EVENTS:      VITAL SIGNS:  T(F): 98.1 (01-19-18 @ 06:30)  HR: 77 (01-19-18 @ 06:30)  BP: 155/61 (01-19-18 @ 06:30)  RR: 16 (01-19-18 @ 06:30)  SpO2: 98% (01-19-18 @ 06:30)  Wt(kg): --  I&O's Detail          REVIEW OF SYSTEMS:    CONSTITUTIONAL:  No fevers, chills, sweats    HEENT:  Eyes:  No diplopia or blurred vision. ENT:  No earache, sore throat or runny nose.    CARDIOVASCULAR:  No pressure, squeezing, tightness, or heaviness about the chest; no palpitations.    RESPIRATORY:  Per HPI    GASTROINTESTINAL:  No abdominal pain, nausea, vomiting or diarrhea.    GENITOURINARY:  No dysuria, frequency or urgency.    NEUROLOGIC:  No paresthesias, fasciculations, seizures or weakness.    PSYCHIATRIC:  No disorder of thought or mood.      PHYSICAL EXAM:    Constitutional: Well developed and nourished  Eyes:Perrla  ENMT: normal  Neck:supple  Respiratory: good air entry  Cardiovascular: S1 S2 regular  Gastrointestinal: Soft, mildly diffusely tender  Extremities: No edema  Vascular:normal  Neurological:Awake, alert,Ox3  Musculoskeletal:Normal      MEDICATIONS  (STANDING):  ascorbic acid 500 milliGRAM(s) Oral daily  calamine Lotion 1 Application(s) Topical three times a day  cinacalcet 30 milliGRAM(s) Oral daily  dextrose 5%. 1000 milliLiter(s) (50 mL/Hr) IV Continuous <Continuous>  dextrose 50% Injectable 12.5 Gram(s) IV Push once  dextrose 50% Injectable 25 Gram(s) IV Push once  dextrose 50% Injectable 25 Gram(s) IV Push once  docusate sodium 100 milliGRAM(s) Oral two times a day  epoetin jacqueline Injectable 71166 Unit(s) IV Push <User Schedule>  ferrous    sulfate Liquid 300 milliGRAM(s) Enteral Tube daily  folic acid 1 milliGRAM(s) Oral daily  gabapentin 100 milliGRAM(s) Oral three times a day  heparin  Injectable 5000 Unit(s) SubCutaneous every 12 hours  insulin lispro (HumaLOG) corrective regimen sliding scale   SubCutaneous Before meals and at bedtime  levothyroxine 50 MICROGram(s) Oral daily  meropenem IVPB 1000 milliGRAM(s) IV Intermittent every 24 hours  meropenem IVPB      metoprolol     tartrate 25 milliGRAM(s) Oral two times a day  nitroglycerin    Patch 0.2 mG/Hr(s) 1 patch Transdermal daily  pantoprazole    Tablet 40 milliGRAM(s) Oral before breakfast  PPD  5 Tuberculin Unit(s) Injectable 5 Unit(s) IntraDermal once  senna 2 Tablet(s) Oral at bedtime  sevelamer hydrochloride 1600 milliGRAM(s) Oral three times a day with meals  simvastatin 20 milliGRAM(s) Oral at bedtime    MEDICATIONS  (PRN):  acetaminophen   Tablet 650 milliGRAM(s) Oral every 6 hours PRN For Temp greater than 38 C (100.4 F)  acetaminophen   Tablet. 650 milliGRAM(s) Oral every 6 hours PRN Moderate Pain (4 - 6)  aluminum hydroxide/magnesium hydroxide/simethicone Suspension 30 milliLiter(s) Oral every 4 hours PRN Dyspepsia  dextrose Gel 1 Dose(s) Oral once PRN Blood Glucose LESS THAN 70 milliGRAM(s)/deciliter  dextrose Gel 1 Dose(s) Oral once PRN Blood Glucose LESS THAN 70 milliGRAM(s)/deciliter  glucagon  Injectable 1 milliGRAM(s) IntraMuscular once PRN Glucose LESS THAN 70 milligrams/deciliter      Allergies    No Known Allergies    Intolerances        LABS:                        7.8    16.2  )-----------( 311      ( 18 Jan 2018 06:53 )             26.8     01-18    138  |  102  |  28<H>  ----------------------------<  68<L>  4.2   |  26  |  5.03<H>    Ca    8.8      18 Jan 2018 06:53  Phos  3.6     01-18  Mg     2.5     01-18    TPro  6.3  /  Alb  1.8<L>  /  TBili  0.3  /  DBili  x   /  AST  15  /  ALT  6<L>  /  AlkPhos  107  01-18              CAPILLARY BLOOD GLUCOSE      POCT Blood Glucose.: 105 mg/dL (19 Jan 2018 09:00)  POCT Blood Glucose.: 76 mg/dL (18 Jan 2018 23:55)  POCT Blood Glucose.: 63 mg/dL (18 Jan 2018 21:06)  POCT Blood Glucose.: 70 mg/dL (18 Jan 2018 15:59)        RADIOLOGY & ADDITIONAL TESTS:    CXR:    Ct scan chest:    ekg;    echo:

## 2018-01-19 NOTE — PROGRESS NOTE ADULT - SUBJECTIVE AND OBJECTIVE BOX
afebrile overnight.    No Known Allergies    Hospital Medications:   MEDICATIONS  (STANDING):  ascorbic acid 500 milliGRAM(s) Oral daily  calamine Lotion 1 Application(s) Topical three times a day  cinacalcet 30 milliGRAM(s) Oral daily  dextrose 5%. 1000 milliLiter(s) (50 mL/Hr) IV Continuous <Continuous>  dextrose 50% Injectable 12.5 Gram(s) IV Push once  dextrose 50% Injectable 25 Gram(s) IV Push once  dextrose 50% Injectable 25 Gram(s) IV Push once  docusate sodium 100 milliGRAM(s) Oral two times a day  epoetin jacqueline Injectable 87042 Unit(s) IV Push <User Schedule>  ferrous    sulfate Liquid 300 milliGRAM(s) Enteral Tube daily  folic acid 1 milliGRAM(s) Oral daily  gabapentin 100 milliGRAM(s) Oral three times a day  heparin  Injectable 5000 Unit(s) SubCutaneous every 12 hours  insulin lispro (HumaLOG) corrective regimen sliding scale   SubCutaneous Before meals and at bedtime  levothyroxine 50 MICROGram(s) Oral daily  meropenem IVPB 1000 milliGRAM(s) IV Intermittent every 24 hours  meropenem IVPB      metoprolol     tartrate 25 milliGRAM(s) Oral two times a day  nitroglycerin    Patch 0.2 mG/Hr(s) 1 patch Transdermal daily  pantoprazole    Tablet 40 milliGRAM(s) Oral before breakfast  PPD  5 Tuberculin Unit(s) Injectable 5 Unit(s) IntraDermal once  senna 2 Tablet(s) Oral at bedtime  sevelamer hydrochloride 1600 milliGRAM(s) Oral three times a day with meals  simvastatin 20 milliGRAM(s) Oral at bedtime        VITALS:  T(F): 98.5 (01-19-18 @ 11:40), Max: 98.9 (01-18-18 @ 20:53)  HR: 73 (01-19-18 @ 11:40)  BP: 128/58 (01-19-18 @ 11:40)  RR: 16 (01-19-18 @ 11:40)  SpO2: 98% (01-19-18 @ 06:30)  Wt(kg): --      PHYSICAL EXAM:  Constitutional: NAD  HEENT: anicteric sclera, oropharynx clear.  Neck: No JVD  Respiratory: CTAB, no wheezes, rales or rhonchi  Cardiovascular: S1, S2, RRR  Gastrointestinal: BS+, soft, NT/ND  Extremities: No cyanosis or clubbing. No peripheral edema  Vascular Access: RT MARCO A lopez.    LABS:  01-18    138  |  102  |  28<H>  ----------------------------<  68<L>  4.2   |  26  |  5.03<H>    Ca    8.8      18 Jan 2018 06:53  Phos  3.6     01-18  Mg     2.5     01-18    TPro  6.3  /  Alb  1.8<L>  /  TBili  0.3  /  DBili      /  AST  15  /  ALT  6<L>  /  AlkPhos  107  01-18    Creatinine Trend: 5.03 <--, 9.44 <--, 7.72 <--, 6.57 <--                        7.4    15.0  )-----------( 342      ( 19 Jan 2018 11:51 )             25.4     Urine Studies:      RADIOLOGY & ADDITIONAL STUDIES:

## 2018-01-19 NOTE — PROGRESS NOTE ADULT - ATTENDING COMMENTS
Patient is seen and examined. Case reviewed with the medical team. Above note is appreciated. Will follow up clinically. Continue DVT prophylaxis. Case discussed with ID. Will get red blood cell indium scan to find any possible source for the leukocytosis.

## 2018-01-20 LAB
BLD GP AB SCN SERPL QL: SIGNIFICANT CHANGE UP
EOSINOPHIL NFR BLD AUTO: 2 % — SIGNIFICANT CHANGE UP (ref 0–6)
GLUCOSE BLDC GLUCOMTR-MCNC: 100 MG/DL — HIGH (ref 70–99)
GLUCOSE BLDC GLUCOMTR-MCNC: 108 MG/DL — HIGH (ref 70–99)
GLUCOSE BLDC GLUCOMTR-MCNC: 116 MG/DL — HIGH (ref 70–99)
GLUCOSE BLDC GLUCOMTR-MCNC: 89 MG/DL — SIGNIFICANT CHANGE UP (ref 70–99)
HCT VFR BLD CALC: 24.9 % — LOW (ref 34.5–45)
HGB BLD-MCNC: 7.5 G/DL — LOW (ref 11.5–15.5)
LYMPHOCYTES # BLD AUTO: 12 % — LOW (ref 13–44)
MCHC RBC-ENTMCNC: 29 PG — SIGNIFICANT CHANGE UP (ref 27–34)
MCHC RBC-ENTMCNC: 30.1 GM/DL — LOW (ref 32–36)
MCV RBC AUTO: 96.3 FL — SIGNIFICANT CHANGE UP (ref 80–100)
MONOCYTES NFR BLD AUTO: 12 % — SIGNIFICANT CHANGE UP (ref 2–14)
NEUTROPHILS NFR BLD AUTO: 74 % — SIGNIFICANT CHANGE UP (ref 43–77)
PLATELET # BLD AUTO: 277 K/UL — SIGNIFICANT CHANGE UP (ref 150–400)
RBC # BLD: 2.59 M/UL — LOW (ref 3.8–5.2)
RBC # FLD: 19 % — HIGH (ref 10.3–14.5)
WBC # BLD: 15 K/UL — HIGH (ref 3.8–10.5)
WBC # FLD AUTO: 15 K/UL — HIGH (ref 3.8–10.5)

## 2018-01-20 RX ADMIN — SEVELAMER CARBONATE 1600 MILLIGRAM(S): 2400 POWDER, FOR SUSPENSION ORAL at 12:29

## 2018-01-20 RX ADMIN — GABAPENTIN 100 MILLIGRAM(S): 400 CAPSULE ORAL at 13:41

## 2018-01-20 RX ADMIN — PANTOPRAZOLE SODIUM 40 MILLIGRAM(S): 20 TABLET, DELAYED RELEASE ORAL at 06:06

## 2018-01-20 RX ADMIN — MEROPENEM 100 MILLIGRAM(S): 1 INJECTION INTRAVENOUS at 12:28

## 2018-01-20 RX ADMIN — HEPARIN SODIUM 5000 UNIT(S): 5000 INJECTION INTRAVENOUS; SUBCUTANEOUS at 06:06

## 2018-01-20 RX ADMIN — GABAPENTIN 100 MILLIGRAM(S): 400 CAPSULE ORAL at 06:06

## 2018-01-20 RX ADMIN — SIMVASTATIN 20 MILLIGRAM(S): 20 TABLET, FILM COATED ORAL at 22:14

## 2018-01-20 RX ADMIN — Medication 50 MICROGRAM(S): at 06:06

## 2018-01-20 RX ADMIN — CALAMINE 8% AND ZINC OXIDE 8% 1 APPLICATION(S): 160 LOTION TOPICAL at 13:41

## 2018-01-20 RX ADMIN — SEVELAMER CARBONATE 1600 MILLIGRAM(S): 2400 POWDER, FOR SUSPENSION ORAL at 08:26

## 2018-01-20 RX ADMIN — Medication 1 PATCH: at 12:27

## 2018-01-20 RX ADMIN — HEPARIN SODIUM 5000 UNIT(S): 5000 INJECTION INTRAVENOUS; SUBCUTANEOUS at 17:31

## 2018-01-20 RX ADMIN — Medication 300 MILLIGRAM(S): at 12:26

## 2018-01-20 RX ADMIN — Medication 500 MILLIGRAM(S): at 12:26

## 2018-01-20 RX ADMIN — Medication 100 MILLIGRAM(S): at 17:31

## 2018-01-20 RX ADMIN — Medication 1 MILLIGRAM(S): at 12:26

## 2018-01-20 RX ADMIN — Medication 100 MILLIGRAM(S): at 06:06

## 2018-01-20 RX ADMIN — SEVELAMER CARBONATE 1600 MILLIGRAM(S): 2400 POWDER, FOR SUSPENSION ORAL at 17:31

## 2018-01-20 RX ADMIN — Medication 25 MILLIGRAM(S): at 06:06

## 2018-01-20 RX ADMIN — CALAMINE 8% AND ZINC OXIDE 8% 1 APPLICATION(S): 160 LOTION TOPICAL at 06:06

## 2018-01-20 RX ADMIN — GABAPENTIN 100 MILLIGRAM(S): 400 CAPSULE ORAL at 22:14

## 2018-01-20 RX ADMIN — CINACALCET 30 MILLIGRAM(S): 30 TABLET, FILM COATED ORAL at 12:26

## 2018-01-20 RX ADMIN — Medication 25 MILLIGRAM(S): at 17:31

## 2018-01-20 NOTE — PROGRESS NOTE ADULT - ATTENDING COMMENTS
Patient is seen and examined. Case reviewed with the medical team. Above note is appreciated. Will follow up clinically. Continue DVT prophylaxis. Case discussed with ID. Await red blood cell indium scan to find any possible source for the leukocytosis.

## 2018-01-20 NOTE — PROGRESS NOTE ADULT - SUBJECTIVE AND OBJECTIVE BOX
CHIEF COMPLAINT:Patient is a 52y old  Female who presents with a chief complaint of SOB (13 Dec 2017 16:01)    	  REVIEW OF SYSTEMS:  CONSTITUTIONAL: No fever, weight loss, or fatigue  EYES: No eye pain, visual disturbances, or discharge  ENMT:  No difficulty hearing, tinnitus, vertigo; No sinus or throat pain  NECK: No pain or stiffness  RESPIRATORY: No cough, wheezing, chills or hemoptysis; No Shortness of Breath  CARDIOVASCULAR: No chest pain, palpitations, passing out, dizziness, or leg swelling  GASTROINTESTINAL: No abdominal or epigastric pain. No nausea, vomiting, or hematemesis; No diarrhea or constipation. No melena or hematochezia.  GENITOURINARY: No dysuria, frequency, hematuria, or incontinence  NEUROLOGICAL: No headaches, memory loss, loss of strength, numbness, or tremors  SKIN: No itching, burning, rashes, or lesions   LYMPH Nodes: No enlarged glands  ENDOCRINE: No heat or cold intolerance; No hair loss  MUSCULOSKELETAL: No joint pain or swelling; No muscle, back, or extremity pain  PSYCHIATRIC: No depression, anxiety, mood swings, or difficulty sleeping  HEME/LYMPH: No easy bruising, or bleeding gums  ALLERY AND IMMUNOLOGIC: No hives or eczema	    [ ] All others negative	  [ ] Unable to obtain    PHYSICAL EXAM:  T(C): 36.8 (01-20-18 @ 13:00), Max: 37 (01-20-18 @ 05:05)  HR: 78 (01-20-18 @ 13:00) (74 - 80)  BP: 149/53 (01-20-18 @ 13:00) (145/49 - 169/72)  RR: 18 (01-20-18 @ 13:00) (16 - 18)  SpO2: 100% (01-20-18 @ 13:00) (96% - 100%)  Wt(kg): --  I&O's Summary      Appearance: Normal	  HEENT:   Normal oral mucosa, PERRL, EOMI	  Lymphatic: No lymphadenopathy  Cardiovascular: Normal S1 S2, No JVD, No murmurs, No edema  Respiratory: Lungs clear to auscultation	  Psychiatry: A & O x 3, Mood & affect appropriate  Gastrointestinal:  Soft, Non-tender, + BS	  Skin: No rashes, No ecchymoses, No cyanosis	  Neurologic: Non-focal  Extremities: Normal range of motion, No clubbing, cyanosis or edema  Vascular: Peripheral pulses palpable 2+ bilaterally    MEDICATIONS  (STANDING):  ascorbic acid 500 milliGRAM(s) Oral daily  calamine Lotion 1 Application(s) Topical three times a day  cinacalcet 30 milliGRAM(s) Oral daily  dextrose 5%. 1000 milliLiter(s) (50 mL/Hr) IV Continuous <Continuous>  dextrose 50% Injectable 12.5 Gram(s) IV Push once  dextrose 50% Injectable 25 Gram(s) IV Push once  dextrose 50% Injectable 25 Gram(s) IV Push once  docusate sodium 100 milliGRAM(s) Oral two times a day  epoetin jacqueline Injectable 04852 Unit(s) IV Push <User Schedule>  ferrous    sulfate Liquid 300 milliGRAM(s) Enteral Tube daily  folic acid 1 milliGRAM(s) Oral daily  gabapentin 100 milliGRAM(s) Oral three times a day  heparin  Injectable 5000 Unit(s) SubCutaneous every 12 hours  insulin lispro (HumaLOG) corrective regimen sliding scale   SubCutaneous Before meals and at bedtime  levothyroxine 50 MICROGram(s) Oral daily  meropenem IVPB 1000 milliGRAM(s) IV Intermittent every 24 hours  meropenem IVPB      metoprolol     tartrate 25 milliGRAM(s) Oral two times a day  nitroglycerin    Patch 0.2 mG/Hr(s) 1 patch Transdermal daily  pantoprazole    Tablet 40 milliGRAM(s) Oral before breakfast  PPD  5 Tuberculin Unit(s) Injectable 5 Unit(s) IntraDermal once  senna 2 Tablet(s) Oral at bedtime  sevelamer hydrochloride 1600 milliGRAM(s) Oral three times a day with meals  simvastatin 20 milliGRAM(s) Oral at bedtime      TELEMETRY: 	    ECG:  	  RADIOLOGY:  OTHER: 	  	  CBC Full  -  ( 20 Jan 2018 07:07 )  WBC Count : 15.0 K/uL  Hemoglobin : 7.5 g/dL  Hematocrit : 24.9 %  Platelet Count - Automated : 277 K/uL  Mean Cell Volume : 96.3 fl  Mean Cell Hemoglobin : 29.0 pg  Mean Cell Hemoglobin Concentration : 30.1 gm/dL  Auto Neutrophil # : x  Auto Lymphocyte # : x  Auto Monocyte # : x  Auto Eosinophil # : x  Auto Basophil # : x  Auto Neutrophil % : 74.0 %  Auto Lymphocyte % : 12.0 %  Auto Monocyte % : 12.0 %  Auto Eosinophil % : 2.0 %  Auto Basophil % : x        CARDIAC MARKERS:                              7.5    15.0  )-----------( 277      ( 20 Jan 2018 07:07 )             24.9                     proBNP:   Lipid Profile:   HgA1c:   TSH:

## 2018-01-20 NOTE — PROGRESS NOTE ADULT - SUBJECTIVE AND OBJECTIVE BOX
s/p HD yesterday.    No Known Allergies    Hospital Medications:   MEDICATIONS  (STANDING):  ascorbic acid 500 milliGRAM(s) Oral daily  calamine Lotion 1 Application(s) Topical three times a day  cinacalcet 30 milliGRAM(s) Oral daily  dextrose 5%. 1000 milliLiter(s) (50 mL/Hr) IV Continuous <Continuous>  dextrose 50% Injectable 12.5 Gram(s) IV Push once  dextrose 50% Injectable 25 Gram(s) IV Push once  dextrose 50% Injectable 25 Gram(s) IV Push once  docusate sodium 100 milliGRAM(s) Oral two times a day  epoetin jacqueline Injectable 85398 Unit(s) IV Push <User Schedule>  ferrous    sulfate Liquid 300 milliGRAM(s) Enteral Tube daily  folic acid 1 milliGRAM(s) Oral daily  gabapentin 100 milliGRAM(s) Oral three times a day  heparin  Injectable 5000 Unit(s) SubCutaneous every 12 hours  insulin lispro (HumaLOG) corrective regimen sliding scale   SubCutaneous Before meals and at bedtime  levothyroxine 50 MICROGram(s) Oral daily  meropenem IVPB 1000 milliGRAM(s) IV Intermittent every 24 hours  meropenem IVPB      metoprolol     tartrate 25 milliGRAM(s) Oral two times a day  nitroglycerin    Patch 0.2 mG/Hr(s) 1 patch Transdermal daily  pantoprazole    Tablet 40 milliGRAM(s) Oral before breakfast  PPD  5 Tuberculin Unit(s) Injectable 5 Unit(s) IntraDermal once  senna 2 Tablet(s) Oral at bedtime  sevelamer hydrochloride 1600 milliGRAM(s) Oral three times a day with meals  simvastatin 20 milliGRAM(s) Oral at bedtime        VITALS:  T(F): 98.3 (01-20-18 @ 13:00), Max: 98.6 (01-20-18 @ 05:05)  HR: 78 (01-20-18 @ 13:00)  BP: 149/53 (01-20-18 @ 13:00)  RR: 18 (01-20-18 @ 13:00)  SpO2: 100% (01-20-18 @ 13:00)  Wt(kg): --      PHYSICAL EXAM:  Constitutional: NAD  HEENT: anicteric sclera, oropharynx clear, MMM  Neck: No JVD  Respiratory: CTAB, no wheezes, rales or rhonchi  Cardiovascular: S1, S2, RRR  Gastrointestinal: BS+, soft, NT/ND  Extremities: No cyanosis or clubbing. No peripheral edema  Neurological: A/O x 3, no focal deficits  Psychiatric: Normal mood, normal affect  : No CVA tenderness. No jarrell.   Skin: No rashes  Vascular Access: IJ shiley    LABS:        Creatinine Trend: 5.03 <--, 9.44 <--, 7.72 <--, 6.57 <--                        7.5    15.0  )-----------( 277      ( 20 Jan 2018 07:07 )             24.9     Urine Studies:      RADIOLOGY & ADDITIONAL STUDIES:

## 2018-01-20 NOTE — PROGRESS NOTE ADULT - ASSESSMENT
52 yr old female with  ESRD.  s/p HD yesterday.  WBC better.  F/U ID IR foroptimal time for Permacath placement. afebrile. on Meronem. afebrilecement.

## 2018-01-20 NOTE — PROGRESS NOTE ADULT - SUBJECTIVE AND OBJECTIVE BOX
Patient is a 52y old  Female who presents with a chief complaint of SOB (13 Dec 2017 16:01)  Awake, alert, comfortable in bed in NAD. S/p indium scan because of persistent leukocytosis    INTERVAL HPI/OVERNIGHT EVENTS:      VITAL SIGNS:  T(F): 98.6 (01-20-18 @ 05:05)  HR: 74 (01-20-18 @ 05:05)  BP: 169/72 (01-20-18 @ 05:05)  RR: 16 (01-20-18 @ 05:05)  SpO2: 100% (01-20-18 @ 05:05)  Wt(kg): --  I&O's Detail          REVIEW OF SYSTEMS:    CONSTITUTIONAL:  No fevers, chills, sweats    HEENT:  Eyes:  No diplopia or blurred vision. ENT:  No earache, sore throat or runny nose.    CARDIOVASCULAR:  No pressure, squeezing, tightness, or heaviness about the chest; no palpitations.    RESPIRATORY:  Per HPI    GASTROINTESTINAL:  No abdominal pain, nausea, vomiting or diarrhea.    GENITOURINARY:  No dysuria, frequency or urgency.    NEUROLOGIC:  No paresthesias, fasciculations, seizures or weakness.    PSYCHIATRIC:  No disorder of thought or mood.      PHYSICAL EXAM:    Constitutional: Well developed and nourished  Eyes:Perrla  ENMT: normal  Neck:supple  Respiratory: good air entry  Cardiovascular: S1 S2 regular  Gastrointestinal: Soft, Non tender  Extremities: No edema  Vascular:normal  Neurological:Awake, alert,Ox3  Musculoskeletal:Normal      MEDICATIONS  (STANDING):  ascorbic acid 500 milliGRAM(s) Oral daily  calamine Lotion 1 Application(s) Topical three times a day  cinacalcet 30 milliGRAM(s) Oral daily  dextrose 5%. 1000 milliLiter(s) (50 mL/Hr) IV Continuous <Continuous>  dextrose 50% Injectable 12.5 Gram(s) IV Push once  dextrose 50% Injectable 25 Gram(s) IV Push once  dextrose 50% Injectable 25 Gram(s) IV Push once  docusate sodium 100 milliGRAM(s) Oral two times a day  epoetin jacqueline Injectable 90604 Unit(s) IV Push <User Schedule>  ferrous    sulfate Liquid 300 milliGRAM(s) Enteral Tube daily  folic acid 1 milliGRAM(s) Oral daily  gabapentin 100 milliGRAM(s) Oral three times a day  heparin  Injectable 5000 Unit(s) SubCutaneous every 12 hours  insulin lispro (HumaLOG) corrective regimen sliding scale   SubCutaneous Before meals and at bedtime  levothyroxine 50 MICROGram(s) Oral daily  meropenem IVPB 1000 milliGRAM(s) IV Intermittent every 24 hours  meropenem IVPB      metoprolol     tartrate 25 milliGRAM(s) Oral two times a day  nitroglycerin    Patch 0.2 mG/Hr(s) 1 patch Transdermal daily  pantoprazole    Tablet 40 milliGRAM(s) Oral before breakfast  PPD  5 Tuberculin Unit(s) Injectable 5 Unit(s) IntraDermal once  senna 2 Tablet(s) Oral at bedtime  sevelamer hydrochloride 1600 milliGRAM(s) Oral three times a day with meals  simvastatin 20 milliGRAM(s) Oral at bedtime    MEDICATIONS  (PRN):  acetaminophen   Tablet 650 milliGRAM(s) Oral every 6 hours PRN For Temp greater than 38 C (100.4 F)  acetaminophen   Tablet. 650 milliGRAM(s) Oral every 6 hours PRN Moderate Pain (4 - 6)  aluminum hydroxide/magnesium hydroxide/simethicone Suspension 30 milliLiter(s) Oral every 4 hours PRN Dyspepsia  dextrose Gel 1 Dose(s) Oral once PRN Blood Glucose LESS THAN 70 milliGRAM(s)/deciliter  dextrose Gel 1 Dose(s) Oral once PRN Blood Glucose LESS THAN 70 milliGRAM(s)/deciliter  glucagon  Injectable 1 milliGRAM(s) IntraMuscular once PRN Glucose LESS THAN 70 milligrams/deciliter      Allergies    No Known Allergies    Intolerances        LABS:                        7.5    15.0  )-----------( 277      ( 20 Jan 2018 07:07 )             24.9                     CAPILLARY BLOOD GLUCOSE      POCT Blood Glucose.: 100 mg/dL (20 Jan 2018 11:07)  POCT Blood Glucose.: 89 mg/dL (20 Jan 2018 07:40)  POCT Blood Glucose.: 114 mg/dL (19 Jan 2018 20:50)  POCT Blood Glucose.: 77 mg/dL (19 Jan 2018 16:21)        RADIOLOGY & ADDITIONAL TESTS:    CXR:    Ct scan chest:    ekg;    echo:

## 2018-01-21 LAB
ALBUMIN SERPL ELPH-MCNC: 1.4 G/DL — LOW (ref 3.5–5)
ALP SERPL-CCNC: 102 U/L — SIGNIFICANT CHANGE UP (ref 40–120)
ALT FLD-CCNC: <6 U/L DA — LOW (ref 10–60)
ANION GAP SERPL CALC-SCNC: 10 MMOL/L — SIGNIFICANT CHANGE UP (ref 5–17)
AST SERPL-CCNC: 11 U/L — SIGNIFICANT CHANGE UP (ref 10–40)
BILIRUB SERPL-MCNC: 0.3 MG/DL — SIGNIFICANT CHANGE UP (ref 0.2–1.2)
BUN SERPL-MCNC: 29 MG/DL — HIGH (ref 7–18)
CALCIUM SERPL-MCNC: 7.7 MG/DL — LOW (ref 8.4–10.5)
CHLORIDE SERPL-SCNC: 103 MMOL/L — SIGNIFICANT CHANGE UP (ref 96–108)
CO2 SERPL-SCNC: 25 MMOL/L — SIGNIFICANT CHANGE UP (ref 22–31)
CREAT SERPL-MCNC: 4.79 MG/DL — HIGH (ref 0.5–1.3)
GLUCOSE BLDC GLUCOMTR-MCNC: 108 MG/DL — HIGH (ref 70–99)
GLUCOSE BLDC GLUCOMTR-MCNC: 122 MG/DL — HIGH (ref 70–99)
GLUCOSE BLDC GLUCOMTR-MCNC: 83 MG/DL — SIGNIFICANT CHANGE UP (ref 70–99)
GLUCOSE BLDC GLUCOMTR-MCNC: 99 MG/DL — SIGNIFICANT CHANGE UP (ref 70–99)
GLUCOSE SERPL-MCNC: 73 MG/DL — SIGNIFICANT CHANGE UP (ref 70–99)
HCT VFR BLD CALC: 24.5 % — LOW (ref 34.5–45)
HCT VFR BLD CALC: 30.2 % — LOW (ref 34.5–45)
HGB BLD-MCNC: 7.3 G/DL — LOW (ref 11.5–15.5)
HGB BLD-MCNC: 8.5 G/DL — LOW (ref 11.5–15.5)
MAGNESIUM SERPL-MCNC: 2.8 MG/DL — HIGH (ref 1.6–2.6)
MCHC RBC-ENTMCNC: 27.3 PG — SIGNIFICANT CHANGE UP (ref 27–34)
MCHC RBC-ENTMCNC: 28.2 GM/DL — LOW (ref 32–36)
MCHC RBC-ENTMCNC: 28.9 PG — SIGNIFICANT CHANGE UP (ref 27–34)
MCHC RBC-ENTMCNC: 29.8 GM/DL — LOW (ref 32–36)
MCV RBC AUTO: 96.6 FL — SIGNIFICANT CHANGE UP (ref 80–100)
MCV RBC AUTO: 97 FL — SIGNIFICANT CHANGE UP (ref 80–100)
OB PNL STL: POSITIVE
PHOSPHATE SERPL-MCNC: 3.6 MG/DL — SIGNIFICANT CHANGE UP (ref 2.5–4.5)
PLATELET # BLD AUTO: 292 K/UL — SIGNIFICANT CHANGE UP (ref 150–400)
PLATELET # BLD AUTO: 359 K/UL — SIGNIFICANT CHANGE UP (ref 150–400)
POTASSIUM SERPL-MCNC: 4.2 MMOL/L — SIGNIFICANT CHANGE UP (ref 3.5–5.3)
POTASSIUM SERPL-SCNC: 4.2 MMOL/L — SIGNIFICANT CHANGE UP (ref 3.5–5.3)
PROT SERPL-MCNC: 5.3 G/DL — LOW (ref 6–8.3)
RBC # BLD: 2.53 M/UL — LOW (ref 3.8–5.2)
RBC # BLD: 3.13 M/UL — LOW (ref 3.8–5.2)
RBC # FLD: 18.3 % — HIGH (ref 10.3–14.5)
RBC # FLD: 18.7 % — HIGH (ref 10.3–14.5)
SODIUM SERPL-SCNC: 138 MMOL/L — SIGNIFICANT CHANGE UP (ref 135–145)
WBC # BLD: 13.3 K/UL — HIGH (ref 3.8–10.5)
WBC # BLD: 13.8 K/UL — HIGH (ref 3.8–10.5)
WBC # FLD AUTO: 13.3 K/UL — HIGH (ref 3.8–10.5)
WBC # FLD AUTO: 13.8 K/UL — HIGH (ref 3.8–10.5)

## 2018-01-21 RX ORDER — HYDRALAZINE HCL 50 MG
50 TABLET ORAL THREE TIMES A DAY
Qty: 0 | Refills: 0 | Status: DISCONTINUED | OUTPATIENT
Start: 2018-01-21 | End: 2018-01-29

## 2018-01-21 RX ADMIN — Medication 50 MILLIGRAM(S): at 23:12

## 2018-01-21 RX ADMIN — SIMVASTATIN 20 MILLIGRAM(S): 20 TABLET, FILM COATED ORAL at 21:09

## 2018-01-21 RX ADMIN — Medication 1 PATCH: at 11:50

## 2018-01-21 RX ADMIN — Medication 650 MILLIGRAM(S): at 21:41

## 2018-01-21 RX ADMIN — Medication 650 MILLIGRAM(S): at 21:11

## 2018-01-21 RX ADMIN — HEPARIN SODIUM 5000 UNIT(S): 5000 INJECTION INTRAVENOUS; SUBCUTANEOUS at 18:09

## 2018-01-21 RX ADMIN — MEROPENEM 100 MILLIGRAM(S): 1 INJECTION INTRAVENOUS at 11:51

## 2018-01-21 RX ADMIN — Medication 50 MILLIGRAM(S): at 15:04

## 2018-01-21 RX ADMIN — Medication 300 MILLIGRAM(S): at 11:50

## 2018-01-21 RX ADMIN — SEVELAMER CARBONATE 1600 MILLIGRAM(S): 2400 POWDER, FOR SUSPENSION ORAL at 11:48

## 2018-01-21 RX ADMIN — PANTOPRAZOLE SODIUM 40 MILLIGRAM(S): 20 TABLET, DELAYED RELEASE ORAL at 05:40

## 2018-01-21 RX ADMIN — Medication 1 MILLIGRAM(S): at 11:49

## 2018-01-21 RX ADMIN — GABAPENTIN 100 MILLIGRAM(S): 400 CAPSULE ORAL at 15:04

## 2018-01-21 RX ADMIN — Medication 25 MILLIGRAM(S): at 05:36

## 2018-01-21 RX ADMIN — CALAMINE 8% AND ZINC OXIDE 8% 1 APPLICATION(S): 160 LOTION TOPICAL at 05:37

## 2018-01-21 RX ADMIN — GABAPENTIN 100 MILLIGRAM(S): 400 CAPSULE ORAL at 21:09

## 2018-01-21 RX ADMIN — Medication 500 MILLIGRAM(S): at 11:48

## 2018-01-21 RX ADMIN — Medication 100 MILLIGRAM(S): at 18:08

## 2018-01-21 RX ADMIN — SEVELAMER CARBONATE 1600 MILLIGRAM(S): 2400 POWDER, FOR SUSPENSION ORAL at 08:41

## 2018-01-21 RX ADMIN — Medication 1 PATCH: at 00:55

## 2018-01-21 RX ADMIN — HEPARIN SODIUM 5000 UNIT(S): 5000 INJECTION INTRAVENOUS; SUBCUTANEOUS at 05:36

## 2018-01-21 RX ADMIN — SENNA PLUS 2 TABLET(S): 8.6 TABLET ORAL at 21:09

## 2018-01-21 RX ADMIN — SEVELAMER CARBONATE 1600 MILLIGRAM(S): 2400 POWDER, FOR SUSPENSION ORAL at 18:09

## 2018-01-21 RX ADMIN — Medication 50 MICROGRAM(S): at 05:36

## 2018-01-21 RX ADMIN — Medication 100 MILLIGRAM(S): at 05:36

## 2018-01-21 RX ADMIN — CALAMINE 8% AND ZINC OXIDE 8% 1 APPLICATION(S): 160 LOTION TOPICAL at 15:04

## 2018-01-21 RX ADMIN — CINACALCET 30 MILLIGRAM(S): 30 TABLET, FILM COATED ORAL at 11:48

## 2018-01-21 RX ADMIN — GABAPENTIN 100 MILLIGRAM(S): 400 CAPSULE ORAL at 05:36

## 2018-01-21 RX ADMIN — Medication 50 MILLIGRAM(S): at 11:48

## 2018-01-21 RX ADMIN — Medication 25 MILLIGRAM(S): at 18:09

## 2018-01-21 NOTE — CHART NOTE - NSCHARTNOTEFT_GEN_A_CORE
following signout for hemoglobin. Given 2PRBC on 1/20 and hgb this AM is 7.3. Pt is ESRD on epogen  will obtain stool guaiac for now  spoke with Dr Escoto regarding possibility of starting IV venofer; recommends starting if indium scan is negative. following signout for hemoglobin. hgb this AM is 7.3. Pt is ESRD on epogen  will obtain stool guaiac for now  spoke with Dr Escoto regarding possibility of starting IV venofer; recommends starting if indium scan is negative.

## 2018-01-21 NOTE — PROGRESS NOTE ADULT - ASSESSMENT
52 yr old female with  ESRD.  HD MWF.  WBC better.  WBC scan in AM for persistent leucocytosis prior to IR Permacath placement. afebrile. on Meronem. afebrile.

## 2018-01-21 NOTE — PROGRESS NOTE ADULT - SUBJECTIVE AND OBJECTIVE BOX
afebrile.    No Known Allergies    Hospital Medications:   MEDICATIONS  (STANDING):  ascorbic acid 500 milliGRAM(s) Oral daily  calamine Lotion 1 Application(s) Topical three times a day  cinacalcet 30 milliGRAM(s) Oral daily  dextrose 5%. 1000 milliLiter(s) (50 mL/Hr) IV Continuous <Continuous>  dextrose 50% Injectable 12.5 Gram(s) IV Push once  dextrose 50% Injectable 25 Gram(s) IV Push once  dextrose 50% Injectable 25 Gram(s) IV Push once  docusate sodium 100 milliGRAM(s) Oral two times a day  epoetin jacqueline Injectable 38668 Unit(s) IV Push <User Schedule>  ferrous    sulfate Liquid 300 milliGRAM(s) Enteral Tube daily  folic acid 1 milliGRAM(s) Oral daily  gabapentin 100 milliGRAM(s) Oral three times a day  heparin  Injectable 5000 Unit(s) SubCutaneous every 12 hours  hydrALAZINE 50 milliGRAM(s) Oral three times a day  insulin lispro (HumaLOG) corrective regimen sliding scale   SubCutaneous Before meals and at bedtime  levothyroxine 50 MICROGram(s) Oral daily  meropenem IVPB 1000 milliGRAM(s) IV Intermittent every 24 hours  meropenem IVPB      metoprolol     tartrate 25 milliGRAM(s) Oral two times a day  nitroglycerin    Patch 0.2 mG/Hr(s) 1 patch Transdermal daily  pantoprazole    Tablet 40 milliGRAM(s) Oral before breakfast  PPD  5 Tuberculin Unit(s) Injectable 5 Unit(s) IntraDermal once  senna 2 Tablet(s) Oral at bedtime  sevelamer hydrochloride 1600 milliGRAM(s) Oral three times a day with meals  simvastatin 20 milliGRAM(s) Oral at bedtime        VITALS:  T(F): 98.2 (01-21-18 @ 05:05), Max: 98.7 (01-20-18 @ 20:30)  HR: 71 (01-21-18 @ 08:50)  BP: 173/71 (01-21-18 @ 08:50)  RR: 16 (01-21-18 @ 05:05)  SpO2: 100% (01-21-18 @ 05:05)  Wt(kg): --      PHYSICAL EXAM:  Constitutional: NAD  HEENT: anicteric sclera, oropharynx clear, MMM  Neck: No JVD  Respiratory: CTAB, no wheezes, rales or rhonchi  Cardiovascular: S1, S2, RRR  Gastrointestinal: BS+, soft, NT/ND  Extremities: No cyanosis or clubbing. No peripheral edema  Neurological: A/O x 3, no focal deficits  Vascular Access: MARCO A lopez.    LABS:  01-21    138  |  103  |  29<H>  ----------------------------<  73  4.2   |  25  |  4.79<H>    Ca    7.7<L>      21 Jan 2018 07:08  Phos  3.6     01-21  Mg     2.8     01-21    TPro  5.3<L>  /  Alb  1.4<L>  /  TBili  0.3  /  DBili      /  AST  11  /  ALT  <6<L>  /  AlkPhos  102  01-21    Creatinine Trend: 4.79 <--, 5.03 <--, 9.44 <--, 7.72 <--                        7.3    13.8  )-----------( 292      ( 21 Jan 2018 07:08 )             24.5     Urine Studies:      RADIOLOGY & ADDITIONAL STUDIES:

## 2018-01-21 NOTE — PROGRESS NOTE ADULT - SUBJECTIVE AND OBJECTIVE BOX
Patient is a 52y old  Female who presents with a chief complaint of SOB (13 Dec 2017 16:01)  awake, alert, comfortable in bed in NAD. Feeling better.    INTERVAL HPI/OVERNIGHT EVENTS:      VITAL SIGNS:  T(F): 98.2 (01-21-18 @ 05:05)  HR: 71 (01-21-18 @ 08:50)  BP: 173/71 (01-21-18 @ 08:50)  RR: 16 (01-21-18 @ 05:05)  SpO2: 100% (01-21-18 @ 05:05)  Wt(kg): --  I&O's Detail          REVIEW OF SYSTEMS:    CONSTITUTIONAL:  No fevers, chills, sweats    HEENT:  Eyes:  No diplopia or blurred vision. ENT:  No earache, sore throat or runny nose.    CARDIOVASCULAR:  No pressure, squeezing, tightness, or heaviness about the chest; no palpitations.    RESPIRATORY:  Per HPI    GASTROINTESTINAL:  No abdominal pain, nausea, vomiting or diarrhea.    GENITOURINARY:  No dysuria, frequency or urgency.    NEUROLOGIC:  No paresthesias, fasciculations, seizures or weakness.    PSYCHIATRIC:  No disorder of thought or mood.      PHYSICAL EXAM:    Constitutional: Well developed and nourished  Eyes:Perrla  ENMT: normal  Neck:supple  Respiratory: good air entry  Cardiovascular: S1 S2 regular  Gastrointestinal: Soft, Non tender  Extremities: No edema  Vascular:normal  Neurological:Awake, alert,Ox3  Musculoskeletal:Normal      MEDICATIONS  (STANDING):  ascorbic acid 500 milliGRAM(s) Oral daily  calamine Lotion 1 Application(s) Topical three times a day  cinacalcet 30 milliGRAM(s) Oral daily  dextrose 5%. 1000 milliLiter(s) (50 mL/Hr) IV Continuous <Continuous>  dextrose 50% Injectable 12.5 Gram(s) IV Push once  dextrose 50% Injectable 25 Gram(s) IV Push once  dextrose 50% Injectable 25 Gram(s) IV Push once  docusate sodium 100 milliGRAM(s) Oral two times a day  epoetin jacqueline Injectable 71531 Unit(s) IV Push <User Schedule>  ferrous    sulfate Liquid 300 milliGRAM(s) Enteral Tube daily  folic acid 1 milliGRAM(s) Oral daily  gabapentin 100 milliGRAM(s) Oral three times a day  heparin  Injectable 5000 Unit(s) SubCutaneous every 12 hours  hydrALAZINE 50 milliGRAM(s) Oral three times a day  insulin lispro (HumaLOG) corrective regimen sliding scale   SubCutaneous Before meals and at bedtime  levothyroxine 50 MICROGram(s) Oral daily  meropenem IVPB 1000 milliGRAM(s) IV Intermittent every 24 hours  meropenem IVPB      metoprolol     tartrate 25 milliGRAM(s) Oral two times a day  nitroglycerin    Patch 0.2 mG/Hr(s) 1 patch Transdermal daily  pantoprazole    Tablet 40 milliGRAM(s) Oral before breakfast  PPD  5 Tuberculin Unit(s) Injectable 5 Unit(s) IntraDermal once  senna 2 Tablet(s) Oral at bedtime  sevelamer hydrochloride 1600 milliGRAM(s) Oral three times a day with meals  simvastatin 20 milliGRAM(s) Oral at bedtime    MEDICATIONS  (PRN):  acetaminophen   Tablet 650 milliGRAM(s) Oral every 6 hours PRN For Temp greater than 38 C (100.4 F)  acetaminophen   Tablet. 650 milliGRAM(s) Oral every 6 hours PRN Moderate Pain (4 - 6)  aluminum hydroxide/magnesium hydroxide/simethicone Suspension 30 milliLiter(s) Oral every 4 hours PRN Dyspepsia  dextrose Gel 1 Dose(s) Oral once PRN Blood Glucose LESS THAN 70 milliGRAM(s)/deciliter  dextrose Gel 1 Dose(s) Oral once PRN Blood Glucose LESS THAN 70 milliGRAM(s)/deciliter  glucagon  Injectable 1 milliGRAM(s) IntraMuscular once PRN Glucose LESS THAN 70 milligrams/deciliter      Allergies    No Known Allergies    Intolerances        LABS:                        7.3    13.8  )-----------( 292      ( 21 Jan 2018 07:08 )             24.5     01-21    138  |  103  |  29<H>  ----------------------------<  73  4.2   |  25  |  4.79<H>    Ca    7.7<L>      21 Jan 2018 07:08  Phos  3.6     01-21  Mg     2.8     01-21    TPro  5.3<L>  /  Alb  1.4<L>  /  TBili  0.3  /  DBili  x   /  AST  11  /  ALT  <6<L>  /  AlkPhos  102  01-21              CAPILLARY BLOOD GLUCOSE      POCT Blood Glucose.: 83 mg/dL (21 Jan 2018 08:03)  POCT Blood Glucose.: 108 mg/dL (20 Jan 2018 21:32)  POCT Blood Glucose.: 116 mg/dL (20 Jan 2018 16:06)  POCT Blood Glucose.: 100 mg/dL (20 Jan 2018 11:07)        RADIOLOGY & ADDITIONAL TESTS:    CXR:    Ct scan chest:    ekg;    echo:

## 2018-01-21 NOTE — PROGRESS NOTE ADULT - ATTENDING COMMENTS
Patient is seen and examined. Case reviewed with the medical team. Above note is appreciated. Will follow up clinically. Continue DVT prophylaxis. Case discussed with nephrology. Will follow up with indium scan ordered. Will follow up CBC. events of earlier noted, will not transfuse at this time.

## 2018-01-21 NOTE — CHART NOTE - NSCHARTNOTEFT_GEN_A_CORE
PGY 3 note:  RN paged that patient is hypertensive and SBP>170s. Reviewed patient home medications. Patient is not complaining any symptoms now. She was on hydralazine 100 tid at home which was held on admission. Restarting patient on hydralazine 50 tid. Will assess patient again.

## 2018-01-21 NOTE — PROGRESS NOTE ADULT - SUBJECTIVE AND OBJECTIVE BOX
CHIEF COMPLAINT:Patient is a 52y old  Female who presents with a chief complaint of SOB (13 Dec 2017 16:01)    	  REVIEW OF SYSTEMS:  CONSTITUTIONAL: No fever, weight loss, or fatigue  EYES: No eye pain, visual disturbances, or discharge  ENMT:  No difficulty hearing, tinnitus, vertigo; No sinus or throat pain  NECK: No pain or stiffness  RESPIRATORY: No cough, wheezing, chills or hemoptysis; No Shortness of Breath  CARDIOVASCULAR: No chest pain, palpitations, passing out, dizziness, or leg swelling  GASTROINTESTINAL: No abdominal or epigastric pain. No nausea, vomiting, or hematemesis; No diarrhea or constipation. No melena or hematochezia.  GENITOURINARY: No dysuria, frequency, hematuria, or incontinence  NEUROLOGICAL: No headaches, memory loss, loss of strength, numbness, or tremors  SKIN: No itching, burning, rashes, or lesions   LYMPH Nodes: No enlarged glands  ENDOCRINE: No heat or cold intolerance; No hair loss  MUSCULOSKELETAL: No joint pain or swelling; No muscle, back, or extremity pain  PSYCHIATRIC: No depression, anxiety, mood swings, or difficulty sleeping  HEME/LYMPH: No easy bruising, or bleeding gums  ALLERY AND IMMUNOLOGIC: No hives or eczema	    [ ] All others negative	  [ ] Unable to obtain    PHYSICAL EXAM:  T(C): 37 (01-21-18 @ 20:52), Max: 37 (01-21-18 @ 20:52)  HR: 78 (01-21-18 @ 20:52) (67 - 78)  BP: 124/48 (01-21-18 @ 20:52) (124/48 - 179/70)  RR: 17 (01-21-18 @ 20:52) (16 - 17)  SpO2: 100% (01-21-18 @ 20:52) (100% - 100%)  Wt(kg): --  I&O's Summary      Appearance: Normal	  HEENT:   Normal oral mucosa, PERRL, EOMI	  Lymphatic: No lymphadenopathy  Cardiovascular: Normal S1 S2, No JVD, No murmurs, No edema  Respiratory: Lungs clear to auscultation	  Psychiatry: A & O x 3, Mood & affect appropriate  Gastrointestinal:  Soft, Non-tender, + BS	  Skin: No rashes, No ecchymoses, No cyanosis	  Neurologic: Non-focal  Extremities: Normal range of motion, No clubbing, cyanosis or edema  Vascular: Peripheral pulses palpable 2+ bilaterally    MEDICATIONS  (STANDING):  ascorbic acid 500 milliGRAM(s) Oral daily  calamine Lotion 1 Application(s) Topical three times a day  cinacalcet 30 milliGRAM(s) Oral daily  dextrose 5%. 1000 milliLiter(s) (50 mL/Hr) IV Continuous <Continuous>  dextrose 50% Injectable 12.5 Gram(s) IV Push once  dextrose 50% Injectable 25 Gram(s) IV Push once  dextrose 50% Injectable 25 Gram(s) IV Push once  docusate sodium 100 milliGRAM(s) Oral two times a day  epoetin jacqueline Injectable 47794 Unit(s) IV Push <User Schedule>  ferrous    sulfate Liquid 300 milliGRAM(s) Enteral Tube daily  folic acid 1 milliGRAM(s) Oral daily  gabapentin 100 milliGRAM(s) Oral three times a day  heparin  Injectable 5000 Unit(s) SubCutaneous every 12 hours  hydrALAZINE 50 milliGRAM(s) Oral three times a day  insulin lispro (HumaLOG) corrective regimen sliding scale   SubCutaneous Before meals and at bedtime  levothyroxine 50 MICROGram(s) Oral daily  meropenem IVPB 1000 milliGRAM(s) IV Intermittent every 24 hours  meropenem IVPB      metoprolol     tartrate 25 milliGRAM(s) Oral two times a day  nitroglycerin    Patch 0.2 mG/Hr(s) 1 patch Transdermal daily  pantoprazole    Tablet 40 milliGRAM(s) Oral before breakfast  PPD  5 Tuberculin Unit(s) Injectable 5 Unit(s) IntraDermal once  senna 2 Tablet(s) Oral at bedtime  sevelamer hydrochloride 1600 milliGRAM(s) Oral three times a day with meals  simvastatin 20 milliGRAM(s) Oral at bedtime      TELEMETRY: 	    ECG:  	  RADIOLOGY:  OTHER: 	  	  CBC Full  -  ( 21 Jan 2018 20:16 )  WBC Count : 13.3 K/uL  Hemoglobin : 8.5 g/dL  Hematocrit : 30.2 %  Platelet Count - Automated : 359 K/uL  Mean Cell Volume : 96.6 fl  Mean Cell Hemoglobin : 27.3 pg  Mean Cell Hemoglobin Concentration : 28.2 gm/dL  Auto Neutrophil # : x  Auto Lymphocyte # : x  Auto Monocyte # : x  Auto Eosinophil # : x  Auto Basophil # : x  Auto Neutrophil % : x  Auto Lymphocyte % : x  Auto Monocyte % : x  Auto Eosinophil % : x  Auto Basophil % : x        CARDIAC MARKERS:                              8.5    13.3  )-----------( 359      ( 21 Jan 2018 20:16 )             30.2       01-21    138  |  103  |  29<H>  ----------------------------<  73  4.2   |  25  |  4.79<H>    Ca    7.7<L>      21 Jan 2018 07:08  Phos  3.6     01-21  Mg     2.8     01-21    TPro  5.3<L>  /  Alb  1.4<L>  /  TBili  0.3  /  DBili  x   /  AST  11  /  ALT  <6<L>  /  AlkPhos  102  01-21            proBNP:   Lipid Profile:   HgA1c:   TSH:

## 2018-01-22 LAB
ALBUMIN SERPL ELPH-MCNC: 1.5 G/DL — LOW (ref 3.5–5)
ALP SERPL-CCNC: 112 U/L — SIGNIFICANT CHANGE UP (ref 40–120)
ALT FLD-CCNC: <6 U/L DA — LOW (ref 10–60)
ANION GAP SERPL CALC-SCNC: 9 MMOL/L — SIGNIFICANT CHANGE UP (ref 5–17)
AST SERPL-CCNC: 10 U/L — SIGNIFICANT CHANGE UP (ref 10–40)
BASOPHILS # BLD AUTO: 0.1 K/UL — SIGNIFICANT CHANGE UP (ref 0–0.2)
BASOPHILS NFR BLD AUTO: 0.8 % — SIGNIFICANT CHANGE UP (ref 0–2)
BILIRUB SERPL-MCNC: 0.3 MG/DL — SIGNIFICANT CHANGE UP (ref 0.2–1.2)
BUN SERPL-MCNC: 38 MG/DL — HIGH (ref 7–18)
CALCIUM SERPL-MCNC: 8.3 MG/DL — LOW (ref 8.4–10.5)
CHLORIDE SERPL-SCNC: 103 MMOL/L — SIGNIFICANT CHANGE UP (ref 96–108)
CO2 SERPL-SCNC: 25 MMOL/L — SIGNIFICANT CHANGE UP (ref 22–31)
CREAT SERPL-MCNC: 6.05 MG/DL — HIGH (ref 0.5–1.3)
EOSINOPHIL # BLD AUTO: 0.3 K/UL — SIGNIFICANT CHANGE UP (ref 0–0.5)
EOSINOPHIL NFR BLD AUTO: 2.3 % — SIGNIFICANT CHANGE UP (ref 0–6)
GLUCOSE BLDC GLUCOMTR-MCNC: 109 MG/DL — HIGH (ref 70–99)
GLUCOSE BLDC GLUCOMTR-MCNC: 115 MG/DL — HIGH (ref 70–99)
GLUCOSE BLDC GLUCOMTR-MCNC: 86 MG/DL — SIGNIFICANT CHANGE UP (ref 70–99)
GLUCOSE BLDC GLUCOMTR-MCNC: 97 MG/DL — SIGNIFICANT CHANGE UP (ref 70–99)
GLUCOSE SERPL-MCNC: 78 MG/DL — SIGNIFICANT CHANGE UP (ref 70–99)
HCT VFR BLD CALC: 26.3 % — LOW (ref 34.5–45)
HGB BLD-MCNC: 7.9 G/DL — LOW (ref 11.5–15.5)
LYMPHOCYTES # BLD AUTO: 15.9 % — SIGNIFICANT CHANGE UP (ref 13–44)
LYMPHOCYTES # BLD AUTO: 2.2 K/UL — SIGNIFICANT CHANGE UP (ref 1–3.3)
MAGNESIUM SERPL-MCNC: 2.9 MG/DL — HIGH (ref 1.6–2.6)
MCHC RBC-ENTMCNC: 29 PG — SIGNIFICANT CHANGE UP (ref 27–34)
MCHC RBC-ENTMCNC: 29.9 GM/DL — LOW (ref 32–36)
MCV RBC AUTO: 97 FL — SIGNIFICANT CHANGE UP (ref 80–100)
MONOCYTES # BLD AUTO: 1.2 K/UL — HIGH (ref 0–0.9)
MONOCYTES NFR BLD AUTO: 8.6 % — SIGNIFICANT CHANGE UP (ref 2–14)
NEUTROPHILS # BLD AUTO: 10.2 K/UL — HIGH (ref 1.8–7.4)
NEUTROPHILS NFR BLD AUTO: 72.4 % — SIGNIFICANT CHANGE UP (ref 43–77)
PHOSPHATE SERPL-MCNC: 3.6 MG/DL — SIGNIFICANT CHANGE UP (ref 2.5–4.5)
PLATELET # BLD AUTO: 385 K/UL — SIGNIFICANT CHANGE UP (ref 150–400)
POTASSIUM SERPL-MCNC: 4.6 MMOL/L — SIGNIFICANT CHANGE UP (ref 3.5–5.3)
POTASSIUM SERPL-SCNC: 4.6 MMOL/L — SIGNIFICANT CHANGE UP (ref 3.5–5.3)
PROT SERPL-MCNC: 5.7 G/DL — LOW (ref 6–8.3)
RBC # BLD: 2.71 M/UL — LOW (ref 3.8–5.2)
RBC # FLD: 19.2 % — HIGH (ref 10.3–14.5)
SODIUM SERPL-SCNC: 137 MMOL/L — SIGNIFICANT CHANGE UP (ref 135–145)
WBC # BLD: 14.2 K/UL — HIGH (ref 3.8–10.5)
WBC # FLD AUTO: 14.2 K/UL — HIGH (ref 3.8–10.5)

## 2018-01-22 RX ORDER — PANTOPRAZOLE SODIUM 20 MG/1
40 TABLET, DELAYED RELEASE ORAL EVERY 12 HOURS
Qty: 0 | Refills: 0 | Status: DISCONTINUED | OUTPATIENT
Start: 2018-01-22 | End: 2018-01-29

## 2018-01-22 RX ADMIN — Medication 50 MICROGRAM(S): at 05:50

## 2018-01-22 RX ADMIN — Medication 50 MILLIGRAM(S): at 14:43

## 2018-01-22 RX ADMIN — Medication 1 PATCH: at 00:11

## 2018-01-22 RX ADMIN — CALAMINE 8% AND ZINC OXIDE 8% 1 APPLICATION(S): 160 LOTION TOPICAL at 22:00

## 2018-01-22 RX ADMIN — Medication 25 MILLIGRAM(S): at 05:54

## 2018-01-22 RX ADMIN — Medication 25 MILLIGRAM(S): at 17:31

## 2018-01-22 RX ADMIN — Medication 50 MILLIGRAM(S): at 05:50

## 2018-01-22 RX ADMIN — Medication 100 MILLIGRAM(S): at 05:50

## 2018-01-22 RX ADMIN — HEPARIN SODIUM 5000 UNIT(S): 5000 INJECTION INTRAVENOUS; SUBCUTANEOUS at 17:21

## 2018-01-22 RX ADMIN — PANTOPRAZOLE SODIUM 40 MILLIGRAM(S): 20 TABLET, DELAYED RELEASE ORAL at 17:33

## 2018-01-22 RX ADMIN — SEVELAMER CARBONATE 1600 MILLIGRAM(S): 2400 POWDER, FOR SUSPENSION ORAL at 08:44

## 2018-01-22 RX ADMIN — GABAPENTIN 100 MILLIGRAM(S): 400 CAPSULE ORAL at 05:51

## 2018-01-22 RX ADMIN — SEVELAMER CARBONATE 1600 MILLIGRAM(S): 2400 POWDER, FOR SUSPENSION ORAL at 17:31

## 2018-01-22 RX ADMIN — PANTOPRAZOLE SODIUM 40 MILLIGRAM(S): 20 TABLET, DELAYED RELEASE ORAL at 05:54

## 2018-01-22 RX ADMIN — GABAPENTIN 100 MILLIGRAM(S): 400 CAPSULE ORAL at 14:43

## 2018-01-22 RX ADMIN — CALAMINE 8% AND ZINC OXIDE 8% 1 APPLICATION(S): 160 LOTION TOPICAL at 14:42

## 2018-01-22 RX ADMIN — HEPARIN SODIUM 5000 UNIT(S): 5000 INJECTION INTRAVENOUS; SUBCUTANEOUS at 05:54

## 2018-01-22 RX ADMIN — ERYTHROPOIETIN 14000 UNIT(S): 10000 INJECTION, SOLUTION INTRAVENOUS; SUBCUTANEOUS at 12:31

## 2018-01-22 NOTE — PROGRESS NOTE ADULT - PROBLEM SELECTOR PLAN 4
-EGD negative for ULcer   -H/H stable   guiac postive  will f/u GI again for possible need for colonoscopy  -Tolerating diet,   -Aspiration precaution

## 2018-01-22 NOTE — PROGRESS NOTE ADULT - ASSESSMENT
52 yr old female with  ESRD.  HD MWF.  Leucocytosis.   Erick not working.  D/W ID   WBC scan in AM for persistent leucocytosis prior to IR Permacath placement. afebrile. on Meronem. afebrile.

## 2018-01-22 NOTE — PROGRESS NOTE ADULT - ATTENDING COMMENTS
Patient is seen and examined. Case reviewed with the medical team. Above note is appreciated. Will follow up clinically. Continue DVT prophylaxis. Case discussed with ID and Nephrology. For indium scan in am. Follow up CBC.

## 2018-01-22 NOTE — PROGRESS NOTE ADULT - SUBJECTIVE AND OBJECTIVE BOX
Patient is a 52y Female with    No Known Allergies    Hospital Medications:   MEDICATIONS  (STANDING):  ascorbic acid 500 milliGRAM(s) Oral daily  calamine Lotion 1 Application(s) Topical three times a day  cinacalcet 30 milliGRAM(s) Oral daily  dextrose 5%. 1000 milliLiter(s) (50 mL/Hr) IV Continuous <Continuous>  dextrose 50% Injectable 12.5 Gram(s) IV Push once  dextrose 50% Injectable 25 Gram(s) IV Push once  dextrose 50% Injectable 25 Gram(s) IV Push once  docusate sodium 100 milliGRAM(s) Oral two times a day  epoetin jacqueline Injectable 48399 Unit(s) IV Push <User Schedule>  ferrous    sulfate Liquid 300 milliGRAM(s) Enteral Tube daily  folic acid 1 milliGRAM(s) Oral daily  gabapentin 100 milliGRAM(s) Oral three times a day  heparin  Injectable 5000 Unit(s) SubCutaneous every 12 hours  hydrALAZINE 50 milliGRAM(s) Oral three times a day  insulin lispro (HumaLOG) corrective regimen sliding scale   SubCutaneous Before meals and at bedtime  levothyroxine 50 MICROGram(s) Oral daily  metoprolol     tartrate 25 milliGRAM(s) Oral two times a day  nitroglycerin    Patch 0.2 mG/Hr(s) 1 patch Transdermal daily  pantoprazole  Injectable 40 milliGRAM(s) IV Push every 12 hours  PPD  5 Tuberculin Unit(s) Injectable 5 Unit(s) IntraDermal once  senna 2 Tablet(s) Oral at bedtime  sevelamer hydrochloride 1600 milliGRAM(s) Oral three times a day with meals  simvastatin 20 milliGRAM(s) Oral at bedtime    REVIEW OF SYSTEMS:  CONSTITUTIONAL: No weakness, fevers or chills  EYES/ENT: No visual changes;  No vertigo or throat pain   NECK: No pain or stiffness  RESPIRATORY: No cough, wheezing, hemoptysis; No shortness of breath  CARDIOVASCULAR: No chest pain or palpitations.  GASTROINTESTINAL: No abdominal or epigastric pain. No nausea, vomiting, or hematemesis; No diarrhea or constipation. No melena or hematochezia.  GENITOURINARY: No dysuria, frequency, foamy urine, urinary urgency, incontinence or hematuria  NEUROLOGICAL: No numbness or weakness  SKIN: No itching, burning, rashes, or lesions   VASCULAR: No bilateral lower extremity edema.   All other review of systems is negative unless indicated above.    VITALS:  T(F): 97.9 (01-22-18 @ 12:00), Max: 98.7 (01-22-18 @ 05:10)  HR: 76 (01-22-18 @ 12:00)  BP: 122/53 (01-22-18 @ 12:00)  RR: 16 (01-22-18 @ 12:00)  SpO2: 97% (01-22-18 @ 12:00)  Wt(kg): --      PHYSICAL EXAM:  Constitutional: NAD  HEENT: anicteric sclera, oropharynx clear.  Neck: No JVD  Respiratory: CTAB, no wheezes, rales or rhonchi  Cardiovascular: S1, S2, RRR  Gastrointestinal: BS+, soft, NT/ND  Extremities: No cyanosis or clubbing. No peripheral edema  Vascular Access: IJ john.    LABS:  01-22    137  |  103  |  38<H>  ----------------------------<  78  4.6   |  25  |  6.05<H>    Ca    8.3<L>      22 Jan 2018 12:22  Phos  3.6     01-22  Mg     2.9     01-22    TPro  5.7<L>  /  Alb  1.5<L>  /  TBili  0.3  /  DBili      /  AST  10  /  ALT  <6<L>  /  AlkPhos  112  01-22    Creatinine Trend: 6.05 <--, 4.79 <--, 5.03 <--, 9.44 <--                        7.9    14.2  )-----------( 385      ( 22 Jan 2018 12:22 )             26.3     Urine Studies:      RADIOLOGY & ADDITIONAL STUDIES:

## 2018-01-22 NOTE — PROGRESS NOTE ADULT - SUBJECTIVE AND OBJECTIVE BOX
52y Female    Meds:    Allergies    No Known Allergies    Intolerances        VITALS:  Vital Signs Last 24 Hrs  T(C): 36.6 (22 Jan 2018 12:00), Max: 37.1 (22 Jan 2018 05:10)  T(F): 97.9 (22 Jan 2018 12:00), Max: 98.7 (22 Jan 2018 05:10)  HR: 76 (22 Jan 2018 12:00) (76 - 78)  BP: 122/53 (22 Jan 2018 12:00) (122/53 - 130/73)  BP(mean): --  RR: 16 (22 Jan 2018 12:00) (16 - 17)  SpO2: 97% (22 Jan 2018 12:00) (97% - 100%)    LABS/DIAGNOSTIC TESTS:                          7.9    14.2  )-----------( 385      ( 22 Jan 2018 12:22 )             26.3         01-22    137  |  103  |  38<H>  ----------------------------<  78  4.6   |  25  |  6.05<H>    Ca    8.3<L>      22 Jan 2018 12:22  Phos  3.6     01-22  Mg     2.9     01-22    TPro  5.7<L>  /  Alb  1.5<L>  /  TBili  0.3  /  DBili  x   /  AST  10  /  ALT  <6<L>  /  AlkPhos  112  01-22      LIVER FUNCTIONS - ( 22 Jan 2018 12:22 )  Alb: 1.5 g/dL / Pro: 5.7 g/dL / ALK PHOS: 112 U/L / ALT: <6 U/L DA / AST: 10 U/L / GGT: x             CULTURES: .Blood Blood  01-10 @ 00:04   No growth at 5 days.  --  --      .Blood Blood-Peripheral  01-06 @ 21:57   No growth at 5 days.  --  --      .Blood Blood  01-06 @ 00:17   No growth at 5 days.  --  --      .Blood Blood  01-05 @ 22:47   No growth at 5 days.  --  --                  RADIOLOGY:      ROS:  [  ] UNABLE TO ELICIT 52y Female who is doing well clinically, she has little to no cough and no fevers, she has completed her antibiotic course for pneumonia. Her shiley cath is not functioning. IR has been refusing to put in a permacath as her wbc count has been slightly elevated, hence we are getting an Indium scan to r/o potential sources of infection to convince IR that it is safe to place a permacath.    Meds:    Allergies    No Known Allergies    Intolerances        VITALS:  Vital Signs Last 24 Hrs  T(C): 36.6 (22 Jan 2018 12:00), Max: 37.1 (22 Jan 2018 05:10)  T(F): 97.9 (22 Jan 2018 12:00), Max: 98.7 (22 Jan 2018 05:10)  HR: 76 (22 Jan 2018 12:00) (76 - 78)  BP: 122/53 (22 Jan 2018 12:00) (122/53 - 130/73)  BP(mean): --  RR: 16 (22 Jan 2018 12:00) (16 - 17)  SpO2: 97% (22 Jan 2018 12:00) (97% - 100%)    LABS/DIAGNOSTIC TESTS:                          7.9    14.2  )-----------( 385      ( 22 Jan 2018 12:22 )             26.3         01-22    137  |  103  |  38<H>  ----------------------------<  78  4.6   |  25  |  6.05<H>    Ca    8.3<L>      22 Jan 2018 12:22  Phos  3.6     01-22  Mg     2.9     01-22    TPro  5.7<L>  /  Alb  1.5<L>  /  TBili  0.3  /  DBili  x   /  AST  10  /  ALT  <6<L>  /  AlkPhos  112  01-22      LIVER FUNCTIONS - ( 22 Jan 2018 12:22 )  Alb: 1.5 g/dL / Pro: 5.7 g/dL / ALK PHOS: 112 U/L / ALT: <6 U/L DA / AST: 10 U/L / GGT: x             CULTURES: .Blood Blood  01-10 @ 00:04   No growth at 5 days.  --  --      .Blood Blood-Peripheral  01-06 @ 21:57   No growth at 5 days.  --  --      .Blood Blood  01-06 @ 00:17   No growth at 5 days.  --  --      .Blood Blood  01-05 @ 22:47   No growth at 5 days.  --  --                  RADIOLOGY:      ROS:  [  ] UNABLE TO ELICIT

## 2018-01-22 NOTE — PROGRESS NOTE ADULT - ASSESSMENT
Leukocytosis - slightly elevated without any signs or symptoms of active infection at this time    plan - off antibiotics  get Indium scan to r/o possible occult source of infection

## 2018-01-22 NOTE — PROGRESS NOTE ADULT - SUBJECTIVE AND OBJECTIVE BOX
Patient is a 52y old  Female who presents with a chief complaint of SOB (13 Dec 2017 16:01)      INTERVAL HPI/OVERNIGHT EVENTS: no overnight events     MEDICATIONS  (STANDING):  ascorbic acid 500 milliGRAM(s) Oral daily  calamine Lotion 1 Application(s) Topical three times a day  cinacalcet 30 milliGRAM(s) Oral daily  dextrose 5%. 1000 milliLiter(s) (50 mL/Hr) IV Continuous <Continuous>  dextrose 50% Injectable 12.5 Gram(s) IV Push once  dextrose 50% Injectable 25 Gram(s) IV Push once  dextrose 50% Injectable 25 Gram(s) IV Push once  docusate sodium 100 milliGRAM(s) Oral two times a day  epoetin jacqueline Injectable 17141 Unit(s) IV Push <User Schedule>  ferrous    sulfate Liquid 300 milliGRAM(s) Enteral Tube daily  folic acid 1 milliGRAM(s) Oral daily  gabapentin 100 milliGRAM(s) Oral three times a day  heparin  Injectable 5000 Unit(s) SubCutaneous every 12 hours  hydrALAZINE 50 milliGRAM(s) Oral three times a day  insulin lispro (HumaLOG) corrective regimen sliding scale   SubCutaneous Before meals and at bedtime  levothyroxine 50 MICROGram(s) Oral daily  meropenem IVPB 1000 milliGRAM(s) IV Intermittent every 24 hours  meropenem IVPB      metoprolol     tartrate 25 milliGRAM(s) Oral two times a day  nitroglycerin    Patch 0.2 mG/Hr(s) 1 patch Transdermal daily  pantoprazole  Injectable 40 milliGRAM(s) IV Push every 12 hours  PPD  5 Tuberculin Unit(s) Injectable 5 Unit(s) IntraDermal once  senna 2 Tablet(s) Oral at bedtime  sevelamer hydrochloride 1600 milliGRAM(s) Oral three times a day with meals  simvastatin 20 milliGRAM(s) Oral at bedtime    MEDICATIONS  (PRN):  acetaminophen   Tablet 650 milliGRAM(s) Oral every 6 hours PRN For Temp greater than 38 C (100.4 F)  acetaminophen   Tablet. 650 milliGRAM(s) Oral every 6 hours PRN Moderate Pain (4 - 6)  aluminum hydroxide/magnesium hydroxide/simethicone Suspension 30 milliLiter(s) Oral every 4 hours PRN Dyspepsia  dextrose Gel 1 Dose(s) Oral once PRN Blood Glucose LESS THAN 70 milliGRAM(s)/deciliter  dextrose Gel 1 Dose(s) Oral once PRN Blood Glucose LESS THAN 70 milliGRAM(s)/deciliter  glucagon  Injectable 1 milliGRAM(s) IntraMuscular once PRN Glucose LESS THAN 70 milligrams/deciliter      Allergies    No Known Allergies    Intolerances        REVIEW OF SYSTEMS:  denies any fever , chills , chest pain or sob    Vital Signs Last 24 Hrs  T(C): 37.1 (22 Jan 2018 05:10), Max: 37.1 (22 Jan 2018 05:10)  T(F): 98.7 (22 Jan 2018 05:10), Max: 98.7 (22 Jan 2018 05:10)  HR: 77 (22 Jan 2018 05:10) (74 - 78)  BP: 130/73 (22 Jan 2018 05:10) (124/48 - 130/73)  BP(mean): --  RR: 17 (22 Jan 2018 05:10) (17 - 17)  SpO2: 100% (22 Jan 2018 05:10) (100% - 100%)    PHYSICAL EXAM:  GENERAL: NAD  NERVOUS SYSTEM:  Alert & Oriented X1-2, no focal neurological deficit   CHEST/LUNG: Clear to percussion bilaterally; Rt. IJ catheter in place   HEART: Regular rate and rhythm; No murmurs, rubs, or gallops  ABDOMEN: Soft, Nontender, Nondistended; Bowel sounds present  EXTREMITIES:  2+ Peripheral Pulses, No clubbing, cyanosis, or edema  SKIN: sacral decubitus ulcer , non stageable   LABS:                        8.5    13.3  )-----------( 359      ( 21 Jan 2018 20:16 )             30.2     01-21    138  |  103  |  29<H>  ----------------------------<  73  4.2   |  25  |  4.79<H>    Ca    7.7<L>      21 Jan 2018 07:08  Phos  3.6     01-21  Mg     2.8     01-21    TPro  5.3<L>  /  Alb  1.4<L>  /  TBili  0.3  /  DBili  x   /  AST  11  /  ALT  <6<L>  /  AlkPhos  102  01-21        CAPILLARY BLOOD GLUCOSE      POCT Blood Glucose.: 109 mg/dL (22 Jan 2018 11:03)  POCT Blood Glucose.: 97 mg/dL (22 Jan 2018 07:13)  POCT Blood Glucose.: 122 mg/dL (21 Jan 2018 21:21)  POCT Blood Glucose.: 99 mg/dL (21 Jan 2018 16:02)      RADIOLOGY & ADDITIONAL TESTS:    Imaging Personally Reviewed:  [ ] YES  [ ] NO    Consultant(s) Notes Reviewed:  [ ] YES  [ ] NO

## 2018-01-22 NOTE — PROGRESS NOTE ADULT - PROBLEM SELECTOR PLAN 2
resolved cleared by ID for permacath  c/w meropenem   completed course  wbc count trending down  but pending wbc scan so IR can put in tunneled cath

## 2018-01-23 LAB
GLUCOSE BLDC GLUCOMTR-MCNC: 105 MG/DL — HIGH (ref 70–99)
GLUCOSE BLDC GLUCOMTR-MCNC: 107 MG/DL — HIGH (ref 70–99)
GLUCOSE BLDC GLUCOMTR-MCNC: 92 MG/DL — SIGNIFICANT CHANGE UP (ref 70–99)
GLUCOSE BLDC GLUCOMTR-MCNC: 95 MG/DL — SIGNIFICANT CHANGE UP (ref 70–99)
GLUCOSE BLDC GLUCOMTR-MCNC: 97 MG/DL — SIGNIFICANT CHANGE UP (ref 70–99)

## 2018-01-23 RX ORDER — COLLAGENASE CLOSTRIDIUM HIST. 250 UNIT/G
1 OINTMENT (GRAM) TOPICAL DAILY
Qty: 0 | Refills: 0 | Status: DISCONTINUED | OUTPATIENT
Start: 2018-01-23 | End: 2018-01-29

## 2018-01-23 RX ADMIN — Medication 50 MILLIGRAM(S): at 22:09

## 2018-01-23 RX ADMIN — GABAPENTIN 100 MILLIGRAM(S): 400 CAPSULE ORAL at 22:08

## 2018-01-23 RX ADMIN — SENNA PLUS 2 TABLET(S): 8.6 TABLET ORAL at 22:08

## 2018-01-23 RX ADMIN — SEVELAMER CARBONATE 1600 MILLIGRAM(S): 2400 POWDER, FOR SUSPENSION ORAL at 09:14

## 2018-01-23 RX ADMIN — GABAPENTIN 100 MILLIGRAM(S): 400 CAPSULE ORAL at 06:08

## 2018-01-23 RX ADMIN — CALAMINE 8% AND ZINC OXIDE 8% 1 APPLICATION(S): 160 LOTION TOPICAL at 13:18

## 2018-01-23 RX ADMIN — Medication 1 MILLIGRAM(S): at 13:15

## 2018-01-23 RX ADMIN — PANTOPRAZOLE SODIUM 40 MILLIGRAM(S): 20 TABLET, DELAYED RELEASE ORAL at 06:10

## 2018-01-23 RX ADMIN — Medication 500 MILLIGRAM(S): at 13:18

## 2018-01-23 RX ADMIN — Medication 50 MICROGRAM(S): at 06:09

## 2018-01-23 RX ADMIN — CINACALCET 30 MILLIGRAM(S): 30 TABLET, FILM COATED ORAL at 13:15

## 2018-01-23 RX ADMIN — Medication 50 MILLIGRAM(S): at 06:09

## 2018-01-23 RX ADMIN — HEPARIN SODIUM 5000 UNIT(S): 5000 INJECTION INTRAVENOUS; SUBCUTANEOUS at 06:08

## 2018-01-23 RX ADMIN — SEVELAMER CARBONATE 1600 MILLIGRAM(S): 2400 POWDER, FOR SUSPENSION ORAL at 18:32

## 2018-01-23 RX ADMIN — Medication 25 MILLIGRAM(S): at 06:09

## 2018-01-23 RX ADMIN — Medication 1 APPLICATION(S): at 22:09

## 2018-01-23 RX ADMIN — Medication 1 PATCH: at 13:14

## 2018-01-23 RX ADMIN — CALAMINE 8% AND ZINC OXIDE 8% 1 APPLICATION(S): 160 LOTION TOPICAL at 22:09

## 2018-01-23 RX ADMIN — Medication 650 MILLIGRAM(S): at 02:37

## 2018-01-23 RX ADMIN — HEPARIN SODIUM 5000 UNIT(S): 5000 INJECTION INTRAVENOUS; SUBCUTANEOUS at 18:32

## 2018-01-23 RX ADMIN — GABAPENTIN 100 MILLIGRAM(S): 400 CAPSULE ORAL at 13:18

## 2018-01-23 RX ADMIN — Medication 50 MILLIGRAM(S): at 00:32

## 2018-01-23 RX ADMIN — GABAPENTIN 100 MILLIGRAM(S): 400 CAPSULE ORAL at 00:32

## 2018-01-23 RX ADMIN — SIMVASTATIN 20 MILLIGRAM(S): 20 TABLET, FILM COATED ORAL at 00:32

## 2018-01-23 RX ADMIN — SIMVASTATIN 20 MILLIGRAM(S): 20 TABLET, FILM COATED ORAL at 22:09

## 2018-01-23 RX ADMIN — PANTOPRAZOLE SODIUM 40 MILLIGRAM(S): 20 TABLET, DELAYED RELEASE ORAL at 18:32

## 2018-01-23 RX ADMIN — SENNA PLUS 2 TABLET(S): 8.6 TABLET ORAL at 00:31

## 2018-01-23 RX ADMIN — SEVELAMER CARBONATE 1600 MILLIGRAM(S): 2400 POWDER, FOR SUSPENSION ORAL at 13:14

## 2018-01-23 RX ADMIN — Medication 650 MILLIGRAM(S): at 01:37

## 2018-01-23 RX ADMIN — Medication 100 MILLIGRAM(S): at 18:32

## 2018-01-23 RX ADMIN — Medication 100 MILLIGRAM(S): at 06:08

## 2018-01-23 RX ADMIN — CALAMINE 8% AND ZINC OXIDE 8% 1 APPLICATION(S): 160 LOTION TOPICAL at 06:08

## 2018-01-23 RX ADMIN — Medication 300 MILLIGRAM(S): at 13:15

## 2018-01-23 NOTE — PROGRESS NOTE ADULT - ATTENDING COMMENTS
Patient is seen and examined. Case reviewed with the medical team. Above note is appreciated. Will follow up clinically. Continue DVT prophylaxis. Case discussed with Nephrology and ID. For Dye injection today and WBC Scan in am. Will follow clinically.

## 2018-01-23 NOTE — PROGRESS NOTE ADULT - PROBLEM SELECTOR PLAN 4
-EGD negative for ULcer   -H/H stable   [facility] postive  will f/u GI again for possible need for colonoscopy  spoke with Dr Cuba over the phone says if h/h is STABLE colonoscopy can be done as outpatient   -Tolerating diet,   -Aspiration precaution

## 2018-01-23 NOTE — PROGRESS NOTE ADULT - ASSESSMENT
52 yr old female with  ESRD.  did not complete yesterday yesterday as erick ot working  RPT renal profile in AM.  Leucocytosis.   Erick not working.  indium scan ordered. Await ID/IR input pending indium scan for placement of permacath, possibly in AM.

## 2018-01-23 NOTE — PROGRESS NOTE ADULT - SUBJECTIVE AND OBJECTIVE BOX
No events overnight.    No Known Allergies    Hospital Medications:   MEDICATIONS  (STANDING):  ascorbic acid 500 milliGRAM(s) Oral daily  calamine Lotion 1 Application(s) Topical three times a day  cinacalcet 30 milliGRAM(s) Oral daily  dextrose 5%. 1000 milliLiter(s) (50 mL/Hr) IV Continuous <Continuous>  dextrose 50% Injectable 12.5 Gram(s) IV Push once  dextrose 50% Injectable 25 Gram(s) IV Push once  dextrose 50% Injectable 25 Gram(s) IV Push once  docusate sodium 100 milliGRAM(s) Oral two times a day  epoetin jacqueline Injectable 47499 Unit(s) IV Push <User Schedule>  ferrous    sulfate Liquid 300 milliGRAM(s) Enteral Tube daily  folic acid 1 milliGRAM(s) Oral daily  gabapentin 100 milliGRAM(s) Oral three times a day  heparin  Injectable 5000 Unit(s) SubCutaneous every 12 hours  hydrALAZINE 50 milliGRAM(s) Oral three times a day  insulin lispro (HumaLOG) corrective regimen sliding scale   SubCutaneous Before meals and at bedtime  levothyroxine 50 MICROGram(s) Oral daily  metoprolol     tartrate 25 milliGRAM(s) Oral two times a day  nitroglycerin    Patch 0.2 mG/Hr(s) 1 patch Transdermal daily  pantoprazole  Injectable 40 milliGRAM(s) IV Push every 12 hours  PPD  5 Tuberculin Unit(s) Injectable 5 Unit(s) IntraDermal once  senna 2 Tablet(s) Oral at bedtime  sevelamer hydrochloride 1600 milliGRAM(s) Oral three times a day with meals  simvastatin 20 milliGRAM(s) Oral at bedtime        VITALS:  T(F): 98.1 (01-23-18 @ 05:27), Max: 99.1 (01-22-18 @ 20:39)  HR: 78 (01-23-18 @ 05:27)  BP: 126/47 (01-23-18 @ 05:27)  RR: 16 (01-23-18 @ 05:27)  SpO2: 100% (01-23-18 @ 05:27)  Wt(kg): --    01-22 @ 07:01  -  01-23 @ 07:00  --------------------------------------------------------  IN: 0 mL / OUT: 0 mL / NET: 0 mL        PHYSICAL EXAM:  Constitutional: NAD  HEENT: anicteric sclera, oropharynx clear.  Neck: No JVD  Respiratory: CTAB, no wheezes, rales or rhonchi  Cardiovascular: S1, S2, RRR  Gastrointestinal: BS+, soft, NT/ND  Extremities: No cyanosis or clubbing. No peripheral edema  Neurological: A/O x 3, no focal deficits  Vascular Access: RT MARCO A lopez.    LABS:  01-22    137  |  103  |  38<H>  ----------------------------<  78  4.6   |  25  |  6.05<H>    Ca    8.3<L>      22 Jan 2018 12:22  Phos  3.6     01-22  Mg     2.9     01-22    TPro  5.7<L>  /  Alb  1.5<L>  /  TBili  0.3  /  DBili      /  AST  10  /  ALT  <6<L>  /  AlkPhos  112  01-22    Creatinine Trend: 6.05 <--, 4.79 <--, 5.03 <--, 9.44 <--                        7.9    14.2  )-----------( 385      ( 22 Jan 2018 12:22 )             26.3     Urine Studies:      RADIOLOGY & ADDITIONAL STUDIES:

## 2018-01-23 NOTE — PROGRESS NOTE ADULT - PROBLEM SELECTOR PLAN 2
resolved cleared by ID for permacath  c/w meropenem   completed course  wbc count trending down  but pending wbc scan so IR can put in tunneled cath  dye injected today wbc scan in am

## 2018-01-23 NOTE — PROGRESS NOTE ADULT - SUBJECTIVE AND OBJECTIVE BOX
Patient is a 52y old  Female who presents with a chief complaint of SOB (13 Dec 2017 16:01)  Awake, alert, comfortable in bed in NAD. Awaiting PC placement as well as indium scan    INTERVAL HPI/OVERNIGHT EVENTS:      VITAL SIGNS:  T(F): 98.1 (01-23-18 @ 05:27)  HR: 78 (01-23-18 @ 05:27)  BP: 126/47 (01-23-18 @ 05:27)  RR: 16 (01-23-18 @ 05:27)  SpO2: 100% (01-23-18 @ 05:27)  Wt(kg): --  I&O's Detail    22 Jan 2018 07:01  -  23 Jan 2018 07:00  --------------------------------------------------------  IN:  Total IN: 0 mL    OUT:  Total OUT: 0 mL    Total NET: 0 mL              REVIEW OF SYSTEMS:    CONSTITUTIONAL:  No fevers, chills, sweats    HEENT:  Eyes:  No diplopia or blurred vision. ENT:  No earache, sore throat or runny nose.    CARDIOVASCULAR:  No pressure, squeezing, tightness, or heaviness about the chest; no palpitations.    RESPIRATORY:  Per HPI    GASTROINTESTINAL:  No abdominal pain, nausea, vomiting or diarrhea.    GENITOURINARY:  No dysuria, frequency or urgency.    NEUROLOGIC:  No paresthesias, fasciculations, seizures or weakness.    PSYCHIATRIC:  No disorder of thought or mood.      PHYSICAL EXAM:    Constitutional: Well developed and nourished  Eyes:Perrla  ENMT: normal  Neck:supple  Respiratory: good air entry  Cardiovascular: S1 S2 regular  Gastrointestinal: Soft, Non tender  Extremities: No edema  Vascular:normal  Neurological:Awake, alert,Ox3  Musculoskeletal:Sacral decubitus      MEDICATIONS  (STANDING):  ascorbic acid 500 milliGRAM(s) Oral daily  calamine Lotion 1 Application(s) Topical three times a day  cinacalcet 30 milliGRAM(s) Oral daily  dextrose 5%. 1000 milliLiter(s) (50 mL/Hr) IV Continuous <Continuous>  dextrose 50% Injectable 12.5 Gram(s) IV Push once  dextrose 50% Injectable 25 Gram(s) IV Push once  dextrose 50% Injectable 25 Gram(s) IV Push once  docusate sodium 100 milliGRAM(s) Oral two times a day  epoetin jacqueline Injectable 18091 Unit(s) IV Push <User Schedule>  ferrous    sulfate Liquid 300 milliGRAM(s) Enteral Tube daily  folic acid 1 milliGRAM(s) Oral daily  gabapentin 100 milliGRAM(s) Oral three times a day  heparin  Injectable 5000 Unit(s) SubCutaneous every 12 hours  hydrALAZINE 50 milliGRAM(s) Oral three times a day  insulin lispro (HumaLOG) corrective regimen sliding scale   SubCutaneous Before meals and at bedtime  levothyroxine 50 MICROGram(s) Oral daily  metoprolol     tartrate 25 milliGRAM(s) Oral two times a day  nitroglycerin    Patch 0.2 mG/Hr(s) 1 patch Transdermal daily  pantoprazole  Injectable 40 milliGRAM(s) IV Push every 12 hours  PPD  5 Tuberculin Unit(s) Injectable 5 Unit(s) IntraDermal once  senna 2 Tablet(s) Oral at bedtime  sevelamer hydrochloride 1600 milliGRAM(s) Oral three times a day with meals  simvastatin 20 milliGRAM(s) Oral at bedtime    MEDICATIONS  (PRN):  acetaminophen   Tablet 650 milliGRAM(s) Oral every 6 hours PRN For Temp greater than 38 C (100.4 F)  acetaminophen   Tablet. 650 milliGRAM(s) Oral every 6 hours PRN Moderate Pain (4 - 6)  aluminum hydroxide/magnesium hydroxide/simethicone Suspension 30 milliLiter(s) Oral every 4 hours PRN Dyspepsia  dextrose Gel 1 Dose(s) Oral once PRN Blood Glucose LESS THAN 70 milliGRAM(s)/deciliter  dextrose Gel 1 Dose(s) Oral once PRN Blood Glucose LESS THAN 70 milliGRAM(s)/deciliter  glucagon  Injectable 1 milliGRAM(s) IntraMuscular once PRN Glucose LESS THAN 70 milligrams/deciliter      Allergies    No Known Allergies    Intolerances        LABS:                        7.9    14.2  )-----------( 385      ( 22 Jan 2018 12:22 )             26.3     01-22    137  |  103  |  38<H>  ----------------------------<  78  4.6   |  25  |  6.05<H>    Ca    8.3<L>      22 Jan 2018 12:22  Phos  3.6     01-22  Mg     2.9     01-22    TPro  5.7<L>  /  Alb  1.5<L>  /  TBili  0.3  /  DBili  x   /  AST  10  /  ALT  <6<L>  /  AlkPhos  112  01-22              CAPILLARY BLOOD GLUCOSE      POCT Blood Glucose.: 95 mg/dL (23 Jan 2018 11:55)  POCT Blood Glucose.: 92 mg/dL (23 Jan 2018 07:40)  POCT Blood Glucose.: 105 mg/dL (23 Jan 2018 00:33)  POCT Blood Glucose.: 115 mg/dL (22 Jan 2018 20:54)  POCT Blood Glucose.: 86 mg/dL (22 Jan 2018 16:15)        RADIOLOGY & ADDITIONAL TESTS:    CXR:    Ct scan chest:    ekg;    echo:

## 2018-01-23 NOTE — PROGRESS NOTE ADULT - SUBJECTIVE AND OBJECTIVE BOX
Patient is a 52y old  Female who presents with a chief complaint of SOB (13 Dec 2017 16:01)      INTERVAL HPI/OVERNIGHT EVENTS:    MEDICATIONS  (STANDING):  ascorbic acid 500 milliGRAM(s) Oral daily  calamine Lotion 1 Application(s) Topical three times a day  cinacalcet 30 milliGRAM(s) Oral daily  dextrose 5%. 1000 milliLiter(s) (50 mL/Hr) IV Continuous <Continuous>  dextrose 50% Injectable 12.5 Gram(s) IV Push once  dextrose 50% Injectable 25 Gram(s) IV Push once  dextrose 50% Injectable 25 Gram(s) IV Push once  docusate sodium 100 milliGRAM(s) Oral two times a day  epoetin jacqueline Injectable 20160 Unit(s) IV Push <User Schedule>  ferrous    sulfate Liquid 300 milliGRAM(s) Enteral Tube daily  folic acid 1 milliGRAM(s) Oral daily  gabapentin 100 milliGRAM(s) Oral three times a day  heparin  Injectable 5000 Unit(s) SubCutaneous every 12 hours  hydrALAZINE 50 milliGRAM(s) Oral three times a day  insulin lispro (HumaLOG) corrective regimen sliding scale   SubCutaneous Before meals and at bedtime  levothyroxine 50 MICROGram(s) Oral daily  metoprolol     tartrate 25 milliGRAM(s) Oral two times a day  nitroglycerin    Patch 0.2 mG/Hr(s) 1 patch Transdermal daily  pantoprazole  Injectable 40 milliGRAM(s) IV Push every 12 hours  PPD  5 Tuberculin Unit(s) Injectable 5 Unit(s) IntraDermal once  senna 2 Tablet(s) Oral at bedtime  sevelamer hydrochloride 1600 milliGRAM(s) Oral three times a day with meals  simvastatin 20 milliGRAM(s) Oral at bedtime    MEDICATIONS  (PRN):  acetaminophen   Tablet 650 milliGRAM(s) Oral every 6 hours PRN For Temp greater than 38 C (100.4 F)  acetaminophen   Tablet. 650 milliGRAM(s) Oral every 6 hours PRN Moderate Pain (4 - 6)  aluminum hydroxide/magnesium hydroxide/simethicone Suspension 30 milliLiter(s) Oral every 4 hours PRN Dyspepsia  dextrose Gel 1 Dose(s) Oral once PRN Blood Glucose LESS THAN 70 milliGRAM(s)/deciliter  dextrose Gel 1 Dose(s) Oral once PRN Blood Glucose LESS THAN 70 milliGRAM(s)/deciliter  glucagon  Injectable 1 milliGRAM(s) IntraMuscular once PRN Glucose LESS THAN 70 milligrams/deciliter      Allergies    No Known Allergies    Intolerances        REVIEW OF SYSTEMS:  denies any fever, chills, chest pain , sob , abdominal pain or diarrhe  Vital Signs Last 24 Hrs  T(C): 36.7 (23 Jan 2018 05:27), Max: 37.3 (22 Jan 2018 20:39)  T(F): 98.1 (23 Jan 2018 05:27), Max: 99.1 (22 Jan 2018 20:39)  HR: 78 (23 Jan 2018 05:27) (73 - 96)  BP: 126/47 (23 Jan 2018 05:27) (110/58 - 126/47)  BP(mean): --  RR: 16 (23 Jan 2018 05:27) (16 - 19)  SpO2: 100% (23 Jan 2018 05:27) (99% - 100%)      PHYSICAL EXAM:  GENERAL: NAD  NERVOUS SYSTEM:  Alert & Oriented X1-2, no focal neurological deficit   CHEST/LUNG: Clear to percussion bilaterally; Rt. IJ catheter in place   HEART: Regular rate and rhythm; No murmurs, rubs, or gallops  ABDOMEN: Soft, Nontender, Nondistended; Bowel sounds present  EXTREMITIES:  2+ Peripheral Pulses, No clubbing, cyanosis, or edema  SKIN: sacral decubitus ulcer , non stageable   LABS:                        7.9    14.2  )-----------( 385      ( 22 Jan 2018 12:22 )             26.3     01-22    137  |  103  |  38<H>  ----------------------------<  78  4.6   |  25  |  6.05<H>    Ca    8.3<L>      22 Jan 2018 12:22  Phos  3.6     01-22  Mg     2.9     01-22    TPro  5.7<L>  /  Alb  1.5<L>  /  TBili  0.3  /  DBili  x   /  AST  10  /  ALT  <6<L>  /  AlkPhos  112  01-22        CAPILLARY BLOOD GLUCOSE      POCT Blood Glucose.: 95 mg/dL (23 Jan 2018 11:55)  POCT Blood Glucose.: 92 mg/dL (23 Jan 2018 07:40)  POCT Blood Glucose.: 105 mg/dL (23 Jan 2018 00:33)  POCT Blood Glucose.: 115 mg/dL (22 Jan 2018 20:54)  POCT Blood Glucose.: 86 mg/dL (22 Jan 2018 16:15)      RADIOLOGY & ADDITIONAL TESTS:    Imaging Personally Reviewed:  [ ] YES  [ ] NO    Consultant(s) Notes Reviewed:  [ ] YES  [ ] NO

## 2018-01-24 LAB
ALBUMIN SERPL ELPH-MCNC: 1.7 G/DL — LOW (ref 3.5–5)
ALP SERPL-CCNC: 116 U/L — SIGNIFICANT CHANGE UP (ref 40–120)
ALT FLD-CCNC: <6 U/L DA — LOW (ref 10–60)
ANION GAP SERPL CALC-SCNC: 9 MMOL/L — SIGNIFICANT CHANGE UP (ref 5–17)
AST SERPL-CCNC: 10 U/L — SIGNIFICANT CHANGE UP (ref 10–40)
BASOPHILS # BLD AUTO: 0.1 K/UL — SIGNIFICANT CHANGE UP (ref 0–0.2)
BASOPHILS NFR BLD AUTO: 0.8 % — SIGNIFICANT CHANGE UP (ref 0–2)
BILIRUB SERPL-MCNC: 0.3 MG/DL — SIGNIFICANT CHANGE UP (ref 0.2–1.2)
BUN SERPL-MCNC: 40 MG/DL — HIGH (ref 7–18)
CALCIUM SERPL-MCNC: 8.8 MG/DL — SIGNIFICANT CHANGE UP (ref 8.4–10.5)
CHLORIDE SERPL-SCNC: 100 MMOL/L — SIGNIFICANT CHANGE UP (ref 96–108)
CO2 SERPL-SCNC: 26 MMOL/L — SIGNIFICANT CHANGE UP (ref 22–31)
CREAT SERPL-MCNC: 6.02 MG/DL — HIGH (ref 0.5–1.3)
EOSINOPHIL # BLD AUTO: 0.2 K/UL — SIGNIFICANT CHANGE UP (ref 0–0.5)
EOSINOPHIL NFR BLD AUTO: 1.4 % — SIGNIFICANT CHANGE UP (ref 0–6)
GLUCOSE BLDC GLUCOMTR-MCNC: 102 MG/DL — HIGH (ref 70–99)
GLUCOSE BLDC GLUCOMTR-MCNC: 104 MG/DL — HIGH (ref 70–99)
GLUCOSE BLDC GLUCOMTR-MCNC: 87 MG/DL — SIGNIFICANT CHANGE UP (ref 70–99)
GLUCOSE BLDC GLUCOMTR-MCNC: 91 MG/DL — SIGNIFICANT CHANGE UP (ref 70–99)
GLUCOSE SERPL-MCNC: 96 MG/DL — SIGNIFICANT CHANGE UP (ref 70–99)
HCT VFR BLD CALC: 27.9 % — LOW (ref 34.5–45)
HGB BLD-MCNC: 7.7 G/DL — LOW (ref 11.5–15.5)
LYMPHOCYTES # BLD AUTO: 15.2 % — SIGNIFICANT CHANGE UP (ref 13–44)
LYMPHOCYTES # BLD AUTO: 2.3 K/UL — SIGNIFICANT CHANGE UP (ref 1–3.3)
MAGNESIUM SERPL-MCNC: 2.9 MG/DL — HIGH (ref 1.6–2.6)
MCHC RBC-ENTMCNC: 27.2 PG — SIGNIFICANT CHANGE UP (ref 27–34)
MCHC RBC-ENTMCNC: 27.7 GM/DL — LOW (ref 32–36)
MCV RBC AUTO: 98.4 FL — SIGNIFICANT CHANGE UP (ref 80–100)
MONOCYTES # BLD AUTO: 1.5 K/UL — HIGH (ref 0–0.9)
MONOCYTES NFR BLD AUTO: 9.6 % — SIGNIFICANT CHANGE UP (ref 2–14)
NEUTROPHILS # BLD AUTO: 11.1 K/UL — HIGH (ref 1.8–7.4)
NEUTROPHILS NFR BLD AUTO: 73 % — SIGNIFICANT CHANGE UP (ref 43–77)
PHOSPHATE SERPL-MCNC: 4 MG/DL — SIGNIFICANT CHANGE UP (ref 2.5–4.5)
PLATELET # BLD AUTO: 445 K/UL — HIGH (ref 150–400)
POTASSIUM SERPL-MCNC: 4.6 MMOL/L — SIGNIFICANT CHANGE UP (ref 3.5–5.3)
POTASSIUM SERPL-SCNC: 4.6 MMOL/L — SIGNIFICANT CHANGE UP (ref 3.5–5.3)
PROT SERPL-MCNC: 6.1 G/DL — SIGNIFICANT CHANGE UP (ref 6–8.3)
RBC # BLD: 2.84 M/UL — LOW (ref 3.8–5.2)
RBC # FLD: 20.9 % — HIGH (ref 10.3–14.5)
SODIUM SERPL-SCNC: 135 MMOL/L — SIGNIFICANT CHANGE UP (ref 135–145)
WBC # BLD: 15.2 K/UL — HIGH (ref 3.8–10.5)
WBC # FLD AUTO: 15.2 K/UL — HIGH (ref 3.8–10.5)

## 2018-01-24 PROCEDURE — 78806: CPT | Mod: 26

## 2018-01-24 RX ADMIN — Medication 25 MILLIGRAM(S): at 05:54

## 2018-01-24 RX ADMIN — CALAMINE 8% AND ZINC OXIDE 8% 1 APPLICATION(S): 160 LOTION TOPICAL at 05:55

## 2018-01-24 RX ADMIN — Medication 1 APPLICATION(S): at 13:51

## 2018-01-24 RX ADMIN — Medication 300 MILLIGRAM(S): at 13:50

## 2018-01-24 RX ADMIN — GABAPENTIN 100 MILLIGRAM(S): 400 CAPSULE ORAL at 13:50

## 2018-01-24 RX ADMIN — Medication 50 MICROGRAM(S): at 05:54

## 2018-01-24 RX ADMIN — Medication 1 PATCH: at 00:36

## 2018-01-24 RX ADMIN — Medication 50 MILLIGRAM(S): at 23:05

## 2018-01-24 RX ADMIN — Medication 50 MILLIGRAM(S): at 05:54

## 2018-01-24 RX ADMIN — CALAMINE 8% AND ZINC OXIDE 8% 1 APPLICATION(S): 160 LOTION TOPICAL at 13:51

## 2018-01-24 RX ADMIN — Medication 500 MILLIGRAM(S): at 13:50

## 2018-01-24 RX ADMIN — GABAPENTIN 100 MILLIGRAM(S): 400 CAPSULE ORAL at 05:54

## 2018-01-24 RX ADMIN — HEPARIN SODIUM 5000 UNIT(S): 5000 INJECTION INTRAVENOUS; SUBCUTANEOUS at 05:55

## 2018-01-24 RX ADMIN — Medication 650 MILLIGRAM(S): at 23:05

## 2018-01-24 RX ADMIN — ERYTHROPOIETIN 10000 UNIT(S): 10000 INJECTION, SOLUTION INTRAVENOUS; SUBCUTANEOUS at 15:38

## 2018-01-24 RX ADMIN — GABAPENTIN 100 MILLIGRAM(S): 400 CAPSULE ORAL at 23:06

## 2018-01-24 RX ADMIN — SIMVASTATIN 20 MILLIGRAM(S): 20 TABLET, FILM COATED ORAL at 23:05

## 2018-01-24 RX ADMIN — Medication 1 MILLIGRAM(S): at 13:50

## 2018-01-24 RX ADMIN — PANTOPRAZOLE SODIUM 40 MILLIGRAM(S): 20 TABLET, DELAYED RELEASE ORAL at 05:54

## 2018-01-24 RX ADMIN — Medication 100 MILLIGRAM(S): at 05:54

## 2018-01-24 RX ADMIN — SENNA PLUS 2 TABLET(S): 8.6 TABLET ORAL at 23:05

## 2018-01-24 RX ADMIN — Medication 1 PATCH: at 13:50

## 2018-01-24 RX ADMIN — CALAMINE 8% AND ZINC OXIDE 8% 1 APPLICATION(S): 160 LOTION TOPICAL at 21:30

## 2018-01-24 RX ADMIN — CINACALCET 30 MILLIGRAM(S): 30 TABLET, FILM COATED ORAL at 13:50

## 2018-01-24 RX ADMIN — SEVELAMER CARBONATE 1600 MILLIGRAM(S): 2400 POWDER, FOR SUSPENSION ORAL at 13:49

## 2018-01-24 NOTE — PROGRESS NOTE ADULT - SUBJECTIVE AND OBJECTIVE BOX
Patient is a 52y old  Female who presents with a chief complaint of SOB (13 Dec 2017 16:01)  Awake, alert, comfortable in bed in [facility].  For indium scan today    INTERVAL HPI/OVERNIGHT EVENTS:      VITAL SIGNS:  T(F): 99.2 (01-24-18 @ 05:00)  HR: 74 (01-24-18 @ 05:00)  BP: 144/69 (01-24-18 @ 05:00)  RR: 16 (01-24-18 @ 05:00)  SpO2: 100% (01-24-18 @ 05:00)  Wt(kg): --  I&O's Detail          REVIEW OF SYSTEMS:    CONSTITUTIONAL:  No fevers, chills, sweats    HEENT:  Eyes:  No diplopia or blurred vision. ENT:  No earache, sore throat or runny nose.    CARDIOVASCULAR:  No pressure, squeezing, tightness, or heaviness about the chest; no palpitations.    RESPIRATORY:  Per HPI    GASTROINTESTINAL:  No abdominal pain, nausea, vomiting or diarrhea.    GENITOURINARY:  No dysuria, frequency or urgency.    NEUROLOGIC:  No paresthesias, fasciculations, seizures or weakness.    PSYCHIATRIC:  No disorder of thought or mood.      PHYSICAL EXAM:    Constitutional: Well developed and nourished  Eyes:Perrla  ENMT: normal  Neck:supple  Respiratory: good air entry  Cardiovascular: S1 S2 regular  Gastrointestinal: Soft, Non tender  Extremities: No edema  Vascular:normal  Neurological:Awake, alert,Ox3  Musculoskeletal:Normal      MEDICATIONS  (STANDING):  ascorbic acid 500 milliGRAM(s) Oral daily  calamine Lotion 1 Application(s) Topical three times a day  cinacalcet 30 milliGRAM(s) Oral daily  collagenase Ointment 1 Application(s) Topical daily  dextrose 5%. 1000 milliLiter(s) (50 mL/Hr) IV Continuous <Continuous>  dextrose 50% Injectable 12.5 Gram(s) IV Push once  dextrose 50% Injectable 25 Gram(s) IV Push once  dextrose 50% Injectable 25 Gram(s) IV Push once  docusate sodium 100 milliGRAM(s) Oral two times a day  epoetin jacqueline Injectable 05163 Unit(s) IV Push <User Schedule>  ferrous    sulfate Liquid 300 milliGRAM(s) Enteral Tube daily  folic acid 1 milliGRAM(s) Oral daily  gabapentin 100 milliGRAM(s) Oral three times a day  heparin  Injectable 5000 Unit(s) SubCutaneous every 12 hours  hydrALAZINE 50 milliGRAM(s) Oral three times a day  insulin lispro (HumaLOG) corrective regimen sliding scale   SubCutaneous Before meals and at bedtime  levothyroxine 50 MICROGram(s) Oral daily  metoprolol     tartrate 25 milliGRAM(s) Oral two times a day  nitroglycerin    Patch 0.2 mG/Hr(s) 1 patch Transdermal daily  pantoprazole  Injectable 40 milliGRAM(s) IV Push every 12 hours  PPD  5 Tuberculin Unit(s) Injectable 5 Unit(s) IntraDermal once  senna 2 Tablet(s) Oral at bedtime  sevelamer hydrochloride 1600 milliGRAM(s) Oral three times a day with meals  simvastatin 20 milliGRAM(s) Oral at bedtime    MEDICATIONS  (PRN):  acetaminophen   Tablet 650 milliGRAM(s) Oral every 6 hours PRN For Temp greater than 38 C (100.4 F)  acetaminophen   Tablet. 650 milliGRAM(s) Oral every 6 hours PRN Moderate Pain (4 - 6)  aluminum hydroxide/magnesium hydroxide/simethicone Suspension 30 milliLiter(s) Oral every 4 hours PRN Dyspepsia  dextrose Gel 1 Dose(s) Oral once PRN Blood Glucose LESS THAN 70 milliGRAM(s)/deciliter  dextrose Gel 1 Dose(s) Oral once PRN Blood Glucose LESS THAN 70 milliGRAM(s)/deciliter  glucagon  Injectable 1 milliGRAM(s) IntraMuscular once PRN Glucose LESS THAN 70 milligrams/deciliter      Allergies    No Known Allergies    Intolerances        LABS:                        7.9    14.2  )-----------( 385      ( 22 Jan 2018 12:22 )             26.3     01-22    137  |  103  |  38<H>  ----------------------------<  78  4.6   |  25  |  6.05<H>    Ca    8.3<L>      22 Jan 2018 12:22  Phos  3.6     01-22  Mg     2.9     01-22    TPro  5.7<L>  /  Alb  1.5<L>  /  TBili  0.3  /  DBili  x   /  AST  10  /  ALT  <6<L>  /  AlkPhos  112  01-22              CAPILLARY BLOOD GLUCOSE      POCT Blood Glucose.: 87 mg/dL (24 Jan 2018 07:39)  POCT Blood Glucose.: 97 mg/dL (23 Jan 2018 20:59)  POCT Blood Glucose.: 107 mg/dL (23 Jan 2018 16:08)  POCT Blood Glucose.: 95 mg/dL (23 Jan 2018 11:55)        RADIOLOGY & ADDITIONAL TESTS:    CXR:    Ct scan chest:    ekg;    echo:

## 2018-01-24 NOTE — PROGRESS NOTE ADULT - NEGATIVE GASTROINTESTINAL SYMPTOMS
no vomiting/no diarrhea/no abdominal pain/no nausea
no nausea/no diarrhea/no abdominal pain/no vomiting
no vomiting/no abdominal pain/no nausea
no hematochezia/no abdominal pain/no melena/no jaundice/no vomiting/no nausea/no diarrhea
no vomiting/no abdominal pain/no nausea/no diarrhea

## 2018-01-24 NOTE — PROGRESS NOTE ADULT - ASSESSMENT
Leukocytosis - reactive only , no signs of active infection    plan -  no need for any antibiotics at this time  the pt is cleared to get a permacath from an Infectious disease point of view( 3rd time pt being cleared)

## 2018-01-24 NOTE — PROGRESS NOTE ADULT - NEGATIVE CARDIOVASCULAR SYMPTOMS
no chest pain
no claudication/no chest pain/no palpitations/no peripheral edema/no orthopnea
no palpitations/no chest pain

## 2018-01-24 NOTE — PROGRESS NOTE ADULT - SUBJECTIVE AND OBJECTIVE BOX
52y Female    Meds:    Allergies    No Known Allergies    Intolerances        VITALS:  Vital Signs Last 24 Hrs  T(C): 36.5 (24 Jan 2018 16:30), Max: 37.3 (24 Jan 2018 05:00)  T(F): 97.7 (24 Jan 2018 16:30), Max: 99.2 (24 Jan 2018 05:00)  HR: 77 (24 Jan 2018 16:30) (74 - 80)  BP: 124/62 (24 Jan 2018 16:30) (100/45 - 144/69)  BP(mean): --  RR: 17 (24 Jan 2018 16:30) (16 - 19)  SpO2: 97% (24 Jan 2018 16:30) (97% - 100%)    LABS/DIAGNOSTIC TESTS:                          7.7    15.2  )-----------( 445      ( 24 Jan 2018 15:04 )             27.9         01-24    135  |  100  |  40<H>  ----------------------------<  96  4.6   |  26  |  6.02<H>    Ca    8.8      24 Jan 2018 15:04  Phos  4.0     01-24  Mg     2.9     01-24    TPro  6.1  /  Alb  1.7<L>  /  TBili  0.3  /  DBili  x   /  AST  10  /  ALT  <6<L>  /  AlkPhos  116  01-24      LIVER FUNCTIONS - ( 24 Jan 2018 15:04 )  Alb: 1.7 g/dL / Pro: 6.1 g/dL / ALK PHOS: 116 U/L / ALT: <6 U/L DA / AST: 10 U/L / GGT: x             CULTURES: .Blood Blood  01-10 @ 00:04   No growth at 5 days.  --  --      .Blood Blood-Peripheral  01-06 @ 21:57   No growth at 5 days.  --  --      .Blood Blood  01-06 @ 00:17   No growth at 5 days.  --  --      .Blood Blood  01-05 @ 22:47   No growth at 5 days.  --  --      .Blood Blood-Peripheral  12-26 @ 22:38   No growth at 5 days.  --  --      .Urine Catheterized  12-26 @ 17:34   No growth  --  --      .Blood Blood-Peripheral  12-21 @ 18:39   No growth at 5 days.  --  --      .Sputum Sputum  12-18 @ 21:43   Normal Respiratory Liz present  --    Few Squamous epithelial cells per low power field  Few polymorphonuclear leukocytes per low power field  Rare Gram variable coccobacilli   per oil power field      .Blood rec'd 2 anaerobic bottles for this accession #  12-16 @ 00:53   No growth at 5 days.  --  --      .Blood Blood-Peripheral  12-15 @ 23:09   No growth at 5 days.  --  --      .Urine Clean Catch (Midstream)  12-13 @ 23:18   No growth  --  --      .Blood Blood-Peripheral  12-13 @ 17:41   No growth at 5 days.  --  --      .Blood Blood-Peripheral  12-13 @ 17:40   No growth at 5 days.  --  --            RADIOLOGY:      ROS:  [  ] UNABLE TO ELICIT 52y Female who is clinically doing well , she has no cough, no nausea , vomiting or diarrhea, no fevers or chills, she had an Indium scan and was negative for any source of infection. Her wbc count is stable and fluctuates between 13 - 16 K.    Meds:    Allergies    No Known Allergies    Intolerances        VITALS:  Vital Signs Last 24 Hrs  T(C): 36.5 (24 Jan 2018 16:30), Max: 37.3 (24 Jan 2018 05:00)  T(F): 97.7 (24 Jan 2018 16:30), Max: 99.2 (24 Jan 2018 05:00)  HR: 77 (24 Jan 2018 16:30) (74 - 80)  BP: 124/62 (24 Jan 2018 16:30) (100/45 - 144/69)  BP(mean): --  RR: 17 (24 Jan 2018 16:30) (16 - 19)  SpO2: 97% (24 Jan 2018 16:30) (97% - 100%)    LABS/DIAGNOSTIC TESTS:                          7.7    15.2  )-----------( 445      ( 24 Jan 2018 15:04 )             27.9         01-24    135  |  100  |  40<H>  ----------------------------<  96  4.6   |  26  |  6.02<H>    Ca    8.8      24 Jan 2018 15:04  Phos  4.0     01-24  Mg     2.9     01-24    TPro  6.1  /  Alb  1.7<L>  /  TBili  0.3  /  DBili  x   /  AST  10  /  ALT  <6<L>  /  AlkPhos  116  01-24      LIVER FUNCTIONS - ( 24 Jan 2018 15:04 )  Alb: 1.7 g/dL / Pro: 6.1 g/dL / ALK PHOS: 116 U/L / ALT: <6 U/L DA / AST: 10 U/L / GGT: x             CULTURES: .Blood Blood  01-10 @ 00:04   No growth at 5 days.  --  --      .Blood Blood-Peripheral  01-06 @ 21:57   No growth at 5 days.  --  --      .Blood Blood  01-06 @ 00:17   No growth at 5 days.  --  --      .Blood Blood  01-05 @ 22:47   No growth at 5 days.  --  --      .Blood Blood-Peripheral  12-26 @ 22:38   No growth at 5 days.  --  --      .Urine Catheterized  12-26 @ 17:34   No growth  --  --      .Blood Blood-Peripheral  12-21 @ 18:39   No growth at 5 days.  --  --      .Sputum Sputum  12-18 @ 21:43   Normal Respiratory Liz present  --    Few Squamous epithelial cells per low power field  Few polymorphonuclear leukocytes per low power field  Rare Gram variable coccobacilli   per oil power field      .Blood rec'd 2 anaerobic bottles for this accession #  12-16 @ 00:53   No growth at 5 days.  --  --      .Blood Blood-Peripheral  12-15 @ 23:09   No growth at 5 days.  --  --      .Urine Clean Catch (Midstream)  12-13 @ 23:18   No growth  --  --      .Blood Blood-Peripheral  12-13 @ 17:41   No growth at 5 days.  --  --      .Blood Blood-Peripheral  12-13 @ 17:40   No growth at 5 days.  --  --            RADIOLOGY:      ROS:  [  ] UNABLE TO ELICIT

## 2018-01-24 NOTE — PROGRESS NOTE ADULT - ATTENDING COMMENTS
Patient is seen and examined. Case reviewed with the medical team. Above note is appreciated. Will follow up clinically. Continue DVT prophylaxis. Case discussed with Nephrology and ID. Will follow up clinically.

## 2018-01-24 NOTE — PROGRESS NOTE ADULT - CVS HE PE MLT D E PC
regular rate and rhythm
no rub/regular rate and rhythm
no rub/regular rate and rhythm
regular rate and rhythm
regular rate and rhythm/no rub

## 2018-01-24 NOTE — PROGRESS NOTE ADULT - NS NEC GEN PE MLT EXAM PC
detailed exam
No bruits; no thyromegaly or nodules
detailed exam

## 2018-01-24 NOTE — PROGRESS NOTE ADULT - NEGATIVE RESPIRATORY AND THORAX SYMPTOMS
no dyspnea/no pleuritic chest pain
no dyspnea
no pleuritic chest pain/no dyspnea
no hemoptysis/no dyspnea/no cough/no pleuritic chest pain
no pleuritic chest pain/no dyspnea/no cough

## 2018-01-24 NOTE — PROGRESS NOTE ADULT - ASSESSMENT
52 yr old female with  ESRD.  did not complete on Monday as john was not working  indium scan negative.   IR to place permacath and HD therafter.

## 2018-01-24 NOTE — PROGRESS NOTE ADULT - PROBLEM SELECTOR PLAN 2
resolved cleared by ID for permacath  c/w meropenem   completed course  wbc count trending down  but pending wbc scan so IR can put in tunneled cath  dye injected today wbc scan  normal results  id called for clearance  perma cath in AM by IR

## 2018-01-24 NOTE — PROGRESS NOTE ADULT - GASTROINTESTINAL DETAILS
no rigidity/bowel sounds normal/soft/no guarding/nontender/no distention
nontender/bowel sounds normal/soft/no rigidity/no distention/no guarding
bowel sounds normal/soft/nontender/no guarding/no rebound tenderness/no distention/no rigidity
soft/bowel sounds normal/no distention/no rigidity/no guarding/nontender
soft/bowel sounds normal/no guarding/no distention/no rebound tenderness/no rigidity/nontender
nontender/bowel sounds normal/no distention/no rigidity/no guarding/soft
soft/no distention/nontender/no guarding/no rigidity/no masses palpable/bowel sounds normal
no rigidity/no guarding/soft/no distention/nontender/bowel sounds normal
no guarding/no rebound tenderness/no rigidity/no distention/soft/nontender/bowel sounds normal

## 2018-01-24 NOTE — PROGRESS NOTE ADULT - SUBJECTIVE AND OBJECTIVE BOX
Indium scan noted to be negative    No Known Allergies    Hospital Medications:   MEDICATIONS  (STANDING):  ascorbic acid 500 milliGRAM(s) Oral daily  calamine Lotion 1 Application(s) Topical three times a day  cinacalcet 30 milliGRAM(s) Oral daily  collagenase Ointment 1 Application(s) Topical daily  dextrose 5%. 1000 milliLiter(s) (50 mL/Hr) IV Continuous <Continuous>  dextrose 50% Injectable 12.5 Gram(s) IV Push once  dextrose 50% Injectable 25 Gram(s) IV Push once  dextrose 50% Injectable 25 Gram(s) IV Push once  docusate sodium 100 milliGRAM(s) Oral two times a day  epoetin jacqueline Injectable 48409 Unit(s) IV Push <User Schedule>  ferrous    sulfate Liquid 300 milliGRAM(s) Enteral Tube daily  folic acid 1 milliGRAM(s) Oral daily  gabapentin 100 milliGRAM(s) Oral three times a day  heparin  Injectable 5000 Unit(s) SubCutaneous every 12 hours  hydrALAZINE 50 milliGRAM(s) Oral three times a day  insulin lispro (HumaLOG) corrective regimen sliding scale   SubCutaneous Before meals and at bedtime  levothyroxine 50 MICROGram(s) Oral daily  metoprolol     tartrate 25 milliGRAM(s) Oral two times a day  nitroglycerin    Patch 0.2 mG/Hr(s) 1 patch Transdermal daily  pantoprazole  Injectable 40 milliGRAM(s) IV Push every 12 hours  PPD  5 Tuberculin Unit(s) Injectable 5 Unit(s) IntraDermal once  senna 2 Tablet(s) Oral at bedtime  sevelamer hydrochloride 1600 milliGRAM(s) Oral three times a day with meals  simvastatin 20 milliGRAM(s) Oral at bedtime        VITALS:  T(F): 99.2 (01-24-18 @ 05:00), Max: 99.2 (01-24-18 @ 05:00)  HR: 74 (01-24-18 @ 05:00)  BP: 144/69 (01-24-18 @ 05:00)  RR: 16 (01-24-18 @ 05:00)  SpO2: 100% (01-24-18 @ 05:00)  Wt(kg): --      PHYSICAL EXAM:  Constitutional: NAD  HEENT: anicteric sclera, oropharynx clear,   Neck: No JVD  Respiratory: CTAB, no wheezes, rales or rhonchi  Cardiovascular: S1, S2, RRR  Gastrointestinal: BS+, soft, NT/ND  Extremities: No cyanosis or clubbing. No peripheral edema  Neurological: A/O x 3, no focal deficits  Vascular Access: LT IJ  permacath.    LABS:  01-22    137  |  103  |  38<H>  ----------------------------<  78  4.6   |  25  |  6.05<H>    Ca    8.3<L>      22 Jan 2018 12:22  Phos  3.6     01-22  Mg     2.9     01-22    TPro  5.7<L>  /  Alb  1.5<L>  /  TBili  0.3  /  DBili      /  AST  10  /  ALT  <6<L>  /  AlkPhos  112  01-22    Creatinine Trend: 6.05 <--, 4.79 <--, 5.03 <--                        7.9    14.2  )-----------( 385      ( 22 Jan 2018 12:22 )             26.3     Urine Studies:      RADIOLOGY & ADDITIONAL STUDIES:

## 2018-01-24 NOTE — PROGRESS NOTE ADULT - CONSTITUTIONAL DETAILS
no distress
well-developed/well-groomed/no distress
well-groomed/well-developed/no distress/cachectic
no distress
no distress/well-nourished/well-developed/well-groomed

## 2018-01-24 NOTE — PROGRESS NOTE ADULT - MS EXT PE MLT D E PC
no cyanosis/no pedal edema
no cyanosis/no pedal edema
no clubbing/pedal edema/no cyanosis
no cyanosis/pedal edema/no clubbing
no pedal edema
pedal edema/no cyanosis
no cyanosis/no pedal edema
no cyanosis/no pedal edema
no cyanosis/no clubbing/pedal edema

## 2018-01-24 NOTE — PROGRESS NOTE ADULT - SUBJECTIVE AND OBJECTIVE BOX
Patient is a 52y old  Female who presents with a chief complaint of SOB (13 Dec 2017 16:01)      INTERVAL HPI/OVERNIGHT EVENTS:    MEDICATIONS  (STANDING):  ascorbic acid 500 milliGRAM(s) Oral daily  calamine Lotion 1 Application(s) Topical three times a day  cinacalcet 30 milliGRAM(s) Oral daily  dextrose 5%. 1000 milliLiter(s) (50 mL/Hr) IV Continuous <Continuous>  dextrose 50% Injectable 12.5 Gram(s) IV Push once  dextrose 50% Injectable 25 Gram(s) IV Push once  dextrose 50% Injectable 25 Gram(s) IV Push once  docusate sodium 100 milliGRAM(s) Oral two times a day  epoetin jacqueline Injectable 80426 Unit(s) IV Push <User Schedule>  ferrous    sulfate Liquid 300 milliGRAM(s) Enteral Tube daily  folic acid 1 milliGRAM(s) Oral daily  gabapentin 100 milliGRAM(s) Oral three times a day  heparin  Injectable 5000 Unit(s) SubCutaneous every 12 hours  hydrALAZINE 50 milliGRAM(s) Oral three times a day  insulin lispro (HumaLOG) corrective regimen sliding scale   SubCutaneous Before meals and at bedtime  levothyroxine 50 MICROGram(s) Oral daily  metoprolol     tartrate 25 milliGRAM(s) Oral two times a day  nitroglycerin    Patch 0.2 mG/Hr(s) 1 patch Transdermal daily  pantoprazole  Injectable 40 milliGRAM(s) IV Push every 12 hours  PPD  5 Tuberculin Unit(s) Injectable 5 Unit(s) IntraDermal once  senna 2 Tablet(s) Oral at bedtime  sevelamer hydrochloride 1600 milliGRAM(s) Oral three times a day with meals  simvastatin 20 milliGRAM(s) Oral at bedtime    MEDICATIONS  (PRN):  acetaminophen   Tablet 650 milliGRAM(s) Oral every 6 hours PRN For Temp greater than 38 C (100.4 F)  acetaminophen   Tablet. 650 milliGRAM(s) Oral every 6 hours PRN Moderate Pain (4 - 6)  aluminum hydroxide/magnesium hydroxide/simethicone Suspension 30 milliLiter(s) Oral every 4 hours PRN Dyspepsia  dextrose Gel 1 Dose(s) Oral once PRN Blood Glucose LESS THAN 70 milliGRAM(s)/deciliter  dextrose Gel 1 Dose(s) Oral once PRN Blood Glucose LESS THAN 70 milliGRAM(s)/deciliter  glucagon  Injectable 1 milliGRAM(s) IntraMuscular once PRN Glucose LESS THAN 70 milligrams/deciliter      Allergies    No Known Allergies    Intolerances        REVIEW OF SYSTEMS:  denies any fever, chills, chest pain , sob , abdominal pain or diarrhe      PHYSICAL EXAM:  GENERAL: NAD  NERVOUS SYSTEM:  Alert & Oriented X1-2, no focal neurological deficit   CHEST/LUNG: Clear to percussion bilaterally; Rt. IJ catheter in place   HEART: Regular rate and rhythm; No murmurs, rubs, or gallops  ABDOMEN: Soft, Nontender, Nondistended; Bowel sounds present  EXTREMITIES:  2+ Peripheral Pulses, No clubbing, cyanosis, or edema  SKIN: sacral decubitus ulcer , non stageable   LABS:                        7

## 2018-01-24 NOTE — PROGRESS NOTE ADULT - PROBLEM SELECTOR PLAN 4
-EGD negative for ULcer   -H/H stable   [facility] postive  will f/u GI again for possible need for colonoscopy  spoke with Dr Cuba over the phone says if h/h is STABLE colonoscopy can be done as outpatient   -Tolerating diet,   -Aspiration precaution

## 2018-01-24 NOTE — PROGRESS NOTE ADULT - RS GEN PE MLT RESP DETAILS PC
good air movement/rales/no wheezes/rhonchi
no wheezes/no rales/clear to auscultation bilaterally/no rhonchi
breath sounds equal/respirations non-labored/rhonchi/no wheezes/rales/good air movement/airway patent
good air movement/rhonchi/rales/no wheezes
respirations non-labored/no rales/clear to auscultation bilaterally/airway patent/breath sounds equal/good air movement/no wheezes
no rales/no wheezes/no rhonchi/good air movement
no rales/no wheezes/breath sounds equal/rhonchi
no rhonchi/clear to auscultation bilaterally/no rales/no wheezes
good air movement/airway obstructed/no wheezes/airway patent/respirations non-labored/clear to auscultation bilaterally/breath sounds equal

## 2018-01-24 NOTE — CHART NOTE - NSCHARTNOTEFT_GEN_A_CORE
D/w IR    indium scan negative for infection    Possible IR permacath in AM  Please keep NPO after midnight  Consent to be done by IR staff

## 2018-01-25 ENCOUNTER — TRANSCRIPTION ENCOUNTER (OUTPATIENT)
Age: 53
End: 2018-01-25

## 2018-01-25 LAB
EOSINOPHIL NFR BLD AUTO: 2 % — SIGNIFICANT CHANGE UP (ref 0–6)
GLUCOSE BLDC GLUCOMTR-MCNC: 72 MG/DL — SIGNIFICANT CHANGE UP (ref 70–99)
GLUCOSE BLDC GLUCOMTR-MCNC: 75 MG/DL — SIGNIFICANT CHANGE UP (ref 70–99)
GLUCOSE BLDC GLUCOMTR-MCNC: 84 MG/DL — SIGNIFICANT CHANGE UP (ref 70–99)
GLUCOSE BLDC GLUCOMTR-MCNC: 89 MG/DL — SIGNIFICANT CHANGE UP (ref 70–99)
HCT VFR BLD CALC: 26.8 % — LOW (ref 34.5–45)
HGB BLD-MCNC: 7.2 G/DL — LOW (ref 11.5–15.5)
LYMPHOCYTES # BLD AUTO: 14 % — SIGNIFICANT CHANGE UP (ref 13–44)
MCHC RBC-ENTMCNC: 27 GM/DL — LOW (ref 32–36)
MCHC RBC-ENTMCNC: 27 PG — SIGNIFICANT CHANGE UP (ref 27–34)
MCV RBC AUTO: 99.8 FL — SIGNIFICANT CHANGE UP (ref 80–100)
MONOCYTES NFR BLD AUTO: 9 % — SIGNIFICANT CHANGE UP (ref 2–14)
NEUTROPHILS NFR BLD AUTO: 75 % — SIGNIFICANT CHANGE UP (ref 43–77)
PLATELET # BLD AUTO: 332 K/UL — SIGNIFICANT CHANGE UP (ref 150–400)
RBC # BLD: 2.68 M/UL — LOW (ref 3.8–5.2)
RBC # FLD: 21.8 % — HIGH (ref 10.3–14.5)
WBC # BLD: 13.5 K/UL — HIGH (ref 3.8–10.5)
WBC # FLD AUTO: 13.5 K/UL — HIGH (ref 3.8–10.5)

## 2018-01-25 PROCEDURE — 36558 INSERT TUNNELED CV CATH: CPT | Mod: RT

## 2018-01-25 PROCEDURE — 36598 INJ W/FLUOR EVAL CV DEVICE: CPT

## 2018-01-25 RX ADMIN — Medication 25 MILLIGRAM(S): at 06:07

## 2018-01-25 RX ADMIN — Medication 300 MILLIGRAM(S): at 13:20

## 2018-01-25 RX ADMIN — Medication 1 PATCH: at 13:22

## 2018-01-25 RX ADMIN — Medication 650 MILLIGRAM(S): at 09:19

## 2018-01-25 RX ADMIN — CINACALCET 30 MILLIGRAM(S): 30 TABLET, FILM COATED ORAL at 13:21

## 2018-01-25 RX ADMIN — Medication 50 MILLIGRAM(S): at 22:54

## 2018-01-25 RX ADMIN — Medication 650 MILLIGRAM(S): at 09:56

## 2018-01-25 RX ADMIN — GABAPENTIN 100 MILLIGRAM(S): 400 CAPSULE ORAL at 13:20

## 2018-01-25 RX ADMIN — Medication 1 MILLIGRAM(S): at 13:21

## 2018-01-25 RX ADMIN — Medication 500 MILLIGRAM(S): at 13:21

## 2018-01-25 RX ADMIN — PANTOPRAZOLE SODIUM 40 MILLIGRAM(S): 20 TABLET, DELAYED RELEASE ORAL at 17:42

## 2018-01-25 RX ADMIN — Medication 100 MILLIGRAM(S): at 17:42

## 2018-01-25 RX ADMIN — Medication 1 APPLICATION(S): at 13:23

## 2018-01-25 RX ADMIN — PANTOPRAZOLE SODIUM 40 MILLIGRAM(S): 20 TABLET, DELAYED RELEASE ORAL at 06:10

## 2018-01-25 RX ADMIN — SENNA PLUS 2 TABLET(S): 8.6 TABLET ORAL at 22:54

## 2018-01-25 RX ADMIN — Medication 50 MILLIGRAM(S): at 05:57

## 2018-01-25 RX ADMIN — SIMVASTATIN 20 MILLIGRAM(S): 20 TABLET, FILM COATED ORAL at 22:56

## 2018-01-25 RX ADMIN — GABAPENTIN 100 MILLIGRAM(S): 400 CAPSULE ORAL at 22:54

## 2018-01-25 RX ADMIN — Medication 650 MILLIGRAM(S): at 00:05

## 2018-01-25 RX ADMIN — Medication 50 MICROGRAM(S): at 05:57

## 2018-01-25 RX ADMIN — GABAPENTIN 100 MILLIGRAM(S): 400 CAPSULE ORAL at 05:57

## 2018-01-25 RX ADMIN — HEPARIN SODIUM 5000 UNIT(S): 5000 INJECTION INTRAVENOUS; SUBCUTANEOUS at 17:42

## 2018-01-25 RX ADMIN — CALAMINE 8% AND ZINC OXIDE 8% 1 APPLICATION(S): 160 LOTION TOPICAL at 05:17

## 2018-01-25 RX ADMIN — SEVELAMER CARBONATE 1600 MILLIGRAM(S): 2400 POWDER, FOR SUSPENSION ORAL at 17:42

## 2018-01-25 RX ADMIN — Medication 25 MILLIGRAM(S): at 17:42

## 2018-01-25 RX ADMIN — Medication 1 PATCH: at 02:00

## 2018-01-25 RX ADMIN — Medication 100 MILLIGRAM(S): at 05:57

## 2018-01-25 RX ADMIN — CALAMINE 8% AND ZINC OXIDE 8% 1 APPLICATION(S): 160 LOTION TOPICAL at 13:23

## 2018-01-25 RX ADMIN — SEVELAMER CARBONATE 1600 MILLIGRAM(S): 2400 POWDER, FOR SUSPENSION ORAL at 13:21

## 2018-01-25 NOTE — PROGRESS NOTE ADULT - PROBLEM SELECTOR PLAN 4
-EGD negative for ULcer   -H/H stable   [facility] postive  will f/u GI again for possible need for colonoscopy  spoke with Dr Cuba over the phone says if h/h is STABLE colonoscopy can be done as outpatient   -Tolerating diet,   -Aspiration precaution

## 2018-01-25 NOTE — DISCHARGE NOTE ADULT - MEDICATION SUMMARY - MEDICATIONS TO TAKE
I will START or STAY ON the medications listed below when I get home from the hospital:    aluminum hydroxide-magnesium hydroxide 200 mg-200 mg/5 mL oral suspension  -- 30 milliliter(s) by mouth every 4 hours, As needed, Dyspepsia  -- Indication: For DyspEPSIA    gabapentin 100 mg oral capsule  -- 1 cap(s) by mouth 3 times a day  -- Indication: For NEUROPATHY    insulin  -- LOW DOSE SLIDING SCALE   3 TIMES A DAY   -- Indication: For Dm    simvastatin 20 mg oral tablet  -- 1 tab(s) by mouth once a day (at bedtime)  -- Indication: For HLD    metoprolol tartrate 25 mg oral tablet  -- 1 tab(s) by mouth 2 times a day  -- Indication: For HTN (hypertension)    cinacalcet 30 mg oral tablet  -- 1 tab(s) by mouth once a day  -- Indication: For ESRD (end stage renal disease) on dialysis    calamine topical lotion  -- 1 application on skin 3 times a day  -- Indication: For rash    collagenase 250 units/g topical ointment  -- 1 application on skin once a day  -- Indication: For rash    Epogen  -- 84533 unit(s) injectable 3 times a week  DURING DIALYSIS DAYS    -- Indication: For ESRD (end stage renal disease) on dialysis    ferrous sulfate 300 mg/5 mL (60 mg elemental iron) oral liquid  -- 5 milliliter(s) by mouth once a day  -- Indication: For supplement    senna oral tablet  -- 2 tab(s) by mouth once a day (at bedtime)  -- Indication: For Constiption    docusate sodium 100 mg oral capsule  -- 1 cap(s) by mouth 2 times a day  -- Indication: For Constipation    sevelamer hydrochloride 800 mg oral tablet  -- 2 tab(s) by mouth 3 times a day (with meals)  -- Indication: For ESRD (end stage renal disease) on dialysis    Protonix 40 mg oral delayed release tablet  -- 1 tab(s) by mouth once a day  -- Indication: For gerd    levothyroxine 50 mcg (0.05 mg) oral tablet  -- 1 tab(s) by mouth once a day  -- Indication: For Hypothyroidism    hydrALAZINE 50 mg oral tablet  -- 1 tab(s) by mouth 3 times a day  -- Indication: For HTN (hypertension)    Multiple Vitamins oral tablet  -- 1 tab(s) by mouth once a day  -- Indication: For supplement    Tab-A-Gage oral tablet  -- 1 tab(s) by mouth once a day  -- Indication: For supplement    ascorbic acid 500 mg oral tablet  -- 1 tab(s) by mouth once a day  -- Indication: For supplement    folic acid 1 mg oral tablet  -- 1 tab(s) by mouth once a day  -- Indication: For supplement I will START or STAY ON the medications listed below when I get home from the hospital:    aluminum hydroxide-magnesium hydroxide 200 mg-200 mg/5 mL oral suspension  -- 30 milliliter(s) by mouth every 4 hours, As needed, Dyspepsia  -- Indication: For DyspEPSIA    gabapentin 100 mg oral capsule  -- 1 cap(s) by mouth 3 times a day  -- Indication: For NEUROPATHY    insulin  -- LOW DOSE SLIDING SCALE   3 TIMES A DAY   -- Indication: For Dm    simvastatin 20 mg oral tablet  -- 1 tab(s) by mouth once a day (at bedtime)  -- Indication: For HLD    metoprolol tartrate 25 mg oral tablet  -- 1 tab(s) by mouth 2 times a day  -- Indication: For HTN (hypertension)    cinacalcet 30 mg oral tablet  -- 1 tab(s) by mouth once a day  -- Indication: For ESRD (end stage renal disease) on dialysis    calamine topical lotion  -- 1 application on skin 3 times a day  -- Indication: For rash    collagenase 250 units/g topical ointment  -- 1 application on skin once a day  -- Indication: For rash    Epogen  -- 52061 unit(s) injectable 3 times a week  DURING DIALYSIS DAYS    -- Indication: For ESRD (end stage renal disease) on dialysis    ferrous sulfate 300 mg/5 mL (60 mg elemental iron) oral liquid  -- 5 milliliter(s) by mouth once a day  -- Indication: For supplement    senna oral tablet  -- 2 tab(s) by mouth once a day (at bedtime)  -- Indication: For Constiption    docusate sodium 100 mg oral capsule  -- 1 cap(s) by mouth 2 times a day  -- Indication: For Constipation    sevelamer hydrochloride 800 mg oral tablet  -- 2 tab(s) by mouth 3 times a day (with meals)  -- Indication: For ESRD (end stage renal disease) on dialysis    Protonix 40 mg oral delayed release tablet  -- 1 tab(s) by mouth once a day  -- Indication: For gerd    levothyroxine 50 mcg (0.05 mg) oral tablet  -- 1 tab(s) by mouth once a day  -- Indication: For Hypothyroidism    hydrALAZINE 50 mg oral tablet  -- 1 tab(s) by mouth 3 times a day  -- Indication: For HTN (hypertension)    Multiple Vitamins oral tablet  -- 1 tab(s) by mouth once a day  -- Indication: For supplement    Tab-A-Gage oral tablet  -- 1 tab(s) by mouth once a day  -- Indication: For supplement    ascorbic acid 500 mg oral tablet  -- 1 tab(s) by mouth once a day  -- Indication: For supplement    folic acid 1 mg oral tablet  -- 1 tab(s) by mouth once a day  -- Indication: For supplement

## 2018-01-25 NOTE — DISCHARGE NOTE ADULT - PLAN OF CARE
to Resolve hypoxia Respiratory failure secondary to PNA and renal failure   Resolved   Saturating well on room air Continue with hemodialysis as per schedule via PermCath   Will need AV graft placement for long term HD   Follow up with vascular surgery No active GI bleeding, s/p EGD   Continue with protonix No active GI bleeding, s/p EGD colonosopy as outpatient  Continue with protonix Prevent hypoxia Respiratory failure secondary to PNA and renal failure. Resolved. Saturating well on room air. No active GI bleeding, s/p EGD colonoscopy as outpatient  Continue with protonix

## 2018-01-25 NOTE — PROGRESS NOTE ADULT - ATTENDING COMMENTS
Patient is seen and examined. Case reviewed with the medical team. Above note is appreciated. Will follow up clinically. Continue DVT prophylaxis. For access placement for HD today. Will follow up clinically.

## 2018-01-25 NOTE — PROGRESS NOTE ADULT - SUBJECTIVE AND OBJECTIVE BOX
s/p permacath.    No Known Allergies    Hospital Medications:   MEDICATIONS  (STANDING):  ascorbic acid 500 milliGRAM(s) Oral daily  calamine Lotion 1 Application(s) Topical three times a day  cinacalcet 30 milliGRAM(s) Oral daily  collagenase Ointment 1 Application(s) Topical daily  dextrose 5%. 1000 milliLiter(s) (50 mL/Hr) IV Continuous <Continuous>  dextrose 50% Injectable 12.5 Gram(s) IV Push once  dextrose 50% Injectable 25 Gram(s) IV Push once  dextrose 50% Injectable 25 Gram(s) IV Push once  docusate sodium 100 milliGRAM(s) Oral two times a day  epoetin jacqueline Injectable 74196 Unit(s) IV Push <User Schedule>  ferrous    sulfate Liquid 300 milliGRAM(s) Enteral Tube daily  folic acid 1 milliGRAM(s) Oral daily  gabapentin 100 milliGRAM(s) Oral three times a day  heparin  Injectable 5000 Unit(s) SubCutaneous every 12 hours  hydrALAZINE 50 milliGRAM(s) Oral three times a day  insulin lispro (HumaLOG) corrective regimen sliding scale   SubCutaneous Before meals and at bedtime  levothyroxine 50 MICROGram(s) Oral daily  metoprolol     tartrate 25 milliGRAM(s) Oral two times a day  nitroglycerin    Patch 0.2 mG/Hr(s) 1 patch Transdermal daily  pantoprazole  Injectable 40 milliGRAM(s) IV Push every 12 hours  PPD  5 Tuberculin Unit(s) Injectable 5 Unit(s) IntraDermal once  senna 2 Tablet(s) Oral at bedtime  sevelamer hydrochloride 1600 milliGRAM(s) Oral three times a day with meals  simvastatin 20 milliGRAM(s) Oral at bedtime        VITALS:  T(F): 98 (01-25-18 @ 14:00), Max: 99 (01-25-18 @ 10:10)  HR: 79 (01-25-18 @ 14:00)  BP: 124/44 (01-25-18 @ 14:00)  RR: 18 (01-25-18 @ 14:00)  SpO2: 100% (01-25-18 @ 14:00)  Wt(kg): --      PHYSICAL EXAM:  Constitutional: NAD  HEENT: anicteric sclera, oropharynx clear, MMM  Neck: No JVD  Respiratory: CTAB, no wheezes, rales or rhonchi  Cardiovascular: S1, S2, RRR  Gastrointestinal: BS+, soft, NT/ND  Extremities: No cyanosis or clubbing. No peripheral edema  Neurological: A/O x 3, no focal deficits  Vascular Access: RT IJ permacath.    LABS:  01-24    135  |  100  |  40<H>  ----------------------------<  96  4.6   |  26  |  6.02<H>    Ca    8.8      24 Jan 2018 15:04  Phos  4.0     01-24  Mg     2.9     01-24    TPro  6.1  /  Alb  1.7<L>  /  TBili  0.3  /  DBili      /  AST  10  /  ALT  <6<L>  /  AlkPhos  116  01-24    Creatinine Trend: 6.02 <--, 6.05 <--, 4.79 <--                        7.2    13.5  )-----------( 332      ( 25 Jan 2018 09:14 )             26.8     Urine Studies:      RADIOLOGY & ADDITIONAL STUDIES:

## 2018-01-25 NOTE — PROGRESS NOTE ADULT - ASSESSMENT
52 female nursing home patient with hx of DM, neuropathy, CKD, CHFpEF, GERD, osteomyelitis of jaw, hypothyroidism, hypoparathyroidism, anemia, admitted to ICU acute on chronic renal failure requiring initiation of dialysis and  acute respiratory failure requiring intubation 2/2 pneumonia with sepsis.   downgraded to medical floor on 01/01/2018.   currently pending tunneled cath placement by IR going today for tunneled cath  d/c planning after tunneled cath

## 2018-01-25 NOTE — DISCHARGE NOTE ADULT - PATIENT PORTAL LINK FT
“You can access the [employer] Patient Portal, offered by [facility], by registering with the following website: [email or web address]”

## 2018-01-25 NOTE — DISCHARGE NOTE ADULT - MEDICATION SUMMARY - MEDICATIONS TO STOP TAKING
I will STOP taking the medications listed below when I get home from the hospital:    amLODIPine 10 mg oral tablet  -- 1 tab(s) by mouth once a day    carvedilol 25 mg oral tablet  -- 1 tab(s) by mouth every 12 hours    ondansetron 2 mg/mL injectable solution  --  injectable    Procrit 10,000 units/mL preservative-free injectable solution  -- once a week on monday at 9am    Lasix 40 mg oral tablet  -- 40 milligram(s) by mouth once a day    Procrit 10,000 units/mL preservative-free injectable solution  -- 1 milliliter(s) injectable every 2 weeks on THURSDAYS    sodium bicarbonate 650 mg oral tablet  -- 2 tab(s) by mouth 2 times a day

## 2018-01-25 NOTE — PROGRESS NOTE ADULT - SUBJECTIVE AND OBJECTIVE BOX
Patient is a 52y old  Female who presents with a chief complaint of SOB (13 Dec 2017 16:01)  Awake, alert, comfortable in bed in NAD. Complaining of pain in the legs. S/p indium scan which was negative. WBC is improved. For PC placement today    INTERVAL HPI/OVERNIGHT EVENTS:      VITAL SIGNS:  T(F): 98.2 (01-25-18 @ 12:30)  HR: 89 (01-25-18 @ 12:30)  BP: 135/55 (01-25-18 @ 12:30)  RR: 18 (01-25-18 @ 12:30)  SpO2: 99% (01-25-18 @ 12:30)  Wt(kg): --  I&O's Detail          REVIEW OF SYSTEMS:    CONSTITUTIONAL:  No fevers, chills, sweats    HEENT:  Eyes:  No diplopia or blurred vision. ENT:  No earache, sore throat or runny nose.    CARDIOVASCULAR:  No pressure, squeezing, tightness, or heaviness about the chest; no palpitations.    RESPIRATORY:  Per HPI    GASTROINTESTINAL:  No abdominal pain, nausea, vomiting or diarrhea.    GENITOURINARY:  No dysuria, frequency or urgency.    NEUROLOGIC:  No paresthesias, fasciculations, seizures or weakness.    PSYCHIATRIC:  No disorder of thought or mood.      PHYSICAL EXAM:    Constitutional: Well developed and nourished  Eyes:Perrla  ENMT: normal  Neck:supple  Respiratory: good air entry  Cardiovascular: S1 S2 regular  Gastrointestinal: Soft, Non tender  Extremities: No edema  Vascular:normal  Neurological:Awake, alert,Ox3  Musculoskeletal:Sacral decubitus      MEDICATIONS  (STANDING):  ascorbic acid 500 milliGRAM(s) Oral daily  calamine Lotion 1 Application(s) Topical three times a day  cinacalcet 30 milliGRAM(s) Oral daily  collagenase Ointment 1 Application(s) Topical daily  dextrose 5%. 1000 milliLiter(s) (50 mL/Hr) IV Continuous <Continuous>  dextrose 50% Injectable 12.5 Gram(s) IV Push once  dextrose 50% Injectable 25 Gram(s) IV Push once  dextrose 50% Injectable 25 Gram(s) IV Push once  docusate sodium 100 milliGRAM(s) Oral two times a day  epoetin jacqueline Injectable 55817 Unit(s) IV Push <User Schedule>  ferrous    sulfate Liquid 300 milliGRAM(s) Enteral Tube daily  folic acid 1 milliGRAM(s) Oral daily  gabapentin 100 milliGRAM(s) Oral three times a day  heparin  Injectable 5000 Unit(s) SubCutaneous every 12 hours  hydrALAZINE 50 milliGRAM(s) Oral three times a day  insulin lispro (HumaLOG) corrective regimen sliding scale   SubCutaneous Before meals and at bedtime  levothyroxine 50 MICROGram(s) Oral daily  metoprolol     tartrate 25 milliGRAM(s) Oral two times a day  nitroglycerin    Patch 0.2 mG/Hr(s) 1 patch Transdermal daily  pantoprazole  Injectable 40 milliGRAM(s) IV Push every 12 hours  PPD  5 Tuberculin Unit(s) Injectable 5 Unit(s) IntraDermal once  senna 2 Tablet(s) Oral at bedtime  sevelamer hydrochloride 1600 milliGRAM(s) Oral three times a day with meals  simvastatin 20 milliGRAM(s) Oral at bedtime    MEDICATIONS  (PRN):  acetaminophen   Tablet 650 milliGRAM(s) Oral every 6 hours PRN For Temp greater than 38 C (100.4 F)  acetaminophen   Tablet. 650 milliGRAM(s) Oral every 6 hours PRN Moderate Pain (4 - 6)  aluminum hydroxide/magnesium hydroxide/simethicone Suspension 30 milliLiter(s) Oral every 4 hours PRN Dyspepsia  dextrose Gel 1 Dose(s) Oral once PRN Blood Glucose LESS THAN 70 milliGRAM(s)/deciliter  dextrose Gel 1 Dose(s) Oral once PRN Blood Glucose LESS THAN 70 milliGRAM(s)/deciliter  glucagon  Injectable 1 milliGRAM(s) IntraMuscular once PRN Glucose LESS THAN 70 milligrams/deciliter      Allergies    No Known Allergies    Intolerances        LABS:                        7.2    13.5  )-----------( 332      ( 25 Jan 2018 09:14 )             26.8     01-24    135  |  100  |  40<H>  ----------------------------<  96  4.6   |  26  |  6.02<H>    Ca    8.8      24 Jan 2018 15:04  Phos  4.0     01-24  Mg     2.9     01-24    TPro  6.1  /  Alb  1.7<L>  /  TBili  0.3  /  DBili  x   /  AST  10  /  ALT  <6<L>  /  AlkPhos  116  01-24              CAPILLARY BLOOD GLUCOSE      POCT Blood Glucose.: 75 mg/dL (25 Jan 2018 12:16)  POCT Blood Glucose.: 84 mg/dL (25 Jan 2018 07:30)  POCT Blood Glucose.: 102 mg/dL (24 Jan 2018 21:08)  POCT Blood Glucose.: 91 mg/dL (24 Jan 2018 17:42)        RADIOLOGY & ADDITIONAL TESTS:    CXR:    Ct scan chest:    ekg;    echo:

## 2018-01-25 NOTE — PROGRESS NOTE ADULT - ASSESSMENT
52 yr old female with  ESRD.  indium scan negative.   s/p placement of permacath  d/c planning after hd.

## 2018-01-25 NOTE — DISCHARGE NOTE ADULT - HOSPITAL COURSE
52y old  Female who presents with a chief complaint of SOB  52 female nursing home patient with hx of DM, neuropathy, CKD, CHFpEF, GERD, osteomyelitis of jaw, hypothyroidism, hypoparathyroidism, anemia, admitted with acute on chronic renal failure requiring initiation of dialysis, acute respiratory failure requiring intubation on 12/16, pneumonia with sepsis, GI bleed, EGD showed non bleeding duodenal ulcer.    Dialysis catheter removed 12/22 due to fevers and leukocytosis. Patient had multiple Shiley placed (4 shiley ) , rosalie to fever and leukocytosis. Blood culture remained negative, indium scan was done Indium labeled leukocyte scan demonstrates:No radionuclide evidence of infection.  Patient got PermCath placed by IR on 01/25/18. Will be discharged with PermCath and follow up with vascular as out patient for AV graft placement for long term HD treatment.   Patient completed antibiotic course with meropenem and levaquin. ID Dr. Kaplan , Nephro Dr. Valles , Pulm Dr. Cordoba was consulted.

## 2018-01-25 NOTE — PROGRESS NOTE ADULT - SUBJECTIVE AND OBJECTIVE BOX
Patient is a 52y old  Female who presents with a chief complaint of SOB (13 Dec 2017 16:01)      INTERVAL HPI/OVERNIGHT EVENTS:    MEDICATIONS  (STANDING):  ascorbic acid 500 milliGRAM(s) Oral daily  calamine Lotion 1 Application(s) Topical three times a day  cinacalcet 30 milliGRAM(s) Oral daily  dextrose 5%. 1000 milliLiter(s) (50 mL/Hr) IV Continuous <Continuous>  dextrose 50% Injectable 12.5 Gram(s) IV Push once  dextrose 50% Injectable 25 Gram(s) IV Push once  dextrose 50% Injectable 25 Gram(s) IV Push once  docusate sodium 100 milliGRAM(s) Oral two times a day  epoetin jacqueline Injectable 01916 Unit(s) IV Push <User Schedule>  ferrous    sulfate Liquid 300 milliGRAM(s) Enteral Tube daily  folic acid 1 milliGRAM(s) Oral daily  gabapentin 100 milliGRAM(s) Oral three times a day  heparin  Injectable 5000 Unit(s) SubCutaneous every 12 hours  hydrALAZINE 50 milliGRAM(s) Oral three times a day  insulin lispro (HumaLOG) corrective regimen sliding scale   SubCutaneous Before meals and at bedtime  levothyroxine 50 MICROGram(s) Oral daily  metoprolol     tartrate 25 milliGRAM(s) Oral two times a day  nitroglycerin    Patch 0.2 mG/Hr(s) 1 patch Transdermal daily  pantoprazole  Injectable 40 milliGRAM(s) IV Push every 12 hours  PPD  5 Tuberculin Unit(s) Injectable 5 Unit(s) IntraDermal once  senna 2 Tablet(s) Oral at bedtime  sevelamer hydrochloride 1600 milliGRAM(s) Oral three times a day with meals  simvastatin 20 milliGRAM(s) Oral at bedtime    MEDICATIONS  (PRN):  acetaminophen   Tablet 650 milliGRAM(s) Oral every 6 hours PRN For Temp greater than 38 C (100.4 F)  acetaminophen   Tablet. 650 milliGRAM(s) Oral every 6 hours PRN Moderate Pain (4 - 6)  aluminum hydroxide/magnesium hydroxide/simethicone Suspension 30 milliLiter(s) Oral every 4 hours PRN Dyspepsia  dextrose Gel 1 Dose(s) Oral once PRN Blood Glucose LESS THAN 70 milliGRAM(s)/deciliter  dextrose Gel 1 Dose(s) Oral once PRN Blood Glucose LESS THAN 70 milliGRAM(s)/deciliter  glucagon  Injectable 1 milliGRAM(s) IntraMuscular once PRN Glucose LESS THAN 70 milligrams/deciliter      Allergies    No Known Allergies    Intolerances        REVIEW OF SYSTEMS:  denies any fever, chills, chest pain , sob , abdominal pain or diarrhe      PHYSICAL EXAM:  GENERAL: NAD  NERVOUS SYSTEM:  Alert & Oriented X1-2, no focal neurological deficit   CHEST/LUNG: Clear to percussion bilaterally; Rt. IJ catheter in place   HEART: Regular rate and rhythm; No murmurs, rubs, or gallops  ABDOMEN: Soft, Nontender, Nondistended; Bowel sounds present  EXTREMITIES:  2+ Peripheral Pulses, No clubbing, cyanosis, or edema  SKIN: sacral decubitus ulcer , non stageable   LABS:                        7

## 2018-01-25 NOTE — DISCHARGE NOTE ADULT - CARE PLAN
Principal Discharge DX:	Acute respiratory failure with hypoxia  Goal:	to Resolve hypoxia  Assessment and plan of treatment:	Respiratory failure secondary to PNA and renal failure   Resolved   Saturating well on room air  Secondary Diagnosis:	Acute renal failure  Assessment and plan of treatment:	Continue with hemodialysis as per schedule via PermCath   Will need AV graft placement for long term HD   Follow up with vascular surgery  Secondary Diagnosis:	Upper GI bleed  Assessment and plan of treatment:	No active GI bleeding, s/p EGD   Continue with protonix Principal Discharge DX:	Acute respiratory failure with hypoxia  Goal:	to Resolve hypoxia  Assessment and plan of treatment:	Respiratory failure secondary to PNA and renal failure   Resolved   Saturating well on room air  Secondary Diagnosis:	Acute renal failure  Assessment and plan of treatment:	Continue with hemodialysis as per schedule via PermCath   Will need AV graft placement for long term HD   Follow up with vascular surgery  Secondary Diagnosis:	Upper GI bleed  Assessment and plan of treatment:	No active GI bleeding, s/p EGD colonosopy as outpatient  Continue with protonix Principal Discharge DX:	Acute respiratory failure with hypoxia  Goal:	Prevent hypoxia  Assessment and plan of treatment:	Respiratory failure secondary to PNA and renal failure. Resolved. Saturating well on room air.  Secondary Diagnosis:	Acute renal failure  Assessment and plan of treatment:	Continue with hemodialysis as per schedule via PermCath   Will need AV graft placement for long term HD   Follow up with vascular surgery  Secondary Diagnosis:	Upper GI bleed  Assessment and plan of treatment:	No active GI bleeding, s/p EGD colonoscopy as outpatient  Continue with protonix

## 2018-01-26 DIAGNOSIS — L89.159 PRESSURE ULCER OF SACRAL REGION, UNSPECIFIED STAGE: ICD-10-CM

## 2018-01-26 LAB
ALBUMIN SERPL ELPH-MCNC: 1.6 G/DL — LOW (ref 3.5–5)
ALP SERPL-CCNC: 108 U/L — SIGNIFICANT CHANGE UP (ref 40–120)
ALT FLD-CCNC: <6 U/L DA — LOW (ref 10–60)
ANION GAP SERPL CALC-SCNC: 8 MMOL/L — SIGNIFICANT CHANGE UP (ref 5–17)
AST SERPL-CCNC: 9 U/L — LOW (ref 10–40)
BASOPHILS # BLD AUTO: 0.2 K/UL — SIGNIFICANT CHANGE UP (ref 0–0.2)
BASOPHILS NFR BLD AUTO: 1.3 % — SIGNIFICANT CHANGE UP (ref 0–2)
BILIRUB SERPL-MCNC: 0.3 MG/DL — SIGNIFICANT CHANGE UP (ref 0.2–1.2)
BUN SERPL-MCNC: 25 MG/DL — HIGH (ref 7–18)
CALCIUM SERPL-MCNC: 8.1 MG/DL — LOW (ref 8.4–10.5)
CHLORIDE SERPL-SCNC: 103 MMOL/L — SIGNIFICANT CHANGE UP (ref 96–108)
CO2 SERPL-SCNC: 27 MMOL/L — SIGNIFICANT CHANGE UP (ref 22–31)
CREAT SERPL-MCNC: 4.4 MG/DL — HIGH (ref 0.5–1.3)
EOSINOPHIL # BLD AUTO: 0.2 K/UL — SIGNIFICANT CHANGE UP (ref 0–0.5)
EOSINOPHIL NFR BLD AUTO: 1.4 % — SIGNIFICANT CHANGE UP (ref 0–6)
GLUCOSE BLDC GLUCOMTR-MCNC: 104 MG/DL — HIGH (ref 70–99)
GLUCOSE BLDC GLUCOMTR-MCNC: 77 MG/DL — SIGNIFICANT CHANGE UP (ref 70–99)
GLUCOSE BLDC GLUCOMTR-MCNC: 80 MG/DL — SIGNIFICANT CHANGE UP (ref 70–99)
GLUCOSE BLDC GLUCOMTR-MCNC: 93 MG/DL — SIGNIFICANT CHANGE UP (ref 70–99)
GLUCOSE SERPL-MCNC: 66 MG/DL — LOW (ref 70–99)
HAV IGM SER-ACNC: SIGNIFICANT CHANGE UP
HBV CORE IGM SER-ACNC: SIGNIFICANT CHANGE UP
HBV SURFACE AG SER-ACNC: SIGNIFICANT CHANGE UP
HCT VFR BLD CALC: 24.1 % — LOW (ref 34.5–45)
HCV AB S/CO SERPL IA: 0.39 S/CO — SIGNIFICANT CHANGE UP
HCV AB SERPL-IMP: SIGNIFICANT CHANGE UP
HGB BLD-MCNC: 7 G/DL — CRITICAL LOW (ref 11.5–15.5)
LYMPHOCYTES # BLD AUTO: 2.9 K/UL — SIGNIFICANT CHANGE UP (ref 1–3.3)
LYMPHOCYTES # BLD AUTO: 22.2 % — SIGNIFICANT CHANGE UP (ref 13–44)
MAGNESIUM SERPL-MCNC: 2.5 MG/DL — SIGNIFICANT CHANGE UP (ref 1.6–2.6)
MCHC RBC-ENTMCNC: 29.2 GM/DL — LOW (ref 32–36)
MCHC RBC-ENTMCNC: 29.2 PG — SIGNIFICANT CHANGE UP (ref 27–34)
MCV RBC AUTO: 99.8 FL — SIGNIFICANT CHANGE UP (ref 80–100)
MONOCYTES # BLD AUTO: 1.5 K/UL — HIGH (ref 0–0.9)
MONOCYTES NFR BLD AUTO: 11 % — SIGNIFICANT CHANGE UP (ref 2–14)
NEUTROPHILS # BLD AUTO: 8.4 K/UL — HIGH (ref 1.8–7.4)
NEUTROPHILS NFR BLD AUTO: 64.1 % — SIGNIFICANT CHANGE UP (ref 43–77)
PHOSPHATE SERPL-MCNC: 3.1 MG/DL — SIGNIFICANT CHANGE UP (ref 2.5–4.5)
PLATELET # BLD AUTO: 338 K/UL — SIGNIFICANT CHANGE UP (ref 150–400)
POTASSIUM SERPL-MCNC: 4.2 MMOL/L — SIGNIFICANT CHANGE UP (ref 3.5–5.3)
POTASSIUM SERPL-SCNC: 4.2 MMOL/L — SIGNIFICANT CHANGE UP (ref 3.5–5.3)
PROT SERPL-MCNC: 5.5 G/DL — LOW (ref 6–8.3)
RBC # BLD: 2.41 M/UL — LOW (ref 3.8–5.2)
RBC # FLD: 20.7 % — HIGH (ref 10.3–14.5)
SODIUM SERPL-SCNC: 138 MMOL/L — SIGNIFICANT CHANGE UP (ref 135–145)
WBC # BLD: 13.2 K/UL — HIGH (ref 3.8–10.5)
WBC # FLD AUTO: 13.2 K/UL — HIGH (ref 3.8–10.5)

## 2018-01-26 RX ORDER — LEVOTHYROXINE SODIUM 125 MCG
1 TABLET ORAL
Qty: 0 | Refills: 0 | DISCHARGE
Start: 2018-01-26

## 2018-01-26 RX ORDER — CALAMINE AND ZINC OXIDE AND PHENOL 160; 10 MG/ML; MG/ML
1 LOTION TOPICAL
Qty: 0 | Refills: 0 | DISCHARGE
Start: 2018-01-26

## 2018-01-26 RX ORDER — DOCUSATE SODIUM 100 MG
1 CAPSULE ORAL
Qty: 0 | Refills: 0 | DISCHARGE
Start: 2018-01-26

## 2018-01-26 RX ORDER — GABAPENTIN 400 MG/1
1 CAPSULE ORAL
Qty: 0 | Refills: 0 | DISCHARGE
Start: 2018-01-26

## 2018-01-26 RX ORDER — FERROUS SULFATE 325(65) MG
1 TABLET ORAL
Qty: 0 | Refills: 0 | COMMUNITY

## 2018-01-26 RX ORDER — SIMVASTATIN 20 MG/1
1 TABLET, FILM COATED ORAL
Qty: 0 | Refills: 0 | DISCHARGE
Start: 2018-01-26

## 2018-01-26 RX ORDER — RANITIDINE HYDROCHLORIDE 150 MG/1
1 TABLET, FILM COATED ORAL
Qty: 0 | Refills: 0 | COMMUNITY

## 2018-01-26 RX ORDER — SENNA PLUS 8.6 MG/1
2 TABLET ORAL
Qty: 0 | Refills: 0 | DISCHARGE
Start: 2018-01-26

## 2018-01-26 RX ORDER — FOLIC ACID 0.8 MG
1 TABLET ORAL
Qty: 0 | Refills: 0 | DISCHARGE
Start: 2018-01-26

## 2018-01-26 RX ORDER — HYDRALAZINE HCL 50 MG
1 TABLET ORAL
Qty: 0 | Refills: 0 | COMMUNITY
Start: 2018-01-26

## 2018-01-26 RX ORDER — SEVELAMER CARBONATE 2400 MG/1
2 POWDER, FOR SUSPENSION ORAL
Qty: 0 | Refills: 0 | DISCHARGE
Start: 2018-01-26

## 2018-01-26 RX ORDER — CINACALCET 30 MG/1
1 TABLET, FILM COATED ORAL
Qty: 0 | Refills: 0 | COMMUNITY

## 2018-01-26 RX ORDER — FUROSEMIDE 40 MG
40 TABLET ORAL
Qty: 0 | Refills: 0 | COMMUNITY

## 2018-01-26 RX ORDER — METOPROLOL TARTRATE 50 MG
1 TABLET ORAL
Qty: 0 | Refills: 0 | COMMUNITY
Start: 2018-01-26

## 2018-01-26 RX ORDER — ERYTHROPOIETIN 10000 [IU]/ML
1 INJECTION, SOLUTION INTRAVENOUS; SUBCUTANEOUS
Qty: 0 | Refills: 0 | COMMUNITY

## 2018-01-26 RX ORDER — FERROUS SULFATE 325(65) MG
5 TABLET ORAL
Qty: 0 | Refills: 0 | DISCHARGE
Start: 2018-01-26

## 2018-01-26 RX ORDER — SODIUM BICARBONATE 1 MEQ/ML
2 SYRINGE (ML) INTRAVENOUS
Qty: 0 | Refills: 0 | COMMUNITY

## 2018-01-26 RX ORDER — CINACALCET 30 MG/1
1 TABLET, FILM COATED ORAL
Qty: 0 | Refills: 0 | DISCHARGE
Start: 2018-01-26

## 2018-01-26 RX ORDER — ASCORBIC ACID 60 MG
1 TABLET,CHEWABLE ORAL
Qty: 0 | Refills: 0 | DISCHARGE
Start: 2018-01-26

## 2018-01-26 RX ORDER — COLLAGENASE CLOSTRIDIUM HIST. 250 UNIT/G
1 OINTMENT (GRAM) TOPICAL
Qty: 0 | Refills: 0 | DISCHARGE
Start: 2018-01-26

## 2018-01-26 RX ADMIN — HEPARIN SODIUM 5000 UNIT(S): 5000 INJECTION INTRAVENOUS; SUBCUTANEOUS at 05:53

## 2018-01-26 RX ADMIN — Medication 1 APPLICATION(S): at 15:58

## 2018-01-26 RX ADMIN — HEPARIN SODIUM 5000 UNIT(S): 5000 INJECTION INTRAVENOUS; SUBCUTANEOUS at 19:25

## 2018-01-26 RX ADMIN — Medication 50 MILLIGRAM(S): at 21:11

## 2018-01-26 RX ADMIN — GABAPENTIN 100 MILLIGRAM(S): 400 CAPSULE ORAL at 21:11

## 2018-01-26 RX ADMIN — CALAMINE 8% AND ZINC OXIDE 8% 1 APPLICATION(S): 160 LOTION TOPICAL at 15:58

## 2018-01-26 RX ADMIN — Medication 1 PATCH: at 23:12

## 2018-01-26 RX ADMIN — Medication 25 MILLIGRAM(S): at 19:25

## 2018-01-26 RX ADMIN — CALAMINE 8% AND ZINC OXIDE 8% 1 APPLICATION(S): 160 LOTION TOPICAL at 05:46

## 2018-01-26 RX ADMIN — Medication 100 MILLIGRAM(S): at 18:15

## 2018-01-26 RX ADMIN — PANTOPRAZOLE SODIUM 40 MILLIGRAM(S): 20 TABLET, DELAYED RELEASE ORAL at 05:47

## 2018-01-26 RX ADMIN — CINACALCET 30 MILLIGRAM(S): 30 TABLET, FILM COATED ORAL at 12:04

## 2018-01-26 RX ADMIN — Medication 300 MILLIGRAM(S): at 12:05

## 2018-01-26 RX ADMIN — Medication 1 PATCH: at 01:21

## 2018-01-26 RX ADMIN — Medication 1 MILLIGRAM(S): at 12:05

## 2018-01-26 RX ADMIN — Medication 1 PATCH: at 12:05

## 2018-01-26 RX ADMIN — Medication 100 MILLIGRAM(S): at 05:47

## 2018-01-26 RX ADMIN — ERYTHROPOIETIN 14000 UNIT(S): 10000 INJECTION, SOLUTION INTRAVENOUS; SUBCUTANEOUS at 09:12

## 2018-01-26 RX ADMIN — SIMVASTATIN 20 MILLIGRAM(S): 20 TABLET, FILM COATED ORAL at 21:11

## 2018-01-26 RX ADMIN — GABAPENTIN 100 MILLIGRAM(S): 400 CAPSULE ORAL at 15:49

## 2018-01-26 RX ADMIN — Medication 25 MILLIGRAM(S): at 05:47

## 2018-01-26 RX ADMIN — SEVELAMER CARBONATE 1600 MILLIGRAM(S): 2400 POWDER, FOR SUSPENSION ORAL at 12:05

## 2018-01-26 RX ADMIN — PANTOPRAZOLE SODIUM 40 MILLIGRAM(S): 20 TABLET, DELAYED RELEASE ORAL at 18:15

## 2018-01-26 RX ADMIN — Medication 500 MILLIGRAM(S): at 12:05

## 2018-01-26 RX ADMIN — SEVELAMER CARBONATE 1600 MILLIGRAM(S): 2400 POWDER, FOR SUSPENSION ORAL at 18:15

## 2018-01-26 RX ADMIN — GABAPENTIN 100 MILLIGRAM(S): 400 CAPSULE ORAL at 05:47

## 2018-01-26 RX ADMIN — Medication 50 MILLIGRAM(S): at 15:59

## 2018-01-26 RX ADMIN — Medication 50 MICROGRAM(S): at 05:47

## 2018-01-26 RX ADMIN — SEVELAMER CARBONATE 1600 MILLIGRAM(S): 2400 POWDER, FOR SUSPENSION ORAL at 08:33

## 2018-01-26 RX ADMIN — Medication 50 MILLIGRAM(S): at 05:47

## 2018-01-26 NOTE — PROGRESS NOTE ADULT - SUBJECTIVE AND OBJECTIVE BOX
Patient is a 52y old  Female who presents with a chief complaint of SOB (13 Dec 2017 16:01)  awake, alert, comfortable in bed in NAD. S/p permacath placement. S/p HD    INTERVAL HPI/OVERNIGHT EVENTS:      VITAL SIGNS:  T(F): 97.8 (01-26-18 @ 11:45)  HR: 70 (01-26-18 @ 11:45)  BP: 140/61 (01-26-18 @ 11:45)  RR: 15 (01-26-18 @ 11:45)  SpO2: 99% (01-26-18 @ 11:45)  Wt(kg): --  I&O's Detail          REVIEW OF SYSTEMS:    CONSTITUTIONAL:  No fevers, chills, sweats    HEENT:  Eyes:  No diplopia or blurred vision. ENT:  No earache, sore throat or runny nose.    CARDIOVASCULAR:  No pressure, squeezing, tightness, or heaviness about the chest; no palpitations.    RESPIRATORY:  Per HPI    GASTROINTESTINAL:  No abdominal pain, nausea, vomiting or diarrhea.    GENITOURINARY:  No dysuria, frequency or urgency.    NEUROLOGIC:  No paresthesias, fasciculations, seizures or weakness.    PSYCHIATRIC:  No disorder of thought or mood.      PHYSICAL EXAM:    Constitutional: Well developed and nourished  Eyes:Perrla  ENMT: normal  Neck:supple  Respiratory: good air entry  Cardiovascular: S1 S2 regular  Gastrointestinal: Soft, Non tender  Extremities: No edema  Vascular:normal  Neurological:Awake, alert,Ox3  Musculoskeletal:Sacral decubitus ulcer       MEDICATIONS  (STANDING):  ascorbic acid 500 milliGRAM(s) Oral daily  calamine Lotion 1 Application(s) Topical three times a day  cinacalcet 30 milliGRAM(s) Oral daily  collagenase Ointment 1 Application(s) Topical daily  dextrose 5%. 1000 milliLiter(s) (50 mL/Hr) IV Continuous <Continuous>  dextrose 50% Injectable 12.5 Gram(s) IV Push once  dextrose 50% Injectable 25 Gram(s) IV Push once  dextrose 50% Injectable 25 Gram(s) IV Push once  docusate sodium 100 milliGRAM(s) Oral two times a day  epoetin jacqueline Injectable 32311 Unit(s) IV Push <User Schedule>  ferrous    sulfate Liquid 300 milliGRAM(s) Enteral Tube daily  folic acid 1 milliGRAM(s) Oral daily  gabapentin 100 milliGRAM(s) Oral three times a day  heparin  Injectable 5000 Unit(s) SubCutaneous every 12 hours  hydrALAZINE 50 milliGRAM(s) Oral three times a day  insulin lispro (HumaLOG) corrective regimen sliding scale   SubCutaneous Before meals and at bedtime  levothyroxine 50 MICROGram(s) Oral daily  metoprolol     tartrate 25 milliGRAM(s) Oral two times a day  nitroglycerin    Patch 0.2 mG/Hr(s) 1 patch Transdermal daily  pantoprazole  Injectable 40 milliGRAM(s) IV Push every 12 hours  PPD  5 Tuberculin Unit(s) Injectable 5 Unit(s) IntraDermal once  senna 2 Tablet(s) Oral at bedtime  sevelamer hydrochloride 1600 milliGRAM(s) Oral three times a day with meals  simvastatin 20 milliGRAM(s) Oral at bedtime    MEDICATIONS  (PRN):  acetaminophen   Tablet 650 milliGRAM(s) Oral every 6 hours PRN For Temp greater than 38 C (100.4 F)  acetaminophen   Tablet. 650 milliGRAM(s) Oral every 6 hours PRN Moderate Pain (4 - 6)  aluminum hydroxide/magnesium hydroxide/simethicone Suspension 30 milliLiter(s) Oral every 4 hours PRN Dyspepsia  dextrose Gel 1 Dose(s) Oral once PRN Blood Glucose LESS THAN 70 milliGRAM(s)/deciliter  dextrose Gel 1 Dose(s) Oral once PRN Blood Glucose LESS THAN 70 milliGRAM(s)/deciliter  glucagon  Injectable 1 milliGRAM(s) IntraMuscular once PRN Glucose LESS THAN 70 milligrams/deciliter      Allergies    No Known Allergies    Intolerances        LABS:                        7.0    13.2  )-----------( 338      ( 26 Jan 2018 09:16 )             24.1     01-26    138  |  103  |  25<H>  ----------------------------<  66<L>  4.2   |  27  |  4.40<H>    Ca    8.1<L>      26 Jan 2018 09:22  Phos  3.1     01-26  Mg     2.5     01-26    TPro  5.5<L>  /  Alb  1.6<L>  /  TBili  0.3  /  DBili  x   /  AST  9<L>  /  ALT  <6<L>  /  AlkPhos  108  01-26              CAPILLARY BLOOD GLUCOSE      POCT Blood Glucose.: 104 mg/dL (26 Jan 2018 11:01)  POCT Blood Glucose.: 80 mg/dL (26 Jan 2018 08:32)  POCT Blood Glucose.: 89 mg/dL (25 Jan 2018 21:42)  POCT Blood Glucose.: 72 mg/dL (25 Jan 2018 16:42)        RADIOLOGY & ADDITIONAL TESTS:    CXR:    Ct scan chest:    ekg;    echo:

## 2018-01-26 NOTE — PROGRESS NOTE ADULT - ASSESSMENT
52 yr old female with  ESRD.  HD TTS  indium scan negative.   s/p placement of permacath  d/c planning to NH and outpatient HD

## 2018-01-26 NOTE — PROGRESS NOTE ADULT - PROBLEM SELECTOR PLAN 4
-EGD negative for ULcer   -H/H stable   [facility] postive  will f/u GI again for possible need for colonoscopy  spoke with Dr Cuba over the phone says if h/h is STABLE colonoscopy can be done as outpatient   -Tolerating diet,   -Aspiration precaution  protonix po

## 2018-01-26 NOTE — PROGRESS NOTE ADULT - SUBJECTIVE AND OBJECTIVE BOX
s/p HD yesterday.    No Known Allergies    Hospital Medications:   MEDICATIONS  (STANDING):  ascorbic acid 500 milliGRAM(s) Oral daily  calamine Lotion 1 Application(s) Topical three times a day  cinacalcet 30 milliGRAM(s) Oral daily  collagenase Ointment 1 Application(s) Topical daily  dextrose 5%. 1000 milliLiter(s) (50 mL/Hr) IV Continuous <Continuous>  dextrose 50% Injectable 12.5 Gram(s) IV Push once  dextrose 50% Injectable 25 Gram(s) IV Push once  dextrose 50% Injectable 25 Gram(s) IV Push once  docusate sodium 100 milliGRAM(s) Oral two times a day  epoetin jacqueline Injectable 80430 Unit(s) IV Push <User Schedule>  ferrous    sulfate Liquid 300 milliGRAM(s) Enteral Tube daily  folic acid 1 milliGRAM(s) Oral daily  gabapentin 100 milliGRAM(s) Oral three times a day  heparin  Injectable 5000 Unit(s) SubCutaneous every 12 hours  hydrALAZINE 50 milliGRAM(s) Oral three times a day  insulin lispro (HumaLOG) corrective regimen sliding scale   SubCutaneous Before meals and at bedtime  levothyroxine 50 MICROGram(s) Oral daily  metoprolol     tartrate 25 milliGRAM(s) Oral two times a day  nitroglycerin    Patch 0.2 mG/Hr(s) 1 patch Transdermal daily  pantoprazole  Injectable 40 milliGRAM(s) IV Push every 12 hours  PPD  5 Tuberculin Unit(s) Injectable 5 Unit(s) IntraDermal once  senna 2 Tablet(s) Oral at bedtime  sevelamer hydrochloride 1600 milliGRAM(s) Oral three times a day with meals  simvastatin 20 milliGRAM(s) Oral at bedtime      VITALS:  T(F): 99.4 (01-26-18 @ 14:30), Max: 99.4 (01-26-18 @ 14:30)  HR: 86 (01-26-18 @ 14:30)  BP: 139/54 (01-26-18 @ 14:30)  RR: 16 (01-26-18 @ 14:30)  SpO2: 100% (01-26-18 @ 14:30)  Wt(kg): --      PHYSICAL EXAM:  Constitutional: NAD  HEENT: anicteric sclera, oropharynx clear.  Neck: No JVD  Respiratory: CTAB, no wheezes, rales or rhonchi  Cardiovascular: S1, S2, RRR  Gastrointestinal: BS+, soft, NT/ND  Extremities: No cyanosis or clubbing. No peripheral edema  Neurological: A/O x 3, no focal deficits  Vascular Access: RT IJ permacath.    LABS:  01-26    138  |  103  |  25<H>  ----------------------------<  66<L>  4.2   |  27  |  4.40<H>    Ca    8.1<L>      26 Jan 2018 09:22  Phos  3.1     01-26  Mg     2.5     01-26    TPro  5.5<L>  /  Alb  1.6<L>  /  TBili  0.3  /  DBili      /  AST  9<L>  /  ALT  <6<L>  /  AlkPhos  108  01-26    Creatinine Trend: 4.40 <--, 6.02 <--, 6.05 <--, 4.79 <--                        7.0    13.2  )-----------( 338      ( 26 Jan 2018 09:16 )             24.1     Urine Studies:      RADIOLOGY & ADDITIONAL STUDIES:

## 2018-01-26 NOTE — PROGRESS NOTE ADULT - SUBJECTIVE AND OBJECTIVE BOX
INTERVAL HPI/OVERNIGHT EVENTS:    Pt seen and examined at bedside, offers no acute complaints at this time, admits to some discomfort at the PC site insertion. Denies fever, chills, SOB or CP.   Had PC placed yesterday by IR.        Vital Signs Last 24 Hrs  T(C): 37.2 (26 Jan 2018 05:05), Max: 37.2 (25 Jan 2018 10:10)  T(F): 99 (26 Jan 2018 05:05), Max: 99 (25 Jan 2018 10:10)  HR: 74 (26 Jan 2018 05:05) (74 - 89)  BP: 155/55 (26 Jan 2018 05:05) (124/44 - 155/55)  BP(mean): --  RR: 16 (26 Jan 2018 05:05) (16 - 18)  SpO2: 97% (26 Jan 2018 05:05) (96% - 100%)  I&O's Detail    aluminum hydroxide/magnesium hydroxide/simethicone Suspension 30 milliLiter(s) Oral every 4 hours PRN  cinacalcet 30 milliGRAM(s) Oral daily  docusate sodium 100 milliGRAM(s) Oral two times a day  pantoprazole  Injectable 40 milliGRAM(s) IV Push every 12 hours  senna 2 Tablet(s) Oral at bedtime  sevelamer hydrochloride 1600 milliGRAM(s) Oral three times a day with meals      Physical Exam  General: AAOx3, No acute distress  Skin: No jaundice, no icterus  Chest Rt PC in place, no TTP, no erythema, dressing c/d/i  Extremities: non edematous, no calf pain bilaterally      Labs:                        7.2    13.5  )-----------( 332      ( 25 Jan 2018 09:14 )             26.8     01-24    135  |  100  |  40<H>  ----------------------------<  96  4.6   |  26  |  6.02<H>    Ca    8.8      24 Jan 2018 15:04  Phos  4.0     01-24  Mg     2.9     01-24    TPro  6.1  /  Alb  1.7<L>  /  TBili  0.3  /  DBili  x   /  AST  10  /  ALT  <6<L>  /  AlkPhos  116  01-24

## 2018-01-26 NOTE — PROGRESS NOTE ADULT - ATTENDING COMMENTS
Patient is seen and examined. Case reviewed with the medical team. Above note is appreciated. Will follow up clinically. Continue DVT prophylaxis. Case discussed with Nephrology and ID. Will continue Hemodialysis and follow up WBC. Overall prognosis is very poor despite any medical treatment and the family is fully aware.

## 2018-01-26 NOTE — PROGRESS NOTE ADULT - SUBJECTIVE AND OBJECTIVE BOX
Patient is a 52y old  Female who presents with a chief complaint of SOB (13 Dec 2017 16:01)      INTERVAL HPI/OVERNIGHT EVENTS: NO overnight events     MEDICATIONS  (STANDING):  ascorbic acid 500 milliGRAM(s) Oral daily  calamine Lotion 1 Application(s) Topical three times a day  cinacalcet 30 milliGRAM(s) Oral daily  dextrose 5%. 1000 milliLiter(s) (50 mL/Hr) IV Continuous <Continuous>  dextrose 50% Injectable 12.5 Gram(s) IV Push once  dextrose 50% Injectable 25 Gram(s) IV Push once  dextrose 50% Injectable 25 Gram(s) IV Push once  docusate sodium 100 milliGRAM(s) Oral two times a day  epoetin jacqueline Injectable 78402 Unit(s) IV Push <User Schedule>  ferrous    sulfate Liquid 300 milliGRAM(s) Enteral Tube daily  folic acid 1 milliGRAM(s) Oral daily  gabapentin 100 milliGRAM(s) Oral three times a day  heparin  Injectable 5000 Unit(s) SubCutaneous every 12 hours  hydrALAZINE 50 milliGRAM(s) Oral three times a day  insulin lispro (HumaLOG) corrective regimen sliding scale   SubCutaneous Before meals and at bedtime  levothyroxine 50 MICROGram(s) Oral daily  metoprolol     tartrate 25 milliGRAM(s) Oral two times a day  nitroglycerin    Patch 0.2 mG/Hr(s) 1 patch Transdermal daily  pantoprazole  Injectable 40 milliGRAM(s) IV Push every 12 hours  PPD  5 Tuberculin Unit(s) Injectable 5 Unit(s) IntraDermal once  senna 2 Tablet(s) Oral at bedtime  sevelamer hydrochloride 1600 milliGRAM(s) Oral three times a day with meals  simvastatin 20 milliGRAM(s) Oral at bedtime    MEDICATIONS  (PRN):  acetaminophen   Tablet 650 milliGRAM(s) Oral every 6 hours PRN For Temp greater than 38 C (100.4 F)  acetaminophen   Tablet. 650 milliGRAM(s) Oral every 6 hours PRN Moderate Pain (4 - 6)  aluminum hydroxide/magnesium hydroxide/simethicone Suspension 30 milliLiter(s) Oral every 4 hours PRN Dyspepsia  dextrose Gel 1 Dose(s) Oral once PRN Blood Glucose LESS THAN 70 milliGRAM(s)/deciliter  dextrose Gel 1 Dose(s) Oral once PRN Blood Glucose LESS THAN 70 milliGRAM(s)/deciliter  glucagon  Injectable 1 milliGRAM(s) IntraMuscular once PRN Glucose LESS THAN 70 milligrams/deciliter      Allergies    No Known Allergies    Intolerances        REVIEW OF SYSTEMS:  denies any fever, chills, chest pain , sob , abdominal pain or diarrhe      PHYSICAL EXAM:  GENERAL: NAD  NERVOUS SYSTEM:  Alert & Oriented X1-2, no focal neurological deficit   CHEST/LUNG: Clear to percussion bilaterally; Rt. IJ catheter in place   HEART: Regular rate and rhythm; No murmurs, rubs, or gallops  ABDOMEN: Soft, Nontender, Nondistended; Bowel sounds present  EXTREMITIES:  2+ Peripheral Pulses, No clubbing, cyanosis, or edema  SKIN: sacral decubitus ulcer , non stageable   LABS:                        7

## 2018-01-26 NOTE — PROGRESS NOTE ADULT - ASSESSMENT
52 yr old female w/ ESRD s/p PC in IR 1/25    -HD as per renal   -Care as per medical team   -Outpatient follow up with Dr Gonsalves, no further vascular intervention at this time.

## 2018-01-27 LAB
GLUCOSE BLDC GLUCOMTR-MCNC: 106 MG/DL — HIGH (ref 70–99)
GLUCOSE BLDC GLUCOMTR-MCNC: 76 MG/DL — SIGNIFICANT CHANGE UP (ref 70–99)
GLUCOSE BLDC GLUCOMTR-MCNC: 78 MG/DL — SIGNIFICANT CHANGE UP (ref 70–99)
GLUCOSE BLDC GLUCOMTR-MCNC: 93 MG/DL — SIGNIFICANT CHANGE UP (ref 70–99)

## 2018-01-27 RX ADMIN — Medication 25 MILLIGRAM(S): at 18:03

## 2018-01-27 RX ADMIN — SIMVASTATIN 20 MILLIGRAM(S): 20 TABLET, FILM COATED ORAL at 21:23

## 2018-01-27 RX ADMIN — HEPARIN SODIUM 5000 UNIT(S): 5000 INJECTION INTRAVENOUS; SUBCUTANEOUS at 18:02

## 2018-01-27 RX ADMIN — GABAPENTIN 100 MILLIGRAM(S): 400 CAPSULE ORAL at 21:22

## 2018-01-27 RX ADMIN — Medication 25 MILLIGRAM(S): at 05:47

## 2018-01-27 RX ADMIN — Medication 50 MICROGRAM(S): at 05:47

## 2018-01-27 RX ADMIN — Medication 650 MILLIGRAM(S): at 16:08

## 2018-01-27 RX ADMIN — Medication 100 MILLIGRAM(S): at 05:47

## 2018-01-27 RX ADMIN — GABAPENTIN 100 MILLIGRAM(S): 400 CAPSULE ORAL at 16:09

## 2018-01-27 RX ADMIN — GABAPENTIN 100 MILLIGRAM(S): 400 CAPSULE ORAL at 05:47

## 2018-01-27 RX ADMIN — Medication 50 MILLIGRAM(S): at 21:23

## 2018-01-27 RX ADMIN — Medication 650 MILLIGRAM(S): at 17:23

## 2018-01-27 RX ADMIN — SEVELAMER CARBONATE 1600 MILLIGRAM(S): 2400 POWDER, FOR SUSPENSION ORAL at 11:03

## 2018-01-27 RX ADMIN — Medication 50 MILLIGRAM(S): at 16:08

## 2018-01-27 RX ADMIN — Medication 1 PATCH: at 23:02

## 2018-01-27 RX ADMIN — SEVELAMER CARBONATE 1600 MILLIGRAM(S): 2400 POWDER, FOR SUSPENSION ORAL at 18:03

## 2018-01-27 RX ADMIN — CALAMINE 8% AND ZINC OXIDE 8% 1 APPLICATION(S): 160 LOTION TOPICAL at 05:48

## 2018-01-27 RX ADMIN — CINACALCET 30 MILLIGRAM(S): 30 TABLET, FILM COATED ORAL at 11:03

## 2018-01-27 RX ADMIN — Medication 1 MILLIGRAM(S): at 11:03

## 2018-01-27 RX ADMIN — PANTOPRAZOLE SODIUM 40 MILLIGRAM(S): 20 TABLET, DELAYED RELEASE ORAL at 05:48

## 2018-01-27 RX ADMIN — CALAMINE 8% AND ZINC OXIDE 8% 1 APPLICATION(S): 160 LOTION TOPICAL at 16:09

## 2018-01-27 RX ADMIN — Medication 300 MILLIGRAM(S): at 11:02

## 2018-01-27 RX ADMIN — Medication 1 PATCH: at 11:02

## 2018-01-27 RX ADMIN — CALAMINE 8% AND ZINC OXIDE 8% 1 APPLICATION(S): 160 LOTION TOPICAL at 21:23

## 2018-01-27 RX ADMIN — Medication 100 MILLIGRAM(S): at 18:02

## 2018-01-27 RX ADMIN — PANTOPRAZOLE SODIUM 40 MILLIGRAM(S): 20 TABLET, DELAYED RELEASE ORAL at 18:03

## 2018-01-27 RX ADMIN — HEPARIN SODIUM 5000 UNIT(S): 5000 INJECTION INTRAVENOUS; SUBCUTANEOUS at 05:47

## 2018-01-27 RX ADMIN — Medication 1 APPLICATION(S): at 11:03

## 2018-01-27 RX ADMIN — Medication 50 MILLIGRAM(S): at 05:47

## 2018-01-27 RX ADMIN — Medication 500 MILLIGRAM(S): at 11:02

## 2018-01-27 RX ADMIN — SEVELAMER CARBONATE 1600 MILLIGRAM(S): 2400 POWDER, FOR SUSPENSION ORAL at 08:09

## 2018-01-27 RX ADMIN — SENNA PLUS 2 TABLET(S): 8.6 TABLET ORAL at 21:22

## 2018-01-27 NOTE — PROGRESS NOTE ADULT - PROBLEM SELECTOR PLAN 4
-EGD negative for ULcer   -H/H stable   [facility] postive  will f/u GI again for possible need for colonoscopy  spoke with Dr Cuba over the phone says if h/h is STABLE colonoscopy can be done as outpatient   -Tolerating diet,   -Aspiration precaution  protonix po

## 2018-01-27 NOTE — PROGRESS NOTE ADULT - SUBJECTIVE AND OBJECTIVE BOX
Patient is a 52y old  Female who presents with a chief complaint of SOB (13 Dec 2017 16:01)  Awake, alert, comfortable in NAD    INTERVAL HPI/OVERNIGHT EVENTS:      VITAL SIGNS:  T(F): 98.7 (01-27-18 @ 04:35)  HR: 84 (01-27-18 @ 04:35)  BP: 161/60 (01-27-18 @ 04:35)  RR: 17 (01-27-18 @ 04:35)  SpO2: 96% (01-27-18 @ 04:35)  Wt(kg): --  I&O's Detail          REVIEW OF SYSTEMS:    CONSTITUTIONAL:  No fevers, chills, sweats    HEENT:  Eyes:  No diplopia or blurred vision. ENT:  No earache, sore throat or runny nose.    CARDIOVASCULAR:  No pressure, squeezing, tightness, or heaviness about the chest; no palpitations.    RESPIRATORY:  Per HPI    GASTROINTESTINAL:  No abdominal pain, nausea, vomiting or diarrhea.    GENITOURINARY:  No dysuria, frequency or urgency.    NEUROLOGIC:  No paresthesias, fasciculations, seizures or weakness.    PSYCHIATRIC:  No disorder of thought or mood.      PHYSICAL EXAM:    Constitutional: Well developed and nourished  Eyes:Perrla  ENMT: normal  Neck:supple  Respiratory: good air entry  Cardiovascular: S1 S2 regular  Gastrointestinal: Soft, Non tender  Extremities: No edema  Vascular:normal  Neurological:Awake, alert,Ox3  Musculoskeletal:Normal      MEDICATIONS  (STANDING):  ascorbic acid 500 milliGRAM(s) Oral daily  calamine Lotion 1 Application(s) Topical three times a day  cinacalcet 30 milliGRAM(s) Oral daily  collagenase Ointment 1 Application(s) Topical daily  dextrose 5%. 1000 milliLiter(s) (50 mL/Hr) IV Continuous <Continuous>  dextrose 50% Injectable 12.5 Gram(s) IV Push once  dextrose 50% Injectable 25 Gram(s) IV Push once  dextrose 50% Injectable 25 Gram(s) IV Push once  docusate sodium 100 milliGRAM(s) Oral two times a day  epoetin jacqueline Injectable 25360 Unit(s) IV Push <User Schedule>  ferrous    sulfate Liquid 300 milliGRAM(s) Enteral Tube daily  folic acid 1 milliGRAM(s) Oral daily  gabapentin 100 milliGRAM(s) Oral three times a day  heparin  Injectable 5000 Unit(s) SubCutaneous every 12 hours  hydrALAZINE 50 milliGRAM(s) Oral three times a day  insulin lispro (HumaLOG) corrective regimen sliding scale   SubCutaneous Before meals and at bedtime  levothyroxine 50 MICROGram(s) Oral daily  metoprolol     tartrate 25 milliGRAM(s) Oral two times a day  nitroglycerin    Patch 0.2 mG/Hr(s) 1 patch Transdermal daily  pantoprazole  Injectable 40 milliGRAM(s) IV Push every 12 hours  PPD  5 Tuberculin Unit(s) Injectable 5 Unit(s) IntraDermal once  senna 2 Tablet(s) Oral at bedtime  sevelamer hydrochloride 1600 milliGRAM(s) Oral three times a day with meals  simvastatin 20 milliGRAM(s) Oral at bedtime    MEDICATIONS  (PRN):  acetaminophen   Tablet 650 milliGRAM(s) Oral every 6 hours PRN For Temp greater than 38 C (100.4 F)  acetaminophen   Tablet. 650 milliGRAM(s) Oral every 6 hours PRN Moderate Pain (4 - 6)  aluminum hydroxide/magnesium hydroxide/simethicone Suspension 30 milliLiter(s) Oral every 4 hours PRN Dyspepsia  dextrose Gel 1 Dose(s) Oral once PRN Blood Glucose LESS THAN 70 milliGRAM(s)/deciliter  dextrose Gel 1 Dose(s) Oral once PRN Blood Glucose LESS THAN 70 milliGRAM(s)/deciliter  glucagon  Injectable 1 milliGRAM(s) IntraMuscular once PRN Glucose LESS THAN 70 milligrams/deciliter      Allergies    No Known Allergies    Intolerances        LABS:                        7.0    13.2  )-----------( 338      ( 26 Jan 2018 09:16 )             24.1     01-26    138  |  103  |  25<H>  ----------------------------<  66<L>  4.2   |  27  |  4.40<H>    Ca    8.1<L>      26 Jan 2018 09:22  Phos  3.1     01-26  Mg     2.5     01-26    TPro  5.5<L>  /  Alb  1.6<L>  /  TBili  0.3  /  DBili  x   /  AST  9<L>  /  ALT  <6<L>  /  AlkPhos  108  01-26              CAPILLARY BLOOD GLUCOSE      POCT Blood Glucose.: 106 mg/dL (27 Jan 2018 11:24)  POCT Blood Glucose.: 76 mg/dL (27 Jan 2018 07:49)  POCT Blood Glucose.: 93 mg/dL (26 Jan 2018 20:53)  POCT Blood Glucose.: 77 mg/dL (26 Jan 2018 16:00)        RADIOLOGY & ADDITIONAL TESTS:    CXR:    Ct scan chest:    ekg;    echo:

## 2018-01-27 NOTE — PROGRESS NOTE ADULT - SUBJECTIVE AND OBJECTIVE BOX
Patient is a 52y Female with  ESRD ON  HD    FEELS OK  AT THE TIME OF EXAM , HAS NO COMPLAINTS    No Known Allergies    Hospital Medications:   MEDICATIONS  (STANDING):  ascorbic acid 500 milliGRAM(s) Oral daily  calamine Lotion 1 Application(s) Topical three times a day  cinacalcet 30 milliGRAM(s) Oral daily  collagenase Ointment 1 Application(s) Topical daily  dextrose 5%. 1000 milliLiter(s) (50 mL/Hr) IV Continuous <Continuous>  dextrose 50% Injectable 12.5 Gram(s) IV Push once  dextrose 50% Injectable 25 Gram(s) IV Push once  dextrose 50% Injectable 25 Gram(s) IV Push once  docusate sodium 100 milliGRAM(s) Oral two times a day  epoetin jacqueline Injectable 37787 Unit(s) IV Push <User Schedule>  ferrous    sulfate Liquid 300 milliGRAM(s) Enteral Tube daily  folic acid 1 milliGRAM(s) Oral daily  gabapentin 100 milliGRAM(s) Oral three times a day  heparin  Injectable 5000 Unit(s) SubCutaneous every 12 hours  hydrALAZINE 50 milliGRAM(s) Oral three times a day  insulin lispro (HumaLOG) corrective regimen sliding scale   SubCutaneous Before meals and at bedtime  levothyroxine 50 MICROGram(s) Oral daily  metoprolol     tartrate 25 milliGRAM(s) Oral two times a day  nitroglycerin    Patch 0.2 mG/Hr(s) 1 patch Transdermal daily  pantoprazole  Injectable 40 milliGRAM(s) IV Push every 12 hours  PPD  5 Tuberculin Unit(s) Injectable 5 Unit(s) IntraDermal once  senna 2 Tablet(s) Oral at bedtime  sevelamer hydrochloride 1600 milliGRAM(s) Oral three times a day with meals  simvastatin 20 milliGRAM(s) Oral at bedtime    REVIEW OF SYSTEMS:   FEELS OK    HAS NO SOB  OR  COUGH ,  CH PAIN    NO FEVER/CHILLS, FEELS HUNGRY,  APPETITE IS GOOD,  NO N./V     NO LEG SWELLLING.    VITALS:  T(F): 98.7 (01-27-18 @ 04:35), Max: 99.4 (01-26-18 @ 14:30)  HR: 84 (01-27-18 @ 04:35)  BP: 161/60 (01-27-18 @ 04:35)  RR: 17 (01-27-18 @ 04:35)  SpO2: 96% (01-27-18 @ 04:35)  Wt(kg): --      PHYSICAL EXAM:  Constitutional: NAD  Neck: No JVD R IJ PC IN PLACE  Respiratory: CTAB, no wheezes, rales or rhonchi  Cardiovascular: S1, S2, RRR  Gastrointestinal: BS+, soft, NT/ND  Extremities: No cyanosis or clubbing. No peripheral edema  Neurological: A/O x 3,   Psychiatric: Normal mood, normal affect  :  No jarrell.     Vascular Access: RIJ PC    LABS:  01-26    138  |  103  |  25<H>  ----------------------------<  66<L>  4.2   |  27  |  4.40<H>    Ca    8.1<L>      26 Jan 2018 09:22  Phos  3.1     01-26  Mg     2.5     01-26    TPro  5.5<L>  /  Alb  1.6<L>  /  TBili  0.3  /  DBili      /  AST  9<L>  /  ALT  <6<L>  /  AlkPhos  108  01-26    Creatinine Trend: 4.40 <--, 6.02 <--, 6.05 <--, 4.79 <--                        7.0    13.2  )-----------( 338      ( 26 Jan 2018 09:16 )             24.1     Urine Studies:      RADIOLOGY & ADDITIONAL STUDIES:

## 2018-01-27 NOTE — PROGRESS NOTE ADULT - SUBJECTIVE AND OBJECTIVE BOX
CHIEF COMPLAINT:Patient is a 52y old  Female who presents with a chief complaint of SOB (13 Dec 2017 16:01)    	  REVIEW OF SYSTEMS:  CONSTITUTIONAL: No fever, weight loss, or fatigue  EYES: No eye pain, visual disturbances, or discharge  ENMT:  No difficulty hearing, tinnitus, vertigo; No sinus or throat pain  NECK: No pain or stiffness  RESPIRATORY: No cough, wheezing, chills or hemoptysis; No Shortness of Breath  CARDIOVASCULAR: No chest pain, palpitations, passing out, dizziness, or leg swelling  GASTROINTESTINAL: No abdominal or epigastric pain. No nausea, vomiting, or hematemesis; No diarrhea or constipation. No melena or hematochezia.  GENITOURINARY: No dysuria, frequency, hematuria, or incontinence  NEUROLOGICAL: No headaches, memory loss, loss of strength, numbness, or tremors  SKIN: No itching, burning, rashes, or lesions   LYMPH Nodes: No enlarged glands  ENDOCRINE: No heat or cold intolerance; No hair loss  MUSCULOSKELETAL: No joint pain or swelling; No muscle, back, or extremity pain  PSYCHIATRIC: No depression, anxiety, mood swings, or difficulty sleeping  HEME/LYMPH: No easy bruising, or bleeding gums  ALLERY AND IMMUNOLOGIC: No hives or eczema	    [ ] All others negative	  [ ] Unable to obtain    PHYSICAL EXAM:  T(C): 37 (01-27-18 @ 20:54), Max: 37.3 (01-27-18 @ 14:37)  HR: 72 (01-27-18 @ 20:54) (59 - 84)  BP: 151/57 (01-27-18 @ 20:54) (117/41 - 161/60)  RR: 16 (01-27-18 @ 20:54) (16 - 17)  SpO2: 100% (01-27-18 @ 20:54) (96% - 100%)  Wt(kg): --  I&O's Summary      Appearance: Normal	  HEENT:   Normal oral mucosa, PERRL, EOMI	  Lymphatic: No lymphadenopathy  Cardiovascular: Normal S1 S2, No JVD, No murmurs, No edema  Respiratory: Lungs clear to auscultation	  Psychiatry: A & O x 3, Mood & affect appropriate  Gastrointestinal:  Soft, Non-tender, + BS	  Skin: No rashes, No ecchymoses, No cyanosis	  Neurologic: Non-focal  Extremities: Normal range of motion, No clubbing, cyanosis or edema  Vascular: Peripheral pulses palpable 2+ bilaterally    MEDICATIONS  (STANDING):  ascorbic acid 500 milliGRAM(s) Oral daily  calamine Lotion 1 Application(s) Topical three times a day  cinacalcet 30 milliGRAM(s) Oral daily  collagenase Ointment 1 Application(s) Topical daily  dextrose 5%. 1000 milliLiter(s) (50 mL/Hr) IV Continuous <Continuous>  dextrose 50% Injectable 12.5 Gram(s) IV Push once  dextrose 50% Injectable 25 Gram(s) IV Push once  dextrose 50% Injectable 25 Gram(s) IV Push once  docusate sodium 100 milliGRAM(s) Oral two times a day  epoetin jacqueline Injectable 13766 Unit(s) IV Push <User Schedule>  ferrous    sulfate Liquid 300 milliGRAM(s) Enteral Tube daily  folic acid 1 milliGRAM(s) Oral daily  gabapentin 100 milliGRAM(s) Oral three times a day  heparin  Injectable 5000 Unit(s) SubCutaneous every 12 hours  hydrALAZINE 50 milliGRAM(s) Oral three times a day  insulin lispro (HumaLOG) corrective regimen sliding scale   SubCutaneous Before meals and at bedtime  levothyroxine 50 MICROGram(s) Oral daily  metoprolol     tartrate 25 milliGRAM(s) Oral two times a day  nitroglycerin    Patch 0.2 mG/Hr(s) 1 patch Transdermal daily  pantoprazole  Injectable 40 milliGRAM(s) IV Push every 12 hours  PPD  5 Tuberculin Unit(s) Injectable 5 Unit(s) IntraDermal once  senna 2 Tablet(s) Oral at bedtime  sevelamer hydrochloride 1600 milliGRAM(s) Oral three times a day with meals  simvastatin 20 milliGRAM(s) Oral at bedtime      TELEMETRY: 	    ECG:  	  RADIOLOGY:  OTHER: 	  	  CBC Full  -  ( 26 Jan 2018 09:16 )  WBC Count : 13.2 K/uL  Hemoglobin : 7.0 g/dL  Hematocrit : 24.1 %  Platelet Count - Automated : 338 K/uL  Mean Cell Volume : 99.8 fl  Mean Cell Hemoglobin : 29.2 pg  Mean Cell Hemoglobin Concentration : 29.2 gm/dL  Auto Neutrophil # : 8.4 K/uL  Auto Lymphocyte # : 2.9 K/uL  Auto Monocyte # : 1.5 K/uL  Auto Eosinophil # : 0.2 K/uL  Auto Basophil # : 0.2 K/uL  Auto Neutrophil % : 64.1 %  Auto Lymphocyte % : 22.2 %  Auto Monocyte % : 11.0 %  Auto Eosinophil % : 1.4 %  Auto Basophil % : 1.3 %        CARDIAC MARKERS:                              7.0    13.2  )-----------( 338      ( 26 Jan 2018 09:16 )             24.1       01-26    138  |  103  |  25<H>  ----------------------------<  66<L>  4.2   |  27  |  4.40<H>    Ca    8.1<L>      26 Jan 2018 09:22  Phos  3.1     01-26  Mg     2.5     01-26    TPro  5.5<L>  /  Alb  1.6<L>  /  TBili  0.3  /  DBili  x   /  AST  9<L>  /  ALT  <6<L>  /  AlkPhos  108  01-26            proBNP:   Lipid Profile:   HgA1c:   TSH:

## 2018-01-27 NOTE — PROGRESS NOTE ADULT - ASSESSMENT
A/P   ESRD ON  HD ,   DIALYSIS AS PER HER SCHEDULE    TREY CHENG,  HAS PC IN  PLACE   HAS NO VOL OVERLOAD  HB LOW  ON  [facility]ITA WITH HD    ON  SENSIPAR  CORRECTED CA IS AROUND 10.2   ARRANGEMENTS FOR OUTPT HD

## 2018-01-27 NOTE — PROGRESS NOTE ADULT - ATTENDING COMMENTS
Patient is seen and examined. Case reviewed with the medical team. Above note is appreciated. Will follow up clinically. Continue DVT prophylaxis. Case discussed with Nephrology and ID. Will continue Hemodialysis and follow up WBC. Overall prognosis is very poor despite any medical treatment and the family is fully aware. discharge planning for julieta friedman.

## 2018-01-28 LAB
GLUCOSE BLDC GLUCOMTR-MCNC: 82 MG/DL — SIGNIFICANT CHANGE UP (ref 70–99)
GLUCOSE BLDC GLUCOMTR-MCNC: 89 MG/DL — SIGNIFICANT CHANGE UP (ref 70–99)
GLUCOSE BLDC GLUCOMTR-MCNC: 97 MG/DL — SIGNIFICANT CHANGE UP (ref 70–99)
GLUCOSE BLDC GLUCOMTR-MCNC: 98 MG/DL — SIGNIFICANT CHANGE UP (ref 70–99)

## 2018-01-28 RX ADMIN — Medication 50 MILLIGRAM(S): at 21:49

## 2018-01-28 RX ADMIN — SIMVASTATIN 20 MILLIGRAM(S): 20 TABLET, FILM COATED ORAL at 21:49

## 2018-01-28 RX ADMIN — Medication 25 MILLIGRAM(S): at 05:42

## 2018-01-28 RX ADMIN — PANTOPRAZOLE SODIUM 40 MILLIGRAM(S): 20 TABLET, DELAYED RELEASE ORAL at 17:28

## 2018-01-28 RX ADMIN — CINACALCET 30 MILLIGRAM(S): 30 TABLET, FILM COATED ORAL at 13:10

## 2018-01-28 RX ADMIN — Medication 300 MILLIGRAM(S): at 13:11

## 2018-01-28 RX ADMIN — SEVELAMER CARBONATE 1600 MILLIGRAM(S): 2400 POWDER, FOR SUSPENSION ORAL at 13:10

## 2018-01-28 RX ADMIN — SENNA PLUS 2 TABLET(S): 8.6 TABLET ORAL at 21:49

## 2018-01-28 RX ADMIN — Medication 50 MILLIGRAM(S): at 13:20

## 2018-01-28 RX ADMIN — HEPARIN SODIUM 5000 UNIT(S): 5000 INJECTION INTRAVENOUS; SUBCUTANEOUS at 17:28

## 2018-01-28 RX ADMIN — CALAMINE 8% AND ZINC OXIDE 8% 1 APPLICATION(S): 160 LOTION TOPICAL at 05:43

## 2018-01-28 RX ADMIN — GABAPENTIN 100 MILLIGRAM(S): 400 CAPSULE ORAL at 13:10

## 2018-01-28 RX ADMIN — Medication 1 PATCH: at 13:10

## 2018-01-28 RX ADMIN — Medication 1 APPLICATION(S): at 13:22

## 2018-01-28 RX ADMIN — Medication 1 MILLIGRAM(S): at 13:09

## 2018-01-28 RX ADMIN — HEPARIN SODIUM 5000 UNIT(S): 5000 INJECTION INTRAVENOUS; SUBCUTANEOUS at 05:43

## 2018-01-28 RX ADMIN — Medication 100 MILLIGRAM(S): at 05:43

## 2018-01-28 RX ADMIN — Medication 500 MILLIGRAM(S): at 13:11

## 2018-01-28 RX ADMIN — Medication 50 MICROGRAM(S): at 05:42

## 2018-01-28 RX ADMIN — Medication 100 MILLIGRAM(S): at 17:29

## 2018-01-28 RX ADMIN — CALAMINE 8% AND ZINC OXIDE 8% 1 APPLICATION(S): 160 LOTION TOPICAL at 21:49

## 2018-01-28 RX ADMIN — GABAPENTIN 100 MILLIGRAM(S): 400 CAPSULE ORAL at 05:42

## 2018-01-28 RX ADMIN — SEVELAMER CARBONATE 1600 MILLIGRAM(S): 2400 POWDER, FOR SUSPENSION ORAL at 08:30

## 2018-01-28 RX ADMIN — PANTOPRAZOLE SODIUM 40 MILLIGRAM(S): 20 TABLET, DELAYED RELEASE ORAL at 05:43

## 2018-01-28 RX ADMIN — SEVELAMER CARBONATE 1600 MILLIGRAM(S): 2400 POWDER, FOR SUSPENSION ORAL at 17:27

## 2018-01-28 RX ADMIN — GABAPENTIN 100 MILLIGRAM(S): 400 CAPSULE ORAL at 21:49

## 2018-01-28 RX ADMIN — Medication 50 MILLIGRAM(S): at 05:42

## 2018-01-28 NOTE — PROGRESS NOTE ADULT - SUBJECTIVE AND OBJECTIVE BOX
Patient is a 52y Female with  ESRD ON  HD ,  WAS DAILYZED  ON  FRID   FEELS  OK, HAS NO COMPLAINTS AT THE TIME OF EXAM       No Known Allergies    Hospital Medications:   MEDICATIONS  (STANDING):  ascorbic acid 500 milliGRAM(s) Oral daily  calamine Lotion 1 Application(s) Topical three times a day  cinacalcet 30 milliGRAM(s) Oral daily  collagenase Ointment 1 Application(s) Topical daily  dextrose 5%. 1000 milliLiter(s) (50 mL/Hr) IV Continuous <Continuous>  dextrose 50% Injectable 12.5 Gram(s) IV Push once  dextrose 50% Injectable 25 Gram(s) IV Push once  dextrose 50% Injectable 25 Gram(s) IV Push once  docusate sodium 100 milliGRAM(s) Oral two times a day  epoetin jacqueline Injectable 22925 Unit(s) IV Push <User Schedule>  ferrous    sulfate Liquid 300 milliGRAM(s) Enteral Tube daily  folic acid 1 milliGRAM(s) Oral daily  gabapentin 100 milliGRAM(s) Oral three times a day  heparin  Injectable 5000 Unit(s) SubCutaneous every 12 hours  hydrALAZINE 50 milliGRAM(s) Oral three times a day  insulin lispro (HumaLOG) corrective regimen sliding scale   SubCutaneous Before meals and at bedtime  levothyroxine 50 MICROGram(s) Oral daily  metoprolol     tartrate 25 milliGRAM(s) Oral two times a day  nitroglycerin    Patch 0.2 mG/Hr(s) 1 patch Transdermal daily  pantoprazole  Injectable 40 milliGRAM(s) IV Push every 12 hours  PPD  5 Tuberculin Unit(s) Injectable 5 Unit(s) IntraDermal once  senna 2 Tablet(s) Oral at bedtime  sevelamer hydrochloride 1600 milliGRAM(s) Oral three times a day with meals  simvastatin 20 milliGRAM(s) Oral at bedtime    REVIEW OF SYSTEMS:    HAS NO FEVER / CHILLS   NO SOB,  COUGH     APPETITE IS OK , NO N/V  NO LEG SWELLING  VITALS:  T(F): 98.9 (01-28-18 @ 05:30), Max: 99.1 (01-27-18 @ 14:37)  HR: 81 (01-28-18 @ 05:30)  BP: 157/59 (01-28-18 @ 05:30)  RR: 18 (01-28-18 @ 05:30)  SpO2: 95% (01-28-18 @ 05:30)  Wt(kg): --      PHYSICAL EXAM:  Constitutional: NAD  Neck: No JVD, R IJ PC IN PLACE  Respiratory: CTAB, no wheezes, rales or rhonchi  Cardiovascular: S1, S2, RRR  Gastrointestinal: BS+, soft, NT/ND  Extremities:  No peripheral edema  Neurological: A/O x 3,   Psychiatric: Normal mood, normal affect  : . No jarrell.     Vascular Access: R IJ PC IN PLACE    LABS:        Creatinine Trend: 4.40 <--, 6.02 <--, 6.05 <--    Urine Studies:      RADIOLOGY & ADDITIONAL STUDIES:

## 2018-01-28 NOTE — PROGRESS NOTE ADULT - ASSESSMENT
A/P   ESRD ON  HD  FOR DIALYSIS ON MON      HAS TREY SIMONS OUT    BP  IS OK   HAS NO VOL OVERLOAD,   ON MARITA  WITH HD    ARRANGEMENTS FOR OUTPT HD

## 2018-01-28 NOTE — PROGRESS NOTE ADULT - SUBJECTIVE AND OBJECTIVE BOX
CHIEF COMPLAINT:Patient is a 52y old  Female who presents with a chief complaint of SOB (13 Dec 2017 16:01)    	  REVIEW OF SYSTEMS:  CONSTITUTIONAL: No fever, weight loss, or fatigue  EYES: No eye pain, visual disturbances, or discharge  ENMT:  No difficulty hearing, tinnitus, vertigo; No sinus or throat pain  NECK: No pain or stiffness  RESPIRATORY: No cough, wheezing, chills or hemoptysis; No Shortness of Breath  CARDIOVASCULAR: No chest pain, palpitations, passing out, dizziness, or leg swelling  GASTROINTESTINAL: No abdominal or epigastric pain. No nausea, vomiting, or hematemesis; No diarrhea or constipation. No melena or hematochezia.  GENITOURINARY: No dysuria, frequency, hematuria, or incontinence  NEUROLOGICAL: No headaches, memory loss, loss of strength, numbness, or tremors  SKIN: No itching, burning, rashes, or lesions   LYMPH Nodes: No enlarged glands  ENDOCRINE: No heat or cold intolerance; No hair loss  MUSCULOSKELETAL: No joint pain or swelling; No muscle, back, or extremity pain  PSYCHIATRIC: No depression, anxiety, mood swings, or difficulty sleeping  HEME/LYMPH: No easy bruising, or bleeding gums  ALLERY AND IMMUNOLOGIC: No hives or eczema	    [ ] All others negative	  [ ] Unable to obtain    PHYSICAL EXAM:  T(C): 37.2 (01-28-18 @ 20:35), Max: 37.2 (01-28-18 @ 05:30)  HR: 79 (01-28-18 @ 20:35) (77 - 83)  BP: 110/56 (01-28-18 @ 20:35) (110/56 - 157/59)  RR: 16 (01-28-18 @ 20:35) (16 - 18)  SpO2: 96% (01-28-18 @ 20:35) (95% - 97%)  Wt(kg): --  I&O's Summary      Appearance: Normal	  HEENT:   Normal oral mucosa, PERRL, EOMI	  Lymphatic: No lymphadenopathy  Cardiovascular: Normal S1 S2, No JVD, No murmurs, No edema  Respiratory: Lungs clear to auscultation	  Psychiatry: A & O x 3, Mood & affect appropriate  Gastrointestinal:  Soft, Non-tender, + BS	  Skin: No rashes, No ecchymoses, No cyanosis	  Neurologic: Non-focal  Extremities: Normal range of motion, No clubbing, cyanosis or edema  Vascular: Peripheral pulses palpable 2+ bilaterally    MEDICATIONS  (STANDING):  ascorbic acid 500 milliGRAM(s) Oral daily  calamine Lotion 1 Application(s) Topical three times a day  cinacalcet 30 milliGRAM(s) Oral daily  collagenase Ointment 1 Application(s) Topical daily  dextrose 5%. 1000 milliLiter(s) (50 mL/Hr) IV Continuous <Continuous>  dextrose 50% Injectable 12.5 Gram(s) IV Push once  dextrose 50% Injectable 25 Gram(s) IV Push once  dextrose 50% Injectable 25 Gram(s) IV Push once  docusate sodium 100 milliGRAM(s) Oral two times a day  epoetin jacqueline Injectable 45697 Unit(s) IV Push <User Schedule>  ferrous    sulfate Liquid 300 milliGRAM(s) Enteral Tube daily  folic acid 1 milliGRAM(s) Oral daily  gabapentin 100 milliGRAM(s) Oral three times a day  heparin  Injectable 5000 Unit(s) SubCutaneous every 12 hours  hydrALAZINE 50 milliGRAM(s) Oral three times a day  insulin lispro (HumaLOG) corrective regimen sliding scale   SubCutaneous Before meals and at bedtime  levothyroxine 50 MICROGram(s) Oral daily  metoprolol     tartrate 25 milliGRAM(s) Oral two times a day  nitroglycerin    Patch 0.2 mG/Hr(s) 1 patch Transdermal daily  pantoprazole  Injectable 40 milliGRAM(s) IV Push every 12 hours  PPD  5 Tuberculin Unit(s) Injectable 5 Unit(s) IntraDermal once  senna 2 Tablet(s) Oral at bedtime  sevelamer hydrochloride 1600 milliGRAM(s) Oral three times a day with meals  simvastatin 20 milliGRAM(s) Oral at bedtime      TELEMETRY: 	    ECG:  	  RADIOLOGY:  OTHER: 	  	        CARDIAC MARKERS:                          proBNP:   Lipid Profile:   HgA1c:   TSH:

## 2018-01-28 NOTE — PROGRESS NOTE ADULT - SUBJECTIVE AND OBJECTIVE BOX
Patient is a 52y old  Female who presents with a chief complaint of SOB (13 Dec 2017 16:01)  Awake, alert, comfortable in NAD    INTERVAL HPI/OVERNIGHT EVENTS:      VITAL SIGNS:  T(F): 98.9 (01-28-18 @ 05:30)  HR: 81 (01-28-18 @ 05:30)  BP: 157/59 (01-28-18 @ 05:30)  RR: 18 (01-28-18 @ 05:30)  SpO2: 95% (01-28-18 @ 05:30)  Wt(kg): --  I&O's Detail          REVIEW OF SYSTEMS:    CONSTITUTIONAL:  No fevers, chills, sweats    HEENT:  Eyes:  No diplopia or blurred vision. ENT:  No earache, sore throat or runny nose.    CARDIOVASCULAR:  No pressure, squeezing, tightness, or heaviness about the chest; no palpitations.    RESPIRATORY:  Per HPI    GASTROINTESTINAL:  No abdominal pain, nausea, vomiting or diarrhea.    GENITOURINARY:  No dysuria, frequency or urgency.    NEUROLOGIC:  No paresthesias, fasciculations, seizures or weakness.    PSYCHIATRIC:  No disorder of thought or mood.      PHYSICAL EXAM:    Constitutional: Well developed and nourished  Eyes:Perrla  ENMT: normal  Neck:supple  Respiratory: good air entry  Cardiovascular: S1 S2 regular  Gastrointestinal: Soft, Non tender  Extremities: No edema  Vascular:normal  Neurological:Awake, alert,Ox3  Musculoskeletal:decubitus ulcer in sacrum      MEDICATIONS  (STANDING):  ascorbic acid 500 milliGRAM(s) Oral daily  calamine Lotion 1 Application(s) Topical three times a day  cinacalcet 30 milliGRAM(s) Oral daily  collagenase Ointment 1 Application(s) Topical daily  dextrose 5%. 1000 milliLiter(s) (50 mL/Hr) IV Continuous <Continuous>  dextrose 50% Injectable 12.5 Gram(s) IV Push once  dextrose 50% Injectable 25 Gram(s) IV Push once  dextrose 50% Injectable 25 Gram(s) IV Push once  docusate sodium 100 milliGRAM(s) Oral two times a day  epoetin jacqueline Injectable 24611 Unit(s) IV Push <User Schedule>  ferrous    sulfate Liquid 300 milliGRAM(s) Enteral Tube daily  folic acid 1 milliGRAM(s) Oral daily  gabapentin 100 milliGRAM(s) Oral three times a day  heparin  Injectable 5000 Unit(s) SubCutaneous every 12 hours  hydrALAZINE 50 milliGRAM(s) Oral three times a day  insulin lispro (HumaLOG) corrective regimen sliding scale   SubCutaneous Before meals and at bedtime  levothyroxine 50 MICROGram(s) Oral daily  metoprolol     tartrate 25 milliGRAM(s) Oral two times a day  nitroglycerin    Patch 0.2 mG/Hr(s) 1 patch Transdermal daily  pantoprazole  Injectable 40 milliGRAM(s) IV Push every 12 hours  PPD  5 Tuberculin Unit(s) Injectable 5 Unit(s) IntraDermal once  senna 2 Tablet(s) Oral at bedtime  sevelamer hydrochloride 1600 milliGRAM(s) Oral three times a day with meals  simvastatin 20 milliGRAM(s) Oral at bedtime    MEDICATIONS  (PRN):  acetaminophen   Tablet 650 milliGRAM(s) Oral every 6 hours PRN For Temp greater than 38 C (100.4 F)  acetaminophen   Tablet. 650 milliGRAM(s) Oral every 6 hours PRN Moderate Pain (4 - 6)  aluminum hydroxide/magnesium hydroxide/simethicone Suspension 30 milliLiter(s) Oral every 4 hours PRN Dyspepsia  dextrose Gel 1 Dose(s) Oral once PRN Blood Glucose LESS THAN 70 milliGRAM(s)/deciliter  dextrose Gel 1 Dose(s) Oral once PRN Blood Glucose LESS THAN 70 milliGRAM(s)/deciliter  glucagon  Injectable 1 milliGRAM(s) IntraMuscular once PRN Glucose LESS THAN 70 milligrams/deciliter      Allergies    No Known Allergies    Intolerances        LABS:                    CAPILLARY BLOOD GLUCOSE      POCT Blood Glucose.: 82 mg/dL (28 Jan 2018 08:14)  POCT Blood Glucose.: 78 mg/dL (27 Jan 2018 21:06)  POCT Blood Glucose.: 93 mg/dL (27 Jan 2018 16:07)  POCT Blood Glucose.: 106 mg/dL (27 Jan 2018 11:24)        RADIOLOGY & ADDITIONAL TESTS:    CXR:    Ct scan chest:    ekg;    echo:

## 2018-01-28 NOTE — PROGRESS NOTE ADULT - PROBLEM SELECTOR PLAN 4
-EGD negative for ULcer   -H/H stable   [facility] postive  will f/u GI again for possible need for colonoscopy  spoke with Dr Cuba over the phone says if h/h is STABLE colonoscopy can be done as outpatient   -Tolerating diet,   -Aspiration precaution  protonix po

## 2018-01-29 VITALS
SYSTOLIC BLOOD PRESSURE: 150 MMHG | HEART RATE: 77 BPM | OXYGEN SATURATION: 100 % | DIASTOLIC BLOOD PRESSURE: 88 MMHG | RESPIRATION RATE: 18 BRPM | TEMPERATURE: 99 F

## 2018-01-29 LAB
ALBUMIN SERPL ELPH-MCNC: 1.8 G/DL — LOW (ref 3.5–5)
ALP SERPL-CCNC: 119 U/L — SIGNIFICANT CHANGE UP (ref 40–120)
ALT FLD-CCNC: <6 U/L DA — LOW (ref 10–60)
ANION GAP SERPL CALC-SCNC: 11 MMOL/L — SIGNIFICANT CHANGE UP (ref 5–17)
AST SERPL-CCNC: 8 U/L — LOW (ref 10–40)
BASOPHILS # BLD AUTO: 0.1 K/UL — SIGNIFICANT CHANGE UP (ref 0–0.2)
BASOPHILS NFR BLD AUTO: 0.8 % — SIGNIFICANT CHANGE UP (ref 0–2)
BILIRUB SERPL-MCNC: 0.3 MG/DL — SIGNIFICANT CHANGE UP (ref 0.2–1.2)
BUN SERPL-MCNC: 23 MG/DL — HIGH (ref 7–18)
CALCIUM SERPL-MCNC: 8 MG/DL — LOW (ref 8.4–10.5)
CHLORIDE SERPL-SCNC: 99 MMOL/L — SIGNIFICANT CHANGE UP (ref 96–108)
CO2 SERPL-SCNC: 26 MMOL/L — SIGNIFICANT CHANGE UP (ref 22–31)
CREAT SERPL-MCNC: 4.67 MG/DL — HIGH (ref 0.5–1.3)
EOSINOPHIL # BLD AUTO: 0.2 K/UL — SIGNIFICANT CHANGE UP (ref 0–0.5)
EOSINOPHIL NFR BLD AUTO: 1.2 % — SIGNIFICANT CHANGE UP (ref 0–6)
GLUCOSE BLDC GLUCOMTR-MCNC: 102 MG/DL — HIGH (ref 70–99)
GLUCOSE BLDC GLUCOMTR-MCNC: 78 MG/DL — SIGNIFICANT CHANGE UP (ref 70–99)
GLUCOSE BLDC GLUCOMTR-MCNC: 91 MG/DL — SIGNIFICANT CHANGE UP (ref 70–99)
GLUCOSE SERPL-MCNC: 83 MG/DL — SIGNIFICANT CHANGE UP (ref 70–99)
HCT VFR BLD CALC: 30.4 % — LOW (ref 34.5–45)
HGB BLD-MCNC: 8.3 G/DL — LOW (ref 11.5–15.5)
LYMPHOCYTES # BLD AUTO: 1.9 K/UL — SIGNIFICANT CHANGE UP (ref 1–3.3)
LYMPHOCYTES # BLD AUTO: 12.8 % — LOW (ref 13–44)
MAGNESIUM SERPL-MCNC: 2.5 MG/DL — SIGNIFICANT CHANGE UP (ref 1.6–2.6)
MCHC RBC-ENTMCNC: 26.4 PG — LOW (ref 27–34)
MCHC RBC-ENTMCNC: 27.3 GM/DL — LOW (ref 32–36)
MCV RBC AUTO: 97 FL — SIGNIFICANT CHANGE UP (ref 80–100)
MONOCYTES # BLD AUTO: 1.1 K/UL — HIGH (ref 0–0.9)
MONOCYTES NFR BLD AUTO: 7.7 % — SIGNIFICANT CHANGE UP (ref 2–14)
NEUTROPHILS # BLD AUTO: 11.5 K/UL — HIGH (ref 1.8–7.4)
NEUTROPHILS NFR BLD AUTO: 77.5 % — HIGH (ref 43–77)
PHOSPHATE SERPL-MCNC: 3.5 MG/DL — SIGNIFICANT CHANGE UP (ref 2.5–4.5)
PLATELET # BLD AUTO: 427 K/UL — HIGH (ref 150–400)
POTASSIUM SERPL-MCNC: 4.2 MMOL/L — SIGNIFICANT CHANGE UP (ref 3.5–5.3)
POTASSIUM SERPL-SCNC: 4.2 MMOL/L — SIGNIFICANT CHANGE UP (ref 3.5–5.3)
PROT SERPL-MCNC: 5.8 G/DL — LOW (ref 6–8.3)
RBC # BLD: 3.13 M/UL — LOW (ref 3.8–5.2)
RBC # FLD: 19.8 % — HIGH (ref 10.3–14.5)
SODIUM SERPL-SCNC: 136 MMOL/L — SIGNIFICANT CHANGE UP (ref 135–145)
WBC # BLD: 14.8 K/UL — HIGH (ref 3.8–10.5)
WBC # FLD AUTO: 14.8 K/UL — HIGH (ref 3.8–10.5)

## 2018-01-29 RX ADMIN — Medication 1 MILLIGRAM(S): at 12:53

## 2018-01-29 RX ADMIN — Medication 100 MILLIGRAM(S): at 05:50

## 2018-01-29 RX ADMIN — SEVELAMER CARBONATE 1600 MILLIGRAM(S): 2400 POWDER, FOR SUSPENSION ORAL at 12:51

## 2018-01-29 RX ADMIN — CALAMINE 8% AND ZINC OXIDE 8% 1 APPLICATION(S): 160 LOTION TOPICAL at 05:51

## 2018-01-29 RX ADMIN — Medication 25 MILLIGRAM(S): at 17:51

## 2018-01-29 RX ADMIN — Medication 50 MICROGRAM(S): at 05:51

## 2018-01-29 RX ADMIN — Medication 50 MILLIGRAM(S): at 05:50

## 2018-01-29 RX ADMIN — CINACALCET 30 MILLIGRAM(S): 30 TABLET, FILM COATED ORAL at 12:53

## 2018-01-29 RX ADMIN — HEPARIN SODIUM 5000 UNIT(S): 5000 INJECTION INTRAVENOUS; SUBCUTANEOUS at 17:51

## 2018-01-29 RX ADMIN — Medication 1 PATCH: at 12:51

## 2018-01-29 RX ADMIN — SEVELAMER CARBONATE 1600 MILLIGRAM(S): 2400 POWDER, FOR SUSPENSION ORAL at 12:29

## 2018-01-29 RX ADMIN — Medication 25 MILLIGRAM(S): at 05:50

## 2018-01-29 RX ADMIN — HEPARIN SODIUM 5000 UNIT(S): 5000 INJECTION INTRAVENOUS; SUBCUTANEOUS at 05:50

## 2018-01-29 RX ADMIN — GABAPENTIN 100 MILLIGRAM(S): 400 CAPSULE ORAL at 05:50

## 2018-01-29 RX ADMIN — PANTOPRAZOLE SODIUM 40 MILLIGRAM(S): 20 TABLET, DELAYED RELEASE ORAL at 17:51

## 2018-01-29 RX ADMIN — Medication 500 MILLIGRAM(S): at 12:51

## 2018-01-29 RX ADMIN — Medication 1 PATCH: at 00:28

## 2018-01-29 RX ADMIN — PANTOPRAZOLE SODIUM 40 MILLIGRAM(S): 20 TABLET, DELAYED RELEASE ORAL at 05:50

## 2018-01-29 RX ADMIN — ERYTHROPOIETIN 14000 UNIT(S): 10000 INJECTION, SOLUTION INTRAVENOUS; SUBCUTANEOUS at 15:41

## 2018-01-29 RX ADMIN — SEVELAMER CARBONATE 1600 MILLIGRAM(S): 2400 POWDER, FOR SUSPENSION ORAL at 17:50

## 2018-01-29 RX ADMIN — Medication 300 MILLIGRAM(S): at 12:53

## 2018-01-29 RX ADMIN — Medication 1 APPLICATION(S): at 12:57

## 2018-01-29 NOTE — PROGRESS NOTE ADULT - PROBLEM/PLAN-5
DISPLAY PLAN FREE TEXT

## 2018-01-29 NOTE — PROGRESS NOTE ADULT - ASSESSMENT
52 yr old female with  ESRD.  Has a permacath.  seen on HD stable.  epo on hd  d/c planning to rehab today post HD

## 2018-01-29 NOTE — PROGRESS NOTE ADULT - PROBLEM SELECTOR PROBLEM 1
Acute respiratory failure
ESRD (end stage renal disease) on dialysis
Pneumonia
Pulmonary HTN
Acute respiratory failure
Acute respiratory failure with hypoxia
CKD (chronic kidney disease)
ESRD (end stage renal disease) on dialysis
Pneumonia
Pulmonary HTN
Acute respiratory failure
Acute respiratory failure with hypoxia
Acute respiratory failure
Acute respiratory failure with hypoxia
Pulmonary HTN
ESRD (end stage renal disease) on dialysis
Acute respiratory failure with hypoxia
ESRD (end stage renal disease) on dialysis
Pulmonary HTN
ESRD (end stage renal disease) on dialysis
ESRD (end stage renal disease) on dialysis
Acute respiratory failure
Acute respiratory failure
ESRD (end stage renal disease) on dialysis

## 2018-01-29 NOTE — PROGRESS NOTE ADULT - SUBJECTIVE AND OBJECTIVE BOX
SEEN ON HD STABLE.    No Known Allergies    Hospital Medications:   MEDICATIONS  (STANDING):  ascorbic acid 500 milliGRAM(s) Oral daily  calamine Lotion 1 Application(s) Topical three times a day  cinacalcet 30 milliGRAM(s) Oral daily  collagenase Ointment 1 Application(s) Topical daily  dextrose 5%. 1000 milliLiter(s) (50 mL/Hr) IV Continuous <Continuous>  dextrose 50% Injectable 12.5 Gram(s) IV Push once  dextrose 50% Injectable 25 Gram(s) IV Push once  dextrose 50% Injectable 25 Gram(s) IV Push once  docusate sodium 100 milliGRAM(s) Oral two times a day  epoetin jacqueline Injectable 50721 Unit(s) IV Push <User Schedule>  ferrous    sulfate Liquid 300 milliGRAM(s) Enteral Tube daily  folic acid 1 milliGRAM(s) Oral daily  gabapentin 100 milliGRAM(s) Oral three times a day  heparin  Injectable 5000 Unit(s) SubCutaneous every 12 hours  hydrALAZINE 50 milliGRAM(s) Oral three times a day  insulin lispro (HumaLOG) corrective regimen sliding scale   SubCutaneous Before meals and at bedtime  levothyroxine 50 MICROGram(s) Oral daily  metoprolol     tartrate 25 milliGRAM(s) Oral two times a day  nitroglycerin    Patch 0.2 mG/Hr(s) 1 patch Transdermal daily  pantoprazole  Injectable 40 milliGRAM(s) IV Push every 12 hours  PPD  5 Tuberculin Unit(s) Injectable 5 Unit(s) IntraDermal once  senna 2 Tablet(s) Oral at bedtime  sevelamer hydrochloride 1600 milliGRAM(s) Oral three times a day with meals  simvastatin 20 milliGRAM(s) Oral at bedtime        VITALS:  T(F): 98.3 (01-29-18 @ 05:15), Max: 98.9 (01-28-18 @ 20:35)  HR: 80 (01-29-18 @ 05:15)  BP: 147/57 (01-29-18 @ 05:15)  RR: 17 (01-29-18 @ 05:15)  SpO2: 98% (01-29-18 @ 05:15)  Wt(kg): --    01-28 @ 07:01  -  01-29 @ 07:00  --------------------------------------------------------  IN: 0 mL / OUT: 0 mL / NET: 0 mL        PHYSICAL EXAM:  Constitutional: NAD  HEENT: anicteric sclera, oropharynx clear.  Neck: No JVD  Respiratory: CTAB, no wheezes, rales or rhonchi  Cardiovascular: S1, S2, RRR  Gastrointestinal: BS+, soft, NT/ND  Extremities:. No peripheral edema  Neurological: A/O x 3, no focal deficits  Vascular Access: IJ PERMACATH.    LABS:  01-29    136  |  99  |  23<H>  ----------------------------<  83  4.2   |  26  |  x     Ca    8.0<L>      29 Jan 2018 13:53  Phos  3.5     01-29  Mg     2.5     01-29    TPro      /  Alb  1.8<L>  /  TBili      /  DBili      /  AST      /  ALT      /  AlkPhos      01-29    Creatinine Trend: 4.40 <--, 6.02 <--                        8.3    14.8  )-----------( 427      ( 29 Jan 2018 13:53 )             30.4     Urine Studies:      RADIOLOGY & ADDITIONAL STUDIES:

## 2018-01-29 NOTE — PROGRESS NOTE ADULT - PROBLEM/PLAN-1
DISPLAY PLAN FREE TEXT

## 2018-01-29 NOTE — PROGRESS NOTE ADULT - PROVIDER SPECIALTY LIST ADULT
Critical Care
Gastroenterology
Infectious Disease
Internal Medicine
MICU
Nephrology
Pulmonology
Surgery
Vascular Surgery
Critical Care
Critical Care
Nephrology
Internal Medicine
Internal Medicine
Nephrology
Vascular Surgery
Internal Medicine
Vascular Surgery
Internal Medicine
Pulmonology
Pulmonology
Internal Medicine
Pulmonology
Pulmonology
Internal Medicine
Internal Medicine
Pulmonology
Internal Medicine
Internal Medicine
Pulmonology
Critical Care
Critical Care
Internal Medicine
Pulmonology

## 2018-01-29 NOTE — PROGRESS NOTE ADULT - SUBJECTIVE AND OBJECTIVE BOX
Patient is a 52y old  Female who presents with a chief complaint of SOB (13 Dec 2017 16:01)  Awake, alert, comfortable in bed in NAD. Awaiting DC today after HD    INTERVAL HPI/OVERNIGHT EVENTS:      VITAL SIGNS:  T(F): 98.3 (01-29-18 @ 05:15)  HR: 80 (01-29-18 @ 05:15)  BP: 147/57 (01-29-18 @ 05:15)  RR: 17 (01-29-18 @ 05:15)  SpO2: 98% (01-29-18 @ 05:15)  Wt(kg): --  I&O's Detail    28 Jan 2018 07:01  -  29 Jan 2018 07:00  --------------------------------------------------------  IN:  Total IN: 0 mL    OUT:  Total OUT: 0 mL    Total NET: 0 mL              REVIEW OF SYSTEMS:    CONSTITUTIONAL:  No fevers, chills, sweats    HEENT:  Eyes:  No diplopia or blurred vision. ENT:  No earache, sore throat or runny nose.    CARDIOVASCULAR:  No pressure, squeezing, tightness, or heaviness about the chest; no palpitations.    RESPIRATORY:  Per HPI    GASTROINTESTINAL:  No abdominal pain, nausea, vomiting or diarrhea.    GENITOURINARY:  No dysuria, frequency or urgency.    NEUROLOGIC:  No paresthesias, fasciculations, seizures or weakness.    PSYCHIATRIC:  No disorder of thought or mood.      PHYSICAL EXAM:    Constitutional: Well developed and nourished  Eyes:Perrla  ENMT: normal  Neck:supple  Respiratory: good air entry  Cardiovascular: S1 S2 regular  Gastrointestinal: Soft, Non tender  Extremities: No edema  Vascular:normal  Neurological:Awake, alert,Ox3  Musculoskeletal:Sacral decubitus ulcer      MEDICATIONS  (STANDING):  ascorbic acid 500 milliGRAM(s) Oral daily  calamine Lotion 1 Application(s) Topical three times a day  cinacalcet 30 milliGRAM(s) Oral daily  collagenase Ointment 1 Application(s) Topical daily  dextrose 5%. 1000 milliLiter(s) (50 mL/Hr) IV Continuous <Continuous>  dextrose 50% Injectable 12.5 Gram(s) IV Push once  dextrose 50% Injectable 25 Gram(s) IV Push once  dextrose 50% Injectable 25 Gram(s) IV Push once  docusate sodium 100 milliGRAM(s) Oral two times a day  epoetin jacqueline Injectable 78161 Unit(s) IV Push <User Schedule>  ferrous    sulfate Liquid 300 milliGRAM(s) Enteral Tube daily  folic acid 1 milliGRAM(s) Oral daily  gabapentin 100 milliGRAM(s) Oral three times a day  heparin  Injectable 5000 Unit(s) SubCutaneous every 12 hours  hydrALAZINE 50 milliGRAM(s) Oral three times a day  insulin lispro (HumaLOG) corrective regimen sliding scale   SubCutaneous Before meals and at bedtime  levothyroxine 50 MICROGram(s) Oral daily  metoprolol     tartrate 25 milliGRAM(s) Oral two times a day  nitroglycerin    Patch 0.2 mG/Hr(s) 1 patch Transdermal daily  pantoprazole  Injectable 40 milliGRAM(s) IV Push every 12 hours  PPD  5 Tuberculin Unit(s) Injectable 5 Unit(s) IntraDermal once  senna 2 Tablet(s) Oral at bedtime  sevelamer hydrochloride 1600 milliGRAM(s) Oral three times a day with meals  simvastatin 20 milliGRAM(s) Oral at bedtime    MEDICATIONS  (PRN):  acetaminophen   Tablet 650 milliGRAM(s) Oral every 6 hours PRN For Temp greater than 38 C (100.4 F)  acetaminophen   Tablet. 650 milliGRAM(s) Oral every 6 hours PRN Moderate Pain (4 - 6)  aluminum hydroxide/magnesium hydroxide/simethicone Suspension 30 milliLiter(s) Oral every 4 hours PRN Dyspepsia  dextrose Gel 1 Dose(s) Oral once PRN Blood Glucose LESS THAN 70 milliGRAM(s)/deciliter  dextrose Gel 1 Dose(s) Oral once PRN Blood Glucose LESS THAN 70 milliGRAM(s)/deciliter  glucagon  Injectable 1 milliGRAM(s) IntraMuscular once PRN Glucose LESS THAN 70 milligrams/deciliter      Allergies    No Known Allergies    Intolerances        LABS:                    CAPILLARY BLOOD GLUCOSE      POCT Blood Glucose.: 78 mg/dL (29 Jan 2018 07:38)  POCT Blood Glucose.: 97 mg/dL (28 Jan 2018 21:04)  POCT Blood Glucose.: 89 mg/dL (28 Jan 2018 16:04)  POCT Blood Glucose.: 98 mg/dL (28 Jan 2018 11:25)        RADIOLOGY & ADDITIONAL TESTS:    CXR:    Ct scan chest:    ekg;    echo:

## 2018-01-31 ENCOUNTER — APPOINTMENT (OUTPATIENT)
Dept: OPHTHALMOLOGY | Facility: CLINIC | Age: 53
End: 2018-01-31

## 2018-03-07 ENCOUNTER — INPATIENT (INPATIENT)
Facility: HOSPITAL | Age: 53
LOS: 12 days | Discharge: NURSING, REHAB OR LONG-TERM CARE | DRG: 853 | End: 2018-03-20
Attending: FAMILY MEDICINE | Admitting: FAMILY MEDICINE
Payer: MEDICAID

## 2018-03-07 ENCOUNTER — TRANSCRIPTION ENCOUNTER (OUTPATIENT)
Age: 53
End: 2018-03-07

## 2018-03-07 VITALS
OXYGEN SATURATION: 98 % | HEART RATE: 85 BPM | WEIGHT: 130.07 LBS | SYSTOLIC BLOOD PRESSURE: 134 MMHG | DIASTOLIC BLOOD PRESSURE: 73 MMHG | RESPIRATION RATE: 18 BRPM | HEIGHT: 65 IN | TEMPERATURE: 101 F

## 2018-03-07 DIAGNOSIS — Z29.9 ENCOUNTER FOR PROPHYLACTIC MEASURES, UNSPECIFIED: ICD-10-CM

## 2018-03-07 DIAGNOSIS — N18.6 END STAGE RENAL DISEASE: ICD-10-CM

## 2018-03-07 DIAGNOSIS — I10 ESSENTIAL (PRIMARY) HYPERTENSION: ICD-10-CM

## 2018-03-07 DIAGNOSIS — A41.9 SEPSIS, UNSPECIFIED ORGANISM: ICD-10-CM

## 2018-03-07 DIAGNOSIS — I50.9 HEART FAILURE, UNSPECIFIED: ICD-10-CM

## 2018-03-07 DIAGNOSIS — E11.9 TYPE 2 DIABETES MELLITUS WITHOUT COMPLICATIONS: ICD-10-CM

## 2018-03-07 DIAGNOSIS — R65.10 SYSTEMIC INFLAMMATORY RESPONSE SYNDROME (SIRS) OF NON-INFECTIOUS ORIGIN WITHOUT ACUTE ORGAN DYSFUNCTION: ICD-10-CM

## 2018-03-07 DIAGNOSIS — E03.9 HYPOTHYROIDISM, UNSPECIFIED: ICD-10-CM

## 2018-03-07 DIAGNOSIS — D64.9 ANEMIA, UNSPECIFIED: ICD-10-CM

## 2018-03-07 LAB
ALBUMIN SERPL ELPH-MCNC: 2 G/DL — LOW (ref 3.5–5)
ALP SERPL-CCNC: 256 U/L — HIGH (ref 40–120)
ALT FLD-CCNC: 7 U/L DA — LOW (ref 10–60)
ANION GAP SERPL CALC-SCNC: 12 MMOL/L — SIGNIFICANT CHANGE UP (ref 5–17)
APPEARANCE UR: ABNORMAL
AST SERPL-CCNC: 13 U/L — SIGNIFICANT CHANGE UP (ref 10–40)
BASOPHILS # BLD AUTO: 0.1 K/UL — SIGNIFICANT CHANGE UP (ref 0–0.2)
BASOPHILS NFR BLD AUTO: 0.3 % — SIGNIFICANT CHANGE UP (ref 0–2)
BILIRUB SERPL-MCNC: 0.2 MG/DL — SIGNIFICANT CHANGE UP (ref 0.2–1.2)
BILIRUB UR-MCNC: NEGATIVE — SIGNIFICANT CHANGE UP
BUN SERPL-MCNC: 65 MG/DL — HIGH (ref 7–18)
CALCIUM SERPL-MCNC: 10.2 MG/DL — SIGNIFICANT CHANGE UP (ref 8.4–10.5)
CHLORIDE SERPL-SCNC: 94 MMOL/L — LOW (ref 96–108)
CO2 SERPL-SCNC: 27 MMOL/L — SIGNIFICANT CHANGE UP (ref 22–31)
COLOR SPEC: YELLOW — SIGNIFICANT CHANGE UP
CREAT SERPL-MCNC: 4.8 MG/DL — HIGH (ref 0.5–1.3)
DIFF PNL FLD: ABNORMAL
EOSINOPHIL # BLD AUTO: 0 K/UL — SIGNIFICANT CHANGE UP (ref 0–0.5)
EOSINOPHIL NFR BLD AUTO: 0.1 % — SIGNIFICANT CHANGE UP (ref 0–6)
GLUCOSE BLDC GLUCOMTR-MCNC: 118 MG/DL — HIGH (ref 70–99)
GLUCOSE BLDC GLUCOMTR-MCNC: 141 MG/DL — HIGH (ref 70–99)
GLUCOSE SERPL-MCNC: 228 MG/DL — HIGH (ref 70–99)
GLUCOSE UR QL: 50 MG/DL
HBA1C BLD-MCNC: 5.3 % — SIGNIFICANT CHANGE UP (ref 4–5.6)
KETONES UR-MCNC: NEGATIVE — SIGNIFICANT CHANGE UP
LACTATE SERPL-SCNC: 1 MMOL/L — SIGNIFICANT CHANGE UP (ref 0.7–2)
LACTATE SERPL-SCNC: 2 MMOL/L — SIGNIFICANT CHANGE UP (ref 0.7–2)
LEUKOCYTE ESTERASE UR-ACNC: ABNORMAL
LYMPHOCYTES # BLD AUTO: 2.7 K/UL — SIGNIFICANT CHANGE UP (ref 1–3.3)
LYMPHOCYTES # BLD AUTO: 5.5 % — LOW (ref 13–44)
MONOCYTES # BLD AUTO: 1.6 K/UL — HIGH (ref 0–0.9)
MONOCYTES NFR BLD AUTO: 3.3 % — SIGNIFICANT CHANGE UP (ref 2–14)
NEUTROPHILS # BLD AUTO: 43.6 K/UL — HIGH (ref 1.8–7.4)
NEUTROPHILS NFR BLD AUTO: 90.8 % — HIGH (ref 43–77)
NITRITE UR-MCNC: NEGATIVE — SIGNIFICANT CHANGE UP
PH UR: 8 — SIGNIFICANT CHANGE UP (ref 5–8)
POTASSIUM SERPL-MCNC: 3.7 MMOL/L — SIGNIFICANT CHANGE UP (ref 3.5–5.3)
POTASSIUM SERPL-SCNC: 3.7 MMOL/L — SIGNIFICANT CHANGE UP (ref 3.5–5.3)
PROT SERPL-MCNC: 8.3 G/DL — SIGNIFICANT CHANGE UP (ref 6–8.3)
PROT UR-MCNC: 500 MG/DL
RAPID RVP RESULT: SIGNIFICANT CHANGE UP
SODIUM SERPL-SCNC: 133 MMOL/L — LOW (ref 135–145)
SP GR SPEC: 1.01 — SIGNIFICANT CHANGE UP (ref 1.01–1.02)
UROBILINOGEN FLD QL: NEGATIVE — SIGNIFICANT CHANGE UP

## 2018-03-07 PROCEDURE — 99285 EMERGENCY DEPT VISIT HI MDM: CPT | Mod: 25

## 2018-03-07 PROCEDURE — 93010 ELECTROCARDIOGRAM REPORT: CPT

## 2018-03-07 PROCEDURE — 71045 X-RAY EXAM CHEST 1 VIEW: CPT | Mod: 26

## 2018-03-07 RX ORDER — INSULIN LISPRO 100/ML
VIAL (ML) SUBCUTANEOUS
Qty: 0 | Refills: 0 | Status: DISCONTINUED | OUTPATIENT
Start: 2018-03-07 | End: 2018-03-08

## 2018-03-07 RX ORDER — FOLIC ACID 0.8 MG
1 TABLET ORAL DAILY
Qty: 0 | Refills: 0 | Status: DISCONTINUED | OUTPATIENT
Start: 2018-03-07 | End: 2018-03-08

## 2018-03-07 RX ORDER — ACETAMINOPHEN 500 MG
650 TABLET ORAL ONCE
Qty: 0 | Refills: 0 | Status: COMPLETED | OUTPATIENT
Start: 2018-03-07 | End: 2018-03-07

## 2018-03-07 RX ORDER — DOCUSATE SODIUM 100 MG
100 CAPSULE ORAL
Qty: 0 | Refills: 0 | Status: DISCONTINUED | OUTPATIENT
Start: 2018-03-07 | End: 2018-03-08

## 2018-03-07 RX ORDER — FERROUS SULFATE 325(65) MG
300 TABLET ORAL DAILY
Qty: 0 | Refills: 0 | Status: DISCONTINUED | OUTPATIENT
Start: 2018-03-07 | End: 2018-03-07

## 2018-03-07 RX ORDER — TRAMADOL HYDROCHLORIDE 50 MG/1
25 TABLET ORAL ONCE
Qty: 0 | Refills: 0 | Status: DISCONTINUED | OUTPATIENT
Start: 2018-03-07 | End: 2018-03-07

## 2018-03-07 RX ORDER — CEFTRIAXONE 500 MG/1
1 INJECTION, POWDER, FOR SOLUTION INTRAMUSCULAR; INTRAVENOUS ONCE
Qty: 0 | Refills: 0 | Status: COMPLETED | OUTPATIENT
Start: 2018-03-07 | End: 2018-03-07

## 2018-03-07 RX ORDER — ACETAMINOPHEN 500 MG
650 TABLET ORAL EVERY 6 HOURS
Qty: 0 | Refills: 0 | Status: DISCONTINUED | OUTPATIENT
Start: 2018-03-07 | End: 2018-03-08

## 2018-03-07 RX ORDER — METOPROLOL TARTRATE 50 MG
25 TABLET ORAL
Qty: 0 | Refills: 0 | Status: DISCONTINUED | OUTPATIENT
Start: 2018-03-07 | End: 2018-03-08

## 2018-03-07 RX ORDER — PIPERACILLIN AND TAZOBACTAM 4; .5 G/20ML; G/20ML
3.38 INJECTION, POWDER, LYOPHILIZED, FOR SOLUTION INTRAVENOUS EVERY 12 HOURS
Qty: 0 | Refills: 0 | Status: DISCONTINUED | OUTPATIENT
Start: 2018-03-07 | End: 2018-03-07

## 2018-03-07 RX ORDER — HYDRALAZINE HCL 50 MG
50 TABLET ORAL THREE TIMES A DAY
Qty: 0 | Refills: 0 | Status: DISCONTINUED | OUTPATIENT
Start: 2018-03-07 | End: 2018-03-07

## 2018-03-07 RX ORDER — VANCOMYCIN HCL 1 G
1000 VIAL (EA) INTRAVENOUS
Qty: 0 | Refills: 0 | Status: DISCONTINUED | OUTPATIENT
Start: 2018-03-07 | End: 2018-03-08

## 2018-03-07 RX ORDER — HEPARIN SODIUM 5000 [USP'U]/ML
5000 INJECTION INTRAVENOUS; SUBCUTANEOUS EVERY 12 HOURS
Qty: 0 | Refills: 0 | Status: DISCONTINUED | OUTPATIENT
Start: 2018-03-07 | End: 2018-03-08

## 2018-03-07 RX ORDER — SODIUM CHLORIDE 9 MG/ML
1750 INJECTION INTRAMUSCULAR; INTRAVENOUS; SUBCUTANEOUS ONCE
Qty: 0 | Refills: 0 | Status: COMPLETED | OUTPATIENT
Start: 2018-03-07 | End: 2018-03-07

## 2018-03-07 RX ORDER — COLLAGENASE CLOSTRIDIUM HIST. 250 UNIT/G
1 OINTMENT (GRAM) TOPICAL DAILY
Qty: 0 | Refills: 0 | Status: DISCONTINUED | OUTPATIENT
Start: 2018-03-07 | End: 2018-03-08

## 2018-03-07 RX ORDER — CINACALCET 30 MG/1
30 TABLET, FILM COATED ORAL DAILY
Qty: 0 | Refills: 0 | Status: DISCONTINUED | OUTPATIENT
Start: 2018-03-07 | End: 2018-03-08

## 2018-03-07 RX ORDER — FERROUS SULFATE 325(65) MG
325 TABLET ORAL DAILY
Qty: 0 | Refills: 0 | Status: DISCONTINUED | OUTPATIENT
Start: 2018-03-07 | End: 2018-03-08

## 2018-03-07 RX ORDER — LEVOTHYROXINE SODIUM 125 MCG
50 TABLET ORAL DAILY
Qty: 0 | Refills: 0 | Status: DISCONTINUED | OUTPATIENT
Start: 2018-03-07 | End: 2018-03-08

## 2018-03-07 RX ORDER — PANTOPRAZOLE SODIUM 20 MG/1
40 TABLET, DELAYED RELEASE ORAL
Qty: 0 | Refills: 0 | Status: DISCONTINUED | OUTPATIENT
Start: 2018-03-07 | End: 2018-03-08

## 2018-03-07 RX ORDER — SIMVASTATIN 20 MG/1
20 TABLET, FILM COATED ORAL AT BEDTIME
Qty: 0 | Refills: 0 | Status: DISCONTINUED | OUTPATIENT
Start: 2018-03-07 | End: 2018-03-08

## 2018-03-07 RX ORDER — GABAPENTIN 400 MG/1
100 CAPSULE ORAL THREE TIMES A DAY
Qty: 0 | Refills: 0 | Status: DISCONTINUED | OUTPATIENT
Start: 2018-03-07 | End: 2018-03-08

## 2018-03-07 RX ORDER — MEROPENEM 1 G/30ML
500 INJECTION INTRAVENOUS ONCE
Qty: 0 | Refills: 0 | Status: COMPLETED | OUTPATIENT
Start: 2018-03-07 | End: 2018-03-07

## 2018-03-07 RX ORDER — HYDRALAZINE HCL 50 MG
50 TABLET ORAL THREE TIMES A DAY
Qty: 0 | Refills: 0 | Status: DISCONTINUED | OUTPATIENT
Start: 2018-03-07 | End: 2018-03-08

## 2018-03-07 RX ORDER — MEROPENEM 1 G/30ML
500 INJECTION INTRAVENOUS EVERY 24 HOURS
Qty: 0 | Refills: 0 | Status: DISCONTINUED | OUTPATIENT
Start: 2018-03-08 | End: 2018-03-08

## 2018-03-07 RX ORDER — MEROPENEM 1 G/30ML
INJECTION INTRAVENOUS
Qty: 0 | Refills: 0 | Status: DISCONTINUED | OUTPATIENT
Start: 2018-03-07 | End: 2018-03-08

## 2018-03-07 RX ORDER — ASCORBIC ACID 60 MG
500 TABLET,CHEWABLE ORAL DAILY
Qty: 0 | Refills: 0 | Status: DISCONTINUED | OUTPATIENT
Start: 2018-03-07 | End: 2018-03-08

## 2018-03-07 RX ORDER — SODIUM CHLORIDE 9 MG/ML
3 INJECTION INTRAMUSCULAR; INTRAVENOUS; SUBCUTANEOUS ONCE
Qty: 0 | Refills: 0 | Status: COMPLETED | OUTPATIENT
Start: 2018-03-07 | End: 2018-03-07

## 2018-03-07 RX ORDER — SEVELAMER CARBONATE 2400 MG/1
1600 POWDER, FOR SUSPENSION ORAL
Qty: 0 | Refills: 0 | Status: DISCONTINUED | OUTPATIENT
Start: 2018-03-07 | End: 2018-03-08

## 2018-03-07 RX ADMIN — Medication 650 MILLIGRAM(S): at 01:41

## 2018-03-07 RX ADMIN — Medication 1 TABLET(S): at 15:32

## 2018-03-07 RX ADMIN — Medication 650 MILLIGRAM(S): at 15:37

## 2018-03-07 RX ADMIN — Medication 325 MILLIGRAM(S): at 15:33

## 2018-03-07 RX ADMIN — SODIUM CHLORIDE 1750 MILLILITER(S): 9 INJECTION INTRAMUSCULAR; INTRAVENOUS; SUBCUTANEOUS at 01:41

## 2018-03-07 RX ADMIN — Medication 50 MILLIGRAM(S): at 21:45

## 2018-03-07 RX ADMIN — GABAPENTIN 100 MILLIGRAM(S): 400 CAPSULE ORAL at 21:45

## 2018-03-07 RX ADMIN — CINACALCET 30 MILLIGRAM(S): 30 TABLET, FILM COATED ORAL at 15:32

## 2018-03-07 RX ADMIN — SIMVASTATIN 20 MILLIGRAM(S): 20 TABLET, FILM COATED ORAL at 21:45

## 2018-03-07 RX ADMIN — TRAMADOL HYDROCHLORIDE 25 MILLIGRAM(S): 50 TABLET ORAL at 21:45

## 2018-03-07 RX ADMIN — Medication 1 MILLIGRAM(S): at 15:31

## 2018-03-07 RX ADMIN — CEFTRIAXONE 100 GRAM(S): 500 INJECTION, POWDER, FOR SOLUTION INTRAMUSCULAR; INTRAVENOUS at 02:27

## 2018-03-07 RX ADMIN — MEROPENEM 100 MILLIGRAM(S): 1 INJECTION INTRAVENOUS at 15:34

## 2018-03-07 RX ADMIN — TRAMADOL HYDROCHLORIDE 25 MILLIGRAM(S): 50 TABLET ORAL at 22:45

## 2018-03-07 RX ADMIN — SODIUM CHLORIDE 3 MILLILITER(S): 9 INJECTION INTRAMUSCULAR; INTRAVENOUS; SUBCUTANEOUS at 01:21

## 2018-03-07 RX ADMIN — GABAPENTIN 100 MILLIGRAM(S): 400 CAPSULE ORAL at 15:33

## 2018-03-07 RX ADMIN — Medication 50 MILLIGRAM(S): at 15:32

## 2018-03-07 RX ADMIN — Medication 1 APPLICATION(S): at 15:34

## 2018-03-07 RX ADMIN — Medication 250 MILLIGRAM(S): at 18:38

## 2018-03-07 RX ADMIN — Medication 500 MILLIGRAM(S): at 15:33

## 2018-03-07 NOTE — DISCHARGE NOTE ADULT - SECONDARY DIAGNOSIS.
HTN (hypertension) Hypothyroidism ESRD (end stage renal disease) CHF (congestive heart failure) Anemia

## 2018-03-07 NOTE — DISCHARGE NOTE ADULT - PLAN OF CARE
Your blood pressure was well-controlled during your admission. Continue with your current regimen of anti-hypertensive medications as mentioned in your discharge summary and ensure that you follow up with your primary care provider within 1 month of discharge for further recommendations and monitoring Your TSH was found to be [] during your admission. This means that you are [hyperthyroid] [hypothyroid] [euthyroid]. Continue with your current dose of synthroid and follow up with your outpatient primary care provider within 1 month of discharge for additional recommendations with regards to medications and repeating your serum TSH level. Continue with your hemodialysis session in addition to supplements as prescribed. Continue with your current medication regimen as described in your discharge summary and follow up with your outpatient primary care provider within 2 weeks of discharge for additional recommendations with regards to medications and consistent monitoring. Continue with your current antimicrobial therapy until completion You were admitted for sepsis, likely secondary to your sacral wound with abscess.  We started you on a regimen of intravenous antibiotics and general surgery performed incision and drainage of your abscess on 3/8.  Please continue with your antibiotic therapy until completion and ensure to follow up with your primary care provider within 1 week of discharge. BP < 140/90 mm hg Your blood pressure was well-controlled during your admission. Continue with your current regimen of anti-hypertensive medications as mentioned in your discharge summary and ensure that you follow up with your primary care provider within 1 week of discharge for further recommendations and monitoring Your TSH was found to be 0.52 during your admission.  Continue with your current dose of synthroid and follow up with your outpatient primary care provider within 1 month of discharge for additional recommendations with regards to medications and repeating your serum TSH level. Continue with your hemodialysis session in addition to supplements as prescribed. Dr. Coronado was consulted for graft placement , but since she was septic , procedure to be done outpatient . Please follow up with him for the same. Anemia in CKD . Please continue with procrit and epogen as per recommendations of Nephrology. You were admitted for sepsis, likely secondary to your sacral wound with abscess.  We started you on a regimen of intravenous antibiotics and general surgery performed incision and drainage of your abscess on 3/8 , with revisions on 3/10 , 3/19.  Please continue with 1250 vancomycin intra dialysis for 2 more weeks.  Please continue with your antibiotic therapy until completion and ensure to follow up with your primary care provider within 1 week of discharge.

## 2018-03-07 NOTE — H&P ADULT - ASSESSMENT
52 y/o F from [place] mostly bedbound  w/ long term care 2/2 difficulty walking possibly 2/2 polyneuropathy  w/ PMH  of anemia, HF pEF, ESRD on HD, Clostridium difficile infection, DM, Diabetic neuropathy, HTN, HLD, Hypothyroidism, OM of jaw, and Parathyroid adenoma, GERD  was sent from NH due to leukocytosis and fever. Pt was found to have leukocytosis 48K.  In ED pt was found to have low-grade fever 100.9. Possible sources of infection UA and sacral ulcer. Pt denies cough, nausea, CP, SOB, N/V, abdominal pain or any other complaint. Pt poor historian.

## 2018-03-07 NOTE — ED PROVIDER NOTE - OBJECTIVE STATEMENT
54 y/o F pt w/ PMHx of DM, HTN, CHF, CKD, and Hypothyroid, bought in from [facility] sent from [facility] for r/o of sepsis. Pt was found to have a high white count in assisted living facility. In ED, pt only c/o low-grade fever x today. Pt denies cough, nausea, urinary sx, or any other complaints. NKDA.

## 2018-03-07 NOTE — H&P ADULT - NSHPPHYSICALEXAM_GEN_ALL_CORE
Gen: NAD  HEENT: moist mucosa, PERRLA  Neck: supple neck, no JVD  CVS: RRR, no RMG  Lungs: CTAx2, no rales, no wheezing, no rhonchi, right perm cath in place  Abd: soft, non tender, no distension, no rigidity, + BS  : no suprapubic tenderness  Ext: no edema, no cyanosis   Skin; sacral decubitus ulcer, R hip bed sore  Neuro: AAOX3, no focal deficits

## 2018-03-07 NOTE — DISCHARGE NOTE ADULT - CARE PLAN
Principal Discharge DX:	Sepsis  Secondary Diagnosis:	HTN (hypertension)  Assessment and plan of treatment:	Your blood pressure was well-controlled during your admission. Continue with your current regimen of anti-hypertensive medications as mentioned in your discharge summary and ensure that you follow up with your primary care provider within 1 month of discharge for further recommendations and monitoring  Secondary Diagnosis:	Hypothyroidism  Assessment and plan of treatment:	Your TSH was found to be [] during your admission. This means that you are [hyperthyroid] [hypothyroid] [euthyroid]. Continue with your current dose of synthroid and follow up with your outpatient primary care provider within 1 month of discharge for additional recommendations with regards to medications and repeating your serum TSH level.  Secondary Diagnosis:	ESRD (end stage renal disease)  Assessment and plan of treatment:	Continue with your hemodialysis session in addition to supplements as prescribed.  Secondary Diagnosis:	CHF (congestive heart failure)  Assessment and plan of treatment:	Continue with your current medication regimen as described in your discharge summary and follow up with your outpatient primary care provider within 2 weeks of discharge for additional recommendations with regards to medications and consistent monitoring. Principal Discharge DX:	Sepsis  Goal:	Continue with your current antimicrobial therapy until completion  Assessment and plan of treatment:	You were admitted for sepsis, likely secondary to your sacral wound with abscess.  We started you on a regimen of intravenous antibiotics and general surgery performed incision and drainage of your abscess on 3/8.  Please continue with your antibiotic therapy until completion and ensure to follow up with your primary care provider within 1 week of discharge.  Secondary Diagnosis:	HTN (hypertension)  Assessment and plan of treatment:	Your blood pressure was well-controlled during your admission. Continue with your current regimen of anti-hypertensive medications as mentioned in your discharge summary and ensure that you follow up with your primary care provider within 1 month of discharge for further recommendations and monitoring  Secondary Diagnosis:	Hypothyroidism  Assessment and plan of treatment:	Your TSH was found to be [] during your admission. This means that you are [hyperthyroid] [hypothyroid] [euthyroid]. Continue with your current dose of synthroid and follow up with your outpatient primary care provider within 1 month of discharge for additional recommendations with regards to medications and repeating your serum TSH level.  Secondary Diagnosis:	ESRD (end stage renal disease)  Assessment and plan of treatment:	Continue with your hemodialysis session in addition to supplements as prescribed.  Secondary Diagnosis:	CHF (congestive heart failure)  Assessment and plan of treatment:	Continue with your current medication regimen as described in your discharge summary and follow up with your outpatient primary care provider within 2 weeks of discharge for additional recommendations with regards to medications and consistent monitoring. Principal Discharge DX:	Sepsis  Goal:	Continue with your current antimicrobial therapy until completion  Assessment and plan of treatment:	You were admitted for sepsis, likely secondary to your sacral wound with abscess.  We started you on a regimen of intravenous antibiotics and general surgery performed incision and drainage of your abscess on 3/8.  Please continue with your antibiotic therapy until completion and ensure to follow up with your primary care provider within 1 week of discharge.  Secondary Diagnosis:	HTN (hypertension)  Goal:	BP < 140/90 mm hg  Assessment and plan of treatment:	Your blood pressure was well-controlled during your admission. Continue with your current regimen of anti-hypertensive medications as mentioned in your discharge summary and ensure that you follow up with your primary care provider within 1 week of discharge for further recommendations and monitoring  Secondary Diagnosis:	Hypothyroidism  Assessment and plan of treatment:	Your TSH was found to be 0.52 during your admission.  Continue with your current dose of synthroid and follow up with your outpatient primary care provider within 1 month of discharge for additional recommendations with regards to medications and repeating your serum TSH level.  Secondary Diagnosis:	ESRD (end stage renal disease)  Assessment and plan of treatment:	Continue with your hemodialysis session in addition to supplements as prescribed. Dr. Coronado was consulted for graft placement , but since she was septic , procedure to be done outpatient . Please follow up with him for the same.  Secondary Diagnosis:	CHF (congestive heart failure)  Assessment and plan of treatment:	Continue with your current medication regimen as described in your discharge summary and follow up with your outpatient primary care provider within 2 weeks of discharge for additional recommendations with regards to medications and consistent monitoring.  Secondary Diagnosis:	Anemia  Assessment and plan of treatment:	Anemia in CKD . Please continue with procrit and epogen as per recommendations of Nephrology. Principal Discharge DX:	Sepsis  Goal:	Continue with your current antimicrobial therapy until completion  Assessment and plan of treatment:	You were admitted for sepsis, likely secondary to your sacral wound with abscess.  We started you on a regimen of intravenous antibiotics and general surgery performed incision and drainage of your abscess on 3/8 , with revisions on 3/10 , 3/19.  Please continue with 1250 vancomycin intra dialysis for 2 more weeks.  Please continue with your antibiotic therapy until completion and ensure to follow up with your primary care provider within 1 week of discharge.  Secondary Diagnosis:	HTN (hypertension)  Goal:	BP < 140/90 mm hg  Assessment and plan of treatment:	Your blood pressure was well-controlled during your admission. Continue with your current regimen of anti-hypertensive medications as mentioned in your discharge summary and ensure that you follow up with your primary care provider within 1 week of discharge for further recommendations and monitoring  Secondary Diagnosis:	Hypothyroidism  Assessment and plan of treatment:	Your TSH was found to be 0.52 during your admission.  Continue with your current dose of synthroid and follow up with your outpatient primary care provider within 1 month of discharge for additional recommendations with regards to medications and repeating your serum TSH level.  Secondary Diagnosis:	ESRD (end stage renal disease)  Assessment and plan of treatment:	Continue with your hemodialysis session in addition to supplements as prescribed. Dr. Coronado was consulted for graft placement , but since she was septic , procedure to be done outpatient . Please follow up with him for the same.  Secondary Diagnosis:	CHF (congestive heart failure)  Assessment and plan of treatment:	Continue with your current medication regimen as described in your discharge summary and follow up with your outpatient primary care provider within 2 weeks of discharge for additional recommendations with regards to medications and consistent monitoring.  Secondary Diagnosis:	Anemia  Assessment and plan of treatment:	Anemia in CKD . Please continue with procrit and epogen as per recommendations of Nephrology.

## 2018-03-07 NOTE — H&P ADULT - HISTORY OF PRESENT ILLNESS
52 y/o F from [place] mostly bedbound  w/ long term care 2/2 difficulty walking possibly 2/2 polyneuropathy  w/ PMH  of anemia, HF pEF, ESRD on HD, Clostridium difficile infection, DM, Diabetic neuropathy, HTN, HLD, Hypothyroidism, OM of jaw, and Parathyroid adenoma, GERD  was sent from NH due to leukocytosis and fever. Pt was found to have leukocytosis 48K.  In ED pt was found to have low-grade fever 100.9. Possible sources of infection UA and sacral ulcer.  Pt denies cough, nausea, CP, SOB, N/V, abdominal pain or any other complaint. Pt poor historian.

## 2018-03-07 NOTE — H&P ADULT - ATTENDING COMMENTS
Patient seen and examined. Case fully discussed with  ER attending and medical resident. Reviewed chief complaint. Reviewed review of systems. Reviewed list of medications.  Reviewed physical exam.  Reviewed assessment and plan. agree with full H and P. Will follow up clinically. family fully aware of patients condition. Patient also had complained to family about her poor vision. Patient with multiple eye surgeries at [facility] due to history of Diabetes. Will also get vascular consult to permanent access placement for hemodialysis. Overall prognosis is very poor despite any medical intervention and the family is aware.

## 2018-03-07 NOTE — ED ADULT NURSE NOTE - OBJECTIVE STATEMENT
Patient worked up for sepsis; presents as A&Ox2. Patient has some spots on the bottom of her feet and came from nursing home with ankle huggers on. Patient has a unstageable sacral ulcer and a stage II ulcer on her upper left back.

## 2018-03-07 NOTE — ED PROVIDER NOTE - PROGRESS NOTE DETAILS
pt with large sacral decubti ulcer - possible source of infection. pt with large sacral decubti ulcer - possible source of infection.  UA also turbid with moderate LE. CUltures taken prior to abx.  pt given ceftriaxone in ED.  Will admit.

## 2018-03-07 NOTE — DISCHARGE NOTE ADULT - THE PATIENT HAS
no difficulties difficulty concentrating/difficulty decision making/difficulty remembering/no difficulties

## 2018-03-07 NOTE — H&P ADULT - PROBLEM SELECTOR PLAN 1
54 yo F w/ multiple comorbidities  p/w to leukocytosis 48k and fever 100.9  sources of infection are UA and sacral decubitus ulcer  Pt not hypotensive or ill appearing  wound care consult   will start Merrem for now   f/u Bcx and Ucx  ID: Dr. Kaplan 54 yo F w/ multiple comorbidities  p/w to leukocytosis 48k and fever 100.9  sources of infection are UA and sacral decubitus ulcer   Pt not hypotensive or ill appearing  wound care consult   will start Merrem and Vancomycin  Pt has MRSA on sacral decub as per dialysis center in [place]   f/u Bcx and Ucx  ID: Dr. Kaplan 54 yo F w/ multiple comorbidities  p/w to leukocytosis 48k and fever 100.9  sources of infection are UA and sacral decubitus ulcer   Pt not hypotensive or ill appearing  wound care consult   will start Merrem and Vancomycin  Pt has MRSA on sacral decub as per [facility]   f/u Bcx and Ucx  ID: Dr. Kaplan

## 2018-03-07 NOTE — DISCHARGE NOTE ADULT - HOSPITAL COURSE
53F admitted for sepsis, potentially secondary to UTI vs. infected sacral ulcer    PMHx HFpEF, ESRD on HD, DM, HTN, HLD, Hypothyroidism, Parathyroid adenoma, GERD    Background – sent from nursing home for evaluation of leukocytosis to 48K and fever. Patient was poor historian and unable to provide additional history     ED – vitals significant for temp 100.9  Labs significant for WBC 48K, Hgb 11, BUN/Cr 65/4.8, Albumin 2.0  UA positive  CXR devoid of consolidations; RVP negative   S/p ceftriaxone, zosyn    On the floors, patient was continued on regimen of vancomycin and meropenem    Given patient's improved clinical status and current hemodynamic stability, decision was made to discharge.  Please refer to patient's complete medical chart with documents for a full hospital course, for this is only a brief summary. 53F admitted for sepsis, potentially secondary to UTI vs. infected sacral ulcer    PMHx HFpEF, ESRD on HD, DM, HTN, HLD, Hypothyroidism, Parathyroid adenoma, GERD    Background – sent from nursing home for evaluation of leukocytosis to 48K and fever. Patient was poor historian and unable to provide additional history     ED – vitals significant for temp 100.9  Labs significant for WBC 48K, Hgb 11, BUN/Cr 65/4.8, Albumin 2.0  UA positive  CXR devoid of consolidations; RVP negative   S/p ceftriaxone, zosyn    On the floors, patient was continued on regimen of vancomycin and meropenem.  General surgery was consulted because of patient's sacral ulceration with abscess She underwent incision and drainage on 3/8      Given patient's improved clinical status and current hemodynamic stability, decision was made to discharge.  Please refer to patient's complete medical chart with documents for a full hospital course, for this is only a brief summary. 53F admitted for sepsis, potentially secondary to UTI vs. infected sacral ulcer    PMHx HFpEF, ESRD on HD, DM, HTN, HLD, Hypothyroidism, Parathyroid adenoma, GERD    Background – sent from nursing home for evaluation of leukocytosis to 48K and fever. Patient was poor historian and unable to provide additional history     ED – vitals significant for temp 100.9  Labs significant for WBC 48K, Hgb 11, BUN/Cr 65/4.8, Albumin 2.0  UA positive  CXR devoid of consolidations; RVP negative   S/p ceftriaxone, zosyn    On the floors, patient was continued on regimen of vancomycin and meropenem.  General surgery was consulted because of patient's sacral ulceration with abscess She underwent incision and drainage on 3/8.  Id was consulted . Based on results from wound cultures which were positive for MRSA , meropenem was discontinued . After sensitivities came back , vancomycin switched to bactrim ds for another 7 days to complete the 2 week course. Dressings were changed on daily basis. Leucocytosis and fever improved.     Patient's renal function was monitored in admission , and dialysis continued by [facility]. Dr. Coronado was consulted for graft placement , but since she was septic , procedure to be done outpatient . Dialysis services re-instated.  patient was continued on home medications for htn and heart failure ( metoprolol , hydralazine ) , hypothyroidism both of which were found to be well in control in this admission.  Diabetes was well controlled . a1c : 5.3.    Patient found       Given patient's improved clinical status and current hemodynamic stability, decision was made to discharge.  Please refer to patient's complete medical chart with documents for a full hospital course, for this is only a brief summary. 53F admitted for sepsis, potentially secondary to UTI vs. infected sacral ulcer    PMHx HFpEF, ESRD on HD, DM, HTN, HLD, Hypothyroidism, Parathyroid adenoma, GERD    Background – sent from nursing home for evaluation of leukocytosis to 48K and fever. Patient was poor historian and unable to provide additional history     ED – vitals significant for temp 100.9  Labs significant for WBC 48K, Hgb 11, BUN/Cr 65/4.8, Albumin 2.0  UA positive  CXR devoid of consolidations; RVP negative   S/p ceftriaxone, zosyn    On the floors, patient was continued on regimen of vancomycin and meropenem.  General surgery was consulted because of patient's sacral ulceration with abscess She underwent incision and drainage on 3/8.  Id was consulted . Based on results from wound cultures which were positive for MRSA , meropenem was discontinued . After sensitivities came back , vancomycin switched to bactrim ds for another 7 days to complete the 2 week course. Dressings were changed on daily basis. Leucocytosis and fever improved.     Patient's renal function was monitored in admission , and dialysis continued by [facility]. Dr. Coronado was consulted for graft placement , but since she was septic , procedure to be done outpatient . Dialysis services re-instated.  patient was continued on home medications for htn and heart failure ( metoprolol , hydralazine ) , hypothyroidism both of which were found to be well in control in this admission.  Diabetes was well controlled . a1c : 5.3.    Patient found to have anemia in this admission, most likely anemia in CKD. No active signs of hospital . Patient was given e[pogen while in hospital . Venofer avoided due to infection .      Given patient's improved clinical status and current hemodynamic stability, decision was made to discharge.  Please refer to patient's complete medical chart with documents for a full hospital course, for this is only a brief summary. 53F admitted for sepsis, potentially secondary to UTI vs. infected sacral ulcer    PMHx HFpEF, ESRD on HD, DM, HTN, HLD, Hypothyroidism, Parathyroid adenoma, GERD    Background – sent from nursing home for evaluation of leukocytosis to 48K and fever. Patient was poor historian and unable to provide additional history     ED – vitals significant for temp 100.9  Labs significant for WBC 48K, Hgb 11, BUN/Cr 65/4.8, Albumin 2.0  UA positive  CXR devoid of consolidations; RVP negative   S/p ceftriaxone, zosyn    On the floors, patient was continued on regimen of vancomycin and meropenem.  General surgery was consulted because of patient's sacral ulceration with abscess She underwent incision and drainage on 3/8.  Id was consulted . Based on results from wound cultures which were positive for MRSA , meropenem was discontinued . After sensitivities came back , vancomycin switched to bactrim ds for another 7 days to complete the 2 week course. Dressings were changed on daily basis. Leucocytosis and fever improved.     Patient's renal function was monitored in admission , and dialysis continued by [facility]. Dr. Coronado was consulted for graft placement , but since she was septic , procedure to be done outpatient . Dialysis services re-instated.  patient was continued on home medications for htn and heart failure ( metoprolol , hydralazine ) , hypothyroidism both of which were found to be well in control in this admission.  Diabetes was well controlled . a1c : 5.3.    Patient found to have anemia in this admission, most likely anemia in CKD. No active signs of hospital . Patient was given epogen while in hospital . Venofer avoided due to infection . Cbc was monitored closely and remained in same range.    Given patient's improved clinical status and current hemodynamic stability, decision was made to discharge.  Please refer to patient's complete medical chart with documents for a full hospital course, for this is only a brief summary. 53F admitted for sepsis, potentially secondary to UTI vs. infected sacral ulcer    PMHx HFpEF, ESRD on HD, DM, HTN, HLD, Hypothyroidism, Parathyroid adenoma, GERD    Background – sent from nursing home for evaluation of leukocytosis to 48K and fever. Patient was poor historian and unable to provide additional history     ED – vitals significant for temp 100.9  Labs significant for WBC 48K, Hgb 11, BUN/Cr 65/4.8, Albumin 2.0  UA positive  CXR devoid of consolidations; RVP negative   S/p ceftriaxone, zosyn    On the floors, patient was continued on regimen of vancomycin and meropenem.  General surgery was consulted because of patient's sacral ulceration with abscess She underwent incision and drainage on 3/8.  Id was consulted . Based on results from wound cultures which were positive for MRSA , meropenem was discontinued . After sensitivities came back , vancomycin switched to bactrim ds . However patient continued to be febrile on po antibiotic . Eventually re-debridement done on 3/19 and restarted on Vancomycin to complete 2 more weeks on discharge.  Dressings were changed on daily basis. Leucocytosis and fever improved. blood cultures remained negative. left IJ cental line was placed for peripheral access, as patient had very poor peripheral venous access.     Patient's renal function was monitored in admission , and dialysis continued by [facility]. Dr. Coronado was consulted for graft placement , but since she was septic , procedure to be done outpatient . Dialysis services re-instated.  patient was continued on home medications for htn and heart failure ( metoprolol , hydralazine ) , hypothyroidism both of which were found to be well in control in this admission.  Diabetes was well controlled . a1c : 5.3. Blood sugars running low due to poor po intake.     Patient found to have anemia in this admission, most likely anemia in CKD. No active signs of hospital . Patient was given epogen while in hospital . Venofer avoided due to infection . Cbc was monitored closely and remained in same range.  With a drop in hb, 1 unit prbc was given .  tracing back history , patient had positive occult in Jan , EGD did not reveal ulcer   Dr. [place] was consulted and at that time since there was no sig of bleeding and h/h was stable , it was decided to persue colonoscopy as outpatient. Similar decision in this admission.    Given patient's improved clinical status and current hemodynamic stability, decision was made to discharge.  Please continue with vancomycin 1250 mg with dialysis , 3 times a week for 2 weeks from discharge.   Please refer to patient's complete medical chart with documents for a full hospital course, for this is only a brief summary.

## 2018-03-07 NOTE — DISCHARGE NOTE ADULT - MEDICATION SUMMARY - MEDICATIONS TO TAKE
I will START or STAY ON the medications listed below when I get home from the hospital:    aluminum hydroxide-magnesium hydroxide 200 mg-200 mg/5 mL oral suspension  -- 30 milliliter(s) by mouth every 4 hours, As needed, Dyspepsia  -- Indication: For Heartburn     gabapentin 100 mg oral capsule  -- 1 cap(s) by mouth 3 times a day  -- Indication: For Neuropathy    insulin  -- LOW DOSE SLIDING SCALE   3 TIMES A DAY   -- Indication: For Diabetes mellitus    simvastatin 20 mg oral tablet  -- 1 tab(s) by mouth once a day (at bedtime)  -- Indication: For HLD    metoprolol tartrate 25 mg oral tablet  -- 1 tab(s) by mouth 2 times a day  -- Indication: For HTN (hypertension)    cinacalcet 30 mg oral tablet  -- 1 tab(s) by mouth once a day  -- Indication: For CKD    calamine topical lotion  -- 1 application on skin 3 times a day  -- Indication: For Wound care     collagenase 250 units/g topical ointment  -- 1 application on skin once a day  -- Indication: For Wound care    Epogen  -- 31813 unit(s) injectable 3 times a week  DURING DIALYSIS DAYS    -- Indication: For Anemia    ferrous sulfate 300 mg/5 mL (60 mg elemental iron) oral liquid  -- 5 milliliter(s) by mouth once a day  -- Indication: For Anemia     senna oral tablet  -- 2 tab(s) by mouth once a day (at bedtime)  -- Indication: For Constipation    docusate sodium 100 mg oral capsule  -- 1 cap(s) by mouth 2 times a day  -- Indication: For Constipation    sevelamer hydrochloride 800 mg oral tablet  -- 2 tab(s) by mouth 3 times a day (with meals)  -- Indication: For CKD    Protonix 40 mg oral delayed release tablet  -- 1 tab(s) by mouth once a day  -- Indication: For GERD    Bactrim  mg-160 mg oral tablet  -- 1 tab(s) by mouth 2 times a day   -- Avoid prolonged or excessive exposure to direct and/or artificial sunlight while taking this medication.  Finish all this medication unless otherwise directed by prescriber.  Medication should be taken with plenty of water.    -- Indication: For Abcess     levothyroxine 50 mcg (0.05 mg) oral tablet  -- 1 tab(s) by mouth once a day  -- Indication: For Hypothyroidism    hydrALAZINE 50 mg oral tablet  -- 1 tab(s) by mouth 3 times a day  -- Indication: For HTN (hypertension)    Multiple Vitamins oral tablet  -- 1 tab(s) by mouth once a day  -- Indication: For Vitamin deficiency     Tab-A-Gage oral tablet  -- 1 tab(s) by mouth once a day  -- Indication: For Vitamin deficiency     ascorbic acid 500 mg oral tablet  -- 1 tab(s) by mouth once a day  -- Indication: For Vitamin deficiency     folic acid 1 mg oral tablet  -- 1 tab(s) by mouth once a day  -- Indication: For Vitamin deficiency I will START or STAY ON the medications listed below when I get home from the hospital:    aluminum hydroxide-magnesium hydroxide 200 mg-200 mg/5 mL oral suspension  -- 30 milliliter(s) by mouth every 4 hours, As needed, Dyspepsia  -- Indication: For Heartburn     gabapentin 100 mg oral capsule  -- 1 cap(s) by mouth 3 times a day  -- Indication: For Neuropathy    simvastatin 20 mg oral tablet  -- 1 tab(s) by mouth once a day (at bedtime)  -- Indication: For HLD    metoprolol tartrate 25 mg oral tablet  -- 1 tab(s) by mouth 2 times a day  -- Indication: For HTN (hypertension)    cinacalcet 30 mg oral tablet  -- 1 tab(s) by mouth once a day  -- Indication: For CKD    calamine topical lotion  -- 1 application on skin 3 times a day  -- Indication: For Wound care     collagenase 250 units/g topical ointment  -- 1 application on skin once a day  -- Indication: For Wound care    Epogen  -- [id number] unit(s) injectable 3 times a week  DURING DIALYSIS DAYS    -- Indication: For Anemia    vancomycin 1.25 g/150 mL-NaCl 0.9% intravenous solution  -- 1 dose(s) intravenously 3 times a week intra dialysis  -- Indication: For Sepsis    ferrous sulfate 300 mg/5 mL (60 mg elemental iron) oral liquid  -- 5 milliliter(s) by mouth once a day  -- Indication: For Anemia     senna oral tablet  -- 2 tab(s) by mouth once a day (at bedtime)  -- Indication: For Constipation    docusate sodium 100 mg oral capsule  -- 1 cap(s) by mouth 2 times a day  -- Indication: For Constipation    sevelamer hydrochloride 800 mg oral tablet  -- 2 tab(s) by mouth 3 times a day (with meals)  -- Indication: For CKD    Protonix 40 mg oral delayed release tablet  -- 1 tab(s) by mouth once a day  -- Indication: For GERD    levothyroxine 50 mcg (0.05 mg) oral tablet  -- 1 tab(s) by mouth once a day  -- Indication: For Hypothyroidism    hydrALAZINE 50 mg oral tablet  -- 1 tab(s) by mouth 3 times a day  -- Indication: For HTN (hypertension)    Multiple Vitamins oral tablet  -- 1 tab(s) by mouth once a day  -- Indication: For Vitamin deficiency     Tab-A-Gage oral tablet  -- 1 tab(s) by mouth once a day  -- Indication: For Vitamin deficiency     ascorbic acid 500 mg oral tablet  -- 1 tab(s) by mouth once a day  -- Indication: For Vitamin deficiency     folic acid 1 mg oral tablet  -- 1 tab(s) by mouth once a day  -- Indication: For Vitamin deficiency

## 2018-03-07 NOTE — DISCHARGE NOTE ADULT - PATIENT PORTAL LINK FT
You can access the [facility] Portal, offered by [facility], by registering with the following website: [email or web address]

## 2018-03-08 ENCOUNTER — RESULT REVIEW (OUTPATIENT)
Age: 53
End: 2018-03-08

## 2018-03-08 DIAGNOSIS — L02.212 CUTANEOUS ABSCESS OF BACK [ANY PART, EXCEPT BUTTOCK]: ICD-10-CM

## 2018-03-08 DIAGNOSIS — L89.154 PRESSURE ULCER OF SACRAL REGION, STAGE 4: ICD-10-CM

## 2018-03-08 DIAGNOSIS — A41.9 SEPSIS, UNSPECIFIED ORGANISM: ICD-10-CM

## 2018-03-08 DIAGNOSIS — L02.415 CUTANEOUS ABSCESS OF RIGHT LOWER LIMB: ICD-10-CM

## 2018-03-08 LAB
ANION GAP SERPL CALC-SCNC: 8 MMOL/L — SIGNIFICANT CHANGE UP (ref 5–17)
ANISOCYTOSIS BLD QL: SLIGHT — SIGNIFICANT CHANGE UP
BUN SERPL-MCNC: 27 MG/DL — HIGH (ref 7–18)
CALCIUM SERPL-MCNC: 9.1 MG/DL — SIGNIFICANT CHANGE UP (ref 8.4–10.5)
CHLORIDE SERPL-SCNC: 104 MMOL/L — SIGNIFICANT CHANGE UP (ref 96–108)
CHOLEST SERPL-MCNC: <50 MG/DL — SIGNIFICANT CHANGE UP (ref 10–199)
CO2 SERPL-SCNC: 31 MMOL/L — SIGNIFICANT CHANGE UP (ref 22–31)
CREAT SERPL-MCNC: 2.8 MG/DL — HIGH (ref 0.5–1.3)
CULTURE RESULTS: SIGNIFICANT CHANGE UP
FERRITIN SERPL-MCNC: 950 NG/ML — HIGH (ref 15–150)
FOLATE SERPL-MCNC: >20 NG/ML — SIGNIFICANT CHANGE UP (ref 4.8–24.2)
GIANT PLATELETS BLD QL SMEAR: PRESENT — SIGNIFICANT CHANGE UP
GLUCOSE BLDC GLUCOMTR-MCNC: 109 MG/DL — HIGH (ref 70–99)
GLUCOSE BLDC GLUCOMTR-MCNC: 117 MG/DL — HIGH (ref 70–99)
GLUCOSE BLDC GLUCOMTR-MCNC: 86 MG/DL — SIGNIFICANT CHANGE UP (ref 70–99)
GLUCOSE BLDC GLUCOMTR-MCNC: 90 MG/DL — SIGNIFICANT CHANGE UP (ref 70–99)
GLUCOSE SERPL-MCNC: 100 MG/DL — HIGH (ref 70–99)
HCT VFR BLD CALC: 31.5 % — LOW (ref 34.5–45)
HDLC SERPL-MCNC: 24 MG/DL — LOW (ref 40–125)
HGB BLD-MCNC: 9.3 G/DL — LOW (ref 11.5–15.5)
HYPOCHROMIA BLD QL: SLIGHT — SIGNIFICANT CHANGE UP
IRON SATN MFR SERPL: 10 % — LOW (ref 15–50)
IRON SATN MFR SERPL: 11 UG/DL — LOW (ref 40–150)
LIPID PNL WITH DIRECT LDL SERPL: <14 MG/DL — SIGNIFICANT CHANGE UP
LYMPHOCYTES # BLD AUTO: 3 % — LOW (ref 13–44)
MAGNESIUM SERPL-MCNC: 2.3 MG/DL — SIGNIFICANT CHANGE UP (ref 1.6–2.6)
MANUAL DIF COMMENT BLD-IMP: SIGNIFICANT CHANGE UP
MANUAL DIF COMMENT BLD-IMP: SIGNIFICANT CHANGE UP
MCHC RBC-ENTMCNC: 26.7 PG — LOW (ref 27–34)
MCHC RBC-ENTMCNC: 29.5 GM/DL — LOW (ref 32–36)
MCV RBC AUTO: 90.4 FL — SIGNIFICANT CHANGE UP (ref 80–100)
MONOCYTES NFR BLD AUTO: 6 % — SIGNIFICANT CHANGE UP (ref 2–14)
MYELOCYTES NFR BLD: 2 % — HIGH (ref 0–0)
NEUTROPHILS NFR BLD AUTO: 89 % — HIGH (ref 43–77)
NEUTS HYPERSEG # BLD: PRESENT — SIGNIFICANT CHANGE UP
PHOSPHATE SERPL-MCNC: 1.7 MG/DL — LOW (ref 2.5–4.5)
PLAT MORPH BLD: NORMAL — SIGNIFICANT CHANGE UP
PLATELET # BLD AUTO: 372 K/UL — SIGNIFICANT CHANGE UP (ref 150–400)
POIKILOCYTOSIS BLD QL AUTO: SLIGHT — SIGNIFICANT CHANGE UP
POLYCHROMASIA BLD QL SMEAR: SLIGHT — SIGNIFICANT CHANGE UP
POTASSIUM SERPL-MCNC: 3.7 MMOL/L — SIGNIFICANT CHANGE UP (ref 3.5–5.3)
POTASSIUM SERPL-SCNC: 3.7 MMOL/L — SIGNIFICANT CHANGE UP (ref 3.5–5.3)
RBC # BLD: 3.49 M/UL — LOW (ref 3.8–5.2)
RBC # FLD: 19.9 % — HIGH (ref 10.3–14.5)
RBC BLD AUTO: ABNORMAL
SCHISTOCYTES BLD QL AUTO: PRESENT — SIGNIFICANT CHANGE UP
SODIUM SERPL-SCNC: 143 MMOL/L — SIGNIFICANT CHANGE UP (ref 135–145)
SPECIMEN SOURCE: SIGNIFICANT CHANGE UP
TARGETS BLD QL SMEAR: SLIGHT — SIGNIFICANT CHANGE UP
TIBC SERPL-MCNC: 105 UG/DL — LOW (ref 250–450)
TOTAL CHOLESTEROL/HDL RATIO MEASUREMENT: <2.1 RATIO — LOW (ref 3.3–7.1)
TRIGL SERPL-MCNC: 59 MG/DL — SIGNIFICANT CHANGE UP (ref 10–149)
TSH SERPL-MCNC: 0.52 UU/ML — SIGNIFICANT CHANGE UP (ref 0.34–4.82)
UIBC SERPL-MCNC: 94 UG/DL — LOW (ref 110–370)
VANCOMYCIN TROUGH SERPL-MCNC: 12.5 UG/ML — SIGNIFICANT CHANGE UP (ref 10–20)
VIT B12 SERPL-MCNC: >2000 PG/ML — HIGH (ref 232–1245)
WBC # BLD: 41.8 K/UL — CRITICAL HIGH (ref 3.8–10.5)
WBC # FLD AUTO: 41.8 K/UL — CRITICAL HIGH (ref 3.8–10.5)

## 2018-03-08 PROCEDURE — 88304 TISSUE EXAM BY PATHOLOGIST: CPT | Mod: 26

## 2018-03-08 PROCEDURE — 88305 TISSUE EXAM BY PATHOLOGIST: CPT | Mod: 26

## 2018-03-08 RX ORDER — MORPHINE SULFATE 50 MG/1
4 CAPSULE, EXTENDED RELEASE ORAL EVERY 4 HOURS
Qty: 0 | Refills: 0 | Status: DISCONTINUED | OUTPATIENT
Start: 2018-03-08 | End: 2018-03-15

## 2018-03-08 RX ORDER — IRON SUCROSE 20 MG/ML
200 INJECTION, SOLUTION INTRAVENOUS DAILY
Qty: 0 | Refills: 0 | Status: DISCONTINUED | OUTPATIENT
Start: 2018-03-08 | End: 2018-03-08

## 2018-03-08 RX ORDER — HYDROMORPHONE HYDROCHLORIDE 2 MG/ML
0.3 INJECTION INTRAMUSCULAR; INTRAVENOUS; SUBCUTANEOUS
Qty: 0 | Refills: 0 | Status: DISCONTINUED | OUTPATIENT
Start: 2018-03-08 | End: 2018-03-08

## 2018-03-08 RX ORDER — METOPROLOL TARTRATE 50 MG
25 TABLET ORAL
Qty: 0 | Refills: 0 | Status: DISCONTINUED | OUTPATIENT
Start: 2018-03-08 | End: 2018-03-19

## 2018-03-08 RX ORDER — ERYTHROPOIETIN 10000 [IU]/ML
4000 INJECTION, SOLUTION INTRAVENOUS; SUBCUTANEOUS
Qty: 0 | Refills: 0 | Status: COMPLETED | OUTPATIENT
Start: 2019-02-05 | End: 2019-02-05

## 2018-03-08 RX ORDER — ERYTHROPOIETIN 10000 [IU]/ML
4000 INJECTION, SOLUTION INTRAVENOUS; SUBCUTANEOUS
Qty: 0 | Refills: 0 | Status: COMPLETED | OUTPATIENT
Start: 2018-03-08 | End: 2018-03-14

## 2018-03-08 RX ORDER — SEVELAMER CARBONATE 2400 MG/1
1600 POWDER, FOR SUSPENSION ORAL
Qty: 0 | Refills: 0 | Status: DISCONTINUED | OUTPATIENT
Start: 2018-03-08 | End: 2018-03-09

## 2018-03-08 RX ORDER — MEROPENEM 1 G/30ML
500 INJECTION INTRAVENOUS EVERY 24 HOURS
Qty: 0 | Refills: 0 | Status: DISCONTINUED | OUTPATIENT
Start: 2018-03-08 | End: 2018-03-13

## 2018-03-08 RX ORDER — SIMVASTATIN 20 MG/1
20 TABLET, FILM COATED ORAL AT BEDTIME
Qty: 0 | Refills: 0 | Status: DISCONTINUED | OUTPATIENT
Start: 2018-03-08 | End: 2018-03-19

## 2018-03-08 RX ORDER — PANTOPRAZOLE SODIUM 20 MG/1
40 TABLET, DELAYED RELEASE ORAL
Qty: 0 | Refills: 0 | Status: DISCONTINUED | OUTPATIENT
Start: 2018-03-08 | End: 2018-03-19

## 2018-03-08 RX ORDER — FOLIC ACID 0.8 MG
1 TABLET ORAL DAILY
Qty: 0 | Refills: 0 | Status: DISCONTINUED | OUTPATIENT
Start: 2018-03-08 | End: 2018-03-19

## 2018-03-08 RX ORDER — OXYCODONE AND ACETAMINOPHEN 5; 325 MG/1; MG/1
1 TABLET ORAL EVERY 4 HOURS
Qty: 0 | Refills: 0 | Status: DISCONTINUED | OUTPATIENT
Start: 2018-03-16 | End: 2018-03-19

## 2018-03-08 RX ORDER — INSULIN LISPRO 100/ML
VIAL (ML) SUBCUTANEOUS
Qty: 0 | Refills: 0 | Status: DISCONTINUED | OUTPATIENT
Start: 2018-03-08 | End: 2018-03-19

## 2018-03-08 RX ORDER — LEVOTHYROXINE SODIUM 125 MCG
50 TABLET ORAL DAILY
Qty: 0 | Refills: 0 | Status: DISCONTINUED | OUTPATIENT
Start: 2018-03-08 | End: 2018-03-19

## 2018-03-08 RX ORDER — GABAPENTIN 400 MG/1
100 CAPSULE ORAL THREE TIMES A DAY
Qty: 0 | Refills: 0 | Status: DISCONTINUED | OUTPATIENT
Start: 2018-03-08 | End: 2018-03-19

## 2018-03-08 RX ORDER — ASCORBIC ACID 60 MG
500 TABLET,CHEWABLE ORAL DAILY
Qty: 0 | Refills: 0 | Status: DISCONTINUED | OUTPATIENT
Start: 2018-03-08 | End: 2018-03-19

## 2018-03-08 RX ORDER — DOCUSATE SODIUM 100 MG
100 CAPSULE ORAL
Qty: 0 | Refills: 0 | Status: DISCONTINUED | OUTPATIENT
Start: 2018-03-08 | End: 2018-03-19

## 2018-03-08 RX ORDER — HYDRALAZINE HCL 50 MG
50 TABLET ORAL THREE TIMES A DAY
Qty: 0 | Refills: 0 | Status: DISCONTINUED | OUTPATIENT
Start: 2018-03-08 | End: 2018-03-17

## 2018-03-08 RX ORDER — COLLAGENASE CLOSTRIDIUM HIST. 250 UNIT/G
1 OINTMENT (GRAM) TOPICAL DAILY
Qty: 0 | Refills: 0 | Status: DISCONTINUED | OUTPATIENT
Start: 2018-03-08 | End: 2018-03-19

## 2018-03-08 RX ORDER — HEPARIN SODIUM 5000 [USP'U]/ML
5000 INJECTION INTRAVENOUS; SUBCUTANEOUS EVERY 12 HOURS
Qty: 0 | Refills: 0 | Status: DISCONTINUED | OUTPATIENT
Start: 2018-03-08 | End: 2018-03-18

## 2018-03-08 RX ORDER — IRON SUCROSE 20 MG/ML
200 INJECTION, SOLUTION INTRAVENOUS DAILY
Qty: 0 | Refills: 0 | Status: DISCONTINUED | OUTPATIENT
Start: 2018-03-08 | End: 2018-03-09

## 2018-03-08 RX ORDER — VANCOMYCIN HCL 1 G
1000 VIAL (EA) INTRAVENOUS
Qty: 0 | Refills: 0 | Status: DISCONTINUED | OUTPATIENT
Start: 2018-03-08 | End: 2018-03-12

## 2018-03-08 RX ORDER — OXYCODONE AND ACETAMINOPHEN 5; 325 MG/1; MG/1
1 TABLET ORAL EVERY 4 HOURS
Qty: 0 | Refills: 0 | Status: DISCONTINUED | OUTPATIENT
Start: 2018-03-08 | End: 2018-03-13

## 2018-03-08 RX ORDER — CINACALCET 30 MG/1
30 TABLET, FILM COATED ORAL DAILY
Qty: 0 | Refills: 0 | Status: DISCONTINUED | OUTPATIENT
Start: 2018-03-08 | End: 2018-03-19

## 2018-03-08 RX ORDER — OXYCODONE AND ACETAMINOPHEN 5; 325 MG/1; MG/1
2 TABLET ORAL EVERY 6 HOURS
Qty: 0 | Refills: 0 | Status: DISCONTINUED | OUTPATIENT
Start: 2018-03-08 | End: 2018-03-08

## 2018-03-08 RX ORDER — ACETAMINOPHEN 500 MG
650 TABLET ORAL EVERY 6 HOURS
Qty: 0 | Refills: 0 | Status: DISCONTINUED | OUTPATIENT
Start: 2018-03-08 | End: 2018-03-19

## 2018-03-08 RX ADMIN — SEVELAMER CARBONATE 1600 MILLIGRAM(S): 2400 POWDER, FOR SUSPENSION ORAL at 09:39

## 2018-03-08 RX ADMIN — SEVELAMER CARBONATE 1600 MILLIGRAM(S): 2400 POWDER, FOR SUSPENSION ORAL at 12:24

## 2018-03-08 RX ADMIN — Medication 25 MILLIGRAM(S): at 18:57

## 2018-03-08 RX ADMIN — CINACALCET 30 MILLIGRAM(S): 30 TABLET, FILM COATED ORAL at 12:24

## 2018-03-08 RX ADMIN — GABAPENTIN 100 MILLIGRAM(S): 400 CAPSULE ORAL at 21:57

## 2018-03-08 RX ADMIN — HEPARIN SODIUM 5000 UNIT(S): 5000 INJECTION INTRAVENOUS; SUBCUTANEOUS at 06:40

## 2018-03-08 RX ADMIN — Medication 1 TABLET(S): at 12:24

## 2018-03-08 RX ADMIN — OXYCODONE AND ACETAMINOPHEN 2 TABLET(S): 5; 325 TABLET ORAL at 09:11

## 2018-03-08 RX ADMIN — SIMVASTATIN 20 MILLIGRAM(S): 20 TABLET, FILM COATED ORAL at 21:57

## 2018-03-08 RX ADMIN — Medication 100 MILLIGRAM(S): at 18:57

## 2018-03-08 RX ADMIN — Medication 1 MILLIGRAM(S): at 12:24

## 2018-03-08 RX ADMIN — Medication 100 MILLIGRAM(S): at 06:40

## 2018-03-08 RX ADMIN — SEVELAMER CARBONATE 1600 MILLIGRAM(S): 2400 POWDER, FOR SUSPENSION ORAL at 18:57

## 2018-03-08 RX ADMIN — Medication 325 MILLIGRAM(S): at 12:24

## 2018-03-08 RX ADMIN — Medication 50 MILLIGRAM(S): at 13:36

## 2018-03-08 RX ADMIN — MEROPENEM 100 MILLIGRAM(S): 1 INJECTION INTRAVENOUS at 12:25

## 2018-03-08 RX ADMIN — Medication 50 MICROGRAM(S): at 06:45

## 2018-03-08 RX ADMIN — GABAPENTIN 100 MILLIGRAM(S): 400 CAPSULE ORAL at 06:40

## 2018-03-08 RX ADMIN — OXYCODONE AND ACETAMINOPHEN 2 TABLET(S): 5; 325 TABLET ORAL at 08:15

## 2018-03-08 RX ADMIN — Medication 500 MILLIGRAM(S): at 12:24

## 2018-03-08 RX ADMIN — Medication 50 MILLIGRAM(S): at 21:57

## 2018-03-08 RX ADMIN — HEPARIN SODIUM 5000 UNIT(S): 5000 INJECTION INTRAVENOUS; SUBCUTANEOUS at 18:57

## 2018-03-08 RX ADMIN — Medication 50 MILLIGRAM(S): at 06:39

## 2018-03-08 RX ADMIN — Medication 25 MILLIGRAM(S): at 06:39

## 2018-03-08 RX ADMIN — MORPHINE SULFATE 4 MILLIGRAM(S): 50 CAPSULE, EXTENDED RELEASE ORAL at 18:20

## 2018-03-08 RX ADMIN — MORPHINE SULFATE 4 MILLIGRAM(S): 50 CAPSULE, EXTENDED RELEASE ORAL at 18:00

## 2018-03-08 RX ADMIN — IRON SUCROSE 110 MILLIGRAM(S): 20 INJECTION, SOLUTION INTRAVENOUS at 12:25

## 2018-03-08 RX ADMIN — PANTOPRAZOLE SODIUM 40 MILLIGRAM(S): 20 TABLET, DELAYED RELEASE ORAL at 06:40

## 2018-03-08 RX ADMIN — GABAPENTIN 100 MILLIGRAM(S): 400 CAPSULE ORAL at 12:24

## 2018-03-08 NOTE — ADVANCED PRACTICE NURSE CONSULT - ASSESSMENT
This is a 53yr old female patient admitted for Sepsis, presenting with the following:  -There is an Abscess to the R. Hip and Mid Spinal Area with red tissue, purulent drainage, surrounding tissue hardening and erythema   -There is an Unstageable Pressure Injury to the Coccyx (9cm x 7cm x 2cm) with slough, necrotic tissue, pink tissue, drainage, odor, and undermining (up to 2.1cm at 360 degrees)

## 2018-03-08 NOTE — PROGRESS NOTE ADULT - PROBLEM SELECTOR PLAN 6
Continue with hydralazine 50mg TID and metoprolol 25mg BID within parameters  Continue to monitor BP

## 2018-03-08 NOTE — CONSULT NOTE ADULT - SUBJECTIVE AND OBJECTIVE BOX
53 YR OLF FEMALE RECENTLY STARTED ON HD AFTER A PROLONGED HOSPITALIZATION IN [facility]. SENT FROM NH WITH LEUCOCYTOSIS. HAS SACRAL DECUB WOUND ( WOUND CULTURES IN NH GREW MRSA)ON EVALUATION FOUND TO HAVE RT KNEE ABCESS. PLANS FOR I&D  TOMMORROW  IN OR.. PT HAS A PERMACATH.DENIES ANY FEVER CHILLS. BLOOD  CULTURES NEGATIVE SO FAR. HD WAS ARRANGED YESTERDAY.    PAST MEDICAL & SURGICAL HISTORY:  Clostridium difficile infection  Osteomyelitis of jaw  Parathyroid adenoma  Hypothyroidism  CKD (chronic kidney disease)  Anemia  CHF (congestive heart failure)  Diabetic neuropathy  Diabetes mellitus  HTN (hypertension)  S/P :   No Past Surgical History    No Known Allergies    Home Medications Reviewed  Hospital Medications:   MEDICATIONS  (STANDING):    SOCIAL HISTORY:  Denies ETOh,Smoking,   FAMILY HISTORY:  Family history of stroke  Family history of hypertension (Sibling)  Family history of diabetes mellitus      VITALS:  T(F): 98.9 (18 @ 13:37), Max: 99.9 (18 @ 21:44)  HR: 83 (18 @ 13:37)  BP: 139/49 (18 @ 13:37)  RR: 20 (18 @ 13:37)  SpO2: 97% (18 @ 13:37)  Wt(kg): --      PHYSICAL EXAM:  Constitutional: NAD  HEENT: anicteric sclera, oropharynx clear.  Neck: No JVD  Respiratory: CTAB, no wheezes, rales or rhonchi  Cardiovascular: S1, S2, RRR  Gastrointestinal: BS+, soft, NT/ND  Extremities: No cyanosis or clubbing. No peripheral edema  Neurological: A/O x 3, no focal deficits  : No CVA tenderness. No jarrell.   Skin: sacral decub ulcers, rt thigh abcess.  Vascular Access: RT IJ PERMACATH with no erythema or tenderness.    LABS:      143  |  104  |  27<H>  ----------------------------<  100<H>  3.7   |  31  |  2.80<H>    Ca    9.1      08 Mar 2018 06:06  Phos  1.7     03-08  Mg     2.3     -08    TPro  8.3  /  Alb  2.0<L>  /  TBili  0.2  /  DBili      /  AST  13  /  ALT  7<L>  /  AlkPhos  256<H>  03-07    Creatinine Trend: 2.80 <--, 4.80 <--                        9.3    41.8  )-----------( 372      ( 08 Mar 2018 06:06 )             31.5     Urine Studies:  Urinalysis Basic - ( 07 Mar 2018 04:30 )    Color: Yellow / Appearance: Slightly Turbid / S.010 / pH:   Gluc:  / Ketone: Negative  / Bili: Negative / Urobili: Negative   Blood:  / Protein: 500 mg/dL / Nitrite: Negative   Leuk Esterase: Moderate / RBC: 0-2 /HPF / WBC >50 /HPF   Sq Epi:  / Non Sq Epi: Moderate /HPF / Bacteria: Many /HPF        RADIOLOGY & ADDITIONAL STUDIES:

## 2018-03-08 NOTE — CONSULT NOTE ADULT - ASSESSMENT
53 YR OLF FEMALE RECENTLY STARTED ON HD AFTER A PROLONGED HOSPITALIZATION IN [facility]. SENT FROM NH WITH LEUCOCYTOSIS. HAS SACRAL DECUB WOUND ( WOUND CULTURES IN NH GREW MRSA)ON EVALUATION FOUND TO HAVE RT KNEE ABCESS. PLANS FOR I&D  TOMMORROW  IN OR.. PT HAS A PERMACATH.DENIES ANY FEVER CHILLS. BLOOD  CULTURES NEGATIVE SO FAR. HD WAS ARRANGED YESTERDAY.    RECOMENDATIONS:  HD IN AM  CONT IV ABX- ID F/U.  ABCESS I&D PER SURGERY.  LOW PHOS D/C SEVELAANGELA  LOW FE SATS, DANG 905. IN VIEW OF ONGOING INFECTION WILL D/C IV VENOFER AND CONT EPO ON HD  BP STABLE CONT BP MEDS.

## 2018-03-08 NOTE — PROGRESS NOTE ADULT - SUBJECTIVE AND OBJECTIVE BOX
PGY 1 Note discussed with supervising resident and primary attending    Patient is a 53y old  Female who presents with a chief complaint of sent from NH due to leukocytosis and fever (07 Mar 2018 21:47)      INTERVAL HPI/OVERNIGHT EVENTS: patient examined at bedside. He/She has no complaints and current symptoms are resolving    Overnight events on telemetry monitoring []    MEDICATIONS  (STANDING):  ascorbic acid 500 milliGRAM(s) Oral daily  cinacalcet 30 milliGRAM(s) Oral daily  collagenase Ointment 1 Application(s) Topical daily  docusate sodium 100 milliGRAM(s) Oral two times a day  ferrous    sulfate 325 milliGRAM(s) Oral daily  folic acid 1 milliGRAM(s) Oral daily  gabapentin 100 milliGRAM(s) Oral three times a day  heparin  Injectable 5000 Unit(s) SubCutaneous every 12 hours  hydrALAZINE 50 milliGRAM(s) Oral three times a day  insulin lispro (HumaLOG) corrective regimen sliding scale   SubCutaneous Before meals and at bedtime  levothyroxine 50 MICROGram(s) Oral daily  meropenem  IVPB 500 milliGRAM(s) IV Intermittent every 24 hours  meropenem  IVPB      metoprolol     tartrate 25 milliGRAM(s) Oral two times a day  multivitamin 1 Tablet(s) Oral daily  pantoprazole    Tablet 40 milliGRAM(s) Oral before breakfast  sevelamer hydrochloride 1600 milliGRAM(s) Oral three times a day with meals  simvastatin 20 milliGRAM(s) Oral at bedtime  vancomycin  IVPB 1000 milliGRAM(s) IV Intermittent <User Schedule>    MEDICATIONS  (PRN):  acetaminophen   Tablet 650 milliGRAM(s) Oral every 6 hours PRN For Temp greater than 38 C (100.4 F)    _______________________________________________  REVIEW OF SYSTEMS:  CONSTITUTIONAL: No fever  NECK: No pain or stiffness  RESPIRATORY: No cough; No shortness of breath  CARDIOVASCULAR: No chest pain, no palpitations  GASTROINTESTINAL: No pain. No constipation, nausea or vomiting; No diarrhea   NEUROLOGICAL: No headache or numbness, no tremors  MUSCULOSKELETAL: No joint pain, no muscle pain  GENITOURINARY: no dysuria, no frequency, no hesitancy  PSYCHIATRY: no depression , no anxiety    Vital Signs Last 24 Hrs  T(C): 37 (08 Mar 2018 00:03), Max: 38.3 (07 Mar 2018 07:29)  T(F): 98.6 (08 Mar 2018 00:03), Max: 100.9 (07 Mar 2018 07:29)  HR: 106 (08 Mar 2018 05:18) (84 - 112)  BP: 150/48 (08 Mar 2018 05:18) (123/57 - 169/64)  BP(mean): --  RR: 20 (08 Mar 2018 00:03) (16 - 20)  SpO2: 96% (08 Mar 2018 00:03) (95% - 99%)  ________________________________________________  PHYSICAL EXAM:  GENERAL: NAD, lying in bed  HEENT: Normocephalic;  conjunctivae and sclerae clear; moist mucous membranes  NECK : supple, trachea midline, no JVD  CHEST/LUNG: Clear to auscultation bilaterally with good air entry   HEART: S1 S2,  regular rate and rhythm,; no murmurs  ABDOMEN: Soft, Nontender, Nondistended; Bowel sounds present  EXTREMITIES: no cyanosis; no edema; no calf tenderness  SKIN: no rash  NERVOUS SYSTEM:  AAOx3; no new focal deficits  _________________________________________________  LABS:                        11.0   48.0  )-----------( 438      ( 07 Mar 2018 01:20 )             37.3     03-07    133<L>  |  94<L>  |  65<H>  ----------------------------<  228<H>  3.7   |  27  |  4.80<H>    Ca    10.2      07 Mar 2018 01:20    TPro  8.3  /  Alb  2.0<L>  /  TBili  0.2  /  DBili  x   /  AST  13  /  ALT  7<L>  /  AlkPhos  256<H>  03-07      Urinalysis Basic - ( 07 Mar 2018 04:30 )    Color: Yellow / Appearance: Slightly Turbid / S.010 / pH: x  Gluc: x / Ketone: Negative  / Bili: Negative / Urobili: Negative   Blood: x / Protein: 500 mg/dL / Nitrite: Negative   Leuk Esterase: Moderate / RBC: 0-2 /HPF / WBC >50 /HPF   Sq Epi: x / Non Sq Epi: Moderate /HPF / Bacteria: Many /HPF      CAPILLARY BLOOD GLUCOSE      POCT Blood Glucose.: 118 mg/dL (07 Mar 2018 21:55)  POCT Blood Glucose.: 141 mg/dL (07 Mar 2018 11:58)    RADIOLOGY & ADDITIONAL TESTS:    Consultant(s) Notes Reviewed:   YES    Care Discussed with Consultants:   Infectious Disease [x] Endocrinology [] Neurology [] ENT [] Cardiology [] Electrophysiology [] Pulmonology [] Gastroenterology [] Nephrology [] Urology [] Orthopaedics [] Vascular Surgery [] Thoracic Surgery [] Plastic Surgery [] General Surgery [] Podiatry [] Psychiatry [] Hematology/Oncology [] Pain [] Palliative Care []    Plan of care was discussed with patient and/or primary care giver; all questions and concerns were addressed and care was aligned with patient's wishes. PGY 1 Note discussed with supervising resident and primary attending    Patient is a 53y old  Female who presents with a chief complaint of sent from NH due to leukocytosis and fever (07 Mar 2018 21:47)      INTERVAL HPI/OVERNIGHT EVENTS: patient examined at bedside. Patient has no complaints overnight or at my time of examination; however, was complaining of leg pain     Overnight events on telemetry monitoring []    MEDICATIONS  (STANDING):  ascorbic acid 500 milliGRAM(s) Oral daily  cinacalcet 30 milliGRAM(s) Oral daily  collagenase Ointment 1 Application(s) Topical daily  docusate sodium 100 milliGRAM(s) Oral two times a day  ferrous    sulfate 325 milliGRAM(s) Oral daily  folic acid 1 milliGRAM(s) Oral daily  gabapentin 100 milliGRAM(s) Oral three times a day  heparin  Injectable 5000 Unit(s) SubCutaneous every 12 hours  hydrALAZINE 50 milliGRAM(s) Oral three times a day  insulin lispro (HumaLOG) corrective regimen sliding scale   SubCutaneous Before meals and at bedtime  levothyroxine 50 MICROGram(s) Oral daily  meropenem  IVPB 500 milliGRAM(s) IV Intermittent every 24 hours  meropenem  IVPB      metoprolol     tartrate 25 milliGRAM(s) Oral two times a day  multivitamin 1 Tablet(s) Oral daily  pantoprazole    Tablet 40 milliGRAM(s) Oral before breakfast  sevelamer hydrochloride 1600 milliGRAM(s) Oral three times a day with meals  simvastatin 20 milliGRAM(s) Oral at bedtime  vancomycin  IVPB 1000 milliGRAM(s) IV Intermittent <User Schedule>    MEDICATIONS  (PRN):  acetaminophen   Tablet 650 milliGRAM(s) Oral every 6 hours PRN For Temp greater than 38 C (100.4 F)    _______________________________________________  REVIEW OF SYSTEMS:  CONSTITUTIONAL: No fever  RESPIRATORY: No cough; No shortness of breath  CARDIOVASCULAR: No chest pain, no palpitations  GASTROINTESTINAL: No pain. No constipation, nausea or vomiting; No diarrhea   NEUROLOGICAL: No headache or numbness, no tremors  MUSCULOSKELETAL: Complaining of bilateral leg pain    Vital Signs Last 24 Hrs  T(C): 37 (08 Mar 2018 00:03), Max: 38.3 (07 Mar 2018 07:29)  T(F): 98.6 (08 Mar 2018 00:03), Max: 100.9 (07 Mar 2018 07:29)  HR: 106 (08 Mar 2018 05:18) (84 - 112)  BP: 150/48 (08 Mar 2018 05:18) (123/57 - 169/64)  BP(mean): --  RR: 20 (08 Mar 2018 00:03) (16 - 20)  SpO2: 96% (08 Mar 2018 00:03) (95% - 99%)  ________________________________________________  PHYSICAL EXAM:  GENERAL: NAD, lying in bed  CHEST/LUNG: Clear to auscultation bilaterally with good air entry. Right peramacath in place   HEART: S1 S2,  tachycardic rate and regular rhythm,; no murmurs  ABDOMEN: Soft, Nontender, Nondistended; Bowel sounds present  EXTREMITIES: no cyanosis; no edema; no calf tenderness  SKIN: tumor along right back and ulceration along left back with wound-dressing partially in place   NERVOUS SYSTEM:  AAOx3; no new focal deficits  _________________________________________________  LABS:                        11.0   48.0  )-----------( 438      ( 07 Mar 2018 01:20 )             37.3     03-07    133<L>  |  94<L>  |  65<H>  ----------------------------<  228<H>  3.7   |  27  |  4.80<H>    Ca    10.2      07 Mar 2018 01:20    TPro  8.3  /  Alb  2.0<L>  /  TBili  0.2  /  DBili  x   /  AST  13  /  ALT  7<L>  /  AlkPhos  256<H>  03-07      Urinalysis Basic - ( 07 Mar 2018 04:30 )    Color: Yellow / Appearance: Slightly Turbid / S.010 / pH: x  Gluc: x / Ketone: Negative  / Bili: Negative / Urobili: Negative   Blood: x / Protein: 500 mg/dL / Nitrite: Negative   Leuk Esterase: Moderate / RBC: 0-2 /HPF / WBC >50 /HPF   Sq Epi: x / Non Sq Epi: Moderate /HPF / Bacteria: Many /HPF      CAPILLARY BLOOD GLUCOSE      POCT Blood Glucose.: 118 mg/dL (07 Mar 2018 21:55)  POCT Blood Glucose.: 141 mg/dL (07 Mar 2018 11:58)    RADIOLOGY & ADDITIONAL TESTS:    Consultant(s) Notes Reviewed:   YES    Care Discussed with Consultants:   Infectious Disease [x] Endocrinology [] Neurology [] ENT [] Cardiology [] Electrophysiology [] Pulmonology [] Gastroenterology [] Nephrology [] Urology [] Orthopaedics [] Vascular Surgery [] Thoracic Surgery [] Plastic Surgery [] General Surgery [] Podiatry [] Psychiatry [] Hematology/Oncology [] Pain [] Palliative Care []    Plan of care was discussed with patient and/or primary care giver; all questions and concerns were addressed and care was aligned with patient's wishes. PGY 1 Note discussed with supervising resident and primary attending    Patient is a 53y old  Female who presents with a chief complaint of sent from NH due to leukocytosis and fever (07 Mar 2018 21:47)      INTERVAL HPI/OVERNIGHT EVENTS: patient examined at bedside. Patient has no complaints overnight or at my time of examination; however, was complaining of leg pain     Overnight events on telemetry monitoring []    MEDICATIONS  (STANDING):  ascorbic acid 500 milliGRAM(s) Oral daily  cinacalcet 30 milliGRAM(s) Oral daily  collagenase Ointment 1 Application(s) Topical daily  docusate sodium 100 milliGRAM(s) Oral two times a day  ferrous    sulfate 325 milliGRAM(s) Oral daily  folic acid 1 milliGRAM(s) Oral daily  gabapentin 100 milliGRAM(s) Oral three times a day  heparin  Injectable 5000 Unit(s) SubCutaneous every 12 hours  hydrALAZINE 50 milliGRAM(s) Oral three times a day  insulin lispro (HumaLOG) corrective regimen sliding scale   SubCutaneous Before meals and at bedtime  levothyroxine 50 MICROGram(s) Oral daily  meropenem  IVPB 500 milliGRAM(s) IV Intermittent every 24 hours  meropenem  IVPB      metoprolol     tartrate 25 milliGRAM(s) Oral two times a day  multivitamin 1 Tablet(s) Oral daily  pantoprazole    Tablet 40 milliGRAM(s) Oral before breakfast  sevelamer hydrochloride 1600 milliGRAM(s) Oral three times a day with meals  simvastatin 20 milliGRAM(s) Oral at bedtime  vancomycin  IVPB 1000 milliGRAM(s) IV Intermittent <User Schedule>    MEDICATIONS  (PRN):  acetaminophen   Tablet 650 milliGRAM(s) Oral every 6 hours PRN For Temp greater than 38 C (100.4 F)    _______________________________________________  REVIEW OF SYSTEMS:  CONSTITUTIONAL: No fever  RESPIRATORY: No cough; No shortness of breath  CARDIOVASCULAR: No chest pain, no palpitations  GASTROINTESTINAL: No pain. No constipation, nausea or vomiting; No diarrhea   NEUROLOGICAL: No headache or numbness, no tremors  MUSCULOSKELETAL: Complaining of bilateral leg pain    Vital Signs Last 24 Hrs  T(C): 37 (08 Mar 2018 00:03), Max: 38.3 (07 Mar 2018 07:29)  T(F): 98.6 (08 Mar 2018 00:03), Max: 100.9 (07 Mar 2018 07:29)  HR: 106 (08 Mar 2018 05:18) (84 - 112)  BP: 150/48 (08 Mar 2018 05:18) (123/57 - 169/64)  BP(mean): --  RR: 20 (08 Mar 2018 00:03) (16 - 20)  SpO2: 96% (08 Mar 2018 00:03) (95% - 99%)  ________________________________________________  PHYSICAL EXAM:  GENERAL: NAD, lying in bed  CHEST/LUNG: Clear to auscultation bilaterally with good air entry. Right peramacath in place   HEART: S1 S2,  tachycardic rate and regular rhythm,; no murmurs  ABDOMEN: Soft, Nontender, Nondistended; Bowel sounds present  EXTREMITIES: no cyanosis; no edema; no calf tenderness  SKIN: tumor along right back and ulceration along left back with wound-dressing partially in place   NERVOUS SYSTEM:  AAOx3; no new focal deficits  _________________________________________________  LABS:                        11.0   48.0  )-----------( 438      ( 07 Mar 2018 01:20 )             37.3     03-07    133<L>  |  94<L>  |  65<H>  ----------------------------<  228<H>  3.7   |  27  |  4.80<H>    Ca    10.2      07 Mar 2018 01:20    TPro  8.3  /  Alb  2.0<L>  /  TBili  0.2  /  DBili  x   /  AST  13  /  ALT  7<L>  /  AlkPhos  256<H>  03-07      Urinalysis Basic - ( 07 Mar 2018 04:30 )    Color: Yellow / Appearance: Slightly Turbid / S.010 / pH: x  Gluc: x / Ketone: Negative  / Bili: Negative / Urobili: Negative   Blood: x / Protein: 500 mg/dL / Nitrite: Negative   Leuk Esterase: Moderate / RBC: 0-2 /HPF / WBC >50 /HPF   Sq Epi: x / Non Sq Epi: Moderate /HPF / Bacteria: Many /HPF      CAPILLARY BLOOD GLUCOSE      POCT Blood Glucose.: 118 mg/dL (07 Mar 2018 21:55)  POCT Blood Glucose.: 141 mg/dL (07 Mar 2018 11:58)    RADIOLOGY & ADDITIONAL TESTS:    Consultant(s) Notes Reviewed:   YES    Care Discussed with Consultants:   Infectious Disease [x] Endocrinology [] Neurology [] ENT [] Cardiology [] Electrophysiology [] Pulmonology [] Gastroenterology [] Nephrology [] Urology [] Orthopaedics [] Vascular Surgery [] Thoracic Surgery [] Plastic Surgery [] General Surgery [x] Podiatry [] Psychiatry [] Hematology/Oncology [] Pain [] Palliative Care []    Plan of care was discussed with patient and/or primary care giver; all questions and concerns were addressed and care was aligned with patient's wishes. PGY 1 Note discussed with supervising resident and primary attending    Patient is a 53y old  Female who presents with a chief complaint of sent from NH due to leukocytosis and fever (07 Mar 2018 21:47)      INTERVAL HPI/OVERNIGHT EVENTS: patient examined at bedside. Patient has no complaints overnight or at my time of examination; however, was complaining of leg pain     Overnight events on telemetry monitoring []    MEDICATIONS  (STANDING):  ascorbic acid 500 milliGRAM(s) Oral daily  cinacalcet 30 milliGRAM(s) Oral daily  collagenase Ointment 1 Application(s) Topical daily  docusate sodium 100 milliGRAM(s) Oral two times a day  ferrous    sulfate 325 milliGRAM(s) Oral daily  folic acid 1 milliGRAM(s) Oral daily  gabapentin 100 milliGRAM(s) Oral three times a day  heparin  Injectable 5000 Unit(s) SubCutaneous every 12 hours  hydrALAZINE 50 milliGRAM(s) Oral three times a day  insulin lispro (HumaLOG) corrective regimen sliding scale   SubCutaneous Before meals and at bedtime  levothyroxine 50 MICROGram(s) Oral daily  meropenem  IVPB 500 milliGRAM(s) IV Intermittent every 24 hours  meropenem  IVPB      metoprolol     tartrate 25 milliGRAM(s) Oral two times a day  multivitamin 1 Tablet(s) Oral daily  pantoprazole    Tablet 40 milliGRAM(s) Oral before breakfast  sevelamer hydrochloride 1600 milliGRAM(s) Oral three times a day with meals  simvastatin 20 milliGRAM(s) Oral at bedtime  vancomycin  IVPB 1000 milliGRAM(s) IV Intermittent <User Schedule>    MEDICATIONS  (PRN):  acetaminophen   Tablet 650 milliGRAM(s) Oral every 6 hours PRN For Temp greater than 38 C (100.4 F)    _______________________________________________  REVIEW OF SYSTEMS:  CONSTITUTIONAL: No fever  RESPIRATORY: No cough; No shortness of breath  CARDIOVASCULAR: No chest pain, no palpitations  GASTROINTESTINAL: No pain. No constipation, nausea or vomiting; No diarrhea   NEUROLOGICAL: No headache or numbness, no tremors  MUSCULOSKELETAL: Complaining of bilateral leg pain    Vital Signs Last 24 Hrs  T(C): 37 (08 Mar 2018 00:03), Max: 38.3 (07 Mar 2018 07:29)  T(F): 98.6 (08 Mar 2018 00:03), Max: 100.9 (07 Mar 2018 07:29)  HR: 106 (08 Mar 2018 05:18) (84 - 112)  BP: 150/48 (08 Mar 2018 05:18) (123/57 - 169/64)  BP(mean): --  RR: 20 (08 Mar 2018 00:03) (16 - 20)  SpO2: 96% (08 Mar 2018 00:03) (95% - 99%)  ________________________________________________  PHYSICAL EXAM:  GENERAL: NAD, lying in bed  CHEST/LUNG: Clear to auscultation bilaterally with good air entry. Right peramacath in place   HEART: S1 S2,  tachycardic rate and regular rhythm,; no murmurs  ABDOMEN: Soft, Nontender, Nondistended; Bowel sounds present  EXTREMITIES: no cyanosis; no edema; no calf tenderness  SKIN: tumor along right back and ulceration along left back with wound-dressing partially in place. Visible abscess along right greater trochanter/sacral region. Clear sacral ulceration, stageable to subcutaneous tissue.   NERVOUS SYSTEM:  AAOx3; no new focal deficits  _________________________________________________  LABS:                        11.0   48.0  )-----------( 438      ( 07 Mar 2018 01:20 )             37.3     03    133<L>  |  94<L>  |  65<H>  ----------------------------<  228<H>  3.7   |  27  |  4.80<H>    Ca    10.2      07 Mar 2018 01:20    TPro  8.3  /  Alb  2.0<L>  /  TBili  0.2  /  DBili  x   /  AST  13  /  ALT  7<L>  /  AlkPhos  256<H>  03-07      Urinalysis Basic - ( 07 Mar 2018 04:30 )    Color: Yellow / Appearance: Slightly Turbid / S.010 / pH: x  Gluc: x / Ketone: Negative  / Bili: Negative / Urobili: Negative   Blood: x / Protein: 500 mg/dL / Nitrite: Negative   Leuk Esterase: Moderate / RBC: 0-2 /HPF / WBC >50 /HPF   Sq Epi: x / Non Sq Epi: Moderate /HPF / Bacteria: Many /HPF      CAPILLARY BLOOD GLUCOSE      POCT Blood Glucose.: 118 mg/dL (07 Mar 2018 21:55)  POCT Blood Glucose.: 141 mg/dL (07 Mar 2018 11:58)    RADIOLOGY & ADDITIONAL TESTS:    Consultant(s) Notes Reviewed:   YES    Care Discussed with Consultants:   Infectious Disease [x] Endocrinology [] Neurology [] ENT [] Cardiology [] Electrophysiology [] Pulmonology [] Gastroenterology [] Nephrology [] Urology [] Orthopaedics [] Vascular Surgery [] Thoracic Surgery [] Plastic Surgery [] General Surgery [x] Podiatry [] Psychiatry [] Hematology/Oncology [] Pain [] Palliative Care []    Plan of care was discussed with patient and/or primary care giver; all questions and concerns were addressed and care was aligned with patient's wishes.

## 2018-03-08 NOTE — CONSULT NOTE ADULT - PROBLEM SELECTOR RECOMMENDATION 3
Follow wound care recommendations per wound care team  No surgical intervention indicated at present time

## 2018-03-08 NOTE — BRIEF OPERATIVE NOTE - PROCEDURE
<<-----Click on this checkbox to enter Procedure Incision and drainage  03/08/2018    Active  DPATERNOSTER

## 2018-03-08 NOTE — ADVANCED PRACTICE NURSE CONSULT - RECOMMEDATIONS
-Clean all wounds with normal saline and apply skin prep to the surrounding skin  -Please consult Surgery for possible Coccyx Debridement and I&D of the R. Hip and Mid Spine wounds  -Pack the Coccyx wound with Dakins Solution soaked gauze and cover with a Foam dressing Daily PRN  -Apply saline moistened gauze to the wound bed and cover with a Foam dressing Daily PRN  -Elevate/float the patients heels using heel protectors and reposition the patient Q 2hrs using wedges or pillows

## 2018-03-08 NOTE — PROGRESS NOTE ADULT - PROBLEM SELECTOR PLAN 1
Potentially secondary to UTI vs. infected sacral ulceration with history of MRSA  Patient sent in from nursing home for evaluation of leukocytosis and fever  Febrile to 100.9 in ED with leukocytosis to 48K  Continue with meropenem and vancomycin  F/U blood and urine cultures  Infectious Disease Dr. Kaplan following Potentially secondary to UTI vs. infected sacral ulceration with history of MRSA  Patient sent in from nursing home for evaluation of leukocytosis and fever  Febrile to 100.9 in ED with leukocytosis to 48K  Continue with meropenem and vancomycin  F/U blood and urine cultures  Infectious Disease Dr. Kaplan following  Will contact general surgery Likely secondary infected sacral ulceration with history of MRSA  Patient sent in from nursing home for evaluation of leukocytosis and fever  Febrile to 100.9 in ED with leukocytosis to 48K  Continue with meropenem and vancomycin  F/U blood and urine cultures  Infectious Disease Dr. Kaplan following  General surgery anticipating performing incision and drainage today 3/8

## 2018-03-08 NOTE — CONSULT NOTE ADULT - SUBJECTIVE AND OBJECTIVE BOX
Patient is a 53y old  Female who presents with a chief complaint of sent from NH due to leukocytosis and fever (07 Mar 2018 21:47)      HPI  Called see and eval 53y.o. bedbound Female w/PMH significant for DM, OM, Cdiff for sacral decubitus ulcer debridement. Pt sent in from NH from leukocytosis of 48k and fever of 100.9F. Upon examination, pt was noted to have swelling of R hip and back, associated with pain. Patient unable to clarify when they started to develop, but do note that they were not present during last admission at end of 2018. Currently on Meropenem.     PAST MEDICAL & SURGICAL HISTORY:  Clostridium difficile infection  Osteomyelitis of jaw  Parathyroid adenoma  Hypothyroidism  CKD (chronic kidney disease)  Anemia  CHF (congestive heart failure)  Diabetic neuropathy  Diabetes mellitus  HTN (hypertension)  S/P :   No Past Surgical History      MEDICATIONS  (STANDING):  ascorbic acid 500 milliGRAM(s) Oral daily  cinacalcet 30 milliGRAM(s) Oral daily  collagenase Ointment 1 Application(s) Topical daily  docusate sodium 100 milliGRAM(s) Oral two times a day  ferrous    sulfate 325 milliGRAM(s) Oral daily  folic acid 1 milliGRAM(s) Oral daily  gabapentin 100 milliGRAM(s) Oral three times a day  heparin  Injectable 5000 Unit(s) SubCutaneous every 12 hours  hydrALAZINE 50 milliGRAM(s) Oral three times a day  insulin lispro (HumaLOG) corrective regimen sliding scale   SubCutaneous Before meals and at bedtime  iron sucrose IVPB 200 milliGRAM(s) IV Intermittent daily  levothyroxine 50 MICROGram(s) Oral daily  meropenem  IVPB 500 milliGRAM(s) IV Intermittent every 24 hours  meropenem  IVPB      metoprolol     tartrate 25 milliGRAM(s) Oral two times a day  multivitamin 1 Tablet(s) Oral daily  pantoprazole    Tablet 40 milliGRAM(s) Oral before breakfast  sevelamer hydrochloride 1600 milliGRAM(s) Oral three times a day with meals  simvastatin 20 milliGRAM(s) Oral at bedtime  vancomycin  IVPB 1000 milliGRAM(s) IV Intermittent <User Schedule>    MEDICATIONS  (PRN):  acetaminophen   Tablet 650 milliGRAM(s) Oral every 6 hours PRN For Temp greater than 38 C (100.4 F)  oxyCODONE    5 mG/acetaminophen 325 mG 2 Tablet(s) Oral every 6 hours PRN Severe Pain (7 - 10)      Allergies    No Known Allergies    Vital Signs Last 24 Hrs  T(C): 37.6 (08 Mar 2018 07:55), Max: 37.7 (07 Mar 2018 17:45)  T(F): 99.7 (08 Mar 2018 07:55), Max: 99.9 (07 Mar 2018 21:44)  HR: 90 (08 Mar 2018 07:55) (90 - 112)  BP: 148/57 (08 Mar 2018 07:55) (123/57 - 154/47)  BP(mean): --  RR: 18 (08 Mar 2018 07:55) (16 - 20)  SpO2: 96% (08 Mar 2018 07:55) (96% - 98%)    Physical:  Gen: NAD    Pelvis: Large approximately 1g2f1av fluctuance on surface of right greater trochanter. Erythematous skin with multiple purulent vesicles. Tender, no active drainage.  [id number] sacral decubitus ulcer to level of muscle. sacrum palpable. Mild fibrinous slough at wound base. No active discharge.     Back: Large approximately 7x9cm erythematous, mildly fluctuant area over left mid back with surrounding induration. Central purulent vesicles. Tender, no active drainage.  4x2x1.5cm fluctuant swelling on right mid back with hyperpigmentation. Mildly tender. No skin break, no active drainage.      LABS:                        9.3    41.8  )-----------( 372      ( 08 Mar 2018 06:06 )             31.5              03-08    143  |  104  |  27<H>  ----------------------------<  100<H>  3.7   |  31  |  2.80<H>    Ca    9.1      08 Mar 2018 06:06  Phos  1.7     03-08  Mg     2.3     03-08    TPro  8.3  /  Alb  2.0<L>  /  TBili  0.2  /  DBili  x   /  AST  13  /  ALT  7<L>  /  AlkPhos  256<H>  03-07              Urinalysis Basic - ( 07 Mar 2018 04:30 )    Color: Yellow / Appearance: Slightly Turbid / S.010 / pH: x  Gluc: x / Ketone: Negative  / Bili: Negative / Urobili: Negative   Blood: x / Protein: 500 mg/dL / Nitrite: Negative   Leuk Esterase: Moderate / RBC: 0-2 /HPF / WBC >50 /HPF   Sq Epi: x / Non Sq Epi: Moderate /HPF / Bacteria: Many /HPF

## 2018-03-08 NOTE — CONSULT NOTE ADULT - PROBLEM SELECTOR RECOMMENDATION 9
OR today for I&D  keep pt NPO  con't abx  ID consult  Medical optimization  f/u culture & sensitivity post op

## 2018-03-08 NOTE — PROGRESS NOTE ADULT - ATTENDING COMMENTS
Patient is seen and examined. Case reviewed with the medical team. Above note is appreciated. Will follow up clinically. Continue DVT prophylaxis. Going for surgical I and D of hip abscess. Continue antibiotics.

## 2018-03-08 NOTE — PROGRESS NOTE ADULT - ASSESSMENT
53F PMHx HFpEF, ESRD on HD, DM, HTN, HLD, Hypothyroidism, Parathyroid adenoma, GERD admitted for sepsis, potentially secondary to UTI vs. infected sacral ulcer

## 2018-03-09 ENCOUNTER — TRANSCRIPTION ENCOUNTER (OUTPATIENT)
Age: 53
End: 2018-03-09

## 2018-03-09 LAB
ANION GAP SERPL CALC-SCNC: 11 MMOL/L — SIGNIFICANT CHANGE UP (ref 5–17)
BASOPHILS # BLD AUTO: 0.1 K/UL — SIGNIFICANT CHANGE UP (ref 0–0.2)
BASOPHILS NFR BLD AUTO: 0.3 % — SIGNIFICANT CHANGE UP (ref 0–2)
BUN SERPL-MCNC: 42 MG/DL — HIGH (ref 7–18)
CALCIUM SERPL-MCNC: 8.3 MG/DL — LOW (ref 8.4–10.5)
CHLORIDE SERPL-SCNC: 101 MMOL/L — SIGNIFICANT CHANGE UP (ref 96–108)
CO2 SERPL-SCNC: 27 MMOL/L — SIGNIFICANT CHANGE UP (ref 22–31)
CREAT SERPL-MCNC: 4.07 MG/DL — HIGH (ref 0.5–1.3)
EOSINOPHIL # BLD AUTO: 0.2 K/UL — SIGNIFICANT CHANGE UP (ref 0–0.5)
EOSINOPHIL NFR BLD AUTO: 0.4 % — SIGNIFICANT CHANGE UP (ref 0–6)
GLUCOSE BLDC GLUCOMTR-MCNC: 100 MG/DL — HIGH (ref 70–99)
GLUCOSE BLDC GLUCOMTR-MCNC: 101 MG/DL — HIGH (ref 70–99)
GLUCOSE BLDC GLUCOMTR-MCNC: 94 MG/DL — SIGNIFICANT CHANGE UP (ref 70–99)
GLUCOSE SERPL-MCNC: 96 MG/DL — SIGNIFICANT CHANGE UP (ref 70–99)
HCT VFR BLD CALC: 29.7 % — LOW (ref 34.5–45)
HGB BLD-MCNC: 8.1 G/DL — LOW (ref 11.5–15.5)
LYMPHOCYTES # BLD AUTO: 3.2 K/UL — SIGNIFICANT CHANGE UP (ref 1–3.3)
LYMPHOCYTES # BLD AUTO: 7.7 % — LOW (ref 13–44)
MCHC RBC-ENTMCNC: 24.8 PG — LOW (ref 27–34)
MCHC RBC-ENTMCNC: 27.3 GM/DL — LOW (ref 32–36)
MCV RBC AUTO: 90.9 FL — SIGNIFICANT CHANGE UP (ref 80–100)
MONOCYTES # BLD AUTO: 2 K/UL — HIGH (ref 0–0.9)
MONOCYTES NFR BLD AUTO: 4.8 % — SIGNIFICANT CHANGE UP (ref 2–14)
NEUTROPHILS # BLD AUTO: 36.4 K/UL — HIGH (ref 1.8–7.4)
NEUTROPHILS NFR BLD AUTO: 86.8 % — HIGH (ref 43–77)
PLATELET # BLD AUTO: 347 K/UL — SIGNIFICANT CHANGE UP (ref 150–400)
POTASSIUM SERPL-MCNC: 4.1 MMOL/L — SIGNIFICANT CHANGE UP (ref 3.5–5.3)
POTASSIUM SERPL-SCNC: 4.1 MMOL/L — SIGNIFICANT CHANGE UP (ref 3.5–5.3)
RBC # BLD: 3.26 M/UL — LOW (ref 3.8–5.2)
RBC # FLD: 19.5 % — HIGH (ref 10.3–14.5)
SODIUM SERPL-SCNC: 139 MMOL/L — SIGNIFICANT CHANGE UP (ref 135–145)
WBC # BLD: 42 K/UL — CRITICAL HIGH (ref 3.8–10.5)
WBC # FLD AUTO: 42 K/UL — CRITICAL HIGH (ref 3.8–10.5)

## 2018-03-09 PROCEDURE — 99233 SBSQ HOSP IP/OBS HIGH 50: CPT | Mod: GC

## 2018-03-09 RX ADMIN — Medication 50 MILLIGRAM(S): at 15:32

## 2018-03-09 RX ADMIN — Medication 250 MILLIGRAM(S): at 13:33

## 2018-03-09 RX ADMIN — Medication 1 MILLIGRAM(S): at 15:31

## 2018-03-09 RX ADMIN — Medication 100 MILLIGRAM(S): at 17:25

## 2018-03-09 RX ADMIN — Medication 50 MICROGRAM(S): at 05:22

## 2018-03-09 RX ADMIN — ERYTHROPOIETIN 4000 UNIT(S): 10000 INJECTION, SOLUTION INTRAVENOUS; SUBCUTANEOUS at 11:47

## 2018-03-09 RX ADMIN — Medication 25 MILLIGRAM(S): at 05:22

## 2018-03-09 RX ADMIN — MORPHINE SULFATE 4 MILLIGRAM(S): 50 CAPSULE, EXTENDED RELEASE ORAL at 15:30

## 2018-03-09 RX ADMIN — Medication 50 MILLIGRAM(S): at 22:49

## 2018-03-09 RX ADMIN — GABAPENTIN 100 MILLIGRAM(S): 400 CAPSULE ORAL at 05:22

## 2018-03-09 RX ADMIN — IRON SUCROSE 110 MILLIGRAM(S): 20 INJECTION, SOLUTION INTRAVENOUS at 12:38

## 2018-03-09 RX ADMIN — CINACALCET 30 MILLIGRAM(S): 30 TABLET, FILM COATED ORAL at 15:30

## 2018-03-09 RX ADMIN — Medication 25 MILLIGRAM(S): at 17:25

## 2018-03-09 RX ADMIN — MEROPENEM 100 MILLIGRAM(S): 1 INJECTION INTRAVENOUS at 17:20

## 2018-03-09 RX ADMIN — PANTOPRAZOLE SODIUM 40 MILLIGRAM(S): 20 TABLET, DELAYED RELEASE ORAL at 05:22

## 2018-03-09 RX ADMIN — Medication 50 MILLIGRAM(S): at 05:22

## 2018-03-09 RX ADMIN — GABAPENTIN 100 MILLIGRAM(S): 400 CAPSULE ORAL at 15:32

## 2018-03-09 RX ADMIN — SIMVASTATIN 20 MILLIGRAM(S): 20 TABLET, FILM COATED ORAL at 22:49

## 2018-03-09 RX ADMIN — Medication 650 MILLIGRAM(S): at 05:21

## 2018-03-09 RX ADMIN — Medication 500 MILLIGRAM(S): at 15:30

## 2018-03-09 RX ADMIN — HEPARIN SODIUM 5000 UNIT(S): 5000 INJECTION INTRAVENOUS; SUBCUTANEOUS at 05:24

## 2018-03-09 RX ADMIN — Medication 1 APPLICATION(S): at 15:31

## 2018-03-09 RX ADMIN — Medication 100 MILLIGRAM(S): at 05:22

## 2018-03-09 RX ADMIN — MORPHINE SULFATE 4 MILLIGRAM(S): 50 CAPSULE, EXTENDED RELEASE ORAL at 15:45

## 2018-03-09 RX ADMIN — Medication 1 TABLET(S): at 15:31

## 2018-03-09 RX ADMIN — Medication 650 MILLIGRAM(S): at 17:19

## 2018-03-09 RX ADMIN — GABAPENTIN 100 MILLIGRAM(S): 400 CAPSULE ORAL at 22:49

## 2018-03-09 RX ADMIN — HEPARIN SODIUM 5000 UNIT(S): 5000 INJECTION INTRAVENOUS; SUBCUTANEOUS at 17:25

## 2018-03-09 NOTE — CONSULT NOTE ADULT - CONSULT REASON
Right hip/ sacral abscesses - s/p I&D, fevers, leukocytosis Right hip/ sacral abscesses - s/p I&D, fevers, leukocytosis, UTI Right hip/ back abscesses - s/p I&D, fevers, leukocytosis, UTI

## 2018-03-09 NOTE — PROGRESS NOTE ADULT - PROBLEM SELECTOR PLAN 1
Likely secondary infected sacral abscess with history of MRSA  Patient febrile to 100.9 in ED with leukocytosis to 48K  S/p incision and drainage 3/8  Continue with meropenem and vancomycin  F/U blood, wound, and urine cultures  Infectious Disease Dr. Kaplan following  General surgery Dr. Wright following

## 2018-03-09 NOTE — CONSULT NOTE ADULT - SUBJECTIVE AND OBJECTIVE BOX
NOT COMPLETE      HPI:  ID consult was called to evaluate this 54 y/o female from NH for fevers and right hip/ sacral abscesses.  Patient has multiple medical conditions and was sent by facility on 3/07 for leukocytosis and fevers. In ED, found to have temp of 100.6F and wbc count of 48K. Underwent I&D of right hip and 2 back abscesses yesterday and specimen was sent for culture. Wbc count is still high and fevers are persistent.     As per H&P:  54 y/o F from [place] mostly bedbound  w/ long term care 2/2 difficulty walking possibly 2/2 polyneuropathy  w/ PMH  of anemia, HF pEF, ESRD on HD, Clostridium difficile infection, DM, Diabetic neuropathy, HTN, HLD, Hypothyroidism, OM of jaw, and Parathyroid adenoma, GERD  was sent from NH due to leukocytosis and fever. Pt was found to have leukocytosis 48K.  In ED pt was found to have low-grade fever 100.9. Possible sources of infection UA and sacral ulcer.  Pt denies cough, nausea, CP, SOB, N/V, abdominal pain or any other complaint. Pt poor historian. (07 Mar 2018 11:31)    REVIEW OF SYSTEMS:  [  ] Not able to illicit  General:	  Chest:	  GI:	  :  Skin:	  Musculoskeletal:	  Neuro:    PAST MEDICAL & SURGICAL HISTORY:  Clostridium difficile infection  Osteomyelitis of jaw  Parathyroid adenoma  Hypothyroidism  CKD (chronic kidney disease)  Anemia  CHF (congestive heart failure)  Diabetic neuropathy  Diabetes mellitus  HTN (hypertension)  S/P :   No Past Surgical History    ALLERGIES: No Known Allergies    MEDS:  acetaminophen   Tablet 650 milliGRAM(s) Oral every 6 hours PRN  ascorbic acid 500 milliGRAM(s) Oral daily  cinacalcet 30 milliGRAM(s) Oral daily  collagenase Ointment 1 Application(s) Topical daily  docusate sodium 100 milliGRAM(s) Oral two times a day  epoetin jacqueline Injectable 4000 Unit(s) IV Push <User Schedule>  folic acid 1 milliGRAM(s) Oral daily  gabapentin 100 milliGRAM(s) Oral three times a day  heparin  Injectable 5000 Unit(s) SubCutaneous every 12 hours  hydrALAZINE 50 milliGRAM(s) Oral three times a day  insulin lispro (HumaLOG) corrective regimen sliding scale   SubCutaneous Before meals and at bedtime  levothyroxine 50 MICROGram(s) Oral daily  meropenem  IVPB 500 milliGRAM(s) IV Intermittent every 24 hours (3/07)  metoprolol     tartrate 25 milliGRAM(s) Oral two times a day  morphine  - Injectable 4 milliGRAM(s) IV Push every 4 hours PRN  multivitamin 1 Tablet(s) Oral daily  oxyCODONE    5 mG/acetaminophen 325 mG 1 Tablet(s) Oral every 4 hours PRN  pantoprazole    Tablet 40 milliGRAM(s) Oral before breakfast  simvastatin 20 milliGRAM(s) Oral at bedtime  vancomycin  IVPB 1000 milliGRAM(s) IV Intermittent <User Schedule> (3/07)    SOCIAL HISTORY:  Smoker:  unknown     FAMILY HISTORY:  Family history of stroke  Family history of hypertension (Sibling)  Family history of diabetes mellitus    VITALS:  Vital Signs Last 24 Hrs  T(C): 36.9 (09 Mar 2018 15:01), Max: 38.7 (09 Mar 2018 05:15)  T(F): 98.4 (09 Mar 2018 15:01), Max: 101.7 (09 Mar 2018 05:15)  HR: 95 (09 Mar 2018 15:01) (80 - 102)  BP: 139/49 (09 Mar 2018 15:01) (127/49 - 166/55)  BP(mean): 74 (08 Mar 2018 16:37) (71 - 77)  RR: 16 (09 Mar 2018 15:01) (12 - 20)  SpO2: 100% (09 Mar 2018 15:01) (94% - 100%)      PHYSICAL EXAM:  Constitutional:  HEENT:  Neck:  Respiratory:  Cardiovascular:  Gastrointestinal:  Extremities:  Skin:  Ortho:  Neuro:      LABS/DIAGNOSTIC TESTS:                        8.1    42.0  )-----------( 347      ( 09 Mar 2018 11:20 )             29.7     WBC Count: 42.0 K/uL ( @ 11:20)  WBC Count: 41.8 K/uL ( @ 06:06)  WBC Count: 48.0 K/uL ( @ 01:20)        139  |  101  |  42<H>  ----------------------------<  96  4.1   |  27  |  4.07<H>    Ca    8.3<L>      09 Mar 2018 11:20  Phos  1.7       Mg     2.3         Lactate, Blood: 1.0 mmol/L (18 @ 10:55)    Rapid Respiratory Viral Panel (18 @ 02:38)    Rapid RVP Result: Not[facility]        CULTURES:   3/08 all surgical cultures - pending    .Blood Dialysis Catheter   @ 23:03   No growth to date.  --  --      .Blood Blood-Peripheral   @ 09:43   No growth to date.  --  --      .Urine Catheterized   @ 09:38   <10,000 CFU/ml Normal Urogenital derrick present  --  --        RADIOLOGY:  EXAM:  XR CHEST AP OR PA 1V                        PROCEDURE DATE:  2018    INTERPRETATION:  PROCEDURE: AP view of the chest.    CLINICAL INFORMATION: Cough    COMPARISON: 2018    FINDINGS:     A right dialysis catheter is noted.    There are no lung consolidations. The cardiac and mediastinal contours   are prominent, which may be due to magnification from AP technique and   shallow inspiration. The osseous structures are intact.    IMPRESSION:    No lung consolidations HPI:  ID consult was called to evaluate this 54 y/o female from NH for fevers, UTI, and right hip/ sacral abscesses.  Patient has multiple medical conditions and was sent by facility on 3/07 for leukocytosis and fevers. In ED, found to have temp of 100.6F and wbc count of 48K. Underwent I&D of right hip and 2 back abscesses yesterday and specimen was sent for culture. Wbc count is still high and fevers are persistent. Patient had HD this am. Currently doing well and has no significant complaints at this time.    As per H&P:  54 y/o F from [place] mostly bedbound  w/ long term care 2/2 difficulty walking possibly 2/2 polyneuropathy  w/ PMH  of anemia, HF pEF, ESRD on HD, Clostridium difficile infection, DM, Diabetic neuropathy, HTN, HLD, Hypothyroidism, OM of jaw, and Parathyroid adenoma, GERD  was sent from NH due to leukocytosis and fever. Pt was found to have leukocytosis 48K.  In ED pt was found to have low-grade fever 100.9. Possible sources of infection UA and sacral ulcer.  Pt denies cough, nausea, CP, SOB, N/V, abdominal pain or any other complaint. Pt poor historian. (07 Mar 2018 11:31)    REVIEW OF SYSTEMS:  [  ] Not able to illicit  General: no fevers no malaise  Chest: no cough no sob  GI: no nvd  : no urinary sxs   Skin: no rashes  Musculoskeletal: +LBP  Neuro: no ha's no dizziness       PAST MEDICAL & SURGICAL HISTORY:  Clostridium difficile infection  Osteomyelitis of jaw  Parathyroid adenoma  Hypothyroidism  CKD (chronic kidney disease)  Anemia  CHF (congestive heart failure)  Diabetic neuropathy  Diabetes mellitus  HTN (hypertension)  S/P : 1987  No Past Surgical History    ALLERGIES: No Known Allergies    MEDS:  acetaminophen   Tablet 650 milliGRAM(s) Oral every 6 hours PRN  ascorbic acid 500 milliGRAM(s) Oral daily  cinacalcet 30 milliGRAM(s) Oral daily  collagenase Ointment 1 Application(s) Topical daily  docusate sodium 100 milliGRAM(s) Oral two times a day  epoetin jacqueline Injectable 4000 Unit(s) IV Push <User Schedule>  folic acid 1 milliGRAM(s) Oral daily  gabapentin 100 milliGRAM(s) Oral three times a day  heparin  Injectable 5000 Unit(s) SubCutaneous every 12 hours  hydrALAZINE 50 milliGRAM(s) Oral three times a day  insulin lispro (HumaLOG) corrective regimen sliding scale   SubCutaneous Before meals and at bedtime  levothyroxine 50 MICROGram(s) Oral daily  meropenem  IVPB 500 milliGRAM(s) IV Intermittent every 24 hours (3/07)  metoprolol     tartrate 25 milliGRAM(s) Oral two times a day  morphine  - Injectable 4 milliGRAM(s) IV Push every 4 hours PRN  multivitamin 1 Tablet(s) Oral daily  oxyCODONE    5 mG/acetaminophen 325 mG 1 Tablet(s) Oral every 4 hours PRN  pantoprazole    Tablet 40 milliGRAM(s) Oral before breakfast  simvastatin 20 milliGRAM(s) Oral at bedtime  vancomycin  IVPB 1000 milliGRAM(s) IV Intermittent <User Schedule> (3/07)    SOCIAL HISTORY:  Smoker:  unknown     FAMILY HISTORY:  Family history of stroke  Family history of hypertension (Sibling)  Family history of diabetes mellitus    VITALS:  Vital Signs Last 24 Hrs  T(C): 36.9 (09 Mar 2018 15:01), Max: 38.7 (09 Mar 2018 05:15)  T(F): 98.4 (09 Mar 2018 15:01), Max: 101.7 (09 Mar 2018 05:15)  HR: 95 (09 Mar 2018 15:01) (80 - 102)  BP: 139/49 (09 Mar 2018 15:01) (127/49 - 166/55)  BP(mean): 74 (08 Mar 2018 16:37) (71 - 77)  RR: 16 (09 Mar 2018 15:01) (12 - 20)  SpO2: 100% (09 Mar 2018 15:01) (94% - 100%)      PHYSICAL EXAM:  Constitutional:  HEENT: normocephalic with moist oral mucosa  Neck: supple no LN's no JVD  Respiratory: diffuse bilateral rales midlung to bases no rhonchi  +right chest P-C  Cardiovascular: S1 S2 reg no murmurs  Gastrointestinal: +BS with soft, mildly distended abdomen; nontender  +jarrell with pus in tubing  Extremities: no edema no cyanosis  Skin: right hip and 2 back wounds with bloody packing and erythematous borders  Ortho: no jt swelling  Neuro: AAO x 2      LABS/DIAGNOSTIC TESTS:                        8.1    42.0  )-----------( 347      ( 09 Mar 2018 11:20 )             29.7     WBC Count: 42.0 K/uL ( @ 11:20)  WBC Count: 41.8 K/uL ( @ 06:06)  WBC Count: 48.0 K/uL ( @ 01:20)        139  |  101  |  42<H>  ----------------------------<  96  4.1   |  27  |  4.07<H>    Ca    8.3<L>      09 Mar 2018 11:20  Phos  1.7       Mg     2.3         Lactate, Blood: 1.0 mmol/L (18 @ 10:55)    Rapid Respiratory Viral Panel (18 @ 02:38)    Rapid RVP Result: [facility]        CULTURES:   3/08 all surgical cultures - pending    .Blood Dialysis Catheter   @ 23:03   No growth to date.  --  --      .Blood Blood-Peripheral   @ 09:43   No growth to date.  --  --      .Urine Catheterized   @ 09:38   <10,000 CFU/ml Normal Urogenital derrick present  --  --        RADIOLOGY:  EXAM:  XR CHEST AP OR PA 1V                        PROCEDURE DATE:  2018    INTERPRETATION:  PROCEDURE: AP view of the chest.    CLINICAL INFORMATION: Cough    COMPARISON: 2018    FINDINGS:     A right dialysis catheter is noted.    There are no lung consolidations. The cardiac and mediastinal contours   are prominent, which may be due to magnification from AP technique and   shallow inspiration. The osseous structures are intact.    IMPRESSION:    No lung consolidations HPI:  ID consult was called to evaluate this 52 y/o female from NH for fevers, UTI, and right hip/ back abscesses.  Patient has multiple medical conditions and was sent by facility on 3/07 for leukocytosis and fevers. In ED, found to have temp of 100.6F and wbc count of 48K. Underwent I&D of right hip and 2 back abscesses yesterday and specimen was sent for culture. Wbc count is still high and fevers are persistent. Patient had HD this am. Currently doing well and has no significant complaints at this time.    As per H&P:  52 y/o F from [place] mostly bedbound  w/ long term care 2/2 difficulty walking possibly 2/2 polyneuropathy  w/ PMH  of anemia, HF pEF, ESRD on HD, Clostridium difficile infection, DM, Diabetic neuropathy, HTN, HLD, Hypothyroidism, OM of jaw, and Parathyroid adenoma, GERD  was sent from NH due to leukocytosis and fever. Pt was found to have leukocytosis 48K.  In ED pt was found to have low-grade fever 100.9. Possible sources of infection UA and sacral ulcer.  Pt denies cough, nausea, CP, SOB, N/V, abdominal pain or any other complaint. Pt poor historian. (07 Mar 2018 11:31)    REVIEW OF SYSTEMS:  [  ] Not able to illicit  General: no fevers no malaise  Chest: no cough no sob  GI: no nvd  : no urinary sxs   Skin: no rashes  Musculoskeletal: +LBP  Neuro: no ha's no dizziness       PAST MEDICAL & SURGICAL HISTORY:  Clostridium difficile infection  Osteomyelitis of jaw  Parathyroid adenoma  Hypothyroidism  CKD (chronic kidney disease)  Anemia  CHF (congestive heart failure)  Diabetic neuropathy  Diabetes mellitus  HTN (hypertension)  S/P : 1987  No Past Surgical History    ALLERGIES: No Known Allergies    MEDS:  acetaminophen   Tablet 650 milliGRAM(s) Oral every 6 hours PRN  ascorbic acid 500 milliGRAM(s) Oral daily  cinacalcet 30 milliGRAM(s) Oral daily  collagenase Ointment 1 Application(s) Topical daily  docusate sodium 100 milliGRAM(s) Oral two times a day  epoetin jacqueline Injectable 4000 Unit(s) IV Push <User Schedule>  folic acid 1 milliGRAM(s) Oral daily  gabapentin 100 milliGRAM(s) Oral three times a day  heparin  Injectable 5000 Unit(s) SubCutaneous every 12 hours  hydrALAZINE 50 milliGRAM(s) Oral three times a day  insulin lispro (HumaLOG) corrective regimen sliding scale   SubCutaneous Before meals and at bedtime  levothyroxine 50 MICROGram(s) Oral daily  meropenem  IVPB 500 milliGRAM(s) IV Intermittent every 24 hours (3/07)  metoprolol     tartrate 25 milliGRAM(s) Oral two times a day  morphine  - Injectable 4 milliGRAM(s) IV Push every 4 hours PRN  multivitamin 1 Tablet(s) Oral daily  oxyCODONE    5 mG/acetaminophen 325 mG 1 Tablet(s) Oral every 4 hours PRN  pantoprazole    Tablet 40 milliGRAM(s) Oral before breakfast  simvastatin 20 milliGRAM(s) Oral at bedtime  vancomycin  IVPB 1000 milliGRAM(s) IV Intermittent <User Schedule> (3/07)    SOCIAL HISTORY:  Smoker:  unknown     FAMILY HISTORY:  Family history of stroke  Family history of hypertension (Sibling)  Family history of diabetes mellitus    VITALS:  Vital Signs Last 24 Hrs  T(C): 36.9 (09 Mar 2018 15:01), Max: 38.7 (09 Mar 2018 05:15)  T(F): 98.4 (09 Mar 2018 15:01), Max: 101.7 (09 Mar 2018 05:15)  HR: 95 (09 Mar 2018 15:01) (80 - 102)  BP: 139/49 (09 Mar 2018 15:01) (127/49 - 166/55)  BP(mean): 74 (08 Mar 2018 16:37) (71 - 77)  RR: 16 (09 Mar 2018 15:01) (12 - 20)  SpO2: 100% (09 Mar 2018 15:01) (94% - 100%)      PHYSICAL EXAM:  Constitutional:  HEENT: normocephalic with moist oral mucosa  Neck: supple no LN's no JVD  Respiratory: diffuse bilateral rales midlung to bases no rhonchi  +right chest P-C  Cardiovascular: S1 S2 reg no murmurs  Gastrointestinal: +BS with soft, mildly distended abdomen; nontender  +jarrell with pus in tubing  Extremities: no edema no cyanosis  Skin: right hip and 2 upper back wounds with bloody packing and erythematous borders  Ortho: no jt swelling  Neuro: AAO x 2      LABS/DIAGNOSTIC TESTS:                        8.1    42.0  )-----------( 347      ( 09 Mar 2018 11:20 )             29.7     WBC Count: 42.0 K/uL ( @ 11:20)  WBC Count: 41.8 K/uL ( @ 06:06)  WBC Count: 48.0 K/uL ( @ 01:20)        139  |  101  |  42<H>  ----------------------------<  96  4.1   |  27  |  4.07<H>    Ca    8.3<L>      09 Mar 2018 11:20  Phos  1.7       Mg     2.3         Lactate, Blood: 1.0 mmol/L (18 @ 10:55)    Rapid Respiratory Viral Panel (18 @ 02:38)    Rapid RVP Result: [facility]        CULTURES:   3/08 all surgical cultures - pending    .Blood Dialysis Catheter   @ 23:03   No growth to date.  --  --      .Blood Blood-Peripheral   @ 09:43   No growth to date.  --  --      .Urine Catheterized   @ 09:38   <10,000 CFU/ml Normal Urogenital derrick present  --  --        RADIOLOGY:  EXAM:  XR CHEST AP OR PA 1V                        PROCEDURE DATE:  2018    INTERPRETATION:  PROCEDURE: AP view of the chest.    CLINICAL INFORMATION: Cough    COMPARISON: 2018    FINDINGS:     A right dialysis catheter is noted.    There are no lung consolidations. The cardiac and mediastinal contours   are prominent, which may be due to magnification from AP technique and   shallow inspiration. The osseous structures are intact.    IMPRESSION:    No lung consolidations

## 2018-03-09 NOTE — PROGRESS NOTE ADULT - SUBJECTIVE AND OBJECTIVE BOX
pt s- complaints  bp stable tmaz 101 now a/f  Upper back: sound s- purulence or bleed.  mid back wound:s- purulence or bleed  sacral wound: fibrinous exudate, some granulation tissue. no fluctuance.  right hip wound: granulation tissue. no bleed.    wounds packed with sterile gauze wet to dry and covered with mepilex

## 2018-03-09 NOTE — PROGRESS NOTE ADULT - ATTENDING COMMENTS
Covering Dr. Gomez:  Patient was examined at the bedside.  Plan of care was discussed with Dr. Turpin.     52 yo referred from NH with a complaint of fever with leukocytosis.   Patient has ESRD and has been dialyzed via permacath.  She had multiple palpable collections on her back, which were drained in OR yesterday.   Surgeon, Dr. Wright, has sent specimens for histology and culture of the three lesions that were drained.   She still c/o pain in her right thigh.     Alert, responsive 52 yo   Vital Signs Last 24 Hrs  T(C): 36.9 (09 Mar 2018 19:12), Max: 39.3 (09 Mar 2018 16:59)  T(F): 98.4 (09 Mar 2018 19:12), Max: 102.8 (09 Mar 2018 16:59)  HR: 109 (09 Mar 2018 15:53) (80 - 109)  BP: 144/60 (09 Mar 2018 15:53) (127/49 - 152/98)  BP(mean): --  RR: 16 (09 Mar 2018 15:53) (16 - 20)  SpO2: 99% (09 Mar 2018 15:53) (95% - 100%)  Multiple palpable collections on her back, three of which were unroofed in the ED  Lungs, bilateral air entry  Cor, RRR  Abdomen, soft                        8.1    42.0  )-----------( 347      ( 09 Mar 2018 11:20 )             29.7     03-09    139  |  101  |  42<H>  ----------------------------<  96  4.1   |  27  |  4.07<H>    Ca    8.3<L>      09 Mar 2018 11:20  Phos  1.7     03-08  Mg     2.3     03-08    Specimen Source: .Blood Dialysis Catheter (03.07.18 @ 23:03)  Specimen Source: .Blood Blood-Peripheral (03.07.18 @ 09:43)    IMP:  Multiple collections are likely abscesses.  Most likely etiology is CAMRSA, but others remain in the ddx.           ESRD, on dialysis          Leukocytosis may be related to a loculated collection  Plan: Continue wound care, IV antibiotics          Will consider Indium labelled leukocyte scan to seek undrained collections should leukocytosis persist.           f/u blood cultures and abscess cultures, which are negative, to date.

## 2018-03-09 NOTE — PROGRESS NOTE ADULT - SUBJECTIVE AND OBJECTIVE BOX
PGY 1 Note discussed with supervising resident and primary attending    Patient is a 53y old  Female who presents with a chief complaint of sent from NH due to leukocytosis and fever (07 Mar 2018 21:47)      INTERVAL HPI/OVERNIGHT EVENTS: patient examined at bedside. He/She has no complaints and current symptoms are resolving    Overnight events on telemetry monitoring []    MEDICATIONS  (STANDING):  ascorbic acid 500 milliGRAM(s) Oral daily  cinacalcet 30 milliGRAM(s) Oral daily  collagenase Ointment 1 Application(s) Topical daily  docusate sodium 100 milliGRAM(s) Oral two times a day  epoetin jacqueline Injectable 4000 Unit(s) IV Push <User Schedule>  folic acid 1 milliGRAM(s) Oral daily  gabapentin 100 milliGRAM(s) Oral three times a day  heparin  Injectable 5000 Unit(s) SubCutaneous every 12 hours  hydrALAZINE 50 milliGRAM(s) Oral three times a day  insulin lispro (HumaLOG) corrective regimen sliding scale   SubCutaneous Before meals and at bedtime  iron sucrose IVPB 200 milliGRAM(s) IV Intermittent daily  levothyroxine 50 MICROGram(s) Oral daily  meropenem  IVPB 500 milliGRAM(s) IV Intermittent every 24 hours  metoprolol     tartrate 25 milliGRAM(s) Oral two times a day  multivitamin 1 Tablet(s) Oral daily  pantoprazole    Tablet 40 milliGRAM(s) Oral before breakfast  sevelamer hydrochloride 1600 milliGRAM(s) Oral three times a day with meals  simvastatin 20 milliGRAM(s) Oral at bedtime  vancomycin  IVPB 1000 milliGRAM(s) IV Intermittent <User Schedule>    MEDICATIONS  (PRN):  acetaminophen   Tablet 650 milliGRAM(s) Oral every 6 hours PRN For Temp greater than 38 C (100.4 F)  morphine  - Injectable 4 milliGRAM(s) IV Push every 4 hours PRN Severe Pain  oxyCODONE    5 mG/acetaminophen 325 mG 1 Tablet(s) Oral every 4 hours PRN Moderate Pain    _______________________________________________  REVIEW OF SYSTEMS:  CONSTITUTIONAL: No fever  NECK: No pain or stiffness  RESPIRATORY: No cough; No shortness of breath  CARDIOVASCULAR: No chest pain, no palpitations  GASTROINTESTINAL: No pain. No constipation, nausea or vomiting; No diarrhea   NEUROLOGICAL: No headache or numbness, no tremors  MUSCULOSKELETAL: No joint pain, no muscle pain  GENITOURINARY: no dysuria, no frequency, no hesitancy  PSYCHIATRY: no depression , no anxiety    Vital Signs Last 24 Hrs  T(C): 37.8 (08 Mar 2018 23:43), Max: 37.8 (08 Mar 2018 23:43)  T(F): 100 (08 Mar 2018 23:43), Max: 100 (08 Mar 2018 23:43)  HR: 87 (08 Mar 2018 23:43) (80 - 106)  BP: 129/44 (08 Mar 2018 23:43) (127/49 - 166/55)  BP(mean): 74 (08 Mar 2018 16:37) (71 - 77)  RR: 18 (08 Mar 2018 23:43) (12 - 20)  SpO2: 95% (08 Mar 2018 23:43) (94% - 100%)  ________________________________________________  PHYSICAL EXAM:  GENERAL: NAD, lying in bed  HEENT: Normocephalic;  conjunctivae and sclerae clear; moist mucous membranes  NECK : supple, trachea midline, no JVD  CHEST/LUNG: Clear to auscultation bilaterally with good air entry   HEART: S1 S2,  regular rate and rhythm,; no murmurs  ABDOMEN: Soft, Nontender, Nondistended; Bowel sounds present  EXTREMITIES: no cyanosis; no edema; no calf tenderness  SKIN: no rash  NERVOUS SYSTEM:  AAOx3; no new focal deficits  _________________________________________________  LABS:                        9.3    41.8  )-----------( 372      ( 08 Mar 2018 06:06 )             31.5     03-08    143  |  104  |  27<H>  ----------------------------<  100<H>  3.7   |  31  |  2.80<H>    Ca    9.1      08 Mar 2018 06:06  Phos  1.7     03-08  Mg     2.3     03-08    TPro  8.3  /  Alb  2.0<L>  /  TBili  0.2  /  DBili  x   /  AST  13  /  ALT  7<L>  /  AlkPhos  256<H>  03-      Urinalysis Basic - ( 07 Mar 2018 04:30 )    Color: Yellow / Appearance: Slightly Turbid / S.010 / pH: x  Gluc: x / Ketone: Negative  / Bili: Negative / Urobili: Negative   Blood: x / Protein: 500 mg/dL / Nitrite: Negative   Leuk Esterase: Moderate / RBC: 0-2 /HPF / WBC >50 /HPF   Sq Epi: x / Non Sq Epi: Moderate /HPF / Bacteria: Many /HPF      CAPILLARY BLOOD GLUCOSE      POCT Blood Glucose.: 90 mg/dL (08 Mar 2018 21:37)  POCT Blood Glucose.: 86 mg/dL (08 Mar 2018 15:45)  POCT Blood Glucose.: 109 mg/dL (08 Mar 2018 11:23)  POCT Blood Glucose.: 117 mg/dL (08 Mar 2018 08:25)    RADIOLOGY & ADDITIONAL TESTS:    Consultant(s) Notes Reviewed:   YES    Care Discussed with Consultants:   Infectious Disease [x] Endocrinology [] Neurology [] ENT [] Cardiology [] Electrophysiology [] Pulmonology [] Gastroenterology [] Nephrology [x] Urology [] Orthopaedics [] Vascular Surgery [] Thoracic Surgery [] Plastic Surgery [] General Surgery [x] Podiatry [] Psychiatry [] Hematology/Oncology [] Pain [] Palliative Care []    Plan of care was discussed with patient and/or primary care giver; all questions and concerns were addressed and care was aligned with patient's wishes. PGY 1 Note discussed with supervising resident and primary attending    Patient is a 53y old  Female who presents with a chief complaint of sent from NH due to leukocytosis and fever (07 Mar 2018 21:47)      INTERVAL HPI/OVERNIGHT EVENTS: patient examined at bedside. Patient reports having a fever overnight, however, states that she feels significantly better since yesterday. She has no complaints other than pain in her right thigh area.     Overnight events on telemetry monitoring []    MEDICATIONS  (STANDING):  ascorbic acid 500 milliGRAM(s) Oral daily  cinacalcet 30 milliGRAM(s) Oral daily  collagenase Ointment 1 Application(s) Topical daily  docusate sodium 100 milliGRAM(s) Oral two times a day  epoetin jacqueline Injectable 4000 Unit(s) IV Push <User Schedule>  folic acid 1 milliGRAM(s) Oral daily  gabapentin 100 milliGRAM(s) Oral three times a day  heparin  Injectable 5000 Unit(s) SubCutaneous every 12 hours  hydrALAZINE 50 milliGRAM(s) Oral three times a day  insulin lispro (HumaLOG) corrective regimen sliding scale   SubCutaneous Before meals and at bedtime  iron sucrose IVPB 200 milliGRAM(s) IV Intermittent daily  levothyroxine 50 MICROGram(s) Oral daily  meropenem  IVPB 500 milliGRAM(s) IV Intermittent every 24 hours  metoprolol     tartrate 25 milliGRAM(s) Oral two times a day  multivitamin 1 Tablet(s) Oral daily  pantoprazole    Tablet 40 milliGRAM(s) Oral before breakfast  sevelamer hydrochloride 1600 milliGRAM(s) Oral three times a day with meals  simvastatin 20 milliGRAM(s) Oral at bedtime  vancomycin  IVPB 1000 milliGRAM(s) IV Intermittent <User Schedule>    MEDICATIONS  (PRN):  acetaminophen   Tablet 650 milliGRAM(s) Oral every 6 hours PRN For Temp greater than 38 C (100.4 F)  morphine  - Injectable 4 milliGRAM(s) IV Push every 4 hours PRN Severe Pain  oxyCODONE    5 mG/acetaminophen 325 mG 1 Tablet(s) Oral every 4 hours PRN Moderate Pain    _______________________________________________  REVIEW OF SYSTEMS:  CONSTITUTIONAL: Reports fever  RESPIRATORY: No cough; No shortness of breath  CARDIOVASCULAR: No chest pain, no palpitations  GASTROINTESTINAL: No pain. No constipation, nausea or vomiting; No diarrhea   NEUROLOGICAL: No headache or numbness, no tremors  MUSCULOSKELETAL: No joint pain, reports pain over right trochanter area    Vital Signs Last 24 Hrs  T(C): 37.8 (08 Mar 2018 23:43), Max: 37.8 (08 Mar 2018 23:43)  T(F): 100 (08 Mar 2018 23:43), Max: 100 (08 Mar 2018 23:43)  HR: 87 (08 Mar 2018 23:43) (80 - 106)  BP: 129/44 (08 Mar 2018 23:43) (127/49 - 166/55)  BP(mean): 74 (08 Mar 2018 16:37) (71 - 77)  RR: 18 (08 Mar 2018 23:43) (12 - 20)  SpO2: 95% (08 Mar 2018 23:43) (94% - 100%)  ________________________________________________  PHYSICAL EXAM:  GENERAL: NAD, lying in bed  CHEST/LUNG: Clear to auscultation bilaterally with good air entry   HEART: S1 S2,  tachycardic rate and regular rhythm,; no murmurs  ABDOMEN: Soft, Nontender, Nondistended; Bowel sounds present  EXTREMITIES: wound dressing in place over right trochanter region - mild blood tinged soaking dressing  NERVOUS SYSTEM:  AAOx3; no new focal deficits  _________________________________________________  LABS:                        9.3    41.8  )-----------( 372      ( 08 Mar 2018 06:06 )             31.5     03-08    143  |  104  |  27<H>  ----------------------------<  100<H>  3.7   |  31  |  2.80<H>    Ca    9.1      08 Mar 2018 06:06  Phos  1.7     03-08  Mg     2.3     03-08    TPro  8.3  /  Alb  2.0<L>  /  TBili  0.2  /  DBili  x   /  AST  13  /  ALT  7<L>  /  AlkPhos  256<H>        Urinalysis Basic - ( 07 Mar 2018 04:30 )    Color: Yellow / Appearance: Slightly Turbid / S.010 / pH: x  Gluc: x / Ketone: Negative  / Bili: Negative / Urobili: Negative   Blood: x / Protein: 500 mg/dL / Nitrite: Negative   Leuk Esterase: Moderate / RBC: 0-2 /HPF / WBC >50 /HPF   Sq Epi: x / Non Sq Epi: Moderate /HPF / Bacteria: Many /HPF      CAPILLARY BLOOD GLUCOSE      POCT Blood Glucose.: 90 mg/dL (08 Mar 2018 21:37)  POCT Blood Glucose.: 86 mg/dL (08 Mar 2018 15:45)  POCT Blood Glucose.: 109 mg/dL (08 Mar 2018 11:23)  POCT Blood Glucose.: 117 mg/dL (08 Mar 2018 08:25)    RADIOLOGY & ADDITIONAL TESTS:    Consultant(s) Notes Reviewed:   YES    Care Discussed with Consultants:   Infectious Disease [x] Endocrinology [] Neurology [] ENT [] Cardiology [] Electrophysiology [] Pulmonology [] Gastroenterology [] Nephrology [x] Urology [] Orthopaedics [] Vascular Surgery [] Thoracic Surgery [] Plastic Surgery [] General Surgery [x] Podiatry [] Psychiatry [] Hematology/Oncology [] Pain [] Palliative Care []    Plan of care was discussed with patient and/or primary care giver; all questions and concerns were addressed and care was aligned with patient's wishes.

## 2018-03-09 NOTE — PROGRESS NOTE ADULT - SUBJECTIVE AND OBJECTIVE BOX
seen on HD. C/O RT hip pain.    No Known Allergies    Hospital Medications:   MEDICATIONS  (STANDING):  ascorbic acid 500 milliGRAM(s) Oral daily  cinacalcet 30 milliGRAM(s) Oral daily  collagenase Ointment 1 Application(s) Topical daily  docusate sodium 100 milliGRAM(s) Oral two times a day  epoetin jacqueline Injectable 4000 Unit(s) IV Push <User Schedule>  folic acid 1 milliGRAM(s) Oral daily  gabapentin 100 milliGRAM(s) Oral three times a day  heparin  Injectable 5000 Unit(s) SubCutaneous every 12 hours  hydrALAZINE 50 milliGRAM(s) Oral three times a day  insulin lispro (HumaLOG) corrective regimen sliding scale   SubCutaneous Before meals and at bedtime  iron sucrose IVPB 200 milliGRAM(s) IV Intermittent daily  levothyroxine 50 MICROGram(s) Oral daily  meropenem  IVPB 500 milliGRAM(s) IV Intermittent every 24 hours  metoprolol     tartrate 25 milliGRAM(s) Oral two times a day  multivitamin 1 Tablet(s) Oral daily  pantoprazole    Tablet 40 milliGRAM(s) Oral before breakfast  sevelamer hydrochloride 1600 milliGRAM(s) Oral three times a day with meals  simvastatin 20 milliGRAM(s) Oral at bedtime  vancomycin  IVPB 1000 milliGRAM(s) IV Intermittent <User Schedule>        VITALS:  T(F): 99.4 (18 @ 07:55), Max: 101.7 (18 @ 05:15)  HR: 87 (18 @ 07:55)  BP: 138/52 (18 @ 07:55)  RR: 16 (18 @ 07:55)  SpO2: 97% (18 @ 07:55)  Wt(kg): --     @ 07:01  -   @ 07:00  --------------------------------------------------------  IN: 200 mL / OUT: 100 mL / NET: 100 mL        PHYSICAL EXAM:  Constitutional: NAD  HEENT: anicteric sclera, oropharynx clear.  Neck: No JVD  Respiratory: CTAB, no wheezes, rales or rhonchi  Cardiovascular: S1, S2, RRR  Gastrointestinal: BS+, soft, NT/ND  Extremities: No cyanosis or clubbing. No peripheral edema  Neurological: A/O x 3, no focal deficits  Vascular Access: RT IJ PERMACATH IN USE    LABS:      139  |  101  |  42<H>  ----------------------------<  96  4.1   |  27  |  4.07<H>    Ca    8.3<L>      09 Mar 2018 11:20  Phos  1.7     03-08  Mg     2.3     03-08      Creatinine Trend: 4.07 <--, 2.80 <--, 4.80 <--                        8.1    42.0  )-----------( 347      ( 09 Mar 2018 11:20 )             29.7     Urine Studies:  Urinalysis Basic - ( 07 Mar 2018 04:30 )    Color: Yellow / Appearance: Slightly Turbid / S.010 / pH:   Gluc:  / Ketone: Negative  / Bili: Negative / Urobili: Negative   Blood:  / Protein: 500 mg/dL / Nitrite: Negative   Leuk Esterase: Moderate / RBC: 0-2 /HPF / WBC >50 /HPF   Sq Epi:  / Non Sq Epi: Moderate /HPF / Bacteria: Many /HPF        RADIOLOGY & ADDITIONAL STUDIES:

## 2018-03-09 NOTE — PROGRESS NOTE ADULT - ASSESSMENT
s/p i&d back abcesses  right hip decubiti  sacral decubiti    add santyl to sacral wound  local wound care wet to dry for i&d sites, changed daily

## 2018-03-09 NOTE — PROGRESS NOTE ADULT - ASSESSMENT
53 YR OLF FEMALE RECENTLY STARTED ON HD AFTER [facility] SENT FROM NH WITH LEUCOCYTOSIS. HAS SACRAL DECUB WOUND ( WOUND CULTURES IN NH GREW MRSA)ON EVALUATION FOUND TO HAVE RT KNEE ABCESS. S/P I&D OF HIP ABCESS.  PT HAS A PERMACATH.    RECOMENDATIONS:  STABLE ON HD TODAY  CONT IV ABX- ID F/U.  ABCESS I&D PER SURGERY.  LOW PHOS D/C SEVELAMER  LOW FE SATS, DANG 905. IN VIEW OF ONGOING INFECTION WILL OFF  IV VENOFER. CONT EPO ON HD  BP STABLE CONT BP MEDS.  D/C SEVELAMER, LOW PHOS

## 2018-03-09 NOTE — PROGRESS NOTE ADULT - PROBLEM SELECTOR PLAN 3
Likely secondary to CKD  Hemoglobin in ED 11  Continue with IV venofer 200mg daily in addition to Epogen   Continue to monitor CBC

## 2018-03-10 DIAGNOSIS — L02.91 CUTANEOUS ABSCESS, UNSPECIFIED: ICD-10-CM

## 2018-03-10 LAB
ANION GAP SERPL CALC-SCNC: 8 MMOL/L — SIGNIFICANT CHANGE UP (ref 5–17)
BASOPHILS # BLD AUTO: 0.2 K/UL — SIGNIFICANT CHANGE UP (ref 0–0.2)
BASOPHILS NFR BLD AUTO: 0.5 % — SIGNIFICANT CHANGE UP (ref 0–2)
BUN SERPL-MCNC: 22 MG/DL — HIGH (ref 7–18)
CALCIUM SERPL-MCNC: 8.5 MG/DL — SIGNIFICANT CHANGE UP (ref 8.4–10.5)
CHLORIDE SERPL-SCNC: 99 MMOL/L — SIGNIFICANT CHANGE UP (ref 96–108)
CO2 SERPL-SCNC: 26 MMOL/L — SIGNIFICANT CHANGE UP (ref 22–31)
CREAT SERPL-MCNC: 2.72 MG/DL — HIGH (ref 0.5–1.3)
EOSINOPHIL # BLD AUTO: 0.1 K/UL — SIGNIFICANT CHANGE UP (ref 0–0.5)
EOSINOPHIL NFR BLD AUTO: 0.4 % — SIGNIFICANT CHANGE UP (ref 0–6)
GLUCOSE BLDC GLUCOMTR-MCNC: 112 MG/DL — HIGH (ref 70–99)
GLUCOSE BLDC GLUCOMTR-MCNC: 112 MG/DL — HIGH (ref 70–99)
GLUCOSE BLDC GLUCOMTR-MCNC: 125 MG/DL — HIGH (ref 70–99)
GLUCOSE BLDC GLUCOMTR-MCNC: 92 MG/DL — SIGNIFICANT CHANGE UP (ref 70–99)
GLUCOSE SERPL-MCNC: 100 MG/DL — HIGH (ref 70–99)
HCT VFR BLD CALC: 35.9 % — SIGNIFICANT CHANGE UP (ref 34.5–45)
HGB BLD-MCNC: 10.4 G/DL — LOW (ref 11.5–15.5)
LYMPHOCYTES # BLD AUTO: 2.8 K/UL — SIGNIFICANT CHANGE UP (ref 1–3.3)
LYMPHOCYTES # BLD AUTO: 7.8 % — LOW (ref 13–44)
MCHC RBC-ENTMCNC: 26.4 PG — LOW (ref 27–34)
MCHC RBC-ENTMCNC: 29 GM/DL — LOW (ref 32–36)
MCV RBC AUTO: 91.1 FL — SIGNIFICANT CHANGE UP (ref 80–100)
MONOCYTES # BLD AUTO: 2 K/UL — HIGH (ref 0–0.9)
MONOCYTES NFR BLD AUTO: 5.6 % — SIGNIFICANT CHANGE UP (ref 2–14)
NEUTROPHILS # BLD AUTO: 30.3 K/UL — HIGH (ref 1.8–7.4)
NEUTROPHILS NFR BLD AUTO: 85.8 % — HIGH (ref 43–77)
PLATELET # BLD AUTO: 357 K/UL — SIGNIFICANT CHANGE UP (ref 150–400)
POTASSIUM SERPL-MCNC: 5.1 MMOL/L — SIGNIFICANT CHANGE UP (ref 3.5–5.3)
POTASSIUM SERPL-SCNC: 5.1 MMOL/L — SIGNIFICANT CHANGE UP (ref 3.5–5.3)
RBC # BLD: 3.94 M/UL — SIGNIFICANT CHANGE UP (ref 3.8–5.2)
RBC # FLD: 19.7 % — HIGH (ref 10.3–14.5)
SODIUM SERPL-SCNC: 133 MMOL/L — LOW (ref 135–145)
WBC # BLD: 35.4 K/UL — HIGH (ref 3.8–10.5)
WBC # FLD AUTO: 35.4 K/UL — HIGH (ref 3.8–10.5)

## 2018-03-10 PROCEDURE — 99233 SBSQ HOSP IP/OBS HIGH 50: CPT | Mod: GC

## 2018-03-10 PROCEDURE — 10060 I&D ABSCESS SIMPLE/SINGLE: CPT

## 2018-03-10 RX ORDER — ACETAMINOPHEN 500 MG
650 TABLET ORAL ONCE
Qty: 0 | Refills: 0 | Status: COMPLETED | OUTPATIENT
Start: 2018-03-10 | End: 2018-03-10

## 2018-03-10 RX ADMIN — Medication 100 MILLIGRAM(S): at 05:54

## 2018-03-10 RX ADMIN — Medication 25 MILLIGRAM(S): at 17:25

## 2018-03-10 RX ADMIN — Medication 100 MILLIGRAM(S): at 17:25

## 2018-03-10 RX ADMIN — MEROPENEM 100 MILLIGRAM(S): 1 INJECTION INTRAVENOUS at 17:26

## 2018-03-10 RX ADMIN — Medication 50 MICROGRAM(S): at 05:54

## 2018-03-10 RX ADMIN — PANTOPRAZOLE SODIUM 40 MILLIGRAM(S): 20 TABLET, DELAYED RELEASE ORAL at 05:54

## 2018-03-10 RX ADMIN — HEPARIN SODIUM 5000 UNIT(S): 5000 INJECTION INTRAVENOUS; SUBCUTANEOUS at 05:54

## 2018-03-10 RX ADMIN — Medication 1 TABLET(S): at 12:50

## 2018-03-10 RX ADMIN — OXYCODONE AND ACETAMINOPHEN 1 TABLET(S): 5; 325 TABLET ORAL at 06:48

## 2018-03-10 RX ADMIN — Medication 50 MILLIGRAM(S): at 22:21

## 2018-03-10 RX ADMIN — Medication 1 MILLIGRAM(S): at 12:50

## 2018-03-10 RX ADMIN — GABAPENTIN 100 MILLIGRAM(S): 400 CAPSULE ORAL at 12:50

## 2018-03-10 RX ADMIN — Medication 50 MILLIGRAM(S): at 05:54

## 2018-03-10 RX ADMIN — Medication 650 MILLIGRAM(S): at 14:42

## 2018-03-10 RX ADMIN — MORPHINE SULFATE 4 MILLIGRAM(S): 50 CAPSULE, EXTENDED RELEASE ORAL at 10:14

## 2018-03-10 RX ADMIN — CINACALCET 30 MILLIGRAM(S): 30 TABLET, FILM COATED ORAL at 12:50

## 2018-03-10 RX ADMIN — OXYCODONE AND ACETAMINOPHEN 1 TABLET(S): 5; 325 TABLET ORAL at 05:53

## 2018-03-10 RX ADMIN — HEPARIN SODIUM 5000 UNIT(S): 5000 INJECTION INTRAVENOUS; SUBCUTANEOUS at 17:26

## 2018-03-10 RX ADMIN — OXYCODONE AND ACETAMINOPHEN 1 TABLET(S): 5; 325 TABLET ORAL at 17:25

## 2018-03-10 RX ADMIN — GABAPENTIN 100 MILLIGRAM(S): 400 CAPSULE ORAL at 22:21

## 2018-03-10 RX ADMIN — Medication 50 MILLIGRAM(S): at 12:50

## 2018-03-10 RX ADMIN — Medication 500 MILLIGRAM(S): at 14:37

## 2018-03-10 RX ADMIN — OXYCODONE AND ACETAMINOPHEN 1 TABLET(S): 5; 325 TABLET ORAL at 19:00

## 2018-03-10 RX ADMIN — SIMVASTATIN 20 MILLIGRAM(S): 20 TABLET, FILM COATED ORAL at 22:21

## 2018-03-10 RX ADMIN — GABAPENTIN 100 MILLIGRAM(S): 400 CAPSULE ORAL at 05:54

## 2018-03-10 RX ADMIN — Medication 25 MILLIGRAM(S): at 05:53

## 2018-03-10 NOTE — PROGRESS NOTE ADULT - ASSESSMENT
1.	Right hip/ back abscesses - s/p I&D  2.	UTI  3.	Fevers  4.	Leukocytosis - decreased  ·	cont meropenem 500mg IV daily D4  ·	cont vanco 1gm IV MWF with HD D4  ·	awaiting culture sensitivity

## 2018-03-10 NOTE — PROGRESS NOTE ADULT - SUBJECTIVE AND OBJECTIVE BOX
INTERVAL HPI/OVERNIGHT EVENTS:  Pt resting comfortably. No acute complaints.   Tolerating pain.    MEDICATIONS  (STANDING):  ascorbic acid 500 milliGRAM(s) Oral daily  cinacalcet 30 milliGRAM(s) Oral daily  collagenase Ointment 1 Application(s) Topical daily  docusate sodium 100 milliGRAM(s) Oral two times a day  epoetin jacqueline Injectable 4000 Unit(s) IV Push <User Schedule>  folic acid 1 milliGRAM(s) Oral daily  gabapentin 100 milliGRAM(s) Oral three times a day  heparin  Injectable 5000 Unit(s) SubCutaneous every 12 hours  hydrALAZINE 50 milliGRAM(s) Oral three times a day  insulin lispro (HumaLOG) corrective regimen sliding scale   SubCutaneous Before meals and at bedtime  levothyroxine 50 MICROGram(s) Oral daily  meropenem  IVPB 500 milliGRAM(s) IV Intermittent every 24 hours  metoprolol     tartrate 25 milliGRAM(s) Oral two times a day  multivitamin 1 Tablet(s) Oral daily  pantoprazole    Tablet 40 milliGRAM(s) Oral before breakfast  simvastatin 20 milliGRAM(s) Oral at bedtime  vancomycin  IVPB 1000 milliGRAM(s) IV Intermittent <User Schedule>    MEDICATIONS  (PRN):  acetaminophen   Tablet 650 milliGRAM(s) Oral every 6 hours PRN For Temp greater than 38 C (100.4 F)  morphine  - Injectable 4 milliGRAM(s) IV Push every 4 hours PRN Severe Pain  oxyCODONE    5 mG/acetaminophen 325 mG 1 Tablet(s) Oral every 4 hours PRN Moderate Pain      Vital Signs Last 24 Hrs  T(C): 37.2 (10 Mar 2018 07:43), Max: 39.3 (09 Mar 2018 16:59)  T(F): 99 (10 Mar 2018 07:43), Max: 102.8 (09 Mar 2018 16:59)  HR: 83 (10 Mar 2018 07:43) (83 - 109)  BP: 116/44 (10 Mar 2018 07:43) (116/44 - 158/61)  BP(mean): --  RR: 16 (10 Mar 2018 07:43) (16 - 19)  SpO2: 98% (10 Mar 2018 07:43) (97% - 100%)    Physical:  General: A&Ox3. NAD.  Pelvis: R hip wound clean, dry. Granulating tissue noted.   Back: Left back wound clean, dry. Right back wound clean, dry.  Mid back with erythematous swelling, fluctuance, tenderness.    LABS:                        10.4   35.4  )-----------( 357      ( 10 Mar 2018 06:07 )             35.9             03-10    133<L>  |  99  |  22<H>  ----------------------------<  100<H>  5.1   |  26  |  2.72<H>    Ca    8.5      10 Mar 2018 06:07 INTERVAL HPI/OVERNIGHT EVENTS:  Pt resting comfortably. No acute complaints.   Tolerating pain.    MEDICATIONS  (STANDING):  ascorbic acid 500 milliGRAM(s) Oral daily  cinacalcet 30 milliGRAM(s) Oral daily  collagenase Ointment 1 Application(s) Topical daily  docusate sodium 100 milliGRAM(s) Oral two times a day  epoetin jacqueline Injectable 4000 Unit(s) IV Push <User Schedule>  folic acid 1 milliGRAM(s) Oral daily  gabapentin 100 milliGRAM(s) Oral three times a day  heparin  Injectable 5000 Unit(s) SubCutaneous every 12 hours  hydrALAZINE 50 milliGRAM(s) Oral three times a day  insulin lispro (HumaLOG) corrective regimen sliding scale   SubCutaneous Before meals and at bedtime  levothyroxine 50 MICROGram(s) Oral daily  meropenem  IVPB 500 milliGRAM(s) IV Intermittent every 24 hours  metoprolol     tartrate 25 milliGRAM(s) Oral two times a day  multivitamin 1 Tablet(s) Oral daily  pantoprazole    Tablet 40 milliGRAM(s) Oral before breakfast  simvastatin 20 milliGRAM(s) Oral at bedtime  vancomycin  IVPB 1000 milliGRAM(s) IV Intermittent <User Schedule>    MEDICATIONS  (PRN):  acetaminophen   Tablet 650 milliGRAM(s) Oral every 6 hours PRN For Temp greater than 38 C (100.4 F)  morphine  - Injectable 4 milliGRAM(s) IV Push every 4 hours PRN Severe Pain  oxyCODONE    5 mG/acetaminophen 325 mG 1 Tablet(s) Oral every 4 hours PRN Moderate Pain      Vital Signs Last 24 Hrs  T(C): 37.2 (10 Mar 2018 07:43), Max: 39.3 (09 Mar 2018 16:59)  T(F): 99 (10 Mar 2018 07:43), Max: 102.8 (09 Mar 2018 16:59)  HR: 83 (10 Mar 2018 07:43) (83 - 109)  BP: 116/44 (10 Mar 2018 07:43) (116/44 - 158/61)  BP(mean): --  RR: 16 (10 Mar 2018 07:43) (16 - 19)  SpO2: 98% (10 Mar 2018 07:43) (97% - 100%)    Physical:  General: A&Ox3. NAD.  Pelvis: R hip wound clean, dry. Granulating tissue noted.   Back: Left back wound clean, dry. Right back wound clean, dry.  Mid back on left side with erythematous swelling, fluctuance, tenderness.    LABS:                        10.4   35.4  )-----------( 357      ( 10 Mar 2018 06:07 )             35.9             03-10    133<L>  |  99  |  22<H>  ----------------------------<  100<H>  5.1   |  26  |  2.72<H>    Ca    8.5      10 Mar 2018 06:07

## 2018-03-10 NOTE — PROGRESS NOTE ADULT - ASSESSMENT
53 YR OLF FEMALE RECENTLY STARTED ON HD AFTER A PROLONGED HOSPITALIZATION IN [facility]. SENT FROM NH WITH LEUCOCYTOSIS. HAS SACRAL DECUB WOUND ( WOUND CULTURES IN NH GREW MRSA)ON EVALUATION FOUND TO HAVE RT KNEE ABCESS. S/P I&D OF HIP ABCESS.  PT HAS A PERMACATH.

## 2018-03-10 NOTE — PROGRESS NOTE ADULT - SUBJECTIVE AND OBJECTIVE BOX
Pt seen and examined at bedside  Pt denies sob. Had I&D earlier today for back abscess.     Allergies:  No Known Allergies    Hospital Medications:   MEDICATIONS  (STANDING):  ascorbic acid 500 milliGRAM(s) Oral daily  cinacalcet 30 milliGRAM(s) Oral daily  collagenase Ointment 1 Application(s) Topical daily  docusate sodium 100 milliGRAM(s) Oral two times a day  epoetin jacqueline Injectable 4000 Unit(s) IV Push <User Schedule>  folic acid 1 milliGRAM(s) Oral daily  gabapentin 100 milliGRAM(s) Oral three times a day  heparin  Injectable 5000 Unit(s) SubCutaneous every 12 hours  hydrALAZINE 50 milliGRAM(s) Oral three times a day  insulin lispro (HumaLOG) corrective regimen sliding scale   SubCutaneous Before meals and at bedtime  levothyroxine 50 MICROGram(s) Oral daily  meropenem  IVPB 500 milliGRAM(s) IV Intermittent every 24 hours  metoprolol     tartrate 25 milliGRAM(s) Oral two times a day  multivitamin 1 Tablet(s) Oral daily  pantoprazole    Tablet 40 milliGRAM(s) Oral before breakfast  simvastatin 20 milliGRAM(s) Oral at bedtime  vancomycin  IVPB 1000 milliGRAM(s) IV Intermittent <User Schedule>    VITALS:  T(F): 99 (03-10-18 @ 07:43), Max: 102.8 (18 @ 16:59)  HR: 83 (03-10-18 @ 07:43)  BP: 116/44 (03-10-18 @ 07:43)  RR: 16 (03-10-18 @ 07:43)  SpO2: 98% (03-10-18 @ 07:43)  Wt(kg): --     @ 07:01  -  03-10 @ 07:00  --------------------------------------------------------  IN: 0 mL / OUT: 230 mL / NET: -230 mL        PHYSICAL EXAM:  Constitutional: NAD  HEENT: anicteric sclera, oropharynx clear, MMM  Neck: No JVD  Respiratory: CTAB, no wheezes, rales or rhonchi  Cardiovascular: S1, S2, RRR  Gastrointestinal: BS+, soft, NT/ND  Extremities: No cyanosis or clubbing. No peripheral edema  Neurological: A/O x 3, no focal deficits  Psychiatric: Normal mood, normal affect  : No CVA tenderness. No jarrell.   Skin: No rashes  Vascular Access: RIJ Tunneled cath     LABS:  03-10    133<L>  |  99  |  22<H>  ----------------------------<  100<H>  5.1   |  26  |  2.72<H>    Ca    8.5      10 Mar 2018 06:07      Creatinine Trend: 2.72 <--, 4.07 <--, 2.80 <--, 4.80 <--                        10.4   35.4  )-----------( 357      ( 10 Mar 2018 06:07 )             35.9     Urine Studies:  Urinalysis Basic - ( 07 Mar 2018 04:30 )    Color: Yellow / Appearance: Slightly Turbid / S.010 / pH:   Gluc:  / Ketone: Negative  / Bili: Negative / Urobili: Negative   Blood:  / Protein: 500 mg/dL / Nitrite: Negative   Leuk Esterase: Moderate / RBC: 0-2 /HPF / WBC >50 /HPF   Sq Epi:  / Non Sq Epi: Moderate /HPF / Bacteria: Many /HPF        RADIOLOGY & ADDITIONAL STUDIES:

## 2018-03-10 NOTE — PROCEDURE NOTE - PROCEDURE
<<-----Click on this checkbox to enter Procedure Incision and drainage  03/08/2018    Active  Felicia Barragan

## 2018-03-10 NOTE — PROGRESS NOTE ADULT - PROBLEM SELECTOR PLAN 9
IMPROVE VTE score = 2 for immobilization, lower extremity paralysis  Heparin for DVT chemoprophylaxis

## 2018-03-10 NOTE — PROGRESS NOTE ADULT - PROBLEM SELECTOR PLAN 1
Patient with multiple abscess collections;  S/P Incision and drainage of abscess by surgical team this am  Local wound care per surgery recommendations Patient with multiple abscess collections;  S/P Incision and drainage of abscess by surgical team this am  Local wound care per surgery recommendations  continue with antibiotics

## 2018-03-10 NOTE — PROGRESS NOTE ADULT - SUBJECTIVE AND OBJECTIVE BOX
MEDICAL ATTENDING NOTE    Patient is a 53y old  Female who presents with a chief complaint of sent from NH due to leukocytosis and fever (07 Mar 2018 21:47)      INTERVAL HPI/OVERNIGHT EVENTS: no new complaints    MEDICATIONS  (STANDING):  ascorbic acid 500 milliGRAM(s) Oral daily  cinacalcet 30 milliGRAM(s) Oral daily  collagenase Ointment 1 Application(s) Topical daily  docusate sodium 100 milliGRAM(s) Oral two times a day  epoetin jacqueline Injectable 4000 Unit(s) IV Push <User Schedule>  folic acid 1 milliGRAM(s) Oral daily  gabapentin 100 milliGRAM(s) Oral three times a day  heparin  Injectable 5000 Unit(s) SubCutaneous every 12 hours  hydrALAZINE 50 milliGRAM(s) Oral three times a day  insulin lispro (HumaLOG) corrective regimen sliding scale   SubCutaneous Before meals and at bedtime  levothyroxine 50 MICROGram(s) Oral daily  meropenem  IVPB 500 milliGRAM(s) IV Intermittent every 24 hours  metoprolol     tartrate 25 milliGRAM(s) Oral two times a day  multivitamin 1 Tablet(s) Oral daily  pantoprazole    Tablet 40 milliGRAM(s) Oral before breakfast  simvastatin 20 milliGRAM(s) Oral at bedtime  vancomycin  IVPB 1000 milliGRAM(s) IV Intermittent <User Schedule>    MEDICATIONS  (PRN):  acetaminophen   Tablet 650 milliGRAM(s) Oral every 6 hours PRN For Temp greater than 38 C (100.4 F)  morphine  - Injectable 4 milliGRAM(s) IV Push every 4 hours PRN Severe Pain  oxyCODONE    5 mG/acetaminophen 325 mG 1 Tablet(s) Oral every 4 hours PRN Moderate Pain      __________________________________________________  REVIEW OF SYSTEMS:    CONSTITUTIONAL: No fever,   EYES: no acute visual disturbances  RESPIRATORY: No cough; No shortness of breath  CARDIOVASCULAR: No chest pain, no palpitations  GASTROINTESTINAL: No abd. pain. No nausea or vomiting; No diarrhea   NEUROLOGICAL: No headache   MUSCULOSKELETAL: No joint pain, no muscle pain      ALL OTHER  ROS negative        Vital Signs Last 24 Hrs  T(C): 39.3 (10 Mar 2018 14:49), Max: 39.3 (09 Mar 2018 16:59)  T(F): 102.8 (10 Mar 2018 14:49), Max: 102.8 (09 Mar 2018 16:59)  HR: 97 (10 Mar 2018 12:30) (83 - 109)  BP: 128/45 (10 Mar 2018 12:30) (116/44 - 158/61)  BP(mean): --  RR: 16 (10 Mar 2018 07:43) (16 - 16)  SpO2: 98% (10 Mar 2018 07:43) (97% - 100%)    ________________________________________________  PHYSICAL EXAM:  GENERAL: NAD  HEENT: Normocephalic;  conjunctivae and sclerae clear; moist mucous membranes;   NECK : supple  CHEST/LUNG: Clear to auscultation ; no wheezing  HEART: S1 S2  regular;   ABDOMEN: Soft, Nontender, Nondistended; Bowel sounds present  EXTREMITIES: no cyanosis; no edema; no calf tenderness  SKIN: warm and dry  NERVOUS SYSTEM:  Awake and alert; Oriented  to place, person and time ; no new deficits    _________________________________________________  LABS:                        10.4   35.4  )-----------( 357      ( 10 Mar 2018 06:07 )             35.9     03-10    133<L>  |  99  |  22<H>  ----------------------------<  100<H>  5.1   |  26  |  2.72<H>    Ca    8.5      10 Mar 2018 06:07          CAPILLARY BLOOD GLUCOSE      POCT Blood Glucose.: 92 mg/dL (10 Mar 2018 12:39)  POCT Blood Glucose.: 112 mg/dL (10 Mar 2018 08:52)  POCT Blood Glucose.: 101 mg/dL (09 Mar 2018 21:55)  POCT Blood Glucose.: 100 mg/dL (09 Mar 2018 15:58)        RADIOLOGY & ADDITIONAL TESTS:    Imaging Personally Reviewed:  YES    Consultant(s) Notes Reviewed:   YES    Care Discussed with Consultants :     Plan of care was discussed with patient ; all questions and concerns were addressed and care was aligned with patient's wishes. MEDICAL ATTENDING NOTE    Patient is a 53y old  Female who presents with a chief complaint of sent from NH due to leukocytosis and fever (07 Mar 2018 21:47)      INTERVAL HPI/OVERNIGHT EVENTS: offers no new complaints    MEDICATIONS  (STANDING):  ascorbic acid 500 milliGRAM(s) Oral daily  cinacalcet 30 milliGRAM(s) Oral daily  collagenase Ointment 1 Application(s) Topical daily  docusate sodium 100 milliGRAM(s) Oral two times a day  epoetin jacqueline Injectable 4000 Unit(s) IV Push <User Schedule>  folic acid 1 milliGRAM(s) Oral daily  gabapentin 100 milliGRAM(s) Oral three times a day  heparin  Injectable 5000 Unit(s) SubCutaneous every 12 hours  hydrALAZINE 50 milliGRAM(s) Oral three times a day  insulin lispro (HumaLOG) corrective regimen sliding scale   SubCutaneous Before meals and at bedtime  levothyroxine 50 MICROGram(s) Oral daily  meropenem  IVPB 500 milliGRAM(s) IV Intermittent every 24 hours  metoprolol     tartrate 25 milliGRAM(s) Oral two times a day  multivitamin 1 Tablet(s) Oral daily  pantoprazole    Tablet 40 milliGRAM(s) Oral before breakfast  simvastatin 20 milliGRAM(s) Oral at bedtime  vancomycin  IVPB 1000 milliGRAM(s) IV Intermittent <User Schedule>    MEDICATIONS  (PRN):  acetaminophen   Tablet 650 milliGRAM(s) Oral every 6 hours PRN For Temp greater than 38 C (100.4 F)  morphine  - Injectable 4 milliGRAM(s) IV Push every 4 hours PRN Severe Pain  oxyCODONE    5 mG/acetaminophen 325 mG 1 Tablet(s) Oral every 4 hours PRN Moderate Pain      __________________________________________________  REVIEW OF SYSTEMS:    CONSTITUTIONAL: No fever, weakness+  RESPIRATORY: No cough; No shortness of breath  CARDIOVASCULAR: No chest pain, no palpitations  GASTROINTESTINAL: No abd. pain. No nausea or vomiting; No diarrhea   NEUROLOGICAL: No headache   MUSCULOSKELETAL: joint pain+, no muscle pain          Vital Signs Last 24 Hrs  T(C): 39.3 (10 Mar 2018 14:49), Max: 39.3 (09 Mar 2018 16:59)  T(F): 102.8 (10 Mar 2018 14:49), Max: 102.8 (09 Mar 2018 16:59)  HR: 97 (10 Mar 2018 12:30) (83 - 109)  BP: 128/45 (10 Mar 2018 12:30) (116/44 - 158/61)  BP(mean): --  RR: 16 (10 Mar 2018 07:43) (16 - 16)  SpO2: 98% (10 Mar 2018 07:43) (97% - 100%)    ________________________________________________  PHYSICAL EXAM:  GENERAL: NAD  HEENT: Normocephalic;  conjunctivae and sclerae clear; moist mucous membranes;   NECK : supple  CHEST/LUNG: Clear to auscultation ; no wheezing; Right chest wall perma cath+  HEART: S1 S2 +   ABDOMEN: Soft, Nontender, Nondistended; Bowel sounds present  EXTREMITIES: no cyanosis; no edema; no calf tenderness  SKIN: warm and dry; multiple sites including back with dressing s/p abscess drainage  NERVOUS SYSTEM:  Awake and alert; Oriented  to place, person and time ; no new deficits    _________________________________________________  LABS:                        10.4   35.4  )-----------( 357      ( 10 Mar 2018 06:07 )             35.9     03-10    133<L>  |  99  |  22<H>  ----------------------------<  100<H>  5.1   |  26  |  2.72<H>    Ca    8.5      10 Mar 2018 06:07          CAPILLARY BLOOD GLUCOSE      POCT Blood Glucose.: 92 mg/dL (10 Mar 2018 12:39)  POCT Blood Glucose.: 112 mg/dL (10 Mar 2018 08:52)  POCT Blood Glucose.: 101 mg/dL (09 Mar 2018 21:55)  POCT Blood Glucose.: 100 mg/dL (09 Mar 2018 15:58)        RADIOLOGY & ADDITIONAL TESTS:    Imaging Personally Reviewed:  YES    Consultant(s) Notes Reviewed:   YES    Care Discussed with Consultants :     Plan of care was discussed with patient ; all questions and concerns were addressed and care was aligned with patient's wishes.

## 2018-03-10 NOTE — PROGRESS NOTE ADULT - ASSESSMENT
53F PMHx HFpEF, ESRD on HD, DM, HTN, HLD, Hypothyroidism, Parathyroid adenoma, GERD admitted for sepsis due to sacral abscess.

## 2018-03-10 NOTE — PROGRESS NOTE ADULT - PROBLEM SELECTOR PLAN 1
Maintenance HD schedule MWF, tolerated HD yesterday.  Electrolytes and volume status ok, plan for repeat HD 3/12.  Off phos binders due to low phos level.  Check vanc levels

## 2018-03-10 NOTE — PROGRESS NOTE ADULT - PROBLEM SELECTOR PLAN 2
resolving sepsis  continue with antibiotics resolving sepsis  continue with antibiotics  Still with severe leaukocytosis  monitor closely  prognosis is gaurded resolving sepsis  continue with antibiotics- Merrem and Vancomycin; monitor vancolmycin level for toxicity  Still with severe leukocytosis  monitor closely  prognosis is gaurded

## 2018-03-10 NOTE — PROGRESS NOTE ADULT - PROBLEM SELECTOR PLAN 1
Bedside I&D mid back abscess performed at bedside. Pt tolerated procedure. Copious amount of pus drained. Wound packed with dry gauze.  Continue wet to dry packing on left and right back wounds.  Continue abx  Tylenol prn fever.  Pain control prn.  Offloading Bedside I&D right mid back abscess performed at bedside. Pt tolerated procedure. Copious amount of pus drained. Wound packed with dry gauze.  Continue wet to dry packing on left and right back wounds.  Continue abx  Tylenol prn fever.  Pain control prn.  Pressure Offloading

## 2018-03-10 NOTE — PROGRESS NOTE ADULT - PROBLEM SELECTOR PLAN 6
secondary to CKD  Hemoglobin in ED 11  Continue with IV Venofer 200mg daily in addition to Epogen   Continue to monitor CBC

## 2018-03-10 NOTE — PROGRESS NOTE ADULT - SUBJECTIVE AND OBJECTIVE BOX
54 y/o female with multiple medical problems is under our care for fevers, UTI, and right hip/ back abscesses, s/p I&D.  Patient is still having persistent fevers. Wbc count has decreased to 35.4. Surgical abscess cultures are all growing staph aureus, sensitivity pending.    REVIEW OF SYSTEMS:  [  ] Not able to illicit  General: no fevers no malaise  Chest: no cough no sob  GI: no nvd   Skin: no rashes  Musculoskeletal: +LBP  Neuro: no ha's no dizziness     ALLERGIES: No Known Allergies    MEDS:  meropenem  IVPB 500 milliGRAM(s) IV Intermittent every 24 hours (3/07)  vancomycin  IVPB 1000 milliGRAM(s) IV Intermittent <User Schedule> (3/07)    VITALS:  Vital Signs Last 24 Hrs  T(C): 39.3 (10 Mar 2018 14:49), Max: 39.3 (10 Mar 2018 14:49)  T(F): 102.8 (10 Mar 2018 14:49), Max: 102.8 (10 Mar 2018 14:49)  HR: 102 (10 Mar 2018 16:01) (83 - 102)  BP: 119/45 (10 Mar 2018 16:01) (116/44 - 158/61)  BP(mean): --  RR: 16 (10 Mar 2018 16:01) (16 - 16)  SpO2: 97% (10 Mar 2018 16:01) (97% - 98%)      PHYSICAL EXAM:  HEENT: n/a  Neck: supple no LN's   Respiratory: diffuse bilateral rales midlung to bases  +right chest P-C  Cardiovascular: S1 S2 reg +soft sys murmur  Gastrointestinal: +BS with soft, mildly distended abdomen; nontender  +jarrell with pus in tubing  Extremities: no edema no cyanosis  Skin: right hip and 2 back wounds with bloody packing and erythematous borders  Ortho: no jt swelling  Neuro: AAO x 2      LABS/DIAGNOSTIC TESTS:                        10.4   35.4  )-----------( 357      ( 10 Mar 2018 06:07 )             35.9   WBC Count: 35.4 K/uL (03-10 @ 06:07)  WBC Count: 42.0 K/uL (03-09 @ 11:20)  WBC Count: 41.8 K/uL (03-08 @ 06:06)    03-10    133<L>  |  99  |  22<H>  ----------------------------<  100<H>  5.1   |  26  |  2.72<H>    Ca    8.5      10 Mar 2018 06:07      CULTURES:   3/09 BC -pending    .Surgical Swab left back mass culture  03-09 @ 12:14   Numerous Staphylococcus aureus  --  --      .Surgical Swab right side of back  03-09 @ 12:13   Numerous Staphylococcus aureus  --  --      .Blood Dialysis Catheter  03-07 @ 23:03   No growth to date.  --  --      .Blood Blood-Peripheral  03-07 @ 09:43   No growth to date.  --  --      .Urine Catheterized  03-07 @ 09:38   <10,000 CFU/ml Normal Urogenital derrick present  --  --  Few Squamous epithelial cells per low power field  Few polymorphonuclear leukocytes per low power field  Rare Gram variable coccobacilli   per oil power field      RADIOLOGY:  EXAM:  XR CHEST AP OR PA 1V                        PROCEDURE DATE:  03/07/2018    INTERPRETATION:  PROCEDURE: AP view of the chest.    CLINICAL INFORMATION: Cough    COMPARISON: 1/12/2018    FINDINGS:     A right dialysis catheter is noted.    There are no lung consolidations. The cardiac and mediastinal contours   are prominent, which may be due to magnification from AP technique and   shallow inspiration. The osseous structures are intact.    IMPRESSION:    No lung consolidations

## 2018-03-11 LAB
-  AMPICILLIN/SULBACTAM: SIGNIFICANT CHANGE UP
-  CEFAZOLIN: SIGNIFICANT CHANGE UP
-  CIPROFLOXACIN: SIGNIFICANT CHANGE UP
-  CLINDAMYCIN: SIGNIFICANT CHANGE UP
-  DAPTOMYCIN: SIGNIFICANT CHANGE UP
-  ERYTHROMYCIN: SIGNIFICANT CHANGE UP
-  GENTAMICIN: SIGNIFICANT CHANGE UP
-  LEVOFLOXACIN: SIGNIFICANT CHANGE UP
-  LINEZOLID: SIGNIFICANT CHANGE UP
-  MOXIFLOXACIN(AEROBIC): SIGNIFICANT CHANGE UP
-  OXACILLIN: SIGNIFICANT CHANGE UP
-  PENICILLIN: SIGNIFICANT CHANGE UP
-  RIFAMPIN: SIGNIFICANT CHANGE UP
-  TETRACYCLINE: SIGNIFICANT CHANGE UP
-  TRIMETHOPRIM/SULFAMETHOXAZOLE: SIGNIFICANT CHANGE UP
-  VANCOMYCIN: SIGNIFICANT CHANGE UP
ANION GAP SERPL CALC-SCNC: 10 MMOL/L — SIGNIFICANT CHANGE UP (ref 5–17)
BASOPHILS # BLD AUTO: 0.1 K/UL — SIGNIFICANT CHANGE UP (ref 0–0.2)
BASOPHILS NFR BLD AUTO: 0.3 % — SIGNIFICANT CHANGE UP (ref 0–2)
BUN SERPL-MCNC: 39 MG/DL — HIGH (ref 7–18)
CALCIUM SERPL-MCNC: 7.8 MG/DL — LOW (ref 8.4–10.5)
CHLORIDE SERPL-SCNC: 97 MMOL/L — SIGNIFICANT CHANGE UP (ref 96–108)
CO2 SERPL-SCNC: 26 MMOL/L — SIGNIFICANT CHANGE UP (ref 22–31)
CREAT SERPL-MCNC: 4.26 MG/DL — HIGH (ref 0.5–1.3)
EOSINOPHIL # BLD AUTO: 0.2 K/UL — SIGNIFICANT CHANGE UP (ref 0–0.5)
EOSINOPHIL NFR BLD AUTO: 0.6 % — SIGNIFICANT CHANGE UP (ref 0–6)
GLUCOSE BLDC GLUCOMTR-MCNC: 128 MG/DL — HIGH (ref 70–99)
GLUCOSE BLDC GLUCOMTR-MCNC: 140 MG/DL — HIGH (ref 70–99)
GLUCOSE BLDC GLUCOMTR-MCNC: 144 MG/DL — HIGH (ref 70–99)
GLUCOSE BLDC GLUCOMTR-MCNC: 185 MG/DL — HIGH (ref 70–99)
GLUCOSE SERPL-MCNC: 122 MG/DL — HIGH (ref 70–99)
HCT VFR BLD CALC: 30.7 % — LOW (ref 34.5–45)
HGB BLD-MCNC: 8.9 G/DL — LOW (ref 11.5–15.5)
LYMPHOCYTES # BLD AUTO: 2.3 K/UL — SIGNIFICANT CHANGE UP (ref 1–3.3)
LYMPHOCYTES # BLD AUTO: 6.2 % — LOW (ref 13–44)
MCHC RBC-ENTMCNC: 26.2 PG — LOW (ref 27–34)
MCHC RBC-ENTMCNC: 28.9 GM/DL — LOW (ref 32–36)
MCV RBC AUTO: 90.5 FL — SIGNIFICANT CHANGE UP (ref 80–100)
METHOD TYPE: SIGNIFICANT CHANGE UP
MONOCYTES # BLD AUTO: 1.9 K/UL — HIGH (ref 0–0.9)
MONOCYTES NFR BLD AUTO: 5.3 % — SIGNIFICANT CHANGE UP (ref 2–14)
NEUTROPHILS # BLD AUTO: 32.3 K/UL — HIGH (ref 1.8–7.4)
NEUTROPHILS NFR BLD AUTO: 87.6 % — HIGH (ref 43–77)
PLATELET # BLD AUTO: 356 K/UL — SIGNIFICANT CHANGE UP (ref 150–400)
POTASSIUM SERPL-MCNC: 4.6 MMOL/L — SIGNIFICANT CHANGE UP (ref 3.5–5.3)
POTASSIUM SERPL-SCNC: 4.6 MMOL/L — SIGNIFICANT CHANGE UP (ref 3.5–5.3)
RBC # BLD: 3.39 M/UL — LOW (ref 3.8–5.2)
RBC # FLD: 19.2 % — HIGH (ref 10.3–14.5)
SODIUM SERPL-SCNC: 133 MMOL/L — LOW (ref 135–145)
WBC # BLD: 36.9 K/UL — HIGH (ref 3.8–10.5)
WBC # FLD AUTO: 36.9 K/UL — HIGH (ref 3.8–10.5)

## 2018-03-11 PROCEDURE — 99233 SBSQ HOSP IP/OBS HIGH 50: CPT | Mod: GC

## 2018-03-11 RX ADMIN — Medication 500 MILLIGRAM(S): at 12:09

## 2018-03-11 RX ADMIN — Medication 50 MICROGRAM(S): at 06:25

## 2018-03-11 RX ADMIN — Medication 50 MILLIGRAM(S): at 13:34

## 2018-03-11 RX ADMIN — GABAPENTIN 100 MILLIGRAM(S): 400 CAPSULE ORAL at 06:25

## 2018-03-11 RX ADMIN — SIMVASTATIN 20 MILLIGRAM(S): 20 TABLET, FILM COATED ORAL at 22:30

## 2018-03-11 RX ADMIN — Medication 1 APPLICATION(S): at 12:08

## 2018-03-11 RX ADMIN — CINACALCET 30 MILLIGRAM(S): 30 TABLET, FILM COATED ORAL at 12:09

## 2018-03-11 RX ADMIN — HEPARIN SODIUM 5000 UNIT(S): 5000 INJECTION INTRAVENOUS; SUBCUTANEOUS at 06:25

## 2018-03-11 RX ADMIN — GABAPENTIN 100 MILLIGRAM(S): 400 CAPSULE ORAL at 22:30

## 2018-03-11 RX ADMIN — HEPARIN SODIUM 5000 UNIT(S): 5000 INJECTION INTRAVENOUS; SUBCUTANEOUS at 17:21

## 2018-03-11 RX ADMIN — GABAPENTIN 100 MILLIGRAM(S): 400 CAPSULE ORAL at 13:34

## 2018-03-11 RX ADMIN — Medication 100 MILLIGRAM(S): at 17:21

## 2018-03-11 RX ADMIN — Medication 1 MILLIGRAM(S): at 12:09

## 2018-03-11 RX ADMIN — Medication 1 TABLET(S): at 12:09

## 2018-03-11 RX ADMIN — PANTOPRAZOLE SODIUM 40 MILLIGRAM(S): 20 TABLET, DELAYED RELEASE ORAL at 06:26

## 2018-03-11 RX ADMIN — Medication 1: at 17:22

## 2018-03-11 RX ADMIN — Medication 650 MILLIGRAM(S): at 13:34

## 2018-03-11 RX ADMIN — Medication 25 MILLIGRAM(S): at 17:21

## 2018-03-11 RX ADMIN — MEROPENEM 100 MILLIGRAM(S): 1 INJECTION INTRAVENOUS at 17:23

## 2018-03-11 RX ADMIN — Medication 50 MILLIGRAM(S): at 06:25

## 2018-03-11 RX ADMIN — Medication 50 MILLIGRAM(S): at 22:31

## 2018-03-11 RX ADMIN — Medication 100 MILLIGRAM(S): at 06:25

## 2018-03-11 NOTE — PROGRESS NOTE ADULT - PROBLEM SELECTOR PLAN 3
Likely secondary to CKD  Hemoglobin in ED 11  Continue with IV venofer 200mg daily in addition to Epogen   Continue to monitor CBC Likely secondary to CKD  Hemoglobin in ED 11  Continue with Epogen   Continue to monitor CBC

## 2018-03-11 NOTE — PROVIDER CONTACT NOTE (CRITICAL VALUE NOTIFICATION) - TEST AND RESULT REPORTED:
Surgical swab Left back Mass Numerous MRSA Surgical Right Back Numerous MRSA Right Hip Numerous MRSA All from 3/8

## 2018-03-11 NOTE — PROGRESS NOTE ADULT - SUBJECTIVE AND OBJECTIVE BOX
INTERVAL HPI/OVERNIGHT EVENTS:  Pt resting comfortably. No acute complaints.   Tolerating incisional pain with meds  Surgical swab culture +MRSA  On meropenem    MEDICATIONS  (STANDING):  ascorbic acid 500 milliGRAM(s) Oral daily  cinacalcet 30 milliGRAM(s) Oral daily  collagenase Ointment 1 Application(s) Topical daily  docusate sodium 100 milliGRAM(s) Oral two times a day  epoetin jacqueline Injectable 4000 Unit(s) IV Push <User Schedule>  folic acid 1 milliGRAM(s) Oral daily  gabapentin 100 milliGRAM(s) Oral three times a day  heparin  Injectable 5000 Unit(s) SubCutaneous every 12 hours  hydrALAZINE 50 milliGRAM(s) Oral three times a day  insulin lispro (HumaLOG) corrective regimen sliding scale   SubCutaneous Before meals and at bedtime  levothyroxine 50 MICROGram(s) Oral daily  meropenem  IVPB 500 milliGRAM(s) IV Intermittent every 24 hours  metoprolol     tartrate 25 milliGRAM(s) Oral two times a day  multivitamin 1 Tablet(s) Oral daily  pantoprazole    Tablet 40 milliGRAM(s) Oral before breakfast  simvastatin 20 milliGRAM(s) Oral at bedtime  vancomycin  IVPB 1000 milliGRAM(s) IV Intermittent <User Schedule>    MEDICATIONS  (PRN):  acetaminophen   Tablet 650 milliGRAM(s) Oral every 6 hours PRN For Temp greater than 38 C (100.4 F)  morphine  - Injectable 4 milliGRAM(s) IV Push every 4 hours PRN Severe Pain  oxyCODONE    5 mG/acetaminophen 325 mG 1 Tablet(s) Oral every 4 hours PRN Moderate Pain      Vital Signs Last 24 Hrs  T(C): 37.1 (11 Mar 2018 08:06), Max: 39.3 (10 Mar 2018 14:49)  T(F): 98.8 (11 Mar 2018 08:06), Max: 102.8 (10 Mar 2018 14:49)  HR: 90 (11 Mar 2018 08:06) (85 - 102)  BP: 119/45 (11 Mar 2018 08:06) (111/43 - 124/48)  BP(mean): --  RR: 16 (11 Mar 2018 08:06) (14 - 16)  SpO2: 100% (11 Mar 2018 08:06) (97% - 100%)    Physical:  General: A&Ox3. NAD.  Pelvis: R hip wound clean, dry. No active drainage. Granulation tissue noted  Back: R wound with mild serous fluid. Granulation tissue noted  L wound clean, dry. No active drainage  Mid back wound clean, dry. No active drainage.    LABS:                        8.9    36.9  )-----------( 356      ( 11 Mar 2018 08:14 )             30.7             03-11    133<L>  |  97  |  39<H>  ----------------------------<  122<H>  4.6   |  26  |  4.26<H>    Ca    7.8<L>      11 Mar 2018 08:14    Culture - Surgical Swab (03.09.18 @ 12:14)    -  Levofloxacin: I 4    -  Penicillin: R >8    -  Ciprofloxacin: R >2    -  Clindamycin: S 0.5    -  Cefazolin: R <=4    -  Erythromycin: R >4    -  Gentamicin: S <=1    -  Linezolid: S 4    -  Moxifloxacin(Aerobic): I 4    -  Oxacillin: R >2    -  RIF- Rifampin: S <=1    -  Tetra/Doxy: S <=1    -  Trimethoprim/Sulfamethoxazole: S <=0.5/9.5    -  Ampicillin/Sulbactam: R <=8/4    -  Daptomycin: S 1    -  Vancomycin: S 2    Specimen Source: .Surgical Swab left back mass culture    Culture Results:   Numerous Methicillin resistant Staphylococcus aureus    Organism Identification: Methicillin resistant Staphylococcus aureus    Organism: Methicillin resistant Staphylococcus aureus    Method Type: URBANO

## 2018-03-11 NOTE — PROGRESS NOTE ADULT - SUBJECTIVE AND OBJECTIVE BOX
52 y/o female with multiple medical problems is under our care for UTI, fevers, right hip/ back abscesses, s/p I&D.  Patient is still having persistent fevers with fluctuating wbc count. Surgical abscess cultures are all growing MRSA.    REVIEW OF SYSTEMS:  [  ] Not able to illicit  General: no fevers no malaise  Chest: no cough no sob  GI: no nvd   Skin: no rashes  Neuro: no ha's no dizziness     ALLERGIES: No Known Allergies    MEDS:  meropenem  IVPB 500 milliGRAM(s) IV Intermittent every 24 hours (3/07)  vancomycin  IVPB 1000 milliGRAM(s) IV Intermittent <User Schedule> (3/07)    VITALS:  Vital Signs Last 24 Hrs  T(C): 37.1 (11 Mar 2018 08:06), Max: 39.3 (10 Mar 2018 14:49)  T(F): 98.8 (11 Mar 2018 08:06), Max: 102.8 (10 Mar 2018 14:49)  HR: 90 (11 Mar 2018 08:06) (85 - 102)  BP: 119/45 (11 Mar 2018 08:06) (111/43 - 124/48)  BP(mean): --  RR: 16 (11 Mar 2018 08:06) (14 - 16)  SpO2: 100% (11 Mar 2018 08:06) (97% - 100%)      PHYSICAL EXAM:  HEENT: n/a  Neck: supple no LN's   Respiratory: diffuse bilateral rales midlung to bases  +right chest P-C  Cardiovascular: S1 S2 reg +soft sys murmur  Gastrointestinal: +BS with soft, mildly distended abdomen; nontender  +jarrell with scant cloudy urine  Extremities: no edema   Skin: right hip and 2 back wounds are packed and dressed  Ortho: n/a  Neuro: AAO x 2      LABS/DIAGNOSTIC TESTS:                        8.9    36.9  )-----------( 356      ( 11 Mar 2018 08:14 )             30.7   WBC Count: 36.9 K/uL (03-11 @ 08:14)  WBC Count: 35.4 K/uL (03-10 @ 06:07)  WBC Count: 42.0 K/uL (03-09 @ 11:20)    03-11    133<L>  |  97  |  39<H>  ----------------------------<  122<H>  4.6   |  26  |  4.26<H>    Ca    7.8<L>      11 Mar 2018 08:14      CULTURES:   .Blood Blood-Peripheral  03-10 @ 09:57   No growth to date.  --  --      .Surgical Swab left back mass culture  03-09 @ 12:14   Numerous Methicillin resistant Staphylococcus aureus  --  Methicillin resistant Staphylococcus aureus      .Surgical Swab right side of back  03-09 @ 12:13   Numerous Methicillin resistant Staphylococcus aureus  ***********Note************  This isolate demonstrates inducible  clindamycin resistance.  Clindamycin may still be effective in some patients.  --  Methicillin resistant Staphylococcus aureus      .Blood Dialysis Catheter  03-07 @ 23:03   No growth to date.  --  --      .Blood Blood-Peripheral  03-07 @ 09:43   No growth to date.  --  --      .Urine Catheterized  03-07 @ 09:38   <10,000 CFU/ml Normal Urogenital derrick present  -      RADIOLOGY: no new studies

## 2018-03-11 NOTE — PROGRESS NOTE ADULT - SUBJECTIVE AND OBJECTIVE BOX
PGY 1 Note discussed with supervising resident and primary attending    Patient is a 53y old  Female who presents with a chief complaint of sent from NH due to leukocytosis and fever (07 Mar 2018 21:47)      INTERVAL HPI/OVERNIGHT EVENTS: patient examined at bedside. He/She has no complaints and current symptoms are resolving    Overnight events on telemetry monitoring []    MEDICATIONS  (STANDING):  ascorbic acid 500 milliGRAM(s) Oral daily  cinacalcet 30 milliGRAM(s) Oral daily  collagenase Ointment 1 Application(s) Topical daily  docusate sodium 100 milliGRAM(s) Oral two times a day  epoetin jacqueline Injectable 4000 Unit(s) IV Push <User Schedule>  folic acid 1 milliGRAM(s) Oral daily  gabapentin 100 milliGRAM(s) Oral three times a day  heparin  Injectable 5000 Unit(s) SubCutaneous every 12 hours  hydrALAZINE 50 milliGRAM(s) Oral three times a day  insulin lispro (HumaLOG) corrective regimen sliding scale   SubCutaneous Before meals and at bedtime  levothyroxine 50 MICROGram(s) Oral daily  meropenem  IVPB 500 milliGRAM(s) IV Intermittent every 24 hours  metoprolol     tartrate 25 milliGRAM(s) Oral two times a day  multivitamin 1 Tablet(s) Oral daily  pantoprazole    Tablet 40 milliGRAM(s) Oral before breakfast  simvastatin 20 milliGRAM(s) Oral at bedtime  vancomycin  IVPB 1000 milliGRAM(s) IV Intermittent <User Schedule>    MEDICATIONS  (PRN):  acetaminophen   Tablet 650 milliGRAM(s) Oral every 6 hours PRN For Temp greater than 38 C (100.4 F)  morphine  - Injectable 4 milliGRAM(s) IV Push every 4 hours PRN Severe Pain  oxyCODONE    5 mG/acetaminophen 325 mG 1 Tablet(s) Oral every 4 hours PRN Moderate Pain    _______________________________________________  REVIEW OF SYSTEMS:  CONSTITUTIONAL: No fever  NECK: No pain or stiffness  RESPIRATORY: No cough; No shortness of breath  CARDIOVASCULAR: No chest pain, no palpitations  GASTROINTESTINAL: No pain. No constipation, nausea or vomiting; No diarrhea   NEUROLOGICAL: No headache or numbness, no tremors  MUSCULOSKELETAL: No joint pain, no muscle pain  GENITOURINARY: no dysuria, no frequency, no hesitancy  PSYCHIATRY: no depression , no anxiety    Vital Signs Last 24 Hrs  T(C): 37.3 (11 Mar 2018 00:13), Max: 39.3 (10 Mar 2018 14:49)  T(F): 99.1 (11 Mar 2018 00:13), Max: 102.8 (10 Mar 2018 14:49)  HR: 89 (11 Mar 2018 05:14) (83 - 102)  BP: 111/43 (11 Mar 2018 05:14) (111/43 - 128/45)  BP(mean): --  RR: 14 (11 Mar 2018 00:13) (14 - 16)  SpO2: 100% (11 Mar 2018 00:13) (97% - 100%)  ________________________________________________  PHYSICAL EXAM:  GENERAL: NAD, lying in bed  HEENT: Normocephalic;  conjunctivae and sclerae clear; moist mucous membranes  NECK : supple, trachea midline, no JVD  CHEST/LUNG: Clear to auscultation bilaterally with good air entry   HEART: S1 S2,  regular rate and rhythm,; no murmurs  ABDOMEN: Soft, Nontender, Nondistended; Bowel sounds present  EXTREMITIES: no cyanosis; no edema; no calf tenderness  SKIN: no rash  NERVOUS SYSTEM:  AAOx3; no new focal deficits  _________________________________________________  LABS:                        10.4   35.4  )-----------( 357      ( 10 Mar 2018 06:07 )             35.9     03-10    133<L>  |  99  |  22<H>  ----------------------------<  100<H>  5.1   |  26  |  2.72<H>    Ca    8.5      10 Mar 2018 06:07          CAPILLARY BLOOD GLUCOSE      POCT Blood Glucose.: 125 mg/dL (10 Mar 2018 21:27)  POCT Blood Glucose.: 112 mg/dL (10 Mar 2018 16:57)  POCT Blood Glucose.: 92 mg/dL (10 Mar 2018 12:39)  POCT Blood Glucose.: 112 mg/dL (10 Mar 2018 08:52)    RADIOLOGY & ADDITIONAL TESTS:    Consultant(s) Notes Reviewed:   YES    Care Discussed with Consultants:   Infectious Disease [] Endocrinology [] Neurology [] ENT [] Cardiology [] Electrophysiology [] Pulmonology [] Gastroenterology [] Nephrology [] Urology [] Orthopaedics [] Vascular Surgery [] Thoracic Surgery [] Plastic Surgery [] General Surgery [] Podiatry [] Psychiatry [] Hematology/Oncology [] Pain [] Palliative Care []    Plan of care was discussed with patient and/or primary care giver; all questions and concerns were addressed and care was aligned with patient's wishes. PGY 1 Note discussed with supervising resident and primary attending    Patient is a 53y old  Female who presents with a chief complaint of sent from NH due to leukocytosis and fever (07 Mar 2018 21:47)      INTERVAL HPI/OVERNIGHT EVENTS: patient examined at bedside. She is complaining of pain in her back, particularly with moving, after undergoing additional incision and drainage of abscess yesterday morning. She has no other complaints.     Overnight events on telemetry monitoring []    MEDICATIONS  (STANDING):  ascorbic acid 500 milliGRAM(s) Oral daily  cinacalcet 30 milliGRAM(s) Oral daily  collagenase Ointment 1 Application(s) Topical daily  docusate sodium 100 milliGRAM(s) Oral two times a day  epoetin jacqueline Injectable 4000 Unit(s) IV Push <User Schedule>  folic acid 1 milliGRAM(s) Oral daily  gabapentin 100 milliGRAM(s) Oral three times a day  heparin  Injectable 5000 Unit(s) SubCutaneous every 12 hours  hydrALAZINE 50 milliGRAM(s) Oral three times a day  insulin lispro (HumaLOG) corrective regimen sliding scale   SubCutaneous Before meals and at bedtime  levothyroxine 50 MICROGram(s) Oral daily  meropenem  IVPB 500 milliGRAM(s) IV Intermittent every 24 hours  metoprolol     tartrate 25 milliGRAM(s) Oral two times a day  multivitamin 1 Tablet(s) Oral daily  pantoprazole    Tablet 40 milliGRAM(s) Oral before breakfast  simvastatin 20 milliGRAM(s) Oral at bedtime  vancomycin  IVPB 1000 milliGRAM(s) IV Intermittent <User Schedule>    MEDICATIONS  (PRN):  acetaminophen   Tablet 650 milliGRAM(s) Oral every 6 hours PRN For Temp greater than 38 C (100.4 F)  morphine  - Injectable 4 milliGRAM(s) IV Push every 4 hours PRN Severe Pain  oxyCODONE    5 mG/acetaminophen 325 mG 1 Tablet(s) Oral every 4 hours PRN Moderate Pain    _______________________________________________  REVIEW OF SYSTEMS:  CONSTITUTIONAL: No fever  RESPIRATORY: No cough; No shortness of breath  CARDIOVASCULAR: No chest pain, no palpitations  GASTROINTESTINAL: No pain. No constipation, nausea or vomiting; No diarrhea   NEUROLOGICAL: No headache or numbness, no tremors  MUSCULOSKELETAL: Reports pain along back and sacrum region     Vital Signs Last 24 Hrs  T(C): 37.3 (11 Mar 2018 00:13), Max: 39.3 (10 Mar 2018 14:49)  T(F): 99.1 (11 Mar 2018 00:13), Max: 102.8 (10 Mar 2018 14:49)  HR: 89 (11 Mar 2018 05:14) (83 - 102)  BP: 111/43 (11 Mar 2018 05:14) (111/43 - 128/45)  BP(mean): --  RR: 14 (11 Mar 2018 00:13) (14 - 16)  SpO2: 100% (11 Mar 2018 00:13) (97% - 100%)  ________________________________________________  PHYSICAL EXAM:  GENERAL: NAD, lying in bed  CHEST/LUNG: Clear to auscultation bilaterally with good air entry   HEART: S1 S2,  tachycardic rate and regular rhythm,; no murmurs  ABDOMEN: Soft, Nontender, Nondistended; Bowel sounds present  EXTREMITIES: no cyanosis; no edema; no calf tenderness  SKIN: wound dressings in place over back in addition to sacrum without discharge soaking dressings.   NERVOUS SYSTEM:  AAOx2; no new focal deficits  _________________________________________________  LABS:                        10.4   35.4  )-----------( 357      ( 10 Mar 2018 06:07 )             35.9     03-10    133<L>  |  99  |  22<H>  ----------------------------<  100<H>  5.1   |  26  |  2.72<H>    Ca    8.5      10 Mar 2018 06:07          CAPILLARY BLOOD GLUCOSE      POCT Blood Glucose.: 125 mg/dL (10 Mar 2018 21:27)  POCT Blood Glucose.: 112 mg/dL (10 Mar 2018 16:57)  POCT Blood Glucose.: 92 mg/dL (10 Mar 2018 12:39)  POCT Blood Glucose.: 112 mg/dL (10 Mar 2018 08:52)    RADIOLOGY & ADDITIONAL TESTS:    Consultant(s) Notes Reviewed:   YES    Care Discussed with Consultants:   Infectious Disease [x] Endocrinology [] Neurology [] ENT [] Cardiology [] Electrophysiology [] Pulmonology [] Gastroenterology [] Nephrology [x] Urology [] Orthopaedics [] Vascular Surgery [] Thoracic Surgery [] Plastic Surgery [] General Surgery [x] Podiatry [] Psychiatry [] Hematology/Oncology [] Pain [] Palliative Care []    Plan of care was discussed with patient and/or primary care giver; all questions and concerns were addressed and care was aligned with patient's wishes.

## 2018-03-11 NOTE — PROGRESS NOTE ADULT - ATTENDING COMMENTS
Patient was examined at the bedside and discussed with Dr. Turpin.     She states that she is feeling somewhat better.     Alert, cooperative 52 yo woman  Vital Signs Last 24 Hrs  T(C): 37.8 (11 Mar 2018 15:31), Max: 37.8 (11 Mar 2018 15:31)  T(F): 100.1 (11 Mar 2018 15:31), Max: 100.1 (11 Mar 2018 15:31)  HR: 104 (11 Mar 2018 15:31) (85 - 104)  BP: 111/55 (11 Mar 2018 15:31) (111/43 - 124/48)  BP(mean): --  RR: 16 (11 Mar 2018 15:31) (14 - 16)  SpO2: 98% (11 Mar 2018 15:31) (98% - 100%)  Bilateral blindness  Lungs, no rales  Cor, RRR  Abscesses on back have been drained  Lab reports MRSA from abscess drained on 3/8/2018 with inducible clindamycin resistance.     IMP:  Multiple abscesses secondary to MRSA have been drained.  To date, bacteremia has not been identified during this admission, nor on previous admissions.    Permacath remains in place  ESRD, on dialysis  Plan:  Continue vancomycin           Will consider discontinuing meropenem, if ID agrees.

## 2018-03-11 NOTE — PROGRESS NOTE ADULT - ASSESSMENT
1.	Right hip/ back abscesses - MRSA  2.	UTI  3.	Fevers  4.	Leukocytosis   ·	cont meropenem 500mg IV daily D5  ·	cont vanco 1gm IV MWF with HD D5

## 2018-03-11 NOTE — PROGRESS NOTE ADULT - PROBLEM SELECTOR PLAN 1
Likely secondary infected sacral abscess with history of MRSA  Patient febrile to 100.9 in ED with leukocytosis to 48K  S/p incision and drainage 3/8  Continue with meropenem and vancomycin  F/U blood, wound, and urine cultures  Infectious Disease Dr. Kaplan following  General surgery Dr. Wright following Likely secondary infected sacral and back abscess with history of MRSA  S/p incision and drainage 3/8; 3/10  Continue with meropenem and vancomycin  F/U wound cultures   Infectious Disease Dr. Kaplan following  General surgery Dr. Wright following

## 2018-03-12 LAB
ANION GAP SERPL CALC-SCNC: 10 MMOL/L — SIGNIFICANT CHANGE UP (ref 5–17)
BASE EXCESS BLDV CALC-SCNC: 5.5 MMOL/L — HIGH (ref -2–2)
BASOPHILS # BLD AUTO: 0.1 K/UL — SIGNIFICANT CHANGE UP (ref 0–0.2)
BASOPHILS NFR BLD AUTO: 0.2 % — SIGNIFICANT CHANGE UP (ref 0–2)
BUN SERPL-MCNC: 53 MG/DL — HIGH (ref 7–18)
CALCIUM SERPL-MCNC: 8.1 MG/DL — LOW (ref 8.4–10.5)
CHLORIDE SERPL-SCNC: 95 MMOL/L — LOW (ref 96–108)
CO2 SERPL-SCNC: 26 MMOL/L — SIGNIFICANT CHANGE UP (ref 22–31)
CREAT SERPL-MCNC: 5.93 MG/DL — HIGH (ref 0.5–1.3)
CULTURE RESULTS: SIGNIFICANT CHANGE UP
CULTURE RESULTS: SIGNIFICANT CHANGE UP
EOSINOPHIL # BLD AUTO: 0.2 K/UL — SIGNIFICANT CHANGE UP (ref 0–0.5)
EOSINOPHIL NFR BLD AUTO: 0.6 % — SIGNIFICANT CHANGE UP (ref 0–6)
GLUCOSE BLDC GLUCOMTR-MCNC: 136 MG/DL — HIGH (ref 70–99)
GLUCOSE BLDC GLUCOMTR-MCNC: 137 MG/DL — HIGH (ref 70–99)
GLUCOSE BLDC GLUCOMTR-MCNC: 148 MG/DL — HIGH (ref 70–99)
GLUCOSE BLDC GLUCOMTR-MCNC: 150 MG/DL — HIGH (ref 70–99)
GLUCOSE SERPL-MCNC: 132 MG/DL — HIGH (ref 70–99)
HCO3 BLDV-SCNC: 29 MMOL/L — SIGNIFICANT CHANGE UP (ref 21–29)
HCT VFR BLD CALC: 32.5 % — LOW (ref 34.5–45)
HGB BLD-MCNC: 9.1 G/DL — LOW (ref 11.5–15.5)
HOROWITZ INDEX BLDV+IHG-RTO: 21 — SIGNIFICANT CHANGE UP
LYMPHOCYTES # BLD AUTO: 2.6 K/UL — SIGNIFICANT CHANGE UP (ref 1–3.3)
LYMPHOCYTES # BLD AUTO: 7.4 % — LOW (ref 13–44)
MCHC RBC-ENTMCNC: 25.1 PG — LOW (ref 27–34)
MCHC RBC-ENTMCNC: 27.9 GM/DL — LOW (ref 32–36)
MCV RBC AUTO: 89.8 FL — SIGNIFICANT CHANGE UP (ref 80–100)
MONOCYTES # BLD AUTO: 1.3 K/UL — HIGH (ref 0–0.9)
MONOCYTES NFR BLD AUTO: 3.6 % — SIGNIFICANT CHANGE UP (ref 2–14)
NEUTROPHILS # BLD AUTO: 31.4 K/UL — HIGH (ref 1.8–7.4)
NEUTROPHILS NFR BLD AUTO: 88.3 % — HIGH (ref 43–77)
PCO2 BLDV: 42 MMHG — SIGNIFICANT CHANGE UP (ref 35–50)
PH BLDV: 7.46 — HIGH (ref 7.35–7.45)
PLATELET # BLD AUTO: 406 K/UL — HIGH (ref 150–400)
PO2 BLDV: <43 MMHG — SIGNIFICANT CHANGE UP (ref 25–45)
POTASSIUM SERPL-MCNC: 5.4 MMOL/L — HIGH (ref 3.5–5.3)
POTASSIUM SERPL-SCNC: 5.4 MMOL/L — HIGH (ref 3.5–5.3)
RBC # BLD: 3.62 M/UL — LOW (ref 3.8–5.2)
RBC # FLD: 19.8 % — HIGH (ref 10.3–14.5)
SAO2 % BLDV: 68 % — SIGNIFICANT CHANGE UP (ref 67–88)
SODIUM SERPL-SCNC: 131 MMOL/L — LOW (ref 135–145)
SPECIMEN SOURCE: SIGNIFICANT CHANGE UP
SPECIMEN SOURCE: SIGNIFICANT CHANGE UP
VANCOMYCIN TROUGH SERPL-MCNC: 9.4 UG/ML — LOW (ref 10–20)
WBC # BLD: 35.6 K/UL — HIGH (ref 3.8–10.5)
WBC # FLD AUTO: 35.6 K/UL — HIGH (ref 3.8–10.5)

## 2018-03-12 PROCEDURE — 71045 X-RAY EXAM CHEST 1 VIEW: CPT | Mod: 26

## 2018-03-12 PROCEDURE — 99233 SBSQ HOSP IP/OBS HIGH 50: CPT | Mod: GC

## 2018-03-12 RX ORDER — VANCOMYCIN HCL 1 G
1250 VIAL (EA) INTRAVENOUS
Qty: 0 | Refills: 0 | Status: DISCONTINUED | OUTPATIENT
Start: 2018-03-14 | End: 2018-03-15

## 2018-03-12 RX ADMIN — Medication 500 MILLIGRAM(S): at 12:59

## 2018-03-12 RX ADMIN — OXYCODONE AND ACETAMINOPHEN 1 TABLET(S): 5; 325 TABLET ORAL at 23:10

## 2018-03-12 RX ADMIN — SIMVASTATIN 20 MILLIGRAM(S): 20 TABLET, FILM COATED ORAL at 22:24

## 2018-03-12 RX ADMIN — Medication 50 MICROGRAM(S): at 05:46

## 2018-03-12 RX ADMIN — GABAPENTIN 100 MILLIGRAM(S): 400 CAPSULE ORAL at 22:24

## 2018-03-12 RX ADMIN — Medication 250 MILLIGRAM(S): at 11:12

## 2018-03-12 RX ADMIN — ERYTHROPOIETIN 4000 UNIT(S): 10000 INJECTION, SOLUTION INTRAVENOUS; SUBCUTANEOUS at 10:12

## 2018-03-12 RX ADMIN — Medication 1 MILLIGRAM(S): at 12:59

## 2018-03-12 RX ADMIN — HEPARIN SODIUM 5000 UNIT(S): 5000 INJECTION INTRAVENOUS; SUBCUTANEOUS at 19:34

## 2018-03-12 RX ADMIN — GABAPENTIN 100 MILLIGRAM(S): 400 CAPSULE ORAL at 15:08

## 2018-03-12 RX ADMIN — GABAPENTIN 100 MILLIGRAM(S): 400 CAPSULE ORAL at 05:46

## 2018-03-12 RX ADMIN — Medication 50 MILLIGRAM(S): at 05:47

## 2018-03-12 RX ADMIN — Medication 1 TABLET(S): at 12:59

## 2018-03-12 RX ADMIN — MEROPENEM 100 MILLIGRAM(S): 1 INJECTION INTRAVENOUS at 19:34

## 2018-03-12 RX ADMIN — OXYCODONE AND ACETAMINOPHEN 1 TABLET(S): 5; 325 TABLET ORAL at 07:00

## 2018-03-12 RX ADMIN — Medication 100 MILLIGRAM(S): at 05:46

## 2018-03-12 RX ADMIN — Medication 100 MILLIGRAM(S): at 19:34

## 2018-03-12 RX ADMIN — Medication 1 APPLICATION(S): at 15:08

## 2018-03-12 RX ADMIN — Medication 25 MILLIGRAM(S): at 05:46

## 2018-03-12 RX ADMIN — CINACALCET 30 MILLIGRAM(S): 30 TABLET, FILM COATED ORAL at 12:59

## 2018-03-12 RX ADMIN — OXYCODONE AND ACETAMINOPHEN 1 TABLET(S): 5; 325 TABLET ORAL at 22:33

## 2018-03-12 RX ADMIN — PANTOPRAZOLE SODIUM 40 MILLIGRAM(S): 20 TABLET, DELAYED RELEASE ORAL at 05:47

## 2018-03-12 RX ADMIN — Medication 25 MILLIGRAM(S): at 19:34

## 2018-03-12 RX ADMIN — OXYCODONE AND ACETAMINOPHEN 1 TABLET(S): 5; 325 TABLET ORAL at 06:47

## 2018-03-12 NOTE — PROGRESS NOTE ADULT - SUBJECTIVE AND OBJECTIVE BOX
52 y/o female with multiple medical problems is under our care for UTI, fevers, right hip/ back abscesses, s/p I&D.  Patient is still fevers, but are more low grade. Wbc count has been slowly trending downward. Vanco trough was low, will increase dose for next session.     REVIEW OF SYSTEMS:  [  ] Not able to illicit  General: no fevers no malaise  Chest: no cough no sob  GI: no nvd   Skin: no rashes  Neuro: no ha's no dizziness     ALLERGIES: No Known Allergies    MEDS:  meropenem  IVPB 500 milliGRAM(s) IV Intermittent every 24 hours (3/07)  vancomycin  IVPB 1000 milliGRAM(s) IV Intermittent <User Schedule> (3/07)    VITALS:  Vital Signs Last 24 Hrs  T(C): 38 (12 Mar 2018 16:16), Max: 38 (12 Mar 2018 16:16)  T(F): 100.4 (12 Mar 2018 16:16), Max: 100.4 (12 Mar 2018 16:16)  HR: 90 (12 Mar 2018 16:16) (79 - 95)  BP: 114/39 (12 Mar 2018 16:16) (114/39 - 126/52)  BP(mean): --  RR: 18 (12 Mar 2018 16:16) (16 - 18)  SpO2: 92% (12 Mar 2018 16:16) (92% - 100%)      PHYSICAL EXAM:  HEENT: n/a  Neck: supple no LN's   Respiratory: diffuse bilateral rales midlung to bases  +right chest P-C  Cardiovascular: S1 S2 reg +soft sys murmur  Gastrointestinal: +BS with soft, mildly distended abdomen; nontender  +jarrell   Extremities: no edema   Skin: right hip and 2 back wounds are packed and dressed  Ortho: n/a  Neuro: AAO x 2      LABS/DIAGNOSTIC TESTS:                        9.1    35.6  )-----------( 406      ( 12 Mar 2018 07:41 )             32.5   WBC Count: 35.6 K/uL (03-12 @ 07:41)  WBC Count: 36.9 K/uL (03-11 @ 08:14)  WBC Count: 35.4 K/uL (03-10 @ 06:07)    03-12    131<L>  |  95<L>  |  53<H>  ----------------------------<  132<H>  5.4<H>   |  26  |  5.93<H>    Ca    8.1<L>      12 Mar 2018 07:41    Vancomycin Level, Trough (03.12.18 @ 09:43)    Vancomycin Level, Trough: 9.4      CULTURES:   .Blood Blood-Peripheral  03-10 @ 09:57   No growth to date.  --  --      .Surgical Swab left back mass culture  03-09 @ 12:14   Numerous Methicillin resistant Staphylococcus aureus  --  Methicillin resistant Staphylococcus aureus      .Surgical Swab right side of back  03-09 @ 12:13   Numerous Methicillin resistant Staphylococcus aureus  ***********Note************  This isolate demonstrates inducible  clindamycin resistance.  Clindamycin may still be effective in some patients.  --  Methicillin resistant Staphylococcus aureus      .Blood Dialysis Catheter  03-07 @ 23:03   No growth to date.  --  --      .Blood Blood-Peripheral  03-07 @ 09:43   No growth to date.  --  --      .Urine Catheterized  03-07 @ 09:38   <10,000 CFU/ml Normal Urogenital derrick present  -      RADIOLOGY: no new studies

## 2018-03-12 NOTE — PROGRESS NOTE ADULT - SUBJECTIVE AND OBJECTIVE BOX
PGY 1 Note discussed with supervising resident and primary attending    Patient is a 53y old  Female who presents with a chief complaint of sent from NH due to leukocytosis and fever (07 Mar 2018 21:47)      INTERVAL HPI/OVERNIGHT EVENTS: offers no new complaints; current symptoms resolving    MEDICATIONS  (STANDING):  ascorbic acid 500 milliGRAM(s) Oral daily  cinacalcet 30 milliGRAM(s) Oral daily  collagenase Ointment 1 Application(s) Topical daily  docusate sodium 100 milliGRAM(s) Oral two times a day  epoetin jacqueline Injectable 4000 Unit(s) IV Push <User Schedule>  folic acid 1 milliGRAM(s) Oral daily  gabapentin 100 milliGRAM(s) Oral three times a day  heparin  Injectable 5000 Unit(s) SubCutaneous every 12 hours  hydrALAZINE 50 milliGRAM(s) Oral three times a day  insulin lispro (HumaLOG) corrective regimen sliding scale   SubCutaneous Before meals and at bedtime  levothyroxine 50 MICROGram(s) Oral daily  meropenem  IVPB 500 milliGRAM(s) IV Intermittent every 24 hours  metoprolol     tartrate 25 milliGRAM(s) Oral two times a day  multivitamin 1 Tablet(s) Oral daily  pantoprazole    Tablet 40 milliGRAM(s) Oral before breakfast  simvastatin 20 milliGRAM(s) Oral at bedtime  vancomycin  IVPB 1000 milliGRAM(s) IV Intermittent <User Schedule>    MEDICATIONS  (PRN):  acetaminophen   Tablet 650 milliGRAM(s) Oral every 6 hours PRN For Temp greater than 38 C (100.4 F)  morphine  - Injectable 4 milliGRAM(s) IV Push every 4 hours PRN Severe Pain  oxyCODONE    5 mG/acetaminophen 325 mG 1 Tablet(s) Oral every 4 hours PRN Moderate Pain      __________________________________________________  REVIEW OF SYSTEMS:    CONSTITUTIONAL: No fever,   EYES: no acute visual disturbances  NECK: No pain or stiffness  RESPIRATORY: No cough; No shortness of breath  CARDIOVASCULAR: No chest pain, no palpitations  GASTROINTESTINAL: No pain. No nausea or vomiting; No diarrhea   NEUROLOGICAL: No headache or numbness, no tremors  MUSCULOSKELETAL: No joint pain, no muscle pain  GENITOURINARY: no dysuria, no frequency, no hesitancy  PSYCHIATRY: no depression , no anxiety  ALL OTHER  ROS negative        Vital Signs Last 24 Hrs  T(C): 37.1 (11 Mar 2018 23:29), Max: 37.8 (11 Mar 2018 15:31)  T(F): 98.8 (11 Mar 2018 23:29), Max: 100.1 (11 Mar 2018 15:31)  HR: 95 (12 Mar 2018 04:52) (90 - 104)  BP: 126/52 (12 Mar 2018 04:52) (111/55 - 126/52)  BP(mean): --  RR: 16 (12 Mar 2018 04:52) (16 - 16)  SpO2: 95% (12 Mar 2018 04:52) (95% - 100%)    ________________________________________________  PHYSICAL EXAM:  GENERAL: NAD  HEENT: Normocephalic;  conjunctivae and sclerae clear; moist mucous membranes;   NECK : supple  CHEST/LUNG: Clear to auscultation bilaterally with good air entry   HEART: S1 S2  regular; no murmurs, gallops or rubs  ABDOMEN: Soft, Nontender, Nondistended; Bowel sounds present  EXTREMITIES: no cyanosis; no edema; no calf tenderness  SKIN: warm and dry; no rash  NERVOUS SYSTEM:  Awake and alert; Oriented  to place, person and time ; no new deficits    _________________________________________________  LABS:                        8.9    36.9  )-----------( 356      ( 11 Mar 2018 08:14 )             30.7     03-11    133<L>  |  97  |  39<H>  ----------------------------<  122<H>  4.6   |  26  |  4.26<H>    Ca    7.8<L>      11 Mar 2018 08:14          CAPILLARY BLOOD GLUCOSE      POCT Blood Glucose.: 144 mg/dL (11 Mar 2018 21:33)  POCT Blood Glucose.: 185 mg/dL (11 Mar 2018 16:54)  POCT Blood Glucose.: 140 mg/dL (11 Mar 2018 11:43)  POCT Blood Glucose.: 128 mg/dL (11 Mar 2018 08:32)        RADIOLOGY & ADDITIONAL TESTS:    Imaging Personally Reviewed:  YES    Consultant(s) Notes Reviewed:   YES    Care Discussed with Consultants : YES    Plan of care was discussed with patient and /or primary care giver; all questions and concerns were addressed and care was aligned with patient's wishes. PGY 1 Note discussed with supervising resident and primary attending    Patient is a 53y old  Female who presents with a chief complaint of sent from NH due to leukocytosis and fever (07 Mar 2018 21:47)      INTERVAL HPI/OVERNIGHT EVENTS: complaints of pain in her back.     MEDICATIONS  (STANDING):  ascorbic acid 500 milliGRAM(s) Oral daily  cinacalcet 30 milliGRAM(s) Oral daily  collagenase Ointment 1 Application(s) Topical daily  docusate sodium 100 milliGRAM(s) Oral two times a day  epoetin jacqueline Injectable 4000 Unit(s) IV Push <User Schedule>  folic acid 1 milliGRAM(s) Oral daily  gabapentin 100 milliGRAM(s) Oral three times a day  heparin  Injectable 5000 Unit(s) SubCutaneous every 12 hours  hydrALAZINE 50 milliGRAM(s) Oral three times a day  insulin lispro (HumaLOG) corrective regimen sliding scale   SubCutaneous Before meals and at bedtime  levothyroxine 50 MICROGram(s) Oral daily  meropenem  IVPB 500 milliGRAM(s) IV Intermittent every 24 hours  metoprolol     tartrate 25 milliGRAM(s) Oral two times a day  multivitamin 1 Tablet(s) Oral daily  pantoprazole    Tablet 40 milliGRAM(s) Oral before breakfast  simvastatin 20 milliGRAM(s) Oral at bedtime  vancomycin  IVPB 1000 milliGRAM(s) IV Intermittent <User Schedule>    MEDICATIONS  (PRN):  acetaminophen   Tablet 650 milliGRAM(s) Oral every 6 hours PRN For Temp greater than 38 C (100.4 F)  morphine  - Injectable 4 milliGRAM(s) IV Push every 4 hours PRN Severe Pain  oxyCODONE    5 mG/acetaminophen 325 mG 1 Tablet(s) Oral every 4 hours PRN Moderate Pain      __________________________________________________  REVIEW OF SYSTEMS:     CONSTITUTIONAL: back pain on scale of 6/10 , complaints of feeling tired   EYES: no acute visual disturbances  NECK: No pain or stiffness  RESPIRATORY: No cough; No shortness of breath  CARDIOVASCULAR: No chest pain, no palpitations  GASTROINTESTINAL: No pain. No nausea or vomiting; No diarrhea   NEUROLOGICAL: No headache or numbness, no tremors  MUSCULOSKELETAL: No joint pain, no muscle pain  GENITOURINARY: no dysuria, no frequency, no hesitancy  PSYCHIATRY: no depression , no anxiety  ALL OTHER  ROS negative        Vital Signs Last 24 Hrs  T(C): 37.1 (11 Mar 2018 23:29), Max: 37.8 (11 Mar 2018 15:31)  T(F): 98.8 (11 Mar 2018 23:29), Max: 100.1 (11 Mar 2018 15:31)  HR: 95 (12 Mar 2018 04:52) (90 - 104)  BP: 126/52 (12 Mar 2018 04:52) (111/55 - 126/52)  BP(mean): --  RR: 16 (12 Mar 2018 04:52) (16 - 16)  SpO2: 95% (12 Mar 2018 04:52) (95% - 100%)    ________________________________________________  PHYSICAL EXAM:  GENERAL: NAD  HEENT: Normocephalic;  conjunctivae and sclerae clear; moist mucous membranes;   NECK : supple  CHEST/LUNG: Clear to auscultation bilaterally with good air entry   HEART: S1 S2  regular; no murmurs, gallops or rubs  ABDOMEN: Soft, Nontender, Nondistended; Bowel sounds present  EXTREMITIES: no cyanosis; no edema; no calf tenderness ; right upper extremity lipoma   SKIN: sacral decubitus , dressing intact . no soakage   Aake and alert; Oriented  to place, person and time ; no new deficits    _________________________________________________  LABS:                        8.9    36.9  )-----------( 356      ( 11 Mar 2018 08:14 )             30.7     03-11    133<L>  |  97  |  39<H>  ----------------------------<  122<H>  4.6   |  26  |  4.26<H>    Ca    7.8<L>      11 Mar 2018 08:14          CAPILLARY BLOOD GLUCOSE      POCT Blood Glucose.: 144 mg/dL (11 Mar 2018 21:33)  POCT Blood Glucose.: 185 mg/dL (11 Mar 2018 16:54)  POCT Blood Glucose.: 140 mg/dL (11 Mar 2018 11:43)  POCT Blood Glucose.: 128 mg/dL (11 Mar 2018 08:32)        RADIOLOGY & ADDITIONAL TESTS:    Imaging Personally Reviewed:  YES    Consultant(s) Notes Reviewed:   YES    Care Discussed with Consultants : YES    Plan of care was discussed with patient and /or primary care giver; all questions and concerns were addressed and care was aligned with patient's wishes. PGY 1 Note discussed with supervising resident and primary attending    Patient is a 53y old  Female who presents with a chief complaint of sent from NH due to leukocytosis and fever (07 Mar 2018 21:47)      INTERVAL HPI/OVERNIGHT EVENTS: complaints of pain in her back.     MEDICATIONS  (STANDING):  ascorbic acid 500 milliGRAM(s) Oral daily  cinacalcet 30 milliGRAM(s) Oral daily  collagenase Ointment 1 Application(s) Topical daily  docusate sodium 100 milliGRAM(s) Oral two times a day  epoetin jacqueline Injectable 4000 Unit(s) IV Push <User Schedule>  folic acid 1 milliGRAM(s) Oral daily  gabapentin 100 milliGRAM(s) Oral three times a day  heparin  Injectable 5000 Unit(s) SubCutaneous every 12 hours  hydrALAZINE 50 milliGRAM(s) Oral three times a day  insulin lispro (HumaLOG) corrective regimen sliding scale   SubCutaneous Before meals and at bedtime  levothyroxine 50 MICROGram(s) Oral daily  meropenem  IVPB 500 milliGRAM(s) IV Intermittent every 24 hours  metoprolol     tartrate 25 milliGRAM(s) Oral two times a day  multivitamin 1 Tablet(s) Oral daily  pantoprazole    Tablet 40 milliGRAM(s) Oral before breakfast  simvastatin 20 milliGRAM(s) Oral at bedtime  vancomycin  IVPB 1000 milliGRAM(s) IV Intermittent <User Schedule>    MEDICATIONS  (PRN):  acetaminophen   Tablet 650 milliGRAM(s) Oral every 6 hours PRN For Temp greater than 38 C (100.4 F)  morphine  - Injectable 4 milliGRAM(s) IV Push every 4 hours PRN Severe Pain  oxyCODONE    5 mG/acetaminophen 325 mG 1 Tablet(s) Oral every 4 hours PRN Moderate Pain      __________________________________________________  REVIEW OF SYSTEMS:     CONSTITUTIONAL: back pain on scale of 6/10 , complaints of feeling tired   EYES: no acute visual disturbances  NECK: No pain or stiffness  RESPIRATORY: No cough; No shortness of breath  CARDIOVASCULAR: No chest pain, no palpitations  GASTROINTESTINAL: No pain. No nausea or vomiting; No diarrhea   NEUROLOGICAL: No headache or numbness, no tremors  MUSCULOSKELETAL: No joint pain, no muscle pain  GENITOURINARY: no dysuria, no frequency, no hesitancy  PSYCHIATRY: no depression , no anxiety  ALL OTHER  ROS negative        Vital Signs Last 24 Hrs  T(C): 37.1 (11 Mar 2018 23:29), Max: 37.8 (11 Mar 2018 15:31)  T(F): 98.8 (11 Mar 2018 23:29), Max: 100.1 (11 Mar 2018 15:31)  HR: 95 (12 Mar 2018 04:52) (90 - 104)  BP: 126/52 (12 Mar 2018 04:52) (111/55 - 126/52)  BP(mean): --  RR: 16 (12 Mar 2018 04:52) (16 - 16)  SpO2: 95% (12 Mar 2018 04:52) (95% - 100%)    ________________________________________________  PHYSICAL EXAM:  GENERAL: NAD  HEENT: Normocephalic;  conjunctivae and sclerae clear; moist mucous membranes;   NECK : supple  CHEST/LUNG: Clear to auscultation bilaterally with good air entry   HEART: S1 S2  regular; no murmurs, gallops or rubs  ABDOMEN: Soft, Nontender, Nondistended; Bowel sounds present  EXTREMITIES: no cyanosis; no edema; no calf tenderness ; right upper extremity lipoma   SKIN: sacral wound , dressing intact . no soakage   Neuro : AAO*2    _________________________________________________  LABS:                        8.9    36.9  )-----------( 356      ( 11 Mar 2018 08:14 )             30.7     03-11    133<L>  |  97  |  39<H>  ----------------------------<  122<H>  4.6   |  26  |  4.26<H>    Ca    7.8<L>      11 Mar 2018 08:14          CAPILLARY BLOOD GLUCOSE      POCT Blood Glucose.: 144 mg/dL (11 Mar 2018 21:33)  POCT Blood Glucose.: 185 mg/dL (11 Mar 2018 16:54)  POCT Blood Glucose.: 140 mg/dL (11 Mar 2018 11:43)  POCT Blood Glucose.: 128 mg/dL (11 Mar 2018 08:32)        Consultant(s) Notes Reviewed:   YES    Care Discussed with Consultants : YES    Plan of care was discussed with patient and /or primary care giver; all questions and concerns were addressed and care was aligned with patient's wishes. PGY 1 Note discussed with supervising resident and primary attending    Patient is a 53y old  Female who presents with a chief complaint of sent from NH due to leukocytosis and fever (07 Mar 2018 21:47)      INTERVAL HPI/OVERNIGHT EVENTS: complains of moderate pain in her back.     MEDICATIONS  (STANDING):  ascorbic acid 500 milliGRAM(s) Oral daily  cinacalcet 30 milliGRAM(s) Oral daily  collagenase Ointment 1 Application(s) Topical daily  docusate sodium 100 milliGRAM(s) Oral two times a day  epoetin jacqueline Injectable 4000 Unit(s) IV Push <User Schedule>  folic acid 1 milliGRAM(s) Oral daily  gabapentin 100 milliGRAM(s) Oral three times a day  heparin  Injectable 5000 Unit(s) SubCutaneous every 12 hours  hydrALAZINE 50 milliGRAM(s) Oral three times a day  insulin lispro (HumaLOG) corrective regimen sliding scale   SubCutaneous Before meals and at bedtime  levothyroxine 50 MICROGram(s) Oral daily  meropenem  IVPB 500 milliGRAM(s) IV Intermittent every 24 hours  metoprolol     tartrate 25 milliGRAM(s) Oral two times a day  multivitamin 1 Tablet(s) Oral daily  pantoprazole    Tablet 40 milliGRAM(s) Oral before breakfast  simvastatin 20 milliGRAM(s) Oral at bedtime  vancomycin  IVPB 1000 milliGRAM(s) IV Intermittent <User Schedule>    MEDICATIONS  (PRN):  acetaminophen   Tablet 650 milliGRAM(s) Oral every 6 hours PRN For Temp greater than 38 C (100.4 F)  morphine  - Injectable 4 milliGRAM(s) IV Push every 4 hours PRN Severe Pain  oxyCODONE    5 mG/acetaminophen 325 mG 1 Tablet(s) Oral every 4 hours PRN Moderate Pain      __________________________________________________  REVIEW OF SYSTEMS:     CONSTITUTIONAL: back pain on scale of 6/10 , complaints of feeling tired   EYES: no acute visual disturbances  NECK: No pain or stiffness  RESPIRATORY: No cough; No shortness of breath  CARDIOVASCULAR: No chest pain, no palpitations  GASTROINTESTINAL: No pain. No nausea or vomiting; No diarrhea   NEUROLOGICAL: No headache or numbness, no tremors  MUSCULOSKELETAL: No joint pain, no muscle pain  GENITOURINARY: no dysuria, no frequency, no hesitancy  PSYCHIATRY: no depression , no anxiety  ALL OTHER  ROS negative        Vital Signs Last 24 Hrs  T(C): 37.1 (11 Mar 2018 23:29), Max: 37.8 (11 Mar 2018 15:31)  T(F): 98.8 (11 Mar 2018 23:29), Max: 100.1 (11 Mar 2018 15:31)  HR: 95 (12 Mar 2018 04:52) (90 - 104)  BP: 126/52 (12 Mar 2018 04:52) (111/55 - 126/52)  BP(mean): --  RR: 16 (12 Mar 2018 04:52) (16 - 16)  SpO2: 95% (12 Mar 2018 04:52) (95% - 100%)    ________________________________________________  PHYSICAL EXAM:  GENERAL: NAD  HEENT: Normocephalic;  conjunctivae and sclerae clear; moist mucous membranes;   NECK : supple  CHEST/LUNG: Clear to auscultation bilaterally with good air entry   HEART: S1 S2  regular; no murmurs, gallops or rubs  ABDOMEN: Soft, Nontender, Nondistended; Bowel sounds present  EXTREMITIES: no cyanosis; no edema; no calf tenderness ; right upper extremity lipoma   SKIN: sacral wound , dressing intact . no soakage   Neuro : AAO*2    _________________________________________________  LABS:                        8.9    36.9  )-----------( 356      ( 11 Mar 2018 08:14 )             30.7     03-11    133<L>  |  97  |  39<H>  ----------------------------<  122<H>  4.6   |  26  |  4.26<H>    Ca    7.8<L>      11 Mar 2018 08:14          CAPILLARY BLOOD GLUCOSE      POCT Blood Glucose.: 144 mg/dL (11 Mar 2018 21:33)  POCT Blood Glucose.: 185 mg/dL (11 Mar 2018 16:54)  POCT Blood Glucose.: 140 mg/dL (11 Mar 2018 11:43)  POCT Blood Glucose.: 128 mg/dL (11 Mar 2018 08:32)        Consultant(s) Notes Reviewed:   YES    Care Discussed with Consultants : YES    Plan of care was discussed with patient and /or primary care giver; all questions and concerns were addressed and care was aligned with patient's wishes.

## 2018-03-12 NOTE — PROGRESS NOTE ADULT - PROBLEM SELECTOR PLAN 4
Not currently in exacerbation  Continue with metoprolol 25mg BID Not currently in exacerbation  Continue with metoprolol 25mg BID  Last echo in december : beny

## 2018-03-12 NOTE — PROGRESS NOTE ADULT - ASSESSMENT
53 year old female s/p I&D right hip and back abscess 3/8, bedside I&D 3/9    1) daily dressing change wet to dry gauze  2) frequent turning  3) antibx per ID

## 2018-03-12 NOTE — PROCEDURE NOTE - NSPOSTPRCRAD_GEN_A_CORE
depth of insertion/line adjusted to depth of insertion/no pneumothorax/central line located in the post-procedure radiography performed/no pneumothorax/central line located in the superior vena cava

## 2018-03-12 NOTE — PROGRESS NOTE ADULT - ATTENDING COMMENTS
MEDICAL ATTENDING NOTE - COVERAGE FOR DR Gomez    Patient was seen and examined by myself. Case was discussed with house staff in details. I have reviewed and agree with the plan as outlined above with edits where appropriate.  52 y/o F with ESRD on HD admitted for multiple abscess with sepsis ; also with ESRD n dialysis, anemia of CKD, HTN and T2DM-   - continue with antimicrobials  - continue dialysis 3 x weekly  - secure IV access for antibiotics  - monitor hb; Procrit as needed  - supportive care  - prognosis guarded.  Other plan as outlined above.  Dr gomez will assume care from tomorrow

## 2018-03-12 NOTE — PROCEDURE NOTE - NSPOSTCAREGUIDE_GEN_A_CORE
Instructed patient/caregiver regarding signs and symptoms of infection/Care for catheter as per unit/ICU protocols/Instructed patient/caregiver to follow-up with primary care physician/Keep the cast/splint/dressing clean and dry/Verbal/written post procedure instructions were given to patient/caregiver
Verbal/written post procedure instructions were given to patient/caregiver

## 2018-03-12 NOTE — PROCEDURE NOTE - NSPROCDETAILS_GEN_ALL_CORE
guidewire recovered/sterile dressing applied/sterile technique, catheter placed/ultrasound guidance/lumen(s) aspirated and flushed
incision left open, packing placed

## 2018-03-12 NOTE — PROGRESS NOTE ADULT - SUBJECTIVE AND OBJECTIVE BOX
s/p HD    No Known Allergies    Hospital Medications:   MEDICATIONS  (STANDING):  ascorbic acid 500 milliGRAM(s) Oral daily  cinacalcet 30 milliGRAM(s) Oral daily  collagenase Ointment 1 Application(s) Topical daily  docusate sodium 100 milliGRAM(s) Oral two times a day  epoetin jacqueline Injectable 4000 Unit(s) IV Push <User Schedule>  folic acid 1 milliGRAM(s) Oral daily  gabapentin 100 milliGRAM(s) Oral three times a day  heparin  Injectable 5000 Unit(s) SubCutaneous every 12 hours  hydrALAZINE 50 milliGRAM(s) Oral three times a day  insulin lispro (HumaLOG) corrective regimen sliding scale   SubCutaneous Before meals and at bedtime  levothyroxine 50 MICROGram(s) Oral daily  meropenem  IVPB 500 milliGRAM(s) IV Intermittent every 24 hours  metoprolol     tartrate 25 milliGRAM(s) Oral two times a day  multivitamin 1 Tablet(s) Oral daily  pantoprazole    Tablet 40 milliGRAM(s) Oral before breakfast  simvastatin 20 milliGRAM(s) Oral at bedtime        VITALS:  T(F): 100.4 (18 @ 16:16), Max: 100.4 (18 @ 16:16)  HR: 90 (18 @ 16:16)  BP: 114/39 (18 @ 16:16)  RR: 18 (18 @ 16:16)  SpO2: 92% (18 @ 16:16)  Wt(kg): --      PHYSICAL EXAM:  Constitutional: NAD  HEENT: anicteric sclera, oropharynx clear, MMM  Neck: No JVD  Respiratory: CTAB, no wheezes, rales or rhonchi  Cardiovascular: S1, S2, RRR  Gastrointestinal: BS+, soft, NT/ND  Extremities:  No peripheral edema  Neurological: A/O x 3, no focal deficits  : No CVA tenderness. No jarrell.   Skin: sacral wounds dressed.  RT IJ permacath.      LABS:      131<L>  |  95<L>  |  53<H>  ----------------------------<  132<H>  5.4<H>   |  26  |  5.93<H>    Ca    8.1<L>      12 Mar 2018 07:41      Creatinine Trend: 5.93 <--, 4.26 <--, 2.72 <--, 4.07 <--, 2.80 <--, 4.80 <--                        9.1    35.6  )-----------( 406      ( 12 Mar 2018 07:41 )             32.5     Urine Studies:  Urinalysis Basic - ( 07 Mar 2018 04:30 )    Color: Yellow / Appearance: Slightly Turbid / S.010 / pH:   Gluc:  / Ketone: Negative  / Bili: Negative / Urobili: Negative   Blood:  / Protein: 500 mg/dL / Nitrite: Negative   Leuk Esterase: Moderate / RBC: 0-2 /HPF / WBC >50 /HPF   Sq Epi:  / Non Sq Epi: Moderate /HPF / Bacteria: Many /HPF        RADIOLOGY & ADDITIONAL STUDIES:

## 2018-03-12 NOTE — PROGRESS NOTE ADULT - ASSESSMENT
1.	Right hip/ back abscesses - MRSA  2.	UTI  3.	Fevers - low grade  4.	Leukocytosis   ·	cont meropenem 500mg IV daily D6  ·	increased vanco to 1.25gm IV MWF with HD D6

## 2018-03-12 NOTE — PROGRESS NOTE ADULT - SUBJECTIVE AND OBJECTIVE BOX
s/p I&D right hip and back abscess 3/8, bedside I&D 3/9  Patient examined at bedside, no complaints.   No nausea, no vomiting  Tolerating diet    T(C): 37.3 (03-12-18 @ 08:12), Max: 37.8 (03-11-18 @ 15:31)  HR: 91 (03-12-18 @ 08:12) (90 - 104)  BP: 117/44 (03-12-18 @ 08:12) (111/55 - 126/52)  RR: 17 (03-12-18 @ 08:12) (16 - 17)  SpO2: 100% (03-12-18 @ 08:12) (95% - 100%)  Wt(kg): --      Physical Exam  General: AAOx3, No acute distress  Skin: No jaundice, no icterus  Abdomen: soft, nontender, nondistended  Extremities: non edematous, no calf pain bilaterally  Right hip: dressing changed, open wound with granulation tissue, no drainage  Back: open wound x 3 with no drainage                          9.1    35.6  )-----------( 406      ( 12 Mar 2018 07:41 )             32.5   03-12    131<L>  |  95<L>  |  53<H>  ----------------------------<  132<H>  5.4<H>   |  26  |  5.93<H>    Ca    8.1<L>      12 Mar 2018 07:41

## 2018-03-12 NOTE — PROGRESS NOTE ADULT - ASSESSMENT
53 YR OLF FEMALE RECENTLY STARTED ON HD AFTER A PROLONGED HOSPITALIZATION IN [facility]. SENT FROM NH WITH LEUCOCYTOSIS. HAS SACRAL DECUB WOUND ( WOUND CULTURES IN NH GREW MRSA)ON EVALUATION FOUND TO HAVE RT KNEE ABCESS. S/P I&D OF HIP ABCESS.  PT HAS A PERMACATH.

## 2018-03-12 NOTE — PROGRESS NOTE ADULT - PROBLEM SELECTOR PLAN 1
Likely secondary infected sacral and back abscess with history of MRSA  S/p incision and drainage 3/8; 3/10  Continue with meropenem and vancomycin  F/U wound cultures   Infectious Disease Dr. Kaplan following  General surgery Dr. Wright following Likely secondary infected sacral and back abscess with history of MRSA  S/p incision and drainage 3/8; 3/10  Continue with meropenem and vancomycin  F/U wound cultures : positive for MRSA  Blood cultures negative to date   Infectious Disease Dr. Kaplan following  General surgery Dr. Wright following  Patient did not have peripheral iv access since last evening. Multiple attempts done by on call team and day team . Eventually central line was placed today in Left IJ for vascular access and iv antibiotics.  Consent obtained from sister , plan discussed with Dr. Gomez

## 2018-03-12 NOTE — PROCEDURE NOTE - NSSITEPREP_SKIN_A_CORE
povidone iodine (if allergic to chlorhexidine)/chlorhexidine/povidone-iodine ( under 2 weeks of age or 1500 grams)/Adherence to aseptic technique: hand hygiene prior to donning barriers (gown, gloves), don cap and mask, sterile drape over patient
chlorhexidine

## 2018-03-13 LAB
ANION GAP SERPL CALC-SCNC: 10 MMOL/L — SIGNIFICANT CHANGE UP (ref 5–17)
BUN SERPL-MCNC: 33 MG/DL — HIGH (ref 7–18)
CALCIUM SERPL-MCNC: 8.3 MG/DL — LOW (ref 8.4–10.5)
CHLORIDE SERPL-SCNC: 98 MMOL/L — SIGNIFICANT CHANGE UP (ref 96–108)
CO2 SERPL-SCNC: 28 MMOL/L — SIGNIFICANT CHANGE UP (ref 22–31)
CREAT SERPL-MCNC: 3.75 MG/DL — HIGH (ref 0.5–1.3)
CULTURE RESULTS: SIGNIFICANT CHANGE UP
CULTURE RESULTS: SIGNIFICANT CHANGE UP
GLUCOSE BLDC GLUCOMTR-MCNC: 125 MG/DL — HIGH (ref 70–99)
GLUCOSE BLDC GLUCOMTR-MCNC: 127 MG/DL — HIGH (ref 70–99)
GLUCOSE BLDC GLUCOMTR-MCNC: 166 MG/DL — HIGH (ref 70–99)
GLUCOSE BLDC GLUCOMTR-MCNC: 173 MG/DL — HIGH (ref 70–99)
GLUCOSE SERPL-MCNC: 113 MG/DL — HIGH (ref 70–99)
HCT VFR BLD CALC: 32 % — LOW (ref 34.5–45)
HGB BLD-MCNC: 8.8 G/DL — LOW (ref 11.5–15.5)
LYMPHOCYTES # BLD AUTO: 8 % — LOW (ref 13–44)
MAGNESIUM SERPL-MCNC: 2.3 MG/DL — SIGNIFICANT CHANGE UP (ref 1.6–2.6)
MCHC RBC-ENTMCNC: 24.8 PG — LOW (ref 27–34)
MCHC RBC-ENTMCNC: 27.6 GM/DL — LOW (ref 32–36)
MCV RBC AUTO: 89.9 FL — SIGNIFICANT CHANGE UP (ref 80–100)
MONOCYTES NFR BLD AUTO: 6 % — SIGNIFICANT CHANGE UP (ref 2–14)
NEUTROPHILS NFR BLD AUTO: 85 % — HIGH (ref 43–77)
PHOSPHATE SERPL-MCNC: 4.1 MG/DL — SIGNIFICANT CHANGE UP (ref 2.5–4.5)
PLATELET # BLD AUTO: 338 K/UL — SIGNIFICANT CHANGE UP (ref 150–400)
POTASSIUM SERPL-MCNC: 4.3 MMOL/L — SIGNIFICANT CHANGE UP (ref 3.5–5.3)
POTASSIUM SERPL-SCNC: 4.3 MMOL/L — SIGNIFICANT CHANGE UP (ref 3.5–5.3)
RBC # BLD: 3.56 M/UL — LOW (ref 3.8–5.2)
RBC # FLD: 19.6 % — HIGH (ref 10.3–14.5)
SODIUM SERPL-SCNC: 136 MMOL/L — SIGNIFICANT CHANGE UP (ref 135–145)
SPECIMEN SOURCE: SIGNIFICANT CHANGE UP
SPECIMEN SOURCE: SIGNIFICANT CHANGE UP
SURGICAL PATHOLOGY FINAL REPORT - CH: SIGNIFICANT CHANGE UP
WBC # BLD: 30.2 K/UL — HIGH (ref 3.8–10.5)
WBC # FLD AUTO: 30.2 K/UL — HIGH (ref 3.8–10.5)

## 2018-03-13 PROCEDURE — 99232 SBSQ HOSP IP/OBS MODERATE 35: CPT | Mod: GC

## 2018-03-13 RX ADMIN — GABAPENTIN 100 MILLIGRAM(S): 400 CAPSULE ORAL at 13:36

## 2018-03-13 RX ADMIN — OXYCODONE AND ACETAMINOPHEN 1 TABLET(S): 5; 325 TABLET ORAL at 22:19

## 2018-03-13 RX ADMIN — Medication 1 TABLET(S): at 11:56

## 2018-03-13 RX ADMIN — OXYCODONE AND ACETAMINOPHEN 1 TABLET(S): 5; 325 TABLET ORAL at 23:00

## 2018-03-13 RX ADMIN — Medication 50 MICROGRAM(S): at 05:54

## 2018-03-13 RX ADMIN — CINACALCET 30 MILLIGRAM(S): 30 TABLET, FILM COATED ORAL at 11:56

## 2018-03-13 RX ADMIN — Medication 50 MILLIGRAM(S): at 22:16

## 2018-03-13 RX ADMIN — Medication 100 MILLIGRAM(S): at 17:44

## 2018-03-13 RX ADMIN — SIMVASTATIN 20 MILLIGRAM(S): 20 TABLET, FILM COATED ORAL at 22:16

## 2018-03-13 RX ADMIN — Medication 1: at 11:58

## 2018-03-13 RX ADMIN — Medication 1 MILLIGRAM(S): at 11:56

## 2018-03-13 RX ADMIN — HEPARIN SODIUM 5000 UNIT(S): 5000 INJECTION INTRAVENOUS; SUBCUTANEOUS at 17:44

## 2018-03-13 RX ADMIN — OXYCODONE AND ACETAMINOPHEN 1 TABLET(S): 5; 325 TABLET ORAL at 15:29

## 2018-03-13 RX ADMIN — Medication 1: at 17:44

## 2018-03-13 RX ADMIN — PANTOPRAZOLE SODIUM 40 MILLIGRAM(S): 20 TABLET, DELAYED RELEASE ORAL at 05:54

## 2018-03-13 RX ADMIN — Medication 500 MILLIGRAM(S): at 11:56

## 2018-03-13 RX ADMIN — Medication 50 MILLIGRAM(S): at 05:54

## 2018-03-13 RX ADMIN — Medication 1 APPLICATION(S): at 11:55

## 2018-03-13 RX ADMIN — HEPARIN SODIUM 5000 UNIT(S): 5000 INJECTION INTRAVENOUS; SUBCUTANEOUS at 05:55

## 2018-03-13 RX ADMIN — OXYCODONE AND ACETAMINOPHEN 1 TABLET(S): 5; 325 TABLET ORAL at 16:00

## 2018-03-13 RX ADMIN — Medication 50 MILLIGRAM(S): at 13:36

## 2018-03-13 RX ADMIN — Medication 100 MILLIGRAM(S): at 05:54

## 2018-03-13 RX ADMIN — Medication 25 MILLIGRAM(S): at 05:55

## 2018-03-13 RX ADMIN — Medication 25 MILLIGRAM(S): at 17:44

## 2018-03-13 RX ADMIN — GABAPENTIN 100 MILLIGRAM(S): 400 CAPSULE ORAL at 22:16

## 2018-03-13 RX ADMIN — GABAPENTIN 100 MILLIGRAM(S): 400 CAPSULE ORAL at 05:54

## 2018-03-13 NOTE — PROGRESS NOTE ADULT - SUBJECTIVE AND OBJECTIVE BOX
PGY 1 Note discussed with supervising resident and primary attending    Patient is a 53y old  Female who presents with a chief complaint of sent from NH due to leukocytosis and fever (07 Mar 2018 21:47)      INTERVAL HPI/OVERNIGHT EVENTS: offers no new complaints; current symptoms resolving    MEDICATIONS  (STANDING):  ascorbic acid 500 milliGRAM(s) Oral daily  cinacalcet 30 milliGRAM(s) Oral daily  collagenase Ointment 1 Application(s) Topical daily  docusate sodium 100 milliGRAM(s) Oral two times a day  epoetin jacqueline Injectable 4000 Unit(s) IV Push <User Schedule>  folic acid 1 milliGRAM(s) Oral daily  gabapentin 100 milliGRAM(s) Oral three times a day  heparin  Injectable 5000 Unit(s) SubCutaneous every 12 hours  hydrALAZINE 50 milliGRAM(s) Oral three times a day  insulin lispro (HumaLOG) corrective regimen sliding scale   SubCutaneous Before meals and at bedtime  levothyroxine 50 MICROGram(s) Oral daily  meropenem  IVPB 500 milliGRAM(s) IV Intermittent every 24 hours  metoprolol     tartrate 25 milliGRAM(s) Oral two times a day  multivitamin 1 Tablet(s) Oral daily  pantoprazole    Tablet 40 milliGRAM(s) Oral before breakfast  simvastatin 20 milliGRAM(s) Oral at bedtime    MEDICATIONS  (PRN):  acetaminophen   Tablet 650 milliGRAM(s) Oral every 6 hours PRN For Temp greater than 38 C (100.4 F)  morphine  - Injectable 4 milliGRAM(s) IV Push every 4 hours PRN Severe Pain  oxyCODONE    5 mG/acetaminophen 325 mG 1 Tablet(s) Oral every 4 hours PRN Moderate Pain      __________________________________________________  REVIEW OF SYSTEMS:    CONSTITUTIONAL: No fever,   EYES: no acute visual disturbances  NECK: No pain or stiffness  RESPIRATORY: No cough; No shortness of breath  CARDIOVASCULAR: No chest pain, no palpitations  GASTROINTESTINAL: No pain. No nausea or vomiting; No diarrhea   NEUROLOGICAL: No headache or numbness, no tremors  MUSCULOSKELETAL: No joint pain, no muscle pain  GENITOURINARY: no dysuria, no frequency, no hesitancy  PSYCHIATRY: no depression , no anxiety  ALL OTHER  ROS negative        Vital Signs Last 24 Hrs  T(C): 37.4 (12 Mar 2018 23:42), Max: 38 (12 Mar 2018 16:16)  T(F): 99.4 (12 Mar 2018 23:42), Max: 100.4 (12 Mar 2018 16:16)  HR: 85 (13 Mar 2018 05:27) (79 - 106)  BP: 123/58 (13 Mar 2018 05:27) (104/38 - 125/64)  BP(mean): --  RR: 18 (12 Mar 2018 23:42) (16 - 18)  SpO2: 98% (12 Mar 2018 23:42) (92% - 100%)    ________________________________________________  PHYSICAL EXAM:  GENERAL: NAD  HEENT: Normocephalic;  conjunctivae and sclerae clear; moist mucous membranes;   NECK : supple  CHEST/LUNG: Clear to auscultation bilaterally with good air entry   HEART: S1 S2  regular; no murmurs, gallops or rubs  ABDOMEN: Soft, Nontender, Nondistended; Bowel sounds present  EXTREMITIES: no cyanosis; no edema; no calf tenderness  SKIN: warm and dry; no rash  NERVOUS SYSTEM:  Awake and alert; Oriented  to place, person and time ; no new deficits    _________________________________________________  LABS:                        9.1    35.6  )-----------( 406      ( 12 Mar 2018 07:41 )             32.5     03-12    131<L>  |  95<L>  |  53<H>  ----------------------------<  132<H>  5.4<H>   |  26  |  5.93<H>    Ca    8.1<L>      12 Mar 2018 07:41          CAPILLARY BLOOD GLUCOSE      POCT Blood Glucose.: 150 mg/dL (12 Mar 2018 21:36)  POCT Blood Glucose.: 148 mg/dL (12 Mar 2018 18:52)  POCT Blood Glucose.: 136 mg/dL (12 Mar 2018 11:16)  POCT Blood Glucose.: 137 mg/dL (12 Mar 2018 08:11)        RADIOLOGY & ADDITIONAL TESTS:        Imaging Personally Reviewed:  YES    Consultant(s) Notes Reviewed:   YES    Care Discussed with Consultants : YES    Plan of care was discussed with patient and /or primary care giver; all questions and concerns were addressed and care was aligned with patient's wishes. PGY 1 Note discussed with supervising resident and primary attending    Patient is a 53y old  Female who presents with a chief complaint of sent from NH due to leukocytosis and fever (07 Mar 2018 21:47)      INTERVAL HPI/OVERNIGHT EVENTS: offers no new complaints; current symptoms resolving    MEDICATIONS  (STANDING):  ascorbic acid 500 milliGRAM(s) Oral daily  cinacalcet 30 milliGRAM(s) Oral daily  collagenase Ointment 1 Application(s) Topical daily  docusate sodium 100 milliGRAM(s) Oral two times a day  epoetin jacqueline Injectable 4000 Unit(s) IV Push <User Schedule>  folic acid 1 milliGRAM(s) Oral daily  gabapentin 100 milliGRAM(s) Oral three times a day  heparin  Injectable 5000 Unit(s) SubCutaneous every 12 hours  hydrALAZINE 50 milliGRAM(s) Oral three times a day  insulin lispro (HumaLOG) corrective regimen sliding scale   SubCutaneous Before meals and at bedtime  levothyroxine 50 MICROGram(s) Oral daily  meropenem  IVPB 500 milliGRAM(s) IV Intermittent every 24 hours  metoprolol     tartrate 25 milliGRAM(s) Oral two times a day  multivitamin 1 Tablet(s) Oral daily  pantoprazole    Tablet 40 milliGRAM(s) Oral before breakfast  simvastatin 20 milliGRAM(s) Oral at bedtime    MEDICATIONS  (PRN):  acetaminophen   Tablet 650 milliGRAM(s) Oral every 6 hours PRN For Temp greater than 38 C (100.4 F)  morphine  - Injectable 4 milliGRAM(s) IV Push every 4 hours PRN Severe Pain  oxyCODONE    5 mG/acetaminophen 325 mG 1 Tablet(s) Oral every 4 hours PRN Moderate Pain      __________________________________________________  REVIEW OF SYSTEMS:    CONSTITUTIONAL: No fever,   EYES: no acute visual disturbances  NECK: No pain or stiffness  RESPIRATORY: No cough; No shortness of breath  CARDIOVASCULAR: No chest pain, no palpitations  GASTROINTESTINAL: No pain. No nausea or vomiting; No diarrhea   NEUROLOGICAL: No headache or numbness, no tremors  MUSCULOSKELETAL: No joint pain, no muscle pain  GENITOURINARY: no dysuria, no frequency, no hesitancy  PSYCHIATRY: no depression , no anxiety  ALL OTHER  ROS negative        Vital Signs Last 24 Hrs  T(C): 37.4 (12 Mar 2018 23:42), Max: 38 (12 Mar 2018 16:16)  T(F): 99.4 (12 Mar 2018 23:42), Max: 100.4 (12 Mar 2018 16:16)  HR: 85 (13 Mar 2018 05:27) (79 - 106)  BP: 123/58 (13 Mar 2018 05:27) (104/38 - 125/64)  BP(mean): --  RR: 18 (12 Mar 2018 23:42) (16 - 18)  SpO2: 98% (12 Mar 2018 23:42) (92% - 100%)    ________________________________________________  PHYSICAL EXAM:  GENERAL: NAD  HEENT: Normocephalic;  conjunctivae and sclerae clear; moist mucous membranes;   NECK : supple  CHEST/LUNG: Clear to auscultation bilaterally with good air entry   HEART: S1 S2  regular; no murmurs, gallops or rubs  ABDOMEN: Soft, Nontender, Nondistended; Bowel sounds present  EXTREMITIES: no cyanosis; no edema; no calf tenderness ; right upper extremity lipoma   SKIN: sacral wound , dressing intact . no soakage   Neuro : AAO*2    _________________________________________________  LABS:                        9.1    35.6  )-----------( 406      ( 12 Mar 2018 07:41 )             32.5     03-12    131<L>  |  95<L>  |  53<H>  ----------------------------<  132<H>  5.4<H>   |  26  |  5.93<H>    Ca    8.1<L>      12 Mar 2018 07:41          CAPILLARY BLOOD GLUCOSE      POCT Blood Glucose.: 150 mg/dL (12 Mar 2018 21:36)  POCT Blood Glucose.: 148 mg/dL (12 Mar 2018 18:52)  POCT Blood Glucose.: 136 mg/dL (12 Mar 2018 11:16)  POCT Blood Glucose.: 137 mg/dL (12 Mar 2018 08:11)        RADIOLOGY & ADDITIONAL TESTS:        Imaging Personally Reviewed:  YES    Consultant(s) Notes Reviewed:   YES    Care Discussed with Consultants : YES    Plan of care was discussed with patient and /or primary care giver; all questions and concerns were addressed and care was aligned with patient's wishes. PGY 1 Note discussed with supervising resident and primary attending    Patient is a 53y old  Female who presents with a chief complaint of sent from NH due to leukocytosis and fever (07 Mar 2018 21:47)      INTERVAL HPI/OVERNIGHT EVENTS: offers no new complaints; current symptoms resolving    MEDICATIONS  (STANDING):  ascorbic acid 500 milliGRAM(s) Oral daily  cinacalcet 30 milliGRAM(s) Oral daily  collagenase Ointment 1 Application(s) Topical daily  docusate sodium 100 milliGRAM(s) Oral two times a day  epoetin jacqueline Injectable 4000 Unit(s) IV Push <User Schedule>  folic acid 1 milliGRAM(s) Oral daily  gabapentin 100 milliGRAM(s) Oral three times a day  heparin  Injectable 5000 Unit(s) SubCutaneous every 12 hours  hydrALAZINE 50 milliGRAM(s) Oral three times a day  insulin lispro (HumaLOG) corrective regimen sliding scale   SubCutaneous Before meals and at bedtime  levothyroxine 50 MICROGram(s) Oral daily  meropenem  IVPB 500 milliGRAM(s) IV Intermittent every 24 hours  metoprolol     tartrate 25 milliGRAM(s) Oral two times a day  multivitamin 1 Tablet(s) Oral daily  pantoprazole    Tablet 40 milliGRAM(s) Oral before breakfast  simvastatin 20 milliGRAM(s) Oral at bedtime    MEDICATIONS  (PRN):  acetaminophen   Tablet 650 milliGRAM(s) Oral every 6 hours PRN For Temp greater than 38 C (100.4 F)  morphine  - Injectable 4 milliGRAM(s) IV Push every 4 hours PRN Severe Pain  oxyCODONE    5 mG/acetaminophen 325 mG 1 Tablet(s) Oral every 4 hours PRN Moderate Pain      __________________________________________________  REVIEW OF SYSTEMS:    CONSTITUTIONAL: No fever,   EYES: no acute visual disturbances  NECK: No pain or stiffness  RESPIRATORY: No cough; No shortness of breath  CARDIOVASCULAR: No chest pain, no palpitations  GASTROINTESTINAL: No pain. No nausea or vomiting; No diarrhea   NEUROLOGICAL: No headache or numbness, no tremors  MUSCULOSKELETAL: No joint pain, no muscle pain  GENITOURINARY: no dysuria, no frequency, no hesitancy  PSYCHIATRY: no depression , no anxiety  ALL OTHER  ROS negative        Vital Signs Last 24 Hrs  T(C): 37.4 (12 Mar 2018 23:42), Max: 38 (12 Mar 2018 16:16)  T(F): 99.4 (12 Mar 2018 23:42), Max: 100.4 (12 Mar 2018 16:16)  HR: 85 (13 Mar 2018 05:27) (79 - 106)  BP: 123/58 (13 Mar 2018 05:27) (104/38 - 125/64)  BP(mean): --  RR: 18 (12 Mar 2018 23:42) (16 - 18)  SpO2: 98% (12 Mar 2018 23:42) (92% - 100%)    ________________________________________________  PHYSICAL EXAM:  LIJ in neck placed on 3/12  GENERAL: NAD  HEENT: Normocephalic;  conjunctivae and sclerae clear; moist mucous membranes;   NECK : supple  CHEST/LUNG: Clear to auscultation bilaterally with good air entry   HEART: S1 S2  regular; no murmurs, gallops or rubs  ABDOMEN: Soft, Nontender, Nondistended; Bowel sounds present  EXTREMITIES: no cyanosis; no edema; no calf tenderness ; right upper extremity lipoma   SKIN: sacral wound , dressing intact . no soakage   Neuro : AAO*2    _________________________________________________  LABS:                        9.1    35.6  )-----------( 406      ( 12 Mar 2018 07:41 )             32.5     03-12    131<L>  |  95<L>  |  53<H>  ----------------------------<  132<H>  5.4<H>   |  26  |  5.93<H>    Ca    8.1<L>      12 Mar 2018 07:41          CAPILLARY BLOOD GLUCOSE      POCT Blood Glucose.: 150 mg/dL (12 Mar 2018 21:36)  POCT Blood Glucose.: 148 mg/dL (12 Mar 2018 18:52)  POCT Blood Glucose.: 136 mg/dL (12 Mar 2018 11:16)  POCT Blood Glucose.: 137 mg/dL (12 Mar 2018 08:11)        RADIOLOGY & ADDITIONAL TESTS:        Imaging Personally Reviewed:  YES    Consultant(s) Notes Reviewed:   YES    Care Discussed with Consultants : YES    Plan of care was discussed with patient and /or primary care giver; all questions and concerns were addressed and care was aligned with patient's wishes.

## 2018-03-13 NOTE — PROGRESS NOTE ADULT - GASTROINTESTINAL COMMENTS
soft, NT/ND, no organomegaly. umbilical hernia soft, NT/ND, no organomegaly. umbilical hernia; L upper abd [id number] soft mass; BS active

## 2018-03-13 NOTE — PROGRESS NOTE ADULT - ASSESSMENT
53 F h/o HLD, HTN, hypothyroidism, parathyroid adenoma admitted for right hip abscess s/p drainage MRSA growing in surgical drainage. Patient is still having low grade temperature Tmax 100.4F and leukocytosis 35. She is on vancomycin day # 7 and meropenem day # 6.    Plan:  Check for vancomycin troph 53 F h/o HLD, HTN, hypothyroidism, parathyroid adenoma admitted for right hip abscess s/p drainage MRSA growing in surgical drainage. Patient is still having low grade temperature Tmax 100.4F and leukocytosis 35. She is on vancomycin day # 7 and meropenem day # 6. Bone scan negative for any evidence of infection    Plan:  Check for vancomycin troph 53 F h/o HLD, HTN, hypothyroidism, parathyroid adenoma admitted for right hip abscess s/p drainage MRSA growing in surgical drainage. Patient is still having low grade temperature Tmax 100.4F and slightly improved to 30.2. She is on vancomycin day # 7 and meropenem day # 6. Bone scan negative for any evidence of infection.     As patient only growing MRSA from multiple I+D specimens, would d/c meropenem and cont. vanco. Perma cath site appears clean.    Plan:  Cont. vanco  Check for vancomycin trough (dose increased) 53 F h/o HLD, HTN, hypothyroidism, parathyroid adenoma admitted for right hip abscess s/p drainage MRSA growing in surgical drainage. Patient is still having low grade temperature Tmax 100.4F and slightly improved to 30.2. She is on vancomycin day # 7 and meropenem day # 6. Bone scan negative for any evidence of infection. As patient only growing MRSA from multiple I+D specimens, would d/c meropenem and cont. vanco. Perma cath site appears clean.    Plan:  Cont. vanco  Check for vancomycin trough (dose increased)

## 2018-03-13 NOTE — PROGRESS NOTE ADULT - EXTREMITIES COMMENTS
Muscle wasting, Left sacrum wound dressing intact, with associated tenderness. Muscle wasting, sacrum wound dressing intact, with associated tenderness. Muscle wasting,

## 2018-03-13 NOTE — PROGRESS NOTE ADULT - ATTENDING COMMENTS
Patient is seen and examined. Case reviewed with the medical team. Above note is appreciated. Will follow up clinically. Continue DVT prophylaxis. Case discussed ID, Nephrology and Vascular. Continue antibiotics for sepsis. Continue hemodialysis. Patient with diabetic retinopathy with multiple laser eye surgeries at [facility]. Overall prognosis is very poor despite any medical intervention and the family is fully aware.

## 2018-03-13 NOTE — PROGRESS NOTE ADULT - ASSESSMENT
53 year old female s/p I&D right hip and back abscess 3/8, bedside I&D 3/9    -Daily dressing changes   -Abx as per ID   -Frequent turning

## 2018-03-13 NOTE — PROGRESS NOTE ADULT - ASSESSMENT
53 YR OLF FEMALE RECENTLY STARTED ON HD AFTER A PROLONGED HOSPITALIZATION IN [facility]. SENT FROM NH WITH LEUCOCYTOSIS. HAS SACRAL DECUB WOUND ( WOUND CULTURES IN NH GREW MRSA)ON EVALUATION FOUND TO HAVE RT KNEE ABCESS. S/P I&D OF HIP ABCESS.  PT HAS A PERMACATH.

## 2018-03-13 NOTE — PROGRESS NOTE ADULT - SUBJECTIVE AND OBJECTIVE BOX
PGY 3 note:    53F PMHx HFpEF, ESRD on HD, DM, HTN, HLD, Hypothyroidism, Parathyroid adenoma, GERD admitted for sepsis due to sacral abscess. S/p incision and drainage 3/8; 3/10  meropenem  IVPB 500 milliGRAM(s) IV Intermittent every 24 hours    Allergies: No Known Allergies    Intolerances        VITALS:  Vital Signs Last 24 Hrs  T(C): 37.4 (12 Mar 2018 23:42), Max: 38 (12 Mar 2018 16:16)  T(F): 99.4 (12 Mar 2018 23:42), Max: 100.4 (12 Mar 2018 16:16)  HR: 85 (13 Mar 2018 05:27) (79 - 106)  BP: 123/58 (13 Mar 2018 05:27) (104/38 - 125/64)  RR: 18 (12 Mar 2018 23:42) (16 - 18)  SpO2: 98% (12 Mar 2018 23:42) (92% - 100%)    LABS/DIAGNOSTIC TESTS:                          9.1    35.6  )-----------( 406      ( 12 Mar 2018 07:41 )             32.5         03-12    131<L>  |  95<L>  |  53<H>  ----------------------------<  132<H>  5.4<H>   |  26  |  5.93<H>    Ca    8.1<L>      12 Mar 2018 07:41            CULTURES: .Blood Blood-Peripheral  03-10 @ 09:57   No growth to date.  --  --      .Surgical Swab left back mass culture  03-09 @ 12:14   Numerous Methicillin resistant Staphylococcus aureus  --  Methicillin resistant Staphylococcus aureus      .Surgical Swab right side of back  03-09 @ 12:13   Numerous Methicillin resistant Staphylococcus aureus  ***********Note************  This isolate demonstrates inducible  clindamycin resistance.  Clindamycin may still be effective in some patients.  --  Methicillin resistant Staphylococcus aureus      .Blood Dialysis Catheter  03-07 @ 23:03   No growth at 5 days.  --  --    .Urine Catheterized  03-07 @ 09:38   <10,000 CFU/ml Normal Urogenital derrick present  --  --      . PGY 3 note:    53F PMHx HFpEF, ESRD on HD, DM, HTN, HLD, Hypothyroidism, Parathyroid adenoma, GERD admitted for sepsis due to sacral abscess. S/p incision and drainage 3/8; 3/10  meropenem  IVPB 500 milliGRAM(s) IV every 24 hours. C/O back pain at I+D sites.    Allergies: No Known Allergies    Intolerances        VITALS:  Vital Signs Last 24 Hrs  T(C): 37.4 (12 Mar 2018 23:42), Max: 38 (12 Mar 2018 16:16)  T(F): 99.4 (12 Mar 2018 23:42), Max: 100.4 (12 Mar 2018 16:16)  HR: 85 (13 Mar 2018 05:27) (79 - 106)  BP: 123/58 (13 Mar 2018 05:27) (104/38 - 125/64)  RR: 18 (12 Mar 2018 23:42) (16 - 18)  SpO2: 98% (12 Mar 2018 23:42) (92% - 100%)    LABS/DIAGNOSTIC TESTS:    WBC 30.2 (3/13/18)                        9.1    35.6  )-----------( 406      ( 12 Mar 2018 07:41 )             32.5         03-12    131<L>  |  95<L>  |  53<H>  ----------------------------<  132<H>  5.4<H>   |  26  |  5.93<H>    Ca    8.1<L>      12 Mar 2018 07:41            CULTURES: .Blood Blood-Peripheral  03-10 @ 09:57   No growth to date.  --  --      .Surgical Swab left back mass culture  03-09 @ 12:14   Numerous Methicillin resistant Staphylococcus aureus  --  Methicillin resistant Staphylococcus aureus      .Surgical Swab right side of back  03-09 @ 12:13   Numerous Methicillin resistant Staphylococcus aureus  ***********Note************  This isolate demonstrates inducible  clindamycin resistance.  Clindamycin may still be effective in some patients.  --  Methicillin resistant Staphylococcus aureus      .Blood Dialysis Catheter  03-07 @ 23:03   No growth at 5 days.  --  --    .Urine Catheterized  03-07 @ 09:38   <10,000 CFU/ml Normal Urogenital derrick present  --  --      . PGY 3 note:    53F PMHx HFpEF, ESRD on HD, DM, HTN, HLD, Hypothyroidism, Parathyroid adenoma, GERD admitted for sepsis due to sacral abscess. S/p incision and drainage 3/8; 3/10  meropenem  IVPB 500 milliGRAM(s) IV every 24 hours and vanco intermittent with HD. C/O back pain at I+D sites.    Allergies: No Known Allergies    Intolerances        VITALS:  Vital Signs Last 24 Hrs  T(C): 37.4 (12 Mar 2018 23:42), Max: 38 (12 Mar 2018 16:16)  T(F): 99.4 (12 Mar 2018 23:42), Max: 100.4 (12 Mar 2018 16:16)  HR: 85 (13 Mar 2018 05:27) (79 - 106)  BP: 123/58 (13 Mar 2018 05:27) (104/38 - 125/64)  RR: 18 (12 Mar 2018 23:42) (16 - 18)  SpO2: 98% (12 Mar 2018 23:42) (92% - 100%)    LABS/DIAGNOSTIC TESTS:    WBC 30.2 (3/13/18)                        9.1    35.6  )-----------( 406      ( 12 Mar 2018 07:41 )             32.5         03-12    131<L>  |  95<L>  |  53<H>  ----------------------------<  132<H>  5.4<H>   |  26  |  5.93<H>    Ca    8.1<L>      12 Mar 2018 07:41            CULTURES: .Blood Blood-Peripheral  03-10 @ 09:57   No growth to date.  --  --      .Surgical Swab left back mass culture  03-09 @ 12:14   Numerous Methicillin resistant Staphylococcus aureus  --  Methicillin resistant Staphylococcus aureus      .Surgical Swab right side of back  03-09 @ 12:13   Numerous Methicillin resistant Staphylococcus aureus  ***********Note************  This isolate demonstrates inducible  clindamycin resistance.  Clindamycin may still be effective in some patients.  --  Methicillin resistant Staphylococcus aureus      .Blood Dialysis Catheter  03-07 @ 23:03   No growth at 5 days.  --  --    .Urine Catheterized  03-07 @ 09:38   <10,000 CFU/ml Normal Urogenital derrick present  --  --      .

## 2018-03-13 NOTE — PROGRESS NOTE ADULT - SUBJECTIVE AND OBJECTIVE BOX
No events overnight    No Known Allergies    Hospital Medications:   MEDICATIONS  (STANDING):  ascorbic acid 500 milliGRAM(s) Oral daily  cinacalcet 30 milliGRAM(s) Oral daily  collagenase Ointment 1 Application(s) Topical daily  docusate sodium 100 milliGRAM(s) Oral two times a day  epoetin jacqueline Injectable 4000 Unit(s) IV Push <User Schedule>  folic acid 1 milliGRAM(s) Oral daily  gabapentin 100 milliGRAM(s) Oral three times a day  heparin  Injectable 5000 Unit(s) SubCutaneous every 12 hours  hydrALAZINE 50 milliGRAM(s) Oral three times a day  insulin lispro (HumaLOG) corrective regimen sliding scale   SubCutaneous Before meals and at bedtime  levothyroxine 50 MICROGram(s) Oral daily  metoprolol     tartrate 25 milliGRAM(s) Oral two times a day  multivitamin 1 Tablet(s) Oral daily  pantoprazole    Tablet 40 milliGRAM(s) Oral before breakfast  simvastatin 20 milliGRAM(s) Oral at bedtime    .    VITALS:  T(F): 98.6 (18 @ 07:57), Max: 100.4 (18 @ 16:16)  HR: 84 (18 @ 07:57)  BP: 121/51 (18 @ 07:57)  RR: 18 (18 @ 07:57)  SpO2: 97% (18 @ 07:57)  Wt(kg): --     @ 07:01  -   @ 07:00  --------------------------------------------------------  IN: 0 mL / OUT: 100 mL / NET: -100 mL        PHYSICAL EXAM:  Constitutional: NAD  HEENT: anicteric sclera, oropharynx clear, MMM  Neck: No JVD  Respiratory: CTAB, no wheezes, rales or rhonchi  Cardiovascular: S1, S2, RRR  Gastrointestinal: BS+, soft, NT/ND. gluteal wound dressed.  Extremities: No cyanosis or clubbing. No peripheral edema  Neurological: A/O x 3, no focal deficits  Vascular Access: RT IJ permacath     LABS:      136  |  98  |  33<H>  ----------------------------<  113<H>  4.3   |  28  |  3.75<H>    Ca    8.3<L>      13 Mar 2018 07:46  Phos  4.1       Mg     2.3           Creatinine Trend: 3.75 <--, 5.93 <--, 4.26 <--, 2.72 <--, 4.07 <--, 2.80 <--, 4.80 <--                        8.8    30.2  )-----------( 338      ( 13 Mar 2018 07:46 )             32.0     Urine Studies:  Urinalysis Basic - ( 07 Mar 2018 04:30 )    Color: Yellow / Appearance: Slightly Turbid / S.010 / pH:   Gluc:  / Ketone: Negative  / Bili: Negative / Urobili: Negative   Blood:  / Protein: 500 mg/dL / Nitrite: Negative   Leuk Esterase: Moderate / RBC: 0-2 /HPF / WBC >50 /HPF   Sq Epi:  / Non Sq Epi: Moderate /HPF / Bacteria: Many /HPF        RADIOLOGY & ADDITIONAL STUDIES:

## 2018-03-13 NOTE — PROGRESS NOTE ADULT - PROBLEM SELECTOR PLAN 1
Likely secondary infected sacral and back abscess with history of MRSA  S/p incision and drainage 3/8; 3/10  Continue with meropenem and vancomycin , day 6  F/U wound cultures : positive for MRSA  Blood cultures negative to date   Infectious Disease Dr. Kaplan following  General surgery Dr. Wright following  Patient did not have peripheral iv access since last evening. Multiple attempts done by on call team and day team . Eventually central line was placed  in Left IJ for vascular access and iv antibiotics on 3/12  Consent obtained from sister , plan discussed with Dr. Jason Goodman Vt before dialysis Likely secondary infected sacral and back abscess with history of MRSA  S/p incision and drainage 3/8; 3/10  Continue with meropenem and vancomycin , day 6  Will dc meropenem today , as per ID , Dr. Dietz   F/U wound cultures : positive for MRSA  Blood cultures negative to date   Infectious Disease Dr. Kaplan following  General surgery Dr. Wright following  Patient did not have peripheral iv access since last evening. Multiple attempts done by on call team and day team . Eventually central line was placed  in Left IJ for vascular access and iv antibiotics on 3/12  Consent obtained from sister , plan discussed with Dr. Jason Goodman Vt before dialysis

## 2018-03-13 NOTE — PROGRESS NOTE ADULT - SUBJECTIVE AND OBJECTIVE BOX
INTERVAL HPI/OVERNIGHT EVENTS:    Pt seen and examined at bedside. Offers no acute complaints at this time, some discomfort at the ulcers site. Denies fever, chills, SOB or CP.     Vital Signs Last 24 Hrs  T(C): 37 (13 Mar 2018 07:57), Max: 38 (12 Mar 2018 16:16)  T(F): 98.6 (13 Mar 2018 07:57), Max: 100.4 (12 Mar 2018 16:16)  HR: 84 (13 Mar 2018 07:57) (79 - 106)  BP: 121/51 (13 Mar 2018 07:57) (104/38 - 125/64)  BP(mean): --  RR: 18 (13 Mar 2018 07:57) (16 - 18)  SpO2: 97% (13 Mar 2018 07:57) (92% - 98%)  I&O's Detail    12 Mar 2018 07:01  -  13 Mar 2018 07:00  --------------------------------------------------------  IN:  Total IN: 0 mL    OUT:    Indwelling Catheter - Urethral: 100 mL  Total OUT: 100 mL    Total NET: -100 mL        cinacalcet 30 milliGRAM(s) Oral daily  docusate sodium 100 milliGRAM(s) Oral two times a day  meropenem  IVPB 500 milliGRAM(s) IV Intermittent every 24 hours  pantoprazole    Tablet 40 milliGRAM(s) Oral before breakfast      Physical Exam  General: AAOx3, No acute distress  Skin: No jaundice, no icterus  Abdomen: soft, nontender, nondistended  Extremities: non edematous, no calf pain bilaterally  Right hip: dressing changed, open wound with granulation tissue, no drainage  Back: open wound x 3 with no drainage

## 2018-03-14 LAB
ANION GAP SERPL CALC-SCNC: 10 MMOL/L — SIGNIFICANT CHANGE UP (ref 5–17)
BUN SERPL-MCNC: 53 MG/DL — HIGH (ref 7–18)
CALCIUM SERPL-MCNC: 8.1 MG/DL — LOW (ref 8.4–10.5)
CHLORIDE SERPL-SCNC: 96 MMOL/L — SIGNIFICANT CHANGE UP (ref 96–108)
CO2 SERPL-SCNC: 27 MMOL/L — SIGNIFICANT CHANGE UP (ref 22–31)
CREAT SERPL-MCNC: 5.93 MG/DL — HIGH (ref 0.5–1.3)
CULTURE RESULTS: SIGNIFICANT CHANGE UP
GLUCOSE BLDC GLUCOMTR-MCNC: 145 MG/DL — HIGH (ref 70–99)
GLUCOSE BLDC GLUCOMTR-MCNC: 171 MG/DL — HIGH (ref 70–99)
GLUCOSE BLDC GLUCOMTR-MCNC: 191 MG/DL — HIGH (ref 70–99)
GLUCOSE BLDC GLUCOMTR-MCNC: 200 MG/DL — HIGH (ref 70–99)
GLUCOSE SERPL-MCNC: 179 MG/DL — HIGH (ref 70–99)
HCT VFR BLD CALC: 27 % — LOW (ref 34.5–45)
HGB BLD-MCNC: 8.3 G/DL — LOW (ref 11.5–15.5)
MAGNESIUM SERPL-MCNC: 2.2 MG/DL — SIGNIFICANT CHANGE UP (ref 1.6–2.6)
MCHC RBC-ENTMCNC: 27.4 PG — SIGNIFICANT CHANGE UP (ref 27–34)
MCHC RBC-ENTMCNC: 30.9 GM/DL — LOW (ref 32–36)
MCV RBC AUTO: 88.8 FL — SIGNIFICANT CHANGE UP (ref 80–100)
ORGANISM # SPEC MICROSCOPIC CNT: SIGNIFICANT CHANGE UP
PHOSPHATE SERPL-MCNC: 5.9 MG/DL — HIGH (ref 2.5–4.5)
PLATELET # BLD AUTO: 382 K/UL — SIGNIFICANT CHANGE UP (ref 150–400)
POTASSIUM SERPL-MCNC: 4.8 MMOL/L — SIGNIFICANT CHANGE UP (ref 3.5–5.3)
POTASSIUM SERPL-SCNC: 4.8 MMOL/L — SIGNIFICANT CHANGE UP (ref 3.5–5.3)
RBC # BLD: 3.04 M/UL — LOW (ref 3.8–5.2)
RBC # FLD: 20.2 % — HIGH (ref 10.3–14.5)
SODIUM SERPL-SCNC: 133 MMOL/L — LOW (ref 135–145)
SPECIMEN SOURCE: SIGNIFICANT CHANGE UP
WBC # BLD: 28 K/UL — HIGH (ref 3.8–10.5)
WBC # FLD AUTO: 28 K/UL — HIGH (ref 3.8–10.5)

## 2018-03-14 PROCEDURE — 99232 SBSQ HOSP IP/OBS MODERATE 35: CPT | Mod: GC

## 2018-03-14 RX ORDER — SENNA PLUS 8.6 MG/1
2 TABLET ORAL AT BEDTIME
Qty: 0 | Refills: 0 | Status: DISCONTINUED | OUTPATIENT
Start: 2018-03-14 | End: 2018-03-19

## 2018-03-14 RX ADMIN — Medication 50 MICROGRAM(S): at 05:33

## 2018-03-14 RX ADMIN — Medication 1: at 12:28

## 2018-03-14 RX ADMIN — Medication 1 TABLET(S): at 12:27

## 2018-03-14 RX ADMIN — Medication 25 MILLIGRAM(S): at 18:31

## 2018-03-14 RX ADMIN — Medication 50 MILLIGRAM(S): at 22:30

## 2018-03-14 RX ADMIN — Medication 1 MILLIGRAM(S): at 12:27

## 2018-03-14 RX ADMIN — SENNA PLUS 2 TABLET(S): 8.6 TABLET ORAL at 22:30

## 2018-03-14 RX ADMIN — Medication 166.67 MILLIGRAM(S): at 13:53

## 2018-03-14 RX ADMIN — HEPARIN SODIUM 5000 UNIT(S): 5000 INJECTION INTRAVENOUS; SUBCUTANEOUS at 05:38

## 2018-03-14 RX ADMIN — ERYTHROPOIETIN 4000 UNIT(S): 10000 INJECTION, SOLUTION INTRAVENOUS; SUBCUTANEOUS at 13:48

## 2018-03-14 RX ADMIN — Medication 25 MILLIGRAM(S): at 05:34

## 2018-03-14 RX ADMIN — Medication 100 MILLIGRAM(S): at 05:33

## 2018-03-14 RX ADMIN — MORPHINE SULFATE 4 MILLIGRAM(S): 50 CAPSULE, EXTENDED RELEASE ORAL at 17:00

## 2018-03-14 RX ADMIN — GABAPENTIN 100 MILLIGRAM(S): 400 CAPSULE ORAL at 22:30

## 2018-03-14 RX ADMIN — Medication 1: at 16:59

## 2018-03-14 RX ADMIN — CINACALCET 30 MILLIGRAM(S): 30 TABLET, FILM COATED ORAL at 12:27

## 2018-03-14 RX ADMIN — PANTOPRAZOLE SODIUM 40 MILLIGRAM(S): 20 TABLET, DELAYED RELEASE ORAL at 05:33

## 2018-03-14 RX ADMIN — Medication 1: at 22:29

## 2018-03-14 RX ADMIN — GABAPENTIN 100 MILLIGRAM(S): 400 CAPSULE ORAL at 05:33

## 2018-03-14 RX ADMIN — Medication 100 MILLIGRAM(S): at 18:31

## 2018-03-14 RX ADMIN — Medication 500 MILLIGRAM(S): at 12:27

## 2018-03-14 RX ADMIN — Medication 1 APPLICATION(S): at 12:27

## 2018-03-14 RX ADMIN — SIMVASTATIN 20 MILLIGRAM(S): 20 TABLET, FILM COATED ORAL at 22:30

## 2018-03-14 RX ADMIN — HEPARIN SODIUM 5000 UNIT(S): 5000 INJECTION INTRAVENOUS; SUBCUTANEOUS at 18:31

## 2018-03-14 RX ADMIN — Medication 50 MILLIGRAM(S): at 05:33

## 2018-03-14 RX ADMIN — MORPHINE SULFATE 4 MILLIGRAM(S): 50 CAPSULE, EXTENDED RELEASE ORAL at 16:24

## 2018-03-14 NOTE — PROGRESS NOTE ADULT - SUBJECTIVE AND OBJECTIVE BOX
PGY 3 note:    53F PMHx HFpEF, ESRD on HD, DM, HTN, HLD, Hypothyroidism, Parathyroid adenoma, GERD admitted for sepsis due to sacral abscess. S/p incision and drainage 3/8; 3/10  meropenem  IVPB 500 milliGRAM(s) IV every 24 hours and vanco intermittent with HD.    Interval history:  C/O back pain at I+D sites.    Allergies: No Known Allergies    Intolerances        VITALS:  Vital Signs Last 24 Hrs  T(C): 36.8 (14 Mar 2018 08:03), Max: 37.9 (13 Mar 2018 16:03)  T(F): 98.3 (14 Mar 2018 08:03), Max: 100.3 (13 Mar 2018 16:03)  HR: 84 (14 Mar 2018 08:03) (83 - 98)  BP: 122/44 (14 Mar 2018 08:03) (120/48 - 131/45)  RR: 15 (14 Mar 2018 08:03) (15 - 18)  SpO2: 99% (14 Mar 2018 08:03) (98% - 100%)    LABS/DIAGNOSTIC TESTS:                          8.8    30.2  )-----------( 338      ( 13 Mar 2018 07:46 )             32.0   03-13    136  |  98  |  33<H>  ----------------------------<  113<H>  4.3   |  28  |  3.75<H>    Ca    8.3<L>      13 Mar 2018 07:46  Phos  4.1     03-13  Mg     2.3     03-13        CULTURES: .Blood Blood-Peripheral  03-10 @ 09:57   No growth to date.  --  --      .Surgical Swab left back mass culture  03-09 @ 12:14   Numerous Methicillin resistant Staphylococcus aureus  --  Methicillin resistant Staphylococcus aureus      .Surgical Swab right side of back  03-09 @ 12:13   Numerous Methicillin resistant Staphylococcus aureus  ***********Note************  This isolate demonstrates inducible  clindamycin resistance.  Clindamycin may still be effective in some patients.  --  Methicillin resistant Staphylococcus aureus      .Blood Dialysis Catheter  03-07 @ 23:03   No growth at 5 days.  --  --    .Urine Catheterized  03-07 @ 09:38   <10,000 CFU/ml Normal Urogenital derrick present  --  --      .

## 2018-03-14 NOTE — PROGRESS NOTE ADULT - SUBJECTIVE AND OBJECTIVE BOX
PGY 1 Note discussed with supervising resident and primary attending    Patient is a 53y old  Female who presents with a chief complaint of sent from NH due to leukocytosis and fever (07 Mar 2018 21:47)      INTERVAL HPI/OVERNIGHT EVENTS: offers no new complaints; current symptoms resolving    MEDICATIONS  (STANDING):  ascorbic acid 500 milliGRAM(s) Oral daily  cinacalcet 30 milliGRAM(s) Oral daily  collagenase Ointment 1 Application(s) Topical daily  docusate sodium 100 milliGRAM(s) Oral two times a day  epoetin jacqueline Injectable 4000 Unit(s) IV Push <User Schedule>  folic acid 1 milliGRAM(s) Oral daily  gabapentin 100 milliGRAM(s) Oral three times a day  heparin  Injectable 5000 Unit(s) SubCutaneous every 12 hours  hydrALAZINE 50 milliGRAM(s) Oral three times a day  insulin lispro (HumaLOG) corrective regimen sliding scale   SubCutaneous Before meals and at bedtime  levothyroxine 50 MICROGram(s) Oral daily  metoprolol     tartrate 25 milliGRAM(s) Oral two times a day  multivitamin 1 Tablet(s) Oral daily  pantoprazole    Tablet 40 milliGRAM(s) Oral before breakfast  simvastatin 20 milliGRAM(s) Oral at bedtime  vancomycin  IVPB 1250 milliGRAM(s) IV Intermittent <User Schedule>    MEDICATIONS  (PRN):  acetaminophen   Tablet 650 milliGRAM(s) Oral every 6 hours PRN For Temp greater than 38 C (100.4 F)  morphine  - Injectable 4 milliGRAM(s) IV Push every 4 hours PRN Severe Pain  oxyCODONE    5 mG/acetaminophen 325 mG 1 Tablet(s) Oral every 4 hours PRN Moderate Pain      __________________________________________________  REVIEW OF SYSTEMS:    CONSTITUTIONAL: No fever,   EYES: no acute visual disturbances  NECK: No pain or stiffness  RESPIRATORY: No cough; No shortness of breath  CARDIOVASCULAR: No chest pain, no palpitations  GASTROINTESTINAL: No pain. No nausea or vomiting; No diarrhea   NEUROLOGICAL: No headache or numbness, no tremors  MUSCULOSKELETAL: No joint pain, no muscle pain  GENITOURINARY: no dysuria, no frequency, no hesitancy  PSYCHIATRY: no depression , no anxiety  ALL OTHER  ROS negative        Vital Signs Last 24 Hrs  T(C): 37.1 (14 Mar 2018 05:26), Max: 37.9 (13 Mar 2018 16:03)  T(F): 98.8 (14 Mar 2018 05:26), Max: 100.3 (13 Mar 2018 16:03)  HR: 83 (14 Mar 2018 05:26) (83 - 98)  BP: 127/50 (14 Mar 2018 05:26) (120/48 - 131/45)  BP(mean): --  RR: 16 (13 Mar 2018 23:30) (16 - 18)  SpO2: 100% (13 Mar 2018 23:30) (98% - 100%)    ________________________________________________  PHYSICAL EXAM:  GENERAL: NAD  HEENT: Normocephalic;  conjunctivae and sclerae clear; moist mucous membranes;   NECK : supple  CHEST/LUNG: Clear to auscultation bilaterally with good air entry   HEART: S1 S2  regular; no murmurs, gallops or rubs  ABDOMEN: Soft, Nontender, Nondistended; Bowel sounds present  EXTREMITIES: no cyanosis; no edema; no calf tenderness  SKIN: warm and dry; no rash  NERVOUS SYSTEM:  Awake and alert; Oriented  to place, person and time ; no new deficits    _________________________________________________  LABS:                        8.8    30.2  )-----------( 338      ( 13 Mar 2018 07:46 )             32.0     03-13    136  |  98  |  33<H>  ----------------------------<  113<H>  4.3   |  28  |  3.75<H>    Ca    8.3<L>      13 Mar 2018 07:46  Phos  4.1     03-13  Mg     2.3     03-13          CAPILLARY BLOOD GLUCOSE      POCT Blood Glucose.: 127 mg/dL (13 Mar 2018 21:23)  POCT Blood Glucose.: 166 mg/dL (13 Mar 2018 16:47)  POCT Blood Glucose.: 173 mg/dL (13 Mar 2018 11:56)  POCT Blood Glucose.: 125 mg/dL (13 Mar 2018 08:06)        RADIOLOGY & ADDITIONAL TESTS:    Imaging Personally Reviewed:  YES    Consultant(s) Notes Reviewed:   YES    Care Discussed with Consultants : YES    Plan of care was discussed with patient and /or primary care giver; all questions and concerns were addressed and care was aligned with patient's wishes. PGY 1 Note discussed with supervising resident and primary attending    Patient is a 53y old  Female who presents with a chief complaint of sent from NH due to leukocytosis and fever (07 Mar 2018 21:47)      INTERVAL HPI/OVERNIGHT EVENTS: back pain improving     MEDICATIONS  (STANDING):  ascorbic acid 500 milliGRAM(s) Oral daily  cinacalcet 30 milliGRAM(s) Oral daily  collagenase Ointment 1 Application(s) Topical daily  docusate sodium 100 milliGRAM(s) Oral two times a day  epoetin jacqueline Injectable 4000 Unit(s) IV Push <User Schedule>  folic acid 1 milliGRAM(s) Oral daily  gabapentin 100 milliGRAM(s) Oral three times a day  heparin  Injectable 5000 Unit(s) SubCutaneous every 12 hours  hydrALAZINE 50 milliGRAM(s) Oral three times a day  insulin lispro (HumaLOG) corrective regimen sliding scale   SubCutaneous Before meals and at bedtime  levothyroxine 50 MICROGram(s) Oral daily  metoprolol     tartrate 25 milliGRAM(s) Oral two times a day  multivitamin 1 Tablet(s) Oral daily  pantoprazole    Tablet 40 milliGRAM(s) Oral before breakfast  simvastatin 20 milliGRAM(s) Oral at bedtime  vancomycin  IVPB 1250 milliGRAM(s) IV Intermittent <User Schedule>    MEDICATIONS  (PRN):  acetaminophen   Tablet 650 milliGRAM(s) Oral every 6 hours PRN For Temp greater than 38 C (100.4 F)  morphine  - Injectable 4 milliGRAM(s) IV Push every 4 hours PRN Severe Pain  oxyCODONE    5 mG/acetaminophen 325 mG 1 Tablet(s) Oral every 4 hours PRN Moderate Pain      __________________________________________________  REVIEW OF SYSTEMS: back pain improving     CONSTITUTIONAL: No fever,   EYES: no acute visual disturbances  NECK: No pain or stiffness  RESPIRATORY: No cough; No shortness of breath  CARDIOVASCULAR: No chest pain, no palpitations  GASTROINTESTINAL: No pain. No nausea or vomiting; No diarrhea   NEUROLOGICAL: No headache or numbness, no tremors  MUSCULOSKELETAL: No joint pain, no muscle pain  GENITOURINARY: no dysuria, no frequency, no hesitancy  PSYCHIATRY: no depression , no anxiety  ALL OTHER  ROS negative        Vital Signs Last 24 Hrs  T(C): 37.1 (14 Mar 2018 05:26), Max: 37.9 (13 Mar 2018 16:03)  T(F): 98.8 (14 Mar 2018 05:26), Max: 100.3 (13 Mar 2018 16:03)  HR: 83 (14 Mar 2018 05:26) (83 - 98)  BP: 127/50 (14 Mar 2018 05:26) (120/48 - 131/45)  BP(mean): --  RR: 16 (13 Mar 2018 23:30) (16 - 18)  SpO2: 100% (13 Mar 2018 23:30) (98% - 100%)    ________________________________________________  PHYSICAL EXAM:  LIJ in neck placed on 3/12  GENERAL: NAD  HEENT: Normocephalic;  conjunctivae and sclerae clear; moist mucous membranes;   NECK : supple  CHEST/LUNG: Clear to auscultation bilaterally with good air entry   HEART: S1 S2  regular; no murmurs, gallops or rubs  ABDOMEN: Soft, Nontender, Nondistended; Bowel sounds present  EXTREMITIES: no cyanosis; no edema; no calf tenderness ; right upper extremity lipoma   SKIN: sacral wound , dressing intact . no soakage   Neuro : AAO*2  _________________________________________________  LABS:                        8.8    30.2  )-----------( 338      ( 13 Mar 2018 07:46 )             32.0     03-13    136  |  98  |  33<H>  ----------------------------<  113<H>  4.3   |  28  |  3.75<H>    Ca    8.3<L>      13 Mar 2018 07:46  Phos  4.1     03-13  Mg     2.3     03-13          CAPILLARY BLOOD GLUCOSE      POCT Blood Glucose.: 127 mg/dL (13 Mar 2018 21:23)  POCT Blood Glucose.: 166 mg/dL (13 Mar 2018 16:47)  POCT Blood Glucose.: 173 mg/dL (13 Mar 2018 11:56)  POCT Blood Glucose.: 125 mg/dL (13 Mar 2018 08:06)        Consultant(s) Notes Reviewed:   YES    Care Discussed with Consultants : YES    Plan of care was discussed with patient and /or primary care giver; all questions and concerns were addressed and care was aligned with patient's wishes.

## 2018-03-14 NOTE — PROGRESS NOTE ADULT - ASSESSMENT
53 YR OLF FEMALE RECENTLY STARTED ON HD [facility] SENT FROM NH WITH LEUCOCYTOSIS. HAS SACRAL DECUB WOUND ( WOUND CULTURES IN NH GREW MRSA)ON EVALUATION FOUND TO HAVE RT KNEE ABCESS. S/P I&D OF HIP ABCESS.  PT HAS A PERMACATH.

## 2018-03-14 NOTE — CHART NOTE - NSCHARTNOTEFT_GEN_A_CORE
Upon Nutritional Assessment by the Registered Dietitian your patient was determined to meet criteria / has evidence of the following diagnosis/diagnoses:          [ ]  Mild Protein Calorie Malnutrition        [ ]  Moderate Protein Calorie Malnutrition        [ x] Severe Protein Calorie Malnutrition        [ ] Unspecified Protein Calorie Malnutrition        [ x] Underweight / BMI <19        [ ] Morbid Obesity / BMI > 40      Findings as based on:  •  Comprehensive nutrition assessment and consultation  •  Calorie counts (nutrient intake analysis)  •  Food acceptance and intake status from observations by staff  •  Follow up  •  Patient education  •  Intervention secondary to interdisciplinary rounds  •   concerns      Treatment:    The following diet has been recommended:  nepro 240 ml bid      PROVIDER Section:     By signing this assessment you are acknowledging and agree with the diagnosis/diagnoses assigned by the Registered Dietitian    Comments:

## 2018-03-14 NOTE — PROGRESS NOTE ADULT - SUBJECTIVE AND OBJECTIVE BOX
pt s- new complaints  Vss a/f  Sacral wound: fibrinous exudate. no purulence, no bleed  back wounds: granulation base, no purulence, no bleed  right hip wound: granulation base; n purulence no bleed      s/p i&d multiple back abcesses and debridement of hip decubiti  wounds healing well

## 2018-03-14 NOTE — DIETITIAN INITIAL EVALUATION ADULT. - ETIOLOGY
ESRD, SIRS,Sepsis,Increase Demand for wound healing, Recent hospitalization ESRD, SIRS,Sepsis,Increase Demand for wound healing, Recent hospitalization,

## 2018-03-14 NOTE — PROGRESS NOTE ADULT - ASSESSMENT
53 F h/o HLD, HTN, hypothyroidism, parathyroid adenoma admitted for right hip abscess s/p drainage MRSA growing in surgical drainage. Patient is still having low grade temperature Tmax 100.3F and slightly improved to 30.2. She is on vancomycin day # 8 and meropenem day # 7. Bone scan negative for any evidence of infection. As patient only growing MRSA from multiple I+D specimens, would d/c meropenem and cont. vanco. Perma cath site appears clean.    Plan:  Cont. vanco  Check for vancomycin trough on Friday 53 F h/o HLD, HTN, hypothyroidism, parathyroid adenoma admitted for right hip abscess s/p drainage MRSA growing in surgical drainage. Patient is still having low grade temperature Tmax 100.3F and slightly improved to 30.2. She is on vancomycin day # 8 and meropenem day # 7. Bone scan negative for any evidence of infection. As patient only growing MRSA from multiple I+D specimens, would cont. vanco. Perma cath site appears clean. Multiple areas of debridement on back and upper buttocks are clean.    Plan:  Cont. vanco  Check for vancomycin trough on Friday  f/u cbc (pt refused today)

## 2018-03-14 NOTE — PROGRESS NOTE ADULT - ASSESSMENT
sacral wound [id number] depth: recommend santyl and moist to dry dressing daily with frequent position changes  left back wound: [id number] depth: recommend wet to dry dressing daily  right back wound 3x.5x.5 depth: wet to dry sterile gauze    hip wound [id number]: wet to dry gauze daily      wound care service follow up    abx per cultures

## 2018-03-14 NOTE — PROGRESS NOTE ADULT - SKIN
No lesions; no rash detailed exam Back-- 2, 14w29yy areas on left mid-back and right mid-back (I+D sites) with minimal amount of superficial exudate; 2, large areas of debridement noted on upper left and right buttock that are clean with minimal amount of superficial exudate

## 2018-03-14 NOTE — PROGRESS NOTE ADULT - ATTENDING COMMENTS
Patient is seen and examined. Case reviewed with the medical team. Above note is appreciated. Will follow up clinically. Continue DVT prophylaxis. Case discussed with ID, Nephrology and vascular surgery. Patient can have permanent access placed in the arm after completes her antibiotics and as out patient, Fully discussed with Dr. Alvarez from vascular.

## 2018-03-14 NOTE — PROGRESS NOTE ADULT - SUBJECTIVE AND OBJECTIVE BOX
no events overnight. gluteal wound cleaned today.    No Known Allergies    Hospital Medications:   MEDICATIONS  (STANDING):  ascorbic acid 500 milliGRAM(s) Oral daily  cinacalcet 30 milliGRAM(s) Oral daily  collagenase Ointment 1 Application(s) Topical daily  docusate sodium 100 milliGRAM(s) Oral two times a day  folic acid 1 milliGRAM(s) Oral daily  gabapentin 100 milliGRAM(s) Oral three times a day  heparin  Injectable 5000 Unit(s) SubCutaneous every 12 hours  hydrALAZINE 50 milliGRAM(s) Oral three times a day  insulin lispro (HumaLOG) corrective regimen sliding scale   SubCutaneous Before meals and at bedtime  levothyroxine 50 MICROGram(s) Oral daily  metoprolol     tartrate 25 milliGRAM(s) Oral two times a day  multivitamin 1 Tablet(s) Oral daily  pantoprazole    Tablet 40 milliGRAM(s) Oral before breakfast  senna 2 Tablet(s) Oral at bedtime  simvastatin 20 milliGRAM(s) Oral at bedtime  vancomycin  IVPB 1250 milliGRAM(s) IV Intermittent <User Schedule>      VITALS:  T(F): 98.5 (03-14-18 @ 16:27), Max: 99.9 (03-13-18 @ 23:30)  HR: 95 (03-14-18 @ 16:27)  BP: 135/42 (03-14-18 @ 16:27)  RR: 18 (03-14-18 @ 16:27)  SpO2: 95% (03-14-18 @ 16:27)  Wt(kg): --      PHYSICAL EXAM:  Constitutional: NAD  HEENT: anicteric sclera, oropharynx clear, MMM  Neck: No JVD  Respiratory: CTAB, no wheezes, rales or rhonchi  Cardiovascular: S1, S2, RRR  Gastrointestinal: BS+, soft, NT/ND  Extremities: No cyanosis or clubbing. No peripheral edema  Neurological: A/O x 3, no focal deficits  Psychiatric: Normal mood, normal affect  Vascular Access: RT IJ permacath.    LABS:  03-14    133<L>  |  96  |  53<H>  ----------------------------<  179<H>  4.8   |  27  |  5.93<H>    Ca    8.1<L>      14 Mar 2018 13:17  Phos  5.9     03-14  Mg     2.2     03-14      Creatinine Trend: 5.93 <--, 3.75 <--, 5.93 <--, 4.26 <--, 2.72 <--, 4.07 <--, 2.80 <--                        8.3    28.0  )-----------( 382      ( 14 Mar 2018 13:17 )             27.0     Urine Studies:      RADIOLOGY & ADDITIONAL STUDIES:

## 2018-03-14 NOTE — DIETITIAN INITIAL EVALUATION ADULT. - OTHER INFO
Pt visited in Dialysis.  Pt is from NH was on ??diet. Pt reports Good appetite. Po intake 100 % of meal per flow sheet. Pt on  HDx ~ 2 months.  . H/O DM x ~20 years..Pt agreed to try Nepro. Pt is Not ambulatory. Pt with Unstageagble PU   @[facility].. . Pt seen  for LOS Pt visited in Dialysis.  Pt is from NH was on ??diet. Pt reports Good appetite. Po intake 100 % of meal per flow sheet. Pt on  HDx ~ 2 months.  . H/O DM x ~20 years..Pt agreed to try Nepro. Pt is Not ambulatory. Pt with Unstageagble PU   @[facility].. . Pt seen  for LOS. per pt + weight loss.  lb  wt loss of ~ 28 lbsx 3-6 months(21%)and also being sick and  recent hospitalization.

## 2018-03-14 NOTE — DIETITIAN INITIAL EVALUATION ADULT. - SIGNS/SYMPTOMS
Unstageable pu @ [facility], On dialysis( started ~ 2 months ago), chol<50 Unstageable pu @[facility], On dialysis, wt loss ~ 28 lbsx3-6 mos,chol<50, bmi 16.8, loss muscle mass,

## 2018-03-14 NOTE — PROGRESS NOTE ADULT - PROBLEM SELECTOR PLAN 1
Likely secondary infected sacral and back abscess with history of MRSA  S/p incision and drainage 3/8; 3/10  Continue with vancomycin , day 7  Will dc meropenem today , as per ID , Dr. Dietz   F/U wound cultures : positive for MRSA  Blood cultures negative to date   Infectious Disease Dr. Kaplan following  General surgery Dr. Wright following  Patient did not have peripheral iv access since last evening. Multiple attempts done by on call team and day team . Eventually central line was placed  in Left IJ for vascular access and iv antibiotics on 3/12  Consent obtained from sister , plan discussed with Dr. Jason Goodman Vt before dialysis on 3/16 Likely secondary infected sacral and back abscess with history of MRSA  S/p incision and drainage 3/8; 3/10  Continue with vancomycin , day 7  Will dc meropenem today , as per ID , Dr. Dietz   F/U wound cultures : positive for MRSA  Blood cultures negative to date   Tmax is 100.5 in last 24 hours   Infectious Disease Dr. Kaplan following  General surgery Dr. Wright following  Patient did not have peripheral iv access since last evening. Multiple attempts done by on call team and day team . Eventually central line was placed  in Left IJ for vascular access and iv antibiotics on 3/12  Consent obtained from sister , plan discussed with Dr. Jason Goodman Vt before dialysis on 3/16

## 2018-03-14 NOTE — DIETITIAN INITIAL EVALUATION ADULT. - PROBLEM SELECTOR PLAN 1
52 yo F w/ multiple comorbidities  p/w to leukocytosis 48k and fever 100.9  sources of infection are UA and sacral decubitus ulcer   Pt not hypotensive or ill appearing  wound care consult   will start Merrem and Vancomycin  Pt has MRSA on sacral decub as per [facility]   f/u Bcx and Ucx  ID: Dr. Kaplan

## 2018-03-15 LAB
ANION GAP SERPL CALC-SCNC: 8 MMOL/L — SIGNIFICANT CHANGE UP (ref 5–17)
BUN SERPL-MCNC: 29 MG/DL — HIGH (ref 7–18)
CALCIUM SERPL-MCNC: 8.2 MG/DL — LOW (ref 8.4–10.5)
CHLORIDE SERPL-SCNC: 103 MMOL/L — SIGNIFICANT CHANGE UP (ref 96–108)
CO2 SERPL-SCNC: 30 MMOL/L — SIGNIFICANT CHANGE UP (ref 22–31)
CREAT SERPL-MCNC: 3.37 MG/DL — HIGH (ref 0.5–1.3)
CULTURE RESULTS: SIGNIFICANT CHANGE UP
GLUCOSE BLDC GLUCOMTR-MCNC: 130 MG/DL — HIGH (ref 70–99)
GLUCOSE BLDC GLUCOMTR-MCNC: 143 MG/DL — HIGH (ref 70–99)
GLUCOSE BLDC GLUCOMTR-MCNC: 154 MG/DL — HIGH (ref 70–99)
GLUCOSE BLDC GLUCOMTR-MCNC: 181 MG/DL — HIGH (ref 70–99)
GLUCOSE SERPL-MCNC: 131 MG/DL — HIGH (ref 70–99)
HCT VFR BLD CALC: 27.2 % — LOW (ref 34.5–45)
HCT VFR BLD CALC: 28 % — LOW (ref 34.5–45)
HGB BLD-MCNC: 7.6 G/DL — LOW (ref 11.5–15.5)
HGB BLD-MCNC: 8 G/DL — LOW (ref 11.5–15.5)
INR BLD: 1.13 RATIO — SIGNIFICANT CHANGE UP (ref 0.88–1.16)
MAGNESIUM SERPL-MCNC: 2.2 MG/DL — SIGNIFICANT CHANGE UP (ref 1.6–2.6)
MCHC RBC-ENTMCNC: 25.3 PG — LOW (ref 27–34)
MCHC RBC-ENTMCNC: 25.6 PG — LOW (ref 27–34)
MCHC RBC-ENTMCNC: 28.1 GM/DL — LOW (ref 32–36)
MCHC RBC-ENTMCNC: 28.6 GM/DL — LOW (ref 32–36)
MCV RBC AUTO: 89.6 FL — SIGNIFICANT CHANGE UP (ref 80–100)
MCV RBC AUTO: 90.2 FL — SIGNIFICANT CHANGE UP (ref 80–100)
PHOSPHATE SERPL-MCNC: 4.4 MG/DL — SIGNIFICANT CHANGE UP (ref 2.5–4.5)
PLATELET # BLD AUTO: 386 K/UL — SIGNIFICANT CHANGE UP (ref 150–400)
PLATELET # BLD AUTO: 422 K/UL — HIGH (ref 150–400)
POTASSIUM SERPL-MCNC: 3.9 MMOL/L — SIGNIFICANT CHANGE UP (ref 3.5–5.3)
POTASSIUM SERPL-SCNC: 3.9 MMOL/L — SIGNIFICANT CHANGE UP (ref 3.5–5.3)
PROTHROM AB SERPL-ACNC: 12.4 SEC — SIGNIFICANT CHANGE UP (ref 9.8–12.7)
PTH RELATED PROT SERPL-MCNC: <1.1 PMOL/L — SIGNIFICANT CHANGE UP
RBC # BLD: 3.01 M/UL — LOW (ref 3.8–5.2)
RBC # BLD: 3.13 M/UL — LOW (ref 3.8–5.2)
RBC # FLD: 20.2 % — HIGH (ref 10.3–14.5)
RBC # FLD: 20.3 % — HIGH (ref 10.3–14.5)
SODIUM SERPL-SCNC: 141 MMOL/L — SIGNIFICANT CHANGE UP (ref 135–145)
SPECIMEN SOURCE: SIGNIFICANT CHANGE UP
WBC # BLD: 25.7 K/UL — HIGH (ref 3.8–10.5)
WBC # BLD: 27.7 K/UL — HIGH (ref 3.8–10.5)
WBC # FLD AUTO: 25.7 K/UL — HIGH (ref 3.8–10.5)
WBC # FLD AUTO: 27.7 K/UL — HIGH (ref 3.8–10.5)

## 2018-03-15 PROCEDURE — 99233 SBSQ HOSP IP/OBS HIGH 50: CPT | Mod: GC

## 2018-03-15 PROCEDURE — 99232 SBSQ HOSP IP/OBS MODERATE 35: CPT | Mod: GC

## 2018-03-15 RX ADMIN — CINACALCET 30 MILLIGRAM(S): 30 TABLET, FILM COATED ORAL at 13:00

## 2018-03-15 RX ADMIN — Medication 1: at 21:51

## 2018-03-15 RX ADMIN — HEPARIN SODIUM 5000 UNIT(S): 5000 INJECTION INTRAVENOUS; SUBCUTANEOUS at 18:27

## 2018-03-15 RX ADMIN — Medication 1 APPLICATION(S): at 12:59

## 2018-03-15 RX ADMIN — MORPHINE SULFATE 4 MILLIGRAM(S): 50 CAPSULE, EXTENDED RELEASE ORAL at 06:20

## 2018-03-15 RX ADMIN — Medication 1: at 12:59

## 2018-03-15 RX ADMIN — Medication 650 MILLIGRAM(S): at 21:51

## 2018-03-15 RX ADMIN — HEPARIN SODIUM 5000 UNIT(S): 5000 INJECTION INTRAVENOUS; SUBCUTANEOUS at 05:57

## 2018-03-15 RX ADMIN — GABAPENTIN 100 MILLIGRAM(S): 400 CAPSULE ORAL at 05:56

## 2018-03-15 RX ADMIN — Medication 1 TABLET(S): at 18:27

## 2018-03-15 RX ADMIN — Medication 25 MILLIGRAM(S): at 18:27

## 2018-03-15 RX ADMIN — GABAPENTIN 100 MILLIGRAM(S): 400 CAPSULE ORAL at 13:00

## 2018-03-15 RX ADMIN — SIMVASTATIN 20 MILLIGRAM(S): 20 TABLET, FILM COATED ORAL at 21:52

## 2018-03-15 RX ADMIN — Medication 1 MILLIGRAM(S): at 13:00

## 2018-03-15 RX ADMIN — Medication 50 MICROGRAM(S): at 05:56

## 2018-03-15 RX ADMIN — Medication 100 MILLIGRAM(S): at 18:27

## 2018-03-15 RX ADMIN — SENNA PLUS 2 TABLET(S): 8.6 TABLET ORAL at 21:52

## 2018-03-15 RX ADMIN — Medication 50 MILLIGRAM(S): at 05:56

## 2018-03-15 RX ADMIN — GABAPENTIN 100 MILLIGRAM(S): 400 CAPSULE ORAL at 21:52

## 2018-03-15 RX ADMIN — MORPHINE SULFATE 4 MILLIGRAM(S): 50 CAPSULE, EXTENDED RELEASE ORAL at 05:57

## 2018-03-15 RX ADMIN — Medication 500 MILLIGRAM(S): at 13:00

## 2018-03-15 RX ADMIN — Medication 100 MILLIGRAM(S): at 05:56

## 2018-03-15 RX ADMIN — PANTOPRAZOLE SODIUM 40 MILLIGRAM(S): 20 TABLET, DELAYED RELEASE ORAL at 05:56

## 2018-03-15 RX ADMIN — Medication 25 MILLIGRAM(S): at 05:56

## 2018-03-15 RX ADMIN — Medication 50 MILLIGRAM(S): at 13:00

## 2018-03-15 RX ADMIN — Medication 1 TABLET(S): at 13:00

## 2018-03-15 RX ADMIN — Medication 50 MILLIGRAM(S): at 21:52

## 2018-03-15 NOTE — PROGRESS NOTE ADULT - PROBLEM SELECTOR PLAN 1
Likely secondary infected sacral and back abscess with history of MRSA  S/p incision and drainage 3/8; 3/10  Dc vanc , merem   Continue with Bactrim ds for 7 days based on sensitivity   F/U wound cultures : positive for MRSA  Blood cultures negative to date   Tmax is 100.5 in last 24 hours   Infectious Disease Dr. Kaplan following  General surgery Dr. Wright following  Patient did not have peripheral iv access since last evening. Multiple attempts done by on call team and day team . Eventually central line was placed  in Left IJ for vascular access and iv antibiotics on 3/12  Consent obtained from sister , plan discussed with Dr. Gomez

## 2018-03-15 NOTE — PROGRESS NOTE ADULT - PROBLEM SELECTOR PLAN 1
Maintenance HD schedule MWF.  On PO bactrim for  MRSA abcess of hip.  off binders as with low phosphorous.  BP controlled- cont hydralazine, metoprolol.

## 2018-03-15 NOTE — PROGRESS NOTE ADULT - ASSESSMENT
53 F h/o HLD, HTN, hypothyroidism, parathyroid adenoma admitted for right hip abscess s/p drainage MRSA growing in surgical drainage. Patient is still having low grade temperature Tmax 100.1F and leukocytosis improving. She is on vancomycin day # 9 and meropenem day # 8. Bone scan negative for any evidence of infection. As patient only growing MRSA from multiple I+D specimens, would cont. vanco. Perma cath site appears clean. Multiple areas of debridement on back and upper buttocks are clean.    Plan:  Cont. vanco  Check for vancomycin trough on Friday  f/u cbc (pt refused today) 53 F h/o HLD, HTN, hypothyroidism, parathyroid adenoma admitted for right hip abscess s/p drainage MRSA growing in surgical drainage. Patient is still having low grade temperature Tmax 100.1F and leukocytosis improving. She is on vancomycin day # 9 and meropenem day # 8. Bone scan negative for any evidence of infection. As patient only growing MRSA from multiple I+D specimens, would cont. vanco. Perma cath site appears clean. Multiple areas of debridement on back and upper buttocks are clean.    Plan:  Cont. vanco  Check for vancomycin trough on Friday 53 F h/o HLD, HTN, hypothyroidism, parathyroid adenoma admitted for right hip abscess s/p drainage MRSA growing in surgical drainage. Patient is still having low grade temperature Tmax 100.1F and leukocytosis improving. She is on vancomycin day # 9. Bone scan negative for any evidence of infection. As patient only growing MRSA from multiple I+D specimens, would cont. vanco. Perma cath site appears clean. Multiple areas of debridement on back and upper buttocks are clean.    Plan:  Cont. vanco  Check for vancomycin trough on Friday 53 F h/o HLD, HTN, hypothyroidism, parathyroid adenoma admitted for right hip abscess s/p drainage MRSA growing in surgical drainage. Patient is still having low grade temperature Tmax 100.1F and leukocytosis improving. She is on vancomycin day # 9. Bone scan negative for any evidence of infection. As patient only growing MRSA from multiple I+D specimens, would cont. treatment for skin/soft tissue infection with MRSA. WBC coming down. Perma cath site appears clean. Multiple areas of debridement on back and upper buttocks are clean.      Plan:    D/C vanco and treat with Bactrim DS 1 po bid x7d for skin/soft tissue infection (S. aureus susceptibility c/w CA MRSA)  F/U WBC

## 2018-03-15 NOTE — PROGRESS NOTE ADULT - ASSESSMENT
53 YR OLF FEMALE RECENTLY STARTED ON HD AFTER A PROLONGED HOSPITALIZATION IN [facility]. SENT FROM NH WITH LEUCOCYTOSIS. HAS SACRAL DECUB WOUND ( WOUND CULTURES IN NH GREW MRSA)ON EVALUATION FOUND TO HAVE RT KNEE ABCESS. S/P I&D OF HIP ABCESS.  PT HAS A PERMACATH.

## 2018-03-15 NOTE — PROGRESS NOTE ADULT - SUBJECTIVE AND OBJECTIVE BOX
PGY 1 Note discussed with supervising resident and primary attending    Patient is a 53y old  Female who presents with a chief complaint of sent from NH due to leukocytosis and fever (07 Mar 2018 21:47)      INTERVAL HPI/OVERNIGHT EVENTS: offers no new complaints; current symptoms resolving    MEDICATIONS  (STANDING):  ascorbic acid 500 milliGRAM(s) Oral daily  cinacalcet 30 milliGRAM(s) Oral daily  collagenase Ointment 1 Application(s) Topical daily  docusate sodium 100 milliGRAM(s) Oral two times a day  folic acid 1 milliGRAM(s) Oral daily  gabapentin 100 milliGRAM(s) Oral three times a day  heparin  Injectable 5000 Unit(s) SubCutaneous every 12 hours  hydrALAZINE 50 milliGRAM(s) Oral three times a day  insulin lispro (HumaLOG) corrective regimen sliding scale   SubCutaneous Before meals and at bedtime  levothyroxine 50 MICROGram(s) Oral daily  metoprolol     tartrate 25 milliGRAM(s) Oral two times a day  multivitamin 1 Tablet(s) Oral daily  pantoprazole    Tablet 40 milliGRAM(s) Oral before breakfast  senna 2 Tablet(s) Oral at bedtime  simvastatin 20 milliGRAM(s) Oral at bedtime  trimethoprim  160 mG/sulfamethoxazole 800 mG 1 Tablet(s) Oral two times a day    MEDICATIONS  (PRN):  acetaminophen   Tablet 650 milliGRAM(s) Oral every 6 hours PRN For Temp greater than 38 C (100.4 F)  morphine  - Injectable 4 milliGRAM(s) IV Push every 4 hours PRN Severe Pain  oxyCODONE    5 mG/acetaminophen 325 mG 1 Tablet(s) Oral every 4 hours PRN Moderate Pain      __________________________________________________  REVIEW OF SYSTEMS: back pain improved , had bowel movement     CONSTITUTIONAL: No fever,   EYES: no acute visual disturbances  NECK: No pain or stiffness  RESPIRATORY: No cough; No shortness of breath  CARDIOVASCULAR: No chest pain, no palpitations  GASTROINTESTINAL: No pain. No nausea or vomiting; No diarrhea   NEUROLOGICAL: No headache or numbness, no tremors  MUSCULOSKELETAL: No joint pain, no muscle pain  GENITOURINARY: no dysuria, no frequency, no hesitancy  PSYCHIATRY: no depression , no anxiety  ALL OTHER  ROS negative        Vital Signs Last 24 Hrs  T(C): 37 (15 Mar 2018 07:49), Max: 37.8 (14 Mar 2018 23:33)  T(F): 98.6 (15 Mar 2018 07:49), Max: 100.1 (14 Mar 2018 23:33)  HR: 77 (15 Mar 2018 07:49) (77 - 95)  BP: 123/45 (15 Mar 2018 07:49) (110/51 - 138/70)  BP(mean): --  RR: 16 (15 Mar 2018 07:49) (16 - 18)  SpO2: 97% (15 Mar 2018 07:49) (95% - 100%)    ________________________________________________  PHYSICAL EXAM:  LIJ in neck placed on 3/12    GENERAL: NAD  HEENT: Normocephalic;  conjunctivae and sclerae clear; moist mucous membranes;   NECK : supple  CHEST/LUNG: Clear to auscultation bilaterally with good air entry   HEART: S1 S2  regular; no murmurs, gallops or rubs  ABDOMEN: Soft, Nontender, Nondistended; Bowel sounds present  EXTREMITIES: no cyanosis; no edema; no calf tenderness  SKIN of back :Back-- 2, [id number] areas on left mid-back and right mid-back (I+D sites) with minimal amount of superficial exudate; 2, large areas of debridement noted on upper left and right buttock that are clean with minimal amount of superficial exudate , dressing in situ .   NERVOUS SYSTEM:  AAO*2    _________________________________________________  LABS:                        7.6    25.7  )-----------( 386      ( 15 Mar 2018 07:12 )             27.2     03-15    141  |  103  |  29<H>  ----------------------------<  131<H>  3.9   |  30  |  3.37<H>    Ca    8.2<L>      15 Mar 2018 07:12  Phos  4.4     03-15  Mg     2.2     03-15          CAPILLARY BLOOD GLUCOSE      POCT Blood Glucose.: 181 mg/dL (15 Mar 2018 11:40)  POCT Blood Glucose.: 130 mg/dL (15 Mar 2018 08:05)  POCT Blood Glucose.: 191 mg/dL (14 Mar 2018 21:25)  POCT Blood Glucose.: 200 mg/dL (14 Mar 2018 16:31)        Consultant(s) Notes Reviewed:   YES    Care Discussed with Consultants : YES    Plan of care was discussed with patient and /or primary care giver; all questions and concerns were addressed and care was aligned with patient's wishes.

## 2018-03-15 NOTE — PROGRESS NOTE ADULT - PROBLEM SELECTOR PLAN 3
Likely secondary to CKD  Hemoglobin baseline 9-10  Continue with Epogen   Continue to monitor CBC Likely secondary to CKD  Hemoglobin baseline 9-10   Hb has been ranging around 8 since few days , however today it was 7.6 this am , repeated on in pm along with coags and type and cross , hb is back to 8  No signs of bleeding   Will monitor cbc , f/u hb im am   Continue with Epogen   Continue to monitor CBC

## 2018-03-15 NOTE — PROGRESS NOTE ADULT - ASSESSMENT
s/p i&d multiple back abcesses and debridement of hip decubiti  wounds healing well      sacral wound [id number] depth: recommend santyl to wound base and moist to dry dressing daily with frequent position changes  left back wound: [id number] depth: recommend wet to dry dressing daily  right back wound 3x.[id number].5 depth: wet to dry sterile gauze    hip wound [id number]: wet to dry gauze daily    wound care service follow up    abx per cultures

## 2018-03-15 NOTE — PROGRESS NOTE ADULT - SUBJECTIVE AND OBJECTIVE BOX
PGY 3 note:    53F PMHx HFpEF, ESRD on HD, DM, HTN, HLD, Hypothyroidism, Parathyroid adenoma, GERD admitted for sepsis due to sacral abscess. S/p incision and drainage 3/8; 3/10    Interval history:  Patient is comfortable and denies any symptoms    MEDICATIONS  (STANDING):  ascorbic acid 500 milliGRAM(s) Oral daily  cinacalcet 30 milliGRAM(s) Oral daily  collagenase Ointment 1 Application(s) Topical daily  docusate sodium 100 milliGRAM(s) Oral two times a day  folic acid 1 milliGRAM(s) Oral daily  gabapentin 100 milliGRAM(s) Oral three times a day  heparin  Injectable 5000 Unit(s) SubCutaneous every 12 hours  hydrALAZINE 50 milliGRAM(s) Oral three times a day  insulin lispro (HumaLOG) corrective regimen sliding scale   SubCutaneous Before meals and at bedtime  levothyroxine 50 MICROGram(s) Oral daily  metoprolol     tartrate 25 milliGRAM(s) Oral two times a day  multivitamin 1 Tablet(s) Oral daily  pantoprazole    Tablet 40 milliGRAM(s) Oral before breakfast  senna 2 Tablet(s) Oral at bedtime  simvastatin 20 milliGRAM(s) Oral at bedtime  vancomycin  IVPB 1250 milliGRAM(s) IV Intermittent <User Schedule>    MEDICATIONS  (PRN):  acetaminophen   Tablet 650 milliGRAM(s) Oral every 6 hours PRN For Temp greater than 38 C (100.4 F)  morphine  - Injectable 4 milliGRAM(s) IV Push every 4 hours PRN Severe Pain  oxyCODONE    5 mG/acetaminophen 325 mG 1 Tablet(s) Oral every 4 hours PRN Moderate Pain          Allergies: No Known Allergies    Intolerances        VITALS:  Vital Signs Last 24 Hrs  T(C): 36.8 (14 Mar 2018 08:03), Max: 37.9 (13 Mar 2018 16:03)  T(F): 98.3 (14 Mar 2018 08:03), Max: 100.3 (13 Mar 2018 16:03)  HR: 84 (14 Mar 2018 08:03) (83 - 98)  BP: 122/44 (14 Mar 2018 08:03) (120/48 - 131/45)  RR: 15 (14 Mar 2018 08:03) (15 - 18)  SpO2: 99% (14 Mar 2018 08:03) (98% - 100%)    LABS/DIAGNOSTIC TESTS:                          8.8    30.2  )-----------( 338      ( 13 Mar 2018 07:46 )             32.0   03-13    136  |  98  |  33<H>  ----------------------------<  113<H>  4.3   |  28  |  3.75<H>    Ca    8.3<L>      13 Mar 2018 07:46  Phos  4.1     03-13  Mg     2.3     03-13        CULTURES: .Blood Blood-Peripheral  03-10 @ 09:57   No growth to date.  --  --      .Surgical Swab left back mass culture  03-09 @ 12:14   Numerous Methicillin resistant Staphylococcus aureus  --  Methicillin resistant Staphylococcus aureus      .Surgical Swab right side of back  03-09 @ 12:13   Numerous Methicillin resistant Staphylococcus aureus  ***********Note************  This isolate demonstrates inducible  clindamycin resistance.  Clindamycin may still be effective in some patients.  --  Methicillin resistant Staphylococcus aureus      .Blood Dialysis Catheter  03-07 @ 23:03   No growth at 5 days.  --  --    .Urine Catheterized  03-07 @ 09:38   <10,000 CFU/ml Normal Urogenital derrick present  --  --      . PGY 3 note:    53F PMHx HFpEF, ESRD on HD, DM, HTN, HLD, Hypothyroidism, Parathyroid adenoma, GERD admitted for sepsis due to sacral abscess. S/p incision and drainage 3/8; 3/10    Interval history:  Patient is comfortable and denies any symptoms    MEDICATIONS  (STANDING):  ascorbic acid 500 milliGRAM(s) Oral daily  cinacalcet 30 milliGRAM(s) Oral daily  collagenase Ointment 1 Application(s) Topical daily  docusate sodium 100 milliGRAM(s) Oral two times a day  folic acid 1 milliGRAM(s) Oral daily  gabapentin 100 milliGRAM(s) Oral three times a day  heparin  Injectable 5000 Unit(s) SubCutaneous every 12 hours  hydrALAZINE 50 milliGRAM(s) Oral three times a day  insulin lispro (HumaLOG) corrective regimen sliding scale   SubCutaneous Before meals and at bedtime  levothyroxine 50 MICROGram(s) Oral daily  metoprolol     tartrate 25 milliGRAM(s) Oral two times a day  multivitamin 1 Tablet(s) Oral daily  pantoprazole    Tablet 40 milliGRAM(s) Oral before breakfast  senna 2 Tablet(s) Oral at bedtime  simvastatin 20 milliGRAM(s) Oral at bedtime  vancomycin  IVPB 1250 milliGRAM(s) IV Intermittent <User Schedule>    MEDICATIONS  (PRN):  acetaminophen   Tablet 650 milliGRAM(s) Oral every 6 hours PRN For Temp greater than 38 C (100.4 F)  morphine  - Injectable 4 milliGRAM(s) IV Push every 4 hours PRN Severe Pain  oxyCODONE    5 mG/acetaminophen 325 mG 1 Tablet(s) Oral every 4 hours PRN Moderate Pain          Allergies: No Known Allergies    Intolerances        VITALS:  Vital Signs Last 24 Hrs  T(C): 36.8 (14 Mar 2018 08:03), Max: 37.9 (13 Mar 2018 16:03)  T(F): 98.3 (14 Mar 2018 08:03), Max: 100.3 (13 Mar 2018 16:03)  HR: 84 (14 Mar 2018 08:03) (83 - 98)  BP: 122/44 (14 Mar 2018 08:03) (120/48 - 131/45)  RR: 15 (14 Mar 2018 08:03) (15 - 18)  SpO2: 99% (14 Mar 2018 08:03) (98% - 100%)    LABS/DIAGNOSTIC TESTS:                              7.6    25.7  )-----------( 386      ( 15 Mar 2018 07:12 )             27.2                     8.8    30.2  )-----------( 338      ( 13 Mar 2018 07:46 )    03-15    141  |  103  |  29<H>  ----------------------------<  131<H>  3.9   |  30  |  3.37<H>    Ca    8.2<L>      15 Mar 2018 07:12  Phos  4.4     03-15  Mg     2.2     03-15                 32.0   03-13    136  |  98  |  33<H>  ----------------------------<  113<H>  4.3   |  28  |  3.75<H>    Ca    8.3<L>      13 Mar 2018 07:46  Phos  4.1     03-13  Mg     2.3     03-13        CULTURES: .Blood Blood-Peripheral  03-10 @ 09:57   No growth to date.  --  --      .Surgical Swab left back mass culture  03-09 @ 12:14   Numerous Methicillin resistant Staphylococcus aureus  --  Methicillin resistant Staphylococcus aureus      .Surgical Swab right side of back  03-09 @ 12:13   Numerous Methicillin resistant Staphylococcus aureus  ***********Note************  This isolate demonstrates inducible  clindamycin resistance.  Clindamycin may still be effective in some patients.  --  Methicillin resistant Staphylococcus aureus      .Blood Dialysis Catheter  03-07 @ 23:03   No growth at 5 days.  --  --    .Urine Catheterized  03-07 @ 09:38   <10,000 CFU/ml Normal Urogenital derrick present  --  --      .

## 2018-03-15 NOTE — PROGRESS NOTE ADULT - SUBJECTIVE AND OBJECTIVE BOX
off IV ABX.    No Known Allergies    Hospital Medications:   MEDICATIONS  (STANDING):  ascorbic acid 500 milliGRAM(s) Oral daily  cinacalcet 30 milliGRAM(s) Oral daily  collagenase Ointment 1 Application(s) Topical daily  docusate sodium 100 milliGRAM(s) Oral two times a day  folic acid 1 milliGRAM(s) Oral daily  gabapentin 100 milliGRAM(s) Oral three times a day  heparin  Injectable 5000 Unit(s) SubCutaneous every 12 hours  hydrALAZINE 50 milliGRAM(s) Oral three times a day  insulin lispro (HumaLOG) corrective regimen sliding scale   SubCutaneous Before meals and at bedtime  levothyroxine 50 MICROGram(s) Oral daily  metoprolol     tartrate 25 milliGRAM(s) Oral two times a day  multivitamin 1 Tablet(s) Oral daily  pantoprazole    Tablet 40 milliGRAM(s) Oral before breakfast  senna 2 Tablet(s) Oral at bedtime  simvastatin 20 milliGRAM(s) Oral at bedtime  trimethoprim  160 mG/sulfamethoxazole 800 mG 1 Tablet(s) Oral two times a day        VITALS:  T(F): 98.6 (03-15-18 @ 07:49), Max: 100.1 (03-14-18 @ 23:33)  HR: 77 (03-15-18 @ 07:49)  BP: 123/45 (03-15-18 @ 07:49)  RR: 16 (03-15-18 @ 07:49)  SpO2: 97% (03-15-18 @ 07:49)  Wt(kg): --    03-14 @ 07:01  -  03-15 @ 07:00  --------------------------------------------------------  IN: 0 mL / OUT: 0 mL / NET: 0 mL        PHYSICAL EXAM:  Constitutional: NAD  HEENT: anicteric sclera, oropharynx clear, MMM  Neck: No JVD  Respiratory: CTAB, no wheezes, rales or rhonchi  Cardiovascular: S1, S2, RRR  Gastrointestinal: BS+, soft, NT/ND  Extremities:  No peripheral edema  Neurological: A/O x 3, no focal deficits  gluteal wound dressed.  Vascular Access: RT IJ permacath.    LABS:  03-15    141  |  103  |  29<H>  ----------------------------<  131<H>  3.9   |  30  |  3.37<H>    Ca    8.2<L>      15 Mar 2018 07:12  Phos  4.4     03-15  Mg     2.2     03-15      Creatinine Trend: 3.37 <--, 5.93 <--, 3.75 <--, 5.93 <--, 4.26 <--, 2.72 <--, 4.07 <--                        7.6    25.7  )-----------( 386      ( 15 Mar 2018 07:12 )             27.2     Urine Studies:      RADIOLOGY & ADDITIONAL STUDIES:

## 2018-03-15 NOTE — PROGRESS NOTE ADULT - SUBJECTIVE AND OBJECTIVE BOX
pt has no- new complaints    Vital Signs Last 24 Hrs  T(C): 37 (15 Mar 2018 07:49), Max: 37.8 (14 Mar 2018 23:33)  T(F): 98.6 (15 Mar 2018 07:49), Max: 100.1 (14 Mar 2018 23:33)  HR: 77 (15 Mar 2018 07:49) (77 - 95)  BP: 123/45 (15 Mar 2018 07:49) (110/51 - 138/70)  BP(mean): --  RR: 16 (15 Mar 2018 07:49) (16 - 18)  SpO2: 97% (15 Mar 2018 07:49) (95% - 100%)    Sacral wound: fibrinous exudate. no purulence, no bleed  back wounds: granulation base, no purulence, no bleed  right hip wound: granulation base; n purulence no bleed    all wounds have aquacel applied to them and it appears to be making the wounds too wet/saturated      s/p i&d multiple back abcesses and debridement of hip decubiti  wounds healing well                          7.6    25.7  )-----------( 386      ( 15 Mar 2018 07:12 )             27.2

## 2018-03-15 NOTE — PROGRESS NOTE ADULT - ATTENDING COMMENTS
MEDICAL ATTENDING NOTE - COVERAGE FOR DR BILL  Patient was seen and examined by myself on 3/15/18 at about 4:30pm. Case was discussed with house staff in details. I have reviewed and agree with the plan as outlined above with edits where appropriate.    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  - other plan as outlined above  discharge planning; remove central line prior to discharge  Plan discussed with patient and nursing staff

## 2018-03-16 LAB
ANION GAP SERPL CALC-SCNC: 11 MMOL/L — SIGNIFICANT CHANGE UP (ref 5–17)
ANION GAP SERPL CALC-SCNC: 9 MMOL/L — SIGNIFICANT CHANGE UP (ref 5–17)
BUN SERPL-MCNC: 36 MG/DL — HIGH (ref 7–18)
BUN SERPL-MCNC: 41 MG/DL — HIGH (ref 7–18)
CALCIUM SERPL-MCNC: 8 MG/DL — LOW (ref 8.4–10.5)
CALCIUM SERPL-MCNC: 8.1 MG/DL — LOW (ref 8.4–10.5)
CHLORIDE SERPL-SCNC: 100 MMOL/L — SIGNIFICANT CHANGE UP (ref 96–108)
CHLORIDE SERPL-SCNC: 98 MMOL/L — SIGNIFICANT CHANGE UP (ref 96–108)
CO2 SERPL-SCNC: 28 MMOL/L — SIGNIFICANT CHANGE UP (ref 22–31)
CO2 SERPL-SCNC: 29 MMOL/L — SIGNIFICANT CHANGE UP (ref 22–31)
CREAT SERPL-MCNC: 4.28 MG/DL — HIGH (ref 0.5–1.3)
CREAT SERPL-MCNC: 5.02 MG/DL — HIGH (ref 0.5–1.3)
GLUCOSE BLDC GLUCOMTR-MCNC: 109 MG/DL — HIGH (ref 70–99)
GLUCOSE BLDC GLUCOMTR-MCNC: 121 MG/DL — HIGH (ref 70–99)
GLUCOSE BLDC GLUCOMTR-MCNC: 179 MG/DL — HIGH (ref 70–99)
GLUCOSE BLDC GLUCOMTR-MCNC: 190 MG/DL — HIGH (ref 70–99)
GLUCOSE SERPL-MCNC: 108 MG/DL — HIGH (ref 70–99)
GLUCOSE SERPL-MCNC: 116 MG/DL — HIGH (ref 70–99)
HCT VFR BLD CALC: 25.6 % — LOW (ref 34.5–45)
HCT VFR BLD CALC: 26.5 % — LOW (ref 34.5–45)
HGB BLD-MCNC: 7.6 G/DL — LOW (ref 11.5–15.5)
HGB BLD-MCNC: 7.9 G/DL — LOW (ref 11.5–15.5)
MAGNESIUM SERPL-MCNC: 1.9 MG/DL — SIGNIFICANT CHANGE UP (ref 1.6–2.6)
MAGNESIUM SERPL-MCNC: 2.1 MG/DL — SIGNIFICANT CHANGE UP (ref 1.6–2.6)
MCHC RBC-ENTMCNC: 26.6 PG — LOW (ref 27–34)
MCHC RBC-ENTMCNC: 26.9 PG — LOW (ref 27–34)
MCHC RBC-ENTMCNC: 29.8 GM/DL — LOW (ref 32–36)
MCHC RBC-ENTMCNC: 29.9 GM/DL — LOW (ref 32–36)
MCV RBC AUTO: 89.3 FL — SIGNIFICANT CHANGE UP (ref 80–100)
MCV RBC AUTO: 89.8 FL — SIGNIFICANT CHANGE UP (ref 80–100)
PHOSPHATE SERPL-MCNC: 4.9 MG/DL — HIGH (ref 2.5–4.5)
PHOSPHATE SERPL-MCNC: 5.8 MG/DL — HIGH (ref 2.5–4.5)
PLATELET # BLD AUTO: 410 K/UL — HIGH (ref 150–400)
PLATELET # BLD AUTO: 419 K/UL — HIGH (ref 150–400)
POTASSIUM SERPL-MCNC: 4.2 MMOL/L — SIGNIFICANT CHANGE UP (ref 3.5–5.3)
POTASSIUM SERPL-MCNC: 5.1 MMOL/L — SIGNIFICANT CHANGE UP (ref 3.5–5.3)
POTASSIUM SERPL-SCNC: 4.2 MMOL/L — SIGNIFICANT CHANGE UP (ref 3.5–5.3)
POTASSIUM SERPL-SCNC: 5.1 MMOL/L — SIGNIFICANT CHANGE UP (ref 3.5–5.3)
RBC # BLD: 2.87 M/UL — LOW (ref 3.8–5.2)
RBC # BLD: 2.95 M/UL — LOW (ref 3.8–5.2)
RBC # FLD: 19.5 % — HIGH (ref 10.3–14.5)
RBC # FLD: 20.2 % — HIGH (ref 10.3–14.5)
SODIUM SERPL-SCNC: 137 MMOL/L — SIGNIFICANT CHANGE UP (ref 135–145)
SODIUM SERPL-SCNC: 138 MMOL/L — SIGNIFICANT CHANGE UP (ref 135–145)
VANCOMYCIN TROUGH SERPL-MCNC: 29.6 UG/ML — CRITICAL HIGH (ref 10–20)
WBC # BLD: 24.2 K/UL — HIGH (ref 3.8–10.5)
WBC # BLD: 29.1 K/UL — HIGH (ref 3.8–10.5)
WBC # FLD AUTO: 24.2 K/UL — HIGH (ref 3.8–10.5)
WBC # FLD AUTO: 29.1 K/UL — HIGH (ref 3.8–10.5)

## 2018-03-16 PROCEDURE — 99233 SBSQ HOSP IP/OBS HIGH 50: CPT | Mod: GC

## 2018-03-16 RX ORDER — AZTREONAM 2 G
1 VIAL (EA) INJECTION
Qty: 12 | Refills: 0 | OUTPATIENT
Start: 2018-03-16 | End: 2018-03-21

## 2018-03-16 RX ORDER — ERYTHROPOIETIN 10000 [IU]/ML
10000 INJECTION, SOLUTION INTRAVENOUS; SUBCUTANEOUS
Qty: 0 | Refills: 0 | Status: DISCONTINUED | OUTPATIENT
Start: 2018-03-16 | End: 2018-03-19

## 2018-03-16 RX ORDER — VANCOMYCIN HCL 1 G
1250 VIAL (EA) INTRAVENOUS
Qty: 0 | Refills: 0 | Status: DISCONTINUED | OUTPATIENT
Start: 2018-03-16 | End: 2018-03-19

## 2018-03-16 RX ORDER — SODIUM HYPOCHLORITE 0.125 %
1 SOLUTION, NON-ORAL MISCELLANEOUS DAILY
Qty: 0 | Refills: 0 | Status: DISCONTINUED | OUTPATIENT
Start: 2018-03-16 | End: 2018-03-19

## 2018-03-16 RX ADMIN — ERYTHROPOIETIN 10000 UNIT(S): 10000 INJECTION, SOLUTION INTRAVENOUS; SUBCUTANEOUS at 16:33

## 2018-03-16 RX ADMIN — Medication 100 MILLIGRAM(S): at 05:59

## 2018-03-16 RX ADMIN — Medication 1 TABLET(S): at 12:33

## 2018-03-16 RX ADMIN — Medication 1: at 11:58

## 2018-03-16 RX ADMIN — GABAPENTIN 100 MILLIGRAM(S): 400 CAPSULE ORAL at 21:39

## 2018-03-16 RX ADMIN — Medication 500 MILLIGRAM(S): at 12:33

## 2018-03-16 RX ADMIN — Medication 1 APPLICATION(S): at 12:33

## 2018-03-16 RX ADMIN — HEPARIN SODIUM 5000 UNIT(S): 5000 INJECTION INTRAVENOUS; SUBCUTANEOUS at 05:59

## 2018-03-16 RX ADMIN — PANTOPRAZOLE SODIUM 40 MILLIGRAM(S): 20 TABLET, DELAYED RELEASE ORAL at 06:01

## 2018-03-16 RX ADMIN — SIMVASTATIN 20 MILLIGRAM(S): 20 TABLET, FILM COATED ORAL at 21:39

## 2018-03-16 RX ADMIN — OXYCODONE AND ACETAMINOPHEN 1 TABLET(S): 5; 325 TABLET ORAL at 19:54

## 2018-03-16 RX ADMIN — OXYCODONE AND ACETAMINOPHEN 1 TABLET(S): 5; 325 TABLET ORAL at 13:15

## 2018-03-16 RX ADMIN — OXYCODONE AND ACETAMINOPHEN 1 TABLET(S): 5; 325 TABLET ORAL at 20:56

## 2018-03-16 RX ADMIN — Medication 1 APPLICATION(S): at 19:58

## 2018-03-16 RX ADMIN — GABAPENTIN 100 MILLIGRAM(S): 400 CAPSULE ORAL at 13:39

## 2018-03-16 RX ADMIN — GABAPENTIN 100 MILLIGRAM(S): 400 CAPSULE ORAL at 05:59

## 2018-03-16 RX ADMIN — Medication 1: at 21:39

## 2018-03-16 RX ADMIN — Medication 50 MILLIGRAM(S): at 13:39

## 2018-03-16 RX ADMIN — Medication 1 MILLIGRAM(S): at 12:34

## 2018-03-16 RX ADMIN — SENNA PLUS 2 TABLET(S): 8.6 TABLET ORAL at 21:39

## 2018-03-16 RX ADMIN — Medication 50 MILLIGRAM(S): at 21:39

## 2018-03-16 RX ADMIN — Medication 25 MILLIGRAM(S): at 05:59

## 2018-03-16 RX ADMIN — OXYCODONE AND ACETAMINOPHEN 1 TABLET(S): 5; 325 TABLET ORAL at 12:30

## 2018-03-16 RX ADMIN — Medication 50 MILLIGRAM(S): at 05:59

## 2018-03-16 RX ADMIN — Medication 1 TABLET(S): at 19:54

## 2018-03-16 RX ADMIN — CINACALCET 30 MILLIGRAM(S): 30 TABLET, FILM COATED ORAL at 12:33

## 2018-03-16 RX ADMIN — Medication 166.67 MILLIGRAM(S): at 16:59

## 2018-03-16 RX ADMIN — Medication 1 TABLET(S): at 05:59

## 2018-03-16 RX ADMIN — Medication 50 MICROGRAM(S): at 05:59

## 2018-03-16 NOTE — PROGRESS NOTE ADULT - SUBJECTIVE AND OBJECTIVE BOX
PGY 3 note:    53F PMHx HFpEF, ESRD on HD, DM, HTN, HLD, Hypothyroidism, Parathyroid adenoma, GERD admitted for sepsis due to sacral abscess. S/p incision and drainage 3/8; 3/10    Interval history:  Patient is comfortable and denies any symptoms    MEDICATIONS  (STANDING):  ascorbic acid 500 milliGRAM(s) Oral daily  cinacalcet 30 milliGRAM(s) Oral daily  collagenase Ointment 1 Application(s) Topical daily  docusate sodium 100 milliGRAM(s) Oral two times a day  folic acid 1 milliGRAM(s) Oral daily  gabapentin 100 milliGRAM(s) Oral three times a day  heparin  Injectable 5000 Unit(s) SubCutaneous every 12 hours  hydrALAZINE 50 milliGRAM(s) Oral three times a day  insulin lispro (HumaLOG) corrective regimen sliding scale   SubCutaneous Before meals and at bedtime  levothyroxine 50 MICROGram(s) Oral daily  metoprolol     tartrate 25 milliGRAM(s) Oral two times a day  multivitamin 1 Tablet(s) Oral daily  pantoprazole    Tablet 40 milliGRAM(s) Oral before breakfast  senna 2 Tablet(s) Oral at bedtime  simvastatin 20 milliGRAM(s) Oral at bedtime  vancomycin  IVPB 1250 milliGRAM(s) IV Intermittent <User Schedule>    MEDICATIONS  (PRN):  acetaminophen   Tablet 650 milliGRAM(s) Oral every 6 hours PRN For Temp greater than 38 C (100.4 F)  morphine  - Injectable 4 milliGRAM(s) IV Push every 4 hours PRN Severe Pain  oxyCODONE    5 mG/acetaminophen 325 mG 1 Tablet(s) Oral every 4 hours PRN Moderate Pain          Allergies: No Known Allergies    Intolerances      Vital Signs Last 24 Hrs  T(C): 37.3 (15 Mar 2018 23:21), Max: 38 (15 Mar 2018 21:24)  T(F): 99.2 (15 Mar 2018 23:21), Max: 100.4 (15 Mar 2018 21:24)  HR: 90 (16 Mar 2018 05:23) (87 - 92)  BP: 132/63 (16 Mar 2018 05:23) (112/54 - 132/63)  RR: 18 (15 Mar 2018 23:21) (16 - 18)  SpO2: 98% (15 Mar 2018 23:21) (96% - 98%)    LABS/DIAGNOSTIC TESTS:                          8.0    27.7  )-----------( 422      ( 15 Mar 2018 14:57 )             28.0   03-15    141  |  103  |  29<H>  ----------------------------<  131<H>  3.9   |  30  |  3.37<H>    Ca    8.2<L>      15 Mar 2018 07:12  Phos  4.4     03-15  Mg     2.2     03-15        CULTURES: .Blood Blood-Peripheral  03-10 @ 09:57   No growth to date.  --  --      .Surgical Swab left back mass culture  03-09 @ 12:14   Numerous Methicillin resistant Staphylococcus aureus  --  Methicillin resistant Staphylococcus aureus      .Surgical Swab right side of back  03-09 @ 12:13   Numerous Methicillin resistant Staphylococcus aureus  ***********Note************  This isolate demonstrates inducible  clindamycin resistance.  Clindamycin may still be effective in some patients.  --  Methicillin resistant Staphylococcus aureus      .Blood Dialysis Catheter  03-07 @ 23:03   No growth at 5 days.  --  --    .Urine Catheterized  03-07 @ 09:38   <10,000 CFU/ml Normal Urogenital derrick present  --  --      . PGY 3 note:    53F PMHx HFpEF, ESRD on HD, DM, HTN, HLD, Hypothyroidism, Parathyroid adenoma, GERD admitted for sepsis due to sacral abscess. S/p incision and drainage 3/8; 3/10    Interval history:  Patient is comfortable and denies any symptoms. Has rising wbc again.    MEDICATIONS  (STANDING):  ascorbic acid 500 milliGRAM(s) Oral daily  cinacalcet 30 milliGRAM(s) Oral daily  collagenase Ointment 1 Application(s) Topical daily  docusate sodium 100 milliGRAM(s) Oral two times a day  folic acid 1 milliGRAM(s) Oral daily  gabapentin 100 milliGRAM(s) Oral three times a day  heparin  Injectable 5000 Unit(s) SubCutaneous every 12 hours  hydrALAZINE 50 milliGRAM(s) Oral three times a day  insulin lispro (HumaLOG) corrective regimen sliding scale   SubCutaneous Before meals and at bedtime  levothyroxine 50 MICROGram(s) Oral daily  metoprolol     tartrate 25 milliGRAM(s) Oral two times a day  multivitamin 1 Tablet(s) Oral daily  pantoprazole    Tablet 40 milliGRAM(s) Oral before breakfast  senna 2 Tablet(s) Oral at bedtime  simvastatin 20 milliGRAM(s) Oral at bedtime  vancomycin  IVPB 1250 milliGRAM(s) IV Intermittent <User Schedule>    MEDICATIONS  (PRN):  acetaminophen   Tablet 650 milliGRAM(s) Oral every 6 hours PRN For Temp greater than 38 C (100.4 F)  morphine  - Injectable 4 milliGRAM(s) IV Push every 4 hours PRN Severe Pain  oxyCODONE    5 mG/acetaminophen 325 mG 1 Tablet(s) Oral every 4 hours PRN Moderate Pain          Allergies: No Known Allergies    Intolerances      Vital Signs Last 24 Hrs  T(C): 37.3 (15 Mar 2018 23:21), Max: 38 (15 Mar 2018 21:24)  T(F): 99.2 (15 Mar 2018 23:21), Max: 100.4 (15 Mar 2018 21:24)  HR: 90 (16 Mar 2018 05:23) (87 - 92)  BP: 132/63 (16 Mar 2018 05:23) (112/54 - 132/63)  RR: 18 (15 Mar 2018 23:21) (16 - 18)  SpO2: 98% (15 Mar 2018 23:21) (96% - 98%)    LABS/DIAGNOSTIC TESTS:                           7.9    29.1  )-----------( 419      ( 16 Mar 2018 10:38 )             26.5                        8.0    27.7  )-----------( 422      ( 15 Mar 2018 14:57 )             28.0   03-15    141  |  103  |  29<H>  ----------------------------<  131<H>  3.9   |  30  |  3.37<H>    Ca    8.2<L>      15 Mar 2018 07:12  Phos  4.4     03-15  Mg     2.2     03-15        CULTURES: .Blood Blood-Peripheral  03-10 @ 09:57   No growth to date.  --  --      .Surgical Swab left back mass culture  03-09 @ 12:14   Numerous Methicillin resistant Staphylococcus aureus  --  Methicillin resistant Staphylococcus aureus      .Surgical Swab right side of back  03-09 @ 12:13   Numerous Methicillin resistant Staphylococcus aureus  ***********Note************  This isolate demonstrates inducible  clindamycin resistance.  Clindamycin may still be effective in some patients.  --  Methicillin resistant Staphylococcus aureus      .Blood Dialysis Catheter  03-07 @ 23:03   No growth at 5 days.  --  --    .Urine Catheterized  03-07 @ 09:38   <10,000 CFU/ml Normal Urogenital derrick present  --  --      .

## 2018-03-16 NOTE — PROGRESS NOTE ADULT - ASSESSMENT
A/P   ESRD ON HD  SCHEDULED FO R HD TODAY    VOL STATUS IS SATISFACTORY   BP   IS ACCEPTABLE    WBC IMPROVING   ON  ABX AS PER ID  FOR POSITIVE CULTURES FROM HER WOUND   ON  EPO  WITH HD   WILL FOLLOW

## 2018-03-16 NOTE — PROGRESS NOTE ADULT - SUBJECTIVE AND OBJECTIVE BOX
PGY 1 Note discussed with supervising resident and primary attending    Patient is a 53y old  Female who presents with a chief complaint of sent from NH due to leukocytosis and fever (07 Mar 2018 21:47)      INTERVAL HPI/OVERNIGHT EVENTS: Patient has a Tmax of 100.4 with inc in white count     MEDICATIONS  (STANDING):  ascorbic acid 500 milliGRAM(s) Oral daily  cinacalcet 30 milliGRAM(s) Oral daily  collagenase Ointment 1 Application(s) Topical daily  docusate sodium 100 milliGRAM(s) Oral two times a day  epoetin jacqueline Injectable 75543 Unit(s) IV Push <User Schedule>  folic acid 1 milliGRAM(s) Oral daily  gabapentin 100 milliGRAM(s) Oral three times a day  heparin  Injectable 5000 Unit(s) SubCutaneous every 12 hours  hydrALAZINE 50 milliGRAM(s) Oral three times a day  insulin lispro (HumaLOG) corrective regimen sliding scale   SubCutaneous Before meals and at bedtime  levothyroxine 50 MICROGram(s) Oral daily  metoprolol     tartrate 25 milliGRAM(s) Oral two times a day  multivitamin 1 Tablet(s) Oral daily  pantoprazole    Tablet 40 milliGRAM(s) Oral before breakfast  senna 2 Tablet(s) Oral at bedtime  simvastatin 20 milliGRAM(s) Oral at bedtime  trimethoprim  160 mG/sulfamethoxazole 800 mG 1 Tablet(s) Oral two times a day  vancomycin  IVPB 1250 milliGRAM(s) IV Intermittent <User Schedule>    MEDICATIONS  (PRN):  acetaminophen   Tablet 650 milliGRAM(s) Oral every 6 hours PRN For Temp greater than 38 C (100.4 F)  oxyCODONE    5 mG/acetaminophen 325 mG 1 Tablet(s) Oral every 4 hours PRN Moderate Pain      __________________________________________________  REVIEW OF SYSTEMS:    CONSTITUTIONAL: No fever,   EYES: no acute visual disturbances  NECK: No pain or stiffness  RESPIRATORY: No cough; No shortness of breath  CARDIOVASCULAR: No chest pain, no palpitations  GASTROINTESTINAL: No pain. No nausea or vomiting; No diarrhea   NEUROLOGICAL: No headache or numbness, no tremors  MUSCULOSKELETAL: No joint pain, no muscle pain  GENITOURINARY: no dysuria, no frequency, no hesitancy  PSYCHIATRY: no depression , no anxiety  ALL OTHER  ROS negative        Vital Signs Last 24 Hrs  T(C): 36.9 (16 Mar 2018 07:58), Max: 38 (15 Mar 2018 21:24)  T(F): 98.4 (16 Mar 2018 07:58), Max: 100.4 (15 Mar 2018 21:24)  HR: 79 (16 Mar 2018 07:58) (79 - 92)  BP: 155/55 (16 Mar 2018 07:58) (112/54 - 155/55)  BP(mean): --  RR: 16 (16 Mar 2018 07:58) (16 - 18)  SpO2: 98% (16 Mar 2018 07:58) (96% - 98%)    ________________________________________________  PHYSICAL EXAM:  LIJ in neck placed on 3/12    GENERAL: NAD , blind   HEENT: Normocephalic;  conjunctivae and sclerae clear; moist mucous membranes;   NECK : supple  CHEST/LUNG: Clear to auscultation bilaterally with good air entry   HEART: S1 S2  regular; no murmurs, gallops or rubs  ABDOMEN: Soft, Nontender, Nondistended; Bowel sounds present  EXTREMITIES: no cyanosis; no edema; no calf tenderness  SKIN of back :Back-- 2, [id number] areas on left mid-back and right mid-back (I+D sites) with minimal amount of superficial exudate; 2, large areas of debridement noted on upper left and right buttock that are clean with minimal amount of superficial exudate , dressing in situ .   NERVOUS SYSTEM:  AAO*2    _________________________________________________  LABS:                        7.9    29.1  )-----------( 419      ( 16 Mar 2018 10:38 )             26.5     03-16    137  |  98  |  41<H>  ----------------------------<  116<H>  5.1   |  28  |  5.02<H>    Ca    8.0<L>      16 Mar 2018 10:45  Phos  5.8     03-16  Mg     2.1     03-16      PT/INR - ( 15 Mar 2018 14:57 )   PT: 12.4 sec;   INR: 1.13 ratio             CAPILLARY BLOOD GLUCOSE      POCT Blood Glucose.: 179 mg/dL (16 Mar 2018 11:28)  POCT Blood Glucose.: 121 mg/dL (16 Mar 2018 08:39)  POCT Blood Glucose.: 154 mg/dL (15 Mar 2018 21:15)  POCT Blood Glucose.: 143 mg/dL (15 Mar 2018 17:14)      Consultant(s) Notes Reviewed:   YES    Care Discussed with Consultants : YES    Plan of care was discussed with patient and /or primary care giver; all questions and concerns were addressed and care was aligned with patient's wishes. PGY 1 Note discussed with supervising resident and primary attending    Patient is a 53y old  Female who presents with a chief complaint of sent from NH due to leukocytosis and fever (07 Mar 2018 21:47)      INTERVAL HPI/OVERNIGHT EVENTS: Patient has a Tmax of 100.4 with inc in white count     MEDICATIONS  (STANDING):  ascorbic acid 500 milliGRAM(s) Oral daily  cinacalcet 30 milliGRAM(s) Oral daily  collagenase Ointment 1 Application(s) Topical daily  docusate sodium 100 milliGRAM(s) Oral two times a day  epoetin ajcqueline Injectable 56968 Unit(s) IV Push <User Schedule>  folic acid 1 milliGRAM(s) Oral daily  gabapentin 100 milliGRAM(s) Oral three times a day  heparin  Injectable 5000 Unit(s) SubCutaneous every 12 hours  hydrALAZINE 50 milliGRAM(s) Oral three times a day  insulin lispro (HumaLOG) corrective regimen sliding scale   SubCutaneous Before meals and at bedtime  levothyroxine 50 MICROGram(s) Oral daily  metoprolol     tartrate 25 milliGRAM(s) Oral two times a day  multivitamin 1 Tablet(s) Oral daily  pantoprazole    Tablet 40 milliGRAM(s) Oral before breakfast  senna 2 Tablet(s) Oral at bedtime  simvastatin 20 milliGRAM(s) Oral at bedtime  trimethoprim  160 mG/sulfamethoxazole 800 mG 1 Tablet(s) Oral two times a day  vancomycin  IVPB 1250 milliGRAM(s) IV Intermittent <User Schedule>    MEDICATIONS  (PRN):  acetaminophen   Tablet 650 milliGRAM(s) Oral every 6 hours PRN For Temp greater than 38 C (100.4 F)  oxyCODONE    5 mG/acetaminophen 325 mG 1 Tablet(s) Oral every 4 hours PRN Moderate Pain      __________________________________________________  REVIEW OF SYSTEMS:    CONSTITUTIONAL: No fever,   EYES: no acute visual disturbances  NECK: No pain or stiffness  RESPIRATORY: No cough; No shortness of breath  CARDIOVASCULAR: No chest pain, no palpitations  GASTROINTESTINAL: No pain. No nausea or vomiting; No diarrhea   NEUROLOGICAL: No headache or numbness, no tremors  MUSCULOSKELETAL: joint pain+  muscle pain+  GENITOURINARY: no dysuria, no frequency, no hesitancy    ALL OTHER  ROS negative        Vital Signs Last 24 Hrs  T(C): 36.9 (16 Mar 2018 07:58), Max: 38 (15 Mar 2018 21:24)  T(F): 98.4 (16 Mar 2018 07:58), Max: 100.4 (15 Mar 2018 21:24)  HR: 79 (16 Mar 2018 07:58) (79 - 92)  BP: 155/55 (16 Mar 2018 07:58) (112/54 - 155/55)  BP(mean): --  RR: 16 (16 Mar 2018 07:58) (16 - 18)  SpO2: 98% (16 Mar 2018 07:58) (96% - 98%)    ________________________________________________  PHYSICAL EXAM:  LIJ in neck placed on 3/12    GENERAL: NAD , blind   HEENT: Normocephalic;  conjunctivae and sclerae clear; moist mucous membranes;   NECK : supple  CHEST/LUNG: Clear to auscultation bilaterally with good air entry   HEART: S1 S2  regular; no murmurs, gallops or rubs  ABDOMEN: Soft, Nontender, Nondistended; Bowel sounds present  EXTREMITIES: no cyanosis; no edema; no calf tenderness  SKIN of back :Back-- 2, [id number] areas on left mid-back and right mid-back (I+D sites) with minimal amount of superficial exudate; 2, large areas of debridement noted on upper left and right buttock that are clean with minimal amount of superficial exudate , dressing in situ .   NERVOUS SYSTEM:  AAO*2    _________________________________________________  LABS:                        7.9    29.1  )-----------( 419      ( 16 Mar 2018 10:38 )             26.5     03-16    137  |  98  |  41<H>  ----------------------------<  116<H>  5.1   |  28  |  5.02<H>    Ca    8.0<L>      16 Mar 2018 10:45  Phos  5.8     03-16  Mg     2.1     03-16      PT/INR - ( 15 Mar 2018 14:57 )   PT: 12.4 sec;   INR: 1.13 ratio             CAPILLARY BLOOD GLUCOSE      POCT Blood Glucose.: 179 mg/dL (16 Mar 2018 11:28)  POCT Blood Glucose.: 121 mg/dL (16 Mar 2018 08:39)  POCT Blood Glucose.: 154 mg/dL (15 Mar 2018 21:15)  POCT Blood Glucose.: 143 mg/dL (15 Mar 2018 17:14)      Consultant(s) Notes Reviewed:   YES    Care Discussed with Consultants : YES    Plan of care was discussed with patient and /or primary care giver; all questions and concerns were addressed and care was aligned with patient's wishes.

## 2018-03-16 NOTE — PROGRESS NOTE ADULT - NEUROLOGICAL DETAILS
alert and oriented x 3/sensation intact/deep reflexes intact/cranial nerves intact/no spontaneous movement
alert and oriented x 3/no spontaneous movement/sensation intact/deep reflexes intact/cranial nerves intact
cranial nerves intact/alert and oriented x 3/sensation intact
no spontaneous movement/sensation intact/deep reflexes intact/cranial nerves intact/alert and oriented x 3

## 2018-03-16 NOTE — PROGRESS NOTE ADULT - NSHPATTENDINGPLANDISCUSS_GEN_ALL_CORE
signed out to covering surgical PA
signed out to surgical covering pa
signed out to surgical covering pa
Dr Wright
Patient, Dr. Papi RN.
provider team
resident on ID, medical team, and Dr. Gomez
resident on ID, housestaff team, and Dr. Lopez
resident on ID and housestaff

## 2018-03-16 NOTE — PROGRESS NOTE ADULT - GASTROINTESTINAL DETAILS
normal/bowel sounds normal/nontender/soft
bowel sounds normal/no distention/soft/no masses palpable/nontender/no bruit/normal

## 2018-03-16 NOTE — PROGRESS NOTE ADULT - SUBJECTIVE AND OBJECTIVE BOX
Called to reevaluate wounds in pt with low grade fever and leukocytosis.  Pt s- new complaints  vss tmal 100.4  Sacral wound: fibrinous exudate Called to reevaluate wounds in pt with low grade fever and leukocytosis.  Pt s- new complaints  vss tmal 100.4  Sacral wound: fibrinous/purulent exudate no fluctuance  Right mid back wound: serous drainage on dressing  left mid back mass: greenish discoloration of dressing. serous /murky drainage on granulation base. No fluctuance. no crepitus  right hip wound: granulation base. no purulence                          7.6    24.2  )-----------( 410      ( 16 Mar 2018 16:05 )             25.6

## 2018-03-16 NOTE — PROGRESS NOTE ADULT - CARDIOVASCULAR COMMENTS
Chest wall: R upper chest permacath without erythema or drainage
R upper chest permacath site without erythema or drainage
Chest wall: R upper chest permacath without erythema or drainage

## 2018-03-16 NOTE — PROGRESS NOTE ADULT - PROBLEM SELECTOR PLAN 3
Likely secondary to CKD  Hemoglobin baseline 9-10   Hb has been ranging around 8 since few days , however today it was 7.6 this am , repeated on in pm along with coags and type and cross , hb is back to 8  No signs of bleeding   Will monitor cbc , f/u hb im am   Continue with Epogen   Continue to monitor CBC

## 2018-03-16 NOTE — PROGRESS NOTE ADULT - PROBLEM SELECTOR PLAN 5
Continue with hemodialysis M/W/F in addition to renagel   Nephrology Dr. Escoto following Continue with hemodialysis M/W/F in addition to Renagel   Nephrology Dr. Escoto following

## 2018-03-16 NOTE — PROGRESS NOTE ADULT - ASSESSMENT
53 F h/o HLD, HTN, hypothyroidism, parathyroid adenoma admitted for right hip abscess s/p drainage MRSA growing in surgical drainage. Patient is still having low grade temperature Tmax 100.4F with leukocytosis.  Bone scan negative for any evidence of infection. As patient only growing MRSA from multiple I+D specimens, would cont. treatment for skin/soft tissue infection with MRSA. . Perma cath site appears clean. Multiple areas of debridement on back and upper buttocks are clean.      Plan:    D/C vanco and treat with Bactrim DS 1 po bid x7d for skin/soft tissue infection (S. aureus susceptibility c/w CA MRSA)  F/U WBC 53 F h/o HLD, HTN, hypothyroidism, parathyroid adenoma admitted for right hip abscess s/p drainage MRSA growing in surgical drainage. Patient is still having low grade temperature Tmax 100.4F with leukocytosis.  Bone scan negative for any evidence of infection. As patient only growing MRSA from multiple I+D specimens, would cont. treatment for skin/soft tissue infection with MRSA. . Perma cath site appears clean. Multiple areas of debridement on back and upper buttocks are clean.      Plan:    continue Bactrim DS 1 po bid (S. aureus susceptibility c/w CA MRSA)  F/U WBC 53 F h/o HLD, HTN, hypothyroidism, parathyroid adenoma admitted for right hip abscess s/p drainage MRSA growing in surgical drainage. Patient is still having low grade temperature Tmax 100.4F with leukocytosis.  Bone scan negative for any evidence of infection. As patient only growing MRSA from multiple I+D specimens, would cont. treatment for skin/soft tissue infection with MRSA. However, in view of rising WBC/low grade temps and need for further debridement of the areas described above, pt should have bactrim d/c'd and vanco given instead. Perma cath site appears clean, although this is a potential source of infection to be considered after repeat debridement is done.       Plan:    Vanco dosed with HD  Check vanco trough  Surgery to repeat debridement  F/U WBC 53 F h/o HLD, HTN, hypothyroidism, parathyroid adenoma admitted for right hip abscess s/p drainage MRSA growing in surgical drainage. Patient is still having low grade temperature Tmax 100.4F with leukocytosis.  Bone scan negative for any evidence of infection. As patient only growing MRSA from multiple I+D specimens, would cont. treatment for skin/soft tissue infection with MRSA. However, in view of rising WBC/low grade temps and need for further debridement of the areas described above, pt should have bactrim d/c'd and vanco given instead. Perma cath site appears clean, although this is a potential source of infection to be considered after repeat debridement is done. L internal jugular also a possible source of infection.      Plan:    Vanco dosed with HD  Check vanco trough  Surgery to repeat debridement  F/U WBC

## 2018-03-16 NOTE — PROGRESS NOTE ADULT - VASCULAR
detailed exam
Equal and normal pulses (carotid, femoral, dorsalis pedis)
Equal and normal pulses (carotid, femoral, dorsalis pedis)

## 2018-03-16 NOTE — PROGRESS NOTE ADULT - ASSESSMENT
murky drainage of back wounds  recommend add 1/4 strength dakins moist to dry dressing to left mid back wound and sacral wound.  Preop for sacral decub debridement on Monday and possible debridement of Left mid back mass Monday pending appearance.

## 2018-03-16 NOTE — PROGRESS NOTE ADULT - RS GEN PE MLT RESP DETAILS PC
clear to auscultation bilaterally/breath sounds equal/good air movement/no chest wall tenderness/airway patent/normal/respirations non-labored
respirations non-labored/clear to auscultation bilaterally/breath sounds equal/good air movement/normal/airway patent
normal/respirations non-labored/clear to auscultation bilaterally/airway patent/breath sounds equal/no chest wall tenderness/good air movement
respirations non-labored/good air movement/breath sounds equal/airway patent

## 2018-03-16 NOTE — PROGRESS NOTE ADULT - ATTENDING COMMENTS
Patient was seen and examined by myself. Case was discussed with house staff in details. I have reviewed and agree with the plan as outlined above with edits where appropriate.    Had fever overnight  Back wounds with increased foul smelling drainage today  Surgery contacted for repeat debridement.  Continue with IV antibiotics  Continue dialysis 3 x weekly  Plan discussed with patient  Discussed with ID attending Patient was seen and examined by myself. Case was discussed with house staff in details. I have reviewed and agree with the plan as outlined above with edits where appropriate.  54y/o F with PMHx HFpEF, ESRD on HD, T2DM, HTN, HLD, Hypothyroidism admitted for sepsis due to multiple back and sacral abscess.     Had fever overnight  Back wounds with increased foul smelling drainage today; Surgery contacted for repeat debridement- likely on Monday  Continue with IV antibiotics. Continue local wound care  Continue dialysis 3 x weekly  Plan discussed with patient  Discussed with ID attending.  Discussed with nursing staff

## 2018-03-16 NOTE — PROGRESS NOTE ADULT - SKIN COMMENTS
Back-- 2, [id number] areas on left mid-back and right mid-back (I+D sites) with minimal amount of superficial exudate; 2, large areas of debridement noted on upper left and right buttock that are clean with minimal amount of superficial exudate
as per extremity exam

## 2018-03-16 NOTE — PROGRESS NOTE ADULT - PROBLEM SELECTOR PLAN 1
Likely secondary infected sacral and back abscess with history of MRSA  S/p incision and drainage 3/8; 3/10  Vancomycin and Meropenem were discontinued   Continue with Bactrim ds for 7 days based on sensitivity   F/U wound cultures : positive for MRSA  Blood cultures negative to date   Tmax is 100.5 in last 24 hours   Infectious Disease Dr. Kaplan following  General surgery Dr. Wright following  Patient did not have peripheral iv access since last evening. Multiple attempts done by on call team and day team . Eventually central line was placed  in Left IJ for vascular access and iv antibiotics on 3/12  Consent obtained from sister , plan discussed with Dr. Gomez Likely secondary infected sacral and back abscess with history of MRSA  S/p incision and drainage 3/8; 3/10  Vancomycin and Meropenem were discontinued and bactrim was started   However , patient still has fever 100.4 F last night with leucocytosis trending up  Will start with iv vancomycin again   Vanc trough post dialysis   Contacted surgery for revision of wounds and re-debridement  F/U wound cultures : positive for MRSA  Blood cultures negative to date   Infectious Disease Dr. Dietz following  General surgery Dr. Wright following  Patient did not have peripheral iv access since last evening. Multiple attempts done by on call team and day team . Eventually central line was placed  in Left IJ for vascular access and iv antibiotics on 3/12  Consent obtained from sister , plan discussed with Dr. Gomez

## 2018-03-16 NOTE — PROGRESS NOTE ADULT - SUBJECTIVE AND OBJECTIVE BOX
Patient is a 53y Female with  ESRD ON  HD    IS DUE FOR HD TODAY    ADMITTED WITH HIGH WBC,  ,  WOUND CULTURE  FORM   R INFRASCAPULAR  WOUND GROWING MRSA   WBC IMPROVING WITH  ABX AS PER ID    No Known Allergies    Hospital Medications:   MEDICATIONS  (STANDING):  ascorbic acid 500 milliGRAM(s) Oral daily  cinacalcet 30 milliGRAM(s) Oral daily  collagenase Ointment 1 Application(s) Topical daily  docusate sodium 100 milliGRAM(s) Oral two times a day  folic acid 1 milliGRAM(s) Oral daily  gabapentin 100 milliGRAM(s) Oral three times a day  heparin  Injectable 5000 Unit(s) SubCutaneous every 12 hours  hydrALAZINE 50 milliGRAM(s) Oral three times a day  insulin lispro (HumaLOG) corrective regimen sliding scale   SubCutaneous Before meals and at bedtime  levothyroxine 50 MICROGram(s) Oral daily  metoprolol     tartrate 25 milliGRAM(s) Oral two times a day  multivitamin 1 Tablet(s) Oral daily  pantoprazole    Tablet 40 milliGRAM(s) Oral before breakfast  senna 2 Tablet(s) Oral at bedtime  simvastatin 20 milliGRAM(s) Oral at bedtime  trimethoprim  160 mG/sulfamethoxazole 800 mG 1 Tablet(s) Oral two times a day  vancomycin  IVPB 1250 milliGRAM(s) IV Intermittent <User Schedule>    REVIEW OF SYSTEMS:  CONSTITUTIONAL: No weakness, fevers or chills   HAS NO SOB    NO  COUGH OR CH PAIN     FEELS  DISCOMFORT AROUND HER  R  INFRASCAPULAR REGION    NO LEG SWELLING   APPETITE IS GOOD,  NO N/V  VITALS:  T(F): 98.4 (03-16-18 @ 07:58), Max: 100.4 (03-15-18 @ 21:24)  HR: 79 (03-16-18 @ 07:58)  BP: 155/55 (03-16-18 @ 07:58)  RR: 16 (03-16-18 @ 07:58)  SpO2: 98% (03-16-18 @ 07:58)  Wt(kg): --    03-15 @ 07:01  -  03-16 @ 07:00  --------------------------------------------------------  IN: 0 mL / OUT: 0 mL / NET: 0 mL        PHYSICAL EXAM:  Constitutional: NAD  HEENT: CONJ PALE  Neck: No JVD  Respiratory: CTAB, no wheezes, rales or rhonchi  Cardiovascular: S1, S2, RRR  Gastrointestinal: BS+, soft, NT/ND  Extremities: No peripheral edema  Neurological: A/O x 3,   Psychiatric: Normal mood, normal affect   No jarrell.     Vascular Access: R IJ PC IN  PLACE SITE CLEAN    LABS:  03-16    137  |  98  |  41<H>  ----------------------------<  116<H>  5.1   |  28  |  5.02<H>    Ca    8.0<L>      16 Mar 2018 10:45  Phos  5.8     03-16  Mg     2.1     03-16      Creatinine Trend: 5.02 <--, 3.37 <--, 5.93 <--, 3.75 <--, 5.93 <--, 4.26 <--, 2.72 <--                        7.9    29.1  )-----------( 419      ( 16 Mar 2018 10:38 )             26.5     Urine Studies:      RADIOLOGY & ADDITIONAL STUDIES:

## 2018-03-17 LAB
ANION GAP SERPL CALC-SCNC: 11 MMOL/L — SIGNIFICANT CHANGE UP (ref 5–17)
BUN SERPL-MCNC: 26 MG/DL — HIGH (ref 7–18)
CALCIUM SERPL-MCNC: 8.3 MG/DL — LOW (ref 8.4–10.5)
CHLORIDE SERPL-SCNC: 100 MMOL/L — SIGNIFICANT CHANGE UP (ref 96–108)
CO2 SERPL-SCNC: 29 MMOL/L — SIGNIFICANT CHANGE UP (ref 22–31)
CREAT SERPL-MCNC: 3.08 MG/DL — HIGH (ref 0.5–1.3)
GLUCOSE BLDC GLUCOMTR-MCNC: 108 MG/DL — HIGH (ref 70–99)
GLUCOSE BLDC GLUCOMTR-MCNC: 125 MG/DL — HIGH (ref 70–99)
GLUCOSE BLDC GLUCOMTR-MCNC: 131 MG/DL — HIGH (ref 70–99)
GLUCOSE BLDC GLUCOMTR-MCNC: 164 MG/DL — HIGH (ref 70–99)
GLUCOSE SERPL-MCNC: 87 MG/DL — SIGNIFICANT CHANGE UP (ref 70–99)
HCT VFR BLD CALC: 24.9 % — LOW (ref 34.5–45)
HGB BLD-MCNC: 7.7 G/DL — LOW (ref 11.5–15.5)
MAGNESIUM SERPL-MCNC: 2.1 MG/DL — SIGNIFICANT CHANGE UP (ref 1.6–2.6)
MCHC RBC-ENTMCNC: 28 PG — SIGNIFICANT CHANGE UP (ref 27–34)
MCHC RBC-ENTMCNC: 31 GM/DL — LOW (ref 32–36)
MCV RBC AUTO: 90.3 FL — SIGNIFICANT CHANGE UP (ref 80–100)
PHOSPHATE SERPL-MCNC: 4 MG/DL — SIGNIFICANT CHANGE UP (ref 2.5–4.5)
PLATELET # BLD AUTO: 382 K/UL — SIGNIFICANT CHANGE UP (ref 150–400)
POTASSIUM SERPL-MCNC: 4.4 MMOL/L — SIGNIFICANT CHANGE UP (ref 3.5–5.3)
POTASSIUM SERPL-SCNC: 4.4 MMOL/L — SIGNIFICANT CHANGE UP (ref 3.5–5.3)
RBC # BLD: 2.76 M/UL — LOW (ref 3.8–5.2)
RBC # FLD: 20.7 % — HIGH (ref 10.3–14.5)
SODIUM SERPL-SCNC: 140 MMOL/L — SIGNIFICANT CHANGE UP (ref 135–145)
WBC # BLD: 26.2 K/UL — HIGH (ref 3.8–10.5)
WBC # FLD AUTO: 26.2 K/UL — HIGH (ref 3.8–10.5)

## 2018-03-17 RX ADMIN — PANTOPRAZOLE SODIUM 40 MILLIGRAM(S): 20 TABLET, DELAYED RELEASE ORAL at 05:38

## 2018-03-17 RX ADMIN — GABAPENTIN 100 MILLIGRAM(S): 400 CAPSULE ORAL at 05:37

## 2018-03-17 RX ADMIN — Medication 50 MILLIGRAM(S): at 05:37

## 2018-03-17 RX ADMIN — Medication 1: at 12:10

## 2018-03-17 RX ADMIN — HEPARIN SODIUM 5000 UNIT(S): 5000 INJECTION INTRAVENOUS; SUBCUTANEOUS at 05:37

## 2018-03-17 RX ADMIN — Medication 50 MICROGRAM(S): at 05:37

## 2018-03-17 RX ADMIN — Medication 100 MILLIGRAM(S): at 05:37

## 2018-03-17 RX ADMIN — CINACALCET 30 MILLIGRAM(S): 30 TABLET, FILM COATED ORAL at 12:04

## 2018-03-17 RX ADMIN — Medication 1 TABLET(S): at 05:37

## 2018-03-17 RX ADMIN — OXYCODONE AND ACETAMINOPHEN 1 TABLET(S): 5; 325 TABLET ORAL at 12:35

## 2018-03-17 RX ADMIN — Medication 25 MILLIGRAM(S): at 05:37

## 2018-03-17 RX ADMIN — SENNA PLUS 2 TABLET(S): 8.6 TABLET ORAL at 22:16

## 2018-03-17 RX ADMIN — Medication 1 MILLIGRAM(S): at 12:04

## 2018-03-17 RX ADMIN — Medication 100 MILLIGRAM(S): at 17:48

## 2018-03-17 RX ADMIN — Medication 50 MILLIGRAM(S): at 14:08

## 2018-03-17 RX ADMIN — GABAPENTIN 100 MILLIGRAM(S): 400 CAPSULE ORAL at 22:16

## 2018-03-17 RX ADMIN — Medication 1 APPLICATION(S): at 14:08

## 2018-03-17 RX ADMIN — GABAPENTIN 100 MILLIGRAM(S): 400 CAPSULE ORAL at 14:08

## 2018-03-17 RX ADMIN — SIMVASTATIN 20 MILLIGRAM(S): 20 TABLET, FILM COATED ORAL at 22:16

## 2018-03-17 RX ADMIN — OXYCODONE AND ACETAMINOPHEN 1 TABLET(S): 5; 325 TABLET ORAL at 12:05

## 2018-03-17 RX ADMIN — Medication 1 TABLET(S): at 12:04

## 2018-03-17 RX ADMIN — Medication 500 MILLIGRAM(S): at 12:04

## 2018-03-17 RX ADMIN — Medication 1 APPLICATION(S): at 12:07

## 2018-03-17 RX ADMIN — HEPARIN SODIUM 5000 UNIT(S): 5000 INJECTION INTRAVENOUS; SUBCUTANEOUS at 17:48

## 2018-03-17 NOTE — PROGRESS NOTE ADULT - ATTENDING COMMENTS
As above  wound look better but sacrum need further debridement    Culture  MRSA, expected    For debridment 3/19

## 2018-03-17 NOTE — PROGRESS NOTE ADULT - PROBLEM SELECTOR PLAN 4
Not currently in exacerbation  Continue with metoprolol 25mg BID  Last echo in december : beny CHRONIC DIASTOLIC HEART FAILURE W/OUT DECOMPENSATION  -has been on hydralazine/imdur/beta blocker--> holding hydralazine for relative hypotension today  Last echo in december : beny

## 2018-03-17 NOTE — PROGRESS NOTE ADULT - SUBJECTIVE AND OBJECTIVE BOX
Patient is a 53y Female with  ESRD ON HD     HAS HD YESTERDAY , TOLERATED IT WELL    SENT IN WITH  HIGH  WBC,  HAS MRSA IN  HER WOUND  = GLUTEAL  REGION,  R INFRASCAPULAR REGION  AND  LUMBAR SPINE  NO FEVER    No Known Allergies    Hospital Medications:   MEDICATIONS  (STANDING):  ascorbic acid 500 milliGRAM(s) Oral daily  cinacalcet 30 milliGRAM(s) Oral daily  collagenase Ointment 1 Application(s) Topical daily  Dakins Solution - 1/4 Strength 1 Application(s) Topical daily  docusate sodium 100 milliGRAM(s) Oral two times a day  epoetin jacqueline Injectable 81561 Unit(s) IV Push <User Schedule>  folic acid 1 milliGRAM(s) Oral daily  gabapentin 100 milliGRAM(s) Oral three times a day  heparin  Injectable 5000 Unit(s) SubCutaneous every 12 hours  hydrALAZINE 50 milliGRAM(s) Oral three times a day  insulin lispro (HumaLOG) corrective regimen sliding scale   SubCutaneous Before meals and at bedtime  levothyroxine 50 MICROGram(s) Oral daily  metoprolol     tartrate 25 milliGRAM(s) Oral two times a day  multivitamin 1 Tablet(s) Oral daily  pantoprazole    Tablet 40 milliGRAM(s) Oral before breakfast  senna 2 Tablet(s) Oral at bedtime  simvastatin 20 milliGRAM(s) Oral at bedtime  trimethoprim  160 mG/sulfamethoxazole 800 mG 1 Tablet(s) Oral two times a day  vancomycin  IVPB 1250 milliGRAM(s) IV Intermittent <User Schedule>    REVIEW OF SYSTEMS:  FEELS OK     PAIN  LOWER BACK IS ALSO  OK  TODAY   NO FEVER/CHILLS   NO SOB  OR COUGH    APPETITE IS GOOD, NO N/V    VITALS:  T(F): 99.8 (03-17-18 @ 07:56), Max: 99.8 (03-17-18 @ 07:56)  HR: 96 (03-17-18 @ 07:56)  BP: 108/39 (03-17-18 @ 07:56)  RR: 17 (03-17-18 @ 07:56)  SpO2: 96% (03-17-18 @ 07:56)  Wt(kg): --      PHYSICAL EXAM:  Constitutional: NAD  Neck: No JVD, R IJ PC IN  PLACE  Respiratory: CTAB, no wheezes, rales or rhonchi  Cardiovascular: S1, S2, RRR  Gastrointestinal: BS+, soft, NT/ND  Extremities: No peripheral edema  HAS DRESSING IN  PLACE ON ALL 3 ABOVE AREAS  Neurological: A/O x 3,   :  No jarrell.     Vascular Access: R IJ PC IN  PLACE SITE CLEAN    LABS:  03-17    140  |  100  |  26<H>  ----------------------------<  87  4.4   |  29  |  3.08<H>    Ca    8.3<L>      17 Mar 2018 07:01  Phos  4.0     03-17  Mg     2.1     03-17      Creatinine Trend: 3.08 <--, 4.28 <--, 5.02 <--, 3.37 <--, 5.93 <--, 3.75 <--, 5.93 <--, 4.26 <--                        7.7    26.2  )-----------( 382      ( 17 Mar 2018 07:01 )             24.9     Urine Studies:      RADIOLOGY & ADDITIONAL STUDIES:

## 2018-03-17 NOTE — PROGRESS NOTE ADULT - SUBJECTIVE AND OBJECTIVE BOX
Patient is a 53y old  Female who presents with a chief complaint of sent from NH due to leukocytosis and fever (07 Mar 2018 21:47)      INTERVAL HPI/OVERNIGHT EVENTS: no new complaints    MEDICATIONS  (STANDING):  ascorbic acid 500 milliGRAM(s) Oral daily  cinacalcet 30 milliGRAM(s) Oral daily  collagenase Ointment 1 Application(s) Topical daily  Dakins Solution - 1/4 Strength 1 Application(s) Topical daily  docusate sodium 100 milliGRAM(s) Oral two times a day  epoetin jacqueline Injectable 96709 Unit(s) IV Push <User Schedule>  folic acid 1 milliGRAM(s) Oral daily  gabapentin 100 milliGRAM(s) Oral three times a day  heparin  Injectable 5000 Unit(s) SubCutaneous every 12 hours  hydrALAZINE 50 milliGRAM(s) Oral three times a day  insulin lispro (HumaLOG) corrective regimen sliding scale   SubCutaneous Before meals and at bedtime  levothyroxine 50 MICROGram(s) Oral daily  metoprolol     tartrate 25 milliGRAM(s) Oral two times a day  multivitamin 1 Tablet(s) Oral daily  pantoprazole    Tablet 40 milliGRAM(s) Oral before breakfast  senna 2 Tablet(s) Oral at bedtime  simvastatin 20 milliGRAM(s) Oral at bedtime  vancomycin  IVPB 1250 milliGRAM(s) IV Intermittent <User Schedule>    MEDICATIONS  (PRN):  acetaminophen   Tablet 650 milliGRAM(s) Oral every 6 hours PRN For Temp greater than 38 C (100.4 F)  oxyCODONE    5 mG/acetaminophen 325 mG 1 Tablet(s) Oral every 4 hours PRN Moderate Pain      __________________________________________________  REVIEW OF SYSTEMS:    CONSTITUTIONAL: No fever,   EYES: no acute visual disturbances  NECK: No pain or stiffness  RESPIRATORY: No cough; No shortness of breath  CARDIOVASCULAR: No chest pain, no palpitations  GASTROINTESTINAL: No pain. No nausea or vomiting; No diarrhea   NEUROLOGICAL: No headache or numbness, no tremors  MUSCULOSKELETAL: No joint pain, no muscle pain  GENITOURINARY: no dysuria, no frequency, no hesitancy  PSYCHIATRY: no depression , no anxiety  ALL OTHER  ROS negative        Vital Signs Last 24 Hrs  T(C): 36.9 (17 Mar 2018 15:35), Max: 37.7 (17 Mar 2018 07:56)  T(F): 98.5 (17 Mar 2018 15:35), Max: 99.8 (17 Mar 2018 07:56)  HR: 79 (17 Mar 2018 15:36) (78 - 96)  BP: 96/31 (17 Mar 2018 15:36) (91/32 - 127/48)  BP(mean): --  RR: 16 (17 Mar 2018 15:35) (16 - 18)  SpO2: 98% (17 Mar 2018 15:35) (96% - 100%)    ________________________________________________  PHYSICAL EXAM:  GENERAL: NAD  HEENT: Normocephalic;  conjunctivae and sclerae clear; moist mucous membranes;   NECK : supple  CHEST/LUNG: Clear to auscultation bilaterally with good air entry   HEART: S1 S2  regular; no murmurs, gallops or rubs  ABDOMEN: Soft, Nontender, Nondistended; Bowel sounds present  EXTREMITIES: no cyanosis; no edema; no calf tenderness  SKIN: warm and dry; no rash  NERVOUS SYSTEM:  Awake and alert; Oriented  to place, person and time ; no new deficits    _________________________________________________  LABS:                        7.7    26.2  )-----------( 382      ( 17 Mar 2018 07:01 )             24.9     03-17    140  |  100  |  26<H>  ----------------------------<  87  4.4   |  29  |  3.08<H>    Ca    8.3<L>      17 Mar 2018 07:01  Phos  4.0     03-17  Mg     2.1     03-17          CAPILLARY BLOOD GLUCOSE      POCT Blood Glucose.: 164 mg/dL (17 Mar 2018 11:41)  POCT Blood Glucose.: 108 mg/dL (17 Mar 2018 08:28)  POCT Blood Glucose.: 190 mg/dL (16 Mar 2018 21:27)  POCT Blood Glucose.: 109 mg/dL (16 Mar 2018 16:01)        RADIOLOGY & ADDITIONAL TESTS:    Imaging Personally Reviewed:  YES/NO    Consultant(s) Notes Reviewed:   YES/ No    Care Discussed with Consultants :     Plan of care was discussed with patient and /or primary care giver; all questions and concerns were addressed and care was aligned with patient's wishes. Patient is a 53y old  Female who presents with a chief complaint of sent from NH due to leukocytosis and fever (07 Mar 2018 21:47)      INTERVAL HPI/OVERNIGHT EVENTS: no new complaints. Patient states she feels well. No fevers/chills, nausea/vomiting  Discussed with her plan for likely return to OR Monday for further debridement of back abcesses.     MEDICATIONS  (STANDING):  ascorbic acid 500 milliGRAM(s) Oral daily  cinacalcet 30 milliGRAM(s) Oral daily  collagenase Ointment 1 Application(s) Topical daily  Dakins Solution - 1/4 Strength 1 Application(s) Topical daily  docusate sodium 100 milliGRAM(s) Oral two times a day  epoetin jacqueline Injectable 74604 Unit(s) IV Push <User Schedule>  folic acid 1 milliGRAM(s) Oral daily  gabapentin 100 milliGRAM(s) Oral three times a day  heparin  Injectable 5000 Unit(s) SubCutaneous every 12 hours  hydrALAZINE 50 milliGRAM(s) Oral three times a day  insulin lispro (HumaLOG) corrective regimen sliding scale   SubCutaneous Before meals and at bedtime  levothyroxine 50 MICROGram(s) Oral daily  metoprolol     tartrate 25 milliGRAM(s) Oral two times a day  multivitamin 1 Tablet(s) Oral daily  pantoprazole    Tablet 40 milliGRAM(s) Oral before breakfast  senna 2 Tablet(s) Oral at bedtime  simvastatin 20 milliGRAM(s) Oral at bedtime  vancomycin  IVPB 1250 milliGRAM(s) IV Intermittent <User Schedule>    MEDICATIONS  (PRN):  acetaminophen   Tablet 650 milliGRAM(s) Oral every 6 hours PRN For Temp greater than 38 C (100.4 F)  oxyCODONE    5 mG/acetaminophen 325 mG 1 Tablet(s) Oral every 4 hours PRN Moderate Pain      __________________________________________________  REVIEW OF SYSTEMS:    CONSTITUTIONAL: No fever,   EYES: no acute visual disturbances  NECK: No pain or stiffness  RESPIRATORY: No cough; No shortness of breath  CARDIOVASCULAR: No chest pain, no palpitations  GASTROINTESTINAL: No pain. No nausea or vomiting; No diarrhea   NEUROLOGICAL: No headache or numbness, no tremors  MUSCULOSKELETAL: No joint pain, no muscle pain  GENITOURINARY: no dysuria, no frequency, no hesitancy  PSYCHIATRY: no depression , no anxiety  ALL OTHER  ROS negative        Vital Signs Last 24 Hrs  T(C): 36.9 (17 Mar 2018 15:35), Max: 37.7 (17 Mar 2018 07:56)  T(F): 98.5 (17 Mar 2018 15:35), Max: 99.8 (17 Mar 2018 07:56)  HR: 79 (17 Mar 2018 15:36) (78 - 96)  BP: 96/31 (17 Mar 2018 15:36) (91/32 - 127/48)  BP(mean): --  RR: 16 (17 Mar 2018 15:35) (16 - 18)  SpO2: 98% (17 Mar 2018 15:35) (96% - 100%)    ________________________________________________  PHYSICAL EXAM:  GENERAL: NAD, thin/frail, chronically ill appearing  HEENT: Normocephalic;  conjunctivae and sclerae clear; moist mucous membranes;   NECK : supple, Left IJ central line catheter  CHEST/LUNG: Clear to auscultation bilaterally with good air entry   HEART: S1 S2  regular; no murmurs, gallops or rubs  ABDOMEN: Soft, Nontender, Nondistended; Bowel sounds present  EXTREMITIES: no cyanosis; no edema; no calf tenderness  SKIN: upper and sacral back abscesses with packing + some drainage   NERVOUS SYSTEM:  Awake and alert; Oriented  to place, person and time ; no new deficits    _________________________________________________  LABS:                        7.7    26.2  )-----------( 382      ( 17 Mar 2018 07:01 )             24.9     03-17    140  |  100  |  26<H>  ----------------------------<  87  4.4   |  29  |  3.08<H>    Ca    8.3<L>      17 Mar 2018 07:01  Phos  4.0     03-17  Mg     2.1     03-17          CAPILLARY BLOOD GLUCOSE      POCT Blood Glucose.: 164 mg/dL (17 Mar 2018 11:41)  POCT Blood Glucose.: 108 mg/dL (17 Mar 2018 08:28)  POCT Blood Glucose.: 190 mg/dL (16 Mar 2018 21:27)  POCT Blood Glucose.: 109 mg/dL (16 Mar 2018 16:01)        RADIOLOGY & ADDITIONAL TESTS:    Imaging Personally Reviewed:  YES/NO    Consultant(s) Notes Reviewed:   YES/ No    Care Discussed with Consultants :     Plan of care was discussed with patient and /or primary care giver; all questions and concerns were addressed and care was aligned with patient's wishes.

## 2018-03-17 NOTE — PROGRESS NOTE ADULT - PROBLEM SELECTOR PLAN 3
Likely secondary to CKD  Hemoglobin baseline 9-10   Hb has been ranging around 8 since few days , however today it was 7.6 this am , repeated on in pm along with coags and type and cross , hb is back to 8  No signs of bleeding   Will monitor cbc , f/u hb im am   Continue with Epogen   Continue to monitor CBC ANEMIA IN CKD    Hemoglobin baseline 9-10 . Slightly lower values likely of increased RBC destruction in setting of acute infection. No evidence of acute blood loss  Continue with Epogen   Continue to monitor CBC

## 2018-03-17 NOTE — PROGRESS NOTE ADULT - ASSESSMENT
A/P   -ESRD ON  HD ,  FOR REGULAR HD ON  MONDAY    ON MARITA WITH HD    BP  IS OK    HAS NO VOL EXCESS  - INFECTED WOUNDS ON HER BACK IS ON  ABX AS PER ID   WILL FOLLOW

## 2018-03-17 NOTE — PROGRESS NOTE ADULT - SUBJECTIVE AND OBJECTIVE BOX
53y Female with no overnight complaints. Tolerating reg diet.   daikins soulution now added to wound care regimen    Vital Signs:  T(C): 37.7 (03-17-18 @ 07:56), Max: 37.7 (03-17-18 @ 07:56)  HR: 96 (03-17-18 @ 07:56) (75 - 96)  BP: 108/39 (03-17-18 @ 07:56) (108/39 - 127/48)  RR: 17 (03-17-18 @ 07:56) (16 - 18)  SpO2: 96% (03-17-18 @ 07:56) (96% - 100%)  Wt(kg): --    Physical Exam:  General: NAD, comfortable  Left upper back: wound is improving with good granulation at the base. the wound is still fibrinous and weepy with a greenish base to the dressing but looks slightly improved.  Sacrum: as above. same apperance                          7.7    26.2  )-----------( 382      ( 17 Mar 2018 07:01 )             24.9

## 2018-03-17 NOTE — PROGRESS NOTE ADULT - PROBLEM SELECTOR PLAN 6
Continue with hydralazine 50mg TID and metoprolol 25mg BID within parameters  Continue to monitor BP - metoprolol 25mg BID within parameters  -holding hydralazine for relative hypotension  Continue to monitor BP

## 2018-03-17 NOTE — PROGRESS NOTE ADULT - PROBLEM SELECTOR PLAN 1
Likely secondary infected sacral and back abscess with history of MRSA  S/p incision and drainage 3/8; 3/10  Vancomycin and Meropenem were discontinued and bactrim was started   However , patient still has fever 100.4 F last night with leucocytosis trending up  Will start with iv vancomycin again   Vanc trough post dialysis   Contacted surgery for revision of wounds and re-debridement  F/U wound cultures : positive for MRSA  Blood cultures negative to date   Infectious Disease Dr. Dietz following  General surgery Dr. Wright following  Patient did not have peripheral iv access since last evening. Multiple attempts done by on call team and day team . Eventually central line was placed  in Left IJ for vascular access and iv antibiotics on 3/12  Consent obtained from sister , plan discussed with Dr. Gomez SECONDARY TO MRSA INFECTED SACRAL AND UPPER BACK Abscess  LEUKOCYTOSIS (uptrending)  S/p incision and drainage 3/8; 3/10  was de-escalated from Vancomycin and Meropenem to bactrim, however , patient   -with low grade fevers/increased purulent drainage and upward trending   leukocytosis. Surgery notified with plan for likely further debridement in OR on Monday  -central line care per protocol  Blood cultures negative to date   -following with ID  General surgery Dr. Wright following

## 2018-03-17 NOTE — PROGRESS NOTE ADULT - ASSESSMENT
s/p recent sacral and back debridements with Dr Wright    1- wounds appear to be improving. please continue to pack all wounds with daikins soaked gauze and cover with dry dressings  2- will re-evaluate wounds 3/18, if not significant improvement, she will be scheduled for re-debridement on monday 3/19

## 2018-03-17 NOTE — PROGRESS NOTE ADULT - ATTENDING COMMENTS
Patient was seen and examined by myself. Case was discussed with house staff in details. I have reviewed and agree with the plan as outlined above with edits where appropriate.  52y/o F with PMHx HFpEF, ESRD on HD, T2DM, HTN, HLD, Hypothyroidism admitted for sepsis due to multiple back and sacral abscess.     Had fever overnight  Back wounds with increased foul smelling drainage today; Surgery contacted for repeat debridement- likely on Monday  Continue with IV antibiotics. Continue local wound care  Continue dialysis 3 x weekly  Plan discussed with patient  Discussed with ID attending.  Discussed with nursing staff

## 2018-03-18 LAB
ANION GAP SERPL CALC-SCNC: 11 MMOL/L — SIGNIFICANT CHANGE UP (ref 5–17)
BUN SERPL-MCNC: 37 MG/DL — HIGH (ref 7–18)
CALCIUM SERPL-MCNC: 8.1 MG/DL — LOW (ref 8.4–10.5)
CHLORIDE SERPL-SCNC: 98 MMOL/L — SIGNIFICANT CHANGE UP (ref 96–108)
CO2 SERPL-SCNC: 27 MMOL/L — SIGNIFICANT CHANGE UP (ref 22–31)
CREAT SERPL-MCNC: 4.22 MG/DL — HIGH (ref 0.5–1.3)
GLUCOSE BLDC GLUCOMTR-MCNC: 108 MG/DL — HIGH (ref 70–99)
GLUCOSE BLDC GLUCOMTR-MCNC: 136 MG/DL — HIGH (ref 70–99)
GLUCOSE BLDC GLUCOMTR-MCNC: 140 MG/DL — HIGH (ref 70–99)
GLUCOSE BLDC GLUCOMTR-MCNC: 148 MG/DL — HIGH (ref 70–99)
GLUCOSE SERPL-MCNC: 93 MG/DL — SIGNIFICANT CHANGE UP (ref 70–99)
HCT VFR BLD CALC: 22.2 % — LOW (ref 34.5–45)
HGB BLD-MCNC: 6.4 G/DL — CRITICAL LOW (ref 11.5–15.5)
MAGNESIUM SERPL-MCNC: 2.2 MG/DL — SIGNIFICANT CHANGE UP (ref 1.6–2.6)
MCHC RBC-ENTMCNC: 25.3 PG — LOW (ref 27–34)
MCHC RBC-ENTMCNC: 28.6 GM/DL — LOW (ref 32–36)
MCV RBC AUTO: 88.6 FL — SIGNIFICANT CHANGE UP (ref 80–100)
PHOSPHATE SERPL-MCNC: 5 MG/DL — HIGH (ref 2.5–4.5)
PLATELET # BLD AUTO: 425 K/UL — HIGH (ref 150–400)
POTASSIUM SERPL-MCNC: 4.4 MMOL/L — SIGNIFICANT CHANGE UP (ref 3.5–5.3)
POTASSIUM SERPL-SCNC: 4.4 MMOL/L — SIGNIFICANT CHANGE UP (ref 3.5–5.3)
RBC # BLD: 2.51 M/UL — LOW (ref 3.8–5.2)
RBC # FLD: 20.5 % — HIGH (ref 10.3–14.5)
SODIUM SERPL-SCNC: 136 MMOL/L — SIGNIFICANT CHANGE UP (ref 135–145)
WBC # BLD: 22.4 K/UL — HIGH (ref 3.8–10.5)
WBC # FLD AUTO: 22.4 K/UL — HIGH (ref 3.8–10.5)

## 2018-03-18 RX ADMIN — Medication 1 APPLICATION(S): at 12:52

## 2018-03-18 RX ADMIN — Medication 500 MILLIGRAM(S): at 12:52

## 2018-03-18 RX ADMIN — Medication 1 TABLET(S): at 12:51

## 2018-03-18 RX ADMIN — GABAPENTIN 100 MILLIGRAM(S): 400 CAPSULE ORAL at 06:35

## 2018-03-18 RX ADMIN — Medication 100 MILLIGRAM(S): at 19:08

## 2018-03-18 RX ADMIN — HEPARIN SODIUM 5000 UNIT(S): 5000 INJECTION INTRAVENOUS; SUBCUTANEOUS at 06:35

## 2018-03-18 RX ADMIN — PANTOPRAZOLE SODIUM 40 MILLIGRAM(S): 20 TABLET, DELAYED RELEASE ORAL at 06:36

## 2018-03-18 RX ADMIN — OXYCODONE AND ACETAMINOPHEN 1 TABLET(S): 5; 325 TABLET ORAL at 19:41

## 2018-03-18 RX ADMIN — Medication 25 MILLIGRAM(S): at 06:35

## 2018-03-18 RX ADMIN — Medication 50 MICROGRAM(S): at 06:35

## 2018-03-18 RX ADMIN — CINACALCET 30 MILLIGRAM(S): 30 TABLET, FILM COATED ORAL at 12:51

## 2018-03-18 RX ADMIN — HEPARIN SODIUM 5000 UNIT(S): 5000 INJECTION INTRAVENOUS; SUBCUTANEOUS at 19:08

## 2018-03-18 RX ADMIN — Medication 1 MILLIGRAM(S): at 12:51

## 2018-03-18 RX ADMIN — GABAPENTIN 100 MILLIGRAM(S): 400 CAPSULE ORAL at 17:14

## 2018-03-18 RX ADMIN — OXYCODONE AND ACETAMINOPHEN 1 TABLET(S): 5; 325 TABLET ORAL at 19:11

## 2018-03-18 RX ADMIN — Medication 100 MILLIGRAM(S): at 06:35

## 2018-03-18 NOTE — PROGRESS NOTE ADULT - PROBLEM SELECTOR PLAN 6
Continue with metoprolol 25mg BID within parameters  Holding hydralazine as BP on lower side   Continue to monitor BP

## 2018-03-18 NOTE — PROGRESS NOTE ADULT - ASSESSMENT
A/P -   - ESRD ON  HD  FOR HD IN  AM ,  ON EPO 10.000 U  WITH HD    VOL STATUS IS OK     BP  IS ACCEPTABLE, OFF  BP  MEDS  - MRSA FROM  WOUND CULTURE, ON ABX AS PER ,   S/P I & D OF HER WOUND OVER SACRAL AREA

## 2018-03-18 NOTE — PROGRESS NOTE ADULT - ATTENDING COMMENTS
Patient seen/evaluated at bedside. I agree with the resident progress note/outlined plan of care. My independent findings and conclusions are documented.    Pt is s/p prior debridement on 3/7/2018 of right back abscess and right hip abscesses w/out complication      TTE 12/20171. Mitral annular calcification. Trace mitral  regurgitation.  2. Mildly dilated left atrium.  LA volume index = 39 cc/m2.  3. Severe concentric left ventricular hypertrophy.  4. Endocardium not well visualized; grossly low normal left  ventricular function.  Moderate diastolic (stage II)  dysfunction.  5. Normal right ventricular size and function.  6. RV systolic pressure is 49 mm Hg. Mild pulmonary  hypertension.  7. Small pericardial effusion measuring 0.7 cm at its  largest dimension when pooled posterior to the LV.   No  echocardiographic evidence of pericardial tamponade.      1. MRSA abscesses of back/hip--> plan for repeat I&D  2. anemia in ckd  3. ESRD on HD  4. DM T II w/ retinopathy/ polyneuropathy/ CKD on chronic dialysis on long-term insulin  5. chronic diastolic chf (w/out clinical decompensation)  6. ambulatory dysfunction  7. severe protein calorie malnutrion    -transfused 1 unit prbc. No evidence of active bleeding  -hold dvt ppx heparin tomorrow am  -plan for hemodialysis prior to procedure tomorrow  low risk procedure for intermediate risk patient  -has right IJ Central venous catheter-->central line care, maintain sterile protocol  -hydralazine held yesterday for SBPs in the 90s, now in the 110s-120s which is acceptable for now, resume post op as tolerated  -c/w outpt beta blocker

## 2018-03-18 NOTE — PROGRESS NOTE ADULT - PROBLEM SELECTOR PLAN 1
Likely secondary infected sacral and back abscess with cultures + of MRSA in wound   S/p incision and drainage 3/8; 3/10  Vancomycin and Meropenem were discontinued and bactrim was started   However , patient still has fever with leucocytosis trending up  started with iv vancomycin again   Vanc trough post dialysis   Contacted surgery for revision of wounds and re-debridement , likely on monday   Blood cultures negative to date   Infectious Disease Dr. Kaplan following  General surgery Dr. Wright following  Patient did not have peripheral iv access since last evening. Multiple attempts done by on call team and day team . Eventually central line was placed  in Left IJ for vascular access and iv antibiotics on 3/12  Consent obtained from sister , plan discussed with Dr. Gomez Likely secondary infected sacral and back abscess with cultures + of MRSA in wound   S/p incision and drainage 3/8; 3/10  Vancomycin and Meropenem were discontinued and bactrim was started   However , patient still has fever with leucocytosis trending up  started with iv vancomycin again   Vanc trough post dialysis   Contacted surgery for revision of wounds and re-debridement , likely on monday   Will keep npo after midnight , coags for am   Blood cultures negative to date   Infectious Disease Dr. Kaplan following  General surgery Dr. Wright following  Patient did not have peripheral iv access since last evening. Multiple attempts done by on call team and day team . Eventually central line was placed  in Left IJ for vascular access and iv antibiotics on 3/12  Consent obtained from sister , plan discussed with Dr. Gomez

## 2018-03-18 NOTE — PROGRESS NOTE ADULT - SUBJECTIVE AND OBJECTIVE BOX
PRE-OP NOTE    Dx: nonhealing sacral decubitus	  Procedure: debridement of sacral decubitus ulcer, and further debridement of any other wound that requires   Surgeon: Dr Wright    patient will need medical optimization  informed consent to be obtained  PRE-OP for 3/19

## 2018-03-18 NOTE — PROGRESS NOTE ADULT - SUBJECTIVE AND OBJECTIVE BOX
PGY 1 Note discussed with supervising resident and primary attending    Patient is a 53y old  Female who presents with a chief complaint of sent from NH due to leukocytosis and fever (07 Mar 2018 21:47)      INTERVAL HPI/OVERNIGHT EVENTS: offers no new complaints; current symptoms resolving    MEDICATIONS  (STANDING):  ascorbic acid 500 milliGRAM(s) Oral daily  cinacalcet 30 milliGRAM(s) Oral daily  collagenase Ointment 1 Application(s) Topical daily  Dakins Solution - 1/4 Strength 1 Application(s) Topical daily  docusate sodium 100 milliGRAM(s) Oral two times a day  epoetin jacqueline Injectable 19688 Unit(s) IV Push <User Schedule>  folic acid 1 milliGRAM(s) Oral daily  gabapentin 100 milliGRAM(s) Oral three times a day  heparin  Injectable 5000 Unit(s) SubCutaneous every 12 hours  insulin lispro (HumaLOG) corrective regimen sliding scale   SubCutaneous Before meals and at bedtime  levothyroxine 50 MICROGram(s) Oral daily  metoprolol     tartrate 25 milliGRAM(s) Oral two times a day  multivitamin 1 Tablet(s) Oral daily  pantoprazole    Tablet 40 milliGRAM(s) Oral before breakfast  senna 2 Tablet(s) Oral at bedtime  simvastatin 20 milliGRAM(s) Oral at bedtime  vancomycin  IVPB 1250 milliGRAM(s) IV Intermittent <User Schedule>    MEDICATIONS  (PRN):  acetaminophen   Tablet 650 milliGRAM(s) Oral every 6 hours PRN For Temp greater than 38 C (100.4 F)  oxyCODONE    5 mG/acetaminophen 325 mG 1 Tablet(s) Oral every 4 hours PRN Moderate Pain      __________________________________________________  REVIEW OF SYSTEMS:    CONSTITUTIONAL: No fever,   EYES: no acute visual disturbances  NECK: No pain or stiffness  RESPIRATORY: No cough; No shortness of breath  CARDIOVASCULAR: No chest pain, no palpitations  GASTROINTESTINAL: No pain. No nausea or vomiting; No diarrhea   NEUROLOGICAL: No headache or numbness, no tremors  MUSCULOSKELETAL: No joint pain, no muscle pain  GENITOURINARY: no dysuria, no frequency, no hesitancy  PSYCHIATRY: no depression , no anxiety  ALL OTHER  ROS negative        Vital Signs Last 24 Hrs  T(C): 36.3 (18 Mar 2018 07:53), Max: 37.2 (17 Mar 2018 23:42)  T(F): 97.4 (18 Mar 2018 07:53), Max: 99 (17 Mar 2018 23:42)  HR: 80 (18 Mar 2018 07:53) (79 - 95)  BP: 134/55 (18 Mar 2018 07:53) (91/32 - 137/49)  BP(mean): --  RR: 16 (18 Mar 2018 07:53) (16 - 16)  SpO2: 100% (18 Mar 2018 07:53) (97% - 100%)    ________________________________________________  PHYSICAL EXAM:  LIJ in neck placed on 3/12    GENERAL: NAD    HEENT: Normocephalic;  conjunctivae and sclerae clear; moist mucous membranes;   NECK : supple  CHEST/LUNG: Clear to auscultation bilaterally with good air entry   HEART: S1 S2  regular; no murmurs, gallops or rubs  ABDOMEN: Soft, Nontender, Nondistended; Bowel sounds present  EXTREMITIES: no cyanosis; no edema; no calf tenderness  SKIN of back :Back-- 2, [id number] areas on left mid-back and right mid-back (I+D sites) with minimal amount of superficial exudate; 2, large areas of debridement noted on upper left and right buttock that are clean with minimal amount of superficial exudate , dressing in situ .   NERVOUS SYSTEM:  AAO*2    _________________________________________________  LABS:                        6.4    22.4  )-----------( 425      ( 18 Mar 2018 06:20 )             22.2     03-18    136  |  98  |  37<H>  ----------------------------<  93  4.4   |  27  |  4.22<H>    Ca    8.1<L>      18 Mar 2018 06:20  Phos  5.0     03-18  Mg     2.2     03-18          CAPILLARY BLOOD GLUCOSE      POCT Blood Glucose.: 108 mg/dL (18 Mar 2018 08:18)  POCT Blood Glucose.: 131 mg/dL (17 Mar 2018 21:33)  POCT Blood Glucose.: 125 mg/dL (17 Mar 2018 17:05)  POCT Blood Glucose.: 164 mg/dL (17 Mar 2018 11:41)  POCT Blood Glucose.: 108 mg/dL (17 Mar 2018 08:28)        Consultant(s) Notes Reviewed:   YES    Care Discussed with Consultants : YES    Plan of care was discussed with patient and /or primary care giver; all questions and concerns were addressed and care was aligned with patient's wishes. PGY 1 Note discussed with supervising resident and primary attending    Patient is a 53y old  Female who presents with a chief complaint of sent from NH due to leukocytosis and fever (07 Mar 2018 21:47)      INTERVAL HPI/OVERNIGHT EVENTS: offers no new complaints; current symptoms resolving    MEDICATIONS  (STANDING):  ascorbic acid 500 milliGRAM(s) Oral daily  cinacalcet 30 milliGRAM(s) Oral daily  collagenase Ointment 1 Application(s) Topical daily  Dakins Solution - 1/4 Strength 1 Application(s) Topical daily  docusate sodium 100 milliGRAM(s) Oral two times a day  epoetin jacqueline Injectable 17689 Unit(s) IV Push <User Schedule>  folic acid 1 milliGRAM(s) Oral daily  gabapentin 100 milliGRAM(s) Oral three times a day  heparin  Injectable 5000 Unit(s) SubCutaneous every 12 hours  insulin lispro (HumaLOG) corrective regimen sliding scale   SubCutaneous Before meals and at bedtime  levothyroxine 50 MICROGram(s) Oral daily  metoprolol     tartrate 25 milliGRAM(s) Oral two times a day  multivitamin 1 Tablet(s) Oral daily  pantoprazole    Tablet 40 milliGRAM(s) Oral before breakfast  senna 2 Tablet(s) Oral at bedtime  simvastatin 20 milliGRAM(s) Oral at bedtime  vancomycin  IVPB 1250 milliGRAM(s) IV Intermittent <User Schedule>    MEDICATIONS  (PRN):  acetaminophen   Tablet 650 milliGRAM(s) Oral every 6 hours PRN For Temp greater than 38 C (100.4 F)  oxyCODONE    5 mG/acetaminophen 325 mG 1 Tablet(s) Oral every 4 hours PRN Moderate Pain      __________________________________________________  REVIEW OF SYSTEMS:    CONSTITUTIONAL: No fever,   EYES: no acute visual disturbances  NECK: No pain or stiffness  RESPIRATORY: No cough; No shortness of breath  CARDIOVASCULAR: No chest pain, no palpitations  GASTROINTESTINAL: No pain. No nausea or vomiting; No diarrhea   NEUROLOGICAL: No headache or numbness, no tremors  MUSCULOSKELETAL: No joint pain, no muscle pain  GENITOURINARY: no dysuria, no frequency, no hesitancy  PSYCHIATRY: no depression , no anxiety  ALL OTHER  ROS negative        Vital Signs Last 24 Hrs  T(C): 36.3 (18 Mar 2018 07:53), Max: 37.2 (17 Mar 2018 23:42)  T(F): 97.4 (18 Mar 2018 07:53), Max: 99 (17 Mar 2018 23:42)  HR: 80 (18 Mar 2018 07:53) (79 - 95)  BP: 134/55 (18 Mar 2018 07:53) (91/32 - 137/49)  BP(mean): --  RR: 16 (18 Mar 2018 07:53) (16 - 16)  SpO2: 100% (18 Mar 2018 07:53) (97% - 100%)    ________________________________________________  PHYSICAL EXAM:  LIJ in neck placed on 3/12    GENERAL: NAD  , visually impaired   HEENT: Normocephalic;  conjunctivae and sclerae clear; moist mucous membranes;   NECK : supple  CHEST/LUNG: Clear to auscultation bilaterally with good air entry   HEART: S1 S2  regular; no murmurs, gallops or rubs  ABDOMEN: Soft, Nontender, Nondistended; Bowel sounds present  EXTREMITIES: no cyanosis; no edema; no calf tenderness  SKIN of back :Back-- 2, [id number] areas on left mid-back and right mid-back (I+D sites) with minimal amount of superficial exudate; 2, large areas of debridement noted on upper left and right buttock that are clean with minimal amount of superficial exudate , dressing in situ .   NERVOUS SYSTEM:  AAO*2    _________________________________________________  LABS:                        6.4    22.4  )-----------( 425      ( 18 Mar 2018 06:20 )             22.2     03-18    136  |  98  |  37<H>  ----------------------------<  93  4.4   |  27  |  4.22<H>    Ca    8.1<L>      18 Mar 2018 06:20  Phos  5.0     03-18  Mg     2.2     03-18          CAPILLARY BLOOD GLUCOSE      POCT Blood Glucose.: 108 mg/dL (18 Mar 2018 08:18)  POCT Blood Glucose.: 131 mg/dL (17 Mar 2018 21:33)  POCT Blood Glucose.: 125 mg/dL (17 Mar 2018 17:05)  POCT Blood Glucose.: 164 mg/dL (17 Mar 2018 11:41)  POCT Blood Glucose.: 108 mg/dL (17 Mar 2018 08:28)        Consultant(s) Notes Reviewed:   YES    Care Discussed with Consultants : YES    Plan of care was discussed with patient and /or primary care giver; all questions and concerns were addressed and care was aligned with patient's wishes.

## 2018-03-18 NOTE — PROGRESS NOTE ADULT - PROBLEM SELECTOR PLAN 3
Likely secondary to CKD  Hemoglobin baseline 9-10   Hb has been ranging around 8 since few days  6.6 this am from 7.6   No signs of bleeding   Ordered 1 unit prbc : follow up post transfusion cbc   tracing back history , patient had positive occult in Jan , EGD did not reveal ulcer   Dr. Cuba was consulted and at that time since there was no sig of bleeding and h/h was stable , it was decided to persue colonoscopy as outpatient  Will re-consult GI and follow recs   Continue with Epogen  , holding venofer in light of sepsis per nephro   Continue to monitor CBC Likely secondary to CKD  Hemoglobin baseline 9-10   Hb has been ranging around 8 since few days  6.4 this am from 7.7   No signs of bleeding   Ordered 1 unit prbc : follow up post transfusion cbc   tracing back history , patient had positive occult in Jan , EGD did not reveal ulcer   Dr. uCba was consulted and at that time since there was no sig of bleeding and h/h was stable , it was decided to persue colonoscopy as outpatient  Will re-consult GI and follow recs   Continue with Epogen  , holding venofer in light of sepsis per nephro   Continue to monitor CBC Likely secondary to CKD  Hemoglobin baseline 9-10   Hb has been ranging around 8 since few days  6.4 this am from 7.7 likely due to sepsis   No signs of bleeding   Ordered 1 unit prbc : follow up post transfusion cbc   tracing back history , patient had positive occult in Jan , EGD did not reveal ulcer   Dr. Cuba was consulted and at that time since there was no sig of bleeding and h/h was stable , it was decided to persue colonoscopy as outpatient  Will re-consult GI and follow recs   Continue with Epogen  , holding venofer in light of sepsis per nephro   Continue to monitor CBC

## 2018-03-18 NOTE — PROGRESS NOTE ADULT - SUBJECTIVE AND OBJECTIVE BOX
Patient is a 53y Female with  ESRD ON  HD, HAD HD ON FRIDAY    DUE FOR HD TOMORROW    GETTING 1 U  PRBC  OFFERS NO COMPLAINTS AT THE TIME OF EXAM    No Known Allergies    Hospital Medications:   MEDICATIONS  (STANDING):  ascorbic acid 500 milliGRAM(s) Oral daily  cinacalcet 30 milliGRAM(s) Oral daily  collagenase Ointment 1 Application(s) Topical daily  Dakins Solution - 1/4 Strength 1 Application(s) Topical daily  docusate sodium 100 milliGRAM(s) Oral two times a day  epoetin jacqueline Injectable 87294 Unit(s) IV Push <User Schedule>  folic acid 1 milliGRAM(s) Oral daily  gabapentin 100 milliGRAM(s) Oral three times a day  heparin  Injectable 5000 Unit(s) SubCutaneous every 12 hours  insulin lispro (HumaLOG) corrective regimen sliding scale   SubCutaneous Before meals and at bedtime  levothyroxine 50 MICROGram(s) Oral daily  metoprolol     tartrate 25 milliGRAM(s) Oral two times a day  multivitamin 1 Tablet(s) Oral daily  pantoprazole    Tablet 40 milliGRAM(s) Oral before breakfast  senna 2 Tablet(s) Oral at bedtime  simvastatin 20 milliGRAM(s) Oral at bedtime  vancomycin  IVPB 1250 milliGRAM(s) IV Intermittent <User Schedule>    REVIEW OF SYSTEMS:   HAS NO  FEVER/CHILLS    NO COUGH/SOB    C/O  PAIN  LOWER BACK  THOUGH  BETTER     APPETITE IS GOOD   NO N/V   NO LEG SWELLING  VITALS:  T(F): 98.3 (03-18-18 @ 10:45), Max: 99 (03-17-18 @ 23:42)  HR: 77 (03-18-18 @ 10:45)  BP: 109/36 (03-18-18 @ 10:45)  RR: 16 (03-18-18 @ 10:45)  SpO2: 98% (03-18-18 @ 10:45)  Wt(kg): --    03-17 @ 07:01  -  03-18 @ 07:00  --------------------------------------------------------  IN: 0 mL / OUT: 1 mL / NET: -1 mL        PHYSICAL EXAM:  Constitutional: NAD  HEENT: anicteric sclera, CONJ PALE  Neck: No JVD, R IJ PC IN PLACE  Respiratory: CTAB, no wheezes, rales or rhonchi  Cardiovascular: S1, S2, RRR  Gastrointestinal: BS+, soft, NT/ND  Extremities: . No peripheral edema  Neurological: A/O x 3,   : . No jarrell.   HAS DRESSING IN PLACE  LUMBAR SPINE, R INFRASCAPULAR REGION, AND  SACRAL AREA  Vascular Access:    LABS:  03-18    136  |  98  |  37<H>  ----------------------------<  93  4.4   |  27  |  4.22<H>    Ca    8.1<L>      18 Mar 2018 06:20  Phos  5.0     03-18  Mg     2.2     03-18      Creatinine Trend: 4.22 <--, 3.08 <--, 4.28 <--, 5.02 <--, 3.37 <--, 5.93 <--, 3.75 <--, 5.93 <--                        6.4    22.4  )-----------( 425      ( 18 Mar 2018 06:20 )             22.2     Urine Studies:      RADIOLOGY & ADDITIONAL STUDIES:

## 2018-03-19 ENCOUNTER — RESULT REVIEW (OUTPATIENT)
Age: 53
End: 2018-03-19

## 2018-03-19 LAB
ANION GAP SERPL CALC-SCNC: 11 MMOL/L — SIGNIFICANT CHANGE UP (ref 5–17)
BUN SERPL-MCNC: 50 MG/DL — HIGH (ref 7–18)
CALCIUM SERPL-MCNC: 8 MG/DL — LOW (ref 8.4–10.5)
CHLORIDE SERPL-SCNC: 97 MMOL/L — SIGNIFICANT CHANGE UP (ref 96–108)
CO2 SERPL-SCNC: 27 MMOL/L — SIGNIFICANT CHANGE UP (ref 22–31)
CREAT SERPL-MCNC: 6.1 MG/DL — HIGH (ref 0.5–1.3)
GLUCOSE BLDC GLUCOMTR-MCNC: 103 MG/DL — HIGH (ref 70–99)
GLUCOSE BLDC GLUCOMTR-MCNC: 121 MG/DL — HIGH (ref 70–99)
GLUCOSE BLDC GLUCOMTR-MCNC: 149 MG/DL — HIGH (ref 70–99)
GLUCOSE BLDC GLUCOMTR-MCNC: 79 MG/DL — SIGNIFICANT CHANGE UP (ref 70–99)
GLUCOSE BLDC GLUCOMTR-MCNC: 97 MG/DL — SIGNIFICANT CHANGE UP (ref 70–99)
GLUCOSE SERPL-MCNC: 122 MG/DL — HIGH (ref 70–99)
HCT VFR BLD CALC: 27 % — LOW (ref 34.5–45)
HGB BLD-MCNC: 8 G/DL — LOW (ref 11.5–15.5)
INR BLD: 1.07 RATIO — SIGNIFICANT CHANGE UP (ref 0.88–1.16)
LYMPHOCYTES # BLD AUTO: 13 % — SIGNIFICANT CHANGE UP (ref 13–44)
MAGNESIUM SERPL-MCNC: 2.1 MG/DL — SIGNIFICANT CHANGE UP (ref 1.6–2.6)
MCHC RBC-ENTMCNC: 26.2 PG — LOW (ref 27–34)
MCHC RBC-ENTMCNC: 29.8 GM/DL — LOW (ref 32–36)
MCV RBC AUTO: 88.2 FL — SIGNIFICANT CHANGE UP (ref 80–100)
MONOCYTES NFR BLD AUTO: 5 % — SIGNIFICANT CHANGE UP (ref 2–14)
NEUTROPHILS NFR BLD AUTO: 82 % — HIGH (ref 43–77)
PHOSPHATE SERPL-MCNC: 6.5 MG/DL — HIGH (ref 2.5–4.5)
PLATELET # BLD AUTO: 493 K/UL — HIGH (ref 150–400)
POTASSIUM SERPL-MCNC: 4.7 MMOL/L — SIGNIFICANT CHANGE UP (ref 3.5–5.3)
POTASSIUM SERPL-SCNC: 4.7 MMOL/L — SIGNIFICANT CHANGE UP (ref 3.5–5.3)
PROTHROM AB SERPL-ACNC: 11.7 SEC — SIGNIFICANT CHANGE UP (ref 9.8–12.7)
RBC # BLD: 3.06 M/UL — LOW (ref 3.8–5.2)
RBC # FLD: 18.6 % — HIGH (ref 10.3–14.5)
SODIUM SERPL-SCNC: 135 MMOL/L — SIGNIFICANT CHANGE UP (ref 135–145)
VANCOMYCIN TROUGH SERPL-MCNC: 20.1 UG/ML — HIGH (ref 10–20)
WBC # BLD: 20.9 K/UL — HIGH (ref 3.8–10.5)
WBC # FLD AUTO: 20.9 K/UL — HIGH (ref 3.8–10.5)

## 2018-03-19 RX ORDER — GABAPENTIN 400 MG/1
100 CAPSULE ORAL THREE TIMES A DAY
Qty: 0 | Refills: 0 | Status: DISCONTINUED | OUTPATIENT
Start: 2018-03-19 | End: 2018-03-20

## 2018-03-19 RX ORDER — CINACALCET 30 MG/1
30 TABLET, FILM COATED ORAL DAILY
Qty: 0 | Refills: 0 | Status: DISCONTINUED | OUTPATIENT
Start: 2018-03-19 | End: 2018-03-20

## 2018-03-19 RX ORDER — INSULIN LISPRO 100/ML
VIAL (ML) SUBCUTANEOUS
Qty: 0 | Refills: 0 | Status: DISCONTINUED | OUTPATIENT
Start: 2018-03-19 | End: 2018-03-20

## 2018-03-19 RX ORDER — FENTANYL CITRATE 50 UG/ML
25 INJECTION INTRAVENOUS
Qty: 0 | Refills: 0 | Status: DISCONTINUED | OUTPATIENT
Start: 2018-03-19 | End: 2018-03-19

## 2018-03-19 RX ORDER — LEVOTHYROXINE SODIUM 125 MCG
50 TABLET ORAL DAILY
Qty: 0 | Refills: 0 | Status: DISCONTINUED | OUTPATIENT
Start: 2018-03-19 | End: 2018-03-20

## 2018-03-19 RX ORDER — DOCUSATE SODIUM 100 MG
100 CAPSULE ORAL
Qty: 0 | Refills: 0 | Status: DISCONTINUED | OUTPATIENT
Start: 2018-03-19 | End: 2018-03-20

## 2018-03-19 RX ORDER — HEPARIN SODIUM 5000 [USP'U]/ML
5000 INJECTION INTRAVENOUS; SUBCUTANEOUS EVERY 12 HOURS
Qty: 0 | Refills: 0 | Status: DISCONTINUED | OUTPATIENT
Start: 2018-03-19 | End: 2018-03-20

## 2018-03-19 RX ORDER — ACETAMINOPHEN 500 MG
650 TABLET ORAL EVERY 6 HOURS
Qty: 0 | Refills: 0 | Status: DISCONTINUED | OUTPATIENT
Start: 2018-03-19 | End: 2018-03-20

## 2018-03-19 RX ORDER — PANTOPRAZOLE SODIUM 20 MG/1
40 TABLET, DELAYED RELEASE ORAL
Qty: 0 | Refills: 0 | Status: DISCONTINUED | OUTPATIENT
Start: 2018-03-19 | End: 2018-03-20

## 2018-03-19 RX ORDER — OXYCODONE AND ACETAMINOPHEN 5; 325 MG/1; MG/1
1 TABLET ORAL EVERY 4 HOURS
Qty: 0 | Refills: 0 | Status: DISCONTINUED | OUTPATIENT
Start: 2018-03-19 | End: 2018-03-20

## 2018-03-19 RX ORDER — ERYTHROPOIETIN 10000 [IU]/ML
10000 INJECTION, SOLUTION INTRAVENOUS; SUBCUTANEOUS
Qty: 0 | Refills: 0 | Status: DISCONTINUED | OUTPATIENT
Start: 2018-03-21 | End: 2018-03-20

## 2018-03-19 RX ORDER — SODIUM HYPOCHLORITE 0.125 %
1 SOLUTION, NON-ORAL MISCELLANEOUS DAILY
Qty: 0 | Refills: 0 | Status: DISCONTINUED | OUTPATIENT
Start: 2018-03-19 | End: 2018-03-19

## 2018-03-19 RX ORDER — SENNA PLUS 8.6 MG/1
2 TABLET ORAL AT BEDTIME
Qty: 0 | Refills: 0 | Status: DISCONTINUED | OUTPATIENT
Start: 2018-03-19 | End: 2018-03-20

## 2018-03-19 RX ORDER — ASCORBIC ACID 60 MG
500 TABLET,CHEWABLE ORAL DAILY
Qty: 0 | Refills: 0 | Status: DISCONTINUED | OUTPATIENT
Start: 2018-03-19 | End: 2018-03-20

## 2018-03-19 RX ORDER — COLLAGENASE CLOSTRIDIUM HIST. 250 UNIT/G
1 OINTMENT (GRAM) TOPICAL DAILY
Qty: 0 | Refills: 0 | Status: DISCONTINUED | OUTPATIENT
Start: 2018-03-19 | End: 2018-03-20

## 2018-03-19 RX ORDER — METOPROLOL TARTRATE 50 MG
25 TABLET ORAL
Qty: 0 | Refills: 0 | Status: DISCONTINUED | OUTPATIENT
Start: 2018-03-19 | End: 2018-03-20

## 2018-03-19 RX ORDER — FOLIC ACID 0.8 MG
1 TABLET ORAL DAILY
Qty: 0 | Refills: 0 | Status: DISCONTINUED | OUTPATIENT
Start: 2018-03-19 | End: 2018-03-20

## 2018-03-19 RX ORDER — ONDANSETRON 8 MG/1
4 TABLET, FILM COATED ORAL ONCE
Qty: 0 | Refills: 0 | Status: DISCONTINUED | OUTPATIENT
Start: 2018-03-19 | End: 2018-03-19

## 2018-03-19 RX ORDER — SIMVASTATIN 20 MG/1
20 TABLET, FILM COATED ORAL AT BEDTIME
Qty: 0 | Refills: 0 | Status: DISCONTINUED | OUTPATIENT
Start: 2018-03-19 | End: 2018-03-20

## 2018-03-19 RX ADMIN — Medication 166.67 MILLIGRAM(S): at 11:55

## 2018-03-19 RX ADMIN — Medication 25 MILLIGRAM(S): at 06:32

## 2018-03-19 RX ADMIN — SIMVASTATIN 20 MILLIGRAM(S): 20 TABLET, FILM COATED ORAL at 03:59

## 2018-03-19 RX ADMIN — Medication 50 MICROGRAM(S): at 06:32

## 2018-03-19 RX ADMIN — GABAPENTIN 100 MILLIGRAM(S): 400 CAPSULE ORAL at 03:58

## 2018-03-19 RX ADMIN — SIMVASTATIN 20 MILLIGRAM(S): 20 TABLET, FILM COATED ORAL at 22:28

## 2018-03-19 RX ADMIN — ERYTHROPOIETIN 10000 UNIT(S): 10000 INJECTION, SOLUTION INTRAVENOUS; SUBCUTANEOUS at 10:50

## 2018-03-19 RX ADMIN — OXYCODONE AND ACETAMINOPHEN 1 TABLET(S): 5; 325 TABLET ORAL at 21:35

## 2018-03-19 RX ADMIN — Medication 25 MILLIGRAM(S): at 17:13

## 2018-03-19 RX ADMIN — OXYCODONE AND ACETAMINOPHEN 1 TABLET(S): 5; 325 TABLET ORAL at 20:30

## 2018-03-19 RX ADMIN — PANTOPRAZOLE SODIUM 40 MILLIGRAM(S): 20 TABLET, DELAYED RELEASE ORAL at 06:32

## 2018-03-19 RX ADMIN — GABAPENTIN 100 MILLIGRAM(S): 400 CAPSULE ORAL at 22:28

## 2018-03-19 RX ADMIN — GABAPENTIN 100 MILLIGRAM(S): 400 CAPSULE ORAL at 06:32

## 2018-03-19 RX ADMIN — Medication 100 MILLIGRAM(S): at 17:13

## 2018-03-19 NOTE — PROGRESS NOTE ADULT - SUBJECTIVE AND OBJECTIVE BOX
53y Female is under our care for back abscesses with MRSA, s/p I&D. Now scheduled today for further wounds debridement. Patient is currently in hemodialysis and has no new complaints.  Wbc count has been trending down and has not had any recurrent fevers.     REVIEW OF SYSTEMS:  [  ] Not able to illicit  General: no fevers no malaise  Chest: no cough no sob  GI: no nvd  : no urinary sxs   Skin: no rashes  Musculoskeletal: no trauma no LBP  Neuro: no ha's no dizziness     MEDS:  vancomycin  IVPB 1250 milliGRAM(s) IV Intermittent <User Schedule>    ALLERGIES: No Known Allergies    VITALS:  Vital Signs Last 24 Hrs  T(C): 36.8 (19 Mar 2018 08:16), Max: 37.1 (18 Mar 2018 16:27)  T(F): 98.2 (19 Mar 2018 08:16), Max: 98.8 (18 Mar 2018 16:27)  HR: 79 (19 Mar 2018 08:16) (75 - 81)  BP: 137/52 (19 Mar 2018 08:16) (111/42 - 145/53)  BP(mean): --  RR: 16 (19 Mar 2018 08:16) (16 - 18)  SpO2: 98% (19 Mar 2018 08:16) (98% - 100%)      PHYSICAL EXAM:  HEENT: n/a  Neck: left neck CVP   Respiratory: bilateral bibasilar rales, right chest P-C  Cardiovascular: S1 S2 reg no murmurs  Gastrointestinal: +BS with soft, nondistended abdomen; nontender  Extremities: no edema  Skin: no able to look at back since on HD machine  Ortho: n/a  Neuro: AAO x 2      LABS/DIAGNOSTIC TESTS:                        8.0    20.9  )-----------( 493      ( 19 Mar 2018 10:44 )             27.0   WBC Count: 20.9 K/uL (03-19 @ 10:44)  WBC Count: 22.4 K/uL (03-18 @ 06:20)  WBC Count: 26.2 K/uL (03-17 @ 07:01)    03-19    135  |  97  |  50<H>  ----------------------------<  122<H>  4.7   |  27  |  6.10<H>    Ca    8.0<L>      19 Mar 2018 10:44  Phos  6.5     03-19  Mg     2.1     03-19      CULTURES:   .Blood Blood-Peripheral  03-10 @ 09:57   No growth at 5 days.  --  --      .Surgical Swab left back mass culture  03-09 @ 12:14   Numerous Methicillin resistant Staphylococcus aureus  --  Methicillin resistant Staphylococcus aureus      .Surgical Swab right side of back  03-09 @ 12:13   Numerous Methicillin resistant Staphylococcus aureus  ***********Note************  This isolate demonstrates inducible  clindamycin resistance.  Clindamycin may still be effective in some patients.  --  Methicillin resistant Staphylococcus aureus      .Blood Dialysis Catheter  03-07 @ 23:03   No growth at 5 days.  --  --      .Blood Blood-Peripheral  03-07 @ 09:43   No growth at 5 days.  --  --      .Urine Catheterized  03-07 @ 09:38   <10,000 CFU/ml Normal Urogenital derrick present  --  --      RADIOLOGY: no new studies

## 2018-03-19 NOTE — PROGRESS NOTE ADULT - ASSESSMENT
A/P   -ESRD  STABLE ON  HD     ON [facility]ITA WITH HD  - HTN, STABLE BP.  - INFECTED WOUNDS ON HER BACK FOR OR AGAIN TODAY

## 2018-03-19 NOTE — PROGRESS NOTE ADULT - ASSESSMENT
Patient is seen and examined. Surgical note is appreciated. Will continue antibiotics as per ID and continue hemodialysis.

## 2018-03-19 NOTE — BRIEF OPERATIVE NOTE - OPERATION/FINDINGS
incision and drainage of 3 necrotic/infected pressure ulcers with tissue breakdown, 2 back, 1 right hip
Sacral Ulcer with some necrotic tissue

## 2018-03-19 NOTE — BRIEF OPERATIVE NOTE - PROCEDURE
<<-----Click on this checkbox to enter Procedure Debridement of pressure ulcer of sacrum  03/19/2018    Active  [id number]

## 2018-03-19 NOTE — PROGRESS NOTE ADULT - SUBJECTIVE AND OBJECTIVE BOX
CHIEF COMPLAINT:Patient is a 53y old  Female who presents with a chief complaint of sent from NH due to leukocytosis and fever (07 Mar 2018 21:47)    	  REVIEW OF SYSTEMS:  CONSTITUTIONAL: No fever, weight loss, or fatigue  EYES: No eye pain, visual disturbances, or discharge  ENMT:  No difficulty hearing, tinnitus, vertigo; No sinus or throat pain  NECK: No pain or stiffness  RESPIRATORY: No cough, wheezing, chills or hemoptysis; No Shortness of Breath  CARDIOVASCULAR: No chest pain, palpitations, passing out, dizziness, or leg swelling  GASTROINTESTINAL: No abdominal or epigastric pain. No nausea, vomiting, or hematemesis; No diarrhea or constipation. No melena or hematochezia.  GENITOURINARY: No dysuria, frequency, hematuria, or incontinence  NEUROLOGICAL: No headaches, memory loss, loss of strength, numbness, or tremors  SKIN: No itching, burning, rashes, or lesions   LYMPH Nodes: No enlarged glands  ENDOCRINE: No heat or cold intolerance; No hair loss  MUSCULOSKELETAL: No joint pain or swelling; No muscle, back, or extremity pain  PSYCHIATRIC: No depression, anxiety, mood swings, or difficulty sleeping  HEME/LYMPH: No easy bruising, or bleeding gums  ALLERY AND IMMUNOLOGIC: No hives or eczema	    [ ] All others negative	  [ ] Unable to obtain    PHYSICAL EXAM:  T(C): 36.9 (03-19-18 @ 15:47), Max: 37.1 (03-19-18 @ 00:25)  HR: 87 (03-19-18 @ 15:47) (73 - 89)  BP: 142/59 (03-19-18 @ 15:47) (114/50 - 153/62)  RR: 12 (03-19-18 @ 15:47) (10 - 18)  SpO2: 100% (03-19-18 @ 15:47) (98% - 100%)  Wt(kg): --  I&O's Summary      Appearance: Normal	  HEENT:   Normal oral mucosa, PERRL, EOMI	  Lymphatic: No lymphadenopathy  Cardiovascular: Normal S1 S2, No JVD, No murmurs, No edema  Respiratory: Lungs clear to auscultation	  Psychiatry: A & O x 3, Mood & affect appropriate  Gastrointestinal:  Soft, Non-tender, + BS	  Skin: No rashes, No ecchymoses, No cyanosis	  Neurologic: Non-focal  Extremities: Normal range of motion, No clubbing, cyanosis or edema  Vascular: Peripheral pulses palpable 2+ bilaterally    MEDICATIONS  (STANDING):  ascorbic acid 500 milliGRAM(s) Oral daily  cinacalcet 30 milliGRAM(s) Oral daily  collagenase Ointment 1 Application(s) Topical daily  docusate sodium 100 milliGRAM(s) Oral two times a day  epoetin jacqueline Injectable 79421 Unit(s) IV Push <User Schedule>  folic acid 1 milliGRAM(s) Oral daily  gabapentin 100 milliGRAM(s) Oral three times a day  heparin  Injectable 5000 Unit(s) SubCutaneous every 12 hours  insulin lispro (HumaLOG) corrective regimen sliding scale   SubCutaneous Before meals and at bedtime  levothyroxine 50 MICROGram(s) Oral daily  metoprolol     tartrate 25 milliGRAM(s) Oral two times a day  multivitamin 1 Tablet(s) Oral daily  pantoprazole    Tablet 40 milliGRAM(s) Oral before breakfast  senna 2 Tablet(s) Oral at bedtime  simvastatin 20 milliGRAM(s) Oral at bedtime      TELEMETRY: 	    ECG:  	  RADIOLOGY:  OTHER: 	  	  CBC Full  -  ( 19 Mar 2018 10:44 )  WBC Count : 20.9 K/uL  Hemoglobin : 8.0 g/dL  Hematocrit : 27.0 %  Platelet Count - Automated : 493 K/uL  Mean Cell Volume : 88.2 fl  Mean Cell Hemoglobin : 26.2 pg  Mean Cell Hemoglobin Concentration : 29.8 gm/dL  Auto Neutrophil # : x  Auto Lymphocyte # : x  Auto Monocyte # : x  Auto Eosinophil # : x  Auto Basophil # : x  Auto Neutrophil % : 82.0 %  Auto Lymphocyte % : 13.0 %  Auto Monocyte % : 5.0 %  Auto Eosinophil % : x  Auto Basophil % : x        CARDIAC MARKERS:                              8.0    20.9  )-----------( 493      ( 19 Mar 2018 10:44 )             27.0       03-19    135  |  97  |  50<H>  ----------------------------<  122<H>  4.7   |  27  |  6.10<H>    Ca    8.0<L>      19 Mar 2018 10:44  Phos  6.5     03-19  Mg     2.1     03-19        PT/INR - ( 19 Mar 2018 10:44 )   PT: 11.7 sec;   INR: 1.07 ratio               proBNP:   Lipid Profile: Cholesterol <50  LDL <14  HDL 24  TG 59    HgA1c: Hemoglobin A1C, Whole Blood: 5.3 % (03-07 @ 17:22)    TSH: Thyroid Stimulating Hormone, Serum: 0.52 uU/mL (03-08 @ 06:06)

## 2018-03-19 NOTE — PROGRESS NOTE ADULT - ASSESSMENT
1.	Right hip/ back abscesses - MRSA  2.	Leukocytosis   ·	cont vanco to 1.25gm IV MWF with HD  D13  ·	going for further wounds debridement today

## 2018-03-20 ENCOUNTER — TRANSCRIPTION ENCOUNTER (OUTPATIENT)
Age: 53
End: 2018-03-20

## 2018-03-20 VITALS
TEMPERATURE: 98 F | SYSTOLIC BLOOD PRESSURE: 137 MMHG | RESPIRATION RATE: 98 BRPM | HEART RATE: 75 BPM | DIASTOLIC BLOOD PRESSURE: 50 MMHG | OXYGEN SATURATION: 98 %

## 2018-03-20 LAB
ANION GAP SERPL CALC-SCNC: 9 MMOL/L — SIGNIFICANT CHANGE UP (ref 5–17)
BUN SERPL-MCNC: 26 MG/DL — HIGH (ref 7–18)
CALCIUM SERPL-MCNC: 9.2 MG/DL — SIGNIFICANT CHANGE UP (ref 8.4–10.5)
CHLORIDE SERPL-SCNC: 99 MMOL/L — SIGNIFICANT CHANGE UP (ref 96–108)
CO2 SERPL-SCNC: 27 MMOL/L — SIGNIFICANT CHANGE UP (ref 22–31)
CREAT SERPL-MCNC: 3.98 MG/DL — HIGH (ref 0.5–1.3)
GLUCOSE BLDC GLUCOMTR-MCNC: 109 MG/DL — HIGH (ref 70–99)
GLUCOSE BLDC GLUCOMTR-MCNC: 127 MG/DL — HIGH (ref 70–99)
GLUCOSE BLDC GLUCOMTR-MCNC: 131 MG/DL — HIGH (ref 70–99)
GLUCOSE SERPL-MCNC: 93 MG/DL — SIGNIFICANT CHANGE UP (ref 70–99)
HCT VFR BLD CALC: 30.4 % — LOW (ref 34.5–45)
HGB BLD-MCNC: 8.9 G/DL — LOW (ref 11.5–15.5)
MAGNESIUM SERPL-MCNC: 2 MG/DL — SIGNIFICANT CHANGE UP (ref 1.6–2.6)
MCHC RBC-ENTMCNC: 26.3 PG — LOW (ref 27–34)
MCHC RBC-ENTMCNC: 29.3 GM/DL — LOW (ref 32–36)
MCV RBC AUTO: 89.9 FL — SIGNIFICANT CHANGE UP (ref 80–100)
PHOSPHATE SERPL-MCNC: 5.2 MG/DL — HIGH (ref 2.5–4.5)
PLATELET # BLD AUTO: 482 K/UL — HIGH (ref 150–400)
POTASSIUM SERPL-MCNC: 4.2 MMOL/L — SIGNIFICANT CHANGE UP (ref 3.5–5.3)
POTASSIUM SERPL-SCNC: 4.2 MMOL/L — SIGNIFICANT CHANGE UP (ref 3.5–5.3)
RBC # BLD: 3.38 M/UL — LOW (ref 3.8–5.2)
RBC # FLD: 19.2 % — HIGH (ref 10.3–14.5)
SODIUM SERPL-SCNC: 135 MMOL/L — SIGNIFICANT CHANGE UP (ref 135–145)
WBC # BLD: 17.6 K/UL — HIGH (ref 3.8–10.5)
WBC # FLD AUTO: 17.6 K/UL — HIGH (ref 3.8–10.5)

## 2018-03-20 PROCEDURE — 87186 SC STD MICRODIL/AGAR DIL: CPT

## 2018-03-20 PROCEDURE — 82962 GLUCOSE BLOOD TEST: CPT

## 2018-03-20 PROCEDURE — 82607 VITAMIN B-12: CPT

## 2018-03-20 PROCEDURE — 82746 ASSAY OF FOLIC ACID SERUM: CPT

## 2018-03-20 PROCEDURE — 83605 ASSAY OF LACTIC ACID: CPT

## 2018-03-20 PROCEDURE — 86900 BLOOD TYPING SEROLOGIC ABO: CPT

## 2018-03-20 PROCEDURE — 83036 HEMOGLOBIN GLYCOSYLATED A1C: CPT

## 2018-03-20 PROCEDURE — 93005 ELECTROCARDIOGRAM TRACING: CPT

## 2018-03-20 PROCEDURE — 82803 BLOOD GASES ANY COMBINATION: CPT

## 2018-03-20 PROCEDURE — 99285 EMERGENCY DEPT VISIT HI MDM: CPT | Mod: 25

## 2018-03-20 PROCEDURE — 99261: CPT

## 2018-03-20 PROCEDURE — 88305 TISSUE EXAM BY PATHOLOGIST: CPT

## 2018-03-20 PROCEDURE — 84443 ASSAY THYROID STIM HORMONE: CPT

## 2018-03-20 PROCEDURE — 82728 ASSAY OF FERRITIN: CPT

## 2018-03-20 PROCEDURE — 88304 TISSUE EXAM BY PATHOLOGIST: CPT

## 2018-03-20 PROCEDURE — 83519 RIA NONANTIBODY: CPT

## 2018-03-20 PROCEDURE — 86923 COMPATIBILITY TEST ELECTRIC: CPT

## 2018-03-20 PROCEDURE — 71045 X-RAY EXAM CHEST 1 VIEW: CPT

## 2018-03-20 PROCEDURE — 87798 DETECT AGENT NOS DNA AMP: CPT

## 2018-03-20 PROCEDURE — 36415 COLL VENOUS BLD VENIPUNCTURE: CPT

## 2018-03-20 PROCEDURE — 83735 ASSAY OF MAGNESIUM: CPT

## 2018-03-20 PROCEDURE — 87086 URINE CULTURE/COLONY COUNT: CPT

## 2018-03-20 PROCEDURE — 80048 BASIC METABOLIC PNL TOTAL CA: CPT

## 2018-03-20 PROCEDURE — 86901 BLOOD TYPING SEROLOGIC RH(D): CPT

## 2018-03-20 PROCEDURE — 80061 LIPID PANEL: CPT

## 2018-03-20 PROCEDURE — 86850 RBC ANTIBODY SCREEN: CPT

## 2018-03-20 PROCEDURE — 85610 PROTHROMBIN TIME: CPT

## 2018-03-20 PROCEDURE — 87633 RESP VIRUS 12-25 TARGETS: CPT

## 2018-03-20 PROCEDURE — 87070 CULTURE OTHR SPECIMN AEROBIC: CPT

## 2018-03-20 PROCEDURE — 36430 TRANSFUSION BLD/BLD COMPNT: CPT

## 2018-03-20 PROCEDURE — 83550 IRON BINDING TEST: CPT

## 2018-03-20 PROCEDURE — 87581 M.PNEUMON DNA AMP PROBE: CPT

## 2018-03-20 PROCEDURE — 87040 BLOOD CULTURE FOR BACTERIA: CPT

## 2018-03-20 PROCEDURE — 80202 ASSAY OF VANCOMYCIN: CPT

## 2018-03-20 PROCEDURE — 87486 CHLMYD PNEUM DNA AMP PROBE: CPT

## 2018-03-20 PROCEDURE — 80053 COMPREHEN METABOLIC PANEL: CPT

## 2018-03-20 PROCEDURE — P9040: CPT

## 2018-03-20 PROCEDURE — 85027 COMPLETE CBC AUTOMATED: CPT

## 2018-03-20 PROCEDURE — 81001 URINALYSIS AUTO W/SCOPE: CPT

## 2018-03-20 PROCEDURE — 84100 ASSAY OF PHOSPHORUS: CPT

## 2018-03-20 RX ORDER — VANCOMYCIN HCL 1 G
1 VIAL (EA) INTRAVENOUS
Qty: 7 | Refills: 0 | OUTPATIENT
Start: 2018-03-20 | End: 2018-04-02

## 2018-03-20 RX ADMIN — Medication 500 MILLIGRAM(S): at 11:32

## 2018-03-20 RX ADMIN — Medication 1 MILLIGRAM(S): at 11:32

## 2018-03-20 RX ADMIN — OXYCODONE AND ACETAMINOPHEN 1 TABLET(S): 5; 325 TABLET ORAL at 01:38

## 2018-03-20 RX ADMIN — OXYCODONE AND ACETAMINOPHEN 1 TABLET(S): 5; 325 TABLET ORAL at 02:33

## 2018-03-20 RX ADMIN — Medication 25 MILLIGRAM(S): at 06:39

## 2018-03-20 RX ADMIN — HEPARIN SODIUM 5000 UNIT(S): 5000 INJECTION INTRAVENOUS; SUBCUTANEOUS at 06:39

## 2018-03-20 RX ADMIN — Medication 1 APPLICATION(S): at 12:20

## 2018-03-20 RX ADMIN — Medication 100 MILLIGRAM(S): at 06:39

## 2018-03-20 RX ADMIN — PANTOPRAZOLE SODIUM 40 MILLIGRAM(S): 20 TABLET, DELAYED RELEASE ORAL at 06:39

## 2018-03-20 RX ADMIN — Medication 50 MICROGRAM(S): at 06:39

## 2018-03-20 RX ADMIN — OXYCODONE AND ACETAMINOPHEN 1 TABLET(S): 5; 325 TABLET ORAL at 12:06

## 2018-03-20 RX ADMIN — OXYCODONE AND ACETAMINOPHEN 1 TABLET(S): 5; 325 TABLET ORAL at 11:33

## 2018-03-20 RX ADMIN — CINACALCET 30 MILLIGRAM(S): 30 TABLET, FILM COATED ORAL at 11:33

## 2018-03-20 RX ADMIN — Medication 1 TABLET(S): at 11:32

## 2018-03-20 RX ADMIN — GABAPENTIN 100 MILLIGRAM(S): 400 CAPSULE ORAL at 06:39

## 2018-03-20 NOTE — PROGRESS NOTE ADULT - PROBLEM SELECTOR PLAN 3
Likely secondary to CKD  Hemoglobin baseline 9-10   Hb has been ranging around 8 since few days  no signs of bleeding   s/p 1 unit prbc this admission   tracing back history , patient had positive occult in Jan , EGD did not reveal ulcer   Dr. Cuba was consulted and at that time since there was no sig of bleeding and h/h was stable , it was decided to persue colonoscopy as outpatient  Will re-consult GI and follow recs   Continue with Epogen  , holding venofer in light of sepsis per nephro   Continue to monitor CBC

## 2018-03-20 NOTE — PROGRESS NOTE ADULT - PROBLEM SELECTOR PROBLEM 1
Sepsis
Sepsis
Abscess of back
Abscess of back
Abscess of multiple sites
ESRD (end stage renal disease)
Sepsis

## 2018-03-20 NOTE — PROGRESS NOTE ADULT - PROBLEM SELECTOR PROBLEM 5
ESRD (end stage renal disease)
Hypothyroidism

## 2018-03-20 NOTE — PROGRESS NOTE ADULT - PROBLEM SELECTOR PROBLEM 2
Hypothyroidism
Hypothyroidism
Abscess of hip, right
Abscess of hip, right
Anemia
Hypothyroidism
Sepsis
Hypothyroidism

## 2018-03-20 NOTE — PROGRESS NOTE ADULT - PROBLEM SELECTOR PLAN 1
Likely secondary infected sacral and back abscess with cultures + of MRSA in wound   S/p incision and drainage 3/8; 3/10 ; 3/19   started with iv vancomycin again , intra dialysis due to fever , leucocytosis , both of which trending down  Vanc trough post dialysis   Blood cultures negative to date   Infectious Disease Dr. Kaplan following  General surgery Dr. Wright following  Patient did not have peripheral iv access since last evening. Multiple attempts done by on call team and day team . Eventually central line was placed  in Left IJ for vascular access and iv antibiotics on 3/12  Consent obtained from sister , plan discussed with Dr. Goemz

## 2018-03-20 NOTE — PROGRESS NOTE ADULT - PROBLEM SELECTOR PLAN 8
IMPROVE VTE score = 2 for immobilization, lower extremity paralysis  Heparin for DVT chemoprophylaxis
HSS and accuchecks   A1c = 5.3
IMPROVE VTE score = 2 for immobilization, lower extremity paralysis  Heparin for DVT chemoprophylaxis

## 2018-03-20 NOTE — PROGRESS NOTE ADULT - PROBLEM SELECTOR PROBLEM 8
Need for prophylactic measure
Diabetes mellitus
Need for prophylactic measure

## 2018-03-20 NOTE — PROGRESS NOTE ADULT - SUBJECTIVE AND OBJECTIVE BOX
PGY 1 Note discussed with supervising resident and primary attending    Patient is a 53y old  Female who presents with a chief complaint of sent from NH due to leukocytosis and fever (07 Mar 2018 21:47)      INTERVAL HPI/OVERNIGHT EVENTS: complaints of some back pain     MEDICATIONS  (STANDING):  ascorbic acid 500 milliGRAM(s) Oral daily  cinacalcet 30 milliGRAM(s) Oral daily  collagenase Ointment 1 Application(s) Topical daily  docusate sodium 100 milliGRAM(s) Oral two times a day  epoetin jacqueline Injectable 72755 Unit(s) IV Push <User Schedule>  folic acid 1 milliGRAM(s) Oral daily  gabapentin 100 milliGRAM(s) Oral three times a day  heparin  Injectable 5000 Unit(s) SubCutaneous every 12 hours  insulin lispro (HumaLOG) corrective regimen sliding scale   SubCutaneous Before meals and at bedtime  levothyroxine 50 MICROGram(s) Oral daily  metoprolol     tartrate 25 milliGRAM(s) Oral two times a day  multivitamin 1 Tablet(s) Oral daily  pantoprazole    Tablet 40 milliGRAM(s) Oral before breakfast  senna 2 Tablet(s) Oral at bedtime  simvastatin 20 milliGRAM(s) Oral at bedtime    MEDICATIONS  (PRN):  acetaminophen   Tablet 650 milliGRAM(s) Oral every 6 hours PRN For Temp greater than 38 C (100.4 F)  oxyCODONE    5 mG/acetaminophen 325 mG 1 Tablet(s) Oral every 4 hours PRN Moderate Pain      __________________________________________________  REVIEW OF SYSTEMS:    CONSTITUTIONAL: No fever,   EYES: no acute visual disturbances  NECK: No pain or stiffness  RESPIRATORY: No cough; No shortness of breath  CARDIOVASCULAR: No chest pain, no palpitations  GASTROINTESTINAL: No pain. No nausea or vomiting; No diarrhea   NEUROLOGICAL: No headache or numbness, no tremors  MUSCULOSKELETAL: No joint pain, no muscle pain  GENITOURINARY: no dysuria, no frequency, no hesitancy  PSYCHIATRY: no depression , no anxiety  ALL OTHER  ROS negative        Vital Signs Last 24 Hrs  T(C): 36.8 (20 Mar 2018 08:21), Max: 37.2 (19 Mar 2018 23:54)  T(F): 98.2 (20 Mar 2018 08:21), Max: 98.9 (19 Mar 2018 23:54)  HR: 75 (20 Mar 2018 08:21) (73 - 89)  BP: 139/56 (20 Mar 2018 08:21) (114/50 - 153/62)  BP(mean): --  RR: 16 (20 Mar 2018 08:21) (10 - 17)  SpO2: 97% (20 Mar 2018 08:21) (96% - 100%)    ________________________________________________  PHYSICAL EXAM:  LIJ in neck placed on 3/12    GENERAL: NAD  , visually impaired   HEENT: Normocephalic;  conjunctivae and sclerae clear; moist mucous membranes;   NECK : supple  CHEST/LUNG: Clear to auscultation bilaterally with good air entry   HEART: S1 S2  regular; no murmurs, gallops or rubs  ABDOMEN: Soft, Nontender, Nondistended; Bowel sounds present  EXTREMITIES: no cyanosis; no edema; no calf tenderness  SKIN of back :Back-- 2, [id number] areas on left mid-back and right mid-back (I+D sites) with minimal amount of superficial exudate; 2, large areas of debridement noted on upper left and right buttock that are clean with minimal amount of superficial exudate , dressing in situ .   NERVOUS SYSTEM:  AAO*2    _________________________________________________  LABS:                        8.9    17.6  )-----------( 482      ( 20 Mar 2018 07:42 )             30.4     03-20    135  |  99  |  26<H>  ----------------------------<  93  4.2   |  27  |  3.98<H>    Ca    9.2      20 Mar 2018 07:42  Phos  5.2     03-20  Mg     2.0     03-20      PT/INR - ( 19 Mar 2018 10:44 )   PT: 11.7 sec;   INR: 1.07 ratio             CAPILLARY BLOOD GLUCOSE      POCT Blood Glucose.: 109 mg/dL (20 Mar 2018 08:38)  POCT Blood Glucose.: 103 mg/dL (19 Mar 2018 21:43)  POCT Blood Glucose.: 79 mg/dL (19 Mar 2018 16:31)  POCT Blood Glucose.: 97 mg/dL (19 Mar 2018 15:25)  POCT Blood Glucose.: 121 mg/dL (19 Mar 2018 11:01)        Consultant(s) Notes Reviewed:   YES    Care Discussed with Consultants : YES    Plan of care was discussed with patient and /or primary care giver; all questions and concerns were addressed and care was aligned with patient's wishes.

## 2018-03-20 NOTE — PROGRESS NOTE ADULT - ASSESSMENT
A/P   -ESRD   HD IN AM.    ON [facility] WITH HD  - HTN, STABLE BP.  - INFECTED WOUNDS ON HER BACK S/P DEBRIDEMENT

## 2018-03-20 NOTE — PROGRESS NOTE ADULT - PROBLEM SELECTOR PROBLEM 6
HTN (hypertension)
Anemia
HTN (hypertension)

## 2018-03-20 NOTE — PROGRESS NOTE ADULT - PROBLEM SELECTOR PROBLEM 7
Diabetes mellitus
HTN (hypertension)

## 2018-03-20 NOTE — PROGRESS NOTE ADULT - ASSESSMENT
s/p debridement of sacral decubitus ulcer POD#1  1. daily dressing changes with Dakin's solution  2. wound care orders placed  3. frequent turning  4. no further surgical intervention.  5. reconsult as needed

## 2018-03-20 NOTE — PROGRESS NOTE ADULT - PROVIDER SPECIALTY LIST ADULT
Hospitalist
Infectious Disease
Internal Medicine
Nephrology
Surgery
Nephrology
Internal Medicine
Infectious Disease
Internal Medicine
Internal Medicine

## 2018-03-20 NOTE — PROGRESS NOTE ADULT - PROBLEM SELECTOR PLAN 7
HSS and accuchecks   A1c = 5.3
Continue with hydralazine 50mg TID and metoprolol 25mg BID within parameters  Continue to monitor BP
HSS and accuchecks   A1c = 5.3
HSS and accuchecks   F/U A1c

## 2018-03-20 NOTE — PROGRESS NOTE ADULT - SUBJECTIVE AND OBJECTIVE BOX
s/p debridement of sacral decub    No Known Allergies    Hospital Medications:   MEDICATIONS  (STANDING):  ascorbic acid 500 milliGRAM(s) Oral daily  cinacalcet 30 milliGRAM(s) Oral daily  collagenase Ointment 1 Application(s) Topical daily  docusate sodium 100 milliGRAM(s) Oral two times a day  epoetin jacqueline Injectable 39934 Unit(s) IV Push <User Schedule>  folic acid 1 milliGRAM(s) Oral daily  gabapentin 100 milliGRAM(s) Oral three times a day  heparin  Injectable 5000 Unit(s) SubCutaneous every 12 hours  insulin lispro (HumaLOG) corrective regimen sliding scale   SubCutaneous Before meals and at bedtime  levothyroxine 50 MICROGram(s) Oral daily  metoprolol     tartrate 25 milliGRAM(s) Oral two times a day  multivitamin 1 Tablet(s) Oral daily  pantoprazole    Tablet 40 milliGRAM(s) Oral before breakfast  senna 2 Tablet(s) Oral at bedtime  simvastatin 20 milliGRAM(s) Oral at bedtime      VITALS:  T(F): 98.2 (03-20-18 @ 08:21), Max: 98.9 (03-19-18 @ 23:54)  HR: 75 (03-20-18 @ 08:21)  BP: 139/56 (03-20-18 @ 08:21)  RR: 16 (03-20-18 @ 08:21)  SpO2: 97% (03-20-18 @ 08:21)  Wt(kg): --      PHYSICAL EXAM:  Constitutional: NAD  HEENT: anicteric sclera, oropharynx clear, MMM  Neck: No JVD  Respiratory: CTAB, no wheezes, rales or rhonchi  Cardiovascular: S1, S2, RRR  Gastrointestinal: BS+, soft, NT/ND  Extremities: No cyanosis or clubbing. No peripheral edema  Neurological: A/O x 3, no focal deficits  Psychiatric: Normal mood, normal affect  Vascular Access: RT IJ PERMACATH     LABS:  03-20    135  |  99  |  26<H>  ----------------------------<  93  4.2   |  27  |  3.98<H>    Ca    9.2      20 Mar 2018 07:42  Phos  5.2     03-20  Mg     2.0     03-20      Creatinine Trend: 3.98 <--, 6.10 <--, 4.22 <--, 3.08 <--, 4.28 <--, 5.02 <--, 3.37 <--, 5.93 <--                        8.9    17.6  )-----------( 482      ( 20 Mar 2018 07:42 )             30.4     Urine Studies:      RADIOLOGY & ADDITIONAL STUDIES:

## 2018-03-20 NOTE — PROGRESS NOTE ADULT - PROBLEM SELECTOR PROBLEM 3
Anemia
ESRD (end stage renal disease)
Anemia

## 2018-03-20 NOTE — PROGRESS NOTE ADULT - PROBLEM SELECTOR PROBLEM 4
Heart failure

## 2018-03-20 NOTE — PROGRESS NOTE ADULT - SUBJECTIVE AND OBJECTIVE BOX
Post Operative Day #: 1    SUBJECTIVE: 53y Female s/p debridement of sacral decubitus ulcer     INTERVAL HPI/OVERNIGHT EVENTS:    c/o intermittent pain L hip, back pain  no overnight events      MEDICATIONS  (STANDING):  ascorbic acid 500 milliGRAM(s) Oral daily  cinacalcet 30 milliGRAM(s) Oral daily  collagenase Ointment 1 Application(s) Topical daily  docusate sodium 100 milliGRAM(s) Oral two times a day  epoetin jacqueline Injectable 23753 Unit(s) IV Push <User Schedule>  folic acid 1 milliGRAM(s) Oral daily  gabapentin 100 milliGRAM(s) Oral three times a day  heparin  Injectable 5000 Unit(s) SubCutaneous every 12 hours  insulin lispro (HumaLOG) corrective regimen sliding scale   SubCutaneous Before meals and at bedtime  levothyroxine 50 MICROGram(s) Oral daily  metoprolol     tartrate 25 milliGRAM(s) Oral two times a day  multivitamin 1 Tablet(s) Oral daily  pantoprazole    Tablet 40 milliGRAM(s) Oral before breakfast  senna 2 Tablet(s) Oral at bedtime  simvastatin 20 milliGRAM(s) Oral at bedtime    MEDICATIONS  (PRN):  acetaminophen   Tablet 650 milliGRAM(s) Oral every 6 hours PRN For Temp greater than 38 C (100.4 F)  oxyCODONE    5 mG/acetaminophen 325 mG 1 Tablet(s) Oral every 4 hours PRN Moderate Pain      OBJECTIVE:  Vital Signs Last 24 Hrs  T(C): 36.8 (20 Mar 2018 08:21), Max: 37.2 (19 Mar 2018 23:54)  T(F): 98.2 (20 Mar 2018 08:21), Max: 98.9 (19 Mar 2018 23:54)  HR: 75 (20 Mar 2018 08:21) (74 - 89)  BP: 139/56 (20 Mar 2018 08:21) (114/50 - 153/62)  BP(mean): --  RR: 16 (20 Mar 2018 08:21) (10 - 17)  SpO2: 97% (20 Mar 2018 08:21) (96% - 100%)    Physical Exam:    Gen: awake, alert oriented NAD  Back: sacral wound with minimal fibrinous debris, blue-green discharge from wound, 6x5cm with 2.5 cm undermining, dakin's solution dressing placed  Extremities: L hip wound clean base, 10x6cm with 1xcm undermining, wet to dry dressing placed.       I&O's Detail                            8.9    17.6  )-----------( 482      ( 20 Mar 2018 07:42 )             30.4     20 Mar 2018 07:42    135    |  99     |  26     ----------------------------<  93     4.2     |  27     |  3.98   19 Mar 2018 10:44    135    |  97     |  50     ----------------------------<  122    4.7     |  27     |  6.10   18 Mar 2018 06:20    136    |  98     |  37     ----------------------------<  93     4.4     |  27     |  4.22     Ca    9.2        20 Mar 2018 07:42  Ca    8.0        19 Mar 2018 10:44  Ca    8.1        18 Mar 2018 06:20  Phos  5.2       20 Mar 2018 07:42  Mg     2.0       20 Mar 2018 07:42      PT/INR - ( 19 Mar 2018 10:44 )   PT: 11.7 sec;   INR: 1.07 ratio

## 2018-03-23 LAB — SURGICAL PATHOLOGY FINAL REPORT - CH: SIGNIFICANT CHANGE UP

## 2018-04-05 ENCOUNTER — INPATIENT (INPATIENT)
Facility: HOSPITAL | Age: 53
LOS: 12 days | Discharge: NURSING, REHAB OR LONG-TERM CARE | DRG: 592 | End: 2018-04-18
Attending: FAMILY MEDICINE | Admitting: FAMILY MEDICINE
Payer: MEDICAID

## 2018-04-05 VITALS
WEIGHT: 100.97 LBS | TEMPERATURE: 99 F | DIASTOLIC BLOOD PRESSURE: 66 MMHG | HEART RATE: 86 BPM | HEIGHT: 67 IN | OXYGEN SATURATION: 97 % | SYSTOLIC BLOOD PRESSURE: 116 MMHG | RESPIRATION RATE: 16 BRPM

## 2018-04-05 DIAGNOSIS — N18.6 END STAGE RENAL DISEASE: ICD-10-CM

## 2018-04-05 DIAGNOSIS — D72.829 ELEVATED WHITE BLOOD CELL COUNT, UNSPECIFIED: ICD-10-CM

## 2018-04-05 DIAGNOSIS — D64.9 ANEMIA, UNSPECIFIED: ICD-10-CM

## 2018-04-05 DIAGNOSIS — Z29.9 ENCOUNTER FOR PROPHYLACTIC MEASURES, UNSPECIFIED: ICD-10-CM

## 2018-04-05 DIAGNOSIS — I10 ESSENTIAL (PRIMARY) HYPERTENSION: ICD-10-CM

## 2018-04-05 DIAGNOSIS — E11.9 TYPE 2 DIABETES MELLITUS WITHOUT COMPLICATIONS: ICD-10-CM

## 2018-04-05 DIAGNOSIS — E03.9 HYPOTHYROIDISM, UNSPECIFIED: ICD-10-CM

## 2018-04-05 LAB
ALBUMIN SERPL ELPH-MCNC: 2 G/DL — LOW (ref 3.5–5)
ALP SERPL-CCNC: 165 U/L — HIGH (ref 40–120)
ALT FLD-CCNC: 10 U/L DA — SIGNIFICANT CHANGE UP (ref 10–60)
ANION GAP SERPL CALC-SCNC: 7 MMOL/L — SIGNIFICANT CHANGE UP (ref 5–17)
ANION GAP SERPL CALC-SCNC: 9 MMOL/L — SIGNIFICANT CHANGE UP (ref 5–17)
APTT BLD: 29.6 SEC — SIGNIFICANT CHANGE UP (ref 27.5–37.4)
AST SERPL-CCNC: 13 U/L — SIGNIFICANT CHANGE UP (ref 10–40)
BASOPHILS # BLD AUTO: 0.1 K/UL — SIGNIFICANT CHANGE UP (ref 0–0.2)
BASOPHILS NFR BLD AUTO: 0.5 % — SIGNIFICANT CHANGE UP (ref 0–2)
BILIRUB SERPL-MCNC: 0.2 MG/DL — SIGNIFICANT CHANGE UP (ref 0.2–1.2)
BUN SERPL-MCNC: 30 MG/DL — HIGH (ref 7–18)
BUN SERPL-MCNC: 36 MG/DL — HIGH (ref 7–18)
CALCIUM SERPL-MCNC: 9.3 MG/DL — SIGNIFICANT CHANGE UP (ref 8.4–10.5)
CALCIUM SERPL-MCNC: 9.5 MG/DL — SIGNIFICANT CHANGE UP (ref 8.4–10.5)
CHLORIDE SERPL-SCNC: 100 MMOL/L — SIGNIFICANT CHANGE UP (ref 96–108)
CHLORIDE SERPL-SCNC: 101 MMOL/L — SIGNIFICANT CHANGE UP (ref 96–108)
CO2 SERPL-SCNC: 28 MMOL/L — SIGNIFICANT CHANGE UP (ref 22–31)
CO2 SERPL-SCNC: 30 MMOL/L — SIGNIFICANT CHANGE UP (ref 22–31)
CREAT SERPL-MCNC: 3.03 MG/DL — HIGH (ref 0.5–1.3)
CREAT SERPL-MCNC: 3.46 MG/DL — HIGH (ref 0.5–1.3)
EOSINOPHIL # BLD AUTO: 0.3 K/UL — SIGNIFICANT CHANGE UP (ref 0–0.5)
EOSINOPHIL NFR BLD AUTO: 1.5 % — SIGNIFICANT CHANGE UP (ref 0–6)
GLUCOSE BLDC GLUCOMTR-MCNC: 101 MG/DL — HIGH (ref 70–99)
GLUCOSE BLDC GLUCOMTR-MCNC: 108 MG/DL — HIGH (ref 70–99)
GLUCOSE BLDC GLUCOMTR-MCNC: 89 MG/DL — SIGNIFICANT CHANGE UP (ref 70–99)
GLUCOSE SERPL-MCNC: 116 MG/DL — HIGH (ref 70–99)
GLUCOSE SERPL-MCNC: 85 MG/DL — SIGNIFICANT CHANGE UP (ref 70–99)
HCT VFR BLD CALC: 31.9 % — LOW (ref 34.5–45)
HCT VFR BLD CALC: 33 % — LOW (ref 34.5–45)
HGB BLD-MCNC: 9.1 G/DL — LOW (ref 11.5–15.5)
HGB BLD-MCNC: 9.4 G/DL — LOW (ref 11.5–15.5)
INR BLD: 1.01 RATIO — SIGNIFICANT CHANGE UP (ref 0.88–1.16)
LACTATE SERPL-SCNC: 1.1 MMOL/L — SIGNIFICANT CHANGE UP (ref 0.7–2)
LYMPHOCYTES # BLD AUTO: 16 % — SIGNIFICANT CHANGE UP (ref 13–44)
LYMPHOCYTES # BLD AUTO: 18.6 % — SIGNIFICANT CHANGE UP (ref 13–44)
LYMPHOCYTES # BLD AUTO: 3.4 K/UL — HIGH (ref 1–3.3)
MCHC RBC-ENTMCNC: 25.9 PG — LOW (ref 27–34)
MCHC RBC-ENTMCNC: 26 PG — LOW (ref 27–34)
MCHC RBC-ENTMCNC: 28.4 GM/DL — LOW (ref 32–36)
MCHC RBC-ENTMCNC: 28.7 GM/DL — LOW (ref 32–36)
MCV RBC AUTO: 90.7 FL — SIGNIFICANT CHANGE UP (ref 80–100)
MCV RBC AUTO: 91.3 FL — SIGNIFICANT CHANGE UP (ref 80–100)
MONOCYTES # BLD AUTO: 1.4 K/UL — HIGH (ref 0–0.9)
MONOCYTES NFR BLD AUTO: 6 % — SIGNIFICANT CHANGE UP (ref 2–14)
MONOCYTES NFR BLD AUTO: 8 % — SIGNIFICANT CHANGE UP (ref 2–14)
NEUTROPHILS # BLD AUTO: 12.9 K/UL — HIGH (ref 1.8–7.4)
NEUTROPHILS NFR BLD AUTO: 71.4 % — SIGNIFICANT CHANGE UP (ref 43–77)
NEUTROPHILS NFR BLD AUTO: 78 % — HIGH (ref 43–77)
PLATELET # BLD AUTO: 368 K/UL — SIGNIFICANT CHANGE UP (ref 150–400)
PLATELET # BLD AUTO: 380 K/UL — SIGNIFICANT CHANGE UP (ref 150–400)
POTASSIUM SERPL-MCNC: 3.6 MMOL/L — SIGNIFICANT CHANGE UP (ref 3.5–5.3)
POTASSIUM SERPL-MCNC: 3.9 MMOL/L — SIGNIFICANT CHANGE UP (ref 3.5–5.3)
POTASSIUM SERPL-SCNC: 3.6 MMOL/L — SIGNIFICANT CHANGE UP (ref 3.5–5.3)
POTASSIUM SERPL-SCNC: 3.9 MMOL/L — SIGNIFICANT CHANGE UP (ref 3.5–5.3)
PROCALCITONIN SERPL-MCNC: 1.47 NG/ML — HIGH (ref 0–0.04)
PROT SERPL-MCNC: 7.4 G/DL — SIGNIFICANT CHANGE UP (ref 6–8.3)
PROTHROM AB SERPL-ACNC: 11 SEC — SIGNIFICANT CHANGE UP (ref 9.8–12.7)
RBC # BLD: 3.52 M/UL — LOW (ref 3.8–5.2)
RBC # BLD: 3.62 M/UL — LOW (ref 3.8–5.2)
RBC # FLD: 19.3 % — HIGH (ref 10.3–14.5)
RBC # FLD: 19.6 % — HIGH (ref 10.3–14.5)
SODIUM SERPL-SCNC: 137 MMOL/L — SIGNIFICANT CHANGE UP (ref 135–145)
SODIUM SERPL-SCNC: 138 MMOL/L — SIGNIFICANT CHANGE UP (ref 135–145)
WBC # BLD: 18 K/UL — HIGH (ref 3.8–10.5)
WBC # BLD: 19.4 K/UL — HIGH (ref 3.8–10.5)
WBC # FLD AUTO: 18 K/UL — HIGH (ref 3.8–10.5)
WBC # FLD AUTO: 19.4 K/UL — HIGH (ref 3.8–10.5)

## 2018-04-05 PROCEDURE — 99285 EMERGENCY DEPT VISIT HI MDM: CPT | Mod: 25

## 2018-04-05 PROCEDURE — 71045 X-RAY EXAM CHEST 1 VIEW: CPT | Mod: 26

## 2018-04-05 RX ORDER — FERROUS SULFATE 325(65) MG
300 TABLET ORAL DAILY
Qty: 0 | Refills: 0 | Status: DISCONTINUED | OUTPATIENT
Start: 2018-04-05 | End: 2018-04-18

## 2018-04-05 RX ORDER — OXYCODONE AND ACETAMINOPHEN 5; 325 MG/1; MG/1
1 TABLET ORAL EVERY 6 HOURS
Qty: 0 | Refills: 0 | Status: DISCONTINUED | OUTPATIENT
Start: 2018-04-05 | End: 2018-04-11

## 2018-04-05 RX ORDER — VANCOMYCIN HCL 1 G
1000 VIAL (EA) INTRAVENOUS
Qty: 0 | Refills: 0 | Status: COMPLETED | OUTPATIENT
Start: 2018-04-05 | End: 2018-04-06

## 2018-04-05 RX ORDER — INSULIN LISPRO 100/ML
VIAL (ML) SUBCUTANEOUS
Qty: 0 | Refills: 0 | Status: DISCONTINUED | OUTPATIENT
Start: 2018-04-05 | End: 2018-04-18

## 2018-04-05 RX ORDER — DEXTROSE 50 % IN WATER 50 %
12.5 SYRINGE (ML) INTRAVENOUS ONCE
Qty: 0 | Refills: 0 | Status: DISCONTINUED | OUTPATIENT
Start: 2018-04-05 | End: 2018-04-16

## 2018-04-05 RX ORDER — ERYTHROPOIETIN 10000 [IU]/ML
4000 INJECTION, SOLUTION INTRAVENOUS; SUBCUTANEOUS
Qty: 0 | Refills: 0 | Status: COMPLETED | OUTPATIENT
Start: 2018-04-05 | End: 2018-04-13

## 2018-04-05 RX ORDER — METOPROLOL TARTRATE 50 MG
25 TABLET ORAL
Qty: 0 | Refills: 0 | Status: DISCONTINUED | OUTPATIENT
Start: 2018-04-05 | End: 2018-04-18

## 2018-04-05 RX ORDER — HEPARIN SODIUM 5000 [USP'U]/ML
5000 INJECTION INTRAVENOUS; SUBCUTANEOUS EVERY 8 HOURS
Qty: 0 | Refills: 0 | Status: DISCONTINUED | OUTPATIENT
Start: 2018-04-05 | End: 2018-04-18

## 2018-04-05 RX ORDER — GLUCAGON INJECTION, SOLUTION 0.5 MG/.1ML
1 INJECTION, SOLUTION SUBCUTANEOUS ONCE
Qty: 0 | Refills: 0 | Status: DISCONTINUED | OUTPATIENT
Start: 2018-04-05 | End: 2018-04-16

## 2018-04-05 RX ORDER — DOCUSATE SODIUM 100 MG
100 CAPSULE ORAL
Qty: 0 | Refills: 0 | Status: DISCONTINUED | OUTPATIENT
Start: 2018-04-05 | End: 2018-04-18

## 2018-04-05 RX ORDER — CINACALCET 30 MG/1
30 TABLET, FILM COATED ORAL DAILY
Qty: 0 | Refills: 0 | Status: DISCONTINUED | OUTPATIENT
Start: 2018-04-05 | End: 2018-04-18

## 2018-04-05 RX ORDER — SENNA PLUS 8.6 MG/1
2 TABLET ORAL AT BEDTIME
Qty: 0 | Refills: 0 | Status: DISCONTINUED | OUTPATIENT
Start: 2018-04-05 | End: 2018-04-18

## 2018-04-05 RX ORDER — MEROPENEM 1 G/30ML
500 INJECTION INTRAVENOUS EVERY 24 HOURS
Qty: 0 | Refills: 0 | Status: DISCONTINUED | OUTPATIENT
Start: 2018-04-05 | End: 2018-04-18

## 2018-04-05 RX ORDER — DEXTROSE 50 % IN WATER 50 %
25 SYRINGE (ML) INTRAVENOUS ONCE
Qty: 0 | Refills: 0 | Status: DISCONTINUED | OUTPATIENT
Start: 2018-04-05 | End: 2018-04-16

## 2018-04-05 RX ORDER — DEXTROSE 50 % IN WATER 50 %
1 SYRINGE (ML) INTRAVENOUS ONCE
Qty: 0 | Refills: 0 | Status: DISCONTINUED | OUTPATIENT
Start: 2018-04-05 | End: 2018-04-16

## 2018-04-05 RX ORDER — SODIUM CHLORIDE 9 MG/ML
3 INJECTION INTRAMUSCULAR; INTRAVENOUS; SUBCUTANEOUS ONCE
Qty: 0 | Refills: 0 | Status: COMPLETED | OUTPATIENT
Start: 2018-04-05 | End: 2018-04-05

## 2018-04-05 RX ORDER — SODIUM CHLORIDE 9 MG/ML
1000 INJECTION, SOLUTION INTRAVENOUS
Qty: 0 | Refills: 0 | Status: DISCONTINUED | OUTPATIENT
Start: 2018-04-05 | End: 2018-04-16

## 2018-04-05 RX ORDER — PIPERACILLIN AND TAZOBACTAM 4; .5 G/20ML; G/20ML
3.38 INJECTION, POWDER, LYOPHILIZED, FOR SOLUTION INTRAVENOUS ONCE
Qty: 0 | Refills: 0 | Status: COMPLETED | OUTPATIENT
Start: 2018-04-05 | End: 2018-04-05

## 2018-04-05 RX ORDER — FOLIC ACID 0.8 MG
1 TABLET ORAL DAILY
Qty: 0 | Refills: 0 | Status: DISCONTINUED | OUTPATIENT
Start: 2018-04-05 | End: 2018-04-18

## 2018-04-05 RX ORDER — SIMVASTATIN 20 MG/1
20 TABLET, FILM COATED ORAL AT BEDTIME
Qty: 0 | Refills: 0 | Status: DISCONTINUED | OUTPATIENT
Start: 2018-04-05 | End: 2018-04-18

## 2018-04-05 RX ORDER — LEVOTHYROXINE SODIUM 125 MCG
50 TABLET ORAL DAILY
Qty: 0 | Refills: 0 | Status: DISCONTINUED | OUTPATIENT
Start: 2018-04-05 | End: 2018-04-18

## 2018-04-05 RX ORDER — ASCORBIC ACID 60 MG
500 TABLET,CHEWABLE ORAL DAILY
Qty: 0 | Refills: 0 | Status: DISCONTINUED | OUTPATIENT
Start: 2018-04-05 | End: 2018-04-18

## 2018-04-05 RX ORDER — PANTOPRAZOLE SODIUM 20 MG/1
40 TABLET, DELAYED RELEASE ORAL
Qty: 0 | Refills: 0 | Status: DISCONTINUED | OUTPATIENT
Start: 2018-04-05 | End: 2018-04-18

## 2018-04-05 RX ORDER — SEVELAMER CARBONATE 2400 MG/1
1600 POWDER, FOR SUSPENSION ORAL THREE TIMES A DAY
Qty: 0 | Refills: 0 | Status: DISCONTINUED | OUTPATIENT
Start: 2018-04-05 | End: 2018-04-05

## 2018-04-05 RX ORDER — ACETAMINOPHEN 500 MG
650 TABLET ORAL EVERY 6 HOURS
Qty: 0 | Refills: 0 | Status: DISCONTINUED | OUTPATIENT
Start: 2018-04-05 | End: 2018-04-18

## 2018-04-05 RX ORDER — HYDRALAZINE HCL 50 MG
50 TABLET ORAL THREE TIMES A DAY
Qty: 0 | Refills: 0 | Status: DISCONTINUED | OUTPATIENT
Start: 2018-04-05 | End: 2018-04-18

## 2018-04-05 RX ORDER — GABAPENTIN 400 MG/1
100 CAPSULE ORAL THREE TIMES A DAY
Qty: 0 | Refills: 0 | Status: DISCONTINUED | OUTPATIENT
Start: 2018-04-05 | End: 2018-04-18

## 2018-04-05 RX ORDER — VANCOMYCIN HCL 1 G
1000 VIAL (EA) INTRAVENOUS ONCE
Qty: 0 | Refills: 0 | Status: COMPLETED | OUTPATIENT
Start: 2018-04-05 | End: 2018-04-05

## 2018-04-05 RX ADMIN — HEPARIN SODIUM 5000 UNIT(S): 5000 INJECTION INTRAVENOUS; SUBCUTANEOUS at 22:24

## 2018-04-05 RX ADMIN — GABAPENTIN 100 MILLIGRAM(S): 400 CAPSULE ORAL at 17:15

## 2018-04-05 RX ADMIN — PIPERACILLIN AND TAZOBACTAM 200 GRAM(S): 4; .5 INJECTION, POWDER, LYOPHILIZED, FOR SOLUTION INTRAVENOUS at 05:29

## 2018-04-05 RX ADMIN — Medication 300 MILLIGRAM(S): at 12:05

## 2018-04-05 RX ADMIN — SENNA PLUS 2 TABLET(S): 8.6 TABLET ORAL at 22:24

## 2018-04-05 RX ADMIN — Medication 25 MILLIGRAM(S): at 19:23

## 2018-04-05 RX ADMIN — PANTOPRAZOLE SODIUM 40 MILLIGRAM(S): 20 TABLET, DELAYED RELEASE ORAL at 08:50

## 2018-04-05 RX ADMIN — MEROPENEM 100 MILLIGRAM(S): 1 INJECTION INTRAVENOUS at 10:17

## 2018-04-05 RX ADMIN — HEPARIN SODIUM 5000 UNIT(S): 5000 INJECTION INTRAVENOUS; SUBCUTANEOUS at 17:15

## 2018-04-05 RX ADMIN — Medication 500 MILLIGRAM(S): at 12:06

## 2018-04-05 RX ADMIN — Medication 100 MILLIGRAM(S): at 18:36

## 2018-04-05 RX ADMIN — Medication 50 MILLIGRAM(S): at 22:24

## 2018-04-05 RX ADMIN — SIMVASTATIN 20 MILLIGRAM(S): 20 TABLET, FILM COATED ORAL at 22:25

## 2018-04-05 RX ADMIN — SODIUM CHLORIDE 3 MILLILITER(S): 9 INJECTION INTRAMUSCULAR; INTRAVENOUS; SUBCUTANEOUS at 03:02

## 2018-04-05 RX ADMIN — GABAPENTIN 100 MILLIGRAM(S): 400 CAPSULE ORAL at 22:25

## 2018-04-05 RX ADMIN — Medication 1 MILLIGRAM(S): at 12:06

## 2018-04-05 RX ADMIN — CINACALCET 30 MILLIGRAM(S): 30 TABLET, FILM COATED ORAL at 12:05

## 2018-04-05 RX ADMIN — Medication 250 MILLIGRAM(S): at 03:56

## 2018-04-05 RX ADMIN — Medication 1 TABLET(S): at 12:16

## 2018-04-05 NOTE — ED PROVIDER NOTE - SKIN, MLM
Ulceration to mid back with purulent discharge; ulceration to R hip with purulent discharge; sacral ulcer with purulent discharge

## 2018-04-05 NOTE — H&P ADULT - HISTORY OF PRESENT ILLNESS
52 y/o F from [place], bedbound due to difficulty walking likely 2/2 to polyneuropathy with PMHx of anemia, HFpEF, ESRD on HD, Clostridium difficile infection, DM, Diabetic neuropathy, HTN, HLD, Hypothyroidism, OM of jaw, Parathyroid adenoma, and GERD was sent from NH due to leukocytosis of 20. Patient denies any complaints on admission including cough, SOB, chest pain, abdominal pain, nausea, vomiting, diarrhea, or pain at the sacral decubitus ulcer site. In the ED, patient was afebrile with leukocytosis of 19.4. She was last admitted at [facility] in March for sepsis. She underwent I&D on 3/8/18 of decubitus ulcer. Wound cx grew MRSA and patient continued to be febrile so she underwent redebridement on 3/18/18 and completed 2 weeks of Vancomycin on discharge. Currently she denies any complaints at all.    SH: Denies smoking, alcohol or illicit drug use. Wheelchair bound.   NKDA

## 2018-04-05 NOTE — ED ADULT NURSE REASSESSMENT NOTE - NS ED NURSE REASSESS COMMENT FT1
4/5/18, 9:00 AM   multiple wounds;  1. R back stage 3: 4 x3 x1, pink bed, no purulence or foul odor  2. L back stage 3: 6 x3 x 2.5cm (undermining) pink bed, + fibrinous debris adherent to wound, no purulence or foul odor  3. Sacrum stage 4: 7 x 5 x 2.5cm (undermining) pink bed, small area of adherent fibrinous debris, no purulence or foul odor  Pelvic: covering by guaze   4. R hip stage 3:8 x5 x 1cm, minimal area of blue-green discharge, mild fruity odor, no purulence : covering by aaliyah

## 2018-04-05 NOTE — H&P ADULT - PROBLEM SELECTOR PLAN 1
Afebrile, no lactate, leukocytosis of 19.4  Patient is comfortable, does not look toxic   Most likely source is wound infection  - f/u UA to rule out UTI  - f/u blood cx, wound cx  - f/u procalcitonin  - hx of MRSA in the past, therefore will start Vancomycin and Meropenem. Patient is s/p 1 dose of Vanc and Zosyn in ED. c/w Vanc 1gm on MWF (HD days) and Meropenem daily  - wound care

## 2018-04-05 NOTE — ED ADULT NURSE NOTE - OBJECTIVE STATEMENT
Pt was sent from nursing home for abnormal labs. Pt denies any complains. Pt not in any acute distress.

## 2018-04-05 NOTE — ED PROVIDER NOTE - PMH
Anemia    CHF (congestive heart failure)    CKD (chronic kidney disease)    Clostridium difficile infection    Diabetes mellitus    Diabetic neuropathy    ESRD (end stage renal disease) on dialysis    HTN (hypertension)    Hypothyroidism    Osteomyelitis of jaw    Parathyroid adenoma

## 2018-04-05 NOTE — H&P ADULT - NSHPREVIEWOFSYSTEMS_GEN_ALL_CORE
REVIEW OF SYSTEMS:  CONSTITUTIONAL: No fever, weight loss, or fatigue  EYES: No eye pain, visual disturbances, or discharge  ENMT:  No difficulty hearing, tinnitus, vertigo; No sinus or throat pain  NECK: No pain or stiffness  BREASTS: No pain, masses, or nipple discharge  RESPIRATORY: No cough, wheezing, chills or hemoptysis; No shortness of breath  CARDIOVASCULAR: No chest pain, palpitations, dizziness, or leg swelling  GASTROINTESTINAL: No abdominal or epigastric pain. No nausea, vomiting, or hematemesis; No diarrhea or constipation. No melena or hematochezia.  GENITOURINARY: No dysuria, frequency, hematuria, or incontinence  NEUROLOGICAL: No headaches, memory loss, loss of strength, numbness, or tremors  ENDOCRINE: No heat or cold intolerance; No hair loss  MUSCULOSKELETAL: No joint pain or swelling; No muscle, back, or extremity pain

## 2018-04-05 NOTE — ED ADULT NURSE NOTE - ED STAT RN HANDOFF DETAILS
Pt endorsed to SUSAN Tobar in stable conditions. Pt endorsed to SUSAN Tobar in stable conditions.  Patient was in dialysis ; not physically in ER holding when report was given by SUSAN Gillespie at 19:01 on 4/06/2018.  At 21:06 on 4/6/2018 dialysis was contacted and informed patient had been assigned a bed in [address] [place] [address] room [facility].

## 2018-04-05 NOTE — ED PROVIDER NOTE - MEDICAL DECISION MAKING DETAILS
54 y/o nursing home pt present with leukocytosis. Will repeat labs, send blood cultures, and giev abx empirically for likley sore infected skin wound. 52 y/o nursing home pt present with leukocytosis. Will repeat labs, send blood cultures, and giev abx empirically for likely from infected skin wound.

## 2018-04-05 NOTE — ADVANCED PRACTICE NURSE CONSULT - ASSESSMENT
This is a 53yr old female patient admitted for Leukocytosis, to which a wound care consultation was placed for evaluation of the Mid Back and R. Hip Abscess's and Coccyx Pressure Injury. At this time, the patient was in the middle of a procedure and a wound care consultation was not able to be performed. I will make another attempt at a later time; I will continue to monitor

## 2018-04-05 NOTE — CONSULT NOTE ADULT - ATTENDING COMMENTS
As above  Previously debrided ulcers ion back, sacrum and R hip are granulating well    No need for surgical debridement    Will use Santyl and observe

## 2018-04-05 NOTE — H&P ADULT - ASSESSMENT
52 y/o F from [place], bedbound due to difficulty walking likely 2/2 to polyneuropathy with PMHx of anemia, HFpEF, ESRD on HD, Clostridium difficile infection, DM, Diabetic neuropathy, HTN, HLD, Hypothyroidism, OM of jaw, Parathyroid adenoma, and GERD was sent from NH due to leukocytosis of 20. She is admitted to rule out underlying infectious etiology.

## 2018-04-05 NOTE — CONSULT NOTE ADULT - ASSESSMENT
53 YR OLD FEMALE WITH ESRD ON HD @ [facility] MWF LAST DIALYSIS ON WEDNESDAY. RECENT ADMISSION TO [facility] WITH LEUCOCYTOSIS SEC TO RT HIP ABCESS WHICH GREW MRA. SHE JUST COMPLETED A COURSE OF IV VANCOMYCIN IN DIALYSIS UNIT. SENT FROM NH WITH LEUCOCYTOSIS OF 20K. IN PANCULTURES SENT AND STARTED ON BROAD SPECTRUM ANTIBIOTICS. RENAL CONSULTED A ON DIALYSIS.     RECS:  HD IN AM VIA PERMACATH  BROAD SPETRUM ABX PENDING CULTURES.   RPT IPTH IN AM AS PT ON SENSIPAR AND PTH HAS BEEN RUNNING LOW IN DIALYSIS  RECENT HYPERCALEMIA RESOLVED ON SENSIPAR  PROCRIT ON HD    MANY THANKS FOR THE CONSULTATION

## 2018-04-05 NOTE — H&P ADULT - NSHPPHYSICALEXAM_GEN_ALL_CORE
Vital Signs Last 24 Hrs  T(C): 37 (05 Apr 2018 00:18), Max: 37 (05 Apr 2018 00:18)  T(F): 98.6 (05 Apr 2018 00:18), Max: 98.6 (05 Apr 2018 00:18)  HR: 86 (05 Apr 2018 00:18) (86 - 86)  BP: 116/66 (05 Apr 2018 00:18) (116/66 - 116/66)  BP(mean): --  RR: 16 (05 Apr 2018 00:18) (16 - 16)  SpO2: 97% (05 Apr 2018 00:18) (97% - 97%)    GENERAL: NAD  HEAD:  Atraumatic, Normocephalic  EYES: EOMI, PERRLA, conjunctiva and sclera clear  ENMT: Moist mucous membranes  NECK: Supple  NERVOUS SYSTEM:  Alert & Oriented X3  CHEST/LUNG: Clear to auscultation bilaterally; No rales, rhonchi, wheezing, or rubs  HEART: Regular rate and rhythm; No murmurs, rubs, or gallops  ABDOMEN: Soft, Nontender, Nondistended; Bowel sounds present  EXTREMITIES:  2+ Peripheral Pulses, No clubbing, cyanosis, or edema  SKIN: large decubitus ulcer stage IV, foul smelling with increased drainage, borders clean, no sorrounding cellulitis

## 2018-04-05 NOTE — ED ADULT NURSE REASSESSMENT NOTE - NS ED NURSE REASSESS COMMENT FT1
Received pt from main ER - brought by nurse MINH CABA to holding area with family at bed side , axox3 , awaiting for bed availability . Received pt from main ER at 1700 pm . - brought by nurse MINH CABA to holding area with family at bed side , axox3 , awaiting for bed availability .

## 2018-04-05 NOTE — H&P ADULT - ATTENDING COMMENTS
Patient seen/evaluated at bedside 4/5/2018. I agree with the resident progress note/outlined plan of care. My independent findings and conclusions are documented. Patient seen/evaluated at bedside 4/5/2018. I agree with the resident progress note/outlined plan of care. My independent findings and conclusions are documented.    Briefly, this is a 52 y/o bedbound female w pmhx of diastolic dysfunction, h/o c diff infection, DM T II, diabetic neuropathy, h/o osteomyelitis of her jaw sent from NH for leukocytosis of 20. No chest pain, sob, cough, sputum production, diarrhea, abdominal pain, nausea/vomiting. Patient is status post recent hospitalization March (March 7th-March 20th) for sepsis s/t mutiple abscesses, infected decub ulcers s/p debridement. Her leucocyte count at discharge was 17.6. Now here w/ WBC of 19.4 She recently completed a course of vancomycin. Patient reports pain at areas of prior ulceration are improved.     Pmhx/meds/all/ros/famhx/sochx as per details of the H&P  right chest wall permacath site clean/dry  back ulcers, hip ulcers, sacral decub appears clean though some  slightly foul drainage from upper back ulcerations      1. leukocytosis  2MRSA abscesses of back/hip  2. anemia in ckd  3. ESRD on HD  4. DM T II w/ retinopathy/ polyneuropathy/ CKD on chronic dialysis on long-term insulin  5. chronic diastolic chf (w/out clinical decompensation)  6. ambulatory dysfunction  7. severe protein calorie malnutrition    have asked general surgery to re-eval wounds though unclear that this is clearly the source of   It is noted that her leukocytosis is not really too much higher than at the time of discharge  Currently on broad spectrum abx with meropenem and vancomycin w/ HD  -f/u blood cultures. Procalcitonin may not be as useful in ESRD patients on hemodialysis  -ID consulted: Dr. Kaplan  -q 2 hour turns, heel offloading  -nephrology for hemodialysis, Dr. Escoto

## 2018-04-05 NOTE — H&P ADULT - NSHPLABSRESULTS_GEN_ALL_CORE
9.1    19.4  )-----------( 368      ( 05 Apr 2018 02:32 )             31.9     04-05    137  |  100  |  30<H>  ----------------------------<  116<H>  3.9   |  28  |  3.03<H>    Ca    9.3      05 Apr 2018 02:32    TPro  7.4  /  Alb  2.0<L>  /  TBili  0.2  /  DBili  x   /  AST  13  /  ALT  10  /  AlkPhos  165<H>  04-05

## 2018-04-05 NOTE — CONSULT NOTE ADULT - SUBJECTIVE AND OBJECTIVE BOX
Surgery  Consultation Note    Patient is a 53y old  Female who presents with a chief complaint of Leukoytosis (2018 06:03)      HPI:  54 y/o F from [place], bedbound h/o debridement of multiple decubitus ulcers 3/18/18 admitted for fever and leukocytosis.  Surgery consulted for evaluation of previously debrided decubiti.  Pt denies pain at site of wounds.  Pt denies cough, SOB, chest pain, abdominal pain, nausea, vomiting, diarrhea, or pain at the sacral decubitus ulcer site. In the ED, patient was afebrile with leukocytosis of 19.4. SH: Denies smoking, alcohol or illicit drug use. Wheelchair bound.     NKDA     PAST MEDICAL & SURGICAL HISTORY:  ESRD (end stage renal disease) on dialysis  Clostridium difficile infection  Osteomyelitis of jaw  Parathyroid adenoma  Hypothyroidism  CKD (chronic kidney disease)  Anemia  CHF (congestive heart failure)  Diabetic neuropathy  Diabetes mellitus  HTN (hypertension)  S/P :   No Past Surgical History      Allergies    No Known Allergies    Intolerances        MEDICATIONS  (STANDING):  ascorbic acid 500 milliGRAM(s) Oral daily  cinacalcet 30 milliGRAM(s) Oral daily  dextrose 5%. 1000 milliLiter(s) (50 mL/Hr) IV Continuous <Continuous>  dextrose 50% Injectable 12.5 Gram(s) IV Push once  dextrose 50% Injectable 25 Gram(s) IV Push once  dextrose 50% Injectable 25 Gram(s) IV Push once  docusate sodium 100 milliGRAM(s) Oral two times a day  epoetin jacqueline Injectable 4000 Unit(s) IV Push <User Schedule>  ferrous    sulfate Liquid 300 milliGRAM(s) Enteral Tube daily  folic acid 1 milliGRAM(s) Oral daily  gabapentin 100 milliGRAM(s) Oral three times a day  heparin  Injectable 5000 Unit(s) SubCutaneous every 8 hours  hydrALAZINE 50 milliGRAM(s) Oral three times a day  insulin lispro (HumaLOG) corrective regimen sliding scale   SubCutaneous three times a day before meals  levothyroxine 50 MICROGram(s) Oral daily  meropenem  IVPB 500 milliGRAM(s) IV Intermittent every 24 hours  metoprolol tartrate 25 milliGRAM(s) Oral two times a day  multivitamin 1 Tablet(s) Oral daily  multivitamin 1 Tablet(s) Oral daily  pantoprazole    Tablet 40 milliGRAM(s) Oral before breakfast  senna 2 Tablet(s) Oral at bedtime  simvastatin 20 milliGRAM(s) Oral at bedtime  vancomycin  IVPB 1000 milliGRAM(s) IV Intermittent <User Schedule>    MEDICATIONS  (PRN):  acetaminophen   Tablet 650 milliGRAM(s) Oral every 6 hours PRN For Temp greater than 38 C (100.4 F) or mild pain  dextrose Gel 1 Dose(s) Oral once PRN Blood Glucose LESS THAN 70 milliGRAM(s)/deciliter  glucagon  Injectable 1 milliGRAM(s) IntraMuscular once PRN Glucose LESS THAN 70 milligrams/deciliter  oxyCODONE    5 mG/acetaminophen 325 mG 1 Tablet(s) Oral every 6 hours PRN Moderate Pain (4 - 6)      Vital Signs Last 24 Hrs  T(C): 36.8 (2018 15:23), Max: 37 (2018 00:18)  T(F): 98.3 (2018 15:23), Max: 98.6 (2018 00:18)  HR: 84 (2018 15:23) (77 - 86)  BP: 124/59 (2018 15:23) (116/66 - 143/61)  BP(mean): --  RR: 18 (2018 15:23) (16 - 18)  SpO2: 100% (2018 15:23) (97% - 100%)    Physical Exam:  Gen: awake, alert oriented NAD  Abd: soft NT ND  Back: multiple decubiti  1. R flank stage 3 4x3x1, pink base, no purulence or foul odor  2. L flank stage 3 6x3x2.5cm (undermining) with pink base, + fibrinous debris adherent to wound, no purulence or foul odor  3. Sacrum stage 4 7x5x2.5cm (undermining) with pink base, small area of adherent fibrinous debris, no purulence or foul odor  Pelvic:  R hip decubit stage 3 [id number], minimal area of blue-green discharge, mild fruity odor, no purulence  Ext:    Labs:                          9.4    18.0  )-----------( 380      ( 2018 10:55 )             33.0     04-05    138  |  101  |  36<H>  ----------------------------<  85  3.6   |  30  |  3.46<H>    Ca    9.5      2018 10:55    TPro  7.4  /  Alb  2.0<L>  /  TBili  0.2  /  DBili  x   /  AST  13  /  ALT  10  /  AlkPhos  165<H>  04-05    PT/INR - ( 2018 02:32 )   PT: 11.0 sec;   INR: 1.01 ratio         PTT - ( 2018 02:32 )  PTT:29.6 sec

## 2018-04-05 NOTE — CONSULT NOTE ADULT - SUBJECTIVE AND OBJECTIVE BOX
53 YR OLD FEMALE WITH ESRD ON HD @ [facility] MWF LAST DIALYSIS ON WEDNESDAY. RECENT ADMISSION TO [facility] WITH LEUCOCYTOSIS SEC TO RT HIP ABCESS WHICH GREW MRA. SHE JUST COMPLETED A COURSE OF IV VANCOMYCIN IN DIALYSIS UNIT. SENT FROM NH WITH LEUCOCYTOSIS OF 20K. IN PANCULTURES SENT AND STARTED ON [facility] [employer]. RENAL CONSULTED A ON DIALYSIS.     PAST MEDICAL & SURGICAL HISTORY:  ESRD (end stage renal disease) on dialysis  Clostridium difficile infection  Osteomyelitis of jaw  Parathyroid adenoma  Hypothyroidism  CKD (chronic kidney disease)  Anemia  CHF (congestive heart failure)  Diabetic neuropathy  Diabetes mellitus  HTN (hypertension)  S/P :   No Past Surgical History    No Known Allergies    Home Medications Reviewed  Hospital Medications:   MEDICATIONS  (STANDING):  ascorbic acid 500 milliGRAM(s) Oral daily  cinacalcet 30 milliGRAM(s) Oral daily  dextrose 5%. 1000 milliLiter(s) (50 mL/Hr) IV Continuous <Continuous>  dextrose 50% Injectable 12.5 Gram(s) IV Push once  dextrose 50% Injectable 25 Gram(s) IV Push once  dextrose 50% Injectable 25 Gram(s) IV Push once  docusate sodium 100 milliGRAM(s) Oral two times a day  epoetin jacqueline Injectable 4000 Unit(s) IV Push <User Schedule>  ferrous    sulfate Liquid 300 milliGRAM(s) Enteral Tube daily  folic acid 1 milliGRAM(s) Oral daily  gabapentin 100 milliGRAM(s) Oral three times a day  heparin  Injectable 5000 Unit(s) SubCutaneous every 8 hours  hydrALAZINE 50 milliGRAM(s) Oral three times a day  insulin lispro (HumaLOG) corrective regimen sliding scale   SubCutaneous three times a day before meals  levothyroxine 50 MICROGram(s) Oral daily  meropenem  IVPB 500 milliGRAM(s) IV Intermittent every 24 hours  metoprolol tartrate 25 milliGRAM(s) Oral two times a day  multivitamin 1 Tablet(s) Oral daily  multivitamin 1 Tablet(s) Oral daily  pantoprazole    Tablet 40 milliGRAM(s) Oral before breakfast  senna 2 Tablet(s) Oral at bedtime  simvastatin 20 milliGRAM(s) Oral at bedtime  vancomycin  IVPB 1000 milliGRAM(s) IV Intermittent <User Schedule>    SOCIAL HISTORY:  Denies ETOh,Smoking,   FAMILY HISTORY:  Family history of stroke  Family history of hypertension (Sibling)  Family history of diabetes mellitus      VITALS:  T(F): 97.8 (18 @ 10:43), Max: 98.6 (18 @ 00:18)  HR: 77 (18 @ 10:43)  BP: 138/52 (18 @ 10:43)  RR: 18 (18 @ 10:43)  SpO2: 100% (18 @ 10:43)  Wt(kg): --    Height (cm): 170.18 ( @ 08:07)  Weight (kg): 45.8 ( @ 08:05)  BMI (kg/m2): 15.8 ( @ :07)  BSA (m2): 1.51 (:07)  PHYSICAL EXAM:  Constitutional: NAD  HEENT: anicteric sclera, oropharynx clear, MMM  Neck: No JVD  Respiratory: CTAB, no wheezes, rales or rhonchi  Cardiovascular: S1, S2, RRR  Gastrointestinal: BS+, soft, NT/ND  Extremities: No cyanosis or clubbing. No peripheral edema  Neurological: A/O x 3, no focal deficits  Vascular Access: RT IJ PERMACATH     LABS:      138  |  101  |  36<H>  ----------------------------<  85  3.6   |  30  |  3.46<H>    Ca    9.5      2018 10:55    TPro  7.4  /  Alb  2.0<L>  /  TBili  0.2  /  DBili      /  AST  13  /  ALT  10  /  AlkPhos  165<H>      Creatinine Trend: 3.46 <--, 3.03 <--                        9.4    18.0  )-----------( 380      ( 2018 10:55 )             33.0     Urine Studies:      RADIOLOGY & ADDITIONAL STUDIES:

## 2018-04-05 NOTE — ED ADULT TRIAGE NOTE - ARRIVAL INFO ADDITIONAL COMMENTS
pt from assisted rehab facility [facility], which called EMS due to abnormal labs received today.  Pt reporting only ble pain at this time.

## 2018-04-05 NOTE — ED PROVIDER NOTE - OBJECTIVE STATEMENT
54 y/o F pt with PMHx of IDDM, HTN and ESRD on HD last dialysis yesterday sent from [facility] for leukocytosis. Pt had WUPC of 20k after having labs drawn yesterday. Pt has known skin ulcerations on back and sacral area. Pt reports no fever, no chills, no nausea, no vomiting, no diarrhea, no cough, no abd pain, or any other complaints.

## 2018-04-05 NOTE — CONSULT NOTE ADULT - ASSESSMENT
Multiple decubiti - no evidence of active infection or necrosis warranting debridement  1. Recommend daily wet to dry dressing for R flank.   2. Santyl to areas of fibrinous debris at L flank and Sacrum. Then wet to dry dressings daily  3. Recommend Dakin's solution to R hip wound.  4. wound care orders placed  5. D/w Dr. Wright and agreed

## 2018-04-06 DIAGNOSIS — R26.2 DIFFICULTY IN WALKING, NOT ELSEWHERE CLASSIFIED: ICD-10-CM

## 2018-04-06 LAB
ANION GAP SERPL CALC-SCNC: 12 MMOL/L — SIGNIFICANT CHANGE UP (ref 5–17)
BUN SERPL-MCNC: 59 MG/DL — HIGH (ref 7–18)
CALCIUM SERPL-MCNC: 9.8 MG/DL — SIGNIFICANT CHANGE UP (ref 8.4–10.5)
CHLORIDE SERPL-SCNC: 94 MMOL/L — LOW (ref 96–108)
CO2 SERPL-SCNC: 26 MMOL/L — SIGNIFICANT CHANGE UP (ref 22–31)
CREAT SERPL-MCNC: 4.87 MG/DL — HIGH (ref 0.5–1.3)
GLUCOSE BLDC GLUCOMTR-MCNC: 101 MG/DL — HIGH (ref 70–99)
GLUCOSE BLDC GLUCOMTR-MCNC: 137 MG/DL — HIGH (ref 70–99)
GLUCOSE BLDC GLUCOMTR-MCNC: 165 MG/DL — HIGH (ref 70–99)
GLUCOSE BLDC GLUCOMTR-MCNC: 97 MG/DL — SIGNIFICANT CHANGE UP (ref 70–99)
GLUCOSE SERPL-MCNC: 134 MG/DL — HIGH (ref 70–99)
HCT VFR BLD CALC: 32.4 % — LOW (ref 34.5–45)
HGB BLD-MCNC: 9.2 G/DL — LOW (ref 11.5–15.5)
INR BLD: 0.96 RATIO — SIGNIFICANT CHANGE UP (ref 0.88–1.16)
MAGNESIUM SERPL-MCNC: 2.8 MG/DL — HIGH (ref 1.6–2.6)
MCHC RBC-ENTMCNC: 25.7 PG — LOW (ref 27–34)
MCHC RBC-ENTMCNC: 28.5 GM/DL — LOW (ref 32–36)
MCV RBC AUTO: 90.1 FL — SIGNIFICANT CHANGE UP (ref 80–100)
PHOSPHATE SERPL-MCNC: 3.4 MG/DL — SIGNIFICANT CHANGE UP (ref 2.5–4.5)
PLATELET # BLD AUTO: 458 K/UL — HIGH (ref 150–400)
POTASSIUM SERPL-MCNC: 4.5 MMOL/L — SIGNIFICANT CHANGE UP (ref 3.5–5.3)
POTASSIUM SERPL-SCNC: 4.5 MMOL/L — SIGNIFICANT CHANGE UP (ref 3.5–5.3)
PROTHROM AB SERPL-ACNC: 10.4 SEC — SIGNIFICANT CHANGE UP (ref 9.8–12.7)
RBC # BLD: 3.6 M/UL — LOW (ref 3.8–5.2)
RBC # FLD: 19.1 % — HIGH (ref 10.3–14.5)
SODIUM SERPL-SCNC: 132 MMOL/L — LOW (ref 135–145)
WBC # BLD: 17 K/UL — HIGH (ref 3.8–10.5)
WBC # FLD AUTO: 17 K/UL — HIGH (ref 3.8–10.5)

## 2018-04-06 PROCEDURE — 99233 SBSQ HOSP IP/OBS HIGH 50: CPT | Mod: GC

## 2018-04-06 RX ADMIN — HEPARIN SODIUM 5000 UNIT(S): 5000 INJECTION INTRAVENOUS; SUBCUTANEOUS at 14:57

## 2018-04-06 RX ADMIN — GABAPENTIN 100 MILLIGRAM(S): 400 CAPSULE ORAL at 06:22

## 2018-04-06 RX ADMIN — OXYCODONE AND ACETAMINOPHEN 1 TABLET(S): 5; 325 TABLET ORAL at 15:05

## 2018-04-06 RX ADMIN — Medication 1 MILLIGRAM(S): at 11:50

## 2018-04-06 RX ADMIN — Medication 250 MILLIGRAM(S): at 09:35

## 2018-04-06 RX ADMIN — Medication 50 MILLIGRAM(S): at 23:06

## 2018-04-06 RX ADMIN — SIMVASTATIN 20 MILLIGRAM(S): 20 TABLET, FILM COATED ORAL at 23:06

## 2018-04-06 RX ADMIN — OXYCODONE AND ACETAMINOPHEN 1 TABLET(S): 5; 325 TABLET ORAL at 17:45

## 2018-04-06 RX ADMIN — ERYTHROPOIETIN 4000 UNIT(S): 10000 INJECTION, SOLUTION INTRAVENOUS; SUBCUTANEOUS at 18:37

## 2018-04-06 RX ADMIN — Medication 1 TABLET(S): at 11:50

## 2018-04-06 RX ADMIN — Medication 100 MILLIGRAM(S): at 17:45

## 2018-04-06 RX ADMIN — Medication 500 MILLIGRAM(S): at 11:51

## 2018-04-06 RX ADMIN — CINACALCET 30 MILLIGRAM(S): 30 TABLET, FILM COATED ORAL at 14:57

## 2018-04-06 RX ADMIN — Medication 50 MILLIGRAM(S): at 14:57

## 2018-04-06 RX ADMIN — GABAPENTIN 100 MILLIGRAM(S): 400 CAPSULE ORAL at 14:57

## 2018-04-06 RX ADMIN — GABAPENTIN 100 MILLIGRAM(S): 400 CAPSULE ORAL at 23:06

## 2018-04-06 RX ADMIN — MEROPENEM 100 MILLIGRAM(S): 1 INJECTION INTRAVENOUS at 12:54

## 2018-04-06 RX ADMIN — Medication 25 MILLIGRAM(S): at 17:45

## 2018-04-06 RX ADMIN — Medication 25 MILLIGRAM(S): at 06:22

## 2018-04-06 RX ADMIN — Medication 300 MILLIGRAM(S): at 14:57

## 2018-04-06 RX ADMIN — PANTOPRAZOLE SODIUM 40 MILLIGRAM(S): 20 TABLET, DELAYED RELEASE ORAL at 09:35

## 2018-04-06 RX ADMIN — Medication 50 MICROGRAM(S): at 06:43

## 2018-04-06 RX ADMIN — SENNA PLUS 2 TABLET(S): 8.6 TABLET ORAL at 23:06

## 2018-04-06 RX ADMIN — HEPARIN SODIUM 5000 UNIT(S): 5000 INJECTION INTRAVENOUS; SUBCUTANEOUS at 23:06

## 2018-04-06 RX ADMIN — Medication 100 MILLIGRAM(S): at 06:43

## 2018-04-06 RX ADMIN — Medication 50 MILLIGRAM(S): at 06:21

## 2018-04-06 RX ADMIN — HEPARIN SODIUM 5000 UNIT(S): 5000 INJECTION INTRAVENOUS; SUBCUTANEOUS at 06:21

## 2018-04-06 NOTE — PROGRESS NOTE ADULT - SUBJECTIVE AND OBJECTIVE BOX
Patient is a 53y old  Female who presents with a chief complaint of Leukoytosis (05 Apr 2018 06:03)      INTERVAL HPI/OVERNIGHT EVENTS: patient was seen and examine at bedside. She is awake, alert x3, speaks with her eyes closed. Has no complains and reports feeling better     MEDICATIONS  (STANDING):  ascorbic acid 500 milliGRAM(s) Oral daily  cinacalcet 30 milliGRAM(s) Oral daily  dextrose 5%. 1000 milliLiter(s) (50 mL/Hr) IV Continuous <Continuous>  dextrose 50% Injectable 12.5 Gram(s) IV Push once  dextrose 50% Injectable 25 Gram(s) IV Push once  dextrose 50% Injectable 25 Gram(s) IV Push once  docusate sodium 100 milliGRAM(s) Oral two times a day  epoetin jacqueline Injectable 4000 Unit(s) IV Push <User Schedule>  ferrous    sulfate Liquid 300 milliGRAM(s) Enteral Tube daily  folic acid 1 milliGRAM(s) Oral daily  gabapentin 100 milliGRAM(s) Oral three times a day  heparin  Injectable 5000 Unit(s) SubCutaneous every 8 hours  hydrALAZINE 50 milliGRAM(s) Oral three times a day  insulin lispro (HumaLOG) corrective regimen sliding scale   SubCutaneous three times a day before meals  levothyroxine 50 MICROGram(s) Oral daily  meropenem  IVPB 500 milliGRAM(s) IV Intermittent every 24 hours  metoprolol tartrate 25 milliGRAM(s) Oral two times a day  multivitamin 1 Tablet(s) Oral daily  multivitamin 1 Tablet(s) Oral daily  pantoprazole    Tablet 40 milliGRAM(s) Oral before breakfast  senna 2 Tablet(s) Oral at bedtime  simvastatin 20 milliGRAM(s) Oral at bedtime    MEDICATIONS  (PRN):  acetaminophen   Tablet 650 milliGRAM(s) Oral every 6 hours PRN For Temp greater than 38 C (100.4 F) or mild pain  dextrose Gel 1 Dose(s) Oral once PRN Blood Glucose LESS THAN 70 milliGRAM(s)/deciliter  glucagon  Injectable 1 milliGRAM(s) IntraMuscular once PRN Glucose LESS THAN 70 milligrams/deciliter  oxyCODONE    5 mG/acetaminophen 325 mG 1 Tablet(s) Oral every 6 hours PRN Moderate Pain (4 - 6)      Allergies    No Known Allergies    Intolerances        REVIEW OF SYSTEMS:  CONSTITUTIONAL: No fever, weight loss, or fatigue  RESPIRATORY: No cough, wheezing, chills or hemoptysis; No shortness of breath  CARDIOVASCULAR: No chest pain, palpitations, dizziness, or leg swelling  GASTROINTESTINAL: No abdominal or epigastric pain. No nausea, vomiting, or hematemesis; No diarrhea or constipation. No melena or hematochezia.  NEUROLOGICAL: No headaches, memory loss, loss of strength, numbness, or tremors  MUSCULOSKELETAL: Multiple pressure ulcers; shoulders,  hips, back,      Vital Signs Last 24 Hrs  T(C): 36.6 (06 Apr 2018 11:33), Max: 37.2 (05 Apr 2018 20:30)  T(F): 97.8 (06 Apr 2018 11:33), Max: 99 (05 Apr 2018 20:30)  HR: 74 (06 Apr 2018 11:33) (74 - 91)  BP: 135/61 (06 Apr 2018 11:33) (121/50 - 139/53)  BP(mean): --  RR: 18 (06 Apr 2018 11:33) (18 - 18)  SpO2: 100% (06 Apr 2018 11:33) (99% - 100%)    PHYSICAL EXAM:  GENERAL: NAD, comfortably laying in bed   HEAD:  Atraumatic, Normocephalic  EYES: eyes always close  NECK: Supple, No JVD, Normal thyroid  NERVOUS SYSTEM:  Alert & Oriented X3, No gross focal deficits  CHEST/LUNG: Clear to percussion bilaterally; No rales, rhonchi, wheezing, or rubs  HEART: Regular rate and rhythm; No murmurs, rubs, or gallops  ABDOMEN: Soft, Nontender, Nondistended; Bowel sounds present  EXTREMITIES:  No clubbing, cyanosis, or edema  SKIN: back ulcers, hip ulcers, sacral decub appears clean though some  slightly foul drainage from upper back ulcerations  LABS:                        9.4    18.0  )-----------( 380      ( 05 Apr 2018 10:55 )             33.0     04-05    138  |  101  |  36<H>  ----------------------------<  85  3.6   |  30  |  3.46<H>    Ca    9.5      05 Apr 2018 10:55    TPro  7.4  /  Alb  2.0<L>  /  TBili  0.2  /  DBili  x   /  AST  13  /  ALT  10  /  AlkPhos  165<H>  04-05    PT/INR - ( 05 Apr 2018 02:32 )   PT: 11.0 sec;   INR: 1.01 ratio         PTT - ( 05 Apr 2018 02:32 )  PTT:29.6 sec    CAPILLARY BLOOD GLUCOSE      POCT Blood Glucose.: 97 mg/dL (06 Apr 2018 11:46)  POCT Blood Glucose.: 101 mg/dL (06 Apr 2018 08:34)  POCT Blood Glucose.: 101 mg/dL (05 Apr 2018 17:51)      RADIOLOGY & ADDITIONAL TESTS:    Imaging Personally Reviewed:  [ ] YES  [ ] NO    Consultant(s) Notes Reviewed:  [ ] YES  [ ] NO    Care Discussed with Consultants/Other Providers [ ] YES  [ ] NO    Plan of Care discussed with Housestaff [ ]YES [ ] NO

## 2018-04-06 NOTE — PROGRESS NOTE ADULT - PROBLEM SELECTOR PLAN 1
Afebrile, leukocytosis slightly trending down   The source is likely many pressure ulcers.   BC no growth to date   CXR: no consolidations   On Meropenem   Wound care following

## 2018-04-06 NOTE — PROGRESS NOTE ADULT - SUBJECTIVE AND OBJECTIVE BOX
Patient is a 53y Female with  ESRD ON HD,  IS DUE FOR HD TODAY   SENT FROM  NH  WITH HIGH   WBC ,  HAS   WOUNDS ON  HER BACK   WAS   HERE 2 WEEKS AGO  WITH  SIMILAR    COMPLAINTS  WAS ON  IV VANCO    No Known Allergies    Hospital Medications:   MEDICATIONS  (STANDING):  ascorbic acid 500 milliGRAM(s) Oral daily  cinacalcet 30 milliGRAM(s) Oral daily  dextrose 5%. 1000 milliLiter(s) (50 mL/Hr) IV Continuous <Continuous>  dextrose 50% Injectable 12.5 Gram(s) IV Push once  dextrose 50% Injectable 25 Gram(s) IV Push once  dextrose 50% Injectable 25 Gram(s) IV Push once  docusate sodium 100 milliGRAM(s) Oral two times a day  epoetin jacqueline Injectable 4000 Unit(s) IV Push <User Schedule>  ferrous    sulfate Liquid 300 milliGRAM(s) Enteral Tube daily  folic acid 1 milliGRAM(s) Oral daily  gabapentin 100 milliGRAM(s) Oral three times a day  heparin  Injectable 5000 Unit(s) SubCutaneous every 8 hours  hydrALAZINE 50 milliGRAM(s) Oral three times a day  insulin lispro (HumaLOG) corrective regimen sliding scale   SubCutaneous three times a day before meals  levothyroxine 50 MICROGram(s) Oral daily  meropenem  IVPB 500 milliGRAM(s) IV Intermittent every 24 hours  metoprolol tartrate 25 milliGRAM(s) Oral two times a day  multivitamin 1 Tablet(s) Oral daily  multivitamin 1 Tablet(s) Oral daily  pantoprazole    Tablet 40 milliGRAM(s) Oral before breakfast  senna 2 Tablet(s) Oral at bedtime  simvastatin 20 milliGRAM(s) Oral at bedtime    REVIEW OF SYSTEMS:  FEELS  OK     HAS NO FEVER/CHILLS   NO SOB OR COUGH    APPETITE IS OK,  HAS NO N/V    HAVING  NORMAL  BM   NO  LEG SWELLING  VITALS:  T(F): 98.6 (04-06-18 @ 07:57), Max: 99 (04-05-18 @ 20:30)  HR: 78 (04-06-18 @ 07:57)  BP: 139/53 (04-06-18 @ 07:57)  RR: 18 (04-06-18 @ 07:57)  SpO2: 100% (04-06-18 @ 07:57)  Wt(kg): --      PHYSICAL EXAM:  Constitutional: NAD  Neck: No JVD,  R IJ PC  IN  PLACE  Respiratory: CTAB, no wheezes, rales or rhonchi  Cardiovascular: S1, S2, RRR  Gastrointestinal: BS+, soft, NT/ND  Extremities: . No peripheral edema  Neurological: A/O x 3,   Psychiatric: Normal mood, normal affect  : NO FOLEYS  HAS DRESSING IN  PLACE  OVER  HER WOUNDS  OVER R HIP,  LOWER MID SPINE AND COCCYX    Vascular Access: R IJ  PC IN  PLACE SITE CLEAN    LABS:  04-05    138  |  101  |  36<H>  ----------------------------<  85  3.6   |  30  |  3.46<H>    Ca    9.5      05 Apr 2018 10:55    TPro  7.4  /  Alb  2.0<L>  /  TBili  0.2  /  DBili      /  AST  13  /  ALT  10  /  AlkPhos  165<H>  04-05    Creatinine Trend: 3.46 <--, 3.03 <--                        9.4    18.0  )-----------( 380      ( 05 Apr 2018 10:55 )             33.0     Urine Studies:      RADIOLOGY & ADDITIONAL STUDIES:

## 2018-04-06 NOTE — PROGRESS NOTE ADULT - PROBLEM SELECTOR PLAN 1
likely from  wound infection, wbc downtrending 18.0   - c/w meropenem  IVPB 500 mg  every 24 hours  - UA, ucx  pending   - f/u blood cx, wound cx  - daily wound care.   - ID Dr Kaplan  - turn and position q 2hrs

## 2018-04-06 NOTE — ADVANCED PRACTICE NURSE CONSULT - RECOMMEDATIONS
-Clean all wounds with normal saline and apply skin prep to the surrounding skin  -Apply Collagenase ointment to the slough areas of the Coccyx and Mid Spine wounds, pack with saline moistened gauze, and cover with a Foam dressing Daily PRN  -Pack the R. Flank and R. Hip wound with Calcium Alginate (Aquacel) and cover with a Foam dressing Q72hrs PRN  -Elevate/float the patients heels using heel protectors and reposition the patient Q 2hrs using wedges or pillows

## 2018-04-06 NOTE — ADVANCED PRACTICE NURSE CONSULT - ASSESSMENT
This is a 53yr old female patient admitted for Leukocytosis, presenting with the following:  -There is an Abscess to the R. Hip (5cm x 8cm) with granulation tissue and drainage  -There is an Abscess to the Mid Spinal Area (7cm x 4cm x 1) and R. Flank (1.5cm x 3.5cm x 0.5cm) with granulation tissue, fibrinous tissue, drainage, and undermining to the R. Flank (up to 2.5cm at 11-4 o'clock)   -There is a Stage 3 Pressure Injury to the Coccyx (9cm x 7cm x 1cm) with slough, granulation tissue, drainage, and undermining (up to 3cm at 10-5 o'clock)

## 2018-04-06 NOTE — PROGRESS NOTE ADULT - SUBJECTIVE AND OBJECTIVE BOX
NP Note discussed with  Primary Attending     54 y/o bedbound female w pmhx of diastolic dysfunction, h/o c diff infection, DM T II, diabetic neuropathy, ESRD on HD h/o osteomyelitis of her jaw, multiple abscesses, infected decub ulcers s/p debridement. Wound cx + MRSA. She recently completed a course of vancomycin.   Pt sent from NH for leukocytosis of 20. Pt seen at bedside, alert, awake, well appearing, leukocytosis trending down. Afebrile.            INTERVAL HPI/OVERNIGHT EVENTS: no new complaints    MEDICATIONS  (STANDING):  ascorbic acid 500 milliGRAM(s) Oral daily  cinacalcet 30 milliGRAM(s) Oral daily  dextrose 5%. 1000 milliLiter(s) (50 mL/Hr) IV Continuous <Continuous>  dextrose 50% Injectable 12.5 Gram(s) IV Push once  dextrose 50% Injectable 25 Gram(s) IV Push once  dextrose 50% Injectable 25 Gram(s) IV Push once  docusate sodium 100 milliGRAM(s) Oral two times a day  epoetin jacqueline Injectable 4000 Unit(s) IV Push <User Schedule>  ferrous    sulfate Liquid 300 milliGRAM(s) Enteral Tube daily  folic acid 1 milliGRAM(s) Oral daily  gabapentin 100 milliGRAM(s) Oral three times a day  heparin  Injectable 5000 Unit(s) SubCutaneous every 8 hours  hydrALAZINE 50 milliGRAM(s) Oral three times a day  insulin lispro (HumaLOG) corrective regimen sliding scale   SubCutaneous three times a day before meals  levothyroxine 50 MICROGram(s) Oral daily  meropenem  IVPB 500 milliGRAM(s) IV Intermittent every 24 hours  metoprolol tartrate 25 milliGRAM(s) Oral two times a day  multivitamin 1 Tablet(s) Oral daily  multivitamin 1 Tablet(s) Oral daily  pantoprazole    Tablet 40 milliGRAM(s) Oral before breakfast  senna 2 Tablet(s) Oral at bedtime  simvastatin 20 milliGRAM(s) Oral at bedtime    MEDICATIONS  (PRN):  acetaminophen   Tablet 650 milliGRAM(s) Oral every 6 hours PRN For Temp greater than 38 C (100.4 F) or mild pain  dextrose Gel 1 Dose(s) Oral once PRN Blood Glucose LESS THAN 70 milliGRAM(s)/deciliter  glucagon  Injectable 1 milliGRAM(s) IntraMuscular once PRN Glucose LESS THAN 70 milligrams/deciliter  oxyCODONE    5 mG/acetaminophen 325 mG 1 Tablet(s) Oral every 6 hours PRN Moderate Pain (4 - 6)      __________________________________________________  REVIEW OF SYSTEMS:    CONSTITUTIONAL: No fever,   RESPIRATORY: No cough; No shortness of breath  CARDIOVASCULAR: No chest pain, no palpitations  GASTROINTESTINAL: No pain. No nausea or vomiting; No diarrhea   NEUROLOGICAL: No headache or numbness, no tremors  MUSCULOSKELETAL: No joint pain, no muscle pain  GENITOURINARY: no dysuria,         Vital Signs Last 24 Hrs  T(C): 36.6 (06 Apr 2018 11:33), Max: 37.2 (05 Apr 2018 20:30)  T(F): 97.8 (06 Apr 2018 11:33), Max: 99 (05 Apr 2018 20:30)  HR: 74 (06 Apr 2018 11:33) (74 - 91)  BP: 135/61 (06 Apr 2018 11:33) (121/50 - 139/53)  BP(mean): --  RR: 18 (06 Apr 2018 11:33) (18 - 18)  SpO2: 100% (06 Apr 2018 11:33) (99% - 100%)    ________________________________________________  PHYSICAL EXAM:  GENERAL: NAD, well appearing   CHEST/LUNG: Clear to auscultation  HEART: S1 S2  regular;   ABDOMEN: Soft, Nontender, Nondistended; Bowel sounds present  EXTREMITIES: no cyanosis; no edema; no calf tenderness  SKIN:  multiple decubitus ulcers   back, hip, sacral decub clean with minimal drainage from  back ulcerations  right chest wall permacath site clean   NERVOUS SYSTEM:  Awake and alert; Oriented  to place, person, no new deficits    _________________________________________________  LABS:                        9.4    18.0  )-----------( 380      ( 05 Apr 2018 10:55 )             33.0     04-05    138  |  101  |  36<H>  ----------------------------<  85  3.6   |  30  |  3.46<H>    Ca    9.5      05 Apr 2018 10:55    TPro  7.4  /  Alb  2.0<L>  /  TBili  0.2  /  DBili  x   /  AST  13  /  ALT  10  /  AlkPhos  165<H>  04-05    PT/INR - ( 05 Apr 2018 02:32 )   PT: 11.0 sec;   INR: 1.01 ratio         PTT - ( 05 Apr 2018 02:32 )  PTT:29.6 sec    CAPILLARY BLOOD GLUCOSE      POCT Blood Glucose.: 97 mg/dL (06 Apr 2018 11:46)  POCT Blood Glucose.: 101 mg/dL (06 Apr 2018 08:34)  POCT Blood Glucose.: 101 mg/dL (05 Apr 2018 17:51)        RADIOLOGY & ADDITIONAL TESTS:    Imaging Personally Reviewed:  YES/NO    Consultant(s) Notes Reviewed:   YES/ No    Care Discussed with Consultants :     Plan of care was discussed with patient and /or primary care giver; all questions and concerns were addressed and care was aligned with patient's wishes.

## 2018-04-07 LAB
ANION GAP SERPL CALC-SCNC: 6 MMOL/L — SIGNIFICANT CHANGE UP (ref 5–17)
BUN SERPL-MCNC: 30 MG/DL — HIGH (ref 7–18)
CALCIUM SERPL-MCNC: 9.4 MG/DL — SIGNIFICANT CHANGE UP (ref 8.4–10.5)
CHLORIDE SERPL-SCNC: 103 MMOL/L — SIGNIFICANT CHANGE UP (ref 96–108)
CO2 SERPL-SCNC: 29 MMOL/L — SIGNIFICANT CHANGE UP (ref 22–31)
CREAT SERPL-MCNC: 3.27 MG/DL — HIGH (ref 0.5–1.3)
GLUCOSE BLDC GLUCOMTR-MCNC: 116 MG/DL — HIGH (ref 70–99)
GLUCOSE BLDC GLUCOMTR-MCNC: 122 MG/DL — HIGH (ref 70–99)
GLUCOSE BLDC GLUCOMTR-MCNC: 130 MG/DL — HIGH (ref 70–99)
GLUCOSE BLDC GLUCOMTR-MCNC: 139 MG/DL — HIGH (ref 70–99)
GLUCOSE SERPL-MCNC: 115 MG/DL — HIGH (ref 70–99)
HCT VFR BLD CALC: 32.1 % — LOW (ref 34.5–45)
HGB BLD-MCNC: 9.1 G/DL — LOW (ref 11.5–15.5)
INR BLD: 0.96 RATIO — SIGNIFICANT CHANGE UP (ref 0.88–1.16)
MAGNESIUM SERPL-MCNC: 2.6 MG/DL — SIGNIFICANT CHANGE UP (ref 1.6–2.6)
MCHC RBC-ENTMCNC: 25.8 PG — LOW (ref 27–34)
MCHC RBC-ENTMCNC: 28.3 GM/DL — LOW (ref 32–36)
MCV RBC AUTO: 91.1 FL — SIGNIFICANT CHANGE UP (ref 80–100)
PLATELET # BLD AUTO: 376 K/UL — SIGNIFICANT CHANGE UP (ref 150–400)
POTASSIUM SERPL-MCNC: 4.8 MMOL/L — SIGNIFICANT CHANGE UP (ref 3.5–5.3)
POTASSIUM SERPL-SCNC: 4.8 MMOL/L — SIGNIFICANT CHANGE UP (ref 3.5–5.3)
PROTHROM AB SERPL-ACNC: 10.5 SEC — SIGNIFICANT CHANGE UP (ref 9.8–12.7)
RBC # BLD: 3.52 M/UL — LOW (ref 3.8–5.2)
RBC # FLD: 19.1 % — HIGH (ref 10.3–14.5)
SODIUM SERPL-SCNC: 138 MMOL/L — SIGNIFICANT CHANGE UP (ref 135–145)
WBC # BLD: 15.7 K/UL — HIGH (ref 3.8–10.5)
WBC # FLD AUTO: 15.7 K/UL — HIGH (ref 3.8–10.5)

## 2018-04-07 PROCEDURE — 99233 SBSQ HOSP IP/OBS HIGH 50: CPT

## 2018-04-07 RX ORDER — VANCOMYCIN HCL 1 G
1250 VIAL (EA) INTRAVENOUS
Qty: 0 | Refills: 0 | Status: DISCONTINUED | OUTPATIENT
Start: 2018-04-07 | End: 2018-04-13

## 2018-04-07 RX ADMIN — HEPARIN SODIUM 5000 UNIT(S): 5000 INJECTION INTRAVENOUS; SUBCUTANEOUS at 22:29

## 2018-04-07 RX ADMIN — Medication 1 MILLIGRAM(S): at 11:33

## 2018-04-07 RX ADMIN — SENNA PLUS 2 TABLET(S): 8.6 TABLET ORAL at 22:29

## 2018-04-07 RX ADMIN — HEPARIN SODIUM 5000 UNIT(S): 5000 INJECTION INTRAVENOUS; SUBCUTANEOUS at 06:45

## 2018-04-07 RX ADMIN — Medication 300 MILLIGRAM(S): at 11:33

## 2018-04-07 RX ADMIN — Medication 50 MICROGRAM(S): at 06:46

## 2018-04-07 RX ADMIN — Medication 100 MILLIGRAM(S): at 06:46

## 2018-04-07 RX ADMIN — Medication 1 TABLET(S): at 11:33

## 2018-04-07 RX ADMIN — MEROPENEM 100 MILLIGRAM(S): 1 INJECTION INTRAVENOUS at 10:12

## 2018-04-07 RX ADMIN — SIMVASTATIN 20 MILLIGRAM(S): 20 TABLET, FILM COATED ORAL at 22:29

## 2018-04-07 RX ADMIN — Medication 25 MILLIGRAM(S): at 06:46

## 2018-04-07 RX ADMIN — Medication 500 MILLIGRAM(S): at 11:33

## 2018-04-07 RX ADMIN — Medication 166.67 MILLIGRAM(S): at 17:25

## 2018-04-07 RX ADMIN — Medication 50 MILLIGRAM(S): at 06:46

## 2018-04-07 RX ADMIN — GABAPENTIN 100 MILLIGRAM(S): 400 CAPSULE ORAL at 06:46

## 2018-04-07 RX ADMIN — PANTOPRAZOLE SODIUM 40 MILLIGRAM(S): 20 TABLET, DELAYED RELEASE ORAL at 06:46

## 2018-04-07 RX ADMIN — HEPARIN SODIUM 5000 UNIT(S): 5000 INJECTION INTRAVENOUS; SUBCUTANEOUS at 13:16

## 2018-04-07 RX ADMIN — CINACALCET 30 MILLIGRAM(S): 30 TABLET, FILM COATED ORAL at 11:33

## 2018-04-07 RX ADMIN — GABAPENTIN 100 MILLIGRAM(S): 400 CAPSULE ORAL at 13:16

## 2018-04-07 RX ADMIN — Medication 50 MILLIGRAM(S): at 22:29

## 2018-04-07 RX ADMIN — GABAPENTIN 100 MILLIGRAM(S): 400 CAPSULE ORAL at 22:29

## 2018-04-07 NOTE — PROGRESS NOTE ADULT - ATTENDING COMMENTS
ID consultation, seen and appreciated.     Patient is responding well to debridement, wound care, and IV antibiotics.    In view of multiple abscesses and incomplete response to previous therapy, will consider a longer course of IV antibiotics, especially vancomycin, which can be given on dialysis.

## 2018-04-07 NOTE — CONSULT NOTE ADULT - ASSESSMENT
1. Infected decubitus  2. Leukocytosis - improving       Plan  Continue Meropenem 500 mg IV q 24 hours day #3. 1. Leukocytosis - improving   2. Decubitus x 4 - all clean      Plan  Continue Meropenem 500 mg IV q 24 hours day #3.   Add Vancomycin 1.25 grams now and M-W-Fr with Dialysis 1. Leukocytosis - improving   2. Decubitus x 4 - all clean      Plan  Continue Meropenem 500 mg IV q 24 hours day #3.   Add Vancomycin 1.25 grams now and M-W-Fr with Dialysis   dc planning for Monday

## 2018-04-07 NOTE — CONSULT NOTE ADULT - SKIN COMMENTS
bilateral flanks and sacrum decubitus - both clean, no malodor, pink base; right hip decubitus + malodor right hip, bilateral flanks and sacrum decubitus- both clean, no malodor, pink base right hip, bilateral flanks and sacrum decubitus- all clean, no malodor, pink base

## 2018-04-07 NOTE — PROGRESS NOTE ADULT - SUBJECTIVE AND OBJECTIVE BOX
MEDICAL ATTENDING NOTE  Patient is a 53y old  Female who presents with a chief complaint of Leukoytosis (05 Apr 2018 06:03)      HPI:  52 y/o F from [place], bedbound due to difficulty walking likely 2/2 to polyneuropathy with PMHx of anemia, HFpEF, ESRD on HD, Clostridium difficile infection, DM, Diabetic neuropathy, HTN, HLD, Hypothyroidism, OM of jaw, Parathyroid adenoma, and GERD was sent from NH due to leukocytosis of 20. Patient denies any complaints on admission including cough, SOB, chest pain, abdominal pain, nausea, vomiting, diarrhea, or pain at the sacral decubitus ulcer site. In the ED, patient was afebrile with leukocytosis of 19.4. She was last admitted at [facility] in March for sepsis. She underwent I&D on 3/8/18 of decubitus ulcer. Wound cx grew MRSA and patient continued to be febrile so she underwent redebridement on 3/18/18 and completed 2 weeks of Vancomycin on discharge. Currently she denies any complaints at all.    SH: Denies smoking, alcohol or illicit drug use. Wheelchair bound.   NKDA (05 Apr 2018 06:03)      INTERVAL HPI/OVERNIGHT EVENTS: no new complaints    MEDICATIONS  (STANDING):  ascorbic acid 500 milliGRAM(s) Oral daily  cinacalcet 30 milliGRAM(s) Oral daily  dextrose 5%. 1000 milliLiter(s) (50 mL/Hr) IV Continuous <Continuous>  dextrose 50% Injectable 12.5 Gram(s) IV Push once  dextrose 50% Injectable 25 Gram(s) IV Push once  dextrose 50% Injectable 25 Gram(s) IV Push once  docusate sodium 100 milliGRAM(s) Oral two times a day  epoetin jacqueline Injectable 4000 Unit(s) IV Push <User Schedule>  ferrous    sulfate Liquid 300 milliGRAM(s) Enteral Tube daily  folic acid 1 milliGRAM(s) Oral daily  gabapentin 100 milliGRAM(s) Oral three times a day  heparin  Injectable 5000 Unit(s) SubCutaneous every 8 hours  hydrALAZINE 50 milliGRAM(s) Oral three times a day  insulin lispro (HumaLOG) corrective regimen sliding scale   SubCutaneous three times a day before meals  levothyroxine 50 MICROGram(s) Oral daily  meropenem  IVPB 500 milliGRAM(s) IV Intermittent every 24 hours  metoprolol tartrate 25 milliGRAM(s) Oral two times a day  multivitamin 1 Tablet(s) Oral daily  multivitamin 1 Tablet(s) Oral daily  pantoprazole    Tablet 40 milliGRAM(s) Oral before breakfast  senna 2 Tablet(s) Oral at bedtime  simvastatin 20 milliGRAM(s) Oral at bedtime  vancomycin  IVPB 1250 milliGRAM(s) IV Intermittent <User Schedule>    MEDICATIONS  (PRN):  acetaminophen   Tablet 650 milliGRAM(s) Oral every 6 hours PRN For Temp greater than 38 C (100.4 F) or mild pain  dextrose Gel 1 Dose(s) Oral once PRN Blood Glucose LESS THAN 70 milliGRAM(s)/deciliter  glucagon  Injectable 1 milliGRAM(s) IntraMuscular once PRN Glucose LESS THAN 70 milligrams/deciliter  oxyCODONE    5 mG/acetaminophen 325 mG 1 Tablet(s) Oral every 6 hours PRN Moderate Pain (4 - 6)      __________________________________________________  REVIEW OF SYSTEMS:    CONSTITUTIONAL: No fever,   EYES: no acute visual disturbances  NECK: No pain or stiffness  RESPIRATORY: No cough; No shortness of breath  CARDIOVASCULAR: No chest pain, no palpitations  GASTROINTESTINAL: No pain. No nausea or vomiting; No diarrhea   NEUROLOGICAL: No headache or numbness, no tremors  MUSCULOSKELETAL: No joint pain, no muscle pain  GENITOURINARY: no dysuria, no frequency, no hesitancy  PSYCHIATRY: no depression , no anxiety  ALL OTHER  ROS negative        Vital Signs Last 24 Hrs  T(C): 37 (07 Apr 2018 13:08), Max: 37 (07 Apr 2018 13:08)  T(F): 98.6 (07 Apr 2018 13:08), Max: 98.6 (07 Apr 2018 13:08)  HR: 74 (07 Apr 2018 17:01) (73 - 84)  BP: 136/54 (07 Apr 2018 17:01) (110/54 - 144/58)  BP(mean): --  RR: 17 (07 Apr 2018 13:08) (16 - 17)  SpO2: 99% (07 Apr 2018 13:08) (98% - 100%)    ________________________________________________  PHYSICAL EXAM:  GENERAL: Chronically ill 54 yo woman in NAD, bedbound.  HEENT: Normocephalic;  conjunctivae and sclerae clear; moist mucous membranes;   NECK : supple  CHEST/LUNG: Clear to auscultation bilaterally with good air entry   HEART: S1 S2  regular; no murmurs, gallops or rubs  Back;  Healing ulcers on her back and her left hip.   ABDOMEN: Soft, Nontender, Nondistended; Bowel sounds present  EXTREMITIES: no cyanosis; no edema; no calf tenderness  SKIN: warm and dry; no rash  NERVOUS SYSTEM:  Awake and alert; Oriented  to place, person and time ; no new deficits    _________________________________________________  LABS:                        9.1    15.7  )-----------( 376      ( 07 Apr 2018 07:14 )             32.1     04-07    138  |  103  |  30<H>  ----------------------------<  115<H>  4.8   |  29  |  3.27<H>    Ca    9.4      07 Apr 2018 07:14  Phos  3.4     04-06  Mg     2.6     04-07      PT/INR - ( 07 Apr 2018 07:14 )   PT: 10.5 sec;   INR: 0.96 ratio             CAPILLARY BLOOD GLUCOSE      POCT Blood Glucose.: 116 mg/dL (07 Apr 2018 16:33)  POCT Blood Glucose.: 122 mg/dL (07 Apr 2018 11:41)  POCT Blood Glucose.: 139 mg/dL (07 Apr 2018 08:03)  POCT Blood Glucose.: 165 mg/dL (06 Apr 2018 22:07)

## 2018-04-07 NOTE — PROGRESS NOTE ADULT - SUBJECTIVE AND OBJECTIVE BOX
Patient is a 53y Female with  ESRD ON  HD    WAS  DIALYZED YESTERDAY    REMAINS AFEBRILE       No Known Allergies    Hospital Medications:   MEDICATIONS  (STANDING):  ascorbic acid 500 milliGRAM(s) Oral daily  cinacalcet 30 milliGRAM(s) Oral daily  dextrose 5%. 1000 milliLiter(s) (50 mL/Hr) IV Continuous <Continuous>  dextrose 50% Injectable 12.5 Gram(s) IV Push once  dextrose 50% Injectable 25 Gram(s) IV Push once  dextrose 50% Injectable 25 Gram(s) IV Push once  docusate sodium 100 milliGRAM(s) Oral two times a day  epoetin jacqueline Injectable 4000 Unit(s) IV Push <User Schedule>  ferrous    sulfate Liquid 300 milliGRAM(s) Enteral Tube daily  folic acid 1 milliGRAM(s) Oral daily  gabapentin 100 milliGRAM(s) Oral three times a day  heparin  Injectable 5000 Unit(s) SubCutaneous every 8 hours  hydrALAZINE 50 milliGRAM(s) Oral three times a day  insulin lispro (HumaLOG) corrective regimen sliding scale   SubCutaneous three times a day before meals  levothyroxine 50 MICROGram(s) Oral daily  meropenem  IVPB 500 milliGRAM(s) IV Intermittent every 24 hours  metoprolol tartrate 25 milliGRAM(s) Oral two times a day  multivitamin 1 Tablet(s) Oral daily  multivitamin 1 Tablet(s) Oral daily  pantoprazole    Tablet 40 milliGRAM(s) Oral before breakfast  senna 2 Tablet(s) Oral at bedtime  simvastatin 20 milliGRAM(s) Oral at bedtime  vancomycin  IVPB 1250 milliGRAM(s) IV Intermittent <User Schedule>    REVIEW OF SYSTEMS:   HAS NO  COMPLAINTS   NO  FEVER  CHILLS    NO  CH  PAIN  OR SOB     APPETITE IS GOOD    NO N/V    NO LEG SWELLING  VITALS:  T(F): 98.6 (04-07-18 @ 13:08), Max: 98.6 (04-07-18 @ 13:08)  HR: 78 (04-07-18 @ 13:08)  BP: 144/58 (04-07-18 @ 13:08)  RR: 17 (04-07-18 @ 13:08)  SpO2: 99% (04-07-18 @ 13:08)  Wt(kg): --      PHYSICAL EXAM:  Constitutional: NAD  HEENT:   CONJ  PINK  Neck: No JVD, R I J PC IN PLACE  Respiratory: CTAB, no wheezes, rales or rhonchi  Cardiovascular: S1, S2, RRR  Gastrointestinal: BS+, soft, NT/ND  Extremities:  No peripheral edema  Neurological: A/O x 3,  Psychiatric: Normal mood, normal affect  : No jarrell.   HAS DRESSING IN  PLACE OVER MULTIPLE   ULCERS OVER HER BACK  AND  GLUTEAL  REGION  Vascular Access: R IJ PC    LABS:  04-07    138  |  103  |  30<H>  ----------------------------<  115<H>  4.8   |  29  |  3.27<H>    Ca    9.4      07 Apr 2018 07:14  Phos  3.4     04-06  Mg     2.6     04-07      Creatinine Trend: 3.27 <--, 4.87 <--, 3.46 <--, 3.03 <--                        9.1    15.7  )-----------( 376      ( 07 Apr 2018 07:14 )             32.1     Urine Studies:      RADIOLOGY & ADDITIONAL STUDIES:

## 2018-04-08 LAB
GLUCOSE BLDC GLUCOMTR-MCNC: 101 MG/DL — HIGH (ref 70–99)
GLUCOSE BLDC GLUCOMTR-MCNC: 111 MG/DL — HIGH (ref 70–99)
GLUCOSE BLDC GLUCOMTR-MCNC: 124 MG/DL — HIGH (ref 70–99)
GLUCOSE BLDC GLUCOMTR-MCNC: 155 MG/DL — HIGH (ref 70–99)

## 2018-04-08 PROCEDURE — 99233 SBSQ HOSP IP/OBS HIGH 50: CPT | Mod: GC

## 2018-04-08 RX ADMIN — OXYCODONE AND ACETAMINOPHEN 1 TABLET(S): 5; 325 TABLET ORAL at 14:06

## 2018-04-08 RX ADMIN — SIMVASTATIN 20 MILLIGRAM(S): 20 TABLET, FILM COATED ORAL at 22:31

## 2018-04-08 RX ADMIN — Medication 1: at 17:05

## 2018-04-08 RX ADMIN — PANTOPRAZOLE SODIUM 40 MILLIGRAM(S): 20 TABLET, DELAYED RELEASE ORAL at 05:57

## 2018-04-08 RX ADMIN — GABAPENTIN 100 MILLIGRAM(S): 400 CAPSULE ORAL at 22:31

## 2018-04-08 RX ADMIN — Medication 1 MILLIGRAM(S): at 12:24

## 2018-04-08 RX ADMIN — Medication 50 MILLIGRAM(S): at 13:05

## 2018-04-08 RX ADMIN — Medication 500 MILLIGRAM(S): at 12:25

## 2018-04-08 RX ADMIN — Medication 50 MICROGRAM(S): at 05:57

## 2018-04-08 RX ADMIN — Medication 1 TABLET(S): at 12:25

## 2018-04-08 RX ADMIN — Medication 100 MILLIGRAM(S): at 05:57

## 2018-04-08 RX ADMIN — HEPARIN SODIUM 5000 UNIT(S): 5000 INJECTION INTRAVENOUS; SUBCUTANEOUS at 22:31

## 2018-04-08 RX ADMIN — Medication 50 MILLIGRAM(S): at 22:31

## 2018-04-08 RX ADMIN — GABAPENTIN 100 MILLIGRAM(S): 400 CAPSULE ORAL at 05:57

## 2018-04-08 RX ADMIN — Medication 50 MILLIGRAM(S): at 05:57

## 2018-04-08 RX ADMIN — HEPARIN SODIUM 5000 UNIT(S): 5000 INJECTION INTRAVENOUS; SUBCUTANEOUS at 05:57

## 2018-04-08 RX ADMIN — Medication 25 MILLIGRAM(S): at 05:57

## 2018-04-08 RX ADMIN — SENNA PLUS 2 TABLET(S): 8.6 TABLET ORAL at 22:31

## 2018-04-08 RX ADMIN — GABAPENTIN 100 MILLIGRAM(S): 400 CAPSULE ORAL at 13:05

## 2018-04-08 RX ADMIN — Medication 300 MILLIGRAM(S): at 12:24

## 2018-04-08 RX ADMIN — OXYCODONE AND ACETAMINOPHEN 1 TABLET(S): 5; 325 TABLET ORAL at 13:06

## 2018-04-08 RX ADMIN — Medication 100 MILLIGRAM(S): at 17:05

## 2018-04-08 RX ADMIN — HEPARIN SODIUM 5000 UNIT(S): 5000 INJECTION INTRAVENOUS; SUBCUTANEOUS at 13:05

## 2018-04-08 RX ADMIN — MEROPENEM 100 MILLIGRAM(S): 1 INJECTION INTRAVENOUS at 10:26

## 2018-04-08 RX ADMIN — CINACALCET 30 MILLIGRAM(S): 30 TABLET, FILM COATED ORAL at 13:04

## 2018-04-08 RX ADMIN — Medication 25 MILLIGRAM(S): at 17:05

## 2018-04-08 NOTE — PROGRESS NOTE ADULT - SUBJECTIVE AND OBJECTIVE BOX
54 y/o female in our care for Leukocytosis, Decubitus x 4 - all clean. Pt is in bed, s/p lunch, has no new complains. Pt remans afebrile, no new lab work today. Pt denies any shortness of breath or chest pain, no abdominal pain, no nausea, vomiting or diarrhea.     MEDS:  meropenem  IVPB 500 milliGRAM(s) IV Intermittent every 24 hours  vancomycin  IVPB 1250 milliGRAM(s) IV Intermittent <User Schedule>    No Known Allergies    VITALS:  Vital Signs Last 24 Hrs  T(C): 36.8 (08 Apr 2018 05:31), Max: 37 (07 Apr 2018 13:08)  T(F): 98.3 (08 Apr 2018 05:31), Max: 98.6 (07 Apr 2018 13:08)  HR: 75 (08 Apr 2018 05:31) (74 - 78)  BP: 161/61 (08 Apr 2018 05:31) (136/54 - 161/61)  BP(mean): --  RR: 17 (08 Apr 2018 05:31) (16 - 17)  SpO2: 96% (08 Apr 2018 05:31) (96% - 100%)    LABS/DIAGNOSTIC TESTS:                          9.1    15.7  )-----------( 376      ( 07 Apr 2018 07:14 )             32.1         04-07    138  |  103  |  30<H>  ----------------------------<  115<H>  4.8   |  29  |  3.27<H>    Ca    9.4      07 Apr 2018 07:14  Phos  3.4     04-06  Mg     2.6     04-07        CULTURES:   .Blood Blood-Peripheral  04-05 @ 09:59   No growth to date.  --  --      .Blood Blood-Peripheral  03-10 @ 09:57   No growth at 5 days.  --  --      .Surgical Swab left back mass culture  03-09 @ 12:14   Numerous Methicillin resistant Staphylococcus aureus  --  Methicillin resistant Staphylococcus aureus      .Surgical Swab right side of back  03-09 @ 12:13   Numerous Methicillin resistant Staphylococcus aureus  ***********Note************  This isolate demonstrates inducible  clindamycin resistance.  Clindamycin may still be effective in some patients.  --  Methicillin resistant Staphylococcus aureus      .Blood Dialysis Catheter  03-07 @ 23:03   No growth at 5 days.  --  --      .Blood Blood-Peripheral  03-07 @ 09:43   No growth at 5 days.  --  --      .Urine Catheterized  03-07 @ 09:38   <10,000 CFU/ml Normal Urogenital derrick present  --  --      .Blood Blood  01-10 @ 00:04   No growth at 5 days.  --  --

## 2018-04-08 NOTE — PROGRESS NOTE ADULT - PROBLEM SELECTOR PLAN 1
Afebrile, leukocytosis  trending down , no labs were done today   The source is likely many pressure ulcers.   BC no growth to date   CXR: no consolidations   On Meropenem and Vancomycin during HD   Wound care following  Plan for discharge in AM

## 2018-04-08 NOTE — CHART NOTE - NSCHARTNOTEFT_GEN_A_CORE
Upon Nutritional Assessment by the Registered Dietitian your patient was determined to meet criteria / has evidence of the following diagnosis/diagnoses:          [ ]  Mild Protein Calorie Malnutrition        [ ]  Moderate Protein Calorie Malnutrition        [ x] Severe Protein Calorie Malnutrition        [ ] Unspecified Protein Calorie Malnutrition        [x ] Underweight / BMI <19        [ ] Morbid Obesity / BMI > 40      Findings as based on:  •  Comprehensive nutrition assessment and consultation  •  Calorie counts (nutrient intake analysis)  •  Food acceptance and intake status from observations by staff  •  Follow up  •  Patient education  •  Intervention secondary to interdisciplinary rounds  •   concerns      Treatment:    The following diet has been recommended:      PROVIDER Section:     By signing this assessment you are acknowledging and agree with the diagnosis/diagnoses assigned by the Registered Dietitian    Comments:  continue  with carbohydrate consistent , renal replacement, DASH/TLC diet , add nepro bid, change multivitamin to nephrocaps, discontinue vitamin C 500mg/d

## 2018-04-08 NOTE — PROGRESS NOTE ADULT - SUBJECTIVE AND OBJECTIVE BOX
Patient is a 53y old  Female who presents with a chief complaint of Leukoytosis (05 Apr 2018 06:03)      INTERVAL HPI/OVERNIGHT EVENTS: patient was seen and examined at bedside. Had no complains.     MEDICATIONS  (STANDING):  ascorbic acid 500 milliGRAM(s) Oral daily  cinacalcet 30 milliGRAM(s) Oral daily  dextrose 5%. 1000 milliLiter(s) (50 mL/Hr) IV Continuous <Continuous>  dextrose 50% Injectable 12.5 Gram(s) IV Push once  dextrose 50% Injectable 25 Gram(s) IV Push once  dextrose 50% Injectable 25 Gram(s) IV Push once  docusate sodium 100 milliGRAM(s) Oral two times a day  epoetin jacqueline Injectable 4000 Unit(s) IV Push <User Schedule>  ferrous    sulfate Liquid 300 milliGRAM(s) Enteral Tube daily  folic acid 1 milliGRAM(s) Oral daily  gabapentin 100 milliGRAM(s) Oral three times a day  heparin  Injectable 5000 Unit(s) SubCutaneous every 8 hours  hydrALAZINE 50 milliGRAM(s) Oral three times a day  insulin lispro (HumaLOG) corrective regimen sliding scale   SubCutaneous three times a day before meals  levothyroxine 50 MICROGram(s) Oral daily  meropenem  IVPB 500 milliGRAM(s) IV Intermittent every 24 hours  metoprolol tartrate 25 milliGRAM(s) Oral two times a day  multivitamin 1 Tablet(s) Oral daily  multivitamin 1 Tablet(s) Oral daily  pantoprazole    Tablet 40 milliGRAM(s) Oral before breakfast  senna 2 Tablet(s) Oral at bedtime  simvastatin 20 milliGRAM(s) Oral at bedtime  vancomycin  IVPB 1250 milliGRAM(s) IV Intermittent <User Schedule>    MEDICATIONS  (PRN):  acetaminophen   Tablet 650 milliGRAM(s) Oral every 6 hours PRN For Temp greater than 38 C (100.4 F) or mild pain  dextrose Gel 1 Dose(s) Oral once PRN Blood Glucose LESS THAN 70 milliGRAM(s)/deciliter  glucagon  Injectable 1 milliGRAM(s) IntraMuscular once PRN Glucose LESS THAN 70 milligrams/deciliter  oxyCODONE    5 mG/acetaminophen 325 mG 1 Tablet(s) Oral every 6 hours PRN Moderate Pain (4 - 6)      Allergies    No Known Allergies    Intolerances        REVIEW OF SYSTEMS:  CONSTITUTIONAL: No fever, weight loss, or fatigue  RESPIRATORY: No cough, wheezing, chills or hemoptysis; No shortness of breath  CARDIOVASCULAR: No chest pain, palpitations, dizziness, or leg swelling  GASTROINTESTINAL: No abdominal or epigastric pain. No nausea, vomiting, or hematemesis; No diarrhea or constipation. No melena or hematochezia.  NEUROLOGICAL: No headaches, memory loss, loss of strength, numbness, or tremors  MUSCULOSKELETAL: No joint pain or swelling; No muscle, back, or extremity pain  SKIN: pressure ulcers: back, hips, shoulders      Vital Signs Last 24 Hrs  T(C): 36.8 (08 Apr 2018 05:31), Max: 37 (07 Apr 2018 13:08)  T(F): 98.3 (08 Apr 2018 05:31), Max: 98.6 (07 Apr 2018 13:08)  HR: 75 (08 Apr 2018 05:31) (74 - 78)  BP: 161/61 (08 Apr 2018 05:31) (136/54 - 161/61)  BP(mean): --  RR: 17 (08 Apr 2018 05:31) (16 - 17)  SpO2: 96% (08 Apr 2018 05:31) (96% - 100%)    PHYSICAL EXAM:  GENERAL: Thin lady, NAD, laying in bed with her eyes always close   HEAD:  Atraumatic, Normocephalic  EYES:  conjunctiva and sclera clear  NECK: Supple, No JVD, Normal thyroid  NERVOUS SYSTEM:  Alert & Oriented X3, No gross focal deficits  CHEST/LUNG: Clear to percussion bilaterally; No rales, rhonchi, wheezing, or rubs  HEART: Regular rate and rhythm; No murmurs, rubs, or gallops  ABDOMEN: Soft, Nontender, Nondistended; Bowel sounds present  EXTREMITIES:  No clubbing, cyanosis, or edema  SKIN: clean pressure ulcers on shoulders, hips, back     LABS:                        9.1    15.7  )-----------( 376      ( 07 Apr 2018 07:14 )             32.1     04-07    138  |  103  |  30<H>  ----------------------------<  115<H>  4.8   |  29  |  3.27<H>    Ca    9.4      07 Apr 2018 07:14  Phos  3.4     04-06  Mg     2.6     04-07      PT/INR - ( 07 Apr 2018 07:14 )   PT: 10.5 sec;   INR: 0.96 ratio             CAPILLARY BLOOD GLUCOSE      POCT Blood Glucose.: 124 mg/dL (08 Apr 2018 11:39)  POCT Blood Glucose.: 101 mg/dL (08 Apr 2018 08:04)  POCT Blood Glucose.: 130 mg/dL (07 Apr 2018 21:25)  POCT Blood Glucose.: 116 mg/dL (07 Apr 2018 16:33)      RADIOLOGY & ADDITIONAL TESTS:    Imaging Personally Reviewed:  [ ] YES  [ ] NO    Consultant(s) Notes Reviewed:  [ ] YES  [ ] NO    Care Discussed with Consultants/Other Providers [ ] YES  [ ] NO    Plan of Care discussed with Housestaff [ ]YES [ ] NO

## 2018-04-08 NOTE — DIETITIAN INITIAL EVALUATION ADULT. - MD RECOMMEND
po supplement/continue with carbohydrate consistent , renal replacement , DASH/TLC diet , suggest to change multivitamin to nephrocaps and discontinue vitamin C 500mg/d

## 2018-04-08 NOTE — DIETITIAN INITIAL EVALUATION ADULT. - OTHER INFO
Patient reports losing 23% from usual in 1 month or longer( 59 to 45.4kg). Patient reports losing 23% from usual in 1 month ( 59 to 45.4kg).

## 2018-04-09 ENCOUNTER — TRANSCRIPTION ENCOUNTER (OUTPATIENT)
Age: 53
End: 2018-04-09

## 2018-04-09 LAB
ALBUMIN SERPL ELPH-MCNC: 1.9 G/DL — LOW (ref 3.5–5)
ALP SERPL-CCNC: 141 U/L — HIGH (ref 40–120)
ALT FLD-CCNC: 10 U/L DA — SIGNIFICANT CHANGE UP (ref 10–60)
ANION GAP SERPL CALC-SCNC: 10 MMOL/L — SIGNIFICANT CHANGE UP (ref 5–17)
AST SERPL-CCNC: 14 U/L — SIGNIFICANT CHANGE UP (ref 10–40)
BILIRUB SERPL-MCNC: 0.2 MG/DL — SIGNIFICANT CHANGE UP (ref 0.2–1.2)
BUN SERPL-MCNC: 56 MG/DL — HIGH (ref 7–18)
CALCIUM SERPL-MCNC: 9.5 MG/DL — SIGNIFICANT CHANGE UP (ref 8.4–10.5)
CHLORIDE SERPL-SCNC: 102 MMOL/L — SIGNIFICANT CHANGE UP (ref 96–108)
CO2 SERPL-SCNC: 25 MMOL/L — SIGNIFICANT CHANGE UP (ref 22–31)
CREAT SERPL-MCNC: 5.96 MG/DL — HIGH (ref 0.5–1.3)
GLUCOSE BLDC GLUCOMTR-MCNC: 104 MG/DL — HIGH (ref 70–99)
GLUCOSE BLDC GLUCOMTR-MCNC: 107 MG/DL — HIGH (ref 70–99)
GLUCOSE BLDC GLUCOMTR-MCNC: 115 MG/DL — HIGH (ref 70–99)
GLUCOSE BLDC GLUCOMTR-MCNC: 167 MG/DL — HIGH (ref 70–99)
GLUCOSE SERPL-MCNC: 103 MG/DL — HIGH (ref 70–99)
HCT VFR BLD CALC: 28.2 % — LOW (ref 34.5–45)
HGB BLD-MCNC: 8.6 G/DL — LOW (ref 11.5–15.5)
MCHC RBC-ENTMCNC: 27.7 PG — SIGNIFICANT CHANGE UP (ref 27–34)
MCHC RBC-ENTMCNC: 30.7 GM/DL — LOW (ref 32–36)
MCV RBC AUTO: 90.2 FL — SIGNIFICANT CHANGE UP (ref 80–100)
PLATELET # BLD AUTO: 505 K/UL — HIGH (ref 150–400)
POTASSIUM SERPL-MCNC: 4.6 MMOL/L — SIGNIFICANT CHANGE UP (ref 3.5–5.3)
POTASSIUM SERPL-SCNC: 4.6 MMOL/L — SIGNIFICANT CHANGE UP (ref 3.5–5.3)
PROT SERPL-MCNC: 6.5 G/DL — SIGNIFICANT CHANGE UP (ref 6–8.3)
RBC # BLD: 3.12 M/UL — LOW (ref 3.8–5.2)
RBC # FLD: 19.9 % — HIGH (ref 10.3–14.5)
SODIUM SERPL-SCNC: 137 MMOL/L — SIGNIFICANT CHANGE UP (ref 135–145)
WBC # BLD: 19.5 K/UL — HIGH (ref 3.8–10.5)
WBC # FLD AUTO: 19.5 K/UL — HIGH (ref 3.8–10.5)

## 2018-04-09 RX ADMIN — GABAPENTIN 100 MILLIGRAM(S): 400 CAPSULE ORAL at 05:19

## 2018-04-09 RX ADMIN — Medication 50 MILLIGRAM(S): at 05:19

## 2018-04-09 RX ADMIN — Medication 500 MILLIGRAM(S): at 14:37

## 2018-04-09 RX ADMIN — ERYTHROPOIETIN 4000 UNIT(S): 10000 INJECTION, SOLUTION INTRAVENOUS; SUBCUTANEOUS at 11:52

## 2018-04-09 RX ADMIN — Medication 25 MILLIGRAM(S): at 17:19

## 2018-04-09 RX ADMIN — Medication 166.67 MILLIGRAM(S): at 12:02

## 2018-04-09 RX ADMIN — HEPARIN SODIUM 5000 UNIT(S): 5000 INJECTION INTRAVENOUS; SUBCUTANEOUS at 14:38

## 2018-04-09 RX ADMIN — GABAPENTIN 100 MILLIGRAM(S): 400 CAPSULE ORAL at 22:09

## 2018-04-09 RX ADMIN — Medication 1 MILLIGRAM(S): at 14:38

## 2018-04-09 RX ADMIN — Medication 300 MILLIGRAM(S): at 14:37

## 2018-04-09 RX ADMIN — Medication 100 MILLIGRAM(S): at 05:19

## 2018-04-09 RX ADMIN — OXYCODONE AND ACETAMINOPHEN 1 TABLET(S): 5; 325 TABLET ORAL at 18:30

## 2018-04-09 RX ADMIN — SIMVASTATIN 20 MILLIGRAM(S): 20 TABLET, FILM COATED ORAL at 22:09

## 2018-04-09 RX ADMIN — GABAPENTIN 100 MILLIGRAM(S): 400 CAPSULE ORAL at 14:38

## 2018-04-09 RX ADMIN — Medication 1: at 08:32

## 2018-04-09 RX ADMIN — PANTOPRAZOLE SODIUM 40 MILLIGRAM(S): 20 TABLET, DELAYED RELEASE ORAL at 05:19

## 2018-04-09 RX ADMIN — Medication 50 MILLIGRAM(S): at 14:39

## 2018-04-09 RX ADMIN — Medication 50 MILLIGRAM(S): at 22:09

## 2018-04-09 RX ADMIN — Medication 1 TABLET(S): at 14:38

## 2018-04-09 RX ADMIN — HEPARIN SODIUM 5000 UNIT(S): 5000 INJECTION INTRAVENOUS; SUBCUTANEOUS at 22:09

## 2018-04-09 RX ADMIN — MEROPENEM 100 MILLIGRAM(S): 1 INJECTION INTRAVENOUS at 09:42

## 2018-04-09 RX ADMIN — Medication 100 MILLIGRAM(S): at 17:19

## 2018-04-09 RX ADMIN — OXYCODONE AND ACETAMINOPHEN 1 TABLET(S): 5; 325 TABLET ORAL at 17:28

## 2018-04-09 RX ADMIN — CINACALCET 30 MILLIGRAM(S): 30 TABLET, FILM COATED ORAL at 14:37

## 2018-04-09 RX ADMIN — Medication 50 MICROGRAM(S): at 05:19

## 2018-04-09 RX ADMIN — Medication 25 MILLIGRAM(S): at 05:19

## 2018-04-09 NOTE — DISCHARGE NOTE ADULT - PATIENT PORTAL LINK FT
You can access the [facility] Patient Portal, offered by [facility], by registering with the following website: [email or web address]

## 2018-04-09 NOTE — DISCHARGE NOTE ADULT - HOSPITAL COURSE
52 y/o F from [place], bedbound due to difficulty walking likely 2/2 to polyneuropathy with PMHx of anemia, HFpEF, ESRD on HD, Clostridium difficile infection, DM, Diabetic neuropathy, HTN, HLD, Hypothyroidism, OM of jaw, Parathyroid adenoma, and GERD was sent from NH due to leukocytosis of 20. She was admitted for the workup of leucocytosis. Pt has been afebrile and her decubitus ulcers were evaluated by wound care nurse and Surgery. They were clean and did not need debridement. Pt was on meropenem and vancomycin intradialysis for Hx of MRSA infection. PTs WBC count improved on IV abx. No specific source of infection identified Pt will be discharged today back to [facility] and follow up in NH.     Intructions for wound care:    Recommendation:  · Recommendations	  -Clean all wounds with normal saline and apply skin prep to the surrounding skin  -Apply Collagenase ointment to the slough areas of the Coccyx and Mid Spine wounds, pack with saline moistened gauze, and cover with a Foam dressing Daily PRN  -Pack the R. Flank and R. Hip wound with Calcium Alginate (Aquacel) and cover with a Foam dressing Q72hrs PRN  -Elevate/float the patients heels using heel protectors and reposition the patient Q 2hrs using wedges or pillows 54 y/o F from [place], bedbound due to difficulty walking likely 2/2 to polyneuropathy with PMHx of anemia, HFpEF, ESRD on HD, Clostridium difficile infection, DM, Diabetic neuropathy, HTN, HLD, Hypothyroidism, OM of jaw, Parathyroid adenoma, and GERD was sent from NH due to leukocytosis of 20. She was admitted for the workup of leucocytosis. Pt has been afebrile and her decubitus ulcers were evaluated by wound care nurse and Surgery. They were clean and did not need debridement. Pt was on meropenem and vancomycin intradialysis for Hx of MRSA infection. PTs WBC count improved on IV abx. No specific source of infection identified Pt will be discharged today back to [facility] and follow up in NH.     Intructions for wound care:    Recommendation:  · Recommendations	  -Clean all wounds with normal saline and apply skin prep to the surrounding skin  -Apply Collagenase ointment to the slough areas of the Coccyx and Mid Spine wounds, pack with saline moistened gauze, and cover with a Foam dressing Daily PRN  -Pack the R. Flank and R. Hip wound with Calcium Alginate (Aquacel) and cover with a Foam dressing Q72hrs PRN  -Elevate/float the patients heels using heel protectors and reposition the patient Q 2hrs using wedges or pillows    Patient had an indium scan that showed increased collection at the gluteal site but since patient is known to have sacral ulcer will recommend wound care.     Per attending stable for discharge.

## 2018-04-09 NOTE — PROGRESS NOTE ADULT - PROBLEM SELECTOR PLAN 1
likely from  wound infection, wbc was downtrending but elevated again,   - c/w meropenem  IVPB 500 mg  every 24 hours and vancomycin 1250mg M-W-F intradialysis   - blood cultures negative  - no debridement as per Surgery  - daily wound care.   - ID Dr Kaplan  - turn and position q 2hrs  - Dr. Mcgrath consulted for persistent leucocytosis  NM indium scan to r/o any occult infection

## 2018-04-09 NOTE — PROGRESS NOTE ADULT - SUBJECTIVE AND OBJECTIVE BOX
s/p HD today.    No Known Allergies    Hospital Medications:   MEDICATIONS  (STANDING):  ascorbic acid 500 milliGRAM(s) Oral daily  cinacalcet 30 milliGRAM(s) Oral daily  dextrose 5%. 1000 milliLiter(s) (50 mL/Hr) IV Continuous <Continuous>  dextrose 50% Injectable 12.5 Gram(s) IV Push once  dextrose 50% Injectable 25 Gram(s) IV Push once  dextrose 50% Injectable 25 Gram(s) IV Push once  docusate sodium 100 milliGRAM(s) Oral two times a day  epoetin jacqueline Injectable 4000 Unit(s) IV Push <User Schedule>  ferrous    sulfate Liquid 300 milliGRAM(s) Enteral Tube daily  folic acid 1 milliGRAM(s) Oral daily  gabapentin 100 milliGRAM(s) Oral three times a day  heparin  Injectable 5000 Unit(s) SubCutaneous every 8 hours  hydrALAZINE 50 milliGRAM(s) Oral three times a day  insulin lispro (HumaLOG) corrective regimen sliding scale   SubCutaneous three times a day before meals  levothyroxine 50 MICROGram(s) Oral daily  meropenem  IVPB 500 milliGRAM(s) IV Intermittent every 24 hours  metoprolol tartrate 25 milliGRAM(s) Oral two times a day  multivitamin 1 Tablet(s) Oral daily  multivitamin 1 Tablet(s) Oral daily  pantoprazole    Tablet 40 milliGRAM(s) Oral before breakfast  senna 2 Tablet(s) Oral at bedtime  simvastatin 20 milliGRAM(s) Oral at bedtime  vancomycin  IVPB 1250 milliGRAM(s) IV Intermittent <User Schedule>      VITALS:  T(F): 98.6 (04-09-18 @ 17:21), Max: 98.6 (04-08-18 @ 22:23)  HR: 84 (04-09-18 @ 17:21)  BP: 165/64 (04-09-18 @ 17:21)  RR: 16 (04-09-18 @ 17:21)  SpO2: 100% (04-09-18 @ 17:21)  Wt(kg): --    04-08 @ 07:01  -  04-09 @ 07:00  --------------------------------------------------------  IN: 400 mL / OUT: 0 mL / NET: 400 mL        PHYSICAL EXAM:  Constitutional: NAD  HEENT: anicteric sclera, oropharynx clear, MMM  Neck: No JVD  Respiratory: CTAB, no wheezes, rales or rhonchi  Cardiovascular: S1, S2, RRR  Gastrointestinal: BS+, soft, NT/ND  Extremities: No cyanosis or clubbing. No peripheral edema  Neurological: A/O x 3, no focal deficits  Psychiatric: Normal mood, normal affect  : No CVA tenderness. No jarrell.   Vascular Access: RT IJ PERMACATH.    LABS:  04-09    137  |  102  |  56<H>  ----------------------------<  103<H>  4.6   |  25  |  5.96<H>    Ca    9.5      09 Apr 2018 10:27    TPro  6.5  /  Alb  1.9<L>  /  TBili  0.2  /  DBili      /  AST  14  /  ALT  10  /  AlkPhos  141<H>  04-09    Creatinine Trend: 5.96 <--, 3.27 <--, 4.87 <--, 3.46 <--, 3.03 <--                        8.6    19.5  )-----------( 505      ( 09 Apr 2018 10:27 )             28.2     Urine Studies:      RADIOLOGY & ADDITIONAL STUDIES:

## 2018-04-09 NOTE — DISCHARGE NOTE ADULT - MEDICATION SUMMARY - MEDICATIONS TO TAKE
I will START or STAY ON the medications listed below when I get home from the hospital:    acetaminophen 325 mg oral tablet  -- 2 tab(s) by mouth every 6 hours, As needed, For Temp greater than 38 C (100.4 F) or mild pain  -- Indication: For pain    acetaminophen 325 mg oral tablet  -- 2 tab(s) by mouth every 6 hours, As needed, Moderate Pain (4 - 6)  -- Indication: For fever    aluminum hydroxide-magnesium hydroxide 200 mg-200 mg/5 mL oral suspension  -- 30 milliliter(s) by mouth every 4 hours, As needed, Dyspepsia  -- Indication: For constipation     gabapentin 100 mg oral capsule  -- 1 cap(s) by mouth 3 times a day  -- Indication: For Neuropathy     simvastatin 20 mg oral tablet  -- 1 tab(s) by mouth once a day (at bedtime)  -- Indication: For HLD    metoprolol tartrate 25 mg oral tablet  -- 1 tab(s) by mouth 2 times a day  -- Indication: For HTN (hypertension)    cinacalcet 30 mg oral tablet  -- 1 tab(s) by mouth once a day  -- Indication: For ESRD (end stage renal disease) on dialysis    calamine topical lotion  -- 1 application on skin 3 times a day  -- Indication: For skin    collagenase 250 units/g topical ointment  -- 1 application on skin once a day  -- Indication: For skin    Epogen  -- 80722 unit(s) injectable 3 times a week  DURING DIALYSIS DAYS    -- Indication: For ESRD (end stage renal disease) on dialysis    ferrous sulfate 300 mg/5 mL (60 mg elemental iron) oral liquid  -- 5 milliliter(s) by mouth once a day  -- Indication: For Anemia    senna oral tablet  -- 2 tab(s) by mouth once a day (at bedtime)  -- Indication: For constipation     docusate sodium 100 mg oral capsule  -- 1 cap(s) by mouth 2 times a day  -- Indication: For constipation     Orazinc 220 oral capsule  -- 1 cap(s) by mouth once a day  -- Indication: For supplement     calcium acetate 667 mg oral tablet  --  by mouth   -- Indication: For supplement     sevelamer hydrochloride 800 mg oral tablet  -- 2 tab(s) by mouth 3 times a day (with meals)  -- Indication: For ESRD (end stage renal disease) on dialysis    Protonix 40 mg oral delayed release tablet  -- 1 tab(s) by mouth once a day  -- Indication: For GI ppx     levothyroxine 50 mcg (0.05 mg) oral tablet  -- 1 tab(s) by mouth once a day  -- Indication: For Hypothyroid     hydrALAZINE 50 mg oral tablet  -- 1 tab(s) by mouth 3 times a day  -- Indication: For HTN (hypertension)    Multiple Vitamins oral tablet  -- 1 tab(s) by mouth once a day  -- Indication: For food supplement     Tab-A-Gage oral tablet  -- 1 tab(s) by mouth once a day  -- Indication: For food supplement     ascorbic acid 500 mg oral tablet  -- 1 tab(s) by mouth once a day  -- Indication: For food supplement     folic acid 1 mg oral tablet  -- 1 tab(s) by mouth once a day  -- Indication: For food supplement

## 2018-04-09 NOTE — DISCHARGE NOTE ADULT - CARE PROVIDER_API CALL
Tia Kaplan ([facility] [place] Infectious Disease  [id number] [address] [place] [id number]  Phone: [phone]  Fax: [phone]

## 2018-04-09 NOTE — DISCHARGE NOTE ADULT - MEDICATION SUMMARY - MEDICATIONS TO CHANGE
I will SWITCH the dose or number of times a day I take the medications listed below when I get home from the hospital:    vancomycin 1.25 g/150 mL-NaCl 0.9% intravenous solution  -- 1 dose(s) intravenously 3 times a week intra dialysis

## 2018-04-09 NOTE — DISCHARGE NOTE ADULT - PLAN OF CARE
Monitor WBC count, r/o infection Pt was admitted for the concern of infection, but no obvious source of infection identified. Her decubitus ulcers looked clean without any sign of infection. Surgery recommended medical management and no debridement. Pt was started on Abx meropenem and vancomycin and her WBC count improved. Monitor WBC count in NH     Recommendation for wound care:    · Recommendations	  -Clean all wounds with normal saline and apply skin prep to the surrounding skin  -Apply Collagenase ointment to the slough areas of the Coccyx and Mid Spine wounds, pack with saline moistened gauze, and cover with a Foam dressing Daily PRN  -Pack the R. Flank and R. Hip wound with Calcium Alginate (Aquacel) and cover with a Foam dressing Q72hrs PRN  -Elevate/float the patients heels using heel protectors and reposition the patient Q 2hrs using wedges or pillows c/w routine HD c/w lopressor and hydralazine

## 2018-04-09 NOTE — PROGRESS NOTE ADULT - SUBJECTIVE AND OBJECTIVE BOX
INTERVAL HPI/OVERNIGHT EVENTS: no events overnight.  52 y/o bedbound female w pmhx of diastolic dysfunction, h/o c diff infection, DM T II, diabetic neuropathy, ESRD on HD h/o osteomyelitis of her jaw, multiple abscesses, infected decub ulcers s/p debridement. Wound cx + MRSA. She recently completed a course of vancomycin.   Pt sent from NH for leukocytosis of 20. Pt seen at bedside, alert, awake, well appearing, leukocytosis trending down. Afebrile.        Antimicrobial:  meropenem  IVPB 500 milliGRAM(s) IV Intermittent every 24 hours  vancomycin  IVPB 1250 milliGRAM(s) IV Intermittent <User Schedule>    Cardiovascular:  hydrALAZINE 50 milliGRAM(s) Oral three times a day  metoprolol tartrate 25 milliGRAM(s) Oral two times a day    Pulmonary:    Hematalogic:  heparin  Injectable 5000 Unit(s) SubCutaneous every 8 hours    Other:  acetaminophen   Tablet 650 milliGRAM(s) Oral every 6 hours PRN  ascorbic acid 500 milliGRAM(s) Oral daily  cinacalcet 30 milliGRAM(s) Oral daily  dextrose 5%. 1000 milliLiter(s) IV Continuous <Continuous>  dextrose 50% Injectable 12.5 Gram(s) IV Push once  dextrose 50% Injectable 25 Gram(s) IV Push once  dextrose 50% Injectable 25 Gram(s) IV Push once  dextrose Gel 1 Dose(s) Oral once PRN  docusate sodium 100 milliGRAM(s) Oral two times a day  epoetin jacqueline Injectable 4000 Unit(s) IV Push <User Schedule>  ferrous    sulfate Liquid 300 milliGRAM(s) Enteral Tube daily  folic acid 1 milliGRAM(s) Oral daily  gabapentin 100 milliGRAM(s) Oral three times a day  glucagon  Injectable 1 milliGRAM(s) IntraMuscular once PRN  insulin lispro (HumaLOG) corrective regimen sliding scale   SubCutaneous three times a day before meals  levothyroxine 50 MICROGram(s) Oral daily  multivitamin 1 Tablet(s) Oral daily  multivitamin 1 Tablet(s) Oral daily  oxyCODONE    5 mG/acetaminophen 325 mG 1 Tablet(s) Oral every 6 hours PRN  pantoprazole    Tablet 40 milliGRAM(s) Oral before breakfast  senna 2 Tablet(s) Oral at bedtime  simvastatin 20 milliGRAM(s) Oral at bedtime    acetaminophen   Tablet 650 milliGRAM(s) Oral every 6 hours PRN  ascorbic acid 500 milliGRAM(s) Oral daily  cinacalcet 30 milliGRAM(s) Oral daily  dextrose 5%. 1000 milliLiter(s) IV Continuous <Continuous>  dextrose 50% Injectable 12.5 Gram(s) IV Push once  dextrose 50% Injectable 25 Gram(s) IV Push once  dextrose 50% Injectable 25 Gram(s) IV Push once  dextrose Gel 1 Dose(s) Oral once PRN  docusate sodium 100 milliGRAM(s) Oral two times a day  epoetin jacqueline Injectable 4000 Unit(s) IV Push <User Schedule>  ferrous    sulfate Liquid 300 milliGRAM(s) Enteral Tube daily  folic acid 1 milliGRAM(s) Oral daily  gabapentin 100 milliGRAM(s) Oral three times a day  glucagon  Injectable 1 milliGRAM(s) IntraMuscular once PRN  heparin  Injectable 5000 Unit(s) SubCutaneous every 8 hours  hydrALAZINE 50 milliGRAM(s) Oral three times a day  insulin lispro (HumaLOG) corrective regimen sliding scale   SubCutaneous three times a day before meals  levothyroxine 50 MICROGram(s) Oral daily  meropenem  IVPB 500 milliGRAM(s) IV Intermittent every 24 hours  metoprolol tartrate 25 milliGRAM(s) Oral two times a day  multivitamin 1 Tablet(s) Oral daily  multivitamin 1 Tablet(s) Oral daily  oxyCODONE    5 mG/acetaminophen 325 mG 1 Tablet(s) Oral every 6 hours PRN  pantoprazole    Tablet 40 milliGRAM(s) Oral before breakfast  senna 2 Tablet(s) Oral at bedtime  simvastatin 20 milliGRAM(s) Oral at bedtime  vancomycin  IVPB 1250 milliGRAM(s) IV Intermittent <User Schedule>    Drug Dosing Weight  Height (cm): 170.18 (05 Apr 2018 08:07)  Weight (kg): 45.8 (05 Apr 2018 08:05)  BMI (kg/m2): 15.8 (05 Apr 2018 08:07)  BSA (m2): 1.51 (05 Apr 2018 08:07)      Vital Signs Last 24 Hrs  T(C): 36.8 (09 Apr 2018 10:15), Max: 37 (08 Apr 2018 14:40)  T(F): 98.2 (09 Apr 2018 10:15), Max: 98.6 (08 Apr 2018 14:40)  HR: 72 (09 Apr 2018 10:15) (72 - 79)  BP: 158/69 (09 Apr 2018 10:15) (151/68 - 173/79)  BP(mean): --  RR: 16 (09 Apr 2018 10:15) (16 - 16)  SpO2: 97% (09 Apr 2018 05:00) (97% - 100%)          I&O's Summary    08 Apr 2018 07:01  -  09 Apr 2018 07:00  --------------------------------------------------------  IN: 400 mL / OUT: 0 mL / NET: 400 mL            PHYSICAL EXAM:    PHYSICAL EXAM:  GENERAL: NAD, well appearing   CHEST/LUNG: Clear to auscultation  HEART: S1 S2  regular;   ABDOMEN: Soft, Nontender, Nondistended; Bowel sounds present  EXTREMITIES: no cyanosis; no edema; no calf tenderness  SKIN:  multiple decubitus ulcers   back, hip, sacral decub clean with minimal drainage from  back ulcerations  right chest wall permacath site clean   NERVOUS SYSTEM:  Awake and alert; Oriented  to place, person, no new deficits    LABS:  CBC Full  -  ( 09 Apr 2018 10:27 )  WBC Count : 19.5 K/uL  Hemoglobin : 8.6 g/dL  Hematocrit : 28.2 %  Platelet Count - Automated : 505 K/uL  Mean Cell Volume : 90.2 fl  Mean Cell Hemoglobin : 27.7 pg  Mean Cell Hemoglobin Concentration : 30.7 gm/dL  Auto Neutrophil # : x  Auto Lymphocyte # : x  Auto Monocyte # : x  Auto Eosinophil # : x  Auto Basophil # : x  Auto Neutrophil % : x  Auto Lymphocyte % : x  Auto Monocyte % : x  Auto Eosinophil % : x  Auto Basophil % : x    04-09    137  |  102  |  56<H>  ----------------------------<  103<H>  4.6   |  25  |  5.96<H>    Ca    9.5      09 Apr 2018 10:27    TPro  6.5  /  Alb  1.9<L>  /  TBili  0.2  /  DBili  x   /  AST  14  /  ALT  10  /  AlkPhos  141<H>  04-09            RADIOLOGY & ADDITIONAL STUDIES REVIEWED:  Yes    [ ]GOALS OF CARE DISCUSSION WITH PATIENT/FAMILY/PROXY:

## 2018-04-09 NOTE — DISCHARGE NOTE ADULT - CARE PLAN
Principal Discharge DX:	Leukocytosis  Goal:	Monitor WBC count, r/o infection  Assessment and plan of treatment:	Pt was admitted for the concern of infection, but no obvious source of infection identified. Her decubitus ulcers looked clean without any sign of infection. Surgery recommended medical management and no debridement. Pt was started on Abx meropenem and vancomycin and her WBC count improved. Monitor WBC count in NH     Recommendation for wound care:    · Recommendations	  -Clean all wounds with normal saline and apply skin prep to the surrounding skin  -Apply Collagenase ointment to the slough areas of the Coccyx and Mid Spine wounds, pack with saline moistened gauze, and cover with a Foam dressing Daily PRN  -Pack the R. Flank and R. Hip wound with Calcium Alginate (Aquacel) and cover with a Foam dressing Q72hrs PRN  -Elevate/float the patients heels using heel protectors and reposition the patient Q 2hrs using wedges or pillows  Secondary Diagnosis:	ESRD (end stage renal disease) on dialysis  Assessment and plan of treatment:	c/w routine HD  Secondary Diagnosis:	HTN (hypertension)  Assessment and plan of treatment:	c/w lopressor and hydralazine

## 2018-04-10 LAB
ANION GAP SERPL CALC-SCNC: 9 MMOL/L — SIGNIFICANT CHANGE UP (ref 5–17)
BUN SERPL-MCNC: 31 MG/DL — HIGH (ref 7–18)
CALCIUM SERPL-MCNC: 9 MG/DL — SIGNIFICANT CHANGE UP (ref 8.4–10.5)
CHLORIDE SERPL-SCNC: 101 MMOL/L — SIGNIFICANT CHANGE UP (ref 96–108)
CO2 SERPL-SCNC: 27 MMOL/L — SIGNIFICANT CHANGE UP (ref 22–31)
CREAT SERPL-MCNC: 4.2 MG/DL — HIGH (ref 0.5–1.3)
CRP SERPL-MCNC: 1.9 MG/DL — HIGH (ref 0–0.4)
CULTURE RESULTS: SIGNIFICANT CHANGE UP
CULTURE RESULTS: SIGNIFICANT CHANGE UP
ERYTHROCYTE [SEDIMENTATION RATE] IN BLOOD: 58 MM/HR — HIGH (ref 0–20)
GLUCOSE BLDC GLUCOMTR-MCNC: 104 MG/DL — HIGH (ref 70–99)
GLUCOSE BLDC GLUCOMTR-MCNC: 129 MG/DL — HIGH (ref 70–99)
GLUCOSE BLDC GLUCOMTR-MCNC: 129 MG/DL — HIGH (ref 70–99)
GLUCOSE BLDC GLUCOMTR-MCNC: 131 MG/DL — HIGH (ref 70–99)
GLUCOSE SERPL-MCNC: 108 MG/DL — HIGH (ref 70–99)
HCT VFR BLD CALC: 29.3 % — LOW (ref 34.5–45)
HGB BLD-MCNC: 8.7 G/DL — LOW (ref 11.5–15.5)
MAGNESIUM SERPL-MCNC: 2.3 MG/DL — SIGNIFICANT CHANGE UP (ref 1.6–2.6)
MCHC RBC-ENTMCNC: 26.6 PG — LOW (ref 27–34)
MCHC RBC-ENTMCNC: 29.6 GM/DL — LOW (ref 32–36)
MCV RBC AUTO: 90 FL — SIGNIFICANT CHANGE UP (ref 80–100)
PHOSPHATE SERPL-MCNC: 2.9 MG/DL — SIGNIFICANT CHANGE UP (ref 2.5–4.5)
PLATELET # BLD AUTO: 438 K/UL — HIGH (ref 150–400)
POTASSIUM SERPL-MCNC: 4 MMOL/L — SIGNIFICANT CHANGE UP (ref 3.5–5.3)
POTASSIUM SERPL-SCNC: 4 MMOL/L — SIGNIFICANT CHANGE UP (ref 3.5–5.3)
RBC # BLD: 3.26 M/UL — LOW (ref 3.8–5.2)
RBC # FLD: 19.9 % — HIGH (ref 10.3–14.5)
SODIUM SERPL-SCNC: 137 MMOL/L — SIGNIFICANT CHANGE UP (ref 135–145)
SPECIMEN SOURCE: SIGNIFICANT CHANGE UP
SPECIMEN SOURCE: SIGNIFICANT CHANGE UP
WBC # BLD: 19.8 K/UL — HIGH (ref 3.8–10.5)
WBC # FLD AUTO: 19.8 K/UL — HIGH (ref 3.8–10.5)

## 2018-04-10 RX ADMIN — Medication 500 MILLIGRAM(S): at 12:24

## 2018-04-10 RX ADMIN — Medication 25 MILLIGRAM(S): at 06:00

## 2018-04-10 RX ADMIN — MEROPENEM 100 MILLIGRAM(S): 1 INJECTION INTRAVENOUS at 12:17

## 2018-04-10 RX ADMIN — HEPARIN SODIUM 5000 UNIT(S): 5000 INJECTION INTRAVENOUS; SUBCUTANEOUS at 23:06

## 2018-04-10 RX ADMIN — PANTOPRAZOLE SODIUM 40 MILLIGRAM(S): 20 TABLET, DELAYED RELEASE ORAL at 06:00

## 2018-04-10 RX ADMIN — GABAPENTIN 100 MILLIGRAM(S): 400 CAPSULE ORAL at 23:06

## 2018-04-10 RX ADMIN — GABAPENTIN 100 MILLIGRAM(S): 400 CAPSULE ORAL at 06:00

## 2018-04-10 RX ADMIN — Medication 300 MILLIGRAM(S): at 12:23

## 2018-04-10 RX ADMIN — Medication 100 MILLIGRAM(S): at 17:20

## 2018-04-10 RX ADMIN — HEPARIN SODIUM 5000 UNIT(S): 5000 INJECTION INTRAVENOUS; SUBCUTANEOUS at 13:43

## 2018-04-10 RX ADMIN — GABAPENTIN 100 MILLIGRAM(S): 400 CAPSULE ORAL at 13:43

## 2018-04-10 RX ADMIN — Medication 50 MICROGRAM(S): at 06:00

## 2018-04-10 RX ADMIN — CINACALCET 30 MILLIGRAM(S): 30 TABLET, FILM COATED ORAL at 12:28

## 2018-04-10 RX ADMIN — Medication 100 MILLIGRAM(S): at 06:00

## 2018-04-10 RX ADMIN — Medication 50 MILLIGRAM(S): at 13:43

## 2018-04-10 RX ADMIN — SENNA PLUS 2 TABLET(S): 8.6 TABLET ORAL at 23:06

## 2018-04-10 RX ADMIN — Medication 25 MILLIGRAM(S): at 17:20

## 2018-04-10 RX ADMIN — Medication 50 MILLIGRAM(S): at 06:00

## 2018-04-10 RX ADMIN — Medication 1 MILLIGRAM(S): at 12:24

## 2018-04-10 RX ADMIN — SIMVASTATIN 20 MILLIGRAM(S): 20 TABLET, FILM COATED ORAL at 23:06

## 2018-04-10 RX ADMIN — HEPARIN SODIUM 5000 UNIT(S): 5000 INJECTION INTRAVENOUS; SUBCUTANEOUS at 06:00

## 2018-04-10 RX ADMIN — Medication 1 TABLET(S): at 12:24

## 2018-04-10 RX ADMIN — Medication 50 MILLIGRAM(S): at 23:06

## 2018-04-10 NOTE — PROGRESS NOTE ADULT - ATTENDING COMMENTS
Patient is seen and examined. Case reviewed with the medical team. Above note is appreciated. Will follow up clinically. Continue DVT prophylaxis. Case discussed with ID. Will get WBC scan to follow up for leukocytosis.

## 2018-04-10 NOTE — PROGRESS NOTE ADULT - SUBJECTIVE AND OBJECTIVE BOX
INTERVAL HPI/OVERNIGHT EVENTS:    No events overnight.    52 y/o bedbound female w pmhx of diastolic dysfunction, h/o c diff infection, DM T II, diabetic neuropathy, ESRD on HD h/o osteomyelitis of her jaw, multiple abscesses, infected decub ulcers s/p debridement. Wound cx + MRSA. She recently completed a course of vancomycin.  Pt sent from NH for leukocytosis of 20. Pt seen at bedside, alert, awake, well appearing, leukocytosis trending down. Afebrile.      VITAL SIGNS:  T(F): 98.7 (04-10-18 @ 05:50)  HR: 83 (04-10-18 @ 05:50)  BP: 161/69 (04-10-18 @ 05:50)  RR: 16 (04-10-18 @ 05:50)  SpO2: 99% (04-10-18 @ 05:50)  Wt(kg): --    PHYSICAL EXAM:  GENERAL: NAD, well appearing   CHEST/LUNG: Clear to auscultation  HEART: S1 S2  regular;   ABDOMEN: Soft, Nontender, Nondistended; Bowel sounds present  EXTREMITIES: no cyanosis; no edema; no calf tenderness  SKIN:  multiple decubitus ulcers   back, hip, sacral decub clean with minimal drainage from  back ulcerations  right chest wall permacath site clean   NERVOUS SYSTEM:  Awake and alert; Oriented  to place, person, no new deficits      MEDICATIONS  (STANDING):  ascorbic acid 500 milliGRAM(s) Oral daily  cinacalcet 30 milliGRAM(s) Oral daily  dextrose 5%. 1000 milliLiter(s) (50 mL/Hr) IV Continuous <Continuous>  dextrose 50% Injectable 12.5 Gram(s) IV Push once  dextrose 50% Injectable 25 Gram(s) IV Push once  dextrose 50% Injectable 25 Gram(s) IV Push once  docusate sodium 100 milliGRAM(s) Oral two times a day  epoetin jacqueline Injectable 4000 Unit(s) IV Push <User Schedule>  ferrous    sulfate Liquid 300 milliGRAM(s) Enteral Tube daily  folic acid 1 milliGRAM(s) Oral daily  gabapentin 100 milliGRAM(s) Oral three times a day  heparin  Injectable 5000 Unit(s) SubCutaneous every 8 hours  hydrALAZINE 50 milliGRAM(s) Oral three times a day  insulin lispro (HumaLOG) corrective regimen sliding scale   SubCutaneous three times a day before meals  levothyroxine 50 MICROGram(s) Oral daily  meropenem  IVPB 500 milliGRAM(s) IV Intermittent every 24 hours  metoprolol tartrate 25 milliGRAM(s) Oral two times a day  multivitamin 1 Tablet(s) Oral daily  multivitamin 1 Tablet(s) Oral daily  pantoprazole    Tablet 40 milliGRAM(s) Oral before breakfast  senna 2 Tablet(s) Oral at bedtime  simvastatin 20 milliGRAM(s) Oral at bedtime  vancomycin  IVPB 1250 milliGRAM(s) IV Intermittent <User Schedule>    MEDICATIONS  (PRN):  acetaminophen   Tablet 650 milliGRAM(s) Oral every 6 hours PRN For Temp greater than 38 C (100.4 F) or mild pain  dextrose Gel 1 Dose(s) Oral once PRN Blood Glucose LESS THAN 70 milliGRAM(s)/deciliter  glucagon  Injectable 1 milliGRAM(s) IntraMuscular once PRN Glucose LESS THAN 70 milligrams/deciliter  oxyCODONE    5 mG/acetaminophen 325 mG 1 Tablet(s) Oral every 6 hours PRN Moderate Pain (4 - 6)      Allergies    No Known Allergies    Intolerances        LABS:                        8.7    19.8  )-----------( 438      ( 10 Apr 2018 07:31 )             29.3     04-10    137  |  101  |  31<H>  ----------------------------<  108<H>  4.0   |  27  |  4.20<H>    Ca    9.0      10 Apr 2018 07:31  Phos  2.9     04-10  Mg     2.3     04-10    TPro  6.5  /  Alb  1.9<L>  /  TBili  0.2  /  DBili  x   /  AST  14  /  ALT  10  /  AlkPhos  141<H>  04-09          RADIOLOGY & ADDITIONAL TESTS:

## 2018-04-10 NOTE — PROGRESS NOTE ADULT - SUBJECTIVE AND OBJECTIVE BOX
No events overnight    No Known Allergies    Hospital Medications:   MEDICATIONS  (STANDING):  ascorbic acid 500 milliGRAM(s) Oral daily  cinacalcet 30 milliGRAM(s) Oral daily  dextrose 5%. 1000 milliLiter(s) (50 mL/Hr) IV Continuous <Continuous>  dextrose 50% Injectable 12.5 Gram(s) IV Push once  dextrose 50% Injectable 25 Gram(s) IV Push once  dextrose 50% Injectable 25 Gram(s) IV Push once  docusate sodium 100 milliGRAM(s) Oral two times a day  epoetin jacqueline Injectable 4000 Unit(s) IV Push <User Schedule>  ferrous    sulfate Liquid 300 milliGRAM(s) Enteral Tube daily  folic acid 1 milliGRAM(s) Oral daily  gabapentin 100 milliGRAM(s) Oral three times a day  heparin  Injectable 5000 Unit(s) SubCutaneous every 8 hours  hydrALAZINE 50 milliGRAM(s) Oral three times a day  insulin lispro (HumaLOG) corrective regimen sliding scale   SubCutaneous three times a day before meals  levothyroxine 50 MICROGram(s) Oral daily  meropenem  IVPB 500 milliGRAM(s) IV Intermittent every 24 hours  metoprolol tartrate 25 milliGRAM(s) Oral two times a day  multivitamin 1 Tablet(s) Oral daily  multivitamin 1 Tablet(s) Oral daily  pantoprazole    Tablet 40 milliGRAM(s) Oral before breakfast  senna 2 Tablet(s) Oral at bedtime  simvastatin 20 milliGRAM(s) Oral at bedtime  vancomycin  IVPB 1250 milliGRAM(s) IV Intermittent <User Schedule>        VITALS:  T(F): 98.7 (04-10-18 @ 13:38), Max: 98.7 (04-10-18 @ 05:50)  HR: 75 (04-10-18 @ 13:38)  BP: 139/56 (04-10-18 @ 13:38)  RR: 17 (04-10-18 @ 13:38)  SpO2: 100% (04-10-18 @ 13:38)  Wt(kg): --      PHYSICAL EXAM:  Constitutional: NAD  HEENT: anicteric sclera, oropharynx clear, MMM  Neck: No JVD  Respiratory: CTAB, no wheezes, rales or rhonchi  Cardiovascular: S1, S2, RRR  Gastrointestinal: BS+, soft, NT/ND  Extremities: No cyanosis or clubbing. No peripheral edema  Neurological: A/O x 3, no focal deficits  Psychiatric: Normal mood, normal affects  Vascular Access: RT IJ  permacth     LABS:  04-10    137  |  101  |  31<H>  ----------------------------<  108<H>  4.0   |  27  |  4.20<H>    Ca    9.0      10 Apr 2018 07:31  Phos  2.9     04-10  Mg     2.3     04-10    TPro  6.5  /  Alb  1.9<L>  /  TBili  0.2  /  DBili      /  AST  14  /  ALT  10  /  AlkPhos  141<H>  04-09    Creatinine Trend: 4.20 <--, 5.96 <--, 3.27 <--, 4.87 <--, 3.46 <--, 3.03 <--                        8.7    19.8  )-----------( 438      ( 10 Apr 2018 07:31 )             29.3     Urine Studies:      RADIOLOGY & ADDITIONAL STUDIES:

## 2018-04-11 LAB
ANION GAP SERPL CALC-SCNC: 11 MMOL/L — SIGNIFICANT CHANGE UP (ref 5–17)
BUN SERPL-MCNC: 56 MG/DL — HIGH (ref 7–18)
CALCIUM SERPL-MCNC: 9.1 MG/DL — SIGNIFICANT CHANGE UP (ref 8.4–10.5)
CHLORIDE SERPL-SCNC: 101 MMOL/L — SIGNIFICANT CHANGE UP (ref 96–108)
CO2 SERPL-SCNC: 26 MMOL/L — SIGNIFICANT CHANGE UP (ref 22–31)
CREAT SERPL-MCNC: 6.54 MG/DL — HIGH (ref 0.5–1.3)
EOSINOPHIL NFR BLD AUTO: 6 % — SIGNIFICANT CHANGE UP (ref 0–6)
GLUCOSE BLDC GLUCOMTR-MCNC: 107 MG/DL — HIGH (ref 70–99)
GLUCOSE BLDC GLUCOMTR-MCNC: 110 MG/DL — HIGH (ref 70–99)
GLUCOSE BLDC GLUCOMTR-MCNC: 116 MG/DL — HIGH (ref 70–99)
GLUCOSE BLDC GLUCOMTR-MCNC: 136 MG/DL — HIGH (ref 70–99)
GLUCOSE BLDC GLUCOMTR-MCNC: 139 MG/DL — HIGH (ref 70–99)
GLUCOSE SERPL-MCNC: 166 MG/DL — HIGH (ref 70–99)
HCT VFR BLD CALC: 29.2 % — LOW (ref 34.5–45)
HGB BLD-MCNC: 8.6 G/DL — LOW (ref 11.5–15.5)
LYMPHOCYTES # BLD AUTO: 14 % — SIGNIFICANT CHANGE UP (ref 13–44)
MAGNESIUM SERPL-MCNC: 2.4 MG/DL — SIGNIFICANT CHANGE UP (ref 1.6–2.6)
MCHC RBC-ENTMCNC: 26.4 PG — LOW (ref 27–34)
MCHC RBC-ENTMCNC: 29.6 GM/DL — LOW (ref 32–36)
MCV RBC AUTO: 89.2 FL — SIGNIFICANT CHANGE UP (ref 80–100)
MONOCYTES NFR BLD AUTO: 6 % — SIGNIFICANT CHANGE UP (ref 2–14)
NEUTROPHILS NFR BLD AUTO: 73 % — SIGNIFICANT CHANGE UP (ref 43–77)
PHOSPHATE SERPL-MCNC: 4.9 MG/DL — HIGH (ref 2.5–4.5)
PLATELET # BLD AUTO: 502 K/UL — HIGH (ref 150–400)
POTASSIUM SERPL-MCNC: 4.9 MMOL/L — SIGNIFICANT CHANGE UP (ref 3.5–5.3)
POTASSIUM SERPL-SCNC: 4.9 MMOL/L — SIGNIFICANT CHANGE UP (ref 3.5–5.3)
RBC # BLD: 3.27 M/UL — LOW (ref 3.8–5.2)
RBC # FLD: 19.8 % — HIGH (ref 10.3–14.5)
SODIUM SERPL-SCNC: 138 MMOL/L — SIGNIFICANT CHANGE UP (ref 135–145)
VANCOMYCIN TROUGH SERPL-MCNC: 36.3 UG/ML — CRITICAL HIGH (ref 10–20)
WBC # BLD: 20.8 K/UL — HIGH (ref 3.8–10.5)
WBC # FLD AUTO: 20.8 K/UL — HIGH (ref 3.8–10.5)

## 2018-04-11 RX ORDER — COLLAGENASE CLOSTRIDIUM HIST. 250 UNIT/G
1 OINTMENT (GRAM) TOPICAL DAILY
Qty: 0 | Refills: 0 | Status: DISCONTINUED | OUTPATIENT
Start: 2018-04-11 | End: 2018-04-18

## 2018-04-11 RX ORDER — SODIUM HYPOCHLORITE 0.125 %
1 SOLUTION, NON-ORAL MISCELLANEOUS
Qty: 0 | Refills: 0 | Status: DISCONTINUED | OUTPATIENT
Start: 2018-04-11 | End: 2018-04-18

## 2018-04-11 RX ADMIN — OXYCODONE AND ACETAMINOPHEN 1 TABLET(S): 5; 325 TABLET ORAL at 18:02

## 2018-04-11 RX ADMIN — Medication 1 TABLET(S): at 12:02

## 2018-04-11 RX ADMIN — Medication 300 MILLIGRAM(S): at 12:02

## 2018-04-11 RX ADMIN — SIMVASTATIN 20 MILLIGRAM(S): 20 TABLET, FILM COATED ORAL at 22:00

## 2018-04-11 RX ADMIN — Medication 50 MICROGRAM(S): at 05:47

## 2018-04-11 RX ADMIN — GABAPENTIN 100 MILLIGRAM(S): 400 CAPSULE ORAL at 13:02

## 2018-04-11 RX ADMIN — PANTOPRAZOLE SODIUM 40 MILLIGRAM(S): 20 TABLET, DELAYED RELEASE ORAL at 05:47

## 2018-04-11 RX ADMIN — GABAPENTIN 100 MILLIGRAM(S): 400 CAPSULE ORAL at 05:47

## 2018-04-11 RX ADMIN — Medication 100 MILLIGRAM(S): at 05:47

## 2018-04-11 RX ADMIN — CINACALCET 30 MILLIGRAM(S): 30 TABLET, FILM COATED ORAL at 12:01

## 2018-04-11 RX ADMIN — Medication 50 MILLIGRAM(S): at 05:46

## 2018-04-11 RX ADMIN — GABAPENTIN 100 MILLIGRAM(S): 400 CAPSULE ORAL at 22:00

## 2018-04-11 RX ADMIN — Medication 25 MILLIGRAM(S): at 17:43

## 2018-04-11 RX ADMIN — Medication 1 MILLIGRAM(S): at 12:01

## 2018-04-11 RX ADMIN — HEPARIN SODIUM 5000 UNIT(S): 5000 INJECTION INTRAVENOUS; SUBCUTANEOUS at 05:46

## 2018-04-11 RX ADMIN — HEPARIN SODIUM 5000 UNIT(S): 5000 INJECTION INTRAVENOUS; SUBCUTANEOUS at 22:00

## 2018-04-11 RX ADMIN — Medication 1 APPLICATION(S): at 17:43

## 2018-04-11 RX ADMIN — MEROPENEM 100 MILLIGRAM(S): 1 INJECTION INTRAVENOUS at 10:24

## 2018-04-11 RX ADMIN — ERYTHROPOIETIN 4000 UNIT(S): 10000 INJECTION, SOLUTION INTRAVENOUS; SUBCUTANEOUS at 15:42

## 2018-04-11 RX ADMIN — Medication 25 MILLIGRAM(S): at 05:47

## 2018-04-11 RX ADMIN — SENNA PLUS 2 TABLET(S): 8.6 TABLET ORAL at 22:00

## 2018-04-11 RX ADMIN — OXYCODONE AND ACETAMINOPHEN 1 TABLET(S): 5; 325 TABLET ORAL at 19:05

## 2018-04-11 RX ADMIN — Medication 500 MILLIGRAM(S): at 12:02

## 2018-04-11 RX ADMIN — HEPARIN SODIUM 5000 UNIT(S): 5000 INJECTION INTRAVENOUS; SUBCUTANEOUS at 13:05

## 2018-04-11 NOTE — PROGRESS NOTE ADULT - SUBJECTIVE AND OBJECTIVE BOX
No events overnight.    No Known Allergies    Hospital Medications:   MEDICATIONS  (STANDING):  ascorbic acid 500 milliGRAM(s) Oral daily  cinacalcet 30 milliGRAM(s) Oral daily  dextrose 5%. 1000 milliLiter(s) (50 mL/Hr) IV Continuous <Continuous>  dextrose 50% Injectable 12.5 Gram(s) IV Push once  dextrose 50% Injectable 25 Gram(s) IV Push once  dextrose 50% Injectable 25 Gram(s) IV Push once  docusate sodium 100 milliGRAM(s) Oral two times a day  epoetin jacqueline Injectable 4000 Unit(s) IV Push <User Schedule>  ferrous    sulfate Liquid 300 milliGRAM(s) Enteral Tube daily  folic acid 1 milliGRAM(s) Oral daily  gabapentin 100 milliGRAM(s) Oral three times a day  heparin  Injectable 5000 Unit(s) SubCutaneous every 8 hours  hydrALAZINE 50 milliGRAM(s) Oral three times a day  insulin lispro (HumaLOG) corrective regimen sliding scale   SubCutaneous three times a day before meals  levothyroxine 50 MICROGram(s) Oral daily  meropenem  IVPB 500 milliGRAM(s) IV Intermittent every 24 hours  metoprolol tartrate 25 milliGRAM(s) Oral two times a day  multivitamin 1 Tablet(s) Oral daily  multivitamin 1 Tablet(s) Oral daily  pantoprazole    Tablet 40 milliGRAM(s) Oral before breakfast  senna 2 Tablet(s) Oral at bedtime  simvastatin 20 milliGRAM(s) Oral at bedtime  vancomycin  IVPB 1250 milliGRAM(s) IV Intermittent <User Schedule>        VITALS:  T(F): 99 (04-11-18 @ 05:38), Max: 99 (04-11-18 @ 05:38)  HR: 78 (04-11-18 @ 05:38)  BP: 148/53 (04-11-18 @ 05:38)  RR: 17 (04-11-18 @ 05:38)  SpO2: 100% (04-11-18 @ 05:38)  Wt(kg): --      PHYSICAL EXAM:  Constitutional: NAD  HEENT: anicteric sclera, oropharynx clear, MMM  Neck: No JVD  Respiratory: CTAB, no wheezes, rales or rhonchi  Cardiovascular: S1, S2, RRR  Gastrointestinal: BS+, soft, NT/ND  Extremities: No cyanosis or clubbing. No peripheral edema  Neurological: A/O x 3, no focal deficits  Vascular Access: RT IJ permacath     LABS:  04-10    137  |  101  |  31<H>  ----------------------------<  108<H>  4.0   |  27  |  4.20<H>    Ca    9.0      10 Apr 2018 07:31  Phos  2.9     04-10  Mg     2.3     04-10      Creatinine Trend: 4.20 <--, 5.96 <--, 3.27 <--, 4.87 <--, 3.46 <--, 3.03 <--                        8.7    19.8  )-----------( 438      ( 10 Apr 2018 07:31 )             29.3     Urine Studies:      RADIOLOGY & ADDITIONAL STUDIES:

## 2018-04-11 NOTE — PROGRESS NOTE ADULT - ATTENDING COMMENTS
Patient is seen and examined. Case reviewed with the medical team. Above note is appreciated. Will follow up clinically. Continue DVT prophylaxis. Patient with hyperkalemia on bmp, not cooperative with taking meds. will treat hyperkalemia and repeat. will follow up bmp and when stable arrange for transfer to [facility]. Patient is seen and examined. Case reviewed with the medical team. Above note is appreciated. Will follow up clinically. Continue DVT prophylaxis. Patient with hyperkalemia on bmp, not cooperative with taking meds. will treat hyperkalemia and repeat. will follow up bmp and when stable arrange for transfer to [facility].  the above paragraph was in error for this patient. Please disregard.   this patient is stable and await work up for leukocytosis with WBC scan. Will continue hemodialysis.

## 2018-04-11 NOTE — PROGRESS NOTE ADULT - SUBJECTIVE AND OBJECTIVE BOX
INTERVAL HPI/OVERNIGHT EVENTS: no events overnight    Antimicrobial:  meropenem  IVPB 500 milliGRAM(s) IV Intermittent every 24 hours  vancomycin  IVPB 1250 milliGRAM(s) IV Intermittent <User Schedule>    Cardiovascular:  hydrALAZINE 50 milliGRAM(s) Oral three times a day  metoprolol tartrate 25 milliGRAM(s) Oral two times a day    Pulmonary:    Hematalogic:  heparin  Injectable 5000 Unit(s) SubCutaneous every 8 hours    Other:  acetaminophen   Tablet 650 milliGRAM(s) Oral every 6 hours PRN  ascorbic acid 500 milliGRAM(s) Oral daily  cinacalcet 30 milliGRAM(s) Oral daily  dextrose 5%. 1000 milliLiter(s) IV Continuous <Continuous>  dextrose 50% Injectable 12.5 Gram(s) IV Push once  dextrose 50% Injectable 25 Gram(s) IV Push once  dextrose 50% Injectable 25 Gram(s) IV Push once  dextrose Gel 1 Dose(s) Oral once PRN  docusate sodium 100 milliGRAM(s) Oral two times a day  epoetin jacqueline Injectable 4000 Unit(s) IV Push <User Schedule>  ferrous    sulfate Liquid 300 milliGRAM(s) Enteral Tube daily  folic acid 1 milliGRAM(s) Oral daily  gabapentin 100 milliGRAM(s) Oral three times a day  glucagon  Injectable 1 milliGRAM(s) IntraMuscular once PRN  insulin lispro (HumaLOG) corrective regimen sliding scale   SubCutaneous three times a day before meals  levothyroxine 50 MICROGram(s) Oral daily  multivitamin 1 Tablet(s) Oral daily  multivitamin 1 Tablet(s) Oral daily  oxyCODONE    5 mG/acetaminophen 325 mG 1 Tablet(s) Oral every 6 hours PRN  pantoprazole    Tablet 40 milliGRAM(s) Oral before breakfast  senna 2 Tablet(s) Oral at bedtime  simvastatin 20 milliGRAM(s) Oral at bedtime    acetaminophen   Tablet 650 milliGRAM(s) Oral every 6 hours PRN  ascorbic acid 500 milliGRAM(s) Oral daily  cinacalcet 30 milliGRAM(s) Oral daily  dextrose 5%. 1000 milliLiter(s) IV Continuous <Continuous>  dextrose 50% Injectable 12.5 Gram(s) IV Push once  dextrose 50% Injectable 25 Gram(s) IV Push once  dextrose 50% Injectable 25 Gram(s) IV Push once  dextrose Gel 1 Dose(s) Oral once PRN  docusate sodium 100 milliGRAM(s) Oral two times a day  epoetin jacqueline Injectable 4000 Unit(s) IV Push <User Schedule>  ferrous    sulfate Liquid 300 milliGRAM(s) Enteral Tube daily  folic acid 1 milliGRAM(s) Oral daily  gabapentin 100 milliGRAM(s) Oral three times a day  glucagon  Injectable 1 milliGRAM(s) IntraMuscular once PRN  heparin  Injectable 5000 Unit(s) SubCutaneous every 8 hours  hydrALAZINE 50 milliGRAM(s) Oral three times a day  insulin lispro (HumaLOG) corrective regimen sliding scale   SubCutaneous three times a day before meals  levothyroxine 50 MICROGram(s) Oral daily  meropenem  IVPB 500 milliGRAM(s) IV Intermittent every 24 hours  metoprolol tartrate 25 milliGRAM(s) Oral two times a day  multivitamin 1 Tablet(s) Oral daily  multivitamin 1 Tablet(s) Oral daily  oxyCODONE    5 mG/acetaminophen 325 mG 1 Tablet(s) Oral every 6 hours PRN  pantoprazole    Tablet 40 milliGRAM(s) Oral before breakfast  senna 2 Tablet(s) Oral at bedtime  simvastatin 20 milliGRAM(s) Oral at bedtime  vancomycin  IVPB 1250 milliGRAM(s) IV Intermittent <User Schedule>    Drug Dosing Weight  Height (cm): 170.18 (05 Apr 2018 08:07)  Weight (kg): 45.8 (05 Apr 2018 08:05)  BMI (kg/m2): 15.8 (05 Apr 2018 08:07)  BSA (m2): 1.51 (05 Apr 2018 08:07)      Vital Signs Last 24 Hrs  T(C): 37.2 (11 Apr 2018 05:38), Max: 37.2 (11 Apr 2018 05:38)  T(F): 99 (11 Apr 2018 05:38), Max: 99 (11 Apr 2018 05:38)  HR: 78 (11 Apr 2018 05:38) (72 - 80)  BP: 148/53 (11 Apr 2018 05:38) (139/56 - 155/69)  BP(mean): --  RR: 17 (11 Apr 2018 05:38) (16 - 17)  SpO2: 100% (11 Apr 2018 05:38) (97% - 100%)          I&O's Summary        PHYSICAL EXAM:  GENERAL: NAD, well appearing   CHEST/LUNG: Clear to auscultation  HEART: S1 S2  regular;   ABDOMEN: Soft, Nontender, Nondistended; Bowel sounds present  EXTREMITIES: no cyanosis; no edema; no calf tenderness  SKIN:  multiple decubitus ulcers   back, hip, sacral decub clean with minimal drainage from  back ulcerations  right chest wall permacath site clean   NERVOUS SYSTEM:  Awake and alert; Oriented  to place, person, no new deficits    LABS:  CBC Full  -  ( 10 Apr 2018 07:31 )  WBC Count : 19.8 K/uL  Hemoglobin : 8.7 g/dL  Hematocrit : 29.3 %  Platelet Count - Automated : 438 K/uL  Mean Cell Volume : 90.0 fl  Mean Cell Hemoglobin : 26.6 pg  Mean Cell Hemoglobin Concentration : 29.6 gm/dL  Auto Neutrophil # : x  Auto Lymphocyte # : x  Auto Monocyte # : x  Auto Eosinophil # : x  Auto Basophil # : x  Auto Neutrophil % : x  Auto Lymphocyte % : x  Auto Monocyte % : x  Auto Eosinophil % : x  Auto Basophil % : x    04-10    137  |  101  |  31<H>  ----------------------------<  108<H>  4.0   |  27  |  4.20<H>    Ca    9.0      10 Apr 2018 07:31  Phos  2.9     04-10  Mg     2.3     04-10              RADIOLOGY & ADDITIONAL STUDIES REVIEWED:  Yes    [ ]GOALS OF CARE DISCUSSION WITH PATIENT/FAMILY/PROXY:

## 2018-04-11 NOTE — PROGRESS NOTE ADULT - PROBLEM SELECTOR PLAN 1
likely from  wound infection, wbc was downtrending but elevated again,   - c/w meropenem  IVPB 500 mg  every 24 hours and vancomycin 1250mg M-W-F intradialysis   - blood cultures negative  - no debridement as per Surgery  - daily wound care.   - ID Dr Kaplan  - turn and position q 2hrs  - Dr. Mcgrath consulted for persistent leucocytosis  NM indium scan to r/o any occult infection, Since the patient is going to dialysis will do indium scan tomorrow AM.

## 2018-04-12 LAB
GLUCOSE BLDC GLUCOMTR-MCNC: 122 MG/DL — HIGH (ref 70–99)
GLUCOSE BLDC GLUCOMTR-MCNC: 150 MG/DL — HIGH (ref 70–99)
GLUCOSE BLDC GLUCOMTR-MCNC: 162 MG/DL — HIGH (ref 70–99)
GLUCOSE BLDC GLUCOMTR-MCNC: 173 MG/DL — HIGH (ref 70–99)
HCT VFR BLD CALC: 30.3 % — LOW (ref 34.5–45)
HGB BLD-MCNC: 9.5 G/DL — LOW (ref 11.5–15.5)
MCHC RBC-ENTMCNC: 28.2 PG — SIGNIFICANT CHANGE UP (ref 27–34)
MCHC RBC-ENTMCNC: 31.2 GM/DL — LOW (ref 32–36)
MCV RBC AUTO: 90.4 FL — SIGNIFICANT CHANGE UP (ref 80–100)
PLATELET # BLD AUTO: 444 K/UL — HIGH (ref 150–400)
RBC # BLD: 3.35 M/UL — LOW (ref 3.8–5.2)
RBC # FLD: 21.4 % — HIGH (ref 10.3–14.5)
WBC # BLD: 19.6 K/UL — HIGH (ref 3.8–10.5)
WBC # FLD AUTO: 19.6 K/UL — HIGH (ref 3.8–10.5)

## 2018-04-12 RX ORDER — ZINC SULFATE TAB 220 MG (50 MG ZINC EQUIVALENT) 220 (50 ZN) MG
220 TAB ORAL DAILY
Qty: 0 | Refills: 0 | Status: DISCONTINUED | OUTPATIENT
Start: 2018-04-12 | End: 2018-04-18

## 2018-04-12 RX ADMIN — Medication 300 MILLIGRAM(S): at 11:31

## 2018-04-12 RX ADMIN — SIMVASTATIN 20 MILLIGRAM(S): 20 TABLET, FILM COATED ORAL at 22:53

## 2018-04-12 RX ADMIN — Medication 1: at 11:32

## 2018-04-12 RX ADMIN — CINACALCET 30 MILLIGRAM(S): 30 TABLET, FILM COATED ORAL at 11:32

## 2018-04-12 RX ADMIN — MEROPENEM 100 MILLIGRAM(S): 1 INJECTION INTRAVENOUS at 09:51

## 2018-04-12 RX ADMIN — HEPARIN SODIUM 5000 UNIT(S): 5000 INJECTION INTRAVENOUS; SUBCUTANEOUS at 06:39

## 2018-04-12 RX ADMIN — Medication 1 APPLICATION(S): at 17:06

## 2018-04-12 RX ADMIN — Medication 50 MILLIGRAM(S): at 22:53

## 2018-04-12 RX ADMIN — Medication 25 MILLIGRAM(S): at 06:39

## 2018-04-12 RX ADMIN — GABAPENTIN 100 MILLIGRAM(S): 400 CAPSULE ORAL at 22:53

## 2018-04-12 RX ADMIN — Medication 50 MILLIGRAM(S): at 06:39

## 2018-04-12 RX ADMIN — Medication 100 MILLIGRAM(S): at 17:06

## 2018-04-12 RX ADMIN — ZINC SULFATE TAB 220 MG (50 MG ZINC EQUIVALENT) 220 MILLIGRAM(S): 220 (50 ZN) TAB at 11:32

## 2018-04-12 RX ADMIN — HEPARIN SODIUM 5000 UNIT(S): 5000 INJECTION INTRAVENOUS; SUBCUTANEOUS at 22:53

## 2018-04-12 RX ADMIN — Medication 1: at 17:05

## 2018-04-12 RX ADMIN — Medication 50 MILLIGRAM(S): at 13:31

## 2018-04-12 RX ADMIN — Medication 50 MICROGRAM(S): at 06:39

## 2018-04-12 RX ADMIN — Medication 1 MILLIGRAM(S): at 11:31

## 2018-04-12 RX ADMIN — PANTOPRAZOLE SODIUM 40 MILLIGRAM(S): 20 TABLET, DELAYED RELEASE ORAL at 06:39

## 2018-04-12 RX ADMIN — HEPARIN SODIUM 5000 UNIT(S): 5000 INJECTION INTRAVENOUS; SUBCUTANEOUS at 13:30

## 2018-04-12 RX ADMIN — Medication 1 APPLICATION(S): at 06:40

## 2018-04-12 RX ADMIN — Medication 100 MILLIGRAM(S): at 06:39

## 2018-04-12 RX ADMIN — Medication 500 MILLIGRAM(S): at 11:31

## 2018-04-12 RX ADMIN — Medication 1 TABLET(S): at 11:31

## 2018-04-12 RX ADMIN — GABAPENTIN 100 MILLIGRAM(S): 400 CAPSULE ORAL at 13:31

## 2018-04-12 RX ADMIN — GABAPENTIN 100 MILLIGRAM(S): 400 CAPSULE ORAL at 06:39

## 2018-04-12 RX ADMIN — Medication 1 APPLICATION(S): at 11:28

## 2018-04-12 RX ADMIN — SENNA PLUS 2 TABLET(S): 8.6 TABLET ORAL at 22:53

## 2018-04-12 NOTE — PROGRESS NOTE ADULT - ATTENDING COMMENTS
Patient is seen and examined. Case reviewed with the medical team. Above note is appreciated. Will follow up clinically. Continue DVT prophylaxis. Case discussed with ID. await WBC scan.

## 2018-04-12 NOTE — PROGRESS NOTE ADULT - SUBJECTIVE AND OBJECTIVE BOX
INTERVAL HPI/OVERNIGHT EVENTS: no events overnight      Antimicrobial:  meropenem  IVPB 500 milliGRAM(s) IV Intermittent every 24 hours  vancomycin  IVPB 1250 milliGRAM(s) IV Intermittent <User Schedule>    Cardiovascular:  hydrALAZINE 50 milliGRAM(s) Oral three times a day  metoprolol tartrate 25 milliGRAM(s) Oral two times a day    Pulmonary:    Hematalogic:  heparin  Injectable 5000 Unit(s) SubCutaneous every 8 hours    Other:  acetaminophen   Tablet 650 milliGRAM(s) Oral every 6 hours PRN  ascorbic acid 500 milliGRAM(s) Oral daily  cinacalcet 30 milliGRAM(s) Oral daily  collagenase Ointment 1 Application(s) Topical daily  Dakins Solution - 1/4 Strength 1 Application(s) Topical two times a day  dextrose 5%. 1000 milliLiter(s) IV Continuous <Continuous>  dextrose 50% Injectable 12.5 Gram(s) IV Push once  dextrose 50% Injectable 25 Gram(s) IV Push once  dextrose 50% Injectable 25 Gram(s) IV Push once  dextrose Gel 1 Dose(s) Oral once PRN  docusate sodium 100 milliGRAM(s) Oral two times a day  epoetin jacqueline Injectable 4000 Unit(s) IV Push <User Schedule>  ferrous    sulfate Liquid 300 milliGRAM(s) Enteral Tube daily  folic acid 1 milliGRAM(s) Oral daily  gabapentin 100 milliGRAM(s) Oral three times a day  glucagon  Injectable 1 milliGRAM(s) IntraMuscular once PRN  insulin lispro (HumaLOG) corrective regimen sliding scale   SubCutaneous three times a day before meals  levothyroxine 50 MICROGram(s) Oral daily  multivitamin 1 Tablet(s) Oral daily  multivitamin 1 Tablet(s) Oral daily  oxyCODONE    5 mG/acetaminophen 325 mG 1 Tablet(s) Oral every 6 hours PRN  pantoprazole    Tablet 40 milliGRAM(s) Oral before breakfast  senna 2 Tablet(s) Oral at bedtime  simvastatin 20 milliGRAM(s) Oral at bedtime  zinc sulfate 220 milliGRAM(s) Oral daily    acetaminophen   Tablet 650 milliGRAM(s) Oral every 6 hours PRN  ascorbic acid 500 milliGRAM(s) Oral daily  cinacalcet 30 milliGRAM(s) Oral daily  collagenase Ointment 1 Application(s) Topical daily  Dakins Solution - 1/4 Strength 1 Application(s) Topical two times a day  dextrose 5%. 1000 milliLiter(s) IV Continuous <Continuous>  dextrose 50% Injectable 12.5 Gram(s) IV Push once  dextrose 50% Injectable 25 Gram(s) IV Push once  dextrose 50% Injectable 25 Gram(s) IV Push once  dextrose Gel 1 Dose(s) Oral once PRN  docusate sodium 100 milliGRAM(s) Oral two times a day  epoetin jacqueline Injectable 4000 Unit(s) IV Push <User Schedule>  ferrous    sulfate Liquid 300 milliGRAM(s) Enteral Tube daily  folic acid 1 milliGRAM(s) Oral daily  gabapentin 100 milliGRAM(s) Oral three times a day  glucagon  Injectable 1 milliGRAM(s) IntraMuscular once PRN  heparin  Injectable 5000 Unit(s) SubCutaneous every 8 hours  hydrALAZINE 50 milliGRAM(s) Oral three times a day  insulin lispro (HumaLOG) corrective regimen sliding scale   SubCutaneous three times a day before meals  levothyroxine 50 MICROGram(s) Oral daily  meropenem  IVPB 500 milliGRAM(s) IV Intermittent every 24 hours  metoprolol tartrate 25 milliGRAM(s) Oral two times a day  multivitamin 1 Tablet(s) Oral daily  multivitamin 1 Tablet(s) Oral daily  oxyCODONE    5 mG/acetaminophen 325 mG 1 Tablet(s) Oral every 6 hours PRN  pantoprazole    Tablet 40 milliGRAM(s) Oral before breakfast  senna 2 Tablet(s) Oral at bedtime  simvastatin 20 milliGRAM(s) Oral at bedtime  vancomycin  IVPB 1250 milliGRAM(s) IV Intermittent <User Schedule>  zinc sulfate 220 milliGRAM(s) Oral daily    Drug Dosing Weight  Height (cm): 170.18 (05 Apr 2018 08:07)  Weight (kg): 45.8 (05 Apr 2018 08:05)  BMI (kg/m2): 15.8 (05 Apr 2018 08:07)  BSA (m2): 1.51 (05 Apr 2018 08:07)      Vital Signs Last 24 Hrs  T(C): 36.8 (12 Apr 2018 05:11), Max: 37.2 (11 Apr 2018 21:46)  T(F): 98.3 (12 Apr 2018 05:11), Max: 99 (11 Apr 2018 21:46)  HR: 77 (12 Apr 2018 05:11) (73 - 87)  BP: 145/59 (12 Apr 2018 05:11) (118/48 - 145/59)  BP(mean): --  RR: 16 (12 Apr 2018 05:11) (16 - 17)  SpO2: 100% (12 Apr 2018 05:11) (99% - 100%)          I&O's Summary          PHYSICAL EXAM:  GENERAL: NAD, well appearing   CHEST/LUNG: Clear to auscultation  HEART: S1 S2  regular;   ABDOMEN: Soft, Nontender, Nondistended; Bowel sounds present  EXTREMITIES: no cyanosis; no edema; no calf tenderness  SKIN:  multiple decubitus ulcers   back, hip, sacral decub clean with minimal drainage from  back ulcerations  right chest wall permacath site clean   NERVOUS SYSTEM:  Awake and alert; Oriented  to place, person, no new deficits    LABS:  CBC Full  -  ( 11 Apr 2018 13:51 )  WBC Count : 20.8 K/uL  Hemoglobin : 8.6 g/dL  Hematocrit : 29.2 %  Platelet Count - Automated : 502 K/uL  Mean Cell Volume : 89.2 fl  Mean Cell Hemoglobin : 26.4 pg  Mean Cell Hemoglobin Concentration : 29.6 gm/dL  Auto Neutrophil # : x  Auto Lymphocyte # : x  Auto Monocyte # : x  Auto Eosinophil # : x  Auto Basophil # : x  Auto Neutrophil % : 73.0 %  Auto Lymphocyte % : 14.0 %  Auto Monocyte % : 6.0 %  Auto Eosinophil % : 6.0 %  Auto Basophil % : x    04-11    138  |  101  |  56<H>  ----------------------------<  166<H>  4.9   |  26  |  6.54<H>    Ca    9.1      11 Apr 2018 13:51  Phos  4.9     04-11  Mg     2.4     04-11              RADIOLOGY & ADDITIONAL STUDIES REVIEWED:  Yes    [ ]GOALS OF CARE DISCUSSION WITH PATIENT/FAMILY/PROXY:

## 2018-04-12 NOTE — PROGRESS NOTE ADULT - PROBLEM SELECTOR PLAN 1
likely from  wound infection, wbc was downtrending but elevated again,   - c/w meropenem  IVPB 500 mg  every 24 hours and vancomycin 1250mg M-W-F intradialysis   - blood cultures negative  - no debridement as per Surgery  - daily wound care.   - ID Dr Kaplan  - turn and position q 2hrs  - Dr. Mcgrath consulted for persistent leucocytosis: thinks its all infectious, no evidence of Bone marrow disorder/ leukemia  will NM indium scan to r/o any occult infection but as per nuclear medicine department they do not have the material to inject this week and will be able to do the test next week, Pt is awaiting WBC scan.  Discussed with Dr. Gomez

## 2018-04-12 NOTE — PROGRESS NOTE ADULT - SUBJECTIVE AND OBJECTIVE BOX
awaiting indium scan    No Known Allergies    Hospital Medications:   MEDICATIONS  (STANDING):  ascorbic acid 500 milliGRAM(s) Oral daily  cinacalcet 30 milliGRAM(s) Oral daily  collagenase Ointment 1 Application(s) Topical daily  Dakins Solution - 1/4 Strength 1 Application(s) Topical two times a day  dextrose 5%. 1000 milliLiter(s) (50 mL/Hr) IV Continuous <Continuous>  dextrose 50% Injectable 12.5 Gram(s) IV Push once  dextrose 50% Injectable 25 Gram(s) IV Push once  dextrose 50% Injectable 25 Gram(s) IV Push once  docusate sodium 100 milliGRAM(s) Oral two times a day  epoetin jacqueline Injectable 4000 Unit(s) IV Push <User Schedule>  ferrous    sulfate Liquid 300 milliGRAM(s) Enteral Tube daily  folic acid 1 milliGRAM(s) Oral daily  gabapentin 100 milliGRAM(s) Oral three times a day  heparin  Injectable 5000 Unit(s) SubCutaneous every 8 hours  hydrALAZINE 50 milliGRAM(s) Oral three times a day  insulin lispro (HumaLOG) corrective regimen sliding scale   SubCutaneous three times a day before meals  levothyroxine 50 MICROGram(s) Oral daily  meropenem  IVPB 500 milliGRAM(s) IV Intermittent every 24 hours  metoprolol tartrate 25 milliGRAM(s) Oral two times a day  multivitamin 1 Tablet(s) Oral daily  multivitamin 1 Tablet(s) Oral daily  pantoprazole    Tablet 40 milliGRAM(s) Oral before breakfast  senna 2 Tablet(s) Oral at bedtime  simvastatin 20 milliGRAM(s) Oral at bedtime  vancomycin  IVPB 1250 milliGRAM(s) IV Intermittent <User Schedule>  zinc sulfate 220 milliGRAM(s) Oral daily        VITALS:  T(F): 99.1 (04-12-18 @ 13:24), Max: 99.1 (04-12-18 @ 13:24)  HR: 81 (04-12-18 @ 16:50)  BP: 141/57 (04-12-18 @ 16:50)  RR: 17 (04-12-18 @ 13:24)  SpO2: 96% (04-12-18 @ 13:24)  Wt(kg): --      PHYSICAL EXAM:  Constitutional: NAD  HEENT: anicteric sclera, oropharynx clear, MMM  Neck: No JVD  Respiratory: CTAB, no wheezes, rales or rhonchi  Cardiovascular: S1, S2, RRR  Gastrointestinal: BS+, soft, NT/ND  Extremities: No cyanosis or clubbing. No peripheral edema  Neurological: A/O x 3, no focal deficits  Vascular Access: RT IJ permacath    LABS:  04-11    138  |  101  |  56<H>  ----------------------------<  166<H>  4.9   |  26  |  6.54<H>    Ca    9.1      11 Apr 2018 13:51  Phos  4.9     04-11  Mg     2.4     04-11      Creatinine Trend: 6.54 <--, 4.20 <--, 5.96 <--, 3.27 <--, 4.87 <--                        9.5    19.6  )-----------( 444      ( 12 Apr 2018 11:28 )             30.3     Urine Studies:      RADIOLOGY & ADDITIONAL STUDIES:

## 2018-04-13 LAB
ANION GAP SERPL CALC-SCNC: 12 MMOL/L — SIGNIFICANT CHANGE UP (ref 5–17)
BASOPHILS # BLD AUTO: 0.1 K/UL — SIGNIFICANT CHANGE UP (ref 0–0.2)
BASOPHILS NFR BLD AUTO: 0.6 % — SIGNIFICANT CHANGE UP (ref 0–2)
BUN SERPL-MCNC: 49 MG/DL — HIGH (ref 7–18)
CALCIUM SERPL-MCNC: 8.1 MG/DL — LOW (ref 8.4–10.5)
CALCIUM SERPL-MCNC: 9 MG/DL — SIGNIFICANT CHANGE UP (ref 8.4–10.5)
CHLORIDE SERPL-SCNC: 97 MMOL/L — SIGNIFICANT CHANGE UP (ref 96–108)
CO2 SERPL-SCNC: 29 MMOL/L — SIGNIFICANT CHANGE UP (ref 22–31)
CREAT SERPL-MCNC: 5.49 MG/DL — HIGH (ref 0.5–1.3)
EOSINOPHIL # BLD AUTO: 0.8 K/UL — HIGH (ref 0–0.5)
EOSINOPHIL NFR BLD AUTO: 4.9 % — SIGNIFICANT CHANGE UP (ref 0–6)
FERRITIN SERPL-MCNC: 636 NG/ML — HIGH (ref 15–150)
GLUCOSE BLDC GLUCOMTR-MCNC: 103 MG/DL — HIGH (ref 70–99)
GLUCOSE BLDC GLUCOMTR-MCNC: 113 MG/DL — HIGH (ref 70–99)
GLUCOSE BLDC GLUCOMTR-MCNC: 131 MG/DL — HIGH (ref 70–99)
GLUCOSE BLDC GLUCOMTR-MCNC: 196 MG/DL — HIGH (ref 70–99)
GLUCOSE SERPL-MCNC: 133 MG/DL — HIGH (ref 70–99)
HCT VFR BLD CALC: 29 % — LOW (ref 34.5–45)
HGB BLD-MCNC: 8.3 G/DL — LOW (ref 11.5–15.5)
IRON SATN MFR SERPL: 18 % — SIGNIFICANT CHANGE UP (ref 15–50)
IRON SATN MFR SERPL: 29 UG/DL — LOW (ref 40–150)
LYMPHOCYTES # BLD AUTO: 19.3 % — SIGNIFICANT CHANGE UP (ref 13–44)
LYMPHOCYTES # BLD AUTO: 3.3 K/UL — SIGNIFICANT CHANGE UP (ref 1–3.3)
MCHC RBC-ENTMCNC: 26.2 PG — LOW (ref 27–34)
MCHC RBC-ENTMCNC: 28.8 GM/DL — LOW (ref 32–36)
MCV RBC AUTO: 91 FL — SIGNIFICANT CHANGE UP (ref 80–100)
MONOCYTES # BLD AUTO: 1.2 K/UL — HIGH (ref 0–0.9)
MONOCYTES NFR BLD AUTO: 7.1 % — SIGNIFICANT CHANGE UP (ref 2–14)
NEUTROPHILS # BLD AUTO: 11.5 K/UL — HIGH (ref 1.8–7.4)
NEUTROPHILS NFR BLD AUTO: 68 % — SIGNIFICANT CHANGE UP (ref 43–77)
PLATELET # BLD AUTO: 488 K/UL — HIGH (ref 150–400)
POTASSIUM SERPL-MCNC: 4.4 MMOL/L — SIGNIFICANT CHANGE UP (ref 3.5–5.3)
POTASSIUM SERPL-SCNC: 4.4 MMOL/L — SIGNIFICANT CHANGE UP (ref 3.5–5.3)
PTH-INTACT FLD-MCNC: 137 PG/ML — HIGH (ref 15–65)
RBC # BLD: 3.18 M/UL — LOW (ref 3.8–5.2)
RBC # FLD: 20.8 % — HIGH (ref 10.3–14.5)
SODIUM SERPL-SCNC: 138 MMOL/L — SIGNIFICANT CHANGE UP (ref 135–145)
TIBC SERPL-MCNC: 162 UG/DL — LOW (ref 250–450)
TSH SERPL-MCNC: 1.02 UU/ML — SIGNIFICANT CHANGE UP (ref 0.34–4.82)
UIBC SERPL-MCNC: 133 UG/DL — SIGNIFICANT CHANGE UP (ref 110–370)
VANCOMYCIN TROUGH SERPL-MCNC: 25.5 UG/ML — CRITICAL HIGH (ref 10–20)
WBC # BLD: 16.9 K/UL — HIGH (ref 3.8–10.5)
WBC # FLD AUTO: 16.9 K/UL — HIGH (ref 3.8–10.5)

## 2018-04-13 PROCEDURE — G0452: CPT | Mod: 26

## 2018-04-13 RX ORDER — OXYCODONE AND ACETAMINOPHEN 5; 325 MG/1; MG/1
1 TABLET ORAL ONCE
Qty: 0 | Refills: 0 | Status: DISCONTINUED | OUTPATIENT
Start: 2018-04-13 | End: 2018-04-13

## 2018-04-13 RX ORDER — TRAMADOL HYDROCHLORIDE 50 MG/1
25 TABLET ORAL EVERY 6 HOURS
Qty: 0 | Refills: 0 | Status: DISCONTINUED | OUTPATIENT
Start: 2018-04-13 | End: 2018-04-14

## 2018-04-13 RX ORDER — VANCOMYCIN HCL 1 G
1000 VIAL (EA) INTRAVENOUS
Qty: 0 | Refills: 0 | Status: DISCONTINUED | OUTPATIENT
Start: 2018-04-13 | End: 2018-04-13

## 2018-04-13 RX ORDER — VANCOMYCIN HCL 1 G
1000 VIAL (EA) INTRAVENOUS
Qty: 0 | Refills: 0 | Status: DISCONTINUED | OUTPATIENT
Start: 2018-04-13 | End: 2018-04-18

## 2018-04-13 RX ADMIN — Medication 100 MILLIGRAM(S): at 06:21

## 2018-04-13 RX ADMIN — TRAMADOL HYDROCHLORIDE 25 MILLIGRAM(S): 50 TABLET ORAL at 21:46

## 2018-04-13 RX ADMIN — SIMVASTATIN 20 MILLIGRAM(S): 20 TABLET, FILM COATED ORAL at 21:45

## 2018-04-13 RX ADMIN — ZINC SULFATE TAB 220 MG (50 MG ZINC EQUIVALENT) 220 MILLIGRAM(S): 220 (50 ZN) TAB at 14:03

## 2018-04-13 RX ADMIN — PANTOPRAZOLE SODIUM 40 MILLIGRAM(S): 20 TABLET, DELAYED RELEASE ORAL at 06:22

## 2018-04-13 RX ADMIN — Medication 50 MILLIGRAM(S): at 14:02

## 2018-04-13 RX ADMIN — OXYCODONE AND ACETAMINOPHEN 1 TABLET(S): 5; 325 TABLET ORAL at 01:17

## 2018-04-13 RX ADMIN — Medication 1 APPLICATION(S): at 17:10

## 2018-04-13 RX ADMIN — Medication 50 MILLIGRAM(S): at 21:46

## 2018-04-13 RX ADMIN — Medication 1 APPLICATION(S): at 14:06

## 2018-04-13 RX ADMIN — HEPARIN SODIUM 5000 UNIT(S): 5000 INJECTION INTRAVENOUS; SUBCUTANEOUS at 06:21

## 2018-04-13 RX ADMIN — OXYCODONE AND ACETAMINOPHEN 1 TABLET(S): 5; 325 TABLET ORAL at 02:17

## 2018-04-13 RX ADMIN — HEPARIN SODIUM 5000 UNIT(S): 5000 INJECTION INTRAVENOUS; SUBCUTANEOUS at 13:58

## 2018-04-13 RX ADMIN — Medication 1 TABLET(S): at 14:03

## 2018-04-13 RX ADMIN — SENNA PLUS 2 TABLET(S): 8.6 TABLET ORAL at 21:46

## 2018-04-13 RX ADMIN — GABAPENTIN 100 MILLIGRAM(S): 400 CAPSULE ORAL at 21:46

## 2018-04-13 RX ADMIN — MEROPENEM 100 MILLIGRAM(S): 1 INJECTION INTRAVENOUS at 09:14

## 2018-04-13 RX ADMIN — Medication 25 MILLIGRAM(S): at 06:22

## 2018-04-13 RX ADMIN — Medication 50 MICROGRAM(S): at 06:22

## 2018-04-13 RX ADMIN — HEPARIN SODIUM 5000 UNIT(S): 5000 INJECTION INTRAVENOUS; SUBCUTANEOUS at 21:46

## 2018-04-13 RX ADMIN — ERYTHROPOIETIN 4000 UNIT(S): 10000 INJECTION, SOLUTION INTRAVENOUS; SUBCUTANEOUS at 11:28

## 2018-04-13 RX ADMIN — GABAPENTIN 100 MILLIGRAM(S): 400 CAPSULE ORAL at 06:21

## 2018-04-13 RX ADMIN — TRAMADOL HYDROCHLORIDE 25 MILLIGRAM(S): 50 TABLET ORAL at 16:24

## 2018-04-13 RX ADMIN — CINACALCET 30 MILLIGRAM(S): 30 TABLET, FILM COATED ORAL at 14:02

## 2018-04-13 RX ADMIN — Medication 500 MILLIGRAM(S): at 13:59

## 2018-04-13 RX ADMIN — Medication 50 MILLIGRAM(S): at 06:21

## 2018-04-13 RX ADMIN — GABAPENTIN 100 MILLIGRAM(S): 400 CAPSULE ORAL at 14:05

## 2018-04-13 RX ADMIN — Medication 1 MILLIGRAM(S): at 13:59

## 2018-04-13 RX ADMIN — TRAMADOL HYDROCHLORIDE 25 MILLIGRAM(S): 50 TABLET ORAL at 17:24

## 2018-04-13 RX ADMIN — TRAMADOL HYDROCHLORIDE 25 MILLIGRAM(S): 50 TABLET ORAL at 22:14

## 2018-04-13 RX ADMIN — Medication 300 MILLIGRAM(S): at 14:05

## 2018-04-13 NOTE — PROGRESS NOTE ADULT - SUBJECTIVE AND OBJECTIVE BOX
OVERNIGHT EVENTS/SUBJECTIVE:  no events overnight  endorses pain on decubiti  had regular BM    T(C): 36.8 (04-13-18 @ 05:42), Max: 37.3 (04-12-18 @ 13:24)  HR: 73 (04-13-18 @ 05:42) (73 - 84)  BP: 159/58 (04-13-18 @ 05:42) (133/68 - 159/58)  RR: 16 (04-13-18 @ 05:42) (16 - 17)  SpO2: 98% (04-13-18 @ 05:42) (96% - 98%)  Wt(kg): --  I&O's Summary      LABS:                        9.5    19.6  )-----------( 444      ( 12 Apr 2018 11:28 )             30.3     04-11    138  |  101  |  56<H>  ----------------------------<  166<H>  4.9   |  26  |  6.54<H>    Ca    9.1      11 Apr 2018 13:51  Phos  4.9     04-11  Mg     2.4     04-11          CAPILLARY BLOOD GLUCOSE      POCT Blood Glucose.: 103 mg/dL (13 Apr 2018 08:03)  POCT Blood Glucose.: 150 mg/dL (12 Apr 2018 21:35)  POCT Blood Glucose.: 173 mg/dL (12 Apr 2018 16:32)  POCT Blood Glucose.: 162 mg/dL (12 Apr 2018 11:29)      RADIOLOGY & ADDITIONAL TESTS:    Imaging Personally Reviewed:  [ ] YES  [ ] NO    Consultant(s) Notes Reviewed:  [ x] YES  [ ] NO    PHYSICAL EXAM:  GENERAL: NAD, well-groomed, thin  HEAD:  Atraumatic, Normocephalic  EYES: EOMI, conjunctiva and sclera clear  NECK: Supple, No JVD  NERVOUS SYSTEM:  Alert & Oriented X3, Good concentration; Motor Strength 3/5 B/L upper and lower extremities  CHEST/LUNG: Clear to percussion bilaterally; No rales, rhonchi, wheezing  HEART: Regular rate and rhythm; No murmurs, rubs, or gallops  ABDOMEN: Soft, Nontender, Nondistended; Bowel sounds present  EXTREMITIES:  No clubbing, cyanosis, or edema  LYMPH: No lymphadenopathy noted  SKIN: No rashes; multiple decubiti on right hip, sacrum and upper back    Care Discussed with Consultants/Other Providers [ ] YES  [ ] NO OVERNIGHT EVENTS/SUBJECTIVE:  no events overnight  endorses pain from decubiti  had normal BM this am    T(C): 36.8 (04-13-18 @ 05:42), Max: 37.3 (04-12-18 @ 13:24)  HR: 73 (04-13-18 @ 05:42) (73 - 84)  BP: 159/58 (04-13-18 @ 05:42) (133/68 - 159/58)  RR: 16 (04-13-18 @ 05:42) (16 - 17)  SpO2: 98% (04-13-18 @ 05:42) (96% - 98%)  Wt(kg): --  I&O's Summary      LABS:                        9.5    19.6  )-----------( 444      ( 12 Apr 2018 11:28 )             30.3     04-11    138  |  101  |  56<H>  ----------------------------<  166<H>  4.9   |  26  |  6.54<H>    Ca    9.1      11 Apr 2018 13:51  Phos  4.9     04-11  Mg     2.4     04-11          CAPILLARY BLOOD GLUCOSE      POCT Blood Glucose.: 103 mg/dL (13 Apr 2018 08:03)  POCT Blood Glucose.: 150 mg/dL (12 Apr 2018 21:35)  POCT Blood Glucose.: 173 mg/dL (12 Apr 2018 16:32)  POCT Blood Glucose.: 162 mg/dL (12 Apr 2018 11:29)      RADIOLOGY & ADDITIONAL TESTS:    Imaging Personally Reviewed:  [ ] YES  [ ] NO    Consultant(s) Notes Reviewed:  [ x] YES  [ ] NO    PHYSICAL EXAM:  GENERAL: NAD, well-groomed, thin  HEAD:  Atraumatic, Normocephalic  EYES: EOMI, conjunctiva and sclera clear  NECK: Supple, No JVD  NERVOUS SYSTEM:  Alert & Oriented X3, Good concentration; Motor Strength 3/5 B/L upper and lower extremities  CHEST/LUNG: Clear to percussion bilaterally; No rales, rhonchi, wheezing  HEART: Regular rate and rhythm; No murmurs, rubs, or gallops  ABDOMEN: Soft, Nontender, Nondistended; Bowel sounds present  EXTREMITIES:  No clubbing, cyanosis, or edema  LYMPH: No lymphadenopathy noted  SKIN: No rashes; multiple decubiti on right hip, sacrum and upper back    Care Discussed with Consultants/Other Providers [ ] YES  [ ] NO

## 2018-04-13 NOTE — PROGRESS NOTE ADULT - SUBJECTIVE AND OBJECTIVE BOX
seen on HD    No Known Allergies    Hospital Medications:   MEDICATIONS  (STANDING):  ascorbic acid 500 milliGRAM(s) Oral daily  cinacalcet 30 milliGRAM(s) Oral daily  collagenase Ointment 1 Application(s) Topical daily  Dakins Solution - 1/4 Strength 1 Application(s) Topical two times a day  dextrose 5%. 1000 milliLiter(s) (50 mL/Hr) IV Continuous <Continuous>  dextrose 50% Injectable 12.5 Gram(s) IV Push once  dextrose 50% Injectable 25 Gram(s) IV Push once  dextrose 50% Injectable 25 Gram(s) IV Push once  docusate sodium 100 milliGRAM(s) Oral two times a day  ferrous    sulfate Liquid 300 milliGRAM(s) Enteral Tube daily  folic acid 1 milliGRAM(s) Oral daily  gabapentin 100 milliGRAM(s) Oral three times a day  heparin  Injectable 5000 Unit(s) SubCutaneous every 8 hours  hydrALAZINE 50 milliGRAM(s) Oral three times a day  insulin lispro (HumaLOG) corrective regimen sliding scale   SubCutaneous three times a day before meals  levothyroxine 50 MICROGram(s) Oral daily  meropenem  IVPB 500 milliGRAM(s) IV Intermittent every 24 hours  metoprolol tartrate 25 milliGRAM(s) Oral two times a day  multivitamin 1 Tablet(s) Oral daily  pantoprazole    Tablet 40 milliGRAM(s) Oral before breakfast  senna 2 Tablet(s) Oral at bedtime  simvastatin 20 milliGRAM(s) Oral at bedtime  vancomycin  IVPB 1250 milliGRAM(s) IV Intermittent <User Schedule>  zinc sulfate 220 milliGRAM(s) Oral daily        VITALS:  T(F): 98.5 (04-13-18 @ 14:28), Max: 98.6 (04-12-18 @ 20:51)  HR: 82 (04-13-18 @ 14:28)  BP: 169/63 (04-13-18 @ 14:28)  RR: 16 (04-13-18 @ 14:28)  SpO2: 100% (04-13-18 @ 14:28)  Wt(kg): --      PHYSICAL EXAM:  Constitutional: NAD  HEENT: anicteric sclera, oropharynx clear, MMM  Neck: No JVD  Respiratory: CTAB, no wheezes, rales or rhonchi  Cardiovascular: S1, S2, RRR  Gastrointestinal: BS+, soft, NT/ND  Extremities: No cyanosis or clubbing. No peripheral edema  Vascular Access: AVF in use    LABS:  04-13    138  |  97  |  49<H>  ----------------------------<  133<H>  4.4   |  29  |  5.49<H>    Ca    9.0      13 Apr 2018 10:31      Creatinine Trend: 5.49 <--, 6.54 <--, 4.20 <--, 5.96 <--, 3.27 <--, 4.87 <--                        8.3    16.9  )-----------( 488      ( 13 Apr 2018 10:31 )             29.0     Urine Studies:      RADIOLOGY & ADDITIONAL STUDIES:

## 2018-04-13 NOTE — PROGRESS NOTE ADULT - ATTENDING COMMENTS
Patient is seen and examined. Case reviewed with the medical team. Above note is appreciated. Will follow up clinically. Continue DVT prophylaxis. Await WBC scan to assess leukocytosis.

## 2018-04-13 NOTE — PROGRESS NOTE ADULT - PROBLEM SELECTOR PLAN 1
likely from infected decubiti, leukocytosis unchanged  appreciate hematology consult - feels 2/2 residually infected decubiti as WBC normal in 2016 and 2017, no further hematology workup recommended  blood cultures negative, CXR without infiltrate, no diarrhea  - c/w meropenem  IVPB 500 mg  every 24 hours and vancomycin 1250mg M-W-F intradialysis; Dr Kaplan following  - no debridement as per Surgery  - daily wound care  - turn and position q 2hrs  indium scan next week as nuclear material will be available next week likely from infected decubiti, leukocytosis unchanged though while on abx; afebrile, nontoxic appearing  appreciate hematology consult - feels 2/2 residually infected decubiti as WBC normal in 2016 and 2017, Dr Mcgrath will reassess today  blood cultures negative, CXR without infiltrate, no diarrhea  - c/w meropenem  IVPB 500 mg  every 24 hours and vancomycin 1250mg M-W-F intradialysis; check vanc level today prior to HD; Dr Kaplan following  - no debridement as per Surgery  - daily wound care with collagenase, zinc, vit c  - turn and position q 2hrs; PT  indium scan next week as nuclear material will be available next week  - dc when leukocytosis resolving

## 2018-04-13 NOTE — PROGRESS NOTE ADULT - SUBJECTIVE AND OBJECTIVE BOX
no fever  no pain  has some loose BM  no sob  WBC remains high  I still think it is most likely reactive  but will do Sky-2 to r/o MPD

## 2018-04-13 NOTE — PROGRESS NOTE ADULT - PROBLEM SELECTOR PLAN 2
c/w routine dialysis   HD M/W/F  c/w sensipar - check PTH  c/w epogen and ferrous sulfate for target Hgb 10  nephro Dr Escoto/ [facility] M/W/F  c/w sensipar - check PTH  c/w epogen and ferrous sulfate for target Hgb 10; check iron studies today  nephro Dr Escoto/ [facility]

## 2018-04-14 LAB
GLUCOSE BLDC GLUCOMTR-MCNC: 125 MG/DL — HIGH (ref 70–99)
GLUCOSE BLDC GLUCOMTR-MCNC: 128 MG/DL — HIGH (ref 70–99)
GLUCOSE BLDC GLUCOMTR-MCNC: 136 MG/DL — HIGH (ref 70–99)
GLUCOSE BLDC GLUCOMTR-MCNC: 162 MG/DL — HIGH (ref 70–99)

## 2018-04-14 RX ADMIN — ZINC SULFATE TAB 220 MG (50 MG ZINC EQUIVALENT) 220 MILLIGRAM(S): 220 (50 ZN) TAB at 11:51

## 2018-04-14 RX ADMIN — Medication 100 MILLIGRAM(S): at 17:13

## 2018-04-14 RX ADMIN — Medication 1 TABLET(S): at 11:51

## 2018-04-14 RX ADMIN — PANTOPRAZOLE SODIUM 40 MILLIGRAM(S): 20 TABLET, DELAYED RELEASE ORAL at 05:04

## 2018-04-14 RX ADMIN — Medication 1 APPLICATION(S): at 05:04

## 2018-04-14 RX ADMIN — Medication 1 APPLICATION(S): at 17:14

## 2018-04-14 RX ADMIN — TRAMADOL HYDROCHLORIDE 25 MILLIGRAM(S): 50 TABLET ORAL at 22:43

## 2018-04-14 RX ADMIN — SIMVASTATIN 20 MILLIGRAM(S): 20 TABLET, FILM COATED ORAL at 21:58

## 2018-04-14 RX ADMIN — GABAPENTIN 100 MILLIGRAM(S): 400 CAPSULE ORAL at 14:02

## 2018-04-14 RX ADMIN — GABAPENTIN 100 MILLIGRAM(S): 400 CAPSULE ORAL at 21:58

## 2018-04-14 RX ADMIN — GABAPENTIN 100 MILLIGRAM(S): 400 CAPSULE ORAL at 05:04

## 2018-04-14 RX ADMIN — MEROPENEM 100 MILLIGRAM(S): 1 INJECTION INTRAVENOUS at 11:51

## 2018-04-14 RX ADMIN — Medication 50 MILLIGRAM(S): at 05:04

## 2018-04-14 RX ADMIN — Medication 50 MICROGRAM(S): at 05:04

## 2018-04-14 RX ADMIN — Medication 1 APPLICATION(S): at 14:02

## 2018-04-14 RX ADMIN — TRAMADOL HYDROCHLORIDE 25 MILLIGRAM(S): 50 TABLET ORAL at 22:04

## 2018-04-14 RX ADMIN — HEPARIN SODIUM 5000 UNIT(S): 5000 INJECTION INTRAVENOUS; SUBCUTANEOUS at 14:02

## 2018-04-14 RX ADMIN — HEPARIN SODIUM 5000 UNIT(S): 5000 INJECTION INTRAVENOUS; SUBCUTANEOUS at 21:58

## 2018-04-14 RX ADMIN — Medication 100 MILLIGRAM(S): at 05:04

## 2018-04-14 RX ADMIN — Medication 500 MILLIGRAM(S): at 11:51

## 2018-04-14 RX ADMIN — Medication 25 MILLIGRAM(S): at 17:13

## 2018-04-14 RX ADMIN — Medication 50 MILLIGRAM(S): at 21:58

## 2018-04-14 RX ADMIN — HEPARIN SODIUM 5000 UNIT(S): 5000 INJECTION INTRAVENOUS; SUBCUTANEOUS at 05:04

## 2018-04-14 RX ADMIN — Medication 25 MILLIGRAM(S): at 05:04

## 2018-04-14 RX ADMIN — Medication 1 MILLIGRAM(S): at 11:51

## 2018-04-14 RX ADMIN — Medication 300 MILLIGRAM(S): at 11:51

## 2018-04-14 RX ADMIN — CINACALCET 30 MILLIGRAM(S): 30 TABLET, FILM COATED ORAL at 11:51

## 2018-04-14 RX ADMIN — Medication 1: at 17:13

## 2018-04-14 NOTE — PROGRESS NOTE ADULT - ATTENDING COMMENTS
Patient is seen and examined. Case reviewed with the medical team. Above note is appreciated. Will follow up clinically. Continue DVT prophylaxis. Await WBC scan.

## 2018-04-14 NOTE — PROGRESS NOTE ADULT - SUBJECTIVE AND OBJECTIVE BOX
CHIEF COMPLAINT:Patient is a 53y old  Female who presents with a chief complaint of Leukoytosis (09 Apr 2018 09:37)    	  REVIEW OF SYSTEMS:  CONSTITUTIONAL: No fever, weight loss, or fatigue  EYES: No eye pain, visual disturbances, or discharge  ENMT:  No difficulty hearing, tinnitus, vertigo; No sinus or throat pain  NECK: No pain or stiffness  RESPIRATORY: No cough, wheezing, chills or hemoptysis; No Shortness of Breath  CARDIOVASCULAR: No chest pain, palpitations, passing out, dizziness, or leg swelling  GASTROINTESTINAL: No abdominal or epigastric pain. No nausea, vomiting, or hematemesis; No diarrhea or constipation. No melena or hematochezia.  GENITOURINARY: No dysuria, frequency, hematuria, or incontinence  NEUROLOGICAL: No headaches, memory loss, loss of strength, numbness, or tremors  SKIN: No itching, burning, rashes, or lesions   LYMPH Nodes: No enlarged glands  ENDOCRINE: No heat or cold intolerance; No hair loss  MUSCULOSKELETAL: No joint pain or swelling; No muscle, back, or extremity pain  PSYCHIATRIC: No depression, anxiety, mood swings, or difficulty sleeping  HEME/LYMPH: No easy bruising, or bleeding gums  ALLERY AND IMMUNOLOGIC: No hives or eczema	    [ ] All others negative	  [ ] Unable to obtain    PHYSICAL EXAM:  T(C): 36.7 (04-14-18 @ 21:07), Max: 37.2 (04-14-18 @ 04:51)  HR: 75 (04-14-18 @ 21:07) (75 - 92)  BP: 141/78 (04-14-18 @ 21:07) (115/45 - 150/67)  RR: 16 (04-14-18 @ 21:07) (16 - 16)  SpO2: 99% (04-14-18 @ 21:07) (99% - 100%)  Wt(kg): --  I&O's Summary      Appearance: Normal	  HEENT:   Normal oral mucosa, PERRL, EOMI	  Lymphatic: No lymphadenopathy  Cardiovascular: Normal S1 S2, No JVD, No murmurs, No edema  Respiratory: Lungs clear to auscultation	  Psychiatry: A & O x 3, Mood & affect appropriate  Gastrointestinal:  Soft, Non-tender, + BS	  Skin: No rashes, No ecchymoses, No cyanosis	  Neurologic: Non-focal  Extremities: Normal range of motion, No clubbing, cyanosis or edema  Vascular: Peripheral pulses palpable 2+ bilaterally    MEDICATIONS  (STANDING):  ascorbic acid 500 milliGRAM(s) Oral daily  cinacalcet 30 milliGRAM(s) Oral daily  collagenase Ointment 1 Application(s) Topical daily  Dakins Solution - 1/4 Strength 1 Application(s) Topical two times a day  dextrose 5%. 1000 milliLiter(s) (50 mL/Hr) IV Continuous <Continuous>  dextrose 50% Injectable 12.5 Gram(s) IV Push once  dextrose 50% Injectable 25 Gram(s) IV Push once  dextrose 50% Injectable 25 Gram(s) IV Push once  docusate sodium 100 milliGRAM(s) Oral two times a day  ferrous    sulfate Liquid 300 milliGRAM(s) Enteral Tube daily  folic acid 1 milliGRAM(s) Oral daily  gabapentin 100 milliGRAM(s) Oral three times a day  heparin  Injectable 5000 Unit(s) SubCutaneous every 8 hours  hydrALAZINE 50 milliGRAM(s) Oral three times a day  insulin lispro (HumaLOG) corrective regimen sliding scale   SubCutaneous three times a day before meals  levothyroxine 50 MICROGram(s) Oral daily  meropenem  IVPB 500 milliGRAM(s) IV Intermittent every 24 hours  metoprolol tartrate 25 milliGRAM(s) Oral two times a day  multivitamin 1 Tablet(s) Oral daily  pantoprazole    Tablet 40 milliGRAM(s) Oral before breakfast  senna 2 Tablet(s) Oral at bedtime  simvastatin 20 milliGRAM(s) Oral at bedtime  vancomycin  IVPB 1000 milliGRAM(s) IV Intermittent <User Schedule>  zinc sulfate 220 milliGRAM(s) Oral daily      TELEMETRY: 	    ECG:  	  RADIOLOGY:  OTHER: 	  	  CBC Full  -  ( 13 Apr 2018 10:31 )  WBC Count : 16.9 K/uL  Hemoglobin : 8.3 g/dL  Hematocrit : 29.0 %  Platelet Count - Automated : 488 K/uL  Mean Cell Volume : 91.0 fl  Mean Cell Hemoglobin : 26.2 pg  Mean Cell Hemoglobin Concentration : 28.8 gm/dL  Auto Neutrophil # : 11.5 K/uL  Auto Lymphocyte # : 3.3 K/uL  Auto Monocyte # : 1.2 K/uL  Auto Eosinophil # : 0.8 K/uL  Auto Basophil # : 0.1 K/uL  Auto Neutrophil % : 68.0 %  Auto Lymphocyte % : 19.3 %  Auto Monocyte % : 7.1 %  Auto Eosinophil % : 4.9 %  Auto Basophil % : 0.6 %        CARDIAC MARKERS:                              8.3    16.9  )-----------( 488      ( 13 Apr 2018 10:31 )             29.0       04-13    138  |  97  |  49<H>  ----------------------------<  133<H>  4.4   |  29  |  5.49<H>    Ca    9.0      13 Apr 2018 10:31              proBNP:   Lipid Profile:   HgA1c:   TSH: Thyroid Stimulating Hormone, Serum: 1.02 uU/mL (04-13 @ 11:24)

## 2018-04-14 NOTE — PROGRESS NOTE ADULT - PROBLEM SELECTOR PLAN 2
HD M/W/F  c/w sensipar - check PTH  c/w epogen and ferrous sulfate for target Hgb 10; check iron studies today  nephro Dr Escoto/ [facility]

## 2018-04-14 NOTE — PROGRESS NOTE ADULT - PROBLEM SELECTOR PLAN 1
likely from infected decubiti, leukocytosis unchanged though while on abx; afebrile, nontoxic appearing  appreciate hematology consult - feels 2/2 residually infected decubiti as WBC normal in 2016 and 2017, Dr Mcgrath will reassess today  blood cultures negative, CXR without infiltrate, no diarrhea  - c/w meropenem  IVPB 500 mg  every 24 hours and vancomycin 1250mg M-W-F intradialysis; check vanc level today prior to HD; Dr Kaplan following  - no debridement as per Surgery  - daily wound care with collagenase, zinc, vit c  - turn and position q 2hrs; PT  indium scan next week as nuclear material will be available next week  - dc when leukocytosis resolving

## 2018-04-15 LAB
GLUCOSE BLDC GLUCOMTR-MCNC: 125 MG/DL — HIGH (ref 70–99)
GLUCOSE BLDC GLUCOMTR-MCNC: 126 MG/DL — HIGH (ref 70–99)
GLUCOSE BLDC GLUCOMTR-MCNC: 130 MG/DL — HIGH (ref 70–99)
GLUCOSE BLDC GLUCOMTR-MCNC: 176 MG/DL — HIGH (ref 70–99)

## 2018-04-15 RX ADMIN — Medication 1 APPLICATION(S): at 12:17

## 2018-04-15 RX ADMIN — Medication 1 MILLIGRAM(S): at 12:04

## 2018-04-15 RX ADMIN — ZINC SULFATE TAB 220 MG (50 MG ZINC EQUIVALENT) 220 MILLIGRAM(S): 220 (50 ZN) TAB at 12:04

## 2018-04-15 RX ADMIN — HEPARIN SODIUM 5000 UNIT(S): 5000 INJECTION INTRAVENOUS; SUBCUTANEOUS at 13:45

## 2018-04-15 RX ADMIN — Medication 50 MICROGRAM(S): at 05:46

## 2018-04-15 RX ADMIN — GABAPENTIN 100 MILLIGRAM(S): 400 CAPSULE ORAL at 22:32

## 2018-04-15 RX ADMIN — MEROPENEM 100 MILLIGRAM(S): 1 INJECTION INTRAVENOUS at 12:04

## 2018-04-15 RX ADMIN — GABAPENTIN 100 MILLIGRAM(S): 400 CAPSULE ORAL at 13:45

## 2018-04-15 RX ADMIN — Medication 25 MILLIGRAM(S): at 05:46

## 2018-04-15 RX ADMIN — Medication 1 APPLICATION(S): at 22:35

## 2018-04-15 RX ADMIN — GABAPENTIN 100 MILLIGRAM(S): 400 CAPSULE ORAL at 05:46

## 2018-04-15 RX ADMIN — Medication 1 APPLICATION(S): at 17:28

## 2018-04-15 RX ADMIN — Medication 50 MILLIGRAM(S): at 13:45

## 2018-04-15 RX ADMIN — SIMVASTATIN 20 MILLIGRAM(S): 20 TABLET, FILM COATED ORAL at 22:32

## 2018-04-15 RX ADMIN — HEPARIN SODIUM 5000 UNIT(S): 5000 INJECTION INTRAVENOUS; SUBCUTANEOUS at 22:32

## 2018-04-15 RX ADMIN — Medication 300 MILLIGRAM(S): at 12:04

## 2018-04-15 RX ADMIN — CINACALCET 30 MILLIGRAM(S): 30 TABLET, FILM COATED ORAL at 12:04

## 2018-04-15 RX ADMIN — Medication 500 MILLIGRAM(S): at 12:04

## 2018-04-15 RX ADMIN — Medication 1 APPLICATION(S): at 05:46

## 2018-04-15 RX ADMIN — PANTOPRAZOLE SODIUM 40 MILLIGRAM(S): 20 TABLET, DELAYED RELEASE ORAL at 05:46

## 2018-04-15 RX ADMIN — HEPARIN SODIUM 5000 UNIT(S): 5000 INJECTION INTRAVENOUS; SUBCUTANEOUS at 05:47

## 2018-04-15 RX ADMIN — Medication 50 MILLIGRAM(S): at 22:33

## 2018-04-15 RX ADMIN — Medication 1 TABLET(S): at 12:04

## 2018-04-15 RX ADMIN — Medication 50 MILLIGRAM(S): at 05:47

## 2018-04-15 RX ADMIN — Medication 25 MILLIGRAM(S): at 17:26

## 2018-04-15 RX ADMIN — Medication 1: at 16:46

## 2018-04-15 NOTE — PROGRESS NOTE ADULT - SUBJECTIVE AND OBJECTIVE BOX
CHIEF COMPLAINT:Patient is a 53y old  Female who presents with a chief complaint of Leukoytosis (09 Apr 2018 09:37)    	  REVIEW OF SYSTEMS:  CONSTITUTIONAL: No fever, weight loss, or fatigue  EYES: No eye pain, visual disturbances, or discharge  ENMT:  No difficulty hearing, tinnitus, vertigo; No sinus or throat pain  NECK: No pain or stiffness  RESPIRATORY: No cough, wheezing, chills or hemoptysis; No Shortness of Breath  CARDIOVASCULAR: No chest pain, palpitations, passing out, dizziness, or leg swelling  GASTROINTESTINAL: No abdominal or epigastric pain. No nausea, vomiting, or hematemesis; No diarrhea or constipation. No melena or hematochezia.  GENITOURINARY: No dysuria, frequency, hematuria, or incontinence  NEUROLOGICAL: No headaches, memory loss, loss of strength, numbness, or tremors  SKIN: No itching, burning, rashes, or lesions   LYMPH Nodes: No enlarged glands  ENDOCRINE: No heat or cold intolerance; No hair loss  MUSCULOSKELETAL: No joint pain or swelling; No muscle, back, or extremity pain  PSYCHIATRIC: No depression, anxiety, mood swings, or difficulty sleeping  HEME/LYMPH: No easy bruising, or bleeding gums  ALLERY AND IMMUNOLOGIC: No hives or eczema	    [ ] All others negative	  [ ] Unable to obtain    PHYSICAL EXAM:  T(C): 36.5 (04-15-18 @ 20:57), Max: 36.7 (04-15-18 @ 05:00)  HR: 74 (04-15-18 @ 20:57) (71 - 79)  BP: 150/77 (04-15-18 @ 20:57) (121/68 - 172/77)  RR: 16 (04-15-18 @ 20:57) (16 - 16)  SpO2: 100% (04-15-18 @ 20:57) (99% - 100%)  Wt(kg): --  I&O's Summary      Appearance: Normal	  HEENT:   Normal oral mucosa, PERRL, EOMI	  Lymphatic: No lymphadenopathy  Cardiovascular: Normal S1 S2, No JVD, No murmurs, No edema  Respiratory: Lungs clear to auscultation	  Psychiatry: A & O x 3, Mood & affect appropriate  Gastrointestinal:  Soft, Non-tender, + BS	  Skin: No rashes, No ecchymoses, No cyanosis	  Neurologic: Non-focal  Extremities: Normal range of motion, No clubbing, cyanosis or edema  Vascular: Peripheral pulses palpable 2+ bilaterally    MEDICATIONS  (STANDING):  ascorbic acid 500 milliGRAM(s) Oral daily  cinacalcet 30 milliGRAM(s) Oral daily  collagenase Ointment 1 Application(s) Topical daily  Dakins Solution - 1/4 Strength 1 Application(s) Topical two times a day  dextrose 5%. 1000 milliLiter(s) (50 mL/Hr) IV Continuous <Continuous>  dextrose 50% Injectable 12.5 Gram(s) IV Push once  dextrose 50% Injectable 25 Gram(s) IV Push once  dextrose 50% Injectable 25 Gram(s) IV Push once  docusate sodium 100 milliGRAM(s) Oral two times a day  ferrous    sulfate Liquid 300 milliGRAM(s) Enteral Tube daily  folic acid 1 milliGRAM(s) Oral daily  gabapentin 100 milliGRAM(s) Oral three times a day  heparin  Injectable 5000 Unit(s) SubCutaneous every 8 hours  hydrALAZINE 50 milliGRAM(s) Oral three times a day  insulin lispro (HumaLOG) corrective regimen sliding scale   SubCutaneous three times a day before meals  levothyroxine 50 MICROGram(s) Oral daily  meropenem  IVPB 500 milliGRAM(s) IV Intermittent every 24 hours  metoprolol tartrate 25 milliGRAM(s) Oral two times a day  multivitamin 1 Tablet(s) Oral daily  pantoprazole    Tablet 40 milliGRAM(s) Oral before breakfast  senna 2 Tablet(s) Oral at bedtime  simvastatin 20 milliGRAM(s) Oral at bedtime  vancomycin  IVPB 1000 milliGRAM(s) IV Intermittent <User Schedule>  zinc sulfate 220 milliGRAM(s) Oral daily      TELEMETRY: 	    ECG:  	  RADIOLOGY:  OTHER: 	  	        CARDIAC MARKERS:                          proBNP:   Lipid Profile:   HgA1c:   TSH: Thyroid Stimulating Hormone, Serum: 1.02 uU/mL (04-13 @ 11:24)

## 2018-04-15 NOTE — PROGRESS NOTE ADULT - PROBLEM SELECTOR PLAN 1
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  - no debridement as per Surgery  - daily wound care with collagenase, zinc, vit c  - turn and position q 2hrs; PT  indium scan next week as nuclear material will be available next week  - dc when leukocytosis resolving

## 2018-04-15 NOTE — PROGRESS NOTE ADULT - SUBJECTIVE AND OBJECTIVE BOX
No events overnight. waiting for indium scan.    No Known Allergies    Hospital Medications:   MEDICATIONS  (STANDING):  ascorbic acid 500 milliGRAM(s) Oral daily  cinacalcet 30 milliGRAM(s) Oral daily  collagenase Ointment 1 Application(s) Topical daily  Dakins Solution - 1/4 Strength 1 Application(s) Topical two times a day  dextrose 5%. 1000 milliLiter(s) (50 mL/Hr) IV Continuous <Continuous>  dextrose 50% Injectable 12.5 Gram(s) IV Push once  dextrose 50% Injectable 25 Gram(s) IV Push once  dextrose 50% Injectable 25 Gram(s) IV Push once  docusate sodium 100 milliGRAM(s) Oral two times a day  ferrous    sulfate Liquid 300 milliGRAM(s) Enteral Tube daily  folic acid 1 milliGRAM(s) Oral daily  gabapentin 100 milliGRAM(s) Oral three times a day  heparin  Injectable 5000 Unit(s) SubCutaneous every 8 hours  hydrALAZINE 50 milliGRAM(s) Oral three times a day  insulin lispro (HumaLOG) corrective regimen sliding scale   SubCutaneous three times a day before meals  levothyroxine 50 MICROGram(s) Oral daily  meropenem  IVPB 500 milliGRAM(s) IV Intermittent every 24 hours  metoprolol tartrate 25 milliGRAM(s) Oral two times a day  multivitamin 1 Tablet(s) Oral daily  pantoprazole    Tablet 40 milliGRAM(s) Oral before breakfast  senna 2 Tablet(s) Oral at bedtime  simvastatin 20 milliGRAM(s) Oral at bedtime  vancomycin  IVPB 1000 milliGRAM(s) IV Intermittent <User Schedule>  zinc sulfate 220 milliGRAM(s) Oral daily      VITALS:  T(F): 97.9 (04-15-18 @ 14:55), Max: 98 (04-14-18 @ 21:07)  HR: 77 (04-15-18 @ 14:55)  BP: 144/77 (04-15-18 @ 14:55)  RR: 16 (04-15-18 @ 14:55)  SpO2: 99% (04-15-18 @ 14:55)  Wt(kg): --      PHYSICAL EXAM:  Constitutional: NAD  HEENT: anicteric sclera, oropharynx clear, MMM  Neck: No JVD  Respiratory: CTAB, no wheezes, rales or rhonchi  Cardiovascular: S1, S2, RRR  Gastrointestinal: BS+, soft, NT/ND  Extremities: No cyanosis or clubbing. No peripheral edema  Neurological: A/O x 3, no focal deficits  Vascular Access: IJ permacath     LABS:        Creatinine Trend: 5.49 <--, 6.54 <--, 4.20 <--, 5.96 <--    Urine Studies:      RADIOLOGY & ADDITIONAL STUDIES:

## 2018-04-16 LAB
ALBUMIN SERPL ELPH-MCNC: 2.2 G/DL — LOW (ref 3.5–5)
ALP SERPL-CCNC: 146 U/L — HIGH (ref 40–120)
ALT FLD-CCNC: 12 U/L DA — SIGNIFICANT CHANGE UP (ref 10–60)
ANION GAP SERPL CALC-SCNC: 12 MMOL/L — SIGNIFICANT CHANGE UP (ref 5–17)
AST SERPL-CCNC: 19 U/L — SIGNIFICANT CHANGE UP (ref 10–40)
BILIRUB SERPL-MCNC: 0.3 MG/DL — SIGNIFICANT CHANGE UP (ref 0.2–1.2)
BUN SERPL-MCNC: 65 MG/DL — HIGH (ref 7–18)
CALCIUM SERPL-MCNC: 9 MG/DL — SIGNIFICANT CHANGE UP (ref 8.4–10.5)
CHLORIDE SERPL-SCNC: 95 MMOL/L — LOW (ref 96–108)
CO2 SERPL-SCNC: 26 MMOL/L — SIGNIFICANT CHANGE UP (ref 22–31)
CREAT SERPL-MCNC: 7.12 MG/DL — HIGH (ref 0.5–1.3)
GLUCOSE BLDC GLUCOMTR-MCNC: 101 MG/DL — HIGH (ref 70–99)
GLUCOSE BLDC GLUCOMTR-MCNC: 127 MG/DL — HIGH (ref 70–99)
GLUCOSE BLDC GLUCOMTR-MCNC: 135 MG/DL — HIGH (ref 70–99)
GLUCOSE BLDC GLUCOMTR-MCNC: 159 MG/DL — HIGH (ref 70–99)
GLUCOSE SERPL-MCNC: 117 MG/DL — HIGH (ref 70–99)
HAV IGM SER-ACNC: SIGNIFICANT CHANGE UP
HBV CORE IGM SER-ACNC: SIGNIFICANT CHANGE UP
HBV SURFACE AG SER-ACNC: SIGNIFICANT CHANGE UP
HCT VFR BLD CALC: 29.1 % — LOW (ref 34.5–45)
HCV AB S/CO SERPL IA: 0.16 S/CO — SIGNIFICANT CHANGE UP
HCV AB SERPL-IMP: SIGNIFICANT CHANGE UP
HGB BLD-MCNC: 9.1 G/DL — LOW (ref 11.5–15.5)
JAK2 P.V617F BLD/T QL: SIGNIFICANT CHANGE UP
MAGNESIUM SERPL-MCNC: 2.4 MG/DL — SIGNIFICANT CHANGE UP (ref 1.6–2.6)
MCHC RBC-ENTMCNC: 28.2 PG — SIGNIFICANT CHANGE UP (ref 27–34)
MCHC RBC-ENTMCNC: 31.1 GM/DL — LOW (ref 32–36)
MCV RBC AUTO: 90.6 FL — SIGNIFICANT CHANGE UP (ref 80–100)
PHOSPHATE SERPL-MCNC: 7.3 MG/DL — HIGH (ref 2.5–4.5)
PLATELET # BLD AUTO: 455 K/UL — HIGH (ref 150–400)
POTASSIUM SERPL-MCNC: 5.2 MMOL/L — SIGNIFICANT CHANGE UP (ref 3.5–5.3)
POTASSIUM SERPL-SCNC: 5.2 MMOL/L — SIGNIFICANT CHANGE UP (ref 3.5–5.3)
PROT SERPL-MCNC: 6.8 G/DL — SIGNIFICANT CHANGE UP (ref 6–8.3)
RBC # BLD: 3.22 M/UL — LOW (ref 3.8–5.2)
RBC # FLD: 20.8 % — HIGH (ref 10.3–14.5)
SODIUM SERPL-SCNC: 133 MMOL/L — LOW (ref 135–145)
WBC # BLD: 14.2 K/UL — HIGH (ref 3.8–10.5)
WBC # FLD AUTO: 14.2 K/UL — HIGH (ref 3.8–10.5)

## 2018-04-16 RX ORDER — CALCIUM ACETATE 667 MG
1334 TABLET ORAL
Qty: 0 | Refills: 0 | Status: DISCONTINUED | OUTPATIENT
Start: 2018-04-16 | End: 2018-04-18

## 2018-04-16 RX ORDER — ERYTHROPOIETIN 10000 [IU]/ML
4000 INJECTION, SOLUTION INTRAVENOUS; SUBCUTANEOUS
Qty: 0 | Refills: 0 | Status: DISCONTINUED | OUTPATIENT
Start: 2018-04-16 | End: 2018-04-18

## 2018-04-16 RX ORDER — ACETAMINOPHEN 500 MG
650 TABLET ORAL EVERY 6 HOURS
Qty: 0 | Refills: 0 | Status: DISCONTINUED | OUTPATIENT
Start: 2018-04-16 | End: 2018-04-18

## 2018-04-16 RX ORDER — OXYCODONE AND ACETAMINOPHEN 5; 325 MG/1; MG/1
1 TABLET ORAL EVERY 6 HOURS
Qty: 0 | Refills: 0 | Status: DISCONTINUED | OUTPATIENT
Start: 2018-04-16 | End: 2018-04-18

## 2018-04-16 RX ADMIN — ZINC SULFATE TAB 220 MG (50 MG ZINC EQUIVALENT) 220 MILLIGRAM(S): 220 (50 ZN) TAB at 13:28

## 2018-04-16 RX ADMIN — Medication 1334 MILLIGRAM(S): at 17:18

## 2018-04-16 RX ADMIN — MEROPENEM 100 MILLIGRAM(S): 1 INJECTION INTRAVENOUS at 13:28

## 2018-04-16 RX ADMIN — GABAPENTIN 100 MILLIGRAM(S): 400 CAPSULE ORAL at 13:28

## 2018-04-16 RX ADMIN — Medication 500 MILLIGRAM(S): at 13:28

## 2018-04-16 RX ADMIN — Medication 50 MILLIGRAM(S): at 22:16

## 2018-04-16 RX ADMIN — Medication 1 APPLICATION(S): at 06:29

## 2018-04-16 RX ADMIN — Medication 50 MILLIGRAM(S): at 06:28

## 2018-04-16 RX ADMIN — OXYCODONE AND ACETAMINOPHEN 1 TABLET(S): 5; 325 TABLET ORAL at 22:57

## 2018-04-16 RX ADMIN — Medication 25 MILLIGRAM(S): at 06:29

## 2018-04-16 RX ADMIN — Medication 300 MILLIGRAM(S): at 13:28

## 2018-04-16 RX ADMIN — Medication 25 MILLIGRAM(S): at 17:24

## 2018-04-16 RX ADMIN — Medication 1 MILLIGRAM(S): at 13:28

## 2018-04-16 RX ADMIN — OXYCODONE AND ACETAMINOPHEN 1 TABLET(S): 5; 325 TABLET ORAL at 22:27

## 2018-04-16 RX ADMIN — Medication 166.67 MILLIGRAM(S): at 09:40

## 2018-04-16 RX ADMIN — HEPARIN SODIUM 5000 UNIT(S): 5000 INJECTION INTRAVENOUS; SUBCUTANEOUS at 22:16

## 2018-04-16 RX ADMIN — PANTOPRAZOLE SODIUM 40 MILLIGRAM(S): 20 TABLET, DELAYED RELEASE ORAL at 06:28

## 2018-04-16 RX ADMIN — Medication 1 TABLET(S): at 13:28

## 2018-04-16 RX ADMIN — Medication 1 APPLICATION(S): at 13:29

## 2018-04-16 RX ADMIN — ERYTHROPOIETIN 4000 UNIT(S): 10000 INJECTION, SOLUTION INTRAVENOUS; SUBCUTANEOUS at 09:38

## 2018-04-16 RX ADMIN — GABAPENTIN 100 MILLIGRAM(S): 400 CAPSULE ORAL at 22:15

## 2018-04-16 RX ADMIN — CINACALCET 30 MILLIGRAM(S): 30 TABLET, FILM COATED ORAL at 13:28

## 2018-04-16 RX ADMIN — Medication 1: at 17:18

## 2018-04-16 RX ADMIN — Medication 1 APPLICATION(S): at 17:20

## 2018-04-16 RX ADMIN — Medication 50 MICROGRAM(S): at 06:28

## 2018-04-16 RX ADMIN — HEPARIN SODIUM 5000 UNIT(S): 5000 INJECTION INTRAVENOUS; SUBCUTANEOUS at 13:29

## 2018-04-16 RX ADMIN — GABAPENTIN 100 MILLIGRAM(S): 400 CAPSULE ORAL at 06:28

## 2018-04-16 RX ADMIN — SIMVASTATIN 20 MILLIGRAM(S): 20 TABLET, FILM COATED ORAL at 22:16

## 2018-04-16 NOTE — PROGRESS NOTE ADULT - SUBJECTIVE AND OBJECTIVE BOX
No events overnight    No Known Allergies    Hospital Medications:   MEDICATIONS  (STANDING):  ascorbic acid 500 milliGRAM(s) Oral daily  calcium acetate 1334 milliGRAM(s) Oral three times a day with meals  cinacalcet 30 milliGRAM(s) Oral daily  collagenase Ointment 1 Application(s) Topical daily  Dakins Solution - 1/4 Strength 1 Application(s) Topical two times a day  docusate sodium 100 milliGRAM(s) Oral two times a day  epoetin jacqueline Injectable 4000 Unit(s) IV Push <User Schedule>  ferrous    sulfate Liquid 300 milliGRAM(s) Enteral Tube daily  folic acid 1 milliGRAM(s) Oral daily  gabapentin 100 milliGRAM(s) Oral three times a day  heparin  Injectable 5000 Unit(s) SubCutaneous every 8 hours  hydrALAZINE 50 milliGRAM(s) Oral three times a day  insulin lispro (HumaLOG) corrective regimen sliding scale   SubCutaneous three times a day before meals  levothyroxine 50 MICROGram(s) Oral daily  meropenem  IVPB 500 milliGRAM(s) IV Intermittent every 24 hours  metoprolol tartrate 25 milliGRAM(s) Oral two times a day  multivitamin 1 Tablet(s) Oral daily  pantoprazole    Tablet 40 milliGRAM(s) Oral before breakfast  senna 2 Tablet(s) Oral at bedtime  simvastatin 20 milliGRAM(s) Oral at bedtime  vancomycin  IVPB 1000 milliGRAM(s) IV Intermittent <User Schedule>  zinc sulfate 220 milliGRAM(s) Oral daily        VITALS:  T(F): 98.6 (04-16-18 @ 13:32), Max: 98.6 (04-16-18 @ 13:32)  HR: 82 (04-16-18 @ 13:32)  BP: 111/60 (04-16-18 @ 13:32)  RR: 15 (04-16-18 @ 13:32)  SpO2: 100% (04-16-18 @ 13:32)  Wt(kg): --      PHYSICAL EXAM:  Constitutional: NAD  HEENT: anicteric sclera, oropharynx clear, MMM  Neck: No JVD  Respiratory: CTAB, no wheezes, rales or rhonchi  Cardiovascular: S1, S2, RRR  Gastrointestinal: BS+, soft, NT/ND  Extremities: No cyanosis or clubbing. No peripheral edema  Vascular Access: RT IJ permacath     LABS:  04-16    133<L>  |  95<L>  |  65<H>  ----------------------------<  117<H>  5.2   |  26  |  7.12<H>    Ca    9.0      16 Apr 2018 07:58  Phos  7.3     04-16  Mg     2.4     04-16    TPro  6.8  /  Alb  2.2<L>  /  TBili  0.3  /  DBili      /  AST  19  /  ALT  12  /  AlkPhos  146<H>  04-16    Creatinine Trend: 7.12 <--, 5.49 <--, 6.54 <--, 4.20 <--                        9.1    14.2  )-----------( 455      ( 16 Apr 2018 07:58 )             29.1     Urine Studies:      RADIOLOGY & ADDITIONAL STUDIES:

## 2018-04-16 NOTE — PROGRESS NOTE ADULT - PROBLEM SELECTOR PLAN 1
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

## 2018-04-16 NOTE — PROGRESS NOTE ADULT - ATTENDING COMMENTS
Patient is seen and examined. Case reviewed with the medical team. Above note is appreciated. Will follow up clinically. Continue DVT prophylaxis. Case discussed with ID , Nephrology and Hematology. Patient with persistent leukocytosis despite antibiotics. WBC scan was started today and the scan will be done in am. Will follow up clinically.

## 2018-04-17 LAB
GLUCOSE BLDC GLUCOMTR-MCNC: 119 MG/DL — HIGH (ref 70–99)
GLUCOSE BLDC GLUCOMTR-MCNC: 128 MG/DL — HIGH (ref 70–99)
GLUCOSE BLDC GLUCOMTR-MCNC: 141 MG/DL — HIGH (ref 70–99)
GLUCOSE BLDC GLUCOMTR-MCNC: 181 MG/DL — HIGH (ref 70–99)

## 2018-04-17 PROCEDURE — 78806: CPT | Mod: 26

## 2018-04-17 RX ADMIN — Medication 1334 MILLIGRAM(S): at 12:49

## 2018-04-17 RX ADMIN — OXYCODONE AND ACETAMINOPHEN 1 TABLET(S): 5; 325 TABLET ORAL at 22:23

## 2018-04-17 RX ADMIN — OXYCODONE AND ACETAMINOPHEN 1 TABLET(S): 5; 325 TABLET ORAL at 22:53

## 2018-04-17 RX ADMIN — Medication 1 APPLICATION(S): at 12:50

## 2018-04-17 RX ADMIN — Medication 1 APPLICATION(S): at 05:47

## 2018-04-17 RX ADMIN — Medication 50 MILLIGRAM(S): at 15:10

## 2018-04-17 RX ADMIN — Medication 50 MILLIGRAM(S): at 05:46

## 2018-04-17 RX ADMIN — PANTOPRAZOLE SODIUM 40 MILLIGRAM(S): 20 TABLET, DELAYED RELEASE ORAL at 05:46

## 2018-04-17 RX ADMIN — HEPARIN SODIUM 5000 UNIT(S): 5000 INJECTION INTRAVENOUS; SUBCUTANEOUS at 15:09

## 2018-04-17 RX ADMIN — GABAPENTIN 100 MILLIGRAM(S): 400 CAPSULE ORAL at 15:09

## 2018-04-17 RX ADMIN — Medication 300 MILLIGRAM(S): at 11:26

## 2018-04-17 RX ADMIN — Medication 1: at 12:49

## 2018-04-17 RX ADMIN — HEPARIN SODIUM 5000 UNIT(S): 5000 INJECTION INTRAVENOUS; SUBCUTANEOUS at 22:23

## 2018-04-17 RX ADMIN — Medication 1 APPLICATION(S): at 17:57

## 2018-04-17 RX ADMIN — MEROPENEM 100 MILLIGRAM(S): 1 INJECTION INTRAVENOUS at 11:25

## 2018-04-17 RX ADMIN — Medication 500 MILLIGRAM(S): at 15:09

## 2018-04-17 RX ADMIN — OXYCODONE AND ACETAMINOPHEN 1 TABLET(S): 5; 325 TABLET ORAL at 09:40

## 2018-04-17 RX ADMIN — GABAPENTIN 100 MILLIGRAM(S): 400 CAPSULE ORAL at 05:46

## 2018-04-17 RX ADMIN — SIMVASTATIN 20 MILLIGRAM(S): 20 TABLET, FILM COATED ORAL at 22:23

## 2018-04-17 RX ADMIN — Medication 1 TABLET(S): at 11:25

## 2018-04-17 RX ADMIN — Medication 50 MICROGRAM(S): at 05:46

## 2018-04-17 RX ADMIN — Medication 1334 MILLIGRAM(S): at 08:40

## 2018-04-17 RX ADMIN — Medication 25 MILLIGRAM(S): at 17:56

## 2018-04-17 RX ADMIN — Medication 1334 MILLIGRAM(S): at 17:57

## 2018-04-17 RX ADMIN — Medication 25 MILLIGRAM(S): at 05:46

## 2018-04-17 RX ADMIN — ZINC SULFATE TAB 220 MG (50 MG ZINC EQUIVALENT) 220 MILLIGRAM(S): 220 (50 ZN) TAB at 11:26

## 2018-04-17 RX ADMIN — HEPARIN SODIUM 5000 UNIT(S): 5000 INJECTION INTRAVENOUS; SUBCUTANEOUS at 05:47

## 2018-04-17 RX ADMIN — OXYCODONE AND ACETAMINOPHEN 1 TABLET(S): 5; 325 TABLET ORAL at 10:15

## 2018-04-17 RX ADMIN — Medication 1 MILLIGRAM(S): at 11:26

## 2018-04-17 RX ADMIN — CINACALCET 30 MILLIGRAM(S): 30 TABLET, FILM COATED ORAL at 11:25

## 2018-04-17 RX ADMIN — GABAPENTIN 100 MILLIGRAM(S): 400 CAPSULE ORAL at 22:23

## 2018-04-17 NOTE — CHART NOTE - NSCHARTNOTEFT_GEN_A_CORE
Assessment: S/p HD on 4/16 & next on 4/18 noted     Factors impacting intake: [ ] none [ ] nausea  [ ] vomiting [ ] diarrhea [ ] constipation  [ ]chewing problems [ ] swallowing issues  [ X] other: ESRD on HD    Diet Presciption: Diet, Regular:   Consistent Carbohydrate {Evening Snacks}  DASH/TLC {Sodium & Cholesterol Restricted}  For patients receiving Renal Replacement - No Protein Restr, No Conc K, No Conc Phos, Low Sodium (RENAL) (04-05-18 @ 06:33)    Intake: Per pt. appetite improving, eating well, consuming 50% of meals, no reports of GI distress, had 2x BM yesterday night, encourage po intake.    Current Weight:    Post HD wt. 106.4 Lbs on 4/16  % Weight Change    Pertinent Medications: MEDICATIONS  (STANDING):  ascorbic acid 500 milliGRAM(s) Oral daily  calcium acetate 1334 milliGRAM(s) Oral three times a day with meals  cinacalcet 30 milliGRAM(s) Oral daily  collagenase Ointment 1 Application(s) Topical daily  Dakins Solution - 1/4 Strength 1 Application(s) Topical two times a day  docusate sodium 100 milliGRAM(s) Oral two times a day  epoetin jacqueline Injectable 4000 Unit(s) IV Push <User Schedule>  ferrous    sulfate Liquid 300 milliGRAM(s) Enteral Tube daily  folic acid 1 milliGRAM(s) Oral daily  gabapentin 100 milliGRAM(s) Oral three times a day  heparin  Injectable 5000 Unit(s) SubCutaneous every 8 hours  hydrALAZINE 50 milliGRAM(s) Oral three times a day  insulin lispro (HumaLOG) corrective regimen sliding scale   SubCutaneous three times a day before meals  levothyroxine 50 MICROGram(s) Oral daily  meropenem  IVPB 500 milliGRAM(s) IV Intermittent every 24 hours  metoprolol tartrate 25 milliGRAM(s) Oral two times a day  multivitamin 1 Tablet(s) Oral daily  pantoprazole    Tablet 40 milliGRAM(s) Oral before breakfast  senna 2 Tablet(s) Oral at bedtime  simvastatin 20 milliGRAM(s) Oral at bedtime  vancomycin  IVPB 1000 milliGRAM(s) IV Intermittent <User Schedule>  zinc sulfate 220 milliGRAM(s) Oral daily    MEDICATIONS  (PRN):  acetaminophen   Tablet 650 milliGRAM(s) Oral every 6 hours PRN For Temp greater than 38 C (100.4 F) or mild pain  acetaminophen   Tablet. 650 milliGRAM(s) Oral every 6 hours PRN Moderate Pain (4 - 6)  oxyCODONE    5 mG/acetaminophen 325 mG 1 Tablet(s) Oral every 6 hours PRN Severe Pain (7 - 10)    Pertinent Labs: 04-16 Na133 mmol/L<L> Glu 117 mg/dL<H> K+ 5.2 mmol/L Cr  7.12 mg/dL<H> BUN 65 mg/dL<H> 04-16 Phos 7.3 mg/dL<H> 04-16 Alb 2.2 g/dL<L>     CAPILLARY BLOOD GLUCOSE      POCT Blood Glucose.: 119 mg/dL (17 Apr 2018 16:28)  POCT Blood Glucose.: 181 mg/dL (17 Apr 2018 12:08)  POCT Blood Glucose.: 128 mg/dL (17 Apr 2018 08:30)  POCT Blood Glucose.: 135 mg/dL (16 Apr 2018 20:40)    Skin: Multiple pressure ulcers noted.     Estimated Needs:   [ X] no change since previous assessment  [ ] recalculated:     Previous Nutrition Diagnosis:   [ ] Inadequate Energy Intake [ ]Inadequate Oral Intake [ ] Excessive Energy Intake   [ ] Underweight [ ] Increased Nutrient Needs [ ] Overweight/Obesity   [ ] Altered GI Function [ ] Unintended Weight Loss [ ] Food & Nutrition Related Knowledge Deficit [ X ] Severe Malnutrition     Nutrition Diagnosis is [ X] ongoing  [ ] resolved [ ] not applicable     New Nutrition Diagnosis: [ ] not applicable       Interventions: c/w diet as order, as medically feasible   Recommend  [ ] Change Diet To:  [ X] Nutrition Supplement : 1 can Nepro BID (850 Kcal, Protein 38 grams), as medically feasible   [ ] Nutrition Support  [X ] Other: Nephrocaps, Vit. C 500mg BID for wound healing, as medically feasible     Monitoring and Evaluation:   [X ] PO intake [ x ] Tolerance to diet prescription [ x ] weights [ x ] labs[ x ] follow up per protocol  [ ] other:

## 2018-04-17 NOTE — PROGRESS NOTE ADULT - SUBJECTIVE AND OBJECTIVE BOX
53y Female    Meds:  meropenem  IVPB 500 milliGRAM(s) IV Intermittent every 24 hours  vancomycin  IVPB 1000 milliGRAM(s) IV Intermittent <User Schedule>    Allergies    No Known Allergies    Intolerances        VITALS:  Vital Signs Last 24 Hrs  T(C): 36.7 (17 Apr 2018 14:26), Max: 37.1 (16 Apr 2018 21:21)  T(F): 98 (17 Apr 2018 14:26), Max: 98.8 (16 Apr 2018 21:21)  HR: 79 (17 Apr 2018 17:55) (78 - 87)  BP: 129/69 (17 Apr 2018 17:55) (129/69 - 165/70)  BP(mean): --  RR: 18 (17 Apr 2018 14:26) (16 - 18)  SpO2: 100% (17 Apr 2018 14:26) (100% - 100%)    LABS/DIAGNOSTIC TESTS:                          9.1    14.2  )-----------( 455      ( 16 Apr 2018 07:58 )             29.1         04-16    133<L>  |  95<L>  |  65<H>  ----------------------------<  117<H>  5.2   |  26  |  7.12<H>    Ca    9.0      16 Apr 2018 07:58  Phos  7.3     04-16  Mg     2.4     04-16    TPro  6.8  /  Alb  2.2<L>  /  TBili  0.3  /  DBili  x   /  AST  19  /  ALT  12  /  AlkPhos  146<H>  04-16      LIVER FUNCTIONS - ( 16 Apr 2018 07:58 )  Alb: 2.2 g/dL / Pro: 6.8 g/dL / ALK PHOS: 146 U/L / ALT: 12 U/L DA / AST: 19 U/L / GGT: x             CULTURES: .Blood Blood-Peripheral  04-05 @ 09:59   No growth at 5 days.  --  --                RADIOLOGY:< from: NM Inflammatory Loc Wholebody, WBC (04.17.18 @ 10:52) >  EXAM:  NM INFLAMMATORY LOC WBC WB                            PROCEDURE DATE:  04/17/2018          INTERPRETATION:    RADIOPHARMACEUTICAL: Indium-labeled autologous leukocytes  DOSE: 549 uCi   IV injected on 4/17/2018    CLINICAL INFORMATION: 53 year-old female with persistent leukocytosis,   referred to evaluate site of infection    TECHNIQUE: Whole-body planar images in the anterior and posterior   projections and additional static views of the pelvis were obtained   approximately 24 hours post tracer injection. The patient was unable to   stay still for further imaging.    COMPARISON: Previous Indium labeled leukocyte scan dated 11/24/2018    FINDINGS: These images demonstrate a new focus of increased Indium   labeled leukocyte accumulation in soft tissue in the right  gluteal   region laterally. There is also mildly increased tracer accumulation in   the left midline of the posterior buttock, possibly extending in the   lower sacrum. There is physiologic tracer distribution in the remainder   of the visualized structures.    IMPRESSION: Indium labeled leukocyte scan demonstrates:    New focus of increased Indium labeled leukocyte accumulation in the right   lateral gluteal region, may represent a surgical site and/or soft tissue   infection.    Mildly increased uptake in the left posterior buttock, probably a   decubitus ulcer with possible involvement of the lower sacrum.          < end of copied text >        ROS:  [  ] UNABLE TO ELICIT 53y Female who is doing well clinically but depressed as she learnt that her mother  in the NH today, she has not had any fevers or chills, she has no diarrhea or coughing. she finally got her Indium scan yesterday and was read today and has uptake in the same places that she has her ulcers from previous abscesses. Her last wbc count was 14.2 3 days ago which has decreased.    Meds:  meropenem  IVPB 500 milliGRAM(s) IV Intermittent every 24 hours  vancomycin  IVPB 1000 milliGRAM(s) IV Intermittent <User Schedule>    Allergies    No Known Allergies    Intolerances        VITALS:  Vital Signs Last 24 Hrs  T(C): 36.7 (2018 14:26), Max: 37.1 (2018 21:21)  T(F): 98 (2018 14:26), Max: 98.8 (2018 21:21)  HR: 79 (2018 17:55) (78 - 87)  BP: 129/69 (2018 17:55) (129/69 - 165/70)  BP(mean): --  RR: 18 (2018 14:26) (16 - 18)  SpO2: 100% (2018 14:26) (100% - 100%)    LABS/DIAGNOSTIC TESTS:                          9.1    14.2  )-----------( 455      ( 2018 07:58 )             29.1         04-16    133<L>  |  95<L>  |  65<H>  ----------------------------<  117<H>  5.2   |  26  |  7.12<H>    Ca    9.0      2018 07:58  Phos  7.3     04-16  Mg     2.4     04-16    TPro  6.8  /  Alb  2.2<L>  /  TBili  0.3  /  DBili  x   /  AST  19  /  ALT  12  /  AlkPhos  146<H>  04-16      LIVER FUNCTIONS - ( 2018 07:58 )  Alb: 2.2 g/dL / Pro: 6.8 g/dL / ALK PHOS: 146 U/L / ALT: 12 U/L DA / AST: 19 U/L / GGT: x             CULTURES: .Blood Blood-Peripheral  - @ 09:59   No growth at 5 days.  --  --                RADIOLOGY:< from: NM Inflammatory Loc Wholebody, WBC (18 @ 10:52) >  EXAM:  NM INFLAMMATORY LOC WBC WB                            PROCEDURE DATE:  2018          INTERPRETATION:    RADIOPHARMACEUTICAL: Indium-labeled autologous leukocytes  DOSE: 549 uCi   IV injected on 2018    CLINICAL INFORMATION: 53 year-old female with persistent leukocytosis,   referred to evaluate site of infection    TECHNIQUE: Whole-body planar images in the anterior and posterior   projections and additional static views of the pelvis were obtained   approximately 24 hours post tracer injection. The patient was unable to   stay still for further imaging.    COMPARISON: Previous Indium labeled leukocyte scan dated 2018    FINDINGS: These images demonstrate a new focus of increased Indium   labeled leukocyte accumulation in soft tissue in the right  gluteal   region laterally. There is also mildly increased tracer accumulation in   the left midline of the posterior buttock, possibly extending in the   lower sacrum. There is physiologic tracer distribution in the remainder   of the visualized structures.    IMPRESSION: Indium labeled leukocyte scan demonstrates:    New focus of increased Indium labeled leukocyte accumulation in the right   lateral gluteal region, may represent a surgical site and/or soft tissue   infection.    Mildly increased uptake in the left posterior buttock, probably a   decubitus ulcer with possible involvement of the lower sacrum.          < end of copied text >        ROS:  [  ] UNABLE TO ELICIT

## 2018-04-17 NOTE — PROGRESS NOTE ADULT - PROBLEM SELECTOR PLAN 1
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

## 2018-04-17 NOTE — PROGRESS NOTE ADULT - SKIN COMMENTS
bilateral flanks, sacral and right hip ulcers are clean, pink base, no malodor, no drainage, no erythema around
multiple open ulcers on back and hips but all are clean with a healthy granular base

## 2018-04-17 NOTE — PROGRESS NOTE ADULT - SUBJECTIVE AND OBJECTIVE BOX
indium scan results noted RT lateral gluteal and LT posterior buttock decubitus accumulation.    No Known Allergies    Hospital Medications:   MEDICATIONS  (STANDING):  ascorbic acid 500 milliGRAM(s) Oral daily  calcium acetate 1334 milliGRAM(s) Oral three times a day with meals  cinacalcet 30 milliGRAM(s) Oral daily  collagenase Ointment 1 Application(s) Topical daily  Dakins Solution - 1/4 Strength 1 Application(s) Topical two times a day  docusate sodium 100 milliGRAM(s) Oral two times a day  epoetin jacqueline Injectable 4000 Unit(s) IV Push <User Schedule>  ferrous    sulfate Liquid 300 milliGRAM(s) Enteral Tube daily  folic acid 1 milliGRAM(s) Oral daily  gabapentin 100 milliGRAM(s) Oral three times a day  heparin  Injectable 5000 Unit(s) SubCutaneous every 8 hours  hydrALAZINE 50 milliGRAM(s) Oral three times a day  insulin lispro (HumaLOG) corrective regimen sliding scale   SubCutaneous three times a day before meals  levothyroxine 50 MICROGram(s) Oral daily  meropenem  IVPB 500 milliGRAM(s) IV Intermittent every 24 hours  metoprolol tartrate 25 milliGRAM(s) Oral two times a day  multivitamin 1 Tablet(s) Oral daily  pantoprazole    Tablet 40 milliGRAM(s) Oral before breakfast  senna 2 Tablet(s) Oral at bedtime  simvastatin 20 milliGRAM(s) Oral at bedtime  vancomycin  IVPB 1000 milliGRAM(s) IV Intermittent <User Schedule>  zinc sulfate 220 milliGRAM(s) Oral daily        VITALS:  T(F): 98 (04-17-18 @ 14:26), Max: 98.8 (04-16-18 @ 21:21)  HR: 81 (04-17-18 @ 14:26)  BP: 137/65 (04-17-18 @ 14:26)  RR: 18 (04-17-18 @ 14:26)  SpO2: 100% (04-17-18 @ 14:26)  Wt(kg): --      PHYSICAL EXAM:  Constitutional: NAD  HEENT: anicteric sclera, oropharynx clear, MMM  Neck: No JVD  Respiratory: CTAB, no wheezes, rales or rhonchi  Cardiovascular: S1, S2, RRR  Gastrointestinal: BS+, soft, NT/ND  Extremities: No cyanosis or clubbing. No peripheral edema  Neurological: A/O x 3, no focal deficits  Vascular Access: RT IJ  permacath.    LABS:  04-16    133<L>  |  95<L>  |  65<H>  ----------------------------<  117<H>  5.2   |  26  |  7.12<H>    Ca    9.0      16 Apr 2018 07:58  Phos  7.3     04-16  Mg     2.4     04-16    TPro  6.8  /  Alb  2.2<L>  /  TBili  0.3  /  DBili      /  AST  19  /  ALT  12  /  AlkPhos  146<H>  04-16    Creatinine Trend: 7.12 <--, 5.49 <--, 6.54 <--                        9.1    14.2  )-----------( 455      ( 16 Apr 2018 07:58 )             29.1     Urine Studies:      RADIOLOGY & ADDITIONAL STUDIES:

## 2018-04-17 NOTE — PROGRESS NOTE ADULT - SUBJECTIVE AND OBJECTIVE BOX
INTERVAL HPI/OVERNIGHT EVENTS: no major events overnight        Antimicrobial:  meropenem  IVPB 500 milliGRAM(s) IV Intermittent every 24 hours  vancomycin  IVPB 1000 milliGRAM(s) IV Intermittent <User Schedule>    Cardiovascular:  hydrALAZINE 50 milliGRAM(s) Oral three times a day  metoprolol tartrate 25 milliGRAM(s) Oral two times a day    Pulmonary:    Hematalogic:  heparin  Injectable 5000 Unit(s) SubCutaneous every 8 hours    Other:  acetaminophen   Tablet 650 milliGRAM(s) Oral every 6 hours PRN  ascorbic acid 500 milliGRAM(s) Oral daily  cinacalcet 30 milliGRAM(s) Oral daily  collagenase Ointment 1 Application(s) Topical daily  Dakins Solution - 1/4 Strength 1 Application(s) Topical two times a day  dextrose 5%. 1000 milliLiter(s) IV Continuous <Continuous>  dextrose 50% Injectable 12.5 Gram(s) IV Push once  dextrose 50% Injectable 25 Gram(s) IV Push once  dextrose 50% Injectable 25 Gram(s) IV Push once  dextrose Gel 1 Dose(s) Oral once PRN  docusate sodium 100 milliGRAM(s) Oral two times a day  ferrous    sulfate Liquid 300 milliGRAM(s) Enteral Tube daily  folic acid 1 milliGRAM(s) Oral daily  gabapentin 100 milliGRAM(s) Oral three times a day  glucagon  Injectable 1 milliGRAM(s) IntraMuscular once PRN  insulin lispro (HumaLOG) corrective regimen sliding scale   SubCutaneous three times a day before meals  levothyroxine 50 MICROGram(s) Oral daily  multivitamin 1 Tablet(s) Oral daily  pantoprazole    Tablet 40 milliGRAM(s) Oral before breakfast  senna 2 Tablet(s) Oral at bedtime  simvastatin 20 milliGRAM(s) Oral at bedtime  zinc sulfate 220 milliGRAM(s) Oral daily    acetaminophen   Tablet 650 milliGRAM(s) Oral every 6 hours PRN  ascorbic acid 500 milliGRAM(s) Oral daily  cinacalcet 30 milliGRAM(s) Oral daily  collagenase Ointment 1 Application(s) Topical daily  Dakins Solution - 1/4 Strength 1 Application(s) Topical two times a day  dextrose 5%. 1000 milliLiter(s) IV Continuous <Continuous>  dextrose 50% Injectable 12.5 Gram(s) IV Push once  dextrose 50% Injectable 25 Gram(s) IV Push once  dextrose 50% Injectable 25 Gram(s) IV Push once  dextrose Gel 1 Dose(s) Oral once PRN  docusate sodium 100 milliGRAM(s) Oral two times a day  ferrous    sulfate Liquid 300 milliGRAM(s) Enteral Tube daily  folic acid 1 milliGRAM(s) Oral daily  gabapentin 100 milliGRAM(s) Oral three times a day  glucagon  Injectable 1 milliGRAM(s) IntraMuscular once PRN  heparin  Injectable 5000 Unit(s) SubCutaneous every 8 hours  hydrALAZINE 50 milliGRAM(s) Oral three times a day  insulin lispro (HumaLOG) corrective regimen sliding scale   SubCutaneous three times a day before meals  levothyroxine 50 MICROGram(s) Oral daily  meropenem  IVPB 500 milliGRAM(s) IV Intermittent every 24 hours  metoprolol tartrate 25 milliGRAM(s) Oral two times a day  multivitamin 1 Tablet(s) Oral daily  pantoprazole    Tablet 40 milliGRAM(s) Oral before breakfast  senna 2 Tablet(s) Oral at bedtime  simvastatin 20 milliGRAM(s) Oral at bedtime  vancomycin  IVPB 1000 milliGRAM(s) IV Intermittent <User Schedule>  zinc sulfate 220 milliGRAM(s) Oral daily    Drug Dosing Weight  Height (cm): 170.18 (05 Apr 2018 08:07)  Weight (kg): 45.8 (05 Apr 2018 08:05)  BMI (kg/m2): 15.8 (05 Apr 2018 08:07)  BSA (m2): 1.51 (05 Apr 2018 08:07)      Vital Signs Last 24 Hrs  Vital Signs Last 24 Hrs  T(C): 36.8 (17 Apr 2018 05:05), Max: 37.1 (16 Apr 2018 21:21)  T(F): 98.2 (17 Apr 2018 05:05), Max: 98.8 (16 Apr 2018 21:21)  HR: 87 (17 Apr 2018 05:05) (72 - 87)  BP: 165/70 (17 Apr 2018 05:05) (111/60 - 165/70)  BP(mean): --  RR: 16 (17 Apr 2018 05:05) (15 - 17)  SpO2: 100% (17 Apr 2018 05:05) (100% - 100%)          I&O's Summary      PHYSICAL EXAM:  GENERAL: NAD, well-groomed, thin  HEAD:  Atraumatic, Normocephalic  EYES: EOMI, conjunctiva and sclera clear  NECK: Supple, No JVD  NERVOUS SYSTEM:  Alert & Oriented X3, Good concentration; Motor Strength 3/5 B/L upper and lower extremities  CHEST/LUNG: Clear to percussion bilaterally; No rales, rhonchi, wheezing  HEART: Regular rate and rhythm; No murmurs, rubs, or gallops  ABDOMEN: Soft, Nontender, Nondistended; Bowel sounds present  EXTREMITIES:  No clubbing, cyanosis, or edema  LYMPH: No lymphadenopathy noted  SKIN: No rashes; multiple decubiti on right hip, sacrum and upper back      LABS:                        9.1    14.2  )-----------( 455      ( 16 Apr 2018 07:58 )             29.1   04-16    133<L>  |  95<L>  |  65<H>  ----------------------------<  117<H>  5.2   |  26  |  7.12<H>    Ca    9.0      16 Apr 2018 07:58  Phos  7.3     04-16  Mg     2.4     04-16    TPro  6.8  /  Alb  2.2<L>  /  TBili  0.3  /  DBili  x   /  AST  19  /  ALT  12  /  AlkPhos  146<H>  04-16            RADIOLOGY & ADDITIONAL STUDIES REVIEWED:  Yes    [ ]GOALS OF CARE DISCUSSION WITH PATIENT/FAMILY/PROXY:

## 2018-04-17 NOTE — PROGRESS NOTE ADULT - GASTROINTESTINAL DETAILS
nontender/no distention/bowel sounds normal/no guarding/soft
no guarding/soft/nontender/no distention/no rigidity/bowel sounds normal

## 2018-04-17 NOTE — PROGRESS NOTE ADULT - RS GEN PE MLT RESP DETAILS PC
breath sounds equal/respirations non-labored/clear to auscultation bilaterally/good air movement
no rhonchi/no wheezes/no rales/good air movement/clear to auscultation bilaterally

## 2018-04-17 NOTE — PROGRESS NOTE ADULT - NEGATIVE GASTROINTESTINAL SYMPTOMS
no abdominal pain/no vomiting/no nausea/no diarrhea
no abdominal pain/no vomiting/no constipation/no diarrhea/no nausea

## 2018-04-17 NOTE — PROGRESS NOTE ADULT - ATTENDING COMMENTS
WBC scan to be completed today. will follow up with ID and nephrology. The Hospitalist team will cover me from 4/17 to 4/21

## 2018-04-18 VITALS
TEMPERATURE: 99 F | RESPIRATION RATE: 16 BRPM | OXYGEN SATURATION: 100 % | DIASTOLIC BLOOD PRESSURE: 59 MMHG | SYSTOLIC BLOOD PRESSURE: 129 MMHG | HEART RATE: 74 BPM

## 2018-04-18 LAB
ANION GAP SERPL CALC-SCNC: 11 MMOL/L — SIGNIFICANT CHANGE UP (ref 5–17)
BASOPHILS # BLD AUTO: 0.1 K/UL — SIGNIFICANT CHANGE UP (ref 0–0.2)
BASOPHILS NFR BLD AUTO: 1 % — SIGNIFICANT CHANGE UP (ref 0–2)
BUN SERPL-MCNC: 72 MG/DL — HIGH (ref 7–18)
CALCIUM SERPL-MCNC: 9.5 MG/DL — SIGNIFICANT CHANGE UP (ref 8.4–10.5)
CHLORIDE SERPL-SCNC: 100 MMOL/L — SIGNIFICANT CHANGE UP (ref 96–108)
CO2 SERPL-SCNC: 25 MMOL/L — SIGNIFICANT CHANGE UP (ref 22–31)
CREAT SERPL-MCNC: 7.32 MG/DL — HIGH (ref 0.5–1.3)
EOSINOPHIL # BLD AUTO: 0.7 K/UL — HIGH (ref 0–0.5)
EOSINOPHIL NFR BLD AUTO: 6.8 % — HIGH (ref 0–6)
GLUCOSE BLDC GLUCOMTR-MCNC: 136 MG/DL — HIGH (ref 70–99)
GLUCOSE BLDC GLUCOMTR-MCNC: 145 MG/DL — HIGH (ref 70–99)
GLUCOSE BLDC GLUCOMTR-MCNC: 178 MG/DL — HIGH (ref 70–99)
GLUCOSE SERPL-MCNC: 129 MG/DL — HIGH (ref 70–99)
HCT VFR BLD CALC: 29 % — LOW (ref 34.5–45)
HGB BLD-MCNC: 8.3 G/DL — LOW (ref 11.5–15.5)
LYMPHOCYTES # BLD AUTO: 2.6 K/UL — SIGNIFICANT CHANGE UP (ref 1–3.3)
LYMPHOCYTES # BLD AUTO: 24.6 % — SIGNIFICANT CHANGE UP (ref 13–44)
MCHC RBC-ENTMCNC: 26 PG — LOW (ref 27–34)
MCHC RBC-ENTMCNC: 28.5 GM/DL — LOW (ref 32–36)
MCV RBC AUTO: 91.2 FL — SIGNIFICANT CHANGE UP (ref 80–100)
MONOCYTES # BLD AUTO: 0.9 K/UL — SIGNIFICANT CHANGE UP (ref 0–0.9)
MONOCYTES NFR BLD AUTO: 8.8 % — SIGNIFICANT CHANGE UP (ref 2–14)
NEUTROPHILS # BLD AUTO: 6.3 K/UL — SIGNIFICANT CHANGE UP (ref 1.8–7.4)
NEUTROPHILS NFR BLD AUTO: 58.8 % — SIGNIFICANT CHANGE UP (ref 43–77)
PLATELET # BLD AUTO: 404 K/UL — HIGH (ref 150–400)
POTASSIUM SERPL-MCNC: 4.9 MMOL/L — SIGNIFICANT CHANGE UP (ref 3.5–5.3)
POTASSIUM SERPL-SCNC: 4.9 MMOL/L — SIGNIFICANT CHANGE UP (ref 3.5–5.3)
RBC # BLD: 3.18 M/UL — LOW (ref 3.8–5.2)
RBC # FLD: 20.4 % — HIGH (ref 10.3–14.5)
SODIUM SERPL-SCNC: 136 MMOL/L — SIGNIFICANT CHANGE UP (ref 135–145)
WBC # BLD: 10.7 K/UL — HIGH (ref 3.8–10.5)
WBC # FLD AUTO: 10.7 K/UL — HIGH (ref 3.8–10.5)

## 2018-04-18 PROCEDURE — 71045 X-RAY EXAM CHEST 1 VIEW: CPT

## 2018-04-18 PROCEDURE — 84145 PROCALCITONIN (PCT): CPT

## 2018-04-18 PROCEDURE — 85610 PROTHROMBIN TIME: CPT

## 2018-04-18 PROCEDURE — 82962 GLUCOSE BLOOD TEST: CPT

## 2018-04-18 PROCEDURE — 84443 ASSAY THYROID STIM HORMONE: CPT

## 2018-04-18 PROCEDURE — 85027 COMPLETE CBC AUTOMATED: CPT

## 2018-04-18 PROCEDURE — 80053 COMPREHEN METABOLIC PANEL: CPT

## 2018-04-18 PROCEDURE — 83550 IRON BINDING TEST: CPT

## 2018-04-18 PROCEDURE — 83735 ASSAY OF MAGNESIUM: CPT

## 2018-04-18 PROCEDURE — 83970 ASSAY OF PARATHORMONE: CPT

## 2018-04-18 PROCEDURE — 93005 ELECTROCARDIOGRAM TRACING: CPT

## 2018-04-18 PROCEDURE — 99285 EMERGENCY DEPT VISIT HI MDM: CPT | Mod: 25

## 2018-04-18 PROCEDURE — 81270 JAK2 GENE: CPT

## 2018-04-18 PROCEDURE — 87040 BLOOD CULTURE FOR BACTERIA: CPT

## 2018-04-18 PROCEDURE — 83605 ASSAY OF LACTIC ACID: CPT

## 2018-04-18 PROCEDURE — 82728 ASSAY OF FERRITIN: CPT

## 2018-04-18 PROCEDURE — 80202 ASSAY OF VANCOMYCIN: CPT

## 2018-04-18 PROCEDURE — 85730 THROMBOPLASTIN TIME PARTIAL: CPT

## 2018-04-18 PROCEDURE — 99239 HOSP IP/OBS DSCHRG MGMT >30: CPT

## 2018-04-18 PROCEDURE — 99261: CPT

## 2018-04-18 PROCEDURE — 86140 C-REACTIVE PROTEIN: CPT

## 2018-04-18 PROCEDURE — 85652 RBC SED RATE AUTOMATED: CPT

## 2018-04-18 PROCEDURE — 80048 BASIC METABOLIC PNL TOTAL CA: CPT

## 2018-04-18 PROCEDURE — 78802 RP LOCLZJ TUM WHBDY 1 D IMG: CPT

## 2018-04-18 PROCEDURE — 80074 ACUTE HEPATITIS PANEL: CPT

## 2018-04-18 PROCEDURE — 84100 ASSAY OF PHOSPHORUS: CPT

## 2018-04-18 PROCEDURE — 82310 ASSAY OF CALCIUM: CPT

## 2018-04-18 RX ORDER — ACETAMINOPHEN 500 MG
2 TABLET ORAL
Qty: 0 | Refills: 0 | DISCHARGE
Start: 2018-04-18

## 2018-04-18 RX ORDER — ZINC SULFATE TAB 220 MG (50 MG ZINC EQUIVALENT) 220 (50 ZN) MG
1 TAB ORAL
Qty: 0 | Refills: 0 | DISCHARGE
Start: 2018-04-18

## 2018-04-18 RX ORDER — CALCIUM ACETATE 667 MG
0 TABLET ORAL
Qty: 0 | Refills: 0 | DISCHARGE
Start: 2018-04-18

## 2018-04-18 RX ADMIN — Medication 1 APPLICATION(S): at 05:56

## 2018-04-18 RX ADMIN — ZINC SULFATE TAB 220 MG (50 MG ZINC EQUIVALENT) 220 MILLIGRAM(S): 220 (50 ZN) TAB at 11:52

## 2018-04-18 RX ADMIN — Medication 300 MILLIGRAM(S): at 11:52

## 2018-04-18 RX ADMIN — Medication 1 APPLICATION(S): at 11:53

## 2018-04-18 RX ADMIN — Medication 1334 MILLIGRAM(S): at 11:53

## 2018-04-18 RX ADMIN — Medication 1334 MILLIGRAM(S): at 08:29

## 2018-04-18 RX ADMIN — GABAPENTIN 100 MILLIGRAM(S): 400 CAPSULE ORAL at 05:56

## 2018-04-18 RX ADMIN — OXYCODONE AND ACETAMINOPHEN 1 TABLET(S): 5; 325 TABLET ORAL at 14:02

## 2018-04-18 RX ADMIN — Medication 500 MILLIGRAM(S): at 11:52

## 2018-04-18 RX ADMIN — PANTOPRAZOLE SODIUM 40 MILLIGRAM(S): 20 TABLET, DELAYED RELEASE ORAL at 05:56

## 2018-04-18 RX ADMIN — ERYTHROPOIETIN 4000 UNIT(S): 10000 INJECTION, SOLUTION INTRAVENOUS; SUBCUTANEOUS at 10:26

## 2018-04-18 RX ADMIN — Medication 25 MILLIGRAM(S): at 05:56

## 2018-04-18 RX ADMIN — HEPARIN SODIUM 5000 UNIT(S): 5000 INJECTION INTRAVENOUS; SUBCUTANEOUS at 13:03

## 2018-04-18 RX ADMIN — Medication 50 MICROGRAM(S): at 05:56

## 2018-04-18 RX ADMIN — Medication 1 MILLIGRAM(S): at 11:52

## 2018-04-18 RX ADMIN — Medication 1 TABLET(S): at 11:52

## 2018-04-18 RX ADMIN — HEPARIN SODIUM 5000 UNIT(S): 5000 INJECTION INTRAVENOUS; SUBCUTANEOUS at 05:56

## 2018-04-18 RX ADMIN — Medication 50 MILLIGRAM(S): at 05:56

## 2018-04-18 RX ADMIN — OXYCODONE AND ACETAMINOPHEN 1 TABLET(S): 5; 325 TABLET ORAL at 13:02

## 2018-04-18 RX ADMIN — CINACALCET 30 MILLIGRAM(S): 30 TABLET, FILM COATED ORAL at 11:52

## 2018-04-18 RX ADMIN — Medication 50 MILLIGRAM(S): at 13:03

## 2018-04-18 RX ADMIN — GABAPENTIN 100 MILLIGRAM(S): 400 CAPSULE ORAL at 13:03

## 2018-04-18 RX ADMIN — Medication 1: at 16:30

## 2018-04-18 NOTE — PROGRESS NOTE ADULT - PROBLEM SELECTOR PLAN 3
- insulin lispro corrective regimen sliding scale sq three times a day before meals  - monitor blood sugar
A1c- 5  - sliding scale insulin, titrate as needed
BSL controlled, on HSS, diabetic diet

## 2018-04-18 NOTE — PROGRESS NOTE ADULT - PROBLEM SELECTOR PROBLEM 3
Diabetes mellitus

## 2018-04-18 NOTE — PROGRESS NOTE ADULT - SUBJECTIVE AND OBJECTIVE BOX
SEEN ON HD. STABLE BP    No Known Allergies    Hospital Medications:   MEDICATIONS  (STANDING):  ascorbic acid 500 milliGRAM(s) Oral daily  calcium acetate 1334 milliGRAM(s) Oral three times a day with meals  cinacalcet 30 milliGRAM(s) Oral daily  collagenase Ointment 1 Application(s) Topical daily  Dakins Solution - 1/4 Strength 1 Application(s) Topical two times a day  docusate sodium 100 milliGRAM(s) Oral two times a day  epoetin jacqueline Injectable 4000 Unit(s) IV Push <User Schedule>  ferrous    sulfate Liquid 300 milliGRAM(s) Enteral Tube daily  folic acid 1 milliGRAM(s) Oral daily  gabapentin 100 milliGRAM(s) Oral three times a day  heparin  Injectable 5000 Unit(s) SubCutaneous every 8 hours  hydrALAZINE 50 milliGRAM(s) Oral three times a day  insulin lispro (HumaLOG) corrective regimen sliding scale   SubCutaneous three times a day before meals  levothyroxine 50 MICROGram(s) Oral daily  metoprolol tartrate 25 milliGRAM(s) Oral two times a day  multivitamin 1 Tablet(s) Oral daily  pantoprazole    Tablet 40 milliGRAM(s) Oral before breakfast  senna 2 Tablet(s) Oral at bedtime  simvastatin 20 milliGRAM(s) Oral at bedtime  zinc sulfate 220 milliGRAM(s) Oral daily        VITALS:  T(F): 98.2 (04-18-18 @ 11:46), Max: 98.3 (04-17-18 @ 21:05)  HR: 75 (04-18-18 @ 11:46)  BP: 145/60 (04-18-18 @ 11:46)  RR: 17 (04-18-18 @ 11:46)  SpO2: 98% (04-18-18 @ 11:46)  Wt(kg): --      PHYSICAL EXAM:  Constitutional: NAD  HEENT: anicteric sclera, oropharynx clear, MMM  Neck: No JVD  Respiratory: CTAB, no wheezes, rales or rhonchi  Cardiovascular: S1, S2, RRR  Gastrointestinal: BS+, soft, NT/ND  Extremities: No cyanosis or clubbing. No peripheral edema  Neurological: A/O x 3, no focal deficits  Vascular Access: IJ PERMACATH    LABS:  04-18    136  |  100  |  72<H>  ----------------------------<  129<H>  4.9   |  25  |  7.32<H>    Ca    9.5      18 Apr 2018 07:40      Creatinine Trend: 7.32 <--, 7.12 <--, 5.49 <--, 6.54 <--                        8.3    10.7  )-----------( 404      ( 18 Apr 2018 07:40 )             29.0     Urine Studies:      RADIOLOGY & ADDITIONAL STUDIES:

## 2018-04-18 NOTE — PROGRESS NOTE ADULT - PROBLEM SELECTOR PLAN 4
- continue synthroid
BP controlled on hydralazine and metoprolol
c/w synthroid  - follow up TSH

## 2018-04-18 NOTE — PROGRESS NOTE ADULT - PROBLEM SELECTOR PROBLEM 5
HTN (hypertension)
Hypothyroidism
HTN (hypertension)

## 2018-04-18 NOTE — PROGRESS NOTE ADULT - ATTENDING COMMENTS
WBC scan shows uptake in the right gluteal area, where there is a healing, debrided abscess.     Patient is clinically improved.  No evidence of residual collection.  Will discharge.  Suggest two more weeks of vancomycin on dialysis.  Discussed with Dr. Escoto.

## 2018-04-18 NOTE — PROGRESS NOTE ADULT - PROBLEM SELECTOR PLAN 1
likely from infected decubiti, leukocytosis unchanged though while on abx; afebrile, nontoxic appearing  appreciate hematology consult - feels 2/2 residually infected decubiti as WBC normal in 2016 and 2017, Dr Mcgrath: Will do JAVIER-2 to r/o MPD  blood cultures negative, CXR without infiltrate, no diarrhea  - c/w meropenem  IVPB 500 mg  every 24 hours and vancomycin 1250mg M-W-F intradialysis; check vanc level today prior to HD; Dr Kaplan following  - no debridement as per Surgery  - daily wound care with collagenase, zinc, vit c  - turn and position q 2hrs; PT  indium scan shows uptake in the right gluteal area where there is a healing ulcer.

## 2018-04-18 NOTE — PROGRESS NOTE ADULT - PROBLEM SELECTOR PROBLEM 4
Hypothyroidism
HTN (hypertension)
Hypothyroidism

## 2018-04-18 NOTE — PROGRESS NOTE ADULT - PROBLEM SELECTOR PLAN 6
-c/w heparin sq  GI ppx
-c/w heparin sq
-c/w heparin sq  GI ppx
Likely combination of chronic disease and kidney disease   Hb 9.1/31.9
none

## 2018-04-18 NOTE — PROGRESS NOTE ADULT - PROVIDER SPECIALTY LIST ADULT
Heme/Onc
Heme/Onc
Hospitalist
Hospitalist
Infectious Disease
Internal Medicine
Nephrology
Internal Medicine
Internal Medicine
Nephrology
Internal Medicine
Infectious Disease

## 2018-04-18 NOTE — PROGRESS NOTE ADULT - PROBLEM SELECTOR PROBLEM 2
ESRD (end stage renal disease) on dialysis

## 2018-04-18 NOTE — PROGRESS NOTE ADULT - SUBJECTIVE AND OBJECTIVE BOX
MEDICAL ATTENDING NOTE  Patient is a 53y old  Female who presents with a chief complaint of Leukoytosis (09 Apr 2018 09:37)      HPI:  54 y/o F from [place], bedbound due to difficulty walking likely 2/2 to polyneuropathy with PMHx of anemia, HFpEF, ESRD on HD, Clostridium difficile infection, DM, Diabetic neuropathy, HTN, HLD, Hypothyroidism, OM of jaw, Parathyroid adenoma, and GERD was sent from NH due to leukocytosis of 20. Patient denies any complaints on admission including cough, SOB, chest pain, abdominal pain, nausea, vomiting, diarrhea, or pain at the sacral decubitus ulcer site. In the ED, patient was afebrile with leukocytosis of 19.4. She was last admitted at [facility] in March for sepsis. She underwent I&D on 3/8/18 of decubitus ulcer. Wound cx grew MRSA and patient continued to be febrile so she underwent redebridement on 3/18/18 and completed 2 weeks of Vancomycin on discharge. Currently she denies any complaints at all.    SH: Denies smoking, alcohol or illicit drug use. Wheelchair bound.   NKDA (05 Apr 2018 06:03)      INTERVAL HPI/OVERNIGHT EVENTS: no new complaints    MEDICATIONS  (STANDING):  ascorbic acid 500 milliGRAM(s) Oral daily  calcium acetate 1334 milliGRAM(s) Oral three times a day with meals  cinacalcet 30 milliGRAM(s) Oral daily  collagenase Ointment 1 Application(s) Topical daily  Dakins Solution - 1/4 Strength 1 Application(s) Topical two times a day  docusate sodium 100 milliGRAM(s) Oral two times a day  epoetin jacqueline Injectable 4000 Unit(s) IV Push <User Schedule>  ferrous    sulfate Liquid 300 milliGRAM(s) Enteral Tube daily  folic acid 1 milliGRAM(s) Oral daily  gabapentin 100 milliGRAM(s) Oral three times a day  heparin  Injectable 5000 Unit(s) SubCutaneous every 8 hours  hydrALAZINE 50 milliGRAM(s) Oral three times a day  insulin lispro (HumaLOG) corrective regimen sliding scale   SubCutaneous three times a day before meals  levothyroxine 50 MICROGram(s) Oral daily  metoprolol tartrate 25 milliGRAM(s) Oral two times a day  multivitamin 1 Tablet(s) Oral daily  pantoprazole    Tablet 40 milliGRAM(s) Oral before breakfast  senna 2 Tablet(s) Oral at bedtime  simvastatin 20 milliGRAM(s) Oral at bedtime  zinc sulfate 220 milliGRAM(s) Oral daily    MEDICATIONS  (PRN):  acetaminophen   Tablet 650 milliGRAM(s) Oral every 6 hours PRN For Temp greater than 38 C (100.4 F) or mild pain  acetaminophen   Tablet. 650 milliGRAM(s) Oral every 6 hours PRN Moderate Pain (4 - 6)  oxyCODONE    5 mG/acetaminophen 325 mG 1 Tablet(s) Oral every 6 hours PRN Severe Pain (7 - 10)      __________________________________________________  REVIEW OF SYSTEMS:    CONSTITUTIONAL: No fever,   EYES: no acute visual disturbances  NECK: No pain or stiffness  RESPIRATORY: No cough; No shortness of breath  CARDIOVASCULAR: No chest pain, no palpitations  GASTROINTESTINAL: No pain. No nausea or vomiting; No diarrhea   NEUROLOGICAL: No headache or numbness, no tremors  MUSCULOSKELETAL: No joint pain, no muscle pain  GENITOURINARY: no dysuria, no frequency, no hesitancy  PSYCHIATRY: no depression , no anxiety  ALL OTHER  ROS negative      Patient's mother passed away yesterday in the nursing home.        Vital Signs Last 24 Hrs  T(C): 37.1 (18 Apr 2018 14:25), Max: 37.1 (18 Apr 2018 14:25)  T(F): 98.8 (18 Apr 2018 14:25), Max: 98.8 (18 Apr 2018 14:25)  HR: 74 (18 Apr 2018 14:25) (72 - 79)  BP: 129/59 (18 Apr 2018 14:25) (129/59 - 160/60)  BP(mean): --  RR: 16 (18 Apr 2018 14:25) (16 - 18)  SpO2: 100% (18 Apr 2018 14:25) (97% - 100%)    ________________________________________________  PHYSICAL EXAM:  GENERAL: Alert, chronically-ill 54 yo  HEENT: Normocephalic;  conjunctivae and sclerae clear; moist mucous membranes;   NECK : supple  Open wound on the right side of her back is healing.  Granulation tissue on the right hip.   CHEST/LUNG: Clear to auscultation bilaterally with good air entry   HEART: S1 S2  regular; no murmurs, gallops or rubs  ABDOMEN: Soft, Nontender, Nondistended; Bowel sounds present  EXTREMITIES: no cyanosis; no edema; no calf tenderness  SKIN: warm and dry; no rash  NERVOUS SYSTEM:  Awake and alert; Oriented  to place, person and time ; no new deficits    _________________________________________________  LABS:                        8.3    10.7  )-----------( 404      ( 18 Apr 2018 07:40 )             29.0     04-18    136  |  100  |  72<H>  ----------------------------<  129<H>  4.9   |  25  |  7.32<H>    Ca    9.5      18 Apr 2018 07:40          CAPILLARY BLOOD GLUCOSE      POCT Blood Glucose.: 145 mg/dL (18 Apr 2018 11:00)  POCT Blood Glucose.: 136 mg/dL (18 Apr 2018 08:05)  POCT Blood Glucose.: 141 mg/dL (17 Apr 2018 21:21)  POCT Blood Glucose.: 119 mg/dL (17 Apr 2018 16:28)    < from: NM Inflammatory Loc Wholebody, WBC (04.17.18 @ 10:52) >  IMPRESSION: Indium labeled leukocyte scan demonstrates:    New focus of increased Indium labeled leukocyte accumulation in the right   lateral gluteal region, may represent a surgical site and/or soft tissue   infection.    Mildly increased uptake in the left posterior buttock, probably a   decubitus ulcer with possible involvement of the lower sacrum.    < end of copied text >

## 2018-04-18 NOTE — PROGRESS NOTE ADULT - ASSESSMENT
1.Leukocytosis.  Plan: likely from  wound infection, wbc was downtrending but elevated again,   - c/w meropenem  IVPB 500 mg  every 24 hours and vancomycin 1250mg M-W-F intradialysis day 6  will get vanco trough tomorrow before HD  - blood cultures negative  - no debridement as per Surgery  - daily wound care.   - ID Dr Kaplan  - turn and position q 2hrs  - Dr. Mcgrath consulted for persistent leucocytosis  -NM indium scan to r/o any occult infection before dc , pt with difficult blood draw, will get arterial puncture for WBC scan     2.ESRD (end stage renal disease) on dialysis.  Plan: for dialyzed today   nephro Dr Escoto/ [place].     3. Diabetes mellitus.  Plan: - insulin lispro corrective regimen sliding scale sq three times a day before meals  - monitor blood sugar.     4. Hypothyroidism.  Plan: - continue synthroid.     5. HTN (hypertension).  Plan: -c/w metoprolol tartrate 25 mg  two times a day  - hydralazine 50 mg oral three times a day  -blood pressure monitoring.     6. Need for prophylactic measure. Plan: -c/w heparin s.q.
54 y/o F from [place], bedbound due to difficulty walking likely 2/2 to polyneuropathy with PMHx of anemia, HFpEF, ESRD on HD, Clostridium difficile infection, DM, Diabetic neuropathy, HTN, HLD, Hypothyroidism, OM of jaw, Parathyroid adenoma, and GERD was sent from NH due to leukocytosis of 20. She is admitted to rule out underlying infectious etiology.
A/P   ESRD ON HD  FOR HD ON  MONDAY    ON  MARITA WITH HD     BP IS WELL CONTROLLED    HAS NO VOL ISSUES     -  IMPROVING  WBC   IS BEING  FOLLOWED BY  ID     ON ABX     B CULTURES NEGATIVE SO  FAR
52 y/o F from [place], bedbound due to difficulty walking likely 2/2 to polyneuropathy with PMHx of anemia, HFpEF, ESRD on HD, Clostridium difficile infection, DM, Diabetic neuropathy, HTN, HLD, Hypothyroidism, OM of jaw, Parathyroid adenoma, and GERD was sent from NH due to leukocytosis of 20, possibly 2/2 infected multiple decubiti
52 y/o F from [place], bedbound due to difficulty walking likely 2/2 to polyneuropathy with PMHx of anemia, HFpEF, ESRD on HD, Clostridium difficile infection, DM, Diabetic neuropathy, HTN, HLD, Hypothyroidism, OM of jaw, Parathyroid adenoma, and GERD was sent from NH due to leukocytosis of 20, possibly 2/2 infected multiple decubiti
52 y/o F from [place], bedbound due to difficulty walking likely 2/2 to polyneuropathy with PMHx of anemia, HFpEF, ESRD on HD, Clostridium difficile infection, DM, Diabetic neuropathy, HTN, HLD, Hypothyroidism, OM of jaw, Parathyroid adenoma, and GERD was sent from NH due to leukocytosis of 20. She is admitted to rule out underlying infectious etiology.
54 y/o F from [place], bedbound due to difficulty walking likely 2/2 to polyneuropathy with PMHx of anemia, HFpEF, ESRD on HD, Clostridium difficile infection, DM, Diabetic neuropathy, HTN, HLD, Hypothyroidism, OM of jaw, Parathyroid adenoma, and GERD was sent from NH due to leukocytosis of 20, possibly 2/2 infected multiple decubiti
54 y/o F from [place], bedbound due to difficulty walking likely 2/2 to polyneuropathy with PMHx of anemia, HFpEF, ESRD on HD, Clostridium difficile infection, DM, Diabetic neuropathy, HTN, HLD, Hypothyroidism, OM of jaw, Parathyroid adenoma, and GERD was sent from NH due to leukocytosis of 20. She is admitted to rule out underlying infectious etiology.
54 y/o F from [place], bedbound due to difficulty walking likely 2/2 to polyneuropathy with PMHx of anemia, HFpEF, ESRD on HD, Clostridium difficile infection, DM, Diabetic neuropathy, HTN, HLD, Hypothyroidism, OM of jaw, Parathyroid adenoma, and GERD was sent from NH due to leukocytosis of 20, possibly 2/2 infected multiple decubiti
54 y/o F from [place], bedbound due to difficulty walking likely 2/2 to polyneuropathy with PMHx of anemia, HFpEF, ESRD on HD, Clostridium difficile infection, DM, Diabetic neuropathy, HTN, HLD, Hypothyroidism, OM of jaw, Parathyroid adenoma, and GERD was sent from NH due to leukocytosis of 20. She is admitted to rule out underlying infectious etiology.
54 y/o F from [place], bedbound due to difficulty walking likely 2/2 to polyneuropathy with PMHx of anemia, HFpEF, ESRD on HD, Clostridium difficile infection, DM, Diabetic neuropathy, HTN, HLD, Hypothyroidism, OM of jaw, Parathyroid adenoma, and GERD was sent from NH due to leukocytosis of 20, possibly 2/2 infected multiple decubiti
54 y/o F from [place], bedbound due to difficulty walking likely 2/2 to polyneuropathy with PMHx of anemia, HFpEF, ESRD on HD, Clostridium difficile infection, DM, Diabetic neuropathy, HTN, HLD, Hypothyroidism, OM of jaw, Parathyroid adenoma, and GERD was sent from NH due to leukocytosis of 20. She is admitted to rule out underlying infectious etiology.
A/P   - ESRD ON HD ,  IS SCHEDULED FOR HD  TODAY     BP IS ACCEPTABLE   HAS NO  VOL EXCESS   HB LOW ON MRAITA WITH HD    - HIGH WBC,   MOST LIKELY  SEC TO  WOUNDS AT THE BACK THOUGH SHOULD FOLLOW  BLOOD CULTURES  ON ABX  AS PER ID   WILL FOLLOW
A/P   ESRD  HD IN AM   ON  MARITA WITH HD   BP IS WELL CONTROLLED   PERSISTENT  LEUCOCYTOSIS. CONT IV ABX  BP WELL CONTROLLED WITH METOPROLOL, HYDRALAZINE   INDIUM  SCAN PLANNED MONDAY
A/P   ESRD  HD IN AM   ON  MARITA WITH HD   BP IS WELL CONTROLLED   PERSISTENT  LEUCOCYTOSIS. CONT IV ABX  WBC SCAN
A/P   ESRD  HD IN AM   ON  MARITA WITH HD   BP IS WELL CONTROLLED   PERSISTENT  LEUCOCYTOSIS. CONT IV ABX. VANCO AND MERONEM  BP WELL CONTROLLED WITH METOPROLOL, HYDRALAZINE   INDIUM  SCAN PLANNED MONDAY
A/P   ESRD  HD MWF   ON  MARITA WITH HD   BP IS WELL CONTROLLED  HIGH PHOS- PHOSLO ADDED   PERSISTENT  LEUCOCYTOSIS. CONT IV ABX. VANCO AND MERONEM  BP WELL CONTROLLED WITH METOPROLOL, HYDRALAZINE   INDIUM  SCAN TESTING ONGOING.
A/P   ESRD  HD TODAY    ON  MARITA WITH HD   BP IS WELL CONTROLLED   PERSISTENT  LEUCOCYTOSIS. CONT IV ABX  BP WELL CONTROLLED WITH METOPROLOL, HYDRALAZINE   INDIUM  SCAN PLANNED FOR AM.
A/P   ESRD  HD in AM  ON  MARITA WITH HD   BP IS WELL CONTROLLED  HIGH PHOS- PHOSLO ADDED   PERSISTENT  LEUCOCYTOSIS. CONT IV ABX. VANCO AND MERONEM  BP WELL CONTROLLED WITH METOPROLOL, HYDRALAZINE   INDIUM  SCAN TEST NOTED.
A/P   ESRD  STABLE ON HD   ON  MARITA WITH HD   BP IS WELL CONTROLLED  HIGH PHOS- PHOSLO ADDED   PERSISTENT  LEUCOCYTOSIS. CONT IV ABX. VANCO AND MERONEM  BP WELL CONTROLLED WITH METOPROLOL, HYDRALAZINE   INDIUM  SCAN TEST NOTED.  D/C PLANNING
A/P   ESRD  STABLE ON HD.    ON  MARITA WITH HD   BP IS WELL CONTROLLED  PERSISTENT  LEUCOCYTOSIS. CONT IV ABX  BP WELL CONTROLLED WITH METOPROLOL, HYDRALAZINE   INDIUM  SCAN PLANNED MONDAY
A/P   ESRD S/P  HD EARLIER.    ON  MARITA WITH HD   BP IS WELL CONTROLLED   HAS NO VOL ISSUES   WBC IMPROVING. CONT IV ABX
52 y/o F from [place], bedbound due to difficulty walking likely 2/2 to polyneuropathy with PMHx of anemia, HFpEF, ESRD on HD, Clostridium difficile infection, DM, Diabetic neuropathy, HTN, HLD, Hypothyroidism, OM of jaw, Parathyroid adenoma, and GERD was sent from NH due to leukocytosis of 20, possibly 2/2 infected multiple decubiti
1. Leukocytosis - improving   2. Decubitus x 4 - all clean    Plan  Continue Meropenem 500 mg IV q 24 hours day #4  Continue Vancomycin 1.25 grams now and M-W-Fr with Dialysis   dc planning for Monday
leukocytosis - no signs of acute infection and Indium scan shows uptake at sites of hip ulcers only    plan - cont vancomycin 1 gm iv 3 x week  cont meropenem 500mgs iv q24 hrs  check cbc tomorrow - if wbc count is stable or decreasing will dc all abxs and dc to NH

## 2018-04-18 NOTE — PROGRESS NOTE ADULT - PROBLEM SELECTOR PLAN 5
-c/w metoprolol tartrate 25 mg  two times a day  - hydralazine 50 mg oral three times a day  -blood pressure monitoring
-c/w metoprolol tartrate 25 mg  two times a day  - hydralazine 50 mg oral three times a day  target /80  BP controlled now
c/w synthroid
-c/w metoprolol tartrate 25 mg  two times a day  - hydralazine 50 mg oral three times a day  target /80  BP controlled now

## 2018-04-18 NOTE — PROGRESS NOTE ADULT - PROBLEM SELECTOR PROBLEM 6
Need for prophylactic measure
Anemia
Need for prophylactic measure

## 2018-04-18 NOTE — PROGRESS NOTE ADULT - PROBLEM SELECTOR PROBLEM 1
Leukocytosis

## 2018-05-23 PROCEDURE — 86920 COMPATIBILITY TEST SPIN: CPT

## 2018-05-23 PROCEDURE — 86880 COOMBS TEST DIRECT: CPT

## 2018-05-23 PROCEDURE — 36580 REPLACE CVAD CATH: CPT

## 2018-05-23 PROCEDURE — 80053 COMPREHEN METABOLIC PANEL: CPT

## 2018-05-23 PROCEDURE — 87389 HIV-1 AG W/HIV-1&-2 AB AG IA: CPT

## 2018-05-23 PROCEDURE — 87633 RESP VIRUS 12-25 TARGETS: CPT

## 2018-05-23 PROCEDURE — 87040 BLOOD CULTURE FOR BACTERIA: CPT

## 2018-05-23 PROCEDURE — 92610 EVALUATE SWALLOWING FUNCTION: CPT

## 2018-05-23 PROCEDURE — 94003 VENT MGMT INPAT SUBQ DAY: CPT

## 2018-05-23 PROCEDURE — C1752: CPT

## 2018-05-23 PROCEDURE — 96375 TX/PRO/DX INJ NEW DRUG ADDON: CPT

## 2018-05-23 PROCEDURE — 94640 AIRWAY INHALATION TREATMENT: CPT

## 2018-05-23 PROCEDURE — 80048 BASIC METABOLIC PNL TOTAL CA: CPT

## 2018-05-23 PROCEDURE — 82272 OCCULT BLD FECES 1-3 TESTS: CPT

## 2018-05-23 PROCEDURE — 83880 ASSAY OF NATRIURETIC PEPTIDE: CPT

## 2018-05-23 PROCEDURE — 87581 M.PNEUMON DNA AMP PROBE: CPT

## 2018-05-23 PROCEDURE — 82306 VITAMIN D 25 HYDROXY: CPT

## 2018-05-23 PROCEDURE — 83605 ASSAY OF LACTIC ACID: CPT

## 2018-05-23 PROCEDURE — 81001 URINALYSIS AUTO W/SCOPE: CPT

## 2018-05-23 PROCEDURE — 86703 HIV-1/HIV-2 1 RESULT ANTBDY: CPT

## 2018-05-23 PROCEDURE — 36581 REPLACE TUNNELED CV CATH: CPT

## 2018-05-23 PROCEDURE — 80074 ACUTE HEPATITIS PANEL: CPT

## 2018-05-23 PROCEDURE — 97164 PT RE-EVAL EST PLAN CARE: CPT

## 2018-05-23 PROCEDURE — 84145 PROCALCITONIN (PCT): CPT

## 2018-05-23 PROCEDURE — 87486 CHLMYD PNEUM DNA AMP PROBE: CPT

## 2018-05-23 PROCEDURE — 88312 SPECIAL STAINS GROUP 1: CPT

## 2018-05-23 PROCEDURE — 97530 THERAPEUTIC ACTIVITIES: CPT

## 2018-05-23 PROCEDURE — 82310 ASSAY OF CALCIUM: CPT

## 2018-05-23 PROCEDURE — 87070 CULTURE OTHR SPECIMN AEROBIC: CPT

## 2018-05-23 PROCEDURE — 93306 TTE W/DOPPLER COMPLETE: CPT

## 2018-05-23 PROCEDURE — C1894: CPT

## 2018-05-23 PROCEDURE — 99285 EMERGENCY DEPT VISIT HI MDM: CPT | Mod: 25

## 2018-05-23 PROCEDURE — 86900 BLOOD TYPING SEROLOGIC ABO: CPT

## 2018-05-23 PROCEDURE — 83970 ASSAY OF PARATHORMONE: CPT

## 2018-05-23 PROCEDURE — 88305 TISSUE EXAM BY PATHOLOGIST: CPT

## 2018-05-23 PROCEDURE — 82803 BLOOD GASES ANY COMBINATION: CPT

## 2018-05-23 PROCEDURE — 93005 ELECTROCARDIOGRAM TRACING: CPT

## 2018-05-23 PROCEDURE — 36430 TRANSFUSION BLD/BLD COMPNT: CPT

## 2018-05-23 PROCEDURE — 78802 RP LOCLZJ TUM WHBDY 1 D IMG: CPT

## 2018-05-23 PROCEDURE — 86901 BLOOD TYPING SEROLOGIC RH(D): CPT

## 2018-05-23 PROCEDURE — 36415 COLL VENOUS BLD VENIPUNCTURE: CPT

## 2018-05-23 PROCEDURE — 87449 NOS EACH ORGANISM AG IA: CPT

## 2018-05-23 PROCEDURE — 82550 ASSAY OF CK (CPK): CPT

## 2018-05-23 PROCEDURE — P9040: CPT

## 2018-05-23 PROCEDURE — 80061 LIPID PANEL: CPT

## 2018-05-23 PROCEDURE — 71045 X-RAY EXAM CHEST 1 VIEW: CPT

## 2018-05-23 PROCEDURE — 86850 RBC ANTIBODY SCREEN: CPT

## 2018-05-23 PROCEDURE — 96374 THER/PROPH/DIAG INJ IV PUSH: CPT

## 2018-05-23 PROCEDURE — 76937 US GUIDE VASCULAR ACCESS: CPT

## 2018-05-23 PROCEDURE — 84100 ASSAY OF PHOSPHORUS: CPT

## 2018-05-23 PROCEDURE — 82728 ASSAY OF FERRITIN: CPT

## 2018-05-23 PROCEDURE — 36598 INJ W/FLUOR EVAL CV DEVICE: CPT

## 2018-05-23 PROCEDURE — 87798 DETECT AGENT NOS DNA AMP: CPT

## 2018-05-23 PROCEDURE — 87086 URINE CULTURE/COLONY COUNT: CPT

## 2018-05-23 PROCEDURE — 85610 PROTHROMBIN TIME: CPT

## 2018-05-23 PROCEDURE — 85730 THROMBOPLASTIN TIME PARTIAL: CPT

## 2018-05-23 PROCEDURE — 84443 ASSAY THYROID STIM HORMONE: CPT

## 2018-05-23 PROCEDURE — 99261: CPT

## 2018-05-23 PROCEDURE — 36558 INSERT TUNNELED CV CATH: CPT

## 2018-05-23 PROCEDURE — 83036 HEMOGLOBIN GLYCOSYLATED A1C: CPT

## 2018-05-23 PROCEDURE — 85027 COMPLETE CBC AUTOMATED: CPT

## 2018-05-23 PROCEDURE — 83550 IRON BINDING TEST: CPT

## 2018-05-23 PROCEDURE — 87535 HIV-1 PROBE&REVERSE TRNSCRPJ: CPT

## 2018-05-23 PROCEDURE — C1769: CPT

## 2018-05-23 PROCEDURE — 82962 GLUCOSE BLOOD TEST: CPT

## 2018-05-23 PROCEDURE — 83735 ASSAY OF MAGNESIUM: CPT

## 2018-05-23 PROCEDURE — 82553 CREATINE MB FRACTION: CPT

## 2018-05-23 PROCEDURE — 84484 ASSAY OF TROPONIN QUANT: CPT

## 2018-05-23 PROCEDURE — 77001 FLUOROGUIDE FOR VEIN DEVICE: CPT

## 2018-05-23 PROCEDURE — 94660 CPAP INITIATION&MGMT: CPT

## 2018-05-23 PROCEDURE — 94002 VENT MGMT INPAT INIT DAY: CPT
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
## 2018-07-09 PROBLEM — N18.6 END STAGE RENAL DISEASE: Chronic | Status: ACTIVE | Noted: 2018-04-05

## 2018-07-12 NOTE — PATIENT PROFILE ADULT. - NS PRO ABUSE SCREEN AFRAID ANYONE YN
OccupationalTherapy Progress Note     Patient Name: Blayne Hayes  ALTCT'N Date: 7/12/2018  Problem List  Patient Active Problem List   Diagnosis    Hypokalemia    History of gastric bypass    History of migraine headaches    Left-sided weakness    Anemia    Vitamin D deficiency    Weakness of both lower extremities    Weakness of both upper extremities    Elevated CPK    Vitamin B12 deficiency    Cervical lymphadenopathy               07/12/18 1003   Restrictions/Precautions   Other Precautions Multiple lines;Telemetry; Fall Risk   ADL   Where Assessed Edge of bed   Grooming Assistance 5  Supervision/Setup   Grooming Deficit Setup;Supervision/safety; Increased time to complete   Grooming Comments Pt completed washing hair, deodorant application, and dental hygiene while in bathroom  Pt attempting to stand for oral hygiene however, legs getting weak x 2 needig 1 minute break  UB Bathing Assistance 5  Supervision/Setup   UB Bathing Deficit Setup   UB Bathing Comments A with back, pt wanting lotion put o her tattoo   LB Bathing Assistance 5  Supervision/Setup   LB Bathing Deficit Setup   LB Bathing Comments Pt completing thights and knees while seated and standing for nina hygiene  UB Dressing Assistance 5  Supervision/Setup   UB Dressing Deficit Setup   UB Dressing Comments A with gown fastners   LB Dressing Assistance 5  Supervision/Setup   LB Dressing Deficit Setup   LB Dressing Comments pt doffing personal undergarments and shorts and donning hospital briefs and pants   Toileting Assistance  6  Modified independent   Toileting Deficit Increased time to complete   Toileting Comments RW for stability   Functional Standing Tolerance   Comments pt unable to complete oral hygiene while standing requiring  breaks x 2 throughout  Pt reporting weak knees and knees shaking  Bed Mobility   Rolling R 6  Modified independent   Additional items Bedrails; Increased time required   Supine to Sit 6  Modified independent   Additional items Bedrails; Increased time required   Transfers   Sit to Stand 6  Modified independent   Additional items Bedrails;Armrests; Increased time required  (RW)   Stand to Sit 6  Modified independent   Additional items Increased time required; Bedrails;Armrests   Toilet transfer 6  Modified independent   Additional items Increased time required  (RW)   Functional Mobility   Functional Mobility 5  Supervision   Additional Comments Pt completed FM within room and into bathroom and back to chair  Additional items Rolling walker   Toilet Transfers   Toilet Transfer From Rolling walker   Toilet Transfer Type To   Toilet Transfer to Standard toilet   Toilet Transfer Technique Ambulating   Toilet Transfers Supervision   Toilet Transfers Comments pt requiring increased time   Cognition   Orientation Level Oriented X4   Activity Tolerance   Activity Tolerance Patient tolerated treatment well   Assessment   Assessment Pt presents supine in bed and finished session seated in chair within room  Pt presenting with increased weakness in LEs during FM throughout room  Pt cooperative and agreeable to complete OT at this time including morning ADL, toileting, and FM within room  Pt finished session seated in chair with SCDs powered on and applied, all lines int act, all needs in reach, and pt getting ready to do work from her phone  unable to assess

## 2018-08-24 ENCOUNTER — APPOINTMENT (OUTPATIENT)
Dept: OPHTHALMOLOGY | Facility: CLINIC | Age: 53
End: 2018-08-24

## 2018-08-29 ENCOUNTER — APPOINTMENT (OUTPATIENT)
Dept: OPHTHALMOLOGY | Facility: CLINIC | Age: 53
End: 2018-08-29

## 2018-09-04 ENCOUNTER — APPOINTMENT (OUTPATIENT)
Dept: OPHTHALMOLOGY | Facility: CLINIC | Age: 53
End: 2018-09-04

## 2018-09-23 ENCOUNTER — INPATIENT (INPATIENT)
Facility: HOSPITAL | Age: 53
LOS: 11 days | Discharge: NURSING, REHAB OR LONG-TERM CARE | DRG: 264 | End: 2018-10-05
Attending: FAMILY MEDICINE | Admitting: FAMILY MEDICINE
Payer: MEDICAID

## 2018-09-23 VITALS
DIASTOLIC BLOOD PRESSURE: 77 MMHG | WEIGHT: 104.06 LBS | HEIGHT: 67 IN | RESPIRATION RATE: 19 BRPM | HEART RATE: 78 BPM | SYSTOLIC BLOOD PRESSURE: 166 MMHG | TEMPERATURE: 98 F | OXYGEN SATURATION: 97 %

## 2018-09-23 DIAGNOSIS — R79.89 OTHER SPECIFIED ABNORMAL FINDINGS OF BLOOD CHEMISTRY: ICD-10-CM

## 2018-09-23 LAB
ALBUMIN SERPL ELPH-MCNC: 2.7 G/DL — LOW (ref 3.5–5)
ALP SERPL-CCNC: 178 U/L — HIGH (ref 40–120)
ALT FLD-CCNC: 15 U/L DA — SIGNIFICANT CHANGE UP (ref 10–60)
ANION GAP SERPL CALC-SCNC: 12 MMOL/L — SIGNIFICANT CHANGE UP (ref 5–17)
APTT BLD: 30.6 SEC — SIGNIFICANT CHANGE UP (ref 27.5–37.4)
AST SERPL-CCNC: 9 U/L — LOW (ref 10–40)
BASOPHILS # BLD AUTO: 0.1 K/UL — SIGNIFICANT CHANGE UP (ref 0–0.2)
BASOPHILS NFR BLD AUTO: 0.8 % — SIGNIFICANT CHANGE UP (ref 0–2)
BILIRUB SERPL-MCNC: 0.3 MG/DL — SIGNIFICANT CHANGE UP (ref 0.2–1.2)
BUN SERPL-MCNC: 93 MG/DL — HIGH (ref 7–18)
CALCIUM SERPL-MCNC: 10.4 MG/DL — SIGNIFICANT CHANGE UP (ref 8.4–10.5)
CHLORIDE SERPL-SCNC: 97 MMOL/L — SIGNIFICANT CHANGE UP (ref 96–108)
CO2 SERPL-SCNC: 28 MMOL/L — SIGNIFICANT CHANGE UP (ref 22–31)
CREAT SERPL-MCNC: 5.49 MG/DL — HIGH (ref 0.5–1.3)
EOSINOPHIL # BLD AUTO: 0.5 K/UL — SIGNIFICANT CHANGE UP (ref 0–0.5)
EOSINOPHIL NFR BLD AUTO: 3.5 % — SIGNIFICANT CHANGE UP (ref 0–6)
GLUCOSE SERPL-MCNC: 231 MG/DL — HIGH (ref 70–99)
HCT VFR BLD CALC: 31.1 % — LOW (ref 34.5–45)
HGB BLD-MCNC: 9.1 G/DL — LOW (ref 11.5–15.5)
INR BLD: 0.88 RATIO — SIGNIFICANT CHANGE UP (ref 0.88–1.16)
LACTATE SERPL-SCNC: 1.4 MMOL/L — SIGNIFICANT CHANGE UP (ref 0.7–2)
LYMPHOCYTES # BLD AUTO: 20.7 % — SIGNIFICANT CHANGE UP (ref 13–44)
LYMPHOCYTES # BLD AUTO: 3.1 K/UL — SIGNIFICANT CHANGE UP (ref 1–3.3)
MCHC RBC-ENTMCNC: 29.2 GM/DL — LOW (ref 32–36)
MCHC RBC-ENTMCNC: 29.4 PG — SIGNIFICANT CHANGE UP (ref 27–34)
MCV RBC AUTO: 100.7 FL — HIGH (ref 80–100)
MONOCYTES # BLD AUTO: 1.1 K/UL — HIGH (ref 0–0.9)
MONOCYTES NFR BLD AUTO: 7.5 % — SIGNIFICANT CHANGE UP (ref 2–14)
NEUTROPHILS # BLD AUTO: 10.1 K/UL — HIGH (ref 1.8–7.4)
NEUTROPHILS NFR BLD AUTO: 67.6 % — SIGNIFICANT CHANGE UP (ref 43–77)
PLATELET # BLD AUTO: 466 K/UL — HIGH (ref 150–400)
POTASSIUM SERPL-MCNC: 4.4 MMOL/L — SIGNIFICANT CHANGE UP (ref 3.5–5.3)
POTASSIUM SERPL-SCNC: 4.4 MMOL/L — SIGNIFICANT CHANGE UP (ref 3.5–5.3)
PROT SERPL-MCNC: 7.7 G/DL — SIGNIFICANT CHANGE UP (ref 6–8.3)
PROTHROM AB SERPL-ACNC: 9.6 SEC — LOW (ref 9.8–12.7)
RBC # BLD: 3.09 M/UL — LOW (ref 3.8–5.2)
RBC # FLD: 14.8 % — HIGH (ref 10.3–14.5)
SODIUM SERPL-SCNC: 137 MMOL/L — SIGNIFICANT CHANGE UP (ref 135–145)
WBC # BLD: 14.9 K/UL — HIGH (ref 3.8–10.5)
WBC # FLD AUTO: 14.9 K/UL — HIGH (ref 3.8–10.5)

## 2018-09-23 PROCEDURE — 71045 X-RAY EXAM CHEST 1 VIEW: CPT | Mod: 26

## 2018-09-23 PROCEDURE — 99285 EMERGENCY DEPT VISIT HI MDM: CPT

## 2018-09-23 RX ORDER — PIPERACILLIN AND TAZOBACTAM 4; .5 G/20ML; G/20ML
3.38 INJECTION, POWDER, LYOPHILIZED, FOR SOLUTION INTRAVENOUS ONCE
Qty: 0 | Refills: 0 | Status: COMPLETED | OUTPATIENT
Start: 2018-09-23 | End: 2018-09-23

## 2018-09-23 RX ORDER — VANCOMYCIN HCL 1 G
1000 VIAL (EA) INTRAVENOUS ONCE
Qty: 0 | Refills: 0 | Status: COMPLETED | OUTPATIENT
Start: 2018-09-23 | End: 2018-09-23

## 2018-09-23 RX ADMIN — PIPERACILLIN AND TAZOBACTAM 200 GRAM(S): 4; .5 INJECTION, POWDER, LYOPHILIZED, FOR SOLUTION INTRAVENOUS at 23:25

## 2018-09-23 RX ADMIN — Medication 250 MILLIGRAM(S): at 23:25

## 2018-09-23 NOTE — ED PROVIDER NOTE - MEDICAL DECISION MAKING DETAILS
52 y/o F pt w/ ESRD, presents w/ positive blood culutre, will obtain sepsis labs, give empiric antibiotics, plan to admit for IV antibiotics 52 y/o F pt w/ ESRD, presents w/ positive blood culutre, will obtain sepsis labs, give empiric antibiotics, plan to admit for IV antibiotics, Dr. Gomez and Tigist made aware,

## 2018-09-23 NOTE — ED ADULT TRIAGE NOTE - CHIEF COMPLAINT QUOTE
sent from [facility] for blood culture positive gram cocci,pt had dialysis two days ago,

## 2018-09-23 NOTE — ED PROVIDER NOTE - OBJECTIVE STATEMENT
52 y/o F pt w/ PMHx of ERSD, DM, HTN presents to ED c/o positive blood cultures per Dr. Escoto at [facility]. Pt has hemodialysis on Monday, Wednesday, and Fridays. On Friday at dialysis appointment, Pt noted to have discharge around catheter, blood culture sent and results proved to be positive for gram positive cocci. Pt sent to ED for further evaluation. Pt denies recent fever, cough, dysuria and all other complaints. NKDA

## 2018-09-24 DIAGNOSIS — N18.6 END STAGE RENAL DISEASE: ICD-10-CM

## 2018-09-24 DIAGNOSIS — R78.81 BACTEREMIA: ICD-10-CM

## 2018-09-24 DIAGNOSIS — E03.9 HYPOTHYROIDISM, UNSPECIFIED: ICD-10-CM

## 2018-09-24 DIAGNOSIS — L98.429 NON-PRESSURE CHRONIC ULCER OF BACK WITH UNSPECIFIED SEVERITY: ICD-10-CM

## 2018-09-24 DIAGNOSIS — I10 ESSENTIAL (PRIMARY) HYPERTENSION: ICD-10-CM

## 2018-09-24 DIAGNOSIS — Z29.9 ENCOUNTER FOR PROPHYLACTIC MEASURES, UNSPECIFIED: ICD-10-CM

## 2018-09-24 DIAGNOSIS — D47.3 ESSENTIAL (HEMORRHAGIC) THROMBOCYTHEMIA: ICD-10-CM

## 2018-09-24 LAB
24R-OH-CALCIDIOL SERPL-MCNC: 31 NG/ML — SIGNIFICANT CHANGE UP (ref 30–80)
ANION GAP SERPL CALC-SCNC: 11 MMOL/L — SIGNIFICANT CHANGE UP (ref 5–17)
BASOPHILS # BLD AUTO: 0.1 K/UL — SIGNIFICANT CHANGE UP (ref 0–0.2)
BASOPHILS NFR BLD AUTO: 0.7 % — SIGNIFICANT CHANGE UP (ref 0–2)
BUN SERPL-MCNC: 97 MG/DL — HIGH (ref 7–18)
CALCIUM SERPL-MCNC: 10.3 MG/DL — SIGNIFICANT CHANGE UP (ref 8.4–10.5)
CHLORIDE SERPL-SCNC: 97 MMOL/L — SIGNIFICANT CHANGE UP (ref 96–108)
CHOLEST SERPL-MCNC: 84 MG/DL — SIGNIFICANT CHANGE UP (ref 10–199)
CO2 SERPL-SCNC: 28 MMOL/L — SIGNIFICANT CHANGE UP (ref 22–31)
CREAT SERPL-MCNC: 5.75 MG/DL — HIGH (ref 0.5–1.3)
CRP SERPL-MCNC: 1.71 MG/DL — HIGH (ref 0–0.4)
EOSINOPHIL # BLD AUTO: 0.6 K/UL — HIGH (ref 0–0.5)
EOSINOPHIL NFR BLD AUTO: 4.3 % — SIGNIFICANT CHANGE UP (ref 0–6)
ERYTHROCYTE [SEDIMENTATION RATE] IN BLOOD: 63 MM/HR — HIGH (ref 0–20)
GLUCOSE BLDC GLUCOMTR-MCNC: 136 MG/DL — HIGH (ref 70–99)
GLUCOSE BLDC GLUCOMTR-MCNC: 151 MG/DL — HIGH (ref 70–99)
GLUCOSE BLDC GLUCOMTR-MCNC: 154 MG/DL — HIGH (ref 70–99)
GLUCOSE BLDC GLUCOMTR-MCNC: 159 MG/DL — HIGH (ref 70–99)
GLUCOSE SERPL-MCNC: 118 MG/DL — HIGH (ref 70–99)
HCT VFR BLD CALC: 30.5 % — LOW (ref 34.5–45)
HDLC SERPL-MCNC: 52 MG/DL — SIGNIFICANT CHANGE UP
HGB BLD-MCNC: 8.8 G/DL — LOW (ref 11.5–15.5)
LIPID PNL WITH DIRECT LDL SERPL: >29 MG/DL — SIGNIFICANT CHANGE UP
LYMPHOCYTES # BLD AUTO: 22.1 % — SIGNIFICANT CHANGE UP (ref 13–44)
LYMPHOCYTES # BLD AUTO: 3.1 K/UL — SIGNIFICANT CHANGE UP (ref 1–3.3)
MAGNESIUM SERPL-MCNC: 2.7 MG/DL — HIGH (ref 1.6–2.6)
MCHC RBC-ENTMCNC: 28.7 GM/DL — LOW (ref 32–36)
MCHC RBC-ENTMCNC: 29.3 PG — SIGNIFICANT CHANGE UP (ref 27–34)
MCV RBC AUTO: 102 FL — HIGH (ref 80–100)
MONOCYTES # BLD AUTO: 1.2 K/UL — HIGH (ref 0–0.9)
MONOCYTES NFR BLD AUTO: 8.5 % — SIGNIFICANT CHANGE UP (ref 2–14)
NEUTROPHILS # BLD AUTO: 9.1 K/UL — HIGH (ref 1.8–7.4)
NEUTROPHILS NFR BLD AUTO: 64.5 % — SIGNIFICANT CHANGE UP (ref 43–77)
PHOSPHATE SERPL-MCNC: 4.4 MG/DL — SIGNIFICANT CHANGE UP (ref 2.5–4.5)
PLATELET # BLD AUTO: 452 K/UL — HIGH (ref 150–400)
POTASSIUM SERPL-MCNC: 4.7 MMOL/L — SIGNIFICANT CHANGE UP (ref 3.5–5.3)
POTASSIUM SERPL-SCNC: 4.7 MMOL/L — SIGNIFICANT CHANGE UP (ref 3.5–5.3)
RBC # BLD: 2.99 M/UL — LOW (ref 3.8–5.2)
RBC # FLD: 15.2 % — HIGH (ref 10.3–14.5)
SODIUM SERPL-SCNC: 136 MMOL/L — SIGNIFICANT CHANGE UP (ref 135–145)
TOTAL CHOLESTEROL/HDL RATIO MEASUREMENT: 1.6 RATIO — LOW (ref 3.3–7.1)
TRIGL SERPL-MCNC: <15 MG/DL — LOW (ref 10–149)
VANCOMYCIN TROUGH SERPL-MCNC: 36.2 UG/ML — CRITICAL HIGH (ref 10–20)
WBC # BLD: 14.2 K/UL — HIGH (ref 3.8–10.5)
WBC # FLD AUTO: 14.2 K/UL — HIGH (ref 3.8–10.5)

## 2018-09-24 PROCEDURE — 72110 X-RAY EXAM L-2 SPINE 4/>VWS: CPT | Mod: 26

## 2018-09-24 RX ORDER — HEPARIN SODIUM 5000 [USP'U]/ML
5000 INJECTION INTRAVENOUS; SUBCUTANEOUS EVERY 8 HOURS
Qty: 0 | Refills: 0 | Status: DISCONTINUED | OUTPATIENT
Start: 2018-09-24 | End: 2018-09-25

## 2018-09-24 RX ORDER — FERROUS SULFATE 325(65) MG
325 TABLET ORAL DAILY
Qty: 0 | Refills: 0 | Status: DISCONTINUED | OUTPATIENT
Start: 2018-09-24 | End: 2018-10-05

## 2018-09-24 RX ORDER — INSULIN LISPRO 100/ML
VIAL (ML) SUBCUTANEOUS
Qty: 0 | Refills: 0 | Status: DISCONTINUED | OUTPATIENT
Start: 2018-09-24 | End: 2018-10-05

## 2018-09-24 RX ORDER — ACETAMINOPHEN 500 MG
650 TABLET ORAL EVERY 6 HOURS
Qty: 0 | Refills: 0 | Status: DISCONTINUED | OUTPATIENT
Start: 2018-09-24 | End: 2018-10-04

## 2018-09-24 RX ORDER — GABAPENTIN 400 MG/1
100 CAPSULE ORAL THREE TIMES A DAY
Qty: 0 | Refills: 0 | Status: DISCONTINUED | OUTPATIENT
Start: 2018-09-24 | End: 2018-10-05

## 2018-09-24 RX ORDER — SENNA PLUS 8.6 MG/1
2 TABLET ORAL AT BEDTIME
Qty: 0 | Refills: 0 | Status: DISCONTINUED | OUTPATIENT
Start: 2018-09-24 | End: 2018-10-05

## 2018-09-24 RX ORDER — FOLIC ACID 0.8 MG
1 TABLET ORAL DAILY
Qty: 0 | Refills: 0 | Status: DISCONTINUED | OUTPATIENT
Start: 2018-09-24 | End: 2018-10-05

## 2018-09-24 RX ORDER — HYDRALAZINE HCL 50 MG
50 TABLET ORAL EVERY 8 HOURS
Qty: 0 | Refills: 0 | Status: DISCONTINUED | OUTPATIENT
Start: 2018-09-24 | End: 2018-09-30

## 2018-09-24 RX ORDER — HEPARIN SODIUM 5000 [USP'U]/ML
5000 INJECTION INTRAVENOUS; SUBCUTANEOUS EVERY 12 HOURS
Qty: 0 | Refills: 0 | Status: DISCONTINUED | OUTPATIENT
Start: 2018-09-24 | End: 2018-09-24

## 2018-09-24 RX ORDER — LEVOTHYROXINE SODIUM 125 MCG
50 TABLET ORAL DAILY
Qty: 0 | Refills: 0 | Status: DISCONTINUED | OUTPATIENT
Start: 2018-09-24 | End: 2018-10-05

## 2018-09-24 RX ORDER — INFLUENZA VIRUS VACCINE 15; 15; 15; 15 UG/.5ML; UG/.5ML; UG/.5ML; UG/.5ML
0.5 SUSPENSION INTRAMUSCULAR ONCE
Qty: 0 | Refills: 0 | Status: DISCONTINUED | OUTPATIENT
Start: 2018-09-24 | End: 2018-10-05

## 2018-09-24 RX ORDER — METOPROLOL TARTRATE 50 MG
25 TABLET ORAL
Qty: 0 | Refills: 0 | Status: DISCONTINUED | OUTPATIENT
Start: 2018-09-24 | End: 2018-09-25

## 2018-09-24 RX ORDER — VANCOMYCIN HCL 1 G
1000 VIAL (EA) INTRAVENOUS
Qty: 0 | Refills: 0 | Status: DISCONTINUED | OUTPATIENT
Start: 2018-09-24 | End: 2018-09-26

## 2018-09-24 RX ORDER — DOCUSATE SODIUM 100 MG
200 CAPSULE ORAL AT BEDTIME
Qty: 0 | Refills: 0 | Status: DISCONTINUED | OUTPATIENT
Start: 2018-09-24 | End: 2018-10-05

## 2018-09-24 RX ORDER — ERYTHROPOIETIN 10000 [IU]/ML
4000 INJECTION, SOLUTION INTRAVENOUS; SUBCUTANEOUS
Qty: 0 | Refills: 0 | Status: DISCONTINUED | OUTPATIENT
Start: 2018-09-24 | End: 2018-10-05

## 2018-09-24 RX ORDER — MULTIVIT-MIN/FERROUS GLUCONATE 9 MG/15 ML
1 LIQUID (ML) ORAL DAILY
Qty: 0 | Refills: 0 | Status: DISCONTINUED | OUTPATIENT
Start: 2018-09-24 | End: 2018-10-05

## 2018-09-24 RX ORDER — ZINC SULFATE TAB 220 MG (50 MG ZINC EQUIVALENT) 220 (50 ZN) MG
220 TAB ORAL DAILY
Qty: 0 | Refills: 0 | Status: DISCONTINUED | OUTPATIENT
Start: 2018-09-24 | End: 2018-10-05

## 2018-09-24 RX ORDER — PANTOPRAZOLE SODIUM 20 MG/1
40 TABLET, DELAYED RELEASE ORAL
Qty: 0 | Refills: 0 | Status: DISCONTINUED | OUTPATIENT
Start: 2018-09-24 | End: 2018-10-05

## 2018-09-24 RX ORDER — ASCORBIC ACID 60 MG
500 TABLET,CHEWABLE ORAL
Qty: 0 | Refills: 0 | Status: DISCONTINUED | OUTPATIENT
Start: 2018-09-24 | End: 2018-10-05

## 2018-09-24 RX ADMIN — Medication 1 TABLET(S): at 12:47

## 2018-09-24 RX ADMIN — Medication 50 MILLIGRAM(S): at 05:25

## 2018-09-24 RX ADMIN — GABAPENTIN 100 MILLIGRAM(S): 400 CAPSULE ORAL at 22:24

## 2018-09-24 RX ADMIN — Medication 25 MILLIGRAM(S): at 05:26

## 2018-09-24 RX ADMIN — GABAPENTIN 100 MILLIGRAM(S): 400 CAPSULE ORAL at 05:25

## 2018-09-24 RX ADMIN — Medication 25 MILLIGRAM(S): at 18:56

## 2018-09-24 RX ADMIN — HEPARIN SODIUM 5000 UNIT(S): 5000 INJECTION INTRAVENOUS; SUBCUTANEOUS at 22:22

## 2018-09-24 RX ADMIN — Medication 200 MILLIGRAM(S): at 22:23

## 2018-09-24 RX ADMIN — Medication 1: at 22:23

## 2018-09-24 RX ADMIN — PANTOPRAZOLE SODIUM 40 MILLIGRAM(S): 20 TABLET, DELAYED RELEASE ORAL at 05:26

## 2018-09-24 RX ADMIN — Medication 50 MILLIGRAM(S): at 22:23

## 2018-09-24 RX ADMIN — Medication 500 MILLIGRAM(S): at 18:56

## 2018-09-24 RX ADMIN — SENNA PLUS 2 TABLET(S): 8.6 TABLET ORAL at 22:24

## 2018-09-24 RX ADMIN — Medication 500 MILLIGRAM(S): at 05:26

## 2018-09-24 RX ADMIN — Medication 1 MILLIGRAM(S): at 12:47

## 2018-09-24 RX ADMIN — HEPARIN SODIUM 5000 UNIT(S): 5000 INJECTION INTRAVENOUS; SUBCUTANEOUS at 05:22

## 2018-09-24 RX ADMIN — Medication 50 MICROGRAM(S): at 05:22

## 2018-09-24 RX ADMIN — Medication 250 MILLIGRAM(S): at 12:48

## 2018-09-24 RX ADMIN — Medication 325 MILLIGRAM(S): at 12:47

## 2018-09-24 RX ADMIN — ERYTHROPOIETIN 4000 UNIT(S): 10000 INJECTION, SOLUTION INTRAVENOUS; SUBCUTANEOUS at 15:11

## 2018-09-24 NOTE — H&P ADULT - PROBLEM SELECTOR PLAN 1
patient sent for NH with gram positive cultures.  afebrile, Hd stable, Leucocytosis 14 k   Normal lactate   Likely due to infected perm cath Vs Infected sacral decubitus  f/u repeat Blood cultures   s/p Vancomycin 1 gram and Zosyn in ED   c/w with Vanco  1000g during Dialysis days   ID consult Dr Kaplan patient sent for NH with gram positive cultures.  afebrile, Hd stable, Leucocytosis 14 k   Normal lactate   Blood cx form Dialysis center gram positive cocci   Likely due to infected perm cath Vs Infected sacral decubitus  f/u repeat Blood cultures   s/p Vancomycin 1 gram and Zosyn in ED   c/w with Vanco  1000g during Dialysis days   ID consult Dr Kaplan  f/u Dialysis centre with the final report of blood culture

## 2018-09-24 NOTE — H&P ADULT - ATTENDING COMMENTS
Patient seen and examined. Case fully discussed with  ER attending and medical resident. Reviewed chief complaint. Reviewed review of systems. Reviewed list of medications.  Reviewed physical exam.  Reviewed assessment and plan. agree with full H and P. Will follow up clinically. Continue antibiotics. Will follow up repeated cultures. Patient with severe diabetic retinopathy and the family is fully aware.

## 2018-09-24 NOTE — H&P ADULT - PROBLEM SELECTOR PLAN 4
patient is HD M W F   Nephrology consult Dr Escoto   last session on Friday   Nithin HD in am  normal Renal functions

## 2018-09-24 NOTE — H&P ADULT - ASSESSMENT
53 year old female from [place], bedbound due to difficulty walking likely 2/2 to neuropathy with PMHx of anemia, HFpEF, ESRD on HD (M, W, F) R chest perm cath,  Clostridium difficile infection, DM, Diabetic neuropathy, HTN, HLD, Hypothyroidism, OM of jaw, Parathyroid adenoma, MRSA infections, stage 4 sacral Decubitus  and GERD was sent from NH due to positive blood cultures. Patient went for her routine dialysis on Friday, Pt noted to have discharge and itching around catheter, blood culture sent and results proved to be positive for gram positive cocci. Patient also has stage 4 sacral decubitus ulcer with minimal discharge. Patient denies any fever, chills, cough, headaches, chest pain, sob, Nausea, vomiting, diarrhea, muscle or joint pains, skin rashesor urinary symptoms       On admission patient was afebrile, Hd stable, CBC showed leucocytosis 14K normal electrolytes, BUN/Cr 93/5.4    Patient was admitted with bacteremia

## 2018-09-24 NOTE — H&P ADULT - PROBLEM SELECTOR PLAN 7
IMPROVE VTE Individual Risk Assessment          RISK                                                          Points  [  ] Previous VTE                                                3  [ x ] Thrombophilia                                             2  [  ] Lower limb paralysis                                   2        (unable to hold up >15 seconds)    [  ] Current Cancer                                             2         (within 6 months)  [ x ] Immobilization > 24 hrs                              1  [  ] ICU/CCU stay > 24 hours                             1  [  ] Age > 60                                                         1    IMPROVE VTE Score: VTe score 3  heparin for DVT prophylaxis   protonix for Gi prophylaxis

## 2018-09-24 NOTE — H&P ADULT - HISTORY OF PRESENT ILLNESS
53 year old female from [place], bedbound due to difficulty walking likely 2/2 to neuropathy with PMHx of anemia, HFpEF, ESRD on HD (M, W, F) R chest perm cath,  Clostridium difficile infection, DM, Diabetic neuropathy, HTN, HLD, Hypothyroidism, OM of jaw, Parathyroid adenoma, MRSA infections, stage 4 sacral Decubitus  and GERD was sent from NH due to positive blood cultures. Patient went for her routine dialysis on Friday, Pt noted to have discharge and itching around catheter, blood culture sent and results proved to be positive for gram positive cocci. Patient also has stage 4 sacral decubitus ulcer with minimal discharge. Patient denies any fever, chills, cough, headaches, chest pain, sob, Nausea, vomiting, diarrhea, muscle or joint pains, skin rashesor urinary symptoms

## 2018-09-24 NOTE — ADVANCED PRACTICE NURSE CONSULT - RECOMMEDATIONS
-Clean the Coccyx wound with normal saline and apply skin prep to the surrounding skin  -Apply Silver Calcium Alginate (Aquacel Ag) to the wound bed and cover with a Foam dressing Q 72hrs PRN  -Elevate/float the patients heels using heel protectors and reposition the patient Q 2hrs using wedges or pillows

## 2018-09-24 NOTE — H&P ADULT - NSHPLABSRESULTS_GEN_ALL_CORE
9.1    14.9  )-----------( 466      ( 23 Sep 2018 22:55 )             31.1       09-23    137  |  97  |  93<H>  ----------------------------<  231<H>  4.4   |  28  |  5.49<H>    Ca    10.4      23 Sep 2018 22:55    TPro  7.7  /  Alb  2.7<L>  /  TBili  0.3  /  DBili  x   /  AST  9<L>  /  ALT  15  /  AlkPhos  178<H>  09-23

## 2018-09-24 NOTE — H&P ADULT - RS GEN PE MLT RESP DETAILS PC
good air movement/no wheezes/clear to auscultation bilaterally/no rales/breath sounds equal/no rhonchi/respirations non-labored

## 2018-09-24 NOTE — ED ADULT NURSE NOTE - CHPI ED NUR SYMPTOMS NEG
no chills/no fever/no nausea/no tingling/no dizziness/no weakness/no vomiting/no pain/no decreased eating/drinking

## 2018-09-24 NOTE — H&P ADULT - PROBLEM SELECTOR PLAN 2
patient has stage 4 sacral decubitus with clean measures   F/u Xray Lumbosacral region  f/u ESR and CRP   continue with iv vancomycin   wound care consult patient has stage 4 sacral decubitus with clean measures   F/u Xray Lumbosacral region  f/u ESR and CRP   continue with iv vancomycin   wound care consult  surgical consult for possible debridement

## 2018-09-24 NOTE — ADVANCED PRACTICE NURSE CONSULT - ASSESSMENT
This is a 53yr old female patient admitted for Abnormal Blood Chemistry, presenting with a Stage 3 Pressure Injury to the Coccyx (3.5cm x 4cm) with pink tissue, drainage, and white wound edges

## 2018-09-24 NOTE — ED ADULT NURSE NOTE - CHIEF COMPLAINT QUOTE
sent from [facility] for blood culture positive gram cocci,pt had dialysis two days ago,

## 2018-09-24 NOTE — H&P ADULT - NSHPPHYSICALEXAM_GEN_ALL_CORE
Vital Signs Last 24 Hrs  T(C): 36.8 (24 Sep 2018 00:36), Max: 36.9 (23 Sep 2018 20:22)  T(F): 98.2 (24 Sep 2018 00:36), Max: 98.5 (23 Sep 2018 20:22)  HR: 105 (24 Sep 2018 00:36) (78 - 105)  BP: 107/55 (24 Sep 2018 00:36) (107/55 - 166/77)  BP(mean): --  RR: 18 (24 Sep 2018 00:36) (18 - 19)  SpO2: 100% (24 Sep 2018 00:36) (97% - 100%)

## 2018-09-24 NOTE — CONSULT NOTE ADULT - SUBJECTIVE AND OBJECTIVE BOX
53 yr old female legally blind. On HD via permacath at [facility] dialysis unit. on Friday, complained of discharge from permacath. blood cultures had preliminary growth on gram positive cocci. NH was called to send pt to hospital for management of cahteter related sepsis. Denies any fever, SOB, chills or rigors. Renal consulted as pt on HD     PAST MEDICAL & SURGICAL HISTORY:  ESRD (end stage renal disease) on dialysis  Clostridium difficile infection  Osteomyelitis of jaw  Parathyroid adenoma  Hypothyroidism  CKD (chronic kidney disease)  Anemia  CHF (congestive heart failure)  Diabetic neuropathy  Diabetes mellitus  HTN (hypertension)  S/P :   No Past Surgical History    No Known Allergies    Home Medications Reviewed  Hospital Medications:   MEDICATIONS  (STANDING):  ascorbic acid 500 milliGRAM(s) Oral two times a day  docusate sodium 200 milliGRAM(s) Oral at bedtime  epoetin jacqueline Injectable 4000 Unit(s) IV Push <User Schedule>  ferrous    sulfate 325 milliGRAM(s) Oral daily  folic acid 1 milliGRAM(s) Oral daily  gabapentin 100 milliGRAM(s) Oral three times a day  heparin  Injectable 5000 Unit(s) SubCutaneous every 8 hours  hydrALAZINE 50 milliGRAM(s) Oral every 8 hours  influenza   Vaccine 0.5 milliLiter(s) IntraMuscular once  insulin lispro (HumaLOG) corrective regimen sliding scale   SubCutaneous Before meals and at bedtime  levothyroxine 50 MICROGram(s) Oral daily  metoprolol tartrate 25 milliGRAM(s) Oral two times a day  multivitamin/minerals 1 Tablet(s) Oral daily  pantoprazole    Tablet 40 milliGRAM(s) Oral before breakfast  senna 2 Tablet(s) Oral at bedtime  vancomycin  IVPB 1000 milliGRAM(s) IV Intermittent <User Schedule>  zinc sulfate 220 milliGRAM(s) Oral daily    SOCIAL HISTORY:  Denies ETOh,Smoking,   FAMILY HISTORY:  Family history of stroke  Family history of hypertension (Sibling)  Family history of diabetes mellitus        VITALS:  T(F): 98 (18 @ 14:30), Max: 98.6 (18 @ 01:28)  HR: 73 (18 @ 14:30)  BP: 163/74 (18 @ 14:30)  RR: 20 (18 @ 14:30)  SpO2: 98% (18 @ 14:30)  Wt(kg): --    Height (cm): 170.18 ( @ 20:22)  Weight (kg): 47.2 ( @ 20:22)  BMI (kg/m2): 16.3 ( @ 20:22)  BSA (m2): 1.53 ( @ 20:22)  PHYSICAL EXAM:  Constitutional: NAD  HEENT: anicteric sclera, oropharynx clear.  Neck: No JVD  Respiratory: CTAB, no wheezes, rales or rhonchi  Cardiovascular: S1, S2, RRR  Gastrointestinal: BS+, soft, NT/ND  Extremities: No cyanosis or clubbing. No peripheral edema  Neurological: A/O x 3, no focal deficits  Vascular Access: RT IJ permacath. no discharge from catheter, induration or tenderness.    LABS:      136  |  97  |  97<H>  ----------------------------<  118<H>  4.7   |  28  |  5.75<H>    Ca    10.3      24 Sep 2018 07:37  Phos  4.4       Mg     2.7         TPro  7.7  /  Alb  2.7<L>  /  TBili  0.3  /  DBili      /  AST  9<L>  /  ALT  15  /  AlkPhos  178<H>      Creatinine Trend: 5.75 <--, 5.49 <--                        8.8    14.2  )-----------( 452      ( 24 Sep 2018 07:37 )             30.5     Urine Studies:      RADIOLOGY & ADDITIONAL STUDIES:

## 2018-09-25 LAB
-  COAGULASE NEGATIVE STAPHYLOCOCCUS: SIGNIFICANT CHANGE UP
ANION GAP SERPL CALC-SCNC: 9 MMOL/L — SIGNIFICANT CHANGE UP (ref 5–17)
BUN SERPL-MCNC: 52 MG/DL — HIGH (ref 7–18)
CALCIUM SERPL-MCNC: 9.8 MG/DL — SIGNIFICANT CHANGE UP (ref 8.4–10.5)
CHLORIDE SERPL-SCNC: 96 MMOL/L — SIGNIFICANT CHANGE UP (ref 96–108)
CO2 SERPL-SCNC: 31 MMOL/L — SIGNIFICANT CHANGE UP (ref 22–31)
CREAT SERPL-MCNC: 4.43 MG/DL — HIGH (ref 0.5–1.3)
GLUCOSE BLDC GLUCOMTR-MCNC: 155 MG/DL — HIGH (ref 70–99)
GLUCOSE BLDC GLUCOMTR-MCNC: 172 MG/DL — HIGH (ref 70–99)
GLUCOSE BLDC GLUCOMTR-MCNC: 237 MG/DL — HIGH (ref 70–99)
GLUCOSE SERPL-MCNC: 129 MG/DL — HIGH (ref 70–99)
GRAM STN FLD: SIGNIFICANT CHANGE UP
GRAM STN FLD: SIGNIFICANT CHANGE UP
HCT VFR BLD CALC: 29 % — LOW (ref 34.5–45)
HGB BLD-MCNC: 8.9 G/DL — LOW (ref 11.5–15.5)
MAGNESIUM SERPL-MCNC: 2.1 MG/DL — SIGNIFICANT CHANGE UP (ref 1.6–2.6)
MCHC RBC-ENTMCNC: 30.3 PG — SIGNIFICANT CHANGE UP (ref 27–34)
MCHC RBC-ENTMCNC: 30.7 GM/DL — LOW (ref 32–36)
MCV RBC AUTO: 98.8 FL — SIGNIFICANT CHANGE UP (ref 80–100)
METHOD TYPE: SIGNIFICANT CHANGE UP
PHOSPHATE SERPL-MCNC: 3.7 MG/DL — SIGNIFICANT CHANGE UP (ref 2.5–4.5)
PLATELET # BLD AUTO: 430 K/UL — HIGH (ref 150–400)
POTASSIUM SERPL-MCNC: 3.5 MMOL/L — SIGNIFICANT CHANGE UP (ref 3.5–5.3)
POTASSIUM SERPL-SCNC: 3.5 MMOL/L — SIGNIFICANT CHANGE UP (ref 3.5–5.3)
RBC # BLD: 2.94 M/UL — LOW (ref 3.8–5.2)
RBC # FLD: 14.6 % — HIGH (ref 10.3–14.5)
SODIUM SERPL-SCNC: 136 MMOL/L — SIGNIFICANT CHANGE UP (ref 135–145)
WBC # BLD: 17.1 K/UL — HIGH (ref 3.8–10.5)
WBC # FLD AUTO: 17.1 K/UL — HIGH (ref 3.8–10.5)

## 2018-09-25 RX ORDER — METOPROLOL TARTRATE 50 MG
50 TABLET ORAL
Qty: 0 | Refills: 0 | Status: DISCONTINUED | OUTPATIENT
Start: 2018-09-25 | End: 2018-10-03

## 2018-09-25 RX ORDER — HEPARIN SODIUM 5000 [USP'U]/ML
5000 INJECTION INTRAVENOUS; SUBCUTANEOUS EVERY 8 HOURS
Qty: 0 | Refills: 0 | Status: DISCONTINUED | OUTPATIENT
Start: 2018-09-25 | End: 2018-09-30

## 2018-09-25 RX ADMIN — Medication 1 MILLIGRAM(S): at 12:29

## 2018-09-25 RX ADMIN — Medication 325 MILLIGRAM(S): at 12:28

## 2018-09-25 RX ADMIN — GABAPENTIN 100 MILLIGRAM(S): 400 CAPSULE ORAL at 05:23

## 2018-09-25 RX ADMIN — Medication 50 MILLIGRAM(S): at 05:23

## 2018-09-25 RX ADMIN — Medication 1: at 14:22

## 2018-09-25 RX ADMIN — Medication 2: at 22:32

## 2018-09-25 RX ADMIN — HEPARIN SODIUM 5000 UNIT(S): 5000 INJECTION INTRAVENOUS; SUBCUTANEOUS at 14:18

## 2018-09-25 RX ADMIN — HEPARIN SODIUM 5000 UNIT(S): 5000 INJECTION INTRAVENOUS; SUBCUTANEOUS at 22:33

## 2018-09-25 RX ADMIN — Medication 500 MILLIGRAM(S): at 05:23

## 2018-09-25 RX ADMIN — Medication 500 MILLIGRAM(S): at 17:37

## 2018-09-25 RX ADMIN — ZINC SULFATE TAB 220 MG (50 MG ZINC EQUIVALENT) 220 MILLIGRAM(S): 220 (50 ZN) TAB at 12:28

## 2018-09-25 RX ADMIN — Medication 50 MILLIGRAM(S): at 14:18

## 2018-09-25 RX ADMIN — Medication 25 MILLIGRAM(S): at 05:23

## 2018-09-25 RX ADMIN — Medication 50 MILLIGRAM(S): at 17:37

## 2018-09-25 RX ADMIN — Medication 200 MILLIGRAM(S): at 22:33

## 2018-09-25 RX ADMIN — Medication 1: at 09:43

## 2018-09-25 RX ADMIN — Medication 1: at 17:36

## 2018-09-25 RX ADMIN — HEPARIN SODIUM 5000 UNIT(S): 5000 INJECTION INTRAVENOUS; SUBCUTANEOUS at 05:22

## 2018-09-25 RX ADMIN — PANTOPRAZOLE SODIUM 40 MILLIGRAM(S): 20 TABLET, DELAYED RELEASE ORAL at 05:23

## 2018-09-25 RX ADMIN — GABAPENTIN 100 MILLIGRAM(S): 400 CAPSULE ORAL at 22:33

## 2018-09-25 RX ADMIN — SENNA PLUS 2 TABLET(S): 8.6 TABLET ORAL at 22:34

## 2018-09-25 RX ADMIN — GABAPENTIN 100 MILLIGRAM(S): 400 CAPSULE ORAL at 14:18

## 2018-09-25 RX ADMIN — Medication 50 MICROGRAM(S): at 05:22

## 2018-09-25 RX ADMIN — Medication 50 MILLIGRAM(S): at 22:33

## 2018-09-25 RX ADMIN — Medication 1 TABLET(S): at 12:28

## 2018-09-25 NOTE — DIETITIAN INITIAL EVALUATION ADULT. - NS AS NUTRI INTERV MEALS SNACK
change diet to carbohydrate consistent , renal replacement given DM history/Fat - modified diet/Mineral - modified diet

## 2018-09-25 NOTE — DIETITIAN INITIAL EVALUATION ADULT. - PROBLEM SELECTOR PLAN 2
patient has stage 4 sacral decubitus with clean measures   F/u Xray Lumbosacral region  f/u ESR and CRP   continue with iv vancomycin   wound care consult  surgical consult for possible debridement

## 2018-09-25 NOTE — CONSULT NOTE ADULT - RS GEN PE MLT RESP DETAILS PC
clear to auscultation bilaterally/no rhonchi/no wheezes/breath sounds equal/good air movement/no rales

## 2018-09-25 NOTE — PROGRESS NOTE ADULT - SUBJECTIVE AND OBJECTIVE BOX
s/p HD yesterday. rpt BC positive for gram positive cocci    No Known Allergies    Hospital Medications:   MEDICATIONS  (STANDING):  ascorbic acid 500 milliGRAM(s) Oral two times a day  docusate sodium 200 milliGRAM(s) Oral at bedtime  epoetin jacqueline Injectable 4000 Unit(s) IV Push <User Schedule>  ferrous    sulfate 325 milliGRAM(s) Oral daily  folic acid 1 milliGRAM(s) Oral daily  gabapentin 100 milliGRAM(s) Oral three times a day  heparin  Injectable 5000 Unit(s) SubCutaneous every 8 hours  hydrALAZINE 50 milliGRAM(s) Oral every 8 hours  influenza   Vaccine 0.5 milliLiter(s) IntraMuscular once  insulin lispro (HumaLOG) corrective regimen sliding scale   SubCutaneous Before meals and at bedtime  levothyroxine 50 MICROGram(s) Oral daily  metoprolol tartrate 50 milliGRAM(s) Oral two times a day  multivitamin/minerals 1 Tablet(s) Oral daily  pantoprazole    Tablet 40 milliGRAM(s) Oral before breakfast  senna 2 Tablet(s) Oral at bedtime  vancomycin  IVPB 1000 milliGRAM(s) IV Intermittent <User Schedule>  zinc sulfate 220 milliGRAM(s) Oral daily        VITALS:  T(F): 98.9 (09-25-18 @ 14:42), Max: 98.9 (09-25-18 @ 14:42)  HR: 77 (09-25-18 @ 14:42)  BP: 130/50 (09-25-18 @ 14:42)  RR: 18 (09-25-18 @ 14:42)  SpO2: 100% (09-25-18 @ 14:42)  Wt(kg): --      PHYSICAL EXAM:  Constitutional: NAD  HEENT: anicteric sclera, oropharynx clear, MMM  Neck: No JVD  Respiratory: CTAB, no wheezes, rales or rhonchi  Cardiovascular: S1, S2, RRR  Gastrointestinal: BS+, soft, NT/ND  Extremities:  No peripheral edema  Neurological: A/O x 3, no focal deficits  Vascular Access: RT IJ permacath     LABS:  09-25    136  |  96  |  52<H>  ----------------------------<  129<H>  3.5   |  31  |  4.43<H>    Ca    9.8      25 Sep 2018 11:10  Phos  3.7     09-25  Mg     2.1     09-25    TPro  7.7  /  Alb  2.7<L>  /  TBili  0.3  /  DBili      /  AST  9<L>  /  ALT  15  /  AlkPhos  178<H>  09-23    Creatinine Trend: 4.43 <--, 5.75 <--, 5.49 <--                        8.9    17.1  )-----------( 430      ( 25 Sep 2018 11:10 )             29.0     Urine Studies:      RADIOLOGY & ADDITIONAL STUDIES:

## 2018-09-25 NOTE — DIETITIAN INITIAL EVALUATION ADULT. - OTHER INFO
However, patient did report weight loss On previous admission ( nutrition note: 4/8/18) , diagnosed as severely malnourished . Nutrition focused physical exam conducted:  Subcutaneous fat loss: [ X] Orbital fat pads region, SEVERE [ ]Buccal fat region, [X] Triceps region, SEVERE [ ]Ribs region.  Muscle wasting: [ X]Temples region, SEVERE [ X]Clavicle region, SEVERE [X ]Shoulder region, SEVERE [ ]Scapula region, [X ]Interosseous region,  SEVERE [ ]thigh region, [ ]Calf region

## 2018-09-25 NOTE — CONSULT NOTE ADULT - SUBJECTIVE AND OBJECTIVE BOX
HPI:  53 year old female from [place], bedbound due to difficulty walking likely 2/2 to neuropathy with PMHx of anemia, HFpEF, ESRD on HD (M, W, F) R chest perm cath,  Clostridium difficile infection, DM, Diabetic neuropathy, HTN, HLD, Hypothyroidism, OM of jaw, Parathyroid adenoma, MRSA infections, stage 4 sacral Decubitus  and GERD was sent from NH due to positive blood cultures. Patient went for her routine dialysis on Friday, Pt noted to have discharge and itching around catheter, blood culture sent and results proved to be positive for gram positive cocci. Patient also has stage 4 sacral decubitus ulcer with minimal discharge. Patient denies any fever, chills, cough, headaches, chest pain, sob, Nausea, vomiting, diarrhea, muscle or joint pains, skin rashesor urinary symptoms (24 Sep 2018 00:50)      PAST MEDICAL & SURGICAL HISTORY:  ESRD (end stage renal disease) on dialysis  Clostridium difficile infection  Osteomyelitis of jaw  Parathyroid adenoma  Hypothyroidism  CKD (chronic kidney disease)  Anemia  CHF (congestive heart failure)  Diabetic neuropathy  Diabetes mellitus  HTN (hypertension)  S/P :   No Past Surgical History      No Known Allergies      Meds:  acetaminophen   Tablet .. 650 milliGRAM(s) Oral every 6 hours PRN  ascorbic acid 500 milliGRAM(s) Oral two times a day  docusate sodium 200 milliGRAM(s) Oral at bedtime  epoetin jacqueline Injectable 4000 Unit(s) IV Push <User Schedule>  ferrous    sulfate 325 milliGRAM(s) Oral daily  folic acid 1 milliGRAM(s) Oral daily  gabapentin 100 milliGRAM(s) Oral three times a day  heparin  Injectable 5000 Unit(s) SubCutaneous every 8 hours  hydrALAZINE 50 milliGRAM(s) Oral every 8 hours  influenza   Vaccine 0.5 milliLiter(s) IntraMuscular once  insulin lispro (HumaLOG) corrective regimen sliding scale   SubCutaneous Before meals and at bedtime  levothyroxine 50 MICROGram(s) Oral daily  metoprolol tartrate 50 milliGRAM(s) Oral two times a day  multivitamin/minerals 1 Tablet(s) Oral daily  pantoprazole    Tablet 40 milliGRAM(s) Oral before breakfast  senna 2 Tablet(s) Oral at bedtime  vancomycin  IVPB 1000 milliGRAM(s) IV Intermittent <User Schedule>  zinc sulfate 220 milliGRAM(s) Oral daily      SOCIAL HISTORY:  Smoker:  YES / NO        PACK YEARS:                         WHEN QUIT?  ETOH use:  YES / NO               FREQUENCY / QUANTITY:  [place] Drug use:  YES / NO  Occupation:  Assisted device use (Cane / Walker):  Live with:    FAMILY HISTORY:  Family history of stroke  Family history of hypertension (Sibling)  Family history of diabetes mellitus      VITALS:  Vital Signs Last 24 Hrs  T(C): 36.9 (25 Sep 2018 05:03), Max: 37.1 (24 Sep 2018 21:36)  T(F): 98.5 (25 Sep 2018 05:03), Max: 98.8 (24 Sep 2018 21:36)  HR: 76 (25 Sep 2018 05:03) (71 - 99)  BP: 173/70 (25 Sep 2018 05:03) (142/58 - 175/74)  BP(mean): --  RR: 18 (25 Sep 2018 05:03) (16 - 20)  SpO2: 100% (25 Sep 2018 05:03) (98% - 100%)    LABS/DIAGNOSTIC TESTS:                          8.9    17.1  )-----------( 430      ( 25 Sep 2018 11:10 )             29.0     WBC Count: 17.1 K/uL ( @ 11:10)  WBC Count: 14.2 K/uL ( @ 07:37)  WBC Count: 14.9 K/uL ( @ 22:55)          136  |  96  |  52<H>  ----------------------------<  129<H>  3.5   |  31  |  4.43<H>    Ca    9.8      25 Sep 2018 11:10  Phos  3.7       Mg     2.1         TPro  7.7  /  Alb  2.7<L>  /  TBili  0.3  /  DBili  x   /  AST  9<L>  /  ALT  15  /  AlkPhos  178<H>            LIVER FUNCTIONS - ( 23 Sep 2018 22:55 )  Alb: 2.7 g/dL / Pro: 7.7 g/dL / ALK PHOS: 178 U/L / ALT: 15 U/L DA / AST: 9 U/L / GGT: x             PT/INR - ( 23 Sep 2018 22:55 )   PT: 9.6 sec;   INR: 0.88 ratio         PTT - ( 23 Sep 2018 22:55 )  PTT:30.6 sec    LACTATE:    ABG -     CULTURES:   .Blood Dialysis Catheter   @ 18:02   Growth in aerobic bottle: Gram Positive Cocci in Clusters      RADIOLOGY:< from: Xray Chest 1 View AP/PA (18 @ 23:54) >  EXAM:  XR CHEST AP OR PA 1V                            PROCEDURE DATE:  2018          INTERPRETATION:  Chest 1 view    HISTORY: Elevated white blood cell count    Comparison: 2018    Radiographic examination shows the heart to be borderline in size. Right   dialysis catheter remains present. The lungs show minimal left base   atelectasis. There is no evidence of focal infiltrate, pneumothorax or   pleural effusion.    IMPRESSION: Minimal atelectasis.    Thank you for this referral.      < end of copied text >        ROS  [  ] UNABLE TO ELICIT HPI:  53 year old female from [place], bedbound due to difficulty walking likely 2/2 to neuropathy with PMHx of anemia, HFpEF, ESRD on HD (M, W, F) R chest perm cath,  Clostridium difficile infection, DM, Diabetic neuropathy, HTN, HLD, Hypothyroidism, OM of jaw, Parathyroid adenoma, MRSA infections, stage 4 sacral Decubitus  and GERD was sent from NH due to positive blood cultures. Patient went for her routine dialysis on Friday, Pt noted to have discharge and itching around catheter, blood culture sent and results proved to be positive for gram positive cocci. Patient also has stage 4 sacral decubitus ulcer with minimal discharge. Patient denies any fever, chills, cough, headaches, chest pain, sob, Nausea, vomiting, diarrhea, muscle or joint pains, skin rashes or urinary symptoms.She is a poor historian and seems mildly confused.      PAST MEDICAL & SURGICAL HISTORY:  ESRD (end stage renal disease) on dialysis  Clostridium difficile infection  Osteomyelitis of jaw  Parathyroid adenoma  Hypothyroidism  CKD (chronic kidney disease)  Anemia  CHF (congestive heart failure)  Diabetic neuropathy  Diabetes mellitus  HTN (hypertension)  S/P :   No Past Surgical History      No Known Allergies      Meds:  acetaminophen   Tablet .. 650 milliGRAM(s) Oral every 6 hours PRN  ascorbic acid 500 milliGRAM(s) Oral two times a day  docusate sodium 200 milliGRAM(s) Oral at bedtime  epoetin jacqueline Injectable 4000 Unit(s) IV Push <User Schedule>  ferrous    sulfate 325 milliGRAM(s) Oral daily  folic acid 1 milliGRAM(s) Oral daily  gabapentin 100 milliGRAM(s) Oral three times a day  heparin  Injectable 5000 Unit(s) SubCutaneous every 8 hours  hydrALAZINE 50 milliGRAM(s) Oral every 8 hours  influenza   Vaccine 0.5 milliLiter(s) IntraMuscular once  insulin lispro (HumaLOG) corrective regimen sliding scale   SubCutaneous Before meals and at bedtime  levothyroxine 50 MICROGram(s) Oral daily  metoprolol tartrate 50 milliGRAM(s) Oral two times a day  multivitamin/minerals 1 Tablet(s) Oral daily  pantoprazole    Tablet 40 milliGRAM(s) Oral before breakfast  senna 2 Tablet(s) Oral at bedtime  vancomycin  IVPB 1000 milliGRAM(s) IV Intermittent <User Schedule>  zinc sulfate 220 milliGRAM(s) Oral daily      SOCIAL HISTORY:  Smoker: denies  ETOH use:  denies    FAMILY HISTORY:  Family history of stroke  Family history of hypertension (Sibling)  Family history of diabetes mellitus      VITALS:  Vital Signs Last 24 Hrs  T(C): 36.9 (25 Sep 2018 05:03), Max: 37.1 (24 Sep 2018 21:36)  T(F): 98.5 (25 Sep 2018 05:03), Max: 98.8 (24 Sep 2018 21:36)  HR: 76 (25 Sep 2018 05:03) (71 - 99)  BP: 173/70 (25 Sep 2018 05:03) (142/58 - 175/74)  BP(mean): --  RR: 18 (25 Sep 2018 05:03) (16 - 20)  SpO2: 100% (25 Sep 2018 05:03) (98% - 100%)    LABS/DIAGNOSTIC TESTS:                          8.9    17.1  )-----------( 430      ( 25 Sep 2018 11:10 )             29.0     WBC Count: 17.1 K/uL ( @ 11:10)  WBC Count: 14.2 K/uL ( @ 07:37)  WBC Count: 14.9 K/uL ( @ 22:55)          136  |  96  |  52<H>  ----------------------------<  129<H>  3.5   |  31  |  4.43<H>    Ca    9.8      25 Sep 2018 11:10  Phos  3.7       Mg     2.1         TPro  7.7  /  Alb  2.7<L>  /  TBili  0.3  /  DBili  x   /  AST  9<L>  /  ALT  15  /  AlkPhos  178<H>            LIVER FUNCTIONS - ( 23 Sep 2018 22:55 )  Alb: 2.7 g/dL / Pro: 7.7 g/dL / ALK PHOS: 178 U/L / ALT: 15 U/L DA / AST: 9 U/L / GGT: x             PT/INR - ( 23 Sep 2018 22:55 )   PT: 9.6 sec;   INR: 0.88 ratio         PTT - ( 23 Sep 2018 22:55 )  PTT:30.6 sec    LACTATE:    ABG -     CULTURES:   .Blood Dialysis Catheter   @ 18:02   Growth in aerobic bottle: Gram Positive Cocci in Clusters      RADIOLOGY:< from: Xray Chest 1 View AP/PA (18 @ 23:54) >  EXAM:  XR CHEST AP OR PA 1V                            PROCEDURE DATE:  2018          INTERPRETATION:  Chest 1 view    HISTORY: Elevated white blood cell count    Comparison: 2018    Radiographic examination shows the heart to be borderline in size. Right   dialysis catheter remains present. The lungs show minimal left base   atelectasis. There is no evidence of focal infiltrate, pneumothorax or   pleural effusion.    IMPRESSION: Minimal atelectasis.    Thank you for this referral.      < end of copied text >        ROS  [  ] UNABLE TO ELICIT

## 2018-09-25 NOTE — DIETITIAN INITIAL EVALUATION ADULT. - MD RECOMMEND
change diet to carbohydrate consistent , renal replacement in view of DM history, change multivitamin and minerals to nephrocap 1 tab/d and  discontinue vitamin C/po supplement

## 2018-09-25 NOTE — PROGRESS NOTE ADULT - ATTENDING COMMENTS
Patient is seen and examined. Case reviewed with the medical team. Above note is appreciated. Will follow up clinically. Continue DVT prophylaxis. Case discussed with ID nephrology.  Overall prognosis is very poor. Follow up with wound care.

## 2018-09-25 NOTE — PROGRESS NOTE ADULT - SUBJECTIVE AND OBJECTIVE BOX
PGY 1 Note discussed with supervising resident and primary attending    Patient is a 53y old  Female who presents with a chief complaint of sent from NH due to positive blood cultures (25 Sep 2018 11:54)      INTERVAL HPI/OVERNIGHT EVENTS:   Patient seen at the bed side. no new complains      MEDICATIONS  (STANDING):  ascorbic acid 500 milliGRAM(s) Oral two times a day  docusate sodium 200 milliGRAM(s) Oral at bedtime  epoetin jacqueline Injectable 4000 Unit(s) IV Push <User Schedule>  ferrous    sulfate 325 milliGRAM(s) Oral daily  folic acid 1 milliGRAM(s) Oral daily  gabapentin 100 milliGRAM(s) Oral three times a day  heparin  Injectable 5000 Unit(s) SubCutaneous every 8 hours  hydrALAZINE 50 milliGRAM(s) Oral every 8 hours  influenza   Vaccine 0.5 milliLiter(s) IntraMuscular once  insulin lispro (HumaLOG) corrective regimen sliding scale   SubCutaneous Before meals and at bedtime  levothyroxine 50 MICROGram(s) Oral daily  metoprolol tartrate 50 milliGRAM(s) Oral two times a day  multivitamin/minerals 1 Tablet(s) Oral daily  pantoprazole    Tablet 40 milliGRAM(s) Oral before breakfast  senna 2 Tablet(s) Oral at bedtime  vancomycin  IVPB 1000 milliGRAM(s) IV Intermittent <User Schedule>  zinc sulfate 220 milliGRAM(s) Oral daily    MEDICATIONS  (PRN):  acetaminophen   Tablet .. 650 milliGRAM(s) Oral every 6 hours PRN Moderate Pain (4 - 6)      __________________________________________________  REVIEW OF SYSTEMS:    CONSTITUTIONAL: No fever,   EYES: no acute visual disturbances  NECK: No pain or stiffness  RESPIRATORY: No cough; No shortness of breath  CARDIOVASCULAR: No chest pain, no palpitations  GASTROINTESTINAL: No pain. No nausea or vomiting; No diarrhea   NEUROLOGICAL: No headache or numbness, no tremors  MUSCULOSKELETAL: No joint pain, no muscle pain  GENITOURINARY: no dysuria, no frequency, no hesitancy  PSYCHIATRY: no depression , no anxiety  ALL OTHER  ROS negative        Vital Signs Last 24 Hrs  T(C): 37.2 (25 Sep 2018 14:42), Max: 37.2 (25 Sep 2018 14:42)  T(F): 98.9 (25 Sep 2018 14:42), Max: 98.9 (25 Sep 2018 14:42)  HR: 77 (25 Sep 2018 14:42) (76 - 99)  BP: 130/50 (25 Sep 2018 14:42) (127/65 - 175/74)  BP(mean): --  RR: 18 (25 Sep 2018 14:42) (16 - 19)  SpO2: 100% (25 Sep 2018 14:42) (100% - 100%)    ________________________________________________  PHYSICAL EXAM:  GENERAL: patient is legally blind    HEENT: Normocephalic;  conjunctivae and sclerae clear; moist mucous membranes;   NECK : supple  CHEST/LUNG: Clear to auscultation bilaterally with good air entry   HEART: S1 S2  regular; no murmurs, gallops or rubs  ABDOMEN: Soft, Nontender, Nondistended; Bowel sounds present  EXTREMITIES: no cyanosis; no edema; no calf tenderness  SKIN: warm and dry; no rash  NERVOUS SYSTEM:  Awake and alert; Oriented  to place, person and time ; no new deficits    _________________________________________________  LABS:                        8.9    17.1  )-----------( 430      ( 25 Sep 2018 11:10 )             29.0     09-25    136  |  96  |  52<H>  ----------------------------<  129<H>  3.5   |  31  |  4.43<H>    Ca    9.8      25 Sep 2018 11:10  Phos  3.7     09-25  Mg     2.1     09-25    TPro  7.7  /  Alb  2.7<L>  /  TBili  0.3  /  DBili  x   /  AST  9<L>  /  ALT  15  /  AlkPhos  178<H>  09-23    PT/INR - ( 23 Sep 2018 22:55 )   PT: 9.6 sec;   INR: 0.88 ratio         PTT - ( 23 Sep 2018 22:55 )  PTT:30.6 sec    CAPILLARY BLOOD GLUCOSE      POCT Blood Glucose.: 172 mg/dL (25 Sep 2018 12:33)  POCT Blood Glucose.: 155 mg/dL (25 Sep 2018 08:45)  POCT Blood Glucose.: 159 mg/dL (24 Sep 2018 22:03)  POCT Blood Glucose.: 151 mg/dL (24 Sep 2018 18:05)  POCT Blood Glucose.: 154 mg/dL (24 Sep 2018 16:18)        RADIOLOGY & ADDITIONAL TESTS:    Imaging Personally Reviewed:  YES/NO    Consultant(s) Notes Reviewed:   YES/ No    Care Discussed with Consultants :     Plan of care was discussed with patient and /or primary care giver; all questions and concerns were addressed and care was aligned with patient's wishes.

## 2018-09-25 NOTE — CHART NOTE - NSCHARTNOTEFT_GEN_A_CORE
Upon Nutritional Assessment by the Registered Dietitian your patient was determined to meet criteria / has evidence of the following diagnosis/diagnoses:          [ ]  Mild Protein Calorie Malnutrition        [ ]  Moderate Protein Calorie Malnutrition        [x ] Severe Protein Calorie Malnutrition        [ ] Unspecified Protein Calorie Malnutrition        [x ] Underweight / BMI <19        [ ] Morbid Obesity / BMI > 40      Findings as based on:  •  Comprehensive nutrition assessment and consultation  •  Calorie counts (nutrient intake analysis)  •  Food acceptance and intake status from observations by staff  •  Follow up  •  Patient education  •  Intervention secondary to interdisciplinary rounds  •   concerns      Treatment:    The following diet has been recommended:      PROVIDER Section:     By signing this assessment you are acknowledging and agree with the diagnosis/diagnoses assigned by the Registered Dietitian    Comments:  change diet to carbohydrate consistent , renal replacement in view of DM history, change multivitamin/minerals to nephrocaps 1 tab/d, discontinue vitamin C, add Nepro bid

## 2018-09-25 NOTE — DIETITIAN INITIAL EVALUATION ADULT. - PROBLEM SELECTOR PLAN 1
patient sent for NH with gram positive cultures.  afebrile, Hd stable, Leucocytosis 14 k   Normal lactate   Blood cx form Dialysis center gram positive cocci   Likely due to infected perm cath Vs Infected sacral decubitus  f/u repeat Blood cultures   s/p Vancomycin 1 gram and Zosyn in ED   c/w with Vanco  1000g during Dialysis days   ID consult Dr Kaplan  f/u Dialysis centre with the final report of blood culture

## 2018-09-25 NOTE — DIETITIAN INITIAL EVALUATION ADULT. - PERTINENT LABORATORY DATA
09-25 Na136 mmol/L Glu 129 mg/dL<H> K+ 3.5 mmol/L Cr  4.43 mg/dL<H> BUN 52 mg/dL<H> 09-25 Phos 3.7 mg/dL 09-23 Alb 2.7 g/dL<L> 09-24 Chol 84 mg/dL LDL >29 mg/dL HDL 52 mg/dL Trig <15 mg/dL<L>

## 2018-09-26 LAB
ALBUMIN SERPL ELPH-MCNC: 2.6 G/DL — LOW (ref 3.5–5)
ALP SERPL-CCNC: 127 U/L — HIGH (ref 40–120)
ALT FLD-CCNC: 14 U/L DA — SIGNIFICANT CHANGE UP (ref 10–60)
ANION GAP SERPL CALC-SCNC: 12 MMOL/L — SIGNIFICANT CHANGE UP (ref 5–17)
AST SERPL-CCNC: 10 U/L — SIGNIFICANT CHANGE UP (ref 10–40)
BASOPHILS # BLD AUTO: 0.1 K/UL — SIGNIFICANT CHANGE UP (ref 0–0.2)
BASOPHILS NFR BLD AUTO: 1.1 % — SIGNIFICANT CHANGE UP (ref 0–2)
BILIRUB SERPL-MCNC: 0.3 MG/DL — SIGNIFICANT CHANGE UP (ref 0.2–1.2)
BUN SERPL-MCNC: 72 MG/DL — HIGH (ref 7–18)
CALCIUM SERPL-MCNC: 10.7 MG/DL — HIGH (ref 8.4–10.5)
CHLORIDE SERPL-SCNC: 96 MMOL/L — SIGNIFICANT CHANGE UP (ref 96–108)
CO2 SERPL-SCNC: 26 MMOL/L — SIGNIFICANT CHANGE UP (ref 22–31)
CREAT SERPL-MCNC: 6 MG/DL — HIGH (ref 0.5–1.3)
CULTURE RESULTS: SIGNIFICANT CHANGE UP
EOSINOPHIL # BLD AUTO: 0.4 K/UL — SIGNIFICANT CHANGE UP (ref 0–0.5)
EOSINOPHIL NFR BLD AUTO: 3.9 % — SIGNIFICANT CHANGE UP (ref 0–6)
GLUCOSE BLDC GLUCOMTR-MCNC: 140 MG/DL — HIGH (ref 70–99)
GLUCOSE BLDC GLUCOMTR-MCNC: 147 MG/DL — HIGH (ref 70–99)
GLUCOSE BLDC GLUCOMTR-MCNC: 243 MG/DL — HIGH (ref 70–99)
GLUCOSE BLDC GLUCOMTR-MCNC: 261 MG/DL — HIGH (ref 70–99)
GLUCOSE SERPL-MCNC: 128 MG/DL — HIGH (ref 70–99)
HCT VFR BLD CALC: 31.3 % — LOW (ref 34.5–45)
HGB BLD-MCNC: 9.5 G/DL — LOW (ref 11.5–15.5)
LYMPHOCYTES # BLD AUTO: 26.2 % — SIGNIFICANT CHANGE UP (ref 13–44)
LYMPHOCYTES # BLD AUTO: 3 K/UL — SIGNIFICANT CHANGE UP (ref 1–3.3)
MAGNESIUM SERPL-MCNC: 2.4 MG/DL — SIGNIFICANT CHANGE UP (ref 1.6–2.6)
MCHC RBC-ENTMCNC: 29.9 PG — SIGNIFICANT CHANGE UP (ref 27–34)
MCHC RBC-ENTMCNC: 30.4 GM/DL — LOW (ref 32–36)
MCV RBC AUTO: 98.5 FL — SIGNIFICANT CHANGE UP (ref 80–100)
MONOCYTES # BLD AUTO: 1.1 K/UL — HIGH (ref 0–0.9)
MONOCYTES NFR BLD AUTO: 9.6 % — SIGNIFICANT CHANGE UP (ref 2–14)
NEUTROPHILS # BLD AUTO: 6.8 K/UL — SIGNIFICANT CHANGE UP (ref 1.8–7.4)
NEUTROPHILS NFR BLD AUTO: 59.3 % — SIGNIFICANT CHANGE UP (ref 43–77)
ORGANISM # SPEC MICROSCOPIC CNT: SIGNIFICANT CHANGE UP
ORGANISM # SPEC MICROSCOPIC CNT: SIGNIFICANT CHANGE UP
PHOSPHATE SERPL-MCNC: 5.1 MG/DL — HIGH (ref 2.5–4.5)
PLATELET # BLD AUTO: 461 K/UL — HIGH (ref 150–400)
POTASSIUM SERPL-MCNC: 4.6 MMOL/L — SIGNIFICANT CHANGE UP (ref 3.5–5.3)
POTASSIUM SERPL-SCNC: 4.6 MMOL/L — SIGNIFICANT CHANGE UP (ref 3.5–5.3)
PROT SERPL-MCNC: 7.8 G/DL — SIGNIFICANT CHANGE UP (ref 6–8.3)
RBC # BLD: 3.18 M/UL — LOW (ref 3.8–5.2)
RBC # FLD: 14.8 % — HIGH (ref 10.3–14.5)
SODIUM SERPL-SCNC: 134 MMOL/L — LOW (ref 135–145)
SPECIMEN SOURCE: SIGNIFICANT CHANGE UP
WBC # BLD: 11.5 K/UL — HIGH (ref 3.8–10.5)
WBC # FLD AUTO: 11.5 K/UL — HIGH (ref 3.8–10.5)

## 2018-09-26 PROCEDURE — 36589 REMOVAL TUNNELED CV CATH: CPT

## 2018-09-26 RX ADMIN — Medication 500 MILLIGRAM(S): at 17:33

## 2018-09-26 RX ADMIN — HEPARIN SODIUM 5000 UNIT(S): 5000 INJECTION INTRAVENOUS; SUBCUTANEOUS at 13:01

## 2018-09-26 RX ADMIN — Medication 50 MICROGRAM(S): at 06:17

## 2018-09-26 RX ADMIN — Medication 200 MILLIGRAM(S): at 22:23

## 2018-09-26 RX ADMIN — Medication 50 MILLIGRAM(S): at 13:01

## 2018-09-26 RX ADMIN — Medication 2: at 17:33

## 2018-09-26 RX ADMIN — GABAPENTIN 100 MILLIGRAM(S): 400 CAPSULE ORAL at 13:01

## 2018-09-26 RX ADMIN — Medication 50 MILLIGRAM(S): at 06:17

## 2018-09-26 RX ADMIN — Medication 1 MILLIGRAM(S): at 13:01

## 2018-09-26 RX ADMIN — PANTOPRAZOLE SODIUM 40 MILLIGRAM(S): 20 TABLET, DELAYED RELEASE ORAL at 06:17

## 2018-09-26 RX ADMIN — ZINC SULFATE TAB 220 MG (50 MG ZINC EQUIVALENT) 220 MILLIGRAM(S): 220 (50 ZN) TAB at 13:01

## 2018-09-26 RX ADMIN — Medication 325 MILLIGRAM(S): at 13:01

## 2018-09-26 RX ADMIN — GABAPENTIN 100 MILLIGRAM(S): 400 CAPSULE ORAL at 06:17

## 2018-09-26 RX ADMIN — Medication 250 MILLIGRAM(S): at 09:40

## 2018-09-26 RX ADMIN — Medication 1 TABLET(S): at 13:01

## 2018-09-26 RX ADMIN — Medication 50 MILLIGRAM(S): at 17:33

## 2018-09-26 RX ADMIN — HEPARIN SODIUM 5000 UNIT(S): 5000 INJECTION INTRAVENOUS; SUBCUTANEOUS at 06:17

## 2018-09-26 RX ADMIN — HEPARIN SODIUM 5000 UNIT(S): 5000 INJECTION INTRAVENOUS; SUBCUTANEOUS at 22:23

## 2018-09-26 RX ADMIN — Medication 500 MILLIGRAM(S): at 06:17

## 2018-09-26 RX ADMIN — GABAPENTIN 100 MILLIGRAM(S): 400 CAPSULE ORAL at 22:23

## 2018-09-26 RX ADMIN — Medication 3: at 12:35

## 2018-09-26 RX ADMIN — Medication 50 MILLIGRAM(S): at 22:23

## 2018-09-26 NOTE — CONSULT NOTE ADULT - SUBJECTIVE AND OBJECTIVE BOX
HPI:  53 year old female from [place], bedbound due to difficulty walking likely 2/2 to neuropathy with PMHx of anemia, HFpEF, ESRD on HD (M, W, F) R chest perm cath, Clostridium difficile infection, DM, Diabetic neuropathy, HTN, HLD, Hypothyroidism, OM of jaw, Parathyroid adenoma, MRSA infections, stage 4 sacral Decubitus and GERD was sent from NH due to positive blood cultures. Patient went for her routine dialysis on Friday, Pt noted to have discharge and itching around catheter, blood culture sent and results proved to be positive for gram positive cocci. Patient also has stage 4 sacral decubitus ulcer with minimal discharge. Patient denies any fever, chills, cough, headaches, chest pain, sob, Nausea, vomiting, diarrhea, muscle or joint pains, skin rashes or urinary symptoms (24 Sep 2018 00:50)    Patient seen at bedside for vascular consult for possible AVF placement. Patient admitted due to infection of right chest wall permacath site and had PC removed . Patient is currently without HD access due to positive blood cultures. Patient denies fever, chills, pain, nausea vomiting. She is right handed and has had no vein stick or blood draws in [facility].     PAST MEDICAL & SURGICAL HISTORY:  ESRD (end stage renal disease) on dialysis  Clostridium difficile infection  Osteomyelitis of jaw  Parathyroid adenoma  Hypothyroidism  CKD (chronic kidney disease)  Anemia  CHF (congestive heart failure)  Diabetic neuropathy  Diabetes mellitus  HTN (hypertension)  S/P :     Review of Systems: Contained within HPI    MEDICATIONS  (STANDING):  ascorbic acid 500 milliGRAM(s) Oral two times a day  docusate sodium 200 milliGRAM(s) Oral at bedtime  epoetin jacqueline Injectable 4000 Unit(s) IV Push <User Schedule>  ferrous    sulfate 325 milliGRAM(s) Oral daily  folic acid 1 milliGRAM(s) Oral daily  gabapentin 100 milliGRAM(s) Oral three times a day  heparin  Injectable 5000 Unit(s) SubCutaneous every 8 hours  hydrALAZINE 50 milliGRAM(s) Oral every 8 hours  influenza   Vaccine 0.5 milliLiter(s) IntraMuscular once  insulin lispro (HumaLOG) corrective regimen sliding scale   SubCutaneous Before meals and at bedtime  levothyroxine 50 MICROGram(s) Oral daily  metoprolol tartrate 50 milliGRAM(s) Oral two times a day  multivitamin/minerals 1 Tablet(s) Oral daily  pantoprazole    Tablet 40 milliGRAM(s) Oral before breakfast  senna 2 Tablet(s) Oral at bedtime  zinc sulfate 220 milliGRAM(s) Oral daily    MEDICATIONS  (PRN):  acetaminophen   Tablet .. 650 milliGRAM(s) Oral every 6 hours PRN Moderate Pain (4 - 6)    Allergies: No Known Allergies    FAMILY HISTORY:  Family history of stroke  Family history of hypertension (Sibling)  Family history of diabetes mellitus    Vital Signs Last 24 Hrs  T(C): 36.9 (26 Sep 2018 14:50), Max: 37 (25 Sep 2018 21:51)  T(F): 98.4 (26 Sep 2018 14:50), Max: 98.6 (25 Sep 2018 21:51)  HR: 70 (26 Sep 2018 14:50) (70 - 80)  BP: 130/50 (26 Sep 2018 14:50) (130/50 - 149/53)  RR: 16 (26 Sep 2018 14:50) (16 - 18)  SpO2: 100% (26 Sep 2018 14:50) (100% - 100%)    Physical Exam:    General:  Appears stated age, well-groomed, well-nourished, no distress  HENT:  WNL, no JVD  Chest: Right chest wall permacath site clean and dry, dressing c/d/i; respirations nonlabored  Abdomen: soft, nontender  Extremities: LUE warm to touch, no edema, pulses 2+. No current lines noted in LUE.  Skin: warm and dry   Musculoskeletal:  no calf tenderness  Neuro:  Alert, oriented to time, place and person   Psych: normal affect    LABS:                        9.5    11.5  )-----------( 461      ( 26 Sep 2018 06:26 )             31.3         134<L>  |  96  |  72<H>  ----------------------------<  128<H>  4.6   |  26  |  6.00<H>    Ca    10.7<H>      26 Sep 2018 06:26  Phos  5.1       Mg     2.4         TPro  7.8  /  Alb  2.6<L>  /  TBili  0.3  /  DBili  x   /  AST  10  /  ALT  14  /  AlkPhos  127<H>      Specimen Source: .Blood Dialysis Catheter (18 @ 00:12)    Gram Stain:   Growth in aerobic bottle: Gram Positive Cocci in Clusters  Growth in anaerobic bottle: Gram Positive Cocci in Clusters (18 @ 18:02)

## 2018-09-26 NOTE — DIETITIAN INITIAL EVALUATION ADULT. - DIET TYPE
renal replacement pts:no protein restr,no conc K & phos, low sodium/DASH/TLC (sodium and cholesterol restricted diet)
home

## 2018-09-26 NOTE — PROGRESS NOTE ADULT - ATTENDING COMMENTS
Patient is seen and examined. Case reviewed with the medical team. Above note is appreciated. Will follow up clinically. Continue DVT prophylaxis. Case discussed with nephrology and will follow up with cardiology. Will optimize for AV fistula placement.

## 2018-09-26 NOTE — CONSULT NOTE ADULT - PROBLEM SELECTOR RECOMMENDATION 9
AVF planning, possibly for Monday 10/1  B/L upper extremity vein mapping ordered, likely will use LUE  No blood draws or lines in LUE AVF planning, possibly for Monday 10/1  B/L upper extremity vein mapping ordered, likely will use LUE  No blood draws or lines in LUE  Repeat blood cultures per medicine/ID  Will need new short term HD access when blood cultures negative  Antibiotics per ID

## 2018-09-26 NOTE — PROGRESS NOTE ADULT - SUBJECTIVE AND OBJECTIVE BOX
PGY 1 Note discussed with supervising resident and primary attending    Patient is a 53y old  Female who presents with a chief complaint of sent from NH due to positive blood cultures (25 Sep 2018 16:10)      INTERVAL HPI/OVERNIGHT EVENTS:   Patient seen at the bedside. no new complains      MEDICATIONS  (STANDING):  ascorbic acid 500 milliGRAM(s) Oral two times a day  docusate sodium 200 milliGRAM(s) Oral at bedtime  epoetin jacqueline Injectable 4000 Unit(s) IV Push <User Schedule>  ferrous    sulfate 325 milliGRAM(s) Oral daily  folic acid 1 milliGRAM(s) Oral daily  gabapentin 100 milliGRAM(s) Oral three times a day  heparin  Injectable 5000 Unit(s) SubCutaneous every 8 hours  hydrALAZINE 50 milliGRAM(s) Oral every 8 hours  influenza   Vaccine 0.5 milliLiter(s) IntraMuscular once  insulin lispro (HumaLOG) corrective regimen sliding scale   SubCutaneous Before meals and at bedtime  levothyroxine 50 MICROGram(s) Oral daily  metoprolol tartrate 50 milliGRAM(s) Oral two times a day  multivitamin/minerals 1 Tablet(s) Oral daily  pantoprazole    Tablet 40 milliGRAM(s) Oral before breakfast  senna 2 Tablet(s) Oral at bedtime  zinc sulfate 220 milliGRAM(s) Oral daily    MEDICATIONS  (PRN):  acetaminophen   Tablet .. 650 milliGRAM(s) Oral every 6 hours PRN Moderate Pain (4 - 6)      __________________________________________________  REVIEW OF SYSTEMS:    CONSTITUTIONAL: No fever,   EYES: no acute visual disturbances  NECK: No pain or stiffness  RESPIRATORY: No cough; No shortness of breath  CARDIOVASCULAR: No chest pain, no palpitations  GASTROINTESTINAL: No pain. No nausea or vomiting; No diarrhea   NEUROLOGICAL: No headache or numbness, no tremors  MUSCULOSKELETAL: No joint pain, no muscle pain  GENITOURINARY: no dysuria, no frequency, no hesitancy  PSYCHIATRY: no depression , no anxiety  ALL OTHER  ROS negative        Vital Signs Last 24 Hrs  T(C): 36.6 (26 Sep 2018 05:31), Max: 37.2 (25 Sep 2018 14:42)  T(F): 97.9 (26 Sep 2018 05:31), Max: 98.9 (25 Sep 2018 14:42)  HR: 73 (26 Sep 2018 05:31) (72 - 80)  BP: 142/57 (26 Sep 2018 05:31) (130/50 - 149/53)  BP(mean): --  RR: 17 (26 Sep 2018 05:31) (16 - 18)  SpO2: 100% (26 Sep 2018 05:31) (100% - 100%)    ________________________________________________  PHYSICAL EXAM:  GENERAL: NAD, legally blind   HEENT: Normocephalic;  conjunctivae and sclerae clear; moist mucous membranes;   NECK : supple  CHEST/LUNG: Clear to auscultation bilaterally with good air entry   HEART: S1 S2  regular; no murmurs, gallops or rubs  ABDOMEN: Soft, Nontender, Nondistended; Bowel sounds present  EXTREMITIES: no cyanosis; no edema; no calf tenderness  SKIN: warm and dry; no rash  NERVOUS SYSTEM:  Awake and alert; Oriented  to place, person and time ; no new deficits    _________________________________________________  LABS:                        9.5    11.5  )-----------( 461      ( 26 Sep 2018 06:26 )             31.3     09-26    134<L>  |  96  |  72<H>  ----------------------------<  128<H>  4.6   |  26  |  6.00<H>    Ca    10.7<H>      26 Sep 2018 06:26  Phos  5.1     09-26  Mg     2.4     09-26    TPro  7.8  /  Alb  2.6<L>  /  TBili  0.3  /  DBili  x   /  AST  10  /  ALT  14  /  AlkPhos  127<H>  09-26        CAPILLARY BLOOD GLUCOSE      POCT Blood Glucose.: 261 mg/dL (26 Sep 2018 11:47)  POCT Blood Glucose.: 147 mg/dL (26 Sep 2018 08:18)  POCT Blood Glucose.: 237 mg/dL (25 Sep 2018 21:37)        RADIOLOGY & ADDITIONAL TESTS:    Imaging Personally Reviewed:  YES/NO    Consultant(s) Notes Reviewed:   YES/ No    Care Discussed with Consultants :     Plan of care was discussed with patient and /or primary care giver; all questions and concerns were addressed and care was aligned with patient's wishes.

## 2018-09-26 NOTE — PROGRESS NOTE ADULT - SUBJECTIVE AND OBJECTIVE BOX
coag negative staph bacteremia. s/p removal of PC.    No Known Allergies    Hospital Medications:   MEDICATIONS  (STANDING):  ascorbic acid 500 milliGRAM(s) Oral two times a day  docusate sodium 200 milliGRAM(s) Oral at bedtime  epoetin jacqueline Injectable 4000 Unit(s) IV Push <User Schedule>  ferrous    sulfate 325 milliGRAM(s) Oral daily  folic acid 1 milliGRAM(s) Oral daily  gabapentin 100 milliGRAM(s) Oral three times a day  heparin  Injectable 5000 Unit(s) SubCutaneous every 8 hours  hydrALAZINE 50 milliGRAM(s) Oral every 8 hours  influenza   Vaccine 0.5 milliLiter(s) IntraMuscular once  insulin lispro (HumaLOG) corrective regimen sliding scale   SubCutaneous Before meals and at bedtime  levothyroxine 50 MICROGram(s) Oral daily  metoprolol tartrate 50 milliGRAM(s) Oral two times a day  multivitamin/minerals 1 Tablet(s) Oral daily  pantoprazole    Tablet 40 milliGRAM(s) Oral before breakfast  senna 2 Tablet(s) Oral at bedtime  zinc sulfate 220 milliGRAM(s) Oral daily        VITALS:  T(F): 98.4 (09-26-18 @ 14:50), Max: 98.6 (09-25-18 @ 21:51)  HR: 70 (09-26-18 @ 14:50)  BP: 130/50 (09-26-18 @ 14:50)  RR: 16 (09-26-18 @ 14:50)  SpO2: 100% (09-26-18 @ 14:50)  Wt(kg): --    09-25 @ 07:01  -  09-26 @ 07:00  --------------------------------------------------------  IN: 0 mL / OUT: 2 mL / NET: -2 mL        PHYSICAL EXAM:  Constitutional: NAD  HEENT: anicteric sclera, oropharynx clear.  Neck: No JVD  Respiratory: CTAB, no wheezes, rales or rhonchi  Cardiovascular: S1, S2, RRR  Gastrointestinal: BS+, soft, NT/ND  Extremities: No cyanosis or clubbing. No peripheral edema  Neurological: A/O x 3, no focal deficits  Vascular Access: nnne    LABS:  09-26    134<L>  |  96  |  72<H>  ----------------------------<  128<H>  4.6   |  26  |  6.00<H>    Ca    10.7<H>      26 Sep 2018 06:26  Phos  5.1     09-26  Mg     2.4     09-26    TPro  7.8  /  Alb  2.6<L>  /  TBili  0.3  /  DBili      /  AST  10  /  ALT  14  /  AlkPhos  127<H>  09-26    Creatinine Trend: 6.00 <--, 4.43 <--, 5.75 <--, 5.49 <--                        9.5    11.5  )-----------( 461      ( 26 Sep 2018 06:26 )             31.3     Urine Studies:      RADIOLOGY & ADDITIONAL STUDIES:

## 2018-09-27 LAB
ALBUMIN SERPL ELPH-MCNC: 2.7 G/DL — LOW (ref 3.5–5)
ALP SERPL-CCNC: 131 U/L — HIGH (ref 40–120)
ALT FLD-CCNC: 14 U/L DA — SIGNIFICANT CHANGE UP (ref 10–60)
ANION GAP SERPL CALC-SCNC: 13 MMOL/L — SIGNIFICANT CHANGE UP (ref 5–17)
AST SERPL-CCNC: 9 U/L — LOW (ref 10–40)
BASOPHILS # BLD AUTO: 0.1 K/UL — SIGNIFICANT CHANGE UP (ref 0–0.2)
BASOPHILS NFR BLD AUTO: 1.1 % — SIGNIFICANT CHANGE UP (ref 0–2)
BILIRUB SERPL-MCNC: 0.4 MG/DL — SIGNIFICANT CHANGE UP (ref 0.2–1.2)
BUN SERPL-MCNC: 88 MG/DL — HIGH (ref 7–18)
CALCIUM SERPL-MCNC: 10.5 MG/DL — SIGNIFICANT CHANGE UP (ref 8.4–10.5)
CHLORIDE SERPL-SCNC: 94 MMOL/L — LOW (ref 96–108)
CO2 SERPL-SCNC: 29 MMOL/L — SIGNIFICANT CHANGE UP (ref 22–31)
CREAT SERPL-MCNC: 6.87 MG/DL — HIGH (ref 0.5–1.3)
EOSINOPHIL # BLD AUTO: 0.5 K/UL — SIGNIFICANT CHANGE UP (ref 0–0.5)
EOSINOPHIL NFR BLD AUTO: 4.4 % — SIGNIFICANT CHANGE UP (ref 0–6)
GLUCOSE BLDC GLUCOMTR-MCNC: 131 MG/DL — HIGH (ref 70–99)
GLUCOSE BLDC GLUCOMTR-MCNC: 221 MG/DL — HIGH (ref 70–99)
GLUCOSE BLDC GLUCOMTR-MCNC: 243 MG/DL — HIGH (ref 70–99)
GLUCOSE BLDC GLUCOMTR-MCNC: 264 MG/DL — HIGH (ref 70–99)
GLUCOSE SERPL-MCNC: 116 MG/DL — HIGH (ref 70–99)
HCT VFR BLD CALC: 30.5 % — LOW (ref 34.5–45)
HGB BLD-MCNC: 9.4 G/DL — LOW (ref 11.5–15.5)
LYMPHOCYTES # BLD AUTO: 2.8 K/UL — SIGNIFICANT CHANGE UP (ref 1–3.3)
LYMPHOCYTES # BLD AUTO: 26.4 % — SIGNIFICANT CHANGE UP (ref 13–44)
MAGNESIUM SERPL-MCNC: 2.5 MG/DL — SIGNIFICANT CHANGE UP (ref 1.6–2.6)
MCHC RBC-ENTMCNC: 30.1 PG — SIGNIFICANT CHANGE UP (ref 27–34)
MCHC RBC-ENTMCNC: 30.8 GM/DL — LOW (ref 32–36)
MCV RBC AUTO: 97.7 FL — SIGNIFICANT CHANGE UP (ref 80–100)
MONOCYTES # BLD AUTO: 1 K/UL — HIGH (ref 0–0.9)
MONOCYTES NFR BLD AUTO: 8.9 % — SIGNIFICANT CHANGE UP (ref 2–14)
NEUTROPHILS # BLD AUTO: 6.3 K/UL — SIGNIFICANT CHANGE UP (ref 1.8–7.4)
NEUTROPHILS NFR BLD AUTO: 59.2 % — SIGNIFICANT CHANGE UP (ref 43–77)
PHOSPHATE SERPL-MCNC: 6.9 MG/DL — HIGH (ref 2.5–4.5)
PLATELET # BLD AUTO: 470 K/UL — HIGH (ref 150–400)
POTASSIUM SERPL-MCNC: 4.7 MMOL/L — SIGNIFICANT CHANGE UP (ref 3.5–5.3)
POTASSIUM SERPL-SCNC: 4.7 MMOL/L — SIGNIFICANT CHANGE UP (ref 3.5–5.3)
PROT SERPL-MCNC: 7.6 G/DL — SIGNIFICANT CHANGE UP (ref 6–8.3)
RBC # BLD: 3.12 M/UL — LOW (ref 3.8–5.2)
RBC # FLD: 14.5 % — SIGNIFICANT CHANGE UP (ref 10.3–14.5)
SODIUM SERPL-SCNC: 136 MMOL/L — SIGNIFICANT CHANGE UP (ref 135–145)
VANCOMYCIN TROUGH SERPL-MCNC: 49.1 UG/ML — CRITICAL HIGH (ref 10–20)
WBC # BLD: 10.7 K/UL — HIGH (ref 3.8–10.5)
WBC # FLD AUTO: 10.7 K/UL — HIGH (ref 3.8–10.5)

## 2018-09-27 PROCEDURE — 93308 TTE F-UP OR LMTD: CPT | Mod: 26

## 2018-09-27 RX ORDER — HYDRALAZINE HCL 50 MG
50 TABLET ORAL EVERY 8 HOURS
Qty: 0 | Refills: 0 | Status: DISCONTINUED | OUTPATIENT
Start: 2018-09-27 | End: 2018-09-30

## 2018-09-27 RX ADMIN — Medication 50 MICROGRAM(S): at 06:08

## 2018-09-27 RX ADMIN — Medication 1 MILLIGRAM(S): at 13:04

## 2018-09-27 RX ADMIN — Medication 3: at 21:52

## 2018-09-27 RX ADMIN — SENNA PLUS 2 TABLET(S): 8.6 TABLET ORAL at 21:53

## 2018-09-27 RX ADMIN — ZINC SULFATE TAB 220 MG (50 MG ZINC EQUIVALENT) 220 MILLIGRAM(S): 220 (50 ZN) TAB at 13:03

## 2018-09-27 RX ADMIN — Medication 2: at 13:03

## 2018-09-27 RX ADMIN — Medication 1 TABLET(S): at 13:04

## 2018-09-27 RX ADMIN — Medication 2: at 17:05

## 2018-09-27 RX ADMIN — Medication 50 MILLIGRAM(S): at 16:09

## 2018-09-27 RX ADMIN — Medication 500 MILLIGRAM(S): at 17:05

## 2018-09-27 RX ADMIN — GABAPENTIN 100 MILLIGRAM(S): 400 CAPSULE ORAL at 21:53

## 2018-09-27 RX ADMIN — GABAPENTIN 100 MILLIGRAM(S): 400 CAPSULE ORAL at 06:08

## 2018-09-27 RX ADMIN — PANTOPRAZOLE SODIUM 40 MILLIGRAM(S): 20 TABLET, DELAYED RELEASE ORAL at 06:08

## 2018-09-27 RX ADMIN — Medication 50 MILLIGRAM(S): at 06:08

## 2018-09-27 RX ADMIN — SENNA PLUS 2 TABLET(S): 8.6 TABLET ORAL at 00:24

## 2018-09-27 RX ADMIN — Medication 500 MILLIGRAM(S): at 06:08

## 2018-09-27 RX ADMIN — HEPARIN SODIUM 5000 UNIT(S): 5000 INJECTION INTRAVENOUS; SUBCUTANEOUS at 06:08

## 2018-09-27 RX ADMIN — Medication 325 MILLIGRAM(S): at 13:03

## 2018-09-27 RX ADMIN — Medication 50 MILLIGRAM(S): at 17:05

## 2018-09-27 RX ADMIN — HEPARIN SODIUM 5000 UNIT(S): 5000 INJECTION INTRAVENOUS; SUBCUTANEOUS at 13:04

## 2018-09-27 RX ADMIN — Medication 50 MILLIGRAM(S): at 21:53

## 2018-09-27 RX ADMIN — HEPARIN SODIUM 5000 UNIT(S): 5000 INJECTION INTRAVENOUS; SUBCUTANEOUS at 21:54

## 2018-09-27 RX ADMIN — Medication 200 MILLIGRAM(S): at 21:54

## 2018-09-27 RX ADMIN — GABAPENTIN 100 MILLIGRAM(S): 400 CAPSULE ORAL at 13:05

## 2018-09-27 NOTE — PROGRESS NOTE ADULT - SUBJECTIVE AND OBJECTIVE BOX
PGY 1 Note discussed with supervising resident and primary attending    Patient is a 53y old  Female who presents with a chief complaint of sent from NH due to positive blood cultures (27 Sep 2018 14:28)      INTERVAL HPI/OVERNIGHT EVENTS:   Patient seen at the bedside. no new complains      MEDICATIONS  (STANDING):  ascorbic acid 500 milliGRAM(s) Oral two times a day  docusate sodium 200 milliGRAM(s) Oral at bedtime  epoetin jacqueline Injectable 4000 Unit(s) IV Push <User Schedule>  ferrous    sulfate 325 milliGRAM(s) Oral daily  folic acid 1 milliGRAM(s) Oral daily  gabapentin 100 milliGRAM(s) Oral three times a day  heparin  Injectable 5000 Unit(s) SubCutaneous every 8 hours  hydrALAZINE 50 milliGRAM(s) Oral every 8 hours  influenza   Vaccine 0.5 milliLiter(s) IntraMuscular once  insulin lispro (HumaLOG) corrective regimen sliding scale   SubCutaneous Before meals and at bedtime  levothyroxine 50 MICROGram(s) Oral daily  metoprolol tartrate 50 milliGRAM(s) Oral two times a day  multivitamin/minerals 1 Tablet(s) Oral daily  pantoprazole    Tablet 40 milliGRAM(s) Oral before breakfast  senna 2 Tablet(s) Oral at bedtime  zinc sulfate 220 milliGRAM(s) Oral daily    MEDICATIONS  (PRN):  acetaminophen   Tablet .. 650 milliGRAM(s) Oral every 6 hours PRN Moderate Pain (4 - 6)  hydrALAZINE 50 milliGRAM(s) Oral every 8 hours PRN Systolic blood pressure > 170      __________________________________________________  REVIEW OF SYSTEMS:    CONSTITUTIONAL: No fever,   EYES: no acute visual disturbances  NECK: No pain or stiffness  RESPIRATORY: No cough; No shortness of breath  CARDIOVASCULAR: No chest pain, no palpitations  GASTROINTESTINAL: No pain. No nausea or vomiting; No diarrhea   NEUROLOGICAL: No headache or numbness, no tremors  MUSCULOSKELETAL: No joint pain, no muscle pain  GENITOURINARY: no dysuria, no frequency, no hesitancy  PSYCHIATRY: no depression , no anxiety  ALL OTHER  ROS negative        Vital Signs Last 24 Hrs  T(C): 36.9 (27 Sep 2018 15:00), Max: 37.1 (26 Sep 2018 21:23)  T(F): 98.5 (27 Sep 2018 15:00), Max: 98.7 (26 Sep 2018 21:23)  HR: 69 (27 Sep 2018 15:00) (68 - 69)  BP: 169/65 (27 Sep 2018 15:00) (166/62 - 173/75)  BP(mean): --  RR: 18 (27 Sep 2018 15:00) (16 - 18)  SpO2: 100% (27 Sep 2018 15:00) (100% - 100%)    ________________________________________________  PHYSICAL EXAM:  GENERAL: NAD, blind   HEENT: Normocephalic;  conjunctivae and sclerae clear; moist mucous membranes;   NECK : supple  CHEST/LUNG: Clear to auscultation bilaterally with good air entry   HEART: S1 S2  regular; no murmurs, gallops or rubs  ABDOMEN: Soft, Nontender, Nondistended; Bowel sounds present  EXTREMITIES: no cyanosis; no edema; no calf tenderness  SKIN: warm and dry; no rash  NERVOUS SYSTEM:  Awake and alert; Oriented  to place, person and time ; no new deficits    _________________________________________________  LABS:                        9.4    10.7  )-----------( 470      ( 27 Sep 2018 05:59 )             30.5     09-27    136  |  94<L>  |  88<H>  ----------------------------<  116<H>  4.7   |  29  |  6.87<H>    Ca    10.5      27 Sep 2018 05:59  Phos  6.9     09-27  Mg     2.5     09-27    TPro  7.6  /  Alb  2.7<L>  /  TBili  0.4  /  DBili  x   /  AST  9<L>  /  ALT  14  /  AlkPhos  131<H>  09-27        CAPILLARY BLOOD GLUCOSE      POCT Blood Glucose.: 221 mg/dL (27 Sep 2018 12:16)  POCT Blood Glucose.: 131 mg/dL (27 Sep 2018 08:30)  POCT Blood Glucose.: 140 mg/dL (26 Sep 2018 22:01)  POCT Blood Glucose.: 243 mg/dL (26 Sep 2018 16:48)        RADIOLOGY & ADDITIONAL TESTS:    Imaging Personally Reviewed:  YES/NO    Consultant(s) Notes Reviewed:   YES/ No    Care Discussed with Consultants :     Plan of care was discussed with patient and /or primary care giver; all questions and concerns were addressed and care was aligned with patient's wishes.

## 2018-09-27 NOTE — CONSULT NOTE ADULT - ASSESSMENT
3 year old female from [place], bedbound due to difficulty walking likely 2/2 to neuropathy with PMHx of anemia, HFpEF, ESRD on HD (M, W, F) R chest perm cath,  Clostridium difficile infection, DM, Diabetic neuropathy, HTN, HLD, Hypothyroidism, OM of jaw, Parathyroid adenoma, MRSA infections, stage 4 sacral Decubitus  and GERD was sent from NH due to positive blood cultures.  1.Echocardiogram reviewed, echodensity appears to be catheter tip, will repeat limited echo and bubble study.  2.ABX as per ID.  3.Vasular eval noted.  4.ABX as per ID.  5.ESRD-renal f/u.  6.DM-Insulin.  7.Hypothyroidism-synthroid.  8.HTN-cont BP medication.  9.GI and DVT prophylaxis.
53 year old female with ESRD on HD MWF via right chest wall PC presented with sepsis secondary to infected catheter site. Catheter removed prior to consult on 9/25. PMH HTN, CAD, DM2, sacral decubitus
53 yr old female legally blind. On HD via permacath at [facility] dialysis unit. on Friday, complained of discharge from permacath. blood cultures had preliminary growth on gram positive cocci. NH was called to send pt to hospital for management of cahteter related sepsis. Denies any fever, SOB, chills or rigors. Renal consulted as pt on HD     RECS  HD TODAY  BROAD SPECTRUM ABX  PROCRIT ON hd  DRAW BLOOD CULTURES ON HD  DOSE MEDS FOR GFR <15ML/MIN
Infected permacath  bacteremia  Leukocytosis    plan - cont Vancomycin 1 gm iv T -T-S with dialysis  please have vascular remove permacath

## 2018-09-27 NOTE — CONSULT NOTE ADULT - REASON FOR ADMISSION
sent from NH due to positive blood cultures

## 2018-09-27 NOTE — PROGRESS NOTE ADULT - SUBJECTIVE AND OBJECTIVE BOX
afebrile. WBC better.    No Known Allergies    Hospital Medications:   MEDICATIONS  (STANDING):  ascorbic acid 500 milliGRAM(s) Oral two times a day  docusate sodium 200 milliGRAM(s) Oral at bedtime  epoetin jacqueline Injectable 4000 Unit(s) IV Push <User Schedule>  ferrous    sulfate 325 milliGRAM(s) Oral daily  folic acid 1 milliGRAM(s) Oral daily  gabapentin 100 milliGRAM(s) Oral three times a day  heparin  Injectable 5000 Unit(s) SubCutaneous every 8 hours  hydrALAZINE 50 milliGRAM(s) Oral every 8 hours  influenza   Vaccine 0.5 milliLiter(s) IntraMuscular once  insulin lispro (HumaLOG) corrective regimen sliding scale   SubCutaneous Before meals and at bedtime  levothyroxine 50 MICROGram(s) Oral daily  metoprolol tartrate 50 milliGRAM(s) Oral two times a day  multivitamin/minerals 1 Tablet(s) Oral daily  pantoprazole    Tablet 40 milliGRAM(s) Oral before breakfast  senna 2 Tablet(s) Oral at bedtime  zinc sulfate 220 milliGRAM(s) Oral daily        VITALS:  T(F): 98.5 (09-27-18 @ 05:18), Max: 98.7 (09-26-18 @ 21:23)  HR: 68 (09-27-18 @ 05:18)  BP: 166/62 (09-27-18 @ 05:18)  RR: 18 (09-27-18 @ 05:18)  SpO2: 100% (09-27-18 @ 05:18)  Wt(kg): --      PHYSICAL EXAM:  Constitutional: NAD  HEENT: anicteric sclera, oropharynx clear.  Neck: No JVD  Respiratory: CTAB, no wheezes, rales or rhonchi  Cardiovascular: S1, S2, RRR  Gastrointestinal: BS+, soft, NT/ND  Extremities:  No peripheral edema  Neurological: A/O x 3, no focal deficits  Vascular Access: no access.    LABS:  09-27    136  |  94<L>  |  88<H>  ----------------------------<  116<H>  4.7   |  29  |  6.87<H>    Ca    10.5      27 Sep 2018 05:59  Phos  6.9     09-27  Mg     2.5     09-27    TPro  7.6  /  Alb  2.7<L>  /  TBili  0.4  /  DBili      /  AST  9<L>  /  ALT  14  /  AlkPhos  131<H>  09-27    Creatinine Trend: 6.87 <--, 6.00 <--, 4.43 <--, 5.75 <--, 5.49 <--                        9.4    10.7  )-----------( 470      ( 27 Sep 2018 05:59 )             30.5     Urine Studies:      RADIOLOGY & ADDITIONAL STUDIES:

## 2018-09-27 NOTE — PROGRESS NOTE ADULT - ATTENDING COMMENTS
Patient is seen and examined. Case reviewed with the medical team. Above note is appreciated. Will follow up clinically. Continue DVT prophylaxis. Case discussed with Cardiology, and nephrology. For AV Fistula and permacath.

## 2018-09-27 NOTE — CONSULT NOTE ADULT - SUBJECTIVE AND OBJECTIVE BOX
CHIEF COMPLAINT:Patient is a 53y old  Female who presents with a chief complaint of sent from NH due to positive blood cultures (26 Sep 2018 16:33)      HPI:  53 year old female from [place], bedbound due to difficulty walking likely 2/2 to neuropathy with PMHx of anemia, HFpEF, ESRD on HD (M, W, F) R chest perm cath,  Clostridium difficile infection, DM, Diabetic neuropathy, HTN, HLD, Hypothyroidism, OM of jaw, Parathyroid adenoma, MRSA infections, stage 4 sacral Decubitus  and GERD was sent from NH due to positive blood cultures. Patient went for her routine dialysis on Friday, Pt noted to have discharge and itching around catheter, blood culture sent and results proved to be positive for gram positive cocci. Patient also has stage 4 sacral decubitus ulcer with minimal discharge. Patient denies any fever, chills, cough, headaches, chest pain, sob, Nausea, vomiting, diarrhea, muscle or joint pains, skin rashesor urinary symptoms (24 Sep 2018 00:50)      PAST MEDICAL & SURGICAL HISTORY:  ESRD (end stage renal disease) on dialysis  Clostridium difficile infection  Osteomyelitis of jaw  Parathyroid adenoma  Hypothyroidism  CKD (chronic kidney disease)  Anemia  CHF (congestive heart failure)  Diabetic neuropathy  Diabetes mellitus  HTN (hypertension)  S/P :   No Past Surgical History      MEDICATIONS  (STANDING):  ascorbic acid 500 milliGRAM(s) Oral two times a day  docusate sodium 200 milliGRAM(s) Oral at bedtime  epoetin jacqueline Injectable 4000 Unit(s) IV Push <User Schedule>  ferrous    sulfate 325 milliGRAM(s) Oral daily  folic acid 1 milliGRAM(s) Oral daily  gabapentin 100 milliGRAM(s) Oral three times a day  heparin  Injectable 5000 Unit(s) SubCutaneous every 8 hours  hydrALAZINE 50 milliGRAM(s) Oral every 8 hours  influenza   Vaccine 0.5 milliLiter(s) IntraMuscular once  insulin lispro (HumaLOG) corrective regimen sliding scale   SubCutaneous Before meals and at bedtime  levothyroxine 50 MICROGram(s) Oral daily  metoprolol tartrate 50 milliGRAM(s) Oral two times a day  multivitamin/minerals 1 Tablet(s) Oral daily  pantoprazole    Tablet 40 milliGRAM(s) Oral before breakfast  senna 2 Tablet(s) Oral at bedtime  zinc sulfate 220 milliGRAM(s) Oral daily    MEDICATIONS  (PRN):  acetaminophen   Tablet .. 650 milliGRAM(s) Oral every 6 hours PRN Moderate Pain (4 - 6)  hydrALAZINE 50 milliGRAM(s) Oral every 8 hours PRN Systolic blood pressure > 170      FAMILY HISTORY:  Family history of stroke  Family history of hypertension (Sibling)  Family history of diabetes mellitus      SOCIAL HISTORY:    [x ] Non-smoker    [ x] Alcohol-denies    Allergies    No Known Allergies    Intolerances    	    REVIEW OF SYSTEMS:  CONSTITUTIONAL: No fever, weight loss, or fatigue  EYES: No eye pain, visual disturbances, or discharge  ENT:  No difficulty hearing, tinnitus, vertigo; No sinus or throat pain  NECK: No pain or stiffness  RESPIRATORY: No cough, wheezing, chills or hemoptysis; No Shortness of Breath  CARDIOVASCULAR: No chest pain, palpitations, passing out, dizziness, or leg swelling  GASTROINTESTINAL: No abdominal or epigastric pain. No nausea, vomiting, or hematemesis; No diarrhea or constipation. No melena or hematochezia.  GENITOURINARY: No dysuria, frequency, hematuria, or incontinence  NEUROLOGICAL: No headaches, memory loss, loss of strength, numbness, or tremors  SKIN: No itching, burning, rashes, or lesions   LYMPH Nodes: No enlarged glands  ENDOCRINE: No heat or cold intolerance; No hair loss  MUSCULOSKELETAL: No joint pain or swelling; No muscle, back, or extremity pain  PSYCHIATRIC: No depression, anxiety, mood swings, or difficulty sleeping  HEME/LYMPH: No easy bruising, or bleeding gums  ALLERGY AND IMMUNOLOGIC: No hives or eczema	      PHYSICAL EXAM:  T(C): 36.9 (18 @ 05:18), Max: 37.1 (18 @ 21:23)  HR: 68 (18 @ 05:18) (68 - 70)  BP: 166/62 (18 @ 05:18) (130/50 - 173/75)  RR: 18 (18 @ 05:18) (16 - 18)  SpO2: 100% (18 @ 05:18) (100% - 100%)  Wt(kg): --  I&O's Summary      Appearance: Normal	  HEENT:   Normal oral mucosa, PERRL, EOMI	  Lymphatic: No lymphadenopathy  Cardiovascular: Normal S1 S2, No JVD, No murmurs, No edema  Respiratory: Lungs clear to auscultation	  Psychiatry: A & O x 3, Mood & affect appropriate  Gastrointestinal:  Soft, Non-tender, + BS	  Skin: No rashes, No ecchymoses, No cyanosis	  Neurologic: Non-focal  Extremities: Normal range of motion, No clubbing, cyanosis or edema  Vascular: Peripheral pulses palpable 2+ bilaterally    	    ECG:  	Normal sinus rhythm  T wave abnormality, consider lateral ischemia  Prolonged QT      LABS:	 	                   9.4    10.7  )-----------( 470      ( 27 Sep 2018 05:59 )             30.5         136  |  94<L>  |  88<H>  ----------------------------<  116<H>  4.7   |  29  |  6.87<H>    Ca    10.5      27 Sep 2018 05:59  Phos  6.9       Mg     2.5         TPro  7.6  /  Alb  2.7<L>  /  TBili  0.4  /  DBili  x   /  AST  9<L>  /  ALT  14  /  AlkPhos  131<H>      Culture - Blood (18 @ 00:12)    Specimen Source: .Blood Dialysis Catheter    Culture Results:   No growth to date.    Culture - Blood (18 @ 18:02)    Gram Stain:   Growth in aerobic bottle: Gram Positive Cocci in Clusters  Growth in anaerobic bottle: Gram Positive Cocci in Clusters    -  Coagulase negative Staphylococcus: Detec    Specimen Source: .Blood Dialysis Catheter    Organism: Blood Culture PCR    Culture Results:   Growth in aerobic and anaerobic bottles: Coag Negative Staphylococcus  Single set isolate, possible contaminant. Contact  Microbiology if susceptibility testing clinically  indicated.  "Due to technical problems, Proteus sp. will Not be reported aspart of  the BCID panel until further notice"  ***Blood Panel PCR results on this specimen are available  approximately 3 hours after the Gram stain result.***  Gram stain, PCR, and/or culture results may not always  correspond due to difference in methodologies.  ************************************************************  This PCR assay was performed using [employer].  The following targets are tested for: Enterococcus,  vancomycin resistant enterococci, Listeria monocytogenes,  coagulase negative staphylococci, S. aureus,  methicillin resistant S. aureus, Streptococcus agalactiae  (Group B), S. pneumoniae, S. pyogenes (Group A),  Acinetobacter baumannii, Enterobacter cloacae, E. coli,  Klebsiella oxytoca, K. pneumoniae, Proteus sp.,  Serratia marcescens, Haemophilus influenzae,  Neisseria meningitidis, Pseudomonas aeruginosa, Candida  albicans, C. glabrata, C krusei, C parapsilosis,  C. tropicalis and the KPC resistance gene.    Organism Identification: Blood Culture PCR    Method Type: PCR    ------------------------------------------------------------------------  OBSERVATIONS:  Mitral Valve: Normal mitral valve. Trace mitral  regurgitation.  Aortic Root: Aortic Root: 2.9 cm.    Aortic Valve: Normal trileaflet aortic valve.  Left Ventricle: Normal Left Ventricular Systolic Function,  (EF = 55 to 60%) Moderate concentric left ventricular  hypertrophy. Grade II diastolic dysfunction.  Right Heart: Normal right atrium. inter atrial septal  aneurysm was noted associated with a small 1 mm  echogenic  density on interatrial septum Normal right ventricular size  and function. There is mild tricuspid regurgitation. Normal  pulmonic valve.  Pericardium/PleuraNormal pericardium with no pericardial  effusion.

## 2018-09-28 LAB
ALBUMIN SERPL ELPH-MCNC: 2.6 G/DL — LOW (ref 3.5–5)
ALP SERPL-CCNC: 128 U/L — HIGH (ref 40–120)
ALT FLD-CCNC: 16 U/L DA — SIGNIFICANT CHANGE UP (ref 10–60)
ANION GAP SERPL CALC-SCNC: 14 MMOL/L — SIGNIFICANT CHANGE UP (ref 5–17)
APTT BLD: 29.7 SEC — SIGNIFICANT CHANGE UP (ref 27.5–37.4)
AST SERPL-CCNC: 9 U/L — LOW (ref 10–40)
BASOPHILS # BLD AUTO: 0.1 K/UL — SIGNIFICANT CHANGE UP (ref 0–0.2)
BASOPHILS NFR BLD AUTO: 1 % — SIGNIFICANT CHANGE UP (ref 0–2)
BILIRUB SERPL-MCNC: 0.4 MG/DL — SIGNIFICANT CHANGE UP (ref 0.2–1.2)
BUN SERPL-MCNC: 111 MG/DL — HIGH (ref 7–18)
CALCIUM SERPL-MCNC: 9.6 MG/DL — SIGNIFICANT CHANGE UP (ref 8.4–10.5)
CHLORIDE SERPL-SCNC: 97 MMOL/L — SIGNIFICANT CHANGE UP (ref 96–108)
CO2 SERPL-SCNC: 24 MMOL/L — SIGNIFICANT CHANGE UP (ref 22–31)
CREAT SERPL-MCNC: 9.49 MG/DL — HIGH (ref 0.5–1.3)
EOSINOPHIL # BLD AUTO: 0.3 K/UL — SIGNIFICANT CHANGE UP (ref 0–0.5)
EOSINOPHIL NFR BLD AUTO: 3.2 % — SIGNIFICANT CHANGE UP (ref 0–6)
GLUCOSE BLDC GLUCOMTR-MCNC: 141 MG/DL — HIGH (ref 70–99)
GLUCOSE BLDC GLUCOMTR-MCNC: 145 MG/DL — HIGH (ref 70–99)
GLUCOSE BLDC GLUCOMTR-MCNC: 166 MG/DL — HIGH (ref 70–99)
GLUCOSE BLDC GLUCOMTR-MCNC: 168 MG/DL — HIGH (ref 70–99)
GLUCOSE BLDC GLUCOMTR-MCNC: 239 MG/DL — HIGH (ref 70–99)
GLUCOSE SERPL-MCNC: 298 MG/DL — HIGH (ref 70–99)
HCT VFR BLD CALC: 29 % — LOW (ref 34.5–45)
HGB BLD-MCNC: 8.9 G/DL — LOW (ref 11.5–15.5)
INR BLD: 0.95 RATIO — SIGNIFICANT CHANGE UP (ref 0.88–1.16)
LYMPHOCYTES # BLD AUTO: 1.9 K/UL — SIGNIFICANT CHANGE UP (ref 1–3.3)
LYMPHOCYTES # BLD AUTO: 17.8 % — SIGNIFICANT CHANGE UP (ref 13–44)
MAGNESIUM SERPL-MCNC: 2.5 MG/DL — SIGNIFICANT CHANGE UP (ref 1.6–2.6)
MCHC RBC-ENTMCNC: 30.2 PG — SIGNIFICANT CHANGE UP (ref 27–34)
MCHC RBC-ENTMCNC: 30.8 GM/DL — LOW (ref 32–36)
MCV RBC AUTO: 98.1 FL — SIGNIFICANT CHANGE UP (ref 80–100)
MONOCYTES # BLD AUTO: 0.8 K/UL — SIGNIFICANT CHANGE UP (ref 0–0.9)
MONOCYTES NFR BLD AUTO: 7.5 % — SIGNIFICANT CHANGE UP (ref 2–14)
NEUTROPHILS # BLD AUTO: 7.7 K/UL — HIGH (ref 1.8–7.4)
NEUTROPHILS NFR BLD AUTO: 70.6 % — SIGNIFICANT CHANGE UP (ref 43–77)
PHOSPHATE SERPL-MCNC: 7.3 MG/DL — HIGH (ref 2.5–4.5)
PLATELET # BLD AUTO: 495 K/UL — HIGH (ref 150–400)
POTASSIUM SERPL-MCNC: 5.9 MMOL/L — HIGH (ref 3.5–5.3)
POTASSIUM SERPL-SCNC: 5.9 MMOL/L — HIGH (ref 3.5–5.3)
PROT SERPL-MCNC: 7.4 G/DL — SIGNIFICANT CHANGE UP (ref 6–8.3)
PROTHROM AB SERPL-ACNC: 10.3 SEC — SIGNIFICANT CHANGE UP (ref 9.8–12.7)
RBC # BLD: 2.96 M/UL — LOW (ref 3.8–5.2)
RBC # FLD: 14.4 % — SIGNIFICANT CHANGE UP (ref 10.3–14.5)
SODIUM SERPL-SCNC: 135 MMOL/L — SIGNIFICANT CHANGE UP (ref 135–145)
VANCOMYCIN TROUGH SERPL-MCNC: 34.1 UG/ML — CRITICAL HIGH (ref 10–20)
WBC # BLD: 11 K/UL — HIGH (ref 3.8–10.5)
WBC # FLD AUTO: 11 K/UL — HIGH (ref 3.8–10.5)

## 2018-09-28 PROCEDURE — 76937 US GUIDE VASCULAR ACCESS: CPT | Mod: 26

## 2018-09-28 PROCEDURE — 36558 INSERT TUNNELED CV CATH: CPT

## 2018-09-28 PROCEDURE — 77001 FLUOROGUIDE FOR VEIN DEVICE: CPT | Mod: 26

## 2018-09-28 RX ORDER — INSULIN LISPRO 100/ML
3 VIAL (ML) SUBCUTANEOUS
Qty: 0 | Refills: 0 | Status: DISCONTINUED | OUTPATIENT
Start: 2018-09-28 | End: 2018-10-05

## 2018-09-28 RX ORDER — CHLORHEXIDINE GLUCONATE 213 G/1000ML
1 SOLUTION TOPICAL DAILY
Qty: 0 | Refills: 0 | Status: DISCONTINUED | OUTPATIENT
Start: 2018-09-28 | End: 2018-10-05

## 2018-09-28 RX ORDER — ERYTHROPOIETIN 10000 [IU]/ML
4000 INJECTION, SOLUTION INTRAVENOUS; SUBCUTANEOUS
Qty: 0 | Refills: 0 | Status: COMPLETED | OUTPATIENT
Start: 2018-09-28 | End: 2018-10-03

## 2018-09-28 RX ORDER — SEVELAMER CARBONATE 2400 MG/1
800 POWDER, FOR SUSPENSION ORAL THREE TIMES A DAY
Qty: 0 | Refills: 0 | Status: DISCONTINUED | OUTPATIENT
Start: 2018-09-28 | End: 2018-10-05

## 2018-09-28 RX ADMIN — Medication 200 MILLIGRAM(S): at 23:26

## 2018-09-28 RX ADMIN — GABAPENTIN 100 MILLIGRAM(S): 400 CAPSULE ORAL at 23:20

## 2018-09-28 RX ADMIN — Medication 50 MILLIGRAM(S): at 05:43

## 2018-09-28 RX ADMIN — HEPARIN SODIUM 5000 UNIT(S): 5000 INJECTION INTRAVENOUS; SUBCUTANEOUS at 23:20

## 2018-09-28 RX ADMIN — Medication 500 MILLIGRAM(S): at 17:37

## 2018-09-28 RX ADMIN — Medication 50 MILLIGRAM(S): at 23:20

## 2018-09-28 RX ADMIN — Medication 50 MICROGRAM(S): at 05:43

## 2018-09-28 RX ADMIN — PANTOPRAZOLE SODIUM 40 MILLIGRAM(S): 20 TABLET, DELAYED RELEASE ORAL at 05:45

## 2018-09-28 RX ADMIN — GABAPENTIN 100 MILLIGRAM(S): 400 CAPSULE ORAL at 05:42

## 2018-09-28 RX ADMIN — Medication 3 UNIT(S): at 17:37

## 2018-09-28 RX ADMIN — ERYTHROPOIETIN 4000 UNIT(S): 10000 INJECTION, SOLUTION INTRAVENOUS; SUBCUTANEOUS at 19:58

## 2018-09-28 RX ADMIN — SEVELAMER CARBONATE 800 MILLIGRAM(S): 2400 POWDER, FOR SUSPENSION ORAL at 23:49

## 2018-09-28 RX ADMIN — Medication 2: at 17:37

## 2018-09-28 RX ADMIN — Medication 500 MILLIGRAM(S): at 05:42

## 2018-09-28 RX ADMIN — HEPARIN SODIUM 5000 UNIT(S): 5000 INJECTION INTRAVENOUS; SUBCUTANEOUS at 05:42

## 2018-09-28 RX ADMIN — Medication 1: at 23:50

## 2018-09-28 RX ADMIN — SENNA PLUS 2 TABLET(S): 8.6 TABLET ORAL at 23:20

## 2018-09-28 NOTE — PROGRESS NOTE ADULT - ATTENDING COMMENTS
Patient is seen and examined. Case reviewed with the medical team. Above note is appreciated. Will follow up clinically. Continue DVT prophylaxis. Case discussed with Cardiology, and nephrology. Will medically optimize for AV fistula and cardiology clearance. Continue wound care. Over all prognosis is poor despite any medical intervention.

## 2018-09-28 NOTE — PROGRESS NOTE ADULT - SUBJECTIVE AND OBJECTIVE BOX
s/p permacath placement    No Known Allergies    Hospital Medications:   MEDICATIONS  (STANDING):  ascorbic acid 500 milliGRAM(s) Oral two times a day  chlorhexidine 2% Cloths 1 Application(s) Topical daily  docusate sodium 200 milliGRAM(s) Oral at bedtime  epoetin jacuqeline Injectable 4000 Unit(s) IV Push <User Schedule>  epoetin jacqueline Injectable 4000 Unit(s) IV Push <User Schedule>  ferrous    sulfate 325 milliGRAM(s) Oral daily  folic acid 1 milliGRAM(s) Oral daily  gabapentin 100 milliGRAM(s) Oral three times a day  heparin  Injectable 5000 Unit(s) SubCutaneous every 8 hours  hydrALAZINE 50 milliGRAM(s) Oral every 8 hours  influenza   Vaccine 0.5 milliLiter(s) IntraMuscular once  insulin lispro (HumaLOG) corrective regimen sliding scale   SubCutaneous Before meals and at bedtime  insulin lispro Injectable (HumaLOG) 3 Unit(s) SubCutaneous three times a day before meals  levothyroxine 50 MICROGram(s) Oral daily  metoprolol tartrate 50 milliGRAM(s) Oral two times a day  multivitamin/minerals 1 Tablet(s) Oral daily  pantoprazole    Tablet 40 milliGRAM(s) Oral before breakfast  senna 2 Tablet(s) Oral at bedtime  zinc sulfate 220 milliGRAM(s) Oral daily        VITALS:  T(F): 97.8 (09-28-18 @ 14:26), Max: 98.1 (09-27-18 @ 21:32)  HR: 76 (09-28-18 @ 14:26)  BP: 144/57 (09-28-18 @ 14:26)  RR: 17 (09-28-18 @ 14:26)  SpO2: 100% (09-28-18 @ 14:26)  Wt(kg): --    09-28 @ 07:01  -  09-28 @ 16:41  --------------------------------------------------------  IN: 0 mL / OUT: 200 mL / NET: -200 mL        PHYSICAL EXAM:  Constitutional: NAD  HEENT: anicteric sclera, oropharynx clear.  Neck: No JVD  Respiratory: CTAB, no wheezes, rales or rhonchi  Cardiovascular: S1, S2, RRR  Gastrointestinal: BS+, soft, NT/ND  Extremities: No peripheral edema  Neurological: A/O x 3, no focal deficits  Vascular Access: RT IJ permacath    LABS:  09-27    136  |  94<L>  |  88<H>  ----------------------------<  116<H>  4.7   |  29  |  6.87<H>    Ca    10.5      27 Sep 2018 05:59  Phos  6.9     09-27  Mg     2.5     09-27    TPro  7.6  /  Alb  2.7<L>  /  TBili  0.4  /  DBili      /  AST  9<L>  /  ALT  14  /  AlkPhos  131<H>  09-27    Creatinine Trend: 6.87 <--, 6.00 <--, 4.43 <--, 5.75 <--, 5.49 <--                        9.4    10.7  )-----------( 470      ( 27 Sep 2018 05:59 )             30.5     Urine Studies:      RADIOLOGY & ADDITIONAL STUDIES:

## 2018-09-28 NOTE — PROGRESS NOTE ADULT - SUBJECTIVE AND OBJECTIVE BOX
PGY 1 Note discussed with supervising resident and primary attending    Patient is a 53y old  Female who presents with a chief complaint of sent from NH due to positive blood cultures (28 Sep 2018 11:00)      INTERVAL HPI/OVERNIGHT EVENTS:   Patient seen at the bedside. no new complains, no over night events    MEDICATIONS  (STANDING):  ascorbic acid 500 milliGRAM(s) Oral two times a day  chlorhexidine 2% Cloths 1 Application(s) Topical daily  docusate sodium 200 milliGRAM(s) Oral at bedtime  epoetin jacqueline Injectable 4000 Unit(s) IV Push <User Schedule>  epoetin jacqueline Injectable 4000 Unit(s) IV Push <User Schedule>  ferrous    sulfate 325 milliGRAM(s) Oral daily  folic acid 1 milliGRAM(s) Oral daily  gabapentin 100 milliGRAM(s) Oral three times a day  heparin  Injectable 5000 Unit(s) SubCutaneous every 8 hours  hydrALAZINE 50 milliGRAM(s) Oral every 8 hours  influenza   Vaccine 0.5 milliLiter(s) IntraMuscular once  insulin lispro (HumaLOG) corrective regimen sliding scale   SubCutaneous Before meals and at bedtime  insulin lispro Injectable (HumaLOG) 3 Unit(s) SubCutaneous three times a day before meals  levothyroxine 50 MICROGram(s) Oral daily  metoprolol tartrate 50 milliGRAM(s) Oral two times a day  multivitamin/minerals 1 Tablet(s) Oral daily  pantoprazole    Tablet 40 milliGRAM(s) Oral before breakfast  senna 2 Tablet(s) Oral at bedtime  zinc sulfate 220 milliGRAM(s) Oral daily    MEDICATIONS  (PRN):  acetaminophen   Tablet .. 650 milliGRAM(s) Oral every 6 hours PRN Moderate Pain (4 - 6)  hydrALAZINE 50 milliGRAM(s) Oral every 8 hours PRN Systolic blood pressure > 170      __________________________________________________  REVIEW OF SYSTEMS:    CONSTITUTIONAL: No fever,   EYES: no acute visual disturbances  NECK: No pain or stiffness  RESPIRATORY: No cough; No shortness of breath  CARDIOVASCULAR: No chest pain, no palpitations  GASTROINTESTINAL: No pain. No nausea or vomiting; No diarrhea   NEUROLOGICAL: No headache or numbness, no tremors  MUSCULOSKELETAL: No joint pain, no muscle pain  GENITOURINARY: no dysuria, no frequency, no hesitancy  PSYCHIATRY: no depression , no anxiety  ALL OTHER  ROS negative        Vital Signs Last 24 Hrs  T(C): 36.6 (28 Sep 2018 14:26), Max: 36.7 (27 Sep 2018 21:32)  T(F): 97.8 (28 Sep 2018 14:26), Max: 98.1 (27 Sep 2018 21:32)  HR: 76 (28 Sep 2018 14:26) (68 - 86)  BP: 144/57 (28 Sep 2018 14:26) (144/57 - 167/76)  BP(mean): --  RR: 17 (28 Sep 2018 14:26) (16 - 18)  SpO2: 100% (28 Sep 2018 14:26) (98% - 100%)    ________________________________________________  PHYSICAL EXAM:  GENERAL: NAD, blind   HEENT: Normocephalic;  conjunctivae and sclerae clear; moist mucous membranes;   NECK : supple  CHEST/LUNG: Clear to auscultation bilaterally with good air entry   HEART: S1 S2  regular; no murmurs, gallops or rubs  ABDOMEN: Soft, Nontender, Nondistended; Bowel sounds present  EXTREMITIES: no cyanosis; no edema; no calf tenderness  SKIN: warm and dry; no rash  NERVOUS SYSTEM:  Awake and alert; Oriented  to place, person and time ; no new deficits    _________________________________________________  LABS:                        9.4    10.7  )-----------( 470      ( 27 Sep 2018 05:59 )             30.5     09-27    136  |  94<L>  |  88<H>  ----------------------------<  116<H>  4.7   |  29  |  6.87<H>    Ca    10.5      27 Sep 2018 05:59  Phos  6.9     09-27  Mg     2.5     09-27    TPro  7.6  /  Alb  2.7<L>  /  TBili  0.4  /  DBili  x   /  AST  9<L>  /  ALT  14  /  AlkPhos  131<H>  09-27        CAPILLARY BLOOD GLUCOSE      POCT Blood Glucose.: 141 mg/dL (28 Sep 2018 11:56)  POCT Blood Glucose.: 166 mg/dL (28 Sep 2018 08:00)  POCT Blood Glucose.: 264 mg/dL (27 Sep 2018 21:30)  POCT Blood Glucose.: 243 mg/dL (27 Sep 2018 16:50)        RADIOLOGY & ADDITIONAL TESTS:    Imaging Personally Reviewed:  YES/NO    Consultant(s) Notes Reviewed:   YES/ No    Care Discussed with Consultants :     Plan of care was discussed with patient and /or primary care giver; all questions and concerns were addressed and care was aligned with patient's wishes.

## 2018-09-28 NOTE — PROGRESS NOTE ADULT - SUBJECTIVE AND OBJECTIVE BOX
CHIEF COMPLAINT:Patient is a 53y old  Female who presents with a chief complaint of sent from NH due to positive blood cultures.Pt appears comfortable.    	  REVIEW OF SYSTEMS:  CONSTITUTIONAL: No fever, weight loss, or fatigue  EYES: No eye pain, visual disturbances, or discharge  ENT:  No difficulty hearing, tinnitus, vertigo; No sinus or throat pain  NECK: No pain or stiffness  RESPIRATORY: No cough, wheezing, chills or hemoptysis; No Shortness of Breath  CARDIOVASCULAR: No chest pain, palpitations, passing out, dizziness, or leg swelling  GASTROINTESTINAL: No abdominal or epigastric pain. No nausea, vomiting, or hematemesis; No diarrhea or constipation. No melena or hematochezia.  GENITOURINARY: No dysuria, frequency, hematuria, or incontinence  NEUROLOGICAL: No headaches, memory loss, loss of strength, numbness, or tremors  SKIN: No itching, burning, rashes, or lesions   LYMPH Nodes: No enlarged glands  ENDOCRINE: No heat or cold intolerance; No hair loss  MUSCULOSKELETAL: No joint pain or swelling; No muscle, back, or extremity pain  PSYCHIATRIC: No depression, anxiety, mood swings, or difficulty sleeping  HEME/LYMPH: No easy bruising, or bleeding gums  ALLERGY AND IMMUNOLOGIC: No hives or eczema	      PHYSICAL EXAM:  T(C): 36.4 (09-28-18 @ 05:11), Max: 36.9 (09-27-18 @ 15:00)  HR: 86 (09-28-18 @ 05:11) (68 - 86)  BP: 167/76 (09-28-18 @ 05:11) (154/55 - 169/65)  RR: 18 (09-28-18 @ 05:11) (16 - 18)  SpO2: 98% (09-28-18 @ 05:11) (98% - 100%)  Wt(kg): --  I&O's Summary      Appearance: Normal	  HEENT:   Normal oral mucosa, PERRL, EOMI	  Lymphatic: No lymphadenopathy  Cardiovascular: Normal S1 S2, No JVD, No murmurs, No edema  Respiratory: Lungs clear to auscultation	  Psychiatry: A & O x 3, Mood & affect appropriate  Gastrointestinal:  Soft, Non-tender, + BS	  Skin: No rashes, No ecchymoses, No cyanosis	  Neurologic: Non-focal  Extremities: Normal range of motion, No clubbing, cyanosis or edema  Vascular: Peripheral pulses palpable 2+ bilaterally    MEDICATIONS  (STANDING):  ascorbic acid 500 milliGRAM(s) Oral two times a day  docusate sodium 200 milliGRAM(s) Oral at bedtime  epoetin jacqueline Injectable 4000 Unit(s) IV Push <User Schedule>  epoetin jacqueline Injectable 4000 Unit(s) IV Push <User Schedule>  ferrous    sulfate 325 milliGRAM(s) Oral daily  folic acid 1 milliGRAM(s) Oral daily  gabapentin 100 milliGRAM(s) Oral three times a day  heparin  Injectable 5000 Unit(s) SubCutaneous every 8 hours  hydrALAZINE 50 milliGRAM(s) Oral every 8 hours  influenza   Vaccine 0.5 milliLiter(s) IntraMuscular once  insulin lispro (HumaLOG) corrective regimen sliding scale   SubCutaneous Before meals and at bedtime  insulin lispro Injectable (HumaLOG) 3 Unit(s) SubCutaneous three times a day before meals  levothyroxine 50 MICROGram(s) Oral daily  metoprolol tartrate 50 milliGRAM(s) Oral two times a day  multivitamin/minerals 1 Tablet(s) Oral daily  pantoprazole    Tablet 40 milliGRAM(s) Oral before breakfast  senna 2 Tablet(s) Oral at bedtime  zinc sulfate 220 milliGRAM(s) Oral daily      LABS:	 	                         9.4    10.7  )-----------( 470      ( 27 Sep 2018 05:59 )             30.5     09-27    136  |  94<L>  |  88<H>  ----------------------------<  116<H>  4.7   |  29  |  6.87<H>    Ca    10.5      27 Sep 2018 05:59  Phos  6.9     09-27  Mg     2.5     09-27    TPro  7.6  /  Alb  2.7<L>  /  TBili  0.4  /  DBili  x   /  AST  9<L>  /  ALT  14  /  AlkPhos  131<H>  09-27      Lipid Profile: Cholesterol 84  LDL >29  HDL 52  TG <15

## 2018-09-29 LAB
ALBUMIN SERPL ELPH-MCNC: 2.5 G/DL — LOW (ref 3.5–5)
ALP SERPL-CCNC: 115 U/L — SIGNIFICANT CHANGE UP (ref 40–120)
ALT FLD-CCNC: 15 U/L DA — SIGNIFICANT CHANGE UP (ref 10–60)
ANION GAP SERPL CALC-SCNC: 10 MMOL/L — SIGNIFICANT CHANGE UP (ref 5–17)
APTT BLD: 28.9 SEC — SIGNIFICANT CHANGE UP (ref 27.5–37.4)
AST SERPL-CCNC: 9 U/L — LOW (ref 10–40)
BASOPHILS # BLD AUTO: 0.1 K/UL — SIGNIFICANT CHANGE UP (ref 0–0.2)
BASOPHILS NFR BLD AUTO: 0.9 % — SIGNIFICANT CHANGE UP (ref 0–2)
BILIRUB SERPL-MCNC: 0.3 MG/DL — SIGNIFICANT CHANGE UP (ref 0.2–1.2)
BUN SERPL-MCNC: 48 MG/DL — HIGH (ref 7–18)
CALCIUM SERPL-MCNC: 9.8 MG/DL — SIGNIFICANT CHANGE UP (ref 8.4–10.5)
CHLORIDE SERPL-SCNC: 96 MMOL/L — SIGNIFICANT CHANGE UP (ref 96–108)
CO2 SERPL-SCNC: 28 MMOL/L — SIGNIFICANT CHANGE UP (ref 22–31)
CREAT SERPL-MCNC: 4.8 MG/DL — HIGH (ref 0.5–1.3)
CULTURE RESULTS: SIGNIFICANT CHANGE UP
EOSINOPHIL # BLD AUTO: 0.4 K/UL — SIGNIFICANT CHANGE UP (ref 0–0.5)
EOSINOPHIL NFR BLD AUTO: 3.6 % — SIGNIFICANT CHANGE UP (ref 0–6)
GLUCOSE BLDC GLUCOMTR-MCNC: 121 MG/DL — HIGH (ref 70–99)
GLUCOSE BLDC GLUCOMTR-MCNC: 131 MG/DL — HIGH (ref 70–99)
GLUCOSE BLDC GLUCOMTR-MCNC: 131 MG/DL — HIGH (ref 70–99)
GLUCOSE BLDC GLUCOMTR-MCNC: 147 MG/DL — HIGH (ref 70–99)
GLUCOSE SERPL-MCNC: 108 MG/DL — HIGH (ref 70–99)
HCT VFR BLD CALC: 29.1 % — LOW (ref 34.5–45)
HGB BLD-MCNC: 9 G/DL — LOW (ref 11.5–15.5)
LYMPHOCYTES # BLD AUTO: 19.7 % — SIGNIFICANT CHANGE UP (ref 13–44)
LYMPHOCYTES # BLD AUTO: 2.3 K/UL — SIGNIFICANT CHANGE UP (ref 1–3.3)
MAGNESIUM SERPL-MCNC: 2.1 MG/DL — SIGNIFICANT CHANGE UP (ref 1.6–2.6)
MCHC RBC-ENTMCNC: 30.1 PG — SIGNIFICANT CHANGE UP (ref 27–34)
MCHC RBC-ENTMCNC: 30.8 GM/DL — LOW (ref 32–36)
MCV RBC AUTO: 97.7 FL — SIGNIFICANT CHANGE UP (ref 80–100)
MONOCYTES # BLD AUTO: 1.1 K/UL — HIGH (ref 0–0.9)
MONOCYTES NFR BLD AUTO: 9.3 % — SIGNIFICANT CHANGE UP (ref 2–14)
NEUTROPHILS # BLD AUTO: 7.7 K/UL — HIGH (ref 1.8–7.4)
NEUTROPHILS NFR BLD AUTO: 66.5 % — SIGNIFICANT CHANGE UP (ref 43–77)
PHOSPHATE SERPL-MCNC: 4.8 MG/DL — HIGH (ref 2.5–4.5)
PLATELET # BLD AUTO: 397 K/UL — SIGNIFICANT CHANGE UP (ref 150–400)
POTASSIUM SERPL-MCNC: 4.5 MMOL/L — SIGNIFICANT CHANGE UP (ref 3.5–5.3)
POTASSIUM SERPL-SCNC: 4.5 MMOL/L — SIGNIFICANT CHANGE UP (ref 3.5–5.3)
PROT SERPL-MCNC: 7 G/DL — SIGNIFICANT CHANGE UP (ref 6–8.3)
RBC # BLD: 2.98 M/UL — LOW (ref 3.8–5.2)
RBC # FLD: 14.6 % — HIGH (ref 10.3–14.5)
SODIUM SERPL-SCNC: 134 MMOL/L — LOW (ref 135–145)
SPECIMEN SOURCE: SIGNIFICANT CHANGE UP
WBC # BLD: 11.6 K/UL — HIGH (ref 3.8–10.5)
WBC # FLD AUTO: 11.6 K/UL — HIGH (ref 3.8–10.5)

## 2018-09-29 PROCEDURE — 93990 DOPPLER FLOW TESTING: CPT | Mod: 26

## 2018-09-29 RX ORDER — ACETAMINOPHEN 500 MG
650 TABLET ORAL ONCE
Qty: 0 | Refills: 0 | Status: COMPLETED | OUTPATIENT
Start: 2018-09-29 | End: 2018-09-29

## 2018-09-29 RX ADMIN — Medication 50 MILLIGRAM(S): at 05:46

## 2018-09-29 RX ADMIN — ZINC SULFATE TAB 220 MG (50 MG ZINC EQUIVALENT) 220 MILLIGRAM(S): 220 (50 ZN) TAB at 12:01

## 2018-09-29 RX ADMIN — HEPARIN SODIUM 5000 UNIT(S): 5000 INJECTION INTRAVENOUS; SUBCUTANEOUS at 05:50

## 2018-09-29 RX ADMIN — Medication 50 MICROGRAM(S): at 05:46

## 2018-09-29 RX ADMIN — SENNA PLUS 2 TABLET(S): 8.6 TABLET ORAL at 21:44

## 2018-09-29 RX ADMIN — CHLORHEXIDINE GLUCONATE 1 APPLICATION(S): 213 SOLUTION TOPICAL at 14:11

## 2018-09-29 RX ADMIN — Medication 325 MILLIGRAM(S): at 12:01

## 2018-09-29 RX ADMIN — Medication 50 MILLIGRAM(S): at 14:10

## 2018-09-29 RX ADMIN — Medication 500 MILLIGRAM(S): at 05:46

## 2018-09-29 RX ADMIN — GABAPENTIN 100 MILLIGRAM(S): 400 CAPSULE ORAL at 21:43

## 2018-09-29 RX ADMIN — Medication 650 MILLIGRAM(S): at 03:15

## 2018-09-29 RX ADMIN — Medication 1 MILLIGRAM(S): at 12:01

## 2018-09-29 RX ADMIN — Medication 650 MILLIGRAM(S): at 09:55

## 2018-09-29 RX ADMIN — Medication 3 UNIT(S): at 17:29

## 2018-09-29 RX ADMIN — GABAPENTIN 100 MILLIGRAM(S): 400 CAPSULE ORAL at 14:10

## 2018-09-29 RX ADMIN — Medication 650 MILLIGRAM(S): at 03:53

## 2018-09-29 RX ADMIN — Medication 50 MILLIGRAM(S): at 17:29

## 2018-09-29 RX ADMIN — SEVELAMER CARBONATE 800 MILLIGRAM(S): 2400 POWDER, FOR SUSPENSION ORAL at 21:44

## 2018-09-29 RX ADMIN — Medication 650 MILLIGRAM(S): at 09:25

## 2018-09-29 RX ADMIN — PANTOPRAZOLE SODIUM 40 MILLIGRAM(S): 20 TABLET, DELAYED RELEASE ORAL at 09:23

## 2018-09-29 RX ADMIN — SEVELAMER CARBONATE 800 MILLIGRAM(S): 2400 POWDER, FOR SUSPENSION ORAL at 05:50

## 2018-09-29 RX ADMIN — Medication 50 MILLIGRAM(S): at 22:04

## 2018-09-29 RX ADMIN — Medication 1 TABLET(S): at 12:01

## 2018-09-29 RX ADMIN — Medication 200 MILLIGRAM(S): at 21:43

## 2018-09-29 RX ADMIN — HEPARIN SODIUM 5000 UNIT(S): 5000 INJECTION INTRAVENOUS; SUBCUTANEOUS at 21:43

## 2018-09-29 RX ADMIN — SEVELAMER CARBONATE 800 MILLIGRAM(S): 2400 POWDER, FOR SUSPENSION ORAL at 14:10

## 2018-09-29 RX ADMIN — Medication 3 UNIT(S): at 09:12

## 2018-09-29 RX ADMIN — Medication 500 MILLIGRAM(S): at 17:29

## 2018-09-29 RX ADMIN — Medication 3 UNIT(S): at 12:00

## 2018-09-29 RX ADMIN — GABAPENTIN 100 MILLIGRAM(S): 400 CAPSULE ORAL at 05:46

## 2018-09-29 RX ADMIN — HEPARIN SODIUM 5000 UNIT(S): 5000 INJECTION INTRAVENOUS; SUBCUTANEOUS at 14:10

## 2018-09-29 NOTE — PROGRESS NOTE ADULT - SUBJECTIVE AND OBJECTIVE BOX
53y Female    Meds:    Allergies    No Known Allergies    Intolerances        VITALS:  Vital Signs Last 24 Hrs  T(C): 36.9 (29 Sep 2018 14:43), Max: 36.9 (29 Sep 2018 14:43)  T(F): 98.4 (29 Sep 2018 14:43), Max: 98.4 (29 Sep 2018 14:43)  HR: 72 (29 Sep 2018 14:43) (71 - 76)  BP: 127/55 (29 Sep 2018 14:43) (127/55 - 187/77)  BP(mean): --  RR: 16 (29 Sep 2018 14:43) (16 - 17)  SpO2: 100% (29 Sep 2018 14:43) (98% - 100%)    LABS/DIAGNOSTIC TESTS:                          9.0    11.6  )-----------( 397      ( 29 Sep 2018 09:26 )             29.1         09-29    134<L>  |  96  |  48<H>  ----------------------------<  108<H>  4.5   |  28  |  4.80<H>    Ca    9.8      29 Sep 2018 09:26  Phos  4.8     09-29  Mg     2.1     09-29    TPro  7.0  /  Alb  2.5<L>  /  TBili  0.3  /  DBili  x   /  AST  9<L>  /  ALT  15  /  AlkPhos  115  09-29      LIVER FUNCTIONS - ( 29 Sep 2018 09:26 )  Alb: 2.5 g/dL / Pro: 7.0 g/dL / ALK PHOS: 115 U/L / ALT: 15 U/L DA / AST: 9 U/L / GGT: x             CULTURES: .Blood Blood-Peripheral  09-27 @ 11:02   No growth to date.  --  --      .Blood Dialysis Catheter  09-25 @ 00:12   No growth to date.  --  --      .Blood Dialysis Catheter  09-24 @ 18:02   Growth in aerobic and anaerobic bottles: Coag Negative Staphylococcus  Single set isolate, possible contaminant. Contact  Microbiology if susceptibility testing clinically  indicated.    .Blood Dialysis Catheter  09-24 @ 18:00   No growth to date.  --  --      .Blood Dialysis Catheter  09-24 @ 17:59   No growth to date.  --  --      .Blood Blood-Peripheral  09-24 @ 14:37   No growth at 5 days.  --  --      .Blood Blood-Peripheral  09-24 @ 14:34   No growth at 5 days.  --  --            RADIOLOGY:      ROS:  [  ] UNABLE TO ELICIT 53y Female who is confused but able to answer questions, her old permacath was removed and a new inserted, she only grew out coag neg staph from her blood cultures, her repeat blood cults have been neg. Her vanco trough has been high hence  vanco on hold.    Meds:    Allergies    No Known Allergies    Intolerances        VITALS:  Vital Signs Last 24 Hrs  T(C): 36.9 (29 Sep 2018 14:43), Max: 36.9 (29 Sep 2018 14:43)  T(F): 98.4 (29 Sep 2018 14:43), Max: 98.4 (29 Sep 2018 14:43)  HR: 72 (29 Sep 2018 14:43) (71 - 76)  BP: 127/55 (29 Sep 2018 14:43) (127/55 - 187/77)  BP(mean): --  RR: 16 (29 Sep 2018 14:43) (16 - 17)  SpO2: 100% (29 Sep 2018 14:43) (98% - 100%)    LABS/DIAGNOSTIC TESTS:                          9.0    11.6  )-----------( 397      ( 29 Sep 2018 09:26 )             29.1         09-29    134<L>  |  96  |  48<H>  ----------------------------<  108<H>  4.5   |  28  |  4.80<H>    Ca    9.8      29 Sep 2018 09:26  Phos  4.8     09-29  Mg     2.1     09-29    TPro  7.0  /  Alb  2.5<L>  /  TBili  0.3  /  DBili  x   /  AST  9<L>  /  ALT  15  /  AlkPhos  115  09-29      LIVER FUNCTIONS - ( 29 Sep 2018 09:26 )  Alb: 2.5 g/dL / Pro: 7.0 g/dL / ALK PHOS: 115 U/L / ALT: 15 U/L DA / AST: 9 U/L / GGT: x             CULTURES: .Blood Blood-Peripheral  09-27 @ 11:02   No growth to date.  --  --      .Blood Dialysis Catheter  09-25 @ 00:12   No growth to date.  --  --      .Blood Dialysis Catheter  09-24 @ 18:02   Growth in aerobic and anaerobic bottles: Coag Negative Staphylococcus  Single set isolate, possible contaminant. Contact  Microbiology if susceptibility testing clinically  indicated.    .Blood Dialysis Catheter  09-24 @ 18:00   No growth to date.  --  --      .Blood Dialysis Catheter  09-24 @ 17:59   No growth to date.  --  --      .Blood Blood-Peripheral  09-24 @ 14:37   No growth at 5 days.  --  --      .Blood Blood-Peripheral  09-24 @ 14:34   No growth at 5 days.  --  --            RADIOLOGY:      ROS:  [  ] UNABLE TO ELICIT

## 2018-09-29 NOTE — PROGRESS NOTE ADULT - SUBJECTIVE AND OBJECTIVE BOX
CHIEF COMPLAINT:Patient is a 53y old  Female who presents with a chief complaint of sent from NH due to positive blood cultures (29 Sep 2018 17:14)    	  REVIEW OF SYSTEMS:  CONSTITUTIONAL: No fever, weight loss, or fatigue  EYES: No eye pain, visual disturbances, or discharge  ENMT:  No difficulty hearing, tinnitus, vertigo; No sinus or throat pain  NECK: No pain or stiffness  RESPIRATORY: No cough, wheezing, chills or hemoptysis; No Shortness of Breath  CARDIOVASCULAR: No chest pain, palpitations, passing out, dizziness, or leg swelling  GASTROINTESTINAL: No abdominal or epigastric pain. No nausea, vomiting, or hematemesis; No diarrhea or constipation. No melena or hematochezia.  GENITOURINARY: No dysuria, frequency, hematuria, or incontinence  NEUROLOGICAL: No headaches, memory loss, loss of strength, numbness, or tremors  SKIN: No itching, burning, rashes, or lesions   LYMPH Nodes: No enlarged glands  ENDOCRINE: No heat or cold intolerance; No hair loss  MUSCULOSKELETAL: No joint pain or swelling; No muscle, back, or extremity pain  PSYCHIATRIC: No depression, anxiety, mood swings, or difficulty sleeping  HEME/LYMPH: No easy bruising, or bleeding gums  ALLERY AND IMMUNOLOGIC: No hives or eczema	    [ ] All others negative	  [ ] Unable to obtain    PHYSICAL EXAM:  T(C): 37.1  @ 05:22), Max: 37.4   HR: 70  @ 05:22) (70 - 79)  BP: 172/62  @ 05:22) (127/55 - 172/62)  RR: 16  @ 05:22) (16 - 16)  SpO2: 100%  @ 05:22) (99% - 100%)  Wt(kg): --  I&O's Summary      Appearance: Normal	  HEENT:   Normal oral mucosa, PERRL, EOMI	  Lymphatic: No lymphadenopathy  Cardiovascular: Normal S1 S2, No JVD, No murmurs, No edema  Respiratory: Lungs clear to auscultation	  Psychiatry: A & O x 3, Mood & affect appropriate  Gastrointestinal:  Soft, Non-tender, + BS	  Skin: No rashes, No ecchymoses, No cyanosis	  Neurologic: Non-focal  Extremities: Normal range of motion, No clubbing, cyanosis or edema  Vascular: Peripheral pulses palpable 2+ bilaterally    MEDICATIONS  (STANDING):  ascorbic acid 500 milliGRAM(s) Oral two times a day  chlorhexidine 2% Cloths 1 Application(s) Topical daily  docusate sodium 200 milliGRAM(s) Oral at bedtime  epoetin jacqueline Injectable 4000 Unit(s) IV Push <User Schedule>  epoetin jacqueline Injectable 4000 Unit(s) IV Push <User Schedule>  ferrous    sulfate 325 milliGRAM(s) Oral daily  folic acid 1 milliGRAM(s) Oral daily  gabapentin 100 milliGRAM(s) Oral three times a day  heparin  Injectable 5000 Unit(s) SubCutaneous every 8 hours  hydrALAZINE 50 milliGRAM(s) Oral every 8 hours  influenza   Vaccine 0.5 milliLiter(s) IntraMuscular once  insulin lispro (HumaLOG) corrective regimen sliding scale   SubCutaneous Before meals and at bedtime  insulin lispro Injectable (HumaLOG) 3 Unit(s) SubCutaneous three times a day before meals  levothyroxine 50 MICROGram(s) Oral daily  metoprolol tartrate 50 milliGRAM(s) Oral two times a day  multivitamin/minerals 1 Tablet(s) Oral daily  pantoprazole    Tablet 40 milliGRAM(s) Oral before breakfast  senna 2 Tablet(s) Oral at bedtime  sevelamer hydrochloride 800 milliGRAM(s) Oral three times a day  zinc sulfate 220 milliGRAM(s) Oral daily      TELEMETRY: 	    ECG:  	  RADIOLOGY:  OTHER: 	  	  CBC Full  -  ( 29 Sep 2018 09:26 )  WBC Count : 11.6 K/uL  Hemoglobin : 9.0 g/dL  Hematocrit : 29.1 %  Platelet Count - Automated : 397 K/uL  Mean Cell Volume : 97.7 fl  Mean Cell Hemoglobin : 30.1 pg  Mean Cell Hemoglobin Concentration : 30.8 gm/dL  Auto Neutrophil # : 7.7 K/uL  Auto Lymphocyte # : 2.3 K/uL  Auto Monocyte # : 1.1 K/uL  Auto Eosinophil # : 0.4 K/uL  Auto Basophil # : 0.1 K/uL  Auto Neutrophil % : 66.5 %  Auto Lymphocyte % : 19.7 %  Auto Monocyte % : 9.3 %  Auto Eosinophil % : 3.6 %  Auto Basophil % : 0.9 %        CARDIAC MARKERS:                              9.0    11.6  )-----------( 397      ( 29 Sep 2018 09:26 )             29.1             PT/INR - ( 28 Sep 2018 19:38 )   PT: 10.3 sec;   INR: 0.95 ratio         PTT - ( 29 Sep 2018 09:26 )  PTT:28.9 sec      proBNP:   Lipid Profile: Cholesterol 84  LDL >29  HDL 52  TG <15    HgA1c:   TSH:

## 2018-09-29 NOTE — PROGRESS NOTE ADULT - SUBJECTIVE AND OBJECTIVE BOX
PGY 1 Note discussed with supervising resident and primary attending    Patient is a 53y old  Female who presents with a chief complaint of sent from NH due to positive blood cultures (29 Sep 2018 11:29)      INTERVAL HPI/OVERNIGHT EVENTS:   Patient seen at the bedside. Doing fine, received HD yesterday. No new complains     MEDICATIONS  (STANDING):  ascorbic acid 500 milliGRAM(s) Oral two times a day  chlorhexidine 2% Cloths 1 Application(s) Topical daily  docusate sodium 200 milliGRAM(s) Oral at bedtime  epoetin jacqueline Injectable 4000 Unit(s) IV Push <User Schedule>  epoetin jacqueline Injectable 4000 Unit(s) IV Push <User Schedule>  ferrous    sulfate 325 milliGRAM(s) Oral daily  folic acid 1 milliGRAM(s) Oral daily  gabapentin 100 milliGRAM(s) Oral three times a day  heparin  Injectable 5000 Unit(s) SubCutaneous every 8 hours  hydrALAZINE 50 milliGRAM(s) Oral every 8 hours  influenza   Vaccine 0.5 milliLiter(s) IntraMuscular once  insulin lispro (HumaLOG) corrective regimen sliding scale   SubCutaneous Before meals and at bedtime  insulin lispro Injectable (HumaLOG) 3 Unit(s) SubCutaneous three times a day before meals  levothyroxine 50 MICROGram(s) Oral daily  metoprolol tartrate 50 milliGRAM(s) Oral two times a day  multivitamin/minerals 1 Tablet(s) Oral daily  pantoprazole    Tablet 40 milliGRAM(s) Oral before breakfast  senna 2 Tablet(s) Oral at bedtime  sevelamer hydrochloride 800 milliGRAM(s) Oral three times a day  zinc sulfate 220 milliGRAM(s) Oral daily    MEDICATIONS  (PRN):  acetaminophen   Tablet .. 650 milliGRAM(s) Oral every 6 hours PRN Moderate Pain (4 - 6)  hydrALAZINE 50 milliGRAM(s) Oral every 8 hours PRN Systolic blood pressure > 170      __________________________________________________  REVIEW OF SYSTEMS:    CONSTITUTIONAL: No fever,   EYES: no acute visual disturbances  NECK: No pain or stiffness  RESPIRATORY: No cough; No shortness of breath  CARDIOVASCULAR: No chest pain, no palpitations  GASTROINTESTINAL: No pain. No nausea or vomiting; No diarrhea   NEUROLOGICAL: No headache or numbness, no tremors  MUSCULOSKELETAL: No joint pain, no muscle pain  GENITOURINARY: no dysuria, no frequency, no hesitancy  PSYCHIATRY: no depression , no anxiety  ALL OTHER  ROS negative        Vital Signs Last 24 Hrs  T(C): 36.8 (29 Sep 2018 05:18), Max: 36.8 (29 Sep 2018 05:18)  T(F): 98.3 (29 Sep 2018 05:18), Max: 98.3 (29 Sep 2018 05:18)  HR: 73 (29 Sep 2018 05:18) (71 - 76)  BP: 164/75 (29 Sep 2018 05:18) (144/57 - 187/77)  BP(mean): --  RR: 16 (29 Sep 2018 05:18) (16 - 17)  SpO2: 100% (29 Sep 2018 05:18) (98% - 100%)    ________________________________________________  PHYSICAL EXAM:  GENERAL: NAD, legally blind   HEENT: Normocephalic;  conjunctivae and sclerae clear; moist mucous membranes;   NECK : supple  CHEST/LUNG: Clear to auscultation bilaterally with good air entry   HEART: S1 S2  regular; no murmurs, gallops or rubs  ABDOMEN: Soft, Nontender, Nondistended; Bowel sounds present  EXTREMITIES: no cyanosis; no edema; no calf tenderness  SKIN: warm and dry; no rash  NERVOUS SYSTEM:  Awake and alert; Oriented  to place, person and time ; no new deficits    _________________________________________________  LABS:                        9.0    11.6  )-----------( 397      ( 29 Sep 2018 09:26 )             29.1     09-29    134<L>  |  96  |  48<H>  ----------------------------<  108<H>  4.5   |  28  |  4.80<H>    Ca    9.8      29 Sep 2018 09:26  Phos  4.8     09-29  Mg     2.1     09-29    TPro  7.0  /  Alb  2.5<L>  /  TBili  0.3  /  DBili  x   /  AST  9<L>  /  ALT  15  /  AlkPhos  115  09-29    PT/INR - ( 28 Sep 2018 19:38 )   PT: 10.3 sec;   INR: 0.95 ratio         PTT - ( 29 Sep 2018 09:26 )  PTT:28.9 sec    CAPILLARY BLOOD GLUCOSE      POCT Blood Glucose.: 131 mg/dL (29 Sep 2018 08:17)  POCT Blood Glucose.: 168 mg/dL (28 Sep 2018 23:41)  POCT Blood Glucose.: 145 mg/dL (28 Sep 2018 22:00)  POCT Blood Glucose.: 239 mg/dL (28 Sep 2018 17:24)  POCT Blood Glucose.: 141 mg/dL (28 Sep 2018 11:56)        RADIOLOGY & ADDITIONAL TESTS:    Imaging Personally Reviewed:  YES/NO    Consultant(s) Notes Reviewed:   YES/ No    Care Discussed with Consultants :     Plan of care was discussed with patient and /or primary care giver; all questions and concerns were addressed and care was aligned with patient's wishes.

## 2018-09-29 NOTE — PROGRESS NOTE ADULT - SUBJECTIVE AND OBJECTIVE BOX
pt preop for left avf  Cardiology consult appreciated, await repeat eccho and bubble study  plan for AVF creation Thursday 10/4/18

## 2018-09-29 NOTE — PROGRESS NOTE ADULT - SUBJECTIVE AND OBJECTIVE BOX
s/p HD yesterday.    No Known Allergies    Hospital Medications:   MEDICATIONS  (STANDING):  ascorbic acid 500 milliGRAM(s) Oral two times a day  chlorhexidine 2% Cloths 1 Application(s) Topical daily  docusate sodium 200 milliGRAM(s) Oral at bedtime  epoetin jacqueline Injectable 4000 Unit(s) IV Push <User Schedule>  epoetin jacqueline Injectable 4000 Unit(s) IV Push <User Schedule>  ferrous    sulfate 325 milliGRAM(s) Oral daily  folic acid 1 milliGRAM(s) Oral daily  gabapentin 100 milliGRAM(s) Oral three times a day  heparin  Injectable 5000 Unit(s) SubCutaneous every 8 hours  hydrALAZINE 50 milliGRAM(s) Oral every 8 hours  influenza   Vaccine 0.5 milliLiter(s) IntraMuscular once  insulin lispro (HumaLOG) corrective regimen sliding scale   SubCutaneous Before meals and at bedtime  insulin lispro Injectable (HumaLOG) 3 Unit(s) SubCutaneous three times a day before meals  levothyroxine 50 MICROGram(s) Oral daily  metoprolol tartrate 50 milliGRAM(s) Oral two times a day  multivitamin/minerals 1 Tablet(s) Oral daily  pantoprazole    Tablet 40 milliGRAM(s) Oral before breakfast  senna 2 Tablet(s) Oral at bedtime  sevelamer hydrochloride 800 milliGRAM(s) Oral three times a day  zinc sulfate 220 milliGRAM(s) Oral daily        VITALS:  T(F): 98.4 (09-29-18 @ 14:43), Max: 98.4 (09-29-18 @ 14:43)  HR: 72 (09-29-18 @ 14:43)  BP: 127/55 (09-29-18 @ 14:43)  RR: 16 (09-29-18 @ 14:43)  SpO2: 100% (09-29-18 @ 14:43)  Wt(kg): --    09-28 @ 07:01  -  09-29 @ 07:00  --------------------------------------------------------  IN: 0 mL / OUT: 200 mL / NET: -200 mL        PHYSICAL EXAM:  Constitutional: NAD  HEENT: anicteric sclera, oropharynx clear.  Neck: No JVD  Respiratory: CTAB, no wheezes, rales or rhonchi  Cardiovascular: S1, S2, RRR  Gastrointestinal: BS+, soft, NT/ND  Extremities:  No peripheral edema  Neurological: A/O x 3, no focal deficits  Vascular Access: RT IJ permacath.    LABS:  09-29    134<L>  |  96  |  48<H>  ----------------------------<  108<H>  4.5   |  28  |  4.80<H>    Ca    9.8      29 Sep 2018 09:26  Phos  4.8     09-29  Mg     2.1     09-29    TPro  7.0  /  Alb  2.5<L>  /  TBili  0.3  /  DBili      /  AST  9<L>  /  ALT  15  /  AlkPhos  115  09-29    Creatinine Trend: 4.80 <--, 9.49 <--, 6.87 <--, 6.00 <--, 4.43 <--, 5.75 <--, 5.49 <--                        9.0    11.6  )-----------( 397      ( 29 Sep 2018 09:26 )             29.1     Urine Studies:      RADIOLOGY & ADDITIONAL STUDIES:

## 2018-09-29 NOTE — PROGRESS NOTE ADULT - SUBJECTIVE AND OBJECTIVE BOX
CHIEF COMPLAINT:Patient is a 53y old  Female who presents with a chief complaint of sent from NH due to positive blood cultures.Pt appears comfortable.    	  REVIEW OF SYSTEMS:  CONSTITUTIONAL: No fever, weight loss, or fatigue  EYES: No eye pain, visual disturbances, or discharge  ENT:  No difficulty hearing, tinnitus, vertigo; No sinus or throat pain  NECK: No pain or stiffness  RESPIRATORY: No cough, wheezing, chills or hemoptysis; No Shortness of Breath  CARDIOVASCULAR: No chest pain, palpitations, passing out, dizziness, or leg swelling  GASTROINTESTINAL: No abdominal or epigastric pain. No nausea, vomiting, or hematemesis; No diarrhea or constipation. No melena or hematochezia.  GENITOURINARY: No dysuria, frequency, hematuria, or incontinence  NEUROLOGICAL: No headaches, memory loss, loss of strength, numbness, or tremors  SKIN: No itching, burning, rashes, or lesions   LYMPH Nodes: No enlarged glands  ENDOCRINE: No heat or cold intolerance; No hair loss  MUSCULOSKELETAL: No joint pain or swelling; No muscle, back, or extremity pain  PSYCHIATRIC: No depression, anxiety, mood swings, or difficulty sleeping  HEME/LYMPH: No easy bruising, or bleeding gums  ALLERGY AND IMMUNOLOGIC: No hives or eczema	      PHYSICAL EXAM:  T(C): 36.8 (09-29-18 @ 05:18), Max: 36.8 (09-29-18 @ 05:18)  HR: 73 (09-29-18 @ 05:18) (71 - 76)  BP: 164/75 (09-29-18 @ 05:18) (144/57 - 187/77)  RR: 16 (09-29-18 @ 05:18) (16 - 17)  SpO2: 100% (09-29-18 @ 05:18) (98% - 100%)  Wt(kg): --  I&O's Summary    28 Sep 2018 07:01  -  29 Sep 2018 07:00  --------------------------------------------------------  IN: 0 mL / OUT: 200 mL / NET: -200 mL        Appearance: Normal	  HEENT:   Normal oral mucosa, PERRL, EOMI	  Lymphatic: No lymphadenopathy  Cardiovascular: Normal S1 S2, No JVD, No murmurs, No edema  Respiratory: Lungs clear to auscultation	  Psychiatry: A & O x 3, Mood & affect appropriate  Gastrointestinal:  Soft, Non-tender, + BS	  Skin: No rashes, No ecchymoses, No cyanosis	  Neurologic: Non-focal  Extremities: Normal range of motion, No clubbing, cyanosis or edema  Vascular: Peripheral pulses palpable 2+ bilaterally    MEDICATIONS  (STANDING):  ascorbic acid 500 milliGRAM(s) Oral two times a day  chlorhexidine 2% Cloths 1 Application(s) Topical daily  docusate sodium 200 milliGRAM(s) Oral at bedtime  epoetin jacqueline Injectable 4000 Unit(s) IV Push <User Schedule>  epoetin jacqueline Injectable 4000 Unit(s) IV Push <User Schedule>  ferrous    sulfate 325 milliGRAM(s) Oral daily  folic acid 1 milliGRAM(s) Oral daily  gabapentin 100 milliGRAM(s) Oral three times a day  heparin  Injectable 5000 Unit(s) SubCutaneous every 8 hours  hydrALAZINE 50 milliGRAM(s) Oral every 8 hours  influenza   Vaccine 0.5 milliLiter(s) IntraMuscular once  insulin lispro (HumaLOG) corrective regimen sliding scale   SubCutaneous Before meals and at bedtime  insulin lispro Injectable (HumaLOG) 3 Unit(s) SubCutaneous three times a day before meals  levothyroxine 50 MICROGram(s) Oral daily  metoprolol tartrate 50 milliGRAM(s) Oral two times a day  multivitamin/minerals 1 Tablet(s) Oral daily  pantoprazole    Tablet 40 milliGRAM(s) Oral before breakfast  senna 2 Tablet(s) Oral at bedtime  sevelamer hydrochloride 800 milliGRAM(s) Oral three times a day  zinc sulfate 220 milliGRAM(s) Oral daily      LABS:	 	                      9.0    11.6  )-----------( 397      ( 29 Sep 2018 09:26 )             29.1     09-29    134<L>  |  96  |  48<H>  ----------------------------<  108<H>  4.5   |  28  |  4.80<H>    Ca    9.8      29 Sep 2018 09:26  Phos  4.8     09-29  Mg     2.1     09-29    TPro  7.0  /  Alb  2.5<L>  /  TBili  0.3  /  DBili  x   /  AST  9<L>  /  ALT  15  /  AlkPhos  115  09-29      Lipid Profile: Cholesterol 84  LDL >29  HDL 52  TG <15       CONCLUSIONS:  1. Normal mitral valve. Trace mitral regurgitation.  2. Normal Left Ventricular Systolic Function,  (EF = 55 to  60% by visual estimation)  3. Normal right atrium.  4. Previously seen catheter in the right heart is no longer  evident.  5. Normal tricuspid valve.  6. An atrial septal aneurysm is noted. Agitated saline  injection revealed bubbles in the left heart, consistent  with patent foramen ovale.  7. No pericardial effusion.    *** Compared with echocardiogram of 9/24/2018, a limited  study was performed and less information provided.  Right-sided catheter no longer seen.

## 2018-09-30 LAB
ALBUMIN SERPL ELPH-MCNC: 2.4 G/DL — LOW (ref 3.5–5)
ALP SERPL-CCNC: 107 U/L — SIGNIFICANT CHANGE UP (ref 40–120)
ALT FLD-CCNC: 15 U/L DA — SIGNIFICANT CHANGE UP (ref 10–60)
ANION GAP SERPL CALC-SCNC: 13 MMOL/L — SIGNIFICANT CHANGE UP (ref 5–17)
AST SERPL-CCNC: 11 U/L — SIGNIFICANT CHANGE UP (ref 10–40)
BASOPHILS # BLD AUTO: 0.1 K/UL — SIGNIFICANT CHANGE UP (ref 0–0.2)
BASOPHILS NFR BLD AUTO: 0.9 % — SIGNIFICANT CHANGE UP (ref 0–2)
BILIRUB SERPL-MCNC: 0.4 MG/DL — SIGNIFICANT CHANGE UP (ref 0.2–1.2)
BUN SERPL-MCNC: 69 MG/DL — HIGH (ref 7–18)
CALCIUM SERPL-MCNC: 10 MG/DL — SIGNIFICANT CHANGE UP (ref 8.4–10.5)
CHLORIDE SERPL-SCNC: 97 MMOL/L — SIGNIFICANT CHANGE UP (ref 96–108)
CO2 SERPL-SCNC: 24 MMOL/L — SIGNIFICANT CHANGE UP (ref 22–31)
CREAT SERPL-MCNC: 6.34 MG/DL — HIGH (ref 0.5–1.3)
CULTURE RESULTS: SIGNIFICANT CHANGE UP
EOSINOPHIL # BLD AUTO: 0.7 K/UL — HIGH (ref 0–0.5)
EOSINOPHIL NFR BLD AUTO: 6.7 % — HIGH (ref 0–6)
GLUCOSE BLDC GLUCOMTR-MCNC: 118 MG/DL — HIGH (ref 70–99)
GLUCOSE BLDC GLUCOMTR-MCNC: 166 MG/DL — HIGH (ref 70–99)
GLUCOSE BLDC GLUCOMTR-MCNC: 170 MG/DL — HIGH (ref 70–99)
GLUCOSE BLDC GLUCOMTR-MCNC: 208 MG/DL — HIGH (ref 70–99)
GLUCOSE SERPL-MCNC: 89 MG/DL — SIGNIFICANT CHANGE UP (ref 70–99)
HCT VFR BLD CALC: 28.9 % — LOW (ref 34.5–45)
HGB BLD-MCNC: 8.6 G/DL — LOW (ref 11.5–15.5)
LYMPHOCYTES # BLD AUTO: 2.9 K/UL — SIGNIFICANT CHANGE UP (ref 1–3.3)
LYMPHOCYTES # BLD AUTO: 25.9 % — SIGNIFICANT CHANGE UP (ref 13–44)
MAGNESIUM SERPL-MCNC: 2.3 MG/DL — SIGNIFICANT CHANGE UP (ref 1.6–2.6)
MCHC RBC-ENTMCNC: 29.8 GM/DL — LOW (ref 32–36)
MCHC RBC-ENTMCNC: 30 PG — SIGNIFICANT CHANGE UP (ref 27–34)
MCV RBC AUTO: 100.7 FL — HIGH (ref 80–100)
MONOCYTES # BLD AUTO: 1.1 K/UL — HIGH (ref 0–0.9)
MONOCYTES NFR BLD AUTO: 9.9 % — SIGNIFICANT CHANGE UP (ref 2–14)
NEUTROPHILS # BLD AUTO: 6.2 K/UL — SIGNIFICANT CHANGE UP (ref 1.8–7.4)
NEUTROPHILS NFR BLD AUTO: 56.5 % — SIGNIFICANT CHANGE UP (ref 43–77)
PHOSPHATE SERPL-MCNC: 5.5 MG/DL — HIGH (ref 2.5–4.5)
PLATELET # BLD AUTO: 378 K/UL — SIGNIFICANT CHANGE UP (ref 150–400)
POTASSIUM SERPL-MCNC: 4.5 MMOL/L — SIGNIFICANT CHANGE UP (ref 3.5–5.3)
POTASSIUM SERPL-SCNC: 4.5 MMOL/L — SIGNIFICANT CHANGE UP (ref 3.5–5.3)
PROT SERPL-MCNC: 6.7 G/DL — SIGNIFICANT CHANGE UP (ref 6–8.3)
RBC # BLD: 2.87 M/UL — LOW (ref 3.8–5.2)
RBC # FLD: 14.7 % — HIGH (ref 10.3–14.5)
SODIUM SERPL-SCNC: 134 MMOL/L — LOW (ref 135–145)
SPECIMEN SOURCE: SIGNIFICANT CHANGE UP
WBC # BLD: 11 K/UL — HIGH (ref 3.8–10.5)
WBC # FLD AUTO: 11 K/UL — HIGH (ref 3.8–10.5)

## 2018-09-30 RX ORDER — HYDRALAZINE HCL 50 MG
75 TABLET ORAL EVERY 8 HOURS
Qty: 0 | Refills: 0 | Status: DISCONTINUED | OUTPATIENT
Start: 2018-09-30 | End: 2018-10-02

## 2018-09-30 RX ORDER — VANCOMYCIN HCL 1 G
750 VIAL (EA) INTRAVENOUS
Qty: 0 | Refills: 0 | Status: DISCONTINUED | OUTPATIENT
Start: 2018-09-30 | End: 2018-10-05

## 2018-09-30 RX ORDER — HEPARIN SODIUM 5000 [USP'U]/ML
5000 INJECTION INTRAVENOUS; SUBCUTANEOUS EVERY 12 HOURS
Qty: 0 | Refills: 0 | Status: DISCONTINUED | OUTPATIENT
Start: 2018-09-30 | End: 2018-10-05

## 2018-09-30 RX ADMIN — Medication 3 UNIT(S): at 08:49

## 2018-09-30 RX ADMIN — Medication 50 MILLIGRAM(S): at 17:14

## 2018-09-30 RX ADMIN — Medication 50 MICROGRAM(S): at 05:30

## 2018-09-30 RX ADMIN — Medication 325 MILLIGRAM(S): at 11:39

## 2018-09-30 RX ADMIN — SEVELAMER CARBONATE 800 MILLIGRAM(S): 2400 POWDER, FOR SUSPENSION ORAL at 22:28

## 2018-09-30 RX ADMIN — GABAPENTIN 100 MILLIGRAM(S): 400 CAPSULE ORAL at 22:27

## 2018-09-30 RX ADMIN — HEPARIN SODIUM 5000 UNIT(S): 5000 INJECTION INTRAVENOUS; SUBCUTANEOUS at 17:14

## 2018-09-30 RX ADMIN — SENNA PLUS 2 TABLET(S): 8.6 TABLET ORAL at 22:28

## 2018-09-30 RX ADMIN — GABAPENTIN 100 MILLIGRAM(S): 400 CAPSULE ORAL at 13:40

## 2018-09-30 RX ADMIN — Medication 75 MILLIGRAM(S): at 13:40

## 2018-09-30 RX ADMIN — Medication 50 MILLIGRAM(S): at 05:29

## 2018-09-30 RX ADMIN — Medication 1: at 22:30

## 2018-09-30 RX ADMIN — CHLORHEXIDINE GLUCONATE 1 APPLICATION(S): 213 SOLUTION TOPICAL at 11:39

## 2018-09-30 RX ADMIN — Medication 200 MILLIGRAM(S): at 22:28

## 2018-09-30 RX ADMIN — PANTOPRAZOLE SODIUM 40 MILLIGRAM(S): 20 TABLET, DELAYED RELEASE ORAL at 05:30

## 2018-09-30 RX ADMIN — Medication 1: at 11:43

## 2018-09-30 RX ADMIN — Medication 2: at 17:14

## 2018-09-30 RX ADMIN — Medication 500 MILLIGRAM(S): at 17:14

## 2018-09-30 RX ADMIN — Medication 500 MILLIGRAM(S): at 05:30

## 2018-09-30 RX ADMIN — Medication 1 TABLET(S): at 11:39

## 2018-09-30 RX ADMIN — Medication 3 UNIT(S): at 11:43

## 2018-09-30 RX ADMIN — SEVELAMER CARBONATE 800 MILLIGRAM(S): 2400 POWDER, FOR SUSPENSION ORAL at 13:40

## 2018-09-30 RX ADMIN — ZINC SULFATE TAB 220 MG (50 MG ZINC EQUIVALENT) 220 MILLIGRAM(S): 220 (50 ZN) TAB at 11:39

## 2018-09-30 RX ADMIN — SEVELAMER CARBONATE 800 MILLIGRAM(S): 2400 POWDER, FOR SUSPENSION ORAL at 05:29

## 2018-09-30 RX ADMIN — HEPARIN SODIUM 5000 UNIT(S): 5000 INJECTION INTRAVENOUS; SUBCUTANEOUS at 05:30

## 2018-09-30 RX ADMIN — Medication 75 MILLIGRAM(S): at 22:29

## 2018-09-30 RX ADMIN — GABAPENTIN 100 MILLIGRAM(S): 400 CAPSULE ORAL at 05:29

## 2018-09-30 RX ADMIN — Medication 1 MILLIGRAM(S): at 11:39

## 2018-09-30 RX ADMIN — Medication 3 UNIT(S): at 17:14

## 2018-09-30 NOTE — PROGRESS NOTE ADULT - SUBJECTIVE AND OBJECTIVE BOX
CHIEF COMPLAINT:Patient is a 53y old  Female who presents with a chief complaint of sent from NH due to positive blood cultures (30 Sep 2018 11:23)    	  REVIEW OF SYSTEMS:  CONSTITUTIONAL: No fever, weight loss, or fatigue  EYES: No eye pain, visual disturbances, or discharge  ENMT:  No difficulty hearing, tinnitus, vertigo; No sinus or throat pain  NECK: No pain or stiffness  RESPIRATORY: No cough, wheezing, chills or hemoptysis; No Shortness of Breath  CARDIOVASCULAR: No chest pain, palpitations, passing out, dizziness, or leg swelling  GASTROINTESTINAL: No abdominal or epigastric pain. No nausea, vomiting, or hematemesis; No diarrhea or constipation. No melena or hematochezia.  GENITOURINARY: No dysuria, frequency, hematuria, or incontinence  NEUROLOGICAL: No headaches, memory loss, loss of strength, numbness, or tremors  SKIN: No itching, burning, rashes, or lesions   LYMPH Nodes: No enlarged glands  ENDOCRINE: No heat or cold intolerance; No hair loss  MUSCULOSKELETAL: No joint pain or swelling; No muscle, back, or extremity pain  PSYCHIATRIC: No depression, anxiety, mood swings, or difficulty sleeping  HEME/LYMPH: No easy bruising, or bleeding gums  ALLERY AND IMMUNOLOGIC: No hives or eczema	    [ ] All others negative	  [ ] Unable to obtain    PHYSICAL EXAM:  T(C): 36.6 (09-30-18 @ 13:30), Max: 37.4 (09-29-18 @ 21:27)  HR: 86 (09-30-18 @ 17:22) (69 - 86)  BP: 157/61 (09-30-18 @ 17:22) (124/49 - 172/62)  RR: 17 (09-30-18 @ 17:22) (16 - 17)  SpO2: 100% (09-30-18 @ 17:22) (99% - 100%)  Wt(kg): --  I&O's Summary      Appearance: Normal	  HEENT:   Normal oral mucosa, PERRL, EOMI	  Lymphatic: No lymphadenopathy  Cardiovascular: Normal S1 S2, No JVD, No murmurs, No edema  Respiratory: Lungs clear to auscultation	  Psychiatry: A & O x 3, Mood & affect appropriate  Gastrointestinal:  Soft, Non-tender, + BS	  Skin: No rashes, No ecchymoses, No cyanosis	  Neurologic: Non-focal  Extremities: Normal range of motion, No clubbing, cyanosis or edema  Vascular: Peripheral pulses palpable 2+ bilaterally    MEDICATIONS  (STANDING):  ascorbic acid 500 milliGRAM(s) Oral two times a day  chlorhexidine 2% Cloths 1 Application(s) Topical daily  docusate sodium 200 milliGRAM(s) Oral at bedtime  epoetin jacqueline Injectable 4000 Unit(s) IV Push <User Schedule>  epoetin jacqueline Injectable 4000 Unit(s) IV Push <User Schedule>  ferrous    sulfate 325 milliGRAM(s) Oral daily  folic acid 1 milliGRAM(s) Oral daily  gabapentin 100 milliGRAM(s) Oral three times a day  heparin  Injectable 5000 Unit(s) SubCutaneous every 12 hours  hydrALAZINE 75 milliGRAM(s) Oral every 8 hours  influenza   Vaccine 0.5 milliLiter(s) IntraMuscular once  insulin lispro (HumaLOG) corrective regimen sliding scale   SubCutaneous Before meals and at bedtime  insulin lispro Injectable (HumaLOG) 3 Unit(s) SubCutaneous three times a day before meals  levothyroxine 50 MICROGram(s) Oral daily  metoprolol tartrate 50 milliGRAM(s) Oral two times a day  multivitamin/minerals 1 Tablet(s) Oral daily  pantoprazole    Tablet 40 milliGRAM(s) Oral before breakfast  senna 2 Tablet(s) Oral at bedtime  sevelamer hydrochloride 800 milliGRAM(s) Oral three times a day  vancomycin  IVPB 750 milliGRAM(s) IV Intermittent <User Schedule>  zinc sulfate 220 milliGRAM(s) Oral daily      TELEMETRY: 	    ECG:  	  RADIOLOGY:  OTHER: 	  	  CBC Full  -  ( 30 Sep 2018 08:49 )  WBC Count : 11.0 K/uL  Hemoglobin : 8.6 g/dL  Hematocrit : 28.9 %  Platelet Count - Automated : 378 K/uL  Mean Cell Volume : 100.7 fl  Mean Cell Hemoglobin : 30.0 pg  Mean Cell Hemoglobin Concentration : 29.8 gm/dL  Auto Neutrophil # : 6.2 K/uL  Auto Lymphocyte # : 2.9 K/uL  Auto Monocyte # : 1.1 K/uL  Auto Eosinophil # : 0.7 K/uL  Auto Basophil # : 0.1 K/uL  Auto Neutrophil % : 56.5 %  Auto Lymphocyte % : 25.9 %  Auto Monocyte % : 9.9 %  Auto Eosinophil % : 6.7 %  Auto Basophil % : 0.9 %        CARDIAC MARKERS:                              8.6    11.0  )-----------( 378      ( 30 Sep 2018 08:49 )             28.9       09-30    134<L>  |  97  |  69<H>  ----------------------------<  89  4.5   |  24  |  6.34<H>    Ca    10.0      30 Sep 2018 08:49  Phos  5.5     09-30  Mg     2.3     09-30    TPro  6.7  /  Alb  2.4<L>  /  TBili  0.4  /  DBili  x   /  AST  11  /  ALT  15  /  AlkPhos  107  09-30      PT/INR - ( 28 Sep 2018 19:38 )   PT: 10.3 sec;   INR: 0.95 ratio         PTT - ( 29 Sep 2018 09:26 )  PTT:28.9 sec      proBNP:   Lipid Profile: Cholesterol 84  LDL >29  HDL 52  TG <15    HgA1c:   TSH:

## 2018-09-30 NOTE — PROGRESS NOTE ADULT - SUBJECTIVE AND OBJECTIVE BOX
CHIEF COMPLAINT:Patient is a 53y old  Female who presents with a chief complaint of sent from NH due to positive blood cultures .Pt appears comfortable.    	  REVIEW OF SYSTEMS:  CONSTITUTIONAL: No fever, weight loss, or fatigue  EYES: No eye pain, visual disturbances, or discharge  ENT:  No difficulty hearing, tinnitus, vertigo; No sinus or throat pain  NECK: No pain or stiffness  RESPIRATORY: No cough, wheezing, chills or hemoptysis; No Shortness of Breath  CARDIOVASCULAR: No chest pain, palpitations, passing out, dizziness, or leg swelling  GASTROINTESTINAL: No abdominal or epigastric pain. No nausea, vomiting, or hematemesis; No diarrhea or constipation. No melena or hematochezia.  GENITOURINARY: No dysuria, frequency, hematuria, or incontinence  NEUROLOGICAL: No headaches, memory loss, loss of strength, numbness, or tremors  SKIN: No itching, burning, rashes, or lesions   LYMPH Nodes: No enlarged glands  ENDOCRINE: No heat or cold intolerance; No hair loss  MUSCULOSKELETAL: No joint pain or swelling; No muscle, back, or extremity pain  PSYCHIATRIC: No depression, anxiety, mood swings, or difficulty sleeping  HEME/LYMPH: No easy bruising, or bleeding gums  ALLERGY AND IMMUNOLOGIC: No hives or eczema	      PHYSICAL EXAM:  T(C): 37.1 (09-30-18 @ 05:22), Max: 37.4 (09-29-18 @ 21:27)  HR: 70 (09-30-18 @ 05:22) (70 - 79)  BP: 172/62 (09-30-18 @ 05:22) (127/55 - 172/62)  RR: 16 (09-30-18 @ 05:22) (16 - 16)  SpO2: 100% (09-30-18 @ 05:22) (99% - 100%)  Wt(kg): --  I&O's Summary      Appearance: Normal	  HEENT:   Normal oral mucosa, PERRL, EOMI	  Lymphatic: No lymphadenopathy  Cardiovascular: Normal S1 S2, No JVD, No murmurs, No edema  Respiratory: Lungs clear to auscultation	  Psychiatry: A & O x 3, Mood & affect appropriate  Gastrointestinal:  Soft, Non-tender, + BS	  Skin: No rashes, No ecchymoses, No cyanosis	  Neurologic: Non-focal  Extremities: Normal range of motion, No clubbing, cyanosis or edema  Vascular: Peripheral pulses palpable 2+ bilaterally    MEDICATIONS  (STANDING):  ascorbic acid 500 milliGRAM(s) Oral two times a day  chlorhexidine 2% Cloths 1 Application(s) Topical daily  docusate sodium 200 milliGRAM(s) Oral at bedtime  epoetin jacqueline Injectable 4000 Unit(s) IV Push <User Schedule>  epoetin jacqueline Injectable 4000 Unit(s) IV Push <User Schedule>  ferrous    sulfate 325 milliGRAM(s) Oral daily  folic acid 1 milliGRAM(s) Oral daily  gabapentin 100 milliGRAM(s) Oral three times a day  heparin  Injectable 5000 Unit(s) SubCutaneous every 8 hours  hydrALAZINE 50 milliGRAM(s) Oral every 8 hours  influenza   Vaccine 0.5 milliLiter(s) IntraMuscular once  insulin lispro (HumaLOG) corrective regimen sliding scale   SubCutaneous Before meals and at bedtime  insulin lispro Injectable (HumaLOG) 3 Unit(s) SubCutaneous three times a day before meals  levothyroxine 50 MICROGram(s) Oral daily  metoprolol tartrate 50 milliGRAM(s) Oral two times a day  multivitamin/minerals 1 Tablet(s) Oral daily  pantoprazole    Tablet 40 milliGRAM(s) Oral before breakfast  senna 2 Tablet(s) Oral at bedtime  sevelamer hydrochloride 800 milliGRAM(s) Oral three times a day  zinc sulfate 220 milliGRAM(s) Oral daily        	  LABS:	 	                        8.6    11.0  )-----------( 378      ( 30 Sep 2018 08:49 )             28.9     09-30    134<L>  |  97  |  69<H>  ----------------------------<  89  4.5   |  24  |  6.34<H>    Ca    10.0      30 Sep 2018 08:49  Phos  5.5     09-30  Mg     2.3     09-30    TPro  6.7  /  Alb  2.4<L>  /  TBili  0.4  /  DBili  x   /  AST  11  /  ALT  15  /  AlkPhos  107  09-30    proBNP:   Lipid Profile: Cholesterol 84  LDL >29  HDL 52  TG <15    HgA1c:   TSH:     	    OBSERVATIONS:  Mitral Valve: Normal mitral valve. Trace mitral  regurgitation.  Left Ventricle: Normal Left Ventricular Systolic Function,  (EF = 55 to 60% by visual estimation) Not all LV wall  segments were seen. Diastolic function incompletely  assessed.  Right Heart: Normal right atrium. Previously seen catheter  in the right heart is no longer evident. Normal tricuspid  valve.  Pericardium/PleuraNo pericardial effusion.  Hemodynamic: An atrial septal aneurysm is noted. Agitated  saline injection revealed bubbles in the left heart,  consistent with patent foramen ovale.

## 2018-09-30 NOTE — CHART NOTE - NSCHARTNOTEFT_GEN_A_CORE
Pt was on two doses of hydralazine 75mg and 50mg. I reviewed notes and discontinued hydralazine 50mg q8. Nurse brought to my attention that Pt was on two doses of hydralazine 75mg and 50mg. I reviewed notes and discontinued hydralazine 50mg q8.

## 2018-10-01 LAB
ALBUMIN SERPL ELPH-MCNC: 2.7 G/DL — LOW (ref 3.5–5)
ALP SERPL-CCNC: 128 U/L — HIGH (ref 40–120)
ALT FLD-CCNC: 16 U/L DA — SIGNIFICANT CHANGE UP (ref 10–60)
ANION GAP SERPL CALC-SCNC: 12 MMOL/L — SIGNIFICANT CHANGE UP (ref 5–17)
AST SERPL-CCNC: 10 U/L — SIGNIFICANT CHANGE UP (ref 10–40)
BASOPHILS # BLD AUTO: 0.2 K/UL — SIGNIFICANT CHANGE UP (ref 0–0.2)
BASOPHILS NFR BLD AUTO: 1.3 % — SIGNIFICANT CHANGE UP (ref 0–2)
BILIRUB SERPL-MCNC: 0.3 MG/DL — SIGNIFICANT CHANGE UP (ref 0.2–1.2)
BUN SERPL-MCNC: 91 MG/DL — HIGH (ref 7–18)
CALCIUM SERPL-MCNC: 10.1 MG/DL — SIGNIFICANT CHANGE UP (ref 8.4–10.5)
CHLORIDE SERPL-SCNC: 98 MMOL/L — SIGNIFICANT CHANGE UP (ref 96–108)
CO2 SERPL-SCNC: 25 MMOL/L — SIGNIFICANT CHANGE UP (ref 22–31)
CREAT SERPL-MCNC: 7.95 MG/DL — HIGH (ref 0.5–1.3)
EOSINOPHIL # BLD AUTO: 0.9 K/UL — HIGH (ref 0–0.5)
EOSINOPHIL NFR BLD AUTO: 6.3 % — HIGH (ref 0–6)
GLUCOSE BLDC GLUCOMTR-MCNC: 146 MG/DL — HIGH (ref 70–99)
GLUCOSE BLDC GLUCOMTR-MCNC: 151 MG/DL — HIGH (ref 70–99)
GLUCOSE BLDC GLUCOMTR-MCNC: 197 MG/DL — HIGH (ref 70–99)
GLUCOSE BLDC GLUCOMTR-MCNC: 204 MG/DL — HIGH (ref 70–99)
GLUCOSE SERPL-MCNC: 138 MG/DL — HIGH (ref 70–99)
HCT VFR BLD CALC: 29.2 % — LOW (ref 34.5–45)
HGB BLD-MCNC: 9.1 G/DL — LOW (ref 11.5–15.5)
INR BLD: 0.94 RATIO — SIGNIFICANT CHANGE UP (ref 0.88–1.16)
LYMPHOCYTES # BLD AUTO: 19.3 % — SIGNIFICANT CHANGE UP (ref 13–44)
LYMPHOCYTES # BLD AUTO: 2.6 K/UL — SIGNIFICANT CHANGE UP (ref 1–3.3)
MAGNESIUM SERPL-MCNC: 2.5 MG/DL — SIGNIFICANT CHANGE UP (ref 1.6–2.6)
MCHC RBC-ENTMCNC: 30.5 PG — SIGNIFICANT CHANGE UP (ref 27–34)
MCHC RBC-ENTMCNC: 31.1 GM/DL — LOW (ref 32–36)
MCV RBC AUTO: 98.1 FL — SIGNIFICANT CHANGE UP (ref 80–100)
MONOCYTES # BLD AUTO: 1.3 K/UL — HIGH (ref 0–0.9)
MONOCYTES NFR BLD AUTO: 9.8 % — SIGNIFICANT CHANGE UP (ref 2–14)
NEUTROPHILS # BLD AUTO: 8.5 K/UL — HIGH (ref 1.8–7.4)
NEUTROPHILS NFR BLD AUTO: 63.3 % — SIGNIFICANT CHANGE UP (ref 43–77)
PHOSPHATE SERPL-MCNC: 6.2 MG/DL — HIGH (ref 2.5–4.5)
PLATELET # BLD AUTO: 364 K/UL — SIGNIFICANT CHANGE UP (ref 150–400)
POTASSIUM SERPL-MCNC: 5.2 MMOL/L — SIGNIFICANT CHANGE UP (ref 3.5–5.3)
POTASSIUM SERPL-SCNC: 5.2 MMOL/L — SIGNIFICANT CHANGE UP (ref 3.5–5.3)
PROT SERPL-MCNC: 7.2 G/DL — SIGNIFICANT CHANGE UP (ref 6–8.3)
PROTHROM AB SERPL-ACNC: 10.2 SEC — SIGNIFICANT CHANGE UP (ref 9.8–12.7)
RBC # BLD: 2.98 M/UL — LOW (ref 3.8–5.2)
RBC # FLD: 14.4 % — SIGNIFICANT CHANGE UP (ref 10.3–14.5)
SODIUM SERPL-SCNC: 135 MMOL/L — SIGNIFICANT CHANGE UP (ref 135–145)
WBC # BLD: 13.5 K/UL — HIGH (ref 3.8–10.5)
WBC # FLD AUTO: 13.5 K/UL — HIGH (ref 3.8–10.5)

## 2018-10-01 RX ORDER — AMLODIPINE BESYLATE 2.5 MG/1
5 TABLET ORAL DAILY
Qty: 0 | Refills: 0 | Status: DISCONTINUED | OUTPATIENT
Start: 2018-10-01 | End: 2018-10-02

## 2018-10-01 RX ORDER — TRAMADOL HYDROCHLORIDE 50 MG/1
25 TABLET ORAL ONCE
Qty: 0 | Refills: 0 | Status: DISCONTINUED | OUTPATIENT
Start: 2018-10-01 | End: 2018-10-01

## 2018-10-01 RX ADMIN — TRAMADOL HYDROCHLORIDE 25 MILLIGRAM(S): 50 TABLET ORAL at 23:08

## 2018-10-01 RX ADMIN — GABAPENTIN 100 MILLIGRAM(S): 400 CAPSULE ORAL at 06:01

## 2018-10-01 RX ADMIN — Medication 75 MILLIGRAM(S): at 06:01

## 2018-10-01 RX ADMIN — SEVELAMER CARBONATE 800 MILLIGRAM(S): 2400 POWDER, FOR SUSPENSION ORAL at 21:49

## 2018-10-01 RX ADMIN — ERYTHROPOIETIN 4000 UNIT(S): 10000 INJECTION, SOLUTION INTRAVENOUS; SUBCUTANEOUS at 13:06

## 2018-10-01 RX ADMIN — Medication 75 MILLIGRAM(S): at 21:49

## 2018-10-01 RX ADMIN — Medication 3 UNIT(S): at 12:34

## 2018-10-01 RX ADMIN — Medication 325 MILLIGRAM(S): at 11:29

## 2018-10-01 RX ADMIN — Medication 250 MILLIGRAM(S): at 13:06

## 2018-10-01 RX ADMIN — Medication 1 MILLIGRAM(S): at 11:29

## 2018-10-01 RX ADMIN — Medication 500 MILLIGRAM(S): at 17:30

## 2018-10-01 RX ADMIN — Medication 3 UNIT(S): at 17:31

## 2018-10-01 RX ADMIN — Medication 2: at 12:34

## 2018-10-01 RX ADMIN — SEVELAMER CARBONATE 800 MILLIGRAM(S): 2400 POWDER, FOR SUSPENSION ORAL at 06:02

## 2018-10-01 RX ADMIN — Medication 50 MILLIGRAM(S): at 06:02

## 2018-10-01 RX ADMIN — Medication 500 MILLIGRAM(S): at 06:01

## 2018-10-01 RX ADMIN — Medication 650 MILLIGRAM(S): at 22:20

## 2018-10-01 RX ADMIN — Medication 200 MILLIGRAM(S): at 21:49

## 2018-10-01 RX ADMIN — Medication 50 MILLIGRAM(S): at 17:30

## 2018-10-01 RX ADMIN — Medication 50 MICROGRAM(S): at 06:01

## 2018-10-01 RX ADMIN — PANTOPRAZOLE SODIUM 40 MILLIGRAM(S): 20 TABLET, DELAYED RELEASE ORAL at 06:01

## 2018-10-01 RX ADMIN — GABAPENTIN 100 MILLIGRAM(S): 400 CAPSULE ORAL at 17:29

## 2018-10-01 RX ADMIN — Medication 3 UNIT(S): at 08:02

## 2018-10-01 RX ADMIN — Medication 1 TABLET(S): at 11:29

## 2018-10-01 RX ADMIN — Medication 650 MILLIGRAM(S): at 21:49

## 2018-10-01 RX ADMIN — HEPARIN SODIUM 5000 UNIT(S): 5000 INJECTION INTRAVENOUS; SUBCUTANEOUS at 17:31

## 2018-10-01 RX ADMIN — GABAPENTIN 100 MILLIGRAM(S): 400 CAPSULE ORAL at 21:49

## 2018-10-01 RX ADMIN — Medication 1: at 21:57

## 2018-10-01 RX ADMIN — TRAMADOL HYDROCHLORIDE 25 MILLIGRAM(S): 50 TABLET ORAL at 23:38

## 2018-10-01 RX ADMIN — CHLORHEXIDINE GLUCONATE 1 APPLICATION(S): 213 SOLUTION TOPICAL at 11:26

## 2018-10-01 RX ADMIN — SENNA PLUS 2 TABLET(S): 8.6 TABLET ORAL at 21:49

## 2018-10-01 RX ADMIN — SEVELAMER CARBONATE 800 MILLIGRAM(S): 2400 POWDER, FOR SUSPENSION ORAL at 17:29

## 2018-10-01 RX ADMIN — ZINC SULFATE TAB 220 MG (50 MG ZINC EQUIVALENT) 220 MILLIGRAM(S): 220 (50 ZN) TAB at 11:29

## 2018-10-01 NOTE — PROGRESS NOTE ADULT - SUBJECTIVE AND OBJECTIVE BOX
seen on HD.    No Known Allergies    Hospital Medications:   MEDICATIONS  (STANDING):  amLODIPine   Tablet 5 milliGRAM(s) Oral daily  ascorbic acid 500 milliGRAM(s) Oral two times a day  chlorhexidine 2% Cloths 1 Application(s) Topical daily  docusate sodium 200 milliGRAM(s) Oral at bedtime  epoetin jacqueline Injectable 4000 Unit(s) IV Push <User Schedule>  epoetin jacqueline Injectable 4000 Unit(s) IV Push <User Schedule>  ferrous    sulfate 325 milliGRAM(s) Oral daily  folic acid 1 milliGRAM(s) Oral daily  gabapentin 100 milliGRAM(s) Oral three times a day  heparin  Injectable 5000 Unit(s) SubCutaneous every 12 hours  hydrALAZINE 75 milliGRAM(s) Oral every 8 hours  influenza   Vaccine 0.5 milliLiter(s) IntraMuscular once  insulin lispro (HumaLOG) corrective regimen sliding scale   SubCutaneous Before meals and at bedtime  insulin lispro Injectable (HumaLOG) 3 Unit(s) SubCutaneous three times a day before meals  levothyroxine 50 MICROGram(s) Oral daily  metoprolol tartrate 50 milliGRAM(s) Oral two times a day  multivitamin/minerals 1 Tablet(s) Oral daily  pantoprazole    Tablet 40 milliGRAM(s) Oral before breakfast  senna 2 Tablet(s) Oral at bedtime  sevelamer hydrochloride 800 milliGRAM(s) Oral three times a day  vancomycin  IVPB 750 milliGRAM(s) IV Intermittent <User Schedule>  zinc sulfate 220 milliGRAM(s) Oral daily        VITALS:  T(F): 97.5 (10-01-18 @ 15:55), Max: 97.6 (10-01-18 @ 12:45)  HR: 80 (10-01-18 @ 17:59)  BP: 149/51 (10-01-18 @ 17:59)  RR: 17 (10-01-18 @ 17:59)  SpO2: 100% (10-01-18 @ 17:59)  Wt(kg): --      PHYSICAL EXAM:  Constitutional: NAD  HEENT: anicteric sclera, oropharynx clear.  Neck: No JVD  Respiratory: CTAB, no wheezes, rales or rhonchi  Cardiovascular: S1, S2, RRR  Gastrointestinal: BS+, soft, NT/ND  Extremities: No cyanosis or clubbing. No peripheral edema  Neurological: A/O x 3, no focal deficits  Vascular Access: RT IJ permacath.    LABS:  10-01    135  |  98  |  91<H>  ----------------------------<  138<H>  5.2   |  25  |  7.95<H>    Ca    10.1      01 Oct 2018 05:37  Phos  6.2     10-01  Mg     2.5     10-01    TPro  7.2  /  Alb  2.7<L>  /  TBili  0.3  /  DBili      /  AST  10  /  ALT  16  /  AlkPhos  128<H>  10-01    Creatinine Trend: 7.95 <--, 6.34 <--, 4.80 <--, 9.49 <--, 6.87 <--, 6.00 <--, 4.43 <--                        9.1    13.5  )-----------( 364      ( 01 Oct 2018 05:37 )             29.2     Urine Studies:      RADIOLOGY & ADDITIONAL STUDIES:

## 2018-10-01 NOTE — PROGRESS NOTE ADULT - SUBJECTIVE AND OBJECTIVE BOX
CHIEF COMPLAINT:Patient is a 53y old  Female who presents with a chief complaint of sent from NH due to positive blood cultures.Pt appears comfortable.    	  REVIEW OF SYSTEMS:  CONSTITUTIONAL: No fever, weight loss, or fatigue  EYES: No eye pain, visual disturbances, or discharge  ENT:  No difficulty hearing, tinnitus, vertigo; No sinus or throat pain  NECK: No pain or stiffness  RESPIRATORY: No cough, wheezing, chills or hemoptysis; No Shortness of Breath  CARDIOVASCULAR: No chest pain, palpitations, passing out, dizziness, or leg swelling  GASTROINTESTINAL: No abdominal or epigastric pain. No nausea, vomiting, or hematemesis; No diarrhea or constipation. No melena or hematochezia.  GENITOURINARY: No dysuria, frequency, hematuria, or incontinence  NEUROLOGICAL: No headaches, memory loss, loss of strength, numbness, or tremors  SKIN: No itching, burning, rashes, or lesions   LYMPH Nodes: No enlarged glands  ENDOCRINE: No heat or cold intolerance; No hair loss  MUSCULOSKELETAL: No joint pain or swelling; No muscle, back, or extremity pain  PSYCHIATRIC: No depression, anxiety, mood swings, or difficulty sleeping  HEME/LYMPH: No easy bruising, or bleeding gums  ALLERGY AND IMMUNOLOGIC: No hives or eczema	      PHYSICAL EXAM:  T(C): 36.3 (10-01-18 @ 05:54), Max: 36.6 (09-30-18 @ 13:30)  HR: 74 (10-01-18 @ 05:54) (69 - 86)  BP: 169/72 (10-01-18 @ 05:54) (124/49 - 169/72)  RR: 16 (10-01-18 @ 05:54) (16 - 17)  SpO2: 98% (10-01-18 @ 05:54) (98% - 100%)      Appearance: Normal	  HEENT:   Normal oral mucosa, PERRL, EOMI	  Lymphatic: No lymphadenopathy  Cardiovascular: Normal S1 S2, No JVD, No murmurs, No edema  Respiratory: Lungs clear to auscultation	  Psychiatry: A & O x 3, Mood & affect appropriate  Gastrointestinal:  Soft, Non-tender, + BS	  Skin: No rashes, No ecchymoses, No cyanosis	  Neurologic: Non-focal  Extremities: Normal range of motion, No clubbing, cyanosis or edema  Vascular: Peripheral pulses palpable 2+ bilaterally    MEDICATIONS  (STANDING):  amLODIPine   Tablet 5 milliGRAM(s) Oral daily  ascorbic acid 500 milliGRAM(s) Oral two times a day  chlorhexidine 2% Cloths 1 Application(s) Topical daily  docusate sodium 200 milliGRAM(s) Oral at bedtime  epoetin jacqueline Injectable 4000 Unit(s) IV Push <User Schedule>  epoetin jacqueline Injectable 4000 Unit(s) IV Push <User Schedule>  ferrous    sulfate 325 milliGRAM(s) Oral daily  folic acid 1 milliGRAM(s) Oral daily  gabapentin 100 milliGRAM(s) Oral three times a day  heparin  Injectable 5000 Unit(s) SubCutaneous every 12 hours  hydrALAZINE 75 milliGRAM(s) Oral every 8 hours  influenza   Vaccine 0.5 milliLiter(s) IntraMuscular once  insulin lispro (HumaLOG) corrective regimen sliding scale   SubCutaneous Before meals and at bedtime  insulin lispro Injectable (HumaLOG) 3 Unit(s) SubCutaneous three times a day before meals  levothyroxine 50 MICROGram(s) Oral daily  metoprolol tartrate 50 milliGRAM(s) Oral two times a day  multivitamin/minerals 1 Tablet(s) Oral daily  pantoprazole    Tablet 40 milliGRAM(s) Oral before breakfast  senna 2 Tablet(s) Oral at bedtime  sevelamer hydrochloride 800 milliGRAM(s) Oral three times a day  vancomycin  IVPB 750 milliGRAM(s) IV Intermittent <User Schedule>  zinc sulfate 220 milliGRAM(s) Oral daily      	  LABS:	 	                        9.1    13.5  )-----------( 364      ( 01 Oct 2018 05:37 )             29.2     10-01    135  |  98  |  91<H>  ----------------------------<  138<H>  5.2   |  25  |  7.95<H>    Ca    10.1      01 Oct 2018 05:37  Phos  6.2     10-01  Mg     2.5     10-01    TPro  7.2  /  Alb  2.7<L>  /  TBili  0.3  /  DBili  x   /  AST  10  /  ALT  16  /  AlkPhos  128<H>  10-01      Lipid Profile: Cholesterol 84  LDL >29  HDL 52  TG <15

## 2018-10-01 NOTE — PROGRESS NOTE ADULT - ATTENDING COMMENTS
Patient is seen and examined. Case reviewed with the medical team. Above note is appreciated. Will follow up clinically. Continue DVT prophylaxis. Case discussed with Cardiology, nephrology and surgery. AV fistula scheduled for 10/4/2018.

## 2018-10-01 NOTE — PROGRESS NOTE ADULT - SUBJECTIVE AND OBJECTIVE BOX
INTERVAL HPI/OVERNIGHT EVENTS:    Pt seen and examined at bedside. No acute complaints at this time, s/p PC 9/28.      Vital Signs Last 24 Hrs  T(C): 36.3 (01 Oct 2018 05:54), Max: 36.6 (30 Sep 2018 13:30)  T(F): 97.3 (01 Oct 2018 05:54), Max: 97.8 (30 Sep 2018 13:30)  HR: 74 (01 Oct 2018 05:54) (69 - 86)  BP: 169/72 (01 Oct 2018 05:54) (124/49 - 169/72)  BP(mean): --  RR: 16 (01 Oct 2018 05:54) (16 - 17)  SpO2: 98% (01 Oct 2018 05:54) (98% - 100%)  I&O's Detail    docusate sodium 200 milliGRAM(s) Oral at bedtime  pantoprazole    Tablet 40 milliGRAM(s) Oral before breakfast  senna 2 Tablet(s) Oral at bedtime  sevelamer hydrochloride 800 milliGRAM(s) Oral three times a day  vancomycin  IVPB 750 milliGRAM(s) IV Intermittent <User Schedule>      Physical Exam  General: AAOx3, No acute distress  Skin: No jaundice, no icterus  Chest: rt PC in place, dressing c/d/i, no TTP   Extremities: non edematous, no calf pain bilaterally        Labs:                        9.1    13.5  )-----------( 364      ( 01 Oct 2018 05:37 )             29.2     10-01    135  |  98  |  91<H>  ----------------------------<  138<H>  5.2   |  25  |  7.95<H>    Ca    10.1      01 Oct 2018 05:37  Phos  6.2     10-01  Mg     2.5     10-01    TPro  7.2  /  Alb  2.7<L>  /  TBili  0.3  /  DBili  x   /  AST  10  /  ALT  16  /  AlkPhos  128<H>  10-01    PT/INR - ( 01 Oct 2018 05:37 )   PT: 10.2 sec;   INR: 0.94 ratio         PTT - ( 29 Sep 2018 09:26 )  PTT:28.9 sec

## 2018-10-01 NOTE — PROGRESS NOTE ADULT - SUBJECTIVE AND OBJECTIVE BOX
PGY 1 Note discussed with supervising resident and primary attending    Patient is a 53y old  Female who presents with a chief complaint of sent from NH due to positive blood cultures (01 Oct 2018 10:45)      INTERVAL HPI/OVERNIGHT EVENTS:   Patient seen at the bedside. No new complains, No over night  events     MEDICATIONS  (STANDING):  amLODIPine   Tablet 5 milliGRAM(s) Oral daily  ascorbic acid 500 milliGRAM(s) Oral two times a day  chlorhexidine 2% Cloths 1 Application(s) Topical daily  docusate sodium 200 milliGRAM(s) Oral at bedtime  epoetin jacqueline Injectable 4000 Unit(s) IV Push <User Schedule>  epoetin jacqueline Injectable 4000 Unit(s) IV Push <User Schedule>  ferrous    sulfate 325 milliGRAM(s) Oral daily  folic acid 1 milliGRAM(s) Oral daily  gabapentin 100 milliGRAM(s) Oral three times a day  heparin  Injectable 5000 Unit(s) SubCutaneous every 12 hours  hydrALAZINE 75 milliGRAM(s) Oral every 8 hours  influenza   Vaccine 0.5 milliLiter(s) IntraMuscular once  insulin lispro (HumaLOG) corrective regimen sliding scale   SubCutaneous Before meals and at bedtime  insulin lispro Injectable (HumaLOG) 3 Unit(s) SubCutaneous three times a day before meals  levothyroxine 50 MICROGram(s) Oral daily  metoprolol tartrate 50 milliGRAM(s) Oral two times a day  multivitamin/minerals 1 Tablet(s) Oral daily  pantoprazole    Tablet 40 milliGRAM(s) Oral before breakfast  senna 2 Tablet(s) Oral at bedtime  sevelamer hydrochloride 800 milliGRAM(s) Oral three times a day  vancomycin  IVPB 750 milliGRAM(s) IV Intermittent <User Schedule>  zinc sulfate 220 milliGRAM(s) Oral daily    MEDICATIONS  (PRN):  acetaminophen   Tablet .. 650 milliGRAM(s) Oral every 6 hours PRN Moderate Pain (4 - 6)      __________________________________________________  REVIEW OF SYSTEMS:    CONSTITUTIONAL: No fever,   EYES: no acute visual disturbances  NECK: No pain or stiffness  RESPIRATORY: No cough; No shortness of breath  CARDIOVASCULAR: No chest pain, no palpitations  GASTROINTESTINAL: No pain. No nausea or vomiting; No diarrhea   NEUROLOGICAL: No headache or numbness, no tremors  MUSCULOSKELETAL: No joint pain, no muscle pain  GENITOURINARY: no dysuria, no frequency, no hesitancy  PSYCHIATRY: no depression , no anxiety  ALL OTHER  ROS negative        Vital Signs Last 24 Hrs  T(C): 36.3 (01 Oct 2018 05:54), Max: 36.3 (30 Sep 2018 21:13)  T(F): 97.3 (01 Oct 2018 05:54), Max: 97.4 (30 Sep 2018 21:13)  HR: 74 (01 Oct 2018 05:54) (69 - 86)  BP: 169/72 (01 Oct 2018 05:54) (147/53 - 169/72)  BP(mean): --  RR: 16 (01 Oct 2018 05:54) (16 - 17)  SpO2: 98% (01 Oct 2018 05:54) (98% - 100%)    ________________________________________________  PHYSICAL EXAM:  GENERAL: NAD, blind  HEENT: Normocephalic;  conjunctivae and sclerae clear; moist mucous membranes;   NECK : supple  CHEST/LUNG: Clear to auscultation bilaterally with good air entry   HEART: S1 S2  regular; no murmurs, gallops or rubs  ABDOMEN: Soft, Nontender, Nondistended; Bowel sounds present  EXTREMITIES: no cyanosis; no edema; no calf tenderness  SKIN: warm and dry; no rash  NERVOUS SYSTEM:  Awake and alert; Oriented  to place, person and time ; no new deficits    _________________________________________________  LABS:                        9.1    13.5  )-----------( 364      ( 01 Oct 2018 05:37 )             29.2     10-01    135  |  98  |  91<H>  ----------------------------<  138<H>  5.2   |  25  |  7.95<H>    Ca    10.1      01 Oct 2018 05:37  Phos  6.2     10-01  Mg     2.5     10-01    TPro  7.2  /  Alb  2.7<L>  /  TBili  0.3  /  DBili  x   /  AST  10  /  ALT  16  /  AlkPhos  128<H>  10-01    PT/INR - ( 01 Oct 2018 05:37 )   PT: 10.2 sec;   INR: 0.94 ratio             CAPILLARY BLOOD GLUCOSE      POCT Blood Glucose.: 204 mg/dL (01 Oct 2018 12:11)  POCT Blood Glucose.: 146 mg/dL (01 Oct 2018 07:56)  POCT Blood Glucose.: 166 mg/dL (30 Sep 2018 21:49)  POCT Blood Glucose.: 208 mg/dL (30 Sep 2018 16:36)        RADIOLOGY & ADDITIONAL TESTS:    Imaging Personally Reviewed:  YES/NO    Consultant(s) Notes Reviewed:   YES/ No    Care Discussed with Consultants :     Plan of care was discussed with patient and /or primary care giver; all questions and concerns were addressed and care was aligned with patient's wishes.

## 2018-10-01 NOTE — CHART NOTE - NSCHARTNOTEFT_GEN_A_CORE
Assessment:      Nutrition reassessment for follow-up. Chart reviewed, pt visited, Discussed with MD/RN;     Factors impacting intake: [ ] none [ ] nausea  [ ] vomiting [ ] diarrhea [ ] constipation  [ ]chewing problems [ ] swallowing issues  [ X ] other: impaired vision; HD; s/p decreased appetite, now improving     Diet Presciption: Diet, DASH/TLC:   Sodium & Cholesterol Restricted  Consistent Carbohydrate {No Snacks}  1200mL Fluid Restriction ([id number])  For patients receiving Renal Replacement - No Protein Restr, No Conc K, No Conc Phos, Low Sodium (RENAL) (18 @ 06:40)    Intake: intake improving, observed breakfast--90% intake; denied GI distress, chewing or swallowing problem at present, no specific food choices reported; requesting Nepro supplement     Daily Weight in k.4 (30 Sep 2018 05:22)  Weight in k (29 Sep 2018 05:18)  Weight in k.1 (28 Sep 2018 22:30)  Weight in k.7 (28 Sep 2018 19:30)  Weight in k.4 (27 Sep 2018 05:18)  Weight in k.2 (25 Sep 2018 15:46)  Weight in k.6 (24 Sep 2018 17:30)  Weight in k.4 (24 Sep 2018 14:30)    % Weight Change: a bit fluctuated may due to fluid shift from HD     Pertinent Medications: MEDICATIONS  (STANDING):  amLODIPine   Tablet 5 milliGRAM(s) Oral daily  ascorbic acid 500 milliGRAM(s) Oral two times a day  chlorhexidine 2% Cloths 1 Application(s) Topical daily  docusate sodium 200 milliGRAM(s) Oral at bedtime  epoetin jacqueline Injectable 4000 Unit(s) IV Push <User Schedule>  epoetin jacqueline Injectable 4000 Unit(s) IV Push <User Schedule>  ferrous    sulfate 325 milliGRAM(s) Oral daily  folic acid 1 milliGRAM(s) Oral daily  gabapentin 100 milliGRAM(s) Oral three times a day  heparin  Injectable 5000 Unit(s) SubCutaneous every 12 hours  hydrALAZINE 75 milliGRAM(s) Oral every 8 hours  influenza   Vaccine 0.5 milliLiter(s) IntraMuscular once  insulin lispro (HumaLOG) corrective regimen sliding scale   SubCutaneous Before meals and at bedtime  insulin lispro Injectable (HumaLOG) 3 Unit(s) SubCutaneous three times a day before meals  levothyroxine 50 MICROGram(s) Oral daily  metoprolol tartrate 50 milliGRAM(s) Oral two times a day  multivitamin/minerals 1 Tablet(s) Oral daily  pantoprazole    Tablet 40 milliGRAM(s) Oral before breakfast  senna 2 Tablet(s) Oral at bedtime  sevelamer hydrochloride 800 milliGRAM(s) Oral three times a day  vancomycin  IVPB 750 milliGRAM(s) IV Intermittent <User Schedule>  zinc sulfate 220 milliGRAM(s) Oral daily    MEDICATIONS  (PRN):  acetaminophen   Tablet .. 650 milliGRAM(s) Oral every 6 hours PRN Moderate Pain (4 - 6)    Pertinent Labs: 10-01 Na135 mmol/L Glu 138 mg/dL<H> K+ 5.2 mmol/L Cr  7.95 mg/dL<H> BUN 91 mg/dL<H> 10-01 Phos 6.2 mg/dL<H> 10-01 Alb 2.7 g/dL<L>  Chol 84 mg/dL LDL >29 mg/dL HDL 52 mg/dL Trig <15 mg/dL<L>     CAPILLARY BLOOD GLUCOSE    POCT Blood Glucose.: 146 mg/dL (01 Oct 2018 07:56)  POCT Blood Glucose.: 166 mg/dL (30 Sep 2018 21:49)  POCT Blood Glucose.: 208 mg/dL (30 Sep 2018 16:36)  POCT Blood Glucose.: 170 mg/dL (30 Sep 2018 11:42)      Skin:  pressure ulcer stage IV   ( on ZnSO4, Vit C, Folic Acid, MVI/minerals noted)     Estimated Needs:   [ X ] no change since previous assessment  [ ] recalculated:     Previous Nutrition Diagnosis:   [ X ] Malnutrition ( Severe )     Nutrition Diagnosis is [ ] ongoing  [ X ] Improving   [ ] resolved [ ] not applicable     New Nutrition Diagnosis: [ X ] not applicable       Interventions:   Recommend  [ ] Change Diet To:  [ X ] Nutrition Supplement: Nepro 1can daily as medically feasible ( 425 kcal, 19 g protein)   [ ] Nutrition Support  [ X ] Other: Rec: Nephrocaps, d/c MVI/minerals as medically feasible     Monitoring and Evaluation:   [ X ] PO intake [ x ] Tolerance to diet prescription [ x ] weights [ x ] labs[ x ] follow up per protocol  [ ] other:

## 2018-10-02 LAB
ALBUMIN SERPL ELPH-MCNC: 2.8 G/DL — LOW (ref 3.5–5)
ALP SERPL-CCNC: 138 U/L — HIGH (ref 40–120)
ALT FLD-CCNC: 16 U/L DA — SIGNIFICANT CHANGE UP (ref 10–60)
ANION GAP SERPL CALC-SCNC: 13 MMOL/L — SIGNIFICANT CHANGE UP (ref 5–17)
AST SERPL-CCNC: 10 U/L — SIGNIFICANT CHANGE UP (ref 10–40)
BASOPHILS NFR BLD AUTO: 1 % — SIGNIFICANT CHANGE UP (ref 0–2)
BILIRUB SERPL-MCNC: 0.3 MG/DL — SIGNIFICANT CHANGE UP (ref 0.2–1.2)
BUN SERPL-MCNC: 59 MG/DL — HIGH (ref 7–18)
CALCIUM SERPL-MCNC: 10.2 MG/DL — SIGNIFICANT CHANGE UP (ref 8.4–10.5)
CHLORIDE SERPL-SCNC: 96 MMOL/L — SIGNIFICANT CHANGE UP (ref 96–108)
CO2 SERPL-SCNC: 24 MMOL/L — SIGNIFICANT CHANGE UP (ref 22–31)
CREAT SERPL-MCNC: 5.97 MG/DL — HIGH (ref 0.5–1.3)
CULTURE RESULTS: SIGNIFICANT CHANGE UP
CULTURE RESULTS: SIGNIFICANT CHANGE UP
EOSINOPHIL NFR BLD AUTO: 1 % — SIGNIFICANT CHANGE UP (ref 0–6)
GLUCOSE BLDC GLUCOMTR-MCNC: 129 MG/DL — HIGH (ref 70–99)
GLUCOSE BLDC GLUCOMTR-MCNC: 194 MG/DL — HIGH (ref 70–99)
GLUCOSE BLDC GLUCOMTR-MCNC: 228 MG/DL — HIGH (ref 70–99)
GLUCOSE BLDC GLUCOMTR-MCNC: 272 MG/DL — HIGH (ref 70–99)
GLUCOSE SERPL-MCNC: 132 MG/DL — HIGH (ref 70–99)
HCT VFR BLD CALC: 29.9 % — LOW (ref 34.5–45)
HGB BLD-MCNC: 9.3 G/DL — LOW (ref 11.5–15.5)
LYMPHOCYTES # BLD AUTO: 13 % — SIGNIFICANT CHANGE UP (ref 13–44)
MAGNESIUM SERPL-MCNC: 2.3 MG/DL — SIGNIFICANT CHANGE UP (ref 1.6–2.6)
MCHC RBC-ENTMCNC: 30.7 PG — SIGNIFICANT CHANGE UP (ref 27–34)
MCHC RBC-ENTMCNC: 30.9 GM/DL — LOW (ref 32–36)
MCV RBC AUTO: 99.3 FL — SIGNIFICANT CHANGE UP (ref 80–100)
MONOCYTES NFR BLD AUTO: 5 % — SIGNIFICANT CHANGE UP (ref 2–14)
MRSA PCR RESULT.: SIGNIFICANT CHANGE UP
NEUTROPHILS NFR BLD AUTO: 78 % — HIGH (ref 43–77)
PHOSPHATE SERPL-MCNC: 4.7 MG/DL — HIGH (ref 2.5–4.5)
PLATELET # BLD AUTO: 329 K/UL — SIGNIFICANT CHANGE UP (ref 150–400)
POTASSIUM SERPL-MCNC: 4.6 MMOL/L — SIGNIFICANT CHANGE UP (ref 3.5–5.3)
POTASSIUM SERPL-SCNC: 4.6 MMOL/L — SIGNIFICANT CHANGE UP (ref 3.5–5.3)
PROT SERPL-MCNC: 7.5 G/DL — SIGNIFICANT CHANGE UP (ref 6–8.3)
RBC # BLD: 3.02 M/UL — LOW (ref 3.8–5.2)
RBC # FLD: 14.8 % — HIGH (ref 10.3–14.5)
S AUREUS DNA NOSE QL NAA+PROBE: SIGNIFICANT CHANGE UP
SODIUM SERPL-SCNC: 133 MMOL/L — LOW (ref 135–145)
SPECIMEN SOURCE: SIGNIFICANT CHANGE UP
SPECIMEN SOURCE: SIGNIFICANT CHANGE UP
WBC # BLD: 17 K/UL — HIGH (ref 3.8–10.5)
WBC # FLD AUTO: 17 K/UL — HIGH (ref 3.8–10.5)

## 2018-10-02 RX ORDER — HYDRALAZINE HCL 50 MG
5 TABLET ORAL ONCE
Qty: 0 | Refills: 0 | Status: COMPLETED | OUTPATIENT
Start: 2018-10-02 | End: 2018-10-02

## 2018-10-02 RX ORDER — AMLODIPINE BESYLATE 2.5 MG/1
10 TABLET ORAL DAILY
Qty: 0 | Refills: 0 | Status: DISCONTINUED | OUTPATIENT
Start: 2018-10-02 | End: 2018-10-03

## 2018-10-02 RX ORDER — HYDRALAZINE HCL 50 MG
100 TABLET ORAL EVERY 8 HOURS
Qty: 0 | Refills: 0 | Status: DISCONTINUED | OUTPATIENT
Start: 2018-10-02 | End: 2018-10-05

## 2018-10-02 RX ORDER — OXYCODONE AND ACETAMINOPHEN 5; 325 MG/1; MG/1
1 TABLET ORAL ONCE
Qty: 0 | Refills: 0 | Status: DISCONTINUED | OUTPATIENT
Start: 2018-10-02 | End: 2018-10-02

## 2018-10-02 RX ADMIN — Medication 3 UNIT(S): at 08:32

## 2018-10-02 RX ADMIN — HEPARIN SODIUM 5000 UNIT(S): 5000 INJECTION INTRAVENOUS; SUBCUTANEOUS at 05:32

## 2018-10-02 RX ADMIN — SEVELAMER CARBONATE 800 MILLIGRAM(S): 2400 POWDER, FOR SUSPENSION ORAL at 05:32

## 2018-10-02 RX ADMIN — SENNA PLUS 2 TABLET(S): 8.6 TABLET ORAL at 22:06

## 2018-10-02 RX ADMIN — Medication 1: at 12:15

## 2018-10-02 RX ADMIN — Medication 100 MILLIGRAM(S): at 22:06

## 2018-10-02 RX ADMIN — Medication 100 MILLIGRAM(S): at 16:55

## 2018-10-02 RX ADMIN — Medication 3 UNIT(S): at 17:01

## 2018-10-02 RX ADMIN — Medication 1 TABLET(S): at 12:16

## 2018-10-02 RX ADMIN — Medication 500 MILLIGRAM(S): at 05:32

## 2018-10-02 RX ADMIN — Medication 50 MILLIGRAM(S): at 05:32

## 2018-10-02 RX ADMIN — HEPARIN SODIUM 5000 UNIT(S): 5000 INJECTION INTRAVENOUS; SUBCUTANEOUS at 17:02

## 2018-10-02 RX ADMIN — Medication 1 MILLIGRAM(S): at 12:16

## 2018-10-02 RX ADMIN — PANTOPRAZOLE SODIUM 40 MILLIGRAM(S): 20 TABLET, DELAYED RELEASE ORAL at 06:04

## 2018-10-02 RX ADMIN — Medication 325 MILLIGRAM(S): at 12:16

## 2018-10-02 RX ADMIN — CHLORHEXIDINE GLUCONATE 1 APPLICATION(S): 213 SOLUTION TOPICAL at 16:51

## 2018-10-02 RX ADMIN — OXYCODONE AND ACETAMINOPHEN 1 TABLET(S): 5; 325 TABLET ORAL at 01:38

## 2018-10-02 RX ADMIN — OXYCODONE AND ACETAMINOPHEN 1 TABLET(S): 5; 325 TABLET ORAL at 02:08

## 2018-10-02 RX ADMIN — Medication 200 MILLIGRAM(S): at 22:07

## 2018-10-02 RX ADMIN — ZINC SULFATE TAB 220 MG (50 MG ZINC EQUIVALENT) 220 MILLIGRAM(S): 220 (50 ZN) TAB at 12:16

## 2018-10-02 RX ADMIN — SEVELAMER CARBONATE 800 MILLIGRAM(S): 2400 POWDER, FOR SUSPENSION ORAL at 16:54

## 2018-10-02 RX ADMIN — Medication 3 UNIT(S): at 12:15

## 2018-10-02 RX ADMIN — SEVELAMER CARBONATE 800 MILLIGRAM(S): 2400 POWDER, FOR SUSPENSION ORAL at 22:07

## 2018-10-02 RX ADMIN — GABAPENTIN 100 MILLIGRAM(S): 400 CAPSULE ORAL at 22:07

## 2018-10-02 RX ADMIN — OXYCODONE AND ACETAMINOPHEN 1 TABLET(S): 5; 325 TABLET ORAL at 19:55

## 2018-10-02 RX ADMIN — Medication 5 MILLIGRAM(S): at 06:46

## 2018-10-02 RX ADMIN — Medication 75 MILLIGRAM(S): at 05:32

## 2018-10-02 RX ADMIN — Medication 500 MILLIGRAM(S): at 16:54

## 2018-10-02 RX ADMIN — GABAPENTIN 100 MILLIGRAM(S): 400 CAPSULE ORAL at 05:32

## 2018-10-02 RX ADMIN — Medication 3: at 17:01

## 2018-10-02 RX ADMIN — Medication 50 MICROGRAM(S): at 05:32

## 2018-10-02 RX ADMIN — GABAPENTIN 100 MILLIGRAM(S): 400 CAPSULE ORAL at 16:53

## 2018-10-02 RX ADMIN — OXYCODONE AND ACETAMINOPHEN 1 TABLET(S): 5; 325 TABLET ORAL at 20:25

## 2018-10-02 RX ADMIN — AMLODIPINE BESYLATE 5 MILLIGRAM(S): 2.5 TABLET ORAL at 05:32

## 2018-10-02 RX ADMIN — Medication 2: at 22:08

## 2018-10-02 RX ADMIN — Medication 50 MILLIGRAM(S): at 19:55

## 2018-10-02 NOTE — PROGRESS NOTE ADULT - SUBJECTIVE AND OBJECTIVE BOX
s/p HD yesterday    No Known Allergies    Hospital Medications:   MEDICATIONS  (STANDING):  amLODIPine   Tablet 10 milliGRAM(s) Oral daily  ascorbic acid 500 milliGRAM(s) Oral two times a day  chlorhexidine 2% Cloths 1 Application(s) Topical daily  docusate sodium 200 milliGRAM(s) Oral at bedtime  epoetin jacqueline Injectable 4000 Unit(s) IV Push <User Schedule>  epoetin jacqueline Injectable 4000 Unit(s) IV Push <User Schedule>  ferrous    sulfate 325 milliGRAM(s) Oral daily  folic acid 1 milliGRAM(s) Oral daily  gabapentin 100 milliGRAM(s) Oral three times a day  heparin  Injectable 5000 Unit(s) SubCutaneous every 12 hours  hydrALAZINE 100 milliGRAM(s) Oral every 8 hours  influenza   Vaccine 0.5 milliLiter(s) IntraMuscular once  insulin lispro (HumaLOG) corrective regimen sliding scale   SubCutaneous Before meals and at bedtime  insulin lispro Injectable (HumaLOG) 3 Unit(s) SubCutaneous three times a day before meals  levothyroxine 50 MICROGram(s) Oral daily  metoprolol tartrate 50 milliGRAM(s) Oral two times a day  multivitamin/minerals 1 Tablet(s) Oral daily  pantoprazole    Tablet 40 milliGRAM(s) Oral before breakfast  senna 2 Tablet(s) Oral at bedtime  sevelamer hydrochloride 800 milliGRAM(s) Oral three times a day  vancomycin  IVPB 750 milliGRAM(s) IV Intermittent <User Schedule>  zinc sulfate 220 milliGRAM(s) Oral daily      VITALS:  T(F): 97.4 (10-02-18 @ 13:59), Max: 98.5 (10-02-18 @ 05:23)  HR: 70 (10-02-18 @ 13:59)  BP: 135/66 (10-02-18 @ 13:59)  RR: 17 (10-02-18 @ 13:59)  SpO2: 97% (10-02-18 @ 13:59)  Wt(kg): --      PHYSICAL EXAM:  Constitutional: NAD  HEENT: anicteric sclera, oropharynx clear.  Neck: No JVD  Respiratory: CTAB, no wheezes, rales or rhonchi  Cardiovascular: S1, S2, RRR  Gastrointestinal: BS+, soft, NT/ND  Extremities: No cyanosis or clubbing. No peripheral edema  Vascular Access: IJ permacath.    LABS:  10-02    133<L>  |  96  |  59<H>  ----------------------------<  132<H>  4.6   |  24  |  5.97<H>    Ca    10.2      02 Oct 2018 07:16  Phos  4.7     10-02  Mg     2.3     10-02    TPro  7.5  /  Alb  2.8<L>  /  TBili  0.3  /  DBili      /  AST  10  /  ALT  16  /  AlkPhos  138<H>  10-02    Creatinine Trend: 5.97 <--, 7.95 <--, 6.34 <--, 4.80 <--, 9.49 <--, 6.87 <--, 6.00 <--                        9.3    17.0  )-----------( 329      ( 02 Oct 2018 07:16 )             29.9     Urine Studies:      RADIOLOGY & ADDITIONAL STUDIES:

## 2018-10-02 NOTE — PROGRESS NOTE ADULT - SUBJECTIVE AND OBJECTIVE BOX
CHIEF COMPLAINT:Patient is a 53y old  Female who presents with a chief complaint of sent from NH due to positive blood cultures .Pt appears comfortable.    	  REVIEW OF SYSTEMS:  CONSTITUTIONAL: No fever, weight loss, or fatigue  EYES: No eye pain, visual disturbances, or discharge  ENT:  No difficulty hearing, tinnitus, vertigo; No sinus or throat pain  NECK: No pain or stiffness  RESPIRATORY: No cough, wheezing, chills or hemoptysis; No Shortness of Breath  CARDIOVASCULAR: No chest pain, palpitations, passing out, dizziness, or leg swelling  GASTROINTESTINAL: No abdominal or epigastric pain. No nausea, vomiting, or hematemesis; No diarrhea or constipation. No melena or hematochezia.  GENITOURINARY: No dysuria, frequency, hematuria, or incontinence  NEUROLOGICAL: No headaches, memory loss, loss of strength, numbness, or tremors  SKIN: No itching, burning, rashes, or lesions   LYMPH Nodes: No enlarged glands  ENDOCRINE: No heat or cold intolerance; No hair loss  MUSCULOSKELETAL: No joint pain or swelling; No muscle, back, or extremity pain  PSYCHIATRIC: No depression, anxiety, mood swings, or difficulty sleeping  HEME/LYMPH: No easy bruising, or bleeding gums  ALLERGY AND IMMUNOLOGIC: No hives or eczema	      PHYSICAL EXAM:  T(C): 36.9 (10-02-18 @ 05:23), Max: 36.9 (10-01-18 @ 21:34)  HR: 74 (10-02-18 @ 08:04) (69 - 81)  BP: 146/56 (10-02-18 @ 08:04) (136/65 - 201/84)  RR: 18 (10-02-18 @ 08:04) (16 - 18)  SpO2: 100% (10-02-18 @ 08:04) (100% - 100%)  Wt(kg): --  I&O's Summary      Appearance: Normal	  HEENT:   Normal oral mucosa, PERRL, EOMI	  Lymphatic: No lymphadenopathy  Cardiovascular: Normal S1 S2, No JVD, No murmurs, No edema  Respiratory: Lungs clear to auscultation	  Psychiatry: A & O x 3, Mood & affect appropriate  Gastrointestinal:  Soft, Non-tender, + BS	  Skin: No rashes, No ecchymoses, No cyanosis	  Neurologic: Non-focal  Extremities: Normal range of motion, No clubbing, cyanosis or edema  Vascular: Peripheral pulses palpable 2+ bilaterally    MEDICATIONS  (STANDING):  amLODIPine   Tablet 10 milliGRAM(s) Oral daily  ascorbic acid 500 milliGRAM(s) Oral two times a day  chlorhexidine 2% Cloths 1 Application(s) Topical daily  docusate sodium 200 milliGRAM(s) Oral at bedtime  epoetin jacqueline Injectable 4000 Unit(s) IV Push <User Schedule>  epoetin jacqueline Injectable 4000 Unit(s) IV Push <User Schedule>  ferrous    sulfate 325 milliGRAM(s) Oral daily  folic acid 1 milliGRAM(s) Oral daily  gabapentin 100 milliGRAM(s) Oral three times a day  heparin  Injectable 5000 Unit(s) SubCutaneous every 12 hours  hydrALAZINE 100 milliGRAM(s) Oral every 8 hours  influenza   Vaccine 0.5 milliLiter(s) IntraMuscular once  insulin lispro (HumaLOG) corrective regimen sliding scale   SubCutaneous Before meals and at bedtime  insulin lispro Injectable (HumaLOG) 3 Unit(s) SubCutaneous three times a day before meals  levothyroxine 50 MICROGram(s) Oral daily  metoprolol tartrate 50 milliGRAM(s) Oral two times a day  multivitamin/minerals 1 Tablet(s) Oral daily  pantoprazole    Tablet 40 milliGRAM(s) Oral before breakfast  senna 2 Tablet(s) Oral at bedtime  sevelamer hydrochloride 800 milliGRAM(s) Oral three times a day  vancomycin  IVPB 750 milliGRAM(s) IV Intermittent <User Schedule>  zinc sulfate 220 milliGRAM(s) Oral daily      	  LABS:	 	                    9.3    17.0  )-----------( 329      ( 02 Oct 2018 07:16 )             29.9     10-02    133<L>  |  96  |  59<H>  ----------------------------<  132<H>  4.6   |  24  |  5.97<H>    Ca    10.2      02 Oct 2018 07:16  Phos  4.7     10-02  Mg     2.3     10-02    TPro  7.5  /  Alb  2.8<L>  /  TBili  0.3  /  DBili  x   /  AST  10  /  ALT  16  /  AlkPhos  138<H>  10-02    Lipid Profile: Cholesterol 84  LDL >29  HDL 52  TG <15

## 2018-10-02 NOTE — PROGRESS NOTE ADULT - ATTENDING COMMENTS
Patient  seen and examined morning of 10/2/2018,  Case reviewed with the medical team. Above note is appreciated. Will follow up clinically. Continue DVT prophylaxis. Case discussed with Cardiology and nephrology in detail. Patient to go for AV fistula on 10/4/2018.

## 2018-10-03 LAB
ALBUMIN SERPL ELPH-MCNC: 2.7 G/DL — LOW (ref 3.5–5)
ALP SERPL-CCNC: 118 U/L — SIGNIFICANT CHANGE UP (ref 40–120)
ALT FLD-CCNC: 15 U/L DA — SIGNIFICANT CHANGE UP (ref 10–60)
ANION GAP SERPL CALC-SCNC: 12 MMOL/L — SIGNIFICANT CHANGE UP (ref 5–17)
AST SERPL-CCNC: 9 U/L — LOW (ref 10–40)
BASOPHILS # BLD AUTO: 0.1 K/UL — SIGNIFICANT CHANGE UP (ref 0–0.2)
BASOPHILS NFR BLD AUTO: 0.8 % — SIGNIFICANT CHANGE UP (ref 0–2)
BILIRUB SERPL-MCNC: 0.3 MG/DL — SIGNIFICANT CHANGE UP (ref 0.2–1.2)
BUN SERPL-MCNC: 91 MG/DL — HIGH (ref 7–18)
CALCIUM SERPL-MCNC: 10.1 MG/DL — SIGNIFICANT CHANGE UP (ref 8.4–10.5)
CHLORIDE SERPL-SCNC: 98 MMOL/L — SIGNIFICANT CHANGE UP (ref 96–108)
CO2 SERPL-SCNC: 24 MMOL/L — SIGNIFICANT CHANGE UP (ref 22–31)
CREAT SERPL-MCNC: 8.1 MG/DL — HIGH (ref 0.5–1.3)
EOSINOPHIL # BLD AUTO: 0.7 K/UL — HIGH (ref 0–0.5)
EOSINOPHIL NFR BLD AUTO: 4.9 % — SIGNIFICANT CHANGE UP (ref 0–6)
GLUCOSE BLDC GLUCOMTR-MCNC: 126 MG/DL — HIGH (ref 70–99)
GLUCOSE BLDC GLUCOMTR-MCNC: 138 MG/DL — HIGH (ref 70–99)
GLUCOSE BLDC GLUCOMTR-MCNC: 184 MG/DL — HIGH (ref 70–99)
GLUCOSE BLDC GLUCOMTR-MCNC: 196 MG/DL — HIGH (ref 70–99)
GLUCOSE SERPL-MCNC: 196 MG/DL — HIGH (ref 70–99)
HCT VFR BLD CALC: 28.9 % — LOW (ref 34.5–45)
HGB BLD-MCNC: 9 G/DL — LOW (ref 11.5–15.5)
LYMPHOCYTES # BLD AUTO: 15 % — SIGNIFICANT CHANGE UP (ref 13–44)
LYMPHOCYTES # BLD AUTO: 2.2 K/UL — SIGNIFICANT CHANGE UP (ref 1–3.3)
MAGNESIUM SERPL-MCNC: 2.4 MG/DL — SIGNIFICANT CHANGE UP (ref 1.6–2.6)
MCHC RBC-ENTMCNC: 30.7 PG — SIGNIFICANT CHANGE UP (ref 27–34)
MCHC RBC-ENTMCNC: 31.2 GM/DL — LOW (ref 32–36)
MCV RBC AUTO: 98.4 FL — SIGNIFICANT CHANGE UP (ref 80–100)
MONOCYTES # BLD AUTO: 1.1 K/UL — HIGH (ref 0–0.9)
MONOCYTES NFR BLD AUTO: 7.4 % — SIGNIFICANT CHANGE UP (ref 2–14)
NEUTROPHILS # BLD AUTO: 10.7 K/UL — HIGH (ref 1.8–7.4)
NEUTROPHILS NFR BLD AUTO: 71.8 % — SIGNIFICANT CHANGE UP (ref 43–77)
PHOSPHATE SERPL-MCNC: 5.3 MG/DL — HIGH (ref 2.5–4.5)
PLATELET # BLD AUTO: 330 K/UL — SIGNIFICANT CHANGE UP (ref 150–400)
POTASSIUM SERPL-MCNC: 5 MMOL/L — SIGNIFICANT CHANGE UP (ref 3.5–5.3)
POTASSIUM SERPL-SCNC: 5 MMOL/L — SIGNIFICANT CHANGE UP (ref 3.5–5.3)
PROT SERPL-MCNC: 7.3 G/DL — SIGNIFICANT CHANGE UP (ref 6–8.3)
RBC # BLD: 2.94 M/UL — LOW (ref 3.8–5.2)
RBC # FLD: 14.4 % — SIGNIFICANT CHANGE UP (ref 10.3–14.5)
SODIUM SERPL-SCNC: 134 MMOL/L — LOW (ref 135–145)
VANCOMYCIN TROUGH SERPL-MCNC: 22.8 UG/ML — HIGH (ref 10–20)
WBC # BLD: 14.8 K/UL — HIGH (ref 3.8–10.5)
WBC # FLD AUTO: 14.8 K/UL — HIGH (ref 3.8–10.5)

## 2018-10-03 RX ORDER — NIFEDIPINE 30 MG
60 TABLET, EXTENDED RELEASE 24 HR ORAL DAILY
Qty: 0 | Refills: 0 | Status: DISCONTINUED | OUTPATIENT
Start: 2018-10-03 | End: 2018-10-05

## 2018-10-03 RX ORDER — OXYCODONE AND ACETAMINOPHEN 5; 325 MG/1; MG/1
1 TABLET ORAL EVERY 4 HOURS
Qty: 0 | Refills: 0 | Status: DISCONTINUED | OUTPATIENT
Start: 2018-10-03 | End: 2018-10-03

## 2018-10-03 RX ORDER — METOPROLOL TARTRATE 50 MG
50 TABLET ORAL THREE TIMES A DAY
Qty: 0 | Refills: 0 | Status: DISCONTINUED | OUTPATIENT
Start: 2018-10-03 | End: 2018-10-03

## 2018-10-03 RX ORDER — METOPROLOL TARTRATE 50 MG
50 TABLET ORAL EVERY 8 HOURS
Qty: 0 | Refills: 0 | Status: DISCONTINUED | OUTPATIENT
Start: 2018-10-03 | End: 2018-10-05

## 2018-10-03 RX ADMIN — CHLORHEXIDINE GLUCONATE 1 APPLICATION(S): 213 SOLUTION TOPICAL at 17:35

## 2018-10-03 RX ADMIN — Medication 325 MILLIGRAM(S): at 12:00

## 2018-10-03 RX ADMIN — Medication 100 MILLIGRAM(S): at 22:16

## 2018-10-03 RX ADMIN — Medication 1: at 17:33

## 2018-10-03 RX ADMIN — Medication 1 TABLET(S): at 12:00

## 2018-10-03 RX ADMIN — PANTOPRAZOLE SODIUM 40 MILLIGRAM(S): 20 TABLET, DELAYED RELEASE ORAL at 06:18

## 2018-10-03 RX ADMIN — ZINC SULFATE TAB 220 MG (50 MG ZINC EQUIVALENT) 220 MILLIGRAM(S): 220 (50 ZN) TAB at 12:00

## 2018-10-03 RX ADMIN — Medication 60 MILLIGRAM(S): at 17:34

## 2018-10-03 RX ADMIN — GABAPENTIN 100 MILLIGRAM(S): 400 CAPSULE ORAL at 22:16

## 2018-10-03 RX ADMIN — Medication 200 MILLIGRAM(S): at 22:16

## 2018-10-03 RX ADMIN — GABAPENTIN 100 MILLIGRAM(S): 400 CAPSULE ORAL at 13:44

## 2018-10-03 RX ADMIN — GABAPENTIN 100 MILLIGRAM(S): 400 CAPSULE ORAL at 06:18

## 2018-10-03 RX ADMIN — SENNA PLUS 2 TABLET(S): 8.6 TABLET ORAL at 22:16

## 2018-10-03 RX ADMIN — ERYTHROPOIETIN 4000 UNIT(S): 10000 INJECTION, SOLUTION INTRAVENOUS; SUBCUTANEOUS at 21:05

## 2018-10-03 RX ADMIN — SEVELAMER CARBONATE 800 MILLIGRAM(S): 2400 POWDER, FOR SUSPENSION ORAL at 22:16

## 2018-10-03 RX ADMIN — Medication 100 MILLIGRAM(S): at 06:18

## 2018-10-03 RX ADMIN — SEVELAMER CARBONATE 800 MILLIGRAM(S): 2400 POWDER, FOR SUSPENSION ORAL at 13:43

## 2018-10-03 RX ADMIN — Medication 50 MILLIGRAM(S): at 06:18

## 2018-10-03 RX ADMIN — AMLODIPINE BESYLATE 10 MILLIGRAM(S): 2.5 TABLET ORAL at 06:18

## 2018-10-03 RX ADMIN — Medication 3 UNIT(S): at 08:18

## 2018-10-03 RX ADMIN — Medication 3 UNIT(S): at 17:34

## 2018-10-03 RX ADMIN — Medication 500 MILLIGRAM(S): at 06:18

## 2018-10-03 RX ADMIN — OXYCODONE AND ACETAMINOPHEN 1 TABLET(S): 5; 325 TABLET ORAL at 23:24

## 2018-10-03 RX ADMIN — Medication 50 MILLIGRAM(S): at 22:16

## 2018-10-03 RX ADMIN — HEPARIN SODIUM 5000 UNIT(S): 5000 INJECTION INTRAVENOUS; SUBCUTANEOUS at 06:18

## 2018-10-03 RX ADMIN — Medication 3 UNIT(S): at 11:58

## 2018-10-03 RX ADMIN — OXYCODONE AND ACETAMINOPHEN 1 TABLET(S): 5; 325 TABLET ORAL at 23:54

## 2018-10-03 RX ADMIN — Medication 1 MILLIGRAM(S): at 12:00

## 2018-10-03 RX ADMIN — Medication 50 MICROGRAM(S): at 06:18

## 2018-10-03 RX ADMIN — SEVELAMER CARBONATE 800 MILLIGRAM(S): 2400 POWDER, FOR SUSPENSION ORAL at 06:18

## 2018-10-03 RX ADMIN — HEPARIN SODIUM 5000 UNIT(S): 5000 INJECTION INTRAVENOUS; SUBCUTANEOUS at 17:35

## 2018-10-03 RX ADMIN — Medication 100 MILLIGRAM(S): at 13:44

## 2018-10-03 RX ADMIN — Medication 50 MILLIGRAM(S): at 13:44

## 2018-10-03 NOTE — PROGRESS NOTE ADULT - ATTENDING COMMENTS
Patient is seen and examined. Case reviewed with the medical team. Above note is appreciated. Will follow up clinically. Continue DVT prophylaxis. Case discussed with Cardiology and nephrology. For AV Fistula in am. Medically optimized.

## 2018-10-03 NOTE — PROGRESS NOTE ADULT - SUBJECTIVE AND OBJECTIVE BOX
no events overnight.    No Known Allergies    Hospital Medications:   MEDICATIONS  (STANDING):  ascorbic acid 500 milliGRAM(s) Oral two times a day  chlorhexidine 2% Cloths 1 Application(s) Topical daily  docusate sodium 200 milliGRAM(s) Oral at bedtime  epoetin jacqueline Injectable 4000 Unit(s) IV Push <User Schedule>  epoetin jacqueline Injectable 4000 Unit(s) IV Push <User Schedule>  ferrous    sulfate 325 milliGRAM(s) Oral daily  folic acid 1 milliGRAM(s) Oral daily  gabapentin 100 milliGRAM(s) Oral three times a day  heparin  Injectable 5000 Unit(s) SubCutaneous every 12 hours  hydrALAZINE 100 milliGRAM(s) Oral every 8 hours  influenza   Vaccine 0.5 milliLiter(s) IntraMuscular once  insulin lispro (HumaLOG) corrective regimen sliding scale   SubCutaneous Before meals and at bedtime  insulin lispro Injectable (HumaLOG) 3 Unit(s) SubCutaneous three times a day before meals  levothyroxine 50 MICROGram(s) Oral daily  metoprolol tartrate 50 milliGRAM(s) Oral every 8 hours  multivitamin/minerals 1 Tablet(s) Oral daily  NIFEdipine XL 60 milliGRAM(s) Oral daily  pantoprazole    Tablet 40 milliGRAM(s) Oral before breakfast  senna 2 Tablet(s) Oral at bedtime  sevelamer hydrochloride 800 milliGRAM(s) Oral three times a day  vancomycin  IVPB 750 milliGRAM(s) IV Intermittent <User Schedule>  zinc sulfate 220 milliGRAM(s) Oral daily      VITALS:  T(F): 97.4 (10-03-18 @ 14:45), Max: 98.4 (10-02-18 @ 22:02)  HR: 77 (10-03-18 @ 14:45)  BP: 155/67 (10-03-18 @ 14:45)  RR: 17 (10-03-18 @ 14:45)  SpO2: 100% (10-03-18 @ 14:45)  Wt(kg): --      PHYSICAL EXAM:  Constitutional: NAD  HEENT: anicteric sclera, oropharynx clear.  Neck: No JVD  Respiratory: CTAB, no wheezes, rales or rhonchi  Cardiovascular: S1, S2, RRR  Gastrointestinal: BS+, soft, NT/ND  Extremities: No peripheral edema  Neurological: A/O x 3, no focal deficits  Vascular Access: RT IJ permacath.    LABS:  10-02    133<L>  |  96  |  59<H>  ----------------------------<  132<H>  4.6   |  24  |  5.97<H>    Ca    10.2      02 Oct 2018 07:16  Phos  4.7     10-02  Mg     2.3     10-02    TPro  7.5  /  Alb  2.8<L>  /  TBili  0.3  /  DBili      /  AST  10  /  ALT  16  /  AlkPhos  138<H>  10-02    Creatinine Trend: 5.97 <--, 7.95 <--, 6.34 <--, 4.80 <--, 9.49 <--, 6.87 <--                        9.3    17.0  )-----------( 329      ( 02 Oct 2018 07:16 )             29.9     Urine Studies:      RADIOLOGY & ADDITIONAL STUDIES:

## 2018-10-03 NOTE — PROGRESS NOTE ADULT - SUBJECTIVE AND OBJECTIVE BOX
Surgery Pre-op    Diagnosis: ESRD  Procedure: Left arm AV fistula    Vital Signs Last 24 Hrs  T(C): 36.3 (03 Oct 2018 14:45), Max: 36.9 (02 Oct 2018 22:02)  T(F): 97.4 (03 Oct 2018 14:45), Max: 98.4 (02 Oct 2018 22:02)  HR: 77 (03 Oct 2018 14:45) (73 - 78)  BP: 155/67 (03 Oct 2018 14:45) (151/67 - 188/75)  BP(mean): --  RR: 17 (03 Oct 2018 14:45) (17 - 18)  SpO2: 100% (03 Oct 2018 14:45) (100% - 100%)                          9.3    17.0  )-----------( 329      ( 02 Oct 2018 07:16 )             29.9   10-02    133<L>  |  96  |  59<H>  ----------------------------<  132<H>  4.6   |  24  |  5.97<H>    Ca    10.2      02 Oct 2018 07:16  Phos  4.7     10-02  Mg     2.3     10-02    TPro  7.5  /  Alb  2.8<L>  /  TBili  0.3  /  DBili  x   /  AST  10  /  ALT  16  /  AlkPhos  138<H>  10-02    < from: US Duplex Hemodialysis Access (09.29.18 @ 17:08) >  Findings: There is no deep vein thrombosis. The internal jugular,   subclavian, axillary, and paired brachial veins are patent. The cephalic   and basilic veins are patent.    Cephalic vein measurements:  Proximal upper arm: 1.5 mm.  Mid upper arm: 1.6 mm.  Distal upper arm: 2.0 mm.  Antecubital fossa: 1.3 mm.  Proximal forearm: 1.6 mm.  Midforearm: 1.6 mm.  Distal forearm: 1.6 mm.  Wrist: 1.5 mm.    Basilic vein measurements:  Proximal upper arm: 2.8 mm.  Mid upper arm: 2.5 mm.  Distal upper arm: 2.4 mm.  Antecubital fossa: 1.0 mm.  Proximal forearm: 0.08 mm.  Midforearm: 0.08 mm.  Wrist: 0.06 mm.    Impression: Left upper extremity venous mapping detailed above.     < end of copied text >

## 2018-10-03 NOTE — PROGRESS NOTE ADULT - SUBJECTIVE AND OBJECTIVE BOX
CHIEF COMPLAINT:Patient is a 53y old  Female who presents with a chief complaint of sent from NH due to positive blood cultures .Pt appears comfortable.    	  REVIEW OF SYSTEMS:  CONSTITUTIONAL: No fever, weight loss, or fatigue  EYES: No eye pain, visual disturbances, or discharge  ENT:  No difficulty hearing, tinnitus, vertigo; No sinus or throat pain  NECK: No pain or stiffness  RESPIRATORY: No cough, wheezing, chills or hemoptysis; No Shortness of Breath  CARDIOVASCULAR: No chest pain, palpitations, passing out, dizziness, or leg swelling  GASTROINTESTINAL: No abdominal or epigastric pain. No nausea, vomiting, or hematemesis; No diarrhea or constipation. No melena or hematochezia.  GENITOURINARY: No dysuria, frequency, hematuria, or incontinence  NEUROLOGICAL: No headaches, memory loss, loss of strength, numbness, or tremors  SKIN: No itching, burning, rashes, or lesions   LYMPH Nodes: No enlarged glands  ENDOCRINE: No heat or cold intolerance; No hair loss  MUSCULOSKELETAL: No joint pain or swelling; No muscle, back, or extremity pain  PSYCHIATRIC: No depression, anxiety, mood swings, or difficulty sleeping  HEME/LYMPH: No easy bruising, or bleeding gums  ALLERGY AND IMMUNOLOGIC: No hives or eczema	      PHYSICAL EXAM:  T(C): 36.6 (10-03-18 @ 05:37), Max: 36.9 (10-02-18 @ 22:02)  HR: 73 (10-03-18 @ 05:37) (70 - 79)  BP: 188/75 (10-03-18 @ 05:37) (135/66 - 188/75)  RR: 18 (10-03-18 @ 05:37) (17 - 18)  SpO2: 100% (10-03-18 @ 05:37) (97% - 100%)      Appearance: Normal	  HEENT:   Normal oral mucosa, PERRL, EOMI	  Lymphatic: No lymphadenopathy  Cardiovascular: Normal S1 S2, No JVD, No murmurs, No edema  Respiratory: Lungs clear to auscultation	  Psychiatry: A & O x 3, Mood & affect appropriate  Gastrointestinal:  Soft, Non-tender, + BS	  Skin: No rashes, No ecchymoses, No cyanosis	  Neurologic: Non-focal  Extremities: Normal range of motion, No clubbing, cyanosis or edema  Vascular: Peripheral pulses palpable 2+ bilaterally    MEDICATIONS  (STANDING):  ascorbic acid 500 milliGRAM(s) Oral two times a day  chlorhexidine 2% Cloths 1 Application(s) Topical daily  docusate sodium 200 milliGRAM(s) Oral at bedtime  epoetin jacqueline Injectable 4000 Unit(s) IV Push <User Schedule>  epoetin jacqueline Injectable 4000 Unit(s) IV Push <User Schedule>  ferrous    sulfate 325 milliGRAM(s) Oral daily  folic acid 1 milliGRAM(s) Oral daily  gabapentin 100 milliGRAM(s) Oral three times a day  heparin  Injectable 5000 Unit(s) SubCutaneous every 12 hours  hydrALAZINE 100 milliGRAM(s) Oral every 8 hours  influenza   Vaccine 0.5 milliLiter(s) IntraMuscular once  insulin lispro (HumaLOG) corrective regimen sliding scale   SubCutaneous Before meals and at bedtime  insulin lispro Injectable (HumaLOG) 3 Unit(s) SubCutaneous three times a day before meals  levothyroxine 50 MICROGram(s) Oral daily  metoprolol tartrate 50 milliGRAM(s) Oral three times a day  multivitamin/minerals 1 Tablet(s) Oral daily  NIFEdipine XL 60 milliGRAM(s) Oral daily  pantoprazole    Tablet 40 milliGRAM(s) Oral before breakfast  senna 2 Tablet(s) Oral at bedtime  sevelamer hydrochloride 800 milliGRAM(s) Oral three times a day  vancomycin  IVPB 750 milliGRAM(s) IV Intermittent <User Schedule>  zinc sulfate 220 milliGRAM(s) Oral daily        	  LABS:	 	                       9.3    17.0  )-----------( 329      ( 02 Oct 2018 07:16 )             29.9     10-02    133<L>  |  96  |  59<H>  ----------------------------<  132<H>  4.6   |  24  |  5.97<H>    Ca    10.2      02 Oct 2018 07:16  Phos  4.7     10-02  Mg     2.3     10-02    TPro  7.5  /  Alb  2.8<L>  /  TBili  0.3  /  DBili  x   /  AST  10  /  ALT  16  /  AlkPhos  138<H>  10-02      Lipid Profile: Cholesterol 84  LDL >29  HDL 52  TG <15    HgA1c:   TSH:

## 2018-10-03 NOTE — PROGRESS NOTE ADULT - SUBJECTIVE AND OBJECTIVE BOX
PGY 1 Note discussed with supervising resident and primary attending    Patient is a 53y old  Female who presents with a chief complaint of sent from NH due to positive blood cultures (03 Oct 2018 11:04)      INTERVAL HPI/OVERNIGHT EVENTS:   Patient seen at the bedside. no new complains.     MEDICATIONS  (STANDING):  ascorbic acid 500 milliGRAM(s) Oral two times a day  chlorhexidine 2% Cloths 1 Application(s) Topical daily  docusate sodium 200 milliGRAM(s) Oral at bedtime  epoetin jacqueline Injectable 4000 Unit(s) IV Push <User Schedule>  epoetin jacqueline Injectable 4000 Unit(s) IV Push <User Schedule>  ferrous    sulfate 325 milliGRAM(s) Oral daily  folic acid 1 milliGRAM(s) Oral daily  gabapentin 100 milliGRAM(s) Oral three times a day  heparin  Injectable 5000 Unit(s) SubCutaneous every 12 hours  hydrALAZINE 100 milliGRAM(s) Oral every 8 hours  influenza   Vaccine 0.5 milliLiter(s) IntraMuscular once  insulin lispro (HumaLOG) corrective regimen sliding scale   SubCutaneous Before meals and at bedtime  insulin lispro Injectable (HumaLOG) 3 Unit(s) SubCutaneous three times a day before meals  levothyroxine 50 MICROGram(s) Oral daily  metoprolol tartrate 50 milliGRAM(s) Oral every 8 hours  multivitamin/minerals 1 Tablet(s) Oral daily  NIFEdipine XL 60 milliGRAM(s) Oral daily  pantoprazole    Tablet 40 milliGRAM(s) Oral before breakfast  senna 2 Tablet(s) Oral at bedtime  sevelamer hydrochloride 800 milliGRAM(s) Oral three times a day  vancomycin  IVPB 750 milliGRAM(s) IV Intermittent <User Schedule>  zinc sulfate 220 milliGRAM(s) Oral daily    MEDICATIONS  (PRN):  acetaminophen   Tablet .. 650 milliGRAM(s) Oral every 6 hours PRN Moderate Pain (4 - 6)      __________________________________________________  REVIEW OF SYSTEMS:    CONSTITUTIONAL: No fever,   EYES: no acute visual disturbances  NECK: No pain or stiffness  RESPIRATORY: No cough; No shortness of breath  CARDIOVASCULAR: No chest pain, no palpitations  GASTROINTESTINAL: No pain. No nausea or vomiting; No diarrhea   NEUROLOGICAL: No headache or numbness, no tremors  MUSCULOSKELETAL: No joint pain, no muscle pain  GENITOURINARY: no dysuria, no frequency, no hesitancy  PSYCHIATRY: no depression , no anxiety  ALL OTHER  ROS negative        Vital Signs Last 24 Hrs  T(C): 36.3 (03 Oct 2018 14:45), Max: 36.9 (02 Oct 2018 22:02)  T(F): 97.4 (03 Oct 2018 14:45), Max: 98.4 (02 Oct 2018 22:02)  HR: 77 (03 Oct 2018 14:45) (73 - 79)  BP: 155/67 (03 Oct 2018 14:45) (143/55 - 188/75)  BP(mean): --  RR: 17 (03 Oct 2018 14:45) (17 - 18)  SpO2: 100% (03 Oct 2018 14:45) (100% - 100%)    ________________________________________________  PHYSICAL EXAM:  GENERAL: NAD  HEENT: Normocephalic;  conjunctivae and sclerae clear; moist mucous membranes;   NECK : supple  CHEST/LUNG: Clear to auscultation bilaterally with good air entry   HEART: S1 S2  regular; no murmurs, gallops or rubs  ABDOMEN: Soft, Nontender, Nondistended; Bowel sounds present  EXTREMITIES: no cyanosis; no edema; no calf tenderness  SKIN: warm and dry; no rash  NERVOUS SYSTEM:  Awake and alert; Oriented  to place, person and time ; no new deficits    _________________________________________________  LABS:                        9.3    17.0  )-----------( 329      ( 02 Oct 2018 07:16 )             29.9     10-02    133<L>  |  96  |  59<H>  ----------------------------<  132<H>  4.6   |  24  |  5.97<H>    Ca    10.2      02 Oct 2018 07:16  Phos  4.7     10-02  Mg     2.3     10-02    TPro  7.5  /  Alb  2.8<L>  /  TBili  0.3  /  DBili  x   /  AST  10  /  ALT  16  /  AlkPhos  138<H>  10-02        CAPILLARY BLOOD GLUCOSE  126 (03 Oct 2018 08:32)  144 (03 Oct 2018 05:37)      POCT Blood Glucose.: 184 mg/dL (03 Oct 2018 11:49)  POCT Blood Glucose.: 126 mg/dL (03 Oct 2018 07:55)  POCT Blood Glucose.: 228 mg/dL (02 Oct 2018 21:47)  POCT Blood Glucose.: 272 mg/dL (02 Oct 2018 16:36)        RADIOLOGY & ADDITIONAL TESTS:    Imaging Personally Reviewed:  YES/NO    Consultant(s) Notes Reviewed:   YES/ No    Care Discussed with Consultants :     Plan of care was discussed with patient and /or primary care giver; all questions and concerns were addressed and care was aligned with patient's wishes.

## 2018-10-04 ENCOUNTER — APPOINTMENT (OUTPATIENT)
Dept: VASCULAR SURGERY | Facility: HOSPITAL | Age: 53
End: 2018-10-04

## 2018-10-04 ENCOUNTER — TRANSCRIPTION ENCOUNTER (OUTPATIENT)
Age: 53
End: 2018-10-04

## 2018-10-04 LAB
ANION GAP SERPL CALC-SCNC: 7 MMOL/L — SIGNIFICANT CHANGE UP (ref 5–17)
APTT BLD: 27 SEC — LOW (ref 27.5–37.4)
BUN SERPL-MCNC: 32 MG/DL — HIGH (ref 7–18)
CALCIUM SERPL-MCNC: 10.1 MG/DL — SIGNIFICANT CHANGE UP (ref 8.4–10.5)
CHLORIDE SERPL-SCNC: 102 MMOL/L — SIGNIFICANT CHANGE UP (ref 96–108)
CO2 SERPL-SCNC: 29 MMOL/L — SIGNIFICANT CHANGE UP (ref 22–31)
CREAT SERPL-MCNC: 3.67 MG/DL — HIGH (ref 0.5–1.3)
GLUCOSE BLDC GLUCOMTR-MCNC: 107 MG/DL — HIGH (ref 70–99)
GLUCOSE BLDC GLUCOMTR-MCNC: 112 MG/DL — HIGH (ref 70–99)
GLUCOSE BLDC GLUCOMTR-MCNC: 133 MG/DL — HIGH (ref 70–99)
GLUCOSE BLDC GLUCOMTR-MCNC: 191 MG/DL — HIGH (ref 70–99)
GLUCOSE BLDC GLUCOMTR-MCNC: 95 MG/DL — SIGNIFICANT CHANGE UP (ref 70–99)
GLUCOSE SERPL-MCNC: 96 MG/DL — SIGNIFICANT CHANGE UP (ref 70–99)
HCT VFR BLD CALC: 29.6 % — LOW (ref 34.5–45)
HGB BLD-MCNC: 9.2 G/DL — LOW (ref 11.5–15.5)
INR BLD: 0.99 RATIO — SIGNIFICANT CHANGE UP (ref 0.88–1.16)
MCHC RBC-ENTMCNC: 30.9 GM/DL — LOW (ref 32–36)
MCHC RBC-ENTMCNC: 30.9 PG — SIGNIFICANT CHANGE UP (ref 27–34)
MCV RBC AUTO: 100.1 FL — HIGH (ref 80–100)
PLATELET # BLD AUTO: 277 K/UL — SIGNIFICANT CHANGE UP (ref 150–400)
POTASSIUM SERPL-MCNC: 3.6 MMOL/L — SIGNIFICANT CHANGE UP (ref 3.5–5.3)
POTASSIUM SERPL-SCNC: 3.6 MMOL/L — SIGNIFICANT CHANGE UP (ref 3.5–5.3)
PROTHROM AB SERPL-ACNC: 10.8 SEC — SIGNIFICANT CHANGE UP (ref 9.8–12.7)
RBC # BLD: 2.96 M/UL — LOW (ref 3.8–5.2)
RBC # FLD: 14.6 % — HIGH (ref 10.3–14.5)
SODIUM SERPL-SCNC: 138 MMOL/L — SIGNIFICANT CHANGE UP (ref 135–145)
WBC # BLD: 11.5 K/UL — HIGH (ref 3.8–10.5)
WBC # FLD AUTO: 11.5 K/UL — HIGH (ref 3.8–10.5)

## 2018-10-04 RX ORDER — OXYCODONE HYDROCHLORIDE 5 MG/1
5 TABLET ORAL EVERY 4 HOURS
Qty: 0 | Refills: 0 | Status: DISCONTINUED | OUTPATIENT
Start: 2018-10-04 | End: 2018-10-05

## 2018-10-04 RX ORDER — ACETAMINOPHEN 500 MG
650 TABLET ORAL EVERY 6 HOURS
Qty: 0 | Refills: 0 | Status: DISCONTINUED | OUTPATIENT
Start: 2018-10-04 | End: 2018-10-05

## 2018-10-04 RX ADMIN — Medication 1: at 22:24

## 2018-10-04 RX ADMIN — GABAPENTIN 100 MILLIGRAM(S): 400 CAPSULE ORAL at 05:33

## 2018-10-04 RX ADMIN — Medication 50 MICROGRAM(S): at 05:34

## 2018-10-04 RX ADMIN — Medication 50 MILLIGRAM(S): at 22:23

## 2018-10-04 RX ADMIN — GABAPENTIN 100 MILLIGRAM(S): 400 CAPSULE ORAL at 22:23

## 2018-10-04 RX ADMIN — Medication 200 MILLIGRAM(S): at 22:23

## 2018-10-04 RX ADMIN — Medication 500 MILLIGRAM(S): at 05:34

## 2018-10-04 RX ADMIN — HEPARIN SODIUM 5000 UNIT(S): 5000 INJECTION INTRAVENOUS; SUBCUTANEOUS at 05:34

## 2018-10-04 RX ADMIN — SEVELAMER CARBONATE 800 MILLIGRAM(S): 2400 POWDER, FOR SUSPENSION ORAL at 05:34

## 2018-10-04 RX ADMIN — Medication 500 MILLIGRAM(S): at 17:22

## 2018-10-04 RX ADMIN — PANTOPRAZOLE SODIUM 40 MILLIGRAM(S): 20 TABLET, DELAYED RELEASE ORAL at 06:35

## 2018-10-04 RX ADMIN — Medication 60 MILLIGRAM(S): at 05:33

## 2018-10-04 RX ADMIN — Medication 3 UNIT(S): at 17:21

## 2018-10-04 RX ADMIN — CHLORHEXIDINE GLUCONATE 1 APPLICATION(S): 213 SOLUTION TOPICAL at 12:04

## 2018-10-04 RX ADMIN — Medication 100 MILLIGRAM(S): at 05:34

## 2018-10-04 RX ADMIN — Medication 50 MILLIGRAM(S): at 05:34

## 2018-10-04 RX ADMIN — HEPARIN SODIUM 5000 UNIT(S): 5000 INJECTION INTRAVENOUS; SUBCUTANEOUS at 17:22

## 2018-10-04 RX ADMIN — Medication 100 MILLIGRAM(S): at 22:23

## 2018-10-04 NOTE — DISCHARGE NOTE ADULT - SECONDARY DIAGNOSIS.
ESRD (end stage renal disease) on dialysis HTN (hypertension) Hypothyroidism Stage 4 skin ulcer of sacral region AV fistula PFO (patent foramen ovale)

## 2018-10-04 NOTE — PROGRESS NOTE ADULT - PROBLEM SELECTOR PLAN 2
managed as above
WBC: trending down   because of Infected perma cath, managed as above which was removed.  Patient also has a decubitus ulcer, ESR, CRP Xray reviewed, OM seems less likely  ID Dr Kaplan following   Wound care
WBC: trending down 17-->11.5  because of Infected perma cath, managed as above  Patient also has a decubitus ulcer, ESR, CRP Xray reviewed, OM seems less likely  ID Dr Kaplan following   Wound care  IV vanco
WBC: trending down 17-->11.5--> 10   because of Infected perma cath, managed as above  Patient also has a decubitus ulcer, ESR, CRP Xray reviewed, OM seems less likely  ID Dr Kaplan following   Wound care
WBC: trending down 17-->11.5--> 10   because of Infected perma cath, managed as above  Patient also has a decubitus ulcer, ESR, CRP Xray reviewed, OM seems less likely  ID Dr Kaplan following   Wound care  IV vanco: hold for now because of high vanco trough
because of Infected perma cath, managed as above  Patient also has a decubitus ulcer, ESR, CRP Xray reviewed, OM seems less likely  ID Dr Kaplan following   Wound care  IV vanco
managed as above

## 2018-10-04 NOTE — PROGRESS NOTE ADULT - SUBJECTIVE AND OBJECTIVE BOX
CHIEF COMPLAINT:Patient is a 53y old  Female who presents with a chief complaint of sent from NH due to positive blood cultures .Pt appears comfortable.    	  REVIEW OF SYSTEMS:  CONSTITUTIONAL: No fever, weight loss, or fatigue  EYES: No eye pain, visual disturbances, or discharge  ENT:  No difficulty hearing, tinnitus, vertigo; No sinus or throat pain  NECK: No pain or stiffness  RESPIRATORY: No cough, wheezing, chills or hemoptysis; No Shortness of Breath  CARDIOVASCULAR: No chest pain, palpitations, passing out, dizziness, or leg swelling  GASTROINTESTINAL: No abdominal or epigastric pain. No nausea, vomiting, or hematemesis; No diarrhea or constipation. No melena or hematochezia.  GENITOURINARY: No dysuria, frequency, hematuria, or incontinence  NEUROLOGICAL: No headaches, memory loss, loss of strength, numbness, or tremors  SKIN: No itching, burning, rashes, or lesions   LYMPH Nodes: No enlarged glands  ENDOCRINE: No heat or cold intolerance; No hair loss  MUSCULOSKELETAL: No joint pain or swelling; No muscle, back, or extremity pain  PSYCHIATRIC: No depression, anxiety, mood swings, or difficulty sleeping  HEME/LYMPH: No easy bruising, or bleeding gums  ALLERGY AND IMMUNOLOGIC: No hives or eczema	    PHYSICAL EXAM:  T(C): 37.1 (10-04-18 @ 07:19), Max: 37.1 (10-04-18 @ 05:39)  HR: 77 (10-04-18 @ 07:19) (77 - 94)  BP: 150/52 (10-04-18 @ 07:19) (116/66 - 155/67)  RR: 18 (10-04-18 @ 07:19) (16 - 18)  SpO2: 99% (10-04-18 @ 07:19) (99% - 100%)  Wt(kg): --  I&O's Summary      Appearance: Normal	  HEENT:   Normal oral mucosa, PERRL, EOMI	  Lymphatic: No lymphadenopathy  Cardiovascular: Normal S1 S2, No JVD, No murmurs, No edema  Respiratory: Lungs clear to auscultation	  Psychiatry: A & O x 3, Mood & affect appropriate  Gastrointestinal:  Soft, Non-tender, + BS	  Skin: No rashes, No ecchymoses, No cyanosis	  Neurologic: Non-focal  Extremities: Normal range of motion, No clubbing, cyanosis or edema  Vascular: Peripheral pulses palpable 2+ bilaterally    MEDICATIONS  (STANDING):  ascorbic acid 500 milliGRAM(s) Oral two times a day  chlorhexidine 2% Cloths 1 Application(s) Topical daily  docusate sodium 200 milliGRAM(s) Oral at bedtime  epoetin jacqueline Injectable 4000 Unit(s) IV Push <User Schedule>  ferrous    sulfate 325 milliGRAM(s) Oral daily  folic acid 1 milliGRAM(s) Oral daily  gabapentin 100 milliGRAM(s) Oral three times a day  heparin  Injectable 5000 Unit(s) SubCutaneous every 12 hours  hydrALAZINE 100 milliGRAM(s) Oral every 8 hours  influenza   Vaccine 0.5 milliLiter(s) IntraMuscular once  insulin lispro (HumaLOG) corrective regimen sliding scale   SubCutaneous Before meals and at bedtime  insulin lispro Injectable (HumaLOG) 3 Unit(s) SubCutaneous three times a day before meals  levothyroxine 50 MICROGram(s) Oral daily  metoprolol tartrate 50 milliGRAM(s) Oral every 8 hours  multivitamin/minerals 1 Tablet(s) Oral daily  NIFEdipine XL 60 milliGRAM(s) Oral daily  pantoprazole    Tablet 40 milliGRAM(s) Oral before breakfast  senna 2 Tablet(s) Oral at bedtime  sevelamer hydrochloride 800 milliGRAM(s) Oral three times a day  vancomycin  IVPB 750 milliGRAM(s) IV Intermittent <User Schedule>  zinc sulfate 220 milliGRAM(s) Oral daily      	  LABS:	 	                        9.2    11.5  )-----------( 277      ( 04 Oct 2018 05:52 )             29.6     10-04    138  |  102  |  32<H>  ----------------------------<  96  3.6   |  29  |  3.67<H>    Ca    10.1      04 Oct 2018 05:52  Phos  5.3     10-03  Mg     2.4     10-03    TPro  7.3  /  Alb  2.7<L>  /  TBili  0.3  /  DBili  x   /  AST  9<L>  /  ALT  15  /  AlkPhos  118  10-03      Lipid Profile: Cholesterol 84  LDL >29  HDL 52  TG <15

## 2018-10-04 NOTE — DISCHARGE NOTE ADULT - PLAN OF CARE
no bacteria in blood You came in with positive blood culture: gram positive cocci. Your repeat blood culture was positive for Coagulase negative bacteria. You did not have fever or sepsis. Your Perma cath was infected. It was removed and you received a new Perma Cath in the hospital. You received Vanco IV during Dialysis. You are recommended to continue Vanco and monitor Vanco level till Oct 9. You are recommended to follow up with PCP in 1 week You are on Monday/ Wednesday/ Friday schedule for Dialysis. You are recommended to maintain compliance with HD and follow up with PCP and Nephrologist You are on Monday/ Wednesday/ Friday schedule for Dialysis. You are recommended to maintain compliance with HD and follow up with PCP and Nephrologist. Your BP was elevated during hospital course. You are being discharged on Metroprolol Tartrate 50 Q8, Nifedipine XL 60 mg OD, Hydralazine 100mg q8. Maintain compliance with BP meds and follow up wiith PCP. You are recommended to take levothyroxine as advised and follow up with PCP in 1 week You came in with Sacral ulcer, with out bone involvement. You are recommended to take care of the wound, walk around  and follow up with PCP You received AV Fistula while in the hospital. You are recommended to follow up with Dr Gonsalves for suture removal in 2 week ECHO showed PFO.

## 2018-10-04 NOTE — CHART NOTE - NSCHARTNOTEFT_GEN_A_CORE
Assessment:      Nutrition reassessment for follow-up. Chart reviewed, pt visited, able to communicate well; on discharge planning to skilled nursing facility noted     Factors impacting intake: [ ] none [ ] nausea  [ ] vomiting [ ] diarrhea [ ] constipation  [ ]chewing problems [ ] swallowing issues  [ X ] other: impaired vision; individual/personal food preferences; HD Tx     Diet Presciption: Diet, DASH/TLC:   Sodium & Cholesterol Restricted  Consistent Carbohydrate {No Snacks}  1200mL Fluid Restriction ([id number])  For patients receiving Renal Replacement - No Protein Restr, No Conc K, No Conc Phos, Low Sodium (RENAL)  Supplement Feeding Modality:  Oral  Nepro Cans or Servings Per Day:  1       Frequency:  Daily (10-01-18 @ 13:47)  Diet, NPO after Midnight:      NPO Start Date: 03-Oct-2018,   NPO Start Time: 23:59 (10-03-18 @ 08:26)    Intake: appetite improving, tolerating meals well, likes the Nepro supplement ordered; denied GI distress, chewing or swallowing problem at present, no specific food choices to update     Daily Weight in k (04 Oct 2018 05:39)  Weight in k.9 (03 Oct 2018 21:45)  Weight in k.5 (03 Oct 2018 18:40)  Weight in k (03 Oct 2018 05:37)  Weight in k (02 Oct 2018 05:23)  Weight in k.5 (01 Oct 2018 15:55)  Weight in k.7 (01 Oct 2018 12:45)  Weight in k.4 (30 Sep 2018 05:22)  Weight in k (29 Sep 2018 05:18)  Weight in k.1 (28 Sep 2018 22:30)  Weight in k.7 (28 Sep 2018 19:30)    % Weight Change: see above     Pertinent Medications: MEDICATIONS  (STANDING):  ascorbic acid 500 milliGRAM(s) Oral two times a day  chlorhexidine 2% Cloths 1 Application(s) Topical daily  docusate sodium 200 milliGRAM(s) Oral at bedtime  epoetin jacqueline Injectable 4000 Unit(s) IV Push <User Schedule>  ferrous    sulfate 325 milliGRAM(s) Oral daily  folic acid 1 milliGRAM(s) Oral daily  gabapentin 100 milliGRAM(s) Oral three times a day  heparin  Injectable 5000 Unit(s) SubCutaneous every 12 hours  hydrALAZINE 100 milliGRAM(s) Oral every 8 hours  influenza   Vaccine 0.5 milliLiter(s) IntraMuscular once  insulin lispro (HumaLOG) corrective regimen sliding scale   SubCutaneous Before meals and at bedtime  insulin lispro Injectable (HumaLOG) 3 Unit(s) SubCutaneous three times a day before meals  levothyroxine 50 MICROGram(s) Oral daily  metoprolol tartrate 50 milliGRAM(s) Oral every 8 hours  multivitamin/minerals 1 Tablet(s) Oral daily  NIFEdipine XL 60 milliGRAM(s) Oral daily  pantoprazole    Tablet 40 milliGRAM(s) Oral before breakfast  senna 2 Tablet(s) Oral at bedtime  sevelamer hydrochloride 800 milliGRAM(s) Oral three times a day  vancomycin  IVPB 750 milliGRAM(s) IV Intermittent <User Schedule>  zinc sulfate 220 milliGRAM(s) Oral daily    MEDICATIONS  (PRN):  acetaminophen   Tablet .. 650 milliGRAM(s) Oral every 6 hours PRN Moderate Pain (4 - 6)    Pertinent Labs: 10-04 Na138 mmol/L Glu 96 mg/dL K+ 3.6 mmol/L Cr  3.67 mg/dL<H> BUN 32 mg/dL<H> 10-03 Phos 5.3 mg/dL<H> 10-03 Alb 2.7 g/dL<L>  Chol 84 mg/dL LDL >29 mg/dL HDL 52 mg/dL Trig <15 mg/dL<L>     CAPILLARY BLOOD GLUCOSE      POCT Blood Glucose.: 138 mg/dL (03 Oct 2018 22:24)  POCT Blood Glucose.: 196 mg/dL (03 Oct 2018 16:54)  POCT Blood Glucose.: 184 mg/dL (03 Oct 2018 11:49)      Skin:  pressure ulcer stage IV x 1     Estimated Needs:   [ X ] no change since previous assessment  [ ] recalculated:     Previous Nutrition Diagnosis:   [ X ] Malnutrition ( Severe )      Nutrition Diagnosis is [ ] ongoing  [ X ] Improving   [ ] resolved [ ] not applicable     New Nutrition Diagnosis: [ X ] not applicable       Interventions:   Recommend  [ ] Change Diet To:  [ X ] Nutrition Supplement: Continue Nepro 1can daily as medically feasible ( 425 kcal, 19 g protein)   [ ] Nutrition Support  [ X ] Other: On ZnSO4, Vit C, Folic Acid for wound healing                   Rec: Nephrocaps, d/c MVI/minerals as medically feasible     Monitoring and Evaluation:   [ X ] PO intake [ x ] Tolerance to diet prescription [ x ] weights [ x ] labs[ x ] follow up per protocol  [ ] other: Assessment:      Nutrition reassessment for follow-up. Chart reviewed, pt visited, able to communicate well; on discharge planning to skilled nursing facility noted     Factors impacting intake: [ ] none [ ] nausea  [ ] vomiting [ ] diarrhea [ ] constipation  [ ]chewing problems [ ] swallowing issues  [ X ] other: impaired vision; individual/personal food preferences; HD Tx     Diet Presciption: Diet, DASH/TLC:   Sodium & Cholesterol Restricted  Consistent Carbohydrate {No Snacks}  1200mL Fluid Restriction ([id number])  For patients receiving Renal Replacement - No Protein Restr, No Conc K, No Conc Phos, Low Sodium (RENAL)  Supplement Feeding Modality:  Oral  Nepro Cans or Servings Per Day:  1       Frequency:  Daily (10-01-18 @ 13:47)  Diet, NPO after Midnight:      NPO Start Date: 03-Oct-2018,   NPO Start Time: 23:59 (10-03-18 @ 08:26)    Intake: appetite improving, tolerating meals well, likes the Nepro supplement ordered; denied GI distress, chewing or swallowing problem at present, no specific food choices to update     Daily Weight in k (04 Oct 2018 05:39)  Weight in k.9 (03 Oct 2018 21:45)  Weight in k.5 (03 Oct 2018 18:40)  Weight in k (03 Oct 2018 05:37)  Weight in k (02 Oct 2018 05:23)  Weight in k.5 (01 Oct 2018 15:55)  Weight in k.7 (01 Oct 2018 12:45)  Weight in k.4 (30 Sep 2018 05:22)  Weight in k (29 Sep 2018 05:18)  Weight in k.1 (28 Sep 2018 22:30)  Weight in k.7 (28 Sep 2018 19:30)    % Weight Change: see above, a bit fluctuated may due to fluid shift from HD Tx     Pertinent Medications: MEDICATIONS  (STANDING):  ascorbic acid 500 milliGRAM(s) Oral two times a day  chlorhexidine 2% Cloths 1 Application(s) Topical daily  docusate sodium 200 milliGRAM(s) Oral at bedtime  epoetin jacqueline Injectable 4000 Unit(s) IV Push <User Schedule>  ferrous    sulfate 325 milliGRAM(s) Oral daily  folic acid 1 milliGRAM(s) Oral daily  gabapentin 100 milliGRAM(s) Oral three times a day  heparin  Injectable 5000 Unit(s) SubCutaneous every 12 hours  hydrALAZINE 100 milliGRAM(s) Oral every 8 hours  influenza   Vaccine 0.5 milliLiter(s) IntraMuscular once  insulin lispro (HumaLOG) corrective regimen sliding scale   SubCutaneous Before meals and at bedtime  insulin lispro Injectable (HumaLOG) 3 Unit(s) SubCutaneous three times a day before meals  levothyroxine 50 MICROGram(s) Oral daily  metoprolol tartrate 50 milliGRAM(s) Oral every 8 hours  multivitamin/minerals 1 Tablet(s) Oral daily  NIFEdipine XL 60 milliGRAM(s) Oral daily  pantoprazole    Tablet 40 milliGRAM(s) Oral before breakfast  senna 2 Tablet(s) Oral at bedtime  sevelamer hydrochloride 800 milliGRAM(s) Oral three times a day  vancomycin  IVPB 750 milliGRAM(s) IV Intermittent <User Schedule>  zinc sulfate 220 milliGRAM(s) Oral daily    MEDICATIONS  (PRN):  acetaminophen   Tablet .. 650 milliGRAM(s) Oral every 6 hours PRN Moderate Pain (4 - 6)    Pertinent Labs: 10-04 Na138 mmol/L Glu 96 mg/dL K+ 3.6 mmol/L Cr  3.67 mg/dL<H> BUN 32 mg/dL<H> 10-03 Phos 5.3 mg/dL<H> 10-03 Alb 2.7 g/dL<L>  Chol 84 mg/dL LDL >29 mg/dL HDL 52 mg/dL Trig <15 mg/dL<L>     CAPILLARY BLOOD GLUCOSE      POCT Blood Glucose.: 138 mg/dL (03 Oct 2018 22:24)  POCT Blood Glucose.: 196 mg/dL (03 Oct 2018 16:54)  POCT Blood Glucose.: 184 mg/dL (03 Oct 2018 11:49)      Skin:  pressure ulcer stage IV x 1     Estimated Needs:   [ X ] no change since previous assessment  [ ] recalculated:     Previous Nutrition Diagnosis:   [ X ] Malnutrition ( Severe )      Nutrition Diagnosis is [ ] ongoing  [ X ] Improving   [ ] resolved [ ] not applicable     New Nutrition Diagnosis: [ X ] not applicable       Interventions:   Recommend  [ ] Change Diet To:  [ X ] Nutrition Supplement: Continue Nepro 1can daily as medically feasible ( 425 kcal, 19 g protein)   [ ] Nutrition Support  [ X ] Other: On ZnSO4, Vit C, Folic Acid for wound healing                   Rec: Nephrocaps, d/c MVI/minerals as medically feasible     Monitoring and Evaluation:   [ X ] PO intake [ x ] Tolerance to diet prescription [ x ] weights [ x ] labs[ x ] follow up per protocol  [ ] other: Assessment:      Nutrition reassessment for follow-up. Chart reviewed, pt visited, able to communicate well; on discharge planning to skilled nursing facility noted     Factors impacting intake: [ ] none [ ] nausea  [ ] vomiting [ ] diarrhea [ ] constipation  [ ]chewing problems [ ] swallowing issues  [ X ] other: impaired vision; individual/personal food preferences; HD Tx     Diet Presciption: Diet, DASH/TLC:   Sodium & Cholesterol Restricted  Consistent Carbohydrate {No Snacks}  1200mL Fluid Restriction ([id number])  For patients receiving Renal Replacement - No Protein Restr, No Conc K, No Conc Phos, Low Sodium (RENAL)  Supplement Feeding Modality:  Oral  Nepro Cans or Servings Per Day:  1       Frequency:  Daily (10-01-18 @ 13:47)  Diet, NPO after Midnight:      NPO Start Date: 03-Oct-2018,   NPO Start Time: 23:59 (10-03-18 @ 08:26)    Intake: appetite improving, tolerating meals well, likes the Nepro supplement ordered; denied GI distress, chewing or swallowing problem at present, no specific food choices to update     Daily Weight in k (04 Oct 2018 05:39)  Weight in k.9 (03 Oct 2018 21:45)  Weight in k.5 (03 Oct 2018 18:40)  Weight in k (03 Oct 2018 05:37)  Weight in k (02 Oct 2018 05:23)  Weight in k.5 (01 Oct 2018 15:55)  Weight in k.7 (01 Oct 2018 12:45)  Weight in k.4 (30 Sep 2018 05:22)  Weight in k (29 Sep 2018 05:18)  Weight in k.1 (28 Sep 2018 22:30)  Weight in k.7 (28 Sep 2018 19:30)    % Weight Change: see above, a bit fluctuated may due to fluid shift from HD Tx     Pertinent Medications: MEDICATIONS  (STANDING):  ascorbic acid 500 milliGRAM(s) Oral two times a day  chlorhexidine 2% Cloths 1 Application(s) Topical daily  docusate sodium 200 milliGRAM(s) Oral at bedtime  epoetin jacqueline Injectable 4000 Unit(s) IV Push <User Schedule>  ferrous    sulfate 325 milliGRAM(s) Oral daily  folic acid 1 milliGRAM(s) Oral daily  gabapentin 100 milliGRAM(s) Oral three times a day  heparin  Injectable 5000 Unit(s) SubCutaneous every 12 hours  hydrALAZINE 100 milliGRAM(s) Oral every 8 hours  influenza   Vaccine 0.5 milliLiter(s) IntraMuscular once  insulin lispro (HumaLOG) corrective regimen sliding scale   SubCutaneous Before meals and at bedtime  insulin lispro Injectable (HumaLOG) 3 Unit(s) SubCutaneous three times a day before meals  levothyroxine 50 MICROGram(s) Oral daily  metoprolol tartrate 50 milliGRAM(s) Oral every 8 hours  multivitamin/minerals 1 Tablet(s) Oral daily  NIFEdipine XL 60 milliGRAM(s) Oral daily  pantoprazole    Tablet 40 milliGRAM(s) Oral before breakfast  senna 2 Tablet(s) Oral at bedtime  sevelamer hydrochloride 800 milliGRAM(s) Oral three times a day  vancomycin  IVPB 750 milliGRAM(s) IV Intermittent <User Schedule>  zinc sulfate 220 milliGRAM(s) Oral daily    MEDICATIONS  (PRN):  acetaminophen   Tablet .. 650 milliGRAM(s) Oral every 6 hours PRN Moderate Pain (4 - 6)    Pertinent Labs: 10-04 Na138 mmol/L Glu 96 mg/dL K+ 3.6 mmol/L Cr  3.67 mg/dL<H> BUN 32 mg/dL<H> 10-03 Phos 5.3 mg/dL<H> 10-03 Alb 2.7 g/dL<L>  Chol 84 mg/dL LDL >29 mg/dL HDL 52 mg/dL Trig <15 mg/dL<L>     CAPILLARY BLOOD GLUCOSE      POCT Blood Glucose.: 138 mg/dL (03 Oct 2018 22:24)  POCT Blood Glucose.: 196 mg/dL (03 Oct 2018 16:54)  POCT Blood Glucose.: 184 mg/dL (03 Oct 2018 11:49)      Skin:  pressure ulcer stage IV x 1     Estimated Needs:   [ X ] no change since previous assessment  [ ] recalculated:     Previous Nutrition Diagnosis:   [ X ] Malnutrition ( Severe )      Nutrition Diagnosis is [ ] ongoing  [ X ] Improving   [ ] resolved [ ] not applicable     New Nutrition Diagnosis: [ X ] not applicable       Interventions:   Recommend  [ ] Change Diet To:  [ X ] Nutrition Supplement: Continue Nepro 1can daily as ordered ( 425 kcal, 19 g protein)   [ ] Nutrition Support  [ X ] Other: On ZnSO4, Vit C, Folic Acid for wound healing                   Rec: Nephrocaps, d/c MVI/minerals as medically feasible     Monitoring and Evaluation:   [ X ] PO intake [ x ] Tolerance to diet prescription [ x ] weights [ x ] labs[ x ] follow up per protocol  [ ] other:

## 2018-10-04 NOTE — PROGRESS NOTE ADULT - ATTENDING COMMENTS
Patient is seen and examined. Case reviewed with the medical team. Above note is appreciated. Will follow up clinically. Continue DVT prophylaxis. Case discussed with nephrology and surgery. Continue HD.

## 2018-10-04 NOTE — PROGRESS NOTE ADULT - SUBJECTIVE AND OBJECTIVE BOX
HPI: 53F w/esrd on dialysis through right chest wall permacath.  Admitted from nursing home due to positive blood cultures. While inpatient she was evaluated for AV fistula    POST OP CHECK  post op day 0 s/p LUE radiocephalic AV fistula. Patient states she is doing well. Denies numbness or tingling, able to move all digits.      EXAM  AAOx3, nad, blind  regular respiratory effort  L wrist dressing c,d,i

## 2018-10-04 NOTE — DISCHARGE NOTE ADULT - CARE PLAN
Principal Discharge DX:	Bacteremia  Secondary Diagnosis:	ESRD (end stage renal disease) on dialysis  Secondary Diagnosis:	HTN (hypertension)  Secondary Diagnosis:	Hypothyroidism  Secondary Diagnosis:	Stage 4 skin ulcer of sacral region Principal Discharge DX:	Bacteremia  Goal:	no bacteria in blood  Assessment and plan of treatment:	You came in with positive blood culture: gram positive cocci. Your repeat blood culture was positive for Coagulase negative bacteria. You did not have fever or sepsis. Your Perma cath was infected. It was removed and you received a new Perma Cath in the hospital. You received Vanco IV during Dialysis. You are recommended to continue Vanco and monitor Vanco level till Oct 9. You are recommended to follow up with PCP in 1 week  Secondary Diagnosis:	ESRD (end stage renal disease) on dialysis  Assessment and plan of treatment:	You are on Monday/ Wednesday/ Friday schedule for Dialysis. You are recommended to maintain compliance with HD and follow up with PCP and Nephrologist  Secondary Diagnosis:	HTN (hypertension)  Secondary Diagnosis:	Hypothyroidism  Secondary Diagnosis:	Stage 4 skin ulcer of sacral region  Secondary Diagnosis:	AV fistula  Secondary Diagnosis:	PFO (patent foramen ovale) Principal Discharge DX:	Bacteremia  Goal:	no bacteria in blood  Assessment and plan of treatment:	You came in with positive blood culture: gram positive cocci. Your repeat blood culture was positive for Coagulase negative bacteria. You did not have fever or sepsis. Your Perma cath was infected. It was removed and you received a new Perma Cath in the hospital. You received Vanco IV during Dialysis. You are recommended to continue Vanco and monitor Vanco level till Oct 9. You are recommended to follow up with PCP in 1 week  Secondary Diagnosis:	ESRD (end stage renal disease) on dialysis  Assessment and plan of treatment:	You are on Monday/ Wednesday/ Friday schedule for Dialysis. You are recommended to maintain compliance with HD and follow up with PCP and Nephrologist.  Secondary Diagnosis:	HTN (hypertension)  Assessment and plan of treatment:	Your BP was elevated during hospital course. You are being discharged on Metroprolol Tartrate 50 Q8, Nifedipine XL 60 mg OD, Hydralazine 100mg q8. Maintain compliance with BP meds and follow up wiith PCP.  Secondary Diagnosis:	Hypothyroidism  Assessment and plan of treatment:	You are recommended to take levothyroxine as advised and follow up with PCP in 1 week  Secondary Diagnosis:	Stage 4 skin ulcer of sacral region  Assessment and plan of treatment:	You came in with Sacral ulcer, with out bone involvement. You are recommended to take care of the wound, walk around  and follow up with PCP  Secondary Diagnosis:	AV fistula  Assessment and plan of treatment:	You received AV Fistula while in the hospital. You are recommended to follow up with Dr Gonsalves for suture removal in 2 week  Secondary Diagnosis:	PFO (patent foramen ovale)  Assessment and plan of treatment:	ECHO showed PFO.

## 2018-10-04 NOTE — PROGRESS NOTE ADULT - PROBLEM SELECTOR PLAN 5
c/w synthroid  - follow up TSH

## 2018-10-04 NOTE — DISCHARGE NOTE ADULT - MEDICATION SUMMARY - MEDICATIONS TO TAKE
I will START or STAY ON the medications listed below when I get home from the hospital:    acetaminophen 325 mg oral tablet  -- 2 tab(s) by mouth every 6 hours, As needed, For Temp greater than 38 C (100.4 F) or mild pain  -- Indication: For Pain    acetaminophen 325 mg oral tablet  -- 2 tab(s) by mouth every 6 hours, As needed, Moderate Pain (4 - 6)  -- Indication: For Pain    aluminum hydroxide-magnesium hydroxide 200 mg-200 mg/5 mL oral suspension  -- 30 milliliter(s) by mouth every 4 hours, As needed, Dyspepsia  -- Indication: For Anatacid    gabapentin 100 mg oral capsule  -- 1 cap(s) by mouth 3 times a day  -- Indication: For Pain    simvastatin 20 mg oral tablet  -- 1 tab(s) by mouth once a day (at bedtime)  -- Indication: For Hyperlipidemia    Metoprolol Tartrate 50 mg oral tablet  -- 1 tab(s) by mouth every 8 hours  -- Indication: For HTN (hypertension)    cinacalcet 30 mg oral tablet  -- 1 tab(s) by mouth once a day  -- Indication: For Calcium binder     NIFEdipine 60 mg oral tablet, extended release  -- 1 tab(s) by mouth once a day  -- Indication: For HTN (hypertension)    calamine topical lotion  -- 1 application on skin 3 times a day  -- Indication: For Lotion    collagenase 250 units/g topical ointment  -- 1 application on skin once a day  -- Indication: For Topical ointment     Epogen  -- 82423 unit(s) injectable 3 times a week  DURING DIALYSIS DAYS    -- Indication: For Anemia     vancomycin 750 mg intravenous injection  -- 750 milligram(s) intravenous   -- Indication: For Bacteremia     ferrous sulfate 300 mg/5 mL (60 mg elemental iron) oral liquid  -- 5 milliliter(s) by mouth once a day  -- Indication: For Supplements     senna oral tablet  -- 2 tab(s) by mouth once a day (at bedtime)  -- Indication: For Laxative     docusate sodium 100 mg oral capsule  -- 1 cap(s) by mouth 2 times a day  -- Indication: For Laxative     Orazinc 220 oral capsule  -- 1 cap(s) by mouth once a day  -- Indication: For Supplement     calcium acetate 667 mg oral tablet  --  by mouth   -- Indication: For Phosphate binder     sevelamer hydrochloride 800 mg oral tablet  -- 2 tab(s) by mouth 3 times a day (with meals)  -- Indication: For Phosphate binder    Protonix 40 mg oral delayed release tablet  -- 1 tab(s) by mouth once a day  -- Indication: For GI agent     levothyroxine 50 mcg (0.05 mg) oral tablet  -- 1 tab(s) by mouth once a day  -- Indication: For Thyroid hormone     hydrALAZINE 100 mg oral tablet  -- 1 tab(s) by mouth every 8 hours   -- It is very important that you take or use this exactly as directed.  Do not skip doses or discontinue unless directed by your doctor.  Some non-prescription drugs may aggravate your condition.  Read all labels carefully.  If a warning appears, check with your doctor before taking.    -- Indication: For HTN (hypertension)    Multiple Vitamins oral tablet  -- 1 tab(s) by mouth once a day  -- Indication: For Supplement     Tab-A-Gage oral tablet  -- 1 tab(s) by mouth once a day  -- Indication: For Supplement     ascorbic acid 500 mg oral tablet  -- 1 tab(s) by mouth once a day  -- Indication: For Supplement     folic acid 1 mg oral tablet  -- 1 tab(s) by mouth once a day  -- Indication: For Supplement I will START or STAY ON the medications listed below when I get home from the hospital:    acetaminophen 325 mg oral tablet  -- 2 tab(s) by mouth every 6 hours, As needed, For Temp greater than 38 C (100.4 F) or mild pain  -- Indication: For Pain    acetaminophen 325 mg oral tablet  -- 2 tab(s) by mouth every 6 hours, As needed, Moderate Pain (4 - 6)  -- Indication: For Pain    aluminum hydroxide-magnesium hydroxide 200 mg-200 mg/5 mL oral suspension  -- 30 milliliter(s) by mouth every 4 hours, As needed, Dyspepsia  -- Indication: For Anatacid    gabapentin 100 mg oral capsule  -- 1 cap(s) by mouth 3 times a day  -- Indication: For Pain    simvastatin 20 mg oral tablet  -- 1 tab(s) by mouth once a day (at bedtime)  -- Indication: For Hyperlipidemia    Metoprolol Tartrate 50 mg oral tablet  -- 1 tab(s) by mouth every 8 hours  -- Indication: For HTN (hypertension)    cinacalcet 30 mg oral tablet  -- 1 tab(s) by mouth once a day  -- Indication: For Calcium binder     NIFEdipine 60 mg oral tablet, extended release  -- 1 tab(s) by mouth once a day  -- Indication: For HTN (hypertension)    calamine topical lotion  -- 1 application on skin 3 times a day  -- Indication: For Lotion    collagenase 250 units/g topical ointment  -- 1 application on skin once a day  -- Indication: For Topical ointment     Epogen  -- 62144 unit(s) injectable 3 times a week  DURING DIALYSIS DAYS    -- Indication: For Anemia     vancomycin 750 mg intravenous injection  -- 750 mg IV during HD on Monday.   -- Indication: For Bactermia     ferrous sulfate 300 mg/5 mL (60 mg elemental iron) oral liquid  -- 5 milliliter(s) by mouth once a day  -- Indication: For Supplements     senna oral tablet  -- 2 tab(s) by mouth once a day (at bedtime)  -- Indication: For Laxative     docusate sodium 100 mg oral capsule  -- 1 cap(s) by mouth 2 times a day  -- Indication: For Laxative     Orazinc 220 oral capsule  -- 1 cap(s) by mouth once a day  -- Indication: For Supplement     calcium acetate 667 mg oral tablet  --  by mouth   -- Indication: For Phosphate binder     sevelamer hydrochloride 800 mg oral tablet  -- 2 tab(s) by mouth 3 times a day (with meals)  -- Indication: For Phosphate binder    Protonix 40 mg oral delayed release tablet  -- 1 tab(s) by mouth once a day  -- Indication: For GI agent     levothyroxine 50 mcg (0.05 mg) oral tablet  -- 1 tab(s) by mouth once a day  -- Indication: For Thyroid hormone     hydrALAZINE 100 mg oral tablet  -- 1 tab(s) by mouth every 8 hours   -- It is very important that you take or use this exactly as directed.  Do not skip doses or discontinue unless directed by your doctor.  Some non-prescription drugs may aggravate your condition.  Read all labels carefully.  If a warning appears, check with your doctor before taking.    -- Indication: For HTN (hypertension)    Multiple Vitamins oral tablet  -- 1 tab(s) by mouth once a day  -- Indication: For Supplement     Tab-A-Gage oral tablet  -- 1 tab(s) by mouth once a day  -- Indication: For Supplement     ascorbic acid 500 mg oral tablet  -- 1 tab(s) by mouth once a day  -- Indication: For Supplement     folic acid 1 mg oral tablet  -- 1 tab(s) by mouth once a day  -- Indication: For Supplement

## 2018-10-04 NOTE — PROGRESS NOTE ADULT - PROBLEM SELECTOR PLAN 7
-c/w heparin sq
-c/w heparin sq  GI ppx

## 2018-10-04 NOTE — DISCHARGE NOTE ADULT - CARE PROVIDER_API CALL
Suhail Gonsalves), Surgery; Vascular Surgery  [address] [facility]  [address]  [place] [address]  Phone: [phone]  Fax: [phone]    Gilberto Gomez (), Medicine  [address] [place] [address]  [address]  [place] [address]  Phone: [phone]  Fax: [phone]

## 2018-10-04 NOTE — PROGRESS NOTE ADULT - PROBLEM SELECTOR PLAN 4
BSL controlled, on HSS, diabetic diet  Insulin dose increased   Monitor finger sticks
BSL controlled, on HSS, diabetic diet  Insulin dose increased   Monitor finger sticks
BSL controlled, on HSS, diabetic diet
BSL controlled, on HSS, diabetic diet  Insulin dose increased   Monitor finger sticks

## 2018-10-04 NOTE — PROGRESS NOTE ADULT - PROBLEM SELECTOR PROBLEM 5
Hypothyroidism

## 2018-10-04 NOTE — PROGRESS NOTE ADULT - PROBLEM SELECTOR PROBLEM 2
Leukocytosis

## 2018-10-04 NOTE — DISCHARGE NOTE ADULT - PATIENT PORTAL LINK FT
You can access the [email or web address], offered by [facility], by registering with the following website: [email or web address]

## 2018-10-04 NOTE — PROGRESS NOTE ADULT - PROBLEM SELECTOR PLAN 6
-sBP running high in 170s- 200s  -c/w metoprolol tartrate 25 mg  two times a day  -Hydralazine 100 TID  -Amlodipine  -- monitor BP
-SBP better controlled   -c/w metoprolol [id number] tid, Procardia 60  -Hydralazine 100 TID  - monitor BP
-c/w metoprolol tartrate 25 mg  two times a day  - hydralazine 50 mg oral three times a day  target /80  BP controlled now
-c/w metoprolol tartrate 25 mg  two times a day  - hydralazine 50 mg oral three times a day  target /80  BP monitor
-c/w metoprolol tartrate 25 mg  two times a day  - hydralazine 50 mg oral three times a day  target /80  BP monitor
-c/w metoprolol tartrate 25 mg  two times a day  -Hydralazine 75   -home meds restarted  - monitor BP
-c/w metoprolol tartrate 25 mg  two times a day  -home meds restarted  - monitor BP
-sBP running high in 170s- 200s  -c/w metoprolol [id number] tid, Procardia 60  -Hydralazine 100 TID  - monitor BP

## 2018-10-04 NOTE — PROGRESS NOTE ADULT - PROBLEM SELECTOR PROBLEM 3
ESRD (end stage renal disease) on dialysis

## 2018-10-04 NOTE — PROGRESS NOTE ADULT - PROBLEM SELECTOR PLAN 1
repeat blood culture; negative  - Vanco 750 during dialysis, with vanco trough, 3 times a week till Oct 9  Vanco trough yesterday: 22.8.   - ECHO: PFO  -HD tomorrow with vanco trough  - Discharge planning after HD
patient presented with positive blood culture , with gram positive cocci  repeat blood culture in the hospital : positive growth  IV vanco  Perma cath removed, patient on break from HD as Perma cath is removed  follow up on sensitivities result  ID Dr Kaplan following
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
repeat blood culture; negative  - Vanco 750 during dialysis, with vanco trough, 3 times a week till Oct 9  - ECHO: PFO
repeat blood culture; negative  - Vanco 750 during dialysis, with vanco trough, 3 times a week till Oct 9  Vanco trough today during dialysis.   - ECHO: PFO
repeat blood culture; negative  - new perma cath placed, s/p HD yesterday   CArdio consult: interatrial aneurysm could be the tip of Perma cath which was removed 2 days back.   WBC count improving  Vanco trough: 34 post HD , holding vanco for now   Dr Kaplan follow up
repeat blood culture; negative  - new perma cath placed, s/p HD yesterday   CArdio consult: interatrial aneurysm could be the tip of Perma cath which was removed 2 days back.   WBC count improving  Vanco trough: 34 post HD , holding vanco for now   Dr Kaplan follow up
repeat blood culture; negative  - new perma cath placed, s/p HD yesterday   No  interatrial aneurysm , What was seen was  the tip of Perma cath which was removed 2 days back.   WBC count improving. Will restart Vanco after follow up of trough   Dr Kaplan follow up
repeat blood culture; negative  - new perma cath placed, will receive HD today   CArdio consult: interatrial aneurysm could be the tip of Perma cath which was removed 2 days back. Will repeat limited ECHO with bubble study : still pending   WBC count improving  Vanco trough: 49.1, holding vanco for now   Dr Kaplan follow up
repeat blood culture; negative  Patient will receive perma cath followed by HD tomorrow   CArdio consult: interatrial aneurysm could be the tip of Perma cath which was removed 2 days back. Will repeat limited ECHO with bubble study   WBC count improving
repeat blood culture; negative  - Vanco 750 during dialysis, with vanco trough, 3 times a week till Oct 9  Vanco trough tomorrow  - ECHO: PFO

## 2018-10-04 NOTE — DISCHARGE NOTE ADULT - HOSPITAL COURSE
53 year old female from [place], bedbound due to difficulty walking likely 2/2 to neuropathy with PMHx of anemia, HFpEF, ESRD on HD (M, W, F) R chest perm cath,  Clostridium difficile infection, DM, Diabetic neuropathy, HTN, HLD, Hypothyroidism, OM of jaw, Parathyroid adenoma, MRSA infections, stage 4 sacral Decubitus  and GERD was sent from NH due to positive blood cultures. Patient went for her routine dialysis on Friday, Pt noted to have discharge and itching around catheter, blood culture sent and results proved to be positive for gram positive cocci. Patient also has stage 4 sacral decubitus ulcer with minimal discharge. Patient denies any fever, chills, cough, headaches, chest pain, sob, Nausea, vomiting, diarrhea, muscle or joint pains, skin rashes or urinary symptoms     Patient was admitted with bacteremia.     For Bacteremia, patient was sent from  NH with gram positive cultures, afebrile, Hd stable, Leucocytosis 14 k, Normal lactate, Repeat blood culture was positive for  gram positive cocci : coagulase negative bacteria. Patient Perma Cath was infected. It was removed and she a new Perma cath was placed.  Patient received Vancomycin 1 gram and Zosyn in ED and continued with  Vanco  1000g during Dialysis days.   For Stage 4 skin ulcer of sacral region, Xray Lumbosacral region was negative for OM. WOund care consulted.   For  ESRD (end stage renal disease) on dialysis, patient received HD on M W F. Patient also got AV fistula while in the hospital.  For hypothyroidism we continued with levothyroxine. --------     Problem/Plan - 6:  Problem: HTN (hypertension). Plan: continue with home isela  hydralazine 50 mg TID   metoprolol 25 mg BID. 53 year old female from [place], bedbound due to difficulty walking likely 2/2 to neuropathy with PMHx of anemia, HFpEF, ESRD on HD (M, W, F) R chest perm cath,  Clostridium difficile infection, DM, Diabetic neuropathy, HTN, HLD, Hypothyroidism, OM of jaw, Parathyroid adenoma, MRSA infections, stage 4 sacral Decubitus  and GERD was sent from NH due to positive blood cultures. Patient went for her routine dialysis on Friday, Pt noted to have discharge and itching around catheter, blood culture sent and results proved to be positive for gram positive cocci. Patient also has stage 4 sacral decubitus ulcer with minimal discharge. Patient denies any fever, chills, cough, headaches, chest pain, sob, Nausea, vomiting, diarrhea, muscle or joint pains, skin rashes or urinary symptoms     Patient was admitted with bacteremia.     For Bacteremia, patient was sent from  NH with gram positive cultures, afebrile, Hd stable, Leucocytosis 14 k, Normal lactate, Repeat blood culture was positive for  gram positive cocci : coagulase negative bacteria. Patient Perma Cath was infected. It was removed and she a new Perma cath was placed.  Patient received Vancomycin 1 gram and Zosyn in ED and continued with  Vanco  1000g during Dialysis days.   For Stage 4 skin ulcer of sacral region, Xray Lumbosacral region was negative for OM. WOund care consulted.   For  ESRD (end stage renal disease) on dialysis, patient received HD on M W F. Patient also got AV fistula while in the hospital.  For hypothyroidism we continued with levothyroxine. For  HTN (hypertension), Patient BP was elevated, BP meds were adjusted.     [facility] is medically stable to be discharged. CAse discussed with the attending

## 2018-10-04 NOTE — PROGRESS NOTE ADULT - PROBLEM SELECTOR PROBLEM 4
Diabetes mellitus

## 2018-10-04 NOTE — BRIEF OPERATIVE NOTE - PROCEDURE
<<-----Click on this checkbox to enter Procedure AV fistula creation with autogenous graft  10/04/2018    Active  DIANE

## 2018-10-04 NOTE — PROGRESS NOTE ADULT - PROBLEM SELECTOR PLAN 3
HD tomorrow  AV fistula on Thursday   Pt low risk for surgery
HD tomorrow and then discharge   AV fistula today  Pt low risk for surgery
HD M/W/F  c/w sensipar - check PTH  c/w epogen and ferrous sulfate for target Hgb 10; check iron studies today  nephro Dr Escoto/   HD today    AV fistula placement on Monday,
HD M/W/F  c/w sensipar - check PTH  c/w epogen and ferrous sulfate for target Hgb 10; check iron studies today  nephro Dr Escoto/   HD today    AV fistula placement on Monday,
HD M/W/F  c/w sensipar - check PTH  c/w epogen and ferrous sulfate for target Hgb 10; check iron studies today  nephro Dr Escoto/   HD today    AV fistula placement on Monday. as per Dr. Hudson patient is medically optimized for surgery on monday.
HD M/W/F  c/w sensipar - check PTH  c/w epogen and ferrous sulfate for target Hgb 10; check iron studies today  nephro Dr Escoto/   HD today    AV fistula placement tomorrow in AM
HD M/W/F  c/w sensipar - check PTH  c/w epogen and ferrous sulfate for target Hgb 10; check iron studies today  nephro Dr Escoto/ [place]  HD break as perma cath is removed
HD M/W/F  c/w sensipar - check PTH  c/w epogen and ferrous sulfate for target Hgb 10; check iron studies today  nephro Dr Escoto/ [place]  HD break as perma cath is removed  AV fistula placement before discharge: Vasc consult
HD M/W/F  c/w sensipar - check PTH  c/w epogen and ferrous sulfate for target Hgb 10; check iron studies today  nephro Dr Escoto/ [place]  HD break as perma cath is removed, Perma cath placement tomorrow followed by dialysis   AV fistula placement before discharge: Vasc consult
HD today  AV fistula on Thursday   Pt low risk for surgery
HD today  AV fistula on Thursday (confirmed with [facility] surgery PA over phone)   Pt low risk for surgery

## 2018-10-04 NOTE — PROGRESS NOTE ADULT - PROBLEM SELECTOR PROBLEM 1
Bacteremia

## 2018-10-05 VITALS — SYSTOLIC BLOOD PRESSURE: 138 MMHG | HEART RATE: 76 BPM | DIASTOLIC BLOOD PRESSURE: 50 MMHG

## 2018-10-05 LAB
GLUCOSE BLDC GLUCOMTR-MCNC: 173 MG/DL — HIGH (ref 70–99)
GLUCOSE BLDC GLUCOMTR-MCNC: 94 MG/DL — SIGNIFICANT CHANGE UP (ref 70–99)
VANCOMYCIN TROUGH SERPL-MCNC: 7.8 UG/ML — LOW (ref 10–20)

## 2018-10-05 RX ORDER — METOPROLOL TARTRATE 50 MG
1 TABLET ORAL
Qty: 90 | Refills: 0
Start: 2018-10-05 | End: 2018-11-03

## 2018-10-05 RX ORDER — HYDRALAZINE HCL 50 MG
1 TABLET ORAL
Qty: 90 | Refills: 0
Start: 2018-10-05 | End: 2018-11-03

## 2018-10-05 RX ORDER — VANCOMYCIN HCL 1 G
750 VIAL (EA) INTRAVENOUS
Qty: 1 | Refills: 0
Start: 2018-10-05

## 2018-10-05 RX ORDER — VANCOMYCIN HCL 1 G
750 VIAL (EA) INTRAVENOUS
Qty: 0 | Refills: 0 | COMMUNITY
Start: 2018-10-05

## 2018-10-05 RX ORDER — NIFEDIPINE 30 MG
1 TABLET, EXTENDED RELEASE 24 HR ORAL
Qty: 0 | Refills: 0 | DISCHARGE
Start: 2018-10-05

## 2018-10-05 RX ORDER — METOPROLOL TARTRATE 50 MG
1 TABLET ORAL
Qty: 90 | Refills: 0 | OUTPATIENT
Start: 2018-10-05 | End: 2018-11-03

## 2018-10-05 RX ADMIN — Medication 50 MILLIGRAM(S): at 05:41

## 2018-10-05 RX ADMIN — ZINC SULFATE TAB 220 MG (50 MG ZINC EQUIVALENT) 220 MILLIGRAM(S): 220 (50 ZN) TAB at 12:22

## 2018-10-05 RX ADMIN — GABAPENTIN 100 MILLIGRAM(S): 400 CAPSULE ORAL at 05:41

## 2018-10-05 RX ADMIN — Medication 250 MILLIGRAM(S): at 14:52

## 2018-10-05 RX ADMIN — Medication 50 MILLIGRAM(S): at 16:04

## 2018-10-05 RX ADMIN — CHLORHEXIDINE GLUCONATE 1 APPLICATION(S): 213 SOLUTION TOPICAL at 12:30

## 2018-10-05 RX ADMIN — Medication 60 MILLIGRAM(S): at 05:41

## 2018-10-05 RX ADMIN — Medication 3 UNIT(S): at 12:23

## 2018-10-05 RX ADMIN — Medication 325 MILLIGRAM(S): at 12:22

## 2018-10-05 RX ADMIN — Medication 1 TABLET(S): at 12:22

## 2018-10-05 RX ADMIN — ERYTHROPOIETIN 4000 UNIT(S): 10000 INJECTION, SOLUTION INTRAVENOUS; SUBCUTANEOUS at 10:48

## 2018-10-05 RX ADMIN — Medication 500 MILLIGRAM(S): at 05:41

## 2018-10-05 RX ADMIN — Medication 50 MICROGRAM(S): at 05:41

## 2018-10-05 RX ADMIN — Medication 1: at 12:22

## 2018-10-05 RX ADMIN — GABAPENTIN 100 MILLIGRAM(S): 400 CAPSULE ORAL at 14:52

## 2018-10-05 RX ADMIN — SEVELAMER CARBONATE 800 MILLIGRAM(S): 2400 POWDER, FOR SUSPENSION ORAL at 05:41

## 2018-10-05 RX ADMIN — HEPARIN SODIUM 5000 UNIT(S): 5000 INJECTION INTRAVENOUS; SUBCUTANEOUS at 05:41

## 2018-10-05 RX ADMIN — Medication 1 MILLIGRAM(S): at 12:22

## 2018-10-05 RX ADMIN — SEVELAMER CARBONATE 800 MILLIGRAM(S): 2400 POWDER, FOR SUSPENSION ORAL at 14:52

## 2018-10-05 RX ADMIN — Medication 100 MILLIGRAM(S): at 16:04

## 2018-10-05 RX ADMIN — Medication 100 MILLIGRAM(S): at 05:41

## 2018-10-05 NOTE — PROGRESS NOTE ADULT - SUBJECTIVE AND OBJECTIVE BOX
Patient is a 53y Female with  ESRD ON  HD    SEEN   DURING HD,  TOLERATING IT  WELL  SO  FAR    HAD  AVF  CREATED  YESTERDAY    No Known Allergies    Hospital Medications:   MEDICATIONS  (STANDING):  ascorbic acid 500 milliGRAM(s) Oral two times a day  chlorhexidine 2% Cloths 1 Application(s) Topical daily  docusate sodium 200 milliGRAM(s) Oral at bedtime  epoetin jacqueline Injectable 4000 Unit(s) IV Push <User Schedule>  ferrous    sulfate 325 milliGRAM(s) Oral daily  folic acid 1 milliGRAM(s) Oral daily  gabapentin 100 milliGRAM(s) Oral three times a day  heparin  Injectable 5000 Unit(s) SubCutaneous every 12 hours  hydrALAZINE 100 milliGRAM(s) Oral every 8 hours  influenza   Vaccine 0.5 milliLiter(s) IntraMuscular once  insulin lispro (HumaLOG) corrective regimen sliding scale   SubCutaneous Before meals and at bedtime  insulin lispro Injectable (HumaLOG) 3 Unit(s) SubCutaneous three times a day before meals  levothyroxine 50 MICROGram(s) Oral daily  metoprolol tartrate 50 milliGRAM(s) Oral every 8 hours  multivitamin/minerals 1 Tablet(s) Oral daily  NIFEdipine XL 60 milliGRAM(s) Oral daily  pantoprazole    Tablet 40 milliGRAM(s) Oral before breakfast  senna 2 Tablet(s) Oral at bedtime  sevelamer hydrochloride 800 milliGRAM(s) Oral three times a day  vancomycin  IVPB 750 milliGRAM(s) IV Intermittent <User Schedule>  zinc sulfate 220 milliGRAM(s) Oral daily    REVIEW OF SYSTEMS:  FEELS  WELL    HAS NO  COMPLAINTS   NO  FEVER/CHILLS  OVERNIGHT   NO  COUGH  OR  SOB   APPETITE IS  GOOD,  NO  N/V    VITALS:  T(F): 98.8 (10-05-18 @ 04:47), Max: 99 (10-04-18 @ 15:09)  HR: 83 (10-05-18 @ 04:47)  BP: 153/70 (10-05-18 @ 04:47)  RR: 18 (10-05-18 @ 04:47)  SpO2: 100% (10-05-18 @ 04:47)  Wt(kg): --    10-04 @ 07:01  -  10-05 @ 07:00  --------------------------------------------------------  IN: 250 mL / OUT: 0 mL / NET: 250 mL        PHYSICAL EXAM:  Constitutional: NAD  HEENT: CONJ  PINK  Neck: No JVD,  R IJ  PC IN  PLACE  Respiratory: CTAB, no wheezes, rales or rhonchi  Cardiovascular: S1, S2, RRR  Gastrointestinal: BS+, soft, NT/ND  Extremities: No peripheral edema  Neurological: A/O x2  :  No jarrell.     Vascular Access: RITA MARINELLI    HAS NEW AVF L  FOREARM,  GOOD  BRUIT    LABS:  10-04    138  |  102  |  32<H>  ----------------------------<  96  3.6   |  29  |  3.67<H>    Ca    10.1      04 Oct 2018 05:52  Phos  5.3     10-03  Mg     2.4     10-03    TPro  7.3  /  Alb  2.7<L>  /  TBili  0.3  /  DBili      /  AST  9<L>  /  ALT  15  /  AlkPhos  118  10-03    Creatinine Trend: 3.67 <--, 8.10 <--, 5.97 <--, 7.95 <--, 6.34 <--, 4.80 <--, 9.49 <--                        9.2    11.5  )-----------( 277      ( 04 Oct 2018 05:52 )             29.6     Urine Studies:      RADIOLOGY & ADDITIONAL STUDIES:

## 2018-10-05 NOTE — PROGRESS NOTE ADULT - PROVIDER SPECIALTY LIST ADULT
Cardiology
Infectious Disease
Internal Medicine
Nephrology
Vascular Surgery
Internal Medicine
Internal Medicine

## 2018-10-05 NOTE — PROGRESS NOTE ADULT - REASON FOR ADMISSION
sent from NH due to positive blood cultures

## 2018-10-05 NOTE — PROGRESS NOTE ADULT - SUBJECTIVE AND OBJECTIVE BOX
CHIEF COMPLAINT:Patient is a 53y old  Female who presents with a chief complaint of sent from NH due to positive blood cultures.    	  REVIEW OF SYSTEMS:  CONSTITUTIONAL: No fever, weight loss, or fatigue  EYES: No eye pain, visual disturbances, or discharge  ENT:  No difficulty hearing, tinnitus, vertigo; No sinus or throat pain  NECK: No pain or stiffness  RESPIRATORY: No cough, wheezing, chills or hemoptysis; No Shortness of Breath  CARDIOVASCULAR: No chest pain, palpitations, passing out, dizziness, or leg swelling  GASTROINTESTINAL: No abdominal or epigastric pain. No nausea, vomiting, or hematemesis; No diarrhea or constipation. No melena or hematochezia.  GENITOURINARY: No dysuria, frequency, hematuria, or incontinence  NEUROLOGICAL: No headaches, memory loss, loss of strength, numbness, or tremors  SKIN: No itching, burning, rashes, or lesions   LYMPH Nodes: No enlarged glands  ENDOCRINE: No heat or cold intolerance; No hair loss  MUSCULOSKELETAL: No joint pain or swelling; No muscle, back, or extremity pain  PSYCHIATRIC: No depression, anxiety, mood swings, or difficulty sleeping  HEME/LYMPH: No easy bruising, or bleeding gums  ALLERGY AND IMMUNOLOGIC: No hives or eczema	      PHYSICAL EXAM:  T(C): 36.7 (10-05-18 @ 10:50), Max: 37.2 (10-04-18 @ 15:09)  HR: 72 (10-05-18 @ 10:50) (72 - 83)  BP: 140/64 (10-05-18 @ 10:50) (118/65 - 153/70)  RR: 17 (10-05-18 @ 10:50) (15 - 18)  SpO2: 100% (10-05-18 @ 04:47) (95% - 100%)  Wt(kg): --  I&O's Summary    04 Oct 2018 07:01  -  05 Oct 2018 07:00  --------------------------------------------------------  IN: 250 mL / OUT: 0 mL / NET: 250 mL        Appearance: Normal	  HEENT:   Normal oral mucosa, PERRL, EOMI	  Lymphatic: No lymphadenopathy  Cardiovascular: Normal S1 S2, No JVD, No murmurs, No edema  Respiratory: Lungs clear to auscultation	  Psychiatry: A & O x 3, Mood & affect appropriate  Gastrointestinal:  Soft, Non-tender, + BS	  Skin: No rashes, No ecchymoses, No cyanosis	  Neurologic: Non-focal  Extremities: Normal range of motion, No clubbing, cyanosis or edema  Vascular: Peripheral pulses palpable 2+ bilaterally    MEDICATIONS  (STANDING):  ascorbic acid 500 milliGRAM(s) Oral two times a day  chlorhexidine 2% Cloths 1 Application(s) Topical daily  docusate sodium 200 milliGRAM(s) Oral at bedtime  epoetin jacqueline Injectable 4000 Unit(s) IV Push <User Schedule>  ferrous    sulfate 325 milliGRAM(s) Oral daily  folic acid 1 milliGRAM(s) Oral daily  gabapentin 100 milliGRAM(s) Oral three times a day  heparin  Injectable 5000 Unit(s) SubCutaneous every 12 hours  hydrALAZINE 100 milliGRAM(s) Oral every 8 hours  influenza   Vaccine 0.5 milliLiter(s) IntraMuscular once  insulin lispro (HumaLOG) corrective regimen sliding scale   SubCutaneous Before meals and at bedtime  insulin lispro Injectable (HumaLOG) 3 Unit(s) SubCutaneous three times a day before meals  levothyroxine 50 MICROGram(s) Oral daily  metoprolol tartrate 50 milliGRAM(s) Oral every 8 hours  multivitamin/minerals 1 Tablet(s) Oral daily  NIFEdipine XL 60 milliGRAM(s) Oral daily  pantoprazole    Tablet 40 milliGRAM(s) Oral before breakfast  senna 2 Tablet(s) Oral at bedtime  sevelamer hydrochloride 800 milliGRAM(s) Oral three times a day  vancomycin  IVPB 750 milliGRAM(s) IV Intermittent <User Schedule>  zinc sulfate 220 milliGRAM(s) Oral daily      TELEMETRY: 	    ECG:  	  RADIOLOGY:  OTHER: 	  	  LABS:	 	    CARDIAC MARKERS:                                9.2    11.5  )-----------( 277      ( 04 Oct 2018 05:52 )             29.6     10-04    138  |  102  |  32<H>  ----------------------------<  96  3.6   |  29  |  3.67<H>    Ca    10.1      04 Oct 2018 05:52  Phos  5.3     10-03  Mg     2.4     10-03    TPro  7.3  /  Alb  2.7<L>  /  TBili  0.3  /  DBili  x   /  AST  9<L>  /  ALT  15  /  AlkPhos  118  10-03    proBNP:   Lipid Profile: Cholesterol 84  LDL >29  HDL 52  TG <15    HgA1c:   TSH:

## 2018-10-05 NOTE — PROGRESS NOTE ADULT - SUBJECTIVE AND OBJECTIVE BOX
HPI: 53F w/esrd on dialysis through right chest wall permacath.  Admitted from nursing home due to positive blood cultures. While inpatient she was evaluated for AV fistula    POST OP CHECK  post op day 1 s/p LUE radiocephalic AV fistula. Patient states she is doing well. Denies numbness or tingling, able to move all digits.  no acute events overnight    Vital Signs Last 24 Hrs  T(F): 98.8 (05 Oct 2018 04:47), Max: 99 (04 Oct 2018 15:09)  HR: 83 (05 Oct 2018 04:47) (75 - 83)  BP: 153/70 (05 Oct 2018 04:47) (118/65 - 153/70)  RR: 18 (05 Oct 2018 04:47) (15 - 18)  SpO2: 100% (05 Oct 2018 04:47) (95% - 100%)    EXAM  AAOx3, nad, blind, laying in bed  regular respiratory effort  L wrist dressing c,d,i, FROM of LUE digits, good capillary refill                          9.2    11.5  )-----------( 277      ( 04 Oct 2018 05:52 )             29.6   10-04    138  |  102  |  32<H>  ----------------------------<  96  3.6   |  29  |  3.67<H>    Ca    10.1      04 Oct 2018 05:52  Phos  5.3     10-03  Mg     2.4     10-03    TPro  7.3  /  Alb  2.7<L>  /  TBili  0.3  /  DBili  x   /  AST  9<L>  /  ALT  15  /  AlkPhos  118  10-03    MEDICATIONS  (STANDING):  ascorbic acid 500 milliGRAM(s) Oral two times a day  chlorhexidine 2% Cloths 1 Application(s) Topical daily  docusate sodium 200 milliGRAM(s) Oral at bedtime  epoetin jacqueline Injectable 4000 Unit(s) IV Push <User Schedule>  ferrous    sulfate 325 milliGRAM(s) Oral daily  folic acid 1 milliGRAM(s) Oral daily  gabapentin 100 milliGRAM(s) Oral three times a day  heparin  Injectable 5000 Unit(s) SubCutaneous every 12 hours  hydrALAZINE 100 milliGRAM(s) Oral every 8 hours  influenza   Vaccine 0.5 milliLiter(s) IntraMuscular once  insulin lispro (HumaLOG) corrective regimen sliding scale   SubCutaneous Before meals and at bedtime  insulin lispro Injectable (HumaLOG) 3 Unit(s) SubCutaneous three times a day before meals  levothyroxine 50 MICROGram(s) Oral daily  metoprolol tartrate 50 milliGRAM(s) Oral every 8 hours  multivitamin/minerals 1 Tablet(s) Oral daily  NIFEdipine XL 60 milliGRAM(s) Oral daily  pantoprazole    Tablet 40 milliGRAM(s) Oral before breakfast  senna 2 Tablet(s) Oral at bedtime  sevelamer hydrochloride 800 milliGRAM(s) Oral three times a day  vancomycin  IVPB 750 milliGRAM(s) IV Intermittent <User Schedule>  zinc sulfate 220 milliGRAM(s) Oral daily    MEDICATIONS  (PRN):  acetaminophen   Tablet .. 650 milliGRAM(s) Oral every 6 hours PRN Mild Pain (1 - 3)  oxyCODONE    IR 5 milliGRAM(s) Oral every 4 hours PRN Moderate Pain (4 - 6)

## 2018-10-26 ENCOUNTER — APPOINTMENT (OUTPATIENT)
Dept: VASCULAR SURGERY | Facility: CLINIC | Age: 53
End: 2018-10-26
Payer: MEDICAID

## 2018-10-26 VITALS
HEIGHT: 67 IN | BODY MASS INDEX: 16.48 KG/M2 | DIASTOLIC BLOOD PRESSURE: 71 MMHG | SYSTOLIC BLOOD PRESSURE: 144 MMHG | WEIGHT: 105 LBS | HEART RATE: 71 BPM

## 2018-10-26 DIAGNOSIS — Z09 ENCOUNTER FOR FOLLOW-UP EXAMINATION AFTER COMPLETED TREATMENT FOR CONDITIONS OTHER THAN MALIGNANT NEOPLASM: ICD-10-CM

## 2018-10-26 PROCEDURE — 99024 POSTOP FOLLOW-UP VISIT: CPT

## 2018-10-26 PROCEDURE — 93990 DOPPLER FLOW TESTING: CPT

## 2018-11-26 ENCOUNTER — FORM ENCOUNTER (OUTPATIENT)
Age: 53
End: 2018-11-26

## 2018-11-27 ENCOUNTER — APPOINTMENT (OUTPATIENT)
Dept: ENDOVASCULAR SURGERY | Facility: CLINIC | Age: 53
End: 2018-11-27
Payer: MEDICAID

## 2018-11-27 PROCEDURE — 36905Z: CUSTOM | Mod: 78

## 2018-11-27 PROCEDURE — 36215Z: CUSTOM | Mod: 78,59

## 2018-12-04 ENCOUNTER — APPOINTMENT (OUTPATIENT)
Dept: VASCULAR SURGERY | Facility: CLINIC | Age: 53
End: 2018-12-04
Payer: MEDICAID

## 2018-12-04 PROCEDURE — 99024 POSTOP FOLLOW-UP VISIT: CPT

## 2018-12-04 PROCEDURE — 93990 DOPPLER FLOW TESTING: CPT

## 2018-12-13 NOTE — PROGRESS NOTE ADULT - PROBLEM SELECTOR PROBLEM 8
Daily Note     Today's date: 2018  Patient name: Cathie Ventura  : 1939  MRN: [id number]  Referring provider: Manoj Engel MD  Dx:   Encounter Diagnosis     ICD-10-CM    1  Acute pain of left shoulder M25 512    2  Sprain of left rotator cuff capsule, initial encounter [id number]                   Subjective:  Pt reports that he is having pain mostly at night into his UT and down to his mid shoulder  Objective: See treatment diary below    Precautions: osteop    Daily Treatment Diary     Manual                                                                                 Exercise Diary            UBE BW 10' 10' 10'          TB: row Gr 20x Gr x30 Blue x30          ext Gr 20x Gr x30 Blue x30          ER Gr 20x Gr x30 Gr x30                       Elbow curls 10# 20x 10# 20x #10 3x10          AROM flx, abd 1# 30x #2 x20 #2 x20          Ball on wall to fatigue 2#  2x 2#  2x 2#  2x          FW bent row 10# 20x 10# 30x 10# 30x          Wt to shelves 4# 30x 4# 30x 4# 30x          [place] Aron carry 10# 2x #10 30ft x4 #10 30ft x6                       Supine punch 2# 20x #2x20 #2x20          T stand facing wall 20x x20           Cervical ret self OP    x15                                                                               Modalities              Ice at end L shoulder   x10min                                      X=same as last visit    Assessment: Pt continues to need frequent cueing for appropriate form with exercises  Pt was easily fatigued today with program not all exercises were performed because of this  Due to pts new c/o of pain in UT into forearm  C-spine was screened and he demonstrated a mod loss of cervical ret and ext  Pt had a favorable response to repeated cervical retraction as demonstrated by resolution of symptoms  Likely all coming from the neck and not RC tear  Pt was instructed to complete this at home   Pt verbalized and demonstrated understanding  Pt remained pain free for rest of session, however did have muscular soreness post tx session which was reduced with ice  Plan: Continue per plan of care  Progress treatment as tolerated  Prophylactic measure

## 2018-12-19 ENCOUNTER — TRANSCRIPTION ENCOUNTER (OUTPATIENT)
Age: 53
End: 2018-12-19

## 2018-12-20 ENCOUNTER — OUTPATIENT (OUTPATIENT)
Dept: OUTPATIENT SERVICES | Facility: HOSPITAL | Age: 53
LOS: 1 days | End: 2018-12-20
Payer: MEDICAID

## 2018-12-20 VITALS
OXYGEN SATURATION: 100 % | SYSTOLIC BLOOD PRESSURE: 113 MMHG | RESPIRATION RATE: 16 BRPM | HEART RATE: 69 BPM | DIASTOLIC BLOOD PRESSURE: 62 MMHG

## 2018-12-20 VITALS
TEMPERATURE: 98 F | HEART RATE: 70 BPM | SYSTOLIC BLOOD PRESSURE: 138 MMHG | OXYGEN SATURATION: 100 % | HEIGHT: 67 IN | WEIGHT: 102.07 LBS | RESPIRATION RATE: 14 BRPM | DIASTOLIC BLOOD PRESSURE: 64 MMHG

## 2018-12-20 DIAGNOSIS — H25.812 COMBINED FORMS OF AGE-RELATED CATARACT, LEFT EYE: ICD-10-CM

## 2018-12-20 DIAGNOSIS — H33.8 OTHER RETINAL DETACHMENTS: ICD-10-CM

## 2018-12-20 DIAGNOSIS — T85.398A OTHER MECHANICAL COMPLICATION OF OTHER OCULAR PROSTHETIC DEVICES, IMPLANTS AND GRAFTS, INITIAL ENCOUNTER: ICD-10-CM

## 2018-12-20 LAB
ANION GAP SERPL CALC-SCNC: 9 MMOL/L — SIGNIFICANT CHANGE UP (ref 5–17)
BUN SERPL-MCNC: 54 MG/DL — HIGH (ref 7–23)
CALCIUM SERPL-MCNC: 10.7 MG/DL — HIGH (ref 8.4–10.5)
CHLORIDE SERPL-SCNC: 97 MMOL/L — SIGNIFICANT CHANGE UP (ref 96–108)
CO2 SERPL-SCNC: 33 MMOL/L — HIGH (ref 22–31)
CREAT SERPL-MCNC: 4.54 MG/DL — HIGH (ref 0.5–1.3)
GLUCOSE BLDC GLUCOMTR-MCNC: 110 MG/DL — HIGH (ref 70–99)
GLUCOSE BLDC GLUCOMTR-MCNC: 114 MG/DL — HIGH (ref 70–99)
GLUCOSE SERPL-MCNC: 122 MG/DL — HIGH (ref 70–99)
HCG SERPL-ACNC: 1 MIU/ML — SIGNIFICANT CHANGE UP
POTASSIUM SERPL-MCNC: 4.4 MMOL/L — SIGNIFICANT CHANGE UP (ref 3.5–5.3)
POTASSIUM SERPL-SCNC: 4.4 MMOL/L — SIGNIFICANT CHANGE UP (ref 3.5–5.3)
SODIUM SERPL-SCNC: 139 MMOL/L — SIGNIFICANT CHANGE UP (ref 135–145)

## 2018-12-20 PROCEDURE — 36415 COLL VENOUS BLD VENIPUNCTURE: CPT

## 2018-12-20 PROCEDURE — C1889: CPT

## 2018-12-20 PROCEDURE — 82962 GLUCOSE BLOOD TEST: CPT

## 2018-12-20 PROCEDURE — V2632: CPT

## 2018-12-20 PROCEDURE — 80048 BASIC METABOLIC PNL TOTAL CA: CPT

## 2018-12-20 PROCEDURE — 84702 CHORIONIC GONADOTROPIN TEST: CPT

## 2018-12-20 PROCEDURE — 66984 XCAPSL CTRC RMVL W/O ECP: CPT | Mod: LT

## 2018-12-20 NOTE — ASU PATIENT PROFILE, ADULT - VISION (WITH CORRECTIVE LENSES IF THE PATIENT USUALLY WEARS THEM):
Poor vision b/l eyes/Partially impaired: cannot see medication labels or newsprint, but can see obstacles in path, and the surrounding layout; can count fingers at arm's length

## 2018-12-20 NOTE — ASU DISCHARGE PLAN (ADULT/PEDIATRIC). - PROCEDURE
Left Eye removal of silicone oil, Endolaser, Left eye phacoemulsification cataract extraction with Intraocular Lens implant

## 2018-12-20 NOTE — ASU PATIENT PROFILE, ADULT - PMH
Allergic rhinitis    Anemia    ASHD (arteriosclerotic heart disease)    Bacteremia    CHF (congestive heart failure)    CKD (chronic kidney disease)    Clostridium difficile infection    Diabetes mellitus    Diabetic neuropathy    Difficulty waking    End stage kidney disease    Enterocolitis    ESRD (end stage renal disease) on dialysis    GERD (gastroesophageal reflux disease)    HTN (hypertension)    Hyperlipidemia    Hypocalcemia    Hypo-osmolality and hyponatremia    Hypothyroidism    Malformation of coronary vessels    Osteomyelitis    Osteomyelitis of jaw    Parathyroid adenoma    Polyneuropathy    Pressure ulcer  Sacral region  Pulmonary edema  chronic  Respiratory failure  with hypoxia or hypercapnia  Sepsis    Sepsis    Tremor Allergic rhinitis    Anemia    ASHD (arteriosclerotic heart disease)    Bacteremia    CHF (congestive heart failure)    CKD (chronic kidney disease)    Clostridium difficile infection    Diabetes mellitus    Diabetic neuropathy    Difficulty waking    Elective surgery  Dialysis catheter in left arm  End stage kidney disease    Enterocolitis    ESRD (end stage renal disease) on dialysis    GERD (gastroesophageal reflux disease)    HTN (hypertension)    Hyperlipidemia    Hypocalcemia    Hypo-osmolality and hyponatremia    Hypothyroidism    Malformation of coronary vessels    Osteomyelitis    Osteomyelitis of jaw    Parathyroid adenoma    Polyneuropathy    Pressure ulcer  Sacral region  Pulmonary edema  chronic  Respiratory failure  with hypoxia or hypercapnia  Sepsis    Sepsis    Tremor

## 2018-12-21 ENCOUNTER — APPOINTMENT (OUTPATIENT)
Dept: OPHTHALMOLOGY | Facility: CLINIC | Age: 53
End: 2018-12-21

## 2018-12-26 PROCEDURE — C1769: CPT

## 2018-12-26 PROCEDURE — 36558 INSERT TUNNELED CV CATH: CPT

## 2018-12-26 PROCEDURE — 85652 RBC SED RATE AUTOMATED: CPT

## 2018-12-26 PROCEDURE — C1750: CPT

## 2018-12-26 PROCEDURE — 87150 DNA/RNA AMPLIFIED PROBE: CPT

## 2018-12-26 PROCEDURE — 83735 ASSAY OF MAGNESIUM: CPT

## 2018-12-26 PROCEDURE — 80053 COMPREHEN METABOLIC PANEL: CPT

## 2018-12-26 PROCEDURE — 86140 C-REACTIVE PROTEIN: CPT

## 2018-12-26 PROCEDURE — 82306 VITAMIN D 25 HYDROXY: CPT

## 2018-12-26 PROCEDURE — 86850 RBC ANTIBODY SCREEN: CPT

## 2018-12-26 PROCEDURE — 80048 BASIC METABOLIC PNL TOTAL CA: CPT

## 2018-12-26 PROCEDURE — 85610 PROTHROMBIN TIME: CPT

## 2018-12-26 PROCEDURE — 99285 EMERGENCY DEPT VISIT HI MDM: CPT | Mod: 25

## 2018-12-26 PROCEDURE — 77001 FLUOROGUIDE FOR VEIN DEVICE: CPT

## 2018-12-26 PROCEDURE — 86900 BLOOD TYPING SEROLOGIC ABO: CPT

## 2018-12-26 PROCEDURE — 87640 STAPH A DNA AMP PROBE: CPT

## 2018-12-26 PROCEDURE — 80202 ASSAY OF VANCOMYCIN: CPT

## 2018-12-26 PROCEDURE — 85730 THROMBOPLASTIN TIME PARTIAL: CPT

## 2018-12-26 PROCEDURE — 85027 COMPLETE CBC AUTOMATED: CPT

## 2018-12-26 PROCEDURE — 87641 MR-STAPH DNA AMP PROBE: CPT

## 2018-12-26 PROCEDURE — 72110 X-RAY EXAM L-2 SPINE 4/>VWS: CPT

## 2018-12-26 PROCEDURE — 93306 TTE W/DOPPLER COMPLETE: CPT

## 2018-12-26 PROCEDURE — 93990 DOPPLER FLOW TESTING: CPT

## 2018-12-26 PROCEDURE — 84100 ASSAY OF PHOSPHORUS: CPT

## 2018-12-26 PROCEDURE — 99261: CPT

## 2018-12-26 PROCEDURE — 36415 COLL VENOUS BLD VENIPUNCTURE: CPT

## 2018-12-26 PROCEDURE — 76937 US GUIDE VASCULAR ACCESS: CPT

## 2018-12-26 PROCEDURE — 83605 ASSAY OF LACTIC ACID: CPT

## 2018-12-26 PROCEDURE — 80061 LIPID PANEL: CPT

## 2018-12-26 PROCEDURE — 93005 ELECTROCARDIOGRAM TRACING: CPT

## 2018-12-26 PROCEDURE — 87040 BLOOD CULTURE FOR BACTERIA: CPT

## 2018-12-26 PROCEDURE — 86901 BLOOD TYPING SEROLOGIC RH(D): CPT

## 2018-12-26 PROCEDURE — 71045 X-RAY EXAM CHEST 1 VIEW: CPT

## 2018-12-26 PROCEDURE — 36589 REMOVAL TUNNELED CV CATH: CPT

## 2018-12-26 PROCEDURE — 82962 GLUCOSE BLOOD TEST: CPT

## 2018-12-27 ENCOUNTER — APPOINTMENT (OUTPATIENT)
Dept: ENDOVASCULAR SURGERY | Facility: CLINIC | Age: 53
End: 2018-12-27
Payer: MEDICAID

## 2018-12-27 PROBLEM — E78.5 HYPERLIPIDEMIA, UNSPECIFIED: Chronic | Status: ACTIVE | Noted: 2018-12-20

## 2018-12-27 PROBLEM — R78.81 BACTEREMIA: Chronic | Status: ACTIVE | Noted: 2018-12-20

## 2018-12-27 PROBLEM — K52.9 NONINFECTIVE GASTROENTERITIS AND COLITIS, UNSPECIFIED: Chronic | Status: ACTIVE | Noted: 2018-12-20

## 2018-12-27 PROBLEM — Q24.5 MALFORMATION OF CORONARY VESSELS: Chronic | Status: ACTIVE | Noted: 2018-12-20

## 2018-12-27 PROBLEM — K21.9 GASTRO-ESOPHAGEAL REFLUX DISEASE WITHOUT ESOPHAGITIS: Chronic | Status: ACTIVE | Noted: 2018-12-20

## 2018-12-27 PROBLEM — E87.1 HYPO-OSMOLALITY AND HYPONATREMIA: Chronic | Status: ACTIVE | Noted: 2018-12-20

## 2018-12-27 PROBLEM — G62.9 POLYNEUROPATHY, UNSPECIFIED: Chronic | Status: ACTIVE | Noted: 2018-12-20

## 2018-12-27 PROBLEM — J96.90 RESPIRATORY FAILURE, UNSPECIFIED, UNSPECIFIED WHETHER WITH HYPOXIA OR HYPERCAPNIA: Chronic | Status: ACTIVE | Noted: 2018-12-20

## 2018-12-27 PROBLEM — A41.9 SEPSIS, UNSPECIFIED ORGANISM: Chronic | Status: ACTIVE | Noted: 2018-12-20

## 2018-12-27 PROBLEM — J30.9 ALLERGIC RHINITIS, UNSPECIFIED: Chronic | Status: ACTIVE | Noted: 2018-12-20

## 2018-12-27 PROBLEM — J81.1 CHRONIC PULMONARY EDEMA: Chronic | Status: ACTIVE | Noted: 2018-12-20

## 2018-12-27 PROBLEM — Z41.9 ENCOUNTER FOR PROCEDURE FOR PURPOSES OTHER THAN REMEDYING HEALTH STATE, UNSPECIFIED: Chronic | Status: ACTIVE | Noted: 2018-12-20

## 2018-12-27 PROBLEM — G47.8 OTHER SLEEP DISORDERS: Chronic | Status: ACTIVE | Noted: 2018-12-20

## 2018-12-27 PROBLEM — R25.1 TREMOR, UNSPECIFIED: Chronic | Status: ACTIVE | Noted: 2018-12-20

## 2018-12-27 PROBLEM — M86.9 OSTEOMYELITIS, UNSPECIFIED: Chronic | Status: ACTIVE | Noted: 2018-12-20

## 2018-12-27 PROBLEM — N18.6 END STAGE RENAL DISEASE: Chronic | Status: ACTIVE | Noted: 2018-12-20

## 2018-12-27 PROBLEM — L89.90 PRESSURE ULCER OF UNSPECIFIED SITE, UNSPECIFIED STAGE: Chronic | Status: ACTIVE | Noted: 2018-12-20

## 2018-12-27 PROBLEM — E83.51 HYPOCALCEMIA: Chronic | Status: ACTIVE | Noted: 2018-12-20

## 2018-12-27 PROBLEM — I25.10 ATHEROSCLEROTIC HEART DISEASE OF NATIVE CORONARY ARTERY WITHOUT ANGINA PECTORIS: Chronic | Status: ACTIVE | Noted: 2018-12-20

## 2018-12-27 PROCEDURE — 93990 DOPPLER FLOW TESTING: CPT

## 2019-01-15 ENCOUNTER — APPOINTMENT (OUTPATIENT)
Dept: ENDOVASCULAR SURGERY | Facility: CLINIC | Age: 54
End: 2019-01-15
Payer: MEDICAID

## 2019-01-15 PROCEDURE — 36589 REMOVAL TUNNELED CV CATH: CPT

## 2019-01-21 NOTE — H&P ADULT - NEGATIVE GENERAL SYMPTOMS
no polydipsia/no malaise/no fatigue/no weight loss/no polyuria/no fever/no chills/no anorexia admission

## 2019-01-29 ENCOUNTER — APPOINTMENT (OUTPATIENT)
Dept: OPHTHALMOLOGY | Facility: CLINIC | Age: 54
End: 2019-01-29

## 2019-02-13 NOTE — ED PROVIDER NOTE - NS_ATTENDINGSCRIBE_ED_ALL_ED
never used I personally performed the service described in the documentation recorded by the scribe in my presence, and it accurately and completely records my words and actions.

## 2019-03-20 ENCOUNTER — APPOINTMENT (OUTPATIENT)
Dept: OPHTHALMOLOGY | Facility: CLINIC | Age: 54
End: 2019-03-20

## 2019-04-01 ENCOUNTER — TRANSCRIPTION ENCOUNTER (OUTPATIENT)
Age: 54
End: 2019-04-01

## 2019-04-01 RX ORDER — CHLORHEXIDINE GLUCONATE 213 G/1000ML
1 SOLUTION TOPICAL ONCE
Qty: 0 | Refills: 0 | Status: COMPLETED | OUTPATIENT
Start: 2019-04-02 | End: 2019-04-02

## 2019-04-01 RX ORDER — SODIUM CHLORIDE 9 MG/ML
3 INJECTION INTRAMUSCULAR; INTRAVENOUS; SUBCUTANEOUS EVERY 8 HOURS
Qty: 0 | Refills: 0 | Status: DISCONTINUED | OUTPATIENT
Start: 2019-04-02 | End: 2019-04-10

## 2019-04-02 ENCOUNTER — OUTPATIENT (OUTPATIENT)
Dept: OUTPATIENT SERVICES | Facility: HOSPITAL | Age: 54
LOS: 1 days | End: 2019-04-02
Payer: MEDICAID

## 2019-04-02 VITALS
SYSTOLIC BLOOD PRESSURE: 162 MMHG | OXYGEN SATURATION: 100 % | HEART RATE: 76 BPM | DIASTOLIC BLOOD PRESSURE: 61 MMHG | RESPIRATION RATE: 18 BRPM | TEMPERATURE: 98 F

## 2019-04-02 VITALS
DIASTOLIC BLOOD PRESSURE: 58 MMHG | TEMPERATURE: 98 F | OXYGEN SATURATION: 100 % | RESPIRATION RATE: 16 BRPM | HEART RATE: 77 BPM | SYSTOLIC BLOOD PRESSURE: 148 MMHG

## 2019-04-02 DIAGNOSIS — Z01.818 ENCOUNTER FOR OTHER PREPROCEDURAL EXAMINATION: ICD-10-CM

## 2019-04-02 DIAGNOSIS — I77.0 ARTERIOVENOUS FISTULA, ACQUIRED: Chronic | ICD-10-CM

## 2019-04-02 DIAGNOSIS — N18.9 CHRONIC KIDNEY DISEASE, UNSPECIFIED: ICD-10-CM

## 2019-04-02 LAB
ANION GAP SERPL CALC-SCNC: 8 MMOL/L — SIGNIFICANT CHANGE UP (ref 5–17)
BUN SERPL-MCNC: 45 MG/DL — HIGH (ref 7–18)
CALCIUM SERPL-MCNC: 9.5 MG/DL — SIGNIFICANT CHANGE UP (ref 8.4–10.5)
CHLORIDE SERPL-SCNC: 99 MMOL/L — SIGNIFICANT CHANGE UP (ref 96–108)
CO2 SERPL-SCNC: 29 MMOL/L — SIGNIFICANT CHANGE UP (ref 22–31)
CREAT SERPL-MCNC: 4.23 MG/DL — HIGH (ref 0.5–1.3)
GLUCOSE BLDC GLUCOMTR-MCNC: 112 MG/DL — HIGH (ref 70–99)
GLUCOSE SERPL-MCNC: 130 MG/DL — HIGH (ref 70–99)
POTASSIUM SERPL-MCNC: 4.1 MMOL/L — SIGNIFICANT CHANGE UP (ref 3.5–5.3)
POTASSIUM SERPL-SCNC: 4.1 MMOL/L — SIGNIFICANT CHANGE UP (ref 3.5–5.3)
SODIUM SERPL-SCNC: 136 MMOL/L — SIGNIFICANT CHANGE UP (ref 135–145)

## 2019-04-02 PROCEDURE — 36830 ARTERY-VEIN NONAUTOGRAFT: CPT | Mod: LT

## 2019-04-02 PROCEDURE — C1768: CPT

## 2019-04-02 PROCEDURE — 36415 COLL VENOUS BLD VENIPUNCTURE: CPT

## 2019-04-02 PROCEDURE — 80048 BASIC METABOLIC PNL TOTAL CA: CPT

## 2019-04-02 PROCEDURE — 82962 GLUCOSE BLOOD TEST: CPT

## 2019-04-02 RX ORDER — OXYCODONE AND ACETAMINOPHEN 5; 325 MG/1; MG/1
1 TABLET ORAL EVERY 4 HOURS
Qty: 0 | Refills: 0 | Status: DISCONTINUED | OUTPATIENT
Start: 2019-04-02 | End: 2019-04-02

## 2019-04-02 RX ADMIN — CHLORHEXIDINE GLUCONATE 1 APPLICATION(S): 213 SOLUTION TOPICAL at 11:02

## 2019-04-02 RX ADMIN — SODIUM CHLORIDE 3 MILLILITER(S): 9 INJECTION INTRAMUSCULAR; INTRAVENOUS; SUBCUTANEOUS at 11:01

## 2019-04-02 NOTE — ASU DISCHARGE PLAN (ADULT/PEDIATRIC) - CALL YOUR DOCTOR IF YOU HAVE ANY OF THE FOLLOWING:
Fever greater than (need to indicate Fahrenheit or Celsius)/Numbness, tingling, color or temperature change to extremity/Pain not relieved by Medications/Swelling that gets worse/Wound/Surgical Site with redness, or foul smelling discharge or pus/Bleeding that does not stop

## 2019-04-02 NOTE — ASU PREOP CHECKLIST - 1.
patient has patch over right eye placed in nursing home.  patient states she had bleeding in right  eye

## 2019-04-02 NOTE — H&P PST ADULT - NSICDXPASTMEDICALHX_GEN_ALL_CORE_FT
PAST MEDICAL HISTORY:  CAD in native artery     Diabetes mellitus     HLD (hyperlipidemia)     HTN (hypertension)     Hypothyroidism

## 2019-04-02 NOTE — ASU PREOP CHECKLIST - 3.
patient has duragesic patch at midback.  Dr. Walden aware that it was placed two days ago and instructs not to remove such

## 2019-04-02 NOTE — H&P PST ADULT - HISTORY OF PRESENT ILLNESS
Patient is a 54 F with multiple medical comorbidities, including HTN, DM, ESRD on HD, CAD, DLD, Hypothyroid seen for evaluation for creation of LUE AV fistula. Patient had previous LUE AVF which stopped working, currently getting HD through permacat. Last dialyzed yesterday. Currently states she feels well, denies complaints.

## 2019-04-02 NOTE — ASU DISCHARGE PLAN (ADULT/PEDIATRIC) - CARE PROVIDER_API CALL
Vinny Alvarez (MD)  Surgery; Thoracic Surgery; Vascular Surgery  [address] [facility], [facility] [address]  [place] [address]  Phone: [phone]  Fax: [phone]  Follow Up Time:

## 2019-04-02 NOTE — ASU DISCHARGE PLAN (ADULT/PEDIATRIC) - ASU DC SPECIAL INSTRUCTIONSFT
Take percocet as needed for severe pain  Do not take tylenol while taking percocet  Do not use graft for dialysis until cleared by surgeon

## 2019-04-05 PROBLEM — I10 ESSENTIAL (PRIMARY) HYPERTENSION: Chronic | Status: ACTIVE | Noted: 2019-04-02

## 2019-04-15 ENCOUNTER — APPOINTMENT (OUTPATIENT)
Dept: VASCULAR SURGERY | Facility: CLINIC | Age: 54
End: 2019-04-15
Payer: MEDICAID

## 2019-04-15 PROBLEM — E03.9 HYPOTHYROIDISM, UNSPECIFIED: Chronic | Status: ACTIVE | Noted: 2019-04-02

## 2019-04-15 PROBLEM — E78.5 HYPERLIPIDEMIA, UNSPECIFIED: Chronic | Status: ACTIVE | Noted: 2019-04-02

## 2019-04-15 PROBLEM — E11.9 TYPE 2 DIABETES MELLITUS WITHOUT COMPLICATIONS: Chronic | Status: ACTIVE | Noted: 2019-04-02

## 2019-04-15 PROBLEM — I25.10 ATHEROSCLEROTIC HEART DISEASE OF NATIVE CORONARY ARTERY WITHOUT ANGINA PECTORIS: Chronic | Status: ACTIVE | Noted: 2019-04-02

## 2019-04-15 PROCEDURE — 93990 DOPPLER FLOW TESTING: CPT

## 2019-04-15 PROCEDURE — 99214 OFFICE O/P EST MOD 30 MIN: CPT

## 2019-04-17 NOTE — HISTORY OF PRESENT ILLNESS
[] : in left upper arm [FreeTextEntry1] : 55 yo female with history of esrd on hd had left upper extremity radiocephalic avf that had failed since her last visit now has a permcath and a new avg in the left upper extremity.  pt without any complaints at this time

## 2019-04-17 NOTE — PHYSICAL EXAM
[Thrill] : thrill [Hand well perfused] : hand well perfused [2+] : left 2+ [Aneurysm] : no aneurysm [Normal] : normoactive bowel sounds, soft and nontender, no hepatosplenomegaly or masses appreciated [Ulcer] : no ulcer [de-identified] : incisions clean and dry, sutures removed and steri strip applied to the proximal incision

## 2019-04-17 NOTE — ASSESSMENT
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

## 2019-04-24 ENCOUNTER — APPOINTMENT (OUTPATIENT)
Dept: OPHTHALMOLOGY | Facility: CLINIC | Age: 54
End: 2019-04-24

## 2019-04-29 ENCOUNTER — FORM ENCOUNTER (OUTPATIENT)
Age: 54
End: 2019-04-29

## 2019-04-29 NOTE — DISCUSSION/SUMMARY
[FSBS] : FSBS [Post Fistulogram/Intervention] : Post Fistulogram/Intervention: Site check for bleeding/hematoma, thrill/bruit. Vitals signs: On admission, then q15 mins x 2 [Resume diet] : resume diet

## 2019-04-30 ENCOUNTER — APPOINTMENT (OUTPATIENT)
Dept: ENDOVASCULAR SURGERY | Facility: CLINIC | Age: 54
End: 2019-04-30
Payer: MEDICAID

## 2019-04-30 VITALS
DIASTOLIC BLOOD PRESSURE: 63 MMHG | RESPIRATION RATE: 16 BRPM | SYSTOLIC BLOOD PRESSURE: 105 MMHG | BODY MASS INDEX: 16.17 KG/M2 | WEIGHT: 103 LBS | HEART RATE: 84 BPM | TEMPERATURE: 98.7 F | HEIGHT: 67 IN | OXYGEN SATURATION: 96 %

## 2019-04-30 DIAGNOSIS — T82.898A OTHER SPECIFIED COMPLICATION OF VASCULAR PROSTHETIC DEVICES, IMPLANTS AND GRAFTS, INITIAL ENCOUNTER: ICD-10-CM

## 2019-04-30 DIAGNOSIS — N18.6 END STAGE RENAL DISEASE: ICD-10-CM

## 2019-04-30 DIAGNOSIS — Z99.2 END STAGE RENAL DISEASE: ICD-10-CM

## 2019-04-30 PROCEDURE — 36903Z: CUSTOM

## 2019-04-30 RX ORDER — NIFEDIPINE 60 MG
60 TABLET, EXTENDED RELEASE ORAL
Refills: 0 | Status: ACTIVE | COMMUNITY

## 2019-04-30 RX ORDER — SEVELAMER CARBONATE 800 MG/1
800 TABLET, FILM COATED ORAL
Refills: 0 | Status: ACTIVE | COMMUNITY

## 2019-04-30 RX ORDER — ZINC SULFATE 50(220)MG
CAPSULE ORAL
Refills: 0 | Status: ACTIVE | COMMUNITY

## 2019-04-30 RX ORDER — METOCLOPRAMIDE 5 MG/1
5 TABLET ORAL
Refills: 0 | Status: ACTIVE | COMMUNITY

## 2019-04-30 RX ORDER — FENTANYL 50 UG/1
PATCH TRANSDERMAL
Refills: 0 | Status: ACTIVE | COMMUNITY

## 2019-04-30 RX ORDER — METOPROLOL SUCCINATE 50 MG/1
50 TABLET, EXTENDED RELEASE ORAL
Refills: 0 | Status: ACTIVE | COMMUNITY

## 2019-04-30 RX ORDER — LEVOTHYROXINE SODIUM 50 UG/1
50 TABLET ORAL
Refills: 0 | Status: ACTIVE | COMMUNITY

## 2019-05-01 NOTE — PAST MEDICAL HISTORY
[Increasing age ( >40 years old)] : Increasing age ( >40 years old) [No therapy indicated for cases scheduled for less than one hour] : No therapy indicated for cases scheduled for less than one hour. [Altered mobility] : Altered mobility [FreeTextEntry1] : Malignant Hyperthermia Screening Tool and Risk of Bleeding Assessment \par \par \par \par Ms. STEVNE HARVEY denies family of unexpected death following Anesthesia or Exercise.\par Denies Family history of Malignant Hyperthermia, Muscle or Neuromuscular disorder and High Temperature  following exercise.\par \par Ms. STEVEN HARVEY denies history of Muscle Spasm, Dark or Chocolate- Colored and Unanticipated fever immediately following anaesthesia or serious exercise.\par Ms. STEVEN HARVEY also denies bleeding tendencies/Risks of Bleeding.\par

## 2019-05-01 NOTE — REASON FOR VISIT
[Other ___] : a [unfilled] visit for [Inadequate Flow within AVF] : inadequate flow within AVF [FreeTextEntry2] : stenosis on duplex

## 2019-05-01 NOTE — HISTORY OF PRESENT ILLNESS
[] : in left upper arm [FreeTextEntry1] : feels ok\par no falls\par accompanied by jamarcus Carpenter [phone] [FreeTextEntry4] : yesterday [FreeTextEntry3] : 3/2019 [FreeTextEntry6] : Dr. Encinas [FreeTextEntry5] : yesterday

## 2019-05-28 ENCOUNTER — APPOINTMENT (OUTPATIENT)
Dept: ENDOVASCULAR SURGERY | Facility: CLINIC | Age: 54
End: 2019-05-28

## 2019-08-07 ENCOUNTER — APPOINTMENT (OUTPATIENT)
Dept: OPHTHALMOLOGY | Facility: CLINIC | Age: 54
End: 2019-08-07

## 2019-08-20 ENCOUNTER — APPOINTMENT (OUTPATIENT)
Dept: OPHTHALMOLOGY | Facility: CLINIC | Age: 54
End: 2019-08-20

## 2019-09-05 ENCOUNTER — INPATIENT (INPATIENT)
Facility: HOSPITAL | Age: 54
LOS: 6 days | Discharge: NURSING, REHAB OR LONG-TERM CARE | End: 2019-09-12
Attending: GENERAL PRACTICE | Admitting: GENERAL PRACTICE
Payer: MEDICAID

## 2019-09-05 VITALS
TEMPERATURE: 99 F | SYSTOLIC BLOOD PRESSURE: 159 MMHG | RESPIRATION RATE: 19 BRPM | DIASTOLIC BLOOD PRESSURE: 78 MMHG | HEART RATE: 78 BPM | OXYGEN SATURATION: 100 %

## 2019-09-05 DIAGNOSIS — I77.0 ARTERIOVENOUS FISTULA, ACQUIRED: Chronic | ICD-10-CM

## 2019-09-05 NOTE — ED ADULT TRIAGE NOTE - CHIEF COMPLAINT QUOTE
pt bib ems for elevated white blood cell count (22.5). patient denies any cough, fevers or chills, no n/v/d. pt completed dialysis yesterday, left AV fistula noted, receives dialysis t/th/sat. pmh htn, esrd, hld.

## 2019-09-06 DIAGNOSIS — N30.00 ACUTE CYSTITIS WITHOUT HEMATURIA: ICD-10-CM

## 2019-09-06 DIAGNOSIS — I10 ESSENTIAL (PRIMARY) HYPERTENSION: ICD-10-CM

## 2019-09-06 DIAGNOSIS — D64.9 ANEMIA, UNSPECIFIED: ICD-10-CM

## 2019-09-06 DIAGNOSIS — N39.0 URINARY TRACT INFECTION, SITE NOT SPECIFIED: ICD-10-CM

## 2019-09-06 DIAGNOSIS — L89.154 PRESSURE ULCER OF SACRAL REGION, STAGE 4: ICD-10-CM

## 2019-09-06 DIAGNOSIS — R10.9 UNSPECIFIED ABDOMINAL PAIN: ICD-10-CM

## 2019-09-06 DIAGNOSIS — E03.9 HYPOTHYROIDISM, UNSPECIFIED: ICD-10-CM

## 2019-09-06 DIAGNOSIS — I25.10 ATHEROSCLEROTIC HEART DISEASE OF NATIVE CORONARY ARTERY WITHOUT ANGINA PECTORIS: ICD-10-CM

## 2019-09-06 DIAGNOSIS — N18.6 END STAGE RENAL DISEASE: ICD-10-CM

## 2019-09-06 DIAGNOSIS — Z29.9 ENCOUNTER FOR PROPHYLACTIC MEASURES, UNSPECIFIED: ICD-10-CM

## 2019-09-06 DIAGNOSIS — D72.829 ELEVATED WHITE BLOOD CELL COUNT, UNSPECIFIED: ICD-10-CM

## 2019-09-06 LAB
ALBUMIN SERPL ELPH-MCNC: 3.9 G/DL — SIGNIFICANT CHANGE UP (ref 3.3–5)
ALP SERPL-CCNC: 161 U/L — HIGH (ref 40–120)
ALT FLD-CCNC: 8 U/L — SIGNIFICANT CHANGE UP (ref 4–33)
ANION GAP SERPL CALC-SCNC: 17 MMO/L — HIGH (ref 7–14)
APPEARANCE UR: SIGNIFICANT CHANGE UP
AST SERPL-CCNC: 7 U/L — SIGNIFICANT CHANGE UP (ref 4–32)
BACTERIA # UR AUTO: NEGATIVE — SIGNIFICANT CHANGE UP
BASE EXCESS BLDV CALC-SCNC: 5.9 MMOL/L — SIGNIFICANT CHANGE UP
BASOPHILS # BLD AUTO: 0.05 K/UL — SIGNIFICANT CHANGE UP (ref 0–0.2)
BASOPHILS NFR BLD AUTO: 0.3 % — SIGNIFICANT CHANGE UP (ref 0–2)
BILIRUB SERPL-MCNC: 0.3 MG/DL — SIGNIFICANT CHANGE UP (ref 0.2–1.2)
BILIRUB UR-MCNC: NEGATIVE — SIGNIFICANT CHANGE UP
BLOOD GAS VENOUS - CREATININE: 4.47 MG/DL — HIGH (ref 0.5–1.3)
BLOOD GAS VENOUS - FIO2: 21 — SIGNIFICANT CHANGE UP
BLOOD UR QL VISUAL: HIGH
BUN SERPL-MCNC: 69 MG/DL — HIGH (ref 7–23)
CALCIUM SERPL-MCNC: 9.9 MG/DL — SIGNIFICANT CHANGE UP (ref 8.4–10.5)
CHLORIDE BLDV-SCNC: 91 MMOL/L — LOW (ref 96–108)
CHLORIDE SERPL-SCNC: 86 MMOL/L — LOW (ref 98–107)
CO2 SERPL-SCNC: 29 MMOL/L — SIGNIFICANT CHANGE UP (ref 22–31)
COLOR SPEC: YELLOW — SIGNIFICANT CHANGE UP
CREAT SERPL-MCNC: 4.4 MG/DL — HIGH (ref 0.5–1.3)
EOSINOPHIL # BLD AUTO: 0.06 K/UL — SIGNIFICANT CHANGE UP (ref 0–0.5)
EOSINOPHIL NFR BLD AUTO: 0.4 % — SIGNIFICANT CHANGE UP (ref 0–6)
GAS PNL BLDV: 129 MMOL/L — LOW (ref 136–146)
GLUCOSE BLDC GLUCOMTR-MCNC: 155 MG/DL — HIGH (ref 70–99)
GLUCOSE BLDV-MCNC: 213 MG/DL — HIGH (ref 70–99)
GLUCOSE SERPL-MCNC: 217 MG/DL — HIGH (ref 70–99)
GLUCOSE UR-MCNC: 100 — SIGNIFICANT CHANGE UP
HBV SURFACE AG SER-ACNC: NEGATIVE — SIGNIFICANT CHANGE UP
HCG SERPL-ACNC: < 5 MIU/ML — SIGNIFICANT CHANGE UP
HCO3 BLDV-SCNC: 28 MMOL/L — HIGH (ref 20–27)
HCT VFR BLD CALC: 27.6 % — LOW (ref 34.5–45)
HCT VFR BLDV CALC: 27.9 % — LOW (ref 34.5–45)
HGB BLD-MCNC: 8.7 G/DL — LOW (ref 11.5–15.5)
HGB BLDV-MCNC: 9 G/DL — LOW (ref 11.5–15.5)
HYALINE CASTS # UR AUTO: NEGATIVE — SIGNIFICANT CHANGE UP
IMM GRANULOCYTES NFR BLD AUTO: 0.3 % — SIGNIFICANT CHANGE UP (ref 0–1.5)
KETONES UR-MCNC: NEGATIVE — SIGNIFICANT CHANGE UP
LACTATE BLDV-MCNC: 1.4 MMOL/L — SIGNIFICANT CHANGE UP (ref 0.5–2)
LEUKOCYTE ESTERASE UR-ACNC: SIGNIFICANT CHANGE UP
LYMPHOCYTES # BLD AUTO: 1.66 K/UL — SIGNIFICANT CHANGE UP (ref 1–3.3)
LYMPHOCYTES # BLD AUTO: 10.7 % — LOW (ref 13–44)
MCHC RBC-ENTMCNC: 29.3 PG — SIGNIFICANT CHANGE UP (ref 27–34)
MCHC RBC-ENTMCNC: 31.5 % — LOW (ref 32–36)
MCV RBC AUTO: 92.9 FL — SIGNIFICANT CHANGE UP (ref 80–100)
MONOCYTES # BLD AUTO: 1.71 K/UL — HIGH (ref 0–0.9)
MONOCYTES NFR BLD AUTO: 11 % — SIGNIFICANT CHANGE UP (ref 2–14)
NEUTROPHILS # BLD AUTO: 11.98 K/UL — HIGH (ref 1.8–7.4)
NEUTROPHILS NFR BLD AUTO: 77.3 % — HIGH (ref 43–77)
NITRITE UR-MCNC: NEGATIVE — SIGNIFICANT CHANGE UP
NRBC # FLD: 0 K/UL — SIGNIFICANT CHANGE UP (ref 0–0)
PCO2 BLDV: 51 MMHG — SIGNIFICANT CHANGE UP (ref 41–51)
PH BLDV: 7.4 PH — SIGNIFICANT CHANGE UP (ref 7.32–7.43)
PH UR: 7.5 — SIGNIFICANT CHANGE UP (ref 5–8)
PLATELET # BLD AUTO: 433 K/UL — HIGH (ref 150–400)
PMV BLD: 9.6 FL — SIGNIFICANT CHANGE UP (ref 7–13)
PO2 BLDV: 25 MMHG — LOW (ref 35–40)
POTASSIUM BLDV-SCNC: 4 MMOL/L — SIGNIFICANT CHANGE UP (ref 3.4–4.5)
POTASSIUM SERPL-MCNC: 4.3 MMOL/L — SIGNIFICANT CHANGE UP (ref 3.5–5.3)
POTASSIUM SERPL-SCNC: 4.3 MMOL/L — SIGNIFICANT CHANGE UP (ref 3.5–5.3)
PROT SERPL-MCNC: 8.1 G/DL — SIGNIFICANT CHANGE UP (ref 6–8.3)
PROT UR-MCNC: 300 — HIGH
RBC # BLD: 2.97 M/UL — LOW (ref 3.8–5.2)
RBC # FLD: 15.4 % — HIGH (ref 10.3–14.5)
RBC CASTS # UR COMP ASSIST: >50 — HIGH (ref 0–?)
SAO2 % BLDV: 32.5 % — LOW (ref 60–85)
SODIUM SERPL-SCNC: 132 MMOL/L — LOW (ref 135–145)
SP GR SPEC: 1.01 — SIGNIFICANT CHANGE UP (ref 1–1.04)
SQUAMOUS # UR AUTO: SIGNIFICANT CHANGE UP
UROBILINOGEN FLD QL: NORMAL — SIGNIFICANT CHANGE UP
WBC # BLD: 15.51 K/UL — HIGH (ref 3.8–10.5)
WBC # FLD AUTO: 15.51 K/UL — HIGH (ref 3.8–10.5)
WBC UR QL: >50 — HIGH (ref 0–?)

## 2019-09-06 PROCEDURE — 71045 X-RAY EXAM CHEST 1 VIEW: CPT | Mod: 26

## 2019-09-06 PROCEDURE — 74177 CT ABD & PELVIS W/CONTRAST: CPT | Mod: 26

## 2019-09-06 PROCEDURE — 99223 1ST HOSP IP/OBS HIGH 75: CPT

## 2019-09-06 RX ORDER — METOPROLOL TARTRATE 50 MG
1 TABLET ORAL
Qty: 0 | Refills: 0 | DISCHARGE

## 2019-09-06 RX ORDER — ASCORBIC ACID 60 MG
1 TABLET,CHEWABLE ORAL
Qty: 0 | Refills: 0 | DISCHARGE

## 2019-09-06 RX ORDER — NYSTATIN CREAM 100000 [USP'U]/G
1 CREAM TOPICAL
Qty: 0 | Refills: 0 | DISCHARGE

## 2019-09-06 RX ORDER — SEVELAMER CARBONATE 2400 MG/1
1600 POWDER, FOR SUSPENSION ORAL THREE TIMES A DAY
Refills: 0 | Status: DISCONTINUED | OUTPATIENT
Start: 2019-09-06 | End: 2019-09-12

## 2019-09-06 RX ORDER — HEPARIN SODIUM 5000 [USP'U]/ML
5000 INJECTION INTRAVENOUS; SUBCUTANEOUS THREE TIMES A DAY
Refills: 0 | Status: DISCONTINUED | OUTPATIENT
Start: 2019-09-06 | End: 2019-09-12

## 2019-09-06 RX ORDER — HYDRALAZINE HCL 50 MG
100 TABLET ORAL THREE TIMES A DAY
Refills: 0 | Status: DISCONTINUED | OUTPATIENT
Start: 2019-09-06 | End: 2019-09-12

## 2019-09-06 RX ORDER — BENZOYL PEROXIDE MICRONIZED 5.8 %
1 TOWELETTE (EA) TOPICAL
Qty: 0 | Refills: 0 | DISCHARGE

## 2019-09-06 RX ORDER — FENTANYL CITRATE 50 UG/ML
1 INJECTION INTRAVENOUS
Refills: 0 | Status: DISCONTINUED | OUTPATIENT
Start: 2019-09-06 | End: 2019-09-10

## 2019-09-06 RX ORDER — CEFTRIAXONE 500 MG/1
1000 INJECTION, POWDER, FOR SOLUTION INTRAMUSCULAR; INTRAVENOUS ONCE
Refills: 0 | Status: COMPLETED | OUTPATIENT
Start: 2019-09-06 | End: 2019-09-06

## 2019-09-06 RX ORDER — MENTHOL AND METHYL SALICYLATE 10; 30 G/100G; G/100G
0 CREAM TOPICAL
Qty: 0 | Refills: 0 | DISCHARGE

## 2019-09-06 RX ORDER — PANTOPRAZOLE SODIUM 20 MG/1
1 TABLET, DELAYED RELEASE ORAL
Qty: 0 | Refills: 0 | DISCHARGE

## 2019-09-06 RX ORDER — CEFTRIAXONE 500 MG/1
2 INJECTION, POWDER, FOR SOLUTION INTRAMUSCULAR; INTRAVENOUS ONCE
Refills: 0 | Status: DISCONTINUED | OUTPATIENT
Start: 2019-09-06 | End: 2019-09-06

## 2019-09-06 RX ORDER — FERROUS SULFATE 325(65) MG
0 TABLET ORAL
Qty: 0 | Refills: 0 | DISCHARGE

## 2019-09-06 RX ORDER — SENNA PLUS 8.6 MG/1
1 TABLET ORAL
Qty: 0 | Refills: 0 | DISCHARGE

## 2019-09-06 RX ORDER — NIFEDIPINE 30 MG
1 TABLET, EXTENDED RELEASE 24 HR ORAL
Qty: 0 | Refills: 0 | DISCHARGE

## 2019-09-06 RX ORDER — ZINC GLUCONATE 30 MG
0 TABLET ORAL
Qty: 0 | Refills: 0 | DISCHARGE

## 2019-09-06 RX ORDER — METOPROLOL TARTRATE 50 MG
75 TABLET ORAL EVERY 12 HOURS
Refills: 0 | Status: DISCONTINUED | OUTPATIENT
Start: 2019-09-06 | End: 2019-09-06

## 2019-09-06 RX ORDER — GABAPENTIN 400 MG/1
1 CAPSULE ORAL
Qty: 0 | Refills: 0 | DISCHARGE

## 2019-09-06 RX ORDER — ACETAMINOPHEN 500 MG
2 TABLET ORAL
Qty: 0 | Refills: 0 | DISCHARGE

## 2019-09-06 RX ORDER — HEPARIN SODIUM 5000 [USP'U]/ML
5000 INJECTION INTRAVENOUS; SUBCUTANEOUS THREE TIMES A DAY
Refills: 0 | Status: DISCONTINUED | OUTPATIENT
Start: 2019-09-06 | End: 2019-09-06

## 2019-09-06 RX ORDER — CHLORHEXIDINE GLUCONATE 213 G/1000ML
1 SOLUTION TOPICAL
Refills: 0 | Status: DISCONTINUED | OUTPATIENT
Start: 2019-09-06 | End: 2019-09-12

## 2019-09-06 RX ORDER — SIMVASTATIN 20 MG/1
20 TABLET, FILM COATED ORAL AT BEDTIME
Refills: 0 | Status: DISCONTINUED | OUTPATIENT
Start: 2019-09-06 | End: 2019-09-12

## 2019-09-06 RX ORDER — GABAPENTIN 400 MG/1
100 CAPSULE ORAL THREE TIMES A DAY
Refills: 0 | Status: DISCONTINUED | OUTPATIENT
Start: 2019-09-06 | End: 2019-09-12

## 2019-09-06 RX ORDER — METOPROLOL TARTRATE 50 MG
25 TABLET ORAL ONCE
Refills: 0 | Status: COMPLETED | OUTPATIENT
Start: 2019-09-06 | End: 2019-09-06

## 2019-09-06 RX ORDER — ACETAMINOPHEN 500 MG
650 TABLET ORAL EVERY 6 HOURS
Refills: 0 | Status: DISCONTINUED | OUTPATIENT
Start: 2019-09-06 | End: 2019-09-12

## 2019-09-06 RX ORDER — DORZOLAMIDE HYDROCHLORIDE TIMOLOL MALEATE 20; 5 MG/ML; MG/ML
1 SOLUTION/ DROPS OPHTHALMIC
Refills: 0 | Status: DISCONTINUED | OUTPATIENT
Start: 2019-09-06 | End: 2019-09-12

## 2019-09-06 RX ORDER — OMEPRAZOLE 10 MG/1
1 CAPSULE, DELAYED RELEASE ORAL
Qty: 0 | Refills: 0 | DISCHARGE

## 2019-09-06 RX ORDER — HYDRALAZINE/HYDROCHLOROTHIAZID 50 MG-50MG
0 CAPSULE ORAL
Qty: 0 | Refills: 0 | DISCHARGE

## 2019-09-06 RX ORDER — METOPROLOL TARTRATE 50 MG
75 TABLET ORAL
Refills: 0 | Status: DISCONTINUED | OUTPATIENT
Start: 2019-09-06 | End: 2019-09-12

## 2019-09-06 RX ORDER — SEVELAMER CARBONATE 2400 MG/1
1 POWDER, FOR SUSPENSION ORAL
Qty: 0 | Refills: 0 | DISCHARGE

## 2019-09-06 RX ORDER — INFLUENZA VIRUS VACCINE 15; 15; 15; 15 UG/.5ML; UG/.5ML; UG/.5ML; UG/.5ML
0.5 SUSPENSION INTRAMUSCULAR ONCE
Refills: 0 | Status: DISCONTINUED | OUTPATIENT
Start: 2019-09-06 | End: 2019-09-12

## 2019-09-06 RX ORDER — NIFEDIPINE 30 MG
60 TABLET, EXTENDED RELEASE 24 HR ORAL DAILY
Refills: 0 | Status: DISCONTINUED | OUTPATIENT
Start: 2019-09-06 | End: 2019-09-12

## 2019-09-06 RX ORDER — PANTOPRAZOLE SODIUM 20 MG/1
40 TABLET, DELAYED RELEASE ORAL
Refills: 0 | Status: DISCONTINUED | OUTPATIENT
Start: 2019-09-06 | End: 2019-09-12

## 2019-09-06 RX ORDER — DOCUSATE SODIUM 100 MG
1 CAPSULE ORAL
Qty: 0 | Refills: 0 | DISCHARGE

## 2019-09-06 RX ORDER — LEVOTHYROXINE SODIUM 125 MCG
1 TABLET ORAL
Qty: 0 | Refills: 0 | DISCHARGE

## 2019-09-06 RX ORDER — ERYTHROPOIETIN 10000 [IU]/ML
14000 INJECTION, SOLUTION INTRAVENOUS; SUBCUTANEOUS
Qty: 0 | Refills: 0 | DISCHARGE

## 2019-09-06 RX ORDER — FENTANYL CITRATE 50 UG/ML
1 INJECTION INTRAVENOUS
Qty: 0 | Refills: 0 | DISCHARGE

## 2019-09-06 RX ORDER — LEVOTHYROXINE SODIUM 125 MCG
50 TABLET ORAL DAILY
Refills: 0 | Status: DISCONTINUED | OUTPATIENT
Start: 2019-09-06 | End: 2019-09-12

## 2019-09-06 RX ORDER — CEFTRIAXONE 500 MG/1
1000 INJECTION, POWDER, FOR SOLUTION INTRAMUSCULAR; INTRAVENOUS ONCE
Refills: 0 | Status: DISCONTINUED | OUTPATIENT
Start: 2019-09-06 | End: 2019-09-06

## 2019-09-06 RX ORDER — ACETAMINOPHEN 500 MG
650 TABLET ORAL ONCE
Refills: 0 | Status: COMPLETED | OUTPATIENT
Start: 2019-09-06 | End: 2019-09-06

## 2019-09-06 RX ORDER — CEFTRIAXONE 500 MG/1
1000 INJECTION, POWDER, FOR SOLUTION INTRAMUSCULAR; INTRAVENOUS EVERY 24 HOURS
Refills: 0 | Status: DISCONTINUED | OUTPATIENT
Start: 2019-09-07 | End: 2019-09-10

## 2019-09-06 RX ORDER — HYDRALAZINE HCL 50 MG
100 TABLET ORAL THREE TIMES A DAY
Refills: 0 | Status: DISCONTINUED | OUTPATIENT
Start: 2019-09-06 | End: 2019-09-06

## 2019-09-06 RX ORDER — SIMVASTATIN 20 MG/1
1 TABLET, FILM COATED ORAL
Qty: 0 | Refills: 0 | DISCHARGE

## 2019-09-06 RX ORDER — CLOPIDOGREL BISULFATE 75 MG/1
75 TABLET, FILM COATED ORAL DAILY
Refills: 0 | Status: DISCONTINUED | OUTPATIENT
Start: 2019-09-06 | End: 2019-09-12

## 2019-09-06 RX ADMIN — CEFTRIAXONE 1000 MILLIGRAM(S): 500 INJECTION, POWDER, FOR SOLUTION INTRAMUSCULAR; INTRAVENOUS at 07:27

## 2019-09-06 RX ADMIN — Medication 650 MILLIGRAM(S): at 23:55

## 2019-09-06 RX ADMIN — Medication 650 MILLIGRAM(S): at 11:38

## 2019-09-06 RX ADMIN — CEFTRIAXONE 100 MILLIGRAM(S): 500 INJECTION, POWDER, FOR SOLUTION INTRAMUSCULAR; INTRAVENOUS at 07:06

## 2019-09-06 RX ADMIN — CEFTRIAXONE 100 MILLIGRAM(S): 500 INJECTION, POWDER, FOR SOLUTION INTRAMUSCULAR; INTRAVENOUS at 07:25

## 2019-09-06 NOTE — ED ADULT NURSE NOTE - CHPI ED NUR SYMPTOMS NEG
no tingling/no nausea/no vomiting/no chills/no dizziness/no weakness/no decreased eating/drinking/no pain

## 2019-09-06 NOTE — H&P ADULT - PROBLEM SELECTOR PLAN 1
-patient with leukocytosis of 22 on outside labwork  -Currently leukocytosis of 15 with neutrophil predominance  -per chart review, appears like patient chronically has elevated WBC levels  -For now sources of infection include acute cystits vs infected sacral decub ulcer  -Monitor for fevers

## 2019-09-06 NOTE — CONSULT NOTE ADULT - ASSESSMENT
54y Female w ESRD on HD MWF @ [facility] HD unit, DM, HTN referred to ED for leukocytosis from routine labwork, with abd pain.

## 2019-09-06 NOTE — H&P ADULT - PROBLEM SELECTOR PLAN 3
-Differential include UTI vs constipation vs hemorrhagic renal cyst   -Patient is on chronic opioids at NH. On fentanyl patch and oxycodone.   -For now treat UTI, start aggressive bowel regimen, discuss with renal regarding contrast study to further evaluate hemorrhagic cyst -Differential include UTI vs constipation vs hemorrhagic renal cyst   -Patient is on chronic opioids at NH. On fentanyl patch and oxycodone.   -For now treat UTI, start aggressive bowel regimen,  -Check CT abd with contrast to further evaluate hemorrhagic cyst - discussed with renal and ok to give contrast

## 2019-09-06 NOTE — ED PROVIDER NOTE - OBJECTIVE STATEMENT
Pertinent PMH/PSH/FHx/SHx and Review of Systems contained within:  55yo F, pmh of htn, hld, hypothyroidism, constipation, CAD, ESRD on HD since last yr (MWF), last dialized on Wednesday, call by dialysis center last night and told to come to ED for abnormal lab value (WBC of 22.5 from labs drawn on Wednesday). Pt reports mild lower abdominal discomfort X few days which feels like her typical constipation pain (last BM was on Monday but is passing gas and denies abd surgery hx) Pertinent PMH/PSH/FHx/SHx and Review of Systems contained within:  53yo F, pmh of htn, hld, hypothyroidism, constipation, CAD, bedbound, ESRD on HD since last yr (MWF, still produces urine), last dialized on Wednesday, called by dialysis center last night and told to come to ED for abnormal lab value (WBC of 22.5 from labs drawn on Wednesday). Pt reports mild lower abdominal discomfort X few days which feels like her typical constipation pain (last BM was on Monday but is passing gas and denies abd surgery hx) but denies any other symptoms. Specifically, pt reports No fever/chills, No photophobia/eye pain/changes in vision, No ear pain/sore throat/dysphagia, No chest pain/palpitations, no SOB/cough/wheeze/stridor, No N/V/D, no dysuria/frequency/discharge, No neck/back pain, no rash, no new focal neuro symptoms.

## 2019-09-06 NOTE — H&P ADULT - NSICDXFAMILYHX_GEN_ALL_CORE_FT
FAMILY HISTORY:  Family history of diabetes mellitus  Family history of stroke    Sibling  Still living? Unknown  Family history of hypertension, Age at diagnosis: Age Unknown

## 2019-09-06 NOTE — CONSULT NOTE ADULT - SUBJECTIVE AND OBJECTIVE BOX
[facility] Consult Note Nephrology - CONSULTATION NOTE - [phone] - Dr Valles / Dr Mahoney / Dr Syed / Dr Lamas / Dr Lozano / Dr Castaneda / Dr Monique / Dr Escoto    Patient is a 54y Female w ESRD on HD MWF @ [facility] HD unit, DM, HTN referred to ED for leukocytosis from routine labwork. Pt had labwork drawn from HD unit with leukocytosis >20k. She only complained of lower abd pain. Pain is 7/10 is intermittent, and currently much better at time of exam. Otherwise, denies N/V/F/C. Appetite at baseline. She has ongoing issue with constipation, and abd pain feels similar to prior constipation pain. Her last BM was 4 days ago. She was last dialyzed on , full treatment without issue.     PAST MEDICAL & SURGICAL HISTORY:  Hypothyroidism  HLD (hyperlipidemia)  CAD in native artery  Diabetes mellitus  HTN (hypertension)  Elective surgery: Dialysis catheter in left arm  Allergic rhinitis  Hypocalcemia  Enterocolitis  Osteomyelitis  Bacteremia  Pressure ulcer: Sacral region  Tremor  Sepsis  Sepsis  Respiratory failure: with hypoxia or hypercapnia  End stage kidney disease  Pulmonary edema: chronic  Hyperlipidemia  Polyneuropathy  ASHD (arteriosclerotic heart disease)  Malformation of coronary vessels  Hypo-osmolality and hyponatremia  Difficulty waking  GERD (gastroesophageal reflux disease)  ESRD (end stage renal disease) on dialysis  Clostridium difficile infection  Osteomyelitis of jaw  Parathyroid adenoma  Hypothyroidism  CKD (chronic kidney disease)  Anemia  CHF (congestive heart failure)  Diabetic neuropathy  Diabetes mellitus  HTN (hypertension)  Arteriovenous fistula  S/P :   No Past Surgical History    Allergies:  No Known Allergies    Home Medications Reviewed  Hospital Medications:   MEDICATIONS  (STANDING):    SOCIAL HISTORY:  Denies ETOh,Smoking,   FAMILY HISTORY:  Family history of stroke  Family history of hypertension (Sibling)  Family history of diabetes mellitus    REVIEW OF SYSTEMS:  CONSTITUTIONAL: No weakness, fevers or chills  EYES/ENT: No visual changes;  No vertigo or throat pain   NECK: No pain or stiffness  RESPIRATORY: No cough, wheezing, hemoptysis; No shortness of breath  CARDIOVASCULAR: No chest pain or palpitations.  GASTROINTESTINAL: Lower abdominal pain. No nausea, vomiting, or hematemesis; No diarrhea or constipation. No melena or hematochezia.  GENITOURINARY: No dysuria, frequency, foamy urine, urinary urgency, incontinence or hematuria  NEUROLOGICAL: No numbness or weakness  SKIN: No itching, burning, rashes, or lesions   VASCULAR: No bilateral lower extremity edema.   All other review of systems is negative unless indicated above.    VITALS:  T(F): 98.1 (19 @ 13:49), Max: 99.9 (19 @ 07:06)  HR: 68 (19 @ 13:49)  BP: 127/57 (19 @ 13:49)  RR: 17 (19 @ 13:49)  SpO2: 100% (19 @ 13:49)  Wt(kg): --      PHYSICAL EXAM:  Constitutional: NAD  HEENT: anicteric sclera, oropharynx clear, MMM  Neck: No JVD  Respiratory: CTAB, no wheezes, rales or rhonchi  Cardiovascular: S1, S2, RRR  Gastrointestinal: BS+, soft, NT/ND  Extremities: No cyanosis or clubbing. No peripheral edema  Neurological: A/O x 3, no focal deficits  Psychiatric: Normal mood, normal affect  : No CVA tenderness. No jarrell.   Skin: No rashes  Vascular Access: UE AV access +thrill     LABS:      132<L>  |  86<L>  |  69<H>  ----------------------------<  217<H>  4.3   |  29  |  4.40<H>    Ca    9.9      06 Sep 2019 04:50    TPro  8.1  /  Alb  3.9  /  TBili  0.3  /  DBili      /  AST  7   /  ALT  8   /  AlkPhos  161<H>      Creatinine Trend: 4.40 <--                        8.7    15.51 )-----------( 433      ( 06 Sep 2019 04:50 )             27.6     Urine Studies:  Urinalysis Basic - ( 06 Sep 2019 06:19 )    Color: YELLOW / Appearance: TURBID / S.014 / pH: 7.5  Gluc: 100 / Ketone: NEGATIVE  / Bili: NEGATIVE / Urobili: NORMAL   Blood: MODERATE / Protein: 300 / Nitrite: NEGATIVE   Leuk Esterase: LARGE / RBC: >50 / WBC >50   Sq Epi: MANY / Non Sq Epi:  / Bacteria: NEGATIVE        RADIOLOGY & ADDITIONAL STUDIES:    < from: CT Abdomen and Pelvis w/ IV Cont (19 @ 10:41) >  IMPRESSION:     A solitary pelvic kidney with multiple complex septated cystic lesions   with intracystic solid components versus hemorrhage, concerning for   neoplasm. Further evaluation may be obtained with contrastenhanced renal   protocol CT or MR.    Underdistended urinary bladder with moderate wall edema and ureteral   enhancement, consistent with provided history of urinary tract infection.    Mild rectal wall edema versus underdistention. Correlate clinically for   proctitis.    Scattered subcentimeter hypodensities throughout the liver are   indeterminate.    A 4.2 cm right adnexal cystic lesion, indeterminate. Recommend further   evaluation with ultrasound and/or contrast enhanced MR.    < end of copied text >

## 2019-09-06 NOTE — H&P ADULT - HISTORY OF PRESENT ILLNESS
53 y/o female PMH of HTN, HLD, hypothyroidism, constipation, CAD, bedbound, ESRD on HD since last yr (MWF, still produces urine once a day), last dialyzed on Wednesday, called by dialysis center last night and told to come to ED for abnormal lab value (WBC of 22.5 from labs drawn on Wednesday). Pt reports mild lower abdominal discomfort X few days which feels like her typical constipation pain (last BM was on Monday but is passing gas and denies abd surgery hx) but denies any other symptoms. Specifically, pt reports no fever/chills, nausea, vomiting, dysuria. Patient states that she feels at her usual state of health. No chest pain/palpitations, no SOB/cough/wheeze/stridor. She has known sacral decub ulcer and patient denies having malodorous secretions from wound. Patient states that she was last admitted in a hospital last year. She has been at her NH for last 7 years.     Vital Signs Last 24 Hrs  T(C): 37.1 (06 Sep 2019 17:41), Max: 37.7 (06 Sep 2019 07:06)  T(F): 98.8 (06 Sep 2019 17:41), Max: 99.9 (06 Sep 2019 07:06)  HR: 75 (06 Sep 2019 17:41) (68 - 80)  BP: 139/63 (06 Sep 2019 17:41) (127/57 - 196/65)  BP(mean): --  RR: 14 (06 Sep 2019 17:41) (14 - 19)  SpO2: 100% (06 Sep 2019 17:41) (93% - 100%)

## 2019-09-06 NOTE — ED PROVIDER NOTE - PHYSICAL EXAMINATION
Gen: Alert, NAD, chronically ill-appearing  Head: NC, AT,  EOMI, normal lids/conjunctiva  ENT:  normal hearing, patent oropharynx without erythema/exudate  Neck: +supple, no tenderness/meningismus/JVD, +Trachea midline  Chest: no chest wall tenderness, equal chest rise  Pulm: Bilateral BS diminished, crackles in post bases  CV: RRR, no M/R/G, +dist pulses  Abd: +BS, soft, ND, mild ttp across lower abdomen  Rectal: deferred  Mskel: no edema/erythema/cyanosis  Skin: stage 3-4 packed sacral decubitus, not ttp  Neuro: AAOx3, moves all extremities

## 2019-09-06 NOTE — ED ADULT NURSE REASSESSMENT NOTE - NS ED NURSE REASSESS COMMENT FT1
Pt awaiting CT scan results. VS as stated on flow sheet. MD made aware of BP. 02 sat 92% on room air, placed on 2L NC. 02 sat 98% on nasal cannula. Pt states she is due for dialysis today. Stage 4 pressure ulcer 1in x1in noted to sacrum with tunneling. Mepilex dressing applied. Comfort measures provided. Pt turned and positioned. Awaiting further orders. Will monitor.

## 2019-09-06 NOTE — CONSULT NOTE ADULT - PROBLEM SELECTOR RECOMMENDATION 9
Maintenance HD schedule MWF.   Last dialyzed 9/4.  Electrolytes and volume status acceptable.   Will plan for routine HD today.  UF goal 2kg, as BP tolerates.   Consent obtained from pt.  F/U UCx and Bcx

## 2019-09-06 NOTE — H&P ADULT - RS GEN PE MLT RESP DETAILS PC
airway patent/breath sounds equal/normal/good air movement/respirations non-labored/decreased BS at bases

## 2019-09-06 NOTE — ED PROVIDER NOTE - PROGRESS NOTE DETAILS
updated patient on results. has lower abdominal pain so well get CT scan abdomen and pelvis and then admit. - resident Zeb Crain Morales PGY3: Pt reassessed since sign out and has been comfortable, went to see patient again and she was tearful, she was sent back from CT as they wanted a upreg, she is 13 years post menopausal, recalled CT to expedite imaging, pt borderline fever so given tylenol though no significant ttp on exam Morales PGY3: cystic lesions on the kidneys, cannot exclude cystic neoplasms vs abscesses or cysts; but definitely some concerns for ascending infection. Morales PGY3: pt in stable condition, well appearing, tolerating po, paged Dr Gomez multiple times since 13:16 at office,  no answer now at the office and no answering service option, left message but will admit to hospitalist if there is no response, renal consult in place Andrew PGY3: spoke to dr ramos who admitted pt in 2014; accepts pts for Dr monteiro

## 2019-09-06 NOTE — ED PROVIDER NOTE - CLINICAL SUMMARY MEDICAL DECISION MAKING FREE TEXT BOX
53yo F pmh as above here for abnormal lab value (leukocytosis of 22.4 from Wednesday). Ddx includes UTI (pt still produces urine) vs sacral wound infxn (will send culture) vs pna (less likely as pt denies cough) vs SBP (less likely) vs other intraabdominal infection. labs/ua/imaging.

## 2019-09-06 NOTE — ED ADULT NURSE NOTE - NSIMPLEMENTINTERV_GEN_ALL_ED
Implemented All Fall Risk Interventions:  Scott to call system. Call bell, personal items and telephone within reach. Instruct patient to call for assistance. Room bathroom lighting operational. Non-slip footwear when patient is off stretcher. Physically safe environment: no spills, clutter or unnecessary equipment. Stretcher in lowest position, wheels locked, appropriate side rails in place. Provide visual cue, wrist band, yellow gown, etc. Monitor gait and stability. Monitor for mental status changes and reorient to person, place, and time. Review medications for side effects contributing to fall risk. Reinforce activity limits and safety measures with patient and family.

## 2019-09-06 NOTE — ED PROVIDER NOTE - ATTENDING CONTRIBUTION TO CARE
History and physical above obtained and documented (or dictated) by me (attending physician).  Resident involved in case for assistance with disposition and may document involvement as necessary via progress note(s).   -Dr. Peralta

## 2019-09-06 NOTE — H&P ADULT - NSICDXPASTMEDICALHX_GEN_ALL_CORE_FT
PAST MEDICAL HISTORY:  Allergic rhinitis     Anemia     ASHD (arteriosclerotic heart disease)     Bacteremia     CAD in native artery     CHF (congestive heart failure)     CKD (chronic kidney disease)     Clostridium difficile infection     Diabetes mellitus     Diabetes mellitus     Diabetic neuropathy     Difficulty waking     Elective surgery Dialysis catheter in left arm    End stage kidney disease     Enterocolitis     ESRD (end stage renal disease) on dialysis     GERD (gastroesophageal reflux disease)     HLD (hyperlipidemia)     HTN (hypertension)     HTN (hypertension)     Hyperlipidemia     Hypo-osmolality and hyponatremia     Hypocalcemia     Hypothyroidism     Hypothyroidism     Malformation of coronary vessels     Osteomyelitis     Osteomyelitis of jaw     Parathyroid adenoma     Polyneuropathy     Pressure ulcer Sacral region    Pulmonary edema chronic    Respiratory failure with hypoxia or hypercapnia    Sepsis     Sepsis     Tremor

## 2019-09-06 NOTE — ED ADULT NURSE NOTE - OBJECTIVE STATEMENT
received to room 23. states was called by dialysis center and told to come in for elevated WBC. lives at [facility]. received dialysis yesterday (MWF). left arm av fistulas with +thrill. denies any complaints. awaits md rizo in no distress.

## 2019-09-06 NOTE — PATIENT PROFILE ADULT - SAFE PLACE TO LIVE
CARDIOVASCULAR - ADULT     Maintains optimal cardiac output and hemodynamic stability Progressing        DISCHARGE PLANNING     Discharge to home or other facility with appropriate resources Progressing        Knowledge Deficit     Patient/family/caregiver demonstrates understanding of disease process, treatment plan, medications, and discharge instructions Progressing        PAIN - ADULT     Verbalizes/displays adequate comfort level or baseline comfort level Progressing        SAFETY ADULT     Patient will remain free of falls Progressing     Maintain or return to baseline ADL function Progressing     Maintain or return mobility status to optimal level Progressing no

## 2019-09-06 NOTE — H&P ADULT - ASSESSMENT
55 y/o female PMH of HTN, HLD, hypothyroidism, constipation, CAD, bedbound, ESRD on HD sent in for leukocytosis admitted for infectious workup

## 2019-09-07 LAB
ANION GAP SERPL CALC-SCNC: 15 MMO/L — HIGH (ref 7–14)
BACTERIA UR CULT: SIGNIFICANT CHANGE UP
BUN SERPL-MCNC: 29 MG/DL — HIGH (ref 7–23)
CALCIUM SERPL-MCNC: 10.2 MG/DL — SIGNIFICANT CHANGE UP (ref 8.4–10.5)
CHLORIDE SERPL-SCNC: 91 MMOL/L — LOW (ref 98–107)
CO2 SERPL-SCNC: 30 MMOL/L — SIGNIFICANT CHANGE UP (ref 22–31)
CREAT SERPL-MCNC: 2.71 MG/DL — HIGH (ref 0.5–1.3)
GLUCOSE SERPL-MCNC: 100 MG/DL — HIGH (ref 70–99)
HBA1C BLD-MCNC: 5.7 % — HIGH (ref 4–5.6)
HBV CORE AB SER-ACNC: NONREACTIVE — SIGNIFICANT CHANGE UP
HBV SURFACE AB SER-ACNC: 462.9 MLU/ML — SIGNIFICANT CHANGE UP
HCT VFR BLD CALC: 27.7 % — LOW (ref 34.5–45)
HCV AB S/CO SERPL IA: 0.21 S/CO — SIGNIFICANT CHANGE UP (ref 0–0.99)
HCV AB S/CO SERPL IA: 0.24 S/CO — SIGNIFICANT CHANGE UP (ref 0–0.99)
HCV AB SERPL-IMP: SIGNIFICANT CHANGE UP
HCV AB SERPL-IMP: SIGNIFICANT CHANGE UP
HGB BLD-MCNC: 8.4 G/DL — LOW (ref 11.5–15.5)
MAGNESIUM SERPL-MCNC: 2.3 MG/DL — SIGNIFICANT CHANGE UP (ref 1.6–2.6)
MCHC RBC-ENTMCNC: 28.8 PG — SIGNIFICANT CHANGE UP (ref 27–34)
MCHC RBC-ENTMCNC: 30.3 % — LOW (ref 32–36)
MCV RBC AUTO: 94.9 FL — SIGNIFICANT CHANGE UP (ref 80–100)
NRBC # FLD: 0 K/UL — SIGNIFICANT CHANGE UP (ref 0–0)
PHOSPHATE SERPL-MCNC: 2.9 MG/DL — SIGNIFICANT CHANGE UP (ref 2.5–4.5)
PLATELET # BLD AUTO: 429 K/UL — HIGH (ref 150–400)
PMV BLD: 10 FL — SIGNIFICANT CHANGE UP (ref 7–13)
POTASSIUM SERPL-MCNC: 3.7 MMOL/L — SIGNIFICANT CHANGE UP (ref 3.5–5.3)
POTASSIUM SERPL-SCNC: 3.7 MMOL/L — SIGNIFICANT CHANGE UP (ref 3.5–5.3)
RBC # BLD: 2.92 M/UL — LOW (ref 3.8–5.2)
RBC # FLD: 15.2 % — HIGH (ref 10.3–14.5)
SODIUM SERPL-SCNC: 136 MMOL/L — SIGNIFICANT CHANGE UP (ref 135–145)
SPECIMEN SOURCE: SIGNIFICANT CHANGE UP
TSH SERPL-MCNC: 1.23 UIU/ML — SIGNIFICANT CHANGE UP (ref 0.27–4.2)
WBC # BLD: 9.23 K/UL — SIGNIFICANT CHANGE UP (ref 3.8–10.5)
WBC # FLD AUTO: 9.23 K/UL — SIGNIFICANT CHANGE UP (ref 3.8–10.5)

## 2019-09-07 RX ORDER — ERYTHROPOIETIN 10000 [IU]/ML
10000 INJECTION, SOLUTION INTRAVENOUS; SUBCUTANEOUS
Refills: 0 | Status: DISCONTINUED | OUTPATIENT
Start: 2019-09-07 | End: 2019-09-12

## 2019-09-07 RX ADMIN — Medication 60 MILLIGRAM(S): at 06:19

## 2019-09-07 RX ADMIN — Medication 75 MILLIGRAM(S): at 17:53

## 2019-09-07 RX ADMIN — Medication 100 MILLIGRAM(S): at 21:45

## 2019-09-07 RX ADMIN — Medication 1 ENEMA: at 11:21

## 2019-09-07 RX ADMIN — SEVELAMER CARBONATE 1600 MILLIGRAM(S): 2400 POWDER, FOR SUSPENSION ORAL at 13:06

## 2019-09-07 RX ADMIN — HEPARIN SODIUM 5000 UNIT(S): 5000 INJECTION INTRAVENOUS; SUBCUTANEOUS at 06:19

## 2019-09-07 RX ADMIN — PANTOPRAZOLE SODIUM 40 MILLIGRAM(S): 20 TABLET, DELAYED RELEASE ORAL at 06:19

## 2019-09-07 RX ADMIN — HEPARIN SODIUM 5000 UNIT(S): 5000 INJECTION INTRAVENOUS; SUBCUTANEOUS at 13:05

## 2019-09-07 RX ADMIN — FENTANYL CITRATE 1 PATCH: 50 INJECTION INTRAVENOUS at 18:11

## 2019-09-07 RX ADMIN — CHLORHEXIDINE GLUCONATE 1 APPLICATION(S): 213 SOLUTION TOPICAL at 11:21

## 2019-09-07 RX ADMIN — FENTANYL CITRATE 1 PATCH: 50 INJECTION INTRAVENOUS at 07:25

## 2019-09-07 RX ADMIN — CLOPIDOGREL BISULFATE 75 MILLIGRAM(S): 75 TABLET, FILM COATED ORAL at 11:21

## 2019-09-07 RX ADMIN — SIMVASTATIN 20 MILLIGRAM(S): 20 TABLET, FILM COATED ORAL at 01:56

## 2019-09-07 RX ADMIN — HEPARIN SODIUM 5000 UNIT(S): 5000 INJECTION INTRAVENOUS; SUBCUTANEOUS at 01:57

## 2019-09-07 RX ADMIN — SEVELAMER CARBONATE 1600 MILLIGRAM(S): 2400 POWDER, FOR SUSPENSION ORAL at 21:45

## 2019-09-07 RX ADMIN — GABAPENTIN 100 MILLIGRAM(S): 400 CAPSULE ORAL at 21:45

## 2019-09-07 RX ADMIN — DORZOLAMIDE HYDROCHLORIDE TIMOLOL MALEATE 1 DROP(S): 20; 5 SOLUTION/ DROPS OPHTHALMIC at 17:59

## 2019-09-07 RX ADMIN — SIMVASTATIN 20 MILLIGRAM(S): 20 TABLET, FILM COATED ORAL at 21:45

## 2019-09-07 RX ADMIN — Medication 650 MILLIGRAM(S): at 00:30

## 2019-09-07 RX ADMIN — Medication 100 MILLIGRAM(S): at 13:05

## 2019-09-07 RX ADMIN — GABAPENTIN 100 MILLIGRAM(S): 400 CAPSULE ORAL at 06:19

## 2019-09-07 RX ADMIN — HEPARIN SODIUM 5000 UNIT(S): 5000 INJECTION INTRAVENOUS; SUBCUTANEOUS at 21:45

## 2019-09-07 RX ADMIN — Medication 50 MICROGRAM(S): at 06:19

## 2019-09-07 RX ADMIN — Medication 1 TABLET(S): at 11:21

## 2019-09-07 RX ADMIN — GABAPENTIN 100 MILLIGRAM(S): 400 CAPSULE ORAL at 13:05

## 2019-09-07 RX ADMIN — GABAPENTIN 100 MILLIGRAM(S): 400 CAPSULE ORAL at 01:56

## 2019-09-07 RX ADMIN — Medication 100 MILLIGRAM(S): at 06:19

## 2019-09-07 RX ADMIN — Medication 100 MILLIGRAM(S): at 02:04

## 2019-09-07 RX ADMIN — CEFTRIAXONE 100 MILLIGRAM(S): 500 INJECTION, POWDER, FOR SOLUTION INTRAMUSCULAR; INTRAVENOUS at 01:56

## 2019-09-07 RX ADMIN — Medication 75 MILLIGRAM(S): at 06:19

## 2019-09-07 NOTE — PROGRESS NOTE ADULT - PROBLEM SELECTOR PLAN 1
patient is schedule for meniscal cyst removal knee, by dr luque 8/2  Please advise time for physical no routine opening available  And any orders   Tolerating HD well, due for HD on Monday  Elevated Calcium level, low calcium bath on HD  Daily BMP  Dose meds for ESRD  Renal diet

## 2019-09-07 NOTE — PROGRESS NOTE ADULT - ASSESSMENT
54y Female w ESRD on HD MWF @ [facility] HD unit, DM, HTN referred to ED for leukocytosis from routine labwork, with abd pain.

## 2019-09-07 NOTE — CONSULT NOTE ADULT - SUBJECTIVE AND OBJECTIVE BOX
HPI    54 year old woman with HTN, DM, CAD, ESRD on HD admitted for leukocytosis on standard HD labs, CT scan showing solitary pelvic kidney with multiple  complex cystic masses. Patient seen and examined. Reportedly not aware of any renal abnormalities. Patient living in nursing home for past 8 years starting, only following with doctor at the facility. Denies hematuria, dysuria, frequency, urgency, fevers/chills, recent weight loss. Reports progressive weakness in her b/l LEs that has apparently been investigated without clear answer.     PAST MEDICAL & SURGICAL HISTORY:  Hypothyroidism  HLD (hyperlipidemia)  CAD in native artery  Diabetes mellitus  HTN (hypertension)  Elective surgery: Dialysis catheter in left arm  Allergic rhinitis  Hypocalcemia  Enterocolitis  Osteomyelitis  Bacteremia  Pressure ulcer: Sacral region  Tremor  Sepsis  Sepsis  Respiratory failure: with hypoxia or hypercapnia  End stage kidney disease  Pulmonary edema: chronic  Hyperlipidemia  Polyneuropathy  ASHD (arteriosclerotic heart disease)  Malformation of coronary vessels  Hypo-osmolality and hyponatremia  Difficulty waking  GERD (gastroesophageal reflux disease)  ESRD (end stage renal disease) on dialysis  Clostridium difficile infection  Osteomyelitis of jaw  Parathyroid adenoma  Hypothyroidism  CKD (chronic kidney disease)  Anemia  CHF (congestive heart failure)  Diabetic neuropathy  Diabetes mellitus  HTN (hypertension)  Arteriovenous fistula  S/P :   No Past Surgical History      MEDICATIONS  (STANDING):  cefTRIAXone   IVPB 1000 milliGRAM(s) IV Intermittent every 24 hours  chlorhexidine 4% Liquid 1 Application(s) Topical <User Schedule>  clopidogrel Tablet 75 milliGRAM(s) Oral daily  dorzolamide 2%/timolol 0.5% Ophthalmic Solution 1 Drop(s) Both EYES two times a day  epoetin jacqueline Injectable 11787 Unit(s) IV Push <User Schedule>  fentaNYL   Patch  75 MICROgram(s)/Hr 1 Patch Transdermal every 72 hours  gabapentin 100 milliGRAM(s) Oral three times a day  heparin  Injectable 5000 Unit(s) SubCutaneous three times a day  hydrALAZINE 100 milliGRAM(s) Oral three times a day  influenza   Vaccine 0.5 milliLiter(s) IntraMuscular once  levothyroxine 50 MICROGram(s) Oral daily  metoprolol tartrate 75 milliGRAM(s) Oral two times a day  multivitamin 1 Tablet(s) Oral daily  NIFEdipine XL 60 milliGRAM(s) Oral daily  pantoprazole    Tablet 40 milliGRAM(s) Oral before breakfast  sevelamer carbonate 1600 milliGRAM(s) Oral three times a day  simvastatin 20 milliGRAM(s) Oral at bedtime    MEDICATIONS  (PRN):  acetaminophen   Tablet .. 650 milliGRAM(s) Oral every 6 hours PRN Mild Pain (1 - 3), Moderate Pain (4 - 6)      FAMILY HISTORY:  Family history of stroke  Family history of hypertension (Sibling)  Family history of diabetes mellitus      Allergies    No Known Allergies    Intolerances        SOCIAL HISTORY:    REVIEW OF SYSTEMS: Otherwise negative as stated in HPI    Physical Exam  Vital signs  T(C): 36.7 (19 @ 12:58), Max: 37.3 (19 @ 21:30)  HR: 87 (19 @ 12:58)  BP: 136/68 (19 @ 12:58)  SpO2: 100% (19 @ 12:58)  Wt(kg): --    Output    OUT:    Other: 2900 mL  Total OUT: 2900 mL    Total NET: -2500 mL          Gen:  NAD    Pulm:  No respiratory distress  	  CV:  RRR    GI:  reducible umbilical hernia  suprapubic fullness, symmetrically firm on palpation    :  normal     MSK:  moves upper extremities spontaneously  LE sensation intact, b/l muscular atrophy    LABS:       @ 06:40    WBC 9.23  / Hct 27.7  / SCr 2.71      @ 04:50    WBC 15.51 / Hct 27.6  / SCr 4.40         136  |  91<L>  |  29<H>  ----------------------------<  100<H>  3.7   |  30  |  2.71<H>    Ca    10.2      07 Sep 2019 06:40  Phos  2.9       Mg     2.3         TPro  8.1  /  Alb  3.9  /  TBili  0.3  /  DBili  x   /  AST  7   /  ALT  8   /  AlkPhos  161<H>        Urinalysis Basic - ( 06 Sep 2019 06:19 )    Color: YELLOW / Appearance: TURBID / S.014 / pH: 7.5  Gluc: 100 / Ketone: NEGATIVE  / Bili: NEGATIVE / Urobili: NORMAL   Blood: MODERATE / Protein: 300 / Nitrite: NEGATIVE   Leuk Esterase: LARGE / RBC: >50 / WBC >50   Sq Epi: MANY / Non Sq Epi: x / Bacteria: NEGATIVE        RADIOLOGY:  < from: CT Abdomen and Pelvis w/ IV Cont (19 @ 10:41) >    EXAM:  CT ABDOMEN AND PELVIS IC        PROCEDURE DATE:  Sep  6 2019         INTERPRETATION:  CLINICAL INFORMATION: ESRD on hemodialysis with   leukocytosis, UTI and mild lower abdominal discomfort for several days.    COMPARISON: CT abdomen and pelvis 10/23/2014.    PROCEDURE:   CT of the Abdomen and Pelvis was performed without intravenous contrast.   Intravenous contrast: None.  Oral contrast: positive contrast was administered.  Sagittal and coronal reformats were performed.    FINDINGS:    LOWER CHEST: Left basilar subsegmental atelectasis. Cardiomegaly.    LIVER: Scattered subcentimeter hypodensities that are too small to   characterize.  BILE DUCTS: Normal caliber.  GALLBLADDER: Contracted limiting evaluation.  SPLEEN: Within normal limits.  PANCREAS: Within normal limits.  ADRENALS: Pancake adrenal glands.  KIDNEYS/URETERS: A solitary pelvic kidney with multiple complex septated   cystic lesions with hyperattenuating components, concerning for solid   component versus hemorrhage.Largest cystic lesion measures 8 cm (series   2, image 79). Urothelial enhancement of the ureter.    BLADDER: Underdistended with moderate circumferential wall thickening and   mild vesicular inflammatory change, consistent with provided history of   urinary tract infection.  REPRODUCTIVE ORGANS: A 4.2 cm cystic lesion in the right adnexa,   indeterminate. No solid left adnexal mass. Normal uterus.    BOWEL: Mild rectal wall thickening versus underdistention. No bowel   obstruction. A couple loops of nonobstructed bowel in an umbilical   hernia. Appendix is not visualized.  PERITONEUM: Trace ascites in the perihepatic space and bilateral   paracolic gutters. Mild presacral edema.  VESSELS: Dense atherosclerotic change.  RETROPERITONEUM/LYMPH NODES: A couple enhancing soft tissue nodules in   the right retroperitoneum, present given differences in technique since   , possibly reflecting renal agenesis versus stable lymph nodes.  ABDOMINAL WALL: Umbilical hernia, as above. Mild anasarca.  BONES: Degenerative change.    IMPRESSION:     A solitary pelvic kidney with multiple complex septated cystic lesions   with intracystic solid components versus hemorrhage, concerning for   neoplasm. Further evaluation may be obtained with contrastenhanced renal   protocol CT or MR.    Underdistended urinary bladder with moderate wall edema and ureteral   enhancement, consistent with provided history of urinary tract infection.    Mild rectal wall edema versus underdistention. Correlate clinically for   proctitis.    Scattered subcentimeter hypodensities throughout the liver are   indeterminate.    A 4.2 cm right adnexal cystic lesion, indeterminate. Recommend further   evaluation with ultrasound and/or contrast enhanced MR.    Findings were discussed with Dr. Morales 2019 1:17 PM by Dr. Woo with   read back confirmation.                JAMIE NICOLE M.D., RADIOLOGY RESIDENT  This document has been electronically signed.  ALFIE WOO M.D., ATTENDING RADIOLOGIST  This document has been electronically signed. Sep  6 2019  1:34PM                  < end of copied text >

## 2019-09-07 NOTE — CONSULT NOTE ADULT - ASSESSMENT
54 year old female with ESRD and solitary pelvic kidney with multiple complex cysts on CT. Concern for renal malignancy.    - Rec noncontrast MRI with Diffusion Weighted Imaging    Urology  [id number]

## 2019-09-07 NOTE — PROGRESS NOTE ADULT - ASSESSMENT
_________________________________________________________________________________________  ========>>  M E D I C A L   A T T E N D I N G    F O L L O W  U P  N O T E  <<=========  -----------------------------------------------------------------------------------------------------    - Patient seen and examined by me approximately thirty minutes ago.  - In summary,  STEVEN HARVEY is a 54y year old woman who originally presented with abd pain   - Patient today overall doing ok, comfortable, eating OK. abd pain better but has been constipated     ==================>> REVIEW OF SYSTEM <<=================    GEN: no fever, no chills, pain improved   RESP: no SOB, no cough, no sputum  CVS: no chest pain, no palpitations, no edema  GI: abdominal pain as above, no nausea, ++ constipation  : no dysuria reported today , no frequency  Neuro: no headache, no dizziness  Derm : no itching, no rash    ==================>> PHYSICAL EXAM <<=================    GEN: A&O X 3 , NAD , comfortable  HEENT: NCAT, PERRL, MMM, hearing intact  Neck: supple , no JVD  CVS: S1S2 , regular , No M/R/G appreciated  PULM: CTA B/L,  no W/R/R appreciated  ABD.: soft. non tender, non distended,  bowel sounds present  Extrem: intact pulses , no edema   PSYCH : normal mood,  not anxious      ==================>> MEDICATIONS <<====================    MEDICATIONS  (STANDING):  cefTRIAXone   IVPB 1000 milliGRAM(s) IV Intermittent every 24 hours  chlorhexidine 4% Liquid 1 Application(s) Topical <User Schedule>  clopidogrel Tablet 75 milliGRAM(s) Oral daily  dorzolamide 2%/timolol 0.5% Ophthalmic Solution 1 Drop(s) Both EYES two times a day  fentaNYL   Patch  75 MICROgram(s)/Hr 1 Patch Transdermal every 72 hours  gabapentin 100 milliGRAM(s) Oral three times a day  heparin  Injectable 5000 Unit(s) SubCutaneous three times a day  hydrALAZINE 100 milliGRAM(s) Oral three times a day  influenza   Vaccine 0.5 milliLiter(s) IntraMuscular once  levothyroxine 50 MICROGram(s) Oral daily  metoprolol tartrate 75 milliGRAM(s) Oral two times a day  multivitamin 1 Tablet(s) Oral daily  NIFEdipine XL 60 milliGRAM(s) Oral daily  pantoprazole    Tablet 40 milliGRAM(s) Oral before breakfast  sevelamer carbonate 1600 milliGRAM(s) Oral three times a day  simvastatin 20 milliGRAM(s) Oral at bedtime    MEDICATIONS  (PRN):  acetaminophen   Tablet .. 650 milliGRAM(s) Oral every 6 hours PRN Mild Pain (1 - 3), Moderate Pain (4 - 6)      ==================>> VITAL SIGNS <<==================    T(C): 36.9 (19 @ 06:16), Max: 37.3 (19 @ 21:30)  HR: 88 (19 @ 06:16) (68 - 88)  BP: 150/62 (19 @ 06:16) (127/57 - 196/65)  BP(mean): --  RR: 19 (19 @ 06:16) (14 - 20)  SpO2: 100% (19 @ 06:16) (98% - 100%)   CAPILLARY BLOOD GLUCOSE      POCT Blood Glucose.: 155 mg/dL (06 Sep 2019 18:28)  I&O's Summary    06 Sep 2019 07:01  -  07 Sep 2019 07:00  --------------------------------------------------------  IN: 400 mL / OUT: 2900 mL / NET: -2500 mL         ==================>> LAB AND IMAGING <<==================                        8.4    9.23  )-----------( 429      ( 07 Sep 2019 06:40 )             27.7            136  |  91<L>  |  29<H>  ----------------------------<  100<H>  3.7   |  30  |  2.71<H>    Ca    10.2      07 Sep 2019 06:40  Phos  2.9       Mg     2.3         TPro  8.1  /  Alb  3.9  /  TBili  0.3  /  DBili  x   /  AST  7   /  ALT  8   /  AlkPhos  161<H>                       Urinalysis Basic - ( 06 Sep 2019 06:19 )    Color: YELLOW / Appearance: TURBID / S.014 / pH: 7.5  Gluc: 100 / Ketone: NEGATIVE  / Bili: NEGATIVE / Urobili: NORMAL   Blood: MODERATE / Protein: 300 / Nitrite: NEGATIVE   Leuk Esterase: LARGE / RBC: >50 / WBC >50   Sq Epi: MANY / Non Sq Epi: x / Bacteria: NEGATIVE    TSH:      1.23   (19)           Lipid profile:    HgA1C: 5.7  (19)            ___________________________________________________________________________________  ===============>>  A S S E S S M E N T   A N D   P L A N <<===============  ------------------------------------------------------------------------------------------    · Assessment		  55 y/o female PMH of HTN, HLD, hypothyroidism, constipation, CAD, bedbound, ESRD on HD sent in for leukocytosis admitted for infectious workup     Problem/Plan - 1:  ·  Problem: Leukocytosis.  Plan: -patient with leukocytosis of 22 on outside labwork  -Currently leukocytosis of 15 with neutrophil predominance  -per chart review, appears like patient chronically has elevated WBC levels  -For now sources of infection include acute cystits vs infected sacral decub ulcer  -Monitor for fevers.     Problem/Plan - 2:  ·  Problem: Acute cystitis without hematuria.  Plan: -Patient with suprapubic pain and positive UA  -treat with ceftriaxone  -Follow up cultures.     Problem/Plan - 3:  ·  Problem: Abdominal pain.  Plan: -Differential include UTI vs constipation vs hemorrhagic renal cyst   -Patient is on chronic opioids at NH. On fentanyl patch and oxycodone.   -For now treat UTI, start aggressive bowel regimen,  -Check CT abd with contrast to further evaluate hemorrhagic cyst - discussed with renal and ok to give contrast.    Problem/Plan - 4:  ·  Problem: ESRD (end stage renal disease) on dialysis.  Plan: -on HD MWF  -Seen by renal  -plan for HD today.     Problem/Plan - 5:  ·  Problem: Sacral decubitus ulcer, stage IV.  Plan: -on exam ulcer does not appear infected  -Wound care eval ordered.     Problem/Plan - 6:  Problem: Hypothyroidism. Plan: -c/w synthroid.    Problem/Plan - 7:  ·  Problem: CAD in native artery.  Plan: -c/w simvastatin, plavix, lopressor.     Problem/Plan - 8:  ·  Problem: Essential hypertension.  Plan: -c/w lopressor, hydralazine, nifedipine   -monitor bp.     Problem/Plan - 9:  ·  Problem: Prophylactic measure.  Plan: hsq.       --------------------------------------------  Case discussed with   Education given on findings and plan of care  ___________________________  H. MIKAELA Hannah.  Pager: [phone] _________________________________________________________________________________________  ========>>  M E D I C A L   A T T E N D I N G    F O L L O W  U P  N O T E  <<=========  -----------------------------------------------------------------------------------------------------    - Patient seen and examined by me approximately sixty minutes ago.   - In summary,  SETVEN HARVEY is a 54y year old woman who originally presented with abd pain   - Patient today overall doing ok, comfortable, eating OK. abd pain better but has been constipated     ==================>> REVIEW OF SYSTEM <<=================    GEN: no fever, no chills, pain improved   RESP: no SOB, no cough, no sputum  CVS: no chest pain, no palpitations, no edema  GI: abdominal pain as above, no nausea, ++ constipation  : no dysuria reported today , no frequency  Neuro: no headache, no dizziness  Derm : no itching, no rash    ==================>> PHYSICAL EXAM <<=================    GEN: A&O X 3 , NAD , comfortable  HEENT: NCAT, PERRL, MMM, hearing intact  Neck: supple , no JVD  CVS: S1S2 , regular , No M/R/G appreciated  PULM: CTA B/L,  no W/R/R appreciated  ABD.: soft. non tender, non distended,  bowel sounds present  Extrem: intact pulses , no edema   PSYCH : normal mood,  not anxious      ==================>> MEDICATIONS <<====================    MEDICATIONS  (STANDING):  cefTRIAXone   IVPB 1000 milliGRAM(s) IV Intermittent every 24 hours  chlorhexidine 4% Liquid 1 Application(s) Topical <User Schedule>  clopidogrel Tablet 75 milliGRAM(s) Oral daily  dorzolamide 2%/timolol 0.5% Ophthalmic Solution 1 Drop(s) Both EYES two times a day  fentaNYL   Patch  75 MICROgram(s)/Hr 1 Patch Transdermal every 72 hours  gabapentin 100 milliGRAM(s) Oral three times a day  heparin  Injectable 5000 Unit(s) SubCutaneous three times a day  hydrALAZINE 100 milliGRAM(s) Oral three times a day  influenza   Vaccine 0.5 milliLiter(s) IntraMuscular once  levothyroxine 50 MICROGram(s) Oral daily  metoprolol tartrate 75 milliGRAM(s) Oral two times a day  multivitamin 1 Tablet(s) Oral daily  NIFEdipine XL 60 milliGRAM(s) Oral daily  pantoprazole    Tablet 40 milliGRAM(s) Oral before breakfast  sevelamer carbonate 1600 milliGRAM(s) Oral three times a day  simvastatin 20 milliGRAM(s) Oral at bedtime    MEDICATIONS  (PRN):  acetaminophen   Tablet .. 650 milliGRAM(s) Oral every 6 hours PRN Mild Pain (1 - 3), Moderate Pain (4 - 6)      ==================>> VITAL SIGNS <<==================    T(C): 36.9 (19 @ 06:16), Max: 37.3 (19 @ 21:30)  HR: 88 (19 @ 06:16) (68 - 88)  BP: 150/62 (19 @ 06:16) (127/57 - 196/65)  RR: 19 (19 @ 06:16) (14 - 20)  SpO2: 100% (09-07-19 @ 06:16) (98% - 100%)     POCT Blood Glucose.: 155 mg/dL (06 Sep 2019 18:28)    I&O's Summary    06 Sep 2019 07:01  -  07 Sep 2019 07:00  --------------------------------------------------------  IN: 400 mL / OUT: 2900 mL / NET: -2500 mL     ==================>> LAB AND IMAGING <<==================                        8.4    9.23  )-----------( 429      ( 07 Sep 2019 06:40 )             27.7        Hemoglobin:   8.4 <<==,  8.7 <<==    136  |  91<L>  |  29<H>  ----------------------------<  100<H>  3.7   |  30  |  2.71<H>    Ca    10.2      07 Sep 2019 06:40  Phos  2.9       Mg     2.3         TPro  8.1  /  Alb  3.9  /  TBili  0.3  /  DBili  x   /  AST  7   /  ALT  8   /  AlkPhos  161<H>      Urinalysis Basic - ( 06 Sep 2019 06:19 )  Color: YELLOW / Appearance: TURBID / S.014 / pH: 7.5  Gluc: 100 / Ketone: NEGATIVE  / Bili: NEGATIVE / Urobili: NORMAL   Blood: MODERATE / Protein: 300 / Nitrite: NEGATIVE   Leuk Esterase: LARGE / RBC: >50 / WBC >50   Sq Epi: MANY / Non Sq Epi: x / Bacteria: NEGATIVE    TSH:      1.23   (19)           HgA1C: 5.7  (19)            ___________________________________________________________________________________  ===============>>  A S S E S S M E N T   A N D   P L A N <<===============  ------------------------------------------------------------------------------------------    · Assessment		  53 y/o female PMH of HTN, HLD, hypothyroidism, constipation, CAD, bedbound, ESRD on HD sent in for leukocytosis admitted for infectious workup     Problem/Plan - 1:  ·  Problem: Leukocytosis.    -patient with leukocytosis of 22 on outside lab work>> now resolved post abx   -For now sources of infection include acute cystitis   -Monitor for fevers.   - abx as bellow     Problem/Plan - 2:  ·  Problem: Acute cystitis without hematuria.    -Patient with suprapubic pain and positive UA  -treat with ceftriaxone> likely 3- 5 day course   -Follow up cultures.     Problem/Plan - 3:  ·  Problem: Abdominal pain.    -Differential include UTI vs constipation vs hemorrhagic renal cyst   -Patient is on chronic opioids at NH. On fentanyl patch and oxycodone. >> continue   -For now treat UTI, start aggressive bowel regimen,  -urology consult re abnormality of pelvic kidney ,  with possible mass or hemorrhagic cyst  -  treat constipation >> enema X 1 now     Problem/Plan - 4:  ·  Problem: ESRD on dialysis.    -on HD MWF  -Seen by renal  - post HD yesterday     Problem/Plan - 5:  ·  Problem: Sacral decubitus ulcer, stage IV.  Plan: -on exam ulcer does not appear infected  -Wound care team to follow   - pain mgmt     Problem/Plan - 6:  Problem: Hypothyroidism. Plan: -c/w synthroid.    Problem/Plan - 7:  ·  Problem: CAD in native artery.  Plan: -c/w simvastatin, plavix, lopressor.     Problem/Plan - 8:  ·  Problem: Essential hypertension.  Plan: -c/w lopressor, hydralazine, nifedipine   -monitor bp.     Problem/Plan - 9:  ·  Problem: Prophylactic measure.  Plan: hsq.       --------------------------------------------  Case discussed with pt, NP  Education given on findings and plan of care  ___________________________  H. MIKAELA Hannah.  Pager: [phone]

## 2019-09-08 ENCOUNTER — TRANSCRIPTION ENCOUNTER (OUTPATIENT)
Age: 54
End: 2019-09-08

## 2019-09-08 DIAGNOSIS — N28.89 OTHER SPECIFIED DISORDERS OF KIDNEY AND URETER: ICD-10-CM

## 2019-09-08 LAB
ALBUMIN SERPL ELPH-MCNC: 3.8 G/DL — SIGNIFICANT CHANGE UP (ref 3.3–5)
ALP SERPL-CCNC: 149 U/L — HIGH (ref 40–120)
ALT FLD-CCNC: 7 U/L — SIGNIFICANT CHANGE UP (ref 4–33)
ANION GAP SERPL CALC-SCNC: 17 MMO/L — HIGH (ref 7–14)
AST SERPL-CCNC: 8 U/L — SIGNIFICANT CHANGE UP (ref 4–32)
BILIRUB SERPL-MCNC: 0.3 MG/DL — SIGNIFICANT CHANGE UP (ref 0.2–1.2)
BUN SERPL-MCNC: 51 MG/DL — HIGH (ref 7–23)
CALCIUM SERPL-MCNC: 9.9 MG/DL — SIGNIFICANT CHANGE UP (ref 8.4–10.5)
CHLORIDE SERPL-SCNC: 90 MMOL/L — LOW (ref 98–107)
CO2 SERPL-SCNC: 28 MMOL/L — SIGNIFICANT CHANGE UP (ref 22–31)
CREAT SERPL-MCNC: 4.8 MG/DL — HIGH (ref 0.5–1.3)
GLUCOSE SERPL-MCNC: 234 MG/DL — HIGH (ref 70–99)
HCT VFR BLD CALC: 27.2 % — LOW (ref 34.5–45)
HGB BLD-MCNC: 8.2 G/DL — LOW (ref 11.5–15.5)
MAGNESIUM SERPL-MCNC: 2.6 MG/DL — SIGNIFICANT CHANGE UP (ref 1.6–2.6)
MCHC RBC-ENTMCNC: 28.5 PG — SIGNIFICANT CHANGE UP (ref 27–34)
MCHC RBC-ENTMCNC: 30.1 % — LOW (ref 32–36)
MCV RBC AUTO: 94.4 FL — SIGNIFICANT CHANGE UP (ref 80–100)
NRBC # FLD: 0.03 K/UL — SIGNIFICANT CHANGE UP (ref 0–0)
PHOSPHATE SERPL-MCNC: 4.4 MG/DL — SIGNIFICANT CHANGE UP (ref 2.5–4.5)
PLATELET # BLD AUTO: 480 K/UL — HIGH (ref 150–400)
PMV BLD: 10 FL — SIGNIFICANT CHANGE UP (ref 7–13)
POTASSIUM SERPL-MCNC: 3.9 MMOL/L — SIGNIFICANT CHANGE UP (ref 3.5–5.3)
POTASSIUM SERPL-SCNC: 3.9 MMOL/L — SIGNIFICANT CHANGE UP (ref 3.5–5.3)
PROT SERPL-MCNC: 8.1 G/DL — SIGNIFICANT CHANGE UP (ref 6–8.3)
RBC # BLD: 2.88 M/UL — LOW (ref 3.8–5.2)
RBC # FLD: 15.1 % — HIGH (ref 10.3–14.5)
SODIUM SERPL-SCNC: 135 MMOL/L — SIGNIFICANT CHANGE UP (ref 135–145)
SPECIMEN SOURCE: SIGNIFICANT CHANGE UP
WBC # BLD: 8.58 K/UL — SIGNIFICANT CHANGE UP (ref 3.8–10.5)
WBC # FLD AUTO: 8.58 K/UL — SIGNIFICANT CHANGE UP (ref 3.8–10.5)

## 2019-09-08 PROCEDURE — 99222 1ST HOSP IP/OBS MODERATE 55: CPT

## 2019-09-08 RX ORDER — MINERAL OIL
133 OIL (ML) MISCELLANEOUS DAILY
Refills: 0 | Status: DISCONTINUED | OUTPATIENT
Start: 2019-09-08 | End: 2019-09-12

## 2019-09-08 RX ORDER — SENNA PLUS 8.6 MG/1
2 TABLET ORAL AT BEDTIME
Refills: 0 | Status: DISCONTINUED | OUTPATIENT
Start: 2019-09-08 | End: 2019-09-12

## 2019-09-08 RX ORDER — DOCUSATE SODIUM 100 MG
100 CAPSULE ORAL
Refills: 0 | Status: DISCONTINUED | OUTPATIENT
Start: 2019-09-08 | End: 2019-09-12

## 2019-09-08 RX ADMIN — SEVELAMER CARBONATE 1600 MILLIGRAM(S): 2400 POWDER, FOR SUSPENSION ORAL at 06:13

## 2019-09-08 RX ADMIN — FENTANYL CITRATE 1 PATCH: 50 INJECTION INTRAVENOUS at 19:23

## 2019-09-08 RX ADMIN — GABAPENTIN 100 MILLIGRAM(S): 400 CAPSULE ORAL at 06:12

## 2019-09-08 RX ADMIN — HEPARIN SODIUM 5000 UNIT(S): 5000 INJECTION INTRAVENOUS; SUBCUTANEOUS at 22:54

## 2019-09-08 RX ADMIN — DORZOLAMIDE HYDROCHLORIDE TIMOLOL MALEATE 1 DROP(S): 20; 5 SOLUTION/ DROPS OPHTHALMIC at 17:03

## 2019-09-08 RX ADMIN — Medication 100 MILLIGRAM(S): at 17:03

## 2019-09-08 RX ADMIN — SEVELAMER CARBONATE 1600 MILLIGRAM(S): 2400 POWDER, FOR SUSPENSION ORAL at 22:54

## 2019-09-08 RX ADMIN — SEVELAMER CARBONATE 1600 MILLIGRAM(S): 2400 POWDER, FOR SUSPENSION ORAL at 14:51

## 2019-09-08 RX ADMIN — Medication 100 MILLIGRAM(S): at 06:12

## 2019-09-08 RX ADMIN — SENNA PLUS 2 TABLET(S): 8.6 TABLET ORAL at 22:53

## 2019-09-08 RX ADMIN — SIMVASTATIN 20 MILLIGRAM(S): 20 TABLET, FILM COATED ORAL at 22:54

## 2019-09-08 RX ADMIN — Medication 100 MILLIGRAM(S): at 22:54

## 2019-09-08 RX ADMIN — CLOPIDOGREL BISULFATE 75 MILLIGRAM(S): 75 TABLET, FILM COATED ORAL at 12:00

## 2019-09-08 RX ADMIN — Medication 75 MILLIGRAM(S): at 17:03

## 2019-09-08 RX ADMIN — Medication 100 MILLIGRAM(S): at 14:51

## 2019-09-08 RX ADMIN — HEPARIN SODIUM 5000 UNIT(S): 5000 INJECTION INTRAVENOUS; SUBCUTANEOUS at 06:13

## 2019-09-08 RX ADMIN — GABAPENTIN 100 MILLIGRAM(S): 400 CAPSULE ORAL at 22:54

## 2019-09-08 RX ADMIN — CHLORHEXIDINE GLUCONATE 1 APPLICATION(S): 213 SOLUTION TOPICAL at 12:00

## 2019-09-08 RX ADMIN — Medication 60 MILLIGRAM(S): at 06:13

## 2019-09-08 RX ADMIN — Medication 75 MILLIGRAM(S): at 06:13

## 2019-09-08 RX ADMIN — CEFTRIAXONE 100 MILLIGRAM(S): 500 INJECTION, POWDER, FOR SOLUTION INTRAMUSCULAR; INTRAVENOUS at 01:39

## 2019-09-08 RX ADMIN — Medication 1 TABLET(S): at 12:00

## 2019-09-08 RX ADMIN — DORZOLAMIDE HYDROCHLORIDE TIMOLOL MALEATE 1 DROP(S): 20; 5 SOLUTION/ DROPS OPHTHALMIC at 06:13

## 2019-09-08 RX ADMIN — FENTANYL CITRATE 1 PATCH: 50 INJECTION INTRAVENOUS at 07:46

## 2019-09-08 RX ADMIN — PANTOPRAZOLE SODIUM 40 MILLIGRAM(S): 20 TABLET, DELAYED RELEASE ORAL at 06:12

## 2019-09-08 RX ADMIN — GABAPENTIN 100 MILLIGRAM(S): 400 CAPSULE ORAL at 14:51

## 2019-09-08 RX ADMIN — Medication 50 MICROGRAM(S): at 06:12

## 2019-09-08 RX ADMIN — HEPARIN SODIUM 5000 UNIT(S): 5000 INJECTION INTRAVENOUS; SUBCUTANEOUS at 14:51

## 2019-09-08 NOTE — DIETITIAN INITIAL EVALUATION ADULT. - DIET TYPE
Nepro 2x daily (850 kcals, 38.2g protein)./renal replacement pts:no protein restr,no conc K & phos, low sodium

## 2019-09-08 NOTE — DIETITIAN INITIAL EVALUATION ADULT. - ADD RECOMMEND
1. Add Nepro 2x daily (850 kcals, 38.2g protein) to optimize protein-energy intake. 2. F/u with nutrition education as needed/feasible.

## 2019-09-08 NOTE — DISCHARGE NOTE PROVIDER - NSDCCAREPROVSEEN_GEN_ALL_CORE_FT
Jayson Peralta  User ADM  Leyda Andrew Valles  Hoorbod Delshadfar  Hoorbod Delshadfar  Hoorbod Delshadfar  Team [facility] Medicine ACP  Brigette Alejandro

## 2019-09-08 NOTE — DIETITIAN INITIAL EVALUATION ADULT. - ENERGY NEEDS
Ht: 66 in Wt:(9/6) 120 pounds BMI: 19.4   IBW: 130 pounds   Edema: none   Pressure Injuries: sacrum stage III

## 2019-09-08 NOTE — DISCHARGE NOTE PROVIDER - HOSPITAL COURSE
54 F from [facility] (bedbound) PMHx ESRD on HD M/W/F, HTN, HLD, pre-DM, hypothyroidism, CAD, sacral decubitus ulcer, constipation p/w leukocytosis 22.5 Patient is a 54 F from NH (bedbound) PMHx ESRD on HD M/W/F, HTN, HLD, pre-DM, hypothyroidism, CAD, sacral decubitus ulcer,    constipation p/w leukocytosis 22.5.        Hospital Course    Leukocytosis.      -BCx negative    -CXR negative        Acute cystitis without hematuria.     -CTX x3-5 days     -UCx with normal derrick         Abdominal pain.       -CT A/P w/ IVC - solitary pelvic kidney with multiple complex septated cystic lesions with intracystic solid components vs hemorrhage,     concerning for neoplasm. Mild rectal wall edema vs underdistention, correlate for proctitis. Scattered subcentimeter hypodensities throughout liver.     4.2 cm Rt adnexal cystic lesion.    -Abdominal US-no hydronephrosis    -MRI abd-F/U MRI abdomen to evaluate - -Multiple cystic lesions in the solitary pelvic kidney with multiple thin     recommended repeat surveillance in 6 months.        Constipation    -S/P fleet enema 9/7         ESRD on HD    -Nephro Cx         Sacral decubitus ulcer, stage IV.      -Wound care Cx         Hypothyroidism    -Continue synthroid         CAD in native artery.     -Statin, Plavix, Lopressor         Essential hypertension    -Lopressor, Hydralazine, Nifedipine     -A1c 5.7 (pre-DM)         Anemia    -Epogen Patient is a 54 F from [facility] (bedbound) PMHx ESRD on HD M/W/F, HTN, HLD, pre-DM, hypothyroidism, CAD, sacral decubitus ulcer,    constipation p/w leukocytosis 22.5.        Hospital Course    Leukocytosis.      -BCx negative    -CXR negative        Acute cystitis without hematuria.     -CTX x3-5 days     -UCx with normal derrick         Abdominal pain.       -CT A/P w/ IVC - solitary pelvic kidney with multiple complex septated cystic lesions with intracystic solid components vs hemorrhage,     concerning for neoplasm. Mild rectal wall edema vs underdistention, correlate for proctitis. Scattered subcentimeter hypodensities throughout liver.     4.2 cm Rt adnexal cystic lesion.    -Abdominal US-no hydronephrosis    -MRI abd-F/U MRI abdomen Multiple cystic lesions in the solitary pelvic kidney with multiple thin     Urology - ************        Constipation    -treated with fleet enema 9/7         ESRD on HD    -Nephro Consulted        Sacral decubitus ulcer, stage IV.      -Wound care Consulted        Hypothyroidism    -Continue synthroid         CAD in native artery.     -Statin, Plavix, Lopressor         Essential hypertension    -Lopressor, Hydralazine, Nifedipine     -A1c 5.7 (pre-DM)         Anemia    -Epogen Patient is a 54 F from [place] (bedbound) PMHx ESRD on HD M/W/F, HTN, HLD, pre-DM, hypothyroidism, CAD, sacral decubitus ulcer,    constipation p/w leukocytosis 22.5.        Hospital Course    Leukocytosis.      -BCx negative    -CXR negative        Acute cystitis without hematuria.     -CTX x3-5 days     -UCx with normal derrick         Abdominal pain.       -CT A/P w/ IVC - solitary pelvic kidney with multiple complex septated cystic lesions with intracystic solid components vs hemorrhage,     concerning for neoplasm. Mild rectal wall edema vs underdistention, correlate for proctitis. Scattered subcentimeter hypodensities throughout liver.     4.2 cm Rt adnexal cystic lesion.    -Abdominal US-no hydronephrosis    -MRI abdomen Multiple cystic lesions in the solitary pelvic kidney with multiple thin     Urology - Patient with solitary pelvic kidney and concern for renal mass.  MRI reveals multiple thin septations within kidney but no enhancing or concerning elements within the kidney itself.    As per urology - continue to surveil q6 months - 1 year with cross sectional imaging at this time.  No further urologic intervention or imaging needed at this time        Constipation    -treated with fleet enema 9/7         ESRD on HD    -Nephro Consulted        Sacral decubitus ulcer, stage IV.      -Wound care Consulted        Hypothyroidism    -Continue synthroid         CAD in native artery.     -Statin, Plavix, Lopressor         Essential hypertension    -Lopressor, Hydralazine, Nifedipine     -A1c 5.7 (pre-DM)         Anemia    -Epogen

## 2019-09-08 NOTE — PROGRESS NOTE ADULT - SUBJECTIVE AND OBJECTIVE BOX
Subjective:  Patient at baseline this AM.     Objectives:  T(C): 36.9 (09-08-19 @ 06:11), Max: 36.9 (09-07-19 @ 20:43)  HR: 69 (09-08-19 @ 06:11) (69 - 72)  BP: 129/61 (09-08-19 @ 06:11) (122/59 - 139/66)  RR: 17 (09-08-19 @ 06:11) (17 - 18)  SpO2: 100% (09-08-19 @ 06:11) (100% - 100%)  Wt(kg): --      Physcial Exam  GENERAL: NAD, well-developed  ABDOMEN: Soft, Nontender  PSYCH: AAOx3    LABS:                        8.2    8.58  )-----------( 480      ( 08 Sep 2019 06:43 )             27.2     09-08    135  |  90<L>  |  51<H>  ----------------------------<  234<H>  3.9   |  28  |  4.80<H>    Ca    9.9      08 Sep 2019 06:43  Phos  4.4     09-08  Mg     2.6     09-08    TPro  8.1  /  Alb  3.8  /  TBili  0.3  /  DBili  x   /  AST  8   /  ALT  7   /  AlkPhos  149<H>  09-08    CAPILLARY BLOOD GLUCOSE      POCT Blood Glucose.: 155 mg/dL (06 Sep 2019 18:28)      CAPILLARY BLOOD GLUCOSE      POCT Blood Glucose.: 155 mg/dL (06 Sep 2019 18:28)

## 2019-09-08 NOTE — PROGRESS NOTE ADULT - ASSESSMENT
_________________________________________________________________________________________  ========>>  M E D I C A L   A T T E N D I N G    F O L L O W  U P  N O T E  <<=========  -----------------------------------------------------------------------------------------------------    - Patient seen and examined by me approximately sixty minutes ago.   - In summary,  STEVEN HARVEY is a 54y year old woman who originally presented with abd pain   - Patient today overall doing ok, comfortable, eating OK. abd pain better but has been constipated     ==================>> REVIEW OF SYSTEM <<=================    GEN: no fever, no chills, pain improved   RESP: no SOB, no cough, no sputum  CVS: no chest pain, no palpitations, no edema  GI: abdominal pain as above, no nausea, + constipation  : no dysuria reported today , no frequency  Neuro: no headache, no dizziness  Derm : no itching, no rash    ==================>> PHYSICAL EXAM <<=================    GEN: A&O X 3 , NAD , comfortable  HEENT: NCAT, PERRL, MMM, hearing intact  Neck: supple , no JVD  CVS: S1S2 , regular , No M/R/G appreciated  PULM: CTA B/L,  no W/R/R appreciated  ABD.: soft. non tender, non distended,  bowel sounds present  Extrem: intact pulses , no edema   PSYCH : normal mood,  not anxious        ==================>> MEDICATIONS <<====================    cefTRIAXone   IVPB 1000 milliGRAM(s) IV Intermittent every 24 hours  chlorhexidine 4% Liquid 1 Application(s) Topical <User Schedule>  clopidogrel Tablet 75 milliGRAM(s) Oral daily  docusate sodium 100 milliGRAM(s) Oral two times a day  dorzolamide 2%/timolol 0.5% Ophthalmic Solution 1 Drop(s) Both EYES two times a day  epoetin jacqueline Injectable 48833 Unit(s) IV Push <User Schedule>  fentaNYL   Patch  75 MICROgram(s)/Hr 1 Patch Transdermal every 72 hours  gabapentin 100 milliGRAM(s) Oral three times a day  heparin  Injectable 5000 Unit(s) SubCutaneous three times a day  hydrALAZINE 100 milliGRAM(s) Oral three times a day  influenza   Vaccine 0.5 milliLiter(s) IntraMuscular once  levothyroxine 50 MICROGram(s) Oral daily  metoprolol tartrate 75 milliGRAM(s) Oral two times a day  multivitamin 1 Tablet(s) Oral daily  NIFEdipine XL 60 milliGRAM(s) Oral daily  pantoprazole    Tablet 40 milliGRAM(s) Oral before breakfast  senna 2 Tablet(s) Oral at bedtime  sevelamer carbonate 1600 milliGRAM(s) Oral three times a day  simvastatin 20 milliGRAM(s) Oral at bedtime    MEDICATIONS  (PRN):  acetaminophen   Tablet .. 650 milliGRAM(s) Oral every 6 hours PRN Mild Pain (1 - 3), Moderate Pain (4 - 6)    ==================>> VITAL SIGNS <<==================    Vital Signs Last 24 Hrs  T(C): 36.6 (19 @ 12:51)  T(F): 97.8 (19 @ 12:51), Max: 98.5 (19 @ 20:43)  HR: 78 (19 @ 14:50) (69 - 78)  BP: 126/60 (19 @ 14:50)  RR: 17 (19 @ 12:51) (17 - 18)  SpO2: 100% (19 @ 12:51) (100% - 100%)       ==================>> LAB AND IMAGING <<==================                        8.2    8.58  )-----------( 480      ( 08 Sep 2019 06:43 )             27.2            135  |  90<L>  |  51<H>  ----------------------------<  234<H>  3.9   |  28  |  4.80<H>    Ca    9.9      08 Sep 2019 06:43  Phos  4.4       Mg     2.6         TPro  8.1  /  Alb  3.8  /  TBili  0.3  /  DBili  x   /  AST  8   /  ALT  7   /  AlkPhos  149<H>      WBC count:   8.58 <<== ,  9.23 <<== ,  15.51 <<==   Hemoglobin:   8.2 <<==,  8.4 <<==,  8.7 <<==  platelets:  480 <==, 429 <==, 433 <==    Creatinine:  4.80  <<==, 2.71  <<==, 4.40  <<==  Sodium:   135  <==, 136  <==, 132  <==       AST:          8 <== , 7 <==      ALT:        7  <== , 8  <==      AP:        149  <=, 161  <=     Bili:        0.3  <=, 0.3  <=    Urinalysis Basic - ( 06 Sep 2019 06:19 )  Color: YELLOW / Appearance: TURBID / S.014 / pH: 7.5  Gluc: 100 / Ketone: NEGATIVE  / Bili: NEGATIVE / Urobili: NORMAL   Blood: MODERATE / Protein: 300 / Nitrite: NEGATIVE   Leuk Esterase: LARGE / RBC: >50 / WBC >50   Sq Epi: MANY / Non Sq Epi: x / Bacteria: NEGATIVE    TSH:      1.23   (19)           HgA1C: 5.7  (19)            _______________________  C U L T U R E S :    Culture - Nose (collected 06 Sep 2019 18:04)  Source: NOSE  Preliminary Report (08 Sep 2019 10:19):    No growth of Methicillin-Resisitant Staphylococcus aureus.    Culture in progress.    Culture - Blood (collected 06 Sep 2019 07:26)  Source: BLOOD VENOUS  Preliminary Report (08 Sep 2019 07:26):    NO ORGANISMS ISOLATED    NO ORGANISMS ISOLATED AT 48 HRS.    Culture - Blood (collected 06 Sep 2019 07:26)  Source: BLOOD PERIPHERAL  Preliminary Report (08 Sep 2019 07:26):    NO ORGANISMS ISOLATED    NO ORGANISMS ISOLATED AT 48 HRS.    Culture - Urine (collected 06 Sep 2019 07:20)  Source: URINE MIDSTREAM  Final Report (07 Sep 2019 09:42):    Normal genitourinary derrick  ___________________________________________________________________________________  ===============>>  A S S E S S M E N T   A N D   P L A N <<===============  ------------------------------------------------------------------------------------------    · Assessment		  55 y/o female PMH of HTN, HLD, hypothyroidism, constipation, CAD, bedbound, ESRD on HD sent in for leukocytosis admitted for infectious workup     Problem/Plan - 1:  ·  Problem: Leukocytosis.  now resolved post abx   -For now sources of infection include acute cystitis   -Monitor for fevers.   - abx as bellow   - follow cultures     Problem/Plan - 2:  ·  Problem: Acute cystitis without hematuria.    -Patient with suprapubic pain and positive UA  -treat with ceftriaxone> likely 3- 5 day course   -Follow up cultures.     Problem/Plan - 3:  ·  Problem: Abdominal pain.    -Differential include UTI vs constipation vs hemorrhagic renal cyst   -Patient is on chronic opioids at NH. On fentanyl patch and oxycodone. >> continue   -For now treat UTI, start aggressive bowel regimen,  -urology consult appreciated     abnormality of pelvic kidney ,  with possible mass or hemorrhagic cyst: concern for malignancy  - MRI ordered, pending   -  treat constipation >> enema as needed     Problem/Plan - 4:  ·  Problem: ESRD on dialysis.    -on HD MWF  -Seen by renal  - post HD yesterday     Problem/Plan - 5:  ·  Problem: Sacral decubitus ulcer, stage IV.  Plan: -on exam ulcer does not appear infected  -Wound care team to follow   - pain mgmt     Problem/Plan - 6:  Problem: Hypothyroidism. Plan: -c/w synthroid.    Problem/Plan - 7:  ·  Problem: CAD in native artery.  Plan: -c/w simvastatin, plavix, lopressor.     Problem/Plan - 8:  ·  Problem: Essential hypertension.  Plan: -c/w lopressor, hydralazine, nifedipine   -monitor bp.     Problem/Plan - 9:  ·  Problem: Prophylactic measure.  Plan: hsq.       --------------------------------------------  Case discussed with pt  Education given on findings and plan of care  ___________________________  H. MIKAELA Hannah.  Pager: [phone]

## 2019-09-08 NOTE — DIETITIAN INITIAL EVALUATION ADULT. - OTHER INFO
Per chart, 55 y/o F admitted from NH with UTI. Pt is bedbound, ESRD on HD, CAD, pre-DM. Per pt, PO intake >75% meals. +constipation- last BM 9/2 6 days ago. States she had an enema and is waiting for it to work. Not on any bowel regimen. No chewing or swallowing difficulties at this time. NKFA.  pounds.     Renal diet education provided with handouts. Discussed limiting high potassium, phosphorous, and sodium foods. Pt with no teach back or recall of any nutrition education. Of note, she lives in [place] where foods are provided. Also discussed with pt increased needs for protein on dialysis; reviewed high protein foods and encouraged intake of Nepro supplements.

## 2019-09-08 NOTE — PROGRESS NOTE ADULT - ASSESSMENT
54 year old female with ESRD and solitary pelvic kidney with multiple complex cysts on CT. Concern for renal malignancy.    - Awaiting MRI for evaluation of complex cyst seen on CT, please contact urology once MRI performed.

## 2019-09-08 NOTE — DIETITIAN INITIAL EVALUATION ADULT. - PERTINENT LABORATORY DATA
09-08 Na 135 mmol/L Glu 234 mg/dL<H> K+ 3.9 mmol/L Cr 4.80 mg/dL<H> BUN 51 mg/dL<H> Phos 4.4 mg/dL  09-07-19 HbA1c 5.7 %

## 2019-09-08 NOTE — DIETITIAN INITIAL EVALUATION ADULT. - NS FNS WEIGHT CHANGE REASON
Per previous RD note (9/2018), pt weighed 104 pounds. Pt with 16 pound wt gain and current dosing wt 120 pounds.

## 2019-09-08 NOTE — DIETITIAN INITIAL EVALUATION ADULT. - PERTINENT MEDS FT
MEDICATIONS  (STANDING):  cefTRIAXone   IVPB 1000 milliGRAM(s) IV Intermittent every 24 hours  chlorhexidine 4% Liquid 1 Application(s) Topical <User Schedule>  clopidogrel Tablet 75 milliGRAM(s) Oral daily  dorzolamide 2%/timolol 0.5% Ophthalmic Solution 1 Drop(s) Both EYES two times a day  epoetin jacqueline Injectable 70822 Unit(s) IV Push <User Schedule>  fentaNYL   Patch  75 MICROgram(s)/Hr 1 Patch Transdermal every 72 hours  gabapentin 100 milliGRAM(s) Oral three times a day  heparin  Injectable 5000 Unit(s) SubCutaneous three times a day  hydrALAZINE 100 milliGRAM(s) Oral three times a day  influenza   Vaccine 0.5 milliLiter(s) IntraMuscular once  levothyroxine 50 MICROGram(s) Oral daily  metoprolol tartrate 75 milliGRAM(s) Oral two times a day  multivitamin 1 Tablet(s) Oral daily  NIFEdipine XL 60 milliGRAM(s) Oral daily  pantoprazole    Tablet 40 milliGRAM(s) Oral before breakfast  sevelamer carbonate 1600 milliGRAM(s) Oral three times a day  simvastatin 20 milliGRAM(s) Oral at bedtime

## 2019-09-08 NOTE — DISCHARGE NOTE PROVIDER - NSDCCPCAREPLAN_GEN_ALL_CORE_FT
PRINCIPAL DISCHARGE DIAGNOSIS  Diagnosis: Urinary tract infection  Assessment and Plan of Treatment:       SECONDARY DISCHARGE DIAGNOSES  Diagnosis: Kidney mass  Assessment and Plan of Treatment:     Diagnosis: CAD in native artery  Assessment and Plan of Treatment: CAD in native artery    Diagnosis: Hypothyroidism  Assessment and Plan of Treatment: Hypothyroidism    Diagnosis: ESRD (end stage renal disease) on dialysis  Assessment and Plan of Treatment: ESRD (end stage renal disease) on dialysis    Diagnosis: Essential hypertension  Assessment and Plan of Treatment: Essential hypertension    Diagnosis: Abdominal pain  Assessment and Plan of Treatment: PRINCIPAL DISCHARGE DIAGNOSIS  Diagnosis: Urinary tract infection  Assessment and Plan of Treatment: Pt completed a course of antibiotics. Monitor for signs/symptoms of infection, such as, fever/chills, burning/pain with urination, urinary frequency/hesitancy, cloudy urine, or blood in urine.          SECONDARY DISCHARGE DIAGNOSES  Diagnosis: ESRD (end stage renal disease) on dialysis  Assessment and Plan of Treatment: Please continue to follow your dialysis schedule and refer to your primary provider/nephrologist for further care/recommendations. Continue your medications and supplementation as directed.      Diagnosis: Hypothyroidism  Assessment and Plan of Treatment: Continue synthroid regimen as ordered.  Monitor for any symptoms of decreased or elevated thyroid function. Follow up with your PCP for routine evaluation and management.       Diagnosis: CAD in native artery  Assessment and Plan of Treatment: CAD in native artery    Diagnosis: Essential hypertension  Assessment and Plan of Treatment: Continue current blood pressure medication regimen as directed. Monitor for any visual changes, headaches or dizziness.  Monitor blood pressure regularly.  Follow up with your PCP for further management for high blood pressure, please call to make appointment within 1 week of discharge      Diagnosis: Kidney mass  Assessment and Plan of Treatment: Recommend repeat MRI in 6 months.   Follow up with your PCP for routine evaluation and management. PRINCIPAL DISCHARGE DIAGNOSIS  Diagnosis: Urinary tract infection  Assessment and Plan of Treatment: Pt completed a course of antibiotics. Monitor for signs/symptoms of infection, such as, fever/chills, burning/pain with urination, urinary frequency/hesitancy, cloudy urine, or blood in urine.          SECONDARY DISCHARGE DIAGNOSES  Diagnosis: ESRD (end stage renal disease) on dialysis  Assessment and Plan of Treatment: Please continue to follow your dialysis schedule and refer to your primary provider/nephrologist for further care/recommendations. Continue your medications and supplementation as directed.      Diagnosis: Hypothyroidism  Assessment and Plan of Treatment: Continue synthroid regimen as ordered.  Monitor for any symptoms of decreased or elevated thyroid function. Follow up with your PCP for routine evaluation and management.       Diagnosis: Essential hypertension  Assessment and Plan of Treatment: Continue current blood pressure medication regimen as directed. Monitor for any visual changes, headaches or dizziness.  Monitor blood pressure regularly.  Follow up with your PCP for further management for high blood pressure, please call to make appointment within 1 week of discharge      Diagnosis: CAD in native artery  Assessment and Plan of Treatment: Continue cholesterol control medications. Continue DASH diet.  Contact  your PCP as per routine follow up,  further management and monitoring of lipid and cholesterol panels.       Diagnosis: Kidney mass  Assessment and Plan of Treatment: Recommend repeat MRI in 6 months.   Follow up with your PCP for routine evaluation and management.

## 2019-09-09 LAB
ALBUMIN SERPL ELPH-MCNC: 3.6 G/DL — SIGNIFICANT CHANGE UP (ref 3.3–5)
ALP SERPL-CCNC: 151 U/L — HIGH (ref 40–120)
ALT FLD-CCNC: 8 U/L — SIGNIFICANT CHANGE UP (ref 4–33)
ANION GAP SERPL CALC-SCNC: 16 MMO/L — HIGH (ref 7–14)
AST SERPL-CCNC: 36 U/L — HIGH (ref 4–32)
BACTERIA NPH CULT: SIGNIFICANT CHANGE UP
BILIRUB SERPL-MCNC: 0.3 MG/DL — SIGNIFICANT CHANGE UP (ref 0.2–1.2)
BUN SERPL-MCNC: 62 MG/DL — HIGH (ref 7–23)
CALCIUM SERPL-MCNC: 10.3 MG/DL — SIGNIFICANT CHANGE UP (ref 8.4–10.5)
CHLORIDE SERPL-SCNC: 88 MMOL/L — LOW (ref 98–107)
CO2 SERPL-SCNC: 26 MMOL/L — SIGNIFICANT CHANGE UP (ref 22–31)
CREAT SERPL-MCNC: 6.03 MG/DL — HIGH (ref 0.5–1.3)
GLUCOSE SERPL-MCNC: 210 MG/DL — HIGH (ref 70–99)
HCT VFR BLD CALC: 29.6 % — LOW (ref 34.5–45)
HGB BLD-MCNC: 8.8 G/DL — LOW (ref 11.5–15.5)
MAGNESIUM SERPL-MCNC: 2.6 MG/DL — SIGNIFICANT CHANGE UP (ref 1.6–2.6)
MCHC RBC-ENTMCNC: 28.5 PG — SIGNIFICANT CHANGE UP (ref 27–34)
MCHC RBC-ENTMCNC: 29.7 % — LOW (ref 32–36)
MCV RBC AUTO: 95.8 FL — SIGNIFICANT CHANGE UP (ref 80–100)
NRBC # FLD: 0 K/UL — SIGNIFICANT CHANGE UP (ref 0–0)
PHOSPHATE SERPL-MCNC: 4.5 MG/DL — SIGNIFICANT CHANGE UP (ref 2.5–4.5)
PLATELET # BLD AUTO: 520 K/UL — HIGH (ref 150–400)
PMV BLD: 9.8 FL — SIGNIFICANT CHANGE UP (ref 7–13)
POTASSIUM SERPL-MCNC: 5.9 MMOL/L — HIGH (ref 3.5–5.3)
POTASSIUM SERPL-SCNC: 5.9 MMOL/L — HIGH (ref 3.5–5.3)
PROT SERPL-MCNC: 8.2 G/DL — SIGNIFICANT CHANGE UP (ref 6–8.3)
RBC # BLD: 3.09 M/UL — LOW (ref 3.8–5.2)
RBC # FLD: 15.2 % — HIGH (ref 10.3–14.5)
SODIUM SERPL-SCNC: 130 MMOL/L — LOW (ref 135–145)
WBC # BLD: 10.18 K/UL — SIGNIFICANT CHANGE UP (ref 3.8–10.5)
WBC # FLD AUTO: 10.18 K/UL — SIGNIFICANT CHANGE UP (ref 3.8–10.5)

## 2019-09-09 PROCEDURE — 76700 US EXAM ABDOM COMPLETE: CPT | Mod: 26

## 2019-09-09 RX ADMIN — Medication 100 MILLIGRAM(S): at 21:20

## 2019-09-09 RX ADMIN — PANTOPRAZOLE SODIUM 40 MILLIGRAM(S): 20 TABLET, DELAYED RELEASE ORAL at 05:35

## 2019-09-09 RX ADMIN — DORZOLAMIDE HYDROCHLORIDE TIMOLOL MALEATE 1 DROP(S): 20; 5 SOLUTION/ DROPS OPHTHALMIC at 05:39

## 2019-09-09 RX ADMIN — CLOPIDOGREL BISULFATE 75 MILLIGRAM(S): 75 TABLET, FILM COATED ORAL at 11:03

## 2019-09-09 RX ADMIN — HEPARIN SODIUM 5000 UNIT(S): 5000 INJECTION INTRAVENOUS; SUBCUTANEOUS at 05:35

## 2019-09-09 RX ADMIN — DORZOLAMIDE HYDROCHLORIDE TIMOLOL MALEATE 1 DROP(S): 20; 5 SOLUTION/ DROPS OPHTHALMIC at 18:09

## 2019-09-09 RX ADMIN — HEPARIN SODIUM 5000 UNIT(S): 5000 INJECTION INTRAVENOUS; SUBCUTANEOUS at 21:20

## 2019-09-09 RX ADMIN — SIMVASTATIN 20 MILLIGRAM(S): 20 TABLET, FILM COATED ORAL at 21:20

## 2019-09-09 RX ADMIN — Medication 50 MICROGRAM(S): at 05:39

## 2019-09-09 RX ADMIN — Medication 1 TABLET(S): at 11:03

## 2019-09-09 RX ADMIN — SEVELAMER CARBONATE 1600 MILLIGRAM(S): 2400 POWDER, FOR SUSPENSION ORAL at 08:53

## 2019-09-09 RX ADMIN — CEFTRIAXONE 100 MILLIGRAM(S): 500 INJECTION, POWDER, FOR SOLUTION INTRAMUSCULAR; INTRAVENOUS at 05:29

## 2019-09-09 RX ADMIN — HEPARIN SODIUM 5000 UNIT(S): 5000 INJECTION INTRAVENOUS; SUBCUTANEOUS at 12:49

## 2019-09-09 RX ADMIN — Medication 650 MILLIGRAM(S): at 11:30

## 2019-09-09 RX ADMIN — FENTANYL CITRATE 1 PATCH: 50 INJECTION INTRAVENOUS at 07:27

## 2019-09-09 RX ADMIN — Medication 100 MILLIGRAM(S): at 05:35

## 2019-09-09 RX ADMIN — ERYTHROPOIETIN 10000 UNIT(S): 10000 INJECTION, SOLUTION INTRAVENOUS; SUBCUTANEOUS at 15:26

## 2019-09-09 RX ADMIN — GABAPENTIN 100 MILLIGRAM(S): 400 CAPSULE ORAL at 05:35

## 2019-09-09 RX ADMIN — SENNA PLUS 2 TABLET(S): 8.6 TABLET ORAL at 21:20

## 2019-09-09 RX ADMIN — Medication 75 MILLIGRAM(S): at 05:35

## 2019-09-09 RX ADMIN — SEVELAMER CARBONATE 1600 MILLIGRAM(S): 2400 POWDER, FOR SUSPENSION ORAL at 21:19

## 2019-09-09 RX ADMIN — Medication 650 MILLIGRAM(S): at 11:02

## 2019-09-09 RX ADMIN — GABAPENTIN 100 MILLIGRAM(S): 400 CAPSULE ORAL at 12:50

## 2019-09-09 RX ADMIN — FENTANYL CITRATE 1 PATCH: 50 INJECTION INTRAVENOUS at 21:21

## 2019-09-09 RX ADMIN — GABAPENTIN 100 MILLIGRAM(S): 400 CAPSULE ORAL at 21:20

## 2019-09-09 RX ADMIN — CHLORHEXIDINE GLUCONATE 1 APPLICATION(S): 213 SOLUTION TOPICAL at 11:03

## 2019-09-09 RX ADMIN — Medication 100 MILLIGRAM(S): at 18:10

## 2019-09-09 RX ADMIN — SEVELAMER CARBONATE 1600 MILLIGRAM(S): 2400 POWDER, FOR SUSPENSION ORAL at 12:49

## 2019-09-09 RX ADMIN — Medication 60 MILLIGRAM(S): at 05:34

## 2019-09-09 NOTE — ADVANCED PRACTICE NURSE CONSULT - ASSESSMENT
A&Ox4, currently bedbound, left arm AV fistula, continent of urine and stool. Skin warm, dry with increased moisture in intertriginous folds, adequate skin turgor, scattered areas of hyperpigmentation and hypopigmentation, scattered areas of ecchymosis on bilateral upper extremities.     Moisture associated dermatitis in bilateral breasts as evident by moist and hyperpigmentation. No suspected candidiasis.   Lower back with moisture associated dermatitis and evidence of candidiasis as evident by dry flaky patch with purple hue.     Sacrum stage 3 pressure injury measuring 3.5cmx1.5cmx1.3cm. Undermining present from 7-1 o'clock extends 0.5cm. Measurements taking while patient laying in left side. Tissue type presents as 15% purple maroon discoloration, and 85% pale pink dermis with scattered fibrin film. Friable. Moderate serous drainage. No odor. Periwound skin with maceration circumferentially, hypopigmentation (extends to sacral fold and bilateral lower buttocks) surrounded  by hyperpigmentation. NO increased warmth, no erythema, no induration. Goals of care: reduce/control bioburden, manage drainage, monitor for tissue type changes, wick moisture. A&Ox4, currently bedbound, left arm AV fistula, continent of urine and stool. Skin warm, dry with increased moisture in intertriginous folds, adequate skin turgor, scattered areas of hyperpigmentation and hypopigmentation, scattered areas of ecchymosis on bilateral upper extremities. Scar in thoracic spine and right trochanter.     Moisture associated dermatitis in bilateral breasts as evident by moist and hyperpigmentation. No suspected candidiasis.   Lower back with moisture associated dermatitis and evidence of candidiasis as evident by dry flaky patch with purple hue.     Sacrum stage 3 pressure injury measuring 3.5cmx1.5cmx1.3cm. Undermining present from 7-1 o'clock extends 0.5cm. Measurements taking while patient laying in left side. Tissue type presents as 15% purple maroon discoloration, and 85% pale pink dermis with scattered fibrin film. Friable. Moderate serous drainage. No odor. Periwound skin with maceration circumferentially, hypopigmentation (extends to sacral fold and bilateral lower buttocks) surrounded  by hyperpigmentation. NO increased warmth, no erythema, no induration. Goals of care: reduce/control bioburden, manage drainage, monitor for tissue type changes, wick moisture.      Findings discussed with primary team.

## 2019-09-09 NOTE — ADVANCED PRACTICE NURSE CONSULT - RECOMMEDATIONS
Upon discharge Recommend follow up care at [facility]: [phone]. Address: [address] [place] [address].  Nutrition consult to optimize nutrition     Topical recommendations     Lower back, sacral fold and lower buttocks: Clean with soap and water, pat dry. Apply ANTIFUNGAL cream to entire area. Apply twice a day.     Sacrum: Clean with Saf-Clens. Rinse with NS. Pat dry. Apply Liquid barrier film to periwound skin. Place a piece of Aquacel AG over wound bed, cover with silicone foam with border. Change daily. Upon discharge Recommend follow up care at [facility]: [phone]. Address: [address] [place] [address].  Nutrition consult to optimize nutrition     Topical recommendations     Lower back, sacral fold and lower buttocks: Clean with soap and water, pat dry. Apply ANTIFUNGAL cream to entire area. Apply twice a day.     Sacrum: Clean with Saf-Clens. Rinse with NS. Pat dry. Apply Liquid barrier film to periwound skin. Place a piece of Aquacel AG over wound bed, cover with silicone foam with border. Change daily.     Bilateral heels: Apply Liquid barrier film daily.     Continue low air loss bed therapy, continue heel elevation with Z-flex fluidized positioning boots, turn & reposition q2h with Z-barbara fluidized positioning device, soft pillow between bony prominences, continue moisture management with barrier creams & single breathable pad, continue measures to decrease friction/shear/pressure. Continue with nutritional support as per dietary/orders.  Plan discussed with patient. Patient educated on topical wound therapy and on dietary recommendations (high protein, low sugar diet) to optimize wound healing. Questions answers.     Please contact Wound Care Service Line if we can be of further assistance (ext [phone]).

## 2019-09-09 NOTE — PROGRESS NOTE ADULT - ASSESSMENT
_________________________________________________________________________________________  ========>>  M E D I C A L   A T T E N D I N G    F O L L O W  U P  N O T E  <<=========  -----------------------------------------------------------------------------------------------------    - Patient seen and examined by me   - In summary,  STEVEN HARVEY is a 54y year old woman who originally presented with abd pain   - Patient today overall doing ok, comfortable, eating OK. abd pain improved     ==================>> REVIEW OF SYSTEM <<=================    GEN: no fever, no chills, pain improved   RESP: no SOB, no cough, no sputum  CVS: no chest pain, no palpitations, no edema  GI: abdominal pain as above, no nausea  : no dysuria reported today , no frequency  Neuro: no headache, no dizziness  Derm : no itching, no rash    ==================>> PHYSICAL EXAM <<=================    GEN: A&O X 3 , NAD , comfortable  HEENT: NCAT, MMM, hearing intact  Neck: supple , no JVD  CVS: S1S2 , regular , No M/R/G appreciated  PULM: CTA B/L,  no W/R/R appreciated  ABD.: soft. non tender, non distended,  bowel sounds present  Extrem: intact pulses , no edema   PSYCH : normal mood,  not anxious      ==================>> MEDICATIONS <<====================    cefTRIAXone   IVPB 1000 milliGRAM(s) IV Intermittent every 24 hours  chlorhexidine 4% Liquid 1 Application(s) Topical <User Schedule>  clopidogrel Tablet 75 milliGRAM(s) Oral daily  docusate sodium 100 milliGRAM(s) Oral two times a day  dorzolamide 2%/timolol 0.5% Ophthalmic Solution 1 Drop(s) Both EYES two times a day  epoetin jacqueline Injectable 30968 Unit(s) IV Push <User Schedule>  fentaNYL   Patch  75 MICROgram(s)/Hr 1 Patch Transdermal every 72 hours  gabapentin 100 milliGRAM(s) Oral three times a day  heparin  Injectable 5000 Unit(s) SubCutaneous three times a day  hydrALAZINE 100 milliGRAM(s) Oral three times a day  influenza   Vaccine 0.5 milliLiter(s) IntraMuscular once  levothyroxine 50 MICROGram(s) Oral daily  metoprolol tartrate 75 milliGRAM(s) Oral two times a day  multivitamin 1 Tablet(s) Oral daily  NIFEdipine XL 60 milliGRAM(s) Oral daily  pantoprazole    Tablet 40 milliGRAM(s) Oral before breakfast  senna 2 Tablet(s) Oral at bedtime  sevelamer carbonate 1600 milliGRAM(s) Oral three times a day  simvastatin 20 milliGRAM(s) Oral at bedtime    MEDICATIONS  (PRN):  acetaminophen   Tablet .. 650 milliGRAM(s) Oral every 6 hours PRN Mild Pain (1 - 3), Moderate Pain (4 - 6)  mineral oil enema 133 milliLiter(s) Rectal daily PRN constipation    ==================>> VITAL SIGNS <<==================    Vital Signs Last 24 Hrs  T(C): 36.7 (09-09-19 @ 05:33)  T(F): 98 (09-09-19 @ 05:33), Max: 98 (09-09-19 @ 05:33)  HR: 75 (09-09-19 @ 05:33) (75 - 80)  BP: 151/52 (09-09-19 @ 05:33)  RR: 18 (09-09-19 @ 05:33) (17 - 18)  SpO2: 100% (09-09-19 @ 05:33) (100% - 100%)       ==================>> LAB AND IMAGING <<==================                        8.8    10.18 )-----------( 520      ( 09 Sep 2019 05:20 )             29.6        130<L>  |  88<L>  |  62<H>  ----------------------------<  210<H>  5.9<H>   |  26  |  6.03<H>    Ca    10.3      09 Sep 2019 05:20  Phos  4.5     09-09  Mg     2.6     09-09    TPro  8.2  /  Alb  3.6  /  TBili  0.3  /  DBili  x   /  AST  36<H>  /  ALT  8   /  AlkPhos  151<H>  09-09    WBC count:   10.18 <<== ,  8.58 <<== ,  9.23 <<== ,  15.51 <<==   Hemoglobin:   8.8 <<==,  8.2 <<==,  8.4 <<==,  8.7 <<==  platelets:  520 <==, 480 <==, 429 <==, 433 <==    Creatinine:  6.03  <<==, 4.80  <<==, 2.71  <<==, 4.40  <<==  Sodium:   130  <==, 135  <==, 136  <==, 132  <==       AST:          36 <== , 8 <== , 7 <==      ALT:        8  <== , 7  <== , 8  <==      AP:        151  <=, 149  <=, 161  <=     Bili:        0.3  <=, 0.3  <=, 0.3  <=    TSH:      1.23   (09-07-19)           HgA1C: 5.7  (09-07-19)            _______________________  _______________________  C U L T U R E S :    Culture - Nose (collected 06 Sep 2019 18:04)  Source: NOSE  Final Report (09 Sep 2019 10:23):    No Growth of Methicillin-Resistant Staphylococcus aureus    No Growth of Methicillin-Susceptible Staphylocuccus aureus    Culture - Blood (collected 06 Sep 2019 07:26)  Source: BLOOD VENOUS  Preliminary Report (09 Sep 2019 07:26):    NO ORGANISMS ISOLATED    NO ORGANISMS ISOLATED AT 72 HRS.    Culture - Blood (collected 06 Sep 2019 07:26)  Source: BLOOD PERIPHERAL  Preliminary Report (09 Sep 2019 07:26):    NO ORGANISMS ISOLATED    NO ORGANISMS ISOLATED AT 72 HRS.    Culture - Urine (collected 06 Sep 2019 07:20)  Source: URINE MIDSTREAM  Final Report (07 Sep 2019 09:42):    Normal genitourinary derrick    ___________________________________________________________________________________  ===============>>  A S S E S S M E N T   A N D   P L A N <<===============  ------------------------------------------------------------------------------------------    · Assessment		  53 y/o female PMH of HTN, HLD, hypothyroidism, constipation, CAD, bedbound, ESRD on HD sent in for leukocytosis admitted for infectious workup     Problem/Plan - 1:  ·  Problem: Leukocytosis.  now resolved post abx   -For now sources of infection include acute cystitis   -Monitor for fevers.   - abx as bellow   - follow cultures     Problem/Plan - 2:  ·  Problem: Acute cystitis without hematuria.    -Patient with suprapubic pain and positive UA  -treat with ceftriaxone> can DC it after tomorrow's dose     Problem/Plan - 3:  ·  Problem: Abdominal pain.    -Differential include UTI vs constipation vs hemorrhagic renal cyst   -Patient is on chronic opioids at NH. On fentanyl patch and oxycodone. >> continue   -For now treat UTI, start aggressive bowel regimen,  -urology consult appreciated     abnormality of pelvic kidney ,  with possible mass or hemorrhagic cyst: concern for malignancy  - MRI ordered, pending   -  treat constipation >> enema as needed     Problem/Plan - 4:  ·  Problem: ESRD on dialysis.    -on HD MWF  -Seen by renal    Problem/Plan - 5:  ·  Problem: Sacral decubitus ulcer, stage IV.  Plan: -on exam ulcer does not appear infected  -Wound care team to follow   - pain mgmt     Problem/Plan - 6:  Problem: Hypothyroidism. Plan: -c/w synthroid.    Problem/Plan - 7:  ·  Problem: CAD in native artery.  Plan: -c/w simvastatin, plavix, lopressor.     Problem/Plan - 8:  ·  Problem: Essential hypertension.  Plan: -c/w lopressor, hydralazine, nifedipine   -monitor bp.     Problem/Plan - 9:  ·  Problem: Prophylactic measure.  Plan: hsq.       --------------------------------------------  Case discussed with RN  Education given on findings and plan of care  ___________________________  AARON Hannah D.O.  Pager: [phone] _________________________________________________________________________________________  ========>>  M E D I C A L   A T T E N D I N G    F O L L O W  U P  N O T E  <<=========  -----------------------------------------------------------------------------------------------------    - Patient seen and examined by me   - In summary,  STEVEN HARVEY is a 54y year old woman who originally presented with abd pain   - Patient today overall doing ok, comfortable, eating OK. abd pain improved     ==================>> REVIEW OF SYSTEM <<=================    GEN: no fever, no chills, pain improved   RESP: no SOB, no cough, no sputum  CVS: no chest pain, no palpitations, no edema  GI: abdominal pain as above, no nausea  : no dysuria reported today , no frequency  Neuro: no headache, no dizziness  Derm : no itching, no rash    ==================>> PHYSICAL EXAM <<=================    GEN: A&O X 3 , NAD , comfortable  HEENT: NCAT, MMM, hearing intact  Neck: supple , no JVD  CVS: S1S2 , regular , No M/R/G appreciated  PULM: CTA B/L,  no W/R/R appreciated  ABD.: soft. non tender, non distended,  bowel sounds present  Extrem: intact pulses , no edema   PSYCH : normal mood,  not anxious      ==================>> MEDICATIONS <<====================    cefTRIAXone   IVPB 1000 milliGRAM(s) IV Intermittent every 24 hours  chlorhexidine 4% Liquid 1 Application(s) Topical <User Schedule>  clopidogrel Tablet 75 milliGRAM(s) Oral daily  docusate sodium 100 milliGRAM(s) Oral two times a day  dorzolamide 2%/timolol 0.5% Ophthalmic Solution 1 Drop(s) Both EYES two times a day  epoetin jacqueline Injectable 49807 Unit(s) IV Push <User Schedule>  fentaNYL   Patch  75 MICROgram(s)/Hr 1 Patch Transdermal every 72 hours  gabapentin 100 milliGRAM(s) Oral three times a day  heparin  Injectable 5000 Unit(s) SubCutaneous three times a day  hydrALAZINE 100 milliGRAM(s) Oral three times a day  influenza   Vaccine 0.5 milliLiter(s) IntraMuscular once  levothyroxine 50 MICROGram(s) Oral daily  metoprolol tartrate 75 milliGRAM(s) Oral two times a day  multivitamin 1 Tablet(s) Oral daily  NIFEdipine XL 60 milliGRAM(s) Oral daily  pantoprazole    Tablet 40 milliGRAM(s) Oral before breakfast  senna 2 Tablet(s) Oral at bedtime  sevelamer carbonate 1600 milliGRAM(s) Oral three times a day  simvastatin 20 milliGRAM(s) Oral at bedtime    MEDICATIONS  (PRN):  acetaminophen   Tablet .. 650 milliGRAM(s) Oral every 6 hours PRN Mild Pain (1 - 3), Moderate Pain (4 - 6)  mineral oil enema 133 milliLiter(s) Rectal daily PRN constipation    ==================>> VITAL SIGNS <<==================    Vital Signs Last 24 Hrs  T(C): 36.7 (09-09-19 @ 05:33)  T(F): 98 (09-09-19 @ 05:33), Max: 98 (09-09-19 @ 05:33)  HR: 75 (09-09-19 @ 05:33) (75 - 80)  BP: 151/52 (09-09-19 @ 05:33)  RR: 18 (09-09-19 @ 05:33) (17 - 18)  SpO2: 100% (09-09-19 @ 05:33) (100% - 100%)       ==================>> LAB AND IMAGING <<==================                        8.8    10.18 )-----------( 520      ( 09 Sep 2019 05:20 )             29.6        130<L>  |  88<L>  |  62<H>  ----------------------------<  210<H>  5.9<H>   |  26  |  6.03<H>    Ca    10.3      09 Sep 2019 05:20  Phos  4.5     09-09  Mg     2.6     09-09    TPro  8.2  /  Alb  3.6  /  TBili  0.3  /  DBili  x   /  AST  36<H>  /  ALT  8   /  AlkPhos  151<H>  09-09    WBC count:   10.18 <<== ,  8.58 <<== ,  9.23 <<== ,  15.51 <<==   Hemoglobin:   8.8 <<==,  8.2 <<==,  8.4 <<==,  8.7 <<==  platelets:  520 <==, 480 <==, 429 <==, 433 <==    Creatinine:  6.03  <<==, 4.80  <<==, 2.71  <<==, 4.40  <<==  Sodium:   130  <==, 135  <==, 136  <==, 132  <==       AST:          36 <== , 8 <== , 7 <==      ALT:        8  <== , 7  <== , 8  <==      AP:        151  <=, 149  <=, 161  <=     Bili:        0.3  <=, 0.3  <=, 0.3  <=    TSH:      1.23   (09-07-19)           HgA1C: 5.7  (09-07-19)            _______________________  _______________________  C U L T U R E S :    Culture - Nose (collected 06 Sep 2019 18:04)  Source: NOSE  Final Report (09 Sep 2019 10:23):    No Growth of Methicillin-Resistant Staphylococcus aureus    No Growth of Methicillin-Susceptible Staphylocuccus aureus    Culture - Blood (collected 06 Sep 2019 07:26)  Source: BLOOD VENOUS  Preliminary Report (09 Sep 2019 07:26):    NO ORGANISMS ISOLATED    NO ORGANISMS ISOLATED AT 72 HRS.    Culture - Blood (collected 06 Sep 2019 07:26)  Source: BLOOD PERIPHERAL  Preliminary Report (09 Sep 2019 07:26):    NO ORGANISMS ISOLATED    NO ORGANISMS ISOLATED AT 72 HRS.    Culture - Urine (collected 06 Sep 2019 07:20)  Source: URINE MIDSTREAM  Final Report (07 Sep 2019 09:42):    Normal genitourinary derrick    ___________________________________________________________________________________  ===============>>  A S S E S S M E N T   A N D   P L A N <<===============  ------------------------------------------------------------------------------------------    · Assessment		  53 y/o female PMH of HTN, HLD, hypothyroidism, constipation, CAD, bedbound, ESRD on HD sent in for leukocytosis admitted for infectious workup     Problem/Plan - 1:  ·  Problem: Leukocytosis.  now resolved post abx   -For now sources of infection include acute cystitis   -Monitor for fevers.   - abx as bellow   - follow cultures     Problem/Plan - 2:  ·  Problem: Acute cystitis without hematuria.    -Patient with suprapubic pain and positive UA  -treat with ceftriaxone> can DC it after tomorrow's dose     Problem/Plan - 3:  ·  Problem: Abdominal pain.    -Differential include UTI vs constipation vs hemorrhagic renal cyst   -Patient is on chronic opioids at NH. On fentanyl patch and oxycodone. >> continue   -For now treat UTI, start aggressive bowel regimen,  -urology consult appreciated     abnormality of pelvic kidney ,  with possible mass or hemorrhagic cyst: concern for malignancy  - MRI ordered, pending   -  treat constipation >> enema as needed     Problem/Plan - 4:  ·  Problem: ESRD on dialysis.    -on HD MWF  -Seen by renal  - teratment of anemia of chronic disease per renal / procrit..      monitor     Problem/Plan - 5:  ·  Problem: Sacral decubitus ulcer, stage IV.  Plan: -on exam ulcer does not appear infected  -Wound care team to follow   - pain mgmt     Problem/Plan - 6:  Problem: Hypothyroidism. Plan: -c/w synthroid.    Problem/Plan - 7:  ·  Problem: CAD in native artery.  Plan: -c/w simvastatin, plavix, lopressor.     Problem/Plan - 8:  ·  Problem: Essential hypertension.  Plan: -c/w lopressor, hydralazine, nifedipine   -monitor bp.     Problem/Plan - 9:  ·  Problem: Prophylactic measure.  Plan: hsq.       --------------------------------------------  Case discussed with RN  Education given on findings and plan of care  ___________________________  HRashaun Hannah D.O.  Pager: [phone]

## 2019-09-09 NOTE — ADVANCED PRACTICE NURSE CONSULT - REASON FOR CONSULT
Patient seen on skin care rounds after wound care referral received for assessment of skin impairment and recommendations of topical management. Chart reviewed: Alex Barry, WBC 10.18, Hemoglobin/HCT: 8.8/29.6, BMI 19.4kg/m2, Hemoglobin A1C 5.7%, patient interviewed, reports chronic pressure injury for about 2 years. Reports she resides at NH and they perform daily dressing changes. Patient endorses continence of urine and stool and endorses she wears diapers at the nursing home for comfort. Patient H/O of  HTN, HLD, hypothyroidism, constipation, CAD, bedbound, ESRD on HD since last yr (MWF, still produces urine once a day), last dialyzed on Wednesday, called by dialysis center last night and told to come to ED for abnormal lab value (WBC of 22.5 from labs drawn on Wednesday). Pt reports mild lower abdominal discomfort X few days which feels like her typical constipation pain (last BM was on Monday but is passing gas and denies abd surgery hx) but denies any other symptoms. Specifically, pt reports no fever/chills, nausea, vomiting, dysuria. Patient states that she feels at her usual state of health. No chest pain/palpitations, no SOB/cough/wheeze/stridor. She has known sacral decub ulcer and patient denies having malodorous secretions from wound. Patient states that she was last admitted in a hospital last year. She has been at her NH for last 7 years. Patient has being seen by urology for complex pelvic kidney cyst.

## 2019-09-10 LAB
ANION GAP SERPL CALC-SCNC: 17 MMO/L — HIGH (ref 7–14)
BASOPHILS # BLD AUTO: 0.05 K/UL — SIGNIFICANT CHANGE UP (ref 0–0.2)
BASOPHILS NFR BLD AUTO: 0.4 % — SIGNIFICANT CHANGE UP (ref 0–2)
BUN SERPL-MCNC: 37 MG/DL — HIGH (ref 7–23)
CALCIUM SERPL-MCNC: 11.4 MG/DL — HIGH (ref 8.4–10.5)
CHLORIDE SERPL-SCNC: 93 MMOL/L — LOW (ref 98–107)
CO2 SERPL-SCNC: 28 MMOL/L — SIGNIFICANT CHANGE UP (ref 22–31)
CREAT SERPL-MCNC: 4.01 MG/DL — HIGH (ref 0.5–1.3)
EOSINOPHIL # BLD AUTO: 0.2 K/UL — SIGNIFICANT CHANGE UP (ref 0–0.5)
EOSINOPHIL NFR BLD AUTO: 1.6 % — SIGNIFICANT CHANGE UP (ref 0–6)
GLUCOSE SERPL-MCNC: 175 MG/DL — HIGH (ref 70–99)
HCT VFR BLD CALC: 30.9 % — LOW (ref 34.5–45)
HGB BLD-MCNC: 9.4 G/DL — LOW (ref 11.5–15.5)
IMM GRANULOCYTES NFR BLD AUTO: 0.4 % — SIGNIFICANT CHANGE UP (ref 0–1.5)
LYMPHOCYTES # BLD AUTO: 2.56 K/UL — SIGNIFICANT CHANGE UP (ref 1–3.3)
LYMPHOCYTES # BLD AUTO: 20.7 % — SIGNIFICANT CHANGE UP (ref 13–44)
MAGNESIUM SERPL-MCNC: 2.4 MG/DL — SIGNIFICANT CHANGE UP (ref 1.6–2.6)
MCHC RBC-ENTMCNC: 28.2 PG — SIGNIFICANT CHANGE UP (ref 27–34)
MCHC RBC-ENTMCNC: 30.4 % — LOW (ref 32–36)
MCV RBC AUTO: 92.8 FL — SIGNIFICANT CHANGE UP (ref 80–100)
MONOCYTES # BLD AUTO: 1.2 K/UL — HIGH (ref 0–0.9)
MONOCYTES NFR BLD AUTO: 9.7 % — SIGNIFICANT CHANGE UP (ref 2–14)
NEUTROPHILS # BLD AUTO: 8.3 K/UL — HIGH (ref 1.8–7.4)
NEUTROPHILS NFR BLD AUTO: 67.2 % — SIGNIFICANT CHANGE UP (ref 43–77)
NRBC # FLD: 0 K/UL — SIGNIFICANT CHANGE UP (ref 0–0)
PHOSPHATE SERPL-MCNC: 2.6 MG/DL — SIGNIFICANT CHANGE UP (ref 2.5–4.5)
PLATELET # BLD AUTO: 556 K/UL — HIGH (ref 150–400)
PMV BLD: 9.5 FL — SIGNIFICANT CHANGE UP (ref 7–13)
POTASSIUM SERPL-MCNC: 4.2 MMOL/L — SIGNIFICANT CHANGE UP (ref 3.5–5.3)
POTASSIUM SERPL-SCNC: 4.2 MMOL/L — SIGNIFICANT CHANGE UP (ref 3.5–5.3)
RBC # BLD: 3.33 M/UL — LOW (ref 3.8–5.2)
RBC # FLD: 14.9 % — HIGH (ref 10.3–14.5)
SODIUM SERPL-SCNC: 138 MMOL/L — SIGNIFICANT CHANGE UP (ref 135–145)
WBC # BLD: 12.36 K/UL — HIGH (ref 3.8–10.5)
WBC # FLD AUTO: 12.36 K/UL — HIGH (ref 3.8–10.5)

## 2019-09-10 PROCEDURE — 72196 MRI PELVIS W/DYE: CPT | Mod: 26

## 2019-09-10 PROCEDURE — 74182 MRI ABDOMEN W/CONTRAST: CPT | Mod: 26

## 2019-09-10 RX ORDER — FENTANYL CITRATE 50 UG/ML
1 INJECTION INTRAVENOUS
Refills: 0 | Status: DISCONTINUED | OUTPATIENT
Start: 2019-09-10 | End: 2019-09-12

## 2019-09-10 RX ADMIN — DORZOLAMIDE HYDROCHLORIDE TIMOLOL MALEATE 1 DROP(S): 20; 5 SOLUTION/ DROPS OPHTHALMIC at 05:21

## 2019-09-10 RX ADMIN — HEPARIN SODIUM 5000 UNIT(S): 5000 INJECTION INTRAVENOUS; SUBCUTANEOUS at 05:20

## 2019-09-10 RX ADMIN — Medication 650 MILLIGRAM(S): at 01:23

## 2019-09-10 RX ADMIN — HEPARIN SODIUM 5000 UNIT(S): 5000 INJECTION INTRAVENOUS; SUBCUTANEOUS at 23:05

## 2019-09-10 RX ADMIN — Medication 100 MILLIGRAM(S): at 23:05

## 2019-09-10 RX ADMIN — Medication 100 MILLIGRAM(S): at 05:20

## 2019-09-10 RX ADMIN — FENTANYL CITRATE 1 PATCH: 50 INJECTION INTRAVENOUS at 07:51

## 2019-09-10 RX ADMIN — GABAPENTIN 100 MILLIGRAM(S): 400 CAPSULE ORAL at 23:05

## 2019-09-10 RX ADMIN — Medication 75 MILLIGRAM(S): at 05:20

## 2019-09-10 RX ADMIN — Medication 50 MICROGRAM(S): at 05:20

## 2019-09-10 RX ADMIN — SENNA PLUS 2 TABLET(S): 8.6 TABLET ORAL at 23:05

## 2019-09-10 RX ADMIN — Medication 133 MILLILITER(S): at 01:20

## 2019-09-10 RX ADMIN — DORZOLAMIDE HYDROCHLORIDE TIMOLOL MALEATE 1 DROP(S): 20; 5 SOLUTION/ DROPS OPHTHALMIC at 17:57

## 2019-09-10 RX ADMIN — SEVELAMER CARBONATE 1600 MILLIGRAM(S): 2400 POWDER, FOR SUSPENSION ORAL at 23:05

## 2019-09-10 RX ADMIN — FENTANYL CITRATE 1 PATCH: 50 INJECTION INTRAVENOUS at 17:46

## 2019-09-10 RX ADMIN — CLOPIDOGREL BISULFATE 75 MILLIGRAM(S): 75 TABLET, FILM COATED ORAL at 13:31

## 2019-09-10 RX ADMIN — SIMVASTATIN 20 MILLIGRAM(S): 20 TABLET, FILM COATED ORAL at 23:05

## 2019-09-10 RX ADMIN — SEVELAMER CARBONATE 1600 MILLIGRAM(S): 2400 POWDER, FOR SUSPENSION ORAL at 13:30

## 2019-09-10 RX ADMIN — GABAPENTIN 100 MILLIGRAM(S): 400 CAPSULE ORAL at 13:31

## 2019-09-10 RX ADMIN — Medication 1 TABLET(S): at 13:31

## 2019-09-10 RX ADMIN — CEFTRIAXONE 100 MILLIGRAM(S): 500 INJECTION, POWDER, FOR SOLUTION INTRAMUSCULAR; INTRAVENOUS at 01:25

## 2019-09-10 RX ADMIN — FENTANYL CITRATE 1 PATCH: 50 INJECTION INTRAVENOUS at 13:30

## 2019-09-10 RX ADMIN — SEVELAMER CARBONATE 1600 MILLIGRAM(S): 2400 POWDER, FOR SUSPENSION ORAL at 07:58

## 2019-09-10 RX ADMIN — CHLORHEXIDINE GLUCONATE 1 APPLICATION(S): 213 SOLUTION TOPICAL at 13:31

## 2019-09-10 RX ADMIN — FENTANYL CITRATE 1 PATCH: 50 INJECTION INTRAVENOUS at 07:58

## 2019-09-10 RX ADMIN — Medication 60 MILLIGRAM(S): at 05:20

## 2019-09-10 RX ADMIN — Medication 75 MILLIGRAM(S): at 17:57

## 2019-09-10 RX ADMIN — Medication 100 MILLIGRAM(S): at 13:31

## 2019-09-10 RX ADMIN — Medication 100 MILLIGRAM(S): at 17:57

## 2019-09-10 RX ADMIN — PANTOPRAZOLE SODIUM 40 MILLIGRAM(S): 20 TABLET, DELAYED RELEASE ORAL at 05:21

## 2019-09-10 RX ADMIN — GABAPENTIN 100 MILLIGRAM(S): 400 CAPSULE ORAL at 05:20

## 2019-09-10 NOTE — PROGRESS NOTE ADULT - ASSESSMENT
_________________________________________________________________________________________  ========>>  M E D I C A L   A T T E N D I N G    F O L L O W  U P  N O T E  <<=========  -----------------------------------------------------------------------------------------------------    - Patient seen and examined by me   - In summary,  STEVEN HARVEY is a 54y year old woman who originally presented with abd pain   - Patient today overall doing ok, comfortable, eating OK. abd pain resolved / better     ==================>> REVIEW OF SYSTEM <<=================    GEN: no fever, no chills, no pain  at present   RESP: no SOB, no cough, no sputum  CVS: no chest pain, no palpitations, no edema  GI: abdominal pain as above, no nausea  : no dysuria reported today , no frequency  Neuro: no headache, no dizziness  Derm : no itching, no rash    ==================>> PHYSICAL EXAM <<=================    GEN: A&O X 3 , NAD , comfortable  HEENT: NCAT, MMM, hearing intact  Neck: supple , no JVD  CVS: S1S2 , regular , No M/R/G appreciated  PULM: CTA B/L,  no W/R/R appreciated  ABD.: soft. non tender, non distended,  bowel sounds present  Extrem: intact pulses , no edema     wound in lower back dressed   PSYCH : normal mood,  not anxious      ==================>> MEDICATIONS <<====================    chlorhexidine 4% Liquid 1 Application(s) Topical <User Schedule>  clopidogrel Tablet 75 milliGRAM(s) Oral daily  docusate sodium 100 milliGRAM(s) Oral two times a day  dorzolamide 2%/timolol 0.5% Ophthalmic Solution 1 Drop(s) Both EYES two times a day  epoetin jacqueline Injectable 55511 Unit(s) IV Push <User Schedule>  fentaNYL   Patch  75 MICROgram(s)/Hr 1 Patch Transdermal every 72 hours  gabapentin 100 milliGRAM(s) Oral three times a day  heparin  Injectable 5000 Unit(s) SubCutaneous three times a day  hydrALAZINE 100 milliGRAM(s) Oral three times a day  influenza   Vaccine 0.5 milliLiter(s) IntraMuscular once  levothyroxine 50 MICROGram(s) Oral daily  metoprolol tartrate 75 milliGRAM(s) Oral two times a day  multivitamin 1 Tablet(s) Oral daily  NIFEdipine XL 60 milliGRAM(s) Oral daily  pantoprazole    Tablet 40 milliGRAM(s) Oral before breakfast  senna 2 Tablet(s) Oral at bedtime  sevelamer carbonate 1600 milliGRAM(s) Oral three times a day  simvastatin 20 milliGRAM(s) Oral at bedtime    MEDICATIONS  (PRN):  acetaminophen   Tablet .. 650 milliGRAM(s) Oral every 6 hours PRN Mild Pain (1 - 3), Moderate Pain (4 - 6)  mineral oil enema 133 milliLiter(s) Rectal daily PRN constipation    ==================>> VITAL SIGNS <<==================    Vital Signs Last 24 Hrs  T(C): 37.2 (09-10-19 @ 05:18)  T(F): 98.9 (09-10-19 @ 05:18), Max: 99.1 (09-09-19 @ 20:42)  HR: 80 (09-10-19 @ 05:18) (79 - 80)  BP: 168/51 (09-10-19 @ 05:18)  RR: 20 (09-10-19 @ 05:18) (18 - 20)  SpO2: 100% (09-10-19 @ 05:18) (100% - 100%)       ==================>> LAB AND IMAGING <<==================                        9.4    12.36 )-----------( 556      ( 10 Sep 2019 09:57 )             30.9        138  |  93<L>  |  37<H>  ----------------------------<  175<H>  4.2   |  28  |  4.01<H>    Ca    11.4<H>      10 Sep 2019 09:57  Phos  2.6     09-10  Mg     2.4     09-10    TPro  8.2  /  Alb  3.6  /  TBili  0.3  /  DBili  x   /  AST  36<H>  /  ALT  8   /  AlkPhos  151<H>  09-09    WBC count:   12.36 <<== ,  10.18 <<== ,  8.58 <<== ,  9.23 <<== ,  15.51 <<==   Hemoglobin:   9.4 <<==,  8.8 <<==,  8.2 <<==,  8.4 <<==,  8.7 <<==  platelets:  556 <==, 520 <==, 480 <==, 429 <==, 433 <==    Creatinine:  4.01  <<==, 6.03  <<==, 4.80  <<==, 2.71  <<==, 4.40  <<==  Sodium:   138  <==, 130  <==, 135  <==, 136  <==, 132  <==       AST:          36 <== , 8 <== , 7 <==      ALT:        8  <== , 7  <== , 8  <==      AP:        151  <=, 149  <=, 161  <=     Bili:        0.3  <=, 0.3  <=, 0.3  <=    < from: US Abdomen Complete (09.09.19 @ 13:16) >  IMPRESSION:   Redemonstration of pelvic kidney, without evidence of hydronephrosis on   the current exam.  < end of copied text >    MRI pending ... !   ___________________________________________________________________________________  ===============>>  A S S E S S M E N T   A N D   P L A N <<===============  ------------------------------------------------------------------------------------------    · Assessment		  55 y/o female PMH of HTN, HLD, hypothyroidism, constipation, CAD, bedbound, ESRD on HD sent in for leukocytosis admitted for infectious workup     Problem/Plan - 1:  ·  Problem: Leukocytosis.  now resolved post abx   -For now sources of infection include acute cystitis   -Monitor for fevers.   - abx as bellow   - follow cultures     Problem/Plan - 2:  ·  Problem: Acute cystitis without hematuria.    -Patient with suprapubic pain and positive UA  -treated > observe off abx     Problem/Plan - 3:  ·  Problem: Abdominal pain.    -Differential include UTI vs constipation vs hemorrhagic renal cyst   -Patient is on chronic opioids at NH. On fentanyl patch and oxycodone. >> continue   -For now treat UTI, start aggressive bowel regimen,  -urology consult appreciated     abnormality of pelvic kidney ,  with possible mass or hemorrhagic cyst: concern for malignancy  - MRI ordered, pending   -  treat constipation >> enema as needed     Problem/Plan - 4:  ·  Problem: ESRD on dialysis.    -on HD MWF  -Seen by renal  - teratment of anemia of chronic disease per renal / procrit..      monitor     Problem/Plan - 5:  ·  Problem: Sacral decubitus ulcer, stage IV.  Plan: -on exam ulcer does not appear infected  -Wound care team to follow   - pain mgmt     Problem/Plan - 6:  Problem: Hypothyroidism. Plan: -c/w synthroid.    Problem/Plan - 7:  ·  Problem: CAD in native artery.  Plan: -c/w simvastatin, plavix, lopressor.     Problem/Plan - 8:  ·  Problem: Essential hypertension.  Plan: -c/w lopressor, hydralazine, nifedipine   -monitor bp.     Problem/Plan - 9:  ·  Problem: Prophylactic measure.  Plan: hsq.       --------------------------------------------  Case discussed with pt  Education given on findings and plan of care  ___________________________  H. MIKAELA Hannah.  Pager: [phone] _________________________________________________________________________________________  ========>>  M E D I C A L   A T T E N D I N G    F O L L O W  U P  N O T E  <<=========  -----------------------------------------------------------------------------------------------------    - Patient seen and examined by me   - In summary,  STEVEN HARVEY is a 54y year old woman who originally presented with abd pain   - Patient today overall doing ok, comfortable, eating OK. abd pain resolved / better     ==================>> REVIEW OF SYSTEM <<=================    GEN: no fever, no chills, no pain  at present   RESP: no SOB, no cough, no sputum  CVS: no chest pain, no palpitations, no edema  GI: abdominal pain as above, no nausea  : no dysuria reported today , no frequency  Neuro: no headache, no dizziness  Derm : no itching, no rash    ==================>> PHYSICAL EXAM <<=================    GEN: A&O X 3 , NAD , comfortable  HEENT: NCAT, MMM, hearing intact  Neck: supple , no JVD  CVS: S1S2 , regular , No M/R/G appreciated  PULM: CTA B/L,  no W/R/R appreciated  ABD.: soft. non tender, non distended,  bowel sounds present  Extrem: intact pulses , no edema     wound in lower back dressed   PSYCH : normal mood,  not anxious      ==================>> MEDICATIONS <<====================    chlorhexidine 4% Liquid 1 Application(s) Topical <User Schedule>  clopidogrel Tablet 75 milliGRAM(s) Oral daily  docusate sodium 100 milliGRAM(s) Oral two times a day  dorzolamide 2%/timolol 0.5% Ophthalmic Solution 1 Drop(s) Both EYES two times a day  epoetin jacqueline Injectable 18419 Unit(s) IV Push <User Schedule>  fentaNYL   Patch  75 MICROgram(s)/Hr 1 Patch Transdermal every 72 hours  gabapentin 100 milliGRAM(s) Oral three times a day  heparin  Injectable 5000 Unit(s) SubCutaneous three times a day  hydrALAZINE 100 milliGRAM(s) Oral three times a day  influenza   Vaccine 0.5 milliLiter(s) IntraMuscular once  levothyroxine 50 MICROGram(s) Oral daily  metoprolol tartrate 75 milliGRAM(s) Oral two times a day  multivitamin 1 Tablet(s) Oral daily  NIFEdipine XL 60 milliGRAM(s) Oral daily  pantoprazole    Tablet 40 milliGRAM(s) Oral before breakfast  senna 2 Tablet(s) Oral at bedtime  sevelamer carbonate 1600 milliGRAM(s) Oral three times a day  simvastatin 20 milliGRAM(s) Oral at bedtime    MEDICATIONS  (PRN):  acetaminophen   Tablet .. 650 milliGRAM(s) Oral every 6 hours PRN Mild Pain (1 - 3), Moderate Pain (4 - 6)  mineral oil enema 133 milliLiter(s) Rectal daily PRN constipation    ==================>> VITAL SIGNS <<==================    Vital Signs Last 24 Hrs  T(C): 37.2 (09-10-19 @ 05:18)  T(F): 98.9 (09-10-19 @ 05:18), Max: 99.1 (09-09-19 @ 20:42)  HR: 80 (09-10-19 @ 05:18) (79 - 80)  BP: 168/51 (09-10-19 @ 05:18)  RR: 20 (09-10-19 @ 05:18) (18 - 20)  SpO2: 100% (09-10-19 @ 05:18) (100% - 100%)       ==================>> LAB AND IMAGING <<==================                        9.4    12.36 )-----------( 556      ( 10 Sep 2019 09:57 )             30.9        138  |  93<L>  |  37<H>  ----------------------------<  175<H>  4.2   |  28  |  4.01<H>    Ca    11.4<H>      10 Sep 2019 09:57  Phos  2.6     09-10  Mg     2.4     09-10    TPro  8.2  /  Alb  3.6  /  TBili  0.3  /  DBili  x   /  AST  36<H>  /  ALT  8   /  AlkPhos  151<H>  09-09    WBC count:   12.36 <<== ,  10.18 <<== ,  8.58 <<== ,  9.23 <<== ,  15.51 <<==   Hemoglobin:   9.4 <<==,  8.8 <<==,  8.2 <<==,  8.4 <<==,  8.7 <<==  platelets:  556 <==, 520 <==, 480 <==, 429 <==, 433 <==    Creatinine:  4.01  <<==, 6.03  <<==, 4.80  <<==, 2.71  <<==, 4.40  <<==  Sodium:   138  <==, 130  <==, 135  <==, 136  <==, 132  <==       AST:          36 <== , 8 <== , 7 <==      ALT:        8  <== , 7  <== , 8  <==      AP:        151  <=, 149  <=, 161  <=     Bili:        0.3  <=, 0.3  <=, 0.3  <=    < from: US Abdomen Complete (09.09.19 @ 13:16) >  IMPRESSION:   Redemonstration of pelvic kidney, without evidence of hydronephrosis on   the current exam.  < end of copied text >    MRI pending ... !   ___________________________________________________________________________________  ===============>>  A S S E S S M E N T   A N D   P L A N <<===============  ------------------------------------------------------------------------------------------    · Assessment		  53 y/o female PMH of HTN, HLD, hypothyroidism, constipation, CAD, bedbound, ESRD on HD sent in for leukocytosis admitted for infectious workup     Problem/Plan - 1:  ·  Problem: Leukocytosis.  now resolved post abx   -For now sources of infection include acute cystitis   -Monitor for fevers.   - abx as bellow   - follow cultures     Problem/Plan - 2:  ·  Problem: Acute cystitis without hematuria.    -Patient with suprapubic pain and positive UA  -treated > observe off abx     Problem/Plan - 3:  ·  Problem: Abdominal pain.    -Differential include UTI vs constipation vs hemorrhagic renal cyst   -Patient is on chronic opioids at NH. On fentanyl patch and oxycodone. >> continue   -For now treat UTI, start aggressive bowel regimen,  -urology consult appreciated     abnormality of pelvic kidney ,  with possible mass or hemorrhagic cyst: concern for malignancy  - MRI ordered, pending   -  treat constipation >> enema as needed     Problem/Plan - 4:  ·  Problem: ESRD on dialysis.    -on HD MWF  -Seen by renal  - teratment of anemia of chronic disease per renal / procrit..      monitor     Problem/Plan - 5:  ·  Problem: Sacral decubitus ulcer, stage IV.  Plan: -on exam ulcer does not appear infected  -Wound care team to follow   - pain mgmt  ( pt on chronic fentanyl patch)     Problem/Plan - 6:  Problem: Hypothyroidism. Plan: -c/w synthroid.    Problem/Plan - 7:  ·  Problem: CAD in native artery.  Plan: -c/w simvastatin, plavix, lopressor.     Problem/Plan - 8:  ·  Problem: Essential hypertension.  Plan: -c/w lopressor, hydralazine, nifedipine   -monitor bp.     Problem/Plan - 9:  ·  Problem: Prophylactic measure.  Plan: hsq.       --------------------------------------------  Case discussed with pt  Education given on findings and plan of care  ___________________________  HRashaun Hannah D.O.  Pager: [phone]

## 2019-09-11 LAB
ANION GAP SERPL CALC-SCNC: 20 MMO/L — HIGH (ref 7–14)
BACTERIA BLD CULT: SIGNIFICANT CHANGE UP
BACTERIA BLD CULT: SIGNIFICANT CHANGE UP
BASOPHILS # BLD AUTO: 0.07 K/UL — SIGNIFICANT CHANGE UP (ref 0–0.2)
BASOPHILS NFR BLD AUTO: 0.7 % — SIGNIFICANT CHANGE UP (ref 0–2)
BUN SERPL-MCNC: 57 MG/DL — HIGH (ref 7–23)
CALCIUM SERPL-MCNC: 10.5 MG/DL — SIGNIFICANT CHANGE UP (ref 8.4–10.5)
CHLORIDE SERPL-SCNC: 91 MMOL/L — LOW (ref 98–107)
CO2 SERPL-SCNC: 27 MMOL/L — SIGNIFICANT CHANGE UP (ref 22–31)
CREAT SERPL-MCNC: 4.93 MG/DL — HIGH (ref 0.5–1.3)
EOSINOPHIL # BLD AUTO: 0.37 K/UL — SIGNIFICANT CHANGE UP (ref 0–0.5)
EOSINOPHIL NFR BLD AUTO: 3.6 % — SIGNIFICANT CHANGE UP (ref 0–6)
GLUCOSE SERPL-MCNC: 108 MG/DL — HIGH (ref 70–99)
HCT VFR BLD CALC: 26.9 % — LOW (ref 34.5–45)
HGB BLD-MCNC: 8.3 G/DL — LOW (ref 11.5–15.5)
IMM GRANULOCYTES NFR BLD AUTO: 0.6 % — SIGNIFICANT CHANGE UP (ref 0–1.5)
LYMPHOCYTES # BLD AUTO: 2.44 K/UL — SIGNIFICANT CHANGE UP (ref 1–3.3)
LYMPHOCYTES # BLD AUTO: 24 % — SIGNIFICANT CHANGE UP (ref 13–44)
MAGNESIUM SERPL-MCNC: 2.3 MG/DL — SIGNIFICANT CHANGE UP (ref 1.6–2.6)
MCHC RBC-ENTMCNC: 28.4 PG — SIGNIFICANT CHANGE UP (ref 27–34)
MCHC RBC-ENTMCNC: 30.9 % — LOW (ref 32–36)
MCV RBC AUTO: 92.1 FL — SIGNIFICANT CHANGE UP (ref 80–100)
MONOCYTES # BLD AUTO: 1.24 K/UL — HIGH (ref 0–0.9)
MONOCYTES NFR BLD AUTO: 12.2 % — SIGNIFICANT CHANGE UP (ref 2–14)
NEUTROPHILS # BLD AUTO: 5.98 K/UL — SIGNIFICANT CHANGE UP (ref 1.8–7.4)
NEUTROPHILS NFR BLD AUTO: 58.9 % — SIGNIFICANT CHANGE UP (ref 43–77)
NRBC # FLD: 0.02 K/UL — SIGNIFICANT CHANGE UP (ref 0–0)
PHOSPHATE SERPL-MCNC: 3.1 MG/DL — SIGNIFICANT CHANGE UP (ref 2.5–4.5)
PLATELET # BLD AUTO: 485 K/UL — HIGH (ref 150–400)
PMV BLD: 10 FL — SIGNIFICANT CHANGE UP (ref 7–13)
POTASSIUM SERPL-MCNC: 4.2 MMOL/L — SIGNIFICANT CHANGE UP (ref 3.5–5.3)
POTASSIUM SERPL-SCNC: 4.2 MMOL/L — SIGNIFICANT CHANGE UP (ref 3.5–5.3)
RBC # BLD: 2.92 M/UL — LOW (ref 3.8–5.2)
RBC # FLD: 15 % — HIGH (ref 10.3–14.5)
SODIUM SERPL-SCNC: 138 MMOL/L — SIGNIFICANT CHANGE UP (ref 135–145)
WBC # BLD: 10.16 K/UL — SIGNIFICANT CHANGE UP (ref 3.8–10.5)
WBC # FLD AUTO: 10.16 K/UL — SIGNIFICANT CHANGE UP (ref 3.8–10.5)

## 2019-09-11 RX ADMIN — GABAPENTIN 100 MILLIGRAM(S): 400 CAPSULE ORAL at 05:13

## 2019-09-11 RX ADMIN — FENTANYL CITRATE 1 PATCH: 50 INJECTION INTRAVENOUS at 08:09

## 2019-09-11 RX ADMIN — Medication 1 TABLET(S): at 15:14

## 2019-09-11 RX ADMIN — HEPARIN SODIUM 5000 UNIT(S): 5000 INJECTION INTRAVENOUS; SUBCUTANEOUS at 05:13

## 2019-09-11 RX ADMIN — FENTANYL CITRATE 1 PATCH: 50 INJECTION INTRAVENOUS at 19:00

## 2019-09-11 RX ADMIN — Medication 50 MICROGRAM(S): at 05:13

## 2019-09-11 RX ADMIN — DORZOLAMIDE HYDROCHLORIDE TIMOLOL MALEATE 1 DROP(S): 20; 5 SOLUTION/ DROPS OPHTHALMIC at 06:09

## 2019-09-11 RX ADMIN — HEPARIN SODIUM 5000 UNIT(S): 5000 INJECTION INTRAVENOUS; SUBCUTANEOUS at 22:42

## 2019-09-11 RX ADMIN — Medication 100 MILLIGRAM(S): at 05:13

## 2019-09-11 RX ADMIN — Medication 100 MILLIGRAM(S): at 15:14

## 2019-09-11 RX ADMIN — GABAPENTIN 100 MILLIGRAM(S): 400 CAPSULE ORAL at 22:39

## 2019-09-11 RX ADMIN — SENNA PLUS 2 TABLET(S): 8.6 TABLET ORAL at 22:40

## 2019-09-11 RX ADMIN — Medication 75 MILLIGRAM(S): at 17:19

## 2019-09-11 RX ADMIN — CHLORHEXIDINE GLUCONATE 1 APPLICATION(S): 213 SOLUTION TOPICAL at 15:14

## 2019-09-11 RX ADMIN — CLOPIDOGREL BISULFATE 75 MILLIGRAM(S): 75 TABLET, FILM COATED ORAL at 15:14

## 2019-09-11 RX ADMIN — DORZOLAMIDE HYDROCHLORIDE TIMOLOL MALEATE 1 DROP(S): 20; 5 SOLUTION/ DROPS OPHTHALMIC at 17:20

## 2019-09-11 RX ADMIN — PANTOPRAZOLE SODIUM 40 MILLIGRAM(S): 20 TABLET, DELAYED RELEASE ORAL at 05:13

## 2019-09-11 RX ADMIN — Medication 60 MILLIGRAM(S): at 05:13

## 2019-09-11 RX ADMIN — HEPARIN SODIUM 5000 UNIT(S): 5000 INJECTION INTRAVENOUS; SUBCUTANEOUS at 15:14

## 2019-09-11 RX ADMIN — SEVELAMER CARBONATE 1600 MILLIGRAM(S): 2400 POWDER, FOR SUSPENSION ORAL at 15:14

## 2019-09-11 RX ADMIN — SEVELAMER CARBONATE 1600 MILLIGRAM(S): 2400 POWDER, FOR SUSPENSION ORAL at 22:40

## 2019-09-11 RX ADMIN — ERYTHROPOIETIN 10000 UNIT(S): 10000 INJECTION, SOLUTION INTRAVENOUS; SUBCUTANEOUS at 11:29

## 2019-09-11 RX ADMIN — SEVELAMER CARBONATE 1600 MILLIGRAM(S): 2400 POWDER, FOR SUSPENSION ORAL at 05:13

## 2019-09-11 RX ADMIN — SIMVASTATIN 20 MILLIGRAM(S): 20 TABLET, FILM COATED ORAL at 22:40

## 2019-09-11 RX ADMIN — GABAPENTIN 100 MILLIGRAM(S): 400 CAPSULE ORAL at 15:14

## 2019-09-11 RX ADMIN — Medication 100 MILLIGRAM(S): at 22:40

## 2019-09-11 NOTE — PROGRESS NOTE ADULT - SUBJECTIVE AND OBJECTIVE BOX
Nephrology Followup Note - [phone] - Dr Valles / Dr Mahoney / Dr Syed / Dr Lamas / Dr Lozano / Dr Castaneda / Dr Monique / Dr Escoto  Pt seen and examined during HD.   No acute events overnight. No complaints.     Allergies:  No Known Allergies    Hospital Medications:   MEDICATIONS  (STANDING):  chlorhexidine 4% Liquid 1 Application(s) Topical <User Schedule>  clopidogrel Tablet 75 milliGRAM(s) Oral daily  docusate sodium 100 milliGRAM(s) Oral two times a day  dorzolamide 2%/timolol 0.5% Ophthalmic Solution 1 Drop(s) Both EYES two times a day  epoetin jacqueline Injectable 66006 Unit(s) IV Push <User Schedule>  fentaNYL   Patch  75 MICROgram(s)/Hr 1 Patch Transdermal every 72 hours  gabapentin 100 milliGRAM(s) Oral three times a day  heparin  Injectable 5000 Unit(s) SubCutaneous three times a day  hydrALAZINE 100 milliGRAM(s) Oral three times a day  influenza   Vaccine 0.5 milliLiter(s) IntraMuscular once  levothyroxine 50 MICROGram(s) Oral daily  metoprolol tartrate 75 milliGRAM(s) Oral two times a day  multivitamin 1 Tablet(s) Oral daily  NIFEdipine XL 60 milliGRAM(s) Oral daily  pantoprazole    Tablet 40 milliGRAM(s) Oral before breakfast  senna 2 Tablet(s) Oral at bedtime  sevelamer carbonate 1600 milliGRAM(s) Oral three times a day  simvastatin 20 milliGRAM(s) Oral at bedtime      VITALS:  T(F): 98.6 (19 @ 05:11), Max: 98.6 (19 @ 05:11)  HR: 82 (19 @ 05:11)  BP: 151/60 (19 @ 05:11)  RR: 17 (19 @ 05:11)  SpO2: 97% (19 @ 05:11)  Wt(kg): --      PHYSICAL EXAM:  Constitutional: NAD  HEENT: anicteric sclera, oropharynx clear, MMM  Neck: No JVD  Respiratory: CTAB, no wheezes, rales or rhonchi  Cardiovascular: S1, S2, RRR  Gastrointestinal: BS+, soft, NT/ND  Extremities: No cyanosis or clubbing. No peripheral edema  Neurological: A/O x 3, no focal deficits  Psychiatric: Normal mood, normal affect  : No CVA tenderness. No jarrell.   Skin: No rashes  Vascular Access: LUE AV access +thrill and bruit.     LABS:      138  |  91<L>  |  57<H>  ----------------------------<  108<H>  4.2   |  27  |  4.93<H>    Ca    10.5      11 Sep 2019 05:54  Phos  3.1       Mg     2.3           Creatinine Trend: 4.93 <--, 4.01 <--, 6.03 <--, 4.80 <--, 2.71 <--, 4.40 <--                        8.3    10.16 )-----------( 485      ( 11 Sep 2019 05:54 )             26.9     Urine Studies:  Urinalysis Basic - ( 06 Sep 2019 06:19 )    Color: YELLOW / Appearance: TURBID / S.014 / pH: 7.5  Gluc: 100 / Ketone: NEGATIVE  / Bili: NEGATIVE / Urobili: NORMAL   Blood: MODERATE / Protein: 300 / Nitrite: NEGATIVE   Leuk Esterase: LARGE / RBC: >50 / WBC >50   Sq Epi: MANY / Non Sq Epi:  / Bacteria: NEGATIVE        RADIOLOGY & ADDITIONAL STUDIES:  < from: MR Pelvis w/ IV Cont (09.10.19 @ 15:32) >  IMPRESSION:     Multiple cystic lesions in the solitary pelvic kidney with multiple thin   septations. Follow-up contrast-enhanced renal protocol CT or MRI is   recommended in 6 months.    < end of copied text >

## 2019-09-11 NOTE — PROGRESS NOTE ADULT - PROBLEM SELECTOR PLAN 1
REpeat HD today.   Elevated Calcium level, low calcium bath on HD  UD goal 1.5 kg, as BP tolerates.   Daily BMP  Dose meds for ESRD  Renal diet  Resolved leukocytosis, afebrile and asymptomatic.

## 2019-09-11 NOTE — PROGRESS NOTE ADULT - ASSESSMENT
_________________________________________________________________________________________  ========>>  M E D I C A L   A T T E N D I N G    F O L L O W  U P  N O T E  <<=========  -----------------------------------------------------------------------------------------------------    - Patient seen and examined by me   - In summary,  STEVEN HARVEY is a 54y year old woman who originally presented with abd pain   - Patient today overall doing ok, comfortable, eating OK. abd pain resolved / better     ==================>> REVIEW OF SYSTEM <<=================    GEN: no fever, no chills, no pain  at present   RESP: no SOB, no cough, no sputum  CVS: no chest pain, no palpitations, no edema  GI: abdominal pain as above, no nausea  : no dysuria reported today , no frequency  Neuro: no headache, no dizziness  Derm : no itching, no rash    ==================>> PHYSICAL EXAM <<=================    GEN: A&O X 3 , NAD , comfortable  HEENT: NCAT, MMM, hearing intact  Neck: supple , no JVD  CVS: S1S2 , regular , No M/R/G appreciated  PULM: CTA B/L,  no W/R/R appreciated  ABD.: soft. non tender, non distended,  bowel sounds present  Extrem: intact pulses , no edema     wound in lower back dressed   PSYCH : normal mood,  not anxious       ==================>> MEDICATIONS <<====================    chlorhexidine 4% Liquid 1 Application(s) Topical <User Schedule>  clopidogrel Tablet 75 milliGRAM(s) Oral daily  docusate sodium 100 milliGRAM(s) Oral two times a day  dorzolamide 2%/timolol 0.5% Ophthalmic Solution 1 Drop(s) Both EYES two times a day  epoetin jacqueline Injectable 58934 Unit(s) IV Push <User Schedule>  fentaNYL   Patch  75 MICROgram(s)/Hr 1 Patch Transdermal every 72 hours  gabapentin 100 milliGRAM(s) Oral three times a day  heparin  Injectable 5000 Unit(s) SubCutaneous three times a day  hydrALAZINE 100 milliGRAM(s) Oral three times a day  influenza   Vaccine 0.5 milliLiter(s) IntraMuscular once  levothyroxine 50 MICROGram(s) Oral daily  metoprolol tartrate 75 milliGRAM(s) Oral two times a day  multivitamin 1 Tablet(s) Oral daily  NIFEdipine XL 60 milliGRAM(s) Oral daily  pantoprazole    Tablet 40 milliGRAM(s) Oral before breakfast  senna 2 Tablet(s) Oral at bedtime  sevelamer carbonate 1600 milliGRAM(s) Oral three times a day  simvastatin 20 milliGRAM(s) Oral at bedtime    MEDICATIONS  (PRN):  acetaminophen   Tablet .. 650 milliGRAM(s) Oral every 6 hours PRN Mild Pain (1 - 3), Moderate Pain (4 - 6)  mineral oil enema 133 milliLiter(s) Rectal daily PRN constipation    ==================>> VITAL SIGNS <<==================    Vital Signs Last 24 Hrs  T(C): 36.6 (09-11-19 @ 15:12)  T(F): 97.9 (09-11-19 @ 15:12), Max: 98.6 (09-11-19 @ 05:11)  HR: 95 (09-11-19 @ 17:18) (82 - 95)  BP: 117/43 (09-11-19 @ 17:18)  BP(mean): --  RR: 16 (09-11-19 @ 15:12) (16 - 17)  SpO2: 100% (09-11-19 @ 15:12) (97% - 100%)    CAPILLARY BLOOD GLUCOSE         ==================>> LAB AND IMAGING <<==================                        8.3    10.16 )-----------( 485      ( 11 Sep 2019 05:54 )             26.9        09-11    138  |  91<L>  |  57<H>  ----------------------------<  108<H>  4.2   |  27  |  4.93<H>    Ca    10.5      11 Sep 2019 05:54  Phos  3.1     09-11  Mg     2.3     09-11      WBC count:   10.16 <<== ,  12.36 <<== ,  10.18 <<== ,  8.58 <<== ,  9.23 <<==   Hemoglobin:   8.3 <<==,  9.4 <<==,  8.8 <<==,  8.2 <<==,  8.4 <<==  platelets:  485 <==, 556 <==, 520 <==, 480 <==, 429 <==, 433 <==    Creatinine:  4.93  <<==, 4.01  <<==, 6.03  <<==, 4.80  <<==, 2.71  <<==, 4.40  <<==  Sodium:   138  <==, 138  <==, 130  <==, 135  <==, 136  <==, 132  <==       AST:          36 <== , 8 <==      ALT:        8  <== , 7  <==      AP:        151  <=, 149  <=     Bili:        0.3  <=, 0.3  <=    < from: MR Pelvis w/ IV Cont (09.10.19 @ 15:32) >  IMPRESSION:   Multiple cystic lesions in the solitary pelvic kidney with multiple thin   septations. Follow-up contrast-enhanced renal protocol CT or MRI is   recommended in 6 months.  < end of copied text     ___________________________________________________________________________________  ===============>>  A S S E S S M E N T   A N D   P L A N <<===============  ------------------------------------------------------------------------------------------    · Assessment		  55 y/o female PMH of HTN, HLD, hypothyroidism, constipation, CAD, bedbound, ESRD on HD sent in for leukocytosis admitted for infectious workup     Problem/Plan - 1:  ·  Problem: Leukocytosis.  now resolved post abx   -For now sources of infection include acute cystitis   -Monitor for fevers.   - abx as bellow   - follow cultures     Problem/Plan - 2:  ·  Problem: Acute cystitis without hematuria.    -Patient with suprapubic pain and positive UA  -treated > observe off abx     Problem/Plan - 3:  ·  Problem: Abdominal pain.    -Differential include UTI vs constipation vs hemorrhagic renal cyst   -Patient is on chronic opioids at NH. On fentanyl patch and oxycodone. >> continue   - aggressive bowel regimen  - urology follow up needed re MRI abnormalities        concern for malignancy:  f/u    Problem/Plan - 4:  ·  Problem: ESRD on dialysis.    -on HD MWF  -Seen by renal  - treatment of anemia of chronic disease per renal / procrit..      monitor     Problem/Plan - 5:  ·  Problem: Sacral decubitus ulcer, stage IV.  Plan: -on exam ulcer does not appear infected  -Wound care team to follow   - pain mgmt  ( pt on chronic fentanyl patch)     Problem/Plan - 6:  Problem: Hypothyroidism. Plan: -c/w synthroid.    Problem/Plan - 7:  ·  Problem: CAD in native artery.  Plan: -c/w simvastatin, plavix, lopressor.     Problem/Plan - 8:  ·  Problem: Essential hypertension.  Plan: -c/w lopressor, hydralazine, nifedipine   -monitor bp.     Problem/Plan - 9:  ·  Problem: Prophylactic measure.  Plan: hsq.       --------------------------------------------  Case discussed with pt, NP  Education given on findings and plan of care  ___________________________  H. MIKAELA Hannah.  Pager: [phone]

## 2019-09-11 NOTE — PROGRESS NOTE ADULT - ASSESSMENT
54y Female w ESRD on HD MWF @ [facility] HD unit, DM, HTN referred to ED for leukocytosis from routine labwork, with abd pain.

## 2019-09-12 ENCOUNTER — TRANSCRIPTION ENCOUNTER (OUTPATIENT)
Age: 54
End: 2019-09-12

## 2019-09-12 VITALS — DIASTOLIC BLOOD PRESSURE: 57 MMHG | HEART RATE: 87 BPM | SYSTOLIC BLOOD PRESSURE: 140 MMHG

## 2019-09-12 DIAGNOSIS — R10.9 UNSPECIFIED ABDOMINAL PAIN: ICD-10-CM

## 2019-09-12 LAB
ANION GAP SERPL CALC-SCNC: 17 MMO/L — HIGH (ref 7–14)
BASOPHILS # BLD AUTO: 0.07 K/UL — SIGNIFICANT CHANGE UP (ref 0–0.2)
BASOPHILS NFR BLD AUTO: 0.5 % — SIGNIFICANT CHANGE UP (ref 0–2)
BUN SERPL-MCNC: 37 MG/DL — HIGH (ref 7–23)
CALCIUM SERPL-MCNC: 10.9 MG/DL — HIGH (ref 8.4–10.5)
CHLORIDE SERPL-SCNC: 90 MMOL/L — LOW (ref 98–107)
CO2 SERPL-SCNC: 29 MMOL/L — SIGNIFICANT CHANGE UP (ref 22–31)
CREAT SERPL-MCNC: 3.75 MG/DL — HIGH (ref 0.5–1.3)
EOSINOPHIL # BLD AUTO: 0.49 K/UL — SIGNIFICANT CHANGE UP (ref 0–0.5)
EOSINOPHIL NFR BLD AUTO: 3.8 % — SIGNIFICANT CHANGE UP (ref 0–6)
GLUCOSE SERPL-MCNC: 142 MG/DL — HIGH (ref 70–99)
HCT VFR BLD CALC: 30.8 % — LOW (ref 34.5–45)
HGB BLD-MCNC: 9.1 G/DL — LOW (ref 11.5–15.5)
IMM GRANULOCYTES NFR BLD AUTO: 0.7 % — SIGNIFICANT CHANGE UP (ref 0–1.5)
LYMPHOCYTES # BLD AUTO: 2.71 K/UL — SIGNIFICANT CHANGE UP (ref 1–3.3)
LYMPHOCYTES # BLD AUTO: 20.8 % — SIGNIFICANT CHANGE UP (ref 13–44)
MAGNESIUM SERPL-MCNC: 2.2 MG/DL — SIGNIFICANT CHANGE UP (ref 1.6–2.6)
MCHC RBC-ENTMCNC: 28.2 PG — SIGNIFICANT CHANGE UP (ref 27–34)
MCHC RBC-ENTMCNC: 29.5 % — LOW (ref 32–36)
MCV RBC AUTO: 95.4 FL — SIGNIFICANT CHANGE UP (ref 80–100)
MONOCYTES # BLD AUTO: 1.54 K/UL — HIGH (ref 0–0.9)
MONOCYTES NFR BLD AUTO: 11.8 % — SIGNIFICANT CHANGE UP (ref 2–14)
NEUTROPHILS # BLD AUTO: 8.14 K/UL — HIGH (ref 1.8–7.4)
NEUTROPHILS NFR BLD AUTO: 62.4 % — SIGNIFICANT CHANGE UP (ref 43–77)
NRBC # FLD: 0.05 K/UL — SIGNIFICANT CHANGE UP (ref 0–0)
PHOSPHATE SERPL-MCNC: 3.3 MG/DL — SIGNIFICANT CHANGE UP (ref 2.5–4.5)
PLATELET # BLD AUTO: 523 K/UL — HIGH (ref 150–400)
PMV BLD: 10.2 FL — SIGNIFICANT CHANGE UP (ref 7–13)
POTASSIUM SERPL-MCNC: 3.7 MMOL/L — SIGNIFICANT CHANGE UP (ref 3.5–5.3)
POTASSIUM SERPL-SCNC: 3.7 MMOL/L — SIGNIFICANT CHANGE UP (ref 3.5–5.3)
PTH-INTACT SERPL-MCNC: 293.7 PG/ML — HIGH (ref 15–65)
RBC # BLD: 3.23 M/UL — LOW (ref 3.8–5.2)
RBC # FLD: 15.2 % — HIGH (ref 10.3–14.5)
SODIUM SERPL-SCNC: 136 MMOL/L — SIGNIFICANT CHANGE UP (ref 135–145)
WBC # BLD: 13.04 K/UL — HIGH (ref 3.8–10.5)
WBC # FLD AUTO: 13.04 K/UL — HIGH (ref 3.8–10.5)

## 2019-09-12 RX ORDER — PANTOPRAZOLE SODIUM 20 MG/1
1 TABLET, DELAYED RELEASE ORAL
Qty: 0 | Refills: 0 | DISCHARGE

## 2019-09-12 RX ORDER — GABAPENTIN 400 MG/1
1 CAPSULE ORAL
Qty: 0 | Refills: 0 | DISCHARGE
Start: 2019-09-12

## 2019-09-12 RX ORDER — ACETAMINOPHEN 500 MG
2 TABLET ORAL
Qty: 0 | Refills: 0 | DISCHARGE
Start: 2019-09-12

## 2019-09-12 RX ORDER — HYDRALAZINE HCL 50 MG
1 TABLET ORAL
Qty: 0 | Refills: 0 | DISCHARGE

## 2019-09-12 RX ORDER — SENNA PLUS 8.6 MG/1
2 TABLET ORAL
Qty: 0 | Refills: 0 | DISCHARGE

## 2019-09-12 RX ORDER — DOCUSATE SODIUM 100 MG
3 CAPSULE ORAL
Qty: 0 | Refills: 0 | DISCHARGE

## 2019-09-12 RX ORDER — CLOPIDOGREL BISULFATE 75 MG/1
1 TABLET, FILM COATED ORAL
Qty: 0 | Refills: 0 | DISCHARGE
Start: 2019-09-12

## 2019-09-12 RX ORDER — CINACALCET 30 MG/1
1 TABLET, FILM COATED ORAL
Qty: 0 | Refills: 0 | DISCHARGE
Start: 2019-09-12

## 2019-09-12 RX ORDER — SIMVASTATIN 20 MG/1
1 TABLET, FILM COATED ORAL
Qty: 0 | Refills: 0 | DISCHARGE

## 2019-09-12 RX ORDER — SEVELAMER CARBONATE 2400 MG/1
2 POWDER, FOR SUSPENSION ORAL
Qty: 0 | Refills: 0 | DISCHARGE

## 2019-09-12 RX ORDER — DORZOLAMIDE HYDROCHLORIDE TIMOLOL MALEATE 20; 5 MG/ML; MG/ML
1 SOLUTION/ DROPS OPHTHALMIC
Qty: 0 | Refills: 0 | DISCHARGE
Start: 2019-09-12

## 2019-09-12 RX ORDER — METOPROLOL TARTRATE 50 MG
3 TABLET ORAL
Qty: 0 | Refills: 0 | DISCHARGE

## 2019-09-12 RX ORDER — NIFEDIPINE 30 MG
1 TABLET, EXTENDED RELEASE 24 HR ORAL
Qty: 0 | Refills: 0 | DISCHARGE
Start: 2019-09-12

## 2019-09-12 RX ORDER — FENTANYL CITRATE 50 UG/ML
1 INJECTION INTRAVENOUS
Qty: 0 | Refills: 0 | DISCHARGE

## 2019-09-12 RX ORDER — HYDRALAZINE HCL 50 MG
1 TABLET ORAL
Qty: 0 | Refills: 0 | DISCHARGE
Start: 2019-09-12

## 2019-09-12 RX ORDER — PANTOPRAZOLE SODIUM 20 MG/1
1 TABLET, DELAYED RELEASE ORAL
Qty: 0 | Refills: 0 | DISCHARGE
Start: 2019-09-12

## 2019-09-12 RX ORDER — SENNA PLUS 8.6 MG/1
2 TABLET ORAL
Qty: 0 | Refills: 0 | DISCHARGE
Start: 2019-09-12

## 2019-09-12 RX ORDER — MINERAL OIL
133 OIL (ML) MISCELLANEOUS
Qty: 0 | Refills: 0 | DISCHARGE
Start: 2019-09-12

## 2019-09-12 RX ORDER — CADEXOMER IODINE 0.9 %
0 PADS, MEDICATED (EA) TOPICAL
Qty: 0 | Refills: 0 | DISCHARGE

## 2019-09-12 RX ORDER — SEVELAMER CARBONATE 2400 MG/1
2 POWDER, FOR SUSPENSION ORAL
Qty: 0 | Refills: 0 | DISCHARGE
Start: 2019-09-12

## 2019-09-12 RX ORDER — DOCUSATE SODIUM 100 MG
3 CAPSULE ORAL
Qty: 0 | Refills: 0 | DISCHARGE
Start: 2019-09-12

## 2019-09-12 RX ORDER — FENTANYL CITRATE 50 UG/ML
1 INJECTION INTRAVENOUS
Qty: 0 | Refills: 0 | DISCHARGE
Start: 2019-09-12

## 2019-09-12 RX ORDER — ERYTHROPOIETIN 10000 [IU]/ML
10000 INJECTION, SOLUTION INTRAVENOUS; SUBCUTANEOUS
Qty: 0 | Refills: 0 | DISCHARGE
Start: 2019-09-12

## 2019-09-12 RX ORDER — DORZOLAMIDE HYDROCHLORIDE TIMOLOL MALEATE 20; 5 MG/ML; MG/ML
1 SOLUTION/ DROPS OPHTHALMIC
Qty: 0 | Refills: 0 | DISCHARGE

## 2019-09-12 RX ORDER — SIMVASTATIN 20 MG/1
1 TABLET, FILM COATED ORAL
Qty: 0 | Refills: 0 | DISCHARGE
Start: 2019-09-12

## 2019-09-12 RX ORDER — LEVOTHYROXINE SODIUM 125 MCG
1 TABLET ORAL
Qty: 0 | Refills: 0 | DISCHARGE
Start: 2019-09-12

## 2019-09-12 RX ORDER — DOCUSATE SODIUM 100 MG
1 CAPSULE ORAL
Qty: 0 | Refills: 0 | DISCHARGE
Start: 2019-09-12

## 2019-09-12 RX ORDER — NIFEDIPINE 30 MG
1 TABLET, EXTENDED RELEASE 24 HR ORAL
Qty: 0 | Refills: 0 | DISCHARGE

## 2019-09-12 RX ORDER — CINACALCET 30 MG/1
30 TABLET, FILM COATED ORAL DAILY
Refills: 0 | Status: DISCONTINUED | OUTPATIENT
Start: 2019-09-12 | End: 2019-09-12

## 2019-09-12 RX ORDER — LEVOTHYROXINE SODIUM 125 MCG
1 TABLET ORAL
Qty: 0 | Refills: 0 | DISCHARGE

## 2019-09-12 RX ORDER — CLOPIDOGREL BISULFATE 75 MG/1
1 TABLET, FILM COATED ORAL
Qty: 0 | Refills: 0 | DISCHARGE

## 2019-09-12 RX ORDER — GABAPENTIN 400 MG/1
1 CAPSULE ORAL
Qty: 0 | Refills: 0 | DISCHARGE

## 2019-09-12 RX ORDER — METOPROLOL TARTRATE 50 MG
1 TABLET ORAL
Qty: 0 | Refills: 0 | DISCHARGE
Start: 2019-09-12

## 2019-09-12 RX ORDER — ACETAMINOPHEN 500 MG
2 TABLET ORAL
Qty: 0 | Refills: 0 | DISCHARGE

## 2019-09-12 RX ADMIN — Medication 75 MILLIGRAM(S): at 17:15

## 2019-09-12 RX ADMIN — Medication 1 TABLET(S): at 12:50

## 2019-09-12 RX ADMIN — CINACALCET 30 MILLIGRAM(S): 30 TABLET, FILM COATED ORAL at 17:15

## 2019-09-12 RX ADMIN — Medication 100 MILLIGRAM(S): at 05:14

## 2019-09-12 RX ADMIN — Medication 50 MICROGRAM(S): at 05:15

## 2019-09-12 RX ADMIN — GABAPENTIN 100 MILLIGRAM(S): 400 CAPSULE ORAL at 13:16

## 2019-09-12 RX ADMIN — Medication 60 MILLIGRAM(S): at 05:14

## 2019-09-12 RX ADMIN — Medication 75 MILLIGRAM(S): at 05:14

## 2019-09-12 RX ADMIN — GABAPENTIN 100 MILLIGRAM(S): 400 CAPSULE ORAL at 05:15

## 2019-09-12 RX ADMIN — SEVELAMER CARBONATE 1600 MILLIGRAM(S): 2400 POWDER, FOR SUSPENSION ORAL at 13:15

## 2019-09-12 RX ADMIN — PANTOPRAZOLE SODIUM 40 MILLIGRAM(S): 20 TABLET, DELAYED RELEASE ORAL at 05:14

## 2019-09-12 RX ADMIN — HEPARIN SODIUM 5000 UNIT(S): 5000 INJECTION INTRAVENOUS; SUBCUTANEOUS at 13:15

## 2019-09-12 RX ADMIN — DORZOLAMIDE HYDROCHLORIDE TIMOLOL MALEATE 1 DROP(S): 20; 5 SOLUTION/ DROPS OPHTHALMIC at 17:15

## 2019-09-12 RX ADMIN — HEPARIN SODIUM 5000 UNIT(S): 5000 INJECTION INTRAVENOUS; SUBCUTANEOUS at 05:12

## 2019-09-12 RX ADMIN — Medication 100 MILLIGRAM(S): at 17:15

## 2019-09-12 RX ADMIN — SEVELAMER CARBONATE 1600 MILLIGRAM(S): 2400 POWDER, FOR SUSPENSION ORAL at 05:15

## 2019-09-12 RX ADMIN — CHLORHEXIDINE GLUCONATE 1 APPLICATION(S): 213 SOLUTION TOPICAL at 12:50

## 2019-09-12 RX ADMIN — DORZOLAMIDE HYDROCHLORIDE TIMOLOL MALEATE 1 DROP(S): 20; 5 SOLUTION/ DROPS OPHTHALMIC at 05:12

## 2019-09-12 RX ADMIN — CLOPIDOGREL BISULFATE 75 MILLIGRAM(S): 75 TABLET, FILM COATED ORAL at 12:50

## 2019-09-12 NOTE — PROGRESS NOTE ADULT - SUBJECTIVE AND OBJECTIVE BOX
Pt seen and examined at bedside  feels fairly well  denies any compls  pain Abd better  no n/v/d  no fever,chills  had HD yeserday, tolerated it well      Allergies:  No Known Allergies    Hospital Medications:   MEDICATIONS  (STANDING):  chlorhexidine 4% Liquid 1 Application(s) Topical <User Schedule>  clopidogrel Tablet 75 milliGRAM(s) Oral daily  docusate sodium 100 milliGRAM(s) Oral two times a day  dorzolamide 2%/timolol 0.5% Ophthalmic Solution 1 Drop(s) Both EYES two times a day  epoetin jacqueline Injectable 94626 Unit(s) IV Push <User Schedule>  fentaNYL   Patch  75 MICROgram(s)/Hr 1 Patch Transdermal every 72 hours  gabapentin 100 milliGRAM(s) Oral three times a day  heparin  Injectable 5000 Unit(s) SubCutaneous three times a day  hydrALAZINE 100 milliGRAM(s) Oral three times a day  influenza   Vaccine 0.5 milliLiter(s) IntraMuscular once  levothyroxine 50 MICROGram(s) Oral daily  metoprolol tartrate 75 milliGRAM(s) Oral two times a day  multivitamin 1 Tablet(s) Oral daily  NIFEdipine XL 60 milliGRAM(s) Oral daily  pantoprazole    Tablet 40 milliGRAM(s) Oral before breakfast  senna 2 Tablet(s) Oral at bedtime  sevelamer carbonate 1600 milliGRAM(s) Oral three times a day  simvastatin 20 milliGRAM(s) Oral at bedtime         VITALS:  T(F): 98.4 (19 @ 05:08), Max: 98.7 (19 @ 22:38)  HR: 80 (19 @ 05:08)  BP: 151/62 (19 @ 05:08)  RR: 17 (19 @ 05:08)  SpO2: 100% (19 @ 05:08)  Wt(kg): --     @ 07:01  -   @ 07:00  --------------------------------------------------------  IN: 500 mL / OUT: 2142 mL / NET: -1642 mL        PHYSICAL EXAM:  Constitutional: NAD. Luing comfortably in bed  HEENT: anicteric sclera, oropharynx clear, MMM  Neck: No JVD  Respiratory: CTAB, no wheezes, rales or rhonchi  Cardiovascular: S1, S2, RRR  Gastrointestinal: BS+, soft, /ND, inc tendernrss lower abd, cystic  'Mass '. lowr abd  Extremities: No cyanosis or clubbing. No peripheral edema  Neurological: A/O x 3, no focal deficits  Psychiatric: Normal mood, normal affect  : No CVA tenderness. No jarrell.   Skin: No rashes  Vascular Access:  avf    LABS:      136  |  90<L>  |  37<H>  ----------------------------<  142<H>  3.7   |  29  |  3.75<H>    Ca    10.9<H>      12 Sep 2019 07:15  Phos  3.3       Mg     2.2           Creatinine Trend: 3.75 <--, 4.93 <--, 4.01 <--, 6.03 <--, 4.80 <--, 2.71 <--, 4.40 <--                        9.1    13.04 )-----------( 523      ( 12 Sep 2019 07:15 )             30.8     Urine Studies:  Urinalysis Basic - ( 06 Sep 2019 06:19 )    Color: YELLOW / Appearance: TURBID / S.014 / pH: 7.5  Gluc: 100 / Ketone: NEGATIVE  / Bili: NEGATIVE / Urobili: NORMAL   Blood: MODERATE / Protein: 300 / Nitrite: NEGATIVE   Leuk Esterase: LARGE / RBC: >50 / WBC >50   Sq Epi: MANY / Non Sq Epi:  / Bacteria: NEGATIVE        RADIOLOGY & ADDITIONAL STUDIES:

## 2019-09-12 NOTE — PROGRESS NOTE ADULT - REASON FOR ADMISSION
elevated WBC

## 2019-09-12 NOTE — CHART NOTE - NSCHARTNOTEFT_GEN_A_CORE
MRI scans reviewed in totality.    Patient with solitary pelvic kidney and concern for renal mass.  MRI reveals multiple thin septations within kidney but no enhancing or concerning elements within the kidney itself.  Continue to surveil q6 months - 1 year with cross sectional imaging at this time.  No further urologic intervention or imaging needed at this time  Urology to sign off, please re-consult as necessary    < from: MR Pelvis w/ IV Cont (09.10.19 @ 15:32) >    FINDINGS:    LIVER: Within normal limits.  BILE DUCTS: Normal caliber.  GALLBLADDER: Cholelithiasis.  SPLEEN: Within normal limits.  PANCREAS: Within normal limits.  ADRENALS: Within normal limits.  KIDNEYS/URETERS: Solitary pelvic kidney with multiple complex cystic   lesions with multiple thin septations. Some demonstrate internal   hemorrhage and debris.    BLADDER: Within normal limits.  REPRODUCTIVE ORGANS: Right adnexal cyst, measuring 4.0 cm.    BOWEL: No bowel obstruction. Large amount of stool within the rectum.  PERITONEUM: No ascites.  VESSELS: Atherosclerotic changes.  RETROPERITONEUM/LYMPH NODES: No lymphadenopathy.    ABDOMINAL WALL: Within normal limits.  BONES: Degenerative changes.    IMPRESSION:     Multiple cystic lesions in the solitary pelvic kidney with multiple thin   septations. Follow-up contrast-enhanced renal protocol CT or MRI is   recommended in 6 months.    < end of copied text >

## 2019-09-12 NOTE — PROVIDER CONTACT NOTE (OTHER) - ASSESSMENT
The Ambulance Crew had wrong address in their system as pt going to [facility]. As per note from SW from floor pt supposed to go to [facility]  [address], I called [facility] and verified that [facility] Has the correct address. Auth # [id number]. Spoke with David Edwards at [facility] and cleared the problem.

## 2019-09-12 NOTE — PROGRESS NOTE ADULT - PROVIDER SPECIALTY LIST ADULT
Internal Medicine
Nephrology
Urology
Internal Medicine

## 2019-09-12 NOTE — PROGRESS NOTE ADULT - ASSESSMENT
54y Female w ESRD on HD MWF @ [facility] HD unit, DM, HTN referred to ED for leukocytosis from routine labwork, with abd pain.

## 2019-09-12 NOTE — PROGRESS NOTE ADULT - PROBLEM SELECTOR PLAN 1
Had HD yesterday, tolerated it well  inc Ha, dialysed with 2 ha bath. used to take Sensipar on dialysis, will restart, check pth  [place]bc [place] to [address], no fever, no clinical evidence of infection. Afebrile  Dose meds for ESRD  Renal diet

## 2019-09-12 NOTE — DISCHARGE NOTE NURSING/CASE MANAGEMENT/SOCIAL WORK - PATIENT PORTAL LINK FT
You can access the FollowMyHealth Patient Portal offered by [facility] by registering at the following website: [email or web address]. By joining [facility]’s FollowMyHealth portal, you will also be able to view your health information using other applications (apps) compatible with our system.

## 2019-09-12 NOTE — PROGRESS NOTE ADULT - ASSESSMENT
M E D I C A L   A T T E N D I N G    F O L L O W    U P   N O T E                                     ------------------------------------------------------------------------------------------------    patient evaluated by me, case discussed with team, chart, medications, and physical exam reviewed, labs / tests  and vitals reviewed by me, as bellow.   Patient is stable for discharge today.  I had a long discussion with pt regarding close folow up of renal abnormalities as already had been discussed and advised by urology. pt understood and agreed..   Patient to follow up with  renal, PMD,   See discharge document for full note.      ==================>> MEDICATIONS <<====================    chlorhexidine 4% Liquid 1 Application(s) Topical <User Schedule>  cinacalcet 30 milliGRAM(s) Oral daily  clopidogrel Tablet 75 milliGRAM(s) Oral daily  docusate sodium 100 milliGRAM(s) Oral two times a day  dorzolamide 2%/timolol 0.5% Ophthalmic Solution 1 Drop(s) Both EYES two times a day  epoetin jacqueline Injectable 56146 Unit(s) IV Push <User Schedule>  fentaNYL   Patch  75 MICROgram(s)/Hr 1 Patch Transdermal every 72 hours  gabapentin 100 milliGRAM(s) Oral three times a day  heparin  Injectable 5000 Unit(s) SubCutaneous three times a day  hydrALAZINE 100 milliGRAM(s) Oral three times a day  influenza   Vaccine 0.5 milliLiter(s) IntraMuscular once  levothyroxine 50 MICROGram(s) Oral daily  metoprolol tartrate 75 milliGRAM(s) Oral two times a day  multivitamin 1 Tablet(s) Oral daily  NIFEdipine XL 60 milliGRAM(s) Oral daily  pantoprazole    Tablet 40 milliGRAM(s) Oral before breakfast  senna 2 Tablet(s) Oral at bedtime  sevelamer carbonate 1600 milliGRAM(s) Oral three times a day  simvastatin 20 milliGRAM(s) Oral at bedtime    MEDICATIONS  (PRN):  acetaminophen   Tablet .. 650 milliGRAM(s) Oral every 6 hours PRN Mild Pain (1 - 3), Moderate Pain (4 - 6)  mineral oil enema 133 milliLiter(s) Rectal daily PRN constipation    ==================>> VITAL SIGNS <<==================    T(C): 37 (09-12-19 @ 13:14), Max: 37.1 (09-11-19 @ 22:38)  HR: 87 (09-12-19 @ 17:13) (74 - 87)  BP: 140/57 (09-12-19 @ 17:13) (110/54 - 151/62)  RR: 16 (09-12-19 @ 13:14) (16 - 17)  SpO2: 100% (09-12-19 @ 13:14) (99% - 100%)   CAPILLARY BLOOD GLUCOSE      I&O's Summary    11 Sep 2019 07:01  -  12 Sep 2019 07:00  --------------------------------------------------------  IN: 500 mL / OUT: 2142 mL / NET: -1642 mL       ==================>> LAB AND IMAGING <<==================                        9.1    13.04 )-----------( 523      ( 12 Sep 2019 07:15 )             30.8        09-12    136  |  90<L>  |  37<H>  ----------------------------<  142<H>  3.7   |  29  |  3.75<H>    Ca    10.9<H>      12 Sep 2019 07:15  Phos  3.3     09-12  Mg     2.2     09-12     TSH:      1.23   (09-07-19)           HgA1C: 5.7  (09-07-19)          WBC count:   13.04 <<== ,  10.16 <<== ,  12.36 <<== ,  10.18 <<== ,  8.58 <<==   Hemoglobin:   9.1 <<==,  8.3 <<==,  9.4 <<==,  8.8 <<==,  8.2 <<==  platelets:  523 <==, 485 <==, 556 <==, 520 <==, 480 <==, 429 <==    Creatinine:  3.75  <<==, 4.93  <<==, 4.01  <<==, 6.03  <<==, 4.80  <<==, 2.71  <<==  Sodium:   136  <==, 138  <==, 138  <==, 130  <==, 135  <==, 136  <==       AST:          36 <== , 8 <==      ALT:        8  <== , 7  <==      AP:        151  <=, 149  <=     Bili:        0.3  <=, 0.3  <=

## 2019-10-10 ENCOUNTER — INPATIENT (INPATIENT)
Facility: HOSPITAL | Age: 54
LOS: 5 days | Discharge: NURSING, REHAB OR LONG-TERM CARE | End: 2019-10-16
Attending: GENERAL PRACTICE | Admitting: GENERAL PRACTICE
Payer: MEDICAID

## 2019-10-10 VITALS
OXYGEN SATURATION: 99 % | DIASTOLIC BLOOD PRESSURE: 66 MMHG | SYSTOLIC BLOOD PRESSURE: 154 MMHG | HEART RATE: 74 BPM | RESPIRATION RATE: 16 BRPM | TEMPERATURE: 98 F

## 2019-10-10 DIAGNOSIS — I77.0 ARTERIOVENOUS FISTULA, ACQUIRED: Chronic | ICD-10-CM

## 2019-10-10 DIAGNOSIS — R78.81 BACTEREMIA: ICD-10-CM

## 2019-10-10 LAB
ALBUMIN SERPL ELPH-MCNC: 3.8 G/DL — SIGNIFICANT CHANGE UP (ref 3.3–5)
ALP SERPL-CCNC: 172 U/L — HIGH (ref 40–120)
ALT FLD-CCNC: 8 U/L — SIGNIFICANT CHANGE UP (ref 4–33)
ANION GAP SERPL CALC-SCNC: 22 MMO/L — HIGH (ref 7–14)
AST SERPL-CCNC: 20 U/L — SIGNIFICANT CHANGE UP (ref 4–32)
BASE EXCESS BLDV CALC-SCNC: 4.8 MMOL/L — SIGNIFICANT CHANGE UP
BASOPHILS # BLD AUTO: 0.06 K/UL — SIGNIFICANT CHANGE UP (ref 0–0.2)
BASOPHILS NFR BLD AUTO: 0.6 % — SIGNIFICANT CHANGE UP (ref 0–2)
BILIRUB SERPL-MCNC: 0.3 MG/DL — SIGNIFICANT CHANGE UP (ref 0.2–1.2)
BLOOD GAS VENOUS - CREATININE: 4.22 MG/DL — HIGH (ref 0.5–1.3)
BLOOD GAS VENOUS - FIO2: 21 — SIGNIFICANT CHANGE UP
BUN SERPL-MCNC: 50 MG/DL — HIGH (ref 7–23)
CALCIUM SERPL-MCNC: 10 MG/DL — SIGNIFICANT CHANGE UP (ref 8.4–10.5)
CHLORIDE BLDV-SCNC: 92 MMOL/L — LOW (ref 96–108)
CHLORIDE SERPL-SCNC: 89 MMOL/L — LOW (ref 98–107)
CO2 SERPL-SCNC: 21 MMOL/L — LOW (ref 22–31)
CREAT SERPL-MCNC: 4.14 MG/DL — HIGH (ref 0.5–1.3)
EOSINOPHIL # BLD AUTO: 0.24 K/UL — SIGNIFICANT CHANGE UP (ref 0–0.5)
EOSINOPHIL NFR BLD AUTO: 2.5 % — SIGNIFICANT CHANGE UP (ref 0–6)
GAS PNL BLDV: 135 MMOL/L — LOW (ref 136–146)
GLUCOSE BLDV-MCNC: 321 MG/DL — HIGH (ref 70–99)
GLUCOSE SERPL-MCNC: 334 MG/DL — HIGH (ref 70–99)
HCO3 BLDV-SCNC: 27 MMOL/L — SIGNIFICANT CHANGE UP (ref 20–27)
HCT VFR BLD CALC: 33.4 % — LOW (ref 34.5–45)
HCT VFR BLDV CALC: 29.8 % — LOW (ref 34.5–45)
HGB BLD-MCNC: 10 G/DL — LOW (ref 11.5–15.5)
HGB BLDV-MCNC: 9.6 G/DL — LOW (ref 11.5–15.5)
IMM GRANULOCYTES NFR BLD AUTO: 0.9 % — SIGNIFICANT CHANGE UP (ref 0–1.5)
LACTATE BLDV-MCNC: 1.7 MMOL/L — SIGNIFICANT CHANGE UP (ref 0.5–2)
LYMPHOCYTES # BLD AUTO: 1.85 K/UL — SIGNIFICANT CHANGE UP (ref 1–3.3)
LYMPHOCYTES # BLD AUTO: 18.9 % — SIGNIFICANT CHANGE UP (ref 13–44)
MCHC RBC-ENTMCNC: 27.9 PG — SIGNIFICANT CHANGE UP (ref 27–34)
MCHC RBC-ENTMCNC: 29.9 % — LOW (ref 32–36)
MCV RBC AUTO: 93.3 FL — SIGNIFICANT CHANGE UP (ref 80–100)
MONOCYTES # BLD AUTO: 0.78 K/UL — SIGNIFICANT CHANGE UP (ref 0–0.9)
MONOCYTES NFR BLD AUTO: 8 % — SIGNIFICANT CHANGE UP (ref 2–14)
NEUTROPHILS # BLD AUTO: 6.76 K/UL — SIGNIFICANT CHANGE UP (ref 1.8–7.4)
NEUTROPHILS NFR BLD AUTO: 69.1 % — SIGNIFICANT CHANGE UP (ref 43–77)
NRBC # FLD: 0 K/UL — SIGNIFICANT CHANGE UP (ref 0–0)
PCO2 BLDV: 48 MMHG — SIGNIFICANT CHANGE UP (ref 41–51)
PH BLDV: 7.4 PH — SIGNIFICANT CHANGE UP (ref 7.32–7.43)
PLATELET # BLD AUTO: 351 K/UL — SIGNIFICANT CHANGE UP (ref 150–400)
PMV BLD: 10.4 FL — SIGNIFICANT CHANGE UP (ref 7–13)
PO2 BLDV: < 24 MMHG — LOW (ref 35–40)
POTASSIUM BLDV-SCNC: 4.6 MMOL/L — HIGH (ref 3.4–4.5)
POTASSIUM SERPL-MCNC: 4.8 MMOL/L — SIGNIFICANT CHANGE UP (ref 3.5–5.3)
POTASSIUM SERPL-SCNC: 4.8 MMOL/L — SIGNIFICANT CHANGE UP (ref 3.5–5.3)
PROT SERPL-MCNC: 8.1 G/DL — SIGNIFICANT CHANGE UP (ref 6–8.3)
RBC # BLD: 3.58 M/UL — LOW (ref 3.8–5.2)
RBC # FLD: 16.5 % — HIGH (ref 10.3–14.5)
REVIEW TO FOLLOW: YES — SIGNIFICANT CHANGE UP
SAO2 % BLDV: 36 % — LOW (ref 60–85)
SODIUM SERPL-SCNC: 132 MMOL/L — LOW (ref 135–145)
WBC # BLD: 9.78 K/UL — SIGNIFICANT CHANGE UP (ref 3.8–10.5)
WBC # FLD AUTO: 9.78 K/UL — SIGNIFICANT CHANGE UP (ref 3.8–10.5)

## 2019-10-10 PROCEDURE — 71045 X-RAY EXAM CHEST 1 VIEW: CPT | Mod: 26

## 2019-10-10 RX ORDER — NIFEDIPINE 30 MG
60 TABLET, EXTENDED RELEASE 24 HR ORAL DAILY
Refills: 0 | Status: DISCONTINUED | OUTPATIENT
Start: 2019-10-10 | End: 2019-10-16

## 2019-10-10 RX ORDER — FERROUS SULFATE 325(65) MG
325 TABLET ORAL DAILY
Refills: 0 | Status: DISCONTINUED | OUTPATIENT
Start: 2019-10-10 | End: 2019-10-13

## 2019-10-10 RX ORDER — DORZOLAMIDE HYDROCHLORIDE TIMOLOL MALEATE 20; 5 MG/ML; MG/ML
1 SOLUTION/ DROPS OPHTHALMIC
Refills: 0 | Status: DISCONTINUED | OUTPATIENT
Start: 2019-10-10 | End: 2019-10-16

## 2019-10-10 RX ORDER — LEVOTHYROXINE SODIUM 125 MCG
50 TABLET ORAL DAILY
Refills: 0 | Status: DISCONTINUED | OUTPATIENT
Start: 2019-10-10 | End: 2019-10-16

## 2019-10-10 RX ORDER — ZINC GLUCONATE 30 MG
0 TABLET ORAL
Qty: 0 | Refills: 0 | DISCHARGE

## 2019-10-10 RX ORDER — ACETAMINOPHEN 500 MG
650 TABLET ORAL EVERY 6 HOURS
Refills: 0 | Status: DISCONTINUED | OUTPATIENT
Start: 2019-10-10 | End: 2019-10-16

## 2019-10-10 RX ORDER — ASCORBIC ACID 60 MG
1 TABLET,CHEWABLE ORAL
Qty: 0 | Refills: 0 | DISCHARGE

## 2019-10-10 RX ORDER — ASCORBIC ACID 60 MG
500 TABLET,CHEWABLE ORAL DAILY
Refills: 0 | Status: DISCONTINUED | OUTPATIENT
Start: 2019-10-10 | End: 2019-10-16

## 2019-10-10 RX ORDER — LORATADINE 10 MG/1
1 TABLET ORAL
Qty: 0 | Refills: 0 | DISCHARGE

## 2019-10-10 RX ORDER — GABAPENTIN 400 MG/1
100 CAPSULE ORAL THREE TIMES A DAY
Refills: 0 | Status: DISCONTINUED | OUTPATIENT
Start: 2019-10-10 | End: 2019-10-10

## 2019-10-10 RX ORDER — METOCLOPRAMIDE HCL 10 MG
1 TABLET ORAL
Qty: 0 | Refills: 0 | DISCHARGE

## 2019-10-10 RX ORDER — DOCUSATE SODIUM 100 MG
100 CAPSULE ORAL THREE TIMES A DAY
Refills: 0 | Status: DISCONTINUED | OUTPATIENT
Start: 2019-10-10 | End: 2019-10-16

## 2019-10-10 RX ORDER — HYDRALAZINE HCL 50 MG
100 TABLET ORAL EVERY 8 HOURS
Refills: 0 | Status: DISCONTINUED | OUTPATIENT
Start: 2019-10-10 | End: 2019-10-16

## 2019-10-10 RX ORDER — SENNA PLUS 8.6 MG/1
2 TABLET ORAL AT BEDTIME
Refills: 0 | Status: DISCONTINUED | OUTPATIENT
Start: 2019-10-10 | End: 2019-10-16

## 2019-10-10 RX ORDER — FERROUS SULFATE 325(65) MG
1 TABLET ORAL
Qty: 0 | Refills: 0 | DISCHARGE

## 2019-10-10 RX ORDER — GABAPENTIN 400 MG/1
100 CAPSULE ORAL EVERY 12 HOURS
Refills: 0 | Status: DISCONTINUED | OUTPATIENT
Start: 2019-10-10 | End: 2019-10-16

## 2019-10-10 RX ORDER — SEVELAMER CARBONATE 2400 MG/1
800 POWDER, FOR SUSPENSION ORAL THREE TIMES A DAY
Refills: 0 | Status: DISCONTINUED | OUTPATIENT
Start: 2019-10-10 | End: 2019-10-12

## 2019-10-10 RX ORDER — METOCLOPRAMIDE HCL 10 MG
5 TABLET ORAL
Refills: 0 | Status: DISCONTINUED | OUTPATIENT
Start: 2019-10-10 | End: 2019-10-16

## 2019-10-10 RX ORDER — CLOPIDOGREL BISULFATE 75 MG/1
75 TABLET, FILM COATED ORAL DAILY
Refills: 0 | Status: DISCONTINUED | OUTPATIENT
Start: 2019-10-10 | End: 2019-10-16

## 2019-10-10 RX ORDER — PANTOPRAZOLE SODIUM 20 MG/1
40 TABLET, DELAYED RELEASE ORAL
Refills: 0 | Status: DISCONTINUED | OUTPATIENT
Start: 2019-10-10 | End: 2019-10-16

## 2019-10-10 RX ORDER — METOPROLOL TARTRATE 50 MG
75 TABLET ORAL
Refills: 0 | Status: DISCONTINUED | OUTPATIENT
Start: 2019-10-10 | End: 2019-10-16

## 2019-10-10 RX ORDER — FENTANYL CITRATE 50 UG/ML
1 INJECTION INTRAVENOUS
Refills: 0 | Status: DISCONTINUED | OUTPATIENT
Start: 2019-10-10 | End: 2019-10-15

## 2019-10-10 RX ORDER — OXYCODONE AND ACETAMINOPHEN 5; 325 MG/1; MG/1
1 TABLET ORAL EVERY 6 HOURS
Refills: 0 | Status: DISCONTINUED | OUTPATIENT
Start: 2019-10-10 | End: 2019-10-16

## 2019-10-10 RX ORDER — VANCOMYCIN HCL 1 G
1000 VIAL (EA) INTRAVENOUS ONCE
Refills: 0 | Status: COMPLETED | OUTPATIENT
Start: 2019-10-10 | End: 2019-10-10

## 2019-10-10 RX ORDER — LORATADINE 10 MG/1
10 TABLET ORAL DAILY
Refills: 0 | Status: DISCONTINUED | OUTPATIENT
Start: 2019-10-10 | End: 2019-10-16

## 2019-10-10 RX ADMIN — Medication 250 MILLIGRAM(S): at 15:43

## 2019-10-10 RX ADMIN — SEVELAMER CARBONATE 800 MILLIGRAM(S): 2400 POWDER, FOR SUSPENSION ORAL at 21:48

## 2019-10-10 RX ADMIN — SENNA PLUS 2 TABLET(S): 8.6 TABLET ORAL at 21:48

## 2019-10-10 RX ADMIN — Medication 100 MILLIGRAM(S): at 21:48

## 2019-10-10 RX ADMIN — Medication 1000 MILLIGRAM(S): at 17:46

## 2019-10-10 NOTE — PATIENT PROFILE ADULT - VISION (WITH CORRECTIVE LENSES IF THE PATIENT USUALLY WEARS THEM):
Problem: Patient Care Overview  Goal: Plan of Care Review  Outcome: Ongoing (interventions implemented as appropriate)    03/21/17 7380   Coping/Psychosocial   Plan Of Care Reviewed With patient   Pt denies pain. Glucoses under control. NAD noted. POC reviewed w pt and they verbalized understanding. Tele- paced   Pt free from falls, Pt ambulates to the bathroom. Bed in low position, side rails up x 2. Call light in reach,  and wheels locked. Will continue to monitor.       Normal vision: sees adequately in most situations; can see medication labels, newsprint

## 2019-10-10 NOTE — ED PROVIDER NOTE - CLINICAL SUMMARY MEDICAL DECISION MAKING FREE TEXT BOX
55yo F with GPC in blood cx from avf, will get labs, cx, admit for abx  pt with no complaints and normal vitals

## 2019-10-10 NOTE — ED PROVIDER NOTE - PMH
Allergic rhinitis    Anemia    ASHD (arteriosclerotic heart disease)    Bacteremia    CAD in native artery    CHF (congestive heart failure)    CKD (chronic kidney disease)    Clostridium difficile infection    Diabetes mellitus    Diabetes mellitus    Diabetic neuropathy    Difficulty waking    Elective surgery  Dialysis catheter in left arm  End stage kidney disease    Enterocolitis    ESRD (end stage renal disease) on dialysis    GERD (gastroesophageal reflux disease)    HLD (hyperlipidemia)    HTN (hypertension)    HTN (hypertension)    Hyperlipidemia    Hypocalcemia    Hypo-osmolality and hyponatremia    Hypothyroidism    Hypothyroidism    Malformation of coronary vessels    Osteomyelitis    Osteomyelitis of jaw    Parathyroid adenoma    Polyneuropathy    Pressure ulcer  Sacral region  Pulmonary edema  chronic  Respiratory failure  with hypoxia or hypercapnia  Sepsis    Sepsis    Tremor

## 2019-10-10 NOTE — H&P ADULT - PROBLEM/PLAN-6
Pt states 2 weeks ago had a tooth pulled and implant in on left upper side. States finished a zpack at that time. States she had a severe infection at that time. States since then has had residual left sided facial pain.  Past few days pt having pain in her DISPLAY PLAN FREE TEXT

## 2019-10-10 NOTE — ED ADULT NURSE NOTE - CHIEF COMPLAINT QUOTE
pt brought from [facility] for abnormal lab results, as per EMS "gram + rods in blood," pt has no complaints @ this time, states blood work @ dialysis, had "cold" got flu shot, completed HD yesterday

## 2019-10-10 NOTE — ED ADULT TRIAGE NOTE - CHIEF COMPLAINT QUOTE
pt brought from [facility] for abnormal lab results, as per EMS "gram + rods in blood," pt has no complaints @ this time, states blood work @ dialysis, had "cold" got flu shot, completed HD yesterday

## 2019-10-10 NOTE — H&P ADULT - NSHPREVIEWOFSYSTEMS_GEN_ALL_CORE
GEN: no fever, no chills, no pain   RESP: no SOB, no cough, no sputum  CVS: no chest pain, no palpitations, no edema  GI: no abdominal pain, no nausea, no vomiting, no constipation, no diarrhea  : no dysuria, no frequency, no hematuria  NEURO: no headache, no dizziness  PSYCH: no depression, not anxious  Derm : no itching, no rash

## 2019-10-10 NOTE — ED ADULT NURSE NOTE - INTERVENTIONS DEFINITIONS
Stretcher in lowest position, wheels locked, appropriate side rails in place/Non-slip footwear when patient is off stretcher/Call bell, personal items and telephone within reach/Physically safe environment: no spills, clutter or unnecessary equipment

## 2019-10-10 NOTE — ED ADULT NURSE NOTE - OBJECTIVE STATEMENT
Pt presents to rm 25, A&Ox4, nonambulatory, pmhx of HTN, ESRD (M/W/F)- via left AV fistula, here for evaluation of positive blood cultures from left AV fistula. Denies chest pain, shortness of breath, palpitations, diaphoresis, headaches, fevers, dizziness, nausea, vomiting, diarrhea, or urinary symptoms at this time. IV established in right forearm with a 20G, labs drawn and sent, call bell in reach, warm blanket provided, bed in lowest position, side rails up x2, MD evaluation in progress. Will continue to monitor. Pt presents to rm 25, A&Ox4, nonambulatory, pmhx of HTN, ESRD (M/W/F)- via left AV fistula, here for evaluation of positive blood cultures from left AV fistula. Healing stage 3, dry pressure ulcer [facility]. Covered at this time. Denies chest pain, shortness of breath, palpitations, diaphoresis, headaches, fevers, dizziness, nausea, vomiting, diarrhea, or urinary symptoms at this time. IV established in right forearm with a 20G, labs drawn and sent, call bell in reach, warm blanket provided, bed in lowest position, side rails up x2, MD evaluation in progress. Will continue to monitor.

## 2019-10-10 NOTE — ED PROVIDER NOTE - PHYSICAL EXAMINATION
Gen: Well appearing in NAD  Head: NC/AT  Neck: trachea midline  Resp:  No distress, lungs clear  cv: rrr  Ext: no deformities, + JAS AVF, + thrill and bruit  abd": soft nontender  Neuro:  A&O appears non focal  Skin:  Warm and dry as visualized  Psych:  Normal affect and mood

## 2019-10-10 NOTE — H&P ADULT - NSHPPHYSICALEXAM_GEN_ALL_CORE
Vital Signs Last 24 Hrs  T(C): 37.3 (10-10-19 @ 18:45)  T(F): 99.1 (10-10-19 @ 18:45), Max: 99.1 (10-10-19 @ 18:45)  HR: 77 (10-10-19 @ 18:45) (74 - 81)  BP: 145/54 (10-10-19 @ 18:45)  RR: 18 (10-10-19 @ 18:45) (16 - 18)  SpO2: 96% (10-10-19 @ 18:45) (96% - 99%)      GEN: A&O X 3 , NAD , comfortable  HEENT: NCAT, PERRL, MMM, hearing intact  Neck: supple , no JVD  CVS: S1S2 , regular , No M/R/G appreciated  PULM: CTA B/L,  no W/R/R appreciated  ABD.: soft. non tender, non distended,  bowel sounds present  Extrem: intact pulses , no edema   Derm: No rash , no ecchymoses  PSYCH : normal mood,  no delusion not anxious

## 2019-10-10 NOTE — ED ADULT NURSE NOTE - SUICIDE SCREENING DEPRESSION
Emery [address]  [facility] [address]  [phone]  [phone]               Thank you for choosing us for your health care visit with Yuly Hrenandez PT.   We are glad to serve you and happy to provide you with t
Negative

## 2019-10-10 NOTE — ED PROVIDER NOTE - OBJECTIVE STATEMENT
54 F [place] (bed[place]) PMHx ESRD on HD M/W/F, HTN, HLD, pre-DM, hypothyroidism, CAD, sacral decubitus ulcer, presents for bacetermia. pt had cultures drawn from dialysis from [facility] and found ot have GPC in chains in both blood cx bottles. pt denies fevers, chills, nausae, vomiting, diarrhea, abd pain, chest pain, cough, URI or any other symptoms

## 2019-10-10 NOTE — H&P ADULT - PROBLEM SELECTOR PLAN 1
bacteremia with gram positives as drawn in dialysis   repeat cultures sent  pt hemodynamically stable  post vanco in ED  ID consulted  likely repeat vanco dosing tomorrow post HD  follow repeat cultures

## 2019-10-10 NOTE — H&P ADULT - PROBLEM SELECTOR PLAN 3
-on exam ulcer does not appear infected  -Wound care eval ordered -Wound care eval ordered  local care

## 2019-10-10 NOTE — H&P ADULT - HISTORY OF PRESENT ILLNESS
Pt is a 53 y/o [place] from [place] ([place]) PMHx ESRD on HD M/W/F, HTN, HLD, pre-DM, hypothyroidism, CAD, sacral decubitus ulcer, presents for bacteremia pt had cultures drawn from dialysis from [facility] and found ot have GPC in chains in both blood cx bottles.   pt denies fevers, chills today but states may have had a low grade fever last night and some chills about 4 days ago ( when cultures were drawn )   pt otherwise feels well, no other c/o

## 2019-10-11 DIAGNOSIS — N18.6 END STAGE RENAL DISEASE: ICD-10-CM

## 2019-10-11 LAB
ALBUMIN SERPL ELPH-MCNC: 3.6 G/DL — SIGNIFICANT CHANGE UP (ref 3.3–5)
ALP SERPL-CCNC: 143 U/L — HIGH (ref 40–120)
ALT FLD-CCNC: 7 U/L — SIGNIFICANT CHANGE UP (ref 4–33)
ANION GAP SERPL CALC-SCNC: 17 MMO/L — HIGH (ref 7–14)
AST SERPL-CCNC: 11 U/L — SIGNIFICANT CHANGE UP (ref 4–32)
BASOPHILS # BLD AUTO: 0.05 K/UL — SIGNIFICANT CHANGE UP (ref 0–0.2)
BASOPHILS NFR BLD AUTO: 0.4 % — SIGNIFICANT CHANGE UP (ref 0–2)
BILIRUB SERPL-MCNC: 0.3 MG/DL — SIGNIFICANT CHANGE UP (ref 0.2–1.2)
BUN SERPL-MCNC: 63 MG/DL — HIGH (ref 7–23)
CALCIUM SERPL-MCNC: 10.3 MG/DL — SIGNIFICANT CHANGE UP (ref 8.4–10.5)
CHLORIDE SERPL-SCNC: 93 MMOL/L — LOW (ref 98–107)
CO2 SERPL-SCNC: 25 MMOL/L — SIGNIFICANT CHANGE UP (ref 22–31)
CREAT SERPL-MCNC: 5.26 MG/DL — HIGH (ref 0.5–1.3)
EOSINOPHIL # BLD AUTO: 0.36 K/UL — SIGNIFICANT CHANGE UP (ref 0–0.5)
EOSINOPHIL NFR BLD AUTO: 2.9 % — SIGNIFICANT CHANGE UP (ref 0–6)
GLUCOSE SERPL-MCNC: 93 MG/DL — SIGNIFICANT CHANGE UP (ref 70–99)
HCT VFR BLD CALC: 28.7 % — LOW (ref 34.5–45)
HGB BLD-MCNC: 8.8 G/DL — LOW (ref 11.5–15.5)
IMM GRANULOCYTES NFR BLD AUTO: 0.6 % — SIGNIFICANT CHANGE UP (ref 0–1.5)
LACTATE SERPL-SCNC: 1 MMOL/L — SIGNIFICANT CHANGE UP (ref 0.5–2)
LYMPHOCYTES # BLD AUTO: 2.51 K/UL — SIGNIFICANT CHANGE UP (ref 1–3.3)
LYMPHOCYTES # BLD AUTO: 20.1 % — SIGNIFICANT CHANGE UP (ref 13–44)
MAGNESIUM SERPL-MCNC: 2.5 MG/DL — SIGNIFICANT CHANGE UP (ref 1.6–2.6)
MCHC RBC-ENTMCNC: 27.9 PG — SIGNIFICANT CHANGE UP (ref 27–34)
MCHC RBC-ENTMCNC: 30.7 % — LOW (ref 32–36)
MCV RBC AUTO: 91.1 FL — SIGNIFICANT CHANGE UP (ref 80–100)
MONOCYTES # BLD AUTO: 1.06 K/UL — HIGH (ref 0–0.9)
MONOCYTES NFR BLD AUTO: 8.5 % — SIGNIFICANT CHANGE UP (ref 2–14)
NEUTROPHILS # BLD AUTO: 8.41 K/UL — HIGH (ref 1.8–7.4)
NEUTROPHILS NFR BLD AUTO: 67.5 % — SIGNIFICANT CHANGE UP (ref 43–77)
NRBC # FLD: 0 K/UL — SIGNIFICANT CHANGE UP (ref 0–0)
PHOSPHATE SERPL-MCNC: 2.8 MG/DL — SIGNIFICANT CHANGE UP (ref 2.5–4.5)
PLATELET # BLD AUTO: 367 K/UL — SIGNIFICANT CHANGE UP (ref 150–400)
PMV BLD: 9.9 FL — SIGNIFICANT CHANGE UP (ref 7–13)
POTASSIUM SERPL-MCNC: 4.5 MMOL/L — SIGNIFICANT CHANGE UP (ref 3.5–5.3)
POTASSIUM SERPL-SCNC: 4.5 MMOL/L — SIGNIFICANT CHANGE UP (ref 3.5–5.3)
PROT SERPL-MCNC: 7.6 G/DL — SIGNIFICANT CHANGE UP (ref 6–8.3)
RBC # BLD: 3.15 M/UL — LOW (ref 3.8–5.2)
RBC # FLD: 16.2 % — HIGH (ref 10.3–14.5)
SODIUM SERPL-SCNC: 135 MMOL/L — SIGNIFICANT CHANGE UP (ref 135–145)
SPECIMEN SOURCE: SIGNIFICANT CHANGE UP
SPECIMEN SOURCE: SIGNIFICANT CHANGE UP
VANCOMYCIN FLD-MCNC: 7.2 UG/ML — SIGNIFICANT CHANGE UP
WBC # BLD: 12.46 K/UL — HIGH (ref 3.8–10.5)
WBC # FLD AUTO: 12.46 K/UL — HIGH (ref 3.8–10.5)

## 2019-10-11 RX ORDER — CEFTRIAXONE 500 MG/1
1000 INJECTION, POWDER, FOR SOLUTION INTRAMUSCULAR; INTRAVENOUS EVERY 24 HOURS
Refills: 0 | Status: DISCONTINUED | OUTPATIENT
Start: 2019-10-11 | End: 2019-10-16

## 2019-10-11 RX ORDER — HEPARIN SODIUM 5000 [USP'U]/ML
5000 INJECTION INTRAVENOUS; SUBCUTANEOUS EVERY 12 HOURS
Refills: 0 | Status: DISCONTINUED | OUTPATIENT
Start: 2019-10-11 | End: 2019-10-16

## 2019-10-11 RX ORDER — CHLORHEXIDINE GLUCONATE 213 G/1000ML
1 SOLUTION TOPICAL ONCE
Refills: 0 | Status: COMPLETED | OUTPATIENT
Start: 2019-10-11 | End: 2019-10-14

## 2019-10-11 RX ADMIN — SENNA PLUS 2 TABLET(S): 8.6 TABLET ORAL at 23:29

## 2019-10-11 RX ADMIN — Medication 75 MILLIGRAM(S): at 12:28

## 2019-10-11 RX ADMIN — CEFTRIAXONE 100 MILLIGRAM(S): 500 INJECTION, POWDER, FOR SOLUTION INTRAMUSCULAR; INTRAVENOUS at 23:28

## 2019-10-11 RX ADMIN — GABAPENTIN 100 MILLIGRAM(S): 400 CAPSULE ORAL at 23:29

## 2019-10-11 RX ADMIN — Medication 325 MILLIGRAM(S): at 12:28

## 2019-10-11 RX ADMIN — CLOPIDOGREL BISULFATE 75 MILLIGRAM(S): 75 TABLET, FILM COATED ORAL at 12:28

## 2019-10-11 RX ADMIN — SEVELAMER CARBONATE 800 MILLIGRAM(S): 2400 POWDER, FOR SUSPENSION ORAL at 23:29

## 2019-10-11 RX ADMIN — Medication 100 MILLIGRAM(S): at 23:47

## 2019-10-11 RX ADMIN — Medication 100 MILLIGRAM(S): at 05:33

## 2019-10-11 RX ADMIN — Medication 50 MICROGRAM(S): at 05:33

## 2019-10-11 RX ADMIN — DORZOLAMIDE HYDROCHLORIDE TIMOLOL MALEATE 1 DROP(S): 20; 5 SOLUTION/ DROPS OPHTHALMIC at 05:32

## 2019-10-11 RX ADMIN — Medication 100 MILLIGRAM(S): at 14:33

## 2019-10-11 RX ADMIN — SEVELAMER CARBONATE 800 MILLIGRAM(S): 2400 POWDER, FOR SUSPENSION ORAL at 05:32

## 2019-10-11 RX ADMIN — DORZOLAMIDE HYDROCHLORIDE TIMOLOL MALEATE 1 DROP(S): 20; 5 SOLUTION/ DROPS OPHTHALMIC at 23:29

## 2019-10-11 RX ADMIN — Medication 500 MILLIGRAM(S): at 12:28

## 2019-10-11 RX ADMIN — Medication 5 MILLIGRAM(S): at 14:34

## 2019-10-11 RX ADMIN — Medication 60 MILLIGRAM(S): at 05:32

## 2019-10-11 RX ADMIN — Medication 100 MILLIGRAM(S): at 23:29

## 2019-10-11 RX ADMIN — PANTOPRAZOLE SODIUM 40 MILLIGRAM(S): 20 TABLET, DELAYED RELEASE ORAL at 05:33

## 2019-10-11 RX ADMIN — Medication 75 MILLIGRAM(S): at 23:29

## 2019-10-11 RX ADMIN — Medication 5 MILLIGRAM(S): at 05:32

## 2019-10-11 RX ADMIN — Medication 1 TABLET(S): at 12:28

## 2019-10-11 RX ADMIN — GABAPENTIN 100 MILLIGRAM(S): 400 CAPSULE ORAL at 05:33

## 2019-10-11 RX ADMIN — HEPARIN SODIUM 5000 UNIT(S): 5000 INJECTION INTRAVENOUS; SUBCUTANEOUS at 23:47

## 2019-10-11 RX ADMIN — Medication 100 MILLIGRAM(S): at 14:36

## 2019-10-11 RX ADMIN — SEVELAMER CARBONATE 800 MILLIGRAM(S): 2400 POWDER, FOR SUSPENSION ORAL at 14:33

## 2019-10-11 NOTE — CONSULT NOTE ADULT - SUBJECTIVE AND OBJECTIVE BOX
Patient is a 54y old  Female who presents with a chief complaint of sent in because of positive blood cultures (10 Oct 2019 19:19)      HPI:  Pt is a 53 y/o woman from NH (bedbound) PMHx ESRD on HD M/W/F, HTN, HLD, pre-DM, hypothyroidism, CAD, sacral decubitus ulcer, presents for bacteremia pt had cultures drawn from dialysis from [facility] and found ot have GPC in chains in both blood cx bottles.   pt denies fevers, chills today but states may have had a low grade fever last night and some chills about 4 days ago ( when cultures were drawn )   pt otherwise feels well, no other c/o (10 Oct 2019 19:19)      REVIEW OF SYSTEMS:    CONSTITUTIONAL: No fever, weight loss, or fatigue  EYES: No eye pain, visual disturbances, or discharge  ENMT:  No sore throat  NECK: No pain or stiffness  RESPIRATORY: No cough, wheezing, chills or hemoptysis; No shortness of breath  CARDIOVASCULAR: No chest pain, palpitations, dizziness, or leg swelling  GASTROINTESTINAL: No abdominal or epigastric pain. No nausea, vomiting, or hematemesis; No diarrhea or constipation. No melena or hematochezia.  GENITOURINARY: No dysuria, frequency, hematuria, or incontinence  NEUROLOGICAL: No headaches, memory loss, loss of strength, numbness, or tremors  SKIN: No itching, burning, rashes, or lesions   LYMPH NODES: No enlarged glands  MUSCULOSKELETAL: No joint pain or swelling; No muscle, back, or extremity pain      PAST MEDICAL & SURGICAL HISTORY:  Hypothyroidism  HLD (hyperlipidemia)  CAD in native artery  Diabetes mellitus  HTN (hypertension)  Elective surgery: Dialysis catheter in left arm  Allergic rhinitis  Hypocalcemia  Enterocolitis  Osteomyelitis  Bacteremia  Pressure ulcer: Sacral region  Tremor  Sepsis  Sepsis  Respiratory failure: with hypoxia or hypercapnia  End stage kidney disease  Pulmonary edema: chronic  Hyperlipidemia  Polyneuropathy  ASHD (arteriosclerotic heart disease)  Malformation of coronary vessels  Hypo-osmolality and hyponatremia  Difficulty waking  GERD (gastroesophageal reflux disease)  ESRD (end stage renal disease) on dialysis  Clostridium difficile infection  Osteomyelitis of jaw  Parathyroid adenoma  Hypothyroidism  CKD (chronic kidney disease)  Anemia  CHF (congestive heart failure)  Diabetic neuropathy  Diabetes mellitus  HTN (hypertension)  Arteriovenous fistula  S/P :   No Past Surgical History      Allergies    No Known Allergies    Intolerances        FAMILY HISTORY:  Family history of stroke  Family history of hypertension (Sibling)  Family history of diabetes mellitus      SOCIAL HISTORY:        MEDICATIONS  (STANDING):  ascorbic acid 500 milliGRAM(s) Oral daily  clopidogrel Tablet 75 milliGRAM(s) Oral daily  docusate sodium 100 milliGRAM(s) Oral three times a day  dorzolamide 2%/timolol 0.5% Ophthalmic Solution 1 Drop(s) Both EYES two times a day  fentaNYL   Patch  75 MICROgram(s)/Hr 1 Patch Transdermal every 72 hours  ferrous    sulfate 325 milliGRAM(s) Oral daily  gabapentin 100 milliGRAM(s) Oral every 12 hours  hydrALAZINE 100 milliGRAM(s) Oral every 8 hours  levothyroxine 50 MICROGram(s) Oral daily  metoclopramide 5 milliGRAM(s) Oral three times a day before meals  metoprolol tartrate 75 milliGRAM(s) Oral two times a day  multivitamin 1 Tablet(s) Oral daily  NIFEdipine XL 60 milliGRAM(s) Oral daily  pantoprazole    Tablet 40 milliGRAM(s) Oral before breakfast  senna 2 Tablet(s) Oral at bedtime  sevelamer carbonate 800 milliGRAM(s) Oral three times a day    MEDICATIONS  (PRN):  acetaminophen   Tablet .. 650 milliGRAM(s) Oral every 6 hours PRN Temp greater or equal to 38C (100.4F), Mild Pain (1 - 3)  loratadine 10 milliGRAM(s) Oral daily PRN allergy  oxyCODONE    5 mG/acetaminophen 325 mG 1 Tablet(s) Oral every 6 hours PRN Moderate Pain (4 - 6)      Vital Signs Last 24 Hrs  T(C): 37.2 (11 Oct 2019 05:29), Max: 37.3 (10 Oct 2019 18:45)  T(F): 99 (11 Oct 2019 05:29), Max: 99.1 (10 Oct 2019 18:45)  HR: 79 (11 Oct 2019 05:29) (74 - 81)  BP: 150/64 (11 Oct 2019 05:29) (140/57 - 163/63)  BP(mean): --  RR: 16 (11 Oct 2019 05:29) (16 - 18)  SpO2: 100% (11 Oct 2019 05:29) (96% - 100%)    PHYSICAL EXAM:    GENERAL: NAD, well-groomed  HEAD:  Atraumatic, Normocephalic  EYES: EOMI, PERRLA, conjunctiva and sclera clear  ENMT: No tonsillar erythema, exudates, or enlargement; Moist mucous membranes  NECK: Supple, No JVD  CHEST/LUNG: Clear to percussion bilaterally; No rales, rhonchi, wheezing, or rubs  HEART: Regular rate and rhythm; No murmurs, rubs, or gallops  ABDOMEN: Soft, Nontender, Nondistended; Bowel sounds present  EXTREMITIES:  2+ Peripheral Pulses, No clubbing, cyanosis, or edema  LYMPH: No lymphadenopathy noted  SKIN: No rashes or lesions    LABS:  CBC Full  -  ( 11 Oct 2019 07:15 )  WBC Count : 12.46 K/uL  RBC Count : 3.15 M/uL  Hemoglobin : 8.8 g/dL  Hematocrit : 28.7 %  Platelet Count - Automated : 367 K/uL  Mean Cell Volume : 91.1 fL  Mean Cell Hemoglobin : 27.9 pg  Mean Cell Hemoglobin Concentration : 30.7 %  Auto Neutrophil # : 8.41 K/uL  Auto Lymphocyte # : 2.51 K/uL  Auto Monocyte # : 1.06 K/uL  Auto Eosinophil # : 0.36 K/uL  Auto Basophil # : 0.05 K/uL  Auto Neutrophil % : 67.5 %  Auto Lymphocyte % : 20.1 %  Auto Monocyte % : 8.5 %  Auto Eosinophil % : 2.9 %  Auto Basophil % : 0.4 %      10-11    135  |  93<L>  |  63<H>  ----------------------------<  93  4.5   |  25  |  5.26<H>    Ca    10.3      11 Oct 2019 07:15  Phos  2.8     10-11  Mg     2.5     10-11    TPro  7.6  /  Alb  3.6  /  TBili  0.3  /  DBili  x   /  AST  11  /  ALT  7   /  AlkPhos  143<H>  10-11      LIVER FUNCTIONS - ( 11 Oct 2019 07:15 )  Alb: 3.6 g/dL / Pro: 7.6 g/dL / ALK PHOS: 143 u/L / ALT: 7 u/L / AST: 11 u/L / GGT: x                               MICROBIOLOGY:                    RADIOLOGY: Patient is a 54y old  Female who presents with a chief complaint of sent in because of positive blood cultures (10 Oct 2019 19:19)      HPI:  Pt is a 55 y/o woman from NH (bedbound) PMHx ESRD on HD M/W/F, HTN, HLD, pre-DM, hypothyroidism, CAD, sacral decubitus ulcer, presents for bacteremia pt had cultures drawn from dialysis from [facility] and found ot have GPC in chains in both blood cx bottles.   pt denies fevers, chills today but states may have had a low grade fever last night and some chills about 4 days ago ( when cultures were drawn )   pt otherwise feels well, no other c/o (10 Oct 2019 19:19)    Outpt blood cultures  from Dialysis center growing Group B strep (strep agalactaciae) from 10/10/19.  Pt started on vancomycin.    ID consult called for further abx managment.        REVIEW OF SYSTEMS:    CONSTITUTIONAL: No fever, weight loss, or fatigue  EYES: No eye pain, visual disturbances, or discharge  ENMT:  No sore throat  NECK: No pain or stiffness  RESPIRATORY: No cough, wheezing, chills or hemoptysis; No shortness of breath  CARDIOVASCULAR: No chest pain, palpitations, dizziness, or leg swelling  GASTROINTESTINAL: No abdominal or epigastric pain. No nausea, vomiting, or hematemesis; No diarrhea or constipation. No melena or hematochezia.  GENITOURINARY: No dysuria, frequency, hematuria, or incontinence  NEUROLOGICAL: No headaches, memory loss, loss of strength, numbness, or tremors  SKIN: No itching, burning, rashes, or lesions   LYMPH NODES: No enlarged glands  MUSCULOSKELETAL: No joint pain or swelling; No muscle, back, or extremity pain      PAST MEDICAL & SURGICAL HISTORY:  Hypothyroidism  HLD (hyperlipidemia)  CAD in native artery  Diabetes mellitus  HTN (hypertension)  Elective surgery: Dialysis catheter in left arm  Allergic rhinitis  Hypocalcemia  Enterocolitis  Osteomyelitis  Bacteremia  Pressure ulcer: Sacral region  Tremor  Sepsis  Sepsis  Respiratory failure: with hypoxia or hypercapnia  End stage kidney disease  Pulmonary edema: chronic  Hyperlipidemia  Polyneuropathy  ASHD (arteriosclerotic heart disease)  Malformation of coronary vessels  Hypo-osmolality and hyponatremia  Difficulty waking  GERD (gastroesophageal reflux disease)  ESRD (end stage renal disease) on dialysis  Clostridium difficile infection  Osteomyelitis of jaw  Parathyroid adenoma  Hypothyroidism  CKD (chronic kidney disease)  Anemia  CHF (congestive heart failure)  Diabetic neuropathy  Diabetes mellitus  HTN (hypertension)  Arteriovenous fistula  S/P :   No Past Surgical History      Allergies    No Known Allergies    Intolerances        FAMILY HISTORY:  Family history of stroke  Family history of hypertension (Sibling)  Family history of diabetes mellitus      SOCIAL HISTORY:  Lives at NH, bedbound.  Denies smoking, ivdu, etoh      MEDICATIONS  (STANDING):  ascorbic acid 500 milliGRAM(s) Oral daily  clopidogrel Tablet 75 milliGRAM(s) Oral daily  docusate sodium 100 milliGRAM(s) Oral three times a day  dorzolamide 2%/timolol 0.5% Ophthalmic Solution 1 Drop(s) Both EYES two times a day  fentaNYL   Patch  75 MICROgram(s)/Hr 1 Patch Transdermal every 72 hours  ferrous    sulfate 325 milliGRAM(s) Oral daily  gabapentin 100 milliGRAM(s) Oral every 12 hours  hydrALAZINE 100 milliGRAM(s) Oral every 8 hours  levothyroxine 50 MICROGram(s) Oral daily  metoclopramide 5 milliGRAM(s) Oral three times a day before meals  metoprolol tartrate 75 milliGRAM(s) Oral two times a day  multivitamin 1 Tablet(s) Oral daily  NIFEdipine XL 60 milliGRAM(s) Oral daily  pantoprazole    Tablet 40 milliGRAM(s) Oral before breakfast  senna 2 Tablet(s) Oral at bedtime  sevelamer carbonate 800 milliGRAM(s) Oral three times a day    MEDICATIONS  (PRN):  acetaminophen   Tablet .. 650 milliGRAM(s) Oral every 6 hours PRN Temp greater or equal to 38C (100.4F), Mild Pain (1 - 3)  loratadine 10 milliGRAM(s) Oral daily PRN allergy  oxyCODONE    5 mG/acetaminophen 325 mG 1 Tablet(s) Oral every 6 hours PRN Moderate Pain (4 - 6)      Vital Signs Last 24 Hrs  T(C): 37.2 (11 Oct 2019 05:29), Max: 37.3 (10 Oct 2019 18:45)  T(F): 99 (11 Oct 2019 05:29), Max: 99.1 (10 Oct 2019 18:45)  HR: 79 (11 Oct 2019 05:29) (74 - 81)  BP: 150/64 (11 Oct 2019 05:29) (140/57 - 163/63)  BP(mean): --  RR: 16 (11 Oct 2019 05:29) (16 - 18)  SpO2: 100% (11 Oct 2019 05:29) (96% - 100%)    PHYSICAL EXAM:    GENERAL: NAD, well-groomed  HEAD:  Atraumatic, Normocephalic  EYES: EOMI, PERRLA, conjunctiva and sclera clear  ENMT: No tonsillar erythema, exudates, or enlargement; Moist mucous membranes  NECK: Supple, No JVD  CHEST/LUNG: Clear to percussion bilaterally; No rales, rhonchi, wheezing, or rubs  HEART: Regular rate and rhythm; No murmurs, rubs, or gallops  ABDOMEN: Soft, Nontender, Nondistended; Bowel sounds present  EXTREMITIES:  2+ Peripheral Pulses, No clubbing, cyanosis, or edema  LYMPH: No lymphadenopathy noted  SKIN: No rashes or lesions, LUE av fistula    LABS:  CBC Full  -  ( 11 Oct 2019 07:15 )  WBC Count : 12.46 K/uL  RBC Count : 3.15 M/uL  Hemoglobin : 8.8 g/dL  Hematocrit : 28.7 %  Platelet Count - Automated : 367 K/uL  Mean Cell Volume : 91.1 fL  Mean Cell Hemoglobin : 27.9 pg  Mean Cell Hemoglobin Concentration : 30.7 %  Auto Neutrophil # : 8.41 K/uL  Auto Lymphocyte # : 2.51 K/uL  Auto Monocyte # : 1.06 K/uL  Auto Eosinophil # : 0.36 K/uL  Auto Basophil # : 0.05 K/uL  Auto Neutrophil % : 67.5 %  Auto Lymphocyte % : 20.1 %  Auto Monocyte % : 8.5 %  Auto Eosinophil % : 2.9 %  Auto Basophil % : 0.4 %      10-11    135  |  93<L>  |  63<H>  ----------------------------<  93  4.5   |  25  |  5.26<H>    Ca    10.3      11 Oct 2019 07:15  Phos  2.8     10-11  Mg     2.5     10-11    TPro  7.6  /  Alb  3.6  /  TBili  0.3  /  DBili  x   /  AST  11  /  ALT  7   /  AlkPhos  143<H>  10-11      LIVER FUNCTIONS - ( 11 Oct 2019 07:15 )  Alb: 3.6 g/dL / Pro: 7.6 g/dL / ALK PHOS: 143 u/L / ALT: 7 u/L / AST: 11 u/L / GGT: x                               MICROBIOLOGY:     (Repeat blood cultures testing)                    RADIOLOGY:      < from: Xray Chest 1 View- PORTABLE-Urgent (10.10.19 @ 16:43) >    FINDINGS:  No focal consolidation.  No pleural effusion or pneumothorax.  The cardiomediastinal silhouette cannot be accurately assessed in this   projection.  Again seen is a left vascular stent overlying the axillary area.  The visualized osseous and soft tissue structures demonstrate no acute   pathology.    IMPRESSION: No localized pulmonary disease.    < end of copied text >        < from: MR Pelvis w/ IV Cont (09.10.19 @ 15:32) >    IMPRESSION:     Multiple cystic lesions in the solitary pelvic kidney with multiple thin   septations. Follow-up contrast-enhanced renal protocol CT or MRI is   recommended in 6 months.    < end of copied text >        < from: US Abdomen Complete (19 @ 13:16) >  FINDINGS:    Liver: Within normal limits.    Bile ducts: Normal caliber. Common bile duct measures 3 mm.     Gallbladder: Not visualized.        Pancreas: Visualized portions are within normal limits.    Spleen: 8.1 cm. Within normal limits.    Pelvic kidney: 10.8 cm. No hydronephrosis.    Ascites: None.    Aorta and IVC: Visualized portions are within normal limits.    IMPRESSION:     Redemonstration of pelvic kidney, without evidence of hydronephrosis on   the current exam.    < end of copied text >

## 2019-10-11 NOTE — CONSULT NOTE ADULT - PROBLEM SELECTOR RECOMMENDATION 2
BCx from outpt HD unit grew pansensitive strept agalactiae.  Leukocytosis noted, hemodynamics stable.  ID consult appreciated. Abx choice as per primary team.  F/u repeat bcx. additional w/u pending.

## 2019-10-11 NOTE — PROGRESS NOTE ADULT - ASSESSMENT
_________________________________________________________________________________________  ========>>  M E D I C A L   A T T E N D I N G    F O L L O W  U P  N O T E  <<=========  -----------------------------------------------------------------------------------------------------    - Patient seen and examined by me earlier today.   - In summary,  STEVEN HARVEY is a 54y year old woman who originally presented with + blood cultures   - Patient today overall doing ok, comfortable, eating OK.     ==================>> REVIEW OF SYSTEM <<=================    GEN: no fever, no chills, no pain  RESP: no SOB, no cough, no sputum  CVS: no chest pain, no palpitations, no edema  GI: no abdominal pain, no nausea, no constipation, no diarrhea  : no dysuria  Neuro: no headache, no dizziness  Derm : no itching, no rash    ==================>> PHYSICAL EXAM <<=================    GEN: A&O X 3 , NAD , comfortable  HEENT: NCAT, MMM, hearing intact  Neck: supple , no JVD  CVS: S1S2 , regular , No M/R/G appreciated  PULM: CTA B/L,  no W/R/R appreciated  ABD.: soft. non tender, non distended,  bowel sounds present  Extrem: intact pulses , no edema   PSYCH : normal mood,  not anxious      ==================>> MEDICATIONS <<====================    MEDICATIONS  (STANDING):  ascorbic acid 500 milliGRAM(s) Oral daily  cefTRIAXone   IVPB 1000 milliGRAM(s) IV Intermittent every 24 hours  chlorhexidine 4% Liquid 1 Application(s) Topical once  clopidogrel Tablet 75 milliGRAM(s) Oral daily  docusate sodium 100 milliGRAM(s) Oral three times a day  dorzolamide 2%/timolol 0.5% Ophthalmic Solution 1 Drop(s) Both EYES two times a day  fentaNYL   Patch  75 MICROgram(s)/Hr 1 Patch Transdermal every 72 hours  ferrous    sulfate 325 milliGRAM(s) Oral daily  gabapentin 100 milliGRAM(s) Oral every 12 hours  hydrALAZINE 100 milliGRAM(s) Oral every 8 hours  levothyroxine 50 MICROGram(s) Oral daily  metoclopramide 5 milliGRAM(s) Oral three times a day before meals  metoprolol tartrate 75 milliGRAM(s) Oral two times a day  multivitamin 1 Tablet(s) Oral daily  NIFEdipine XL 60 milliGRAM(s) Oral daily  pantoprazole    Tablet 40 milliGRAM(s) Oral before breakfast  senna 2 Tablet(s) Oral at bedtime  sevelamer carbonate 800 milliGRAM(s) Oral three times a day    MEDICATIONS  (PRN):  acetaminophen   Tablet .. 650 milliGRAM(s) Oral every 6 hours PRN Temp greater or equal to 38C (100.4F), Mild Pain (1 - 3)  loratadine 10 milliGRAM(s) Oral daily PRN allergy  oxyCODONE    5 mG/acetaminophen 325 mG 1 Tablet(s) Oral every 6 hours PRN Moderate Pain (4 - 6)      ==================>> VITAL SIGNS <<==================    T(C): 36.7 (10-11-19 @ 17:16), Max: 37.3 (10-10-19 @ 18:45)  HR: 74 (10-11-19 @ 17:16) (72 - 82)  BP: 145/51 (10-11-19 @ 17:16) (128/60 - 163/63)  RR: 20 (10-11-19 @ 17:16) (16 - 20)  SpO2: 100% (10-11-19 @ 17:16) (96% - 100%)     I&O's Summary    10 Oct 2019 07:01  -  11 Oct 2019 07:00  --------------------------------------------------------  IN: 150 mL / OUT: 0 mL / NET: 150 mL     ==================>> LAB AND IMAGING <<==================                        8.8    12.46 )-----------( 367      ( 11 Oct 2019 07:15 )             28.7        135  |  93<L>  |  63<H>  ----------------------------<  93  4.5   |  25  |  5.26<H>    Ca    10.3      11 Oct 2019 07:15  Phos  2.8     10-11  Mg     2.5     10-11    TPro  7.6  /  Alb  3.6  /  TBili  0.3  /  DBili  x   /  AST  11  /  ALT  7   /  AlkPhos  143<H>  10-11    TSH:      1.23   (09-07-19)           HgA1C: 5.7  (09-07-19)            _______________________  C U L T U R E S :    Culture - Blood (collected 10 Oct 2019 15:37)  Source: BLOOD VENOUS  Preliminary Report (11 Oct 2019 15:36):    NO ORGANISMS ISOLATED    NO ORGANISMS ISOLATED AT 24 HOURS    Culture - Blood (collected 10 Oct 2019 15:37)  Source: BLOOD PERIPHERAL  Preliminary Report (11 Oct 2019 15:36):    NO ORGANISMS ISOLATED    NO ORGANISMS ISOLATED AT 24 HOURS    ___________________________________________________________________________________  ===============>>  A S S E S S M E N T   A N D   P L A N <<===============  ------------------------------------------------------------------------------------------    · Assessment		  53 y/o female PMH of HTN, HLD, hypothyroidism, constipation, CAD, bedbound, ESRD on HD sent in for leukocytosis admitted for infectious workup     Problem/Plan - 1:  ·  Problem: Bacteremia.  Plan: bacteremia with gram positives as drawn in dialysis   repeat cultures in proccess   pt hemodynamically stable  post vanco in ED  ID following   follow repeat cultures.   repeat vanco post HD as needed     Problem/Plan - 2:  ·  Problem: ESRD  on dialysis.       -on HD MWF  -renal following   -plan for HD today    Problem/Plan - 3:  ·  Problem: Sacral decubitus ulcer, stage IV.    - local Wound care   turn and position in this pt with bedbound status     Problem/Plan - 4:  ·  Problem: Hypothyroidism.  Plan: -c/w synthroid.     Problem/Plan - 5:  ·  Problem: CAD in native artery.  Plan: -c/w simvastatin, plavix, lopressor.     Problem/Plan - 6:  Problem: Essential hypertension.   - Continue Current medications and monitor.     Problem/Plan - 7:  ·  Problem: Prophylactic measure.  Plan: hsq.     --------------------------------------------  Case discussed with pt  Education given on findings and plan of care  ___________________________  H. MIKAELA Hannah.  Pager: [phone]

## 2019-10-11 NOTE — CONSULT NOTE ADULT - PROBLEM SELECTOR RECOMMENDATION 9
Maintenance HD schedule MWF.  Last dialyzed on 10/9, without acute events.  HD consent obtained. Plan for HD today.   Otherwise, lytes and volume status acceptable.

## 2019-10-11 NOTE — CONSULT NOTE ADULT - SUBJECTIVE AND OBJECTIVE BOX
[facility] Consult Note Nephrology - CONSULTATION NOTE - [phone] - Dr Valles / Dr Mahoney / Dr Syed / Dr Lamas / Dr Lozano / Dr Castaneda / Dr Monique / Dr Escoto    Patient is a 54y Female w ESRD on HD MWF @ [facility] HD unit, DM, HTN referred to ED for gram Positive bacteremia, drawn in outpt HD unit on 10/7. While in HD unit on 10/7, she c/o chills, so bcx subsequently drawn. She refused evaluation in ED at that time. She returned for her regular HD treatment on 10/9, and felt well. She completed her treatment without acute issues. She was advised to come to ED as bcx grew pansensitive strept agalactiae. She has not received any abx in outpt unit. Otherwise, denies N/V/F/C/D/SOB/pain. Appetite at baseline.     PAST MEDICAL & SURGICAL HISTORY:  Hypothyroidism  HLD (hyperlipidemia)  CAD in native artery  Diabetes mellitus  HTN (hypertension)  Elective surgery: Dialysis catheter in left arm  Allergic rhinitis  Hypocalcemia  Enterocolitis  Osteomyelitis  Bacteremia  Pressure ulcer: Sacral region  Tremor  Sepsis  Sepsis  Respiratory failure: with hypoxia or hypercapnia  End stage kidney disease  Pulmonary edema: chronic  Hyperlipidemia  Polyneuropathy  ASHD (arteriosclerotic heart disease)  Malformation of coronary vessels  Hypo-osmolality and hyponatremia  Difficulty waking  GERD (gastroesophageal reflux disease)  ESRD (end stage renal disease) on dialysis  Clostridium difficile infection  Osteomyelitis of jaw  Parathyroid adenoma  Hypothyroidism  CKD (chronic kidney disease)  Anemia  CHF (congestive heart failure)  Diabetic neuropathy  Diabetes mellitus  HTN (hypertension)  Arteriovenous fistula  S/P :   No Past Surgical History    Allergies:  No Known Allergies    Home Medications Reviewed  Hospital Medications:   MEDICATIONS  (STANDING):  ascorbic acid 500 milliGRAM(s) Oral daily  chlorhexidine 4% Liquid 1 Application(s) Topical once  clopidogrel Tablet 75 milliGRAM(s) Oral daily  docusate sodium 100 milliGRAM(s) Oral three times a day  dorzolamide 2%/timolol 0.5% Ophthalmic Solution 1 Drop(s) Both EYES two times a day  fentaNYL   Patch  75 MICROgram(s)/Hr 1 Patch Transdermal every 72 hours  ferrous    sulfate 325 milliGRAM(s) Oral daily  gabapentin 100 milliGRAM(s) Oral every 12 hours  hydrALAZINE 100 milliGRAM(s) Oral every 8 hours  levothyroxine 50 MICROGram(s) Oral daily  metoclopramide 5 milliGRAM(s) Oral three times a day before meals  metoprolol tartrate 75 milliGRAM(s) Oral two times a day  multivitamin 1 Tablet(s) Oral daily  NIFEdipine XL 60 milliGRAM(s) Oral daily  pantoprazole    Tablet 40 milliGRAM(s) Oral before breakfast  senna 2 Tablet(s) Oral at bedtime  sevelamer carbonate 800 milliGRAM(s) Oral three times a day    SOCIAL HISTORY:  Denies ETOh,Smoking,   FAMILY HISTORY:  Family history of stroke  Family history of hypertension (Sibling)  Family history of diabetes mellitus    REVIEW OF SYSTEMS:  CONSTITUTIONAL: No weakness, fevers or chills  EYES/ENT: No visual changes;  No vertigo or throat pain   NECK: No pain or stiffness  RESPIRATORY: No cough, wheezing, hemoptysis; No shortness of breath  CARDIOVASCULAR: No chest pain or palpitations.  GASTROINTESTINAL: No abdominal or epigastric pain. No nausea, vomiting, or hematemesis; No diarrhea or constipation. No melena or hematochezia.  GENITOURINARY: No dysuria, frequency, foamy urine, urinary urgency, incontinence or hematuria  NEUROLOGICAL: No numbness or weakness  SKIN: No itching, burning, rashes, or lesions   VASCULAR: No bilateral lower extremity edema.   All other review of systems is negative unless indicated above.    VITALS:  T(F): 98.7 (10-11-19 @ 12:25), Max: 99.1 (10-10-19 @ 18:45)  HR: 82 (10-11-19 @ 12:25)  BP: 128/60 (10-11-19 @ 12:25)  RR: 18 (10-11-19 @ 12:25)  SpO2: 99% (10-11-19 @ 12:25)  Wt(kg): --    10-10 @ 07:01  -  10-11 @ 07:00  --------------------------------------------------------  IN: 150 mL / OUT: 0 mL / NET: 150 mL      Height (cm): 154.2 (10-10 @ 21:46)  Weight (kg): 57 (10-10 @ 21:46)  BMI (kg/m2): 24 (10-10 @ 21:46)  BSA (m2): 1.55 (10-10 @ 21:46)  PHYSICAL EXAM:  Constitutional: NAD  HEENT: anicteric sclera, oropharynx clear, MMM  Neck: No JVD  Respiratory: CTAB, no wheezes, rales or rhonchi  Cardiovascular: S1, S2, RRR  Gastrointestinal: BS+, soft, NT/ND  Extremities: No cyanosis or clubbing. No peripheral edema  Neurological: A/O x 3, no focal deficits  Psychiatric: Normal mood, normal affect  : No CVA tenderness. No jarrell.   Skin: No rashes  Vascular Access: LUE AV access +thrill and bruit.     LABS:  10-11    135  |  93<L>  |  63<H>  ----------------------------<  93  4.5   |  25  |  5.26<H>    Ca    10.3      11 Oct 2019 07:15  Phos  2.8     10-11  Mg     2.5     10-11    TPro  7.6  /  Alb  3.6  /  TBili  0.3  /  DBili      /  AST  11  /  ALT  7   /  AlkPhos  143<H>  1011    Creatinine Trend: 5.26 <--, 4.14 <--                        8.8    12.46 )-----------( 367      ( 11 Oct 2019 07:15 )             28.7     Urine Studies:      RADIOLOGY & ADDITIONAL STUDIES:  < from: Xray Chest 1 View- PORTABLE-Urgent (10.10.19 @ 16:43) >  IMPRESSION: No localized pulmonary disease.    < end of copied text >

## 2019-10-11 NOTE — CONSULT NOTE ADULT - ASSESSMENT
54y Female w ESRD on HD MWF @ [facility] HD unit, DM, HTN referred to ED for gram Positive bacteremia, drawn in outpt HD unit on 10/7.
Pt is a 55 y/o woman from [place] (bed[place]) PMHx ESRD on HD M/W/F, HTN, HLD, pre-DM, hypothyroidism, CAD, sacral decubitus ulcer, presents for bacteremia pt had cultures drawn from dialysis from [facility] and found ot have GPC in chains in both blood cx bottles.   pt denies fevers, chills today but states may have had a low grade fever last night and some chills about 4 days ago ( when cultures were drawn )   pt otherwise feels well, no other c/o (10 Oct 2019 19:19)    Outpt blood cultures  from Dialysis center growing Group B strep (strep agalactaciae) from 10/10/19.  Pt started on vancomycin.    ID consult called for further abx managment.      Bacteremia:    - Pt with subjective fever/chills on Monday.  No sepsis presentation.  Outpt bcx growing GBS (see copy in chart).  Pt denies abd pain, urinary symptoms, diarrhea,  Pt states she had UTI a couple months ago.  Recent ucx from Sept grew normal derrick.  No skin infections.  No other localizing symptoms on exam.    - Pt given vanco x 1 in the ER.  Change to ceftriaxone 1gm IV qdaily based on outpt cultures.     - Source unclear.  Check UA, urine cultures.  Recommend echocardiogram. No pna on cxr.  No joint swelling/pain    - f/u repeat blood cultures to ensure clearance.     Will follow,    (Dr. Mohsena Amin covering over the weekend)

## 2019-10-12 LAB
ANION GAP SERPL CALC-SCNC: 13 MMO/L — SIGNIFICANT CHANGE UP (ref 7–14)
APPEARANCE UR: SIGNIFICANT CHANGE UP
BACTERIA # UR AUTO: HIGH
BASOPHILS # BLD AUTO: 0.07 K/UL — SIGNIFICANT CHANGE UP (ref 0–0.2)
BASOPHILS NFR BLD AUTO: 0.7 % — SIGNIFICANT CHANGE UP (ref 0–2)
BILIRUB UR-MCNC: NEGATIVE — SIGNIFICANT CHANGE UP
BLOOD UR QL VISUAL: SIGNIFICANT CHANGE UP
BUN SERPL-MCNC: 29 MG/DL — HIGH (ref 7–23)
CALCIUM SERPL-MCNC: 9.6 MG/DL — SIGNIFICANT CHANGE UP (ref 8.4–10.5)
CHLORIDE SERPL-SCNC: 93 MMOL/L — LOW (ref 98–107)
CO2 SERPL-SCNC: 27 MMOL/L — SIGNIFICANT CHANGE UP (ref 22–31)
COLOR SPEC: SIGNIFICANT CHANGE UP
CREAT SERPL-MCNC: 3.15 MG/DL — HIGH (ref 0.5–1.3)
EOSINOPHIL # BLD AUTO: 0.52 K/UL — HIGH (ref 0–0.5)
EOSINOPHIL NFR BLD AUTO: 5.3 % — SIGNIFICANT CHANGE UP (ref 0–6)
GLUCOSE SERPL-MCNC: 89 MG/DL — SIGNIFICANT CHANGE UP (ref 70–99)
GLUCOSE UR-MCNC: 100 — SIGNIFICANT CHANGE UP
HCT VFR BLD CALC: 30.2 % — LOW (ref 34.5–45)
HGB BLD-MCNC: 8.9 G/DL — LOW (ref 11.5–15.5)
HYALINE CASTS # UR AUTO: HIGH
IMM GRANULOCYTES NFR BLD AUTO: 0.9 % — SIGNIFICANT CHANGE UP (ref 0–1.5)
KETONES UR-MCNC: NEGATIVE — SIGNIFICANT CHANGE UP
LEUKOCYTE ESTERASE UR-ACNC: SIGNIFICANT CHANGE UP
LYMPHOCYTES # BLD AUTO: 2.47 K/UL — SIGNIFICANT CHANGE UP (ref 1–3.3)
LYMPHOCYTES # BLD AUTO: 25.3 % — SIGNIFICANT CHANGE UP (ref 13–44)
MAGNESIUM SERPL-MCNC: 2.1 MG/DL — SIGNIFICANT CHANGE UP (ref 1.6–2.6)
MCHC RBC-ENTMCNC: 27.3 PG — SIGNIFICANT CHANGE UP (ref 27–34)
MCHC RBC-ENTMCNC: 29.5 % — LOW (ref 32–36)
MCV RBC AUTO: 92.6 FL — SIGNIFICANT CHANGE UP (ref 80–100)
MONOCYTES # BLD AUTO: 0.94 K/UL — HIGH (ref 0–0.9)
MONOCYTES NFR BLD AUTO: 9.6 % — SIGNIFICANT CHANGE UP (ref 2–14)
NEUTROPHILS # BLD AUTO: 5.69 K/UL — SIGNIFICANT CHANGE UP (ref 1.8–7.4)
NEUTROPHILS NFR BLD AUTO: 58.2 % — SIGNIFICANT CHANGE UP (ref 43–77)
NITRITE UR-MCNC: NEGATIVE — SIGNIFICANT CHANGE UP
NRBC # FLD: 0 K/UL — SIGNIFICANT CHANGE UP (ref 0–0)
PH UR: 7.5 — SIGNIFICANT CHANGE UP (ref 5–8)
PHOSPHATE SERPL-MCNC: 2.2 MG/DL — LOW (ref 2.5–4.5)
PLATELET # BLD AUTO: 367 K/UL — SIGNIFICANT CHANGE UP (ref 150–400)
PMV BLD: 10.1 FL — SIGNIFICANT CHANGE UP (ref 7–13)
POTASSIUM SERPL-MCNC: 3.4 MMOL/L — LOW (ref 3.5–5.3)
POTASSIUM SERPL-SCNC: 3.4 MMOL/L — LOW (ref 3.5–5.3)
PROT UR-MCNC: 300 — HIGH
RBC # BLD: 3.26 M/UL — LOW (ref 3.8–5.2)
RBC # FLD: 16 % — HIGH (ref 10.3–14.5)
RBC CASTS # UR COMP ASSIST: SIGNIFICANT CHANGE UP (ref 0–?)
SODIUM SERPL-SCNC: 133 MMOL/L — LOW (ref 135–145)
SP GR SPEC: 1.01 — SIGNIFICANT CHANGE UP (ref 1–1.04)
SPECIMEN SOURCE: SIGNIFICANT CHANGE UP
SQUAMOUS # UR AUTO: SIGNIFICANT CHANGE UP
UROBILINOGEN FLD QL: NORMAL — SIGNIFICANT CHANGE UP
WBC # BLD: 9.78 K/UL — SIGNIFICANT CHANGE UP (ref 3.8–10.5)
WBC # FLD AUTO: 9.78 K/UL — SIGNIFICANT CHANGE UP (ref 3.8–10.5)
WBC UR QL: >50 — HIGH (ref 0–?)

## 2019-10-12 RX ADMIN — HEPARIN SODIUM 5000 UNIT(S): 5000 INJECTION INTRAVENOUS; SUBCUTANEOUS at 17:18

## 2019-10-12 RX ADMIN — Medication 100 MILLIGRAM(S): at 13:21

## 2019-10-12 RX ADMIN — GABAPENTIN 100 MILLIGRAM(S): 400 CAPSULE ORAL at 07:01

## 2019-10-12 RX ADMIN — Medication 5 MILLIGRAM(S): at 06:59

## 2019-10-12 RX ADMIN — FENTANYL CITRATE 1 PATCH: 50 INJECTION INTRAVENOUS at 18:03

## 2019-10-12 RX ADMIN — DORZOLAMIDE HYDROCHLORIDE TIMOLOL MALEATE 1 DROP(S): 20; 5 SOLUTION/ DROPS OPHTHALMIC at 06:56

## 2019-10-12 RX ADMIN — Medication 50 MICROGRAM(S): at 06:58

## 2019-10-12 RX ADMIN — HEPARIN SODIUM 5000 UNIT(S): 5000 INJECTION INTRAVENOUS; SUBCUTANEOUS at 06:57

## 2019-10-12 RX ADMIN — CEFTRIAXONE 100 MILLIGRAM(S): 500 INJECTION, POWDER, FOR SOLUTION INTRAMUSCULAR; INTRAVENOUS at 22:23

## 2019-10-12 RX ADMIN — Medication 325 MILLIGRAM(S): at 12:02

## 2019-10-12 RX ADMIN — DORZOLAMIDE HYDROCHLORIDE TIMOLOL MALEATE 1 DROP(S): 20; 5 SOLUTION/ DROPS OPHTHALMIC at 17:18

## 2019-10-12 RX ADMIN — Medication 100 MILLIGRAM(S): at 22:22

## 2019-10-12 RX ADMIN — CLOPIDOGREL BISULFATE 75 MILLIGRAM(S): 75 TABLET, FILM COATED ORAL at 12:01

## 2019-10-12 RX ADMIN — Medication 100 MILLIGRAM(S): at 22:21

## 2019-10-12 RX ADMIN — Medication 75 MILLIGRAM(S): at 06:59

## 2019-10-12 RX ADMIN — FENTANYL CITRATE 1 PATCH: 50 INJECTION INTRAVENOUS at 12:00

## 2019-10-12 RX ADMIN — Medication 100 MILLIGRAM(S): at 06:56

## 2019-10-12 RX ADMIN — PANTOPRAZOLE SODIUM 40 MILLIGRAM(S): 20 TABLET, DELAYED RELEASE ORAL at 07:00

## 2019-10-12 RX ADMIN — Medication 5 MILLIGRAM(S): at 12:00

## 2019-10-12 RX ADMIN — SEVELAMER CARBONATE 800 MILLIGRAM(S): 2400 POWDER, FOR SUSPENSION ORAL at 13:21

## 2019-10-12 RX ADMIN — Medication 500 MILLIGRAM(S): at 12:01

## 2019-10-12 RX ADMIN — Medication 1 TABLET(S): at 12:02

## 2019-10-12 RX ADMIN — Medication 100 MILLIGRAM(S): at 06:58

## 2019-10-12 RX ADMIN — Medication 5 MILLIGRAM(S): at 17:23

## 2019-10-12 RX ADMIN — Medication 75 MILLIGRAM(S): at 22:22

## 2019-10-12 RX ADMIN — Medication 60 MILLIGRAM(S): at 06:59

## 2019-10-12 RX ADMIN — SEVELAMER CARBONATE 800 MILLIGRAM(S): 2400 POWDER, FOR SUSPENSION ORAL at 06:59

## 2019-10-12 RX ADMIN — GABAPENTIN 100 MILLIGRAM(S): 400 CAPSULE ORAL at 17:19

## 2019-10-12 RX ADMIN — SENNA PLUS 2 TABLET(S): 8.6 TABLET ORAL at 22:22

## 2019-10-12 NOTE — PROGRESS NOTE ADULT - ASSESSMENT
54y Female w ESRD on HD MWF @ [facility] HD unit, DM, HTN referred to ED for gram Positive bacteremia, drawn in outpt HD unit on 10/7. Renal following for ESRD Mx.    End stage kidney disease. Maintenance HD schedule MWF.  dialyzed on 10/11, net uf 1.3L, uneventful   volume status acceptable.  K mildly low-no need to replete  low phos- d/jacob renvela  liberalize dietary K, phos restriction  Anemia in CKD-Hb <goal-will add MARITA w/next hd    HTN, controlled  c/w BB, hydralazine, Nifedipine    Bacteremia.  BCx from outpt HD unit grew pansensitive strept agalactiae.  Leukocytosis dec, hemodynamics stable.  f/u wID. Abx choice as per ID. on CTX iv  F/u bcx 48Hrs NTD    labs, chart reviewed

## 2019-10-12 NOTE — PROGRESS NOTE ADULT - SUBJECTIVE AND OBJECTIVE BOX
[facility] - Denzel / Reena BEARD /Cydney/ CHIRAG Lozano/ CHIRAG Syed/ Lance Monique / PREET Njeru  ---------------------------------------------------------------------------------------------------------------    Patient seen and examined bedside    Subjective and Objective: No overnight events, deneid D/V/sob. No complaints today. feeling better    Allergies: No Known Allergies      Hospital Medications:   MEDICATIONS  (STANDING):  ascorbic acid 500 milliGRAM(s) Oral daily  cefTRIAXone   IVPB 1000 milliGRAM(s) IV Intermittent every 24 hours  chlorhexidine 4% Liquid 1 Application(s) Topical once  clopidogrel Tablet 75 milliGRAM(s) Oral daily  docusate sodium 100 milliGRAM(s) Oral three times a day  dorzolamide 2%/timolol 0.5% Ophthalmic Solution 1 Drop(s) Both EYES two times a day  fentaNYL   Patch  75 MICROgram(s)/Hr 1 Patch Transdermal every 72 hours  ferrous    sulfate 325 milliGRAM(s) Oral daily  gabapentin 100 milliGRAM(s) Oral every 12 hours  heparin  Injectable 5000 Unit(s) SubCutaneous every 12 hours  hydrALAZINE 100 milliGRAM(s) Oral every 8 hours  levothyroxine 50 MICROGram(s) Oral daily  metoclopramide 5 milliGRAM(s) Oral three times a day before meals  metoprolol tartrate 75 milliGRAM(s) Oral two times a day  multivitamin 1 Tablet(s) Oral daily  NIFEdipine XL 60 milliGRAM(s) Oral daily  pantoprazole    Tablet 40 milliGRAM(s) Oral before breakfast  senna 2 Tablet(s) Oral at bedtime    VITALS:  T(F): 99.1 (10-12-19 @ 17:44), Max: 99.2 (10-12-19 @ 06:53)  HR: 78 (10-12-19 @ 17:44)  BP: 140/62 (10-12-19 @ 17:44)  RR: 17 (10-12-19 @ 17:44)  SpO2: 100% (10-12-19 @ 17:44)  Wt(kg): --    10-11 @ 07:  -  10-12 @ 07:00  --------------------------------------------------------  IN: 400 mL / OUT: 1700 mL / NET: -1300 mL    10-12 @ 07:  -  10-12 @ 18:34  --------------------------------------------------------  IN: 0 mL / OUT: 250 mL / NET: -250 mL    PHYSICAL EXAM:  Constitutional: NAD  HEENT: anicteric sclera, oropharynx clear  Neck: No JVD  Respiratory: CTAB, no wheezes, rales or rhonchi  Cardiovascular: S1, S2, RRR  Gastrointestinal: BS+, soft, NT/ND  Extremities: No cyanosis or clubbing. No peripheral edema  Neurological: A/O x 3, no focal deficits  Psychiatric: Normal mood, normal affect  : No jarrell.   Skin: No rashes  Vascular Access: BRYSON AVG+bruit    LABS:  10-12    133<L>  |  93<L>  |  29<H>  ----------------------------<  89  3.4<L>   |  27  |  3.15<H>    Ca    9.6      12 Oct 2019 06:00  Phos  2.2     10-12  Mg     2.1     10-12    TPro  7.6  /  Alb  3.6  /  TBili  0.3  /  DBili      /  AST  11  /  ALT  7   /  AlkPhos  143<H>  10-11    Creatinine Trend: 3.15 <--, 5.26 <--, 4.14 <--                        8.9    9.78  )-----------( 367      ( 12 Oct 2019 06:00 )             30.2     Urine Studies:  Urinalysis Basic - ( 12 Oct 2019 08:42 )    Color: LIGHT ORANGE / Appearance: TURBID / S.011 / pH: 7.5  Gluc: 100 / Ketone: NEGATIVE  / Bili: NEGATIVE / Urobili: NORMAL   Blood: SMALL / Protein: 300 / Nitrite: NEGATIVE   Leuk Esterase: LARGE / RBC: 5-10 / WBC >50   Sq Epi: MANY / Non Sq Epi:  / Bacteria: MANY          RADIOLOGY & ADDITIONAL STUDIES:

## 2019-10-12 NOTE — PROGRESS NOTE ADULT - SUBJECTIVE AND OBJECTIVE BOX
Patient seen and examined by me earlier today.   - In summary,  STEVEN HARVEY is a 54y year old woman who originally presented with + blood cultures   - Patient today overall doing ok, comfortable, eating OK.     ==================>> REVIEW OF SYSTEM <<=================    GEN: no fever, no chills, no pain  RESP: no SOB, no cough, no sputum  CVS: no chest pain, no palpitations, no edema  GI: no abdominal pain, no nausea, no constipation, no diarrhea  : no dysuria  Neuro: no headache, no dizziness  Derm : no itching, no rash    ==================>> PHYSICAL EXAM <<=================    GEN: A&O X 3 , NAD , comfortable  HEENT: NCAT, MMM, hearing intact  Neck: supple , no JVD  CVS: S1S2 , regular , No M/R/G appreciated  PULM: CTA B/L,  no W/R/R appreciated  ABD.: soft. non tender, non distended,  bowel sounds present  Extrem: intact pulses , no edema   PSYCH : normal mood,  not anxious      ==================>> MEDICATIONS <<====================    MEDICATIONS  (STANDING):  ascorbic acid 500 milliGRAM(s) Oral daily  cefTRIAXone   IVPB 1000 milliGRAM(s) IV Intermittent every 24 hours  chlorhexidine 4% Liquid 1 Application(s) Topical once  clopidogrel Tablet 75 milliGRAM(s) Oral daily  docusate sodium 100 milliGRAM(s) Oral three times a day  dorzolamide 2%/timolol 0.5% Ophthalmic Solution 1 Drop(s) Both EYES two times a day  fentaNYL   Patch  75 MICROgram(s)/Hr 1 Patch Transdermal every 72 hours  ferrous    sulfate 325 milliGRAM(s) Oral daily  gabapentin 100 milliGRAM(s) Oral every 12 hours  hydrALAZINE 100 milliGRAM(s) Oral every 8 hours  levothyroxine 50 MICROGram(s) Oral daily  metoclopramide 5 milliGRAM(s) Oral three times a day before meals  metoprolol tartrate 75 milliGRAM(s) Oral two times a day  multivitamin 1 Tablet(s) Oral daily  NIFEdipine XL 60 milliGRAM(s) Oral daily  pantoprazole    Tablet 40 milliGRAM(s) Oral before breakfast  senna 2 Tablet(s) Oral at bedtime  sevelamer carbonate 800 milliGRAM(s) Oral three times a day    MEDICATIONS  (PRN):  acetaminophen   Tablet .. 650 milliGRAM(s) Oral every 6 hours PRN Temp greater or equal to 38C (100.4F), Mild Pain (1 - 3)  loratadine 10 milliGRAM(s) Oral daily PRN allergy  oxyCODONE    5 mG/acetaminophen 325 mG 1 Tablet(s) Oral every 6 hours PRN Moderate Pain (4 - 6)      ==================>> VITAL SIGNS <<==================    T(C): 36.7 (10-11-19 @ 17:16), Max: 37.3 (10-10-19 @ 18:45)  HR: 74 (10-11-19 @ 17:16) (72 - 82)  BP: 145/51 (10-11-19 @ 17:16) (128/60 - 163/63)  RR: 20 (10-11-19 @ 17:16) (16 - 20)  SpO2: 100% (10-11-19 @ 17:16) (96% - 100%)     I&O's Summary    10 Oct 2019 07:01  -  11 Oct 2019 07:00  --------------------------------------------------------  IN: 150 mL / OUT: 0 mL / NET: 150 mL     ==================>> LAB AND IMAGING <<==================                        8.8    12.46 )-----------( 367      ( 11 Oct 2019 07:15 )             28.7        135  |  93<L>  |  63<H>  ----------------------------<  93  4.5   |  25  |  5.26<H>    Ca    10.3      11 Oct 2019 07:15  Phos  2.8     10-11  Mg     2.5     10-11    TPro  7.6  /  Alb  3.6  /  TBili  0.3  /  DBili  x   /  AST  11  /  ALT  7   /  AlkPhos  143<H>  10-11    TSH:      1.23   (09-07-19)           HgA1C: 5.7  (09-07-19)            _______________________  C U L T U R E S :    Culture - Blood (collected 10 Oct 2019 15:37)  Source: BLOOD VENOUS  Preliminary Report (11 Oct 2019 15:36):    NO ORGANISMS ISOLATED    NO ORGANISMS ISOLATED AT 24 HOURS    Culture - Blood (collected 10 Oct 2019 15:37)  Source: BLOOD PERIPHERAL  Preliminary Report (11 Oct 2019 15:36):    NO ORGANISMS ISOLATED    NO ORGANISMS ISOLATED AT 24 HOURS

## 2019-10-13 LAB
ANION GAP SERPL CALC-SCNC: 20 MMO/L — HIGH (ref 7–14)
BACTERIA NPH CULT: SIGNIFICANT CHANGE UP
BASOPHILS # BLD AUTO: 0.07 K/UL — SIGNIFICANT CHANGE UP (ref 0–0.2)
BASOPHILS NFR BLD AUTO: 0.7 % — SIGNIFICANT CHANGE UP (ref 0–2)
BUN SERPL-MCNC: 55 MG/DL — HIGH (ref 7–23)
CALCIUM SERPL-MCNC: 10.6 MG/DL — HIGH (ref 8.4–10.5)
CHLORIDE SERPL-SCNC: 92 MMOL/L — LOW (ref 98–107)
CO2 SERPL-SCNC: 20 MMOL/L — LOW (ref 22–31)
CREAT SERPL-MCNC: 4.84 MG/DL — HIGH (ref 0.5–1.3)
EOSINOPHIL # BLD AUTO: 0.74 K/UL — HIGH (ref 0–0.5)
EOSINOPHIL NFR BLD AUTO: 6.9 % — HIGH (ref 0–6)
GLUCOSE SERPL-MCNC: 145 MG/DL — HIGH (ref 70–99)
HCT VFR BLD CALC: 38.4 % — SIGNIFICANT CHANGE UP (ref 34.5–45)
HGB BLD-MCNC: 11 G/DL — LOW (ref 11.5–15.5)
IMM GRANULOCYTES NFR BLD AUTO: 0.9 % — SIGNIFICANT CHANGE UP (ref 0–1.5)
LYMPHOCYTES # BLD AUTO: 2.77 K/UL — SIGNIFICANT CHANGE UP (ref 1–3.3)
LYMPHOCYTES # BLD AUTO: 25.7 % — SIGNIFICANT CHANGE UP (ref 13–44)
MAGNESIUM SERPL-MCNC: 2.2 MG/DL — SIGNIFICANT CHANGE UP (ref 1.6–2.6)
MCHC RBC-ENTMCNC: 27 PG — SIGNIFICANT CHANGE UP (ref 27–34)
MCHC RBC-ENTMCNC: 28.6 % — LOW (ref 32–36)
MCV RBC AUTO: 94.1 FL — SIGNIFICANT CHANGE UP (ref 80–100)
MONOCYTES # BLD AUTO: 0.85 K/UL — SIGNIFICANT CHANGE UP (ref 0–0.9)
MONOCYTES NFR BLD AUTO: 7.9 % — SIGNIFICANT CHANGE UP (ref 2–14)
NEUTROPHILS # BLD AUTO: 6.23 K/UL — SIGNIFICANT CHANGE UP (ref 1.8–7.4)
NEUTROPHILS NFR BLD AUTO: 57.9 % — SIGNIFICANT CHANGE UP (ref 43–77)
NRBC # FLD: 0 K/UL — SIGNIFICANT CHANGE UP (ref 0–0)
PHOSPHATE SERPL-MCNC: 3.7 MG/DL — SIGNIFICANT CHANGE UP (ref 2.5–4.5)
PLATELET # BLD AUTO: 461 K/UL — HIGH (ref 150–400)
PMV BLD: 9.9 FL — SIGNIFICANT CHANGE UP (ref 7–13)
POTASSIUM SERPL-MCNC: 4.7 MMOL/L — SIGNIFICANT CHANGE UP (ref 3.5–5.3)
POTASSIUM SERPL-SCNC: 4.7 MMOL/L — SIGNIFICANT CHANGE UP (ref 3.5–5.3)
RBC # BLD: 4.08 M/UL — SIGNIFICANT CHANGE UP (ref 3.8–5.2)
RBC # FLD: 16.1 % — HIGH (ref 10.3–14.5)
SODIUM SERPL-SCNC: 132 MMOL/L — LOW (ref 135–145)
WBC # BLD: 10.76 K/UL — HIGH (ref 3.8–10.5)
WBC # FLD AUTO: 10.76 K/UL — HIGH (ref 3.8–10.5)

## 2019-10-13 PROCEDURE — 93306 TTE W/DOPPLER COMPLETE: CPT | Mod: 26

## 2019-10-13 RX ORDER — CINACALCET 30 MG/1
30 TABLET, FILM COATED ORAL DAILY
Refills: 0 | Status: DISCONTINUED | OUTPATIENT
Start: 2019-10-13 | End: 2019-10-16

## 2019-10-13 RX ORDER — MUPIROCIN 20 MG/G
1 OINTMENT TOPICAL EVERY 12 HOURS
Refills: 0 | Status: DISCONTINUED | OUTPATIENT
Start: 2019-10-13 | End: 2019-10-16

## 2019-10-13 RX ADMIN — MUPIROCIN 1 APPLICATION(S): 20 OINTMENT TOPICAL at 21:49

## 2019-10-13 RX ADMIN — CINACALCET 30 MILLIGRAM(S): 30 TABLET, FILM COATED ORAL at 18:39

## 2019-10-13 RX ADMIN — Medication 5 MILLIGRAM(S): at 18:27

## 2019-10-13 RX ADMIN — FENTANYL CITRATE 1 PATCH: 50 INJECTION INTRAVENOUS at 05:26

## 2019-10-13 RX ADMIN — GABAPENTIN 100 MILLIGRAM(S): 400 CAPSULE ORAL at 05:24

## 2019-10-13 RX ADMIN — PANTOPRAZOLE SODIUM 40 MILLIGRAM(S): 20 TABLET, DELAYED RELEASE ORAL at 05:24

## 2019-10-13 RX ADMIN — Medication 50 MICROGRAM(S): at 05:24

## 2019-10-13 RX ADMIN — GABAPENTIN 100 MILLIGRAM(S): 400 CAPSULE ORAL at 18:27

## 2019-10-13 RX ADMIN — Medication 100 MILLIGRAM(S): at 21:49

## 2019-10-13 RX ADMIN — Medication 100 MILLIGRAM(S): at 14:04

## 2019-10-13 RX ADMIN — Medication 60 MILLIGRAM(S): at 05:24

## 2019-10-13 RX ADMIN — Medication 325 MILLIGRAM(S): at 12:00

## 2019-10-13 RX ADMIN — Medication 100 MILLIGRAM(S): at 05:24

## 2019-10-13 RX ADMIN — Medication 75 MILLIGRAM(S): at 05:24

## 2019-10-13 RX ADMIN — Medication 5 MILLIGRAM(S): at 05:24

## 2019-10-13 RX ADMIN — DORZOLAMIDE HYDROCHLORIDE TIMOLOL MALEATE 1 DROP(S): 20; 5 SOLUTION/ DROPS OPHTHALMIC at 18:26

## 2019-10-13 RX ADMIN — Medication 1 TABLET(S): at 12:00

## 2019-10-13 RX ADMIN — Medication 500 MILLIGRAM(S): at 11:59

## 2019-10-13 RX ADMIN — HEPARIN SODIUM 5000 UNIT(S): 5000 INJECTION INTRAVENOUS; SUBCUTANEOUS at 05:25

## 2019-10-13 RX ADMIN — DORZOLAMIDE HYDROCHLORIDE TIMOLOL MALEATE 1 DROP(S): 20; 5 SOLUTION/ DROPS OPHTHALMIC at 05:24

## 2019-10-13 RX ADMIN — CLOPIDOGREL BISULFATE 75 MILLIGRAM(S): 75 TABLET, FILM COATED ORAL at 11:59

## 2019-10-13 RX ADMIN — Medication 100 MILLIGRAM(S): at 05:25

## 2019-10-13 RX ADMIN — CEFTRIAXONE 100 MILLIGRAM(S): 500 INJECTION, POWDER, FOR SOLUTION INTRAMUSCULAR; INTRAVENOUS at 21:49

## 2019-10-13 RX ADMIN — Medication 5 MILLIGRAM(S): at 12:00

## 2019-10-13 RX ADMIN — HEPARIN SODIUM 5000 UNIT(S): 5000 INJECTION INTRAVENOUS; SUBCUTANEOUS at 18:27

## 2019-10-13 RX ADMIN — Medication 75 MILLIGRAM(S): at 18:27

## 2019-10-13 NOTE — PROGRESS NOTE ADULT - SUBJECTIVE AND OBJECTIVE BOX
Patient is seen and examined at the bed side, is afebrile.      REVIEW OF SYSTEMS: All other review systems are negative        Vital Signs Last 24 Hrs  T(C): 36.8 (13 Oct 2019 16:55), Max: 37.2 (13 Oct 2019 14:19)  T(F): 98.2 (13 Oct 2019 16:55), Max: 98.9 (13 Oct 2019 14:19)  HR: 74 (13 Oct 2019 16:55) (70 - 75)  BP: 148/63 (13 Oct 2019 16:55) (131/59 - 152/71)  BP(mean): --  RR: 18 (13 Oct 2019 16:55) (16 - 18)  SpO2: 99% (13 Oct 2019 16:55) (96% - 100%)        PHYSICAL EXAM:    GENERAL: Not in distres  CHEST/LUNG: Clear to percussion bilaterally  HEART: Regular rate and rhythm; No murmurs, rubs, or gallops  ABDOMEN: Soft, Nontender, Nondistended; Bowel sounds present  EXTREMITIES:  2+ Peripheral Pulses, No clubbing, cyanosis, or edema  LYMPH: No lymphadenopathy noted  SKIN: No rashes or lesions, LUE av fistula      Allergies    No Known Allergies              MEDICATIONS  (STANDING):  ascorbic acid 500 milliGRAM(s) Oral daily  cefTRIAXone   IVPB 1000 milliGRAM(s) IV Intermittent every 24 hours  chlorhexidine 4% Liquid 1 Application(s) Topical once  cinacalcet 30 milliGRAM(s) Oral daily  clopidogrel Tablet 75 milliGRAM(s) Oral daily  docusate sodium 100 milliGRAM(s) Oral three times a day  dorzolamide 2%/timolol 0.5% Ophthalmic Solution 1 Drop(s) Both EYES two times a day  fentaNYL   Patch  75 MICROgram(s)/Hr 1 Patch Transdermal every 72 hours  gabapentin 100 milliGRAM(s) Oral every 12 hours  heparin  Injectable 5000 Unit(s) SubCutaneous every 12 hours  hydrALAZINE 100 milliGRAM(s) Oral every 8 hours  levothyroxine 50 MICROGram(s) Oral daily  metoclopramide 5 milliGRAM(s) Oral three times a day before meals  metoprolol tartrate 75 milliGRAM(s) Oral two times a day  multivitamin 1 Tablet(s) Oral daily  NIFEdipine XL 60 milliGRAM(s) Oral daily  pantoprazole    Tablet 40 milliGRAM(s) Oral before breakfast  senna 2 Tablet(s) Oral at bedtime    MEDICATIONS  (PRN):  acetaminophen   Tablet .. 650 milliGRAM(s) Oral every 6 hours PRN Temp greater or equal to 38C (100.4F), Mild Pain (1 - 3)  loratadine 10 milliGRAM(s) Oral daily PRN allergy  oxyCODONE    5 mG/acetaminophen 325 mG 1 Tablet(s) Oral every 6 hours PRN Moderate Pain (4 - 6)          LABS:                        11.0   10.76 )-----------( 461      ( 13 Oct 2019 11:00 )             38.4       10-13    132<L>  |  92<L>  |  55<H>  ----------------------------<  145<H>  4.7   |  20<L>  |  4.84<H>    Ca    10.6<H>      13 Oct 2019 11:00  Phos  3.7     10-13  Mg     2.2     10-13      10-11    135  |  93<L>  |  63<H>  ----------------------------<  93  4.5   |  25  |  5.26<H>    Ca    10.3      11 Oct 2019 07:15  Phos  2.8     10-11  Mg     2.5     10-11    TPro  7.6  /  Alb  3.6  /  TBili  0.3  /  DBili  x   /  AST  11  /  ALT  7   /  AlkPhos  143<H>  10-11      LIVER FUNCTIONS - ( 11 Oct 2019 07:15 )  Alb: 3.6 g/dL / Pro: 7.6 g/dL / ALK PHOS: 143 u/L / ALT: 7 u/L / AST: 11 u/L / GGT: x               MICROBIOLOGY  DATA :      Culture - Nose (10.11.19 @ 21:21)    Culture - Nose:   Growth of Methicillin-Resistant Staphylococcus aureus  MRSA^Staph. aureus *MRSA*  OXICILLIN-RESISTANT staphylococci should be regarded as  RESISTANT to ALL other Beta-Lactams regardless of the  in-vitro results obtained.  These include: ALL  Penicillins, Cephalosporins, Amoxicillin-clavulanic  acid, Ticarcillin-clavulanic acid,  Ampicillin-sulbactam, and Imipenem.    Specimen Source: NOSE      Culture - Blood (10.10.19 @ 15:37)    Culture - Blood:   NO ORGANISMS ISOLATED  NO ORGANISMS ISOLATED AT 72 HRS.    Specimen Source: BLOOD VENOUS      Culture - Blood (10.10.19 @ 15:37)    Culture - Blood:   NO ORGANISMS ISOLATED  NO ORGANISMS ISOLATED AT 72 HRS.    Specimen Source: BLOOD PERIPHERAL                    RADIOLOGY:      < from: Xray Chest 1 View- PORTABLE-Urgent (10.10.19 @ 16:43) >    FINDINGS:  No focal consolidation.  No pleural effusion or pneumothorax.  The cardiomediastinal silhouette cannot be accurately assessed in this   projection.  Again seen is a left vascular stent overlying the axillary area.  The visualized osseous and soft tissue structures demonstrate no acute   pathology.    IMPRESSION: No localized pulmonary disease.    < end of copied text >        < from: MR Pelvis w/ IV Cont (09.10.19 @ 15:32) >    IMPRESSION:     Multiple cystic lesions in the solitary pelvic kidney with multiple thin   septations. Follow-up contrast-enhanced renal protocol CT or MRI is   recommended in 6 months.    < end of copied text >        < from: US Abdomen Complete (09.09.19 @ 13:16) >  FINDINGS:    Liver: Within normal limits.    Bile ducts: Normal caliber. Common bile duct measures 3 mm.     Gallbladder: Not visualized.        Pancreas: Visualized portions are within normal limits.    Spleen: 8.1 cm. Within normal limits.    Pelvic kidney: 10.8 cm. No hydronephrosis.    Ascites: None.    Aorta and IVC: Visualized portions are within normal limits.    IMPRESSION:     Redemonstration of pelvic kidney, without evidence of hydronephrosis on   the current exam.    < end of copied text > Patient is seen and examined at the bed side, is afebrile. She mentioned doing better, no complaints. The Blood cultures are negative to date.         REVIEW OF SYSTEMS: All other review systems are negative        Vital Signs Last 24 Hrs  T(C): 36.8 (13 Oct 2019 16:55), Max: 37.2 (13 Oct 2019 14:19)  T(F): 98.2 (13 Oct 2019 16:55), Max: 98.9 (13 Oct 2019 14:19)  HR: 74 (13 Oct 2019 16:55) (70 - 75)  BP: 148/63 (13 Oct 2019 16:55) (131/59 - 152/71)  BP(mean): --  RR: 18 (13 Oct 2019 16:55) (16 - 18)  SpO2: 99% (13 Oct 2019 16:55) (96% - 100%)        PHYSICAL EXAM:  GENERAL: Not in distress   CHEST/LUNG: Air entry  bilaterally  HEART: s1 and s2 present   ABDOMEN:  Nontender an d Nondistended  EXTREMITIES:  No pedal edema,  LUE av fistula in placed  CNS: Awake and Alert        Allergies    No Known Allergies          MEDICATIONS  (STANDING):  ascorbic acid 500 milliGRAM(s) Oral daily  cefTRIAXone   IVPB 1000 milliGRAM(s) IV Intermittent every 24 hours  chlorhexidine 4% Liquid 1 Application(s) Topical once  cinacalcet 30 milliGRAM(s) Oral daily  clopidogrel Tablet 75 milliGRAM(s) Oral daily  docusate sodium 100 milliGRAM(s) Oral three times a day  dorzolamide 2%/timolol 0.5% Ophthalmic Solution 1 Drop(s) Both EYES two times a day  fentaNYL   Patch  75 MICROgram(s)/Hr 1 Patch Transdermal every 72 hours  gabapentin 100 milliGRAM(s) Oral every 12 hours  heparin  Injectable 5000 Unit(s) SubCutaneous every 12 hours  hydrALAZINE 100 milliGRAM(s) Oral every 8 hours  levothyroxine 50 MICROGram(s) Oral daily  metoclopramide 5 milliGRAM(s) Oral three times a day before meals  metoprolol tartrate 75 milliGRAM(s) Oral two times a day  multivitamin 1 Tablet(s) Oral daily  NIFEdipine XL 60 milliGRAM(s) Oral daily  pantoprazole    Tablet 40 milliGRAM(s) Oral before breakfast  senna 2 Tablet(s) Oral at bedtime    MEDICATIONS  (PRN):  acetaminophen   Tablet .. 650 milliGRAM(s) Oral every 6 hours PRN Temp greater or equal to 38C (100.4F), Mild Pain (1 - 3)  loratadine 10 milliGRAM(s) Oral daily PRN allergy  oxyCODONE    5 mG/acetaminophen 325 mG 1 Tablet(s) Oral every 6 hours PRN Moderate Pain (4 - 6)          LABS:                        11.0   10.76 )-----------( 461      ( 13 Oct 2019 11:00 )             38.4       10-13    132<L>  |  92<L>  |  55<H>  ----------------------------<  145<H>  4.7   |  20<L>  |  4.84<H>    Ca    10.6<H>      13 Oct 2019 11:00  Phos  3.7     10-13  Mg     2.2     10-13      10-11    135  |  93<L>  |  63<H>  ----------------------------<  93  4.5   |  25  |  5.26<H>    Ca    10.3      11 Oct 2019 07:15  Phos  2.8     10-11  Mg     2.5     10-11    TPro  7.6  /  Alb  3.6  /  TBili  0.3  /  DBili  x   /  AST  11  /  ALT  7   /  AlkPhos  143<H>  10-11      LIVER FUNCTIONS - ( 11 Oct 2019 07:15 )  Alb: 3.6 g/dL / Pro: 7.6 g/dL / ALK PHOS: 143 u/L / ALT: 7 u/L / AST: 11 u/L / GGT: x               MICROBIOLOGY  DATA :      Culture - Nose (10.11.19 @ 21:21)    Culture - Nose:   Growth of Methicillin-Resistant Staphylococcus aureus  MRSA^Staph. aureus *MRSA*  OXICILLIN-RESISTANT staphylococci should be regarded as  RESISTANT to ALL other Beta-Lactams regardless of the  in-vitro results obtained.  These include: ALL  Penicillins, Cephalosporins, Amoxicillin-clavulanic  acid, Ticarcillin-clavulanic acid,  Ampicillin-sulbactam, and Imipenem.    Specimen Source: NOSE      Culture - Blood (10.10.19 @ 15:37)    Culture - Blood:   NO ORGANISMS ISOLATED  NO ORGANISMS ISOLATED AT 72 HRS.    Specimen Source: BLOOD VENOUS      Culture - Blood (10.10.19 @ 15:37)    Culture - Blood:   NO ORGANISMS ISOLATED  NO ORGANISMS ISOLATED AT 72 HRS.    Specimen Source: BLOOD PERIPHERAL          RADIOLOGY DATA :      < from: Xray Chest 1 View- PORTABLE-Urgent (10.10.19 @ 16:43)   No focal consolidation.  No pleural effusion or pneumothorax.  The cardiomediastinal silhouette cannot be accurately assessed in this   projection.  Again seen is a left vascular stent overlying the axillary area.  The visualized osseous and soft tissue structures demonstrate no acute   pathology.    IMPRESSION: No localized pulmonary disease.            < from: MR Pelvis w/ IV Cont (09.10.19 @ 15:32) >    IMPRESSION:     Multiple cystic lesions in the solitary pelvic kidney with multiple thin   septations. Follow-up contrast-enhanced renal protocol CT or MRI is   recommended in 6 months.    < end of copied text >        < from: US Abdomen Complete (09.09.19 @ 13:16) >  FINDINGS:    Liver: Within normal limits.    Bile ducts: Normal caliber. Common bile duct measures 3 mm.     Gallbladder: Not visualized.        Pancreas: Visualized portions are within normal limits.    Spleen: 8.1 cm. Within normal limits.    Pelvic kidney: 10.8 cm. No hydronephrosis.    Ascites: None.    Aorta and IVC: Visualized portions are within normal limits.    IMPRESSION:     Redemonstration of pelvic kidney, without evidence of hydronephrosis on   the current exam.    < end of copied text >

## 2019-10-13 NOTE — PROGRESS NOTE ADULT - ASSESSMENT
_________________________________________________________________________________________  ========>>  M E D I C A L   A T T E N D I N G    F O L L O W  U P  N O T E  <<=========  -----------------------------------------------------------------------------------------------------    - Patient seen and examined by me earlier today.   - In summary,  STEVEN HARVEY is a 54y year old woman who originally presented with + blood cultures   - Patient today overall doing ok, comfortable, eating OK. no fever     ==================>> REVIEW OF SYSTEM <<=================    GEN: no fever, no chills, no pain  RESP: no SOB, no cough, no sputum  CVS: no chest pain, no palpitations, no edema  GI: no abdominal pain, no nausea, no constipation, no diarrhea  : no dysuria  Neuro: no headache, no dizziness  Derm : no itching, no rash    ==================>> PHYSICAL EXAM <<=================    GEN: A&O X 3 , NAD , comfortable  HEENT: NCAT, MMM, hearing intact  Neck: supple , no JVD  CVS: S1S2 , regular , No M/R/G appreciated  PULM: CTA B/L,  no W/R/R appreciated  ABD.: soft. non tender, non distended,  bowel sounds present  Extrem: intact pulses , no edema   PSYCH : normal mood,  not anxious      ==================>> MEDICATIONS <<====================    ascorbic acid 500 milliGRAM(s) Oral daily  cefTRIAXone   IVPB 1000 milliGRAM(s) IV Intermittent every 24 hours  chlorhexidine 4% Liquid 1 Application(s) Topical once  cinacalcet 30 milliGRAM(s) Oral daily  clopidogrel Tablet 75 milliGRAM(s) Oral daily  docusate sodium 100 milliGRAM(s) Oral three times a day  dorzolamide 2%/timolol 0.5% Ophthalmic Solution 1 Drop(s) Both EYES two times a day  fentaNYL   Patch  75 MICROgram(s)/Hr 1 Patch Transdermal every 72 hours  gabapentin 100 milliGRAM(s) Oral every 12 hours  heparin  Injectable 5000 Unit(s) SubCutaneous every 12 hours  hydrALAZINE 100 milliGRAM(s) Oral every 8 hours  levothyroxine 50 MICROGram(s) Oral daily  metoclopramide 5 milliGRAM(s) Oral three times a day before meals  metoprolol tartrate 75 milliGRAM(s) Oral two times a day  multivitamin 1 Tablet(s) Oral daily  NIFEdipine XL 60 milliGRAM(s) Oral daily  pantoprazole    Tablet 40 milliGRAM(s) Oral before breakfast  senna 2 Tablet(s) Oral at bedtime    MEDICATIONS  (PRN):  acetaminophen   Tablet .. 650 milliGRAM(s) Oral every 6 hours PRN Temp greater or equal to 38C (100.4F), Mild Pain (1 - 3)  loratadine 10 milliGRAM(s) Oral daily PRN allergy  oxyCODONE    5 mG/acetaminophen 325 mG 1 Tablet(s) Oral every 6 hours PRN Moderate Pain (4 - 6)    ==================>> VITAL SIGNS <<==================    Vital Signs Last 24 Hrs  T(C): 36.8 (10-13-19 @ 16:55)  T(F): 98.2 (10-13-19 @ 16:55), Max: 98.9 (10-13-19 @ 14:19)  HR: 74 (10-13-19 @ 16:55) (70 - 75)  BP: 148/63 (10-13-19 @ 16:55)  RR: 18 (10-13-19 @ 16:55) (16 - 18)  SpO2: 99% (10-13-19 @ 16:55) (96% - 100%)       ==================>> LAB AND IMAGING <<==================                        11.0   10.76 )-----------( 461      ( 13 Oct 2019 11:00 )             38.4        132<L>  |  92<L>  |  55<H>  ----------------------------<  145<H>  4.7   |  20<L>  |  4.84<H>    Ca    10.6<H>      13 Oct 2019 11:00  Phos  3.7     10-13  Mg     2.2     10-13      WBC count:   10.76 <<== ,  9.78 <<== ,  12.46 <<== ,  9.78 <<==   Hemoglobin:   11.0 <<==,  8.9 <<==,  8.8 <<==,  10.0 <<==  platelets:  461 <==, 367 <==, 367 <==, 351 <==    Creatinine:  4.84  <<==, 3.15  <<==, 5.26  <<==, 4.14  <<==  Sodium:   132  <==, 133  <==, 135  <==, 132  <==       AST:          11 <== , 20 <==      ALT:        7  <== , 8  <==      AP:        143  <=, 172  <=     Bili:        0.3  <=, 0.3  <=    _______________________  C U L T U R E S :    Culture - Nose (collected 11 Oct 2019 21:21)  Source: NOSE  Final Report (13 Oct 2019 14:19):    Growth of Methicillin-Resistant Staphylococcus aureus    MRSA^Staph. aureus *MRSA*    OXICILLIN-RESISTANT staphylococci should be regarded as    RESISTANT to ALL other Beta-Lactams regardless of the    in-vitro results obtained.  These include: ALL    Penicillins, Cephalosporins, Amoxicillin-clavulanic    acid, Ticarcillin-clavulanic acid,    Ampicillin-sulbactam, and Imipenem.    Culture - Blood (collected 10 Oct 2019 15:37)  Source: BLOOD VENOUS  Preliminary Report (13 Oct 2019 15:36):    NO ORGANISMS ISOLATED    NO ORGANISMS ISOLATED AT 72 HRS.    Culture - Blood (collected 10 Oct 2019 15:37)  Source: BLOOD PERIPHERAL  Preliminary Report (13 Oct 2019 15:36):    NO ORGANISMS ISOLATED    NO ORGANISMS ISOLATED AT 72 HRS.    ___________________________________________________________________________________  ===============>>  A S S E S S M E N T   A N D   P L A N <<===============  ------------------------------------------------------------------------------------------    · Assessment		  53 y/o female PMH of HTN, HLD, hypothyroidism, constipation, CAD, bedbound, ESRD on HD sent in for leukocytosis admitted for infectious workup     Problem/Plan - 1:  ·  Problem: Bacteremia.  Plan: bacteremia with gram positives as drawn in dialysis   repeat cultures so far negative   pt hemodynamically stable  ID following   abx per ID  plan to return to facility when cleared by ID    Problem/Plan - 2:  ·  Problem: ESRD  on dialysis.       -on HD MWF  -renal following   HD per schedule     Problem/Plan - 3:  ·  Problem: decubitus ulcer  - local Wound care   turn and position in this pt with bedbound status     Problem/Plan - 4:  ·  Problem: Hypothyroidism.  Plan: -c/w synthroid.     Problem/Plan - 5:  ·  Problem: CAD in native artery.  Plan: -c/w simvastatin, plavix, lopressor.     Problem/Plan - 6:  Problem: Essential hypertension.   - Continue Current medications and monitor.     Problem/Plan - 7:  ·  Problem: Prophylactic measure.  Plan: hsq.     --------------------------------------------  Case discussed with pt  Education given on findings and plan of care  ___________________________  H. MIKAELA Hannah.  Pager: [phone]    Dr Mckeon will cover through 10-15-19.

## 2019-10-13 NOTE — PROGRESS NOTE ADULT - ASSESSMENT
Pt is a 53 y/o woman from [facility] (bedbound) PMHx ESRD on HD M/W/F, HTN, HLD, pre-DM, hypothyroidism, CAD, sacral decubitus ulcer, presents for bacteremia pt had cultures drawn from dialysis from [facility] and found ot have GPC in chains in both blood cx bottles.   pt denies fevers, chills today but states may have had a low grade fever last night and some chills about 4 days ago ( when cultures were drawn )   pt otherwise feels well, no other c/o (10 Oct 2019 19:19)    Outpt blood cultures  from Dialysis center growing Group B strep (strep agalactaciae) from 10/10/19.  Pt started on vancomycin.    ID consult called for further abx managment.      Bacteremia:    - Pt with subjective fever/chills on Monday.  No sepsis presentation.  Outpt bcx growing GBS (see copy in chart).  Pt denies abd pain, urinary symptoms, diarrhea,  Pt states she had UTI a couple months ago.  Recent ucx from Sept grew normal derrick.  No skin infections.  No other localizing symptoms on exam.    - Pt given vanco x 1 in the ER.  Change to ceftriaxone 1gm IV qdaily based on outpt cultures.     - Source unclear.  Check UA, urine cultures.  Recommend echocardiogram. No pna on cxr.  No joint swelling/pain    - f/u repeat blood cultures to ensure clearance.     Will follow, Pt is a 53 y/o woman from [facility] (bedbound) PMHx ESRD on HD M/W/F, HTN, HLD, pre-DM, hypothyroidism, CAD, sacral decubitus ulcer, presents for bacteremia pt had cultures drawn from dialysis from [facility] and found ot have GPC in chains in both blood cx bottles.   pt denies fevers, chills today but states may have had a low grade fever last night and some chills about 4 days ago ( when cultures were drawn )   pt otherwise feels well, no other c/o (10 Oct 2019 19:19)    Outpt blood cultures  from Dialysis center growing Group B strep (strep agalactiae ) from 10/10/19.  Pt started on vancomycin.    ID consult called for further abx management.       Bacteremia:    - Pt with subjective fever/chills on Monday.  No sepsis presentation.  Outpt bcx growing GBS (see copy in chart).  Pt denies abd pain, urinary symptoms, diarrhea,  Pt states she had UTI a couple months ago.  Recent ucx from Sept grew normal derrick.  No skin infections.  No other localizing symptoms on exam.    - Pt given vanco x 1 in the ER.  Change to ceftriaxone 1gm IV qdaily based on outpt cultures.     - Source unclear.  - Repeat Blood cultures are negative to date 10/10/19  and continue Ceftriaxone.       - covering Dr. Jacobs

## 2019-10-14 LAB
ANION GAP SERPL CALC-SCNC: 18 MMO/L — HIGH (ref 7–14)
BASOPHILS # BLD AUTO: 0.07 K/UL — SIGNIFICANT CHANGE UP (ref 0–0.2)
BASOPHILS NFR BLD AUTO: 0.6 % — SIGNIFICANT CHANGE UP (ref 0–2)
BUN SERPL-MCNC: 79 MG/DL — HIGH (ref 7–23)
CALCIUM SERPL-MCNC: 9.7 MG/DL — SIGNIFICANT CHANGE UP (ref 8.4–10.5)
CHLORIDE SERPL-SCNC: 92 MMOL/L — LOW (ref 98–107)
CO2 SERPL-SCNC: 22 MMOL/L — SIGNIFICANT CHANGE UP (ref 22–31)
CREAT SERPL-MCNC: 6.71 MG/DL — HIGH (ref 0.5–1.3)
EOSINOPHIL # BLD AUTO: 0.75 K/UL — HIGH (ref 0–0.5)
EOSINOPHIL NFR BLD AUTO: 6.1 % — HIGH (ref 0–6)
GLUCOSE BLDC GLUCOMTR-MCNC: 133 MG/DL — HIGH (ref 70–99)
GLUCOSE BLDC GLUCOMTR-MCNC: 306 MG/DL — HIGH (ref 70–99)
GLUCOSE SERPL-MCNC: 214 MG/DL — HIGH (ref 70–99)
HCT VFR BLD CALC: 28.6 % — LOW (ref 34.5–45)
HGB BLD-MCNC: 9 G/DL — LOW (ref 11.5–15.5)
IMM GRANULOCYTES NFR BLD AUTO: 1.2 % — SIGNIFICANT CHANGE UP (ref 0–1.5)
LYMPHOCYTES # BLD AUTO: 2.54 K/UL — SIGNIFICANT CHANGE UP (ref 1–3.3)
LYMPHOCYTES # BLD AUTO: 20.8 % — SIGNIFICANT CHANGE UP (ref 13–44)
MAGNESIUM SERPL-MCNC: 2.2 MG/DL — SIGNIFICANT CHANGE UP (ref 1.6–2.6)
MCHC RBC-ENTMCNC: 28.1 PG — SIGNIFICANT CHANGE UP (ref 27–34)
MCHC RBC-ENTMCNC: 31.5 % — LOW (ref 32–36)
MCV RBC AUTO: 89.4 FL — SIGNIFICANT CHANGE UP (ref 80–100)
MONOCYTES # BLD AUTO: 0.79 K/UL — SIGNIFICANT CHANGE UP (ref 0–0.9)
MONOCYTES NFR BLD AUTO: 6.5 % — SIGNIFICANT CHANGE UP (ref 2–14)
NEUTROPHILS # BLD AUTO: 7.92 K/UL — HIGH (ref 1.8–7.4)
NEUTROPHILS NFR BLD AUTO: 64.8 % — SIGNIFICANT CHANGE UP (ref 43–77)
NRBC # FLD: 0 K/UL — SIGNIFICANT CHANGE UP (ref 0–0)
PHOSPHATE SERPL-MCNC: 3.5 MG/DL — SIGNIFICANT CHANGE UP (ref 2.5–4.5)
PLATELET # BLD AUTO: 465 K/UL — HIGH (ref 150–400)
PMV BLD: 9.7 FL — SIGNIFICANT CHANGE UP (ref 7–13)
POTASSIUM SERPL-MCNC: 6 MMOL/L — HIGH (ref 3.5–5.3)
POTASSIUM SERPL-SCNC: 6 MMOL/L — HIGH (ref 3.5–5.3)
RBC # BLD: 3.2 M/UL — LOW (ref 3.8–5.2)
RBC # FLD: 16.1 % — HIGH (ref 10.3–14.5)
SODIUM SERPL-SCNC: 132 MMOL/L — LOW (ref 135–145)
WBC # BLD: 12.22 K/UL — HIGH (ref 3.8–10.5)
WBC # FLD AUTO: 12.22 K/UL — HIGH (ref 3.8–10.5)

## 2019-10-14 RX ORDER — INSULIN LISPRO 100/ML
VIAL (ML) SUBCUTANEOUS
Refills: 0 | Status: DISCONTINUED | OUTPATIENT
Start: 2019-10-14 | End: 2019-10-16

## 2019-10-14 RX ORDER — DEXTROSE 50 % IN WATER 50 %
15 SYRINGE (ML) INTRAVENOUS ONCE
Refills: 0 | Status: DISCONTINUED | OUTPATIENT
Start: 2019-10-14 | End: 2019-10-16

## 2019-10-14 RX ORDER — SODIUM CHLORIDE 9 MG/ML
1000 INJECTION, SOLUTION INTRAVENOUS
Refills: 0 | Status: DISCONTINUED | OUTPATIENT
Start: 2019-10-14 | End: 2019-10-16

## 2019-10-14 RX ORDER — DEXTROSE 50 % IN WATER 50 %
25 SYRINGE (ML) INTRAVENOUS ONCE
Refills: 0 | Status: DISCONTINUED | OUTPATIENT
Start: 2019-10-14 | End: 2019-10-16

## 2019-10-14 RX ORDER — GLUCAGON INJECTION, SOLUTION 0.5 MG/.1ML
1 INJECTION, SOLUTION SUBCUTANEOUS ONCE
Refills: 0 | Status: DISCONTINUED | OUTPATIENT
Start: 2019-10-14 | End: 2019-10-16

## 2019-10-14 RX ORDER — DEXTROSE 50 % IN WATER 50 %
12.5 SYRINGE (ML) INTRAVENOUS ONCE
Refills: 0 | Status: DISCONTINUED | OUTPATIENT
Start: 2019-10-14 | End: 2019-10-16

## 2019-10-14 RX ADMIN — FENTANYL CITRATE 1 PATCH: 50 INJECTION INTRAVENOUS at 18:20

## 2019-10-14 RX ADMIN — HEPARIN SODIUM 5000 UNIT(S): 5000 INJECTION INTRAVENOUS; SUBCUTANEOUS at 05:51

## 2019-10-14 RX ADMIN — Medication 4: at 18:18

## 2019-10-14 RX ADMIN — Medication 75 MILLIGRAM(S): at 17:34

## 2019-10-14 RX ADMIN — PANTOPRAZOLE SODIUM 40 MILLIGRAM(S): 20 TABLET, DELAYED RELEASE ORAL at 05:51

## 2019-10-14 RX ADMIN — Medication 100 MILLIGRAM(S): at 21:24

## 2019-10-14 RX ADMIN — Medication 650 MILLIGRAM(S): at 21:37

## 2019-10-14 RX ADMIN — Medication 100 MILLIGRAM(S): at 15:23

## 2019-10-14 RX ADMIN — MUPIROCIN 1 APPLICATION(S): 20 OINTMENT TOPICAL at 10:07

## 2019-10-14 RX ADMIN — Medication 50 MICROGRAM(S): at 05:50

## 2019-10-14 RX ADMIN — FENTANYL CITRATE 1 PATCH: 50 INJECTION INTRAVENOUS at 07:38

## 2019-10-14 RX ADMIN — OXYCODONE AND ACETAMINOPHEN 1 TABLET(S): 5; 325 TABLET ORAL at 23:55

## 2019-10-14 RX ADMIN — Medication 1 TABLET(S): at 15:23

## 2019-10-14 RX ADMIN — HEPARIN SODIUM 5000 UNIT(S): 5000 INJECTION INTRAVENOUS; SUBCUTANEOUS at 17:34

## 2019-10-14 RX ADMIN — Medication 5 MILLIGRAM(S): at 17:34

## 2019-10-14 RX ADMIN — Medication 650 MILLIGRAM(S): at 22:37

## 2019-10-14 RX ADMIN — GABAPENTIN 100 MILLIGRAM(S): 400 CAPSULE ORAL at 17:34

## 2019-10-14 RX ADMIN — SENNA PLUS 2 TABLET(S): 8.6 TABLET ORAL at 21:24

## 2019-10-14 RX ADMIN — MUPIROCIN 1 APPLICATION(S): 20 OINTMENT TOPICAL at 21:24

## 2019-10-14 RX ADMIN — Medication 500 MILLIGRAM(S): at 15:23

## 2019-10-14 RX ADMIN — CINACALCET 30 MILLIGRAM(S): 30 TABLET, FILM COATED ORAL at 17:34

## 2019-10-14 RX ADMIN — Medication 100 MILLIGRAM(S): at 05:50

## 2019-10-14 RX ADMIN — DORZOLAMIDE HYDROCHLORIDE TIMOLOL MALEATE 1 DROP(S): 20; 5 SOLUTION/ DROPS OPHTHALMIC at 05:52

## 2019-10-14 RX ADMIN — DORZOLAMIDE HYDROCHLORIDE TIMOLOL MALEATE 1 DROP(S): 20; 5 SOLUTION/ DROPS OPHTHALMIC at 17:34

## 2019-10-14 RX ADMIN — Medication 100 MILLIGRAM(S): at 15:22

## 2019-10-14 RX ADMIN — Medication 75 MILLIGRAM(S): at 05:51

## 2019-10-14 RX ADMIN — CHLORHEXIDINE GLUCONATE 1 APPLICATION(S): 213 SOLUTION TOPICAL at 17:36

## 2019-10-14 RX ADMIN — GABAPENTIN 100 MILLIGRAM(S): 400 CAPSULE ORAL at 05:50

## 2019-10-14 RX ADMIN — CLOPIDOGREL BISULFATE 75 MILLIGRAM(S): 75 TABLET, FILM COATED ORAL at 15:22

## 2019-10-14 RX ADMIN — Medication 5 MILLIGRAM(S): at 15:24

## 2019-10-14 RX ADMIN — Medication 5 MILLIGRAM(S): at 10:06

## 2019-10-14 RX ADMIN — Medication 60 MILLIGRAM(S): at 05:50

## 2019-10-14 RX ADMIN — CEFTRIAXONE 100 MILLIGRAM(S): 500 INJECTION, POWDER, FOR SOLUTION INTRAMUSCULAR; INTRAVENOUS at 21:37

## 2019-10-14 NOTE — ADVANCED PRACTICE NURSE CONSULT - ASSESSMENT
A&Ox4, currently bedbound, left arm AV fistula, continent of stool, anuric on HD. Skin warm, dry with increased moisture in intertriginous folds, adequate skin turgor. Scar in thoracic spine and right trochanter. Bilateral feet with scattered areas of hyperpigmentation. Patient reports OOB to wheelchair at rehab center.     Sacrum Chronic stage 3 pressure injury measuring 2cmx1.5cm (when buttock is held open, when patient is in natural anatomical position the width of the injury is 1cm)x1cm. Measurements taking while patient laying in left side. Tissue type presents as 100% moist, red granulation tissue. Friable with cleansing. Moderate serous drainage. No odor. Periwound skin with maceration circumferentially, hypopigmentation (extends to sacral fold and bilateral lower buttocks that create irregular borders) surrounded  by chronic hyperpigmentation of bilateral buttock. NO increased warmth, no erythema, no induration (no signs of soft tissue infection or wound infection). Goals of care: manage drainage while maintaining moist environment for wound healing, protect periwound skin.

## 2019-10-14 NOTE — ADVANCED PRACTICE NURSE CONSULT - REASON FOR CONSULT
Patient seen on skin care rounds after wound care referral received for assessment of skin impairment and recommendations of topical management. Known to WOCN team from previous admission. Chart reviewed: Alex 14, WBC 10.76, HgbA1C 5.7%, BMI 24kg/m2, patient interviewed, reports chronic pressure injury for about 2 years. Reports she resides at NH and they perform daily dressing changes. Patient H/O of  HTN, HLD, hypothyroidism, constipation, CAD, bedbound, ESRD on HD. Admitted with leukocytosis (sent by HD center for infectious workup). Seen by nephrology and ID services.

## 2019-10-14 NOTE — PROGRESS NOTE ADULT - PROBLEM SELECTOR PLAN 1
Maintenance HD schedule MWF.  High K noted today, will be corrected with HD.   UF goal 1kg, as BP tolerates.  Continue eleazar with HD

## 2019-10-14 NOTE — PROGRESS NOTE ADULT - SUBJECTIVE AND OBJECTIVE BOX
Patient seen and examined by me earlier today.   - In summary,  STEVEN HARVEY is a 54y year old woman who originally presented with + blood cultures   - Patient today overall doing ok, comfortable, eating OK. no fever     ==================>> REVIEW OF SYSTEM <<=================    GEN: no fever, no chills, no pain  RESP: no SOB, no cough, no sputum  CVS: no chest pain, no palpitations, no edema  GI: no abdominal pain, no nausea, no constipation, no diarrhea  : no dysuria  Neuro: no headache, no dizziness  Derm : no itching, no rash    ==================>> PHYSICAL EXAM <<=================    GEN: A&O X 3 , NAD , comfortable  HEENT: NCAT, MMM, hearing intact  Neck: supple , no JVD  CVS: S1S2 , regular , No M/R/G appreciated  PULM: CTA B/L,  no W/R/R appreciated  ABD.: soft. non tender, non distended,  bowel sounds present  Extrem: intact pulses , no edema   PSYCH : normal mood,  not anxious      ==================>> MEDICATIONS <<====================    ascorbic acid 500 milliGRAM(s) Oral daily  cefTRIAXone   IVPB 1000 milliGRAM(s) IV Intermittent every 24 hours  chlorhexidine 4% Liquid 1 Application(s) Topical once  cinacalcet 30 milliGRAM(s) Oral daily  clopidogrel Tablet 75 milliGRAM(s) Oral daily  docusate sodium 100 milliGRAM(s) Oral three times a day  dorzolamide 2%/timolol 0.5% Ophthalmic Solution 1 Drop(s) Both EYES two times a day  fentaNYL   Patch  75 MICROgram(s)/Hr 1 Patch Transdermal every 72 hours  gabapentin 100 milliGRAM(s) Oral every 12 hours  heparin  Injectable 5000 Unit(s) SubCutaneous every 12 hours  hydrALAZINE 100 milliGRAM(s) Oral every 8 hours  levothyroxine 50 MICROGram(s) Oral daily  metoclopramide 5 milliGRAM(s) Oral three times a day before meals  metoprolol tartrate 75 milliGRAM(s) Oral two times a day  multivitamin 1 Tablet(s) Oral daily  NIFEdipine XL 60 milliGRAM(s) Oral daily  pantoprazole    Tablet 40 milliGRAM(s) Oral before breakfast  senna 2 Tablet(s) Oral at bedtime    MEDICATIONS  (PRN):  acetaminophen   Tablet .. 650 milliGRAM(s) Oral every 6 hours PRN Temp greater or equal to 38C (100.4F), Mild Pain (1 - 3)  loratadine 10 milliGRAM(s) Oral daily PRN allergy  oxyCODONE    5 mG/acetaminophen 325 mG 1 Tablet(s) Oral every 6 hours PRN Moderate Pain (4 - 6)    ==================>> VITAL SIGNS <<==================    Vital Signs Last 24 Hrs  T(C): 36.8 (10-13-19 @ 16:55)  T(F): 98.2 (10-13-19 @ 16:55), Max: 98.9 (10-13-19 @ 14:19)  HR: 74 (10-13-19 @ 16:55) (70 - 75)  BP: 148/63 (10-13-19 @ 16:55)  RR: 18 (10-13-19 @ 16:55) (16 - 18)  SpO2: 99% (10-13-19 @ 16:55) (96% - 100%)       ==================>> LAB AND IMAGING <<==================                        11.0   10.76 )-----------( 461      ( 13 Oct 2019 11:00 )             38.4        132<L>  |  92<L>  |  55<H>  ----------------------------<  145<H>  4.7   |  20<L>  |  4.84<H>    Ca    10.6<H>      13 Oct 2019 11:00  Phos  3.7     10-13  Mg     2.2     10-13      WBC count:   10.76 <<== ,  9.78 <<== ,  12.46 <<== ,  9.78 <<==   Hemoglobin:   11.0 <<==,  8.9 <<==,  8.8 <<==,  10.0 <<==  platelets:  461 <==, 367 <==, 367 <==, 351 <==    Creatinine:  4.84  <<==, 3.15  <<==, 5.26  <<==, 4.14  <<==  Sodium:   132  <==, 133  <==, 135  <==, 132  <==       AST:          11 <== , 20 <==      ALT:        7  <== , 8  <==      AP:        143  <=, 172  <=     Bili:        0.3  <=, 0.3  <=    _______________________  C U L T U R E S :    Culture - Nose (collected 11 Oct 2019 21:21)  Source: NOSE  Final Report (13 Oct 2019 14:19):    Growth of Methicillin-Resistant Staphylococcus aureus    MRSA^Staph. aureus *MRSA*    OXICILLIN-RESISTANT staphylococci should be regarded as    RESISTANT to ALL other Beta-Lactams regardless of the    in-vitro results obtained.  These include: ALL    Penicillins, Cephalosporins, Amoxicillin-clavulanic    acid, Ticarcillin-clavulanic acid,    Ampicillin-sulbactam, and Imipenem.    Culture - Blood (collected 10 Oct 2019 15:37)  Source: BLOOD VENOUS  Preliminary Report (13 Oct 2019 15:36):    NO ORGANISMS ISOLATED    NO ORGANISMS ISOLATED AT 72 HRS.    Culture - Blood (collected 10 Oct 2019 15:37)  Source: BLOOD PERIPHERAL  Preliminary Report (13 Oct 2019 15:36):    NO ORGANISMS ISOLATED    NO ORGANISMS ISOLATED AT 72 HR

## 2019-10-14 NOTE — CONSULT NOTE ADULT - REASON FOR ADMISSION
sent in because of positive blood cultures

## 2019-10-14 NOTE — PROGRESS NOTE ADULT - SUBJECTIVE AND OBJECTIVE BOX
Nephrology Followup Note - [phone] - Dr Valles / Dr Mahoney / Dr Syed / Dr Lamas / Dr Lozano / Dr Castaneda / Dr Monique / Dr Escoto  Pt seen and examined during HD  No acute events overnight. No complaints.     Allergies:  No Known Allergies    Hospital Medications:   MEDICATIONS  (STANDING):  ascorbic acid 500 milliGRAM(s) Oral daily  cefTRIAXone   IVPB 1000 milliGRAM(s) IV Intermittent every 24 hours  chlorhexidine 4% Liquid 1 Application(s) Topical once  cinacalcet 30 milliGRAM(s) Oral daily  clopidogrel Tablet 75 milliGRAM(s) Oral daily  dextrose 5%. 1000 milliLiter(s) (50 mL/Hr) IV Continuous <Continuous>  dextrose 50% Injectable 12.5 Gram(s) IV Push once  dextrose 50% Injectable 25 Gram(s) IV Push once  dextrose 50% Injectable 25 Gram(s) IV Push once  docusate sodium 100 milliGRAM(s) Oral three times a day  dorzolamide 2%/timolol 0.5% Ophthalmic Solution 1 Drop(s) Both EYES two times a day  fentaNYL   Patch  75 MICROgram(s)/Hr 1 Patch Transdermal every 72 hours  gabapentin 100 milliGRAM(s) Oral every 12 hours  heparin  Injectable 5000 Unit(s) SubCutaneous every 12 hours  hydrALAZINE 100 milliGRAM(s) Oral every 8 hours  insulin lispro (HumaLOG) corrective regimen sliding scale   SubCutaneous three times a day before meals  levothyroxine 50 MICROGram(s) Oral daily  metoclopramide 5 milliGRAM(s) Oral three times a day before meals  metoprolol tartrate 75 milliGRAM(s) Oral two times a day  multivitamin 1 Tablet(s) Oral daily  mupirocin 2% Ointment 1 Application(s) Topical every 12 hours  NIFEdipine XL 60 milliGRAM(s) Oral daily  pantoprazole    Tablet 40 milliGRAM(s) Oral before breakfast  senna 2 Tablet(s) Oral at bedtime      VITALS:  T(F): 97.8 (10-14-19 @ 11:15), Max: 98.6 (10-14-19 @ 05:36)  HR: 72 (10-14-19 @ 11:15)  BP: 149/65 (10-14-19 @ 11:15)  RR: 17 (10-14-19 @ 11:15)  SpO2: 100% (10-14-19 @ 10:40)  Wt(kg): --      PHYSICAL EXAM:  Constitutional: NAD  HEENT: anicteric sclera, oropharynx clear, MMM  Neck: No JVD  Respiratory: CTAB, no wheezes, rales or rhonchi  Cardiovascular: S1, S2, RRR  Gastrointestinal: BS+, soft, NT/ND  Extremities: No cyanosis or clubbing. No peripheral edema  Neurological: A/O x 3, no focal deficits  Psychiatric: Normal mood, normal affect  : No CVA tenderness. No jarrell.   Skin: No rashes  Vascular Access: LUE AV access +thrill and bruit.     LABS:  10-14    132<L>  |  92<L>  |  79<H>  ----------------------------<  214<H>  6.0<H>   |  22  |  6.71<H>    Ca    9.7      14 Oct 2019 11:20  Phos  3.5     10-14  Mg     2.2     10-14      Creatinine Trend: 6.71 <--, 4.84 <--, 3.15 <--, 5.26 <--, 4.14 <--                        9.0    12.22 )-----------( 465      ( 14 Oct 2019 11:20 )             28.6     Urine Studies:  Urinalysis Basic - ( 12 Oct 2019 08:42 )    Color: LIGHT ORANGE / Appearance: TURBID / S.011 / pH: 7.5  Gluc: 100 / Ketone: NEGATIVE  / Bili: NEGATIVE / Urobili: NORMAL   Blood: SMALL / Protein: 300 / Nitrite: NEGATIVE   Leuk Esterase: LARGE / RBC: 5-10 / WBC >50   Sq Epi: MANY / Non Sq Epi:  / Bacteria: MANY        RADIOLOGY & ADDITIONAL STUDIES:

## 2019-10-14 NOTE — PROGRESS NOTE ADULT - ASSESSMENT
Pt is a 55 y/o [place] ([place]) PMHx ESRD on HD M/W/F, HTN, HLD, pre-DM, hypothyroidism, CAD, sacral decubitus ulcer, presents for bacteremia pt had [place] [facility] [place] [facility] and found ot have GPC in chains in both blood cx bottles.   pt denies fevers, chills today but states may have had a low grade fever last night and some chills about 4 days ago ( when cultures were drawn )   pt otherwise feels well, no other c/o (10 Oct 2019 19:19)    Outpt blood cultures  from Dialysis center growing Group B strep (strep agalactiae ) from 10/10/19.  Pt started on vancomycin.    ID consult called for further abx management.       Bacteremia:    - Pt with subjective fever/chills on Monday.  No sepsis presentation.  Outpt bcx growing GBS (see copy in chart).  Pt denies abd pain, urinary symptoms, diarrhea,  Pt states she had UTI a couple months ago.  Recent ucx from Sept grew normal derrick.  No skin infections.  No other localizing symptoms on exam.    - Cont ceftriaxone 1gm IV qdaily based on outpt cultures.  Can change to cefazolin 2g/2gm/3gm with HD 3x/week after patient is discharged from hospital.     - Source unclear.  ? UTI.  UA (+) LE, and >50 WBC.  Ucx unavailable, will order, although likely to be low yield post abx.      - Repeat Blood cultures are negative to date.  TTE no vegetations.  No suspicion for endocarditis as cultures cleared quickly.  FACUNDO likely to be low yield.        * Pt c/o Rt eye pain.  Medicine ADS paged ([id number]), awaiting callback.  Recommend Opthalmology evaluation.  Pt on cosopt bid      d/w Nursing      Will follow,    Noelle Jacobs  [phone]

## 2019-10-14 NOTE — CONSULT NOTE ADULT - ATTENDING COMMENTS
I have interviewed and examined the patient and reviewed the residents note including the history, exam, assessment, and plan.  I agree with the residents assessment and plan.    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 [facility] NLP OD, 20/30-2 OS, IOP 28/24. Anterior exam notable for 2+ SPK OD > OS, trace injection OD, no hypopyon OU. DFE notable for chronic total RD OD, pale ON and sclerotic vessels OS.  Pain likely secondary to dry eyes.    Plan:  - preservative free artificial tears 1 drop QID into both eyes  - lacrilube (artificial tear ointment) QHS into both eyes  - c/w cosopt 1 drop into both eyes BID  - start latanoprost 1 drop QHS into both eyes  - last seen in glaucoma clinic 8/2019 with recommendation for IOP check every 3 months (unable to obtain OCT nerve or HVF)  - last seen in retina clinic 8/2019 with recommendation to return in 2 months (had elevated IOP at this time 35 and 25), no surgical intervention for OD  - will follow  - findings and plan discussed with patient and primary team    Follow-Up:  Patient should follow up with her ophthalmologist or in the [facility] within 1 week of discharge.    Saida Rojo MD
Dr Mani Valles  Nephrology  Office [phone]  Ans Serv [phone]  [facility] [phone]

## 2019-10-14 NOTE — CHART NOTE - NSCHARTNOTEFT_GEN_A_CORE
Writer spoke with nephrologist Dr Valles. No need to repeat BMP. Patient currently in dialysis. 11:20 labs K 6.0.

## 2019-10-14 NOTE — CONSULT NOTE ADULT - SUBJECTIVE AND OBJECTIVE BOX
[facility] Ophthalmology Consult Note    HPI: 54-year-old F from [place] (bed[place]) with PMHx ESRD, pre-DM, HTN, HLD, hypothyroidism, CAD, sacral ulcer and POChx NLP OD, CEIOL OS who presents with bacteremia sent in by dialysis, ophthalmology consulted for right eye pain for a few hours, no change in vision.     PMHx: As above  Meds:   ·      epoetin jacqueline: Last Dose Taken:  , 87220 unit(s) intravenous 3 times a week  	EVERY MONDAY , WEDNESDAY AND FRIDAY WITH DIALYSIS   · 	timolol-dorzolamide 0.5%-2% preservative-free ophthalmic solution: Last Dose Taken:  , 1 drop(s) to each affected eye 2 times a day  · 	Multiple Vitamins oral tablet: Last Dose Taken:  , 1 tab(s) orally once a day  · 	hydrALAZINE 100 mg oral tablet: Last Dose Taken:  , 1 tab(s) orally 3 times a day  · 	levothyroxine 50 mcg (0.05 mg) oral tablet: Last Dose Taken:  , 1 tab(s) orally once a day  · 	pantoprazole 20 mg oral delayed release tablet: Last Dose Taken:  , 1 tab(s) orally once a day  · 	sevelamer carbonate 800 mg oral tablet: Last Dose Taken:  , 2 tab(s) orally 3 times a day  · 	docusate sodium 100 mg oral capsule: Last Dose Taken:  , 3 cap(s) orally once a day (at bedtime)  · 	senna oral tablet: Last Dose Taken:  , 2 tab(s) orally once a day (at bedtime)  · 	NIFEdipine 60 mg oral tablet, extended release: Last Dose Taken:  , 1 tab(s) orally once a day  · 	metoprolol tartrate 75 mg oral tablet: Last Dose Taken:  , 1 tab(s) orally 2 times a day  · 	clopidogrel 75 mg oral tablet: Last Dose Taken:  , 1 tab(s) orally once a day  · 	gabapentin 100 mg oral capsule: Last Dose Taken:  , 1 cap(s) orally 3 times a day  · 	fentaNYL 75 mcg/hr transdermal film, extended release: Last Dose Taken:  , 1 patch transdermal every 72 hours  · 	acetaminophen 325 mg oral tablet: Last Dose Taken:  , 2 tab(s) orally every 6 hours, As needed, Mild Pain (1 - 3), Moderate Pain (4 - 6)  · 	ferrous sulfate 325 mg (65 mg elemental iron) oral delayed release tablet: Last Dose Taken:  , 1 tab(s) orally once a day  · 	metoclopramide 5 mg oral tablet: Last Dose Taken:  , 1 tab(s) orally 4 times a day (before meals and at bedtime), As Needed  · 	ascorbic acid 500 mg oral capsule: Last Dose Taken:  , 1 cap(s) orally once a day  · 	zinc (as gluconate) 50 mg oral tablet: Last Dose Taken:  , 1 tablet by mouth three time a week on Monday, Wednesday, and Friday  · 	menthol-methyl salicylate 10%-15% topical cream: Last Dose Taken:  , Apply topically to affected area every 8 hours  · 	Claritin 10 mg oral tablet: Last Dose Taken:  , 1 tab(s) orally once a day, As Needed  · 	oxycodone-acetaminophen 5 mg-325 mg oral tablet: Last Dose Taken:  , 1 tab(s) orally every 6 hours, As Needed    POcHx (including surgeries/lasers/trauma): Surgery OD (can't remember what), CEIOL OS  Drops: cosopt BID OU  FamHx: None  Social Hx: Lives at NH, bedbound, denies tobacco  Allergies: NKDA    ROS:  General (neg), Vision (per HPI), Head and Neck (neg), Pulm (neg), CV (neg), GI (neg),  (neg), Musculoskeletal (neg), Skin/Integ (neg), Neuro (neg), Endocrine (neg), Heme (neg), All/Immuno (neg)    Mood and Affect Appropriate ( x ),  Oriented to Time, Place, and Person x 3 ( x )    Ophthalmology Exam    Visual acuity (cc near card): NLP OD; 20/30-2 OS  Pupils: OD 4 mm, minimally reactive; OS surgical  Ttono: 29 OD, 25 OS  Extraocular movements (EOMs): Sensory XT OD; full OU, no pain  Confrontational Visual Field (CVF): NLP OD; Full OS  Color Plates: HARDIK 2/2 VA OD; 0/12 OS (poor cooperation)    Pen Light Exam (PLE)  External:  Flat OU  Lids/Lashes/Lacrimal Ducts: Flat OU    Sclera/Conjunctiva:  trace injection OD; W+Q OS  Cornea: 2+ SPK OU, negative staining temporally OD  Anterior Chamber: D+F OU, no hypopyon OU  Iris:  Flat OU  Lens:  IOL OU    Fundus Exam: dilated with 1% tropicamide and 2.5% phenylephrine  Approval obtained from primary team for dilation  Patient aware that pupils can remained dilated for at least 4-6 hours  Exam performed with 20D lens    Vitreous: wnl OU  Disc, cup/disc: sharp and pink, 0.4 OU  Macula:  wnl OU  Vessels:  wnl OU  Periphery: wnl OU    Diagnostic Testing:      Assessment:      Plan:      Follow-Up:  Patient should follow up his/her ophthalmologist or in the [facility] Practice within 1 week of discharge.  [address] [address] [phone] [facility] Ophthalmology Consult Note    HPI: 54-year-old F from [place] ([place]) with PMHx ESRD, pre-DM, HTN, HLD, hypothyroidism, CAD, sacral ulcer and POChx chronic NLP OD, CEIOL OS who presents with bacteremia sent in by dialysis center, ophthalmology consulted for right eye pain for a few hours, no change in vision. Patient states she has a stabbing pain in the right eye that is improved when eye is closed, pain completely resolved with instillation of proparacaine. No other complaints.    PMHx: As above  Meds:   ·      epoetin jacqueline: Last Dose Taken:  , 69497 unit(s) intravenous 3 times a week  	EVERY MONDAY , WEDNESDAY AND FRIDAY WITH DIALYSIS   · 	timolol-dorzolamide 0.5%-2% preservative-free ophthalmic solution: Last Dose Taken:  , 1 drop(s) to each affected eye 2 times a day  · 	Multiple Vitamins oral tablet: Last Dose Taken:  , 1 tab(s) orally once a day  · 	hydrALAZINE 100 mg oral tablet: Last Dose Taken:  , 1 tab(s) orally 3 times a day  · 	levothyroxine 50 mcg (0.05 mg) oral tablet: Last Dose Taken:  , 1 tab(s) orally once a day  · 	pantoprazole 20 mg oral delayed release tablet: Last Dose Taken:  , 1 tab(s) orally once a day  · 	sevelamer carbonate 800 mg oral tablet: Last Dose Taken:  , 2 tab(s) orally 3 times a day  · 	docusate sodium 100 mg oral capsule: Last Dose Taken:  , 3 cap(s) orally once a day (at bedtime)  · 	senna oral tablet: Last Dose Taken:  , 2 tab(s) orally once a day (at bedtime)  · 	NIFEdipine 60 mg oral tablet, extended release: Last Dose Taken:  , 1 tab(s) orally once a day  · 	metoprolol tartrate 75 mg oral tablet: Last Dose Taken:  , 1 tab(s) orally 2 times a day  · 	clopidogrel 75 mg oral tablet: Last Dose Taken:  , 1 tab(s) orally once a day  · 	gabapentin 100 mg oral capsule: Last Dose Taken:  , 1 cap(s) orally 3 times a day  · 	fentaNYL 75 mcg/hr transdermal film, extended release: Last Dose Taken:  , 1 patch transdermal every 72 hours  · 	acetaminophen 325 mg oral tablet: Last Dose Taken:  , 2 tab(s) orally every 6 hours, As needed, Mild Pain (1 - 3), Moderate Pain (4 - 6)  · 	ferrous sulfate 325 mg (65 mg elemental iron) oral delayed release tablet: Last Dose Taken:  , 1 tab(s) orally once a day  · 	metoclopramide 5 mg oral tablet: Last Dose Taken:  , 1 tab(s) orally 4 times a day (before meals and at bedtime), As Needed  · 	ascorbic acid 500 mg oral capsule: Last Dose Taken:  , 1 cap(s) orally once a day  · 	zinc (as gluconate) 50 mg oral tablet: Last Dose Taken:  , 1 tablet by mouth three time a week on Monday, Wednesday, and Friday  · 	menthol-methyl salicylate 10%-15% topical cream: Last Dose Taken:  , Apply topically to affected area every 8 hours  · 	Claritin 10 mg oral tablet: Last Dose Taken:  , 1 tab(s) orally once a day, As Needed  · 	oxycodone-acetaminophen 5 mg-325 mg oral tablet: Last Dose Taken:  , 1 tab(s) orally every 6 hours, As Needed    POcHx (including surgeries/lasers/trauma): Surgery OD (can't remember what), CEIOL OS  Drops: cosopt BID OU  FamHx: None  Social Hx: Lives at NH, bedbound  Allergies: NKDA    ROS:  General (neg), Vision (per HPI), Head and Neck (neg), Pulm (neg), CV (neg), GI (nausea),  (neg), Musculoskeletal (leg pain, back pain), Skin/Integ (neg), Neuro (neg), Endocrine (neg), Heme (neg), All/Immuno (neg)    Mood and Affect Appropriate ( x ),  Oriented to Time, Place, and Person x 3 ( x )    Ophthalmology Exam    Visual acuity (cc near card): NLP OD; 20/30-2 OS  Pupils: OD 4 mm, minimally reactive; OS surgical  Ttono: 28 OD, 24 OS  Extraocular movements (EOMs): Sensory XT OD; full OU, no pain  Confrontational Visual Field (CVF): NLP OD; Full OS  Color Plates: HARDIK 2/2 VA OD; 0/12 OS (poor cooperation)    Pen Light Exam (PLE)  External:  Flat OU  Lids/Lashes/Lacrimal Ducts: Flat OU    Sclera/Conjunctiva:  trace injection OD; W+Q OS  Cornea: 2+ SPK OU, small area of negative staining temporally OD  Anterior Chamber: D+F OU, no hypopyon OU  Iris:  Flat OU  Lens:  IOL OU    Fundus Exam: dilated with 1% tropicamide and 2.5% phenylephrine  Approval obtained from primary team for dilation  Patient aware that pupils can remained dilated for at least 4-6 hours  Exam performed with 20D lens    Vitreous: wnl OU  Disc, cup/disc: hazy view OD; pale OS  Macula:  total RD with PVR OD; flat OS  Vessels:  sclerotic OS  Periphery: fibrosis OD; PRP OS    Assessment: 54-year-old F from [place] (bed[place]) with PMHx ESRD, pre-DM, HTN, HLD, hypothyroidism, CAD, sacral ulcer and POChx chronic NLP OD, CEIOL OS who presents with bacteremia sent in by dialysis center, ophthalmology consulted for right eye pain for a few hours, no change in vision, pain completely resolved with instillation of proparacaine. VA NLP OD, 20/30-2 OS, IOP 28/24. Anterior exam notable for 2+ SPK OD > OS, trace injection OD, no hypopyon OU. DFE notable for chronic RD OD, pale ON and sclerotic vessels OS.    Plan:  - preservative free artificial tears 1 drop QID into both eyes  - lacrilube (artificial tear ointment) QHS into both eyes  - c/w cosopt 1 drop into both eyes BID  - start latanoprost 1 drop QHS into both eyes  - will follow    Follow-Up:  Patient should follow up with her ophthalmologist or in the [facility] Practice within 1 week of discharge.  [address] [place] [address].  [place] [address] [phone] [facility] Ophthalmology Consult Note    HPI: 54-year-old F from [place] (bed[place]) with PMHx ESRD, pre-DM, HTN, HLD, hypothyroidism, CAD, sacral ulcer and POChx chronic NLP OD, CEIOL OS who presents with bacteremia sent in by dialysis center, ophthalmology consulted for right eye pain for a few hours, no change in vision. Patient states she has a stabbing pain in the right eye that is improved when eye is closed, pain completely resolved with instillation of proparacaine. No other complaints.    PMHx: As above  Meds:   ·      epoetin jacqueline: Last Dose Taken:  , 87425 unit(s) intravenous 3 times a week  	EVERY MONDAY , WEDNESDAY AND FRIDAY WITH DIALYSIS   · 	timolol-dorzolamide 0.5%-2% preservative-free ophthalmic solution: Last Dose Taken:  , 1 drop(s) to each affected eye 2 times a day  · 	Multiple Vitamins oral tablet: Last Dose Taken:  , 1 tab(s) orally once a day  · 	hydrALAZINE 100 mg oral tablet: Last Dose Taken:  , 1 tab(s) orally 3 times a day  · 	levothyroxine 50 mcg (0.05 mg) oral tablet: Last Dose Taken:  , 1 tab(s) orally once a day  · 	pantoprazole 20 mg oral delayed release tablet: Last Dose Taken:  , 1 tab(s) orally once a day  · 	sevelamer carbonate 800 mg oral tablet: Last Dose Taken:  , 2 tab(s) orally 3 times a day  · 	docusate sodium 100 mg oral capsule: Last Dose Taken:  , 3 cap(s) orally once a day (at bedtime)  · 	senna oral tablet: Last Dose Taken:  , 2 tab(s) orally once a day (at bedtime)  · 	NIFEdipine 60 mg oral tablet, extended release: Last Dose Taken:  , 1 tab(s) orally once a day  · 	metoprolol tartrate 75 mg oral tablet: Last Dose Taken:  , 1 tab(s) orally 2 times a day  · 	clopidogrel 75 mg oral tablet: Last Dose Taken:  , 1 tab(s) orally once a day  · 	gabapentin 100 mg oral capsule: Last Dose Taken:  , 1 cap(s) orally 3 times a day  · 	fentaNYL 75 mcg/hr transdermal film, extended release: Last Dose Taken:  , 1 patch transdermal every 72 hours  · 	acetaminophen 325 mg oral tablet: Last Dose Taken:  , 2 tab(s) orally every 6 hours, As needed, Mild Pain (1 - 3), Moderate Pain (4 - 6)  · 	ferrous sulfate 325 mg (65 mg elemental iron) oral delayed release tablet: Last Dose Taken:  , 1 tab(s) orally once a day  · 	metoclopramide 5 mg oral tablet: Last Dose Taken:  , 1 tab(s) orally 4 times a day (before meals and at bedtime), As Needed  · 	ascorbic acid 500 mg oral capsule: Last Dose Taken:  , 1 cap(s) orally once a day  · 	zinc (as gluconate) 50 mg oral tablet: Last Dose Taken:  , 1 tablet by mouth three time a week on Monday, Wednesday, and Friday  · 	menthol-methyl salicylate 10%-15% topical cream: Last Dose Taken:  , Apply topically to affected area every 8 hours  · 	Claritin 10 mg oral tablet: Last Dose Taken:  , 1 tab(s) orally once a day, As Needed  · 	oxycodone-acetaminophen 5 mg-325 mg oral tablet: Last Dose Taken:  , 1 tab(s) orally every 6 hours, As Needed    POcHx (including surgeries/lasers/trauma): Surgery OD (can't remember what), CEIOL OS  Drops: cosopt BID OU  FamHx: None  Social Hx: Lives at NH, bedbound  Allergies: NKDA    ROS:  General (neg), Vision (per HPI), Head and Neck (neg), Pulm (neg), CV (neg), GI (nausea),  (neg), Musculoskeletal (leg pain, back pain), Skin/Integ (neg), Neuro (neg), Endocrine (neg), Heme (neg), All/Immuno (neg)    Mood and Affect Appropriate ( x ),  Oriented to Time, Place, and Person x 3 ( x )    Ophthalmology Exam    Visual acuity (cc near card): NLP OD; 20/30-2 OS  Pupils: OD 4 mm, minimally reactive; OS surgical  Ttono: 28 OD, 24 OS  Extraocular movements (EOMs): Sensory XT OD; full OU, no pain  Confrontational Visual Field (CVF): NLP OD; Full OS  Color Plates: HARDIK 2/2 VA OD; 0/12 OS (poor cooperation)    Pen Light Exam (PLE)  External:  Flat OU  Lids/Lashes/Lacrimal Ducts: Flat OU    Sclera/Conjunctiva:  trace injection OD; W+Q OS  Cornea: 2+ SPK OU, small area of negative staining temporally OD  Anterior Chamber: D+F OU, no hypopyon OU  Iris:  Flat OU  Lens:  3+ NS OD, IOL OS    Fundus Exam: dilated with 1% tropicamide and 2.5% phenylephrine  Approval obtained from primary team for dilation  Patient aware that pupils can remained dilated for at least 4-6 hours  Exam performed with 20D lens    Vitreous: chronic VH OD, wnl OS  Disc, cup/disc: hazy view OD; pale, 0.8 OS  Macula:  total RD with PVR OD; flat OS  Vessels:  sclerotic OS  Periphery: PRP and fibrosis OD; PRP OS, inferior retinal break surrounded by laser    Assessment: 54-year-old F from [place] (bed[place]) with PMHx ESRD, pre-DM, HTN, HLD, hypothyroidism, CAD, sacral ulcer and POChx PDR s/p PRP OU, glaucoma OU (POAG vs NVG), mac off total RD OD with chronic poor vision and RXT, TRD OS s/p PPV/MP/EL/SO, CEIOL OS who presents with bacteremia sent in by dialysis center, ophthalmology consulted for right eye pain for a few hours, no change in vision, pain completely resolved with instillation of proparacaine. VA NLP OD, 20/30-2 OS, IOP 28/24. Anterior exam notable for 2+ SPK OD > OS, trace injection OD, no hypopyon OU. DFE notable for chronic total RD OD, pale ON and sclerotic vessels OS.    Plan:  - preservative free artificial tears 1 drop QID into both eyes  - lacrilube (artificial tear ointment) QHS into both eyes  - c/w cosopt 1 drop into both eyes BID  - start latanoprost 1 drop QHS into both eyes  - last seen in glaucoma clinic 8/2019 with recommendation for IOP check every 3 months (unable to obtain OCT nerve or HVF)  - last seen in retina clinic 8/2019 with recommendation to return in 2 months (had elevated IOP at this time 35 and 25), no surgical intervention for OD  - will follow    Follow-Up:  Patient should follow up with her ophthalmologist or in the [facility] within 1 week of discharge.  [address] [place] [address].  [place] [address]  [phone] [facility] Ophthal[facility] Consult Note    HPI: 54-year-old F from [place] (bed[place]) with PMHx ESRD, pre-DM, HTN, HLD, hypothyroidism, CAD, sacral ulcer and POChx chronic NLP OD, CEIOL OS who presents with bacteremia sent in by dialysis center, ophthalmology consulted for right eye pain for a few hours, no change in vision. Patient states she has a stabbing pain in the right eye that is improved when eye is closed, pain completely resolved with instillation of proparacaine. No other complaints.    PMHx: As above  Meds:   ·      epoetin jacqueline: Last Dose Taken:  , 32019 unit(s) intravenous 3 times a week  	EVERY MONDAY , WEDNESDAY AND FRIDAY WITH DIALYSIS   · 	timolol-dorzolamide 0.5%-2% preservative-free ophthalmic solution: Last Dose Taken:  , 1 drop(s) to each affected eye 2 times a day  · 	Multiple Vitamins oral tablet: Last Dose Taken:  , 1 tab(s) orally once a day  · 	hydrALAZINE 100 mg oral tablet: Last Dose Taken:  , 1 tab(s) orally 3 times a day  · 	levothyroxine 50 mcg (0.05 mg) oral tablet: Last Dose Taken:  , 1 tab(s) orally once a day  · 	pantoprazole 20 mg oral delayed release tablet: Last Dose Taken:  , 1 tab(s) orally once a day  · 	sevelamer carbonate 800 mg oral tablet: Last Dose Taken:  , 2 tab(s) orally 3 times a day  · 	docusate sodium 100 mg oral capsule: Last Dose Taken:  , 3 cap(s) orally once a day (at bedtime)  · 	senna oral tablet: Last Dose Taken:  , 2 tab(s) orally once a day (at bedtime)  · 	NIFEdipine 60 mg oral tablet, extended release: Last Dose Taken:  , 1 tab(s) orally once a day  · 	metoprolol tartrate 75 mg oral tablet: Last Dose Taken:  , 1 tab(s) orally 2 times a day  · 	clopidogrel 75 mg oral tablet: Last Dose Taken:  , 1 tab(s) orally once a day  · 	gabapentin 100 mg oral capsule: Last Dose Taken:  , 1 cap(s) orally 3 times a day  · 	fentaNYL 75 mcg/hr transdermal film, extended release: Last Dose Taken:  , 1 patch transdermal every 72 hours  · 	acetaminophen 325 mg oral tablet: Last Dose Taken:  , 2 tab(s) orally every 6 hours, As needed, Mild Pain (1 - 3), Moderate Pain (4 - 6)  · 	ferrous sulfate 325 mg (65 mg elemental iron) oral delayed release tablet: Last Dose Taken:  , 1 tab(s) orally once a day  · 	metoclopramide 5 mg oral tablet: Last Dose Taken:  , 1 tab(s) orally 4 times a day (before meals and at bedtime), As Needed  · 	ascorbic acid 500 mg oral capsule: Last Dose Taken:  , 1 cap(s) orally once a day  · 	zinc (as gluconate) 50 mg oral tablet: Last Dose Taken:  , 1 tablet by mouth three time a week on Monday, Wednesday, and Friday  · 	menthol-methyl salicylate 10%-15% topical cream: Last Dose Taken:  , Apply topically to affected area every 8 hours  · 	Claritin 10 mg oral tablet: Last Dose Taken:  , 1 tab(s) orally once a day, As Needed  · 	oxycodone-acetaminophen 5 mg-325 mg oral tablet: Last Dose Taken:  , 1 tab(s) orally every 6 hours, As Needed    POcHx (including surgeries/lasers/trauma): Surgery OD (can't remember what), CEIOL OS  Drops: cosopt BID OU  FamHx: None  Social Hx: Lives at NH, bedbound  Allergies: NKDA    ROS:  General (neg), Vision (per HPI), Head and Neck (neg), Pulm (neg), CV (neg), GI (nausea),  (neg), Musculoskeletal (leg pain, back pain), Skin/Integ (neg), Neuro (neg), Endocrine (neg), Heme (neg), All/Immuno (neg)    Mood and Affect Appropriate ( x ),  Oriented to Time, Place, and Person x 3 ( x )    Ophthalmology Exam    Visual acuity (cc near card): NLP OD; 20/30-2 OS  Pupils: OD 4 mm, minimally reactive; OS surgical  Ttono: 28 OD, 24 OS  Extraocular movements (EOMs): Sensory XT OD; full OU, no pain  Confrontational Visual Field (CVF): NLP OD; Full OS  Color Plates: HARDIK 2/2 VA OD; 0/12 OS (poor cooperation)    Pen Light Exam (PLE)  External:  Flat OU  Lids/Lashes/Lacrimal Ducts: Flat OU    Sclera/Conjunctiva:  trace injection OD; W+Q OS  Cornea: 2+ SPK OU, small area of negative staining temporally OD  Anterior Chamber: D+F OU, no hypopyon OU  Iris:  Flat OU  Lens:  3+ NS OD, IOL OS    Fundus Exam: dilated with 1% tropicamide and 2.5% phenylephrine  Approval obtained from primary team for dilation  Patient aware that pupils can remained dilated for at least 4-6 hours  Exam performed with 20D lens    Vitreous: chronic VH OD, wnl OS  Disc, cup/disc: hazy view OD; pale, 0.8 OS  Macula:  total RD with PVR OD; flat OS  Vessels:  sclerotic OS  Periphery: PRP and fibrosis OD; PRP OS, inferior retinal break surrounded by laser    Assessment: 54-year-old F from [place] (bed[place]) with PMHx ESRD, DM, HTN, HLD, hypothyroidism, CAD, sacral ulcer and POChx PDR s/p PRP OU, glaucoma OU (POAG vs NVG), mac off total RD OD with chronic poor vision and RXT, TRD OS s/p PPV/MP/EL/SO, CEIOL OS who presents with bacteremia sent in by dialysis center, ophthalmology consulted for right eye pain for a few hours, no change in vision, pain completely resolved with instillation of proparacaine. VA NLP OD, 20/30-2 OS, IOP 28/24. Anterior exam notable for 2+ SPK OD > OS, trace injection OD, no hypopyon OU. DFE notable for chronic total RD OD, pale ON and sclerotic vessels OS.    Plan:  - preservative free artificial tears 1 drop QID into both eyes  - lacrilube (artificial tear ointment) QHS into both eyes  - c/w cosopt 1 drop into both eyes BID  - start latanoprost 1 drop QHS into both eyes  - last seen in glaucoma clinic 8/2019 with recommendation for IOP check every 3 months (unable to obtain OCT nerve or HVF)  - last seen in retina clinic 8/2019 with recommendation to return in 2 months (had elevated IOP at this time 35 and 25), no surgical intervention for OD  - will follow    Follow-Up:  Patient should follow up with her ophthalmologist or in the Health system Ophthalmology Practice within 1 week of discharge.  [address] [place] [address].  [place] [address]  [phone] [facility] Ophthalmology Consult Note    HPI: 54-year-old F from [place] (bed[place]) with PMHx ESRD, pre-DM, HTN, HLD, hypothyroidism, CAD, sacral ulcer and POChx chronic NLP OD, CEIOL OS who presents with bacteremia sent in by dialysis center, ophthalmology consulted for right eye pain for a few hours, no change in vision. Patient states she has a stabbing pain in the right eye that is improved when eye is closed, pain completely resolved with instillation of proparacaine. No other complaints.    PMHx: As above  Meds:   ·      epoetin jacqueline: Last Dose Taken:  , 39403 unit(s) intravenous 3 times a week  	EVERY MONDAY , WEDNESDAY AND FRIDAY WITH DIALYSIS   · 	timolol-dorzolamide 0.5%-2% preservative-free ophthalmic solution: Last Dose Taken:  , 1 drop(s) to each affected eye 2 times a day  · 	Multiple Vitamins oral tablet: Last Dose Taken:  , 1 tab(s) orally once a day  · 	hydrALAZINE 100 mg oral tablet: Last Dose Taken:  , 1 tab(s) orally 3 times a day  · 	levothyroxine 50 mcg (0.05 mg) oral tablet: Last Dose Taken:  , 1 tab(s) orally once a day  · 	pantoprazole 20 mg oral delayed release tablet: Last Dose Taken:  , 1 tab(s) orally once a day  · 	sevelamer carbonate 800 mg oral tablet: Last Dose Taken:  , 2 tab(s) orally 3 times a day  · 	docusate sodium 100 mg oral capsule: Last Dose Taken:  , 3 cap(s) orally once a day (at bedtime)  · 	senna oral tablet: Last Dose Taken:  , 2 tab(s) orally once a day (at bedtime)  · 	NIFEdipine 60 mg oral tablet, extended release: Last Dose Taken:  , 1 tab(s) orally once a day  · 	metoprolol tartrate 75 mg oral tablet: Last Dose Taken:  , 1 tab(s) orally 2 times a day  · 	clopidogrel 75 mg oral tablet: Last Dose Taken:  , 1 tab(s) orally once a day  · 	gabapentin 100 mg oral capsule: Last Dose Taken:  , 1 cap(s) orally 3 times a day  · 	fentaNYL 75 mcg/hr transdermal film, extended release: Last Dose Taken:  , 1 patch transdermal every 72 hours  · 	acetaminophen 325 mg oral tablet: Last Dose Taken:  , 2 tab(s) orally every 6 hours, As needed, Mild Pain (1 - 3), Moderate Pain (4 - 6)  · 	ferrous sulfate 325 mg (65 mg elemental iron) oral delayed release tablet: Last Dose Taken:  , 1 tab(s) orally once a day  · 	metoclopramide 5 mg oral tablet: Last Dose Taken:  , 1 tab(s) orally 4 times a day (before meals and at bedtime), As Needed  · 	ascorbic acid 500 mg oral capsule: Last Dose Taken:  , 1 cap(s) orally once a day  · 	zinc (as gluconate) 50 mg oral tablet: Last Dose Taken:  , 1 tablet by mouth three time a week on Monday, Wednesday, and Friday  · 	menthol-methyl salicylate 10%-15% topical cream: Last Dose Taken:  , Apply topically to affected area every 8 hours  · 	Claritin 10 mg oral tablet: Last Dose Taken:  , 1 tab(s) orally once a day, As Needed  · 	oxycodone-acetaminophen 5 mg-325 mg oral tablet: Last Dose Taken:  , 1 tab(s) orally every 6 hours, As Needed    POcHx (including surgeries/lasers/trauma): Surgery OD (can't remember what), CEIOL OS  Drops: cosopt BID OU  FamHx: None  Social Hx: Lives at NH, bedbound  Allergies: NKDA    ROS:  General (neg), Vision (per HPI), Head and Neck (neg), Pulm (neg), CV (neg), GI (nausea),  (neg), Musculoskeletal (leg pain, back pain), Skin/Integ (neg), Neuro (neg), Endocrine (neg), Heme (neg), All/Immuno (neg)    Mood and Affect Appropriate ( x ),  Oriented to Time, Place, and Person x 3 ( x )    Ophthalmology Exam    Visual acuity (cc near card): NLP OD; 20/30-2 OS  Pupils: OD 4 mm, minimally reactive; OS surgical  Ttono: 28 OD, 24 OS  Extraocular movements (EOMs): Sensory XT OD; full OU, no pain  Confrontational Visual Field (CVF): NLP OD; Full OS  Color Plates: HARDIK 2/2 VA OD; 0/12 OS (poor cooperation)    Pen Light Exam (PLE)  External:  Flat OU  Lids/Lashes/Lacrimal Ducts: Flat OU    Sclera/Conjunctiva:  trace injection OD; W+Q OS  Cornea: 2+ SPK OU, small area of negative staining temporally OD  Anterior Chamber: D+F OU, no hypopyon OU  Iris:  Flat OU  Lens:  3+ NS OD, IOL OS    Fundus Exam: dilated with 1% tropicamide and 2.5% phenylephrine  Approval obtained from primary team for dilation  Patient aware that pupils can remained dilated for at least 4-6 hours  Exam performed with 20D lens    Vitreous: chronic VH OD, wnl OS  Disc, cup/disc: hazy view OD; pale, 0.8 OS  Macula:  total RD with PVR OD; flat OS  Vessels:  sclerotic OS  Periphery: PRP and fibrosis OD; PRP OS, inferior retinal break surrounded by laser    Assessment: 54-year-old F from NH (bedbound) with PMHx ESRD, DM, HTN, HLD, hypothyroidism, CAD, sacral ulcer and POChx PDR s/p PRP OU, glaucoma OU (POAG vs NVG), mac off total RD OD with chronic poor vision and RXT, TRD OS s/p PPV/MP/EL/SO, CEIOL OS who presents with bacteremia sent in by dialysis center, ophthalmology consulted for right eye pain for a few hours, no change in vision, pain completely resolved with instillation of proparacaine. VA NLP OD, 20/30-2 OS, IOP 28/24. Anterior exam notable for 2+ SPK OD > OS, trace injection OD, no hypopyon OU. DFE notable for chronic total RD OD, pale ON and sclerotic vessels OS.  Pain likely secondary to dry eyes.    Plan:  - preservative free artificial tears 1 drop QID into both eyes  - lacrilube (artificial tear ointment) QHS into both eyes  - c/w cosopt 1 drop into both eyes BID  - start latanoprost 1 drop QHS into both eyes  - last seen in glaucoma clinic 8/2019 with recommendation for IOP check every 3 months (unable to obtain OCT nerve or HVF)  - last seen in retina clinic 8/2019 with recommendation to return in 2 months (had elevated IOP at this time 35 and 25), no surgical intervention for OD  - will follow  - findings and plan discussed with patient and primary team    Follow-Up:  Patient should follow up with her ophthalmologist or in the [facility] within 1 week of discharge.  [address] [place] [address].  [place] [address] [phone]

## 2019-10-14 NOTE — ADVANCED PRACTICE NURSE CONSULT - RECOMMEDATIONS
Recommend follow up care at [facility] ([phone], [address] [place] [address]).  Recommend nutrition consult, Chronic stage 3 pressure injury.    Sacrum: Cleanse with SAF-clens, rinse with NS, pat dry. Apply Liquid barrier film to periwound skin. Apply Aquacel Hydrofiber to wound base, cover with foam with border. Change daily.    Continue low air loss bed therapy, heel elevation, continue to turn & reposition q2h,continue moisture management with barrier creams & single breathable pad, continue measures to decrease friction/shear/pressure.     Please call wound care service line is further assistance is needed ([phone]).

## 2019-10-14 NOTE — PROGRESS NOTE ADULT - ASSESSMENT
54y Female w ESRD on HD MWF @ [facility] HD unit, DM, HTN referred to ED for gram Positive bacteremia, drawn in outpt HD unit on 10/7. Renal following for ESRD Mx.

## 2019-10-14 NOTE — PROGRESS NOTE ADULT - ASSESSMENT
· Assessment	  55 y/o female PMH of HTN, HLD, hypothyroidism, constipation, CAD, bedbound, ESRD on HD sent in for leukocytosis admitted for infectious workup      Problem/Plan - 1:  ·  Problem: Bacteremia.  Plan: bacteremia with gram positives as drawn in dialysis   repeat cultures so far negative   pt hemodynamically stable  ID following   abx per ID  plan to return to facility when cleared by ID     Problem/Plan - 2:  ·  Problem: ESRD  on dialysis.       -on HD MWF  -renal following   HD per schedule      Problem/Plan - 3:  ·  Problem: decubitus ulcer  - local Wound care   turn and position in this pt with bedbound status      Problem/Plan - 4:  ·  Problem: Hypothyroidism.  Plan: -c/w synthroid.      Problem/Plan - 5:  ·  Problem: CAD in native artery.  Plan: -c/w simvastatin, plavix, lopressor.      Problem/Plan - 6:  Problem: Essential hypertension.   - Continue Current medications and monitor.     Rt eye pain:  Ophthalmology eval noted.

## 2019-10-14 NOTE — PROGRESS NOTE ADULT - SUBJECTIVE AND OBJECTIVE BOX
Infectious Diseases progress note:    Subjective: Coverage appreciated.  No fever.  WBC 12 <-- 10.   Repeat bcx ngtd.  Pt c/o pain in rt eye for past 1 -2 hours.  No change in vision.      ROS:  CONSTITUTIONAL:  No fever, chills, rigors  CARDIOVASCULAR:  No chest pain or palpitations  RESPIRATORY:   No SOB, cough, dyspnea on exertion.  No wheezing  GASTROINTESTINAL:  No abd pain, N/V, diarrhea/constipation  EXTREMITIES:  No swelling or joint pain  GENITOURINARY:  No burning on urination, increased frequency or urgency.  No flank pain  NEUROLOGIC:  No HA, visual disturbances  SKIN: No rashes    Allergies    No Known Allergies    Intolerances        ANTIBIOTICS/RELEVANT:  antimicrobials  cefTRIAXone   IVPB 1000 milliGRAM(s) IV Intermittent every 24 hours    immunologic:    OTHER:  acetaminophen   Tablet .. 650 milliGRAM(s) Oral every 6 hours PRN  ascorbic acid 500 milliGRAM(s) Oral daily  cinacalcet 30 milliGRAM(s) Oral daily  clopidogrel Tablet 75 milliGRAM(s) Oral daily  dextrose 40% Gel 15 Gram(s) Oral once PRN  dextrose 5%. 1000 milliLiter(s) IV Continuous <Continuous>  dextrose 50% Injectable 12.5 Gram(s) IV Push once  dextrose 50% Injectable 25 Gram(s) IV Push once  dextrose 50% Injectable 25 Gram(s) IV Push once  docusate sodium 100 milliGRAM(s) Oral three times a day  dorzolamide 2%/timolol 0.5% Ophthalmic Solution 1 Drop(s) Both EYES two times a day  fentaNYL   Patch  75 MICROgram(s)/Hr 1 Patch Transdermal every 72 hours  gabapentin 100 milliGRAM(s) Oral every 12 hours  glucagon  Injectable 1 milliGRAM(s) IntraMuscular once PRN  heparin  Injectable 5000 Unit(s) SubCutaneous every 12 hours  hydrALAZINE 100 milliGRAM(s) Oral every 8 hours  insulin lispro (HumaLOG) corrective regimen sliding scale   SubCutaneous three times a day before meals  levothyroxine 50 MICROGram(s) Oral daily  loratadine 10 milliGRAM(s) Oral daily PRN  metoclopramide 5 milliGRAM(s) Oral three times a day before meals  metoprolol tartrate 75 milliGRAM(s) Oral two times a day  multivitamin 1 Tablet(s) Oral daily  mupirocin 2% Ointment 1 Application(s) Topical every 12 hours  NIFEdipine XL 60 milliGRAM(s) Oral daily  oxyCODONE    5 mG/acetaminophen 325 mG 1 Tablet(s) Oral every 6 hours PRN  pantoprazole    Tablet 40 milliGRAM(s) Oral before breakfast  senna 2 Tablet(s) Oral at bedtime      Objective:  Vital Signs Last 24 Hrs  T(C): 36.8 (14 Oct 2019 17:22), Max: 37 (14 Oct 2019 05:36)  T(F): 98.2 (14 Oct 2019 17:22), Max: 98.6 (14 Oct 2019 05:36)  HR: 75 (14 Oct 2019 17:22) (64 - 76)  BP: 157/60 (14 Oct 2019 17:22) (132/63 - 166/75)  BP(mean): --  RR: 20 (14 Oct 2019 17:22) (17 - 20)  SpO2: 100% (14 Oct 2019 17:22) (100% - 100%)    PHYSICAL EXAM:  Constitutional:NAD  Eyes:VICTORIANO, EOMI  Ear/Nose/Throat: no thrush, mucositis.  Moist mucous membranes	  Neck:no JVD, no lymphadenopathy, supple  Respiratory: CTA bertin  Cardiovascular: S1S2 RRR, no murmurs  Gastrointestinal:soft, nontender,  nondistended (+) BS  Extremities:no e/e/c  Skin:  no rashes, open wounds or ulcerations        LABS:                        9.0    12.22 )-----------( 465      ( 14 Oct 2019 11:20 )             28.6     10-14    132<L>  |  92<L>  |  79<H>  ----------------------------<  214<H>  6.0<H>   |  22  |  6.71<H>    Ca    9.7      14 Oct 2019 11:20  Phos  3.5     10-14  Mg     2.2     10-14              Vancomycin Level, Random:  ug/mL (10-11 @ 22:30)                  MICROBIOLOGY:    Urinalysis (10.12.19 @ 08:42)    Color: LIGHT ORANGE    Urine Appearance: TURBID    Glucose: 100    Bilirubin: NEGATIVE    Ketone - Urine: NEGATIVE    Specific Gravity: 1.011    Blood: SMALL    pH - Urine: 7.5    Protein, Urine: 300    Urobilinogen: NORMAL    Nitrite: NEGATIVE    Leukocyte Esterase Concentration: LARGE    Red Blood Cell - Urine: 5-10    White Blood Cell - Urine: >50    Hyaline Casts: 4+    Bacteria: MANY    Squamous Epithelial: MANY        Culture - Nose (10.11.19 @ 21:21)    Culture - Nose:   Growth of Methicillin-Resistant Staphylococcus aureus  MRSA^Staph. aureus *MRSA*  OXICILLIN-RESISTANT staphylococci should be regarded as  RESISTANT to ALL other Beta-Lactams regardless of the  in-vitro results obtained.  These include: ALL  Penicillins, Cephalosporins, Amoxicillin-clavulanic  acid, Ticarcillin-clavulanic acid,  Ampicillin-sulbactam, and Imipenem.    Specimen Source: NOSE    Culture - Blood (10.10.19 @ 15:37)    Culture - Blood:   NO ORGANISMS ISOLATED  NO ORGANISMS ISOLATED AT 96 HOURS    Specimen Source: BLOOD VENOUS    Culture - Blood (10.10.19 @ 15:37)    Culture - Blood:   NO ORGANISMS ISOLATED  NO ORGANISMS ISOLATED AT 96 HOURS    Specimen Source: BLOOD PERIPHERAL          RADIOLOGY & ADDITIONAL STUDIES:    < from: Xray Chest 1 View- PORTABLE-Urgent (10.10.19 @ 16:43) >  FINDINGS:  No focal consolidation.  No pleural effusion or pneumothorax.  The cardiomediastinal silhouette cannot be accurately assessed in this   projection.  Again seen is a left vascular stent overlying the axillary area.  The visualized osseous and soft tissue structures demonstrate no acute   pathology.    IMPRESSION: No localized pulmonary disease.    < end of copied text >

## 2019-10-15 LAB
ANION GAP SERPL CALC-SCNC: 19 MMO/L — HIGH (ref 7–14)
BACTERIA BLD CULT: SIGNIFICANT CHANGE UP
BACTERIA BLD CULT: SIGNIFICANT CHANGE UP
BUN SERPL-MCNC: 55 MG/DL — HIGH (ref 7–23)
CALCIUM SERPL-MCNC: 10.3 MG/DL — SIGNIFICANT CHANGE UP (ref 8.4–10.5)
CHLORIDE SERPL-SCNC: 90 MMOL/L — LOW (ref 98–107)
CO2 SERPL-SCNC: 26 MMOL/L — SIGNIFICANT CHANGE UP (ref 22–31)
CREAT SERPL-MCNC: 5.71 MG/DL — HIGH (ref 0.5–1.3)
GLUCOSE BLDC GLUCOMTR-MCNC: 144 MG/DL — HIGH (ref 70–99)
GLUCOSE BLDC GLUCOMTR-MCNC: 216 MG/DL — HIGH (ref 70–99)
GLUCOSE BLDC GLUCOMTR-MCNC: 331 MG/DL — HIGH (ref 70–99)
GLUCOSE BLDC GLUCOMTR-MCNC: 334 MG/DL — HIGH (ref 70–99)
GLUCOSE SERPL-MCNC: 371 MG/DL — HIGH (ref 70–99)
HCT VFR BLD CALC: 35.3 % — SIGNIFICANT CHANGE UP (ref 34.5–45)
HGB BLD-MCNC: 10.5 G/DL — LOW (ref 11.5–15.5)
MAGNESIUM SERPL-MCNC: 2.1 MG/DL — SIGNIFICANT CHANGE UP (ref 1.6–2.6)
MCHC RBC-ENTMCNC: 27.6 PG — SIGNIFICANT CHANGE UP (ref 27–34)
MCHC RBC-ENTMCNC: 29.7 % — LOW (ref 32–36)
MCV RBC AUTO: 92.7 FL — SIGNIFICANT CHANGE UP (ref 80–100)
NRBC # FLD: 0 K/UL — SIGNIFICANT CHANGE UP (ref 0–0)
PHOSPHATE SERPL-MCNC: 3.9 MG/DL — SIGNIFICANT CHANGE UP (ref 2.5–4.5)
PLATELET # BLD AUTO: 520 K/UL — HIGH (ref 150–400)
PMV BLD: 9.8 FL — SIGNIFICANT CHANGE UP (ref 7–13)
POTASSIUM SERPL-MCNC: 5 MMOL/L — SIGNIFICANT CHANGE UP (ref 3.5–5.3)
POTASSIUM SERPL-SCNC: 5 MMOL/L — SIGNIFICANT CHANGE UP (ref 3.5–5.3)
RBC # BLD: 3.81 M/UL — SIGNIFICANT CHANGE UP (ref 3.8–5.2)
RBC # FLD: 16.6 % — HIGH (ref 10.3–14.5)
SODIUM SERPL-SCNC: 135 MMOL/L — SIGNIFICANT CHANGE UP (ref 135–145)
WBC # BLD: 10.21 K/UL — SIGNIFICANT CHANGE UP (ref 3.8–10.5)
WBC # FLD AUTO: 10.21 K/UL — SIGNIFICANT CHANGE UP (ref 3.8–10.5)

## 2019-10-15 PROCEDURE — 99223 1ST HOSP IP/OBS HIGH 75: CPT

## 2019-10-15 RX ORDER — LATANOPROST 0.05 MG/ML
1 SOLUTION/ DROPS OPHTHALMIC; TOPICAL AT BEDTIME
Refills: 0 | Status: DISCONTINUED | OUTPATIENT
Start: 2019-10-15 | End: 2019-10-16

## 2019-10-15 RX ORDER — INSULIN LISPRO 100/ML
2 VIAL (ML) SUBCUTANEOUS
Refills: 0 | Status: DISCONTINUED | OUTPATIENT
Start: 2019-10-15 | End: 2019-10-16

## 2019-10-15 RX ADMIN — Medication 75 MILLIGRAM(S): at 06:32

## 2019-10-15 RX ADMIN — MUPIROCIN 1 APPLICATION(S): 20 OINTMENT TOPICAL at 06:32

## 2019-10-15 RX ADMIN — CLOPIDOGREL BISULFATE 75 MILLIGRAM(S): 75 TABLET, FILM COATED ORAL at 15:12

## 2019-10-15 RX ADMIN — MUPIROCIN 1 APPLICATION(S): 20 OINTMENT TOPICAL at 17:24

## 2019-10-15 RX ADMIN — Medication 5 MILLIGRAM(S): at 06:32

## 2019-10-15 RX ADMIN — HEPARIN SODIUM 5000 UNIT(S): 5000 INJECTION INTRAVENOUS; SUBCUTANEOUS at 06:31

## 2019-10-15 RX ADMIN — CEFTRIAXONE 100 MILLIGRAM(S): 500 INJECTION, POWDER, FOR SOLUTION INTRAMUSCULAR; INTRAVENOUS at 22:17

## 2019-10-15 RX ADMIN — PANTOPRAZOLE SODIUM 40 MILLIGRAM(S): 20 TABLET, DELAYED RELEASE ORAL at 06:32

## 2019-10-15 RX ADMIN — FENTANYL CITRATE 1 PATCH: 50 INJECTION INTRAVENOUS at 15:05

## 2019-10-15 RX ADMIN — Medication 100 MILLIGRAM(S): at 15:11

## 2019-10-15 RX ADMIN — Medication 100 MILLIGRAM(S): at 22:15

## 2019-10-15 RX ADMIN — SENNA PLUS 2 TABLET(S): 8.6 TABLET ORAL at 22:15

## 2019-10-15 RX ADMIN — HEPARIN SODIUM 5000 UNIT(S): 5000 INJECTION INTRAVENOUS; SUBCUTANEOUS at 17:22

## 2019-10-15 RX ADMIN — GABAPENTIN 100 MILLIGRAM(S): 400 CAPSULE ORAL at 17:22

## 2019-10-15 RX ADMIN — Medication 1 APPLICATION(S): at 23:45

## 2019-10-15 RX ADMIN — FENTANYL CITRATE 1 PATCH: 50 INJECTION INTRAVENOUS at 18:17

## 2019-10-15 RX ADMIN — OXYCODONE AND ACETAMINOPHEN 1 TABLET(S): 5; 325 TABLET ORAL at 00:55

## 2019-10-15 RX ADMIN — DORZOLAMIDE HYDROCHLORIDE TIMOLOL MALEATE 1 DROP(S): 20; 5 SOLUTION/ DROPS OPHTHALMIC at 06:31

## 2019-10-15 RX ADMIN — Medication 75 MILLIGRAM(S): at 17:20

## 2019-10-15 RX ADMIN — Medication 1 DROP(S): at 06:33

## 2019-10-15 RX ADMIN — Medication 1 TABLET(S): at 15:11

## 2019-10-15 RX ADMIN — Medication 4: at 15:03

## 2019-10-15 RX ADMIN — Medication 500 MILLIGRAM(S): at 15:12

## 2019-10-15 RX ADMIN — DORZOLAMIDE HYDROCHLORIDE TIMOLOL MALEATE 1 DROP(S): 20; 5 SOLUTION/ DROPS OPHTHALMIC at 17:21

## 2019-10-15 RX ADMIN — Medication 100 MILLIGRAM(S): at 06:32

## 2019-10-15 RX ADMIN — Medication 1 DROP(S): at 17:21

## 2019-10-15 RX ADMIN — LATANOPROST 1 DROP(S): 0.05 SOLUTION/ DROPS OPHTHALMIC; TOPICAL at 22:15

## 2019-10-15 RX ADMIN — GABAPENTIN 100 MILLIGRAM(S): 400 CAPSULE ORAL at 06:32

## 2019-10-15 RX ADMIN — FENTANYL CITRATE 1 PATCH: 50 INJECTION INTRAVENOUS at 15:00

## 2019-10-15 RX ADMIN — FENTANYL CITRATE 1 PATCH: 50 INJECTION INTRAVENOUS at 07:00

## 2019-10-15 RX ADMIN — Medication 60 MILLIGRAM(S): at 06:32

## 2019-10-15 RX ADMIN — Medication 4: at 18:16

## 2019-10-15 RX ADMIN — CINACALCET 30 MILLIGRAM(S): 30 TABLET, FILM COATED ORAL at 15:10

## 2019-10-15 RX ADMIN — Medication 50 MICROGRAM(S): at 06:32

## 2019-10-15 RX ADMIN — Medication 100 MILLIGRAM(S): at 15:17

## 2019-10-15 RX ADMIN — Medication 1 DROP(S): at 15:14

## 2019-10-15 RX ADMIN — Medication 5 MILLIGRAM(S): at 15:15

## 2019-10-15 NOTE — PROGRESS NOTE ADULT - SUBJECTIVE AND OBJECTIVE BOX
[facility] Ophthalmology Follow Up Note    Interval:   Pt states that her right eye pain is significantly improved, completely resolved at this time.     Mood and Affect Appropriate ( x ),  Oriented to Time, Place, and Person x 3 ( x )    Ophthalmology Exam    Visual acuity (cc near card): NLP OD; 20/30-2 OS  Pupils: OD non-reactive, OS surgical pupil  Ttono: 54 OD, 24 OS  Extraocular movements (EOMs): Sensory XT OD; full OU, no pain  Confrontational Visual Field (CVF): NLP OD; Full OS  Color Plates: HARDIK 2/2 VA OD; 0/12 OS (poor cooperation)    Pen Light Exam (PLE)  External:  Flat OU  Lids/Lashes/Lacrimal Ducts: Flat OU    Sclera/Conjunctiva:  trace injection OD; W+Q OS  Cornea: 2+ SPK OU, small area of negative staining temporally OD  Anterior Chamber: D+F OU, no hypopyon OU  Iris:  Flat OU  Lens:  3+ NS OD, IOL OS      Assessment: 54-year-old F from [place] (bedbound) with PMHx ESRD, DM, HTN, HLD, hypothyroidism, CAD, sacral ulcer. 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 VA NLP OD, 20/30-2 OS, IOP 28 OD, 24 OS,  Anterior exam notable for 2+ SPK OD > OS, trace injection OD, no hypopyon OU. DFE notable for chronic total RD OD, pale ON and sclerotic vessels OS. The pt has a blind left eye, in which she states the pain has resolved. The pt was seen in the outpt clinic in August 2019 (at which time the IOP was 35 OD 25 OS) at which time Cosopt was started.     Plan:  - preservative free artificial tears 1 drop QID into both eyes  - lacrilube (artificial tear ointment) QHS into both eyes  - c/w cosopt 1 drop into both eyes BID, latanoprost 1 drop QHS into both eyes  - last seen in glaucoma clinic 8/2019 with recommendation for IOP check every 3 months (unable to obtain OCT nerve or HVF)  - last seen in retina clinic 8/2019 with recommendation to return in 2 months, no surgical intervention for OD    Follow-Up:  Patient should follow up with her ophthalmologist or in the [facility] within 1 week of discharge.  [address] [place] [address].  [place] [address] [phone]

## 2019-10-15 NOTE — PROGRESS NOTE ADULT - PROBLEM SELECTOR PLAN 1
Had HD yesterday. tolerated it well  BP slightly high. will attempt to remove more fluids during Dialysis tomorrow.  Continue eleazar with HD

## 2019-10-15 NOTE — PROGRESS NOTE ADULT - SUBJECTIVE AND OBJECTIVE BOX
ACP Team PA Note:    54 F with PMH/ Both eyes Sx and R eye blindness,  pt c/o B/L eye pain, started as mild and is 9/10 at the present time,   > pt denies any prior episodes/trauma/vision changes/dizziness/HA/other complaints,   > ophthalmology evaluated pt, >> see their recommendations in the note

## 2019-10-15 NOTE — PROGRESS NOTE ADULT - SUBJECTIVE AND OBJECTIVE BOX
Infectious Diseases progress note:    Subjective:  Events noted, pt seen by ophthalmology.  Pt states eye pain resolving.  No  new somatic complaints.      ROS:  CONSTITUTIONAL:  No fever, chills, rigors  CARDIOVASCULAR:  No chest pain or palpitations  RESPIRATORY:   No SOB, cough, dyspnea on exertion.  No wheezing  GASTROINTESTINAL:  No abd pain, N/V, diarrhea/constipation  EXTREMITIES:  No swelling or joint pain  GENITOURINARY:  No burning on urination, increased frequency or urgency.  No flank pain  NEUROLOGIC:  No HA, visual disturbances  SKIN: No rashes    Allergies    No Known Allergies    Intolerances        ANTIBIOTICS/RELEVANT:  antimicrobials  cefTRIAXone   IVPB 1000 milliGRAM(s) IV Intermittent every 24 hours    immunologic:    OTHER:  acetaminophen   Tablet .. 650 milliGRAM(s) Oral every 6 hours PRN  artificial tears (preservative free) Ophthalmic Solution 1 Drop(s) Both EYES four times a day  ascorbic acid 500 milliGRAM(s) Oral daily  cinacalcet 30 milliGRAM(s) Oral daily  clopidogrel Tablet 75 milliGRAM(s) Oral daily  dextrose 40% Gel 15 Gram(s) Oral once PRN  dextrose 5%. 1000 milliLiter(s) IV Continuous <Continuous>  dextrose 50% Injectable 12.5 Gram(s) IV Push once  dextrose 50% Injectable 25 Gram(s) IV Push once  dextrose 50% Injectable 25 Gram(s) IV Push once  docusate sodium 100 milliGRAM(s) Oral three times a day  dorzolamide 2%/timolol 0.5% Ophthalmic Solution 1 Drop(s) Both EYES two times a day  gabapentin 100 milliGRAM(s) Oral every 12 hours  glucagon  Injectable 1 milliGRAM(s) IntraMuscular once PRN  heparin  Injectable 5000 Unit(s) SubCutaneous every 12 hours  hydrALAZINE 100 milliGRAM(s) Oral every 8 hours  insulin lispro (HumaLOG) corrective regimen sliding scale   SubCutaneous three times a day before meals  insulin lispro Injectable (HumaLOG) 2 Unit(s) SubCutaneous three times a day before meals  latanoprost 0.005% Ophthalmic Solution 1 Drop(s) Both EYES at bedtime  levothyroxine 50 MICROGram(s) Oral daily  loratadine 10 milliGRAM(s) Oral daily PRN  metoclopramide 5 milliGRAM(s) Oral three times a day before meals  metoprolol tartrate 75 milliGRAM(s) Oral two times a day  multivitamin 1 Tablet(s) Oral daily  mupirocin 2% Ointment 1 Application(s) Topical every 12 hours  NIFEdipine XL 60 milliGRAM(s) Oral daily  oxyCODONE    5 mG/acetaminophen 325 mG 1 Tablet(s) Oral every 6 hours PRN  pantoprazole    Tablet 40 milliGRAM(s) Oral before breakfast  petrolatum Ophthalmic Ointment 1 Application(s) Both EYES at bedtime  senna 2 Tablet(s) Oral at bedtime      Objective:  Vital Signs Last 24 Hrs  T(C): 36.8 (15 Oct 2019 20:53), Max: 36.9 (15 Oct 2019 06:28)  T(F): 98.2 (15 Oct 2019 20:53), Max: 98.4 (15 Oct 2019 06:28)  HR: 77 (15 Oct 2019 20:53) (70 - 80)  BP: 142/69 (15 Oct 2019 20:53) (142/64 - 163/83)  BP(mean): --  RR: 18 (15 Oct 2019 20:53) (16 - 18)  SpO2: 95% (15 Oct 2019 20:53) (95% - 100%)    PHYSICAL EXAM:  Constitutional:NAD  Eyes:VICTORIANO, EOMI  Ear/Nose/Throat: no thrush, mucositis.  Moist mucous membranes	  Neck:no JVD, no lymphadenopathy, supple  Respiratory: CTA bertin  Cardiovascular: S1S2 RRR, no murmurs  Gastrointestinal:soft, nontender,  nondistended (+) BS  Extremities:no e/e/c  Skin:  no rashes, open wounds or ulcerations.  LUE av fistula        LABS:                        10.5   10.21 )-----------( 520      ( 15 Oct 2019 15:30 )             35.3     10-15    135  |  90<L>  |  55<H>  ----------------------------<  371<H>  5.0   |  26  |  5.71<H>    Ca    10.3      15 Oct 2019 15:30  Phos  3.9     10-15  Mg     2.1     10-15              Vancomycin Level, Random:  ug/mL (10-11 @ 22:30)                  MICROBIOLOGY:    Culture - Nose (10.11.19 @ 21:21)    Culture - Nose:   Growth of Methicillin-Resistant Staphylococcus aureus  MRSA^Staph. aureus *MRSA*  OXICILLIN-RESISTANT staphylococci should be regarded as  RESISTANT to ALL other Beta-Lactams regardless of the  in-vitro results obtained.  These include: ALL  Penicillins, Cephalosporins, Amoxicillin-clavulanic  acid, Ticarcillin-clavulanic acid,  Ampicillin-sulbactam, and Imipenem.    Specimen Source: NOSE          RADIOLOGY & ADDITIONAL STUDIES:    < from: Xray Chest 1 View- PORTABLE-Urgent (10.10.19 @ 16:43) >  FINDINGS:  No focal consolidation.  No pleural effusion or pneumothorax.  The cardiomediastinal silhouette cannot be accurately assessed in this   projection.  Again seen is a left vascular stent overlying the axillary area.  The visualized osseous and soft tissue structures demonstrate no acute   pathology.    IMPRESSION: No localized pulmonary disease.    < end of copied text >

## 2019-10-15 NOTE — PROGRESS NOTE ADULT - ASSESSMENT
Pt is a 55 y/o woman from [place] (bedbound) PMHx ESRD on HD M/W/F, HTN, HLD, pre-DM, hypothyroidism, CAD, sacral decubitus ulcer, presents for bacteremia pt had cultures drawn from dialysis [place] [facility] and found ot have GPC in chains in both blood cx bottles.   pt denies fevers, chills today but states may have had a low grade fever last night and some chills about 4 days ago ( when cultures were drawn )   pt otherwise feels well, no other c/o (10 Oct 2019 19:19)    Outpt blood cultures  from Dialysis center growing Group B strep (strep agalactiae ) from 10/10/19.  Pt started on vancomycin.    ID consult called for further abx management.       Bacteremia:    - Pt with subjective fever/chills on Monday.  No sepsis presentation.  Outpt bcx growing GBS (see copy in chart).  Pt denies abd pain, urinary symptoms, diarrhea,  Pt states she had UTI a couple months ago.  Recent ucx from Sept grew normal derrick.  No skin infections.  No other localizing symptoms on exam.    - Cont ceftriaxone 1gm IV qdaily based on outpt cultures.  Can change to cefazolin 2g/2gm/3gm with HD 3x/week after patient is discharged from hospital.     - Source unclear.  ? UTI.  UA (+) LE, and >50 WBC.  Ucx unavailable, will order, although likely to be low yield post abx.      - Repeat Blood cultures are negative to date.  TTE no vegetations.  No suspicion for endocarditis as cultures cleared quickly.  FACUNDO likely to be low yield.        * Pt c/o Rt eye pain on 10/14.  Opthalmology eval appreciated.  f/u recs.   Eye pain resolving today.    * Nasal PCR (+) MRSA.  Cont mupirocin ointment to nares x 5 day course, s/p chlorhexidine wash.           Will follow,    Noelle Jacobs  [phone]

## 2019-10-15 NOTE — PROGRESS NOTE ADULT - ASSESSMENT
Pt is a 53 y/o woman from [place] (bed[place]) PMHx ESRD on HD M/W/F, HTN, HLD, pre-DM, hypothyroidism, CAD, sacral decubitus ulcer, presents for bacteremia pt had cultures drawn from dialysis from [facility] and found ot have GPC in chains in both blood cx bottles.     Bacteremia:  ID f/up noted.  IV Abx    ESRD:  On HD  Nephro f/up noted.

## 2019-10-15 NOTE — PROGRESS NOTE ADULT - ASSESSMENT
54y Female w ESRD on HD MWF @ [facility] HD unit, DM, HTN referred to ED for gram Positive bacteremia, drawn in outpt HD unit on 10/7. Renal following for ESRD Mx.

## 2019-10-15 NOTE — PROGRESS NOTE ADULT - SUBJECTIVE AND OBJECTIVE BOX
Patient is a 54y old  Female who presents with a chief complaint of sent in because of positive blood cultures (15 Oct 2019 19:29)      SUBJECTIVE / OVERNIGHT EVENTS:    Events noted.  CONSTITUTIONAL: No fever,  or fatigue  RESPIRATORY: No cough, wheezing,  No shortness of breath  CARDIOVASCULAR: No chest pain, palpitations, dizziness, or leg swelling  GASTROINTESTINAL: No abdominal or epigastric pain. No nausea, vomiting.  NEUROLOGICAL: No headaches,     MEDICATIONS  (STANDING):  artificial tears (preservative free) Ophthalmic Solution 1 Drop(s) Both EYES four times a day  ascorbic acid 500 milliGRAM(s) Oral daily  cefTRIAXone   IVPB 1000 milliGRAM(s) IV Intermittent every 24 hours  cinacalcet 30 milliGRAM(s) Oral daily  clopidogrel Tablet 75 milliGRAM(s) Oral daily  dextrose 5%. 1000 milliLiter(s) (50 mL/Hr) IV Continuous <Continuous>  dextrose 50% Injectable 12.5 Gram(s) IV Push once  dextrose 50% Injectable 25 Gram(s) IV Push once  dextrose 50% Injectable 25 Gram(s) IV Push once  docusate sodium 100 milliGRAM(s) Oral three times a day  dorzolamide 2%/timolol 0.5% Ophthalmic Solution 1 Drop(s) Both EYES two times a day  gabapentin 100 milliGRAM(s) Oral every 12 hours  heparin  Injectable 5000 Unit(s) SubCutaneous every 12 hours  hydrALAZINE 100 milliGRAM(s) Oral every 8 hours  insulin lispro (HumaLOG) corrective regimen sliding scale   SubCutaneous three times a day before meals  insulin lispro Injectable (HumaLOG) 2 Unit(s) SubCutaneous three times a day before meals  latanoprost 0.005% Ophthalmic Solution 1 Drop(s) Both EYES at bedtime  levothyroxine 50 MICROGram(s) Oral daily  metoclopramide 5 milliGRAM(s) Oral three times a day before meals  metoprolol tartrate 75 milliGRAM(s) Oral two times a day  multivitamin 1 Tablet(s) Oral daily  mupirocin 2% Ointment 1 Application(s) Topical every 12 hours  NIFEdipine XL 60 milliGRAM(s) Oral daily  pantoprazole    Tablet 40 milliGRAM(s) Oral before breakfast  petrolatum Ophthalmic Ointment 1 Application(s) Both EYES at bedtime  senna 2 Tablet(s) Oral at bedtime    MEDICATIONS  (PRN):  acetaminophen   Tablet .. 650 milliGRAM(s) Oral every 6 hours PRN Temp greater or equal to 38C (100.4F), Mild Pain (1 - 3)  dextrose 40% Gel 15 Gram(s) Oral once PRN Blood Glucose LESS THAN 70 milliGRAM(s)/deciliter  glucagon  Injectable 1 milliGRAM(s) IntraMuscular once PRN Glucose LESS THAN 70 milligrams/deciliter  loratadine 10 milliGRAM(s) Oral daily PRN allergy  oxyCODONE    5 mG/acetaminophen 325 mG 1 Tablet(s) Oral every 6 hours PRN Moderate Pain (4 - 6)        CAPILLARY BLOOD GLUCOSE      POCT Blood Glucose.: 216 mg/dL (15 Oct 2019 21:10)  POCT Blood Glucose.: 331 mg/dL (15 Oct 2019 17:40)  POCT Blood Glucose.: 334 mg/dL (15 Oct 2019 14:09)  POCT Blood Glucose.: 144 mg/dL (15 Oct 2019 08:59)    I&O's Summary    14 Oct 2019 07:01  -  15 Oct 2019 07:00  --------------------------------------------------------  IN: 975 mL / OUT: 150 mL / NET: 825 mL    15 Oct 2019 07:01  -  16 Oct 2019 01:22  --------------------------------------------------------  IN: 450 mL / OUT: 0 mL / NET: 450 mL        PHYSICAL EXAM:  GENERAL: NAD  NECK: Supple, No JVD  CHEST/LUNG: Clear to auscultation bilaterally; No wheezing.  HEART: Regular rate and rhythm; No murmurs, rubs, or gallops  ABDOMEN: Soft, Nontender, Nondistended; Bowel sounds present  EXTREMITIES:   No edema  NEUROLOGY: AAO X 3      LABS:                        10.5   10.21 )-----------( 520      ( 15 Oct 2019 15:30 )             35.3     10-15    135  |  90<L>  |  55<H>  ----------------------------<  371<H>  5.0   |  26  |  5.71<H>    Ca    10.3      15 Oct 2019 15:30  Phos  3.9     10-15  Mg     2.1     10-15              CAPILLARY BLOOD GLUCOSE      POCT Blood Glucose.: 216 mg/dL (15 Oct 2019 21:10)  POCT Blood Glucose.: 331 mg/dL (15 Oct 2019 17:40)  POCT Blood Glucose.: 334 mg/dL (15 Oct 2019 14:09)  POCT Blood Glucose.: 144 mg/dL (15 Oct 2019 08:59)    10-11 @ 21:21  Culture-urine --  Culture results --  method type --  Organism --  Organism Identification --  Specimen source NOSE  10-10 @ 15:37  Culture-urine --  Culture results --  method type --  Organism --  Organism Identification --  Specimen source BLOOD PERIPHERAL           10-11 @ 21:21  Culture blood --  Culture results --  Gram stain --  Gram stain blood --  Method type --  Organism --  Organism identification --  Specimen source NOSE   10-10 @ 15:37  Culture blood   NO ORGANISMS ISOLATED  Culture results --  Gram stain --  Gram stain blood --  Method type --  Organism --  Organism identification --  Specimen source BLOOD PERIPHERAL      RADIOLOGY & ADDITIONAL TESTS:    Imaging Personally Reviewed:    Consultant(s) Notes Reviewed:      Care Discussed with Consultants/Other Providers:

## 2019-10-15 NOTE — PROGRESS NOTE ADULT - SUBJECTIVE AND OBJECTIVE BOX
Pt seen and examined at bedside  feels well. denies any compls  no fever,chills  Had HD yesterday,tolerated it wel  no new events      Allergies:  No Known Allergies    Hospital Medications:   MEDICATIONS  (STANDING):  artificial tears (preservative free) Ophthalmic Solution 1 Drop(s) Both EYES four times a day  ascorbic acid 500 milliGRAM(s) Oral daily  cefTRIAXone   IVPB 1000 milliGRAM(s) IV Intermittent every 24 hours  cinacalcet 30 milliGRAM(s) Oral daily  clopidogrel Tablet 75 milliGRAM(s) Oral daily  dextrose 5%. 1000 milliLiter(s) (50 mL/Hr) IV Continuous <Continuous>  dextrose 50% Injectable 12.5 Gram(s) IV Push once  dextrose 50% Injectable 25 Gram(s) IV Push once  dextrose 50% Injectable 25 Gram(s) IV Push once  docusate sodium 100 milliGRAM(s) Oral three times a day  dorzolamide 2%/timolol 0.5% Ophthalmic Solution 1 Drop(s) Both EYES two times a day  fentaNYL   Patch  75 MICROgram(s)/Hr 1 Patch Transdermal every 72 hours  gabapentin 100 milliGRAM(s) Oral every 12 hours  heparin  Injectable 5000 Unit(s) SubCutaneous every 12 hours  hydrALAZINE 100 milliGRAM(s) Oral every 8 hours  insulin lispro (HumaLOG) corrective regimen sliding scale   SubCutaneous three times a day before meals  latanoprost 0.005% Ophthalmic Solution 1 Drop(s) Both EYES at bedtime  levothyroxine 50 MICROGram(s) Oral daily  metoclopramide 5 milliGRAM(s) Oral three times a day before meals  metoprolol tartrate 75 milliGRAM(s) Oral two times a day  multivitamin 1 Tablet(s) Oral daily  mupirocin 2% Ointment 1 Application(s) Topical every 12 hours  NIFEdipine XL 60 milliGRAM(s) Oral daily  pantoprazole    Tablet 40 milliGRAM(s) Oral before breakfast  petrolatum Ophthalmic Ointment 1 Application(s) Both EYES at bedtime  senna 2 Tablet(s) Oral at bedtime       VITALS:  T(F): 98.4 (10-15-19 @ 06:28), Max: 98.4 (10-14-19 @ 10:40)  HR: 70 (10-15-19 @ 06:28)  BP: 163/83 (10-15-19 @ 06:28)  RR: 16 (10-15-19 @ 06:28)  SpO2: 100% (10-15-19 @ 06:28)  Wt(kg): --    10-14 @ 07:01  -  10-15 @ 07:00  --------------------------------------------------------  IN: 975 mL / OUT: 150 mL / NET: 825 mL        PHYSICAL EXAM:  Constitutional: NAD. lying comfortably in bed  HEENT: anicteric sclera, oropharynx clear, MMM  Neck: No JVD  Respiratory: CTAB, no wheezes, rales or rhonchi  Cardiovascular: S1, S2, RRR. no mur  Gastrointestinal: BS+, soft, NT/ND  Extremities: No cyanosis or clubbing. No peripheral edema  Neurological: A/O x 3, no focal deficits  Psychiatric: Normal mood, normal affect  : No CVA tenderness. No jarrell.   Skin: No rashes  Vascular Access: avg. no erythema,tenderness    LABS:  10-14    132<L>  |  92<L>  |  79<H>  ----------------------------<  214<H>  6.0<H>   |  22  |  6.71<H>    Ca    9.7      14 Oct 2019 11:20  Phos  3.5     10-14  Mg     2.2     10-14      Creatinine Trend: 6.71 <--, 4.84 <--, 3.15 <--, 5.26 <--, 4.14 <--                        9.0    12.22 )-----------( 465      ( 14 Oct 2019 11:20 )             28.6     Urine Studies:  Urinalysis Basic - ( 12 Oct 2019 08:42 )    Color: LIGHT ORANGE / Appearance: TURBID / S.011 / pH: 7.5  Gluc: 100 / Ketone: NEGATIVE  / Bili: NEGATIVE / Urobili: NORMAL   Blood: SMALL / Protein: 300 / Nitrite: NEGATIVE   Leuk Esterase: LARGE / RBC: 5-10 / WBC >50   Sq Epi: MANY / Non Sq Epi:  / Bacteria: MANY        RADIOLOGY & ADDITIONAL STUDIES:

## 2019-10-16 ENCOUNTER — TRANSCRIPTION ENCOUNTER (OUTPATIENT)
Age: 54
End: 2019-10-16

## 2019-10-16 VITALS — DIASTOLIC BLOOD PRESSURE: 78 MMHG | HEART RATE: 86 BPM | SYSTOLIC BLOOD PRESSURE: 184 MMHG

## 2019-10-16 LAB
ANION GAP SERPL CALC-SCNC: 19 MMO/L — HIGH (ref 7–14)
BACTERIA UR CULT: SIGNIFICANT CHANGE UP
BASOPHILS # BLD AUTO: 0.09 K/UL — SIGNIFICANT CHANGE UP (ref 0–0.2)
BASOPHILS NFR BLD AUTO: 0.7 % — SIGNIFICANT CHANGE UP (ref 0–2)
BUN SERPL-MCNC: 69 MG/DL — HIGH (ref 7–23)
CALCIUM SERPL-MCNC: 9.9 MG/DL — SIGNIFICANT CHANGE UP (ref 8.4–10.5)
CHLORIDE SERPL-SCNC: 95 MMOL/L — LOW (ref 98–107)
CO2 SERPL-SCNC: 23 MMOL/L — SIGNIFICANT CHANGE UP (ref 22–31)
CREAT SERPL-MCNC: 6.07 MG/DL — HIGH (ref 0.5–1.3)
EOSINOPHIL # BLD AUTO: 0.68 K/UL — HIGH (ref 0–0.5)
EOSINOPHIL NFR BLD AUTO: 5.2 % — SIGNIFICANT CHANGE UP (ref 0–6)
GLUCOSE BLDC GLUCOMTR-MCNC: 108 MG/DL — HIGH (ref 70–99)
GLUCOSE BLDC GLUCOMTR-MCNC: 118 MG/DL — HIGH (ref 70–99)
GLUCOSE BLDC GLUCOMTR-MCNC: 179 MG/DL — HIGH (ref 70–99)
GLUCOSE BLDC GLUCOMTR-MCNC: 229 MG/DL — HIGH (ref 70–99)
GLUCOSE SERPL-MCNC: 111 MG/DL — HIGH (ref 70–99)
HBA1C BLD-MCNC: 6.3 % — HIGH (ref 4–5.6)
HCT VFR BLD CALC: 30.7 % — LOW (ref 34.5–45)
HGB BLD-MCNC: 9.6 G/DL — LOW (ref 11.5–15.5)
IMM GRANULOCYTES NFR BLD AUTO: 1.7 % — HIGH (ref 0–1.5)
LYMPHOCYTES # BLD AUTO: 26 % — SIGNIFICANT CHANGE UP (ref 13–44)
LYMPHOCYTES # BLD AUTO: 3.38 K/UL — HIGH (ref 1–3.3)
MAGNESIUM SERPL-MCNC: 2.2 MG/DL — SIGNIFICANT CHANGE UP (ref 1.6–2.6)
MCHC RBC-ENTMCNC: 28.2 PG — SIGNIFICANT CHANGE UP (ref 27–34)
MCHC RBC-ENTMCNC: 31.3 % — LOW (ref 32–36)
MCV RBC AUTO: 90 FL — SIGNIFICANT CHANGE UP (ref 80–100)
MONOCYTES # BLD AUTO: 1.16 K/UL — HIGH (ref 0–0.9)
MONOCYTES NFR BLD AUTO: 8.9 % — SIGNIFICANT CHANGE UP (ref 2–14)
NEUTROPHILS # BLD AUTO: 7.45 K/UL — HIGH (ref 1.8–7.4)
NEUTROPHILS NFR BLD AUTO: 57.5 % — SIGNIFICANT CHANGE UP (ref 43–77)
NRBC # FLD: 0.02 K/UL — SIGNIFICANT CHANGE UP (ref 0–0)
PHOSPHATE SERPL-MCNC: 4.6 MG/DL — HIGH (ref 2.5–4.5)
PLATELET # BLD AUTO: 529 K/UL — HIGH (ref 150–400)
PMV BLD: 9.8 FL — SIGNIFICANT CHANGE UP (ref 7–13)
POTASSIUM SERPL-MCNC: 5.4 MMOL/L — HIGH (ref 3.5–5.3)
POTASSIUM SERPL-SCNC: 5.4 MMOL/L — HIGH (ref 3.5–5.3)
RBC # BLD: 3.41 M/UL — LOW (ref 3.8–5.2)
RBC # FLD: 16.9 % — HIGH (ref 10.3–14.5)
SODIUM SERPL-SCNC: 137 MMOL/L — SIGNIFICANT CHANGE UP (ref 135–145)
SPECIMEN SOURCE: SIGNIFICANT CHANGE UP
WBC # BLD: 12.98 K/UL — HIGH (ref 3.8–10.5)
WBC # FLD AUTO: 12.98 K/UL — HIGH (ref 3.8–10.5)

## 2019-10-16 RX ORDER — GABAPENTIN 400 MG/1
1 CAPSULE ORAL
Qty: 0 | Refills: 0 | DISCHARGE
Start: 2019-10-16

## 2019-10-16 RX ORDER — HYDRALAZINE HCL 50 MG
1 TABLET ORAL
Qty: 0 | Refills: 0 | DISCHARGE
Start: 2019-10-16

## 2019-10-16 RX ORDER — PANTOPRAZOLE SODIUM 20 MG/1
1 TABLET, DELAYED RELEASE ORAL
Qty: 0 | Refills: 0 | DISCHARGE
Start: 2019-10-16

## 2019-10-16 RX ORDER — CEFAZOLIN SODIUM 1 G
2000 VIAL (EA) INJECTION ONCE
Refills: 0 | Status: COMPLETED | OUTPATIENT
Start: 2019-10-16 | End: 2019-10-16

## 2019-10-16 RX ORDER — DORZOLAMIDE HYDROCHLORIDE TIMOLOL MALEATE 20; 5 MG/ML; MG/ML
1 SOLUTION/ DROPS OPHTHALMIC
Qty: 0 | Refills: 0 | DISCHARGE
Start: 2019-10-16

## 2019-10-16 RX ORDER — CEFAZOLIN SODIUM 1 G
2 VIAL (EA) INJECTION
Qty: 10 | Refills: 0
Start: 2019-10-16 | End: 2019-10-23

## 2019-10-16 RX ORDER — MENTHOL AND METHYL SALICYLATE 10; 30 G/100G; G/100G
0 CREAM TOPICAL
Qty: 0 | Refills: 0 | DISCHARGE

## 2019-10-16 RX ORDER — CINACALCET 30 MG/1
1 TABLET, FILM COATED ORAL
Qty: 0 | Refills: 0 | DISCHARGE
Start: 2019-10-16

## 2019-10-16 RX ORDER — MUPIROCIN 20 MG/G
1 OINTMENT TOPICAL
Qty: 0 | Refills: 0 | DISCHARGE
Start: 2019-10-16

## 2019-10-16 RX ORDER — LATANOPROST 0.05 MG/ML
1 SOLUTION/ DROPS OPHTHALMIC; TOPICAL
Qty: 0 | Refills: 0 | DISCHARGE
Start: 2019-10-16

## 2019-10-16 RX ADMIN — Medication 1 TABLET(S): at 11:55

## 2019-10-16 RX ADMIN — Medication 2 UNIT(S): at 13:39

## 2019-10-16 RX ADMIN — Medication 100 MILLIGRAM(S): at 13:39

## 2019-10-16 RX ADMIN — Medication 100 MILLIGRAM(S): at 14:45

## 2019-10-16 RX ADMIN — MUPIROCIN 1 APPLICATION(S): 20 OINTMENT TOPICAL at 07:38

## 2019-10-16 RX ADMIN — CLOPIDOGREL BISULFATE 75 MILLIGRAM(S): 75 TABLET, FILM COATED ORAL at 11:55

## 2019-10-16 RX ADMIN — HEPARIN SODIUM 5000 UNIT(S): 5000 INJECTION INTRAVENOUS; SUBCUTANEOUS at 07:40

## 2019-10-16 RX ADMIN — CINACALCET 30 MILLIGRAM(S): 30 TABLET, FILM COATED ORAL at 11:55

## 2019-10-16 RX ADMIN — HEPARIN SODIUM 5000 UNIT(S): 5000 INJECTION INTRAVENOUS; SUBCUTANEOUS at 18:41

## 2019-10-16 RX ADMIN — MUPIROCIN 1 APPLICATION(S): 20 OINTMENT TOPICAL at 18:41

## 2019-10-16 RX ADMIN — Medication 5 MILLIGRAM(S): at 18:40

## 2019-10-16 RX ADMIN — GABAPENTIN 100 MILLIGRAM(S): 400 CAPSULE ORAL at 18:40

## 2019-10-16 RX ADMIN — Medication 50 MICROGRAM(S): at 07:39

## 2019-10-16 RX ADMIN — Medication 5 MILLIGRAM(S): at 07:40

## 2019-10-16 RX ADMIN — Medication 1: at 18:35

## 2019-10-16 RX ADMIN — Medication 1 DROP(S): at 11:55

## 2019-10-16 RX ADMIN — Medication 1 DROP(S): at 18:40

## 2019-10-16 RX ADMIN — Medication 500 MILLIGRAM(S): at 11:55

## 2019-10-16 RX ADMIN — Medication 5 MILLIGRAM(S): at 11:55

## 2019-10-16 RX ADMIN — Medication 75 MILLIGRAM(S): at 18:40

## 2019-10-16 RX ADMIN — Medication 2: at 13:39

## 2019-10-16 RX ADMIN — Medication 1 DROP(S): at 07:39

## 2019-10-16 RX ADMIN — DORZOLAMIDE HYDROCHLORIDE TIMOLOL MALEATE 1 DROP(S): 20; 5 SOLUTION/ DROPS OPHTHALMIC at 18:40

## 2019-10-16 RX ADMIN — Medication 2 UNIT(S): at 09:53

## 2019-10-16 RX ADMIN — Medication 2 UNIT(S): at 18:41

## 2019-10-16 RX ADMIN — FENTANYL CITRATE 1 PATCH: 50 INJECTION INTRAVENOUS at 07:46

## 2019-10-16 RX ADMIN — GABAPENTIN 100 MILLIGRAM(S): 400 CAPSULE ORAL at 07:39

## 2019-10-16 RX ADMIN — DORZOLAMIDE HYDROCHLORIDE TIMOLOL MALEATE 1 DROP(S): 20; 5 SOLUTION/ DROPS OPHTHALMIC at 07:41

## 2019-10-16 NOTE — PROGRESS NOTE ADULT - ASSESSMENT
_________________________________________________________________________________________  ========>>  M E D I C A L   A T T E N D I N G    F O L L O W  U P  N O T E  <<=========  -----------------------------------------------------------------------------------------------------    - Patient seen and examined by me earlier today.   - In summary,  STEVEN HARVEY is a 54y year old woman who originally presented with + blood cultures   - Patient today overall doing ok, comfortable, eating OK. no fever     ==================>> REVIEW OF SYSTEM <<=================    GEN: no fever, no chills, no pain  RESP: no SOB, no cough, no sputum  CVS: no chest pain, no palpitations, no edema  GI: no abdominal pain, no nausea, no constipation, no diarrhea  : no dysuria  Neuro: no headache, no dizziness  Derm : no itching, no rash    ==================>> PHYSICAL EXAM <<=================    GEN: A&O X 3 , NAD , comfortable  HEENT: NCAT, MMM, hearing intact  Neck: supple , no JVD  CVS: S1S2 , regular , No M/R/G appreciated  PULM: CTA B/L,  no W/R/R appreciated  ABD.: soft. non tender, non distended,  bowel sounds present  Extrem: intact pulses , no edema   PSYCH : normal mood,  not anxious       ==================>> MEDICATIONS <<====================    artificial tears (preservative free) Ophthalmic Solution 1 Drop(s) Both EYES four times a day  ascorbic acid 500 milliGRAM(s) Oral daily  ceFAZolin   IVPB 2000 milliGRAM(s) IV Intermittent once  cinacalcet 30 milliGRAM(s) Oral daily  clopidogrel Tablet 75 milliGRAM(s) Oral daily  dextrose 5%. 1000 milliLiter(s) IV Continuous <Continuous>  dextrose 50% Injectable 12.5 Gram(s) IV Push once  dextrose 50% Injectable 25 Gram(s) IV Push once  dextrose 50% Injectable 25 Gram(s) IV Push once  docusate sodium 100 milliGRAM(s) Oral three times a day  dorzolamide 2%/timolol 0.5% Ophthalmic Solution 1 Drop(s) Both EYES two times a day  gabapentin 100 milliGRAM(s) Oral every 12 hours  heparin  Injectable 5000 Unit(s) SubCutaneous every 12 hours  hydrALAZINE 100 milliGRAM(s) Oral every 8 hours  insulin lispro (HumaLOG) corrective regimen sliding scale   SubCutaneous three times a day before meals  insulin lispro Injectable (HumaLOG) 2 Unit(s) SubCutaneous three times a day before meals  latanoprost 0.005% Ophthalmic Solution 1 Drop(s) Both EYES at bedtime  levothyroxine 50 MICROGram(s) Oral daily  metoclopramide 5 milliGRAM(s) Oral three times a day before meals  metoprolol tartrate 75 milliGRAM(s) Oral two times a day  multivitamin 1 Tablet(s) Oral daily  mupirocin 2% Ointment 1 Application(s) Topical every 12 hours  NIFEdipine XL 60 milliGRAM(s) Oral daily  pantoprazole    Tablet 40 milliGRAM(s) Oral before breakfast  petrolatum Ophthalmic Ointment 1 Application(s) Both EYES at bedtime  senna 2 Tablet(s) Oral at bedtime    MEDICATIONS  (PRN):  acetaminophen   Tablet .. 650 milliGRAM(s) Oral every 6 hours PRN Temp greater or equal to 38C (100.4F), Mild Pain (1 - 3)  dextrose 40% Gel 15 Gram(s) Oral once PRN Blood Glucose LESS THAN 70 milliGRAM(s)/deciliter  glucagon  Injectable 1 milliGRAM(s) IntraMuscular once PRN Glucose LESS THAN 70 milligrams/deciliter  loratadine 10 milliGRAM(s) Oral daily PRN allergy  oxyCODONE    5 mG/acetaminophen 325 mG 1 Tablet(s) Oral every 6 hours PRN Moderate Pain (4 - 6)    ==================>> VITAL SIGNS <<==================    Vital Signs Last 24 Hrs  T(C): 37 (10-16-19 @ 11:51)  T(F): 98.6 (10-16-19 @ 11:51), Max: 99.2 (10-16-19 @ 07:55)  HR: 82 (10-16-19 @ 13:36) (77 - 89)  BP: 157/87 (10-16-19 @ 13:36)  RR: 15 (10-16-19 @ 11:51) (15 - 18)  SpO2: 100% (10-16-19 @ 11:51) (95% - 100%)    CAPILLARY BLOOD GLUCOSE      POCT Blood Glucose.: 229 mg/dL (16 Oct 2019 12:56)  POCT Blood Glucose.: 108 mg/dL (16 Oct 2019 09:42)  POCT Blood Glucose.: 118 mg/dL (16 Oct 2019 07:33)  POCT Blood Glucose.: 216 mg/dL (15 Oct 2019 21:10)  POCT Blood Glucose.: 331 mg/dL (15 Oct 2019 17:40)     ==================>> LAB AND IMAGING <<==================                        9.6    12.98 )-----------( 529      ( 16 Oct 2019 07:55 )             30.7        10-16    137  |  95<L>  |  69<H>  ----------------------------<  111<H>  5.4<H>   |  23  |  6.07<H>    Ca    9.9      16 Oct 2019 07:55  Phos  4.6     10-16  Mg     2.2     10-16      WBC count:   12.98 <<== ,  10.21 <<== ,  12.22 <<== ,  10.76 <<== ,  9.78 <<==   Hemoglobin:   9.6 <<==,  10.5 <<==,  9.0 <<==,  11.0 <<==,  8.9 <<==  platelets:  529 <==, 520 <==, 465 <==, 461 <==, 367 <==, 367 <==, 351 <==    Creatinine:  6.07  <<==, 5.71  <<==, 6.71  <<==, 4.84  <<==, 3.15  <<==, 5.26  <<==  Sodium:   137  <==, 135  <==, 132  <==, 132  <==, 133  <==, 135  <==, 132  <==    _______________________  C U L T U R E S :    Culture - Urine (collected 15 Oct 2019 08:28)  Source: URINE MIDSTREAM  Final Report (16 Oct 2019 09:32):    Culture grew 3 or more types of organisms which indicate    collection contamination; consider recollection only if    clinically indicated.    Culture - Nose (collected 11 Oct 2019 21:21)  Source: NOSE  Final Report (13 Oct 2019 14:19):    Growth of Methicillin-Resistant Staphylococcus aureus    MRSA^Staph. aureus *MRSA*    OXICILLIN-RESISTANT staphylococci should be regarded as    RESISTANT to ALL other Beta-Lactams regardless of the    in-vitro results obtained.  These include: ALL    Penicillins, Cephalosporins, Amoxicillin-clavulanic    acid, Ticarcillin-clavulanic acid,    Ampicillin-sulbactam, and Imipenem.    Culture - Blood (collected 10 Oct 2019 15:37)  Source: BLOOD VENOUS  Final Report (15 Oct 2019 15:37):    NO ORGANISMS ISOLATED    Culture - Blood (collected 10 Oct 2019 15:37)  Source: BLOOD PERIPHERAL  Final Report (15 Oct 2019 15:37):    NO ORGANISMS ISOLATED    ___________________________________________________________________________________  ===============>>  A S S E S S M E N T   A N D   P L A N <<===============  ------------------------------------------------------------------------------------------    · Assessment		  53 y/o female PMH of HTN, HLD, hypothyroidism, constipation, CAD, bedbound, ESRD on HD sent in for leukocytosis admitted for infectious workup     Problem/Plan - 1:  ·  Problem: Bacteremia.  Plan: bacteremia with gram positives as drawn in dialysis   repeat cultures so far negative   pt hemodynamically stable  ID following   finish abx per ID  plan to return to facility when cleared by ID    Problem/Plan - 2:  ·  Problem: ESRD  on dialysis.       -on HD MWF  -renal following   HD per schedule     Problem/Plan - 3:  ·  Problem: decubitus ulcer  - local Wound care   turn and position in this pt with bedbound status     Problem/Plan - 4:  ·  Problem: Hypothyroidism.  Plan: -c/w synthroid.     Problem/Plan - 5:  ·  Problem: CAD in native artery.  Plan: -c/w simvastatin, plavix, lopressor.     Problem/Plan - 6:  Problem: Essential hypertension.   - Continue Current medications and monitor.     Problem/Plan - 7:  ·  Problem: Prophylactic measure.  Plan: hsq.     --------------------------------------------  Case discussed with pt  Education given on findings and plan of care  ___________________________  HCASSIA Mar.O.  Pager: [phone]    Dr Mckeon will cover through 10-15-19. _________________________________________________________________________________________  ========>>  M E D I C A L   A T T E N D I N G    F O L L O W  U P  N O T E  <<=========  -----------------------------------------------------------------------------------------------------    - Patient seen and examined by me earlier today.   - In summary,  STEVEN HARVEY is a 54y year old woman who originally presented with + blood cultures   - Patient today overall doing ok, comfortable, eating OK. no fever     ==================>> REVIEW OF SYSTEM <<=================    GEN: no fever, no chills, no pain  RESP: no SOB, no cough, no sputum  CVS: no chest pain, no palpitations, no edema  GI: no abdominal pain, no nausea, no constipation, no diarrhea  : no dysuria  Neuro: no headache, no dizziness  Derm : no itching, no rash    ==================>> PHYSICAL EXAM <<=================    GEN: A&O X 3 , NAD , comfortable  HEENT: NCAT, MMM, hearing intact  Neck: supple , no JVD  CVS: S1S2 , regular , No M/R/G appreciated  PULM: CTA B/L,  no W/R/R appreciated  ABD.: soft. non tender, non distended,  bowel sounds present  Extrem: intact pulses , no edema   PSYCH : normal mood,  not anxious       ==================>> MEDICATIONS <<====================    artificial tears (preservative free) Ophthalmic Solution 1 Drop(s) Both EYES four times a day  ascorbic acid 500 milliGRAM(s) Oral daily  ceFAZolin   IVPB 2000 milliGRAM(s) IV Intermittent once  cinacalcet 30 milliGRAM(s) Oral daily  clopidogrel Tablet 75 milliGRAM(s) Oral daily  dextrose 5%. 1000 milliLiter(s) IV Continuous <Continuous>  dextrose 50% Injectable 12.5 Gram(s) IV Push once  dextrose 50% Injectable 25 Gram(s) IV Push once  dextrose 50% Injectable 25 Gram(s) IV Push once  docusate sodium 100 milliGRAM(s) Oral three times a day  dorzolamide 2%/timolol 0.5% Ophthalmic Solution 1 Drop(s) Both EYES two times a day  gabapentin 100 milliGRAM(s) Oral every 12 hours  heparin  Injectable 5000 Unit(s) SubCutaneous every 12 hours  hydrALAZINE 100 milliGRAM(s) Oral every 8 hours  insulin lispro (HumaLOG) corrective regimen sliding scale   SubCutaneous three times a day before meals  insulin lispro Injectable (HumaLOG) 2 Unit(s) SubCutaneous three times a day before meals  latanoprost 0.005% Ophthalmic Solution 1 Drop(s) Both EYES at bedtime  levothyroxine 50 MICROGram(s) Oral daily  metoclopramide 5 milliGRAM(s) Oral three times a day before meals  metoprolol tartrate 75 milliGRAM(s) Oral two times a day  multivitamin 1 Tablet(s) Oral daily  mupirocin 2% Ointment 1 Application(s) Topical every 12 hours  NIFEdipine XL 60 milliGRAM(s) Oral daily  pantoprazole    Tablet 40 milliGRAM(s) Oral before breakfast  petrolatum Ophthalmic Ointment 1 Application(s) Both EYES at bedtime  senna 2 Tablet(s) Oral at bedtime    MEDICATIONS  (PRN):  acetaminophen   Tablet .. 650 milliGRAM(s) Oral every 6 hours PRN Temp greater or equal to 38C (100.4F), Mild Pain (1 - 3)  dextrose 40% Gel 15 Gram(s) Oral once PRN Blood Glucose LESS THAN 70 milliGRAM(s)/deciliter  glucagon  Injectable 1 milliGRAM(s) IntraMuscular once PRN Glucose LESS THAN 70 milligrams/deciliter  loratadine 10 milliGRAM(s) Oral daily PRN allergy  oxyCODONE    5 mG/acetaminophen 325 mG 1 Tablet(s) Oral every 6 hours PRN Moderate Pain (4 - 6)    ==================>> VITAL SIGNS <<==================    Vital Signs Last 24 Hrs  T(C): 37 (10-16-19 @ 11:51)  T(F): 98.6 (10-16-19 @ 11:51), Max: 99.2 (10-16-19 @ 07:55)  HR: 82 (10-16-19 @ 13:36) (77 - 89)  BP: 157/87 (10-16-19 @ 13:36)  RR: 15 (10-16-19 @ 11:51) (15 - 18)  SpO2: 100% (10-16-19 @ 11:51) (95% - 100%)    CAPILLARY BLOOD GLUCOSE      POCT Blood Glucose.: 229 mg/dL (16 Oct 2019 12:56)  POCT Blood Glucose.: 108 mg/dL (16 Oct 2019 09:42)  POCT Blood Glucose.: 118 mg/dL (16 Oct 2019 07:33)  POCT Blood Glucose.: 216 mg/dL (15 Oct 2019 21:10)  POCT Blood Glucose.: 331 mg/dL (15 Oct 2019 17:40)     ==================>> LAB AND IMAGING <<==================                        9.6    12.98 )-----------( 529      ( 16 Oct 2019 07:55 )             30.7        10-16    137  |  95<L>  |  69<H>  ----------------------------<  111<H>  5.4<H>   |  23  |  6.07<H>    Ca    9.9      16 Oct 2019 07:55  Phos  4.6     10-16  Mg     2.2     10-16      WBC count:   12.98 <<== ,  10.21 <<== ,  12.22 <<== ,  10.76 <<== ,  9.78 <<==   Hemoglobin:   9.6 <<==,  10.5 <<==,  9.0 <<==,  11.0 <<==,  8.9 <<==  platelets:  529 <==, 520 <==, 465 <==, 461 <==, 367 <==, 367 <==, 351 <==    Creatinine:  6.07  <<==, 5.71  <<==, 6.71  <<==, 4.84  <<==, 3.15  <<==, 5.26  <<==  Sodium:   137  <==, 135  <==, 132  <==, 132  <==, 133  <==, 135  <==, 132  <==    _______________________  C U L T U R E S :    Culture - Urine (collected 15 Oct 2019 08:28)  Source: URINE MIDSTREAM  Final Report (16 Oct 2019 09:32):    Culture grew 3 or more types of organisms which indicate    collection contamination; consider recollection only if    clinically indicated.    Culture - Nose (collected 11 Oct 2019 21:21)  Source: NOSE  Final Report (13 Oct 2019 14:19):    Growth of Methicillin-Resistant Staphylococcus aureus    MRSA^Staph. aureus *MRSA*    OXICILLIN-RESISTANT staphylococci should be regarded as    RESISTANT to ALL other Beta-Lactams regardless of the    in-vitro results obtained.  These include: ALL    Penicillins, Cephalosporins, Amoxicillin-clavulanic    acid, Ticarcillin-clavulanic acid,    Ampicillin-sulbactam, and Imipenem.    Culture - Blood (collected 10 Oct 2019 15:37)  Source: BLOOD VENOUS  Final Report (15 Oct 2019 15:37):    NO ORGANISMS ISOLATED    Culture - Blood (collected 10 Oct 2019 15:37)  Source: BLOOD PERIPHERAL  Final Report (15 Oct 2019 15:37):    NO ORGANISMS ISOLATED    ___________________________________________________________________________________  ===============>>  A S S E S S M E N T   A N D   P L A N <<===============  ------------------------------------------------------------------------------------------    · Assessment		  55 y/o female PMH of HTN, HLD, hypothyroidism, constipation, CAD, bedbound, ESRD on HD sent in for leukocytosis admitted for infectious workup     Problem/Plan - 1:  ·  Problem: Bacteremia.  Plan: bacteremia with gram positives as drawn in dialysis   repeat cultures so far negative   pt hemodynamically stable  ID following   finish abx per ID  plan to return to facility     Problem/Plan - 2:  ·  Problem: ESRD  on dialysis.       -on HD MWF  -renal following   HD per schedule     Problem/Plan - 3:  ·  Problem: decubitus ulcer  - local Wound care   turn and position in this pt with bedbound status     Problem/Plan - 4:  ·  Problem: Hypothyroidism.  Plan: -c/w synthroid.     Problem/Plan - 5:  ·  Problem: CAD in native artery.  Plan: -c/w simvastatin, plavix, lopressor.     Problem/Plan - 6:  Problem: Essential hypertension.   - Continue Current medications and monitor.     Problem/Plan - 7:  ·  Problem: Prophylactic measure.  Plan: hsq.     --------------------------------------------  Case discussed with pt, NP  Education given on findings and plan of care  ___________________________  HRashaun Hannah D.O.  Pager: [phone]

## 2019-10-16 NOTE — PROGRESS NOTE ADULT - PROVIDER SPECIALTY LIST ADULT
"Patient has noted increased sensation of irregular heart beat over the past two days.  She has a history of a \"benign\" arhythmia that was diagnosed 30 years ago.  No chest pain, no shortness of breath, no lightheadedness or diaphoresis.  Patient was brought to the ER by EMS who observed NSR without ectopy during transport.  ABCs intact.  Patient is alert and oriented x3.  s  "
Hospitalist
Infectious Disease
Internal Medicine
Nephrology
Ophthalmology
Nephrology
Infectious Disease
Internal Medicine
Infectious Disease

## 2019-10-16 NOTE — PROGRESS NOTE ADULT - SUBJECTIVE AND OBJECTIVE BOX
Nephrology Followup Note - [phone] - Dr Valles / Dr Mahoney / Dr Syed / Dr Lamas / Dr Lozano / Dr Castaneda / Dr Monique / Dr Escoto  Pt seen and examined at bedside  No acute events overnight. No complaints.     Allergies:  No Known Allergies    Hospital Medications:   MEDICATIONS  (STANDING):  artificial tears (preservative free) Ophthalmic Solution 1 Drop(s) Both EYES four times a day  ascorbic acid 500 milliGRAM(s) Oral daily  cefTRIAXone   IVPB 1000 milliGRAM(s) IV Intermittent every 24 hours  cinacalcet 30 milliGRAM(s) Oral daily  clopidogrel Tablet 75 milliGRAM(s) Oral daily  dextrose 5%. 1000 milliLiter(s) (50 mL/Hr) IV Continuous <Continuous>  dextrose 50% Injectable 12.5 Gram(s) IV Push once  dextrose 50% Injectable 25 Gram(s) IV Push once  dextrose 50% Injectable 25 Gram(s) IV Push once  docusate sodium 100 milliGRAM(s) Oral three times a day  dorzolamide 2%/timolol 0.5% Ophthalmic Solution 1 Drop(s) Both EYES two times a day  gabapentin 100 milliGRAM(s) Oral every 12 hours  heparin  Injectable 5000 Unit(s) SubCutaneous every 12 hours  hydrALAZINE 100 milliGRAM(s) Oral every 8 hours  insulin lispro (HumaLOG) corrective regimen sliding scale   SubCutaneous three times a day before meals  insulin lispro Injectable (HumaLOG) 2 Unit(s) SubCutaneous three times a day before meals  latanoprost 0.005% Ophthalmic Solution 1 Drop(s) Both EYES at bedtime  levothyroxine 50 MICROGram(s) Oral daily  metoclopramide 5 milliGRAM(s) Oral three times a day before meals  metoprolol tartrate 75 milliGRAM(s) Oral two times a day  multivitamin 1 Tablet(s) Oral daily  mupirocin 2% Ointment 1 Application(s) Topical every 12 hours  NIFEdipine XL 60 milliGRAM(s) Oral daily  pantoprazole    Tablet 40 milliGRAM(s) Oral before breakfast  petrolatum Ophthalmic Ointment 1 Application(s) Both EYES at bedtime  senna 2 Tablet(s) Oral at bedtime    VITALS:  T(F): 99.2 (10-16-19 @ 07:55), Max: 99.2 (10-16-19 @ 07:55)  HR: 83 (10-16-19 @ 07:55)  BP: 183/90 (10-16-19 @ 07:55)  RR: 17 (10-16-19 @ 07:55)  SpO2: 96% (10-16-19 @ 07:35)  Wt(kg): --    10-15 @ 07:01  -  10-16 @ 07:00  --------------------------------------------------------  IN: 450 mL / OUT: 0 mL / NET: 450 mL    PHYSICAL EXAM:  Constitutional: NAD  HEENT: anicteric sclera, oropharynx clear, MMM  Neck: No JVD  Respiratory: CTAB, no wheezes, rales or rhonchi  Cardiovascular: S1, S2, RRR  Gastrointestinal: BS+, soft, NT/ND  Extremities: No cyanosis or clubbing. No peripheral edema  Neurological: A/O x 3, no focal deficits  Psychiatric: Normal mood, normal affect  : No CVA tenderness. No jarrell.   Skin: No rashes  Vascular Access: LUE AV access +thrill and bruit.     LABS:  10-16    137  |  95<L>  |  69<H>  ----------------------------<  111<H>  5.4<H>   |  23  |  6.07<H>    Ca    9.9      16 Oct 2019 07:55  Phos  4.6     10-16  Mg     2.2     10-16      Creatinine Trend: 6.07 <--, 5.71 <--, 6.71 <--, 4.84 <--, 3.15 <--, 5.26 <--, 4.14 <--                        9.6    12.98 )-----------( 529      ( 16 Oct 2019 07:55 )             30.7     Urine Studies:  Urinalysis Basic - ( 12 Oct 2019 08:42 )    Color: LIGHT ORANGE / Appearance: TURBID / S.011 / pH: 7.5  Gluc: 100 / Ketone: NEGATIVE  / Bili: NEGATIVE / Urobili: NORMAL   Blood: SMALL / Protein: 300 / Nitrite: NEGATIVE   Leuk Esterase: LARGE / RBC: 5-10 / WBC >50   Sq Epi: MANY / Non Sq Epi:  / Bacteria: MANY        RADIOLOGY & ADDITIONAL STUDIES:

## 2019-10-16 NOTE — DISCHARGE NOTE PROVIDER - NSDCCAREPROVSEEN_GEN_ALL_CORE_FT
Paco Hannah Helga Cisneros  . -Attending Only-  User ADM  Hoorbod Delshadfar  Hoorbod Delshadfar  Hoorbod Delshadfar  Team [facility]  Sam Leung Seema Rai Carolyn Niles Laprincella Legrand Jenna Mercurio Swapna Vemulapalli Jessica Millner Michelle Vasquez Mohsena Amin Sahiba BakMadison County Health Care System Linda Chavez

## 2019-10-16 NOTE — DISCHARGE NOTE PROVIDER - INSTRUCTIONS
Renal diet: no concentrated potassium or phosphorous  Low sodium and cholesterol diet  1200 ml Fluid restriction

## 2019-10-16 NOTE — DISCHARGE NOTE PROVIDER - CARE PROVIDER_API CALL
Mani Valles)  Internal Medicine; Nephrology  [address] [place] [address]  Phone: [phone]  Fax: [phone]  Follow Up Time:     Noelle Jacobs)  Infectious Disease; Internal Medicine  [address] [facility] [address]  [place] [address]  Phone: [phone]  Fax: [phone]  Follow Up Time:     Saida Rojo)  Ophthalmology  [address], [address]  [place] [address]  Phone: [phone]  Fax: [phone]  Follow Up Time:     Primary care provider,   Phone: (   )    -  Fax: (   )    -  Follow Up Time:     Paco Hannah ()  Internal Medicine  [address]  [place] [address]  Phone: [phone]  Fax: [phone]  Follow Up Time:

## 2019-10-16 NOTE — PROGRESS NOTE ADULT - REASON FOR ADMISSION
sent in because of positive blood cultures

## 2019-10-16 NOTE — PROGRESS NOTE ADULT - PROBLEM SELECTOR PLAN 1
TOlerating HD today.   BP high at start of treatment, but had intradialytic hypotension. UF goal limited to 1.8L.   Borderline high K noted, will correct with HD.   Added K restriction in diet.   Continue eleazar with HD

## 2019-10-16 NOTE — PROGRESS NOTE ADULT - ASSESSMENT
54y Female w ESRD on HD MWF @ [facility] HD unit, DM, HTN referred to ED for gram Positive bacteremia, drawn in outpt HD unit on 10/7. Renal following for ESRD Mx.

## 2019-10-16 NOTE — DISCHARGE NOTE NURSING/CASE MANAGEMENT/SOCIAL WORK - PATIENT PORTAL LINK FT
You can access the FollowMyHealth Patient Portal offered by [facility] by registering at the following website: [email or web address]. By joining [employer]’s FollowMyHealth portal, you will also be able to view your health information using other applications (apps) compatible with our system.

## 2019-10-16 NOTE — DISCHARGE NOTE PROVIDER - NSDCCPCAREPLAN_GEN_ALL_CORE_FT
PRINCIPAL DISCHARGE DIAGNOSIS  Diagnosis: Bacteremia  Assessment and Plan of Treatment: You were seen by the infectious disease team and IV antibiotics were started. Complete antibiotic therapy as directed. Repeat blood cultures obtained in hospital are negative. Monitor for any further signs and symptoms of further infection, including but not limited to, fevers/chills, shortness of breath, increased heart rate, dizziness, or abrupt changes in mental status.      SECONDARY DISCHARGE DIAGNOSES  Diagnosis: End stage kidney disease  Assessment and Plan of Treatment: Please continue to follow your dialysis schedule and refer to your primary provider/nephrologist for further care/recommendations. Continue your medications and supplementation as directed.    Diagnosis: Hypothyroidism  Assessment and Plan of Treatment: Continue your thyroid medications as recommended and follow-up with your outpatient provider for continual thyroid function testing and further medication management.    Diagnosis: HTN (hypertension)  Assessment and Plan of Treatment: Continue blood pressure medication regimen as directed. Monitor for any visual changes, headaches or dizziness.  Monitor blood pressure regularly.  Follow up with your PCP for further management for high blood pressure.    Diagnosis: Sacral wound  Assessment and Plan of Treatment: Continue wound care as instructed.  Recommend follow up care at [facility] ([phone], [address] [place] [address]).  Sacrum: Cleanse with SAF-clens, rinse with NS, pat dry. Apply Liquid barrier film to periwound skin. Apply Aquacel Hydrofiber to wound base, cover with foam with border. Change daily.  Continue low air loss bed therapy, heel elevation, continue to turn & reposition q2h,continue moisture management with barrier creams & single breathable pad, continue measures to decrease friction/shear/pressure.   Continue your medications as directed and please follow-up as an outpatient with your primary care provider for further care and recommendations.    Diagnosis: Eye pain, bilateral  Assessment and Plan of Treatment: You were seen by the ophthalmology team during hospitalization. Continue the following:  - preservative free artificial tears 1 drop QID into both eyes  - lacrilube (artificial tear ointment) QHS into both eyes  - c/w cosopt 1 drop into both eyes BID  - start latanoprost 1 drop QHS into both eyes  - Outpatient follow up with Ophthalmology clinic within 1 week of discharge from the hospital.    Diagnosis: Coronary artery disease  Assessment and Plan of Treatment: Continue with simvastatin, plavix, lopressor. Continue your medications as directed and please follow-up as an outpatient with your primary care provider and cardiologist for further care and recommendations.

## 2019-10-16 NOTE — PROGRESS NOTE ADULT - ATTENDING COMMENTS
Nephrology  Office [phone]  Ans Serv [phone]  [facility] - [phone]
Dr Mani Valles  Nephrology  Office [phone]  Ans Serv [phone]  [facility] [phone]

## 2019-10-16 NOTE — DISCHARGE NOTE PROVIDER - PROVIDER TOKENS
PROVIDER:[TOKEN:[[id number]]],PROVIDER:[TOKEN:[[id number]]],PROVIDER:[TOKEN:[[id number]]],FREE:[LAST:[Primary care provider],PHONE:[(   )    -],FAX:[(   )    -]],PROVIDER:[TOKEN:[[id number]]]

## 2019-10-16 NOTE — DISCHARGE NOTE PROVIDER - HOSPITAL COURSE
55 y/o [facility] female PMH of HTN, HLD, hypothyroidism, constipation, CAD, bedbound, ESRD on HD sent in for leukocytosis admitted for infectious workup.         Bacteremia.     - Outpatient blood cultures with gram positives cocci in chains     - ID consulted    - ceftriaxone 1gm IV qdaily was given during hospitalization. cCange to cefazolin 2g/2gm/3gm with HD 3x/week after patient is discharged from hospital till 10/24/19    - Repeat Blood cultures in hospital are negative to date.  TTE no vegetations.  No suspicion for endocarditis as cultures cleared quickly.  FACUNDO likely to be low yield.      * Nasal PCR (+) MRSA.  Cont mupirocin ointment to nares x 5 day course, s/p chlorhexidine wash.        ESRD (end stage renal disease) on dialysis.      -on HD MWF    -Seen by renal        Sacral decubitus ulcer, stage IV.      -Wound care eval ordered    - local care.        Hypothyroidism.     -c/w synthroid.         CAD in native artery.      c/w simvastatin, plavix, lopressor.          Essential hypertension.     -c/w lopressor, hydralazine, nifedipine     -monitor bp.        #B/L eye pain:    +-- was evaluated by ophthalmology-- 10/14    - preservative free artificial tears 1 drop QID into both eyes    - lacrilube (artificial tear ointment) QHS into both eyes    - c/w cosopt 1 drop into both eyes BID    - start latanoprost 1 drop QHS into both eyes    - Outpatient follow up         Discussed case with Dr. Hannah on 10/16/19. As per medical attending patient is medically stable for discharge to nursing home

## 2019-10-16 NOTE — DISCHARGE NOTE PROVIDER - CARE PROVIDERS DIRECT ADDRESSES
,DirectAddress_Unknown,DirectAddress_Unknown,[email or web address],DirectAddress_Unknown,DirectAddress_Unknown

## 2019-11-22 NOTE — PROGRESS NOTE ADULT - PROBLEM SELECTOR PLAN 1
Informed Jazlyn De La Paz- that pt states she is to have gallbladder removed at time of hysterectomy. Stated she will check on it. Resolved

## 2019-12-06 ENCOUNTER — APPOINTMENT (OUTPATIENT)
Dept: OPHTHALMOLOGY | Facility: CLINIC | Age: 54
End: 2019-12-06

## 2020-01-01 NOTE — PROGRESS NOTE ADULT - PROBLEM SELECTOR PLAN 7
Mom calls, [facility] in [place] has questions about dosing for fluconazole (DIFLUCAN). Please call them at [phone]   Monitor BP  Cont meds

## 2020-01-13 NOTE — PHYSICAL THERAPY INITIAL EVALUATION ADULT - LEVEL OF INDEPENDENCE, REHAB EVAL
dependent (less than 25% patients effort)
maximum assist (25% patients effort)
dependent (less than 25% patients effort)
none

## 2020-04-03 ENCOUNTER — INPATIENT (INPATIENT)
Facility: HOSPITAL | Age: 55
LOS: 3 days | End: 2020-04-07
Attending: GENERAL PRACTICE | Admitting: GENERAL PRACTICE
Payer: MEDICAID

## 2020-04-03 VITALS
RESPIRATION RATE: 20 BRPM | HEART RATE: 76 BPM | TEMPERATURE: 99 F | OXYGEN SATURATION: 93 % | SYSTOLIC BLOOD PRESSURE: 113 MMHG | DIASTOLIC BLOOD PRESSURE: 47 MMHG

## 2020-04-03 DIAGNOSIS — E11.9 TYPE 2 DIABETES MELLITUS WITHOUT COMPLICATIONS: ICD-10-CM

## 2020-04-03 DIAGNOSIS — I77.0 ARTERIOVENOUS FISTULA, ACQUIRED: Chronic | ICD-10-CM

## 2020-04-03 DIAGNOSIS — Z29.9 ENCOUNTER FOR PROPHYLACTIC MEASURES, UNSPECIFIED: ICD-10-CM

## 2020-04-03 DIAGNOSIS — B34.2 CORONAVIRUS INFECTION, UNSPECIFIED: ICD-10-CM

## 2020-04-03 DIAGNOSIS — Z71.89 OTHER SPECIFIED COUNSELING: ICD-10-CM

## 2020-04-03 DIAGNOSIS — N18.6 END STAGE RENAL DISEASE: ICD-10-CM

## 2020-04-03 DIAGNOSIS — J96.01 ACUTE RESPIRATORY FAILURE WITH HYPOXIA: ICD-10-CM

## 2020-04-03 DIAGNOSIS — I25.10 ATHEROSCLEROTIC HEART DISEASE OF NATIVE CORONARY ARTERY WITHOUT ANGINA PECTORIS: ICD-10-CM

## 2020-04-03 DIAGNOSIS — I10 ESSENTIAL (PRIMARY) HYPERTENSION: ICD-10-CM

## 2020-04-03 DIAGNOSIS — L89.90 PRESSURE ULCER OF UNSPECIFIED SITE, UNSPECIFIED STAGE: ICD-10-CM

## 2020-04-03 DIAGNOSIS — Z51.5 ENCOUNTER FOR PALLIATIVE CARE: ICD-10-CM

## 2020-04-03 DIAGNOSIS — E03.9 HYPOTHYROIDISM, UNSPECIFIED: ICD-10-CM

## 2020-04-03 LAB
ALBUMIN SERPL ELPH-MCNC: 3.9 G/DL — SIGNIFICANT CHANGE UP (ref 3.3–5)
ALP SERPL-CCNC: 113 U/L — SIGNIFICANT CHANGE UP (ref 40–120)
ALT FLD-CCNC: 26 U/L — SIGNIFICANT CHANGE UP (ref 4–33)
ANION GAP SERPL CALC-SCNC: 28 MMO/L — HIGH (ref 7–14)
ANION GAP SERPL CALC-SCNC: 29 MMO/L — HIGH (ref 7–14)
APTT BLD: 35.1 SEC — SIGNIFICANT CHANGE UP (ref 27.5–36.3)
AST SERPL-CCNC: 107 U/L — HIGH (ref 4–32)
BASE EXCESS BLDV CALC-SCNC: 3.2 MMOL/L — SIGNIFICANT CHANGE UP
BASOPHILS # BLD AUTO: 0.03 K/UL — SIGNIFICANT CHANGE UP (ref 0–0.2)
BASOPHILS NFR BLD AUTO: 0.2 % — SIGNIFICANT CHANGE UP (ref 0–2)
BILIRUB SERPL-MCNC: 0.4 MG/DL — SIGNIFICANT CHANGE UP (ref 0.2–1.2)
BLOOD GAS VENOUS - CREATININE: 8.07 MG/DL — HIGH (ref 0.5–1.3)
BLOOD GAS VENOUS - FIO2: 21 — SIGNIFICANT CHANGE UP
BUN SERPL-MCNC: 106 MG/DL — HIGH (ref 7–23)
BUN SERPL-MCNC: 110 MG/DL — HIGH (ref 7–23)
CALCIUM SERPL-MCNC: 10 MG/DL — SIGNIFICANT CHANGE UP (ref 8.4–10.5)
CALCIUM SERPL-MCNC: 9.9 MG/DL — SIGNIFICANT CHANGE UP (ref 8.4–10.5)
CHLORIDE BLDV-SCNC: 93 MMOL/L — LOW (ref 96–108)
CHLORIDE SERPL-SCNC: 84 MMOL/L — LOW (ref 98–107)
CHLORIDE SERPL-SCNC: 85 MMOL/L — LOW (ref 98–107)
CK SERPL-CCNC: 225 U/L — HIGH (ref 25–170)
CO2 SERPL-SCNC: 23 MMOL/L — SIGNIFICANT CHANGE UP (ref 22–31)
CO2 SERPL-SCNC: 25 MMOL/L — SIGNIFICANT CHANGE UP (ref 22–31)
CREAT SERPL-MCNC: 6.74 MG/DL — HIGH (ref 0.5–1.3)
CREAT SERPL-MCNC: 7.52 MG/DL — HIGH (ref 0.5–1.3)
CRP SERPL-MCNC: 316.9 MG/L — HIGH
D DIMER BLD IA.RAPID-MCNC: 2907 NG/ML — SIGNIFICANT CHANGE UP
EOSINOPHIL # BLD AUTO: 0 K/UL — SIGNIFICANT CHANGE UP (ref 0–0.5)
EOSINOPHIL NFR BLD AUTO: 0 % — SIGNIFICANT CHANGE UP (ref 0–6)
ERYTHROCYTE [SEDIMENTATION RATE] IN BLOOD: 72 MM/HR — HIGH (ref 4–25)
FERRITIN SERPL-MCNC: 9640 NG/ML — HIGH (ref 15–150)
GAS PNL BLDV: 137 MMOL/L — SIGNIFICANT CHANGE UP (ref 136–146)
GLUCOSE BLDV-MCNC: 328 MG/DL — HIGH (ref 70–99)
GLUCOSE SERPL-MCNC: 255 MG/DL — HIGH (ref 70–99)
GLUCOSE SERPL-MCNC: 356 MG/DL — HIGH (ref 70–99)
HCO3 BLDV-SCNC: 27 MMOL/L — SIGNIFICANT CHANGE UP (ref 20–27)
HCT VFR BLD CALC: 28.3 % — LOW (ref 34.5–45)
HCT VFR BLDV CALC: 26 % — LOW (ref 34.5–45)
HGB BLD-MCNC: 8.9 G/DL — LOW (ref 11.5–15.5)
HGB BLDV-MCNC: 8.4 G/DL — LOW (ref 11.5–15.5)
IMM GRANULOCYTES NFR BLD AUTO: 0.8 % — SIGNIFICANT CHANGE UP (ref 0–1.5)
INR BLD: 0.94 — SIGNIFICANT CHANGE UP (ref 0.88–1.17)
LACTATE BLDV-MCNC: 1.5 MMOL/L — SIGNIFICANT CHANGE UP (ref 0.5–2)
LDH SERPL L TO P-CCNC: 985 U/L — HIGH (ref 135–225)
LYMPHOCYTES # BLD AUTO: 1.68 K/UL — SIGNIFICANT CHANGE UP (ref 1–3.3)
LYMPHOCYTES # BLD AUTO: 12.1 % — LOW (ref 13–44)
MAGNESIUM SERPL-MCNC: 2.5 MG/DL — SIGNIFICANT CHANGE UP (ref 1.6–2.6)
MCHC RBC-ENTMCNC: 29.7 PG — SIGNIFICANT CHANGE UP (ref 27–34)
MCHC RBC-ENTMCNC: 31.4 % — LOW (ref 32–36)
MCV RBC AUTO: 94.3 FL — SIGNIFICANT CHANGE UP (ref 80–100)
MONOCYTES # BLD AUTO: 0.43 K/UL — SIGNIFICANT CHANGE UP (ref 0–0.9)
MONOCYTES NFR BLD AUTO: 3.1 % — SIGNIFICANT CHANGE UP (ref 2–14)
NEUTROPHILS # BLD AUTO: 11.59 K/UL — HIGH (ref 1.8–7.4)
NEUTROPHILS NFR BLD AUTO: 83.8 % — HIGH (ref 43–77)
NRBC # FLD: 0.04 K/UL — SIGNIFICANT CHANGE UP (ref 0–0)
PCO2 BLDV: 41 MMHG — SIGNIFICANT CHANGE UP (ref 41–51)
PH BLDV: 7.43 PH — SIGNIFICANT CHANGE UP (ref 7.32–7.43)
PHOSPHATE SERPL-MCNC: 6 MG/DL — HIGH (ref 2.5–4.5)
PLATELET # BLD AUTO: 393 K/UL — SIGNIFICANT CHANGE UP (ref 150–400)
PMV BLD: 10.6 FL — SIGNIFICANT CHANGE UP (ref 7–13)
PO2 BLDV: 55 MMHG — HIGH (ref 35–40)
POTASSIUM BLDV-SCNC: 4.6 MMOL/L — HIGH (ref 3.4–4.5)
POTASSIUM SERPL-MCNC: 4.5 MMOL/L — SIGNIFICANT CHANGE UP (ref 3.5–5.3)
POTASSIUM SERPL-MCNC: SIGNIFICANT CHANGE UP MMOL/L (ref 3.5–5.3)
POTASSIUM SERPL-SCNC: 4.5 MMOL/L — SIGNIFICANT CHANGE UP (ref 3.5–5.3)
POTASSIUM SERPL-SCNC: SIGNIFICANT CHANGE UP MMOL/L (ref 3.5–5.3)
PROCALCITONIN SERPL-MCNC: 34.72 NG/ML — HIGH (ref 0.02–0.1)
PROT SERPL-MCNC: 9 G/DL — HIGH (ref 6–8.3)
PROTHROM AB SERPL-ACNC: 10.7 SEC — SIGNIFICANT CHANGE UP (ref 9.8–13.1)
RBC # BLD: 3 M/UL — LOW (ref 3.8–5.2)
RBC # FLD: 13.5 % — SIGNIFICANT CHANGE UP (ref 10.3–14.5)
SAO2 % BLDV: 84.7 % — SIGNIFICANT CHANGE UP (ref 60–85)
SARS-COV-2 RNA SPEC QL NAA+PROBE: DETECTED
SODIUM SERPL-SCNC: 135 MMOL/L — SIGNIFICANT CHANGE UP (ref 135–145)
SODIUM SERPL-SCNC: 139 MMOL/L — SIGNIFICANT CHANGE UP (ref 135–145)
TROPONIN T, HIGH SENSITIVITY: 195 NG/L — CRITICAL HIGH (ref ?–14)
TROPONIN T, HIGH SENSITIVITY: 196 NG/L — CRITICAL HIGH (ref ?–14)
WBC # BLD: 13.84 K/UL — HIGH (ref 3.8–10.5)
WBC # FLD AUTO: 13.84 K/UL — HIGH (ref 3.8–10.5)

## 2020-04-03 PROCEDURE — 99232 SBSQ HOSP IP/OBS MODERATE 35: CPT

## 2020-04-03 PROCEDURE — 99223 1ST HOSP IP/OBS HIGH 75: CPT | Mod: GC

## 2020-04-03 PROCEDURE — 71045 X-RAY EXAM CHEST 1 VIEW: CPT | Mod: 26

## 2020-04-03 RX ORDER — PANTOPRAZOLE SODIUM 20 MG/1
40 TABLET, DELAYED RELEASE ORAL
Refills: 0 | Status: DISCONTINUED | OUTPATIENT
Start: 2020-04-03 | End: 2020-04-07

## 2020-04-03 RX ORDER — DEXTROSE 50 % IN WATER 50 %
25 SYRINGE (ML) INTRAVENOUS ONCE
Refills: 0 | Status: DISCONTINUED | OUTPATIENT
Start: 2020-04-03 | End: 2020-04-07

## 2020-04-03 RX ORDER — ERYTHROPOIETIN 10000 [IU]/ML
10000 INJECTION, SOLUTION INTRAVENOUS; SUBCUTANEOUS
Refills: 0 | Status: DISCONTINUED | OUTPATIENT
Start: 2020-04-03 | End: 2020-04-07

## 2020-04-03 RX ORDER — LATANOPROST 0.05 MG/ML
1 SOLUTION/ DROPS OPHTHALMIC; TOPICAL AT BEDTIME
Refills: 0 | Status: DISCONTINUED | OUTPATIENT
Start: 2020-04-03 | End: 2020-04-07

## 2020-04-03 RX ORDER — CLOPIDOGREL BISULFATE 75 MG/1
75 TABLET, FILM COATED ORAL DAILY
Refills: 0 | Status: DISCONTINUED | OUTPATIENT
Start: 2020-04-03 | End: 2020-04-07

## 2020-04-03 RX ORDER — CINACALCET 30 MG/1
30 TABLET, FILM COATED ORAL DAILY
Refills: 0 | Status: DISCONTINUED | OUTPATIENT
Start: 2020-04-03 | End: 2020-04-06

## 2020-04-03 RX ORDER — INSULIN LISPRO 100/ML
VIAL (ML) SUBCUTANEOUS EVERY 6 HOURS
Refills: 0 | Status: DISCONTINUED | OUTPATIENT
Start: 2020-04-03 | End: 2020-04-05

## 2020-04-03 RX ORDER — LEVOTHYROXINE SODIUM 125 MCG
50 TABLET ORAL DAILY
Refills: 0 | Status: DISCONTINUED | OUTPATIENT
Start: 2020-04-03 | End: 2020-04-07

## 2020-04-03 RX ORDER — ASCORBIC ACID 60 MG
500 TABLET,CHEWABLE ORAL DAILY
Refills: 0 | Status: DISCONTINUED | OUTPATIENT
Start: 2020-04-03 | End: 2020-04-04

## 2020-04-03 RX ORDER — GABAPENTIN 400 MG/1
100 CAPSULE ORAL EVERY 12 HOURS
Refills: 0 | Status: DISCONTINUED | OUTPATIENT
Start: 2020-04-03 | End: 2020-04-07

## 2020-04-03 RX ORDER — DORZOLAMIDE HYDROCHLORIDE TIMOLOL MALEATE 20; 5 MG/ML; MG/ML
1 SOLUTION/ DROPS OPHTHALMIC
Refills: 0 | Status: DISCONTINUED | OUTPATIENT
Start: 2020-04-03 | End: 2020-04-07

## 2020-04-03 RX ORDER — GLUCAGON INJECTION, SOLUTION 0.5 MG/.1ML
1 INJECTION, SOLUTION SUBCUTANEOUS ONCE
Refills: 0 | Status: DISCONTINUED | OUTPATIENT
Start: 2020-04-03 | End: 2020-04-07

## 2020-04-03 RX ORDER — HEPARIN SODIUM 5000 [USP'U]/ML
5000 INJECTION INTRAVENOUS; SUBCUTANEOUS EVERY 12 HOURS
Refills: 0 | Status: DISCONTINUED | OUTPATIENT
Start: 2020-04-03 | End: 2020-04-06

## 2020-04-03 RX ORDER — DEXTROSE 50 % IN WATER 50 %
12.5 SYRINGE (ML) INTRAVENOUS ONCE
Refills: 0 | Status: DISCONTINUED | OUTPATIENT
Start: 2020-04-03 | End: 2020-04-07

## 2020-04-03 RX ORDER — ZINC SULFATE TAB 220 MG (50 MG ZINC EQUIVALENT) 220 (50 ZN) MG
220 TAB ORAL DAILY
Refills: 0 | Status: DISCONTINUED | OUTPATIENT
Start: 2020-04-03 | End: 2020-04-07

## 2020-04-03 RX ORDER — DEXTROSE 50 % IN WATER 50 %
15 SYRINGE (ML) INTRAVENOUS ONCE
Refills: 0 | Status: DISCONTINUED | OUTPATIENT
Start: 2020-04-03 | End: 2020-04-07

## 2020-04-03 RX ORDER — SODIUM CHLORIDE 9 MG/ML
1000 INJECTION, SOLUTION INTRAVENOUS
Refills: 0 | Status: DISCONTINUED | OUTPATIENT
Start: 2020-04-03 | End: 2020-04-07

## 2020-04-03 RX ADMIN — Medication 40 MILLIGRAM(S): at 15:54

## 2020-04-03 NOTE — H&P ADULT - PROBLEM SELECTOR PLAN 1
R/O COVID 19, on 100% NRB, pt is FULL Code, Palliative care f/u, COVID 19 PCR, F/U CBC, CMP, fall / aspiration precaution, NPO for now, Vit C, ZINC, Hold Antibiotics for now,

## 2020-04-03 NOTE — CONSULT NOTE ADULT - PROBLEM SELECTOR RECOMMENDATION 4
Palliative care consulted for GOC in the setting of advanced illness and respiratory failure 2/2 presumed Covid-19 infection.    Ryann Friend MD  Palliative Medicine Fellow    In the event of newly developing, evolving, or worsening symptoms, please contact the Palliative Medicine team via pager (if the patient is at [facility] #[phone] or if the patient is at [facility] #[phone]) The Geriatric and Palliative Medicine service has coverage 24 hours a day/ 7 days a week to provide medical recommendations regarding symptom management needs.

## 2020-04-03 NOTE — CONSULT NOTE ADULT - SUBJECTIVE AND OBJECTIVE BOX
Full consult note to follow HPI: 56 y/o F PMHx ESRD HD MWF (missed today, last dialyzed 2 days ago), HTN, HLD, hypothyroid, CAD, bedbound w/ sacral decubitus ulcers, presents from [facility] for generalized weakness. Pt states that she has felt generally weak since yesterday and was unable to go to dialysis today due to her weakness. Pt denies any fevers, sob, cough, abd pain, nausea, vomiting, diarrhea, headache, dysuria.   Per EMS, pt was sating 55% on RA, improved with NRM. Admitted for respiratory distress 2/2 presumed Covid-19 infection.    Palliative care consulted for GOC and complex decision making in the setting of advanced illness and respiratory failure 2/2 presumed Covid-19 infection.    PERTINENT PM/SXH:   Hypothyroidism  HLD (hyperlipidemia)  CAD in native artery  Diabetes mellitus  HTN (hypertension)  Elective surgery  Allergic rhinitis  Hypocalcemia  Enterocolitis  Osteomyelitis  Bacteremia  Pressure ulcer  Tremor  Sepsis  Sepsis  Respiratory failure  End stage kidney disease  Pulmonary edema  Hyperlipidemia  Polyneuropathy  ASHD (arteriosclerotic heart disease)  Malformation of coronary vessels  Hypo-osmolality and hyponatremia  Difficulty waking  GERD (gastroesophageal reflux disease)  ESRD (end stage renal disease) on dialysis  Clostridium difficile infection  Osteomyelitis of jaw  Parathyroid adenoma  Hypothyroidism  CKD (chronic kidney disease)  Anemia  CHF (congestive heart failure)  Diabetic neuropathy  Diabetes mellitus  HTN (hypertension)    Arteriovenous fistula  S/P   No significant past surgical history  No Past Surgical History    FAMILY HISTORY:  Family history of stroke  Family history of hypertension (Sibling)  Family history of diabetes mellitus    ITEMS NOT CHECKED ARE NOT PRESENT    SOCIAL HISTORY:   Significant other/partner:  [ ]  Children:  [ ]  Cheondoism/Spirituality:  Substance hx:  [ ]   Tobacco hx:  [ ]   Alcohol hx: [ ]   Home Opioid hx:  [ ] I-Stop Reference No:  Living Situation: [ ]Home  [x]Long term care  [ ]Rehab [ ]Other    ADVANCE DIRECTIVES:    DNR  MOLST  [ ]  Living Will  [ ]   DECISION MAKER(s):   [ ] Health Care Proxy(s)  [x] Surrogate(s)  [ ] Guardian           Name(s): Emily Bustos (sister)  Phone Number(s): [phone]    BASELINE (I)ADL(s) (prior to admission):  [place]: [ ]Total  [x] Moderate [ ]Dependent    Allergies    No Known Allergies    Intolerances    MEDICATIONS  (STANDING):    MEDICATIONS  (PRN):    PRESENT SYMPTOMS: [ ]Unable to obtain due to poor mentation   Source if other than patient:  [ ]Family   [ ]Team     Pain: [ ] yes [ ] no  QOL impact -   Location -                    Aggravating factors -  Quality -  Radiation -  Timing-  Severity (0-10 scale):  Minimal acceptable level (0-10 scale):     PAIN AD Score:     [email or web address] (press ctrl +  left click to view)    Dyspnea:                           [ ]Mild [ ]Moderate [ ]Severe  Anxiety:                             [ ]Mild [ ]Moderate [ ]Severe  Fatigue:                             [ ]Mild [ ]Moderate [ ]Severe  Nausea:                             [ ]Mild [ ]Moderate [ ]Severe  Loss of appetite:              [ ]Mild [ ]Moderate [ ]Severe  Constipation:                    [ ]Mild [ ]Moderate [ ]Severe    Other Symptoms:  [ ]All other review of systems negative     Karnofsky Performance Score/Palliative Performance Status Version 2: 30%    http://npcrc.org/files/news/palliative_performance_scale_ppsv2.pdf  PHYSICAL EXAM: Please refer to physical exam performed by ED team, as per hospital policy during pandemic I did not personally examine the patient.  Vital Signs Last 24 Hrs  T(C): 37.8 (2020 14:11), Max: 37.8 (2020 14:11)  T(F): 100.1 (2020 14:11), Max: 100.1 (2020 14:11)  HR: 81 (2020 14:11) (76 - 81)  BP: 115/62 (2020 14:11) (113/47 - 115/62)  BP(mean): --  RR: 20 (2020 14:11) (20 - 20)  SpO2: 100% (2020 14:11) (93% - 100%) I&O's Summary    GENERAL: Please refer to ED physical examination.  [ ]Alert  [ ]Oriented x   [ ]Lethargic  [ ]Cachexia  [ ]Unarousable  [ ]Verbal  [ ]Non-Verbal  Behavioral:   [ ] Anxiety  [ ] Delirium [ ] Agitation [ ] Other  HEENT:  [ ]Normal   [ ]Dry mouth   [ ]ET Tube/Trach  [ ]Oral lesions  PULMONARY:   [ ]Clear [ ]Tachypnea  [ ]Audible excessive secretions   [ ]Rhonchi        [ ]Right [ ]Left [ ]Bilateral  [ ]Crackles        [ ]Right [ ]Left [ ]Bilateral  [ ]Wheezing     [ ]Right [ ]Left [ ]Bilateral  CARDIOVASCULAR:    [ ]Regular [ ]Irregular [ ]Tachy  [ ]Harman [ ]Murmur [ ]Other  GASTROINTESTINAL:  [ ]Soft  [ ]Distended   [ ]+BS  [ ]Non tender [ ]Tender  [ ]PEG [ ]OGT/ NGT  Last BM: GENITOURINARY:  [ ]Normal [ ] Incontinent   [ ]Oliguria/Anuria   [ ]Flores  MUSCULOSKELETAL:   [ ]Normal   [ ]Weakness  [ ]Bed/Wheelchair bound [ ]Edema  NEUROLOGIC:   [ ]No focal deficits  [ ] Cognitive impairment  [ ] Dysphagia [ ]Dysarthria [ ] Paresis [ ]Other   SKIN:   [ ]Normal   [ ]Pressure ulcer(s)  [ ]Rash    CRITICAL CARE:  [ ] Shock Present  [ ]Septic [ ]Cardiogenic [ ]Neurologic [ ]Hypovolemic  [ ]  Vasopressors [ ]  Inotropes   [x] Respiratory failure present [ ] mechanical ventilation [ ] non-invasive ventilatory support [ ] High flow  [x] Acute  [ ] Chronic [ ] Hypoxic  [ ] Hypercarbic [ ] Other  [x] Other organ failure; skin, renal, cardiac    LABS:                        8.9    13.84 )-----------( 393      ( 2020 12:50 )             28.3   04-03    135  |  84<L>  |  106<H>  ----------------------------<  255<H>  Test not performed SPECIMEN GROSSLY HEMOLYZED   |  23  |  6.74<H>    Ca    9.9      2020 12:50    TPro  9.0<H>  /  Alb  3.9  /  TBili  0.4  /  DBili  x   /  AST  107<H>  /  ALT  26  /  AlkPhos  113  04-03  PT/INR - ( 2020 12:50 )   PT: 10.7 SEC;   INR: 0.94          PTT - ( 2020 12:50 )  PTT:35.1 SEC  	    RADIOLOGY & ADDITIONAL STUDIES:  < from: Xray Chest 1 View AP/PA (20 @ 12:57) >  EXAM:  XR CHEST AP OR PA 1V        PROCEDURE DATE:  Apr  3 2020         INTERPRETATION:  INDICATION:Shortness of Breath, Cough,  Fever pneumonia. Follow-up exam    COMPARISON: 10/10/2019    FINDINGS:    Heart: The heart is enlarged.    Mediastinum:Mediastinal contours are normal. There are no enlarged mediastinal or hilar nodes.    Lungs: Diffuse bilateral central and peripheral infiltrates are noted.    Pulmonary vascularity: Pulmonary vascularity is normal.    Osseous structures: The osseousstructures are intact.    Soft tissues:Left axillary stent is noted    Pleura: There are no pleural effusions.    IMPRESSION:    Diffuse bilateral pulmonary infiltrates.    PROTEIN CALORIE MALNUTRITION PRESENT: [ ] Yes [x] No  [x] PPSV2 < or = to 30% [ ] significant weight loss  [ ] poor nutritional intake [ ] catabolic state [ ] anasarca     Albumin, Serum: 3.9 g/dL (20 @ 12:50)  Artificial Nutrition [ ]     REFERRALS:   [ ]Chaplaincy  [ ] Hospice  [ ]Child Life  [ ]Social Work  [ ]Case management [ ]Holistic Therapy

## 2020-04-03 NOTE — ED PROVIDER NOTE - CARE PLAN
Principal Discharge DX:	Coronavirus infection  Secondary Diagnosis:	Acute hypoxemic respiratory failure

## 2020-04-03 NOTE — ED PROVIDER NOTE - OBJECTIVE STATEMENT
56 yo F PMHx ESRD HD MWF (missed today, last dialyzed 2 days ago), HTN, HLD, hypothyroid, CAD, bedbound w/ sacral decubitus ulcers, presents from [facility] for generalized weakness. Pt states that she has felt generally weak since yesterday and was unable to go to dialysis today due to her weakness. Pt denies any fevers, sob, cough, abd pain, nausea, vomiting, diarrhea, headache, dysuria.   Per EMS, pt was sating 55% on RA, improved when placed on NRB to 93%.

## 2020-04-03 NOTE — ED PROVIDER NOTE - NS ED ROS FT
Constitutional: generalized weakness; no fevers or chills  HEENT: no visual changes, no sore throat, no rhinorrhea  CV: no cp or palpitations  Resp: no sob or cough  GI: no abd pain, no nausea, no vomiting, no diarrhea, no constipation  : no dysuria, no hematuria  MSK: no myalgais or arthralgias  skin: no rashes  neuro: no HA, no numbness; no weakness, no tingling  ROS statement: all other ROS negative except as per HPI

## 2020-04-03 NOTE — CONSULT NOTE ADULT - ASSESSMENT
54 y/o F w/ PMHx of ESRD (HD MWF), HTN, CAD, bedbound with sacral decubitus ulcers presents from NH w/ SOB and respiratory failure 2/2 presumed covid-19 infection. Palliative consulted for GOC in the setting of advanced illness and respiratory failure 2/2 presumed Covid-19 infection.

## 2020-04-03 NOTE — ED PROVIDER NOTE - PHYSICAL EXAMINATION
PHYSICAL EXAM:  GENERAL: non-toxic appearing  HEAD: Atraumatic, Normocephalic;  NECK: No JVD; FROM  EYES: PERRL, EOMs intact b/l w/out deficits  CHEST/LUNG: diffuse rhonchi; speaking in full sentences;   HEART: RRR no murmur/gallops/rubs  ABDOMEN: +BS, soft, NT, ND  EXTREMITIES: No LE edema, +2 radial pulses b/l; L AV fistula, palpable thrill  MUSCULOSKELETAL: FROM of all 4 extremities;  NERVOUS SYSTEM:  Awake, resopnds to questions, lethargic, No motor deficits or sensory deficits; CNII-XII intact; no focal neurologic deficits  SKIN:  No new rashes

## 2020-04-03 NOTE — ED ADULT NURSE NOTE - OBJECTIVE STATEMENT
Pt is resting on cot, no caregivers at bedside, pt states on dialysis MWF did not go today since she didn't feel well. States she has fevers, but did not know temperatures

## 2020-04-03 NOTE — H&P ADULT - ASSESSMENT
Note:  Per current hospital medicine emergency protocol, in an effort to reduce COVID exposures and also conserve PPE for necessary encounters, H&P Above is obtained from chart review without direct patient contact unless acute changes. Pt was seen & Examine by ER, Evaluated by  renal and palliative Care today as well , I D/W the ER Nurse as well regarding the pt's status, pt in no Distress at this time on 100% NRB,     As Per ER Note:     54 yo F PMHx ESRD HD MWF (missed today, last dialyzed 2 days ago), HTN, HLD, hypothyroid, CAD, bedbound w/ sacral decubitus ulcers, presents from [facility] for generalized weakness. Pt states that she has felt generally weak since yesterday and was unable to go to dialysis today due to her weakness. Pt denies any fevers, sob, cough, abd pain, nausea, vomiting, diarrhea, headache, dysuria.   Per EMS, pt was sating 55% on RA, improved when placed on NRB to 93%. Pt was seen by Renal today, Order for HD was place by Renal for tonight, Pt s/p IV Solumedrol 40 mg x 1 today,     As per Palliative CARE NOTE:      Advance care planning.  Recommendation: Spoke with patient's sister, Emily Concepcion ([phone]). Discussed patient's current clinical condition, possible diagnosis of covid-19 infection, prognosis, and disease trajectory. She confirmed sister's advance care directives, noting that she would "want everything to be done" to prolong her life. Discussed possibility that cardiopulmonary resuscitation not changing outcome and that it will be a clinical decision of risk/benefit of CPR. Conveyed that it is clinicians duty to not cause suffering and undue harm if a particular procedure/intervention will not help the patient. Patient's sister conveyed understanding. Family asked to be updated with regard to daily changes in condition. Stated she discussed condition with entire family and all hope that everything will be done for her.

## 2020-04-03 NOTE — CONSULT NOTE ADULT - ATTENDING COMMENTS
[facility]  [facility] - [phone]  Office [phone]  Ans Serv [phone]
Pt seen with fellow.  Agree with above.  Pt with multiple comorbities now with COVID.  Discussion as above with family.  Continue all aggressive measures as above.

## 2020-04-03 NOTE — CONSULT NOTE ADULT - PROBLEM SELECTOR RECOMMENDATION 3
Spoke with patient's sister, Emily Concepcion ([phone]). Spoke with patient's sister, Emily Concepcion ([phone]). Discussed patient's current clinical condition, possible diagnosis of covid-19 infection, prognosis, and disease trajectory. She confirmed sister's advance care directives, noting that she would "want everything to be done" to prolong her life. Discussed possibility that cardiopulmonary resuscitation not changing outcome and that it will be a clinical decision of risk/benefit of CPR. Conveyed that it is clinicians duty to not cause suffering and undue harm if a particular procedure/intervention will not help the patient. Patient's sister conveyed understanding. Family asked to be updated with regard to daily changes in condition. Stated she discussed condition with entire family and all hope that everything will be done for her.

## 2020-04-03 NOTE — ED PROVIDER NOTE - ATTENDING CONTRIBUTION TO CARE
55 year old female with complicated medical history presenting with symptoms suspicious for covid 19 hypoxic resp failure.  will send covid labs, provide 02 therapy. admit for further treatment and monitoring

## 2020-04-03 NOTE — ED PROVIDER NOTE - PROGRESS NOTE DETAILS
Veronica KUHN; NARCISA form from [facility] states patient amenable to trial of intubation. Dr. Keys discussed with sister Saige confirming that these remain the wishes if patient should decompensate. Yves Keys MD. attempted to reach nephrologist, Dr. Mahoney. Left voice mail. Awaiting call back. Spoke w/ Dr. Hannah, who accepts pt for admission.

## 2020-04-03 NOTE — ED PROVIDER NOTE - CLINICAL SUMMARY MEDICAL DECISION MAKING FREE TEXT BOX
Yves Keys MD. 54 yo F PMHx ESRD (HD MWF, missed today, last dialyzed 2 days ago), HTN, HLD, CAD< hypothyroid, CAD, bedbound w/ sacral decub ulcers, presents from NH for generalizsed weaknes x1 day. pt denies cough, sob, abd pain, n/v/d, cp. per EMS, sating 60s on RA, improved when placed on NRB to low 90s. Pt w/ diffuse rhonchi. will have to arrange for dialysis and will plan for admission w/ covid w/u. Complex Repair And Dermal Autograft Text: The defect edges were debeveled with a #15 scalpel blade.  The primary defect was closed partially with a complex linear closure.  Given the location of the defect, shape of the defect and the proximity to free margins an dermal autograft was deemed most appropriate to repair the remaining defect.  The graft was trimmed to fit the size of the remaining defect.  The graft was then placed in the primary defect, oriented appropriately, and sutured into place.

## 2020-04-03 NOTE — ED ADULT NURSE NOTE - NSIMPLEMENTINTERV_GEN_ALL_ED
Implemented All Fall Risk Interventions:  [place] to call system. Call bell, personal items and telephone within reach. Instruct patient to call for assistance. Room bathroom lighting operational. Non-slip footwear when patient is off stretcher. Physically safe environment: no spills, clutter or unnecessary equipment. Stretcher in lowest position, wheels locked, appropriate side rails in place. Provide visual cue, wrist band, yellow gown, etc. Monitor gait and stability. Monitor for mental status changes and reorient to person, place, and time. Review medications for side effects contributing to fall risk. Reinforce activity limits and safety measures with patient and family.

## 2020-04-03 NOTE — H&P ADULT - HISTORY OF PRESENT ILLNESS
Note:  Per current hospital medicine emergency protocol, in an effort to reduce COVID exposures and also conserve PPE for necessary encounters, H&P below is obtained from chart review without direct patient contact unless acute changes. Pt was seen & Examine by ER, Evaluated by  renal and palliative Care today as well , I D/W the ER Nurse as well regarding the pt's status, pt in no Distress at this time on 100% NRB,     As Per ER Note:     56 yo F PMHx ESRD HD MWF (missed today, last dialyzed 2 days ago), HTN, HLD, hypothyroid, CAD, bedbound w/ sacral decubitus ulcers, presents from [facility] for generalized weakness. Pt states that she has felt generally weak since yesterday and was unable to go to dialysis today due to her weakness. Pt denies any fevers, sob, cough, abd pain, nausea, vomiting, diarrhea, headache, dysuria.   Per EMS, pt was sating 55% on RA, improved when placed on NRB to 93%. Pt was seen by Renal today, Order for HD was place by Renal for tonight, Pt s/p IV Solumedrol 40 mg x 1 today,     As per Palliative CARE NOTE:      Advance care planning.  Recommendation: Spoke with patient's sister, Emily Concepcion ([phone]). Discussed patient's current clinical condition, possible diagnosis of covid-19 infection, prognosis, and disease trajectory. She confirmed sister's advance care directives, noting that she would "want everything to be done" to prolong her life. Discussed possibility that cardiopulmonary resuscitation not changing outcome and that it will be a clinical decision of risk/benefit of CPR. Conveyed that it is clinicians duty to not cause suffering and undue harm if a particular procedure/intervention will not help the patient. Patient's sister conveyed understanding. Family asked to be updated with regard to daily changes in condition. Stated she discussed condition with entire family and all hope that everything will be done for her.    REVIEW OF SYSTEMS: as per ER:    Review of Systems:  · Review of Systems: Constitutional: generalized weakness; no fevers or chills  	HEENT: no visual changes, no sore throat, no rhinorrhea  	CV: no cp or palpitations  	Resp: no sob or cough  	GI: no abd pain, no nausea, no vomiting, no diarrhea, no constipation  	: no dysuria, no hematuria  	MSK: no myalgais or arthralgias  	skin: no rashes  	neuro: no HA, no numbness; no weakness, no tingling  ROS statement: all other ROS negative except as per HPI    PHYSICAL EXAM: as per ER:   · Physical Examination: PHYSICAL EXAM:  	GENERAL: non-toxic appearing  	HEAD: Atraumatic, Normocephalic;  	NECK: No JVD; FROM  	EYES: PERRL, EOMs intact b/l w/out deficits  	CHEST/LUNG: diffuse rhonchi; speaking in full sentences;   	HEART: RRR no murmur/gallops/rubs  	ABDOMEN: +BS, soft, NT, ND  	EXTREMITIES: No LE edema, +2 radial pulses b/l; L AV fistula, palpable thrill  	MUSCULOSKELETAL: FROM of all 4 extremities;  	NERVOUS SYSTEM:  Awake, responds  to questions, lethargic, No motor deficits or sensory deficits; CNII-XII intact; no focal neurologic deficits  SKIN:  No new rashes    Labs: Lactate 1.5, Na 139, K+ 4.5, , Creatinine 7.52, glucose 356, Troponin HS: 196/195, ferritin 9640, , procalcitonin 34.72, , ESR 72, WBC 13.84, Hgb 8.9, platelet 393, D-dimer 2907, PTT 35.1, PT 10.7, INR 0.94 ,    Vitals: T .1, HR 77, /45, RR 26, 96% on NRB ,

## 2020-04-03 NOTE — CONSULT NOTE ADULT - SUBJECTIVE AND OBJECTIVE BOX
[facility] - Denzel / Reena S /Cydney/ CHIRAG Lozano/ CHIRAG Syed/ Lance Monique / PREET Njeru  ---------------------------------------------------------------------------------------------------------------  Patient seen and examined    PAST MEDICAL & SURGICAL HISTORY:  Hypothyroidism  HLD (hyperlipidemia)  CAD in native artery  Diabetes mellitus  HTN (hypertension)  Elective surgery: Dialysis catheter in left arm  Allergic rhinitis  Hypocalcemia  Enterocolitis  Osteomyelitis  Bacteremia  Pressure ulcer: Sacral region  Tremor  Sepsis  Sepsis  Respiratory failure: with hypoxia or hypercapnia  End stage kidney disease  Pulmonary edema: chronic  Hyperlipidemia  Polyneuropathy  ASHD (arteriosclerotic heart disease)  Malformation of coronary vessels  Hypo-osmolality and hyponatremia  Difficulty waking  GERD (gastroesophageal reflux disease)  ESRD (end stage renal disease) on dialysis  Clostridium difficile infection  Osteomyelitis of jaw  Parathyroid adenoma  Hypothyroidism  CKD (chronic kidney disease)  Anemia  CHF (congestive heart failure)  Diabetic neuropathy  Diabetes mellitus  HTN (hypertension)  Arteriovenous fistula  S/P :   No Past Surgical History      Allergies: No Known Allergies    Home Medications Reviewed  Hospital Medications:   MEDICATIONS  (STANDING):  methylPREDNISolone sodium succinate Injectable 40 milliGRAM(s) IV Push Once    SOCIAL HISTORY:  Denies ETOh,Smoking, illicit drug use  FAMILY HISTORY:  Family history of stroke  Family history of hypertension (Sibling)  Family history of diabetes mellitus      REVIEW OF SYSTEMS:  CONSTITUTIONAL: No weakness, fevers or chills  RESPIRATORY: No cough, wheezing, hemoptysis; No shortness of breath  CARDIOVASCULAR: No chest pain or palpitations.  GASTROINTESTINAL: No abdominal or epigastric pain. No nausea, vomiting, or hematemesis; No diarrhea or constipation. No melena or hematochezia.  VASCULAR: No bilateral lower extremity edema.   All other review of systems is negative unless indicated above.    VITALS:  T(F): 100.1 (20 @ 14:11), Max: 100.1 (20 @ 14:11)  HR: 81 (04-03-20 @ 14:11)  BP: 115/62 (20 @ 14:11)  RR: 20 (20 @ 14:11)  SpO2: 100% (20 @ 14:11)  Wt(kg): --        PHYSICAL EXAM:  Constitutional: NAD, on NRB mask  Respiratory: CTAB  Extremities: No peripheral edema  Neurological: A/O x 3, no focal deficits  Psychiatric: Normal mood, normal affect  :  No jarrell.   Vascular Access: BRYSON AVF+thrill    LABS:      135  |  84<L>  |  106<H>  ----------------------------<  255<H>  Test not performed SPECIMEN GROSSLY HEMOLYZED   |  23  |  6.74<H>    Ca    9.9      2020 12:50    TPro  9.0<H>  /  Alb  3.9  /  TBili  0.4  /  DBili      /  AST  107<H>  /  ALT  26  /  AlkPhos  113  04-03    Creatinine Trend: 6.74 <--                        8.9    13.84 )-----------( 393      ( 2020 12:50 )             28.3     Urine Studies:        RADIOLOGY & ADDITIONAL STUDIES: [facility] - Denzel / Reena S /Cydney/ CHIRAG Lozano/ CHIRAG Syed/ Lance Monique / PREET Njeru  ---------------------------------------------------------------------------------------------------------------  Patient seen and examined    PAST MEDICAL & SURGICAL HISTORY:  Hypothyroidism  HLD (hyperlipidemia)  CAD in native artery  Diabetes mellitus  HTN (hypertension)  Elective surgery: Dialysis catheter in left arm  Allergic rhinitis  Hypocalcemia  Enterocolitis  Osteomyelitis  Bacteremia  Pressure ulcer: Sacral region  Tremor  Sepsis  Sepsis  Respiratory failure: with hypoxia or hypercapnia  End stage kidney disease  Pulmonary edema: chronic  Hyperlipidemia  Polyneuropathy  ASHD (arteriosclerotic heart disease)  Malformation of coronary vessels  Hypo-osmolality and hyponatremia  Difficulty waking  GERD (gastroesophageal reflux disease)  ESRD (end stage renal disease) on dialysis  Clostridium difficile infection  Osteomyelitis of jaw  Parathyroid adenoma  Hypothyroidism  CKD (chronic kidney disease)  Anemia  CHF (congestive heart failure)  Diabetic neuropathy  Diabetes mellitus  HTN (hypertension)  Arteriovenous fistula  S/P :   No Past Surgical History      Allergies: No Known Allergies    Home Medications Reviewed  Hospital Medications:   MEDICATIONS  (STANDING):  methylPREDNISolone sodium succinate Injectable 40 milliGRAM(s) IV Push Once    SOCIAL HISTORY:  Denies ETOh,Smoking, illicit drug use  FAMILY HISTORY:  Family history of stroke  Family history of hypertension (Sibling)  Family history of diabetes mellitus      REVIEW OF SYSTEMS: foused  CONSTITUTIONAL: No weakness, fevers or chills  RESPIRATORY: No cough, wheezing, hemoptysis; No shortness of breath  CARDIOVASCULAR: No chest pain or palpitations.  GASTROINTESTINAL: No abdominal or epigastric pain. No nausea, vomiting, or hematemesis; No diarrhea or constipation. No melena or hematochezia.  VASCULAR: No bilateral lower extremity edema.   All other review of systems is negative unless indicated above.    VITALS:  T(F): 100.1 (20 @ 14:11), Max: 100.1 (20 @ 14:11)  HR: 81 (20 @ 14:11)  BP: 115/62 (20 @ 14:11)  RR: 20 (20 @ 14:11)  SpO2: 100% (20 @ 14:11)  Wt(kg): --      PHYSICAL EXAM:  Constitutional: NAD, on NRB mask  Extremities: No peripheral edema  Neurological: A/O x 3, no focal deficits  Psychiatric: Normal mood, normal affect  :  No jarrell.   Vascular Access: BRYSON AVF+thrill    LABS:      135  |  84<L>  |  106<H>  ----------------------------<  255<H>  Test not performed SPECIMEN GROSSLY HEMOLYZED   |  23  |  6.74<H>    Ca    9.9      2020 12:50    TPro  9.0<H>  /  Alb  3.9  /  TBili  0.4  /  DBili      /  AST  107<H>  /  ALT  26  /  AlkPhos  113  -    Creatinine Trend: 6.74 <--                        8.9    13.84 )-----------( 393      ( 2020 12:50 )             28.3     Urine Studies:        RADIOLOGY & ADDITIONAL STUDIES:    < from: Xray Chest 1 View AP/PA (20 @ 12:57) >  IMPRESSION:    Diffuse bilateral pulmonary infiltrates.    < end of copied text > [facility] - Denzel / Reena S /Cydney/ CHIRAG Lozano/ CHIRAG Syed/ Lance Monique / PREET Njeru  ---------------------------------------------------------------------------------------------------------------  Patient seen and examined in ER    54 yo F PMHx ESRD HD MWF, known to our group from [facility] HD unit, HTN, HLD, hypothyroid, CAD, bedbound w/ sacral decubitus ulcers, presented from [facility] for generalized weakness. Pt states that she has felt generally weak since yesterday and was unable to go to dialysis today due to her weakness. Pt denies any fevers, sob, cough. Pt last dialyzed  outpt-was complete and uneventful per pt. Per chart, per EMS, pt was sating 55% on RA, improved when placed on NRB to 93%.    PAST MEDICAL & SURGICAL HISTORY:  Hypothyroidism  HLD (hyperlipidemia)  CAD in native artery  Diabetes mellitus  HTN (hypertension)  Elective surgery: Dialysis catheter in left arm  Allergic rhinitis  Hypocalcemia  Enterocolitis  Osteomyelitis  Bacteremia  Pressure ulcer: Sacral region  Tremor  Sepsis  Sepsis  Respiratory failure: with hypoxia or hypercapnia  End stage kidney disease  Pulmonary edema: chronic  Hyperlipidemia  Polyneuropathy  ASHD (arteriosclerotic heart disease)  Malformation of coronary vessels  Hypo-osmolality and hyponatremia  Difficulty waking  GERD (gastroesophageal reflux disease)  ESRD (end stage renal disease) on dialysis  Clostridium difficile infection  Osteomyelitis of jaw  Parathyroid adenoma  Hypothyroidism  CKD (chronic kidney disease)  Anemia  CHF (congestive heart failure)  Diabetic neuropathy  Diabetes mellitus  HTN (hypertension)  Arteriovenous fistula  S/P :   No Past Surgical History      Allergies: No Known Allergies    Home Medications Reviewed  Hospital Medications:   MEDICATIONS  (STANDING):  methylPREDNISolone sodium succinate Injectable 40 milliGRAM(s) IV Push Once    SOCIAL HISTORY: per chart  no Smoking  FAMILY HISTORY: per chart  Family history of stroke  Family history of hypertension (Sibling)  Family history of diabetes mellitus      REVIEW OF SYSTEMS: foused  CONSTITUTIONAL: +gen weakness, no fevers or chills  RESPIRATORY: No cough, No shortness of breath  CARDIOVASCULAR: No chest pain  GASTROINTESTINAL: No diarrhea      VITALS:  T(F): 100.1 (20 @ 14:11), Max: 100.1 (20 @ 14:11)  HR: 81 (20 @ 14:11)  BP: 115/62 (20 @ 14:11)  RR: 20 (20 @ 14:11)  SpO2: 100% (20 @ 14:11)  Wt(kg): --      PHYSICAL EXAM: limited  / COVID 19 +tivity  Constitutional: NAD, on NRB mask  Extremities: No peripheral edema  Neurological: A/O x 3  Psychiatric: Normal mood, normal affect  :  No jarrell.   Vascular Access: BRYSON AVF+thrill    LABS:      135  |  84<L>  |  106<H>  ----------------------------<  255<H>  Test not performed SPECIMEN GROSSLY HEMOLYZED   |  23  |  6.74<H>    Ca    9.9      2020 12:50    TPro  9.0<H>  /  Alb  3.9  /  TBili  0.4  /  DBili      /  AST  107<H>  /  ALT  26  /  AlkPhos  113      Creatinine Trend: 6.74 <--                        8.9    13.84 )-----------( 393      ( 2020 12:50 )             28.3     Urine Studies:        RADIOLOGY & ADDITIONAL STUDIES:    < from: Xray Chest 1 View AP/PA (20 @ 12:57) >  IMPRESSION:    Diffuse bilateral pulmonary infiltrates.    < end of copied text >

## 2020-04-03 NOTE — CONSULT NOTE ADULT - ASSESSMENT
ESRD on HD MWF  routine HD arranged for today Renal following for ESRD Mx.     ESRD on HD MWF  s/p last hd4/1 outpt  clinically not in CHF  HTN, controlled  Anemia in CKD Hb <goal  t/r/o COVID19    labs, chart, rad reviewed  routine HD arranged for today  Informed verbal consent for HD obtained from pt  HD w/2k bath, uf 1.5kg as tolerated, add MARITA 10k tiw w/hd  renal restrictions in diet.   dose all meds for ESRD  Thanks for consulting. will closely follow up. 6 yo F PMHx ESRD HD MWF, known to our group from [facility] HD unit, HTN, HLD, hypothyroid, CAD, bedbound w/ sacral decubitus ulcers, presented from [facility] for generalized weakness. pt being admitted for hypoxia, high concern for COVID19 infection. Renal following for ESRD Mx.     ESRD on HD MWF  s/p last hd4/1 outpt  clinically not in CHF  HTN, controlled  Anemia in CKD Hb <goal  t/r/o COVID19    labs, chart, rad reviewed  rpt BMP reviewed-K wnl  routine HD arranged for today  Informed verbal consent for HD obtained from pt  HD w/2k bath, uf 1.5kg as tolerated, add MARITA 10k tiw w/hd  renal restrictions in diet.   dose all meds for ESRD  Thanks for consulting. will closely follow up.

## 2020-04-03 NOTE — ED ADULT TRIAGE NOTE - CHIEF COMPLAINT QUOTE
Pt brought in by EMS from a NH for hypoxia and weakness. As per EMS pt was found with SpO2 55%. Pt arrives on a non rebreather, SpO2 93%.

## 2020-04-03 NOTE — ED ADULT NURSE NOTE - CHIEF COMPLAINT
The patient is a 55y Female complaining of weakness and shortness of breath since yesterday, pt is poor historian and is from nursing home. Pt did state that she is on dialysis MWF, did not go to day

## 2020-04-03 NOTE — CONSULT NOTE ADULT - PROBLEM SELECTOR RECOMMENDATION 9
Most likely 2/2 to presumed Covid-19. As per EMS saturating 55% on RA, improved to 93% on NRM. Imaging revealed diffuse bilateral pulmonary infiltrates. Will be admitted for Covid w/u.

## 2020-04-03 NOTE — ED PROCEDURE NOTE - PROCEDURE ADDITIONAL DETAILS
R 18 gauge, Brachial  Peripheral IV access in the Emergency Department obtained under dynamic ultrasound guidance with dark nonpulsatile blood return.  Catheter was flushed afterwards without any resistance or resulting extravasation.  IV catheter confirmed in compressible vein after insertion.

## 2020-04-04 LAB
ALBUMIN SERPL ELPH-MCNC: 3.9 G/DL — SIGNIFICANT CHANGE UP (ref 3.3–5)
ALBUMIN SERPL ELPH-MCNC: 3.9 G/DL — SIGNIFICANT CHANGE UP (ref 3.3–5)
ALP SERPL-CCNC: 113 U/L — SIGNIFICANT CHANGE UP (ref 40–120)
ALP SERPL-CCNC: 113 U/L — SIGNIFICANT CHANGE UP (ref 40–120)
ALT FLD-CCNC: 19 U/L — SIGNIFICANT CHANGE UP (ref 4–33)
ALT FLD-CCNC: 19 U/L — SIGNIFICANT CHANGE UP (ref 4–33)
ANION GAP SERPL CALC-SCNC: 30 MMO/L — HIGH (ref 7–14)
ANION GAP SERPL CALC-SCNC: 30 MMO/L — HIGH (ref 7–14)
AST SERPL-CCNC: 66 U/L — HIGH (ref 4–32)
AST SERPL-CCNC: 66 U/L — HIGH (ref 4–32)
BASOPHILS # BLD AUTO: 0.01 K/UL — SIGNIFICANT CHANGE UP (ref 0–0.2)
BASOPHILS NFR BLD AUTO: 0.1 % — SIGNIFICANT CHANGE UP (ref 0–2)
BILIRUB SERPL-MCNC: 0.4 MG/DL — SIGNIFICANT CHANGE UP (ref 0.2–1.2)
BILIRUB SERPL-MCNC: 0.4 MG/DL — SIGNIFICANT CHANGE UP (ref 0.2–1.2)
BUN SERPL-MCNC: 48 MG/DL — HIGH (ref 7–23)
BUN SERPL-MCNC: 48 MG/DL — HIGH (ref 7–23)
CALCIUM SERPL-MCNC: 10.3 MG/DL — SIGNIFICANT CHANGE UP (ref 8.4–10.5)
CALCIUM SERPL-MCNC: 10.3 MG/DL — SIGNIFICANT CHANGE UP (ref 8.4–10.5)
CHLORIDE SERPL-SCNC: 90 MMOL/L — LOW (ref 98–107)
CHLORIDE SERPL-SCNC: 90 MMOL/L — LOW (ref 98–107)
CHOLEST SERPL-MCNC: 82 MG/DL — LOW (ref 120–199)
CO2 SERPL-SCNC: 19 MMOL/L — LOW (ref 22–31)
CO2 SERPL-SCNC: 19 MMOL/L — LOW (ref 22–31)
CREAT SERPL-MCNC: 3.5 MG/DL — HIGH (ref 0.5–1.3)
CREAT SERPL-MCNC: 3.5 MG/DL — HIGH (ref 0.5–1.3)
EOSINOPHIL # BLD AUTO: 0 K/UL — SIGNIFICANT CHANGE UP (ref 0–0.5)
EOSINOPHIL NFR BLD AUTO: 0 % — SIGNIFICANT CHANGE UP (ref 0–6)
FERRITIN SERPL-MCNC: HIGH NG/ML (ref 15–150)
FRUCTOSAMINE SERPL-MCNC: 440 UMOL/L — HIGH (ref 205–285)
GLUCOSE BLDC GLUCOMTR-MCNC: 141 MG/DL — HIGH (ref 70–99)
GLUCOSE BLDC GLUCOMTR-MCNC: 182 MG/DL — HIGH (ref 70–99)
GLUCOSE BLDC GLUCOMTR-MCNC: 195 MG/DL — HIGH (ref 70–99)
GLUCOSE BLDC GLUCOMTR-MCNC: 220 MG/DL — HIGH (ref 70–99)
GLUCOSE BLDC GLUCOMTR-MCNC: 93 MG/DL — SIGNIFICANT CHANGE UP (ref 70–99)
GLUCOSE BLDC GLUCOMTR-MCNC: 97 MG/DL — SIGNIFICANT CHANGE UP (ref 70–99)
GLUCOSE SERPL-MCNC: 210 MG/DL — HIGH (ref 70–99)
GLUCOSE SERPL-MCNC: 210 MG/DL — HIGH (ref 70–99)
HAV IGM SER-ACNC: NONREACTIVE — SIGNIFICANT CHANGE UP
HBA1C BLD-MCNC: 5.9 % — HIGH (ref 4–5.6)
HBV CORE IGM SER-ACNC: NONREACTIVE — SIGNIFICANT CHANGE UP
HBV SURFACE AG SER-ACNC: NONREACTIVE — SIGNIFICANT CHANGE UP
HCT VFR BLD CALC: 27.1 % — LOW (ref 34.5–45)
HCV AB S/CO SERPL IA: 0.4 S/CO — SIGNIFICANT CHANGE UP (ref 0–0.99)
HCV AB SERPL-IMP: SIGNIFICANT CHANGE UP
HDLC SERPL-MCNC: 37 MG/DL — LOW (ref 45–65)
HGB BLD-MCNC: 8.4 G/DL — LOW (ref 11.5–15.5)
IMM GRANULOCYTES NFR BLD AUTO: 1 % — SIGNIFICANT CHANGE UP (ref 0–1.5)
LDH SERPL L TO P-CCNC: 660 U/L — HIGH (ref 135–225)
LIPID PNL WITH DIRECT LDL SERPL: 20 MG/DL — SIGNIFICANT CHANGE UP
LYMPHOCYTES # BLD AUTO: 1.05 K/UL — SIGNIFICANT CHANGE UP (ref 1–3.3)
LYMPHOCYTES # BLD AUTO: 11 % — LOW (ref 13–44)
MAGNESIUM SERPL-MCNC: 2.3 MG/DL — SIGNIFICANT CHANGE UP (ref 1.6–2.6)
MCHC RBC-ENTMCNC: 30.2 PG — SIGNIFICANT CHANGE UP (ref 27–34)
MCHC RBC-ENTMCNC: 31 % — LOW (ref 32–36)
MCV RBC AUTO: 97.5 FL — SIGNIFICANT CHANGE UP (ref 80–100)
MONOCYTES # BLD AUTO: 0.54 K/UL — SIGNIFICANT CHANGE UP (ref 0–0.9)
MONOCYTES NFR BLD AUTO: 5.6 % — SIGNIFICANT CHANGE UP (ref 2–14)
NEUTROPHILS # BLD AUTO: 7.88 K/UL — HIGH (ref 1.8–7.4)
NEUTROPHILS NFR BLD AUTO: 82.3 % — HIGH (ref 43–77)
NRBC # FLD: 0.07 K/UL — SIGNIFICANT CHANGE UP (ref 0–0)
PHOSPHATE SERPL-MCNC: 4.8 MG/DL — HIGH (ref 2.5–4.5)
PLATELET # BLD AUTO: 348 K/UL — SIGNIFICANT CHANGE UP (ref 150–400)
PMV BLD: 10.5 FL — SIGNIFICANT CHANGE UP (ref 7–13)
POTASSIUM SERPL-MCNC: 4.1 MMOL/L — SIGNIFICANT CHANGE UP (ref 3.5–5.3)
POTASSIUM SERPL-MCNC: 4.1 MMOL/L — SIGNIFICANT CHANGE UP (ref 3.5–5.3)
POTASSIUM SERPL-SCNC: 4.1 MMOL/L — SIGNIFICANT CHANGE UP (ref 3.5–5.3)
POTASSIUM SERPL-SCNC: 4.1 MMOL/L — SIGNIFICANT CHANGE UP (ref 3.5–5.3)
PROCALCITONIN SERPL-MCNC: 49.06 NG/ML — HIGH (ref 0.02–0.1)
PROT SERPL-MCNC: 7.5 G/DL — SIGNIFICANT CHANGE UP (ref 6–8.3)
PROT SERPL-MCNC: 7.5 G/DL — SIGNIFICANT CHANGE UP (ref 6–8.3)
RBC # BLD: 2.78 M/UL — LOW (ref 3.8–5.2)
RBC # FLD: 13.6 % — SIGNIFICANT CHANGE UP (ref 10.3–14.5)
SODIUM SERPL-SCNC: 139 MMOL/L — SIGNIFICANT CHANGE UP (ref 135–145)
SODIUM SERPL-SCNC: 139 MMOL/L — SIGNIFICANT CHANGE UP (ref 135–145)
T4 FREE SERPL-MCNC: 1.84 NG/DL — HIGH (ref 0.9–1.8)
TRIGL SERPL-MCNC: 172 MG/DL — HIGH (ref 10–149)
TSH SERPL-MCNC: 0.15 UIU/ML — LOW (ref 0.27–4.2)
WBC # BLD: 9.58 K/UL — SIGNIFICANT CHANGE UP (ref 3.8–10.5)
WBC # FLD AUTO: 9.58 K/UL — SIGNIFICANT CHANGE UP (ref 3.8–10.5)

## 2020-04-04 RX ORDER — ASCORBIC ACID 60 MG
1000 TABLET,CHEWABLE ORAL
Refills: 0 | Status: DISCONTINUED | OUTPATIENT
Start: 2020-04-04 | End: 2020-04-06

## 2020-04-04 RX ORDER — HYDROXYCHLOROQUINE SULFATE 200 MG
200 TABLET ORAL EVERY 12 HOURS
Refills: 0 | Status: DISCONTINUED | OUTPATIENT
Start: 2020-04-05 | End: 2020-04-07

## 2020-04-04 RX ORDER — ERYTHROPOIETIN 10000 [IU]/ML
10000 INJECTION, SOLUTION INTRAVENOUS; SUBCUTANEOUS
Refills: 0 | Status: DISCONTINUED | OUTPATIENT
Start: 2020-04-04 | End: 2020-04-04

## 2020-04-04 RX ORDER — HYDROXYCHLOROQUINE SULFATE 200 MG
400 TABLET ORAL EVERY 12 HOURS
Refills: 0 | Status: COMPLETED | OUTPATIENT
Start: 2020-04-04 | End: 2020-04-04

## 2020-04-04 RX ORDER — HYDROXYCHLOROQUINE SULFATE 200 MG
TABLET ORAL
Refills: 0 | Status: DISCONTINUED | OUTPATIENT
Start: 2020-04-04 | End: 2020-04-07

## 2020-04-04 RX ORDER — ERYTHROPOIETIN 10000 [IU]/ML
10000 INJECTION, SOLUTION INTRAVENOUS; SUBCUTANEOUS ONCE
Refills: 0 | Status: COMPLETED | OUTPATIENT
Start: 2020-04-04 | End: 2020-04-04

## 2020-04-04 RX ADMIN — HEPARIN SODIUM 5000 UNIT(S): 5000 INJECTION INTRAVENOUS; SUBCUTANEOUS at 18:23

## 2020-04-04 RX ADMIN — Medication 400 MILLIGRAM(S): at 18:22

## 2020-04-04 RX ADMIN — LATANOPROST 1 DROP(S): 0.05 SOLUTION/ DROPS OPHTHALMIC; TOPICAL at 22:47

## 2020-04-04 RX ADMIN — Medication 50 MICROGRAM(S): at 06:34

## 2020-04-04 RX ADMIN — DORZOLAMIDE HYDROCHLORIDE TIMOLOL MALEATE 1 DROP(S): 20; 5 SOLUTION/ DROPS OPHTHALMIC at 06:34

## 2020-04-04 RX ADMIN — CINACALCET 30 MILLIGRAM(S): 30 TABLET, FILM COATED ORAL at 13:27

## 2020-04-04 RX ADMIN — ZINC SULFATE TAB 220 MG (50 MG ZINC EQUIVALENT) 220 MILLIGRAM(S): 220 (50 ZN) TAB at 13:27

## 2020-04-04 RX ADMIN — CLOPIDOGREL BISULFATE 75 MILLIGRAM(S): 75 TABLET, FILM COATED ORAL at 13:28

## 2020-04-04 RX ADMIN — DORZOLAMIDE HYDROCHLORIDE TIMOLOL MALEATE 1 DROP(S): 20; 5 SOLUTION/ DROPS OPHTHALMIC at 18:21

## 2020-04-04 RX ADMIN — ERYTHROPOIETIN 10000 UNIT(S): 10000 INJECTION, SOLUTION INTRAVENOUS; SUBCUTANEOUS at 00:40

## 2020-04-04 RX ADMIN — Medication 1000 MILLIGRAM(S): at 18:20

## 2020-04-04 RX ADMIN — HEPARIN SODIUM 5000 UNIT(S): 5000 INJECTION INTRAVENOUS; SUBCUTANEOUS at 06:33

## 2020-04-04 RX ADMIN — GABAPENTIN 100 MILLIGRAM(S): 400 CAPSULE ORAL at 18:22

## 2020-04-04 RX ADMIN — Medication 400 MILLIGRAM(S): at 07:16

## 2020-04-04 RX ADMIN — Medication 1 TABLET(S): at 13:28

## 2020-04-04 RX ADMIN — PANTOPRAZOLE SODIUM 40 MILLIGRAM(S): 20 TABLET, DELAYED RELEASE ORAL at 06:34

## 2020-04-04 RX ADMIN — GABAPENTIN 100 MILLIGRAM(S): 400 CAPSULE ORAL at 06:34

## 2020-04-04 NOTE — PROGRESS NOTE ADULT - SUBJECTIVE AND OBJECTIVE BOX
[facility] - MARK Mahoney / MARK Valles / IMELDA Lamas/ MARK Lozano/ MARK Syed/ RYLEE Monique / SUSANNA Escoto / FAHAD Castaneda  ---------------------------------------------------------------------------------------------------------------  seen and examined today for ESRD on HD  Interval : NAD  VITALS:  T(F): 97.9 (04-04-20 @ 09:40), Max: 100.1 (04-03-20 @ 14:11)  HR: 77 (04-04-20 @ 09:40)  BP: 114/40 (04-04-20 @ 09:40)  RR: 16 (04-04-20 @ 09:40)  SpO2: 96% (04-04-20 @ 09:40)  Wt(kg): --    04-03 @ 07:01  -  04-04 @ 07:00  --------------------------------------------------------  IN: 400 mL / OUT: 1400 mL / NET: -1000 mL      Physical Exam :-  Constitutional: NAD  Neck: Supple.  Respiratory: Bilateral equal breath sounds,  Cardiovascular: S1, S2 normal,  Gastrointestinal: Bowel Sounds present, soft, non tender.  Extremities: No edema  Neurological: Alert and Oriented x 3, no focal deficits  Psychiatric: Normal mood, normal affect  Data:-  Allergies :   No Known Allergies    Hospital Medications:   MEDICATIONS  (STANDING):  ascorbic acid 500 milliGRAM(s) Oral daily  cinacalcet 30 milliGRAM(s) Oral daily  clopidogrel Tablet 75 milliGRAM(s) Oral daily  dextrose 5%. 1000 milliLiter(s) (50 mL/Hr) IV Continuous <Continuous>  dextrose 50% Injectable 12.5 Gram(s) IV Push once  dextrose 50% Injectable 25 Gram(s) IV Push once  dextrose 50% Injectable 25 Gram(s) IV Push once  dorzolamide 2%/timolol 0.5% Ophthalmic Solution 1 Drop(s) Both EYES two times a day  epoetin-jacqueline-epbx (RETACRIT) Injectable 71634 Unit(s) IV Push <User Schedule>  gabapentin 100 milliGRAM(s) Oral every 12 hours  heparin  Injectable 5000 Unit(s) SubCutaneous every 12 hours  hydroxychloroquine   Oral   hydroxychloroquine 400 milliGRAM(s) Oral every 12 hours  insulin lispro (HumaLOG) corrective regimen sliding scale   SubCutaneous every 6 hours  latanoprost 0.005% Ophthalmic Solution 1 Drop(s) Both EYES at bedtime  levothyroxine 50 MICROGram(s) Oral daily  Nephro-frederick 1 Tablet(s) Oral daily  pantoprazole    Tablet 40 milliGRAM(s) Oral before breakfast  zinc sulfate 220 milliGRAM(s) Oral daily    04-04    139  |  90<L>  |  48<H>  ----------------------------<  210<H>  4.1   |  19<L>  |  3.50<H>    Ca    10.3      04 Apr 2020 05:17  Phos  4.8     04-04  Mg     2.3     04-04    TPro  7.5  /  Alb  3.9  /  TBili  0.4  /  DBili      /  AST  66<H>  /  ALT  19  /  AlkPhos  113  04-04    Creatinine Trend: 3.50 <--, 7.52 <--, 6.74 <--                        8.4    9.58  )-----------( 348      ( 04 Apr 2020 05:17 )             27.1

## 2020-04-05 LAB
ALBUMIN SERPL ELPH-MCNC: 3.7 G/DL — SIGNIFICANT CHANGE UP (ref 3.3–5)
ALP SERPL-CCNC: 116 U/L — SIGNIFICANT CHANGE UP (ref 40–120)
ALT FLD-CCNC: 28 U/L — SIGNIFICANT CHANGE UP (ref 4–33)
ANION GAP SERPL CALC-SCNC: 33 MMO/L — HIGH (ref 7–14)
ANISOCYTOSIS BLD QL: SIGNIFICANT CHANGE UP
AST SERPL-CCNC: 94 U/L — HIGH (ref 4–32)
BASOPHILS # BLD AUTO: 0.01 K/UL — SIGNIFICANT CHANGE UP (ref 0–0.2)
BASOPHILS NFR BLD AUTO: 0.1 % — SIGNIFICANT CHANGE UP (ref 0–2)
BASOPHILS NFR SPEC: 0 % — SIGNIFICANT CHANGE UP (ref 0–2)
BILIRUB SERPL-MCNC: 0.5 MG/DL — SIGNIFICANT CHANGE UP (ref 0.2–1.2)
BLASTS # FLD: 0 % — SIGNIFICANT CHANGE UP (ref 0–0)
BUN SERPL-MCNC: 73 MG/DL — HIGH (ref 7–23)
CALCIUM SERPL-MCNC: 10.1 MG/DL — SIGNIFICANT CHANGE UP (ref 8.4–10.5)
CHLORIDE SERPL-SCNC: 93 MMOL/L — LOW (ref 98–107)
CO2 SERPL-SCNC: 14 MMOL/L — LOW (ref 22–31)
CREAT SERPL-MCNC: 5.17 MG/DL — HIGH (ref 0.5–1.3)
EOSINOPHIL # BLD AUTO: 0 K/UL — SIGNIFICANT CHANGE UP (ref 0–0.5)
EOSINOPHIL NFR BLD AUTO: 0 % — SIGNIFICANT CHANGE UP (ref 0–6)
EOSINOPHIL NFR FLD: 0 % — SIGNIFICANT CHANGE UP (ref 0–6)
GIANT PLATELETS BLD QL SMEAR: PRESENT — SIGNIFICANT CHANGE UP
GLUCOSE BLDC GLUCOMTR-MCNC: 101 MG/DL — HIGH (ref 70–99)
GLUCOSE BLDC GLUCOMTR-MCNC: 195 MG/DL — HIGH (ref 70–99)
GLUCOSE BLDC GLUCOMTR-MCNC: 280 MG/DL — HIGH (ref 70–99)
GLUCOSE BLDC GLUCOMTR-MCNC: 288 MG/DL — HIGH (ref 70–99)
GLUCOSE SERPL-MCNC: 83 MG/DL — SIGNIFICANT CHANGE UP (ref 70–99)
HCT VFR BLD CALC: 32.4 % — LOW (ref 34.5–45)
HGB BLD-MCNC: 10 G/DL — LOW (ref 11.5–15.5)
IMM GRANULOCYTES NFR BLD AUTO: 1.1 % — SIGNIFICANT CHANGE UP (ref 0–1.5)
LYMPHOCYTES # BLD AUTO: 13.1 % — SIGNIFICANT CHANGE UP (ref 13–44)
LYMPHOCYTES # BLD AUTO: 2.08 K/UL — SIGNIFICANT CHANGE UP (ref 1–3.3)
LYMPHOCYTES NFR SPEC AUTO: 12.6 % — LOW (ref 13–44)
MACROCYTES BLD QL: SLIGHT — SIGNIFICANT CHANGE UP
MAGNESIUM SERPL-MCNC: 2.5 MG/DL — SIGNIFICANT CHANGE UP (ref 1.6–2.6)
MCHC RBC-ENTMCNC: 30 PG — SIGNIFICANT CHANGE UP (ref 27–34)
MCHC RBC-ENTMCNC: 30.9 % — LOW (ref 32–36)
MCV RBC AUTO: 97.3 FL — SIGNIFICANT CHANGE UP (ref 80–100)
METAMYELOCYTES # FLD: 0 % — SIGNIFICANT CHANGE UP (ref 0–1)
MICROCYTES BLD QL: SLIGHT — SIGNIFICANT CHANGE UP
MONOCYTES # BLD AUTO: 0.61 K/UL — SIGNIFICANT CHANGE UP (ref 0–0.9)
MONOCYTES NFR BLD AUTO: 3.8 % — SIGNIFICANT CHANGE UP (ref 2–14)
MONOCYTES NFR BLD: 3.6 % — SIGNIFICANT CHANGE UP (ref 2–9)
MYELOCYTES NFR BLD: 0 % — SIGNIFICANT CHANGE UP (ref 0–0)
NEUTROPHIL AB SER-ACNC: 79.3 % — HIGH (ref 43–77)
NEUTROPHILS # BLD AUTO: 13.03 K/UL — HIGH (ref 1.8–7.4)
NEUTROPHILS NFR BLD AUTO: 81.9 % — HIGH (ref 43–77)
NEUTS BAND # BLD: 2.7 % — SIGNIFICANT CHANGE UP (ref 0–6)
NRBC # FLD: 0.22 K/UL — SIGNIFICANT CHANGE UP (ref 0–0)
NRBC FLD-RTO: 1.4 — SIGNIFICANT CHANGE UP
OTHER - HEMATOLOGY %: 0 — SIGNIFICANT CHANGE UP
PHOSPHATE SERPL-MCNC: 3.7 MG/DL — SIGNIFICANT CHANGE UP (ref 2.5–4.5)
PLATELET # BLD AUTO: 414 K/UL — HIGH (ref 150–400)
PLATELET COUNT - ESTIMATE: NORMAL — SIGNIFICANT CHANGE UP
PMV BLD: 11.3 FL — SIGNIFICANT CHANGE UP (ref 7–13)
POIKILOCYTOSIS BLD QL AUTO: SLIGHT — SIGNIFICANT CHANGE UP
POLYCHROMASIA BLD QL SMEAR: SLIGHT — SIGNIFICANT CHANGE UP
POTASSIUM SERPL-MCNC: 6.1 MMOL/L — HIGH (ref 3.5–5.3)
POTASSIUM SERPL-SCNC: 6.1 MMOL/L — HIGH (ref 3.5–5.3)
PROCALCITONIN SERPL-MCNC: 57.51 NG/ML — HIGH (ref 0.02–0.1)
PROMYELOCYTES # FLD: 0 % — SIGNIFICANT CHANGE UP (ref 0–0)
PROT SERPL-MCNC: 9.1 G/DL — HIGH (ref 6–8.3)
RBC # BLD: 3.33 M/UL — LOW (ref 3.8–5.2)
RBC # FLD: 14.1 % — SIGNIFICANT CHANGE UP (ref 10.3–14.5)
SCHISTOCYTES BLD QL AUTO: SLIGHT — SIGNIFICANT CHANGE UP
SODIUM SERPL-SCNC: 140 MMOL/L — SIGNIFICANT CHANGE UP (ref 135–145)
SPHEROCYTES BLD QL SMEAR: SLIGHT — SIGNIFICANT CHANGE UP
VARIANT LYMPHS # BLD: 1.8 % — SIGNIFICANT CHANGE UP
WBC # BLD: 15.91 K/UL — HIGH (ref 3.8–10.5)
WBC # FLD AUTO: 15.91 K/UL — HIGH (ref 3.8–10.5)

## 2020-04-05 RX ORDER — ACETAMINOPHEN 500 MG
1000 TABLET ORAL ONCE
Refills: 0 | Status: COMPLETED | OUTPATIENT
Start: 2020-04-05 | End: 2020-04-05

## 2020-04-05 RX ORDER — INSULIN LISPRO 100/ML
VIAL (ML) SUBCUTANEOUS AT BEDTIME
Refills: 0 | Status: DISCONTINUED | OUTPATIENT
Start: 2020-04-05 | End: 2020-04-07

## 2020-04-05 RX ORDER — INSULIN LISPRO 100/ML
VIAL (ML) SUBCUTANEOUS
Refills: 0 | Status: DISCONTINUED | OUTPATIENT
Start: 2020-04-05 | End: 2020-04-07

## 2020-04-05 RX ADMIN — PANTOPRAZOLE SODIUM 40 MILLIGRAM(S): 20 TABLET, DELAYED RELEASE ORAL at 06:38

## 2020-04-05 RX ADMIN — GABAPENTIN 100 MILLIGRAM(S): 400 CAPSULE ORAL at 06:38

## 2020-04-05 RX ADMIN — Medication 1: at 18:35

## 2020-04-05 RX ADMIN — DORZOLAMIDE HYDROCHLORIDE TIMOLOL MALEATE 1 DROP(S): 20; 5 SOLUTION/ DROPS OPHTHALMIC at 18:30

## 2020-04-05 RX ADMIN — Medication 200 MILLIGRAM(S): at 18:18

## 2020-04-05 RX ADMIN — Medication 50 MICROGRAM(S): at 06:38

## 2020-04-05 RX ADMIN — Medication 400 MILLIGRAM(S): at 18:30

## 2020-04-05 RX ADMIN — HEPARIN SODIUM 5000 UNIT(S): 5000 INJECTION INTRAVENOUS; SUBCUTANEOUS at 18:30

## 2020-04-05 RX ADMIN — Medication 2: at 23:30

## 2020-04-05 RX ADMIN — Medication 1000 MILLIGRAM(S): at 18:18

## 2020-04-05 RX ADMIN — DORZOLAMIDE HYDROCHLORIDE TIMOLOL MALEATE 1 DROP(S): 20; 5 SOLUTION/ DROPS OPHTHALMIC at 06:38

## 2020-04-05 RX ADMIN — LATANOPROST 1 DROP(S): 0.05 SOLUTION/ DROPS OPHTHALMIC; TOPICAL at 23:30

## 2020-04-05 RX ADMIN — Medication 1000 MILLIGRAM(S): at 06:38

## 2020-04-05 RX ADMIN — Medication 200 MILLIGRAM(S): at 06:40

## 2020-04-05 RX ADMIN — Medication 0: at 12:34

## 2020-04-05 RX ADMIN — Medication 40 MILLIGRAM(S): at 17:44

## 2020-04-05 RX ADMIN — HEPARIN SODIUM 5000 UNIT(S): 5000 INJECTION INTRAVENOUS; SUBCUTANEOUS at 06:38

## 2020-04-05 RX ADMIN — GABAPENTIN 100 MILLIGRAM(S): 400 CAPSULE ORAL at 18:19

## 2020-04-06 VITALS
OXYGEN SATURATION: 89 % | DIASTOLIC BLOOD PRESSURE: 68 MMHG | TEMPERATURE: 98 F | SYSTOLIC BLOOD PRESSURE: 132 MMHG | RESPIRATION RATE: 25 BRPM | HEART RATE: 93 BPM

## 2020-04-06 DIAGNOSIS — R06.00 DYSPNEA, UNSPECIFIED: ICD-10-CM

## 2020-04-06 LAB
ALBUMIN SERPL ELPH-MCNC: 3.5 G/DL — SIGNIFICANT CHANGE UP (ref 3.3–5)
ALP SERPL-CCNC: 130 U/L — HIGH (ref 40–120)
ALT FLD-CCNC: 22 U/L — SIGNIFICANT CHANGE UP (ref 4–33)
ANION GAP SERPL CALC-SCNC: 38 MMO/L — HIGH (ref 7–14)
ANISOCYTOSIS BLD QL: SLIGHT — SIGNIFICANT CHANGE UP
AST SERPL-CCNC: 63 U/L — HIGH (ref 4–32)
BASOPHILS # BLD AUTO: 0.03 K/UL — SIGNIFICANT CHANGE UP (ref 0–0.2)
BASOPHILS NFR BLD AUTO: 0.1 % — SIGNIFICANT CHANGE UP (ref 0–2)
BASOPHILS NFR SPEC: 0 % — SIGNIFICANT CHANGE UP (ref 0–2)
BILIRUB SERPL-MCNC: 0.5 MG/DL — SIGNIFICANT CHANGE UP (ref 0.2–1.2)
BLASTS # FLD: 0 % — SIGNIFICANT CHANGE UP (ref 0–0)
BUN SERPL-MCNC: 100 MG/DL — HIGH (ref 7–23)
CALCIUM SERPL-MCNC: 10.7 MG/DL — HIGH (ref 8.4–10.5)
CHLORIDE SERPL-SCNC: 91 MMOL/L — LOW (ref 98–107)
CO2 SERPL-SCNC: 13 MMOL/L — LOW (ref 22–31)
CREAT SERPL-MCNC: 6.47 MG/DL — HIGH (ref 0.5–1.3)
EOSINOPHIL # BLD AUTO: 0.02 K/UL — SIGNIFICANT CHANGE UP (ref 0–0.5)
EOSINOPHIL NFR BLD AUTO: 0.1 % — SIGNIFICANT CHANGE UP (ref 0–6)
EOSINOPHIL NFR FLD: 0 % — SIGNIFICANT CHANGE UP (ref 0–6)
GIANT PLATELETS BLD QL SMEAR: PRESENT — SIGNIFICANT CHANGE UP
GLUCOSE BLDC GLUCOMTR-MCNC: 178 MG/DL — HIGH (ref 70–99)
GLUCOSE BLDC GLUCOMTR-MCNC: 204 MG/DL — HIGH (ref 70–99)
GLUCOSE BLDC GLUCOMTR-MCNC: 312 MG/DL — HIGH (ref 70–99)
GLUCOSE BLDC GLUCOMTR-MCNC: 332 MG/DL — HIGH (ref 70–99)
GLUCOSE SERPL-MCNC: 296 MG/DL — HIGH (ref 70–99)
HCT VFR BLD CALC: 31.6 % — LOW (ref 34.5–45)
HGB BLD-MCNC: 9.6 G/DL — LOW (ref 11.5–15.5)
HYPOCHROMIA BLD QL: SLIGHT — SIGNIFICANT CHANGE UP
IMM GRANULOCYTES NFR BLD AUTO: 2.5 % — HIGH (ref 0–1.5)
LDH SERPL L TO P-CCNC: SIGNIFICANT CHANGE UP U/L (ref 135–225)
LYMPHOCYTES # BLD AUTO: 1.07 K/UL — SIGNIFICANT CHANGE UP (ref 1–3.3)
LYMPHOCYTES # BLD AUTO: 3.1 % — LOW (ref 13–44)
LYMPHOCYTES NFR SPEC AUTO: 2.7 % — LOW (ref 13–44)
MACROCYTES BLD QL: SLIGHT — SIGNIFICANT CHANGE UP
MAGNESIUM SERPL-MCNC: 2.9 MG/DL — HIGH (ref 1.6–2.6)
MCHC RBC-ENTMCNC: 30 PG — SIGNIFICANT CHANGE UP (ref 27–34)
MCHC RBC-ENTMCNC: 30.4 % — LOW (ref 32–36)
MCV RBC AUTO: 98.8 FL — SIGNIFICANT CHANGE UP (ref 80–100)
METAMYELOCYTES # FLD: 0 % — SIGNIFICANT CHANGE UP (ref 0–1)
MONOCYTES # BLD AUTO: 0.77 K/UL — SIGNIFICANT CHANGE UP (ref 0–0.9)
MONOCYTES NFR BLD AUTO: 2.2 % — SIGNIFICANT CHANGE UP (ref 2–14)
MONOCYTES NFR BLD: 0 % — LOW (ref 2–9)
MYELOCYTES NFR BLD: 0 % — SIGNIFICANT CHANGE UP (ref 0–0)
NEUTROPHIL AB SER-ACNC: 87.6 % — HIGH (ref 43–77)
NEUTROPHILS # BLD AUTO: 32.01 K/UL — HIGH (ref 1.8–7.4)
NEUTROPHILS NFR BLD AUTO: 92 % — HIGH (ref 43–77)
NEUTS BAND # BLD: 8.8 % — HIGH (ref 0–6)
NRBC # BLD: 1 /100WBC — SIGNIFICANT CHANGE UP
NRBC # FLD: 0.54 K/UL — SIGNIFICANT CHANGE UP (ref 0–0)
NRBC FLD-RTO: 1.6 — SIGNIFICANT CHANGE UP
OTHER - HEMATOLOGY %: 0 — SIGNIFICANT CHANGE UP
OVALOCYTES BLD QL SMEAR: SLIGHT — SIGNIFICANT CHANGE UP
PHOSPHATE SERPL-MCNC: 6.7 MG/DL — HIGH (ref 2.5–4.5)
PLATELET # BLD AUTO: 538 K/UL — HIGH (ref 150–400)
PLATELET COUNT - ESTIMATE: SIGNIFICANT CHANGE UP
PMV BLD: 11 FL — SIGNIFICANT CHANGE UP (ref 7–13)
POIKILOCYTOSIS BLD QL AUTO: SIGNIFICANT CHANGE UP
POLYCHROMASIA BLD QL SMEAR: SIGNIFICANT CHANGE UP
POTASSIUM SERPL-MCNC: 5.9 MMOL/L — HIGH (ref 3.5–5.3)
POTASSIUM SERPL-SCNC: 5.9 MMOL/L — HIGH (ref 3.5–5.3)
PROMYELOCYTES # FLD: 0 % — SIGNIFICANT CHANGE UP (ref 0–0)
PROT SERPL-MCNC: 8.6 G/DL — HIGH (ref 6–8.3)
RBC # BLD: 3.2 M/UL — LOW (ref 3.8–5.2)
RBC # FLD: 14.3 % — SIGNIFICANT CHANGE UP (ref 10.3–14.5)
SCHISTOCYTES BLD QL AUTO: SLIGHT — SIGNIFICANT CHANGE UP
SODIUM SERPL-SCNC: 142 MMOL/L — SIGNIFICANT CHANGE UP (ref 135–145)
VARIANT LYMPHS # BLD: 0.9 % — SIGNIFICANT CHANGE UP
WBC # BLD: 34.77 K/UL — HIGH (ref 3.8–10.5)
WBC # FLD AUTO: 34.77 K/UL — HIGH (ref 3.8–10.5)

## 2020-04-06 PROCEDURE — 99233 SBSQ HOSP IP/OBS HIGH 50: CPT | Mod: GC

## 2020-04-06 RX ORDER — CINACALCET 30 MG/1
60 TABLET, FILM COATED ORAL DAILY
Refills: 0 | Status: DISCONTINUED | OUTPATIENT
Start: 2020-04-07 | End: 2020-04-07

## 2020-04-06 RX ORDER — ENOXAPARIN SODIUM 100 MG/ML
50 INJECTION SUBCUTANEOUS DAILY
Refills: 0 | Status: DISCONTINUED | OUTPATIENT
Start: 2020-04-06 | End: 2020-04-07

## 2020-04-06 RX ORDER — HYDROMORPHONE HYDROCHLORIDE 2 MG/ML
0.2 INJECTION INTRAMUSCULAR; INTRAVENOUS; SUBCUTANEOUS
Refills: 0 | Status: DISCONTINUED | OUTPATIENT
Start: 2020-04-06 | End: 2020-04-07

## 2020-04-06 RX ADMIN — Medication 200 MILLIGRAM(S): at 05:28

## 2020-04-06 RX ADMIN — Medication 4: at 09:32

## 2020-04-06 RX ADMIN — Medication 200 MILLIGRAM(S): at 17:56

## 2020-04-06 RX ADMIN — LATANOPROST 1 DROP(S): 0.05 SOLUTION/ DROPS OPHTHALMIC; TOPICAL at 23:58

## 2020-04-06 RX ADMIN — DORZOLAMIDE HYDROCHLORIDE TIMOLOL MALEATE 1 DROP(S): 20; 5 SOLUTION/ DROPS OPHTHALMIC at 17:56

## 2020-04-06 RX ADMIN — Medication 40 MILLIGRAM(S): at 17:57

## 2020-04-06 RX ADMIN — HEPARIN SODIUM 5000 UNIT(S): 5000 INJECTION INTRAVENOUS; SUBCUTANEOUS at 05:28

## 2020-04-06 RX ADMIN — Medication 4: at 12:13

## 2020-04-06 RX ADMIN — GABAPENTIN 100 MILLIGRAM(S): 400 CAPSULE ORAL at 05:29

## 2020-04-06 RX ADMIN — Medication 1 TABLET(S): at 12:22

## 2020-04-06 RX ADMIN — Medication 1000 MILLIGRAM(S): at 05:32

## 2020-04-06 RX ADMIN — Medication 1: at 17:55

## 2020-04-06 RX ADMIN — GABAPENTIN 100 MILLIGRAM(S): 400 CAPSULE ORAL at 17:57

## 2020-04-06 RX ADMIN — CINACALCET 30 MILLIGRAM(S): 30 TABLET, FILM COATED ORAL at 12:23

## 2020-04-06 RX ADMIN — CLOPIDOGREL BISULFATE 75 MILLIGRAM(S): 75 TABLET, FILM COATED ORAL at 12:22

## 2020-04-06 RX ADMIN — PANTOPRAZOLE SODIUM 40 MILLIGRAM(S): 20 TABLET, DELAYED RELEASE ORAL at 05:28

## 2020-04-06 RX ADMIN — DORZOLAMIDE HYDROCHLORIDE TIMOLOL MALEATE 1 DROP(S): 20; 5 SOLUTION/ DROPS OPHTHALMIC at 05:28

## 2020-04-06 RX ADMIN — ENOXAPARIN SODIUM 50 MILLIGRAM(S): 100 INJECTION SUBCUTANEOUS at 17:55

## 2020-04-06 RX ADMIN — Medication 50 MICROGRAM(S): at 05:28

## 2020-04-06 RX ADMIN — Medication 40 MILLIGRAM(S): at 07:56

## 2020-04-06 NOTE — PROGRESS NOTE ADULT - REASON FOR ADMISSION
Weakness, R/O COVID 19 infection, missed HD today, Hypoxia,

## 2020-04-06 NOTE — PROGRESS NOTE ADULT - PROBLEM SELECTOR PLAN 3
ESRD ON HD (MWF), had missed previous HD. Nephrology consulted. Continued on HD, due for session today. May improve oxygenation.

## 2020-04-06 NOTE — ADVANCED PRACTICE NURSE CONSULT - RECOMMEDATIONS
Topical Recommendations:     Sacrum: Cleanse with SAF-clens, rinse with NS, pat dry. Apply Liquid barrier film to periwound skin. Apply Aquacel Hydrofiber to wound base, cover with foam with border. Change every other day.    Continue low air loss bed therapy, heel elevation, continue to turn & reposition q2-4h as patient tolerates to maintain O2 saturation,continue moisture management with barrier creams & single breathable pad, continue measures to decrease friction/shear/pressure.     Findings and plan discussed with patient.     Please contact Wound Care Service Line if we can be of further assistance (ext [phone]).

## 2020-04-06 NOTE — PROGRESS NOTE ADULT - SUBJECTIVE AND OBJECTIVE BOX
[facility] - Denzel / Reena S /Cydeny/ CHIRAG Lozano/ CHIRAG Syed/ Lance Monique / PREET Njeru  ---------------------------------------------------------------------------------------------------------------    Patient seen and examined bedside    Subjective and Objective: No overnight events, sob resolved. No complaints today. feeling better    Allergies: No Known Allergies      Hospital Medications:   MEDICATIONS  (STANDING):  cinacalcet 30 milliGRAM(s) Oral daily  clopidogrel Tablet 75 milliGRAM(s) Oral daily  dextrose 5%. 1000 milliLiter(s) (50 mL/Hr) IV Continuous <Continuous>  dextrose 50% Injectable 12.5 Gram(s) IV Push once  dextrose 50% Injectable 25 Gram(s) IV Push once  dextrose 50% Injectable 25 Gram(s) IV Push once  dorzolamide 2%/timolol 0.5% Ophthalmic Solution 1 Drop(s) Both EYES two times a day  enoxaparin Injectable 50 milliGRAM(s) SubCutaneous daily  epoetin-jacqueline-epbx (RETACRIT) Injectable 92619 Unit(s) IV Push <User Schedule>  gabapentin 100 milliGRAM(s) Oral every 12 hours  hydroxychloroquine   Oral   hydroxychloroquine 200 milliGRAM(s) Oral every 12 hours  insulin lispro (HumaLOG) corrective regimen sliding scale   SubCutaneous three times a day before meals  insulin lispro (HumaLOG) corrective regimen sliding scale   SubCutaneous at bedtime  latanoprost 0.005% Ophthalmic Solution 1 Drop(s) Both EYES at bedtime  levothyroxine 50 MICROGram(s) Oral daily  methylPREDNISolone sodium succinate Injectable 40 milliGRAM(s) IV Push two times a day  Nephro-frederick 1 Tablet(s) Oral daily  pantoprazole    Tablet 40 milliGRAM(s) Oral before breakfast  zinc sulfate 220 milliGRAM(s) Oral daily      REVIEW OF SYSTEMS:  CONSTITUTIONAL: No weakness, fevers or chills  EYES/ENT: No visual changes;  No vertigo or throat pain   NECK: No pain or stiffness  RESPIRATORY: No cough, wheezing, hemoptysis; No shortness of breath  CARDIOVASCULAR: No chest pain or palpitations.  GASTROINTESTINAL: No abdominal or epigastric pain. No nausea, vomiting, or hematemesis; No diarrhea or constipation. No melena or hematochezia.  GENITOURINARY: No dysuria, frequency, foamy urine, urinary urgency, incontinence or hematuria  NEUROLOGICAL: No numbness or weakness  SKIN: No itching, burning, rashes, or lesions   VASCULAR: No bilateral lower extremity edema.   All other review of systems is negative unless indicated above.    VITALS:  T(F): 97.9 (04-06-20 @ 12:00), Max: 97.9 (04-06-20 @ 12:00)  HR: 90 (04-06-20 @ 12:00)  BP: 130/49 (04-06-20 @ 12:00)  RR: 31 (04-06-20 @ 18:09)  SpO2: 89% (04-06-20 @ 18:09)  Wt(kg): --        PHYSICAL EXAM:  Constitutional: NAD  Vascular Access:    LABS:  04-06    142  |  91<L>  |  100<H>  ----------------------------<  296<H>  5.9<H>   |  13<L>  |  6.47<H>    Ca    10.7<H>      06 Apr 2020 07:33  Phos  6.7     04-06  Mg     2.9     04-06    TPro  8.6<H>  /  Alb  3.5  /  TBili  0.5  /  DBili      /  AST  63<H>  /  ALT  22  /  AlkPhos  130<H>  04-06    Creatinine Trend: 6.47 <--, 5.17 <--, 3.50 <--, 7.52 <--, 6.74 <--                        9.6    34.77 )-----------( 538      ( 06 Apr 2020 07:33 )             31.6     Urine Studies:        RADIOLOGY & ADDITIONAL STUDIES: [facility] - Denzel / Reena BEARD /Cydney/ CHIRAG Lozano/ CHIRAG Syed/ Lance Monique / PREET Njeru  ---------------------------------------------------------------------------------------------------------------    Patient seen and examined bedside    Subjective and Objective: No overnight events, sob +.     Allergies: No Known Allergies      Hospital Medications:   MEDICATIONS  (STANDING):  cinacalcet 30 milliGRAM(s) Oral daily  clopidogrel Tablet 75 milliGRAM(s) Oral daily  dextrose 5%. 1000 milliLiter(s) (50 mL/Hr) IV Continuous <Continuous>  dextrose 50% Injectable 12.5 Gram(s) IV Push once  dextrose 50% Injectable 25 Gram(s) IV Push once  dextrose 50% Injectable 25 Gram(s) IV Push once  dorzolamide 2%/timolol 0.5% Ophthalmic Solution 1 Drop(s) Both EYES two times a day  enoxaparin Injectable 50 milliGRAM(s) SubCutaneous daily  epoetin-jacqueline-epbx (RETACRIT) Injectable 21830 Unit(s) IV Push <User Schedule>  gabapentin 100 milliGRAM(s) Oral every 12 hours  hydroxychloroquine   Oral   hydroxychloroquine 200 milliGRAM(s) Oral every 12 hours  insulin lispro (HumaLOG) corrective regimen sliding scale   SubCutaneous three times a day before meals  insulin lispro (HumaLOG) corrective regimen sliding scale   SubCutaneous at bedtime  latanoprost 0.005% Ophthalmic Solution 1 Drop(s) Both EYES at bedtime  levothyroxine 50 MICROGram(s) Oral daily  methylPREDNISolone sodium succinate Injectable 40 milliGRAM(s) IV Push two times a day  Nephro-frederick 1 Tablet(s) Oral daily  pantoprazole    Tablet 40 milliGRAM(s) Oral before breakfast  zinc sulfate 220 milliGRAM(s) Oral daily      VITALS:  T(F): 97.9 (04-06-20 @ 12:00), Max: 97.9 (04-06-20 @ 12:00)  HR: 90 (04-06-20 @ 12:00)  BP: 130/49 (04-06-20 @ 12:00)  RR: 31 (04-06-20 @ 18:09)  SpO2: 89% (04-06-20 @ 18:09)  Wt(kg): --      PHYSICAL EXAM: inspection only 2/2 COVID 19 +tivity  Constitutional: NAD, on NRB mask  Vascular Access: BRYSON AVF  no wesly  AAOx2    LABS:  04-06    142  |  91<L>  |  100<H>  ----------------------------<  296<H>  5.9<H>   |  13<L>  |  6.47<H>    Ca    10.7<H>      06 Apr 2020 07:33  Phos  6.7     04-06  Mg     2.9     04-06    TPro  8.6<H>  /  Alb  3.5  /  TBili  0.5  /  DBili      /  AST  63<H>  /  ALT  22  /  AlkPhos  130<H>  04-06    Creatinine Trend: 6.47 <--, 5.17 <--, 3.50 <--, 7.52 <--, 6.74 <--                        9.6    34.77 )-----------( 538      ( 06 Apr 2020 07:33 )             31.6     Urine Studies:        RADIOLOGY & ADDITIONAL STUDIES:

## 2020-04-06 NOTE — PROGRESS NOTE ADULT - ASSESSMENT
_________________________________________________________________________________________  ========>>  M E D I C A L   A T T E N D I N G    F O L L O W  U P  N O T E  <<=========  -----------------------------------------------------------------------------------------------------    - Patient evaluated by me, chart reviewed   - In summary,  STEVEN HARVEY is a 55y year old woman who originally presented with SOB  - Patient today overall doing better post dialysis, comfortable, eating OK. breathing better     ==================>> REVIEW OF SYSTEM <<=================    GEN: no fever, no chills, no pain  RESP: SOB improved , occasional cough, no sputum  CVS: no chest pain, no palpitations, no edema  GI: no abdominal pain, no nausea  : no c/o  Neuro: no headache, no dizziness  Derm : no itching, no rash    ==================>> PHYSICAL EXAM <<=================    GEN: A&O X 2-3 , NAD , comfortable  HEENT: NCAT, MMM, hearing intact, visually impaired   Neck: supple , no JVD appreciated  CVS: S1S2 , regular , No M/R/G appreciated  PULM: CTA B/L,  limited exam as not taking deep breaths   ABD.: soft. non tender, non distended  Extrem: intact pulses , no edema   PSYCH : normal mood,  not anxious      ==================>> MEDICATIONS <<====================    MEDICATIONS  (STANDING):  ascorbic acid 500 milliGRAM(s) Oral daily  cinacalcet 30 milliGRAM(s) Oral daily  clopidogrel Tablet 75 milliGRAM(s) Oral daily  dextrose 5%. 1000 milliLiter(s) (50 mL/Hr) IV Continuous <Continuous>  dextrose 50% Injectable 12.5 Gram(s) IV Push once  dextrose 50% Injectable 25 Gram(s) IV Push once  dextrose 50% Injectable 25 Gram(s) IV Push once  dorzolamide 2%/timolol 0.5% Ophthalmic Solution 1 Drop(s) Both EYES two times a day  epoetin-jacqueline-epbx (RETACRIT) Injectable 92894 Unit(s) IV Push <User Schedule>  gabapentin 100 milliGRAM(s) Oral every 12 hours  heparin  Injectable 5000 Unit(s) SubCutaneous every 12 hours  hydroxychloroquine   Oral   hydroxychloroquine 400 milliGRAM(s) Oral every 12 hours  insulin lispro (HumaLOG) corrective regimen sliding scale   SubCutaneous every 6 hours  latanoprost 0.005% Ophthalmic Solution 1 Drop(s) Both EYES at bedtime  levothyroxine 50 MICROGram(s) Oral daily  Nephro-frederick 1 Tablet(s) Oral daily  pantoprazole    Tablet 40 milliGRAM(s) Oral before breakfast  zinc sulfate 220 milliGRAM(s) Oral daily    MEDICATIONS  (PRN):  dextrose 40% Gel 15 Gram(s) Oral once PRN Blood Glucose LESS THAN 70 milliGRAM(s)/deciliter  glucagon  Injectable 1 milliGRAM(s) IntraMuscular once PRN Glucose LESS THAN 70 milligrams/deciliter    ==================>> VITAL SIGNS <<==================    T(C): 36.6 (04-04-20 @ 09:40), Max: 37.8 (04-03-20 @ 14:11)  HR: 77 (04-04-20 @ 09:40) (77 - 87)  BP: 114/40 (04-04-20 @ 09:40) (114/40 - 137/40)  BP(mean): 58 (04-04-20 @ 09:40)  RR: 18 (04-04-20 @ 11:28) (14 - 26)  SpO2: 95% (04-04-20 @ 11:28) (95% - 100%)     POCT Blood Glucose.: 141 mg/dL (04 Apr 2020 12:17)  POCT Blood Glucose.: 182 mg/dL (04 Apr 2020 09:04)  POCT Blood Glucose.: 195 mg/dL (04 Apr 2020 06:13)  POCT Blood Glucose.: 220 mg/dL (04 Apr 2020 00:10)    I&O's Summary    03 Apr 2020 07:01  -  04 Apr 2020 07:00  --------------------------------------------------------  IN: 400 mL / OUT: 1400 mL / NET: -1000 mL       ==================>> LAB AND IMAGING <<==================                        8.4    9.58  )-----------( 348      ( 04 Apr 2020 05:17 )             27.1        139  |  90<L>  |  48<H>  ----------------------------<  210<H>  4.1   |  19<L>  |  3.50<H>    Ca    10.3      04 Apr 2020 05:17  Phos  4.8     04-04  Mg     2.3     04-04    TPro  7.5  /  Alb  3.9  /  TBili  0.4  /  DBili  x   /  AST  66<H>  /  ALT  19  /  AlkPhos  113  04-04    PT/INR - ( 03 Apr 2020 12:50 )   PT: 10.7 SEC;   INR: 0.94          PTT - ( 03 Apr 2020 12:50 )  PTT:35.1 SEC               TSH:      0.15   (04-04-20)           Lipid profile:  (04-04-20)     Total: 82     LDL  : 20     HDL  :37     TG   :172     HgA1C: 5.9  (04-04-20)            COVID-19 +  ___________________________________________________________________________________  ===============>>  A S S E S S M E N T   A N D   P L A N <<===============  ------------------------------------------------------------------------------------------    54 yo F PMHx ESRD HD MWF (missed today, last dialyzed 2 days ago), HTN, HLD, hypothyroid, CAD, bedbound w/ sacral decubitus ulcers, presents from [facility] for generalized weakness and SOB.   Per EMS, pt was sating 55% on RA, improved when placed on NRB to 93%.         Problem/Plan - 1:  ·  Problem: Acute hypoxemic respiratory failure likely due to COVID-19 with Viral pneumonia from Viral syndrome.      also likely large component of pulm edema due to missed HD     respiratory status improved post HD      Plaquenil started      O2 and wean down as able      continue supportive care and supplements     Problem/Plan - 2:  ·  Problem: ESRD on dialysis.    Renal f/u, HD last night  monitor BMP    Problem/Plan - 3:  ·  Problem: CAD in native artery.  Plan: on Plavix,     Problem/Plan - 4:  ·  Problem: HTN (hypertension).  Plan: Hold BP meds , monitor BP Closely, BP stable now, Avoid Hypotension,     Problem/Plan - 5:  ·  Problem: Diabetes mellitus.  Plan: FS, Sliding scale Q 6 HR, NPO, + Hypoxia, on 100% NRB, HgbA1c, Fructosamine,     Problem/Plan - 6:  Problem: Hypothyroidism. Plan: on synthroid, TSH, free T4,    Problem/Plan - 7:  ·  Problem: Pressure ulcer.    Wound consult and mgmt    -GI/DVT Prophylaxis.    --------------------------------------------  Case discussed with staff   ___________________________  H. MIKAELA Hannah.  Pager: [phone]
_________________________________________________________________________________________  ========>>  M E D I C A L   A T T E N D I N G    F O L L O W  U P  N O T E  <<=========  -----------------------------------------------------------------------------------------------------    - Patient seen and examined by me earlier today.   - In summary,  STEVEN HARVEY is a 55y year old woman who originally presented with SOB   - Patient today overall doing fairly, weak, poor appetite, comfortable, breathing worsened overnight > pt on nonrebreather      ==================>> REVIEW OF SYSTEM <<=================    limited ROS as pt lethargic poor historian   GEN: no fever, no pain  RESP: + SOB, occasional cough  CVS: no chest pain  GI: no abdominal pain, no nausea  : no c/o  Neuro: no headache  Derm : no itching    ==================>> PHYSICAL EXAM <<=================    GEN: A&O X 1-2 , NAD , comfortable, weak appearing, in prone position   HEENT: NCAT, MMM, hearing intact, visually impaired   Neck: supple , no JVD appreciated  CVS: S1S2 , regular , No M/R/G appreciated  PULM: CTA B/L,  limited exam as not taking deep breaths   ABD.: soft. non tender, non distended  Extrem: intact pulses , no edema   PSYCH : normal mood,  not anxious      ==================>> MEDICATIONS <<====================    cinacalcet 30 milliGRAM(s) Oral daily  clopidogrel Tablet 75 milliGRAM(s) Oral daily  dextrose 5%. 1000 milliLiter(s) IV Continuous <Continuous>  dextrose 50% Injectable 12.5 Gram(s) IV Push once  dextrose 50% Injectable 25 Gram(s) IV Push once  dextrose 50% Injectable 25 Gram(s) IV Push once  dorzolamide 2%/timolol 0.5% Ophthalmic Solution 1 Drop(s) Both EYES two times a day  epoetin-jacqueline-epbx (RETACRIT) Injectable 80732 Unit(s) IV Push <User Schedule>  gabapentin 100 milliGRAM(s) Oral every 12 hours  heparin  Injectable 5000 Unit(s) SubCutaneous every 12 hours  hydroxychloroquine   Oral   hydroxychloroquine 200 milliGRAM(s) Oral every 12 hours  insulin lispro (HumaLOG) corrective regimen sliding scale   SubCutaneous three times a day before meals  insulin lispro (HumaLOG) corrective regimen sliding scale   SubCutaneous at bedtime  latanoprost 0.005% Ophthalmic Solution 1 Drop(s) Both EYES at bedtime  levothyroxine 50 MICROGram(s) Oral daily  methylPREDNISolone sodium succinate Injectable 40 milliGRAM(s) IV Push two times a day  Nephro-frederick 1 Tablet(s) Oral daily  pantoprazole    Tablet 40 milliGRAM(s) Oral before breakfast  zinc sulfate 220 milliGRAM(s) Oral daily    MEDICATIONS  (PRN):  dextrose 40% Gel 15 Gram(s) Oral once PRN Blood Glucose LESS THAN 70 milliGRAM(s)/deciliter  glucagon  Injectable 1 milliGRAM(s) IntraMuscular once PRN Glucose LESS THAN 70 milligrams/deciliter  HYDROmorphone  Injectable 0.2 milliGRAM(s) IV Push every 2 hours PRN shortness of breath  LORazepam   Injectable 0.5 milliGRAM(s) IV Push every 4 hours PRN Anxiety    ==================>> VITAL SIGNS <<==================    Vital Signs Last 24 Hrs  T(C): 36.6 (04-06-20 @ 12:00)  T(F): 97.9 (04-06-20 @ 12:00), Max: 101 (04-05-20 @ 15:38)  HR: 90 (04-06-20 @ 12:00) (90 - 102)  BP: 130/49 (04-06-20 @ 12:00)  BP(mean): --  RR: 24 (04-06-20 @ 12:00) (24 - 34)  SpO2: 94% (04-06-20 @ 12:00) (84% - 97%)    CAPILLARY BLOOD GLUCOSE      POCT Blood Glucose.: 312 mg/dL (06 Apr 2020 12:08)  POCT Blood Glucose.: 332 mg/dL (06 Apr 2020 07:41)  POCT Blood Glucose.: 280 mg/dL (05 Apr 2020 23:02)  POCT Blood Glucose.: 288 mg/dL (05 Apr 2020 18:10)     ==================>> LAB AND IMAGING <<==================                        9.6    34.77 )-----------( 538      ( 06 Apr 2020 07:33 )             31.6        04-06    142  |  91<L>  |  100<H>  ----------------------------<  296<H>  5.9<H>   |  13<L>  |  6.47<H>    Ca    10.7<H>      06 Apr 2020 07:33  Phos  6.7     04-06  Mg     2.5     04-05    TPro  8.6<H>  /  Alb  3.5  /  TBili  0.5  /  DBili  x   /  AST  63<H>  /  ALT  22  /  AlkPhos  130<H>  04-06    WBC count:   34.77 <<== ,  15.91 <<== ,  9.58 <<== ,  13.84 <<==   Hemoglobin:   9.6 <<==,  10.0 <<==,  8.4 <<==,  8.9 <<==  platelets:  538 <==, 414 <==, 348 <==, 393 <==    Creatinine:  6.47  <<==, 5.17  <<==, 3.50  <<==, 7.52  <<==, 6.74  <<==  Sodium:   142  <==, 140  <==, 139  <==, 139  <==, 135  <==       AST:          63 <== , 94 <== , 66 <== , 107 <==      ALT:        22  <== , 28  <== , 19  <== , 26  <==      AP:        130  <=, 116  <=, 113  <=, 113  <=     Bili:        0.5  <=, 0.5  <=, 0.4  <=, 0.4  <=    COVID-19 +  ___________________________________________________________________________________  ===============>>  A S S E S S M E N T   A N D   P L A N <<===============  ------------------------------------------------------------------------------------------    56 yo F PMHx ESRD HD MWF (missed today, last dialyzed 2 days ago), HTN, HLD, hypothyroid, CAD, bedbound w/ sacral decubitus ulcers, presents from [facility] for generalized weakness and SOB.       Problem/Plan - 1:  ·  Problem: Acute hypoxemic respiratory failure due to COVID-19 with Viral pneumonia from Viral syndrome.      also likely large component of pulm edema due to missed HD      respiratory status now worsened       Plaquenil per protocol for COVID-19       will add Anakinra vs steroids if not better       Full AC as bellow       O2 and wean down as able\       fluid removal via HD       continue supportive care and supplements      encourage Po intake as able with spiration persecutions     Problem/Plan - 2:  ·  Problem: ESRD on dialysis.    Renal f/u, HD today  monitor BMP    Problem/Plan - 3:  ·  Problem: CAD in native artery.  Plan: on Plavix,     Problem/Plan - 4:  ·  Problem: HTN (hypertension).  Plan: Hold BP meds , monitor BP Closely, BP stable now, Avoid Hypotension,     Problem/Plan - 5:  ·  Problem: Diabetes mellitus.  Plan: FS, Sliding scale Q 6 HR, NPO, + Hypoxia, on 100% NRB, HgbA1c, Fructosamine,     Problem/Plan - 6:  Problem: Hypothyroidism. Plan: on synthroid, TSH, free T4,    Problem/Plan - 7:  ·  Problem: Pressure ulcer.    Wound consult and mgmt    -GI/DVT Prophylaxis.  - given new finding / recommendations / gridlines regarding treatment of COVID-19, will keep patient on full dose anticoagulation, renally dosed Lovenox, for treatment of possible microemboli.    monitor closely for bleeding or other complications.   ___________________________  H. MIKAELA Hannah.  Pager: [phone]
_________________________________________________________________________________________  ========>>  M E D I C A L   A T T E N D I N G    F O L L O W  U P  N O T E  <<=========  -----------------------------------------------------------------------------------------------------    - Patient seen and examined by me earlier today.   - In summary,  STEVEN HARVEY is a 55y year old woman who originally presented with SOB  - Patient today overall doing fairly, weak, poor appetite, comfortable, breathing ok     ==================>> REVIEW OF SYSTEM <<=================    limited ROS as pt lethargic poor historian   GEN: no fever, no pain  RESP: SOB improved , occasional cough  CVS: no chest pain  GI: no abdominal pain, no nausea  : no c/o  Neuro: no headache  Derm : no itching    ==================>> PHYSICAL EXAM <<=================    GEN: A&O X 1-2 , NAD , comfortable, weak appearing  HEENT: NCAT, MMM, hearing intact, visually impaired   Neck: supple , no JVD appreciated  CVS: S1S2 , regular , No M/R/G appreciated  PULM: CTA B/L,  limited exam as not taking deep breaths   ABD.: soft. non tender, non distended  Extrem: intact pulses , no edema   PSYCH : normal mood,  not anxious      ==================>> MEDICATIONS <<====================    ascorbic acid 1000 milliGRAM(s) Oral two times a day  cinacalcet 30 milliGRAM(s) Oral daily  clopidogrel Tablet 75 milliGRAM(s) Oral daily  dextrose 5%. 1000 milliLiter(s) IV Continuous <Continuous>  dextrose 50% Injectable 12.5 Gram(s) IV Push once  dextrose 50% Injectable 25 Gram(s) IV Push once  dextrose 50% Injectable 25 Gram(s) IV Push once  dorzolamide 2%/timolol 0.5% Ophthalmic Solution 1 Drop(s) Both EYES two times a day  epoetin-jacqueline-epbx (RETACRIT) Injectable 30430 Unit(s) IV Push <User Schedule>  gabapentin 100 milliGRAM(s) Oral every 12 hours  heparin  Injectable 5000 Unit(s) SubCutaneous every 12 hours  hydroxychloroquine   Oral   hydroxychloroquine 200 milliGRAM(s) Oral every 12 hours  insulin lispro (HumaLOG) corrective regimen sliding scale   SubCutaneous every 6 hours  latanoprost 0.005% Ophthalmic Solution 1 Drop(s) Both EYES at bedtime  levothyroxine 50 MICROGram(s) Oral daily  Nephro-frederick 1 Tablet(s) Oral daily  pantoprazole    Tablet 40 milliGRAM(s) Oral before breakfast  zinc sulfate 220 milliGRAM(s) Oral daily    MEDICATIONS  (PRN):  dextrose 40% Gel 15 Gram(s) Oral once PRN Blood Glucose LESS THAN 70 milliGRAM(s)/deciliter  glucagon  Injectable 1 milliGRAM(s) IntraMuscular once PRN Glucose LESS THAN 70 milligrams/deciliter    ==================>> VITAL SIGNS <<==================    Vital Signs Last 24 Hrs  T(C): 38.3 (04-05-20 @ 15:38)  T(F): 101 (04-05-20 @ 15:38), Max: 101 (04-05-20 @ 15:38)  HR: 90 (04-05-20 @ 15:38) (90 - 100)  BP: 160/59 (04-05-20 @ 09:35)  RR: 28 (04-05-20 @ 15:38) (18 - 36)  SpO2: 92% (04-05-20 @ 15:38) (85% - 96%)      POCT Blood Glucose.: 195 mg/dL (05 Apr 2020 12:00)  POCT Blood Glucose.: 101 mg/dL (05 Apr 2020 06:39)  POCT Blood Glucose.: 97 mg/dL (04 Apr 2020 23:15)  POCT Blood Glucose.: 93 mg/dL (04 Apr 2020 17:21)     ==================>> LAB AND IMAGING <<==================                        10.0   15.91 )-----------( 414      ( 05 Apr 2020 06:44 )             32.4        140  |  93<L>  |  73<H>  ----------------------------<  83  6.1<H>   |  14<L>  |  5.17<H>    Ca    10.1      05 Apr 2020 06:44  Phos  3.7     04-05  Mg     2.5     04-05    TPro  9.1<H>  /  Alb  3.7  /  TBili  0.5  /  DBili  x   /  AST  94<H>  /  ALT  28  /  AlkPhos  116  04-05    WBC count:   15.91 <<== ,  9.58 <<== ,  13.84 <<==   Hemoglobin:   10.0 <<==,  8.4 <<==,  8.9 <<==  platelets:  414 <==, 348 <==, 393 <==    Creatinine:  5.17  <<==, 3.50  <<==, 7.52  <<==, 6.74  <<==  Sodium:   140  <==, 139  <==, 139  <==, 135  <==       AST:          94 <== , 66 <== , 107 <==      ALT:        28  <== , 19  <== , 26  <==      AP:        116  <=, 113  <=, 113  <=     Bili:        0.5  <=, 0.4  <=, 0.4  <=    COVID-19 +  ___________________________________________________________________________________  ===============>>  A S S E S S M E N T   A N D   P L A N <<===============  ------------------------------------------------------------------------------------------    56 yo F PMHx ESRD HD MWF (missed today, last dialyzed 2 days ago), HTN, HLD, hypothyroid, CAD, bedbound w/ sacral decubitus ulcers, presents from [facility] for generalized weakness and SOB.   Per EMS, pt was sating 55% on RA, improved when placed on NRB to 93%.         Problem/Plan - 1:  ·  Problem: Acute hypoxemic respiratory failure due to COVID-19 with Viral pneumonia from Viral syndrome.      also likely large component of pulm edema due to missed HD      respiratory status improved post HD      Plaquenil pe protocol or COVID-19       O2 and wean down as able      continue supportive care and supplements      encourage Po intake as able with spiration persecutions     Problem/Plan - 2:  ·  Problem: ESRD on dialysis.    Renal f/u, HD last night  monitor BMP    Problem/Plan - 3:  ·  Problem: CAD in native artery.  Plan: on Plavix,     Problem/Plan - 4:  ·  Problem: HTN (hypertension).  Plan: Hold BP meds , monitor BP Closely, BP stable now, Avoid Hypotension,     Problem/Plan - 5:  ·  Problem: Diabetes mellitus.  Plan: FS, Sliding scale Q 6 HR, NPO, + Hypoxia, on 100% NRB, HgbA1c, Fructosamine,     Problem/Plan - 6:  Problem: Hypothyroidism. Plan: on synthroid, TSH, free T4,    Problem/Plan - 7:  ·  Problem: Pressure ulcer.    Wound consult and mgmt    -GI/DVT Prophylaxis.  ___________________________  H. MIKAELA Hannah.  Pager: [phone]
54y Female w ESRD on HD MWF @ [id number] HD unit, DM, HTN referred to ED for gram Positive bacteremia, drawn in outpt HD unit on 10/7. Renal following for ESRD Mx.
54y Female w ESRD on HD MWF @ [facility] HD unit, DM, HTN referred to ED for gram Positive bacteremia, drawn in outpt HD unit on 10/7. Renal following for ESRD Mx.        labs, chart, reviewed  Hypercalcemia- inc sensipar 30>60mg qd w/food  COVID 19 infection - Mx per medicine/ID. f/u bl cxs
56 y/o F w/ PMHx of ESRD (HD MWF), HTN, CAD, bedbound with sacral decubitus ulcers presents from NH w/ SOB and respiratory failure 2/2 presumed covid-19 infection. Palliative consulted for GOC in the setting of advanced illness and respiratory failure 2/2 presumed Covid-19 infection.

## 2020-04-06 NOTE — PROGRESS NOTE ADULT - ATTENDING COMMENTS
For any question, call:  Cell # [phone]  Pager # [phone]  Callback # [phone]
[facility]  [facility] - [phone]  Office [phone]  Ans Serv [phone]
Pt seen with fellow.  Agree with above.  Respiratory failure due to covid.  COntinue hd as tolerated.  would add dilaudid prn dyspnea and ativan prn anxiety.  Reviewed with clinical review committee. agree that intubation and cpr would not change outcome.  Sister aware.

## 2020-04-06 NOTE — PROGRESS NOTE ADULT - SUBJECTIVE AND OBJECTIVE BOX
SUBJECTIVE AND OBJECTIVE: 54 y/o F with ESRD (HD MWF), HTN, CAD, bedbound w/ sacral decub ulcers presents from NH for SOB, COVID-19+    INTERVAL HPI/OVERNIGHT EVENTS: Patient was febrile to 101 F yesterday, remains tachycardic, hypertensive, tachypneic (34) and saturating 84% on NRM. Patient is due for HD today.    DNR on chart: No  Allergies    No Known Allergies    Intolerances    MEDICATIONS  (STANDING):  ascorbic acid 1000 milliGRAM(s) Oral two times a day  cinacalcet 30 milliGRAM(s) Oral daily  clopidogrel Tablet 75 milliGRAM(s) Oral daily  dextrose 5%. 1000 milliLiter(s) (50 mL/Hr) IV Continuous <Continuous>  dextrose 50% Injectable 12.5 Gram(s) IV Push once  dextrose 50% Injectable 25 Gram(s) IV Push once  dextrose 50% Injectable 25 Gram(s) IV Push once  dorzolamide 2%/timolol 0.5% Ophthalmic Solution 1 Drop(s) Both EYES two times a day  epoetin-jacqueline-epbx (RETACRIT) Injectable 12403 Unit(s) IV Push <User Schedule>  gabapentin 100 milliGRAM(s) Oral every 12 hours  heparin  Injectable 5000 Unit(s) SubCutaneous every 12 hours  hydroxychloroquine   Oral   hydroxychloroquine 200 milliGRAM(s) Oral every 12 hours  insulin lispro (HumaLOG) corrective regimen sliding scale   SubCutaneous three times a day before meals  insulin lispro (HumaLOG) corrective regimen sliding scale   SubCutaneous at bedtime  latanoprost 0.005% Ophthalmic Solution 1 Drop(s) Both EYES at bedtime  levothyroxine 50 MICROGram(s) Oral daily  methylPREDNISolone sodium succinate Injectable 40 milliGRAM(s) IV Push two times a day  Nephro-frederick 1 Tablet(s) Oral daily  pantoprazole    Tablet 40 milliGRAM(s) Oral before breakfast  zinc sulfate 220 milliGRAM(s) Oral daily    MEDICATIONS  (PRN):  dextrose 40% Gel 15 Gram(s) Oral once PRN Blood Glucose LESS THAN 70 milliGRAM(s)/deciliter  glucagon  Injectable 1 milliGRAM(s) IntraMuscular once PRN Glucose LESS THAN 70 milligrams/deciliter      ITEMS UNCHECKED ARE NOT PRESENT    PRESENT SYMPTOMS: [ ]Unable to obtain due to poor mentation   Source if other than patient:  [ ]Family   [ ]Team     Pain:  [ ] yes [ ] no  QOL impact -   Location -                    Aggravating factors -  Quality -  Radiation -  Timing-  Severity (0-10 scale):  Minimal acceptable level (0-10 scale):     Dyspnea:                           [ ]Mild [ ]Moderate [ ]Severe  Anxiety:                             [ ]Mild [ ]Moderate [ ]Severe  Fatigue:                             [ ]Mild [ ]Moderate [ ]Severe  Nausea:                             [ ]Mild [ ]Moderate [ ]Severe  Loss of appetite:              [ ]Mild [ ]Moderate [ ]Severe  Constipation:                    [ ]Mild [ ]Moderate [ ]Severe    PAIN AD Score:	  [email or web address] (Ctrl + left click to view)    Other Symptoms:  [ ]All other review of systems negative     Karnofsky Performance Score/Palliative Performance Status Version 2:         %    http://npcrc.org/files/news/palliative_performance_scale_ppsv2.pdf  PPSv2.pdf  PHYSICAL EXAM:  Vital Signs Last 24 Hrs  T(C): 36.4 (06 Apr 2020 09:06), Max: 38.3 (05 Apr 2020 15:38)  T(F): 97.6 (06 Apr 2020 09:06), Max: 101 (05 Apr 2020 15:38)  HR: 102 (06 Apr 2020 09:06) (90 - 102)  BP: 171/60 (06 Apr 2020 09:06) (150/56 - 171/60)  BP(mean): --  RR: 34 (06 Apr 2020 09:06) (28 - 34)  SpO2: 84% (06 Apr 2020 09:06) (84% - 97%) I&O's Summary   GENERAL:  [ ]Alert  [ ]Oriented x   [ ]Lethargic  [ ]Cachexia  [ ]Unarousable  [ ]Verbal  [ ]Non-Verbal  Behavioral:   [ ] Anxiety  [ ] Delirium [ ] Agitation [ ] Other  HEENT:  [ ]Normal   [ ]Dry mouth   [ ]ET Tube/Trach  [ ]Oral lesions  PULMONARY:   [ ]Clear [ ]Tachypnea  [ ]Audible excessive secretions   [ ]Rhonchi        [ ]Right [ ]Left [ ]Bilateral  [ ]Crackles        [ ]Right [ ]Left [ ]Bilateral  [ ]Wheezing     [ ]Right [ ]Left [ ]Bilateral  CARDIOVASCULAR:    [ ]Regular [ ]Irregular [ ]Tachy  [ ]Harman [ ]Murmur [ ]Other  GASTROINTESTINAL:  [ ]Soft  [ ]Distended   [ ]+BS  [ ]Non tender [ ]Tender  [ ]PEG [ ]OGT/ NGT   Last BM:      GENITOURINARY:  [ ]Normal [ ] Incontinent   [ ]Oliguria/Anuria   [ ]Flores  MUSCULOSKELETAL:   [ ]Normal   [ ]Weakness  [ ]Bed/Wheelchair bound [ ]Edema  NEUROLOGIC:   [ ]No focal deficits  [ ] Cognitive impairment  [ ] Dysphagia [ ]Dysarthria [ ] Paresis [ ]Other   SKIN:   [ ]Normal   [ ]Pressure ulcer(s)  [ ]Rash    CRITICAL CARE:  [ ] Shock Present  [ ]Septic [ ]Cardiogenic [ ]Neurologic [ ]Hypovolemic  [ ]  Vasopressors [ ]  Inotropes   [ ] Respiratory failure present [ ] Mechanical Ventilation [ ] Non-invasive ventilatory support [ ] High-Flow  [ ] Acute  [ ] Chronic [ ] Hypoxic  [ ] Hypercarbic [ ] Other  [ ] Other organ failure     LABS:                        9.6    34.77 )-----------( 538      ( 06 Apr 2020 07:33 )             31.6   04-05    140  |  93<L>  |  73<H>  ----------------------------<  83  6.1<H>   |  14<L>  |  5.17<H>    Ca    10.1      05 Apr 2020 06:44  Phos  3.7     04-05  Mg     2.5     04-05    TPro  9.1<H>  /  Alb  3.7  /  TBili  0.5  /  DBili  x   /  AST  94<H>  /  ALT  28  /  AlkPhos  116  04-05        RADIOLOGY & ADDITIONAL STUDIES:    Protein Calorie Malnutrition Present: [ ] yes [ ] no  [ ] PPSV2 < or = 30%  [ ] significant weight loss [ ] poor nutritional intake [ ] anasarca [ ] catabolic state Albumin, Serum: 3.7 g/dL (04-05-20 @ 06:44)  Artificial Nutrition [ ]     REFERRALS:   [ ]Chaplaincy  [ ] Hospice  [ ]Child Life  [ ]Social Work  [ ]Case management [ ]Holistic Therapy     Goals of Care Document:     Progress Note Adult-Internal Medicine Attending [AARON Hannah] (04-05-20 @ 15:38) SUBJECTIVE AND OBJECTIVE: 54 y/o F with ESRD (HD MWF), HTN, CAD, bedbound w/ sacral decub ulcers presents from NH for SOB, COVID-19+    INTERVAL HPI/OVERNIGHT EVENTS: Patient was febrile to 101 F yesterday, remains tachycardic, hypertensive, tachypneic (34) and saturating 84% on NRM. Patient is due for HD today.    DNR on chart: No  Allergies    No Known Allergies    Intolerances    MEDICATIONS  (STANDING):  ascorbic acid 1000 milliGRAM(s) Oral two times a day  cinacalcet 30 milliGRAM(s) Oral daily  clopidogrel Tablet 75 milliGRAM(s) Oral daily  dextrose 5%. 1000 milliLiter(s) (50 mL/Hr) IV Continuous <Continuous>  dextrose 50% Injectable 12.5 Gram(s) IV Push once  dextrose 50% Injectable 25 Gram(s) IV Push once  dextrose 50% Injectable 25 Gram(s) IV Push once  dorzolamide 2%/timolol 0.5% Ophthalmic Solution 1 Drop(s) Both EYES two times a day  epoetin-jacqueline-epbx (RETACRIT) Injectable 65929 Unit(s) IV Push <User Schedule>  gabapentin 100 milliGRAM(s) Oral every 12 hours  heparin  Injectable 5000 Unit(s) SubCutaneous every 12 hours  hydroxychloroquine   Oral   hydroxychloroquine 200 milliGRAM(s) Oral every 12 hours  insulin lispro (HumaLOG) corrective regimen sliding scale   SubCutaneous three times a day before meals  insulin lispro (HumaLOG) corrective regimen sliding scale   SubCutaneous at bedtime  latanoprost 0.005% Ophthalmic Solution 1 Drop(s) Both EYES at bedtime  levothyroxine 50 MICROGram(s) Oral daily  methylPREDNISolone sodium succinate Injectable 40 milliGRAM(s) IV Push two times a day  Nephro-frederick 1 Tablet(s) Oral daily  pantoprazole    Tablet 40 milliGRAM(s) Oral before breakfast  zinc sulfate 220 milliGRAM(s) Oral daily    MEDICATIONS  (PRN):  dextrose 40% Gel 15 Gram(s) Oral once PRN Blood Glucose LESS THAN 70 milliGRAM(s)/deciliter  glucagon  Injectable 1 milliGRAM(s) IntraMuscular once PRN Glucose LESS THAN 70 milligrams/deciliter      ITEMS UNCHECKED ARE NOT PRESENT    PRESENT SYMPTOMS: [ ]Unable to obtain due to poor mentation   Source if other than patient:  [ ]Family   [ ]Team     Pain:  [ ] yes [ ] no  QOL impact -   Location -                    Aggravating factors -  Quality -  Radiation -  Timing-  Severity (0-10 scale):  Minimal acceptable level (0-10 scale):     Dyspnea:                           [ ]Mild [ ]Moderate [ ]Severe  Anxiety:                             [ ]Mild [ ]Moderate [ ]Severe  Fatigue:                             [ ]Mild [ ]Moderate [ ]Severe  Nausea:                             [ ]Mild [ ]Moderate [ ]Severe  Loss of appetite:              [ ]Mild [ ]Moderate [ ]Severe  Constipation:                    [ ]Mild [ ]Moderate [ ]Severe    PAIN AD Score:	  [email or web address] (Ctrl + left click to view)    Other Symptoms:  [ ]All other review of systems negative     Karnofsky Performance Score/Palliative Performance Status Version 2:  30%    http://npcrc.org/files/news/palliative_performance_scale_ppsv2.pdf  PPSv2.pdf  PHYSICAL EXAM: Due to Covid-19 pandemic hospital policy to reduce exposure will defer to exam completed by primary team.  Vital Signs Last 24 Hrs  T(C): 36.4 (06 Apr 2020 09:06), Max: 38.3 (05 Apr 2020 15:38)  T(F): 97.6 (06 Apr 2020 09:06), Max: 101 (05 Apr 2020 15:38)  HR: 102 (06 Apr 2020 09:06) (90 - 102)  BP: 171/60 (06 Apr 2020 09:06) (150/56 - 171/60)  BP(mean): --  RR: 34 (06 Apr 2020 09:06) (28 - 34)  SpO2: 84% (06 Apr 2020 09:06) (84% - 97%) I&O's Summary     GENERAL: Please refer to daily physical exam  [ ]Alert  [ ]Oriented x   [ ]Lethargic  [ ]Cachexia  [ ]Unarousable  [ ]Verbal  [ ]Non-Verbal  Behavioral:   [ ] Anxiety  [ ] Delirium [ ] Agitation [ ] Other  HEENT:  [ ]Normal   [ ]Dry mouth   [ ]ET Tube/Trach  [ ]Oral lesions  PULMONARY:   [ ]Clear [ ]Tachypnea  [ ]Audible excessive secretions   [ ]Rhonchi        [ ]Right [ ]Left [ ]Bilateral  [ ]Crackles        [ ]Right [ ]Left [ ]Bilateral  [ ]Wheezing     [ ]Right [ ]Left [ ]Bilateral  CARDIOVASCULAR:    [ ]Regular [ ]Irregular [ ]Tachy  [ ]Harman [ ]Murmur [ ]Other  GASTROINTESTINAL:  [ ]Soft  [ ]Distended   [ ]+BS  [ ]Non tender [ ]Tender  [ ]PEG [ ]OGT/ NGT   Last BM:      GENITOURINARY:  [ ]Normal [ ] Incontinent   [ ]Oliguria/Anuria   [ ]Flores  MUSCULOSKELETAL:   [ ]Normal   [ ]Weakness  [ ]Bed/Wheelchair bound [ ]Edema  NEUROLOGIC:   [ ]No focal deficits  [ ] Cognitive impairment  [ ] Dysphagia [ ]Dysarthria [ ] Paresis [ ]Other   SKIN:   [ ]Normal   [ ]Pressure ulcer(s)  [ ]Rash    CRITICAL CARE:  [ ] Shock Present  [ ]Septic [ ]Cardiogenic [ ]Neurologic [ ]Hypovolemic  [ ]  Vasopressors [ ]  Inotropes   [x] Respiratory failure present [ ] Mechanical Ventilation [ ] Non-invasive ventilatory support [ ] High-Flow  [x] Acute  [ ] Chronic [ ] Hypoxic  [ ] Hypercarbic [ ] Other  [x] Other organ failure; skin, renal    LABS:                        9.6    34.77 )-----------( 538      ( 06 Apr 2020 07:33 )             31.6   04-05    140  |  93<L>  |  73<H>  ----------------------------<  83  6.1<H>   |  14<L>  |  5.17<H>    Ca    10.1      05 Apr 2020 06:44  Phos  3.7     04-05  Mg     2.5     04-05    TPro  9.1<H>  /  Alb  3.7  /  TBili  0.5  /  DBili  x   /  AST  94<H>  /  ALT  28  /  AlkPhos  116  04-05    COVID-19 PCR . (04.03.20 @ 14:42)    COVID-19 PCR: Detected: This test has been validated by [employer] to be accurate;  though it has not been FDA cleared/approved by the usual pathway.  As with all laboratory tests, results should be correlated with clinical  findings.  https://www.fda.gov/media/876287/download  https://www.fda.gov/media/204760/download      RADIOLOGY & ADDITIONAL STUDIES:  < from: Xray Chest 1 View AP/PA (04.03.20 @ 12:57) >    EXAM:  XR CHEST AP OR PA 1V        PROCEDURE DATE:  Apr  3 2020         INTERPRETATION:  INDICATION:Shortness of Breath, Cough,  Fever pneumonia. Follow-up exam    COMPARISON: 10/10/2019    FINDINGS:    Heart: The heart is enlarged.    Mediastinum:Mediastinal contours are normal. There are no enlarged mediastinal or hilar nodes.    Lungs: Diffuse bilateral central and peripheral infiltrates are noted.    Pulmonary vascularity: Pulmonary vascularity is normal.    Osseous structures: The osseousstructures are intact.    Soft tissues:Left axillary stent is noted    Pleura: There are no pleural effusions.    IMPRESSION:    Diffuse bilateral pulmonary infiltrates.    Protein Calorie Malnutrition Present: [ ] yes [ ] no  [x] PPSV2 < or = 30%  [ ] significant weight loss [ ] poor nutritional intake [ ] anasarca [ ] catabolic state Albumin, Serum: 3.7 g/dL (04-05-20 @ 06:44)  Artificial Nutrition [ ]     REFERRALS:   [ ]Chaplaincy  [ ] Hospice  [ ]Child Life  [ ]Social Work  [ ]Case management [ ]Holistic Therapy

## 2020-04-06 NOTE — ADVANCED PRACTICE NURSE CONSULT - REASON FOR CONSULT
Patient known to WOC service line from previous admission seen today after wound care referral received for assessment and recommendations for wound maintenance. Chart reviewed, PMH ESRD HD MWF, HTN, HLD, hypothyroid, CAD, bedbound w/ full thickness sacral pressure injury, presents from [facility] for generalized weakness and SOB. Admitted with COVID-19 (+).

## 2020-04-06 NOTE — PROGRESS NOTE ADULT - PROBLEM SELECTOR PLAN 4
Spoke with patient's sister, Emily Concepcion ([phone]). Discussed patient's current clinical condition, possible diagnosis of covid-19 infection, prognosis, and disease trajectory. She confirmed sister's advance care directives, noting that she would "want everything to be done" to prolong her life. Discussed possibility that cardiopulmonary resuscitation not changing outcome and that it will be a clinical decision of risk/benefit of CPR. Conveyed that it is clinicians duty to not cause suffering and undue harm if a particular procedure/intervention will not help the patient. Patient's sister conveyed understanding.

## 2020-04-06 NOTE — CHART NOTE - NSCHARTNOTEFT_GEN_A_CORE
Pt with multiple comorbities including ESRD on HD, bedbound with decubitus ulcer presenting from SNF.  Pt now with COVID respiratory failure.  COntinue all aggressive medical interventions.  However, due to pt's overall poor prognosis, intubation and cpr would not change outcome.  Discussed with clinical review committee and agree.  Sister made aware.

## 2020-04-06 NOTE — ADVANCED PRACTICE NURSE CONSULT - ASSESSMENT
General: Patient in prone position on 100% non-rebreather. Appeared comfortable at time of assessment (1100) in no acute distress.     Sacrum- stage 3 pressure injury- [id number]. Tissue type presents as 100% dusky- purple maroon flat agranular base. Irregular borders. Small serous drainage, no odor. Periwound skin with chronic hyperpigmentation of bilateral buttock. No increased warmth, no erythema, no induration (no signs of soft tissue infection or wound infection). Goals of care: wound maintenance; manage drainage while maintaining moist environment for wound healing, protect periwound skin.

## 2020-04-06 NOTE — PROGRESS NOTE ADULT - PROBLEM SELECTOR PLAN 1
Tolerating HD today.   BP high at start of treatment, but had intradialytic hypotension. UF goal limited to 1.8L.   Borderline high K noted, will correct with HD.   Added K restriction in diet.   Continue eleazar with HD
high K noted, hemolyzed  c/w K restriction in diet.   scheduled for HD today w/2k bath, uf 1.5kg as tolerated  dose all meds for ESRD  Continue eleazar with HD
Most likely 2/2 to Covid-19. As per EMS saturating 55% on RA, improved to 93% on NRM on admission. Imaging revealed diffuse bilateral pulmonary infiltrates. Patient remains tachypneic and oxygen saturation continues to worsen. Current RR 34, O2 sat 84% on NRM.

## 2020-04-06 NOTE — PROGRESS NOTE ADULT - PROBLEM SELECTOR PLAN 2
BCx from outpt HD unit grew pansensitive strept agalactiae.  Leukocytosis decreasing. hemodynamics stable.  Unclear source of bacteremia. On Ctx as per ID.  Blood culture neg 0n 10/10 in Hosp.
BCx from outpt HD unit grew pansensitive strept agalactiae.  Leukocytosis decreasing. hemodynamics stable.  Unclear source of bacteremia. On Ctx as per ID.  Blood culture neg 0n 10/10 in Hosp.
Respiratory failure 2/2 covid-19 infection. Patient remains tachypneic (RR 34) and saturating 84% on NRM. May improve after HD session.    -Consider starting Dilaudid 0.5 mg IV q3-4hr prn dyspnea  -Consider starting Ativan 0.5 mg IV q4hr prn anxiety/agitation/refractory dyspnea  -Continue with supplemental O2

## 2020-04-06 NOTE — PROGRESS NOTE ADULT - NSTELEHEALTH_GEN_ALL_CORE
Chief Complaint   Patient presents with     RECHECK     Interstitial Lung     Milagros Amaya CMA    
No

## 2020-04-06 NOTE — PROGRESS NOTE ADULT - PROBLEM SELECTOR PLAN 5
Palliative care consulted for GOC in the setting of advanced illness and respiratory failure 2/2 Covid-19 infection. Spoke with patient's sister (Emily Izaguirre), conveyed severity of patient's condition, increase in oxygen requirement and poor prognosis. Patient's sister is willing to start medications for symptom management (i.e. Dilaudid, Ativan).    Discussed case with clinical review committee and agree that intubation and resuscitation will not change overall outcome.     Ryann Friend MD  Palliative Medicine Fellow      In the event of newly developing, evolving, or worsening symptoms, please contact the Palliative Medicine team via pager (if the patient is at [facility] #[phone] or if the patient is at [facility] #[phone]) The Geriatric and Palliative Medicine service has coverage 24 hours a day/ 7 days a week to provide medical recommendations regarding symptom management needs.

## 2020-04-07 LAB
FERRITIN SERPL-MCNC: > 8000 NG/ML — HIGH (ref 15–150)
PROCALCITONIN SERPL-MCNC: 55.38 NG/ML — HIGH (ref 0.02–0.1)

## 2020-04-07 NOTE — CHART NOTE - NSCHARTNOTEFT_GEN_A_CORE
Called initially for RRT that was upgraded to code blue.     Upon review of chart, patient seen and consulted on by ethics committee with decision that CPR and intubation would not change clinical outcome in this 56 y/o F with multiple comorbidities including ESRD on HD, bedbound with decubitus ulcer who came from a SNF. Patient was PEA when initially placed on the monitor. Performed 1 round of CPR and at rhythm and pulse check, patient was systole. Time of death called. Family notified by primary team.     Sylvie Mercado MD  MAR

## 2020-04-07 NOTE — DISCHARGE NOTE FOR THE EXPIRED PATIENT - HOSPITAL COURSE
56 yo F PMHx ESRD HD MWF (missed today, last dialyzed 2 days ago), HTN, HLD, hypothyroid, CAD, bedbound w/ sacral decubitus ulcers, presents from [facility] for generalized weakness and SOB.        Problem/Plan - 1:  ·  Problem: Acute hypoxemic respiratory failure due to COVID-19 with Viral pneumonia from Viral syndrome.      also likely large component of pulm edema due to missed HD      respiratory status now worsened       Plaquenil per protocol for COVID-19       will add Anakinra vs steroids if not better       Full AC as bellow       O2 and wean down as able\       fluid removal via HD       continue supportive care and supplements      encourage Po intake as able with spiration persecutions      Problem/Plan - 2:  ·  Problem: ESRD on dialysis.    Renal f/u, HD today  monitor BMP     Problem/Plan - 3:  ·  Problem: CAD in native artery.  Plan: on Plavix,      Problem/Plan - 4:  ·  Problem: HTN (hypertension).  Plan: Hold BP meds , monitor BP Closely, BP stable now, Avoid Hypotension,      Problem/Plan - 5:  ·  Problem: Diabetes mellitus.  Plan: FS, Sliding scale Q 6 HR, NPO, + Hypoxia, on 100% NRB, HgbA1c, Fructosamine,      Problem/Plan - 6:  Problem: Hypothyroidism. Plan: on synthroid, TSH, free T4,     Problem/Plan - 7:  ·  Problem: Pressure ulcer.    Wound consult and mgmt    -GI/DVT Prophylaxis.  - given new finding / recommendations / gridlines regarding treatment of COVID-19, will keep patient on full dose anticoagulation, renally dosed Lovenox, for treatment of possible microemboli.    monitor closely for bleeding or other complications.       ***4/7---RRT CALLED, UPGRADED TO CODE BLUE ON ARRIVAL, pt found to be unresponsive, no response to verbal or tactile stimuli. Pt pronounced dead at 06:14. Pt's sister Emily Izaguirre [phone] notified. Medical attending made aware.*****

## 2020-04-28 NOTE — ED ADULT NURSE NOTE - BREATHING, MLM
[de-identified] : outside audio with poor SDS (52%) R ear and larger ABG; stable SNHL L ear\par today's audio with small ABG on R and poor SDS (40%)
Spontaneous, unlabored and symmetrical

## 2020-11-25 NOTE — PROGRESS NOTE ADULT - ASSESSMENT
3 year old female from [place], bedbound due to difficulty walking likely 2/2 to neuropathy with PMHx of anemia, HFpEF, ESRD on HD (M, W, F) R chest perm cath,  Clostridium difficile infection, DM, Diabetic neuropathy, HTN, HLD, Hypothyroidism, OM of jaw, Parathyroid adenoma, MRSA infections, stage 4 sacral Decubitus  and GERD was sent from NH due to positive blood cultures.  1.ABX as per ID.  2.HTN- BP medication.  3.Vasular f/u, AVF-p, pt at low freddie-operative risk for cardiac complication..  4.ABX as per ID.  5.ESRD-renal f/u.  6.DM-Insulin.  7.Hypothyroidism-synthroid.  8.GI and DVT prophylaxis.
3 year old female from [place], bedbound due to difficulty walking likely 2/2 to neuropathy with PMHx of anemia, HFpEF, ESRD on HD (M, W, F) R chest perm cath,  Clostridium difficile infection, DM, Diabetic neuropathy, HTN, HLD, Hypothyroidism, OM of jaw, Parathyroid adenoma, MRSA infections, stage 4 sacral Decubitus  and GERD was sent from NH due to positive blood cultures.  1.ABX as per ID.  2.HTN- BP medication.  3.Vasular f/u, AVF-p, pt at low freddie-operative risk for cardiac complication..  4.ABX as per ID.  5.ESRD-renal f/u.  6.DM-Insulin.  7.Hypothyroidism-synthroid.  8.GI and DVT prophylaxis.
3 year old female from [place], bedbound due to difficulty walking likely 2/2 to neuropathy with PMHx of anemia, HFpEF, ESRD on HD (M, W, F) R chest perm cath,  Clostridium difficile infection, DM, Diabetic neuropathy, HTN, HLD, Hypothyroidism, OM of jaw, Parathyroid adenoma, MRSA infections, stage 4 sacral Decubitus  and GERD was sent from NH due to positive blood cultures.  1.ABX as per ID.  2.HTN- BP medication.  3.Vasular f/u, AVF-p, pt at low freddie-operative risk for cardiac complication..  4.ABX as per ID.  5.ESRD-renal f/u.  6.DM-Insulin.  7.Hypothyroidism-synthroid.  8.GI and DVT prophylaxis.
3 year old female from [place], bedbound due to difficulty walking likely 2/2 to neuropathy with PMHx of anemia, HFpEF, ESRD on HD (M, W, F) R chest perm cath,  Clostridium difficile infection, DM, Diabetic neuropathy, HTN, HLD, Hypothyroidism, OM of jaw, Parathyroid adenoma, MRSA infections, stage 4 sacral Decubitus  and GERD was sent from NH due to positive blood cultures.  1.ABX as per ID.  2.HTN-Inc hydralazine 75 mg tid,cont rest of BP medication.  3.Vasular f/u, AVF-p, pt at low freddie-operative risk for cardiac complication..  4.ABX as per ID.  5.ESRD-renal f/u.  6.DM-Insulin.  7.Hypothyroidism-synthroid.  8.GI and DVT prophylaxis.
3 year old female from [place], bedbound due to difficulty walking likely 2/2 to neuropathy with PMHx of anemia, HFpEF, ESRD on HD (M, W, F) R chest perm cath,  Clostridium difficile infection, DM, Diabetic neuropathy, HTN, HLD, Hypothyroidism, OM of jaw, Parathyroid adenoma, MRSA infections, stage 4 sacral Decubitus  and GERD was sent from NH due to positive blood cultures.  1.ABX as per ID.  2.HTN- BP medication.  3.Vasular f/u, S/P AVF.  4.ABX as per ID.  5.ESRD-renal f/u.  6.DM-Insulin.  7.Hypothyroidism-synthroid.  8.GI and DVT prophylaxis.
3 year old female from [place], bedbound due to difficulty walking likely 2/2 to neuropathy with PMHx of anemia, HFpEF, ESRD on HD (M, W, F) R chest perm cath,  Clostridium difficile infection, DM, Diabetic neuropathy, HTN, HLD, Hypothyroidism, OM of jaw, Parathyroid adenoma, MRSA infections, stage 4 sacral Decubitus  and GERD was sent from NH due to positive blood cultures.  1.Await repeat limited echo and bubble study.  2.ABX as per ID.  3.Vasular f/u.  4.ABX as per ID.  5.ESRD-renal f/u.  6.DM-Insulin.  7.Hypothyroidism-synthroid.  8.HTN-cont BP medication.  9.GI and DVT prophylaxis.
3 year old female from [place], bedbound due to difficulty walking likely 2/2 to neuropathy with PMHx of anemia, HFpEF, ESRD on HD (M, W, F) R chest perm cath,  Clostridium difficile infection, DM, Diabetic neuropathy, HTN, HLD, Hypothyroidism, OM of jaw, Parathyroid adenoma, MRSA infections, stage 4 sacral Decubitus  and GERD was sent from NH due to positive blood cultures.  1.ABX as per ID.  2.HTN- BP medication.  3.Vasular f/u, AVF-p, pt at low freddie-operative risk for cardiac complication..  4.ABX as per ID.  5.ESRD-renal f/u.  6.DM-Insulin.  7.Hypothyroidism-synthroid.  8.GI and DVT prophylaxis.
3 year old female from [place], bedbound due to difficulty walking likely 2/2 to neuropathy with PMHx of anemia, HFpEF, ESRD on HD (M, W, F) R chest perm cath,  Clostridium difficile infection, DM, Diabetic neuropathy, HTN, HLD, Hypothyroidism, OM of jaw, Parathyroid adenoma, MRSA infections, stage 4 sacral Decubitus  and GERD was sent from NH due to positive blood cultures.  1.Await repeat limited echo and bubble study.  2.ABX as per ID.  3.Vasular f/u, AVF-p, pt at low freddie-operative risk for cardiac complication..  4.ABX as per ID.  5.ESRD-renal f/u.  6.DM-Insulin.  7.Hypothyroidism-synthroid.  8.HTN-cont BP medication.  9.GI and DVT prophylaxis.
52 y/o F from [place], bedbound due to difficulty walking likely 2/2 to polyneuropathy with PMHx of anemia, HFpEF, ESRD on HD, Clostridium difficile infection, DM, Diabetic neuropathy, HTN, HLD, Hypothyroidism, OM of jaw, Parathyroid adenoma, and GERD was sent from NH due to leukocytosis of 20, possibly 2/2 infected multiple decubiti
52 y/o F from [place], bedbound due to difficulty walking likely 2/2 to polyneuropathy with PMHx of anemia, HFpEF, ESRD on HD, Clostridium difficile infection, DM, Diabetic neuropathy, HTN, HLD, Hypothyroidism, OM of jaw, Parathyroid adenoma, and GERD was sent from NH due to leukocytosis of 20, possibly 2/2 infected multiple decubiti
52 y/o Female w/ ESRD on HD via PC 9/28, hx of sepsis 2/2 infected PC     -HD via PC as per renal   -Plan for Lt AVF 10/4  -Vein mapping done and reviewed   -Pt cleared by cardiology at low freddie-operative risk  -Care as per medical team   -Will follow
52yo female with ESRD undergoing left arm AV fistula creation    1) npo after midnight  2) pre-op labs  3) consent to be obtained
53 yr old female with ESRD on HD, via permacath. sent from dialysis unit with bacteremia.   PC was removed and re-inserted after BC negative   HD today  awaits AV access surgery tommorrow.    recs:  cont ABX  cont  renvela  procrit on HD
53 yr old female with ESRD on HD, via permacath. sent from dialysis unit with bacteremia.   PC was removed and re-inserted after BC negative  s/p HD yesterday.  awaits AV access surgery.    recs:  cont ABX  cont  renvela  procrit on HD
53 yr old female with ESRD on HD, via permacath. sent from dialysis unit with bacteremia.   PC was removed and re-inserted after BC negative  seen on HD stable  awaits AV access surgery.    recs:  Seen on HD today.  cont ABX  cont  renvela  procrit on HD
53 yr old female with ESRD on HD, via permacath. sent from dialysis unit with bacteremia. RPT blood cultures are positive. denies any chills or rigors. leucocytosis better.  rpt BC neg.    recs:  s/p removal of  Permacath    cathter free holiday.  ensure flid restriction 1200 cc/day, renal diet  daily BMP  hopefully can delay placement pf PC and  HD until Friday
53 yr old female with ESRD on HD, via permacath. sent from dialysis unit with bacteremia. RPT blood cultures are positive. denies any chills or rigors. leucocytosis better.  rpt blood cultures negative  s/p  permacath and HD yesterday.    recs:  HD on Monday.  cont ABX  cont  renvela  procrit on HD
53 yr old female with ESRD on HD, via permacath. sent from dialysis unit with bacteremia. RPT blood cultures are positive. denies any chills or rigors. leucocytosis better.  rpt blood cultures negative  s/p  permacath today    recs:  HD today  cont ABX  echo with bubble study for lesion in cath tip  phos elevated. start renvela
53 yr old female with ESRD on HD, via permacath. sent from dialysis unit with bacteremia. RPT blood cultures are positive. denies any chills or rigors. leucocytosis better.  rpt blood cultures negative  s/p removal of permacath     recs:  cathter free holiday.  ensure fluid restriction 1200 cc/day, renal diet  daily BMP  plans for permacath in AM and HD thereafter.
53 yr old female with ESRD on HD, via permacath. sent from dialysis unit with bacteremia. RPT blood cultures are positive. denies any chills or rigors. leucocytosis worse.    recs:  remove Permacath today.   cathter free holiday.  ensure flid restriction 1200 cc/day, renal diet  daily BMP  hopefully can delay HD until Friday to clear bacteremia
53F s/p LUE AV fistula for ESRD  -pain control  -continue home medications   -dressing can be taken off tomorrow morning  -continue inpatient care as per primary team  -stitches to be removed in 2 weeks  -f/u in vascular surgery clinic with Dr. Gonsalves if discharged before then  -please call vascular surgery as needed
53F s/p LUE AV fistula for ESRD  -pain control  -continue home medications   -keep dressing for 48 hours  -continue inpatient care as per primary team
54 y/o F from [place], bedbound due to difficulty walking likely 2/2 to polyneuropathy with PMHx of anemia, HFpEF, ESRD on HD, Clostridium difficile infection, DM, Diabetic neuropathy, HTN, HLD, Hypothyroidism, OM of jaw, Parathyroid adenoma, and GERD was sent from NH due to leukocytosis of 20, possibly 2/2 infected multiple decubiti
54 y/o F from [place], bedbound due to difficulty walking likely 2/2 to polyneuropathy with PMHx of anemia, HFpEF, ESRD on HD, Clostridium difficile infection, DM, Diabetic neuropathy, HTN, HLD, Hypothyroidism, OM of jaw, Parathyroid adenoma, and GERD was sent from NH due to leukocytosis of 20, possibly 2/2 infected multiple decubiti
54 y/o F from [place], bedbound due to difficulty walking likely 2/2 to polyneuropathy with PMHx of anemia, HFpEF, ESRD on HD, Clostridium difficile infection, DM, Diabetic neuropathy, HTN, HLD, Hypothyroidism, OM of jaw, Parathyroid adenoma, and GERD was sent from NH due to leukocytosis of 20, possibly 2/2 infected multiple decubiti
54 y/o F from [place], bedbound due to difficulty walking likely 2/2 to polyneuropathy with PMHx of anemia, HFpEF, ESRD on HD, Clostridium difficile infection, DM, Diabetic neuropathy, HTN, HLD, Hypothyroidism, OM of jaw, Parathyroid adenoma, and GERD was sent from NH due to leukocytosis of 20, possibly 2/2 infected multiple decubiti
54 y/o F from [place], bedbound due to difficulty walking likely 2/2 to polyneuropathy with PMHx of anemia, HFpEF, ESRD on HD, Clostridium difficile infection, DM, Diabetic neuropathy, HTN, HLD, Hypothyroidism, OM of jaw, Parathyroid adenoma, and GERD was sent from NH due to leukocytosis of 20, possibly 2/2 infected multiple decubiti
54 y/o F from [place], bedbound due to difficulty walking likely 2/2 to polyneuropathy with PMHx of anemia, HFpEF, ESRD on HD, Clostridium difficile infection, DM, Diabetic neuropathy, HTN, HLD, Hypothyroidism, OM of jaw, Parathyroid adenoma, and GERD was sent from NH due to leukocytosis of 20, possibly 2/2 infected multiple decubiti
54 y/o F from [place], bedbound due to difficulty walking likely 2/2 to polyneuropathy with PMHx of anemia, HFpEF, ESRD on HD, Clostridium difficile infection, DM, Diabetic neuropathy, HTN, HLD, Hypothyroidism, OM of jaw, Parathyroid adenoma, and GERD was sent from NH due to leukocytosis of 20, possibly 2/2 infected multiple decubiti
A/P  ESRD ON  HD  SEEN   DURING HD    TOLERATING  IT  WELL      ON  EPO WITH  HD      S/P  CREATION  OF  NEW  AVF  ON  VANCO   WITH HD      CAME  IN  WITH POSITIVE  BLOOD  CULTURES  SEC  TO  INFECTED  PC    WAS  REMOVED  AND HAS NEW  PC  D/C  PLANNING
Statement Selected
52 y/o F from [place], bedbound due to difficulty walking likely 2/2 to polyneuropathy with PMHx of anemia, HFpEF, ESRD on HD, Clostridium difficile infection, DM, Diabetic neuropathy, HTN, HLD, Hypothyroidism, OM of jaw, Parathyroid adenoma, and GERD was sent from NH due to leukocytosis of 20, possibly 2/2 infected multiple decubiti
Infected permacath - s/p removal      plan - restart vancomycin at 750mgs iv 3 x week during dialysis till 10/9/18( 2 weeks post negative culture)  dc planning  reconsult prn
54 y/o F from [place], bedbound due to difficulty walking likely 2/2 to polyneuropathy with PMHx of anemia, HFpEF, ESRD on HD, Clostridium difficile infection, DM, Diabetic neuropathy, HTN, HLD, Hypothyroidism, OM of jaw, Parathyroid adenoma, and GERD was sent from NH due to leukocytosis of 20, possibly 2/2 infected multiple decubiti

## 2020-12-31 NOTE — DISCHARGE NOTE NURSING/CASE MANAGEMENT/SOCIAL WORK - NSPROEXTENSIONSOFSELF_GEN_A_NUR
12/31/2020 at 1130 a.m. right  implanted port accessed using a 20 gauge non-coring needle primed with 0.9% sodium chloride.  Good blood return obtained.  Blood not collected. Flushed with 20ml 0.9% sodium chloride. Implanted port site is not sensitive to touch, not swollen, not warm, and not reddened.  Patient tolerated procedure without complaint.    
Implanted port flushed with 50ml 0.9% sodium chloride followed by Heparin 500 units/5ml.  Needle removed from port..  Patient left ambulatory.   
none

## 2021-01-25 NOTE — PROVIDER CONTACT NOTE (CRITICAL VALUE NOTIFICATION) - PERSON GIVING RESULT:
jah muñiz Quality 400a: One-Time Screening For Hepatitis C Virus (Hcv) For All Patients: Patient received one-time screening for HCV infection Quality 130: Documentation Of Current Medications In The Medical Record: Current Medications Documented Quality 110: Preventive Care And Screening: Influenza Immunization: Influenza immunization was not ordered or administered, reason not given Quality 226: Preventive Care And Screening: Tobacco Use: Screening And Cessation Intervention: Patient screened for tobacco use and is an ex/non-smoker Quality 131: Pain Assessment And Follow-Up: Pain assessment using a standardized tool is documented as negative, no follow-up plan required Quality 431: Preventive Care And Screening: Unhealthy Alcohol Use - Screening: Patient screened for unhealthy alcohol use using a single question and scores less than 2 times per year Quality 474: Zoster Vaccination Status: Shingrix Vaccination Administered or Previously Received Detail Level: Detailed

## 2021-02-11 NOTE — ASU PREOP CHECKLIST - HEART RATE (BEATS/MIN)
[address] [place] Counseling: I discussed with the patient the risks of tildrakizumab including but not limited to immunosuppression, malignancy, posterior leukoencephalopathy syndrome, and serious infections.  The patient understands that monitoring is required including a PPD at baseline and must alert us or the primary physician if symptoms of infection or other concerning signs are noted.

## 2021-03-20 NOTE — PROGRESS NOTE ADULT - ASSESSMENT
53F PMHx HFpEF, ESRD on HD, DM, HTN, HLD, Hypothyroidism, Parathyroid adenoma, GERD admitted for sepsis due to sacral abscess. None

## 2021-05-25 NOTE — DISCHARGE NOTE ADULT - NS AS DC AMI YN
Detail Level: Detailed Quality 130: Documentation Of Current Medications In The Medical Record: Current Medications Documented Quality 226: Preventive Care And Screening: Tobacco Use: Screening And Cessation Intervention: Patient screened for tobacco use and is an ex/non-smoker Quality 431: Preventive Care And Screening: Unhealthy Alcohol Use - Screening: Patient screened for unhealthy alcohol use using a single question and scores less than 2 times per year no

## 2021-07-15 NOTE — PROGRESS NOTE ADULT - SUBJECTIVE AND OBJECTIVE BOX
Universal Safety Interventions PGY 1 Note discussed with supervising resident and primary attending    Patient is a 53y old  Female who presents with a chief complaint of sent from NH due to positive blood cultures (02 Oct 2018 11:02)      INTERVAL HPI/OVERNIGHT EVENTS:  Patient seen at the bedside. No new complains.    MEDICATIONS  (STANDING):  amLODIPine   Tablet 10 milliGRAM(s) Oral daily  ascorbic acid 500 milliGRAM(s) Oral two times a day  chlorhexidine 2% Cloths 1 Application(s) Topical daily  docusate sodium 200 milliGRAM(s) Oral at bedtime  epoetin jacqueline Injectable 4000 Unit(s) IV Push <User Schedule>  epoetin jacqueline Injectable 4000 Unit(s) IV Push <User Schedule>  ferrous    sulfate 325 milliGRAM(s) Oral daily  folic acid 1 milliGRAM(s) Oral daily  gabapentin 100 milliGRAM(s) Oral three times a day  heparin  Injectable 5000 Unit(s) SubCutaneous every 12 hours  hydrALAZINE 100 milliGRAM(s) Oral every 8 hours  influenza   Vaccine 0.5 milliLiter(s) IntraMuscular once  insulin lispro (HumaLOG) corrective regimen sliding scale   SubCutaneous Before meals and at bedtime  insulin lispro Injectable (HumaLOG) 3 Unit(s) SubCutaneous three times a day before meals  levothyroxine 50 MICROGram(s) Oral daily  metoprolol tartrate 50 milliGRAM(s) Oral two times a day  multivitamin/minerals 1 Tablet(s) Oral daily  pantoprazole    Tablet 40 milliGRAM(s) Oral before breakfast  senna 2 Tablet(s) Oral at bedtime  sevelamer hydrochloride 800 milliGRAM(s) Oral three times a day  vancomycin  IVPB 750 milliGRAM(s) IV Intermittent <User Schedule>  zinc sulfate 220 milliGRAM(s) Oral daily    MEDICATIONS  (PRN):  acetaminophen   Tablet .. 650 milliGRAM(s) Oral every 6 hours PRN Moderate Pain (4 - 6)      __________________________________________________  REVIEW OF SYSTEMS:    CONSTITUTIONAL: No fever,   EYES: no acute visual disturbances  NECK: No pain or stiffness  RESPIRATORY: No cough; No shortness of breath  CARDIOVASCULAR: No chest pain, no palpitations  GASTROINTESTINAL: No pain. No nausea or vomiting; No diarrhea   NEUROLOGICAL: No headache or numbness, no tremors  MUSCULOSKELETAL: No joint pain, no muscle pain  GENITOURINARY: no dysuria, no frequency, no hesitancy  PSYCHIATRY: no depression , no anxiety  ALL OTHER  ROS negative        Vital Signs Last 24 Hrs  T(C): 36.3 (02 Oct 2018 13:59), Max: 36.9 (01 Oct 2018 21:34)  T(F): 97.4 (02 Oct 2018 13:59), Max: 98.5 (02 Oct 2018 05:23)  HR: 70 (02 Oct 2018 13:59) (69 - 81)  BP: 135/66 (02 Oct 2018 13:59) (135/66 - 201/84)  BP(mean): --  RR: 17 (02 Oct 2018 13:59) (16 - 18)  SpO2: 97% (02 Oct 2018 13:59) (97% - 100%)    ________________________________________________  PHYSICAL EXAM:  GENERAL: NAD  HEENT: Normocephalic;  conjunctivae and sclerae clear; moist mucous membranes;   NECK : supple  CHEST/LUNG: Clear to auscultation bilaterally with good air entry   HEART: S1 S2  regular; no murmurs, gallops or rubs  ABDOMEN: Soft, Nontender, Nondistended; Bowel sounds present  EXTREMITIES: no cyanosis; no edema; no calf tenderness  SKIN: warm and dry; no rash  NERVOUS SYSTEM:  Awake and alert; Oriented  to place, person and time ; no new deficits    _________________________________________________  LABS:                        9.3    17.0  )-----------( 329      ( 02 Oct 2018 07:16 )             29.9     10-02    133<L>  |  96  |  59<H>  ----------------------------<  132<H>  4.6   |  24  |  5.97<H>    Ca    10.2      02 Oct 2018 07:16  Phos  4.7     10-02  Mg     2.3     10-02    TPro  7.5  /  Alb  2.8<L>  /  TBili  0.3  /  DBili  x   /  AST  10  /  ALT  16  /  AlkPhos  138<H>  10-02    PT/INR - ( 01 Oct 2018 05:37 )   PT: 10.2 sec;   INR: 0.94 ratio             CAPILLARY BLOOD GLUCOSE      POCT Blood Glucose.: 194 mg/dL (02 Oct 2018 11:51)  POCT Blood Glucose.: 129 mg/dL (02 Oct 2018 07:48)  POCT Blood Glucose.: 197 mg/dL (01 Oct 2018 21:52)  POCT Blood Glucose.: 151 mg/dL (01 Oct 2018 16:53)        RADIOLOGY & ADDITIONAL TESTS:    Imaging Personally Reviewed:  YES/NO    Consultant(s) Notes Reviewed:   YES/ No    Care Discussed with Consultants :     Plan of care was discussed with patient and /or primary care giver; all questions and concerns were addressed and care was aligned with patient's wishes.

## 2021-09-20 NOTE — H&P ADULT - PROBLEM SELECTOR PROBLEM 6
[FreeTextEntry1] : Ms. ARTHUR is a 39 year old female referred by Dr. Galileo Elizabeth for a  follow up  on a abnormal CT scan and needle ultrasound aspiration for a neck mass known for 4 months. Her 5.22.21 CT Soft Tissue Neck with Contrast from [facility] showed a small cluster of a borderline 1.1 cm, left supraclavicular lymph nodes of which one has low density center.  A biopsy was proposed to identify the etiology.\par \par Her chief complaint includes swelling along the left sublingual nodule without benefit from NSAIDs (Naprosyn) or antibiotic (Cefdinir).   Today she reports a  recent tooth extraction where she was prescribed new antibiotic that will be completed the end of this week. She  denies overall symptoms today except for left lymph node swelling. Her past medical history  is significant for dysmenorrhea, supraclavicular adenopathy, COVID-19 virus infection, asthma, and hypertension. She is here to discuss her  biopsy results from 9.3.21.
HTN (hypertension)

## 2021-09-24 NOTE — PROGRESS NOTE ADULT - PROBLEM SELECTOR PLAN 2
[address] [place] [address] ED  EMERGENCY DEPARTMENT ENCOUNTER      Pt Name: My De León  MRN: [id number]  [place] 1954  Date of evaluation: 9/23/2021  Provider: Cassie Rodas DO    CHIEF COMPLAINT       Chief Complaint   Patient presents with    Positive For Covid-19    Abdominal Pain     feels bloated and cramping     Chief complaint: Abdominal pain  History of chief complaint: This 69-year-old gentleman presents to the emergency department complaining of diffuse crampy abdominal pain states more prominent across the lower abdomen. Patient states pain started up yesterday and just progressively worsening. Patient denies any associated nausea vomiting or diarrhea no fevers or chills. No associated back pain no dysuria hematuria frequency or urgency. Patient denies any previous abdominal surgeries. Patient states he was diagnosed with Covid last week states he had gone in for regular checkup with his family doctor and was having a little bit of a congestion and just feeling rundown and they did a screening test and ended up calling him Saturday and telling him it was positive. Patient states he did have some intermittent fever and headache on Sunday Monday Tuesday but that is since resolved patient denies any significant cough or difficulty breathing , no chest pain palpitation or shortness of breath. Patient states he was previously vaccinated for covid. Denies any history of diverticulitis or kidney stones. Nursing Notes were reviewed. REVIEW OF SYSTEMS    (2-9 systems for level 4, 10 or more for level 5)     Review of Systems   Constitutional: Negative for chills and diaphoresis. HENT: Negative for congestion, sinus pain and sore throat. Eyes: Negative for discharge and redness. Respiratory: Negative for cough and shortness of breath. Cardiovascular: Negative for chest pain, palpitations and leg swelling. Gastrointestinal: Positive for abdominal pain.  Negative for diarrhea, nausea and vomiting. Genitourinary: Negative for difficulty urinating and dysuria. Musculoskeletal: Negative for back pain and myalgias. Skin: Negative for color change and rash. Neurological: Negative for dizziness, weakness, numbness and headaches. Hematological: Negative for adenopathy. Except as noted above the remainder of the review of systems was reviewed and negative. PAST MEDICAL HISTORY     Past Medical History:   Diagnosis Date    Hyperlipidemia     Hypertension     Hyperuricemia     Obesity          SURGICAL HISTORY       Past Surgical History:   Procedure Laterality Date    BACK SURGERY  1984    COLONOSCOPY  2006    TONSILLECTOMY AND ADENOIDECTOMY           CURRENT MEDICATIONS       Previous Medications    ALLOPURINOL (ZYLOPRIM) 100 MG TABLET    Two tablets daily to prevent gout. ASPIRIN 81 MG CHEWABLE TABLET    Take 162 mg by mouth daily. ATORVASTATIN (LIPITOR) 40 MG TABLET    Take 1 tablet by mouth daily. CHOLECALCIFEROL (VITAMIN D) 2000 UNITS CAPS CAPSULE    Take 1 capsule by mouth daily. COENZYME Q10 (CO Q 10) 100 MG CAPS    Take 1 tablet by mouth. FISH OIL-OMEGA-3 FATTY ACIDS 1000 MG CAPSULE    Take 2 g by mouth daily. GLUCOSAMINE-CHONDROITIN 500-400 MG CAPS    Take  by mouth. IBUPROFEN (ADVIL;MOTRIN) 200 MG CAPS    Take 1 capsule by mouth as needed. LISINOPRIL-HYDROCHLOROTHIAZIDE (PRINZIDE;ZESTORETIC) 10-12.5 MG PER TABLET    Take 1 tablet by mouth daily. METFORMIN (GLUCOPHAGE) 500 MG TABLET    Take 2 tablets by mouth daily    TESTOSTERONE (ANDROGEL) 20.25 MG/1.25GM (1.62%) GEL    Place 4 Act onto the skin daily. VITAMIN D (ERGOCALCIFEROL) 44103 UNITS CAPS CAPSULE    Take 1 capsule by mouth every 14 days. ALLERGIES     Patient has no known allergies.     FAMILY HISTORY       Family History   Problem Relation Age of Onset    Diabetes Father           SOCIAL HISTORY       Social History     Socioeconomic History    Marital status:      Spouse name: None    Number of children: None    Years of education: None    Highest education level: None   Occupational History    None   Tobacco Use    Smoking status: Never Smoker    Smokeless tobacco: Never Used   Substance and Sexual Activity    Alcohol use: No     Alcohol/week: 2.0 standard drinks     Types: 2 Cans of beer per week    Drug use: No    Sexual activity: None   Other Topics Concern    None   Social History Narrative    None     Social Determinants of Health     Financial Resource Strain:     Difficulty of Paying Living Expenses:    Food Insecurity:     Worried About Running Out of Food in the Last Year:     Ran Out of Food in the Last Year:    Transportation Needs:     Lack of Transportation (Medical):      Lack of Transportation (Non-Medical):    Physical Activity:     Days of Exercise per Week:     Minutes of Exercise per Session:    Stress:     Feeling of Stress :    Social Connections:     Frequency of Communication with Friends and Family:     Frequency of Social Gatherings with Friends and Family:     Attends [employer]ism Services:     Active Member of Clubs or Organizations:     Attends Club or Organization Meetings:     Marital Status:    Intimate Partner Violence:     Fear of Current or Ex-Partner:     Emotionally Abused:     Physically Abused:     Sexually Abused:          PHYSICAL EXAM    (up to 7 for level 4, 8 or more for level 5)     ED Triage Vitals [09/23/21 2250]   BP Temp Temp Source Pulse Resp SpO2 Height Weight   125/64 98.4 °F (36.9 °C) Oral 72 18 94 % 5' 8\" (1.727 m) 245 lb (111.1 kg)       Physical Exam   General appearance: Patient is awake alert interactive appropriate nontoxic in no acute distress  Head is atraumatic normocephalic  Eyes pupils are equal and reactive sclera white conjunctive are pink  Oral pharyngeal cavity is pink with good moisture, no exudates or ulcerations no asymmetry, the airway is widely patent  Neck: Supple no meningeal signs no adenopathy no JVD  Heart: Regular rate and rhythm no gross murmurs rubs or clicks  Lungs: Breath sounds are clear with good air movement throughout no active wheezes rales or rhonchi no respiratory distress  Abdomen: Obese soft mildly distended with tenderness to palpation throughout increased over the left lower quadrant there is some mild guarding on palpation through the lower abdomen no rebound or rigidity no gross palpable masses hernias or fullness appreciated no overlying rash ecchymosis or abrasion femoral pulses are full and symmetric. Back: Nontender to palpation no costovertebral angle tenderness  Skin: Warm and dry without rashes  Lower extremities: No edema or calf tenderness or asymmetry. DIAGNOSTIC RESULTS       RADIOLOGY:   Non-plain film images such as CT, Ultrasound and MRI are read by the radiologist. Plain radiographic images are visualized and preliminarily interpreted by the emergency physician with the below findings:    Chest x-ray 1 view interpreted by ED physician indication Covid positive: Heart mediastinum are within normal limits lung fields reveal some focal patchy infiltrate in the right lower lung base. No gross vascular congestion or pneumothorax appreciated. Official radiology report is pending    Interpretation per the Radiologist below, if available at the time of this note:    CT ABDOMEN PELVIS WO CONTRAST Additional Contrast? None    (Results Pending)   XR CHEST PORTABLE    (Results Pending)   CT scan of the abdomen and pelvis faxed result from radiology shows bibasilar infiltrates right greater than left felt may represent atelectasis or pneumonia hepatomegaly, renal stones in the left kidney no ureteral stones incidental diverticulosis without diverticulitis scattered gas/fluid levels with fluid-filled nondilated small bowel suggest possible mild ileus.   Fat-containing left inguinal hernia      LABS:  Labs Reviewed   COVID-19, RAPID - Abnormal; Notable hospitalist to arrange admission for further evaluation and care I spoke with Dr. Amanda Mc who will accept the patient requested I add a D-dimer and CTA of the chest for further evaluation. D-dimer was elevated CTA of the chest was ordered and pending    FINAL IMPRESSION      1. Pneumonia due to COVID-19 virus    2. Generalized abdominal pain    3. Hypoxia          DISPOSITION/PLAN   DISPOSITION    Patient awaiting transport to [address] [place] [address]:  No follow-up provider specified. DISCHARGE MEDICATIONS:  New Prescriptions    No medications on file     Controlled Substances Monitoring:     No flowsheet data found.     (Please note that portions of this note were completed with a voice recognition program.  Efforts were made to edit the dictations but occasionally words are mis-transcribed.)    Megan Magaña DO (electronically signed)  Attending Emergency Physician            Megan Magaña DO  09/24/21 4418 cont antibiotics  ID follow up  follow up CXR  monitor WBC and temp

## 2021-11-16 NOTE — DISCHARGE NOTE FOR THE EXPIRED PATIENT - AUTOPSY OFFERED
Anesthesia Pre Eval Note    Anesthesia ROS/Med Hx          Pulmonary Review:  Positive for pneumonia  Positive for sleep apnea     End/Other Review:    Positive for cancer      Relevant Problems   Anesthesia Problems   (+) Sleep apnea       Physical Exam     Airway   Mallampati: II  TM Distance: >3 FB  Neck ROM: Full  Neck: Non-tender and Able to place in sniff position  TMJ Mobility: Good    Cardiovascular  Cardiovascular exam normal  Cardio Rhythm: Regular  Cardio Rate: Normal    Head Assessment  Head assessment: Normocephalic and Atraumatic    General Assessment  General Assessment: Alert and oriented and No acute distress    Dental Exam  Dental exam normal    Pulmonary Exam  Pulmonary exam normal  Breath sounds clear to auscultation:  Yes    Abdominal Exam  Abdominal exam normal      Anesthesia Plan:    ASA Status: 2  Anesthesia Type: General    Induction: Intravenous  Preferred Airway Type: LMA  Maintenance: Inhalational    Post-op Pain Management: Per Surgeon      Checklist  Reviewed: NPO Status, Allergies, Medications, Problem list and Past Med History  Consent/Risks Discussed Statement:  The proposed anesthetic plan, including its risks and benefits, have been discussed with the Patient along with the risks and benefits of alternatives. Questions were encouraged and answered and the patient and/or representative understands and agrees to proceed.        I discussed with the patient (and/or patient's legal representative) the risks and benefits of the proposed anesthesia plan, General, which may include services performed by other anesthesia providers.    Alternative anesthesia plans, if available, were reviewed with the patient (and/or patient's legal representative). Discussion has been held with the patient (and/or patient's legal representative) regarding risks of anesthesia, which include vomiting, nausea and dental injury and emergent situations that may require change in anesthesia plan.    The patient  (and/or patient's legal representative) has indicated understanding, his/her questions have been answered, and he/she wishes to proceed with the planned anesthetic.    Blood Products: Not Anticipated     no

## 2022-01-14 NOTE — PROGRESS NOTE ADULT - LYMPH NODES
No lymphadedenopathy
No
No lymphadedenopathy

## 2022-02-14 NOTE — ED PROVIDER NOTE - CONSTITUTIONAL [-], MLM
Encounter Date: 2/14/2022       History     Chief Complaint   Patient presents with    Wound Check     Pt had dialysis access placed to left chest, family reports bleeding from site, and slight swelling noted, and pain around site      Mr. Castro is a 76-year-old who presents by personal transportation with a family member.  He has end-stage renal disease and is currently receiving hemodialysis through a tunnel catheter to his chest.  He underwent AV fistula surgery performed by Dr. Dubon three days ago.  He complains of severe discomfort at his surgical site since the operation.  The pain is somewhat improved with Norco which he last took at 07:00 this morning.  He denies fever, chills, nausea, vomiting, chest pain, and shortness of breath.  He has not removed the dressing placed at the time of his operation.    He has a past medical history of Arthritis, Cataract, Chronic diastolic congestive heart failure, Coronary artery disease, Cystoid macular edema of both eyes, Diabetes mellitus type II, Diabetic retinopathy, Hyperlipemia, Hypertension, Retinal hole, Stage 4 chronic kidney disease, and Streptococcus pyogenes bacteremia (12/23/2018).        Review of patient's allergies indicates:   Allergen Reactions    Atorvastatin Other (See Comments)     Past Medical History:   Diagnosis Date    Arthritis     Cataract     Chronic diastolic congestive heart failure     Coronary artery disease     Cystoid macular edema of both eyes     Diabetes mellitus type II     Diabetic retinopathy     Hyperlipemia     Hypertension     Retinal hole     Stage 4 chronic kidney disease     Streptococcus pyogenes bacteremia 12/23/2018    Due to left toe osteomyelitis     Past Surgical History:   Procedure Laterality Date    ABDOMINAL AORTOGRAPHY N/A 7/30/2019    Procedure: AORTOGRAM-ABDOMINAL;  Surgeon: Amish Hyatt MD;  Location: [facility] CATH LAB/EP;  Service: Cardiology;  Laterality: N/A;  CO2 angiography    ANGIOGRAPHY  OF LOWER EXTREMITY Left 2019    Procedure: Angiogram Extremity Unilateral;  Surgeon: Amish Hyatt MD;  Location: [facility] CATH LAB/EP;  Service: Cardiology;  Laterality: Left;    AV FISTULA PLACEMENT Left 2022    Procedure: CREATION, AV FISTULA INSERTION GRAFT, LEFT UPPER FOREARM;  Surgeon: Daren Dubon MD;  Location: [facility] OR;  Service: General;  Laterality: Left;    CATARACT EXTRACTION W/  INTRAOCULAR LENS IMPLANT Left 12/15/14    Dr de la cruz    CATARACT EXTRACTION W/  INTRAOCULAR LENS IMPLANT Right 14    dorothy    CIRCUMCISION, NON-      focal laser right eye      INSERTION OF SHAH CATHETER Right 2021    Procedure: INSERTION, CATHETER, CENTRAL VENOUS, SHAH DUAL LUMEN;  Surgeon: Denys Aleman Jr., MD;  Location: [facility] OR;  Service: General;  Laterality: Right;    INSERTION OF TUNNELED CENTRAL VENOUS CATHETER (CVC) WITH SUBCUTANEOUS PORT Right 2019    Procedure: [id number];  Surgeon: Denys Aleman Jr., MD;  Location: [facility] OR;  Service: General;  Laterality: Right;    INSERTION OF TUNNELED CENTRAL VENOUS HEMODIALYSIS CATHETER Right 2021    Procedure: Insertion, Catheter, Central Venous, Hemodialysis;  Surgeon: Agata Rosado MD;  Location: [facility] CATH LAB;  Service: Interventional Nephrology;  Laterality: Right;    KNEE SURGERY      left    Left medial collateral ligament repair      TONSILLECTOMY       Family History   Problem Relation Age of Onset    Heart disease Mother     Cancer Mother     Cancer Brother     Heart disease Father     Diabetes Father     Cancer Sister     Cataracts Paternal Grandmother     Glaucoma Paternal Grandmother     Blindness Neg Hx     Amblyopia Neg Hx     Hypertension Neg Hx     Macular degeneration Neg Hx     Retinal detachment Neg Hx     Strabismus Neg Hx      Social History     Tobacco Use    Smoking status: Never Smoker    Smokeless tobacco: Never Used   Substance Use Topics    Alcohol use: No      Alcohol/week: 0.0 standard drinks    Drug use: No     Review of Systems   Constitutional: Negative for chills and fever.   Respiratory: Negative for shortness of breath.    Cardiovascular: Negative for chest pain.   Gastrointestinal: Negative for nausea and vomiting.   Musculoskeletal:        Positive for pain to the distal left upper arm and proximal forearm at the site of recent AV fistula surgery.   Skin: Positive for wound.   Neurological: Positive for numbness. Negative for dizziness, light-headedness and headaches.       Physical Exam     Initial Vitals [02/14/22 0931]   BP Pulse Resp Temp SpO2   (!) 165/77 73 18 98.9 °F (37.2 °C) 98 %      MAP       --         Physical Exam    Nursing note and vitals reviewed.  Constitutional: He is not diaphoretic. No distress.   Cardiovascular:   Pulses:       Radial pulses are 2+ on the left side.   Pulmonary/Chest: No respiratory distress.   Musculoskeletal:      Comments: Roll gauze dressing to the distal left upper arm and proximal forearm.  Small amount of dried bloody drainage on the dressing.  Three stapled wounds without dehiscence.  No bleeding or drainage from the wounds.  No surrounding erythema or induration.  Mild tenderness.  No swelling to the left upper extremity.     Neurological: He is alert and oriented to person, place, and time. GCS score is 15. GCS eye subscore is 4. GCS verbal subscore is 5. GCS motor subscore is 6.   Skin: Skin is warm and dry. No pallor.   Psychiatric: He is not actively hallucinating. He is attentive.         ED Course   Procedures  Labs Reviewed   CBC W/ AUTO DIFFERENTIAL - Abnormal; Notable for the following components:       Result Value    RBC 2.94 (*)     Hemoglobin 9.2 (*)     Hematocrit 27.4 (*)     MCH 31.3 (*)     All other components within normal limits          Imaging Results    None          Medications   HYDROcodone-acetaminophen 5-325 mg per tablet 1 tablet (1 tablet Oral Given 2/14/22 1003)     Medical  Decision Making:   History:   Old Medical Records: I decided to obtain old medical records.  Old Records Summarized: other records.       <> Summary of Records: Past medical history reviewed as above.  Clinical Tests:   Lab Tests: Ordered and Reviewed    MDM:  The patient underwent emergent evaluation for postoperative pain at the site of recent AV fistula surgery.  He was afebrile.  His blood pressure was mildly elevated, likely secondary to pain.  There were no signs of vascular compromise or infection on exam.  There was no white count elevation on lab review.  His pain management regimen was adjusted and he was discharged.             ED Course as of 02/14/22 1215   Mon Feb 14, 2022   1027 Called and discussed the patient's presentation and exam with Dr. Dubon. He recommends obtaining at CBC. If the patient's white count is normal, he needs improved pain management. If it's elevated, I'll call him back. [LP]      ED Course User Index  [LP] Onesimo Bradley III, MD             Clinical Impression:   Final diagnoses:  [G89.18] Postoperative pain (Primary)          ED Disposition Condition    Discharge Stable        ED Prescriptions     Medication Sig Dispense Start Date End Date Auth. Provider    HYDROcodone-acetaminophen (NORCO)  mg per tablet Take 1 tablet by mouth every 6 (six) hours as needed (Moderate to severe pain). 12 tablet 2/14/2022  Onesimo Bradley III, MD        Follow-up Information     Follow up With Specialties Details Why Contact Info    Daren Dubon MD General Surgery, Surgery  Call to schedule follow-up. [address] [facility]  [address]  [facility] [address]  [phone]      [place] - Emergency Dept Emergency Medicine  Return to the ER for worsened pain, fever, chills, vomiting, or for any other concerns. [address] [facility] [address] [phone]           Onesimo Bradley III, MD  02/14/22 1217     no fever

## 2022-02-16 NOTE — PROGRESS NOTE ADULT - SUBJECTIVE AND OBJECTIVE BOX
SEEN ON HD . STABLE HEMODYNAMICS    No Known Allergies    Hospital Medications:   MEDICATIONS  (STANDING):  ascorbic acid 500 milliGRAM(s) Oral daily  cinacalcet 30 milliGRAM(s) Oral daily  collagenase Ointment 1 Application(s) Topical daily  Dakins Solution - 1/4 Strength 1 Application(s) Topical daily  docusate sodium 100 milliGRAM(s) Oral two times a day  epoetin jacqueline Injectable 95021 Unit(s) IV Push <User Schedule>  folic acid 1 milliGRAM(s) Oral daily  gabapentin 100 milliGRAM(s) Oral three times a day  insulin lispro (HumaLOG) corrective regimen sliding scale   SubCutaneous Before meals and at bedtime  levothyroxine 50 MICROGram(s) Oral daily  metoprolol     tartrate 25 milliGRAM(s) Oral two times a day  multivitamin 1 Tablet(s) Oral daily  pantoprazole    Tablet 40 milliGRAM(s) Oral before breakfast  senna 2 Tablet(s) Oral at bedtime  simvastatin 20 milliGRAM(s) Oral at bedtime  vancomycin  IVPB 1250 milliGRAM(s) IV Intermittent <User Schedule>      VITALS:  T(F): 98.2 (03-19-18 @ 08:16), Max: 98.8 (03-18-18 @ 16:27)  HR: 79 (03-19-18 @ 08:16)  BP: 137/52 (03-19-18 @ 08:16)  RR: 16 (03-19-18 @ 08:16)  SpO2: 98% (03-19-18 @ 08:16)  Wt(kg): --      PHYSICAL EXAM:  Constitutional: NAD  HEENT: anicteric sclera, oropharynx clear, MMM  Neck: No JVD  Respiratory: CTAB, no wheezes, rales or rhonchi  Cardiovascular: S1, S2, RRR  Gastrointestinal: BS+, soft, NT/ND  Extremities: No cyanosis or clubbing. No peripheral edema  Vascular Access: IJ PERMACATH IN USE.     LABS:  03-19    135  |  97  |  50<H>  ----------------------------<  122<H>  4.7   |  27  |  6.10<H>    Ca    8.0<L>      19 Mar 2018 10:44  Phos  6.5     03-19  Mg     2.1     03-19      Creatinine Trend: 6.10 <--, 4.22 <--, 3.08 <--, 4.28 <--, 5.02 <--, 3.37 <--, 5.93 <--, 3.75 <--                        8.0    20.9  )-----------( 493      ( 19 Mar 2018 10:44 )             27.0     Urine Studies:      RADIOLOGY & ADDITIONAL STUDIES: Home

## 2022-04-05 NOTE — CONSULT NOTE ADULT - NECK DETAILS
Patient presented after waiting 30 minutes with no reaction to  injections. Discharged from clinic.  Vicky JOHNSON RN  Specialty/Allergy Clinic    
normal/no JVD/supple

## 2022-08-24 NOTE — DIETITIAN INITIAL EVALUATION ADULT. - NS AS NUTRI INTERV COLLABORAT
What Is The Reason For Today's Visit?: Skin Cancer Screening Exam What Is The Reason For Today's Visit? (Being Monitored For X): the development of skin cancers How Severe Are Your Spot(S)?: mild Collaboration with other providers

## 2022-10-09 NOTE — ED ADULT NURSE NOTE - FALLEN IN THE PAST
no
Implemented All Fall with Harm Risk Interventions:  [place] to call system. Call bell, personal items and telephone within reach. Instruct patient to call for assistance. Room bathroom lighting operational. Non-slip footwear when patient is off stretcher. Physically safe environment: no spills, clutter or unnecessary equipment. Stretcher in lowest position, wheels locked, appropriate side rails in place. Provide visual cue, wrist band, yellow gown, etc. Monitor gait and stability. Monitor for mental status changes and reorient to person, place, and time. Review medications for side effects contributing to fall risk. Reinforce activity limits and safety measures with patient and family. Provide visual clues: red socks.

## 2022-11-11 NOTE — ASU PATIENT PROFILE, ADULT - NS PRO PASSIVE SMOKE EXP
[facility] Palliative Care Services    Palliative Care Initial Consult   Attending Physician: Usama Rodriguez*  Referring Provider: Katie Upton RN/Usama Rodriguez MD    Reason for Referral: assistance with clarification of goals of care  Family/Support: spouse and mother    Code Status and Medical Interventions:   Ordered at: 11/11/22 0833     Medical Intervention Limits:    NO cardioversion    NO dialysis    NO antiarrhythmic drugs    NO vasopressors    NO intubation (DNI)     Level Of Support Discussed With:    Health Care Surrogate     Code Status (Patient has no pulse and is not breathing):    No CPR (Do Not Attempt to Resuscitate)     Medical Interventions (Patient has pulse or is breathing):    Limited Support     Goals of Care: TBD.    HPI:   44 y.o. female with history of thyroid cancer s/p thyroidectomy, anxiety, type 2 diabetes mellitus, hepatitis C, asthma, tobacco abuse, and seizure disorder. Patient presented to [facility] on 11/9/2022 related to cardiac arrest.  According to chart review patient had allegedly choked on a pork chop and vomited a black mass day prior to event.  Day of admission patient became unresponsive and pulseless,  began CPR and once EMS arrived received 4 defibrillation shocks.  Upon arrival to hospital patient was PEA received 1 dose of epinephrine with ROSC and code chill initiated.  CT angiogram of the chest negative for PE, positive for enlarged left ventricle, pulmonary edema with bilateral small layering pleural effusions.  Pulmonology consulted for ventilator management.  Neurology consulted.  EEG and MRI ordered.  Started on Keppra.  MRI revealed hypoxic ischemic encephalopathy.  UDS negative.  Echocardiogram revealed EF 26 to 30%.  Requiring vasopressor support.  Experienced VT this morning and received subsequent defibrillation shocks.  Amiodarone initiated per cardiology.  Dr. Rodriguez had care conference with  family this morning and CODE STATUS changes implemented, awaiting further direction from neurology.  Upon assessment patient is noted to be in VT over the last several minutes.  Family at bedside.  Palliative Care Spoke With: family are overwhelmed by this sudden event and hospitalization.  Spouse reports they have been  a short time and just celebrated their 1st anniversary on day of hospitalization.  Due to the Palliative Care Topics Discussed: palliative care, goals of care, care options and resuscitation status we will establish an advance care plan.   Advance Care Planning   Advance Care Planning Discussion: Spoke with spouse and patient's mother.  Discussed poor prognosis and MRI findings consistent with anoxic brain injury.  Unfortunately patient has been in VT rhythm over the last several minutes. As such, family have elected to transition to comfort measures and remove patient from ventilator support.  Respiratory has been notified.  Expectations discussed with family and wish to remain at bedside.   notified and at bedside.  Questions encouraged and support given.     Review of Systems   Unable to perform ROS: intubated     1- Pain Assessment  CPOT Facial Expression: 0-->relaxed, neutral  CPOT Body Movements: 0-->absence of movements  CPOT Muscle Tension: 0-->relaxed  Ventilator Compliance/Vocalization: 0-->tolerating ventilator or movement  CPOT Score: 0    Past Medical History:   Diagnosis Date   • Anxiety state    • Cancer (HCC)     THYROID   • Endogenous obesity    • History of malignant neoplasm of thyroid    • Postoperative hypothyroidism    • Type 2 diabetes mellitus (HCC)    • Vitamin D deficiency      Past Surgical History:   Procedure Laterality Date   • CHOLECYSTECTOMY     • THYROIDECTOMY     • TONSILLECTOMY       Social History     Socioeconomic History   • Marital status:    Tobacco Use   • Smoking status: Every Day     Types: Cigarettes   • Smokeless tobacco: Never   •  Tobacco comments:     smoking cessation encouraged    Substance and Sexual Activity   • Alcohol use: No   • Drug use: No       Current Facility-Administered Medications   Medication Dose Route Frequency Provider Last Rate Last Admin   • acetaminophen (TYLENOL) suppository 650 mg  650 mg Rectal Q4H PRN Usama Rodriguez MD       • amiodarone 360 mg in 200 mL D5W infusion  1 mg/min Intravenous Continuous Oskar Weber MD        Followed by   • amiodarone 360 mg in 200 mL D5W infusion  0.5 mg/min Intravenous Continuous Oskar Weber MD       • artificial tears ophthalmic ointment   Both Eyes Q4H Usama Rodriguez MD   Given at 11/11/22 0340   • budesonide (PULMICORT) nebulizer solution 0.5 mg  0.5 mg Nebulization BID - RT Carlin Moseley MD   0.5 mg at 11/11/22 0639   • Chlorhexidine Gluconate Cloth 2 % pads 1 application  1 application Topical Q24H Usama Rodriguez MD   1 application at 11/11/22 0340   • Enoxaparin Sodium (LOVENOX) syringe 40 mg  40 mg Subcutaneous Daily Shani Mendiola APRN   40 mg at 11/10/22 1627   • famotidine (PEPCID) injection 20 mg  20 mg Intravenous Q12H Usama Rodriguez MD   20 mg at 11/10/22 2000   • furosemide (LASIX) injection 40 mg  40 mg Intravenous Q12H Usama Rodriguez MD   40 mg at 11/10/22 2018   • influenza vac split quad (FLUZONE,FLUARIX,AFLURIA,FLULAVAL) injection 0.5 mL  0.5 mL Intramuscular During Hospitalization Usama Rodriguez MD       • ipratropium-albuterol (DUO-NEB) nebulizer solution 3 mL  3 mL Nebulization 4x Daily - RT Shani Mendiola APRN   3 mL at 11/11/22 0633   • levETIRAcetam in NaCl 0.75% (KEPPRA) IVPB 1,000 mg  1,000 mg Intravenous Q12H Rosie Nunes MD   1,000 mg at 11/11/22 0826   • levothyroxine sodium injection 88 mcg  88 mcg Intravenous Daily Usama Rodriguez MD   88 mcg at 11/10/22 1128   • LORazepam (ATIVAN) injection 1 mg  1 mg Intravenous Q4H PRN Usama Rodriguez MD   1  mg at 11/11/22 0824   • Magnesium Sulfate 2 gram Bolus, followed by 8 gram infusion (total Mg dose 10 grams)- Mg less than or equal to 1mg/dL  2 g Intravenous PRN Mary Davis, APRN        Or   • Magnesium Sulfate 2 gram / 50mL Infusion (GIVE X 3 BAGS TO EQUAL 6GM TOTAL DOSE) - Mg 1.1 - 1.5 mg/dl  2 g Intravenous PRN Mary Davis, APRN        Or   • Magnesium Sulfate 4 gram infusion- Mg 1.6-1.9 mg/dL  4 g Intravenous PRN Mary Davis, APRN       • methylPREDNISolone sodium succinate (SOLU-Medrol) injection 40 mg  40 mg Intravenous Q12H Shani Mendiola APRN   40 mg at 11/10/22 1959   • mupirocin (BACTROBAN) 2 % nasal ointment 1 application  1 application Each Nare BID Usama Rodriguez MD   1 application at 11/10/22 2001   • norepinephrine (LEVOPHED) 8 mg in 250 mL NS infusion (premix)  0.02-0.3 mcg/kg/min Intravenous Titrated Usama Rodriguez MD   Stopped at 11/10/22 0100   • ondansetron (ZOFRAN) injection 4 mg  4 mg Intravenous Q6H PRN Usama Rodriguez MD       • Pharmacy to Dose enoxaparin (LOVENOX)   Does not apply Continuous PRN Usama Rodriguez MD       • Pharmacy to Dose Zosyn   Does not apply Continuous PRN Carlin Moseley MD       • piperacillin-tazobactam (ZOSYN) 4.5 g/100 mL 0.9% NS IVPB (mbp)  4.5 g Intravenous Q8H Carlin Moseley MD   4.5 g at 11/11/22 0340   • potassium chloride 20 mEq in 50 mL IVPB  20 mEq Intravenous Q1H PRN Mary Davis APRN 50 mL/hr at 11/11/22 0824 20 mEq at 11/11/22 0824   • propofol (DIPRIVAN) infusion 10 mg/mL 100 mL  5-50 mcg/kg/min Intravenous Titrated Usama Rodriguez MD 29.5 mL/hr at 11/11/22 0825 50 mcg/kg/min at 11/11/22 0825   • sodium chloride 0.9 % flush 10 mL  10 mL Intravenous Usama Reynaga MD       • sodium chloride 0.9 % flush 10 mL  10 mL Intravenous PRN Usama Rodriguez MD       • sodium chloride 0.9 % flush 10 mL  10 mL Intravenous Q12H Usama Rodriguez MD   10 mL at  "11/10/22 2001   • sodium chloride 0.9 % flush 10 mL  10 mL Intravenous PRN Usama Rodriguez MD       • sodium chloride 0.9 % infusion  75 mL/hr Intravenous Continuous Triny Chaves DO 75 mL/hr at 11/10/22 2317 75 mL/hr at 11/10/22 2317   • vancomycin (VANCOCIN) 1,000 mg in sodium chloride 0.9 % 250 mL IVPB  1,000 mg Intravenous Q12H Usama Rodriguez MD         amiodarone, 1 mg/min   Followed by  amiodarone, 0.5 mg/min  norepinephrine, 0.02-0.3 mcg/kg/min, Last Rate: Stopped (11/10/22 0100)  Pharmacy to Dose enoxaparin (LOVENOX),   Pharmacy to Dose Zosyn,   propofol, 5-50 mcg/kg/min, Last Rate: 50 mcg/kg/min (11/11/22 0825)  sodium chloride, 75 mL/hr, Last Rate: 75 mL/hr (11/10/22 2317)      •  acetaminophen  •  influenza vaccine  •  LORazepam  •  magnesium sulfate **OR** magnesium sulfate **OR** magnesium sulfate  •  ondansetron  •  Pharmacy to Dose enoxaparin (LOVENOX)  •  Pharmacy to Dose Zosyn  •  potassium chloride  •  sodium chloride  •  [COMPLETED] Insert peripheral IV **AND** sodium chloride  •  sodium chloride    No Known Allergies  Attest that current medications reviewed including but not limited to prescriptions, over-the counter, herbals and vitamin/mineral/dietary (nutritional) supplements for route, type, dose and frequency and are current per MAR at time of dictation.    Intake/Output Summary (Last 24 hours) at 11/11/2022 0837  Last data filed at 11/11/2022 0737  Gross per 24 hour   Intake 1823.6 ml   Output 2400 ml   Net -576.4 ml       Physical Exam:    Diagnostics: Reviewed  /75 (BP Location: Right arm, Patient Position: Lying)   Pulse 104   Temp 98.4 °F (36.9 °C) (Axillary)   Resp (!) 30   Ht 167.6 cm (66\")   Wt 95.5 kg (210 lb 8 oz)   SpO2 97%   BMI 33.98 kg/m²     Vitals and nursing note reviewed.   Constitutional:       Appearance: Toxic-appearing and acutely ill-appearing.      Interventions: Intubated.      Comments: Propofol and amiodarone infusing "   Eyes:      General: Lids are normal.   HENT:      Head: Normocephalic.   Pulmonary:      Effort: Intubated.   Cardiovascular:      Tachycardia present.   Edema:     Peripheral edema present.  Abdominal:      Palpations: Abdomen is soft.   Musculoskeletal: Normal range of motion.      Cervical back: Neck supple. Skin:     General: Skin is warm.      Coloration: Skin is cyanotic.   Genitourinary:     Comments: No reported dysuria  Neurological:      Comments: sedated   Psychiatric:      Comments: sedated     Patient status: Disease state: Actively dying.  Functional status: Palliative Performance Scale Score: Performance 10% based on the following measures: Ambulation: Totally bed bound, Activity and Evidence of Disease: Unable to do any work, extensive evidence of disease, Self-Care: Total care required,  Intake: Mouth care only, LOC: Drowsy or comatose   Nutritional status: Albumin 2.90 Body mass index is 33.98 kg/m².         Impression/Problem List:    1. Cardiac arrest, reportedly PEA  2. Cardiomyopathy  3. Elevated troponin  4. Hypoxic ischemic encephalopathy, per MRI  5. Hepatitis C  6. Thyroid cancer  7. Type 2 diabetes mellitus  8. Tobacco abuse     Recommendations/Plan:  1. plan: Goals of care include CODE STATUS no CPR/comfort measures.    Family support: The patient receives support from her  and mother..  Advance Directives: Advance Directive Status: Patient does not have advance directive   POA/Healthcare surrogate-spouse, next of kin.    2.  Palliative care encounter  - Prognosis is poor long-term secondary to cardiac arrest, anoxic brain injury, and multiple comorbidities.  -Family appear to have good prognostic awareness.     -Family elected to transition to comfort measures palliative extubation.    3.  Comfort care  -Ativan given times x1 dose preextubation.  Extubated per family wishes.  No spontaneous respirations.  Time of death 0959.      Thank you for this consult and allowing us to  participate in patient's plan of care. Palliative Care Team will continue to follow patient.     Time spent: 95 minutes spent reviewing medical and medication records, assessing and examining patient, discussing with family, answering questions, providing some guidance about a plan and documentation of care, and coordinating care with other healthcare members, with > 50% time spent face to face.   25 minutes spent on advance care planning.    Aga Meyer, AIDEN  11/11/2022  08:37 CST             No

## 2022-12-03 NOTE — PROGRESS NOTE ADULT - PROBLEM SELECTOR PLAN 8
Negative [] Previous VTE                                                3  [] Thrombophilia                                             2  [] Lower limb paralysis                                   2    [] Current Cancer                                             2   [X] Immobilization > 24 hrs                              1  [] ICU/CCU stay > 24 hours                             1  [X] Age > 60                                                         1    IMPROVE VTE Score: 2  Heparin

## 2023-03-21 NOTE — PROGRESS NOTE ADULT - SUBJECTIVE AND OBJECTIVE BOX
Patient education provided.  Patient is a 52y old  Female who presents with a chief complaint of SOB (13 Dec 2017 16:01)  Awake, alert, comfortable in bed in [facility].    INTERVAL HPI/OVERNIGHT EVENTS:      VITAL SIGNS:  T(F): 102.4 (01-09-18 @ 05:18)  HR: 107 (01-09-18 @ 05:18)  BP: 115/47 (01-09-18 @ 05:18)  RR: 16 (01-09-18 @ 05:18)  SpO2: 95% (01-09-18 @ 05:18)  Wt(kg): --  I&O's Detail          REVIEW OF SYSTEMS:    CONSTITUTIONAL:  No fevers, chills, sweats    HEENT:  Eyes:  No diplopia or blurred vision. ENT:  No earache, sore throat or runny nose.    CARDIOVASCULAR:  No pressure, squeezing, tightness, or heaviness about the chest; no palpitations.    RESPIRATORY:  Per HPI    GASTROINTESTINAL:  No abdominal pain, nausea, vomiting or diarrhea.    GENITOURINARY:  No dysuria, frequency or urgency.    NEUROLOGIC:  No paresthesias, fasciculations, seizures or weakness.    PSYCHIATRIC:  No disorder of thought or mood.      PHYSICAL EXAM:    Constitutional: Well developed and nourished  Eyes:Perrla  ENMT: normal  Neck:supple  Respiratory: good air entry  Cardiovascular: S1 S2 regular  Gastrointestinal: Soft, Non tender  Extremities: No edema  Vascular:normal  Neurological:Awake, alert,Ox3  Musculoskeletal:Normal      MEDICATIONS  (STANDING):  ascorbic acid 500 milliGRAM(s) Oral daily  cinacalcet 30 milliGRAM(s) Oral daily  dextrose 5%. 1000 milliLiter(s) (50 mL/Hr) IV Continuous <Continuous>  dextrose 50% Injectable 12.5 Gram(s) IV Push once  dextrose 50% Injectable 25 Gram(s) IV Push once  dextrose 50% Injectable 25 Gram(s) IV Push once  epoetin jacqueline Injectable 6000 Unit(s) IV Push <User Schedule>  ferrous    sulfate Liquid 300 milliGRAM(s) Enteral Tube daily  folic acid 1 milliGRAM(s) Oral daily  heparin  Injectable 5000 Unit(s) SubCutaneous every 12 hours  insulin lispro (HumaLOG) corrective regimen sliding scale   SubCutaneous every 6 hours  levothyroxine 50 MICROGram(s) Oral daily  metoprolol     tartrate 25 milliGRAM(s) Oral two times a day  pantoprazole  Injectable 40 milliGRAM(s) IV Push daily  PPD  5 Tuberculin Unit(s) Injectable 5 Unit(s) IntraDermal once  pregabalin 75 milliGRAM(s) Oral two times a day  sevelamer hydrochloride 1600 milliGRAM(s) Oral three times a day with meals  simvastatin 20 milliGRAM(s) Oral at bedtime    MEDICATIONS  (PRN):  acetaminophen   Tablet 650 milliGRAM(s) Oral every 6 hours PRN For Temp greater than 38 C (100.4 F)  acetaminophen  Suppository 650 milliGRAM(s) Rectal every 6 hours PRN For Temp greater than 38 C (100.4 F)  dextrose Gel 1 Dose(s) Oral once PRN Blood Glucose LESS THAN 70 milliGRAM(s)/deciliter  glucagon  Injectable 1 milliGRAM(s) IntraMuscular once PRN Glucose LESS THAN 70 milligrams/deciliter  morphine  - Injectable 1 milliGRAM(s) IV Push every 4 hours PRN Severe Pain (7 - 10)      Allergies    No Known Allergies    Intolerances        LABS:                        9.8    19.3  )-----------( 316      ( 09 Jan 2018 08:42 )             33.2     01-09    139  |  104  |  63<H>  ----------------------------<  73  6.3<HH>   |  23  |  8.37<H>    Ca    8.9      09 Jan 2018 08:42                CAPILLARY BLOOD GLUCOSE      POCT Blood Glucose.: 86 mg/dL (09 Jan 2018 05:59)  POCT Blood Glucose.: 95 mg/dL (09 Jan 2018 00:16)  POCT Blood Glucose.: 89 mg/dL (08 Jan 2018 19:00)  POCT Blood Glucose.: 102 mg/dL (08 Jan 2018 12:22)  POCT Blood Glucose.: 98 mg/dL (08 Jan 2018 11:49)        RADIOLOGY & ADDITIONAL TESTS:    CXR:  < from: Xray Chest 1 View AP-PORTABLE IMMEDIATE (01.06.18 @ 16:36) >  Impression: Right IJ dialysis catheter tip atthe SVC. Cardiomegaly.   Clear lungs.      < end of copied text >    Ct scan chest:    ekg;    echo:

## 2023-08-02 NOTE — H&P ADULT - NSICDXPASTSURGICALHX_GEN_ALL_CORE_FT
The patient is a 77y Female complaining of medical evaluation.
PAST SURGICAL HISTORY:  Arteriovenous fistula     No Past Surgical History     S/P  1987

## 2023-08-15 NOTE — ED PROVIDER NOTE - NS ED MD TWO NIGHTS YN
I sent in 15 xanax to last until his visit in sept  Advise him to use these sparingly thanks
I spoke with pt's wife, who is on hipaa form. I advised of pcp's message. Wife verbalized understanding and was thankful.
Pt is due for a 2 month f/up appt
Yes

## 2023-08-17 NOTE — PROGRESS NOTE ADULT - GEN GEN HX ROS MEA POS PC
Caller: Amy Johnson    Relationship to patient: Emergency Contact    Best call back number: [phone]    Chief complaint:     Type of visit: FOLLOW UP    Requested date:      If rescheduling, when is the original appointment: 8-17-23     Additional notes:PATIENTS DAUGHTER CALLED NEEDED TO RESCHEDULE APPT.  PATIENT IS SICK  FIRST AVAILABLE IS NOT UNTIL JAN 2024 PLEASE REACH OUT TO PATIENT           fever/weakness

## 2023-10-17 NOTE — DIETITIAN INITIAL EVALUATION ADULT. - EST PROTEIN NEEDS7
Short Stay Endoscopy History and Physical    PCP - Richa Santos MD  Referring Physician - Richa Santos MD  [address] [place] [address]    Procedure - Colonoscopy  ASA - per anesthesia  Mallampati - per anesthesia  History of Anesthesia problems - no  Family history Anesthesia problems -  no   Plan of anesthesia - General    HPI  54 y.o. male  Reason for procedure:   Encounter for screening colonoscopy [Z12.11]    Cscope 2018 removed one small polyp     ROS:  Constitutional: No fevers, chills, No weight loss  CV: No chest pain  Pulm: No cough, No shortness of breath  GI: see HPI    Medical History:  has a past medical history of childhood asthma, Colon polyp (09/2018), and Hypertension.    Surgical History:  has a past surgical history that includes Appendectomy (1996) and Colonoscopy (N/A, 9/8/2018).    Family History: family history includes Cirrhosis in his father; Heart disease in his father and mother..    Social History:  reports that he has never smoked. He has never used smokeless tobacco. He reports current alcohol use. He reports that he does not use drugs.    Review of patient's allergies indicates:  No Known Allergies    Medications:   Medications Prior to Admission   Medication Sig Dispense Refill Last Dose    allopurinoL (ZYLOPRIM) 300 MG tablet Take 300 mg by mouth.       GAVILYTE-C 240-22.72-6.72 -5.84 gram SolR TAKE 4000 MLS BY MOUTH ONCE AS DIRECTED BY DR OFFICE FOR 1 DOSE       irbesartan (AVAPRO) 300 MG tablet Take 1 tablet (300 mg total) by mouth every evening. 90 tablet 3     NIFEdipine (PROCARDIA-XL) 30 MG (OSM) 24 hr tablet Take 1 tablet (30 mg total) by mouth once daily. 90 tablet 3        Physical Exam:    Vital Signs: There were no vitals filed for this visit.    General Appearance: Well appearing in no acute distress  Abdomen: Soft, non tender, non distended with normal bowel sounds, no masses    Labs:  Lab Results   Component Value Date    WBC 5.07  09/16/2023    HGB 13.5 (L) 09/16/2023    HCT 41.1 09/16/2023     09/16/2023    CHOL 143 09/16/2023    TRIG 84 09/16/2023    HDL 51 09/16/2023    ALT 27 09/16/2023    AST 25 09/16/2023     09/16/2023    K 4.3 09/16/2023     09/16/2023    CREATININE 1.0 09/16/2023    BUN 13 09/16/2023    CO2 26 09/16/2023    TSH 1.969 09/16/2023    PSA 0.71 10/14/2022    HGBA1C 4.4 09/16/2023       I have explained the risks and benefits of this endoscopic procedure to the patient including but not limited to bleeding, inflammation, infection, perforation, and death.    Assessment/Plan:     CRC Screening     - Proceed with colonoscopy     Melia Buck MD  Gastroenterology   [facility]      54.96

## 2023-12-05 NOTE — PROGRESS NOTE ADULT - PROBLEM SELECTOR PLAN 2
Discharge Instructions for Radial Heart Catherization    1. Take it easy for 3-4 days. 2.  No driving for 2 days. 3.  No lifting of 5 lbs or more for 5 days with the affected arm. 4.  Can shower after 24 hours. 5.  Remove arm board after 24 hours. 6.  Apply a band aid to the insertion site daily for 5 days. May apply antibiotic ointment if desired, but not necessary. Wash site daily with soap and water. 7.  No creams, ointments, or powders near the insertion site. 8.  No tub baths, swimming, hot tubs, or hand washing dishes for 1 week. 9.  Watch for signs of infection (redness, warmth, swelling, or pus drainage) or coolness of extremity and call physician if this occurs  10. If bleeding occurs from insertion site, apply pressure and call 911.      F/up dr mccormick 1 week  F/up PCP 1 week  Resume metformin 12/6/23
BCx from outpt HD unit grew pansensitive strept agalactiae.  Leukocytosis decreasing. hemodynamics stable.  Unclear source of bacteremia. On Ctx as per ID.
BCx from outpt HD unit grew pansensitive strept agalactiae.  Leukocytosis decreasing. hemodynamics stable.  Unclear source of bacteremia. On Ctx as per ID.  Blood culture neg 0n 10/10 in Hosp.
BCx from outpt HD unit grew pansensitive strept agalactiae.  Leukocytosis decreasing. hemodynamics stable.  Unclear source of bacteremia. On Ctx as per ID.  Blood culture neg 0n 10/10 in Hosp.  rpt Blood culture

## 2023-12-19 NOTE — ED PROVIDER NOTE - SOCIAL CONCERNS
"                 VASCULAR SURGERY                    Postop Note  _________________________________    12/19/2023     Ms. Adames comes to the clinic today her family member for follow-up at my request, s/p left upper arm dialysis graft placement on November 29, 2023 by myself and ligation of the graft on December 3, 2023 by Dr. Marroquin for arterial steal syndrome.  On follow-up today, she has no new complaints.  The range of motion of the left hand and fingers has been improving.  She still has some numbness.      Vitals  /50 (BP Location: Left arm, Patient Position: Sitting, BP Cuff Size: Adult)   Pulse 74   Temp 36.8 °C (98.3 °F) (Temporal)   Ht 1.702 m (5' 7\")   Wt 58.1 kg (128 lb)   SpO2 98%   BMI 20.05 kg/m²      Exam  General: Pleasant female in none apparent distress.  Left upper extremity: Incisions are healing well without erythema, drainage, or fluctuation.  Graft has no flow.  Biphasic Doppler flow signals are obtained over the left distal radial artery as well as ulnar artery.  Left hand and fingers are warm, well-perfused.  Able to close finger make a complete fist  .  Assessment: Postop.     Plan: Improving.  I asked patient to continue exercising her left hand using a soft medicine ball.  I also referred her to outpatient hand therapy.  I asked patient to contact me if she is not contacted by hand therapy within 2 weeks; otherwise, I will see her back as needed.  I asked her to contact me for any problem at any time.  Patient and her family member indicated understanding.  All questions were answered.          Sarahi Carey MD  [facility]  [phone]  [address], [place] [address]    " None

## 2024-01-31 NOTE — ED PROCEDURE NOTE - ACCESS TYPE
PHYSICAL EXAM:   GENERAL: Alert. Not confused. No acute distress. Not thin. Not cachectic. Not obese.  HEAD:  Atraumatic. Normocephalic.  EYES: EOMI. PERRLA. Normal conjunctiva/sclera.  ENT: Neck supple. No JVD. Moist oral mucosa. Not edentulous. No thrush.  CARDIAC: Regular rate. Regular rhythm. S1. S2. No murmur. No rub. No distant heart sounds.  LUNG/CHEST: CTAB. BS equal bilaterally. No wheezes. No rales. No rhonchi.  ABDOMEN: Soft. No tenderness. No distension. Normal bowel sounds.  VASCULAR: +2 b/l radial or ulnar pulses. Palpable DP pulses.  EXTREMITIES:  No clubbing. No cyanosis. No edema. Moving all 4.  NEUROLOGY: A&Ox3. Non-focal exam. Cranial nerves intact. Normal speech. Sensation intact.  SKIN: No jaundice. No erythema. No rash/lesion.  Vital Signs Last 24 Hrs  T(C): 36.8 (31 Jan 2024 05:30), Max: 37.9 (30 Jan 2024 17:40)  T(F): 98.2 (31 Jan 2024 05:30), Max: 100.2 (30 Jan 2024 17:40)  HR: 104 (31 Jan 2024 05:30) (84 - 120)  BP: 131/58 (31 Jan 2024 05:30) (131/58 - 161/59)  BP(mean): 75 (31 Jan 2024 05:30) (75 - 84)  ABP: --  ABP(mean): --  RR: 17 (31 Jan 2024 05:30) (16 - 22)  SpO2: 97% (31 Jan 2024 05:30) (93% - 100%)    O2 Parameters below as of 31 Jan 2024 05:30  Patient On (Oxygen Delivery Method): nasal cannula  O2 Flow (L/min): 3        I&O's Summary Peripheral Venous PHYSICAL EXAM:   GENERAL: Alert. Not confused. No acute distress.  HEAD:  Atraumatic. Normocephalic.  EYES: EOMI. Normal conjunctiva/sclera.  ENT: Neck supple. Unable to appreciate JVD due to body habitus. Moist oral mucosa.   CARDIAC: Regular rate. Regular rhythm. S1. S2.   LUNG/CHEST: CTAB. BS equal bilaterally. No wheezes. No rales. No rhonchi.  ABDOMEN: Soft. No tenderness. No distension. Normal bowel sounds.  VASCULAR: +2 b/l radial or ulnar pulses. Palpable DP pulses.  EXTREMITIES:  No clubbing. No cyanosis. 1+ BLE edema. Moving all 4.  NEUROLOGY: A&Ox3. Non-focal exam.   SKIN: No jaundice. No erythema. No rash/lesion.  : dobbs in place, clear yellow urine in bag  Vital Signs Last 24 Hrs  T(C): 36.8 (31 Jan 2024 05:30), Max: 37.9 (30 Jan 2024 17:40)  T(F): 98.2 (31 Jan 2024 05:30), Max: 100.2 (30 Jan 2024 17:40)  HR: 104 (31 Jan 2024 05:30) (84 - 120)  BP: 131/58 (31 Jan 2024 05:30) (131/58 - 161/59)  BP(mean): 75 (31 Jan 2024 05:30) (75 - 84)  ABP: --  ABP(mean): --  RR: 17 (31 Jan 2024 05:30) (16 - 22)  SpO2: 97% (31 Jan 2024 05:30) (93% - 100%)    O2 Parameters below as of 31 Jan 2024 05:30  Patient On (Oxygen Delivery Method): nasal cannula  O2 Flow (L/min): 3        I&O's Summary

## 2024-03-04 NOTE — PROGRESS NOTE ADULT - SUBJECTIVE AND OBJECTIVE BOX
INTERVAL HPI/OVERNIGHT EVENTS: ***        Antimicrobial:  meropenem  IVPB 500 milliGRAM(s) IV Intermittent every 24 hours  vancomycin  IVPB 1000 milliGRAM(s) IV Intermittent <User Schedule>    Cardiovascular:  hydrALAZINE 50 milliGRAM(s) Oral three times a day  metoprolol tartrate 25 milliGRAM(s) Oral two times a day    Pulmonary:    Hematalogic:  heparin  Injectable 5000 Unit(s) SubCutaneous every 8 hours    Other:  acetaminophen   Tablet 650 milliGRAM(s) Oral every 6 hours PRN  ascorbic acid 500 milliGRAM(s) Oral daily  cinacalcet 30 milliGRAM(s) Oral daily  collagenase Ointment 1 Application(s) Topical daily  Dakins Solution - 1/4 Strength 1 Application(s) Topical two times a day  dextrose 5%. 1000 milliLiter(s) IV Continuous <Continuous>  dextrose 50% Injectable 12.5 Gram(s) IV Push once  dextrose 50% Injectable 25 Gram(s) IV Push once  dextrose 50% Injectable 25 Gram(s) IV Push once  dextrose Gel 1 Dose(s) Oral once PRN  docusate sodium 100 milliGRAM(s) Oral two times a day  ferrous    sulfate Liquid 300 milliGRAM(s) Enteral Tube daily  folic acid 1 milliGRAM(s) Oral daily  gabapentin 100 milliGRAM(s) Oral three times a day  glucagon  Injectable 1 milliGRAM(s) IntraMuscular once PRN  insulin lispro (HumaLOG) corrective regimen sliding scale   SubCutaneous three times a day before meals  levothyroxine 50 MICROGram(s) Oral daily  multivitamin 1 Tablet(s) Oral daily  pantoprazole    Tablet 40 milliGRAM(s) Oral before breakfast  senna 2 Tablet(s) Oral at bedtime  simvastatin 20 milliGRAM(s) Oral at bedtime  zinc sulfate 220 milliGRAM(s) Oral daily    acetaminophen   Tablet 650 milliGRAM(s) Oral every 6 hours PRN  ascorbic acid 500 milliGRAM(s) Oral daily  cinacalcet 30 milliGRAM(s) Oral daily  collagenase Ointment 1 Application(s) Topical daily  Dakins Solution - 1/4 Strength 1 Application(s) Topical two times a day  dextrose 5%. 1000 milliLiter(s) IV Continuous <Continuous>  dextrose 50% Injectable 12.5 Gram(s) IV Push once  dextrose 50% Injectable 25 Gram(s) IV Push once  dextrose 50% Injectable 25 Gram(s) IV Push once  dextrose Gel 1 Dose(s) Oral once PRN  docusate sodium 100 milliGRAM(s) Oral two times a day  ferrous    sulfate Liquid 300 milliGRAM(s) Enteral Tube daily  folic acid 1 milliGRAM(s) Oral daily  gabapentin 100 milliGRAM(s) Oral three times a day  glucagon  Injectable 1 milliGRAM(s) IntraMuscular once PRN  heparin  Injectable 5000 Unit(s) SubCutaneous every 8 hours  hydrALAZINE 50 milliGRAM(s) Oral three times a day  insulin lispro (HumaLOG) corrective regimen sliding scale   SubCutaneous three times a day before meals  levothyroxine 50 MICROGram(s) Oral daily  meropenem  IVPB 500 milliGRAM(s) IV Intermittent every 24 hours  metoprolol tartrate 25 milliGRAM(s) Oral two times a day  multivitamin 1 Tablet(s) Oral daily  pantoprazole    Tablet 40 milliGRAM(s) Oral before breakfast  senna 2 Tablet(s) Oral at bedtime  simvastatin 20 milliGRAM(s) Oral at bedtime  vancomycin  IVPB 1000 milliGRAM(s) IV Intermittent <User Schedule>  zinc sulfate 220 milliGRAM(s) Oral daily    Drug Dosing Weight  Height (cm): 170.18 (05 Apr 2018 08:07)  Weight (kg): 45.8 (05 Apr 2018 08:05)  BMI (kg/m2): 15.8 (05 Apr 2018 08:07)  BSA (m2): 1.51 (05 Apr 2018 08:07)      Vital Signs Last 24 Hrs  T(C): 36.8 (16 Apr 2018 07:45), Max: 36.8 (16 Apr 2018 07:45)  T(F): 98.3 (16 Apr 2018 07:45), Max: 98.3 (16 Apr 2018 07:45)  HR: 71 (16 Apr 2018 07:45) (71 - 79)  BP: 156/72 (16 Apr 2018 07:45) (121/68 - 157/73)  BP(mean): --  RR: 17 (16 Apr 2018 07:45) (16 - 17)  SpO2: 100% (16 Apr 2018 07:45) (99% - 100%)          I&O's Summary      PHYSICAL EXAM:  GENERAL: NAD, well-groomed, thin  HEAD:  Atraumatic, Normocephalic  EYES: EOMI, conjunctiva and sclera clear  NECK: Supple, No JVD  NERVOUS SYSTEM:  Alert & Oriented X3, Good concentration; Motor Strength 3/5 B/L upper and lower extremities  CHEST/LUNG: Clear to percussion bilaterally; No rales, rhonchi, wheezing  HEART: Regular rate and rhythm; No murmurs, rubs, or gallops  ABDOMEN: Soft, Nontender, Nondistended; Bowel sounds present  EXTREMITIES:  No clubbing, cyanosis, or edema  LYMPH: No lymphadenopathy noted  SKIN: No rashes; multiple decubiti on right hip, sacrum and upper back      LABS:  CBC Full  -  ( 16 Apr 2018 07:58 )  WBC Count : 14.2 K/uL  Hemoglobin : 9.1 g/dL  Hematocrit : 29.1 %  Platelet Count - Automated : 455 K/uL  Mean Cell Volume : 90.6 fl  Mean Cell Hemoglobin : 28.2 pg  Mean Cell Hemoglobin Concentration : 31.1 gm/dL  Auto Neutrophil # : x  Auto Lymphocyte # : x  Auto Monocyte # : x  Auto Eosinophil # : x  Auto Basophil # : x  Auto Neutrophil % : x  Auto Lymphocyte % : x  Auto Monocyte % : x  Auto Eosinophil % : x  Auto Basophil % : x    04-16    133<L>  |  95<L>  |  65<H>  ----------------------------<  117<H>  5.2   |  26  |  7.12<H>    Ca    9.0      16 Apr 2018 07:58  Phos  7.3     04-16  Mg     2.4     04-16    TPro  6.8  /  Alb  2.2<L>  /  TBili  0.3  /  DBili  x   /  AST  19  /  ALT  12  /  AlkPhos  146<H>  04-16            RADIOLOGY & ADDITIONAL STUDIES REVIEWED:  Yes    [ ]GOALS OF CARE DISCUSSION WITH PATIENT/FAMILY/PROXY: INTERVAL HPI/OVERNIGHT EVENTS: no events overnight        Antimicrobial:  meropenem  IVPB 500 milliGRAM(s) IV Intermittent every 24 hours  vancomycin  IVPB 1000 milliGRAM(s) IV Intermittent <User Schedule>    Cardiovascular:  hydrALAZINE 50 milliGRAM(s) Oral three times a day  metoprolol tartrate 25 milliGRAM(s) Oral two times a day    Pulmonary:    Hematalogic:  heparin  Injectable 5000 Unit(s) SubCutaneous every 8 hours    Other:  acetaminophen   Tablet 650 milliGRAM(s) Oral every 6 hours PRN  ascorbic acid 500 milliGRAM(s) Oral daily  cinacalcet 30 milliGRAM(s) Oral daily  collagenase Ointment 1 Application(s) Topical daily  Dakins Solution - 1/4 Strength 1 Application(s) Topical two times a day  dextrose 5%. 1000 milliLiter(s) IV Continuous <Continuous>  dextrose 50% Injectable 12.5 Gram(s) IV Push once  dextrose 50% Injectable 25 Gram(s) IV Push once  dextrose 50% Injectable 25 Gram(s) IV Push once  dextrose Gel 1 Dose(s) Oral once PRN  docusate sodium 100 milliGRAM(s) Oral two times a day  ferrous    sulfate Liquid 300 milliGRAM(s) Enteral Tube daily  folic acid 1 milliGRAM(s) Oral daily  gabapentin 100 milliGRAM(s) Oral three times a day  glucagon  Injectable 1 milliGRAM(s) IntraMuscular once PRN  insulin lispro (HumaLOG) corrective regimen sliding scale   SubCutaneous three times a day before meals  levothyroxine 50 MICROGram(s) Oral daily  multivitamin 1 Tablet(s) Oral daily  pantoprazole    Tablet 40 milliGRAM(s) Oral before breakfast  senna 2 Tablet(s) Oral at bedtime  simvastatin 20 milliGRAM(s) Oral at bedtime  zinc sulfate 220 milliGRAM(s) Oral daily    acetaminophen   Tablet 650 milliGRAM(s) Oral every 6 hours PRN  ascorbic acid 500 milliGRAM(s) Oral daily  cinacalcet 30 milliGRAM(s) Oral daily  collagenase Ointment 1 Application(s) Topical daily  Dakins Solution - 1/4 Strength 1 Application(s) Topical two times a day  dextrose 5%. 1000 milliLiter(s) IV Continuous <Continuous>  dextrose 50% Injectable 12.5 Gram(s) IV Push once  dextrose 50% Injectable 25 Gram(s) IV Push once  dextrose 50% Injectable 25 Gram(s) IV Push once  dextrose Gel 1 Dose(s) Oral once PRN  docusate sodium 100 milliGRAM(s) Oral two times a day  ferrous    sulfate Liquid 300 milliGRAM(s) Enteral Tube daily  folic acid 1 milliGRAM(s) Oral daily  gabapentin 100 milliGRAM(s) Oral three times a day  glucagon  Injectable 1 milliGRAM(s) IntraMuscular once PRN  heparin  Injectable 5000 Unit(s) SubCutaneous every 8 hours  hydrALAZINE 50 milliGRAM(s) Oral three times a day  insulin lispro (HumaLOG) corrective regimen sliding scale   SubCutaneous three times a day before meals  levothyroxine 50 MICROGram(s) Oral daily  meropenem  IVPB 500 milliGRAM(s) IV Intermittent every 24 hours  metoprolol tartrate 25 milliGRAM(s) Oral two times a day  multivitamin 1 Tablet(s) Oral daily  pantoprazole    Tablet 40 milliGRAM(s) Oral before breakfast  senna 2 Tablet(s) Oral at bedtime  simvastatin 20 milliGRAM(s) Oral at bedtime  vancomycin  IVPB 1000 milliGRAM(s) IV Intermittent <User Schedule>  zinc sulfate 220 milliGRAM(s) Oral daily    Drug Dosing Weight  Height (cm): 170.18 (05 Apr 2018 08:07)  Weight (kg): 45.8 (05 Apr 2018 08:05)  BMI (kg/m2): 15.8 (05 Apr 2018 08:07)  BSA (m2): 1.51 (05 Apr 2018 08:07)      Vital Signs Last 24 Hrs  T(C): 36.8 (16 Apr 2018 07:45), Max: 36.8 (16 Apr 2018 07:45)  T(F): 98.3 (16 Apr 2018 07:45), Max: 98.3 (16 Apr 2018 07:45)  HR: 71 (16 Apr 2018 07:45) (71 - 79)  BP: 156/72 (16 Apr 2018 07:45) (121/68 - 157/73)  BP(mean): --  RR: 17 (16 Apr 2018 07:45) (16 - 17)  SpO2: 100% (16 Apr 2018 07:45) (99% - 100%)          I&O's Summary      PHYSICAL EXAM:  GENERAL: NAD, well-groomed, thin  HEAD:  Atraumatic, Normocephalic  EYES: EOMI, conjunctiva and sclera clear  NECK: Supple, No JVD  NERVOUS SYSTEM:  Alert & Oriented X3, Good concentration; Motor Strength 3/5 B/L upper and lower extremities  CHEST/LUNG: Clear to percussion bilaterally; No rales, rhonchi, wheezing  HEART: Regular rate and rhythm; No murmurs, rubs, or gallops  ABDOMEN: Soft, Nontender, Nondistended; Bowel sounds present  EXTREMITIES:  No clubbing, cyanosis, or edema  LYMPH: No lymphadenopathy noted  SKIN: No rashes; multiple decubiti on right hip, sacrum and upper back      LABS:  CBC Full  -  ( 16 Apr 2018 07:58 )  WBC Count : 14.2 K/uL  Hemoglobin : 9.1 g/dL  Hematocrit : 29.1 %  Platelet Count - Automated : 455 K/uL  Mean Cell Volume : 90.6 fl  Mean Cell Hemoglobin : 28.2 pg  Mean Cell Hemoglobin Concentration : 31.1 gm/dL  Auto Neutrophil # : x  Auto Lymphocyte # : x  Auto Monocyte # : x  Auto Eosinophil # : x  Auto Basophil # : x  Auto Neutrophil % : x  Auto Lymphocyte % : x  Auto Monocyte % : x  Auto Eosinophil % : x  Auto Basophil % : x    04-16    133<L>  |  95<L>  |  65<H>  ----------------------------<  117<H>  5.2   |  26  |  7.12<H>    Ca    9.0      16 Apr 2018 07:58  Phos  7.3     04-16  Mg     2.4     04-16    TPro  6.8  /  Alb  2.2<L>  /  TBili  0.3  /  DBili  x   /  AST  19  /  ALT  12  /  AlkPhos  146<H>  04-16            RADIOLOGY & ADDITIONAL STUDIES REVIEWED:  Yes    [ ]GOALS OF CARE DISCUSSION WITH PATIENT/FAMILY/PROXY: [All other systems negative] : All other systems negative

## 2024-05-07 NOTE — PROGRESS NOTE ADULT - PROBLEM SELECTOR PLAN 1
Afebrile, leukocytosis slightly trending down   The source is likely many pressure ulcers.   BC no growth to date   CXR: no consolidations   On Meropenem   Wound care following [phone]

## 2024-06-24 NOTE — PHYSICAL THERAPY INITIAL EVALUATION ADULT - PLANNED THERAPY INTERVENTIONS, PT EVAL
c/o worsening R sided hip, back, leg pain s/p return from  MRI completed on 06/20 with orthopedic f/u tomorrow. on Methylprednisolone. denies trauma/falls, swelling. Hx GERD, Mitral Valve Replacement. appears uncomfortable.
strengthening/balance training/gait training/bed mobility training/transfer training/ROM
strengthening/balance training/bed mobility training/ROM/transfer training/gait training/postural re-education

## 2024-06-24 NOTE — ED ADULT NURSE NOTE - CAS DISCH ACCOMP BY
No protocol for requested medication.    Medication: clonazePAM (KlonoPIN) 0.5 MG tablet   Last office visit date: 04/05/2024  Pharmacy: [id number] - [place], [place] - [address]    Order pended, routed to clinician for review.     Thank you for calling [facility], your medication(s) have been sent to your provider for further review. Please contact your provider if you have not received an update within 48 hours regarding your medication refill. Please do not respond to this message.       Transporter/RN/MD

## 2024-07-12 NOTE — PROGRESS NOTE ADULT - SUBJECTIVE AND OBJECTIVE BOX
----- Message from Kalie Shah LPN sent at 7/11/2024  2:09 PM EDT -----  Regarding: FW: [facility] Appointment Request    ----- Message -----  From: Julissa Kaur  Sent: 7/11/2024   2:01 PM EDT  To: #  Subject: [facility] Appointment Request                          [facility] Appointment Request    Patient needs appointment for [facility] Appointment Type: New to Provider.    Patient Requested Dates(s): Any date (ASAP)   Patient Requested Time: Any time (Mid day)  Provider Name: Desirae Deng DO    Reason for Appointment Request: Established Patient - No appointments available during search    Patient asking for an urgent appointment for her condition  --------------------------------------------------------------------------------------------------------------------------    Relationship to Patient: Self     Call Back Information: Do not leave any message, patient will call back for answer  Preferred Call Back Number: Phone: [phone]              INTERVAL /OVERNIGHT EVENTS: pt was hypoglcyemic overnight to 45 and had episode of dark stool, pt had hypoxic ABG and FiO2 was increased to 90%    PRESSORS: [ ] YES [x ] NO      ANTIBIOTICS:   Vancomycin, Zosyn               DATE STARTED:     Antimicrobial:  piperacillin/tazobactam IVPB. 3.375 Gram(s) IV Intermittent every 12 hours  vancomycin  IVPB 500 milliGRAM(s) IV Intermittent every 12 hours    Cardiovascular:  amLODIPine   Tablet 10 milliGRAM(s) Oral daily  carvedilol 25 milliGRAM(s) Oral every 12 hours  furosemide   Injectable 40 milliGRAM(s) IV Push two times a day  hydrALAZINE 100 milliGRAM(s) Oral every 8 hours    Pulmonary:  ALBUTerol/ipratropium for Nebulization 3 milliLiter(s) Nebulizer every 6 hours    Hematalogic:  aspirin  chewable 81 milliGRAM(s) Oral daily  heparin  Injectable 5000 Unit(s) SubCutaneous every 8 hours    Other:  acetaminophen  Suppository 650 milliGRAM(s) Rectal every 6 hours PRN  ascorbic acid 500 milliGRAM(s) Oral daily  cinacalcet 30 milliGRAM(s) Oral daily  dextrose 5%. 1000 milliLiter(s) IV Continuous <Continuous>  dextrose 50% Injectable 12.5 Gram(s) IV Push once  dextrose 50% Injectable 25 Gram(s) IV Push once  dextrose 50% Injectable 25 Gram(s) IV Push once  dextrose Gel 1 Dose(s) Oral once PRN  docusate sodium 100 milliGRAM(s) Oral three times a day  epoetin jacqueline Injectable 48197 Unit(s) IV Push <User Schedule>  ferrous    sulfate 325 milliGRAM(s) Oral two times a day with meals  folic acid 1 milliGRAM(s) Oral daily  glucagon  Injectable 1 milliGRAM(s) IntraMuscular once PRN  levothyroxine 50 MICROGram(s) Oral daily  multivitamin 1 Tablet(s) Oral daily  pantoprazole    Tablet 40 milliGRAM(s) Oral before breakfast  pregabalin 50 milliGRAM(s) Oral two times a day  senna 2 Tablet(s) Oral at bedtime  simvastatin 20 milliGRAM(s) Oral at bedtime  sodium bicarbonate 650 milliGRAM(s) Oral two times a day    acetaminophen  Suppository 650 milliGRAM(s) Rectal every 6 hours PRN  ALBUTerol/ipratropium for Nebulization 3 milliLiter(s) Nebulizer every 6 hours  amLODIPine   Tablet 10 milliGRAM(s) Oral daily  ascorbic acid 500 milliGRAM(s) Oral daily  aspirin  chewable 81 milliGRAM(s) Oral daily  carvedilol 25 milliGRAM(s) Oral every 12 hours  cinacalcet 30 milliGRAM(s) Oral daily  dextrose 5%. 1000 milliLiter(s) IV Continuous <Continuous>  dextrose 50% Injectable 12.5 Gram(s) IV Push once  dextrose 50% Injectable 25 Gram(s) IV Push once  dextrose 50% Injectable 25 Gram(s) IV Push once  dextrose Gel 1 Dose(s) Oral once PRN  docusate sodium 100 milliGRAM(s) Oral three times a day  epoetin jacqueline Injectable 86513 Unit(s) IV Push <User Schedule>  ferrous    sulfate 325 milliGRAM(s) Oral two times a day with meals  folic acid 1 milliGRAM(s) Oral daily  furosemide   Injectable 40 milliGRAM(s) IV Push two times a day  glucagon  Injectable 1 milliGRAM(s) IntraMuscular once PRN  heparin  Injectable 5000 Unit(s) SubCutaneous every 8 hours  hydrALAZINE 100 milliGRAM(s) Oral every 8 hours  levothyroxine 50 MICROGram(s) Oral daily  multivitamin 1 Tablet(s) Oral daily  pantoprazole    Tablet 40 milliGRAM(s) Oral before breakfast  piperacillin/tazobactam IVPB. 3.375 Gram(s) IV Intermittent every 12 hours  pregabalin 50 milliGRAM(s) Oral two times a day  senna 2 Tablet(s) Oral at bedtime  simvastatin 20 milliGRAM(s) Oral at bedtime  sodium bicarbonate 650 milliGRAM(s) Oral two times a day  vancomycin  IVPB 500 milliGRAM(s) IV Intermittent every 12 hours    Drug Dosing Weight  Height (cm): 170 (13 Dec 2017 17:16)  Weight (kg): 57 (13 Dec 2017 17:16)  BMI (kg/m2): 19.7 (13 Dec 2017 17:16)  BSA (m2): 1.66 (13 Dec 2017 17:16)    CENTRAL LINE: [ ] YES [x ] NO  JARRELL: [x ] YES [ ] NO    DATE INSERTED:   REMOVE:  [ ] YES [x ] NO  EXPLAIN: urine output monitoring    A-LINE:  [ ] YES [x ] NO     ICU Vital Signs Last 24 Hrs  T(C): 38.8 (14 Dec 2017 04:00), Max: 38.8 (14 Dec 2017 04:00)  T(F): 101.9 (14 Dec 2017 04:00), Max: 101.9 (14 Dec 2017 04:00)  HR: 80 (14 Dec 2017 08:15) (77 - 94)  BP: 124/63 (14 Dec 2017 07:00) (124/63 - 164/69)  BP(mean): 76 (14 Dec 2017 07:00) (76 - 94)  RR: 12 (14 Dec 2017 07:00) (12 - 27)  SpO2: 98% (14 Dec 2017 08:15) (86% - 99%)      ABG - ( 14 Dec 2017 05:15 )  pH: 7.39  /  pCO2: 45    /  pO2: 56    / HCO3: 27    / Base Excess: 2.1   /  SaO2: 88                    - @ 07:01  -  -14 @ 07:00  --------------------------------------------------------  IN: 600 mL / OUT: 1115 mL / NET: -515 mL            PHYSICAL EXAM:    GENERAL: no acute distress, on BiPAP  HEAD: atraumatic, normocephalic   EYES: PERRL, white sclera.   ENMT: oral cavity - moist  NECK: supple,  no JVD  LYMPH: no palpable lymph nodes     SKIN: warm, dry   CHEST/LUNG:  No Chest deformity , no chest tenderness. bilateral breath sounds, no  adventitious sounds  HEART: RRR, no m/r/g   ABDOMEN: soft, nontender, nondistended; bowel sounds.  :jarrell catheter.  EXTREMITIES: +1 non-pitting edema, no cyanosis, no clubbing.  NEURO: AA0X , mood/ affect-, no focal neuro deficits        LABS:  CBC Full  -  ( 14 Dec 2017 06:26 )  WBC Count : 13.7 K/uL  Hemoglobin : 7.3 g/dL  Hematocrit : 23.7 %  Platelet Count - Automated : 123 K/uL  Mean Cell Volume : 94.6 fl  Mean Cell Hemoglobin : 29.1 pg  Mean Cell Hemoglobin Concentration : 30.8 gm/dL  Auto Neutrophil # : x  Auto Lymphocyte # : x  Auto Monocyte # : x  Auto Eosinophil # : x  Auto Basophil # : x  Auto Neutrophil % : x  Auto Lymphocyte % : x  Auto Monocyte % : x  Auto Eosinophil % : x  Auto Basophil % : x    12-14    146<H>  |  109<H>  |  91<H>  ----------------------------<  45<LL>  4.1   |  26  |  6.53<H>    Ca    8.8      14 Dec 2017 06:26    TPro  8.1  /  Alb  3.4<L>  /  TBili  0.6  /  DBili  x   /  AST  31  /  ALT  17  /  AlkPhos  77  12-13      Urinalysis Basic - ( 13 Dec 2017 14:12 )    Color: Yellow / Appearance: Slightly Turbid / S.015 / pH: x  Gluc: x / Ketone: Negative  / Bili: Negative / Urobili: Negative   Blood: x / Protein: 100 / Nitrite: Negative   Leuk Esterase: Small / RBC: 0-2 /HPF / WBC 3-5 /HPF   Sq Epi: x / Non Sq Epi: Occasional /HPF / Bacteria: Trace /HPF          RADIOLOGY & ADDITIONAL STUDIES REVIEWED:     [ ]GOALS OF CARE DISCUSSION WITH PATIENT/FAMILY/PROXY:    CRITICAL CARE TIME SPENT: 35 minutes INTERVAL /OVERNIGHT EVENTS: pt was hypoglcyemic overnight to 45 and had episode of dark stool, pt had hypoxic ABG and FiO2 was increased to 90%    PRESSORS: [ ] YES [x ] NO      ANTIBIOTICS:   Vancomycin, Zosyn               DATE STARTED:     Antimicrobial:  piperacillin/tazobactam IVPB. 3.375 Gram(s) IV Intermittent every 12 hours  vancomycin  IVPB 500 milliGRAM(s) IV Intermittent every 12 hours    Cardiovascular:  amLODIPine   Tablet 10 milliGRAM(s) Oral daily  carvedilol 25 milliGRAM(s) Oral every 12 hours  furosemide   Injectable 40 milliGRAM(s) IV Push two times a day  hydrALAZINE 100 milliGRAM(s) Oral every 8 hours    Pulmonary:  ALBUTerol/ipratropium for Nebulization 3 milliLiter(s) Nebulizer every 6 hours    Hematalogic:  aspirin  chewable 81 milliGRAM(s) Oral daily  heparin  Injectable 5000 Unit(s) SubCutaneous every 8 hours    Other:  acetaminophen  Suppository 650 milliGRAM(s) Rectal every 6 hours PRN  ascorbic acid 500 milliGRAM(s) Oral daily  cinacalcet 30 milliGRAM(s) Oral daily  dextrose 5%. 1000 milliLiter(s) IV Continuous <Continuous>  dextrose 50% Injectable 12.5 Gram(s) IV Push once  dextrose 50% Injectable 25 Gram(s) IV Push once  dextrose 50% Injectable 25 Gram(s) IV Push once  dextrose Gel 1 Dose(s) Oral once PRN  docusate sodium 100 milliGRAM(s) Oral three times a day  epoetin jacqueline Injectable 89468 Unit(s) IV Push <User Schedule>  ferrous    sulfate 325 milliGRAM(s) Oral two times a day with meals  folic acid 1 milliGRAM(s) Oral daily  glucagon  Injectable 1 milliGRAM(s) IntraMuscular once PRN  levothyroxine 50 MICROGram(s) Oral daily  multivitamin 1 Tablet(s) Oral daily  pantoprazole    Tablet 40 milliGRAM(s) Oral before breakfast  pregabalin 50 milliGRAM(s) Oral two times a day  senna 2 Tablet(s) Oral at bedtime  simvastatin 20 milliGRAM(s) Oral at bedtime  sodium bicarbonate 650 milliGRAM(s) Oral two times a day    acetaminophen  Suppository 650 milliGRAM(s) Rectal every 6 hours PRN  ALBUTerol/ipratropium for Nebulization 3 milliLiter(s) Nebulizer every 6 hours  amLODIPine   Tablet 10 milliGRAM(s) Oral daily  ascorbic acid 500 milliGRAM(s) Oral daily  aspirin  chewable 81 milliGRAM(s) Oral daily  carvedilol 25 milliGRAM(s) Oral every 12 hours  cinacalcet 30 milliGRAM(s) Oral daily  dextrose 5%. 1000 milliLiter(s) IV Continuous <Continuous>  dextrose 50% Injectable 12.5 Gram(s) IV Push once  dextrose 50% Injectable 25 Gram(s) IV Push once  dextrose 50% Injectable 25 Gram(s) IV Push once  dextrose Gel 1 Dose(s) Oral once PRN  docusate sodium 100 milliGRAM(s) Oral three times a day  epoetin jacqueline Injectable 57895 Unit(s) IV Push <User Schedule>  ferrous    sulfate 325 milliGRAM(s) Oral two times a day with meals  folic acid 1 milliGRAM(s) Oral daily  furosemide   Injectable 40 milliGRAM(s) IV Push two times a day  glucagon  Injectable 1 milliGRAM(s) IntraMuscular once PRN  heparin  Injectable 5000 Unit(s) SubCutaneous every 8 hours  hydrALAZINE 100 milliGRAM(s) Oral every 8 hours  levothyroxine 50 MICROGram(s) Oral daily  multivitamin 1 Tablet(s) Oral daily  pantoprazole    Tablet 40 milliGRAM(s) Oral before breakfast  piperacillin/tazobactam IVPB. 3.375 Gram(s) IV Intermittent every 12 hours  pregabalin 50 milliGRAM(s) Oral two times a day  senna 2 Tablet(s) Oral at bedtime  simvastatin 20 milliGRAM(s) Oral at bedtime  sodium bicarbonate 650 milliGRAM(s) Oral two times a day  vancomycin  IVPB 500 milliGRAM(s) IV Intermittent every 12 hours    Drug Dosing Weight  Height (cm): 170 (13 Dec 2017 17:16)  Weight (kg): 57 (13 Dec 2017 17:16)  BMI (kg/m2): 19.7 (13 Dec 2017 17:16)  BSA (m2): 1.66 (13 Dec 2017 17:16)    CENTRAL LINE: [ ] YES [x ] NO  JARRELL: [x ] YES [ ] NO    DATE INSERTED:   REMOVE:  [ ] YES [x ] NO  EXPLAIN: urine output monitoring    A-LINE:  [ ] YES [x ] NO     ICU Vital Signs Last 24 Hrs  T(C): 38.8 (14 Dec 2017 04:00), Max: 38.8 (14 Dec 2017 04:00)  T(F): 101.9 (14 Dec 2017 04:00), Max: 101.9 (14 Dec 2017 04:00)  HR: 80 (14 Dec 2017 08:15) (77 - 94)  BP: 124/63 (14 Dec 2017 07:00) (124/63 - 164/69)  BP(mean): 76 (14 Dec 2017 07:00) (76 - 94)  RR: 12 (14 Dec 2017 07:00) (12 - 27)  SpO2: 98% (14 Dec 2017 08:15) (86% - 99%)      ABG - ( 14 Dec 2017 05:15 )  pH: 7.39  /  pCO2: 45    /  pO2: 56    / HCO3: 27    / Base Excess: 2.1   /  SaO2: 88                    - @ 07:01  -  -14 @ 07:00  --------------------------------------------------------  IN: 600 mL / OUT: 1115 mL / NET: -515 mL            PHYSICAL EXAM:    GENERAL: no acute distress, on BiPAP  HEAD: atraumatic, normocephalic   EYES: PERRL, white sclera.   ENMT: oral cavity - moist  NECK: supple,  no JVD  LYMPH: no palpable lymph nodes     SKIN: warm, dry   CHEST/LUNG:  No Chest deformity , no chest tenderness. left-sided wheezing  HEART: RRR, no m/r/g   ABDOMEN: soft, nontender, nondistended; bowel sounds, umbilical hernia  :jarrell catheter.  EXTREMITIES: +1 non-pitting edema, no cyanosis, no clubbing.  NEURO: AA0X 3, no focal neuro deficits        LABS:  CBC Full  -  ( 14 Dec 2017 06:26 )  WBC Count : 13.7 K/uL  Hemoglobin : 7.3 g/dL  Hematocrit : 23.7 %  Platelet Count - Automated : 123 K/uL  Mean Cell Volume : 94.6 fl  Mean Cell Hemoglobin : 29.1 pg  Mean Cell Hemoglobin Concentration : 30.8 gm/dL  Auto Neutrophil # : x  Auto Lymphocyte # : x  Auto Monocyte # : x  Auto Eosinophil # : x  Auto Basophil # : x  Auto Neutrophil % : x  Auto Lymphocyte % : x  Auto Monocyte % : x  Auto Eosinophil % : x  Auto Basophil % : x    12-14    146<H>  |  109<H>  |  91<H>  ----------------------------<  45<LL>  4.1   |  26  |  6.53<H>    Ca    8.8      14 Dec 2017 06:26    TPro  8.1  /  Alb  3.4<L>  /  TBili  0.6  /  DBili  x   /  AST  31  /  ALT  17  /  AlkPhos  77  12-13      Urinalysis Basic - ( 13 Dec 2017 14:12 )    Color: Yellow / Appearance: Slightly Turbid / S.015 / pH: x  Gluc: x / Ketone: Negative  / Bili: Negative / Urobili: Negative   Blood: x / Protein: 100 / Nitrite: Negative   Leuk Esterase: Small / RBC: 0-2 /HPF / WBC 3-5 /HPF   Sq Epi: x / Non Sq Epi: Occasional /HPF / Bacteria: Trace /HPF          RADIOLOGY & ADDITIONAL STUDIES REVIEWED:     [ ]GOALS OF CARE DISCUSSION WITH PATIENT/FAMILY/PROXY:    CRITICAL CARE TIME SPENT: 35 minutes

## 2025-02-22 NOTE — ED PROVIDER NOTE - CARDIAC, MLM
" LOS: 12 days     Name: Chrissy Gutierrez  Age: 74 y.o.  Sex: female  :  1950  MRN: [id number]         Primary Care Physician: Jai Steven MD    Subjective   Subjective  No new complaints or acute overnight events    Objective   Vital Signs  Temp:  [97.3 °F (36.3 °C)-98.3 °F (36.8 °C)] 97.8 °F (36.6 °C)  Heart Rate:  [79-98] 79  Resp:  [16-20] 18  BP: (120-150)/(73-84) 125/84  Body mass index is 31.95 kg/m².    Objective:  General Appearance:  Comfortable and in no acute distress.    Vital signs: (most recent): Blood pressure 125/84, pulse 79, temperature 97.8 °F (36.6 °C), temperature source Oral, resp. rate 18, height 170.2 cm (67\"), weight 92.5 kg (204 lb), SpO2 96%.    Lungs:  Normal effort and normal respiratory rate.  She is not in respiratory distress.  There are decreased breath sounds.    Heart: Normal rate.  Regular rhythm.    Abdomen: Abdomen is soft.  Bowel sounds are normal.   There is no abdominal tenderness.     Extremities: There is no dependent edema or local swelling.    Neurological: Patient is alert and oriented to person, place and time.    Skin:  Warm and dry.                Results Review:       I reviewed the patient's new clinical results.    Results from last 7 days   Lab Units 25  0344 25  0442 25  0631 25  0908 25  0417   WBC 10*3/mm3 13.56* 13.74* 13.47* 12.74* 11.70*   HEMOGLOBIN g/dL 11.2* 11.2* 11.1* 10.9* 10.6*   PLATELETS 10*3/mm3 545* 601* 577* 575* 483*     Results from last 7 days   Lab Units 25  0344 25  1418 25  0442 25  0631 25  0909 25  0417   SODIUM mmol/L 128* 134* 138 138 138 139   POTASSIUM mmol/L 3.8 4.4 4.5 4.6 4.2 4.8   CHLORIDE mmol/L 92* 94* 100 100 99 102   CO2 mmol/L 22.0 23.1 24.9 23.1 24.7 22.9   BUN mg/dL 64* 60* 74* 71* 65* 66*   CREATININE mg/dL 1.24* 1.45* 1.53* 1.37* 1.31* 1.49*   CALCIUM mg/dL 8.9 8.8 9.5 9.2 9.1 8.7   GLUCOSE mg/dL 135* 331* 158* 188* 223* 240*       "           Scheduled Meds:   aspirin, 81 mg, Oral, BID  carvedilol, 12.5 mg, Oral, BID With Meals  cefTRIAXone, 2,000 mg, Intravenous, Q24H  empagliflozin, 25 mg, Oral, Daily  furosemide, 20 mg, Oral, Daily  glipizide, 5 mg, Oral, QAM AC  insulin glargine, 10 Units, Subcutaneous, Nightly  insulin lispro, 3-14 Units, Subcutaneous, 4x Daily AC & at Bedtime  ipratropium-albuterol, 3 mL, Nebulization, Q6H While Awake - RT  predniSONE, 20 mg, Oral, Daily With Breakfast  sodium chloride, 10 mL, Intravenous, Q12H      PRN Meds:     senna-docusate sodium **AND** polyethylene glycol **AND** bisacodyl **AND** bisacodyl    calcium carbonate    dextrose    dextrose    glucagon (human recombinant)    ipratropium-albuterol    methocarbamol    nitroglycerin    ondansetron ODT **OR** ondansetron    oxyCODONE    sodium chloride    sodium chloride    sodium chloride  Continuous Infusions:       Assessment & Plan   Active Hospital Problems    Diagnosis  POA    **Bacterial pneumonia [J15.9]  Yes    Sepsis, unspecified organism [A41.9]  Yes    Influenza A [J10.1]  Yes    Acute pain of left knee [M25.562]  Yes    Type 2 diabetes mellitus with hyperglycemia, with long-term current use of insulin [E11.65, Z79.4]  Not Applicable    CKD (chronic kidney disease) stage 3, GFR 30-59 ml/min [N18.30]  Yes    Acute respiratory failure with hypoxia [J96.01]  Yes      Resolved Hospital Problems   No resolved problems to display.       Assessment & Plan    1.  Acute respiratory failure with hypoxia, bacterial pneumonia, recent history of flu infection, superimposed bacterial pneumonia. Treated with antibiotic including Rocephin and doxycycline.  No further need of antibiotics for pneumonia but she remains on these for possible septic arthritis of the knee as outlined below.     2.  Acute pain in the left knee, S/P OR for debridement and washout.  ID recommends a 4-week course of IV antibiotics.  Patient now agreeable to IV antibiotics.  Continue  prednisone taper.  PICC line to be placed just before discharge     3.  Acute on chronic kidney injury/hyponatremia.  Management as per nephrology.     4.  Diabetes mellitus.  Blood sugars reasonably controlled this morning.  Continue present insulin regimen.     5.  Transaminitis.  Gastroenterology suspected muscle related injury.  LFTs trending down     6.  CODE STATUS is full code.       Rehab facility has been found.  Awaiting insurance         Expected Discharge Date: 2/22/2025; Expected Discharge Time:      Silvestre Pierre MD  [facility]  02/22/25  09:45 EST     Normal rate, regular rhythm.  Heart sounds S1, S2.  No murmurs, rubs or gallops.

## 2025-03-07 NOTE — DISCHARGE NOTE ADULT - IF YOU ARE A SMOKER, IT IS IMPORTANT FOR YOUR HEALTH TO STOP SMOKING. PLEASE BE AWARE THAT SECOND HAND SMOKE IS ALSO HARMFUL.
Slightly larger size [id number] mass in liver t hat could be a bundle of blood vessels. Rec MRI as suggested by radiology  F/u with PCP after MRI results  Called pt.   Left voicemail   Ok to order MRI as suggested in US per radiology   Please notify patient Statement Selected